# Patient Record
Sex: FEMALE | Race: WHITE | Employment: OTHER | URBAN - METROPOLITAN AREA
[De-identification: names, ages, dates, MRNs, and addresses within clinical notes are randomized per-mention and may not be internally consistent; named-entity substitution may affect disease eponyms.]

---

## 2017-03-03 PROBLEM — M17.12 PRIMARY OSTEOARTHRITIS OF LEFT KNEE: Status: ACTIVE | Noted: 2017-03-03

## 2017-03-23 PROBLEM — J44.9 COPD (CHRONIC OBSTRUCTIVE PULMONARY DISEASE) (HCC): Chronic | Status: ACTIVE | Noted: 2017-03-23

## 2017-03-23 PROBLEM — Z87.19 HISTORY OF ISCHEMIC BOWEL DISEASE: Chronic | Status: ACTIVE | Noted: 2017-03-23

## 2017-03-23 PROBLEM — N18.30 CHRONIC KIDNEY DISEASE, STAGE III (MODERATE) (HCC): Chronic | Status: ACTIVE | Noted: 2017-03-23

## 2017-03-23 PROBLEM — Z93.2 ILEOSTOMY PRESENT (HCC): Chronic | Status: ACTIVE | Noted: 2017-03-23

## 2018-06-13 ENCOUNTER — HOSPITAL ENCOUNTER (OUTPATIENT)
Dept: CT IMAGING | Age: 78
Discharge: HOME | End: 2018-06-15
Payer: MEDICARE

## 2018-06-13 DIAGNOSIS — R52 PAIN: ICD-10-CM

## 2018-06-13 PROCEDURE — 74160 CT ABDOMEN W/CONTRAST: CPT

## 2018-06-13 PROCEDURE — 6360000004 HC RX CONTRAST MEDICATION: Performed by: RADIOLOGY

## 2018-06-13 RX ADMIN — IOPAMIDOL 110 ML: 755 INJECTION, SOLUTION INTRAVENOUS at 15:20

## 2018-06-13 RX ADMIN — IOHEXOL 50 ML: 240 INJECTION, SOLUTION INTRATHECAL; INTRAVASCULAR; INTRAVENOUS; ORAL at 15:21

## 2018-07-05 ENCOUNTER — HOSPITAL ENCOUNTER (INPATIENT)
Age: 78
LOS: 2 days | Discharge: HOME | DRG: 683 | End: 2018-07-07
Attending: EMERGENCY MEDICINE | Admitting: INTERNAL MEDICINE
Payer: MEDICARE

## 2018-07-05 ENCOUNTER — APPOINTMENT (OUTPATIENT)
Dept: CT IMAGING | Age: 78
DRG: 683 | End: 2018-07-05
Payer: MEDICARE

## 2018-07-05 DIAGNOSIS — E87.5 HYPERKALEMIA: Primary | ICD-10-CM

## 2018-07-05 DIAGNOSIS — R11.2 NON-INTRACTABLE VOMITING WITH NAUSEA, UNSPECIFIED VOMITING TYPE: ICD-10-CM

## 2018-07-05 DIAGNOSIS — N17.9 ACUTE KIDNEY INJURY (HCC): ICD-10-CM

## 2018-07-05 DIAGNOSIS — E86.0 DEHYDRATION: ICD-10-CM

## 2018-07-05 DIAGNOSIS — E87.1 HYPONATREMIA: ICD-10-CM

## 2018-07-05 LAB
ANION GAP SERPL CALCULATED.3IONS-SCNC: 16 MMOL/L (ref 7–16)
ANION GAP SERPL CALCULATED.3IONS-SCNC: 18 MMOL/L (ref 7–16)
BASOPHILS ABSOLUTE: 0.05 E9/L (ref 0–0.2)
BASOPHILS RELATIVE PERCENT: 0.5 % (ref 0–2)
BUN BLDV-MCNC: 49 MG/DL (ref 8–23)
BUN BLDV-MCNC: 52 MG/DL (ref 8–23)
CALCIUM SERPL-MCNC: 8.7 MG/DL (ref 8.6–10.2)
CALCIUM SERPL-MCNC: 9.8 MG/DL (ref 8.6–10.2)
CHLORIDE BLD-SCNC: 94 MMOL/L (ref 98–107)
CHLORIDE BLD-SCNC: 96 MMOL/L (ref 98–107)
CO2: 16 MMOL/L (ref 22–29)
CO2: 18 MMOL/L (ref 22–29)
CREAT SERPL-MCNC: 1.7 MG/DL (ref 0.5–1)
CREAT SERPL-MCNC: 2 MG/DL (ref 0.5–1)
EKG ATRIAL RATE: 65 BPM
EKG P AXIS: 63 DEGREES
EKG P-R INTERVAL: 174 MS
EKG Q-T INTERVAL: 402 MS
EKG QRS DURATION: 94 MS
EKG QTC CALCULATION (BAZETT): 418 MS
EKG R AXIS: -43 DEGREES
EKG T AXIS: 49 DEGREES
EKG VENTRICULAR RATE: 65 BPM
EOSINOPHILS ABSOLUTE: 0.13 E9/L (ref 0.05–0.5)
EOSINOPHILS RELATIVE PERCENT: 1.4 % (ref 0–6)
GFR AFRICAN AMERICAN: 29
GFR AFRICAN AMERICAN: 35
GFR NON-AFRICAN AMERICAN: 24 ML/MIN/1.73
GFR NON-AFRICAN AMERICAN: 29 ML/MIN/1.73
GLUCOSE BLD-MCNC: 138 MG/DL (ref 74–109)
GLUCOSE BLD-MCNC: 75 MG/DL (ref 74–109)
HCT VFR BLD CALC: 49.5 % (ref 34–48)
HEMOGLOBIN: 16.2 G/DL (ref 11.5–15.5)
IMMATURE GRANULOCYTES #: 0.08 E9/L
IMMATURE GRANULOCYTES %: 0.8 % (ref 0–5)
LACTIC ACID: 1.1 MMOL/L (ref 0.5–2.2)
LYMPHOCYTES ABSOLUTE: 1.51 E9/L (ref 1.5–4)
LYMPHOCYTES RELATIVE PERCENT: 16 % (ref 20–42)
MCH RBC QN AUTO: 32.1 PG (ref 26–35)
MCHC RBC AUTO-ENTMCNC: 32.7 % (ref 32–34.5)
MCV RBC AUTO: 98.2 FL (ref 80–99.9)
MONOCYTES ABSOLUTE: 0.94 E9/L (ref 0.1–0.95)
MONOCYTES RELATIVE PERCENT: 10 % (ref 2–12)
NEUTROPHILS ABSOLUTE: 6.73 E9/L (ref 1.8–7.3)
NEUTROPHILS RELATIVE PERCENT: 71.3 % (ref 43–80)
PDW BLD-RTO: 13.6 FL (ref 11.5–15)
PLATELET # BLD: 267 E9/L (ref 130–450)
PMV BLD AUTO: 11.4 FL (ref 7–12)
POTASSIUM REFLEX MAGNESIUM: 6.8 MMOL/L (ref 3.5–5)
POTASSIUM SERPL-SCNC: 5.4 MMOL/L (ref 3.5–5)
RBC # BLD: 5.04 E12/L (ref 3.5–5.5)
SODIUM BLD-SCNC: 128 MMOL/L (ref 132–146)
SODIUM BLD-SCNC: 130 MMOL/L (ref 132–146)
TROPONIN: <0.01 NG/ML (ref 0–0.03)
WBC # BLD: 9.4 E9/L (ref 4.5–11.5)

## 2018-07-05 PROCEDURE — 2060000000 HC ICU INTERMEDIATE R&B

## 2018-07-05 PROCEDURE — 93005 ELECTROCARDIOGRAM TRACING: CPT | Performed by: EMERGENCY MEDICINE

## 2018-07-05 PROCEDURE — 6370000000 HC RX 637 (ALT 250 FOR IP): Performed by: INTERNAL MEDICINE

## 2018-07-05 PROCEDURE — 83605 ASSAY OF LACTIC ACID: CPT

## 2018-07-05 PROCEDURE — 96374 THER/PROPH/DIAG INJ IV PUSH: CPT

## 2018-07-05 PROCEDURE — 96375 TX/PRO/DX INJ NEW DRUG ADDON: CPT

## 2018-07-05 PROCEDURE — 36415 COLL VENOUS BLD VENIPUNCTURE: CPT

## 2018-07-05 PROCEDURE — 85025 COMPLETE CBC W/AUTO DIFF WBC: CPT

## 2018-07-05 PROCEDURE — 96361 HYDRATE IV INFUSION ADD-ON: CPT

## 2018-07-05 PROCEDURE — 2580000003 HC RX 258: Performed by: EMERGENCY MEDICINE

## 2018-07-05 PROCEDURE — 6360000002 HC RX W HCPCS: Performed by: EMERGENCY MEDICINE

## 2018-07-05 PROCEDURE — 94664 DEMO&/EVAL PT USE INHALER: CPT

## 2018-07-05 PROCEDURE — 94640 AIRWAY INHALATION TREATMENT: CPT

## 2018-07-05 PROCEDURE — 99285 EMERGENCY DEPT VISIT HI MDM: CPT

## 2018-07-05 PROCEDURE — 74176 CT ABD & PELVIS W/O CONTRAST: CPT

## 2018-07-05 PROCEDURE — 2500000003 HC RX 250 WO HCPCS: Performed by: EMERGENCY MEDICINE

## 2018-07-05 PROCEDURE — 6360000004 HC RX CONTRAST MEDICATION: Performed by: RADIOLOGY

## 2018-07-05 PROCEDURE — 84484 ASSAY OF TROPONIN QUANT: CPT

## 2018-07-05 PROCEDURE — 80048 BASIC METABOLIC PNL TOTAL CA: CPT

## 2018-07-05 PROCEDURE — 6370000000 HC RX 637 (ALT 250 FOR IP): Performed by: EMERGENCY MEDICINE

## 2018-07-05 PROCEDURE — 94760 N-INVAS EAR/PLS OXIMETRY 1: CPT

## 2018-07-05 PROCEDURE — 2580000003 HC RX 258: Performed by: INTERNAL MEDICINE

## 2018-07-05 RX ORDER — ONDANSETRON 2 MG/ML
4 INJECTION INTRAMUSCULAR; INTRAVENOUS ONCE
Status: COMPLETED | OUTPATIENT
Start: 2018-07-05 | End: 2018-07-05

## 2018-07-05 RX ORDER — TRAZODONE HYDROCHLORIDE 50 MG/1
50 TABLET ORAL NIGHTLY
Status: DISCONTINUED | OUTPATIENT
Start: 2018-07-05 | End: 2018-07-07 | Stop reason: HOSPADM

## 2018-07-05 RX ORDER — SODIUM CHLORIDE 9 MG/ML
INJECTION, SOLUTION INTRAVENOUS CONTINUOUS
Status: DISCONTINUED | OUTPATIENT
Start: 2018-07-05 | End: 2018-07-05 | Stop reason: SDUPTHER

## 2018-07-05 RX ORDER — FENTANYL CITRATE 50 UG/ML
50 INJECTION, SOLUTION INTRAMUSCULAR; INTRAVENOUS ONCE
Status: COMPLETED | OUTPATIENT
Start: 2018-07-05 | End: 2018-07-05

## 2018-07-05 RX ORDER — SODIUM CHLORIDE 0.9 % (FLUSH) 0.9 %
10 SYRINGE (ML) INJECTION PRN
Status: DISCONTINUED | OUTPATIENT
Start: 2018-07-05 | End: 2018-07-07 | Stop reason: HOSPADM

## 2018-07-05 RX ORDER — PROPRANOLOL HYDROCHLORIDE 20 MG/1
20 TABLET ORAL 2 TIMES DAILY
Status: DISCONTINUED | OUTPATIENT
Start: 2018-07-05 | End: 2018-07-07 | Stop reason: HOSPADM

## 2018-07-05 RX ORDER — FENTANYL CITRATE 50 UG/ML
50 INJECTION, SOLUTION INTRAMUSCULAR; INTRAVENOUS ONCE
Status: DISCONTINUED | OUTPATIENT
Start: 2018-07-05 | End: 2018-07-06

## 2018-07-05 RX ORDER — PRIMIDONE 250 MG/1
250 TABLET ORAL NIGHTLY
Status: DISCONTINUED | OUTPATIENT
Start: 2018-07-05 | End: 2018-07-07 | Stop reason: HOSPADM

## 2018-07-05 RX ORDER — PRAVASTATIN SODIUM 20 MG
40 TABLET ORAL EVERY EVENING
Status: DISCONTINUED | OUTPATIENT
Start: 2018-07-05 | End: 2018-07-07 | Stop reason: HOSPADM

## 2018-07-05 RX ORDER — SODIUM CHLORIDE 0.9 % (FLUSH) 0.9 %
10 SYRINGE (ML) INJECTION EVERY 12 HOURS SCHEDULED
Status: DISCONTINUED | OUTPATIENT
Start: 2018-07-05 | End: 2018-07-07 | Stop reason: HOSPADM

## 2018-07-05 RX ORDER — OXYCODONE AND ACETAMINOPHEN 10; 325 MG/1; MG/1
1 TABLET ORAL EVERY 6 HOURS PRN
Status: DISCONTINUED | OUTPATIENT
Start: 2018-07-05 | End: 2018-07-07 | Stop reason: HOSPADM

## 2018-07-05 RX ORDER — ALBUTEROL SULFATE 2.5 MG/3ML
10 SOLUTION RESPIRATORY (INHALATION) ONCE
Status: COMPLETED | OUTPATIENT
Start: 2018-07-05 | End: 2018-07-05

## 2018-07-05 RX ORDER — ONDANSETRON 2 MG/ML
4 INJECTION INTRAMUSCULAR; INTRAVENOUS EVERY 6 HOURS PRN
Status: DISCONTINUED | OUTPATIENT
Start: 2018-07-05 | End: 2018-07-07 | Stop reason: HOSPADM

## 2018-07-05 RX ORDER — METOCLOPRAMIDE 5 MG/1
5 TABLET ORAL
Status: DISCONTINUED | OUTPATIENT
Start: 2018-07-06 | End: 2018-07-07 | Stop reason: HOSPADM

## 2018-07-05 RX ORDER — DILTIAZEM HYDROCHLORIDE 180 MG/1
180 CAPSULE, COATED, EXTENDED RELEASE ORAL DAILY
Status: DISCONTINUED | OUTPATIENT
Start: 2018-07-06 | End: 2018-07-06

## 2018-07-05 RX ORDER — SODIUM CHLORIDE 9 MG/ML
INJECTION, SOLUTION INTRAVENOUS CONTINUOUS
Status: DISCONTINUED | OUTPATIENT
Start: 2018-07-05 | End: 2018-07-07

## 2018-07-05 RX ORDER — PANTOPRAZOLE SODIUM 40 MG/1
40 GRANULE, DELAYED RELEASE ORAL DAILY
Status: DISCONTINUED | OUTPATIENT
Start: 2018-07-06 | End: 2018-07-07 | Stop reason: HOSPADM

## 2018-07-05 RX ORDER — SODIUM CHLORIDE 0.9 % (FLUSH) 0.9 %
10 SYRINGE (ML) INJECTION PRN
Status: DISCONTINUED | OUTPATIENT
Start: 2018-07-05 | End: 2018-07-05

## 2018-07-05 RX ORDER — 0.9 % SODIUM CHLORIDE 0.9 %
1000 INTRAVENOUS SOLUTION INTRAVENOUS ONCE
Status: COMPLETED | OUTPATIENT
Start: 2018-07-05 | End: 2018-07-05

## 2018-07-05 RX ORDER — DEXTROSE MONOHYDRATE 25 G/50ML
50 INJECTION, SOLUTION INTRAVENOUS ONCE
Status: COMPLETED | OUTPATIENT
Start: 2018-07-05 | End: 2018-07-05

## 2018-07-05 RX ORDER — ESCITALOPRAM OXALATE 10 MG/1
20 TABLET ORAL NIGHTLY
Status: DISCONTINUED | OUTPATIENT
Start: 2018-07-05 | End: 2018-07-07 | Stop reason: HOSPADM

## 2018-07-05 RX ORDER — ACETAMINOPHEN 325 MG/1
650 TABLET ORAL EVERY 4 HOURS PRN
Status: DISCONTINUED | OUTPATIENT
Start: 2018-07-05 | End: 2018-07-07 | Stop reason: HOSPADM

## 2018-07-05 RX ADMIN — Medication 10 ML: at 18:44

## 2018-07-05 RX ADMIN — SODIUM CHLORIDE 1000 ML: 9 INJECTION, SOLUTION INTRAVENOUS at 18:49

## 2018-07-05 RX ADMIN — TRAZODONE HYDROCHLORIDE 50 MG: 50 TABLET ORAL at 23:48

## 2018-07-05 RX ADMIN — PRIMIDONE 250 MG: 250 TABLET ORAL at 23:48

## 2018-07-05 RX ADMIN — INSULIN HUMAN 10 UNITS: 100 INJECTION, SOLUTION PARENTERAL at 18:44

## 2018-07-05 RX ADMIN — Medication 10 ML: at 23:48

## 2018-07-05 RX ADMIN — ALBUTEROL SULFATE 10 MG: 2.5 SOLUTION RESPIRATORY (INHALATION) at 19:29

## 2018-07-05 RX ADMIN — SODIUM BICARBONATE 50 MEQ: 84 INJECTION, SOLUTION INTRAVENOUS at 18:44

## 2018-07-05 RX ADMIN — PRAVASTATIN SODIUM 40 MG: 20 TABLET ORAL at 23:48

## 2018-07-05 RX ADMIN — ESCITALOPRAM OXALATE 20 MG: 10 TABLET ORAL at 23:48

## 2018-07-05 RX ADMIN — ONDANSETRON 4 MG: 2 INJECTION INTRAMUSCULAR; INTRAVENOUS at 17:40

## 2018-07-05 RX ADMIN — FENTANYL CITRATE 50 MCG: 50 INJECTION, SOLUTION INTRAMUSCULAR; INTRAVENOUS at 17:40

## 2018-07-05 RX ADMIN — DEXTROSE MONOHYDRATE 50 ML: 25 INJECTION, SOLUTION INTRAVENOUS at 18:44

## 2018-07-05 RX ADMIN — SODIUM CHLORIDE: 9 INJECTION, SOLUTION INTRAVENOUS at 23:41

## 2018-07-05 RX ADMIN — IOHEXOL 50 ML: 240 INJECTION, SOLUTION INTRATHECAL; INTRAVASCULAR; INTRAVENOUS; ORAL at 18:58

## 2018-07-05 ASSESSMENT — ENCOUNTER SYMPTOMS
ABDOMINAL PAIN: 1
EYE PAIN: 0
SINUS PRESSURE: 0
SHORTNESS OF BREATH: 0
WHEEZING: 0
EYE REDNESS: 0
ABDOMINAL DISTENTION: 1
VOMITING: 1
BACK PAIN: 0
DIARRHEA: 1
COUGH: 0
EYE DISCHARGE: 0
SORE THROAT: 0
NAUSEA: 1

## 2018-07-05 ASSESSMENT — PAIN DESCRIPTION - PAIN TYPE
TYPE: ACUTE PAIN
TYPE: ACUTE PAIN

## 2018-07-05 ASSESSMENT — PAIN SCALES - GENERAL
PAINLEVEL_OUTOF10: 3
PAINLEVEL_OUTOF10: 4
PAINLEVEL_OUTOF10: 1

## 2018-07-05 ASSESSMENT — PAIN DESCRIPTION - LOCATION
LOCATION: NECK;HEAD;BACK
LOCATION: ABDOMEN

## 2018-07-05 NOTE — ED PROVIDER NOTES
past medical history of Acute kidney failure ([facility] [address] Anemia; Anxiety; Arthritis; Bladder calculus; CAD (coronary artery disease); Cancer [facility]); COPD (chronic obstructive pulmonary disease) ([facility] Depression; Fibromyalgia; Gastritis; Generalized headaches; History of blood transfusion; History of necrotic bowel; Hyperlipidemia; Hypertension; Incisional hernia; Insomnia; Mitral valve regurgitation; Myocardial infarct; Nausea; Occasional tremors; Osteopenia; PONV (postoperative nausea and vomiting); and Vitamin B12 deficiency. Past Surgical History:  has a past surgical history that includes Coronary angioplasty with stent (1-7-2002); Colonoscopy; Esophagus surgery (1-4-13); Rotator cuff repair (2010); arthroplasty (1-2006); Finger trigger release (Right, 2006); Cataract removal with implant (Right, 03/03/15); Abdomen surgery (2-); Appendectomy (2002); Cholecystectomy (5-11-07); Carpal tunnel release (Bilateral); skin biopsy (2010); Foot surgery (Right); ileostomy or jejunostomy (1-3-13); joint replacement (Right, 3/24/15); Endoscopy, colon, diagnostic; epigastric hernia repair (3/21/16); other surgical history (08/24/2016); Nasal sinus surgery; back surgery (1991, 2004, 2009); back surgery; Mohs surgery; other surgical history; hernia repair (12-31-13); Breast surgery (years ago); and Knee Arthroplasty (Left, 03/22/2017). Social History:  reports that she has been smoking. She has been smoking about 1.00 pack per day. She has never used smokeless tobacco. She reports that she drinks alcohol. She reports that she does not use drugs. Family History: family history is not on file. The patients home medications have been reviewed.     Allergies: Fentanyl; Levofloxacin; Tramadol; and Vioxx [rofecoxib]    -------------------------------------------------- RESULTS -------------------------------------------------    LABS:  Results for orders placed or performed during the hospital encounter of 07/05/18 Duration 94 ms    Q-T Interval 402 ms    QTc Calculation (Bazett) 418 ms    P Axis 63 degrees    R Axis -43 degrees    T Axis 49 degrees       RADIOLOGY:  CT ABDOMEN PELVIS WO CONTRAST Additional Contrast? Oral   Final Result          ------------------------- NURSING NOTES AND VITALS REVIEWED ---------------------------  Date / Time Roomed:  7/5/2018  3:53 PM  ED Bed Assignment:  11/11    The nursing notes within the ED encounter and vital signs as below have been reviewed. Patient Vitals for the past 24 hrs:   BP Temp Temp src Pulse Resp SpO2 Height Weight   07/05/18 1551 117/62 98.3 °F (36.8 °C) Oral 75 14 94 % 5' (1.524 m) 130 lb (59 kg)       Oxygen Saturation Interpretation: Normal    ------------------------------------------ PROGRESS NOTES ------------------------------------------  Counseling:  I have spoken with the patient and  and discussed todays results, in addition to providing specific details for the plan of care and counseling regarding the diagnosis and prognosis. Their questions are answered at this time and they are agreeable with the plan of admission.    --------------------------------- ADDITIONAL PROVIDER NOTES ---------------------------------  Consultations:  Time: 10:28 PM. Spoke with Dr. Joseline Kasper. Discussed case. They will admit the patient. This patient's ED course included: a personal history and physicial examination, multiple bedside re-evaluations, IV medications, cardiac monitoring, continuous pulse oximetry and complex medical decision making and emergency management    This patient has remained hemodynamically stable during their ED course. Diagnosis:  1. Hyperkalemia    2. Acute kidney injury ([address]    3. Hyponatremia    4. Dehydration    5. Non-intractable vomiting with nausea, unspecified vomiting type        Disposition:  Patient's disposition: Admit to telemetry  Patient's condition is serious.          Aurelia Hill, DO  Resident  07/05/18 9291

## 2018-07-06 ENCOUNTER — APPOINTMENT (OUTPATIENT)
Dept: ULTRASOUND IMAGING | Age: 78
DRG: 683 | End: 2018-07-06
Payer: MEDICARE

## 2018-07-06 PROBLEM — E44.0 MODERATE PROTEIN MALNUTRITION (HCC): Status: ACTIVE | Noted: 2018-07-06

## 2018-07-06 LAB
ANION GAP SERPL CALCULATED.3IONS-SCNC: 16 MMOL/L (ref 7–16)
BUN BLDV-MCNC: 45 MG/DL (ref 8–23)
CALCIUM SERPL-MCNC: 8.6 MG/DL (ref 8.6–10.2)
CHLORIDE BLD-SCNC: 98 MMOL/L (ref 98–107)
CHLORIDE URINE RANDOM: <20 MMOL/L
CO2: 17 MMOL/L (ref 22–29)
CREAT SERPL-MCNC: 1.6 MG/DL (ref 0.5–1)
GFR AFRICAN AMERICAN: 38
GFR NON-AFRICAN AMERICAN: 31 ML/MIN/1.73
GLUCOSE BLD-MCNC: 109 MG/DL (ref 74–109)
HCT VFR BLD CALC: 41.7 % (ref 34–48)
HEMOGLOBIN: 13.5 G/DL (ref 11.5–15.5)
MAGNESIUM: 1.9 MG/DL (ref 1.6–2.6)
MCH RBC QN AUTO: 32.1 PG (ref 26–35)
MCHC RBC AUTO-ENTMCNC: 32.4 % (ref 32–34.5)
MCV RBC AUTO: 99 FL (ref 80–99.9)
PDW BLD-RTO: 13.2 FL (ref 11.5–15)
PLATELET # BLD: 201 E9/L (ref 130–450)
PMV BLD AUTO: 11 FL (ref 7–12)
POTASSIUM SERPL-SCNC: 5.4 MMOL/L (ref 3.5–5)
POTASSIUM SERPL-SCNC: 5.5 MMOL/L (ref 3.5–5)
POTASSIUM, UR: 53.9 MMOL/L
RBC # BLD: 4.21 E12/L (ref 3.5–5.5)
SODIUM BLD-SCNC: 131 MMOL/L (ref 132–146)
SODIUM URINE: <20 MMOL/L
WBC # BLD: 7.9 E9/L (ref 4.5–11.5)

## 2018-07-06 PROCEDURE — 83735 ASSAY OF MAGNESIUM: CPT

## 2018-07-06 PROCEDURE — 6370000000 HC RX 637 (ALT 250 FOR IP): Performed by: INTERNAL MEDICINE

## 2018-07-06 PROCEDURE — 51702 INSERT TEMP BLADDER CATH: CPT

## 2018-07-06 PROCEDURE — 2580000003 HC RX 258: Performed by: INTERNAL MEDICINE

## 2018-07-06 PROCEDURE — 84133 ASSAY OF URINE POTASSIUM: CPT

## 2018-07-06 PROCEDURE — 6360000002 HC RX W HCPCS: Performed by: INTERNAL MEDICINE

## 2018-07-06 PROCEDURE — 36415 COLL VENOUS BLD VENIPUNCTURE: CPT

## 2018-07-06 PROCEDURE — 2500000003 HC RX 250 WO HCPCS: Performed by: INTERNAL MEDICINE

## 2018-07-06 PROCEDURE — 84300 ASSAY OF URINE SODIUM: CPT

## 2018-07-06 PROCEDURE — 2060000000 HC ICU INTERMEDIATE R&B

## 2018-07-06 PROCEDURE — 84132 ASSAY OF SERUM POTASSIUM: CPT

## 2018-07-06 PROCEDURE — 85027 COMPLETE CBC AUTOMATED: CPT

## 2018-07-06 PROCEDURE — 82436 ASSAY OF URINE CHLORIDE: CPT

## 2018-07-06 PROCEDURE — 80048 BASIC METABOLIC PNL TOTAL CA: CPT

## 2018-07-06 PROCEDURE — 76770 US EXAM ABDO BACK WALL COMP: CPT

## 2018-07-06 RX ORDER — SODIUM BICARBONATE 650 MG/1
650 TABLET ORAL 2 TIMES DAILY WITH MEALS
Status: DISCONTINUED | OUTPATIENT
Start: 2018-07-06 | End: 2018-07-07 | Stop reason: HOSPADM

## 2018-07-06 RX ORDER — DILTIAZEM HYDROCHLORIDE 120 MG/1
120 CAPSULE, COATED, EXTENDED RELEASE ORAL DAILY
Status: DISCONTINUED | OUTPATIENT
Start: 2018-07-06 | End: 2018-07-07 | Stop reason: HOSPADM

## 2018-07-06 RX ADMIN — Medication 10 ML: at 21:58

## 2018-07-06 RX ADMIN — SODIUM PHOSPHATE, MONOBASIC, MONOHYDRATE 15 MMOL: 276; 142 INJECTION, SOLUTION INTRAVENOUS at 10:37

## 2018-07-06 RX ADMIN — SODIUM BICARBONATE 650 MG: 650 TABLET ORAL at 16:42

## 2018-07-06 RX ADMIN — OXYCODONE AND ACETAMINOPHEN 1 TABLET: 10; 325 TABLET ORAL at 16:40

## 2018-07-06 RX ADMIN — ENOXAPARIN SODIUM 30 MG: 100 INJECTION SUBCUTANEOUS at 10:27

## 2018-07-06 RX ADMIN — SODIUM CHLORIDE: 9 INJECTION, SOLUTION INTRAVENOUS at 21:58

## 2018-07-06 RX ADMIN — SODIUM CHLORIDE: 9 INJECTION, SOLUTION INTRAVENOUS at 12:32

## 2018-07-06 RX ADMIN — OXYCODONE AND ACETAMINOPHEN 1 TABLET: 10; 325 TABLET ORAL at 22:43

## 2018-07-06 RX ADMIN — PRIMIDONE 250 MG: 250 TABLET ORAL at 21:57

## 2018-07-06 RX ADMIN — ESCITALOPRAM OXALATE 20 MG: 10 TABLET ORAL at 21:58

## 2018-07-06 RX ADMIN — TRAZODONE HYDROCHLORIDE 50 MG: 50 TABLET ORAL at 23:57

## 2018-07-06 RX ADMIN — Medication 20 ML: at 13:53

## 2018-07-06 RX ADMIN — SODIUM BICARBONATE 650 MG: 650 TABLET ORAL at 12:58

## 2018-07-06 RX ADMIN — DILTIAZEM HYDROCHLORIDE 120 MG: 120 CAPSULE, COATED, EXTENDED RELEASE ORAL at 10:37

## 2018-07-06 RX ADMIN — PROPRANOLOL HYDROCHLORIDE 20 MG: 20 TABLET ORAL at 10:26

## 2018-07-06 RX ADMIN — OXYCODONE AND ACETAMINOPHEN 1 TABLET: 10; 325 TABLET ORAL at 10:26

## 2018-07-06 RX ADMIN — PANTOPRAZOLE SODIUM 40 MG: 40 GRANULE, DELAYED RELEASE ORAL at 10:26

## 2018-07-06 RX ADMIN — PRAVASTATIN SODIUM 40 MG: 20 TABLET ORAL at 21:57

## 2018-07-06 RX ADMIN — ONDANSETRON 4 MG: 2 INJECTION, SOLUTION INTRAMUSCULAR; INTRAVENOUS at 12:32

## 2018-07-06 ASSESSMENT — PAIN DESCRIPTION - DESCRIPTORS
DESCRIPTORS: ACHING;CONSTANT;DISCOMFORT
DESCRIPTORS: ACHING;CONSTANT;DISCOMFORT

## 2018-07-06 ASSESSMENT — PAIN SCALES - GENERAL
PAINLEVEL_OUTOF10: 2
PAINLEVEL_OUTOF10: 4
PAINLEVEL_OUTOF10: 5
PAINLEVEL_OUTOF10: 2
PAINLEVEL_OUTOF10: 0
PAINLEVEL_OUTOF10: 3
PAINLEVEL_OUTOF10: 4
PAINLEVEL_OUTOF10: 0

## 2018-07-06 ASSESSMENT — PAIN DESCRIPTION - ONSET
ONSET: ON-GOING
ONSET: ON-GOING

## 2018-07-06 ASSESSMENT — PAIN DESCRIPTION - PROGRESSION
CLINICAL_PROGRESSION: NOT CHANGED
CLINICAL_PROGRESSION: NOT CHANGED

## 2018-07-06 ASSESSMENT — PAIN DESCRIPTION - PAIN TYPE
TYPE: ACUTE PAIN
TYPE: CHRONIC PAIN
TYPE: CHRONIC PAIN
TYPE: ACUTE PAIN

## 2018-07-06 ASSESSMENT — PAIN DESCRIPTION - ORIENTATION
ORIENTATION: MID
ORIENTATION: MID

## 2018-07-06 ASSESSMENT — PAIN DESCRIPTION - LOCATION
LOCATION: BACK;NECK
LOCATION: BACK;NECK
LOCATION: HEAD;NECK
LOCATION: HEAD

## 2018-07-06 ASSESSMENT — PAIN DESCRIPTION - FREQUENCY
FREQUENCY: CONTINUOUS
FREQUENCY: CONTINUOUS

## 2018-07-06 NOTE — H&P
SURGERY  years ago    monor calcifications    CARPAL TUNNEL RELEASE Bilateral     CATARACT REMOVAL WITH IMPLANT Right 03/03/15    CHOLECYSTECTOMY  5-11-07    Lap    COLONOSCOPY      CORONARY ANGIOPLASTY WITH STENT PLACEMENT  1-7-2002    ENDOSCOPY, COLON, DIAGNOSTIC      EPIGASTRIC HERNIA REPAIR  3/21/16    incisional with mesh implantation    ESOPHAGUS SURGERY  1-4-13    esophageal clamp (torn Esophagus)    FINGER TRIGGER RELEASE Right 2006    index finger    FOOT SURGERY Right     multiple    HERNIA REPAIR  12-31-13    abdominal x 5    ILEOSTOMY OR JEJUNOSTOMY  1-3-13    revision    JOINT REPLACEMENT Right 3/24/15    right total shoulder arthroplasty    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     MOHS SURGERY      bilateal / leg    NASAL SINUS SURGERY      x2 /  cauterized    OTHER SURGICAL HISTORY  08/24/2016    diagnostic laparotomy with repair of intraabdominal hernia    OTHER SURGICAL HISTORY      removal plate / screws  / right great toe    ROTATOR CUFF REPAIR  2010    right     SKIN BIOPSY  2010    3 squamous cell carcinoma right leg, left leg       Medications Prior to Admission:    Prescriptions Prior to Admission: aspirin 325 MG EC tablet, Take 1 tablet by mouth daily  trimethobenzamide (TIGAN) 300 MG capsule, Take 300 mg by mouth every 6 hours as needed  oxyCODONE-acetaminophen (PERCOCET)  MG per tablet, Take 1 tablet by mouth every 6 hours as needed for Pain  Instructed to take with sip water am of procedure if needs .   fenofibrate (TRICOR) 145 MG tablet, Take 145 mg by mouth daily  Multiple Vitamins-Minerals (ICAPS AREDS 2 PO), Take by mouth daily LD 3/16/2017  Cholecalciferol (VITAMIN D3) 48214 UNITS CAPS, Take by mouth every 7 days Takes every Friday LD 3/16/2017  Coenzyme Q10 (COQ10 PO), Take by mouth daily LD 3/16/2017  Ferrous Sulfate (IRON) 28 MG TABS, Take by mouth daily  calcium carbonate (OSCAL) 500 MG TABS tablet, Take 500 mg by mouth daily LD 3/16/2017  metoclopramide (REGLAN) 5 MG tablet, Take 5 mg by mouth 2 times daily (before meals)   pravastatin (PRAVACHOL) 40 MG tablet, Take 40 mg by mouth every evening  propranolol (INDERAL) 20 MG tablet, Take 20 mg by mouth 2 times daily Instructed to take morning of surgery with a sip of water  vitamin B-12 (CYANOCOBALAMIN) 500 MCG tablet, Place 500 mcg under the tongue daily holdInstructed to hold 5 days pre-op / LD 3/16/2017  Omega-3 Fatty Acids (OMEGA 3 PO), Take by mouth daily Instructed to hold 5 days pre-op / LD 3/16/2017  diltiazem (CARDIZEM CD) 240 MG ER capsule, Take 180 mg by mouth daily Instructed to take morning of surgery with a sip of water  escitalopram (LEXAPRO) 20 MG tablet, Take 20 mg by mouth nightly. Pantoprazole Sodium (PROTONIX) 40 MG PACK packet, Take 40 mg by mouth daily Instructed to take morning of surgery with a sip of water  primidone (MYSOLINE) 50 MG tablet, Take 250 mg by mouth nightly Taking for essential tremors  traZODone (DESYREL) 50 MG tablet, Take 50 mg by mouth nightly. tiZANidine (ZANAFLEX) 4 MG tablet, Take 4 mg by mouth 2 times daily as needed. Note that the patient's home medications were reviewed and the above list is accurate to the best of my knowledge at the time of the exam.    Allergies:    Fentanyl; Levofloxacin; Tramadol; and Vioxx [rofecoxib]    Social History:    reports that she has been smoking. She has been smoking about 1.00 pack per day. She has never used smokeless tobacco. She reports that she drinks alcohol. She reports that she does not use drugs. Family History:   History is noncontributory due to advanced age and co-morbidities. REVIEW OF SYSTEMS:  As above in the HPI, otherwise negative    PHYSICAL EXAM:    Vitals:  BP (!) 94/57   Pulse 92   Temp 98 °F (36.7 °C) (Oral)   Resp 18   Ht 5' (1.524 m)   Wt 130 lb (59 kg)   SpO2 94%   BMI 25.39 kg/m²     General:  Awake, alert, oriented X 3. Well developed, well nourished, well groomed. No apparent distress. HEENT:  Normocephalic, atraumatic. No scleral icterus. No conjunctival injection. Normal lips, teeth, and gums. No nasal discharge. Neck:  Supple  Heart:  RRR, 3/6 holosystolic murmur at apex, no gallops or rubs  Lungs:  CTA bilaterally, bilat symmetrical expansion, no wheeze, rales, or rhonchi  Abdomen:   Bowel sounds present, soft, non distended, nontender, no masses, no organomegaly, no peritoneal signs  Extremities:  No clubbing, cyanosis, or edema  Skin:  Warm and dry, no open lesions or rash  Neuro:  Cranial nerves 2-12 intact, no focal deficits  Breast: deferred  Rectal: deferred  Genitalia:  deferred    LABS:  CBC:   Lab Results   Component Value Date    WBC 7.9 07/06/2018    RBC 4.21 07/06/2018    HGB 13.5 07/06/2018    HCT 41.7 07/06/2018    MCV 99.0 07/06/2018    MCH 32.1 07/06/2018    MCHC 32.4 07/06/2018    RDW 13.2 07/06/2018     07/06/2018    MPV 11.0 07/06/2018     BMP:    Lab Results   Component Value Date     07/06/2018    K 5.4 07/06/2018    CL 98 07/06/2018    CO2 17 07/06/2018    BUN 45 07/06/2018    CREATININE 1.6 07/06/2018    CALCIUM 8.6 07/06/2018    GFRAA 38 07/06/2018    LABGLOM 31 07/06/2018    GLUCOSE 109 07/06/2018     Magnesium:    Lab Results   Component Value Date    MG 1.9 07/06/2018     Phosphorus:    Lab Results   Component Value Date    PHOS 1.4 08/26/2016       Imaging studies pending  Monitor-sinus      ASSESSMENT:      Patient Active Problem List   Diagnosis    Acute kidney injury with severe hyperkalemia-secondary to volume depletion    Essential hypertension, benign    Hyperlipidemia, mixed    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    Monica Colon MD  9:11 AM  7/6/2018

## 2018-07-06 NOTE — CONSULTS
Nephrology Consult  [facility]  Toni Freeman MD    CC:   arf    HPI:  The pt is a 71 yo female who was admitted for nausea, vomiting, diarrhea for several days prior to admission. Labs showed na 128, k 6.8, co2 18, bun 49, cr 1.7, ca 8.7, wbc 9.4, hgb 16.2, plt 267, urine na < 20. Renal us was without hydronephrosis. She has been started on ivf, ns at 150 ml/hr. Her bp has been low. She does have an ostomy and the output from it is more watery and higher volume.     PMH:    Past Medical History:   Diagnosis Date    Acute kidney failure ([address] 2014 and 8/2016    x2,no dialysis needed,resolved, kidney function tests returned to normal    Anemia     h/o    Anxiety     Arthritis     Bladder calculus     h/o    CAD (coronary artery disease)     follows with Dr. Bre Bhagat every 6 months    Cancer [facility])     skin, leg,nose    COPD (chronic obstructive pulmonary disease) (HCC)     mild    Depression     Fibromyalgia     chronic back and neck pain    Gastritis     Generalized headaches     h/o / daily    History of blood transfusion 3-11-14    multiple times    History of necrotic bowel 2012    Hyperlipidemia     Hypertension     Incisional hernia     abdomen    Insomnia     Mitral valve regurgitation     Myocardial infarct 2002    Nausea     daily / since last hernia surgery in 3/2015    Occasional tremors     at hands / stable with medication    Osteopenia     PONV (postoperative nausea and vomiting)     Vitamin B12 deficiency     h/o       Patient Active Problem List   Diagnosis    Right cataract    Essential hypertension, benign    Hyperlipidemia with target LDL less than 100    Osteoarthritis, shoulder    Primary osteoarthritis of left knee    History of ischemic bowel disease    Ileostomy present ([address]    Chronic kidney disease, stage III (moderate)    COPD (chronic obstructive pulmonary disease) ([address]    KD (acute kidney injury) ([address]       Meds:     diltiazem  120 mg Oral the tongue daily holdInstructed to hold 5 days pre-op / LD 3/16/2017      Omega-3 Fatty Acids (OMEGA 3 PO) Take by mouth daily Instructed to hold 5 days pre-op / LD 3/16/2017      diltiazem (CARDIZEM CD) 240 MG ER capsule Take 180 mg by mouth daily Instructed to take morning of surgery with a sip of water      escitalopram (LEXAPRO) 20 MG tablet Take 20 mg by mouth nightly.  Pantoprazole Sodium (PROTONIX) 40 MG PACK packet Take 40 mg by mouth daily Instructed to take morning of surgery with a sip of water      primidone (MYSOLINE) 50 MG tablet Take 250 mg by mouth nightly Taking for essential tremors      traZODone (DESYREL) 50 MG tablet Take 50 mg by mouth nightly.  tiZANidine (ZANAFLEX) 4 MG tablet Take 4 mg by mouth 2 times daily as needed. Allergies:    Fentanyl; Levofloxacin; Tramadol; and Vioxx [rofecoxib]    Social History:     reports that she has been smoking. She has been smoking about 1.00 pack per day. She has never used smokeless tobacco. She reports that she drinks alcohol. She reports that she does not use drugs.     Family History:     No h/o kidney disease    ROS:     General: no fever, chills   Heent: no nasal congestion, sore throat   Resp: no cough, sob   Cardiac: no cp   Gi: no nausea, vomiting, melena, abd pain, nausea  Gu: no hematuria, dysuria   Neruo: no numbness, weakness, headache, blurry vision   Endocrine:  No h/o dm  Derm: no rash   Heme: no epistaxis, bruising  All other sx negative     Physical Exam:    Patient Vitals for the past 24 hrs:   BP Temp Temp src Pulse Resp SpO2 Height Weight   07/06/18 1015 - 98.1 °F (36.7 °C) Oral - 18 - - -   07/06/18 0430 (!) 94/57 98 °F (36.7 °C) Oral 92 18 94 % - -   07/05/18 2306 (!) 99/56 97.9 °F (36.6 °C) Oral 88 20 93 % - -   07/05/18 2238 (!) 80/52 98.2 °F (36.8 °C) - 89 18 - - -   07/05/18 1551 117/62 98.3 °F (36.8 °C) Oral 75 14 94 % 5' (1.524 m) 130 lb (59 kg)         Intake/Output Summary (Last 24 hours) at 07/06/18 1056  Last data filed at 07/06/18 1050   Gross per 24 hour   Intake           583.33 ml   Output             1075 ml   Net          -491.67 ml       Constitutional: Patient in no acute distress   Head: normocephalic, atraumatic   Neck: supple, no jvd  Cardiovascular: regular rate and rhythm, no murmurs, gallops, or rubs   Respiratory: Clear, no rales, rhochi, or wheezes,   Gastrointestinal: soft, nontender, nondistended, no hepatosplenomegaly  Ext: no edema  Neuro: aaox3  Skin: dry, no rash   Back: nontender    Data:    Recent Labs      07/05/18 1742 07/06/18   0356   WBC  9.4  7.9   HGB  16.2*  13.5   HCT  49.5*  41.7   MCV  98.2  99.0   PLT  267  201       Recent Labs      07/05/18 1742 07/05/18   2113  07/06/18   0356   NA  128*  130*  131*   K  6.8*  5.4*  5.4*   CL  94*  96*  98   CO2  16*  18*  17*   CREATININE  2.0*  1.7*  1.6*   BUN  52*  49*  45*   LABGLOM  24  29  31   GLUCOSE  138*  75  109   CALCIUM  9.8  8.7  8.6       No results for input(s): ALT, AST, ALKPHOS, BILITOT, BILIDIR in the last 72 hours. No results found for: FERRITIN, IRON, TIBC    Lab Results   Component Value Date    MG 1.9 07/06/2018    PHOS 1.4 (L) 08/26/2016       Lab Results   Component Value Date    LABALBU 3.0 (L) 09/22/2017    LABALBU 3.5 09/21/2017    LABALBU 2.9 (L) 08/26/2016       Assessment and Plan:    1. Arf (baseline 0.8): in setting of nausea, vomiting, diarrhea. Continue hydration at this time. Daily bmp, check riya as well as urine lytes. 2. Hyperkalemia: in setting of arf and metabolic acidosis. Improving  3. Metabolic acidosis: due to arf and diarrhea. Add oral hco3  4. Hypotension: continue ivf, due to hypovolemia  5. Hyponatremia hypovolemic. Continue hydration    Wash Deems.  Melissa Viera MD

## 2018-07-06 NOTE — PROGRESS NOTES
7/6/2018  2:42 PM      Nutrition Assessment    Type and Reason for Visit: Initial, Positive Nutrition Screen    Nutrition Recommendations: Continue current diet and initiated Standard high calorie ONS    Malnutrition Assessment:  · Malnutrition Status: Meets the criteria for moderate malnutrition  · Context: Acute illness or injury  · Findings of the 6 clinical characteristics of malnutrition (Minimum of 2 out of 6 clinical characteristics is required to make the diagnosis of moderate or severe Protein Calorie Malnutrition based on AND/ASPEN Guidelines):  1. Energy Intake-Less than or equal to 50%, greater than or equal to 5 days    2. Weight Loss-No significant weight loss,    3. Fat Loss-No significant subcutaneous fat loss,    4. Muscle Loss-Mild muscle mass loss, Clavicles (pectoralis and deltoids)  5. Fluid Accumulation-No significant fluid accumulation,    6.  Strength-Not measured    Nutrition Diagnosis:   · Problem: Moderate malnutrition, in context of acute illness or injury  · Etiology: related to Alteration in GI function     Signs and symptoms:  as evidenced by Diet history of poor intake, Intake 0-25%, Lab values, GI abnormality, Nausea, Diarrhea, Vomiting    Nutrition Assessment:  · Subjective Assessment:    · Nutrition-Focused Physical Findings: N+V, abdominal pain and diarrhea PTA, fatigue, weakness, -I/O  · Wound Type:  Wound Consult Pending (ileostomy)  · Current Nutrition Therapies:  · Oral Diet Orders: General   · Oral Diet intake: 1-25% (PTA and per pt currently)  · Anthropometric Measures:  · Ht: 5' (152.4 cm)   · Current Body Wt: 136 lb (61.7 kg) (7/6 bedwt by RD)  · Usual Body Wt: 130 lb (59 kg) (per Pt and EMR hx)  · % Weight Change: none significant,     · Ideal Body Wt: 100 lb (45.4 kg), % Ideal Body 136%  · BMI Classification: BMI 25.0 - 29.9 Overweight  · Comparative Standards (Estimated Nutrition Needs):  · Estimated Daily Total Kcal:   · Estimated Daily Protein (g):

## 2018-07-06 NOTE — PROGRESS NOTES
Dr. Wili Pike covering for Dr. John hCaparro notified via [facility] of new consult.   Rajni Dela Cruz

## 2018-07-07 VITALS
HEART RATE: 86 BPM | TEMPERATURE: 98 F | OXYGEN SATURATION: 94 % | BODY MASS INDEX: 27.27 KG/M2 | HEIGHT: 60 IN | DIASTOLIC BLOOD PRESSURE: 52 MMHG | SYSTOLIC BLOOD PRESSURE: 122 MMHG | WEIGHT: 138.9 LBS | RESPIRATION RATE: 16 BRPM

## 2018-07-07 PROBLEM — M17.12 PRIMARY OSTEOARTHRITIS OF LEFT KNEE: Chronic | Status: ACTIVE | Noted: 2017-03-03

## 2018-07-07 PROBLEM — E44.0 MODERATE PROTEIN MALNUTRITION (HCC): Chronic | Status: ACTIVE | Noted: 2018-07-06

## 2018-07-07 PROBLEM — N17.9 AKI (ACUTE KIDNEY INJURY) (HCC): Status: RESOLVED | Noted: 2018-07-05 | Resolved: 2018-07-07

## 2018-07-07 LAB
ANION GAP SERPL CALCULATED.3IONS-SCNC: 12 MMOL/L (ref 7–16)
BUN BLDV-MCNC: 32 MG/DL (ref 8–23)
CALCIUM SERPL-MCNC: 8.2 MG/DL (ref 8.6–10.2)
CHLORIDE BLD-SCNC: 106 MMOL/L (ref 98–107)
CO2: 20 MMOL/L (ref 22–29)
CREAT SERPL-MCNC: 1.1 MG/DL (ref 0.5–1)
GFR AFRICAN AMERICAN: 58
GFR NON-AFRICAN AMERICAN: 48 ML/MIN/1.73
GLUCOSE BLD-MCNC: 86 MG/DL (ref 74–109)
MAGNESIUM: 1.7 MG/DL (ref 1.6–2.6)
PHOSPHORUS: 3.7 MG/DL (ref 2.5–4.5)
POTASSIUM SERPL-SCNC: 4.7 MMOL/L (ref 3.5–5)
SODIUM BLD-SCNC: 138 MMOL/L (ref 132–146)

## 2018-07-07 PROCEDURE — 2580000003 HC RX 258: Performed by: INTERNAL MEDICINE

## 2018-07-07 PROCEDURE — 6370000000 HC RX 637 (ALT 250 FOR IP): Performed by: INTERNAL MEDICINE

## 2018-07-07 PROCEDURE — 6360000002 HC RX W HCPCS: Performed by: INTERNAL MEDICINE

## 2018-07-07 PROCEDURE — 84100 ASSAY OF PHOSPHORUS: CPT

## 2018-07-07 PROCEDURE — 36415 COLL VENOUS BLD VENIPUNCTURE: CPT

## 2018-07-07 PROCEDURE — 80048 BASIC METABOLIC PNL TOTAL CA: CPT

## 2018-07-07 PROCEDURE — 83735 ASSAY OF MAGNESIUM: CPT

## 2018-07-07 RX ORDER — DILTIAZEM HYDROCHLORIDE 120 MG/1
120 CAPSULE, COATED, EXTENDED RELEASE ORAL DAILY
Qty: 30 CAPSULE | Refills: 0 | Status: SHIPPED | OUTPATIENT
Start: 2018-07-07 | End: 2018-07-07

## 2018-07-07 RX ORDER — SODIUM BICARBONATE 650 MG/1
650 TABLET ORAL 2 TIMES DAILY WITH MEALS
Qty: 60 TABLET | Refills: 0 | Status: ON HOLD | OUTPATIENT
Start: 2018-07-07 | End: 2020-05-07

## 2018-07-07 RX ORDER — SODIUM BICARBONATE 650 MG/1
650 TABLET ORAL 2 TIMES DAILY WITH MEALS
Qty: 60 TABLET | Refills: 0 | Status: SHIPPED | OUTPATIENT
Start: 2018-07-07 | End: 2018-07-07

## 2018-07-07 RX ORDER — DILTIAZEM HYDROCHLORIDE 120 MG/1
120 CAPSULE, COATED, EXTENDED RELEASE ORAL DAILY
Qty: 30 CAPSULE | Refills: 0 | Status: ON HOLD | OUTPATIENT
Start: 2018-07-07 | End: 2021-02-28 | Stop reason: HOSPADM

## 2018-07-07 RX ADMIN — SODIUM BICARBONATE 650 MG: 650 TABLET ORAL at 09:12

## 2018-07-07 RX ADMIN — PANTOPRAZOLE SODIUM 40 MG: 40 GRANULE, DELAYED RELEASE ORAL at 09:13

## 2018-07-07 RX ADMIN — PROPRANOLOL HYDROCHLORIDE 20 MG: 20 TABLET ORAL at 09:12

## 2018-07-07 RX ADMIN — DILTIAZEM HYDROCHLORIDE 120 MG: 120 CAPSULE, COATED, EXTENDED RELEASE ORAL at 09:12

## 2018-07-07 RX ADMIN — SODIUM CHLORIDE: 9 INJECTION, SOLUTION INTRAVENOUS at 03:48

## 2018-07-07 RX ADMIN — OXYCODONE AND ACETAMINOPHEN 1 TABLET: 10; 325 TABLET ORAL at 09:58

## 2018-07-07 ASSESSMENT — PAIN DESCRIPTION - ONSET: ONSET: ON-GOING

## 2018-07-07 ASSESSMENT — PAIN DESCRIPTION - LOCATION: LOCATION: HEAD

## 2018-07-07 ASSESSMENT — PAIN DESCRIPTION - PROGRESSION: CLINICAL_PROGRESSION: GRADUALLY IMPROVING

## 2018-07-07 ASSESSMENT — PAIN DESCRIPTION - DESCRIPTORS: DESCRIPTORS: ACHING;CONSTANT;DISCOMFORT

## 2018-07-07 ASSESSMENT — PAIN DESCRIPTION - PAIN TYPE: TYPE: ACUTE PAIN

## 2018-07-07 ASSESSMENT — PAIN DESCRIPTION - ORIENTATION: ORIENTATION: MID

## 2018-07-07 ASSESSMENT — PAIN SCALES - GENERAL
PAINLEVEL_OUTOF10: 0
PAINLEVEL_OUTOF10: 4
PAINLEVEL_OUTOF10: 3
PAINLEVEL_OUTOF10: 0

## 2018-07-07 ASSESSMENT — PAIN DESCRIPTION - FREQUENCY: FREQUENCY: CONTINUOUS

## 2018-07-07 NOTE — DISCHARGE SUMMARY
(TRICOR) 145 MG tablet Take 145 mg by mouth daily      Multiple Vitamins-Minerals (ICAPS AREDS 2 PO) Take by mouth daily LD 3/16/2017      Cholecalciferol (VITAMIN D3) 27870 UNITS CAPS Take by mouth every 7 days Takes every Friday  LD 3/16/2017      Coenzyme Q10 (COQ10 PO) Take by mouth daily LD 3/16/2017      Ferrous Sulfate (IRON) 28 MG TABS Take by mouth daily      calcium carbonate (OSCAL) 500 MG TABS tablet Take 500 mg by mouth daily LD 3/16/2017      metoclopramide (REGLAN) 5 MG tablet Take 5 mg by mouth 2 times daily (before meals)       pravastatin (PRAVACHOL) 40 MG tablet Take 40 mg by mouth every evening      propranolol (INDERAL) 20 MG tablet Take 20 mg by mouth 2 times daily Instructed to take morning of surgery with a sip of water      vitamin B-12 (CYANOCOBALAMIN) 500 MCG tablet Place 500 mcg under the tongue daily holdInstructed to hold 5 days pre-op / LD 3/16/2017      Omega-3 Fatty Acids (OMEGA 3 PO) Take by mouth daily Instructed to hold 5 days pre-op / LD 3/16/2017      escitalopram (LEXAPRO) 20 MG tablet Take 20 mg by mouth nightly. Pantoprazole Sodium (PROTONIX) 40 MG PACK packet Take 40 mg by mouth daily Instructed to take morning of surgery with a sip of water      primidone (MYSOLINE) 50 MG tablet Take 250 mg by mouth nightly Taking for essential tremors      traZODone (DESYREL) 50 MG tablet Take 50 mg by mouth nightly. tiZANidine (ZANAFLEX) 4 MG tablet Take 4 mg by mouth 2 times daily as needed. STOP taking these medications       aspirin 325 MG EC tablet Comments:   Reason for Stopping:             Activity: activity as tolerated  Diet: regular diet    Follow-up with DR Kaiser in 1 week.     Note that over 30 minutes was spent in preparing discharge papers, discussing discharge with patient, medication review, etc.    Signed:  Jackson Schaeffer MD  7/7/2018  9:10 AM

## 2018-07-07 NOTE — PLAN OF CARE
Problem: Skin Integrity/Risk  Goal: No skin breakdown during hospitalization  Outcome: Met This Shift      Problem: Pain:  Goal: Pain level will decrease  Pain level will decrease   Outcome: Met This Shift

## 2018-07-07 NOTE — PROGRESS NOTES
Subjective: The patient is awake and alert. Sitting up in a chair. Eating breakfast.  No acute events overnight. Tolerating diet. No further nausea/vomiting. Wants to go home. Objective:    BP (!) 122/52   Pulse 86   Temp 98 °F (36.7 °C) (Oral)   Resp 16   Ht 5' (1.524 m)   Wt 138 lb 14.4 oz (63 kg)   SpO2 94%   BMI 27.13 kg/m²     Current medications that patient is taking have been reviewed. Heart:  RRR, 3/6 holosystolic murmur across the chest, no gallops or rubs.   Lungs:  CTA bilaterally, no wheeze, rales or rhonchi  Abd: bowel sounds present, nontender, nondistended, no masses  Extrem:  No clubbing, cyanosis, or edema    BMP:    Lab Results   Component Value Date     07/07/2018    K 4.7 07/07/2018    K 6.8 07/05/2018     07/07/2018    CO2 20 07/07/2018    BUN 32 07/07/2018    LABALBU 3.0 09/22/2017    CREATININE 1.1 07/07/2018    CALCIUM 8.2 07/07/2018    GFRAA 58 07/07/2018    LABGLOM 48 07/07/2018    GLUCOSE 86 07/07/2018      Phosphorus:    Lab Results   Component Value Date    PHOS 3.7 07/07/2018         Assessment:    Patient Active Problem List   Diagnosis    Acute kidney injury with severe hyperkalemia-secondary to volume depletion, resolved    Essential hypertension, benign    Hyperlipidemia, mixed    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

## 2019-03-11 ENCOUNTER — HOSPITAL ENCOUNTER (OUTPATIENT)
Age: 79
Discharge: HOME | End: 2019-03-13

## 2019-03-11 LAB
ABO/RH: NORMAL
ANTIBODY SCREEN: NORMAL

## 2019-03-11 PROCEDURE — 87088 URINE BACTERIA CULTURE: CPT

## 2019-03-11 PROCEDURE — 87081 CULTURE SCREEN ONLY: CPT

## 2019-03-11 PROCEDURE — 86850 RBC ANTIBODY SCREEN: CPT

## 2019-03-11 PROCEDURE — 86901 BLOOD TYPING SEROLOGIC RH(D): CPT

## 2019-03-11 PROCEDURE — 86900 BLOOD TYPING SEROLOGIC ABO: CPT

## 2019-03-13 LAB
MRSA CULTURE ONLY: NORMAL
URINE CULTURE, ROUTINE: NORMAL

## 2019-03-22 ENCOUNTER — HOSPITAL ENCOUNTER (OUTPATIENT)
Age: 79
Discharge: HOME | End: 2019-03-24

## 2019-03-22 LAB
ANION GAP SERPL CALCULATED.3IONS-SCNC: 12 MMOL/L (ref 7–16)
BUN BLDV-MCNC: 10 MG/DL (ref 8–23)
CALCIUM SERPL-MCNC: 8.1 MG/DL (ref 8.6–10.2)
CHLORIDE BLD-SCNC: 104 MMOL/L (ref 98–107)
CO2: 24 MMOL/L (ref 22–29)
CREAT SERPL-MCNC: 0.9 MG/DL (ref 0.5–1)
GFR AFRICAN AMERICAN: >60
GFR NON-AFRICAN AMERICAN: >60 ML/MIN/1.73
GLUCOSE BLD-MCNC: 106 MG/DL (ref 74–99)
HCT VFR BLD CALC: 42.5 % (ref 34–48)
HEMOGLOBIN: 13 G/DL (ref 11.5–15.5)
MCH RBC QN AUTO: 32.3 PG (ref 26–35)
MCHC RBC AUTO-ENTMCNC: 30.6 % (ref 32–34.5)
MCV RBC AUTO: 105.5 FL (ref 80–99.9)
PDW BLD-RTO: 13.2 FL (ref 11.5–15)
PLATELET # BLD: 214 E9/L (ref 130–450)
PMV BLD AUTO: 12.8 FL (ref 7–12)
POTASSIUM SERPL-SCNC: 4.1 MMOL/L (ref 3.5–5)
RBC # BLD: 4.03 E12/L (ref 3.5–5.5)
SODIUM BLD-SCNC: 140 MMOL/L (ref 132–146)
WBC # BLD: 10.6 E9/L (ref 4.5–11.5)

## 2019-03-22 PROCEDURE — 85027 COMPLETE CBC AUTOMATED: CPT

## 2019-03-22 PROCEDURE — 80048 BASIC METABOLIC PNL TOTAL CA: CPT

## 2019-03-23 ENCOUNTER — HOSPITAL ENCOUNTER (OUTPATIENT)
Age: 79
Discharge: HOME | End: 2019-03-25

## 2019-03-23 LAB
ANION GAP SERPL CALCULATED.3IONS-SCNC: 9 MMOL/L (ref 7–16)
BUN BLDV-MCNC: 10 MG/DL (ref 8–23)
CALCIUM SERPL-MCNC: 8.5 MG/DL (ref 8.6–10.2)
CHLORIDE BLD-SCNC: 103 MMOL/L (ref 98–107)
CO2: 26 MMOL/L (ref 22–29)
CREAT SERPL-MCNC: 0.9 MG/DL (ref 0.5–1)
GFR AFRICAN AMERICAN: >60
GFR NON-AFRICAN AMERICAN: >60 ML/MIN/1.73
GLUCOSE BLD-MCNC: 103 MG/DL (ref 74–99)
HCT VFR BLD CALC: 34.9 % (ref 34–48)
HEMOGLOBIN: 11.2 G/DL (ref 11.5–15.5)
MCH RBC QN AUTO: 32.7 PG (ref 26–35)
MCHC RBC AUTO-ENTMCNC: 32.1 % (ref 32–34.5)
MCV RBC AUTO: 101.7 FL (ref 80–99.9)
PDW BLD-RTO: 13.1 FL (ref 11.5–15)
PLATELET # BLD: 192 E9/L (ref 130–450)
PMV BLD AUTO: 12.3 FL (ref 7–12)
POTASSIUM SERPL-SCNC: 3.6 MMOL/L (ref 3.5–5)
RBC # BLD: 3.43 E12/L (ref 3.5–5.5)
SODIUM BLD-SCNC: 138 MMOL/L (ref 132–146)
WBC # BLD: 8.7 E9/L (ref 4.5–11.5)

## 2019-03-23 PROCEDURE — 85027 COMPLETE CBC AUTOMATED: CPT

## 2019-03-23 PROCEDURE — 80048 BASIC METABOLIC PNL TOTAL CA: CPT

## 2020-01-25 ENCOUNTER — HOSPITAL ENCOUNTER (INPATIENT)
Age: 80
LOS: 4 days | Discharge: NURSING, REHAB OR LONG-TERM CARE | DRG: 908 | End: 2020-01-29
Attending: EMERGENCY MEDICINE | Admitting: INTERNAL MEDICINE
Payer: MEDICARE

## 2020-01-25 ENCOUNTER — APPOINTMENT (OUTPATIENT)
Dept: CT IMAGING | Age: 80
DRG: 908 | End: 2020-01-25
Payer: MEDICARE

## 2020-01-25 PROBLEM — L02.211 ABDOMINAL WALL ABSCESS: Status: ACTIVE | Noted: 2020-01-25

## 2020-01-25 LAB
ALBUMIN SERPL-MCNC: 3.9 G/DL (ref 3.5–5.2)
ALP BLD-CCNC: 117 U/L (ref 35–104)
ALT SERPL-CCNC: 14 U/L (ref 0–32)
ANION GAP SERPL CALCULATED.3IONS-SCNC: 14 MMOL/L (ref 7–16)
AST SERPL-CCNC: 27 U/L (ref 0–31)
BACTERIA: ABNORMAL /HPF
BASOPHILS ABSOLUTE: 0.05 E9/L (ref 0–0.2)
BASOPHILS RELATIVE PERCENT: 0.4 % (ref 0–2)
BILIRUB SERPL-MCNC: 0.4 MG/DL (ref 0–1.2)
BILIRUBIN URINE: ABNORMAL
BLOOD, URINE: NEGATIVE
BUN BLDV-MCNC: 30 MG/DL (ref 8–23)
CALCIUM SERPL-MCNC: 10 MG/DL (ref 8.6–10.2)
CASTS: ABNORMAL /LPF
CHLORIDE BLD-SCNC: 106 MMOL/L (ref 98–107)
CLARITY: CLEAR
CO2: 19 MMOL/L (ref 22–29)
COLOR: YELLOW
CREAT SERPL-MCNC: 1.6 MG/DL (ref 0.5–1)
EOSINOPHILS ABSOLUTE: 0.27 E9/L (ref 0.05–0.5)
EOSINOPHILS RELATIVE PERCENT: 2.2 % (ref 0–6)
GFR AFRICAN AMERICAN: 38
GFR NON-AFRICAN AMERICAN: 31 ML/MIN/1.73
GLUCOSE BLD-MCNC: 242 MG/DL (ref 74–99)
GLUCOSE URINE: NEGATIVE MG/DL
HCT VFR BLD CALC: 43.7 % (ref 34–48)
HEMOGLOBIN: 13.3 G/DL (ref 11.5–15.5)
IMMATURE GRANULOCYTES #: 0.07 E9/L
IMMATURE GRANULOCYTES %: 0.6 % (ref 0–5)
KETONES, URINE: ABNORMAL MG/DL
LACTIC ACID: 1.2 MMOL/L (ref 0.5–2.2)
LEUKOCYTE ESTERASE, URINE: ABNORMAL
LIPASE: 19 U/L (ref 13–60)
LYMPHOCYTES ABSOLUTE: 1.33 E9/L (ref 1.5–4)
LYMPHOCYTES RELATIVE PERCENT: 10.8 % (ref 20–42)
MCH RBC QN AUTO: 31.4 PG (ref 26–35)
MCHC RBC AUTO-ENTMCNC: 30.4 % (ref 32–34.5)
MCV RBC AUTO: 103.1 FL (ref 80–99.9)
MONOCYTES ABSOLUTE: 0.75 E9/L (ref 0.1–0.95)
MONOCYTES RELATIVE PERCENT: 6.1 % (ref 2–12)
NEUTROPHILS ABSOLUTE: 9.87 E9/L (ref 1.8–7.3)
NEUTROPHILS RELATIVE PERCENT: 79.9 % (ref 43–80)
NITRITE, URINE: NEGATIVE
PDW BLD-RTO: 13.5 FL (ref 11.5–15)
PH UA: 5 (ref 5–9)
PLATELET # BLD: 446 E9/L (ref 130–450)
PMV BLD AUTO: 10.8 FL (ref 7–12)
POTASSIUM REFLEX MAGNESIUM: 5.2 MMOL/L (ref 3.5–5)
PROTEIN UA: 30 MG/DL
RBC # BLD: 4.24 E12/L (ref 3.5–5.5)
RBC UA: ABNORMAL /HPF (ref 0–2)
SODIUM BLD-SCNC: 139 MMOL/L (ref 132–146)
SPECIFIC GRAVITY UA: >=1.03 (ref 1–1.03)
TOTAL PROTEIN: 7.7 G/DL (ref 6.4–8.3)
TROPONIN: 0.07 NG/ML (ref 0–0.03)
TROPONIN: 0.09 NG/ML (ref 0–0.03)
UROBILINOGEN, URINE: 0.2 E.U./DL
WBC # BLD: 12.3 E9/L (ref 4.5–11.5)
WBC UA: ABNORMAL /HPF (ref 0–5)

## 2020-01-25 PROCEDURE — G0378 HOSPITAL OBSERVATION PER HR: HCPCS

## 2020-01-25 PROCEDURE — 6360000002 HC RX W HCPCS: Performed by: EMERGENCY MEDICINE

## 2020-01-25 PROCEDURE — 84484 ASSAY OF TROPONIN QUANT: CPT

## 2020-01-25 PROCEDURE — 83605 ASSAY OF LACTIC ACID: CPT

## 2020-01-25 PROCEDURE — 96367 TX/PROPH/DG ADDL SEQ IV INF: CPT

## 2020-01-25 PROCEDURE — 96374 THER/PROPH/DIAG INJ IV PUSH: CPT

## 2020-01-25 PROCEDURE — 6360000002 HC RX W HCPCS

## 2020-01-25 PROCEDURE — 81001 URINALYSIS AUTO W/SCOPE: CPT

## 2020-01-25 PROCEDURE — 96372 THER/PROPH/DIAG INJ SC/IM: CPT

## 2020-01-25 PROCEDURE — 2580000003 HC RX 258: Performed by: STUDENT IN AN ORGANIZED HEALTH CARE EDUCATION/TRAINING PROGRAM

## 2020-01-25 PROCEDURE — 6360000002 HC RX W HCPCS: Performed by: INTERNAL MEDICINE

## 2020-01-25 PROCEDURE — 36415 COLL VENOUS BLD VENIPUNCTURE: CPT

## 2020-01-25 PROCEDURE — 74176 CT ABD & PELVIS W/O CONTRAST: CPT

## 2020-01-25 PROCEDURE — 93005 ELECTROCARDIOGRAM TRACING: CPT | Performed by: STUDENT IN AN ORGANIZED HEALTH CARE EDUCATION/TRAINING PROGRAM

## 2020-01-25 PROCEDURE — 83690 ASSAY OF LIPASE: CPT

## 2020-01-25 PROCEDURE — 6370000000 HC RX 637 (ALT 250 FOR IP): Performed by: INTERNAL MEDICINE

## 2020-01-25 PROCEDURE — 80053 COMPREHEN METABOLIC PANEL: CPT

## 2020-01-25 PROCEDURE — 99284 EMERGENCY DEPT VISIT MOD MDM: CPT

## 2020-01-25 PROCEDURE — 87040 BLOOD CULTURE FOR BACTERIA: CPT

## 2020-01-25 PROCEDURE — 96365 THER/PROPH/DIAG IV INF INIT: CPT

## 2020-01-25 PROCEDURE — 2580000003 HC RX 258: Performed by: EMERGENCY MEDICINE

## 2020-01-25 PROCEDURE — 85025 COMPLETE CBC W/AUTO DIFF WBC: CPT

## 2020-01-25 PROCEDURE — 96375 TX/PRO/DX INJ NEW DRUG ADDON: CPT

## 2020-01-25 PROCEDURE — 2580000003 HC RX 258: Performed by: INTERNAL MEDICINE

## 2020-01-25 RX ORDER — OXYCODONE AND ACETAMINOPHEN 10; 325 MG/1; MG/1
1 TABLET ORAL EVERY 6 HOURS PRN
Status: DISCONTINUED | OUTPATIENT
Start: 2020-01-25 | End: 2020-01-29 | Stop reason: HOSPADM

## 2020-01-25 RX ORDER — 0.9 % SODIUM CHLORIDE 0.9 %
1000 INTRAVENOUS SOLUTION INTRAVENOUS ONCE
Status: DISCONTINUED | OUTPATIENT
Start: 2020-01-25 | End: 2020-01-25

## 2020-01-25 RX ORDER — MORPHINE SULFATE 4 MG/ML
4 INJECTION, SOLUTION INTRAMUSCULAR; INTRAVENOUS ONCE
Status: COMPLETED | OUTPATIENT
Start: 2020-01-25 | End: 2020-01-25

## 2020-01-25 RX ORDER — SODIUM CHLORIDE 9 MG/ML
INJECTION, SOLUTION INTRAVENOUS CONTINUOUS
Status: DISCONTINUED | OUTPATIENT
Start: 2020-01-26 | End: 2020-01-26

## 2020-01-25 RX ORDER — PRAVASTATIN SODIUM 20 MG
40 TABLET ORAL EVERY EVENING
Status: DISCONTINUED | OUTPATIENT
Start: 2020-01-25 | End: 2020-01-29 | Stop reason: HOSPADM

## 2020-01-25 RX ORDER — MORPHINE SULFATE 4 MG/ML
INJECTION, SOLUTION INTRAMUSCULAR; INTRAVENOUS
Status: COMPLETED
Start: 2020-01-25 | End: 2020-01-25

## 2020-01-25 RX ORDER — ONDANSETRON 2 MG/ML
4 INJECTION INTRAMUSCULAR; INTRAVENOUS ONCE
Status: COMPLETED | OUTPATIENT
Start: 2020-01-25 | End: 2020-01-25

## 2020-01-25 RX ORDER — OMEPRAZOLE 20 MG/1
40 CAPSULE, DELAYED RELEASE ORAL DAILY
Status: ON HOLD | COMMUNITY
End: 2020-05-07

## 2020-01-25 RX ORDER — SODIUM CHLORIDE 9 MG/ML
INJECTION, SOLUTION INTRAVENOUS CONTINUOUS
Status: DISCONTINUED | OUTPATIENT
Start: 2020-01-25 | End: 2020-01-29 | Stop reason: HOSPADM

## 2020-01-25 RX ORDER — HYOSCYAMINE SULFATE 0.125 MG
125 TABLET ORAL 3 TIMES DAILY
Status: DISCONTINUED | OUTPATIENT
Start: 2020-01-25 | End: 2020-01-29 | Stop reason: HOSPADM

## 2020-01-25 RX ORDER — ONDANSETRON 4 MG/1
4 TABLET, FILM COATED ORAL EVERY 6 HOURS PRN
Status: DISCONTINUED | OUTPATIENT
Start: 2020-01-25 | End: 2020-01-29 | Stop reason: HOSPADM

## 2020-01-25 RX ORDER — PROPRANOLOL HYDROCHLORIDE 10 MG/1
20 TABLET ORAL 2 TIMES DAILY
Status: DISCONTINUED | OUTPATIENT
Start: 2020-01-25 | End: 2020-01-29 | Stop reason: HOSPADM

## 2020-01-25 RX ORDER — ESCITALOPRAM OXALATE 10 MG/1
20 TABLET ORAL NIGHTLY
Status: DISCONTINUED | OUTPATIENT
Start: 2020-01-25 | End: 2020-01-29 | Stop reason: HOSPADM

## 2020-01-25 RX ORDER — OMEPRAZOLE 20 MG/1
40 CAPSULE, DELAYED RELEASE ORAL 2 TIMES DAILY
Status: DISCONTINUED | OUTPATIENT
Start: 2020-01-25 | End: 2020-01-25

## 2020-01-25 RX ORDER — OMEPRAZOLE 20 MG/1
40 CAPSULE, DELAYED RELEASE ORAL DAILY
Status: DISCONTINUED | OUTPATIENT
Start: 2020-01-26 | End: 2020-01-29 | Stop reason: HOSPADM

## 2020-01-25 RX ORDER — DILTIAZEM HYDROCHLORIDE 180 MG/1
180 CAPSULE, COATED, EXTENDED RELEASE ORAL DAILY
Status: DISCONTINUED | OUTPATIENT
Start: 2020-01-25 | End: 2020-01-29 | Stop reason: HOSPADM

## 2020-01-25 RX ORDER — HYOSCYAMINE SULFATE 0.125 MG
125 TABLET ORAL 3 TIMES DAILY
Status: ON HOLD | COMMUNITY
End: 2020-05-07

## 2020-01-25 RX ORDER — PRIMIDONE 250 MG/1
250 TABLET ORAL NIGHTLY
Status: DISCONTINUED | OUTPATIENT
Start: 2020-01-25 | End: 2020-01-29 | Stop reason: HOSPADM

## 2020-01-25 RX ORDER — 0.9 % SODIUM CHLORIDE 0.9 %
1000 INTRAVENOUS SOLUTION INTRAVENOUS ONCE
Status: COMPLETED | OUTPATIENT
Start: 2020-01-25 | End: 2020-01-25

## 2020-01-25 RX ORDER — ONDANSETRON 2 MG/ML
INJECTION INTRAMUSCULAR; INTRAVENOUS
Status: COMPLETED
Start: 2020-01-25 | End: 2020-01-25

## 2020-01-25 RX ORDER — ONDANSETRON 4 MG/1
4 TABLET, FILM COATED ORAL 3 TIMES DAILY
Status: ON HOLD | COMMUNITY
End: 2021-02-28 | Stop reason: HOSPADM

## 2020-01-25 RX ORDER — TRAZODONE HYDROCHLORIDE 50 MG/1
50 TABLET ORAL NIGHTLY
Status: DISCONTINUED | OUTPATIENT
Start: 2020-01-25 | End: 2020-01-29 | Stop reason: HOSPADM

## 2020-01-25 RX ADMIN — OXYCODONE AND ACETAMINOPHEN 1 TABLET: 10; 325 TABLET ORAL at 22:18

## 2020-01-25 RX ADMIN — PROPRANOLOL HYDROCHLORIDE 20 MG: 10 TABLET ORAL at 22:33

## 2020-01-25 RX ADMIN — ONDANSETRON HYDROCHLORIDE 4 MG: 4 TABLET, FILM COATED ORAL at 22:18

## 2020-01-25 RX ADMIN — PRAVASTATIN SODIUM 40 MG: 20 TABLET ORAL at 22:21

## 2020-01-25 RX ADMIN — ESCITALOPRAM OXALATE 20 MG: 10 TABLET ORAL at 21:41

## 2020-01-25 RX ADMIN — ENOXAPARIN SODIUM 40 MG: 40 INJECTION SUBCUTANEOUS at 21:41

## 2020-01-25 RX ADMIN — PIPERACILLIN AND TAZOBACTAM 4.5 G: 4; .5 INJECTION, POWDER, FOR SOLUTION INTRAVENOUS at 18:05

## 2020-01-25 RX ADMIN — PRIMIDONE 250 MG: 250 TABLET ORAL at 22:21

## 2020-01-25 RX ADMIN — SODIUM CHLORIDE 1000 ML: 9 INJECTION, SOLUTION INTRAVENOUS at 15:00

## 2020-01-25 RX ADMIN — ONDANSETRON 4 MG: 2 INJECTION INTRAMUSCULAR; INTRAVENOUS at 15:01

## 2020-01-25 RX ADMIN — SODIUM CHLORIDE: 9 INJECTION, SOLUTION INTRAVENOUS at 21:43

## 2020-01-25 RX ADMIN — VANCOMYCIN HYDROCHLORIDE 1000 MG: 1 INJECTION, POWDER, LYOPHILIZED, FOR SOLUTION INTRAVENOUS at 19:32

## 2020-01-25 RX ADMIN — MORPHINE SULFATE 4 MG: 4 INJECTION, SOLUTION INTRAMUSCULAR; INTRAVENOUS at 15:01

## 2020-01-25 RX ADMIN — TRAZODONE HYDROCHLORIDE 50 MG: 50 TABLET ORAL at 23:46

## 2020-01-25 ASSESSMENT — ENCOUNTER SYMPTOMS
TROUBLE SWALLOWING: 0
COUGH: 0
ABDOMINAL PAIN: 1
SHORTNESS OF BREATH: 0
DIARRHEA: 0
NAUSEA: 1
BACK PAIN: 0
VOMITING: 0

## 2020-01-25 ASSESSMENT — PAIN DESCRIPTION - PAIN TYPE
TYPE: ACUTE PAIN
TYPE: ACUTE PAIN

## 2020-01-25 ASSESSMENT — PAIN - FUNCTIONAL ASSESSMENT: PAIN_FUNCTIONAL_ASSESSMENT: PREVENTS OR INTERFERES SOME ACTIVE ACTIVITIES AND ADLS

## 2020-01-25 ASSESSMENT — PAIN SCALES - GENERAL
PAINLEVEL_OUTOF10: 0
PAINLEVEL_OUTOF10: 8
PAINLEVEL_OUTOF10: 8
PAINLEVEL_OUTOF10: 0
PAINLEVEL_OUTOF10: 7

## 2020-01-25 ASSESSMENT — PAIN DESCRIPTION - FREQUENCY
FREQUENCY: CONTINUOUS
FREQUENCY: INTERMITTENT

## 2020-01-25 ASSESSMENT — PAIN DESCRIPTION - ONSET: ONSET: ON-GOING

## 2020-01-25 ASSESSMENT — PAIN DESCRIPTION - PROGRESSION: CLINICAL_PROGRESSION: NOT CHANGED

## 2020-01-25 ASSESSMENT — PAIN DESCRIPTION - DESCRIPTORS
DESCRIPTORS: BURNING;TENDER
DESCRIPTORS: ACHING;BURNING;SHOOTING

## 2020-01-25 ASSESSMENT — PAIN DESCRIPTION - LOCATION
LOCATION: ABDOMEN
LOCATION: ABDOMEN

## 2020-01-25 NOTE — ED NOTES
RN faxed SBAR to floor. called floor to confirm receipt. spoke with floor staff who confirmed.       Salas Hernandez RN  01/25/20 7227

## 2020-01-25 NOTE — ED PROVIDER NOTES
not radiate  Pain severity:  Mild  Onset quality:  Sudden  Timing:  Constant  Progression:  Worsening  Chronicity:  New  Context: previous surgery    Relieved by:  None tried  Worsened by:  Nothing  Ineffective treatments:  None tried  Associated symptoms: nausea    Associated symptoms: no chest pain, no chills, no cough, no diarrhea, no dysuria, no fatigue, no fever, no hematuria, no melena, no shortness of breath and no vomiting    Risk factors: multiple surgeries         Review of Systems   Constitutional: Negative for chills, fatigue and fever. HENT: Negative for congestion and trouble swallowing. Respiratory: Negative for cough and shortness of breath. Cardiovascular: Negative for chest pain and leg swelling. Gastrointestinal: Positive for abdominal pain and nausea. Negative for diarrhea, melena and vomiting. Genitourinary: Negative for dysuria, flank pain and hematuria. Musculoskeletal: Negative for back pain and neck pain. Skin: Negative for rash and wound. Neurological: Negative for dizziness, syncope, weakness, light-headedness, numbness and headaches. All other systems reviewed and are negative. Physical Exam  Vitals signs and nursing note reviewed. Constitutional:       General: She is not in acute distress. Appearance: She is well-developed. She is not ill-appearing, toxic-appearing or diaphoretic. HENT:      Head: Normocephalic and atraumatic. Nose: Nose normal.      Mouth/Throat:      Lips: Pink. No lesions. Mouth: Mucous membranes are moist.   Eyes:      General: Lids are normal.   Neck:      Musculoskeletal: Normal range of motion and neck supple. Cardiovascular:      Rate and Rhythm: Normal rate and regular rhythm. Heart sounds: Normal heart sounds, S1 normal and S2 normal. No murmur. Pulmonary:      Effort: Pulmonary effort is normal. No respiratory distress. Breath sounds: Normal breath sounds. No wheezing or rales.    Abdominal: - 10.2 mg/dL    Total Protein 7.7 6.4 - 8.3 g/dL    Alb 3.9 3.5 - 5.2 g/dL    Total Bilirubin 0.4 0.0 - 1.2 mg/dL    Alkaline Phosphatase 117 (H) 35 - 104 U/L    ALT 14 0 - 32 U/L    AST 27 0 - 31 U/L   Lactic Acid, Plasma   Result Value Ref Range    Lactic Acid 1.2 0.5 - 2.2 mmol/L   Urinalysis with Microscopic   Result Value Ref Range    Color, UA Yellow Straw/Yellow    Clarity, UA Clear Clear    Glucose, Ur Negative Negative mg/dL    Bilirubin Urine SMALL (A) Negative    Ketones, Urine TRACE (A) Negative mg/dL    Specific Gravity, UA >=1.030 1.005 - 1.030    Blood, Urine Negative Negative    pH, UA 5.0 5.0 - 9.0    Protein, UA 30 (A) Negative mg/dL    Urobilinogen, Urine 0.2 <2.0 E.U./dL    Nitrite, Urine Negative Negative    Leukocyte Esterase, Urine TRACE (A) Negative    Casts FEW /LPF    WBC, UA 2-5 0 - 5 /HPF    RBC, UA NONE 0 - 2 /HPF    Bacteria, UA FEW (A) /HPF   Lipase   Result Value Ref Range    Lipase 19 13 - 60 U/L   Troponin   Result Value Ref Range    Troponin 0.09 (H) 0.00 - 0.03 ng/mL   EKG 12 Lead   Result Value Ref Range    Ventricular Rate 66 BPM    Atrial Rate 66 BPM    P-R Interval 160 ms    QRS Duration 84 ms    Q-T Interval 402 ms    QTc Calculation (Bazett) 421 ms    P Axis 68 degrees    R Axis -15 degrees    T Axis 46 degrees     CT ABDOMEN PELVIS WO CONTRAST   Final Result   1. 8.3 x 3.8 x 9.4 cm anterior abdominal wall abscess with air-fluid   level. There is a linear tract of gas which extends from the posterior   aspect of the abdominal wall collection towards the right to the level   of the ileostomy, raising concern for a fistulous communication. Alternately, the gas may be due to recent surgical intervention. If   indicated, the ileostomy may be injected under fluoroscopy to   determine any fistulous communication with the abdominal wall abscess. 2. 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  Prominent atherosclerosis, with respect two stable 1.4 x 1. 1 cm. Procedures     MDM  Number of Diagnoses or Management Options  Abdominal wall abscess:   KD (acute kidney injury) ([facility] [address]):   Elevated troponin:   Hyperkalemia:   Leukocytosis, unspecified type:   Diagnosis management comments: The patient is a 72-year-old female who presents to the emergency department for a wound check. She is hemodynamically stable, nontoxic appearance, in no acute distress. Differential diagnosis includes but is not limited to superficial abscess versus abdominal abscess versus obstruction versus ventral hernia. We will proceed with basic labs, CT scan, and reevaluate. Patient with a dose of morphine, IV fluids, and Zofran. Patient found to have an KD. She also has a mild degree of hyperkalemia with a potassium of 5.2. Patient also has an elevated troponin. Not complaining of any chest pain at this time. EKG unremarkable. CT of the abdomen and pelvis shows an 8 x 4 x 9 anterior abdominal wall abscess with air-fluid level. There is also a linear track to gas was extends from the posterior aspect of the abdominal wall collection twoard the right ileostomy concerning for a fistulous communication. Started on broad-spectrum antibiotics, IV Zosyn and vancomycin. I spoke with the last surgeon the patient had seen for follow-up, Dr. Catarina Schwarz. He will see this patient on consultation. I spoke with Dr. Debi Padilla on behalf of Dr. Leah Self (PCP) for admission. EKG:  This EKG is signed and interpreted by me. Rate: 1811  Rhythm: Sinus  Interpretation: Normal sinus rhythm without any ST elevation or depression. No T wave inversion. Normal intervals.   Comparison: was normal     Problem List Items Addressed This Visit     Abdominal wall abscess - Primary      Other Visit Diagnoses     KD (acute kidney injury) ([facility] [address]        Hyperkalemia        Elevated troponin        Leukocytosis, unspecified type               Darrion Gilbert MD  01/25/20 1811

## 2020-01-26 ENCOUNTER — ANESTHESIA (OUTPATIENT)
Dept: OPERATING ROOM | Age: 80
DRG: 908 | End: 2020-01-26
Payer: MEDICARE

## 2020-01-26 ENCOUNTER — ANESTHESIA EVENT (OUTPATIENT)
Dept: OPERATING ROOM | Age: 80
DRG: 908 | End: 2020-01-26
Payer: MEDICARE

## 2020-01-26 VITALS — SYSTOLIC BLOOD PRESSURE: 139 MMHG | OXYGEN SATURATION: 97 % | DIASTOLIC BLOOD PRESSURE: 103 MMHG

## 2020-01-26 LAB
ANION GAP SERPL CALCULATED.3IONS-SCNC: 11 MMOL/L (ref 7–16)
BUN BLDV-MCNC: 26 MG/DL (ref 8–23)
CALCIUM SERPL-MCNC: 9.1 MG/DL (ref 8.6–10.2)
CHLORIDE BLD-SCNC: 108 MMOL/L (ref 98–107)
CO2: 20 MMOL/L (ref 22–29)
CREAT SERPL-MCNC: 1.4 MG/DL (ref 0.5–1)
EKG ATRIAL RATE: 66 BPM
EKG P AXIS: 68 DEGREES
EKG P-R INTERVAL: 160 MS
EKG Q-T INTERVAL: 402 MS
EKG QRS DURATION: 84 MS
EKG QTC CALCULATION (BAZETT): 421 MS
EKG R AXIS: -15 DEGREES
EKG T AXIS: 46 DEGREES
EKG VENTRICULAR RATE: 66 BPM
GFR AFRICAN AMERICAN: 44
GFR NON-AFRICAN AMERICAN: 36 ML/MIN/1.73
GLUCOSE BLD-MCNC: 113 MG/DL (ref 74–99)
HCT VFR BLD CALC: 38.3 % (ref 34–48)
HEMOGLOBIN: 11.3 G/DL (ref 11.5–15.5)
MCH RBC QN AUTO: 31.3 PG (ref 26–35)
MCHC RBC AUTO-ENTMCNC: 29.5 % (ref 32–34.5)
MCV RBC AUTO: 106.1 FL (ref 80–99.9)
PDW BLD-RTO: 13.7 FL (ref 11.5–15)
PLATELET # BLD: 379 E9/L (ref 130–450)
PMV BLD AUTO: 11.1 FL (ref 7–12)
POTASSIUM SERPL-SCNC: 5.1 MMOL/L (ref 3.5–5)
RBC # BLD: 3.61 E12/L (ref 3.5–5.5)
SODIUM BLD-SCNC: 139 MMOL/L (ref 132–146)
WBC # BLD: 14.3 E9/L (ref 4.5–11.5)

## 2020-01-26 PROCEDURE — 3700000000 HC ANESTHESIA ATTENDED CARE: Performed by: SURGERY

## 2020-01-26 PROCEDURE — 2580000003 HC RX 258: Performed by: EMERGENCY MEDICINE

## 2020-01-26 PROCEDURE — 2709999900 HC NON-CHARGEABLE SUPPLY: Performed by: SURGERY

## 2020-01-26 PROCEDURE — 80048 BASIC METABOLIC PNL TOTAL CA: CPT

## 2020-01-26 PROCEDURE — G0378 HOSPITAL OBSERVATION PER HR: HCPCS

## 2020-01-26 PROCEDURE — 85027 COMPLETE CBC AUTOMATED: CPT

## 2020-01-26 PROCEDURE — 6370000000 HC RX 637 (ALT 250 FOR IP): Performed by: INTERNAL MEDICINE

## 2020-01-26 PROCEDURE — 7100000001 HC PACU RECOVERY - ADDTL 15 MIN: Performed by: SURGERY

## 2020-01-26 PROCEDURE — 6360000002 HC RX W HCPCS: Performed by: SPECIALIST

## 2020-01-26 PROCEDURE — 3700000001 HC ADD 15 MINUTES (ANESTHESIA): Performed by: SURGERY

## 2020-01-26 PROCEDURE — 2580000003 HC RX 258: Performed by: NURSE ANESTHETIST, CERTIFIED REGISTERED

## 2020-01-26 PROCEDURE — 3600000012 HC SURGERY LEVEL 2 ADDTL 15MIN: Performed by: SURGERY

## 2020-01-26 PROCEDURE — 2700000000 HC OXYGEN THERAPY PER DAY

## 2020-01-26 PROCEDURE — 87206 SMEAR FLUORESCENT/ACID STAI: CPT

## 2020-01-26 PROCEDURE — 2580000003 HC RX 258: Performed by: SPECIALIST

## 2020-01-26 PROCEDURE — 0WPF0JZ REMOVAL OF SYNTHETIC SUBSTITUTE FROM ABDOMINAL WALL, OPEN APPROACH: ICD-10-PCS | Performed by: SURGERY

## 2020-01-26 PROCEDURE — 3600000002 HC SURGERY LEVEL 2 BASE: Performed by: SURGERY

## 2020-01-26 PROCEDURE — 87116 MYCOBACTERIA CULTURE: CPT

## 2020-01-26 PROCEDURE — 87015 SPECIMEN INFECT AGNT CONCNTJ: CPT

## 2020-01-26 PROCEDURE — 6360000002 HC RX W HCPCS: Performed by: ANESTHESIOLOGY

## 2020-01-26 PROCEDURE — 87205 SMEAR GRAM STAIN: CPT

## 2020-01-26 PROCEDURE — 87070 CULTURE OTHR SPECIMN AEROBIC: CPT

## 2020-01-26 PROCEDURE — 87102 FUNGUS ISOLATION CULTURE: CPT

## 2020-01-26 PROCEDURE — 2060000000 HC ICU INTERMEDIATE R&B

## 2020-01-26 PROCEDURE — 7100000000 HC PACU RECOVERY - FIRST 15 MIN: Performed by: SURGERY

## 2020-01-26 PROCEDURE — 6360000002 HC RX W HCPCS: Performed by: NURSE ANESTHETIST, CERTIFIED REGISTERED

## 2020-01-26 PROCEDURE — 87147 CULTURE TYPE IMMUNOLOGIC: CPT

## 2020-01-26 PROCEDURE — 0W9F0ZZ DRAINAGE OF ABDOMINAL WALL, OPEN APPROACH: ICD-10-PCS | Performed by: SURGERY

## 2020-01-26 PROCEDURE — 87186 SC STD MICRODIL/AGAR DIL: CPT

## 2020-01-26 PROCEDURE — 36415 COLL VENOUS BLD VENIPUNCTURE: CPT

## 2020-01-26 PROCEDURE — 87106 FUNGI IDENTIFICATION YEAST: CPT

## 2020-01-26 PROCEDURE — 87076 CULTURE ANAEROBE IDENT EACH: CPT

## 2020-01-26 PROCEDURE — 2580000003 HC RX 258: Performed by: INTERNAL MEDICINE

## 2020-01-26 PROCEDURE — 2500000003 HC RX 250 WO HCPCS: Performed by: NURSE ANESTHETIST, CERTIFIED REGISTERED

## 2020-01-26 PROCEDURE — 87077 CULTURE AEROBIC IDENTIFY: CPT

## 2020-01-26 PROCEDURE — 6360000002 HC RX W HCPCS: Performed by: EMERGENCY MEDICINE

## 2020-01-26 PROCEDURE — 0JD80ZZ EXTRACTION OF ABDOMEN SUBCUTANEOUS TISSUE AND FASCIA, OPEN APPROACH: ICD-10-PCS | Performed by: SURGERY

## 2020-01-26 PROCEDURE — 6360000002 HC RX W HCPCS: Performed by: INTERNAL MEDICINE

## 2020-01-26 PROCEDURE — 96366 THER/PROPH/DIAG IV INF ADDON: CPT

## 2020-01-26 PROCEDURE — 87075 CULTR BACTERIA EXCEPT BLOOD: CPT

## 2020-01-26 RX ORDER — KETOROLAC TROMETHAMINE 5 MG/ML
1 SOLUTION OPHTHALMIC 4 TIMES DAILY
Status: DISCONTINUED | OUTPATIENT
Start: 2020-01-26 | End: 2020-01-26

## 2020-01-26 RX ORDER — ONDANSETRON 2 MG/ML
INJECTION INTRAMUSCULAR; INTRAVENOUS PRN
Status: DISCONTINUED | OUTPATIENT
Start: 2020-01-26 | End: 2020-01-26 | Stop reason: SDUPTHER

## 2020-01-26 RX ORDER — KETOROLAC TROMETHAMINE 5 MG/ML
1 SOLUTION OPHTHALMIC 3 TIMES DAILY
Status: ON HOLD | COMMUNITY
Start: 2020-01-26 | End: 2020-01-26

## 2020-01-26 RX ORDER — SODIUM CHLORIDE, SODIUM LACTATE, POTASSIUM CHLORIDE, CALCIUM CHLORIDE 600; 310; 30; 20 MG/100ML; MG/100ML; MG/100ML; MG/100ML
INJECTION, SOLUTION INTRAVENOUS CONTINUOUS PRN
Status: DISCONTINUED | OUTPATIENT
Start: 2020-01-26 | End: 2020-01-26 | Stop reason: SDUPTHER

## 2020-01-26 RX ORDER — DIPHENHYDRAMINE HYDROCHLORIDE 50 MG/ML
12.5 INJECTION INTRAMUSCULAR; INTRAVENOUS
Status: ACTIVE | OUTPATIENT
Start: 2020-01-26 | End: 2020-01-26

## 2020-01-26 RX ORDER — MEPERIDINE HYDROCHLORIDE 25 MG/ML
12.5 INJECTION INTRAMUSCULAR; INTRAVENOUS; SUBCUTANEOUS EVERY 5 MIN PRN
Status: DISCONTINUED | OUTPATIENT
Start: 2020-01-26 | End: 2020-01-28

## 2020-01-26 RX ORDER — PREDNISOLONE ACETATE 10 MG/ML
1 SUSPENSION/ DROPS OPHTHALMIC 4 TIMES DAILY
Status: DISCONTINUED | OUTPATIENT
Start: 2020-01-26 | End: 2020-01-29 | Stop reason: HOSPADM

## 2020-01-26 RX ORDER — FENTANYL CITRATE 50 UG/ML
INJECTION, SOLUTION INTRAMUSCULAR; INTRAVENOUS PRN
Status: DISCONTINUED | OUTPATIENT
Start: 2020-01-26 | End: 2020-01-26 | Stop reason: SDUPTHER

## 2020-01-26 RX ORDER — LIDOCAINE HYDROCHLORIDE 20 MG/ML
INJECTION, SOLUTION EPIDURAL; INFILTRATION; INTRACAUDAL; PERINEURAL PRN
Status: DISCONTINUED | OUTPATIENT
Start: 2020-01-26 | End: 2020-01-26 | Stop reason: SDUPTHER

## 2020-01-26 RX ORDER — SODIUM CHLORIDE 9 MG/ML
INJECTION, SOLUTION INTRAVENOUS EVERY 12 HOURS
Status: DISCONTINUED | OUTPATIENT
Start: 2020-01-27 | End: 2020-01-29 | Stop reason: HOSPADM

## 2020-01-26 RX ORDER — KETOROLAC TROMETHAMINE 5 MG/ML
1 SOLUTION OPHTHALMIC 4 TIMES DAILY
Status: ON HOLD | COMMUNITY
End: 2020-05-07

## 2020-01-26 RX ORDER — PREDNISOLONE ACETATE 10 MG/ML
1 SUSPENSION/ DROPS OPHTHALMIC 4 TIMES DAILY
Status: ON HOLD | COMMUNITY
Start: 2020-01-26 | End: 2020-05-07

## 2020-01-26 RX ORDER — ROCURONIUM BROMIDE 10 MG/ML
INJECTION, SOLUTION INTRAVENOUS PRN
Status: DISCONTINUED | OUTPATIENT
Start: 2020-01-26 | End: 2020-01-26 | Stop reason: SDUPTHER

## 2020-01-26 RX ORDER — PROPOFOL 10 MG/ML
INJECTION, EMULSION INTRAVENOUS PRN
Status: DISCONTINUED | OUTPATIENT
Start: 2020-01-26 | End: 2020-01-26 | Stop reason: SDUPTHER

## 2020-01-26 RX ORDER — KETOROLAC TROMETHAMINE 5 MG/ML
1 SOLUTION OPHTHALMIC 3 TIMES DAILY
Status: DISCONTINUED | OUTPATIENT
Start: 2020-01-26 | End: 2020-01-29 | Stop reason: HOSPADM

## 2020-01-26 RX ADMIN — SODIUM CHLORIDE: 9 INJECTION, SOLUTION INTRAVENOUS at 14:50

## 2020-01-26 RX ADMIN — ESCITALOPRAM OXALATE 20 MG: 10 TABLET ORAL at 21:03

## 2020-01-26 RX ADMIN — PIPERACILLIN AND TAZOBACTAM 3.38 G: 3; .375 INJECTION, POWDER, FOR SOLUTION INTRAVENOUS at 21:49

## 2020-01-26 RX ADMIN — PRIMIDONE 250 MG: 250 TABLET ORAL at 21:03

## 2020-01-26 RX ADMIN — KETOROLAC TROMETHAMINE 1 DROP: 5 SOLUTION OPHTHALMIC at 14:44

## 2020-01-26 RX ADMIN — ONDANSETRON HYDROCHLORIDE 4 MG: 4 TABLET, FILM COATED ORAL at 21:05

## 2020-01-26 RX ADMIN — SODIUM CHLORIDE, POTASSIUM CHLORIDE, SODIUM LACTATE AND CALCIUM CHLORIDE: 600; 310; 30; 20 INJECTION, SOLUTION INTRAVENOUS at 13:00

## 2020-01-26 RX ADMIN — FENTANYL CITRATE 50 MCG: 50 INJECTION, SOLUTION INTRAMUSCULAR; INTRAVENOUS at 13:06

## 2020-01-26 RX ADMIN — KETOROLAC TROMETHAMINE 1 DROP: 5 SOLUTION OPHTHALMIC at 21:04

## 2020-01-26 RX ADMIN — PROPOFOL 130 MG: 10 INJECTION, EMULSION INTRAVENOUS at 13:06

## 2020-01-26 RX ADMIN — HYOSCYAMINE SULFATE 125 MCG: 0.12 TABLET ORAL at 14:44

## 2020-01-26 RX ADMIN — LIDOCAINE HYDROCHLORIDE 100 MG: 20 INJECTION, SOLUTION EPIDURAL; INFILTRATION; INTRACAUDAL; PERINEURAL at 13:06

## 2020-01-26 RX ADMIN — HYOSCYAMINE SULFATE 125 MCG: 0.12 TABLET ORAL at 09:49

## 2020-01-26 RX ADMIN — PREDNISOLONE ACETATE 1 DROP: 10 SUSPENSION/ DROPS OPHTHALMIC at 09:50

## 2020-01-26 RX ADMIN — OXYCODONE AND ACETAMINOPHEN 1 TABLET: 10; 325 TABLET ORAL at 21:05

## 2020-01-26 RX ADMIN — PIPERACILLIN AND TAZOBACTAM 3.38 G: 3; .375 INJECTION, POWDER, FOR SOLUTION INTRAVENOUS at 09:50

## 2020-01-26 RX ADMIN — ENOXAPARIN SODIUM 30 MG: 30 INJECTION SUBCUTANEOUS at 21:03

## 2020-01-26 RX ADMIN — FENTANYL CITRATE 50 MCG: 50 INJECTION, SOLUTION INTRAMUSCULAR; INTRAVENOUS at 13:32

## 2020-01-26 RX ADMIN — SODIUM CHLORIDE: 9 INJECTION, SOLUTION INTRAVENOUS at 06:58

## 2020-01-26 RX ADMIN — HYDROMORPHONE HYDROCHLORIDE 0.25 MG: 1 INJECTION, SOLUTION INTRAMUSCULAR; INTRAVENOUS; SUBCUTANEOUS at 14:10

## 2020-01-26 RX ADMIN — KETOROLAC TROMETHAMINE 1 DROP: 5 SOLUTION OPHTHALMIC at 09:50

## 2020-01-26 RX ADMIN — OMEPRAZOLE 40 MG: 20 CAPSULE, DELAYED RELEASE ORAL at 09:49

## 2020-01-26 RX ADMIN — PIPERACILLIN AND TAZOBACTAM 3.38 G: 3; .375 INJECTION, POWDER, FOR SOLUTION INTRAVENOUS at 02:08

## 2020-01-26 RX ADMIN — PROPRANOLOL HYDROCHLORIDE 20 MG: 10 TABLET ORAL at 21:08

## 2020-01-26 RX ADMIN — DILTIAZEM HYDROCHLORIDE 180 MG: 180 CAPSULE, COATED, EXTENDED RELEASE ORAL at 09:50

## 2020-01-26 RX ADMIN — HYDROMORPHONE HYDROCHLORIDE 0.25 MG: 1 INJECTION, SOLUTION INTRAMUSCULAR; INTRAVENOUS; SUBCUTANEOUS at 14:04

## 2020-01-26 RX ADMIN — ROCURONIUM BROMIDE 30 MG: 10 SOLUTION INTRAVENOUS at 13:06

## 2020-01-26 RX ADMIN — ONDANSETRON HYDROCHLORIDE 4 MG: 2 INJECTION, SOLUTION INTRAMUSCULAR; INTRAVENOUS at 13:06

## 2020-01-26 RX ADMIN — PREDNISOLONE ACETATE 1 DROP: 10 SUSPENSION/ DROPS OPHTHALMIC at 18:05

## 2020-01-26 RX ADMIN — SUGAMMADEX 200 MG: 100 INJECTION, SOLUTION INTRAVENOUS at 13:32

## 2020-01-26 RX ADMIN — PREDNISOLONE ACETATE 1 DROP: 10 SUSPENSION/ DROPS OPHTHALMIC at 21:04

## 2020-01-26 RX ADMIN — PREDNISOLONE ACETATE 1 DROP: 10 SUSPENSION/ DROPS OPHTHALMIC at 14:44

## 2020-01-26 RX ADMIN — PROPRANOLOL HYDROCHLORIDE 20 MG: 10 TABLET ORAL at 09:49

## 2020-01-26 RX ADMIN — HYOSCYAMINE SULFATE 125 MCG: 0.12 TABLET ORAL at 21:03

## 2020-01-26 RX ADMIN — PRAVASTATIN SODIUM 40 MG: 20 TABLET ORAL at 18:04

## 2020-01-26 ASSESSMENT — PULMONARY FUNCTION TESTS
PIF_VALUE: 29
PIF_VALUE: 30
PIF_VALUE: 1
PIF_VALUE: 1
PIF_VALUE: 4
PIF_VALUE: 1
PIF_VALUE: 29
PIF_VALUE: 27
PIF_VALUE: 20
PIF_VALUE: 29
PIF_VALUE: 2
PIF_VALUE: 30
PIF_VALUE: 28
PIF_VALUE: 1
PIF_VALUE: 29
PIF_VALUE: 2
PIF_VALUE: 29
PIF_VALUE: 31
PIF_VALUE: 30
PIF_VALUE: 14
PIF_VALUE: 29
PIF_VALUE: 27
PIF_VALUE: 29
PIF_VALUE: 17
PIF_VALUE: 30
PIF_VALUE: 29
PIF_VALUE: 0
PIF_VALUE: 17
PIF_VALUE: 1
PIF_VALUE: 30
PIF_VALUE: 31
PIF_VALUE: 31
PIF_VALUE: 29
PIF_VALUE: 1
PIF_VALUE: 29
PIF_VALUE: 2
PIF_VALUE: 1

## 2020-01-26 ASSESSMENT — PAIN DESCRIPTION - PAIN TYPE
TYPE: SURGICAL PAIN
TYPE: SURGICAL PAIN

## 2020-01-26 ASSESSMENT — PAIN SCALES - GENERAL
PAINLEVEL_OUTOF10: 5
PAINLEVEL_OUTOF10: 6
PAINLEVEL_OUTOF10: 6
PAINLEVEL_OUTOF10: 0
PAINLEVEL_OUTOF10: 6

## 2020-01-26 ASSESSMENT — PAIN - FUNCTIONAL ASSESSMENT
PAIN_FUNCTIONAL_ASSESSMENT: PREVENTS OR INTERFERES SOME ACTIVE ACTIVITIES AND ADLS
PAIN_FUNCTIONAL_ASSESSMENT: PREVENTS OR INTERFERES SOME ACTIVE ACTIVITIES AND ADLS

## 2020-01-26 ASSESSMENT — LIFESTYLE VARIABLES: SMOKING_STATUS: 1

## 2020-01-26 ASSESSMENT — PAIN DESCRIPTION - LOCATION
LOCATION: ABDOMEN
LOCATION: ABDOMEN

## 2020-01-26 ASSESSMENT — PAIN DESCRIPTION - DESCRIPTORS
DESCRIPTORS: DISCOMFORT;PRESSURE
DESCRIPTORS: ACHING;DISCOMFORT;PRESSURE

## 2020-01-26 ASSESSMENT — PAIN DESCRIPTION - FREQUENCY
FREQUENCY: INTERMITTENT
FREQUENCY: CONTINUOUS

## 2020-01-26 ASSESSMENT — PAIN DESCRIPTION - PROGRESSION
CLINICAL_PROGRESSION: GRADUALLY IMPROVING
CLINICAL_PROGRESSION: NOT CHANGED

## 2020-01-26 ASSESSMENT — PAIN DESCRIPTION - ORIENTATION
ORIENTATION: MID;UPPER
ORIENTATION: MID;UPPER

## 2020-01-26 ASSESSMENT — PAIN DESCRIPTION - ONSET
ONSET: ON-GOING
ONSET: ON-GOING

## 2020-01-26 ASSESSMENT — PAIN SCALES - WONG BAKER: WONGBAKER_NUMERICALRESPONSE: 0

## 2020-01-26 NOTE — H&P
negative    PHYSICAL EXAM:    Vitals:  BP (!) 102/51   Pulse 63   Temp 98.8 °F (37.1 °C) (Oral)   Resp 18   Ht 5' (1.524 m)   Wt 129 lb 1.6 oz (58.6 kg)   SpO2 91%   BMI 25.21 kg/m²     General:  Awake, alert, oriented X 3. Well developed, well nourished, well groomed. No apparent distress. HEENT:  Normocephalic, atraumatic. Pupils equal, round, reactive to light. No scleral icterus. No conjunctival injection. Normal lips, teeth, and gums. No nasal discharge. Neck:  Supple  Heart:  RRR, no murmurs, gallops, rubs  Lungs:  CTA bilaterally, bilat symmetrical expansion, no wheeze, rales, or rhonchi  Abdomen:   Bowel sounds present, soft, nontender, no masses, no organomegaly, no peritoneal signs  Extremities:  No clubbing, cyanosis, or edema  Skin:  Warm and dry, no open lesions or rash  Neuro:  Cranial nerves 2-12 intact, no focal deficits  Breast: deferred  Rectal: deferred  Genitalia:  deferred    LABS:  CBC with Differential:    Lab Results   Component Value Date    WBC 14.3 01/26/2020    RBC 3.61 01/26/2020    HGB 11.3 01/26/2020    HCT 38.3 01/26/2020     01/26/2020    .1 01/26/2020    MCH 31.3 01/26/2020    MCHC 29.5 01/26/2020    RDW 13.7 01/26/2020    LYMPHOPCT 10.8 01/25/2020    MONOPCT 6.1 01/25/2020    BASOPCT 0.4 01/25/2020    MONOSABS 0.75 01/25/2020    LYMPHSABS 1.33 01/25/2020    EOSABS 0.27 01/25/2020    BASOSABS 0.05 01/25/2020     CMP:    Lab Results   Component Value Date     01/26/2020    K 5.1 01/26/2020    K 5.2 01/25/2020     01/26/2020    CO2 20 01/26/2020    BUN 26 01/26/2020    CREATININE 1.4 01/26/2020    GFRAA 44 01/26/2020    LABGLOM 36 01/26/2020    GLUCOSE 113 01/26/2020    PROT 7.7 01/25/2020    LABALBU 3.9 01/25/2020    CALCIUM 9.1 01/26/2020    BILITOT 0.4 01/25/2020    ALKPHOS 117 01/25/2020    AST 27 01/25/2020    ALT 14 01/25/2020     PT/INR:    Lab Results   Component Value Date    PROTIME 12.9 08/23/2016    INR 1.2 08/23/2016

## 2020-01-26 NOTE — CONSULTS
1680    And    0.9 % sodium chloride infusion   Intravenous Continuous Main Ott MD 12.5 mL/hr at 01/26/20 0658      0.9 % sodium chloride infusion   Intravenous Continuous Yas Martinez,  mL/hr at 01/25/20 2143      diltiazem (CARDIZEM CD) extended release capsule 180 mg  180 mg Oral Daily Yasadriel Pettitsen, DO        escitalopram (LEXAPRO) tablet 20 mg  20 mg Oral Nightly Yas Juan, DO   20 mg at 01/25/20 2141    ondansetron (ZOFRAN) tablet 4 mg  4 mg Oral Q6H PRN Yas Pettitsen, DO   4 mg at 01/25/20 2218    pravastatin (PRAVACHOL) tablet 40 mg  40 mg Oral QPM Yas Pettitsen, DO   40 mg at 01/25/20 2221    oxyCODONE-acetaminophen (PERCOCET)  MG per tablet 1 tablet  1 tablet Oral Q6H PRN Yas Pettitsen, DO   1 tablet at 01/25/20 2218    propranolol (INDERAL) tablet 20 mg  20 mg Oral BID Yas Pettitsen, DO   20 mg at 01/25/20 2233    traZODone (DESYREL) tablet 50 mg  50 mg Oral Nightly Yas Juan, DO   50 mg at 01/25/20 2346    primidone (MYSOLINE) tablet 250 mg  250 mg Oral Nightly Yas Juan, DO   250 mg at 01/25/20 2221    hyoscyamine (ANASPAZ;LEVSIN) tablet 125 mcg  125 mcg Oral TID Yas Pettitsen, DO        omeprazole (PRILOSEC) delayed release capsule 40 mg  40 mg Oral Daily Yas Juan, DO           Social History     Tobacco Use    Smoking status: Current Every Day Smoker     Packs/day: 1.00    Smokeless tobacco: Never Used   Substance Use Topics    Alcohol use: Yes     Comment: occasional        Labs:  Lab Results   Component Value Date    WBC 14.3 (H) 01/26/2020    HCT 38.3 01/26/2020    HGB 11.3 (L) 01/26/2020     01/26/2020      Lab Results   Component Value Date     01/26/2020    K 5.1 (H) 01/26/2020     (H) 01/26/2020    CO2 20 (L) 01/26/2020    BUN 26 (H) 01/26/2020    CREATININE 1.4 (H) 01/26/2020    GLUCOSE 113 (H) 01/26/2020     Lab Results   Component Value Date    AST 27 01/25/2020    ALT 14 01/25/2020    ALKPHOS 117 (H) 01/25/2020    PROT 7.7 01/25/2020    LIPASE 19 01/25/2020        Review of Systems:    Other than stated above in the HPI is negative      Physical exam: BP (!) 102/51   Pulse 63   Temp 98.8 °F (37.1 °C) (Oral)   Resp 18   Ht 5' (1.524 m)   Wt 129 lb 1.6 oz (58.6 kg)   SpO2 91%   BMI 25.21 kg/m²     General appearance: no acute distress  Head: NCAT, PERRLA, EOMI  Neck: supple, no masses  Lungs: CTABL  Heart: RRR  Abdomen: soft, nondistended with evidence of an abscess involving the abdominal wall with associated tenderness, erythema, induration and rubor   extremities: no rash cyanosis edema or jaundice    Assessment:    Abdominal wall abscess without communication with the ileostomy or intra-abdominal cavity  The patient does have a tattoo below the site. Plan:    The patient will undergo exploration of the abdominal wall and drainage of an associated abscess.   Sophia Flores MD 1/26/2020 at 7:21 AM

## 2020-01-26 NOTE — CONSULTS
traZODone  50 mg Oral Nightly    primidone  250 mg Oral Nightly    hyoscyamine  125 mcg Oral TID    omeprazole  40 mg Oral Daily     Continuous Infusions:   sodium chloride 12.5 mL/hr at 01/26/20 0658    sodium chloride 100 mL/hr at 01/25/20 2143     PRN Meds:ondansetron, oxyCODONE-acetaminophen    Allergies:  Fentanyl; Levofloxacin; Tramadol; and Vioxx [rofecoxib]    Social History:   Social History     Socioeconomic History    Marital status:      Spouse name: None    Number of children: None    Years of education: None    Highest education level: None   Occupational History    None   Social Needs    Financial resource strain: None    Food insecurity:     Worry: None     Inability: None    Transportation needs:     Medical: None     Non-medical: None   Tobacco Use    Smoking status: Current Every Day Smoker     Packs/day: 1.00    Smokeless tobacco: Never Used   Substance and Sexual Activity    Alcohol use: Yes     Comment: occasional    Drug use: No    Sexual activity: None   Lifestyle    Physical activity:     Days per week: None     Minutes per session: None    Stress: None   Relationships    Social connections:     Talks on phone: None     Gets together: None     Attends Denominational service: None     Active member of club or organization: None     Attends meetings of clubs or organizations: None     Relationship status: None    Intimate partner violence:     Fear of current or ex partner: None     Emotionally abused: None     Physically abused: None     Forced sexual activity: None   Other Topics Concern    None   Social History Narrative    None      Pets: Cats  Travel: No  The patient lives at home with her     Family History:   History reviewed. No pertinent family history. . Otherwise non-pertinent to the chief complaint.     REVIEW OF SYSTEMS:    Constitutional: Native for fevers, chills, diaphoresis  Neurologic: Negative   Psychiatric: Negative  Rheumatologic: Negative 01/26/2020    BUN 26 01/26/2020    CREATININE 1.4 01/26/2020    GFRAA 44 01/26/2020    LABGLOM 36 01/26/2020    GLUCOSE 113 01/26/2020    PROT 7.7 01/25/2020    LABALBU 3.9 01/25/2020    CALCIUM 9.1 01/26/2020    BILITOT 0.4 01/25/2020    ALKPHOS 117 01/25/2020    AST 27 01/25/2020    ALT 14 01/25/2020       Lab Results   Component Value Date    PROTIME 12.9 08/23/2016    INR 1.2 08/23/2016       No results found for: TSH    Lab Results   Component Value Date    COLORU Yellow 01/25/2020    PHUR 5.0 01/25/2020    LABCAST FEW 01/25/2020    WBCUA 2-5 01/25/2020    RBCUA NONE 01/25/2020    BACTERIA FEW 01/25/2020    CLARITYU Clear 01/25/2020    SPECGRAV >=1.030 01/25/2020    LEUKOCYTESUR TRACE 01/25/2020    UROBILINOGEN 0.2 01/25/2020    BILIRUBINUR SMALL 01/25/2020    BLOODU Negative 01/25/2020    GLUCOSEU Negative 01/25/2020       No results found for: HCO3, BE, O2SAT, PH, THGB, PCO2, PO2, TCO2  Radiology:  CT of the abdomen and pelvis reviewed    Microbiology:  Pending  No results for input(s): BC in the last 72 hours. No results for input(s): ORG in the last 72 hours. No results for input(s): Sallye Callow in the last 72 hours. No results for input(s): STREPNEUMAGU in the last 72 hours. No results for input(s): LP1UAG in the last 72 hours. No results for input(s): ASO in the last 72 hours. No results for input(s): CULTRESP in the last 72 hours. No results for input(s): PROCAL in the last 72 hours. Assessment:  · Abdominal wall abscess  · Probable mesh infection  · Possible enterocutaneous fistula  · Leukocytosis associated to the above    Plan:    · Continue Zosyn. Adjusted to underlying GFR  · Check cultures, baseline ESR, CRP  · Monitor labs  · Will follow with you    Thank you for having us see this patient in consultation. I will be discussing this case with the treating physicians.     Sandi Pride  12:47 PM  1/26/2020

## 2020-01-26 NOTE — PROGRESS NOTES
Miranda Padilla,    Your patient is on a medication that requires a renal dose adjustment. Renal Function Assessment:    Date Body Weight IBW Adj. Body Weight Scr CrCl Dialysis status   1/26/20 58.6 kg   1.4 27        Pharmacy has renally dose-adjusted the following medication(s):    Date Medication Original Dosing Regimen New Dosing Regimen   1/26/20 lovenox 40 mg daily 30 mg daily           These changes were made per protocol according to the [facility] Renal Function-Based Dose Adjustments Policy    *Please note this dose may need readjusted if your patient's renal function significantly improves. Please contact pharmacy will any questions regarding these changes.     Thank you,

## 2020-01-27 PROBLEM — E44.0 MODERATE PROTEIN MALNUTRITION (HCC): Status: ACTIVE | Noted: 2020-01-27

## 2020-01-27 LAB
ANION GAP SERPL CALCULATED.3IONS-SCNC: 14 MMOL/L (ref 7–16)
ANTISTREPTOLYSIN-O: 65 IU/ML (ref 0–200)
BUN BLDV-MCNC: 23 MG/DL (ref 8–23)
C-REACTIVE PROTEIN: 23.2 MG/DL (ref 0–0.4)
CALCIUM SERPL-MCNC: 9.2 MG/DL (ref 8.6–10.2)
CHLORIDE BLD-SCNC: 108 MMOL/L (ref 98–107)
CO2: 18 MMOL/L (ref 22–29)
CREAT SERPL-MCNC: 1.2 MG/DL (ref 0.5–1)
GFR AFRICAN AMERICAN: 53
GFR NON-AFRICAN AMERICAN: 43 ML/MIN/1.73
GLUCOSE BLD-MCNC: 150 MG/DL (ref 74–99)
GRAM STAIN ORDERABLE: NORMAL
HCT VFR BLD CALC: 38.3 % (ref 34–48)
HEMOGLOBIN: 11.2 G/DL (ref 11.5–15.5)
MCH RBC QN AUTO: 31.5 PG (ref 26–35)
MCHC RBC AUTO-ENTMCNC: 29.2 % (ref 32–34.5)
MCV RBC AUTO: 107.6 FL (ref 80–99.9)
PDW BLD-RTO: 13.7 FL (ref 11.5–15)
PLATELET # BLD: 332 E9/L (ref 130–450)
PMV BLD AUTO: 11.8 FL (ref 7–12)
POTASSIUM SERPL-SCNC: 4.3 MMOL/L (ref 3.5–5)
RBC # BLD: 3.56 E12/L (ref 3.5–5.5)
SEDIMENTATION RATE, ERYTHROCYTE: 60 MM/HR (ref 0–20)
SODIUM BLD-SCNC: 140 MMOL/L (ref 132–146)
WBC # BLD: 8.1 E9/L (ref 4.5–11.5)

## 2020-01-27 PROCEDURE — 86140 C-REACTIVE PROTEIN: CPT

## 2020-01-27 PROCEDURE — 6360000002 HC RX W HCPCS: Performed by: SPECIALIST

## 2020-01-27 PROCEDURE — 2580000003 HC RX 258: Performed by: INTERNAL MEDICINE

## 2020-01-27 PROCEDURE — 85027 COMPLETE CBC AUTOMATED: CPT

## 2020-01-27 PROCEDURE — 6360000002 HC RX W HCPCS: Performed by: SURGERY

## 2020-01-27 PROCEDURE — 86060 ANTISTREPTOLYSIN O TITER: CPT

## 2020-01-27 PROCEDURE — 36415 COLL VENOUS BLD VENIPUNCTURE: CPT

## 2020-01-27 PROCEDURE — 6360000002 HC RX W HCPCS: Performed by: INTERNAL MEDICINE

## 2020-01-27 PROCEDURE — 6370000000 HC RX 637 (ALT 250 FOR IP): Performed by: INTERNAL MEDICINE

## 2020-01-27 PROCEDURE — 2060000000 HC ICU INTERMEDIATE R&B

## 2020-01-27 PROCEDURE — 85651 RBC SED RATE NONAUTOMATED: CPT

## 2020-01-27 PROCEDURE — 80048 BASIC METABOLIC PNL TOTAL CA: CPT

## 2020-01-27 PROCEDURE — 2580000003 HC RX 258: Performed by: SPECIALIST

## 2020-01-27 RX ORDER — LIDOCAINE HYDROCHLORIDE 10 MG/ML
5 INJECTION, SOLUTION EPIDURAL; INFILTRATION; INTRACAUDAL; PERINEURAL ONCE
Status: COMPLETED | OUTPATIENT
Start: 2020-01-27 | End: 2020-01-28

## 2020-01-27 RX ORDER — SODIUM CHLORIDE 0.9 % (FLUSH) 0.9 %
10 SYRINGE (ML) INJECTION PRN
Status: DISCONTINUED | OUTPATIENT
Start: 2020-01-27 | End: 2020-01-29 | Stop reason: HOSPADM

## 2020-01-27 RX ORDER — MORPHINE SULFATE 2 MG/ML
2 INJECTION, SOLUTION INTRAMUSCULAR; INTRAVENOUS
Status: DISCONTINUED | OUTPATIENT
Start: 2020-01-27 | End: 2020-01-28

## 2020-01-27 RX ORDER — HEPARIN SODIUM (PORCINE) LOCK FLUSH IV SOLN 100 UNIT/ML 100 UNIT/ML
3 SOLUTION INTRAVENOUS EVERY 12 HOURS SCHEDULED
Status: DISCONTINUED | OUTPATIENT
Start: 2020-01-27 | End: 2020-01-29 | Stop reason: HOSPADM

## 2020-01-27 RX ORDER — HEPARIN SODIUM (PORCINE) LOCK FLUSH IV SOLN 100 UNIT/ML 100 UNIT/ML
3 SOLUTION INTRAVENOUS PRN
Status: DISCONTINUED | OUTPATIENT
Start: 2020-01-27 | End: 2020-01-29 | Stop reason: HOSPADM

## 2020-01-27 RX ADMIN — OXYCODONE AND ACETAMINOPHEN 1 TABLET: 10; 325 TABLET ORAL at 16:46

## 2020-01-27 RX ADMIN — SODIUM CHLORIDE: 9 INJECTION, SOLUTION INTRAVENOUS at 16:02

## 2020-01-27 RX ADMIN — TRAZODONE HYDROCHLORIDE 50 MG: 50 TABLET ORAL at 19:53

## 2020-01-27 RX ADMIN — PREDNISOLONE ACETATE 1 DROP: 10 SUSPENSION/ DROPS OPHTHALMIC at 09:25

## 2020-01-27 RX ADMIN — PIPERACILLIN AND TAZOBACTAM 3.38 G: 3; .375 INJECTION, POWDER, FOR SOLUTION INTRAVENOUS at 21:22

## 2020-01-27 RX ADMIN — PROPRANOLOL HYDROCHLORIDE 20 MG: 10 TABLET ORAL at 19:53

## 2020-01-27 RX ADMIN — KETOROLAC TROMETHAMINE 1 DROP: 5 SOLUTION OPHTHALMIC at 14:10

## 2020-01-27 RX ADMIN — PREDNISOLONE ACETATE 1 DROP: 10 SUSPENSION/ DROPS OPHTHALMIC at 19:52

## 2020-01-27 RX ADMIN — ESCITALOPRAM OXALATE 20 MG: 10 TABLET ORAL at 19:53

## 2020-01-27 RX ADMIN — HYOSCYAMINE SULFATE 125 MCG: 0.12 TABLET ORAL at 09:25

## 2020-01-27 RX ADMIN — KETOROLAC TROMETHAMINE 1 DROP: 5 SOLUTION OPHTHALMIC at 09:25

## 2020-01-27 RX ADMIN — SODIUM CHLORIDE: 9 INJECTION, SOLUTION INTRAVENOUS at 02:17

## 2020-01-27 RX ADMIN — HYOSCYAMINE SULFATE 125 MCG: 0.12 TABLET ORAL at 19:53

## 2020-01-27 RX ADMIN — PIPERACILLIN AND TAZOBACTAM 3.38 G: 3; .375 INJECTION, POWDER, FOR SOLUTION INTRAVENOUS at 09:24

## 2020-01-27 RX ADMIN — ENOXAPARIN SODIUM 30 MG: 30 INJECTION SUBCUTANEOUS at 19:53

## 2020-01-27 RX ADMIN — Medication 10 ML: at 15:13

## 2020-01-27 RX ADMIN — MORPHINE SULFATE 2 MG: 2 INJECTION, SOLUTION INTRAMUSCULAR; INTRAVENOUS at 19:53

## 2020-01-27 RX ADMIN — PRAVASTATIN SODIUM 40 MG: 20 TABLET ORAL at 21:22

## 2020-01-27 RX ADMIN — PREDNISOLONE ACETATE 1 DROP: 10 SUSPENSION/ DROPS OPHTHALMIC at 16:46

## 2020-01-27 RX ADMIN — OXYCODONE AND ACETAMINOPHEN 1 TABLET: 10; 325 TABLET ORAL at 03:39

## 2020-01-27 RX ADMIN — KETOROLAC TROMETHAMINE 1 DROP: 5 SOLUTION OPHTHALMIC at 19:52

## 2020-01-27 RX ADMIN — OMEPRAZOLE 40 MG: 20 CAPSULE, DELAYED RELEASE ORAL at 09:25

## 2020-01-27 RX ADMIN — PREDNISOLONE ACETATE 1 DROP: 10 SUSPENSION/ DROPS OPHTHALMIC at 14:10

## 2020-01-27 RX ADMIN — PROPRANOLOL HYDROCHLORIDE 20 MG: 10 TABLET ORAL at 09:25

## 2020-01-27 RX ADMIN — SODIUM CHLORIDE: 9 INJECTION, SOLUTION INTRAVENOUS at 14:16

## 2020-01-27 RX ADMIN — DILTIAZEM HYDROCHLORIDE 180 MG: 180 CAPSULE, COATED, EXTENDED RELEASE ORAL at 09:25

## 2020-01-27 RX ADMIN — PRIMIDONE 250 MG: 250 TABLET ORAL at 19:54

## 2020-01-27 ASSESSMENT — PAIN SCALES - GENERAL
PAINLEVEL_OUTOF10: 0
PAINLEVEL_OUTOF10: 5
PAINLEVEL_OUTOF10: 5
PAINLEVEL_OUTOF10: 9
PAINLEVEL_OUTOF10: 0
PAINLEVEL_OUTOF10: 0
PAINLEVEL_OUTOF10: 6
PAINLEVEL_OUTOF10: 3

## 2020-01-27 ASSESSMENT — PAIN DESCRIPTION - FREQUENCY
FREQUENCY: INTERMITTENT
FREQUENCY: INTERMITTENT

## 2020-01-27 ASSESSMENT — ENCOUNTER SYMPTOMS
DIARRHEA: 0
NAUSEA: 0
VOMITING: 0
SHORTNESS OF BREATH: 0

## 2020-01-27 ASSESSMENT — PAIN DESCRIPTION - PAIN TYPE
TYPE: SURGICAL PAIN
TYPE: SURGICAL PAIN

## 2020-01-27 ASSESSMENT — PAIN DESCRIPTION - ONSET
ONSET: ON-GOING
ONSET: ON-GOING

## 2020-01-27 ASSESSMENT — PAIN DESCRIPTION - LOCATION
LOCATION: ABDOMEN
LOCATION: ABDOMEN

## 2020-01-27 ASSESSMENT — PAIN DESCRIPTION - PROGRESSION
CLINICAL_PROGRESSION: NOT CHANGED
CLINICAL_PROGRESSION: NOT CHANGED

## 2020-01-27 ASSESSMENT — PAIN DESCRIPTION - DESCRIPTORS
DESCRIPTORS: ACHING;DISCOMFORT
DESCRIPTORS: ACHING;DISCOMFORT;PRESSURE

## 2020-01-27 ASSESSMENT — PAIN DESCRIPTION - ORIENTATION
ORIENTATION: MID
ORIENTATION: MID

## 2020-01-27 NOTE — PROGRESS NOTES
Nutrition Assessment    Type and Reason for Visit: Initial, Positive Nutrition Screen(Wounds)    Nutrition Recommendations: Continue Diet. Start Ensure High Ayaz ONS BID and Misael Wound Healing ONS BID. Nutrition Assessment: Pt moderately malnourished upon admit AEB mild muscle loss and poor intake x ~1 week d/t poor appetite 2/2 Abd Pain and nausea per pt. At further risk d/t increased needs 2/2 wound healing s/p Abd Wall Abscess Drainage w/ Debridement as well as h/o COPD w/ Ileostomy. Malnutrition Assessment:  · Malnutrition Status: Meets the criteria for moderate malnutrition  · Context: Acute illness or injury  · Findings of the 6 clinical characteristics of malnutrition (Minimum of 2 out of 6 clinical characteristics is required to make the diagnosis of moderate or severe Protein Calorie Malnutrition based on AND/ASPEN Guidelines):  1. Energy Intake-Less than or equal to 75% of estimated energy requirement, Greater than or equal to 7 days    2. Weight Loss-Unable to assess(2/2 poor EMR hx), unable to assess  3. Fat Loss-No significant subcutaneous fat loss    4. Muscle Loss-Mild muscle mass loss, Temples (temporalis muscle), Clavicles (pectoralis and deltoids)  5. Fluid Accumulation-No significant fluid accumulation    6.  Strength-Not measured    Nutrition Risk Level: Moderate    Nutrient Needs:  · Estimated Daily Total Kcal: 2294-0324(MSJ x 1.3 SF per CBW)  · Estimated Daily Protein (g): 60-70(1.3-1.5 gm/kg per IBW)  · Estimated Daily Total Fluid (ml/day): 8436-3489(1 ml/kcal)    Nutrition Diagnosis:   · Problem:  Moderate malnutrition, In context of acute illness or injury  · Etiology: related to Pain(2/2 Abd Wall Abscess)     Signs and symptoms:  as evidenced by Intake 25-50%, Diet history of poor intake, Mild muscle loss    Objective Information:  · Nutrition-Focused Physical Findings: A&O, poor dentition, Abd WDL, Hypo BS, +Ileostomy, No Edema, +I/O's  · Wound Type: Surgical

## 2020-01-27 NOTE — PROGRESS NOTES
[address] [place] [address] [facility]  [facility]UANA[facility]  Progress Note    SUBJECTIVE:  Chief Complaint   Patient presents with    Wound Check     abdomen, started monday. Xray done  and told results were unremarkable. Redness and swelling started tuesday and has gotten worse. The patient is tolerating antibiotics. She admits to having some abdominal discomfort. No nausea or vomiting. Review of systems:  As stated above in the chief complaint, otherwise negative. Medications:  Scheduled Meds:   prednisoLONE acetate  1 drop Left Eye 4x Daily    ketorolac  1 drop Left Eye TID    enoxaparin  30 mg Subcutaneous Daily    piperacillin-tazobactam  3.375 g Intravenous Q12H    diltiazem  180 mg Oral Daily    escitalopram  20 mg Oral Nightly    pravastatin  40 mg Oral QPM    propranolol  20 mg Oral BID    traZODone  50 mg Oral Nightly    primidone  250 mg Oral Nightly    hyoscyamine  125 mcg Oral TID    omeprazole  40 mg Oral Daily     Continuous Infusions:   sodium chloride 12.5 mL/hr at 20 0217    sodium chloride 100 mL/hr at 20 1450     PRN Meds:meperidine, HYDROmorphone, HYDROmorphone, ondansetron, oxyCODONE-acetaminophen    OBJECTIVE:  BP (!) 126/57   Pulse 70   Temp 97.6 °F (36.4 °C) (Oral)   Resp 16   Ht 5' (1.524 m)   Wt 128 lb 1.6 oz (58.1 kg)   SpO2 94%   BMI 25.02 kg/m²   Temp  Av.9 °F (36.6 °C)  Min: 97.5 °F (36.4 °C)  Max: 98.6 °F (37 °C)  Constitutional: The patient is awake, alert, and oriented. Sitting in a chair.  present. Skin: Warm and dry. No rashes were noted. HEENT: Round and reactive pupils. Moist mucous membranes. No ulcerations or thrush. Neck: Supple to movements. Chest: No use of accessory muscles to breathe. Symmetrical expansion. No wheezing, crackles or rhonchi. Cardiovascular: S1 and S2 are rhythmic and regular. No murmurs appreciated. Abdomen: Positive bowel sounds to auscultation.   Epigastric abdominal incision covered with dressing. Not disturbed. Ileostomy with liquid green fluid. Extremities: No clubbing, no cyanosis, no edema. Lines: peripheral    Laboratory and Tests Review:  Lab Results   Component Value Date    WBC 8.1 01/27/2020    WBC 14.3 (H) 01/26/2020    WBC 12.3 (H) 01/25/2020    HGB 11.2 (L) 01/27/2020    HCT 38.3 01/27/2020    .6 (H) 01/27/2020     01/27/2020     Lab Results   Component Value Date    NEUTROABS 9.87 (H) 01/25/2020    NEUTROABS 6.73 07/05/2018    NEUTROABS 6.96 09/22/2017     No results found for: CRP  Lab Results   Component Value Date    ALT 14 01/25/2020    AST 27 01/25/2020    ALKPHOS 117 (H) 01/25/2020    BILITOT 0.4 01/25/2020     Lab Results   Component Value Date     01/27/2020    K 4.3 01/27/2020    K 5.2 01/25/2020     01/27/2020    CO2 18 01/27/2020    BUN 23 01/27/2020    CREATININE 1.2 01/27/2020    CREATININE 1.4 01/26/2020    CREATININE 1.6 01/25/2020    GFRAA 53 01/27/2020    LABGLOM 43 01/27/2020    GLUCOSE 150 01/27/2020    PROT 7.7 01/25/2020    LABALBU 3.9 01/25/2020    CALCIUM 9.2 01/27/2020    BILITOT 0.4 01/25/2020    ALKPHOS 117 01/25/2020    AST 27 01/25/2020    ALT 14 01/25/2020     No results found for: CRP  No results found for: [address]  Radiology:      Microbiology:   Surgical culture 1/26/2020 mixed GPO, GNR  Blood cultures 1/25/2020: Pending    ASSESSMENT:  · Abdominal wall abscess. Status post incision and drainage, debridement of skin and soft tissue without muscle or fascia and explantation of visible mesh on 1/26/2020  · Probable mesh infection  · Leukocytosis secondary to abdominal wall abscess, improved    PLAN:  · Continue Zosyn  · Check final cultures  · PICC for IV antibiotics    Informed Consent for PICC:  I explained the risks, benefits, alternative options, and potential complications associated with insertion of Peripherally Inserted Central Catheter (PICC) with the patient prior to the procedure.     Electronically signed by Mallika Soria MD on 1/27/2020 at 2:58 PM     Mallika Soria  1:51 PM  1/27/2020

## 2020-01-27 NOTE — PROGRESS NOTES
[facility] Quality Flow/Interdisciplinary Rounds Progress Note        Quality Flow Rounds held on January 27, 2020    Disciplines Attending:  Bedside Nurse, ,  and Nursing Unit Leadership    Frances Richard was admitted on 1/25/2020  2:20 PM    Anticipated Discharge Date:  Expected Discharge Date: 01/28/20    Disposition:    Roland Score:  Roland Scale Score: 20    Readmission Risk              Risk of Unplanned Readmission:        19           Discussed patient goal for the day, patient clinical progression, and barriers to discharge. The following Goal(s) of the Day/Commitment(s) have been identified:  Patient continues on Iv ATB's.     Lurlean Acron  January 27, 2020

## 2020-01-27 NOTE — PROGRESS NOTES
PRN Lewie Arrow, DO   4 mg at 01/26/20 2105    pravastatin (PRAVACHOL) tablet 40 mg  40 mg Oral QPM Lewie Arrow, DO   40 mg at 01/26/20 1804    oxyCODONE-acetaminophen (PERCOCET)  MG per tablet 1 tablet  1 tablet Oral Q6H PRN Lewie Arrow, DO   1 tablet at 01/27/20 5083    propranolol (INDERAL) tablet 20 mg  20 mg Oral BID Lewie Arrow, DO   20 mg at 01/26/20 2108    traZODone (DESYREL) tablet 50 mg  50 mg Oral Nightly Lewie Arrow, DO   50 mg at 01/25/20 2346    primidone (MYSOLINE) tablet 250 mg  250 mg Oral Nightly Lewie Arrow, DO   250 mg at 01/26/20 2103    hyoscyamine (ANASPAZ;LEVSIN) tablet 125 mcg  125 mcg Oral TID Lewie Arrow, DO   125 mcg at 01/26/20 2103    omeprazole (PRILOSEC) delayed release capsule 40 mg  40 mg Oral Daily Lewie Arrow, DO   40 mg at 01/26/20 6236     Allergies   Allergen Reactions    Fentanyl Itching     Patch only    Levofloxacin Other (See Comments)     Leg edema/pain    Tramadol Nausea Only     Nausea . Sleeplisness, shakey    Vioxx [Rofecoxib]      Leg edema and pain     Active Problems:    Abdominal wall abscess  Resolved Problems:    * No resolved hospital problems. *    Blood pressure (!) 115/54, pulse 57, temperature 97.6 °F (36.4 °C), temperature source Oral, resp. rate 18, height 5' (1.524 m), weight 128 lb 1.6 oz (58.1 kg), SpO2 90 %. Subjective:  Symptoms:  No shortness of breath, chest pain, chest pressure or diarrhea. Diet:  No nausea or vomiting. Activity level: Impaired due to pain. Pain:  She complains of pain that is moderate. Pain is partially controlled. Objective:  General Appearance:  Comfortable and ill-appearing. Vital signs: (most recent): Blood pressure (!) 115/54, pulse 57, temperature 97.6 °F (36.4 °C), temperature source Oral, resp. rate 18, height 5' (1.524 m), weight 128 lb 1.6 oz (58.1 kg), SpO2 90 %. Vital signs are normal.    Output: Producing urine.     Lungs:  Normal effort and normal respiratory

## 2020-01-27 NOTE — PLAN OF CARE
Problem: Falls - Risk of:  Goal: Will remain free from falls  Description  Will remain free from falls  1/27/2020 0704 by Carine Kelly RN  Outcome: Ongoing  1/26/2020 2234 by Ya Ponce RN  Outcome: Met This Shift  Goal: Absence of physical injury  Description  Absence of physical injury  Outcome: Ongoing     Problem: Pain:  Goal: Pain level will decrease  Description  Pain level will decrease  1/27/2020 0704 by Carine Kelly RN  Outcome: Ongoing  1/26/2020 2234 by Ya Ponce RN  Outcome: Met This Shift  Goal: Control of acute pain  Description  Control of acute pain  1/27/2020 0704 by Carine Kelly RN  Outcome: Ongoing  1/26/2020 2234 by Ya Ponec RN  Outcome: Met This Shift  Goal: Control of chronic pain  Description  Control of chronic pain  Outcome: Ongoing     Problem: Nausea/Vomiting:  Goal: Absence of nausea/vomiting  Description  Absence of nausea/vomiting  Outcome: Ongoing     Problem: Physical Regulation:  Goal: Complications related to the disease process, condition or treatment will be avoided or minimized  Description  Complications related to the disease process, condition or treatment will be avoided or minimized  Outcome: Ongoing     Problem: Sensory:  Goal: Pain level will decrease  Description  Pain level will decrease  1/27/2020 0704 by Carine Kelly RN  Outcome: Ongoing  1/26/2020 2234 by Ya Ponce RN  Outcome: Met This Shift     Problem: Skin Integrity:  Goal: Skin integrity will be maintained  Description  Skin integrity will be maintained  Outcome: Ongoing  Goal: Skin integrity will improve  Description  Skin integrity will improve  Outcome: Ongoing

## 2020-01-27 NOTE — CARE COORDINATION
Met w/ patient. Explained role of  and plan of care. POD # 1 I&D abd wall abscess-mesh removed. Lives w/ . Independent PTA. Currently on O2 4lNC- does not wear home O2. On iv abx. PCP is Dr. Hernando Cassidy and pharmacy is [facility]. Plan per pt is to return home. HHC discussed- list offered and declined. States she would like [facility] on discharge if needed. [facility] referral made per Bessie CHARLES. Will follow Arianne Nova     The Plan for Transition of Care is related to the following treatment goals: post op care    The Patient and/or patient representative Shauna Breen was provided with a choice of provider and agrees   with the discharge plan. [x] Yes [] No    Freedom of choice list was provided with basic dialogue that supports the patient's individualized plan of care/goals, treatment preferences and shares the quality data associated with the providers.  [x] Yes [] No

## 2020-01-28 LAB
ANION GAP SERPL CALCULATED.3IONS-SCNC: 12 MMOL/L (ref 7–16)
BUN BLDV-MCNC: 17 MG/DL (ref 8–23)
CALCIUM SERPL-MCNC: 9.1 MG/DL (ref 8.6–10.2)
CHLORIDE BLD-SCNC: 108 MMOL/L (ref 98–107)
CO2: 21 MMOL/L (ref 22–29)
CREAT SERPL-MCNC: 1 MG/DL (ref 0.5–1)
GFR AFRICAN AMERICAN: >60
GFR NON-AFRICAN AMERICAN: 54 ML/MIN/1.73
GLUCOSE BLD-MCNC: 93 MG/DL (ref 74–99)
HCT VFR BLD CALC: 36.8 % (ref 34–48)
HEMOGLOBIN: 11.1 G/DL (ref 11.5–15.5)
MCH RBC QN AUTO: 31.7 PG (ref 26–35)
MCHC RBC AUTO-ENTMCNC: 30.2 % (ref 32–34.5)
MCV RBC AUTO: 105.1 FL (ref 80–99.9)
PDW BLD-RTO: 13.5 FL (ref 11.5–15)
PLATELET # BLD: 359 E9/L (ref 130–450)
PMV BLD AUTO: 10.7 FL (ref 7–12)
POTASSIUM SERPL-SCNC: 4.1 MMOL/L (ref 3.5–5)
RBC # BLD: 3.5 E12/L (ref 3.5–5.5)
SODIUM BLD-SCNC: 141 MMOL/L (ref 132–146)
WBC # BLD: 7.1 E9/L (ref 4.5–11.5)

## 2020-01-28 PROCEDURE — 36569 INSJ PICC 5 YR+ W/O IMAGING: CPT

## 2020-01-28 PROCEDURE — 85027 COMPLETE CBC AUTOMATED: CPT

## 2020-01-28 PROCEDURE — 6370000000 HC RX 637 (ALT 250 FOR IP): Performed by: INTERNAL MEDICINE

## 2020-01-28 PROCEDURE — 6360000002 HC RX W HCPCS: Performed by: SPECIALIST

## 2020-01-28 PROCEDURE — 36415 COLL VENOUS BLD VENIPUNCTURE: CPT

## 2020-01-28 PROCEDURE — 76937 US GUIDE VASCULAR ACCESS: CPT

## 2020-01-28 PROCEDURE — 6360000002 HC RX W HCPCS: Performed by: INTERNAL MEDICINE

## 2020-01-28 PROCEDURE — 97606 NEG PRS WND THER DME>50 SQCM: CPT

## 2020-01-28 PROCEDURE — 2060000000 HC ICU INTERMEDIATE R&B

## 2020-01-28 PROCEDURE — 2500000003 HC RX 250 WO HCPCS: Performed by: SPECIALIST

## 2020-01-28 PROCEDURE — 2580000003 HC RX 258: Performed by: SPECIALIST

## 2020-01-28 PROCEDURE — 80048 BASIC METABOLIC PNL TOTAL CA: CPT

## 2020-01-28 PROCEDURE — C1751 CATH, INF, PER/CENT/MIDLINE: HCPCS

## 2020-01-28 PROCEDURE — 02HV33Z INSERTION OF INFUSION DEVICE INTO SUPERIOR VENA CAVA, PERCUTANEOUS APPROACH: ICD-10-PCS | Performed by: INTERNAL MEDICINE

## 2020-01-28 PROCEDURE — 2580000003 HC RX 258: Performed by: INTERNAL MEDICINE

## 2020-01-28 PROCEDURE — 2700000000 HC OXYGEN THERAPY PER DAY

## 2020-01-28 RX ORDER — MORPHINE SULFATE 2 MG/ML
2 INJECTION, SOLUTION INTRAMUSCULAR; INTRAVENOUS EVERY 4 HOURS PRN
Status: DISCONTINUED | OUTPATIENT
Start: 2020-01-28 | End: 2020-01-29 | Stop reason: HOSPADM

## 2020-01-28 RX ADMIN — MORPHINE SULFATE 2 MG: 2 INJECTION, SOLUTION INTRAMUSCULAR; INTRAVENOUS at 12:55

## 2020-01-28 RX ADMIN — PREDNISOLONE ACETATE 1 DROP: 10 SUSPENSION/ DROPS OPHTHALMIC at 08:43

## 2020-01-28 RX ADMIN — KETOROLAC TROMETHAMINE 1 DROP: 5 SOLUTION OPHTHALMIC at 08:43

## 2020-01-28 RX ADMIN — HYOSCYAMINE SULFATE 125 MCG: 0.12 TABLET ORAL at 14:02

## 2020-01-28 RX ADMIN — HYOSCYAMINE SULFATE 125 MCG: 0.12 TABLET ORAL at 19:50

## 2020-01-28 RX ADMIN — PREDNISOLONE ACETATE 1 DROP: 10 SUSPENSION/ DROPS OPHTHALMIC at 19:52

## 2020-01-28 RX ADMIN — ESCITALOPRAM OXALATE 20 MG: 10 TABLET ORAL at 19:50

## 2020-01-28 RX ADMIN — SODIUM CHLORIDE: 9 INJECTION, SOLUTION INTRAVENOUS at 09:47

## 2020-01-28 RX ADMIN — KETOROLAC TROMETHAMINE 1 DROP: 5 SOLUTION OPHTHALMIC at 19:51

## 2020-01-28 RX ADMIN — OXYCODONE AND ACETAMINOPHEN 1 TABLET: 10; 325 TABLET ORAL at 14:39

## 2020-01-28 RX ADMIN — PREDNISOLONE ACETATE 1 DROP: 10 SUSPENSION/ DROPS OPHTHALMIC at 16:45

## 2020-01-28 RX ADMIN — KETOROLAC TROMETHAMINE 1 DROP: 5 SOLUTION OPHTHALMIC at 14:02

## 2020-01-28 RX ADMIN — PRAVASTATIN SODIUM 40 MG: 20 TABLET ORAL at 22:01

## 2020-01-28 RX ADMIN — PREDNISOLONE ACETATE 1 DROP: 10 SUSPENSION/ DROPS OPHTHALMIC at 14:02

## 2020-01-28 RX ADMIN — PROPRANOLOL HYDROCHLORIDE 20 MG: 10 TABLET ORAL at 08:42

## 2020-01-28 RX ADMIN — DILTIAZEM HYDROCHLORIDE 180 MG: 180 CAPSULE, COATED, EXTENDED RELEASE ORAL at 08:42

## 2020-01-28 RX ADMIN — SODIUM CHLORIDE: 9 INJECTION, SOLUTION INTRAVENOUS at 03:09

## 2020-01-28 RX ADMIN — Medication 10 ML: at 08:43

## 2020-01-28 RX ADMIN — SODIUM CHLORIDE: 9 INJECTION, SOLUTION INTRAVENOUS at 14:03

## 2020-01-28 RX ADMIN — PROPRANOLOL HYDROCHLORIDE 20 MG: 10 TABLET ORAL at 19:51

## 2020-01-28 RX ADMIN — PIPERACILLIN AND TAZOBACTAM 3.38 G: 3; .375 INJECTION, POWDER, FOR SOLUTION INTRAVENOUS at 09:47

## 2020-01-28 RX ADMIN — OMEPRAZOLE 40 MG: 20 CAPSULE, DELAYED RELEASE ORAL at 08:42

## 2020-01-28 RX ADMIN — HYOSCYAMINE SULFATE 125 MCG: 0.12 TABLET ORAL at 08:42

## 2020-01-28 RX ADMIN — OXYCODONE AND ACETAMINOPHEN 1 TABLET: 10; 325 TABLET ORAL at 08:39

## 2020-01-28 RX ADMIN — OXYCODONE AND ACETAMINOPHEN 1 TABLET: 10; 325 TABLET ORAL at 23:24

## 2020-01-28 RX ADMIN — TRAZODONE HYDROCHLORIDE 50 MG: 50 TABLET ORAL at 22:01

## 2020-01-28 RX ADMIN — ENOXAPARIN SODIUM 30 MG: 30 INJECTION SUBCUTANEOUS at 19:53

## 2020-01-28 RX ADMIN — SODIUM CHLORIDE, PRESERVATIVE FREE 300 UNITS: 5 INJECTION INTRAVENOUS at 20:51

## 2020-01-28 RX ADMIN — HYDROMORPHONE HYDROCHLORIDE 0.5 MG: 1 INJECTION, SOLUTION INTRAMUSCULAR; INTRAVENOUS; SUBCUTANEOUS at 10:12

## 2020-01-28 RX ADMIN — PRIMIDONE 250 MG: 250 TABLET ORAL at 19:51

## 2020-01-28 RX ADMIN — PIPERACILLIN AND TAZOBACTAM 3.38 G: 3; .375 INJECTION, POWDER, FOR SOLUTION INTRAVENOUS at 22:01

## 2020-01-28 RX ADMIN — LIDOCAINE HYDROCHLORIDE 0.5 ML: 10 INJECTION, SOLUTION EPIDURAL; INFILTRATION; INTRACAUDAL; PERINEURAL at 12:05

## 2020-01-28 ASSESSMENT — PAIN DESCRIPTION - PROGRESSION
CLINICAL_PROGRESSION: NOT CHANGED
CLINICAL_PROGRESSION: GRADUALLY WORSENING
CLINICAL_PROGRESSION: NOT CHANGED

## 2020-01-28 ASSESSMENT — PAIN SCALES - GENERAL
PAINLEVEL_OUTOF10: 2
PAINLEVEL_OUTOF10: 5
PAINLEVEL_OUTOF10: 3
PAINLEVEL_OUTOF10: 5
PAINLEVEL_OUTOF10: 4
PAINLEVEL_OUTOF10: 5
PAINLEVEL_OUTOF10: 3
PAINLEVEL_OUTOF10: 3
PAINLEVEL_OUTOF10: 0
PAINLEVEL_OUTOF10: 9

## 2020-01-28 ASSESSMENT — ENCOUNTER SYMPTOMS
DIARRHEA: 0
SHORTNESS OF BREATH: 0
NAUSEA: 0
VOMITING: 0

## 2020-01-28 ASSESSMENT — PAIN DESCRIPTION - ORIENTATION
ORIENTATION: MID
ORIENTATION: MID

## 2020-01-28 ASSESSMENT — PAIN DESCRIPTION - ONSET
ONSET: ON-GOING
ONSET: ON-GOING

## 2020-01-28 ASSESSMENT — PAIN DESCRIPTION - LOCATION
LOCATION: ABDOMEN
LOCATION: ABDOMEN

## 2020-01-28 ASSESSMENT — PAIN DESCRIPTION - FREQUENCY
FREQUENCY: INTERMITTENT
FREQUENCY: CONTINUOUS

## 2020-01-28 ASSESSMENT — PAIN DESCRIPTION - PAIN TYPE: TYPE: SURGICAL PAIN

## 2020-01-28 ASSESSMENT — PAIN DESCRIPTION - DIRECTION: RADIATING_TOWARDS: LOWER

## 2020-01-28 ASSESSMENT — PAIN DESCRIPTION - DESCRIPTORS
DESCRIPTORS: ACHING;DISCOMFORT;SHARP
DESCRIPTORS: ACHING;DISCOMFORT

## 2020-01-28 NOTE — PROCEDURES
PICC    Catheter insertion date 1/28/2020     Product Number:  Ref cdc [id number]   Lot No: [id number]   Gauge: 18   Lumen: single, 4FR   R Basilic    Vein Diameter : 0.44cm   Upper arm circumference (10CM ABOVE AC): 26cm   Catheter Length : 41cm   Internal Length: 40cm   External Catheter Length: 1cm   Ultrasound Used:yes  VPS [facility] [place] confirms PICC tip is placed in the lower 1/3 of the SVC or at the Cavoatrial junction. Floor nurse notified PICC is okay to use.    : LAZARO Menon-BC

## 2020-01-28 NOTE — PROGRESS NOTES
Colleen Soni is a 70 y.o. female patient seen for follow up of abdominal wall abscess. Patient states having some pain in belly this am but was worse during dressing change last evening. She denies any fevers, chills, chest pains, shortness of breath, nausea, vomiting or increased output through her ostomy.      Current Facility-Administered Medications   Medication Dose Route Frequency Provider Last Rate Last Dose    lidocaine PF 1 % injection 5 mL  5 mL Intradermal Once Renetta Parks MD        sodium chloride flush 0.9 % injection 10 mL  10 mL Intravenous PRN Renetta Parks MD   10 mL at 01/27/20 1513    heparin flush 100 UNIT/ML injection 300 Units  3 mL Intravenous 2 times per day Renetta Parks MD        heparin flush 100 UNIT/ML injection 300 Units  3 mL Intercatheter PRN Renetta Parks MD        morphine (PF) injection 2 mg  2 mg Intravenous Q2H PRN Geovanna Stout MD   2 mg at 01/27/20 1953    prednisoLONE acetate (PRED FORTE) 1 % ophthalmic suspension 1 drop  1 drop Left Eye 4x Daily Humphrey Gell, DO   1 drop at 01/27/20 1952    ketorolac (ACULAR) 0.5 % ophthalmic solution 1 drop  1 drop Left Eye TID Humphrey Gell, DO   1 drop at 01/27/20 1952    enoxaparin (LOVENOX) injection 30 mg  30 mg Subcutaneous Daily Easton Gell, DO   30 mg at 01/27/20 1953    piperacillin-tazobactam (ZOSYN) 3.375 g in dextrose 5 % 50 mL IVPB extended infusion (mini-bag)  3.375 g Intravenous Q12H Renetta Parks MD   Stopped at 01/28/20 0309    And    0.9 % sodium chloride infusion   Intravenous Q12H Renetta Parks MD 12.5 mL/hr at 01/28/20 0309      meperidine (DEMEROL) injection 12.5 mg  12.5 mg Intravenous Q5 Min PRN Fortino Singh MD        HYDROmorphone (DILAUDID) injection 0.5 mg  0.5 mg Intravenous Q5 Min PRN Fortino Singh MD        HYDROmorphone (DILAUDID) injection 0.25 mg  0.25 mg Intravenous Q5 Min PRN Fortino Singh MD   0.25 mg at 01/26/20 1410    0.9 % sodium chloride infusion   Intravenous Continuous Annita Borders,  mL/hr at 01/27/20 1602      diltiazem (CARDIZEM CD) extended release capsule 180 mg  180 mg Oral Daily Annita Borders, DO   180 mg at 01/27/20 8965    escitalopram (LEXAPRO) tablet 20 mg  20 mg Oral Nightly Kassandra Borders, DO   20 mg at 01/27/20 1953    ondansetron (ZOFRAN) tablet 4 mg  4 mg Oral Q6H PRN Annita Borders, DO   4 mg at 01/26/20 2105    pravastatin (PRAVACHOL) tablet 40 mg  40 mg Oral QPM Annita Borders, DO   40 mg at 01/27/20 2122    oxyCODONE-acetaminophen (PERCOCET)  MG per tablet 1 tablet  1 tablet Oral Q6H PRN Annita Borders, DO   1 tablet at 01/27/20 1646    propranolol (INDERAL) tablet 20 mg  20 mg Oral BID Annita Borders, DO   20 mg at 01/27/20 1953    traZODone (DESYREL) tablet 50 mg  50 mg Oral Nightly Annita Borders, DO   50 mg at 01/27/20 1953    primidone (MYSOLINE) tablet 250 mg  250 mg Oral Nightly Kassandra Borders, DO   250 mg at 01/27/20 1954    hyoscyamine (ANASPAZ;LEVSIN) tablet 125 mcg  125 mcg Oral TID Annita Borders, DO   125 mcg at 01/27/20 1953    omeprazole (PRILOSEC) delayed release capsule 40 mg  40 mg Oral Daily Annita Borders, DO   40 mg at 01/27/20 5068     Allergies   Allergen Reactions    Fentanyl Itching     Patch only    Levofloxacin Other (See Comments)     Leg edema/pain    Tramadol Nausea Only     Nausea . Sleeplisness, shakey    Vioxx [Rofecoxib]      Leg edema and pain     Active Problems: Moderate protein malnutrition (HCC)    Abdominal wall abscess  Resolved Problems:    * No resolved hospital problems. *    Blood pressure 120/62, pulse 78, temperature 98.2 °F (36.8 °C), temperature source Oral, resp. rate 18, height 5' (1.524 m), weight 129 lb 6.4 oz (58.7 kg), SpO2 95 %. Subjective:  Symptoms:  Improved. No shortness of breath, chest pain or diarrhea. Diet:  No nausea or vomiting. Activity level: Impaired due to pain. Pain:  She complains of pain that is mild.

## 2020-01-28 NOTE — CARE COORDINATION
Haim Loya @ [facility] notified of need for wound vac. Forms placed in light chart. For PICC insertion. On iv abx q12hr. Message left w/ Barbara Sargent @ [facility] to check LTAC criteria- awaiting response.  Aye Ordoñez

## 2020-01-28 NOTE — PROGRESS NOTES
Call placed to [facility] inpatient wound care to see patient in regards to wound vac vs. Dressing recommendation-also sent message via perfect Serve as this is an identified barrier to discharge.   Electronically signed by Rody Laws RN on 1/28/2020 at 9:43 AM

## 2020-01-28 NOTE — PROGRESS NOTES
GENERAL SURGERY  DAILY PROGRESS NOTE  1/28/2020    Subjective:  S/p I&D of abdominal wall abscess and explantation of mesh  Pain controlled. Objective:  /62   Pulse 78   Temp 98.2 °F (36.8 °C) (Oral)   Resp 18   Ht 5' (1.524 m)   Wt 129 lb 6.4 oz (58.7 kg)   SpO2 95%   BMI 25.27 kg/m²     GENERAL:  Laying in bed, awake, alert, cooperative, no apparent distress  HEAD: Normocephalic, atraumatic  EYES: No sclera icterus, pupils equal  LUNGS:  No increased work of breathing  CARDIOVASCULAR:  Regular rate  ABDOMEN:  Soft, non-tender, non-distended. RLQ ileostomy with air and stool.   Upper midline abdominal wound with packing in place, surrounding cellulitis improved s/p I&D  EXTREMITIES: No edema or swelling  SKIN: Warm and dry    Assessment/Plan:  70 y.o. female with abdominal wall abscess s/p 1/27 I&D abscess, debridement of  Necrotic skin and soft tissue and explantation of exposed mesh    Overall care per primary  Local wound care, will ask wound care RN to evaluate for placement of wound vac  ID following, on Zosyn    Electronically signed by Moreno Walsh on 1/28/2020 at 7:31 AM

## 2020-01-28 NOTE — PROGRESS NOTES
[facility] Quality Flow/Interdisciplinary Rounds Progress Note        Quality Flow Rounds held on January 28, 2020    Disciplines Attending:  Bedside Nurse, ,  and Nursing Unit Leadership    [place] was admitted on 1/25/2020  2:20 PM    Anticipated Discharge Date:  Expected Discharge Date: 01/28/20    Disposition:    Roland Score:  Roland Scale Score: 20    Readmission Risk              Risk of Unplanned Readmission:        18           Discussed patient goal for the day, patient clinical progression, and barriers to discharge. The following Goal(s) of the Day/Commitment(s) have been identified:  PICC line today-hopeful discharge afterwards.       Metro Lis  January 28, 2020

## 2020-01-28 NOTE — PLAN OF CARE
Problem: Falls - Risk of:  Goal: Will remain free from falls  Description  Will remain free from falls  Outcome: Ongoing  Goal: Absence of physical injury  Description  Absence of physical injury  Outcome: Ongoing     Problem: Pain:  Goal: Pain level will decrease  Description  Pain level will decrease  Outcome: Ongoing  Goal: Control of acute pain  Description  Control of acute pain  Outcome: Ongoing  Goal: Control of chronic pain  Description  Control of chronic pain  Outcome: Ongoing     Problem: Nausea/Vomiting:  Goal: Absence of nausea/vomiting  Description  Absence of nausea/vomiting  Outcome: Ongoing     Problem: Physical Regulation:  Goal: Complications related to the disease process, condition or treatment will be avoided or minimized  Description  Complications related to the disease process, condition or treatment will be avoided or minimized  Outcome: Ongoing     Problem: Sensory:  Goal: Pain level will decrease  Description  Pain level will decrease  Outcome: Ongoing     Problem: Skin Integrity:  Goal: Skin integrity will be maintained  Description  Skin integrity will be maintained  Outcome: Ongoing  Goal: Skin integrity will improve  Description  Skin integrity will improve  Outcome: Ongoing     Problem: Infection - Central Venous Catheter-Associated Bloodstream Infection:  Goal: Will show no infection signs and symptoms  Description  Will show no infection signs and symptoms  Outcome: Ongoing

## 2020-01-29 VITALS
OXYGEN SATURATION: 93 % | BODY MASS INDEX: 25.13 KG/M2 | RESPIRATION RATE: 18 BRPM | TEMPERATURE: 98.2 F | HEIGHT: 60 IN | DIASTOLIC BLOOD PRESSURE: 66 MMHG | WEIGHT: 128 LBS | SYSTOLIC BLOOD PRESSURE: 149 MMHG | HEART RATE: 83 BPM

## 2020-01-29 PROBLEM — L02.211 ABDOMINAL WALL ABSCESS: Status: RESOLVED | Noted: 2020-01-25 | Resolved: 2020-01-29

## 2020-01-29 PROBLEM — E44.0 MODERATE PROTEIN MALNUTRITION (HCC): Status: RESOLVED | Noted: 2020-01-27 | Resolved: 2020-01-29

## 2020-01-29 LAB
ANAEROBIC CULTURE: ABNORMAL
ANION GAP SERPL CALCULATED.3IONS-SCNC: 9 MMOL/L (ref 7–16)
BUN BLDV-MCNC: 25 MG/DL (ref 8–23)
CALCIUM SERPL-MCNC: 9.4 MG/DL (ref 8.6–10.2)
CHLORIDE BLD-SCNC: 108 MMOL/L (ref 98–107)
CO2: 23 MMOL/L (ref 22–29)
CREAT SERPL-MCNC: 1 MG/DL (ref 0.5–1)
GFR AFRICAN AMERICAN: >60
GFR NON-AFRICAN AMERICAN: 54 ML/MIN/1.73
GLUCOSE BLD-MCNC: 96 MG/DL (ref 74–99)
HCT VFR BLD CALC: 37 % (ref 34–48)
HEMOGLOBIN: 11.1 G/DL (ref 11.5–15.5)
MCH RBC QN AUTO: 31.2 PG (ref 26–35)
MCHC RBC AUTO-ENTMCNC: 30 % (ref 32–34.5)
MCV RBC AUTO: 103.9 FL (ref 80–99.9)
ORGANISM: ABNORMAL
PDW BLD-RTO: 13.4 FL (ref 11.5–15)
PLATELET # BLD: 405 E9/L (ref 130–450)
PMV BLD AUTO: 10.5 FL (ref 7–12)
POTASSIUM SERPL-SCNC: 4.1 MMOL/L (ref 3.5–5)
RBC # BLD: 3.56 E12/L (ref 3.5–5.5)
SODIUM BLD-SCNC: 140 MMOL/L (ref 132–146)
WBC # BLD: 8 E9/L (ref 4.5–11.5)

## 2020-01-29 PROCEDURE — 2580000003 HC RX 258: Performed by: SPECIALIST

## 2020-01-29 PROCEDURE — 85027 COMPLETE CBC AUTOMATED: CPT

## 2020-01-29 PROCEDURE — 6360000002 HC RX W HCPCS: Performed by: INTERNAL MEDICINE

## 2020-01-29 PROCEDURE — 97165 OT EVAL LOW COMPLEX 30 MIN: CPT

## 2020-01-29 PROCEDURE — 80048 BASIC METABOLIC PNL TOTAL CA: CPT

## 2020-01-29 PROCEDURE — 97161 PT EVAL LOW COMPLEX 20 MIN: CPT

## 2020-01-29 PROCEDURE — 6370000000 HC RX 637 (ALT 250 FOR IP): Performed by: INTERNAL MEDICINE

## 2020-01-29 PROCEDURE — 6360000002 HC RX W HCPCS: Performed by: SPECIALIST

## 2020-01-29 RX ADMIN — PREDNISOLONE ACETATE 1 DROP: 10 SUSPENSION/ DROPS OPHTHALMIC at 08:23

## 2020-01-29 RX ADMIN — MORPHINE SULFATE 2 MG: 2 INJECTION, SOLUTION INTRAMUSCULAR; INTRAVENOUS at 08:24

## 2020-01-29 RX ADMIN — OMEPRAZOLE 40 MG: 20 CAPSULE, DELAYED RELEASE ORAL at 08:23

## 2020-01-29 RX ADMIN — SODIUM CHLORIDE: 9 INJECTION, SOLUTION INTRAVENOUS at 02:06

## 2020-01-29 RX ADMIN — Medication 10 ML: at 08:25

## 2020-01-29 RX ADMIN — DILTIAZEM HYDROCHLORIDE 180 MG: 180 CAPSULE, COATED, EXTENDED RELEASE ORAL at 08:23

## 2020-01-29 RX ADMIN — HYDROMORPHONE HYDROCHLORIDE 0.5 MG: 1 INJECTION, SOLUTION INTRAMUSCULAR; INTRAVENOUS; SUBCUTANEOUS at 12:40

## 2020-01-29 RX ADMIN — PROPRANOLOL HYDROCHLORIDE 20 MG: 10 TABLET ORAL at 08:24

## 2020-01-29 RX ADMIN — SODIUM CHLORIDE, PRESERVATIVE FREE 300 UNITS: 5 INJECTION INTRAVENOUS at 08:24

## 2020-01-29 RX ADMIN — HYOSCYAMINE SULFATE 125 MCG: 0.12 TABLET ORAL at 08:23

## 2020-01-29 RX ADMIN — KETOROLAC TROMETHAMINE 1 DROP: 5 SOLUTION OPHTHALMIC at 14:19

## 2020-01-29 RX ADMIN — KETOROLAC TROMETHAMINE 1 DROP: 5 SOLUTION OPHTHALMIC at 08:23

## 2020-01-29 RX ADMIN — PIPERACILLIN AND TAZOBACTAM 3.38 G: 3; .375 INJECTION, POWDER, FOR SOLUTION INTRAVENOUS at 10:33

## 2020-01-29 RX ADMIN — PREDNISOLONE ACETATE 1 DROP: 10 SUSPENSION/ DROPS OPHTHALMIC at 14:19

## 2020-01-29 RX ADMIN — HYOSCYAMINE SULFATE 125 MCG: 0.12 TABLET ORAL at 14:19

## 2020-01-29 ASSESSMENT — ENCOUNTER SYMPTOMS
SHORTNESS OF BREATH: 0
DIARRHEA: 0
VOMITING: 0
NAUSEA: 0

## 2020-01-29 ASSESSMENT — PAIN SCALES - GENERAL
PAINLEVEL_OUTOF10: 0
PAINLEVEL_OUTOF10: 1
PAINLEVEL_OUTOF10: 5
PAINLEVEL_OUTOF10: 8
PAINLEVEL_OUTOF10: 7

## 2020-01-29 ASSESSMENT — PAIN DESCRIPTION - PAIN TYPE: TYPE: ACUTE PAIN

## 2020-01-29 ASSESSMENT — PAIN DESCRIPTION - LOCATION: LOCATION: ABDOMEN;HEAD;NECK;BACK

## 2020-01-29 NOTE — PROGRESS NOTES
VAC and ostomy pouch intact. Plans to transfer to [facility]  Jayde Marker, CNS, Wound Care

## 2020-01-29 NOTE — PROGRESS NOTES
Occupational Therapy  OCCUPATIONAL THERAPY INITIAL EVALUATION      Date:2020  Patient Name: Miya Min  MRN: [id number]  : 1940  Room: [address] [place], [place] [address]    Evaluating OT: Mason Mccracken OTR/L [id number]    AM-PAC Daily Activity Raw Score:     Recommended Adaptive Equipment: TBD    Diagnosis: abdominal wall abscess. Pt presents to ED from home for wound check. Pertinent Medical History: osteopenia, HTN, fibromyalgia, COPD, CAD, anxiety, ostomy   Precautions:  Falls, ostomy, wound vac     Home Living: Pt lives with  in a single story home with 2-3 steps HR on entry. Bathroom setup: walk in shower with grab bars and seat, standard commode     Prior Level of Function: Independent with ADLs, Independent with IADLs; completed functional mobility no AD  Driving: Yes    Pain Level: abdominal pain    Cognition: A&O: 4/4. Problem solving:  WFL   Judgement/safety:  WFL     Functional Assessment:   Initial Eval Status  Date: 20 Treatment session:  Short Term Goals  Treatment frequency: 2-5x/wk PRN x1-3 wks   Feeding Independent     Grooming SBA  Standing sink level for hand hygiene  Independent   UB Dressing Set up  Independent   LB Dressing Min A  Limited by presence of wound vac at abdomen  Mod I    Bathing Min A  Mod I   Toileting SBA  Use of grab bar for support in transfer and to maintain balance in standing for hyun care  mOd I   Bed Mobility  Supine to sit:  Independent     Functional Transfers STS: SBA  Mod I   Functional Mobility CGA with HHA  Household distance  Mod I during ADLs   Balance Sitting: fair    Standing: fair      Activity Tolerance fair  standing marin x6-7 min with fair plus balance during self care tasks              Strength ROM Additional Info:    RUE  4/5 WFL good  and FMC/dexterity noted during ADL tasks     LUE 4/5 WFL good  and FMC/dexterity noted during ADL tasks         Hearing: WFL   Vision: WFL   Sensation:  No c/o numbness or tingling   Tone: [facility] Edema: none                             Comments/Treatment: Patient educated on adapted techniques for completion of ADL, safe functional transfers and mobility. Patient required cues for follow through with proper hand/foot placement, pacing, safety and technique in bed mobility, functional transfers, functional mobility, toileting, grooming and LB dressing in preparation for maximum independence in all self care tasks. Eval Complexity: Low    Assessment of current deficits   Functional mobility [x]  ADLs [x] Strength [x]  Cognition []  Functional transfers  [x] IADLs [x] Safety Awareness [x]  Endurance [x]  Fine Motor Coordination [] Balance [x] Vision/perception [] Sensation []   Gross Motor Coordination [] ROM [] Delirium []                  Motor Control []    Plan of Care:   ADL retraining [x]   Equipment needs [x]   Neuromuscular re-education [x] Energy Conservation Techniques [x]  Functional Transfer training [x] Patient and/or Family Education [x]  Functional Mobility training [x]  Environmental Modifications [x]  Cognitive re-training []   Compensatory techniques for ADLs [x]  Splinting Needs []   Positioning to improve overall function [x]   Therapeutic Activity [x]  Therapeutic Exercise  [x]  Visual/Perceptual: []    Delirium prevention/treatment  []   Other:  []    Rehab Potential: Good for established goals     Patient / Family Goal: To get home. Patient and/or family were instructed on functional diagnosis, prognosis/goals and OT plan of care. Pt verbalized good understanding. Upon arrival, patient supine in bed. At end of session, patient seated in chair with call light and phone within reach, all lines and tubes intact. Pt would benefit from continued skilled OT to increase safety and independence with completion of ADL/IADL tasks for functional independence and quality of life.  Bed/chair alarm: ON    Low Evaluation + 0 timed treatment minutes    Evaluation time includes thorough review of current medical information, gathering information on past medical history/social history and prior level of function, completion of standardized testing/informal observation of tasks, assessment of data, and development of POC/Goals    Shannan Tavera OTR/L  [id number]

## 2020-01-29 NOTE — DISCHARGE INSTR - COC
Continuity of Care Form    Patient Name: Silva Sampson   :  1940  MRN:  [id number]    Admit date:  2020  Discharge date:  ***    Code Status Order: Prior   Advance Directives:   [facility] Documentation     Date/Time Healthcare Directive Type of Healthcare Directive Copy in [address] Agent's Name Healthcare Agent's Phone Number    20 1210  Yes, patient has an advance directive for healthcare treatment  Living will  No, copy requested from family  [address]  [phone]    20 2230  Yes, patient has an advance directive for healthcare treatment  Living will  No, copy requested from family  --  --  --          Admitting Physician:  Adolph Bal DO  PCP: Siddharth Urias DO    Discharging Nurse: [facility] Unit/Room#: 8011/0570-U  Discharging Unit Phone Number: ***    Emergency Contact:   Extended Emergency Contact Information  Primary Emergency Contact: John Kelley  Address: [address] [place] [address] Phone: [phone]  Relation: Spouse  Secondary Emergency Contact[facility] Phone: [phone]  Relation: Brother/Sister    Past Surgical History:  Past Surgical History:   Procedure Laterality Date    ABDOMEN SURGERY  2012    total colectomy, bowel resection, ileostomy,revision of ileostomy     ABDOMEN SURGERY N/A 2020    DRAINAGE OF ABDOMINAL WALL ABSCESS, EXPLANTATION OF MESH, DEBRIDEMENT OF SKIN AND SUBCUTANEOUS TISSUE performed by Enrique Ace MD at [address] [employer] [facility]     [facility] [address]  -    right and left thumb   [facility], ,     lumbar fusion x 3    BACK SURGERY      cervical fusion x 2    BREAST SURGERY  years ago    monor calcifications    CARPAL TUNNEL RELEASE Bilateral     CATARACT REMOVAL WITH IMPLANT Right 03/03/15    CHOLECYSTECTOMY  07    Lap    COLONOSCOPY      CORONARY ANGIOPLASTY WITH STENT PLACEMENT  1-7-2002    ENDOSCOPY, COLON, DIAGNOSTIC      EPIGASTRIC HERNIA REPAIR  3/21/16    incisional with mesh implantation    ESOPHAGUS SURGERY  1-4-13    esophageal clamp (torn Esophagus)    FINGER TRIGGER RELEASE Right 2006    index finger    FOOT SURGERY Right     multiple    HERNIA REPAIR  12-31-13    abdominal x 5    ILEOSTOMY OR JEJUNOSTOMY  1-3-13    revision    JOINT REPLACEMENT Right 3/24/15    right total shoulder arthroplasty    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     MOHS SURGERY      bilateal / leg    NASAL SINUS SURGERY      x2 /  cauterized    OTHER SURGICAL HISTORY  08/24/2016    diagnostic laparotomy with repair of intraabdominal hernia    OTHER SURGICAL HISTORY      removal plate / screws  / right great toe    ROTATOR CUFF REPAIR  2010    right     SKIN BIOPSY  2010    3 squamous cell carcinoma right leg, left leg       Immunization History:   Immunization History   Administered Date(s) Administered    Influenza Vaccine, unspecified formulation 10/15/2016       Active Problems:  Patient Active Problem List   Diagnosis Code    Right cataract H26.9    Essential hypertension, benign I10    Hyperlipidemia with target LDL less than 100 E78.5    Osteoarthritis, 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    Primary osteoarthritis of left knee M17.12    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    Ileostomy present ([facility] [address] [address] Z93.2    Chronic kidney disease, stage III (moderate) ([facility]) N18.3    COPD (chronic obstructive pulmonary disease) ([facility]) J44.9    Moderate protein malnutrition ([facility]) E44.0    Abdominal wall abscess L02.211       Isolation/Infection:   Isolation          No Isolation        Patient Infection Status     None to display          Nurse Assessment:  Last Vital Signs: BP (!) 149/66   Pulse 83   Temp 98.2 °F (36.8 °C) (Oral)   Resp 18   Ht 5' (1.524 m)   Wt 128 lb (58.1 kg)   SpO2 93%   BMI 25.00 kg/m²     Last documented pain score (0-10 scale): Pain Level: 7  Last Weight:   Wt Readings from Last 1 Encounters:   01/29/20 128 lb (58.1 kg)     Mental Status:  alert, thought processes intact and able to concentrate and follow conversation    IV Access:  - PICC - site  R Basilic, insertion date: 1/28/20    Nursing Mobility/ADLs:  Walking   Assisted  Transfer  Assisted  Bathing  Assisted  Dressing  Assisted  Toileting  Assisted  Feeding  Independent  Med [address]   Independent  Med Delivery   whole    Wound Care Documentation and Therapy:  Negative Pressure Wound Therapy Abdomen (Active)   $ Standard NPWT >50 sq cm PER TX $ Yes 1/28/2020  2:00 PM   Dressing Type Black foam 1/28/2020  2:00 PM   Number of pieces used 1 1/28/2020  2:00 PM   Cycle Continuous 1/28/2020 11:26 PM   Target Pressure (mmHg) 125 1/28/2020 11:26 PM   Dressing Status Clean;Dry; Intact 1/28/2020 11:26 PM   Number of days: 0       Wound 01/28/20 Abdomen (Active)   Wound Image   1/28/2020  2:00 PM   Dressing Status Clean;Dry; Intact 1/28/2020 11:26 PM   Dressing Changed Changed/New 1/28/2020  2:00 PM   Dressing/Treatment Vacuum dressing 1/28/2020 11:26 PM   Wound Length (cm) 8 cm 1/28/2020  2:00 PM   Wound Width (cm) 6 cm 1/28/2020  2:00 PM   Wound Depth (cm) 1 cm 1/28/2020  2:00 PM   Wound Surface Area (cm^2) 48 cm^2 1/28/2020  2:00 PM   Wound Volume (cm^3) 48 cm^3 1/28/2020  2:00 PM   Undermining Starts ___ O'Clock 1 1/28/2020  2:00 PM   Undermining Ends___ O'Clock 6 1/28/2020  2:00 PM   Undermining Maxium Distance (cm) 5 1/28/2020  2:00 PM   Wound Assessment Pink;Red 1/28/2020  2:00 PM   Drainage Amount Small 1/28/2020 11:26 PM   Drainage Description Serous 1/28/2020 11:26 PM   Odor None 1/28/2020  2:00 PM   Danelle-wound Assessment Intact 1/28/2020  2:00 PM   Number of days: 0        Elimination:  Continence:   · Bowel: No  · Bladder: No  Urinary Catheter: None   Colostomy/Ileostomy/Ileal Conduit: Yes  Ileostomy Ileostomy RLQ-Stomal Appliance: Changed  Ileostomy Ileostomy RLQ-Stoma

## 2020-01-29 NOTE — CARE COORDINATION
Social work / Discharge Planning:      Social work met with patient again to discuss discharge plan. She cannot afford the out of pocket cost for IV antibiotics at home. She does not want a [facility]. Social work discussed the option of LTAC. Patient is agreeable. She requests [facility]. Referral made and patient accepted. No precert needed. RN updated.    Electronically signed by CASSI Veras on 1/29/2020 at 9:38 AM

## 2020-01-29 NOTE — PROGRESS NOTES
History: anemia, anxiety, depression, CAD, COPD, fibromyalgia, HTN, MI  PRECAUTIONS: fall risk, wound vac      Social:  Pt lives with  in a 1 story floor plan with 2 steps and 1 rail to enter. Prior to admission pt walked without assistive device.       Initial Evaluation  Date: 1/29/2020 Treatment      Short Term/ Long Term   Goals   Was pt agreeable to Eval/treatment? yes       Does pt have pain? abd discomfort       Bed Mobility  Rolling: NT  Supine to sit: independent   Sit to supine: NT  Scooting: independent in sitting and supine   S/I   Transfers Sit to stand: SBA  Stand to sit: SBA   Stand pivot: SBA   S/I   Ambulation   15 feet  1 and 30 feet x 1  with wheeled walker with SBA   100 feet with wheeled walker with S/I         Stair negotiation: ascended and descended NT   4steps with 1 rail with SBA   LE ROM  WFL      LE strength   grossly 4/5        AM PAC 20/ 24           Balance: SBA    Endurance: decreased   Chair alarm: yes     ASSESSMENT    Pt displays functional ability as noted in the objective portion of this evaluation.       Comments/Treatment:  Pt left in chair with call light in reach after mobility.         Examination of body systems Decreased   Functional mobility X   ROM    Strength X   Safety Awareness     Cognition     Endurance X   Sensation     Balance X   Vision/Visual Deficits     Coordination           Patient education  Pt educated on fall risk, safety with mobility     Patient response to education:   Pt verbalized understanding Pt demonstrated skill Pt requires further education in this area   yes yes yes      Rehab potential is Good for reaching above PT goals.       Pts/ family goals     1. none stated     Patient and or family understand(s) diagnosis, prognosis, and plan of care. - yes     PLAN    PT care will be provided in accordance with the objectives noted above. Whenever appropriate, clear delegation orders will be provided for nursing staff.   Exercises and functional mobility practice will be used as well as appropriate assistive devices or modalities to obtain goals.  Patient and family education will also be administered as needed.     Frequency of treatments will be 2- 5 x/ wk  x 3 -5 days.     Time in: [facility]  Time out: [address] [place] [address] number:  PT [phone]

## 2020-01-29 NOTE — PROGRESS NOTES
Right PICC 1/28/2020    Laboratory and Tests Review:  Lab Results   Component Value Date    WBC 8.0 01/29/2020    WBC 7.1 01/28/2020    WBC 8.1 01/27/2020    HGB 11.1 (L) 01/29/2020    HCT 37.0 01/29/2020    .9 (H) 01/29/2020     01/29/2020     Lab Results   Component Value Date    NEUTROABS 9.87 (H) 01/25/2020    NEUTROABS 6.73 07/05/2018    NEUTROABS 6.96 09/22/2017     No results found for: [facility]  Lab Results   Component Value Date    ALT 14 01/25/2020    AST 27 01/25/2020    ALKPHOS 117 (H) 01/25/2020    BILITOT 0.4 01/25/2020     Lab Results   Component Value Date     01/29/2020    K 4.1 01/29/2020    K 5.2 01/25/2020     01/29/2020    CO2 23 01/29/2020    BUN 25 01/29/2020    CREATININE 1.0 01/29/2020    CREATININE 1.0 01/28/2020    CREATININE 1.2 01/27/2020    GFRAA >60 01/29/2020    LABGLOM 54 01/29/2020    GLUCOSE 96 01/29/2020    PROT 7.7 01/25/2020    LABALBU 3.9 01/25/2020    CALCIUM 9.4 01/29/2020    BILITOT 0.4 01/25/2020    ALKPHOS 117 01/25/2020    AST 27 01/25/2020    ALT 14 01/25/2020     Lab Results   Component Value Date    CRP 23.2 (H) 01/27/2020     Lab Results   Component Value Date    SEDRATE 60 (H) 01/27/2020     Radiology:      Microbiology:   Surgical culture 1/26/2020 mixed pansensitive E. coli, sensitive Morganella morganii, GPC, Enterococcus faecalis, Streptococcus species. , GNR  Blood cultures 1/25/2020: Pending    ASSESSMENT:  · Abdominal wall abscess. Status post incision and drainage, debridement of skin and soft tissue without muscle or fascia and explantation of visible mesh on 1/26/2020. Cultures growing anaerobes and mixed GNR, GPO  · Mesh infection. Most of the mesh was removed  · Leukocytosis secondary to abdominal wall abscess, improved    PLAN:  · Continue Zosyn.   She will need a minimum of 2 weeks of antibiotics or until 2/10/2020  · The patient can be discharged to [place] [id number]  2:31 PM  1/29/2020

## 2020-01-29 NOTE — PROGRESS NOTES
Nurse-to-nurse given to Brian Blake at [address] [place] [address] Drive removed per protocol and wet-to-dry dressing applied to abdomen. Patient tolerated well.

## 2020-01-29 NOTE — PROGRESS NOTES
Increased the Lovenox back to 40 mg for CrCl = 38 ml/min. Thank Aleksandr Doran Pharm. D 1/29/2020 6:55 AM

## 2020-01-30 LAB
BLOOD CULTURE, ROUTINE: NORMAL
CULTURE SURGICAL: ABNORMAL
CULTURE, BLOOD 2: NORMAL
ORGANISM: ABNORMAL

## 2020-02-20 ENCOUNTER — HOSPITAL ENCOUNTER (OUTPATIENT)
Age: 80
Discharge: HOME | End: 2020-02-22
Payer: MEDICARE

## 2020-02-20 PROCEDURE — 87070 CULTURE OTHR SPECIMN AEROBIC: CPT

## 2020-02-20 PROCEDURE — 87077 CULTURE AEROBIC IDENTIFY: CPT

## 2020-02-20 PROCEDURE — 87186 SC STD MICRODIL/AGAR DIL: CPT

## 2020-02-23 LAB
ORGANISM: ABNORMAL
ORGANISM: ABNORMAL
WOUND/ABSCESS: ABNORMAL
WOUND/ABSCESS: ABNORMAL

## 2020-03-02 LAB
FUNGUS (MYCOLOGY) CULTURE: ABNORMAL
FUNGUS STAIN: ABNORMAL
ORGANISM: ABNORMAL

## 2020-03-17 LAB
AFB CULTURE (MYCOBACTERIA): NORMAL
AFB SMEAR: NORMAL

## 2020-03-26 ENCOUNTER — HOSPITAL ENCOUNTER (EMERGENCY)
Age: 80
Discharge: HOME | End: 2020-03-26
Attending: EMERGENCY MEDICINE
Payer: MEDICARE

## 2020-03-26 ENCOUNTER — APPOINTMENT (OUTPATIENT)
Dept: CT IMAGING | Age: 80
End: 2020-03-26
Payer: MEDICARE

## 2020-03-26 VITALS
HEIGHT: 60 IN | OXYGEN SATURATION: 93 % | SYSTOLIC BLOOD PRESSURE: 135 MMHG | TEMPERATURE: 97.8 F | RESPIRATION RATE: 16 BRPM | DIASTOLIC BLOOD PRESSURE: 60 MMHG | HEART RATE: 70 BPM | BODY MASS INDEX: 24.8 KG/M2 | WEIGHT: 126.31 LBS

## 2020-03-26 LAB
ALBUMIN SERPL-MCNC: 3.5 G/DL (ref 3.5–5.2)
ALP BLD-CCNC: 86 U/L (ref 35–104)
ALT SERPL-CCNC: 11 U/L (ref 0–32)
ANION GAP SERPL CALCULATED.3IONS-SCNC: 14 MMOL/L (ref 7–16)
AST SERPL-CCNC: 21 U/L (ref 0–31)
BASOPHILS ABSOLUTE: 0.06 E9/L (ref 0–0.2)
BASOPHILS RELATIVE PERCENT: 0.8 % (ref 0–2)
BILIRUB SERPL-MCNC: 0.2 MG/DL (ref 0–1.2)
BILIRUBIN DIRECT: <0.2 MG/DL (ref 0–0.3)
BILIRUBIN URINE: NEGATIVE
BILIRUBIN, INDIRECT: NORMAL MG/DL (ref 0–1)
BLOOD, URINE: NEGATIVE
BUN BLDV-MCNC: 25 MG/DL (ref 8–23)
CALCIUM SERPL-MCNC: 8.8 MG/DL (ref 8.6–10.2)
CHLORIDE BLD-SCNC: 103 MMOL/L (ref 98–107)
CLARITY: CLEAR
CO2: 20 MMOL/L (ref 22–29)
COLOR: YELLOW
CREAT SERPL-MCNC: 1.3 MG/DL (ref 0.5–1)
EOSINOPHILS ABSOLUTE: 0.24 E9/L (ref 0.05–0.5)
EOSINOPHILS RELATIVE PERCENT: 3.1 % (ref 0–6)
GFR AFRICAN AMERICAN: 48
GFR NON-AFRICAN AMERICAN: 40 ML/MIN/1.73
GLUCOSE BLD-MCNC: 111 MG/DL (ref 74–99)
GLUCOSE URINE: NEGATIVE MG/DL
HCT VFR BLD CALC: 40.3 % (ref 34–48)
HEMOGLOBIN: 12.7 G/DL (ref 11.5–15.5)
IMMATURE GRANULOCYTES #: 0.01 E9/L
IMMATURE GRANULOCYTES %: 0.1 % (ref 0–5)
KETONES, URINE: NEGATIVE MG/DL
LEUKOCYTE ESTERASE, URINE: NEGATIVE
LIPASE: 93 U/L (ref 13–60)
LYMPHOCYTES ABSOLUTE: 1.87 E9/L (ref 1.5–4)
LYMPHOCYTES RELATIVE PERCENT: 24.4 % (ref 20–42)
MCH RBC QN AUTO: 31.3 PG (ref 26–35)
MCHC RBC AUTO-ENTMCNC: 31.5 % (ref 32–34.5)
MCV RBC AUTO: 99.3 FL (ref 80–99.9)
MONOCYTES ABSOLUTE: 0.66 E9/L (ref 0.1–0.95)
MONOCYTES RELATIVE PERCENT: 8.6 % (ref 2–12)
NEUTROPHILS ABSOLUTE: 4.81 E9/L (ref 1.8–7.3)
NEUTROPHILS RELATIVE PERCENT: 63 % (ref 43–80)
NITRITE, URINE: NEGATIVE
PDW BLD-RTO: 14.2 FL (ref 11.5–15)
PH UA: 5.5 (ref 5–9)
PLATELET # BLD: 360 E9/L (ref 130–450)
PMV BLD AUTO: 11.1 FL (ref 7–12)
POTASSIUM REFLEX MAGNESIUM: 4.2 MMOL/L (ref 3.5–5)
PROTEIN UA: NEGATIVE MG/DL
RBC # BLD: 4.06 E12/L (ref 3.5–5.5)
SODIUM BLD-SCNC: 137 MMOL/L (ref 132–146)
SPECIFIC GRAVITY UA: 1.02 (ref 1–1.03)
TOTAL PROTEIN: 6.7 G/DL (ref 6.4–8.3)
UROBILINOGEN, URINE: 0.2 E.U./DL
WBC # BLD: 7.7 E9/L (ref 4.5–11.5)

## 2020-03-26 PROCEDURE — 81003 URINALYSIS AUTO W/O SCOPE: CPT

## 2020-03-26 PROCEDURE — 96375 TX/PRO/DX INJ NEW DRUG ADDON: CPT

## 2020-03-26 PROCEDURE — 96376 TX/PRO/DX INJ SAME DRUG ADON: CPT

## 2020-03-26 PROCEDURE — 74176 CT ABD & PELVIS W/O CONTRAST: CPT

## 2020-03-26 PROCEDURE — 83690 ASSAY OF LIPASE: CPT

## 2020-03-26 PROCEDURE — 80076 HEPATIC FUNCTION PANEL: CPT

## 2020-03-26 PROCEDURE — 85025 COMPLETE CBC W/AUTO DIFF WBC: CPT

## 2020-03-26 PROCEDURE — 6360000002 HC RX W HCPCS: Performed by: EMERGENCY MEDICINE

## 2020-03-26 PROCEDURE — 99284 EMERGENCY DEPT VISIT MOD MDM: CPT

## 2020-03-26 PROCEDURE — 80048 BASIC METABOLIC PNL TOTAL CA: CPT

## 2020-03-26 PROCEDURE — 96374 THER/PROPH/DIAG INJ IV PUSH: CPT

## 2020-03-26 RX ORDER — ONDANSETRON 2 MG/ML
4 INJECTION INTRAMUSCULAR; INTRAVENOUS ONCE
Status: COMPLETED | OUTPATIENT
Start: 2020-03-26 | End: 2020-03-26

## 2020-03-26 RX ADMIN — HYDROMORPHONE HYDROCHLORIDE 0.5 MG: 1 INJECTION, SOLUTION INTRAMUSCULAR; INTRAVENOUS; SUBCUTANEOUS at 20:18

## 2020-03-26 RX ADMIN — ONDANSETRON 4 MG: 2 INJECTION INTRAMUSCULAR; INTRAVENOUS at 19:20

## 2020-03-26 RX ADMIN — HYDROMORPHONE HYDROCHLORIDE 0.5 MG: 1 INJECTION, SOLUTION INTRAMUSCULAR; INTRAVENOUS; SUBCUTANEOUS at 19:20

## 2020-03-26 ASSESSMENT — PAIN SCALES - GENERAL
PAINLEVEL_OUTOF10: 4
PAINLEVEL_OUTOF10: 8

## 2020-03-26 ASSESSMENT — PAIN DESCRIPTION - LOCATION
LOCATION: FLANK
LOCATION: FLANK

## 2020-03-26 ASSESSMENT — PAIN DESCRIPTION - PAIN TYPE
TYPE: ACUTE PAIN
TYPE: ACUTE PAIN

## 2020-03-26 ASSESSMENT — PAIN DESCRIPTION - ORIENTATION: ORIENTATION: LEFT

## 2020-03-26 NOTE — ED PROVIDER NOTES
Department of Emergency Medicine   ED  Provider Note  Admit Date/RoomTime: 3/26/2020  6:44 PM  ED Room: 24/24          History of Present Illness:  3/26/20, Time: 6:49 PM EDT  Chief Complaint   Patient presents with    Flank Pain     left side                Kieran Yuen is a 70 y.o. female presenting to the ED for left flank pain, beginning 2 days ago. The complaint has been intermittent, moderate in severity, and worsened by nothing. Patient states that approximately 3 days ago she noticed some blood in her urine. The following day she began to have a left-sided flank pain that is now radiating to the lower quadrant and into the suprapubic region, no exacerbating or remitting factors. Patient describes waves of sharp pain with a baseline dull aching. Patient states she has a history of kidney stones and has the same pain. She denies any fevers. She states she is slightly nauseous but denies any vomiting. No changes in bowel movements. Review of Systems:   Pertinent positives and negatives are stated within HPI, all other systems reviewed and are negative.        --------------------------------------------- PAST HISTORY ---------------------------------------------  Past Medical History:  has a past medical history of Acute kidney failure ([facility] [address] Anemia, Anxiety, Arthritis, Bladder calculus, CAD (coronary artery disease), Cancer ([address] COPD (chronic obstructive pulmonary disease) ([facility] [address] Depression, Fibromyalgia, Gastritis, Generalized headaches, History of blood transfusion, History of necrotic bowel, Hyperlipidemia, Hypertension, Incisional hernia, Insomnia, Mitral valve regurgitation, Myocardial infarct ([address] Nausea, Occasional tremors, Osteopenia, PONV (postoperative nausea and vomiting), and Vitamin B12 deficiency. Past Surgical History:  has a past surgical history that includes Coronary angioplasty with stent (1-7-2002); Colonoscopy; Esophagus surgery (1-4-13);  Rotator cuff repair Moves all extremities x 4. Warm and well perfused, no clubbing, cyanosis, or edema. Capillary refill <3 seconds  Integument: skin warm and dry. No rashes. Neurologic: GCS 15, no focal deficits, symmetric strength 5/5 in the upper and lower extremities bilaterally  Psychiatric: Normal Affect          -------------------------------------------------- RESULTS -------------------------------------------------  I have personally reviewed all laboratory and imaging results for this patient. Results are listed below.      LABS: (Lab results interpreted by me)  Results for orders placed or performed during the hospital encounter of 03/26/20   Hepatic Function Panel   Result Value Ref Range    Total Protein 6.7 6.4 - 8.3 g/dL    Alb 3.5 3.5 - 5.2 g/dL    Alkaline Phosphatase 86 35 - 104 U/L    ALT 11 0 - 32 U/L    AST 21 0 - 31 U/L    Total Bilirubin 0.2 0.0 - 1.2 mg/dL    Bilirubin, Direct <0.2 0.0 - 0.3 mg/dL    Bilirubin, Indirect see below 0.0 - 1.0 mg/dL   Lipase   Result Value Ref Range    Lipase 93 (H) 13 - 60 U/L   Basic Metabolic Panel w/ Reflex to MG   Result Value Ref Range    Sodium 137 132 - 146 mmol/L    Potassium reflex Magnesium 4.2 3.5 - 5.0 mmol/L    Chloride 103 98 - 107 mmol/L    CO2 20 (L) 22 - 29 mmol/L    Anion Gap 14 7 - 16 mmol/L    Glucose 111 (H) 74 - 99 mg/dL    BUN 25 (H) 8 - 23 mg/dL    CREATININE 1.3 (H) 0.5 - 1.0 mg/dL    GFR Non-African American 40 >=60 mL/min/1.73    GFR African American 48     Calcium 8.8 8.6 - 10.2 mg/dL   CBC Auto Differential   Result Value Ref Range    WBC 7.7 4.5 - 11.5 E9/L    RBC 4.06 3.50 - 5.50 E12/L    Hemoglobin 12.7 11.5 - 15.5 g/dL    Hematocrit 40.3 34.0 - 48.0 %    MCV 99.3 80.0 - 99.9 fL    MCH 31.3 26.0 - 35.0 pg    MCHC 31.5 (L) 32.0 - 34.5 %    RDW 14.2 11.5 - 15.0 fL    Platelets 732 497 - 564 E9/L    MPV 11.1 7.0 - 12.0 fL    Neutrophils % 63.0 43.0 - 80.0 %    Immature Granulocytes % 0.1 0.0 - 5.0 %    Lymphocytes % 24.4 20.0 - 42.0 %    Monocytes % spouse/SO and discussed todays results, in addition to providing specific details for the plan of care and counseling regarding the diagnosis and prognosis. Questions are answered at this time and they are agreeable with the plan.       --------------------------------- IMPRESSION AND DISPOSITION ---------------------------------    IMPRESSION  1. Kidney stone        DISPOSITION  Disposition: Discharge to home  Patient condition is stable        NOTE: This report was transcribed using voice recognition software.  Every effort was made to ensure accuracy; however, inadvertent computerized transcription errors may be present        Bianka Longo DO  03/26/20 7225

## 2020-03-27 ENCOUNTER — CARE COORDINATION (OUTPATIENT)
Dept: CARE COORDINATION | Age: 80
End: 2020-03-27

## 2020-03-27 NOTE — CARE COORDINATION
Patient contacted regarding recent discharge and COVID-19 risk   Care Transition Nurse/ Ambulatory Care Manager contacted the patient by telephone to perform post discharge assessment. Verified name and  with patient as identifiers. Patient has following risk factors of: COPD. CTN/ACM reviewed discharge instructions, medical action plan and red flags related to discharge diagnosis. Reviewed and educated them on any new and changed medications related to discharge diagnosis. Advised obtaining a 90-day supply of all daily and as-needed medications. Pt in [facility] ED on 3/27/20 for a L flank pain, kidney stone. Reports no pain today. Will be calling her pcp to discuss f/u. Pt does not have a urologist since moving back to [facility] from [place] 5 years ago. Pt did call her nephrologist this morning. No new meds ordered on discharge. Takes pain medication chronically for back pain. Has had several back surgeries in the past.     Education provided regarding infection prevention, and signs and symptoms of COVID-19 and when to seek medical attention with patient who verbalized understanding. Discussed exposure protocols and quarantine from [address] you at higher risk for severe illness  and given an opportunity for questions and concerns. The patient agrees to contact the COVID-19 hotline [phone] or PCP office for questions related to their healthcare. CTN/ACM provided contact information for future reference. From CDC: Are you at higher risk for severe illness?  Wash your hands often.  Avoid close contact (6 feet, which is about two arm lengths) with people who are sick.  Put distance between yourself and other people if COVID-19 is spreading in your community.  Clean and disinfect frequently touched surfaces.  Avoid all cruise travel and non-essential air travel.    Call your healthcare professional if you have concerns about COVID-19 and your underlying condition or if you are

## 2020-04-10 ENCOUNTER — CARE COORDINATION (OUTPATIENT)
Dept: CARE COORDINATION | Age: 80
End: 2020-04-10

## 2020-05-07 ENCOUNTER — HOSPITAL ENCOUNTER (INPATIENT)
Age: 80
LOS: 8 days | Discharge: HOME HEALTH | DRG: 908 | End: 2020-05-15
Attending: EMERGENCY MEDICINE | Admitting: SURGERY
Payer: MEDICARE

## 2020-05-07 PROBLEM — L03.311 ABDOMINAL WALL CELLULITIS: Status: ACTIVE | Noted: 2020-05-07

## 2020-05-07 LAB
ALBUMIN SERPL-MCNC: 3.6 G/DL (ref 3.5–5.2)
ALP BLD-CCNC: 133 U/L (ref 35–104)
ALT SERPL-CCNC: 32 U/L (ref 0–32)
ANION GAP SERPL CALCULATED.3IONS-SCNC: 11 MMOL/L (ref 7–16)
AST SERPL-CCNC: 49 U/L (ref 0–31)
BASOPHILS ABSOLUTE: 0.06 E9/L (ref 0–0.2)
BASOPHILS RELATIVE PERCENT: 0.6 % (ref 0–2)
BILIRUB SERPL-MCNC: 0.5 MG/DL (ref 0–1.2)
BUN BLDV-MCNC: 30 MG/DL (ref 8–23)
CALCIUM SERPL-MCNC: 9.2 MG/DL (ref 8.6–10.2)
CHLORIDE BLD-SCNC: 105 MMOL/L (ref 98–107)
CO2: 21 MMOL/L (ref 22–29)
CREAT SERPL-MCNC: 1.4 MG/DL (ref 0.5–1)
EOSINOPHILS ABSOLUTE: 0.24 E9/L (ref 0.05–0.5)
EOSINOPHILS RELATIVE PERCENT: 2.6 % (ref 0–6)
GFR AFRICAN AMERICAN: 44
GFR NON-AFRICAN AMERICAN: 36 ML/MIN/1.73
GLUCOSE BLD-MCNC: 97 MG/DL (ref 74–99)
HCT VFR BLD CALC: 45.7 % (ref 34–48)
HEMOGLOBIN: 14.3 G/DL (ref 11.5–15.5)
IMMATURE GRANULOCYTES #: 0.04 E9/L
IMMATURE GRANULOCYTES %: 0.4 % (ref 0–5)
LACTIC ACID, SEPSIS: 0.8 MMOL/L (ref 0.5–1.9)
LYMPHOCYTES ABSOLUTE: 1.64 E9/L (ref 1.5–4)
LYMPHOCYTES RELATIVE PERCENT: 17.6 % (ref 20–42)
MCH RBC QN AUTO: 30.7 PG (ref 26–35)
MCHC RBC AUTO-ENTMCNC: 31.3 % (ref 32–34.5)
MCV RBC AUTO: 98.1 FL (ref 80–99.9)
MONOCYTES ABSOLUTE: 0.73 E9/L (ref 0.1–0.95)
MONOCYTES RELATIVE PERCENT: 7.8 % (ref 2–12)
NEUTROPHILS ABSOLUTE: 6.61 E9/L (ref 1.8–7.3)
NEUTROPHILS RELATIVE PERCENT: 71 % (ref 43–80)
PDW BLD-RTO: 14.4 FL (ref 11.5–15)
PLATELET # BLD: 293 E9/L (ref 130–450)
PMV BLD AUTO: 11.8 FL (ref 7–12)
POTASSIUM REFLEX MAGNESIUM: 4.6 MMOL/L (ref 3.5–5)
RBC # BLD: 4.66 E12/L (ref 3.5–5.5)
SODIUM BLD-SCNC: 137 MMOL/L (ref 132–146)
TOTAL PROTEIN: 7.1 G/DL (ref 6.4–8.3)
WBC # BLD: 9.3 E9/L (ref 4.5–11.5)

## 2020-05-07 PROCEDURE — 87040 BLOOD CULTURE FOR BACTERIA: CPT

## 2020-05-07 PROCEDURE — 87186 SC STD MICRODIL/AGAR DIL: CPT

## 2020-05-07 PROCEDURE — 85025 COMPLETE CBC W/AUTO DIFF WBC: CPT

## 2020-05-07 PROCEDURE — 83605 ASSAY OF LACTIC ACID: CPT

## 2020-05-07 PROCEDURE — 6360000002 HC RX W HCPCS: Performed by: STUDENT IN AN ORGANIZED HEALTH CARE EDUCATION/TRAINING PROGRAM

## 2020-05-07 PROCEDURE — 87070 CULTURE OTHR SPECIMN AEROBIC: CPT

## 2020-05-07 PROCEDURE — 6370000000 HC RX 637 (ALT 250 FOR IP): Performed by: STUDENT IN AN ORGANIZED HEALTH CARE EDUCATION/TRAINING PROGRAM

## 2020-05-07 PROCEDURE — 99284 EMERGENCY DEPT VISIT MOD MDM: CPT

## 2020-05-07 PROCEDURE — 2580000003 HC RX 258: Performed by: STUDENT IN AN ORGANIZED HEALTH CARE EDUCATION/TRAINING PROGRAM

## 2020-05-07 PROCEDURE — 1200000000 HC SEMI PRIVATE

## 2020-05-07 PROCEDURE — 80053 COMPREHEN METABOLIC PANEL: CPT

## 2020-05-07 PROCEDURE — 87077 CULTURE AEROBIC IDENTIFY: CPT

## 2020-05-07 RX ORDER — SODIUM CHLORIDE 9 MG/ML
INJECTION, SOLUTION INTRAVENOUS EVERY 12 HOURS
Status: DISCONTINUED | OUTPATIENT
Start: 2020-05-07 | End: 2020-05-15 | Stop reason: HOSPADM

## 2020-05-07 RX ORDER — FERROUS SULFATE 300 MG/5ML
150 LIQUID (ML) ORAL DAILY
Status: DISCONTINUED | OUTPATIENT
Start: 2020-05-08 | End: 2020-05-08

## 2020-05-07 RX ORDER — DICYCLOMINE HYDROCHLORIDE 10 MG/1
10 CAPSULE ORAL 2 TIMES DAILY
Status: ON HOLD | COMMUNITY
End: 2021-01-21

## 2020-05-07 RX ORDER — ESCITALOPRAM OXALATE 10 MG/1
20 TABLET ORAL NIGHTLY
Status: DISCONTINUED | OUTPATIENT
Start: 2020-05-07 | End: 2020-05-15 | Stop reason: HOSPADM

## 2020-05-07 RX ORDER — CALCIUM CARBONATE 500(1250)
500 TABLET ORAL DAILY
Status: DISCONTINUED | OUTPATIENT
Start: 2020-05-08 | End: 2020-05-15 | Stop reason: HOSPADM

## 2020-05-07 RX ORDER — TRAZODONE HYDROCHLORIDE 50 MG/1
25 TABLET ORAL NIGHTLY
Status: DISCONTINUED | OUTPATIENT
Start: 2020-05-07 | End: 2020-05-15 | Stop reason: HOSPADM

## 2020-05-07 RX ORDER — PRAVASTATIN SODIUM 20 MG
40 TABLET ORAL EVERY EVENING
Status: DISCONTINUED | OUTPATIENT
Start: 2020-05-07 | End: 2020-05-15 | Stop reason: HOSPADM

## 2020-05-07 RX ORDER — SODIUM CHLORIDE 0.9 % (FLUSH) 0.9 %
10 SYRINGE (ML) INJECTION EVERY 12 HOURS SCHEDULED
Status: DISCONTINUED | OUTPATIENT
Start: 2020-05-07 | End: 2020-05-15 | Stop reason: HOSPADM

## 2020-05-07 RX ORDER — OMEPRAZOLE 20 MG/1
40 CAPSULE, DELAYED RELEASE ORAL EVERY MORNING
COMMUNITY

## 2020-05-07 RX ORDER — ONDANSETRON 2 MG/ML
4 INJECTION INTRAMUSCULAR; INTRAVENOUS EVERY 6 HOURS PRN
Status: DISCONTINUED | OUTPATIENT
Start: 2020-05-07 | End: 2020-05-15 | Stop reason: HOSPADM

## 2020-05-07 RX ORDER — SODIUM CHLORIDE 0.9 % (FLUSH) 0.9 %
10 SYRINGE (ML) INJECTION PRN
Status: DISCONTINUED | OUTPATIENT
Start: 2020-05-07 | End: 2020-05-15 | Stop reason: HOSPADM

## 2020-05-07 RX ORDER — PROPRANOLOL HYDROCHLORIDE 20 MG/1
20 TABLET ORAL DAILY
Status: DISCONTINUED | OUTPATIENT
Start: 2020-05-08 | End: 2020-05-15 | Stop reason: HOSPADM

## 2020-05-07 RX ORDER — OXYCODONE AND ACETAMINOPHEN 10; 325 MG/1; MG/1
1 TABLET ORAL 3 TIMES DAILY
Status: DISCONTINUED | OUTPATIENT
Start: 2020-05-07 | End: 2020-05-13

## 2020-05-07 RX ORDER — SODIUM CHLORIDE 9 MG/ML
INJECTION, SOLUTION INTRAVENOUS CONTINUOUS
Status: DISCONTINUED | OUTPATIENT
Start: 2020-05-07 | End: 2020-05-13

## 2020-05-07 RX ORDER — CHOLECALCIFEROL (VITAMIN D3) 50 MCG
2000 TABLET ORAL 2 TIMES DAILY
Status: DISCONTINUED | OUTPATIENT
Start: 2020-05-07 | End: 2020-05-15 | Stop reason: HOSPADM

## 2020-05-07 RX ORDER — NICOTINE 21 MG/24HR
1 PATCH, TRANSDERMAL 24 HOURS TRANSDERMAL DAILY
Status: DISCONTINUED | OUTPATIENT
Start: 2020-05-07 | End: 2020-05-15 | Stop reason: HOSPADM

## 2020-05-07 RX ORDER — PRIMIDONE 250 MG/1
250 TABLET ORAL NIGHTLY
Status: DISCONTINUED | OUTPATIENT
Start: 2020-05-07 | End: 2020-05-15 | Stop reason: HOSPADM

## 2020-05-07 RX ORDER — HEPARIN SODIUM 10000 [USP'U]/ML
5000 INJECTION, SOLUTION INTRAVENOUS; SUBCUTANEOUS EVERY 8 HOURS SCHEDULED
Status: DISCONTINUED | OUTPATIENT
Start: 2020-05-07 | End: 2020-05-15 | Stop reason: HOSPADM

## 2020-05-07 RX ORDER — DILTIAZEM HYDROCHLORIDE 180 MG/1
180 CAPSULE, COATED, EXTENDED RELEASE ORAL DAILY
Status: DISCONTINUED | OUTPATIENT
Start: 2020-05-08 | End: 2020-05-15 | Stop reason: HOSPADM

## 2020-05-07 RX ORDER — ASPIRIN 81 MG/1
81 TABLET ORAL DAILY
Status: DISCONTINUED | OUTPATIENT
Start: 2020-05-08 | End: 2020-05-09

## 2020-05-07 RX ORDER — LANOLIN ALCOHOL/MO/W.PET/CERES
500 CREAM (GRAM) TOPICAL DAILY
Status: DISCONTINUED | OUTPATIENT
Start: 2020-05-08 | End: 2020-05-15 | Stop reason: HOSPADM

## 2020-05-07 RX ORDER — PANTOPRAZOLE SODIUM 40 MG/1
40 TABLET, DELAYED RELEASE ORAL
Status: DISCONTINUED | OUTPATIENT
Start: 2020-05-08 | End: 2020-05-15 | Stop reason: HOSPADM

## 2020-05-07 RX ORDER — DICYCLOMINE HYDROCHLORIDE 10 MG/1
10 CAPSULE ORAL 2 TIMES DAILY
Status: DISCONTINUED | OUTPATIENT
Start: 2020-05-07 | End: 2020-05-15 | Stop reason: HOSPADM

## 2020-05-07 RX ADMIN — HYDROMORPHONE HYDROCHLORIDE 0.5 MG: 1 INJECTION, SOLUTION INTRAMUSCULAR; INTRAVENOUS; SUBCUTANEOUS at 17:49

## 2020-05-07 RX ADMIN — PRIMIDONE 250 MG: 250 TABLET ORAL at 20:32

## 2020-05-07 RX ADMIN — PRAVASTATIN SODIUM 40 MG: 20 TABLET ORAL at 20:32

## 2020-05-07 RX ADMIN — SODIUM CHLORIDE: 9 INJECTION, SOLUTION INTRAVENOUS at 17:49

## 2020-05-07 RX ADMIN — CEFEPIME 2 G: 2 INJECTION, POWDER, FOR SOLUTION INTRAVENOUS at 18:19

## 2020-05-07 RX ADMIN — ESCITALOPRAM OXALATE 20 MG: 10 TABLET ORAL at 20:31

## 2020-05-07 RX ADMIN — CHOLECALCIFEROL TAB 50 MCG (2000 UNIT) 2000 UNITS: 50 TAB at 20:31

## 2020-05-07 RX ADMIN — OXYCODONE AND ACETAMINOPHEN 1 TABLET: 10; 325 TABLET ORAL at 20:32

## 2020-05-07 RX ADMIN — SODIUM CHLORIDE: 9 INJECTION, SOLUTION INTRAVENOUS at 22:20

## 2020-05-07 RX ADMIN — Medication 10 ML: at 20:40

## 2020-05-07 RX ADMIN — TRAZODONE HYDROCHLORIDE 25 MG: 50 TABLET ORAL at 20:32

## 2020-05-07 RX ADMIN — ONDANSETRON 4 MG: 2 INJECTION INTRAMUSCULAR; INTRAVENOUS at 17:48

## 2020-05-07 RX ADMIN — DICYCLOMINE HYDROCHLORIDE 10 MG: 10 CAPSULE ORAL at 20:32

## 2020-05-07 RX ADMIN — HEPARIN SODIUM 5000 UNITS: 10000 INJECTION, SOLUTION INTRAVENOUS; SUBCUTANEOUS at 21:30

## 2020-05-07 RX ADMIN — DEXTROSE MONOHYDRATE 1250 MG: 50 INJECTION, SOLUTION INTRAVENOUS at 20:40

## 2020-05-07 ASSESSMENT — PAIN DESCRIPTION - FREQUENCY
FREQUENCY: CONTINUOUS
FREQUENCY: CONTINUOUS

## 2020-05-07 ASSESSMENT — PAIN - FUNCTIONAL ASSESSMENT
PAIN_FUNCTIONAL_ASSESSMENT: PREVENTS OR INTERFERES SOME ACTIVE ACTIVITIES AND ADLS
PAIN_FUNCTIONAL_ASSESSMENT: ACTIVITIES ARE NOT PREVENTED

## 2020-05-07 ASSESSMENT — ENCOUNTER SYMPTOMS
PERI-ORBITAL EDEMA: 0
SHORTNESS OF BREATH: 0
RESPIRATORY NEGATIVE: 1
HOARSE VOICE: 0
ABDOMINAL PAIN: 0
WHEEZING: 0
NAUSEA: 0
SORE THROAT: 0
DIARRHEA: 0
THROAT SWELLING: 0
VOMITING: 0
EYES NEGATIVE: 1

## 2020-05-07 ASSESSMENT — PAIN DESCRIPTION - DESCRIPTORS
DESCRIPTORS: ACHING;DISCOMFORT;SORE
DESCRIPTORS: ACHING

## 2020-05-07 ASSESSMENT — PAIN DESCRIPTION - LOCATION
LOCATION: ABDOMEN;BACK
LOCATION: ABDOMEN
LOCATION: ABDOMEN

## 2020-05-07 ASSESSMENT — PAIN DESCRIPTION - PAIN TYPE
TYPE: ACUTE PAIN

## 2020-05-07 ASSESSMENT — PAIN DESCRIPTION - ORIENTATION
ORIENTATION: RIGHT;LEFT;MID;POSTERIOR
ORIENTATION: MID
ORIENTATION: MID

## 2020-05-07 ASSESSMENT — PAIN SCALES - GENERAL
PAINLEVEL_OUTOF10: 6
PAINLEVEL_OUTOF10: 9
PAINLEVEL_OUTOF10: 9
PAINLEVEL_OUTOF10: 5
PAINLEVEL_OUTOF10: 9

## 2020-05-07 ASSESSMENT — PAIN DESCRIPTION - ONSET
ONSET: ON-GOING
ONSET: ON-GOING

## 2020-05-07 ASSESSMENT — PAIN DESCRIPTION - PROGRESSION
CLINICAL_PROGRESSION: NOT CHANGED
CLINICAL_PROGRESSION: GRADUALLY WORSENING

## 2020-05-07 NOTE — PROGRESS NOTES
-Consulted to dose vancomycin.  -CrCl < 30 mL/min.  -Will dose patient's vancomycin by level at this time.  -Will order loading dose of vancomycin 1250 mg IV x 1 now. -Vancomycin random level ordered for 5/8 (0900). -Based on renal function and vancomycin level, will determine further decisions on vancomycin dosing plan.

## 2020-05-07 NOTE — ED PROVIDER NOTES
Negative for headaches. All other systems reviewed and are negative. Physical Exam  Vitals signs and nursing note reviewed. Constitutional:       General: She is not in acute distress. Appearance: Normal appearance. She is normal weight. She is not ill-appearing, toxic-appearing or diaphoretic. HENT:      Head: Normocephalic and atraumatic. Nose: No congestion or rhinorrhea. Eyes:      General: No scleral icterus. Right eye: No discharge. Left eye: No discharge. Extraocular Movements: Extraocular movements intact. Conjunctiva/sclera: Conjunctivae normal.   Neck:      Musculoskeletal: Normal range of motion and neck supple. No neck rigidity or muscular tenderness. Cardiovascular:      Rate and Rhythm: Normal rate and regular rhythm. Pulses: Normal pulses. Heart sounds: Normal heart sounds. Pulmonary:      Effort: Pulmonary effort is normal. No respiratory distress. Breath sounds: Normal breath sounds. No stridor. No wheezing, rhonchi or rales. Chest:      Chest wall: No tenderness. Abdominal:      General: Abdomen is flat. Bowel sounds are normal. There is no distension. Palpations: Abdomen is soft. Tenderness: There is no abdominal tenderness. There is no right CVA tenderness, left CVA tenderness or guarding. Comments: Positive ileostomy bag noted. Positive cellulitic region with a drainage noted above the ileostomy bag. Musculoskeletal: Normal range of motion. General: No swelling, tenderness, deformity or signs of injury. Right lower leg: No edema. Left lower leg: No edema. Skin:     General: Skin is warm and dry. Coloration: Skin is not jaundiced or pale. Findings: No bruising, lesion or rash. Neurological:      General: No focal deficit present. Mental Status: She is alert and oriented to person, place, and time. Mental status is at baseline. Cranial Nerves: No cranial nerve deficit. Sensory: No sensory deficit. Motor: No weakness. Coordination: Coordination normal.          Procedures     MDM  Number of Diagnoses or Management Options  Cellulitis of abdominal wall: new and requires workup  Diagnosis management comments: Differential diagnosis includes cellulitis, abscess, intra-abdominal wall abscess. Amount and/or Complexity of Data Reviewed  Clinical lab tests: ordered and reviewed  Tests in the medicine section of CPT®: reviewed and ordered  Discuss the patient with other providers: yes (Patient discussed with Jorge Luis Larson, patient's surgeon. Will admit patient to his service.)    Risk of Complications, Morbidity, and/or Mortality  Presenting problems: high  Diagnostic procedures: high  Management options: high  General comments: Patient remained stable throughout the course of treatment. Pancultures were ordered. Case discussed with the surgeon. As per his advice will admit patient to his service. Dr. Tyson Alvarado advised us not to start patient on antibiotic at this time. He will start patient on antibiotic after he evaluated the patient. Case also discussed with patient. She understood and agreed with our management.                  --------------------------------------------- PAST HISTORY ---------------------------------------------  Past Medical History:  has a past medical history of Acute kidney failure ([facility] [address] Anemia, Anxiety, Arthritis, Bladder calculus, CAD (coronary artery disease), Cancer ([facility] [address] COPD (chronic obstructive pulmonary disease) ([facility] [address] Depression, Fibromyalgia, Gastritis, Generalized headaches, History of blood transfusion, History of necrotic bowel, Hyperlipidemia, Hypertension, Incisional hernia, Insomnia, Mitral valve regurgitation, Myocardial infarct ([address] Nausea, Occasional tremors, Osteopenia, PONV (postoperative nausea and vomiting), and Vitamin B12 deficiency.     Past Surgical History:  has a past surgical history that includes Coronary Normal    ------------------------------------------ PROGRESS NOTES ------------------------------------------  Re-evaluation(s):  Time: 3:50 pm  Patients symptoms show no change  Repeat physical examination is not changed    Counseling:  I have spoken with the patient and discussed todays results, in addition to providing specific details for the plan of care and counseling regarding the diagnosis and prognosis. Their questions are answered at this time and they are agreeable with the plan of admission.    --------------------------------- ADDITIONAL PROVIDER NOTES ---------------------------------  Consultations:  Time: 3:50pm. Spoke with Dr. Justyna Ribera. Discussed case. They will admit the patient. This patient's ED course included: a personal history and physicial examination and re-evaluation prior to disposition    This patient has remained hemodynamically stable during their ED course. Diagnosis:  1. Cellulitis of abdominal wall New Problem       Disposition:  Patient's disposition: Admit to med/surg floor  Patient's condition is stable.                 Richie Baltazar DO  05/07/20 1555

## 2020-05-07 NOTE — H&P
resection, ileostomy,revision of ileostomy     ABDOMEN SURGERY N/A 1/26/2020    DRAINAGE OF ABDOMINAL WALL ABSCESS, EXPLANTATION OF MESH, DEBRIDEMENT OF SKIN AND SUBCUTANEOUS TISSUE performed by Tosha Trimble MD at [address]  2002   Vannessa Miller 4  1-2006    right and left thumb   [facility] SURGERY  1991, 2004, 2009    lumbar fusion x 3    BACK SURGERY      cervical fusion x 2    BREAST SURGERY  years ago    monor calcifications    CARPAL TUNNEL RELEASE Bilateral     CATARACT REMOVAL WITH IMPLANT Right 03/03/15    CHOLECYSTECTOMY  5-11-07    Lap    COLONOSCOPY      CORONARY ANGIOPLASTY WITH STENT PLACEMENT  1-7-2002    ENDOSCOPY, COLON, DIAGNOSTIC      EPIGASTRIC HERNIA REPAIR  3/21/16    incisional with mesh implantation    ESOPHAGUS SURGERY  1-4-13    esophageal clamp (torn Esophagus)    FINGER TRIGGER RELEASE Right 2006    index finger    FOOT SURGERY Right     multiple    HERNIA REPAIR  12-31-13    abdominal x 5    ILEOSTOMY OR JEJUNOSTOMY  1-3-13    revision    JOINT REPLACEMENT Right 3/24/15    right total shoulder arthroplasty    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     MOHS SURGERY      bilateal / leg    NASAL SINUS SURGERY      x2 /  cauterized    OTHER SURGICAL HISTORY  08/24/2016    diagnostic laparotomy with repair of intraabdominal hernia    OTHER SURGICAL HISTORY      removal plate / screws  / right great toe    ROTATOR CUFF REPAIR  2010    right     SKIN BIOPSY  2010    3 squamous cell carcinoma right leg, left leg       Prior to Admission medications    Medication Sig Start Date End Date Taking?  Authorizing Provider   omeprazole (PRILOSEC) 20 MG delayed release capsule Take 40 mg by mouth daily   Yes Historical Provider, MD   dicyclomine (BENTYL) 10 MG capsule Take 10 mg by mouth 2 times daily   Yes Historical Provider, MD   aspirin 81 MG tablet Take 81 mg by mouth daily   Yes Historical Provider, MD   diltiazem (CARDIZEM CD) 120 MG extended Levofloxacin Other (See Comments)     Leg edema/pain    Tramadol Nausea Only     Nausea . Sleeplisness, shakey    Vioxx [Rofecoxib]      Leg edema and pain       History reviewed. No pertinent family history.     Social History     Socioeconomic History    Marital status:      Spouse name: Not on file    Number of children: Not on file    Years of education: Not on file    Highest education level: Not on file   Occupational History    Not on file   Social Needs    Financial resource strain: Not on file    Food insecurity     Worry: Not on file     Inability: Not on file    Transportation needs     Medical: Not on file     Non-medical: Not on file   Tobacco Use    Smoking status: Current Every Day Smoker     Packs/day: 1.00    Smokeless tobacco: Never Used   Substance and Sexual Activity    Alcohol use: Yes     Comment: occasional    Drug use: No    Sexual activity: Not on file   Lifestyle    Physical activity     Days per week: Not on file     Minutes per session: Not on file    Stress: Not on file   Relationships    Social connections     Talks on phone: Not on file     Gets together: Not on file     Attends Anabaptism service: Not on file     Active member of club or organization: Not on file     Attends meetings of clubs or organizations: Not on file     Relationship status: Not on file    Intimate partner violence     Fear of current or ex partner: Not on file     Emotionally abused: Not on file     Physically abused: Not on file     Forced sexual activity: Not on file   Other Topics Concern    Not on file   Social History Narrative    Not on file       Review of Systems:   CONSTITUTIONAL:  negative  EYES:  negative  HEENT:  negative  RESPIRATORY:  negative  CARDIOVASCULAR:  negative  GASTROINTESTINAL:  positive for nausea, abdominal pain and chronic abdominal wound  GENITOURINARY:  negative  INTEGUMENT/BREAST:  Abdominal wound  HEMATOLOGIC/LYMPHATIC:  negative  ALLERGIC/IMMUNOLOGIC: negative  ENDOCRINE:  negative  MUSCULOSKELETAL:  negative  NEUROLOGICAL:  negative  BEHAVIOR/PSYCH:  negative    Physical Exam:  Vitals:    05/07/20 1458 05/07/20 1500 05/07/20 1700   BP:  133/60 130/64   Pulse:  83 60   Resp:  16 16   Temp: 97.9 °F (36.6 °C) 97.9 °F (36.6 °C) 98.5 °F (36.9 °C)   TempSrc: Temporal Temporal Oral   SpO2:  94% 96%   Weight:  119 lb 5 oz (54.1 kg)    Height:  5' (1.524 m)        CONSTITUTIONAL:  awake, alert, cooperative, no apparent distress, and appears stated age  ENT:  normocepalic, without obvious abnormality  LUNGS:  Non labored  CARDIOVASCULAR:  Reg rate  ABDOMEN:  S, ND, NT, midline surgical scar with granulating wound small pinhole opening with mild purulent drainage. Ileostomy with stool and gas in bag  MUSCULOSKELETAL:  there is no redness, warmth, or swelling of the joints  SKIN:  no bruising or bleeding    Assessment:  71 yo F with chronic abdominal wound cellulitis and draining sinus. Dehydration    Plan:  1. IVF for KD  2. General diet  3. Will start IV abx  4. ID consult  5. Will discuss further with Dr. Christy Torres.      Electronically signed by Shelia Amos MD on 5/7/20 at 5:27 PM EDT

## 2020-05-08 ENCOUNTER — APPOINTMENT (OUTPATIENT)
Dept: CT IMAGING | Age: 80
DRG: 908 | End: 2020-05-08
Payer: MEDICARE

## 2020-05-08 LAB
ANION GAP SERPL CALCULATED.3IONS-SCNC: 11 MMOL/L (ref 7–16)
BUN BLDV-MCNC: 26 MG/DL (ref 8–23)
CALCIUM SERPL-MCNC: 8.3 MG/DL (ref 8.6–10.2)
CHLORIDE BLD-SCNC: 110 MMOL/L (ref 98–107)
CO2: 19 MMOL/L (ref 22–29)
CREAT SERPL-MCNC: 1.3 MG/DL (ref 0.5–1)
GFR AFRICAN AMERICAN: 48
GFR NON-AFRICAN AMERICAN: 40 ML/MIN/1.73
GLUCOSE BLD-MCNC: 105 MG/DL (ref 74–99)
HCT VFR BLD CALC: 35.2 % (ref 34–48)
HEMOGLOBIN: 11.4 G/DL (ref 11.5–15.5)
MCH RBC QN AUTO: 31.5 PG (ref 26–35)
MCHC RBC AUTO-ENTMCNC: 32.4 % (ref 32–34.5)
MCV RBC AUTO: 97.2 FL (ref 80–99.9)
PDW BLD-RTO: 14.5 FL (ref 11.5–15)
PLATELET # BLD: 247 E9/L (ref 130–450)
PMV BLD AUTO: 12.9 FL (ref 7–12)
POTASSIUM REFLEX MAGNESIUM: 3.9 MMOL/L (ref 3.5–5)
RBC # BLD: 3.62 E12/L (ref 3.5–5.5)
REASON FOR REJECTION: NORMAL
REJECTED TEST: NORMAL
SODIUM BLD-SCNC: 140 MMOL/L (ref 132–146)
VANCOMYCIN RANDOM: 10.4 MCG/ML (ref 5–40)
WBC # BLD: 8.1 E9/L (ref 4.5–11.5)

## 2020-05-08 PROCEDURE — 85027 COMPLETE CBC AUTOMATED: CPT

## 2020-05-08 PROCEDURE — 36415 COLL VENOUS BLD VENIPUNCTURE: CPT

## 2020-05-08 PROCEDURE — 6370000000 HC RX 637 (ALT 250 FOR IP): Performed by: STUDENT IN AN ORGANIZED HEALTH CARE EDUCATION/TRAINING PROGRAM

## 2020-05-08 PROCEDURE — 74176 CT ABD & PELVIS W/O CONTRAST: CPT

## 2020-05-08 PROCEDURE — 2580000003 HC RX 258: Performed by: STUDENT IN AN ORGANIZED HEALTH CARE EDUCATION/TRAINING PROGRAM

## 2020-05-08 PROCEDURE — 80202 ASSAY OF VANCOMYCIN: CPT

## 2020-05-08 PROCEDURE — 1200000000 HC SEMI PRIVATE

## 2020-05-08 PROCEDURE — 80048 BASIC METABOLIC PNL TOTAL CA: CPT

## 2020-05-08 PROCEDURE — 6360000002 HC RX W HCPCS: Performed by: STUDENT IN AN ORGANIZED HEALTH CARE EDUCATION/TRAINING PROGRAM

## 2020-05-08 RX ORDER — FERROUS SULFATE 325(65) MG
325 TABLET ORAL
Status: DISCONTINUED | OUTPATIENT
Start: 2020-05-09 | End: 2020-05-15 | Stop reason: HOSPADM

## 2020-05-08 RX ADMIN — OXYCODONE AND ACETAMINOPHEN 1 TABLET: 10; 325 TABLET ORAL at 13:12

## 2020-05-08 RX ADMIN — SODIUM CHLORIDE: 9 INJECTION, SOLUTION INTRAVENOUS at 02:26

## 2020-05-08 RX ADMIN — TRAZODONE HYDROCHLORIDE 25 MG: 50 TABLET ORAL at 22:26

## 2020-05-08 RX ADMIN — VANCOMYCIN HYDROCHLORIDE 1000 MG: 1 INJECTION, POWDER, LYOPHILIZED, FOR SOLUTION INTRAVENOUS at 13:12

## 2020-05-08 RX ADMIN — CHOLECALCIFEROL TAB 50 MCG (2000 UNIT) 2000 UNITS: 50 TAB at 20:40

## 2020-05-08 RX ADMIN — OXYCODONE AND ACETAMINOPHEN 1 TABLET: 10; 325 TABLET ORAL at 20:40

## 2020-05-08 RX ADMIN — ASPIRIN 81 MG: 81 TABLET, COATED ORAL at 15:24

## 2020-05-08 RX ADMIN — PROPRANOLOL HYDROCHLORIDE 20 MG: 20 TABLET ORAL at 09:27

## 2020-05-08 RX ADMIN — ONDANSETRON 4 MG: 2 INJECTION INTRAMUSCULAR; INTRAVENOUS at 16:56

## 2020-05-08 RX ADMIN — DILTIAZEM HYDROCHLORIDE 180 MG: 180 CAPSULE, COATED, EXTENDED RELEASE ORAL at 09:27

## 2020-05-08 RX ADMIN — SODIUM CHLORIDE, PRESERVATIVE FREE 10 ML: 5 INJECTION INTRAVENOUS at 16:56

## 2020-05-08 RX ADMIN — CEFEPIME 2 G: 2 INJECTION, POWDER, FOR SOLUTION INTRAVENOUS at 05:30

## 2020-05-08 RX ADMIN — CYANOCOBALAMIN TAB 1000 MCG 500 MCG: 1000 TAB at 15:25

## 2020-05-08 RX ADMIN — SODIUM CHLORIDE: 9 INJECTION, SOLUTION INTRAVENOUS at 09:15

## 2020-05-08 RX ADMIN — CEFEPIME 2 G: 2 INJECTION, POWDER, FOR SOLUTION INTRAVENOUS at 17:34

## 2020-05-08 RX ADMIN — HEPARIN SODIUM 5000 UNITS: 10000 INJECTION, SOLUTION INTRAVENOUS; SUBCUTANEOUS at 22:26

## 2020-05-08 RX ADMIN — CALCIUM 500 MG: 500 TABLET ORAL at 15:25

## 2020-05-08 RX ADMIN — PRAVASTATIN SODIUM 40 MG: 20 TABLET ORAL at 20:41

## 2020-05-08 RX ADMIN — PRIMIDONE 250 MG: 250 TABLET ORAL at 20:40

## 2020-05-08 RX ADMIN — DICYCLOMINE HYDROCHLORIDE 10 MG: 10 CAPSULE ORAL at 20:40

## 2020-05-08 RX ADMIN — ESCITALOPRAM OXALATE 20 MG: 10 TABLET ORAL at 20:40

## 2020-05-08 RX ADMIN — SODIUM CHLORIDE: 9 INJECTION, SOLUTION INTRAVENOUS at 21:54

## 2020-05-08 RX ADMIN — HEPARIN SODIUM 5000 UNITS: 10000 INJECTION, SOLUTION INTRAVENOUS; SUBCUTANEOUS at 05:30

## 2020-05-08 ASSESSMENT — PAIN DESCRIPTION - PROGRESSION
CLINICAL_PROGRESSION: NOT CHANGED
CLINICAL_PROGRESSION: NOT CHANGED

## 2020-05-08 ASSESSMENT — PAIN DESCRIPTION - FREQUENCY
FREQUENCY: CONTINUOUS
FREQUENCY: CONTINUOUS

## 2020-05-08 ASSESSMENT — PAIN DESCRIPTION - PAIN TYPE
TYPE: ACUTE PAIN
TYPE: ACUTE PAIN

## 2020-05-08 ASSESSMENT — PAIN DESCRIPTION - DESCRIPTORS
DESCRIPTORS: ACHING;DISCOMFORT
DESCRIPTORS: HEADACHE;ACHING;SORE

## 2020-05-08 ASSESSMENT — PAIN SCALES - GENERAL
PAINLEVEL_OUTOF10: 6
PAINLEVEL_OUTOF10: 2
PAINLEVEL_OUTOF10: 5

## 2020-05-08 ASSESSMENT — PAIN DESCRIPTION - ONSET
ONSET: ON-GOING
ONSET: ON-GOING

## 2020-05-08 ASSESSMENT — PAIN DESCRIPTION - ORIENTATION: ORIENTATION: MID

## 2020-05-08 ASSESSMENT — PAIN DESCRIPTION - LOCATION
LOCATION: ABDOMEN;BACK
LOCATION: HEAD;BACK;NECK

## 2020-05-08 NOTE — CARE COORDINATION
Spoke to patient via phone, introduced myself and explained my role as RN case manager. Patient stated that she came to the hospital due to pain and an infection she has had since January. Discussed plan of care (ID consult, general surgery consult, NPO status, Labs/testing, medications, discharge planning, etc.). Pt verbalized understanding. She noted that she lives with her  independently and plans to return hmoe upon discharge. She stated that she has used [facility]d [facility] in the past. She also noted that she has been to [facility] in [place] in the past, but nowhere local. Pt stated she gets around independently bu has a cane and a walker at home that she can use if ever needed. Pt denies any questions or concerns at this time.      Informed patient that case management and social work will continue to follow to assist with the transition of care

## 2020-05-08 NOTE — PROGRESS NOTES
Notified Dr. Garrison Mcmullen that CT results are available.     Electronically signed by Torin Herrera RN on 5/8/2020 at 1:53 PM

## 2020-05-09 LAB
ANION GAP SERPL CALCULATED.3IONS-SCNC: 10 MMOL/L (ref 7–16)
BUN BLDV-MCNC: 25 MG/DL (ref 8–23)
CALCIUM SERPL-MCNC: 8.7 MG/DL (ref 8.6–10.2)
CHLORIDE BLD-SCNC: 111 MMOL/L (ref 98–107)
CO2: 20 MMOL/L (ref 22–29)
CREAT SERPL-MCNC: 1.3 MG/DL (ref 0.5–1)
GFR AFRICAN AMERICAN: 48
GFR NON-AFRICAN AMERICAN: 40 ML/MIN/1.73
GLUCOSE BLD-MCNC: 89 MG/DL (ref 74–99)
HCT VFR BLD CALC: 37.3 % (ref 34–48)
HEMOGLOBIN: 11.7 G/DL (ref 11.5–15.5)
MAGNESIUM: 1.6 MG/DL (ref 1.6–2.6)
MCH RBC QN AUTO: 30.6 PG (ref 26–35)
MCHC RBC AUTO-ENTMCNC: 31.4 % (ref 32–34.5)
MCV RBC AUTO: 97.6 FL (ref 80–99.9)
PDW BLD-RTO: 14.4 FL (ref 11.5–15)
PLATELET # BLD: 211 E9/L (ref 130–450)
PMV BLD AUTO: 12.5 FL (ref 7–12)
POTASSIUM REFLEX MAGNESIUM: 3.5 MMOL/L (ref 3.5–5)
RBC # BLD: 3.82 E12/L (ref 3.5–5.5)
SODIUM BLD-SCNC: 141 MMOL/L (ref 132–146)
WBC # BLD: 6.1 E9/L (ref 4.5–11.5)

## 2020-05-09 PROCEDURE — 6370000000 HC RX 637 (ALT 250 FOR IP): Performed by: STUDENT IN AN ORGANIZED HEALTH CARE EDUCATION/TRAINING PROGRAM

## 2020-05-09 PROCEDURE — 80048 BASIC METABOLIC PNL TOTAL CA: CPT

## 2020-05-09 PROCEDURE — 6370000000 HC RX 637 (ALT 250 FOR IP): Performed by: SURGERY

## 2020-05-09 PROCEDURE — 83735 ASSAY OF MAGNESIUM: CPT

## 2020-05-09 PROCEDURE — 6360000002 HC RX W HCPCS: Performed by: STUDENT IN AN ORGANIZED HEALTH CARE EDUCATION/TRAINING PROGRAM

## 2020-05-09 PROCEDURE — 85027 COMPLETE CBC AUTOMATED: CPT

## 2020-05-09 PROCEDURE — 36415 COLL VENOUS BLD VENIPUNCTURE: CPT

## 2020-05-09 PROCEDURE — 1200000000 HC SEMI PRIVATE

## 2020-05-09 PROCEDURE — 2580000003 HC RX 258: Performed by: STUDENT IN AN ORGANIZED HEALTH CARE EDUCATION/TRAINING PROGRAM

## 2020-05-09 RX ADMIN — DICYCLOMINE HYDROCHLORIDE 10 MG: 10 CAPSULE ORAL at 21:00

## 2020-05-09 RX ADMIN — Medication 10 ML: at 09:31

## 2020-05-09 RX ADMIN — CYANOCOBALAMIN TAB 1000 MCG 500 MCG: 1000 TAB at 09:26

## 2020-05-09 RX ADMIN — DILTIAZEM HYDROCHLORIDE 180 MG: 180 CAPSULE, COATED, EXTENDED RELEASE ORAL at 09:26

## 2020-05-09 RX ADMIN — PANTOPRAZOLE SODIUM 40 MG: 40 TABLET, DELAYED RELEASE ORAL at 06:29

## 2020-05-09 RX ADMIN — HYDROMORPHONE HYDROCHLORIDE 0.5 MG: 1 INJECTION, SOLUTION INTRAMUSCULAR; INTRAVENOUS; SUBCUTANEOUS at 18:05

## 2020-05-09 RX ADMIN — CEFEPIME 2 G: 2 INJECTION, POWDER, FOR SOLUTION INTRAVENOUS at 17:33

## 2020-05-09 RX ADMIN — CALCIUM 500 MG: 500 TABLET ORAL at 09:25

## 2020-05-09 RX ADMIN — PRAVASTATIN SODIUM 40 MG: 20 TABLET ORAL at 19:46

## 2020-05-09 RX ADMIN — CEFEPIME 2 G: 2 INJECTION, POWDER, FOR SOLUTION INTRAVENOUS at 06:29

## 2020-05-09 RX ADMIN — FERROUS SULFATE TAB 325 MG (65 MG ELEMENTAL FE) 325 MG: 325 (65 FE) TAB at 09:25

## 2020-05-09 RX ADMIN — DICYCLOMINE HYDROCHLORIDE 10 MG: 10 CAPSULE ORAL at 09:26

## 2020-05-09 RX ADMIN — CHOLECALCIFEROL TAB 50 MCG (2000 UNIT) 2000 UNITS: 50 TAB at 09:25

## 2020-05-09 RX ADMIN — SODIUM CHLORIDE: 9 INJECTION, SOLUTION INTRAVENOUS at 22:40

## 2020-05-09 RX ADMIN — ONDANSETRON 4 MG: 2 INJECTION INTRAMUSCULAR; INTRAVENOUS at 12:07

## 2020-05-09 RX ADMIN — ESCITALOPRAM OXALATE 20 MG: 10 TABLET ORAL at 21:00

## 2020-05-09 RX ADMIN — CHOLECALCIFEROL TAB 50 MCG (2000 UNIT) 2000 UNITS: 50 TAB at 21:00

## 2020-05-09 RX ADMIN — HYDROMORPHONE HYDROCHLORIDE 0.5 MG: 1 INJECTION, SOLUTION INTRAMUSCULAR; INTRAVENOUS; SUBCUTANEOUS at 12:07

## 2020-05-09 RX ADMIN — SODIUM CHLORIDE: 9 INJECTION, SOLUTION INTRAVENOUS at 17:37

## 2020-05-09 RX ADMIN — VANCOMYCIN HYDROCHLORIDE 1000 MG: 1 INJECTION, POWDER, LYOPHILIZED, FOR SOLUTION INTRAVENOUS at 13:51

## 2020-05-09 RX ADMIN — TRAZODONE HYDROCHLORIDE 25 MG: 50 TABLET ORAL at 19:50

## 2020-05-09 RX ADMIN — HEPARIN SODIUM 5000 UNITS: 10000 INJECTION, SOLUTION INTRAVENOUS; SUBCUTANEOUS at 06:29

## 2020-05-09 RX ADMIN — PRIMIDONE 250 MG: 250 TABLET ORAL at 20:30

## 2020-05-09 RX ADMIN — OXYCODONE AND ACETAMINOPHEN 1 TABLET: 10; 325 TABLET ORAL at 21:00

## 2020-05-09 RX ADMIN — PROPRANOLOL HYDROCHLORIDE 20 MG: 20 TABLET ORAL at 09:28

## 2020-05-09 RX ADMIN — OXYCODONE AND ACETAMINOPHEN 1 TABLET: 10; 325 TABLET ORAL at 09:26

## 2020-05-09 RX ADMIN — SODIUM CHLORIDE: 9 INJECTION, SOLUTION INTRAVENOUS at 11:01

## 2020-05-09 RX ADMIN — OXYCODONE AND ACETAMINOPHEN 1 TABLET: 10; 325 TABLET ORAL at 13:50

## 2020-05-09 RX ADMIN — Medication 10 ML: at 20:30

## 2020-05-09 RX ADMIN — SODIUM CHLORIDE, PRESERVATIVE FREE 10 ML: 5 INJECTION INTRAVENOUS at 14:37

## 2020-05-09 ASSESSMENT — PAIN SCALES - GENERAL
PAINLEVEL_OUTOF10: 5
PAINLEVEL_OUTOF10: 7
PAINLEVEL_OUTOF10: 4
PAINLEVEL_OUTOF10: 7
PAINLEVEL_OUTOF10: 6
PAINLEVEL_OUTOF10: 0

## 2020-05-09 ASSESSMENT — PAIN DESCRIPTION - ONSET: ONSET: ON-GOING

## 2020-05-09 ASSESSMENT — PAIN DESCRIPTION - ORIENTATION: ORIENTATION: RIGHT;LEFT

## 2020-05-09 ASSESSMENT — PAIN - FUNCTIONAL ASSESSMENT: PAIN_FUNCTIONAL_ASSESSMENT: PREVENTS OR INTERFERES SOME ACTIVE ACTIVITIES AND ADLS

## 2020-05-09 ASSESSMENT — PAIN DESCRIPTION - PAIN TYPE: TYPE: ACUTE PAIN

## 2020-05-09 ASSESSMENT — PAIN DESCRIPTION - FREQUENCY: FREQUENCY: INTERMITTENT

## 2020-05-09 ASSESSMENT — PAIN DESCRIPTION - LOCATION: LOCATION: BACK;HEAD;NECK

## 2020-05-09 ASSESSMENT — PAIN DESCRIPTION - PROGRESSION: CLINICAL_PROGRESSION: NOT CHANGED

## 2020-05-09 ASSESSMENT — PAIN DESCRIPTION - DESCRIPTORS: DESCRIPTORS: DISCOMFORT;ACHING;HEADACHE

## 2020-05-09 NOTE — PROGRESS NOTES
Department of Surgery - Adult  General Surgery  Dr. Charlene Flores's Progress Note      SUBJECTIVE: Overall, the patient is improved. The patient states that her abdominal pain has improved. OBJECTIVE      Physical    VITALS:  BP (!) 142/62   Pulse 68   Temp 98.1 °F (36.7 °C) (Oral)   Resp 16   Ht 5' (1.524 m)   Wt 122 lb 12.3 oz (55.7 kg)   SpO2 95%   BMI 23.98 kg/m²   INTAKE/OUTPUT:      Intake/Output Summary (Last 24 hours) at 2020 0656  Last data filed at 2020  Gross per 24 hour   Intake 2370 ml   Output 1200 ml   Net 1170 ml     TEMPERATURE:  Current - Temp: 98.1 °F (36.7 °C); Max - Temp  Av.2 °F (36.8 °C)  Min: 98.1 °F (36.7 °C)  Max: 98.2 °F (36.8 °C)  RESPIRATIONS RANGE: Resp  Av  Min: 16  Max: 16  PULSE RANGE: Pulse  Av  Min: 68  Max: 76  BLOOD PRESSURE RANGE:  Systolic (83UVC), YFI:617 , Min:142 , KGL:404   ; Diastolic (48BJT), IKR:11, Min:62, Max:67    PULSE OXIMETRY RANGE: SpO2  Av.5 %  Min: 95 %  Max: 96 %  CONSTITUTIONAL:  awake, alert, cooperative, no apparent distress, and appears stated age  LUNGS:  No increased work of breathing, good air exchange, clear to auscultation bilaterally, no crackles or wheezing  CARDIOVASCULAR:  regular rate and rhythm and no murmur noted  ABDOMEN: The abdomen is soft non-distended with appropriate tenderness related to her active infection. The patient has induration, erythema and edema. The patient does have a fistulous tract.   Data  CBC with Differential:    Lab Results   Component Value Date    WBC 6.1 2020    RBC 3.82 2020    HGB 11.7 2020    HCT 37.3 2020     2020    MCV 97.6 2020    MCH 30.6 2020    MCHC 31.4 2020    RDW 14.4 2020    LYMPHOPCT 17.6 2020    MONOPCT 7.8 2020    BASOPCT 0.6 2020    MONOSABS 0.73 2020    LYMPHSABS 1.64 2020    EOSABS 0.24 2020    BASOSABS 0.06 2020     CMP:    Lab Results   Component Value Date     05/09/2020    K 3.5 05/09/2020     05/09/2020    CO2 20 05/09/2020    BUN 25 05/09/2020    CREATININE 1.3 05/09/2020    GFRAA 48 05/09/2020    LABGLOM 40 05/09/2020    GLUCOSE 89 05/09/2020    PROT 7.1 05/07/2020    LABALBU 3.6 05/07/2020    CALCIUM 8.7 05/09/2020    BILITOT 0.5 05/07/2020    ALKPHOS 133 05/07/2020    AST 49 05/07/2020    ALT 32 05/07/2020     BMP:  Hepatic Function Panel:  Ionized Calcium:  No results found for: IONCA  Magnesium:    Lab Results   Component Value Date    MG 1.6 05/09/2020     Phosphorus:    Lab Results   Component Value Date    PHOS 3.7 07/07/2018       Current Inpatient Medications    Current Facility-Administered Medications: vancomycin 1000 mg IVPB in 250 mL D5W addavial, 1,000 mg, Intravenous, Q24H  ferrous sulfate (IRON 325) tablet 325 mg, 325 mg, Oral, Daily with breakfast  sodium chloride flush 0.9 % injection 10 mL, 10 mL, Intravenous, 2 times per day  sodium chloride flush 0.9 % injection 10 mL, 10 mL, Intravenous, PRN  0.9 % sodium chloride infusion, , Intravenous, Continuous  HYDROmorphone (DILAUDID) injection 0.25 mg, 0.25 mg, Intravenous, Q3H PRN **OR** HYDROmorphone (DILAUDID) injection 0.5 mg, 0.5 mg, Intravenous, Q3H PRN  ondansetron (ZOFRAN) injection 4 mg, 4 mg, Intravenous, Q6H PRN  heparin (porcine) injection 5,000 Units, 5,000 Units, Subcutaneous, 3 times per day  aspirin EC tablet 81 mg, 81 mg, Oral, Daily  calcium elemental (OSCAL) tablet 500 mg, 500 mg, Oral, Daily  vitamin D tablet 2,000 Units, 2,000 Units, Oral, BID  dicyclomine (BENTYL) capsule 10 mg, 10 mg, Oral, BID  dilTIAZem (CARDIZEM CD) extended release capsule 180 mg, 180 mg, Oral, Daily  escitalopram (LEXAPRO) tablet 20 mg, 20 mg, Oral, Nightly  pantoprazole (PROTONIX) tablet 40 mg, 40 mg, Oral, QAM AC  oxyCODONE-acetaminophen (PERCOCET)  MG per tablet 1 tablet, 1 tablet, Oral, TID  pravastatin (PRAVACHOL) tablet 40 mg, 40 mg, Oral, QPM  primidone (MYSOLINE) tablet 250

## 2020-05-10 LAB
ANION GAP SERPL CALCULATED.3IONS-SCNC: 7 MMOL/L (ref 7–16)
BUN BLDV-MCNC: 24 MG/DL (ref 8–23)
CALCIUM SERPL-MCNC: 8.3 MG/DL (ref 8.6–10.2)
CHLORIDE BLD-SCNC: 110 MMOL/L (ref 98–107)
CO2: 21 MMOL/L (ref 22–29)
CREAT SERPL-MCNC: 1 MG/DL (ref 0.5–1)
GFR AFRICAN AMERICAN: >60
GFR NON-AFRICAN AMERICAN: 54 ML/MIN/1.73
GLUCOSE BLD-MCNC: 77 MG/DL (ref 74–99)
HCT VFR BLD CALC: 34.2 % (ref 34–48)
HEMOGLOBIN: 10.8 G/DL (ref 11.5–15.5)
MCH RBC QN AUTO: 30.7 PG (ref 26–35)
MCHC RBC AUTO-ENTMCNC: 31.6 % (ref 32–34.5)
MCV RBC AUTO: 97.2 FL (ref 80–99.9)
ORGANISM: ABNORMAL
ORGANISM: ABNORMAL
PDW BLD-RTO: 14.5 FL (ref 11.5–15)
PLATELET # BLD: 219 E9/L (ref 130–450)
PMV BLD AUTO: 12.4 FL (ref 7–12)
POTASSIUM REFLEX MAGNESIUM: 3.6 MMOL/L (ref 3.5–5)
RBC # BLD: 3.52 E12/L (ref 3.5–5.5)
SODIUM BLD-SCNC: 138 MMOL/L (ref 132–146)
WBC # BLD: 5 E9/L (ref 4.5–11.5)
WOUND/ABSCESS: ABNORMAL
WOUND/ABSCESS: ABNORMAL

## 2020-05-10 PROCEDURE — 87081 CULTURE SCREEN ONLY: CPT

## 2020-05-10 PROCEDURE — 6370000000 HC RX 637 (ALT 250 FOR IP): Performed by: SURGERY

## 2020-05-10 PROCEDURE — 6370000000 HC RX 637 (ALT 250 FOR IP): Performed by: STUDENT IN AN ORGANIZED HEALTH CARE EDUCATION/TRAINING PROGRAM

## 2020-05-10 PROCEDURE — 1200000000 HC SEMI PRIVATE

## 2020-05-10 PROCEDURE — 2580000003 HC RX 258: Performed by: STUDENT IN AN ORGANIZED HEALTH CARE EDUCATION/TRAINING PROGRAM

## 2020-05-10 PROCEDURE — 85027 COMPLETE CBC AUTOMATED: CPT

## 2020-05-10 PROCEDURE — 80048 BASIC METABOLIC PNL TOTAL CA: CPT

## 2020-05-10 PROCEDURE — 6360000002 HC RX W HCPCS: Performed by: STUDENT IN AN ORGANIZED HEALTH CARE EDUCATION/TRAINING PROGRAM

## 2020-05-10 PROCEDURE — 36415 COLL VENOUS BLD VENIPUNCTURE: CPT

## 2020-05-10 RX ORDER — SODIUM CHLORIDE, SODIUM LACTATE, POTASSIUM CHLORIDE, CALCIUM CHLORIDE 600; 310; 30; 20 MG/100ML; MG/100ML; MG/100ML; MG/100ML
INJECTION, SOLUTION INTRAVENOUS CONTINUOUS
Status: DISCONTINUED | OUTPATIENT
Start: 2020-05-10 | End: 2020-05-14

## 2020-05-10 RX ADMIN — CHOLECALCIFEROL TAB 50 MCG (2000 UNIT) 2000 UNITS: 50 TAB at 08:42

## 2020-05-10 RX ADMIN — HYDROMORPHONE HYDROCHLORIDE 0.5 MG: 1 INJECTION, SOLUTION INTRAMUSCULAR; INTRAVENOUS; SUBCUTANEOUS at 12:47

## 2020-05-10 RX ADMIN — ONDANSETRON 4 MG: 2 INJECTION INTRAMUSCULAR; INTRAVENOUS at 12:47

## 2020-05-10 RX ADMIN — SODIUM CHLORIDE: 9 INJECTION, SOLUTION INTRAVENOUS at 10:00

## 2020-05-10 RX ADMIN — Medication 10 ML: at 21:23

## 2020-05-10 RX ADMIN — OXYCODONE AND ACETAMINOPHEN 1 TABLET: 10; 325 TABLET ORAL at 21:21

## 2020-05-10 RX ADMIN — CEFEPIME 2 G: 2 INJECTION, POWDER, FOR SOLUTION INTRAVENOUS at 05:45

## 2020-05-10 RX ADMIN — CHOLECALCIFEROL TAB 50 MCG (2000 UNIT) 2000 UNITS: 50 TAB at 21:21

## 2020-05-10 RX ADMIN — DILTIAZEM HYDROCHLORIDE 180 MG: 180 CAPSULE, COATED, EXTENDED RELEASE ORAL at 08:42

## 2020-05-10 RX ADMIN — PROPRANOLOL HYDROCHLORIDE 20 MG: 20 TABLET ORAL at 08:41

## 2020-05-10 RX ADMIN — SODIUM CHLORIDE: 9 INJECTION, SOLUTION INTRAVENOUS at 13:41

## 2020-05-10 RX ADMIN — VANCOMYCIN HYDROCHLORIDE 1000 MG: 1 INJECTION, POWDER, LYOPHILIZED, FOR SOLUTION INTRAVENOUS at 13:30

## 2020-05-10 RX ADMIN — ESCITALOPRAM OXALATE 20 MG: 10 TABLET ORAL at 21:20

## 2020-05-10 RX ADMIN — PANTOPRAZOLE SODIUM 40 MG: 40 TABLET, DELAYED RELEASE ORAL at 05:45

## 2020-05-10 RX ADMIN — HEPARIN SODIUM 5000 UNITS: 10000 INJECTION, SOLUTION INTRAVENOUS; SUBCUTANEOUS at 06:00

## 2020-05-10 RX ADMIN — PRIMIDONE 250 MG: 250 TABLET ORAL at 21:21

## 2020-05-10 RX ADMIN — SODIUM CHLORIDE: 9 INJECTION, SOLUTION INTRAVENOUS at 05:45

## 2020-05-10 RX ADMIN — OXYCODONE AND ACETAMINOPHEN 1 TABLET: 10; 325 TABLET ORAL at 13:40

## 2020-05-10 RX ADMIN — CEFEPIME 2 G: 2 INJECTION, POWDER, FOR SOLUTION INTRAVENOUS at 17:13

## 2020-05-10 RX ADMIN — FERROUS SULFATE TAB 325 MG (65 MG ELEMENTAL FE) 325 MG: 325 (65 FE) TAB at 08:41

## 2020-05-10 RX ADMIN — HYDROMORPHONE HYDROCHLORIDE 0.5 MG: 1 INJECTION, SOLUTION INTRAMUSCULAR; INTRAVENOUS; SUBCUTANEOUS at 17:13

## 2020-05-10 RX ADMIN — PRAVASTATIN SODIUM 40 MG: 20 TABLET ORAL at 21:21

## 2020-05-10 RX ADMIN — CYANOCOBALAMIN TAB 1000 MCG 500 MCG: 1000 TAB at 08:42

## 2020-05-10 RX ADMIN — SODIUM CHLORIDE: 9 INJECTION, SOLUTION INTRAVENOUS at 21:22

## 2020-05-10 RX ADMIN — DICYCLOMINE HYDROCHLORIDE 10 MG: 10 CAPSULE ORAL at 21:21

## 2020-05-10 RX ADMIN — Medication 10 ML: at 08:45

## 2020-05-10 RX ADMIN — TRAZODONE HYDROCHLORIDE 25 MG: 50 TABLET ORAL at 21:48

## 2020-05-10 RX ADMIN — CALCIUM 500 MG: 500 TABLET ORAL at 08:41

## 2020-05-10 RX ADMIN — DICYCLOMINE HYDROCHLORIDE 10 MG: 10 CAPSULE ORAL at 08:41

## 2020-05-10 ASSESSMENT — PAIN SCALES - GENERAL
PAINLEVEL_OUTOF10: 4
PAINLEVEL_OUTOF10: 7
PAINLEVEL_OUTOF10: 4

## 2020-05-10 NOTE — PROGRESS NOTES
Pharmacy Consultation Note  (Antibiotic Dosing and Monitoring)    Initial consult date: 2020  Consulting physician: Dr. Najma Weems  Drug(s): Vancomycin  Indication: Abdominal wall cellulitis    Ht Readings from Last 1 Encounters:   20 5' (1.524 m)     Wt Readings from Last 1 Encounters:   05/10/20 133 lb (60.3 kg)       Age/Gender IBW DW  Allergy Information   70 y.o. female 45.5 kg 55.5 kg  Fentanyl; Levofloxacin; Tramadol; and Vioxx [rofecoxib]               Date  WBC BUN/CR Drug/Dose Time   Given Level(s)   (Time) Comments    9.3 30/1.4 Vancomycin 1250 mg IV x 1 dose 2040      8.1 26/1.3 Vancomycin 1 g IV Q24H 1312 Random: 10.4 mcg/mL (1055)     6.1 25/1.3 Vancomycin 1000 mg IV Q24H 1353     5/10 5 24/1 Vancomycin 1000 mg IV Q24H <1300>        Vancomycin 1000 mg IV Q24H <1300> Trough <1230>      Estimated Creatinine Clearance: 38 mL/min (based on SCr of 1 mg/dL).       Intake/Output Summary (Last 24 hours) at 5/10/2020 1150  Last data filed at 5/10/2020 1027  Gross per 24 hour   Intake 1988 ml   Output 1450 ml   Net 538 ml   Urine output: 0.5 mL/kg/hr    Temp max: Temp (24hrs), Av.5 °F (36.9 °C), Min:98.3 °F (36.8 °C), Max:98.6 °F (37 °C)      Cultures:  available culture and sensitivity results were reviewed in [place] [id number]   blood cx - No growth    wound cx - E coli    Assessment:  · Patient is a 70year old female currently ordered vancomycin and cefepime for abdominal wall cellulitis  · Consulted by Dr. Gage Baker to dose/monitor vancomycin  · Goal trough level:  15-20 mcg/mL  · Random level from 1055 on  was 10.4 mcg/mL    Plan:  · Will continue vancomycin 1 g IV every 24 hours  · Will check trough level at steady state if vancomycin continues  · Pharmacist will follow and monitor/adjust dosing as necessary    Omero DelgadoD 5/10/2020 11:50 AM

## 2020-05-11 ENCOUNTER — ANESTHESIA (OUTPATIENT)
Dept: OPERATING ROOM | Age: 80
DRG: 908 | End: 2020-05-11
Payer: MEDICARE

## 2020-05-11 ENCOUNTER — ANESTHESIA EVENT (OUTPATIENT)
Dept: OPERATING ROOM | Age: 80
DRG: 908 | End: 2020-05-11
Payer: MEDICARE

## 2020-05-11 LAB
ABO/RH: NORMAL
ANION GAP SERPL CALCULATED.3IONS-SCNC: 7 MMOL/L (ref 7–16)
ANTIBODY SCREEN: NORMAL
BUN BLDV-MCNC: 19 MG/DL (ref 8–23)
CALCIUM SERPL-MCNC: 8.4 MG/DL (ref 8.6–10.2)
CHLORIDE BLD-SCNC: 111 MMOL/L (ref 98–107)
CO2: 21 MMOL/L (ref 22–29)
CREAT SERPL-MCNC: 1 MG/DL (ref 0.5–1)
EKG ATRIAL RATE: 58 BPM
EKG P AXIS: 67 DEGREES
EKG P-R INTERVAL: 174 MS
EKG Q-T INTERVAL: 484 MS
EKG QRS DURATION: 90 MS
EKG QTC CALCULATION (BAZETT): 475 MS
EKG R AXIS: -20 DEGREES
EKG T AXIS: 43 DEGREES
EKG VENTRICULAR RATE: 58 BPM
GFR AFRICAN AMERICAN: >60
GFR NON-AFRICAN AMERICAN: 54 ML/MIN/1.73
GLUCOSE BLD-MCNC: 83 MG/DL (ref 74–99)
HCT VFR BLD CALC: 34 % (ref 34–48)
HEMOGLOBIN: 10.9 G/DL (ref 11.5–15.5)
MAGNESIUM: 1.5 MG/DL (ref 1.6–2.6)
MCH RBC QN AUTO: 31.1 PG (ref 26–35)
MCHC RBC AUTO-ENTMCNC: 32.1 % (ref 32–34.5)
MCV RBC AUTO: 96.9 FL (ref 80–99.9)
PDW BLD-RTO: 14.6 FL (ref 11.5–15)
PLATELET # BLD: 217 E9/L (ref 130–450)
PMV BLD AUTO: 11.9 FL (ref 7–12)
POTASSIUM REFLEX MAGNESIUM: 3.3 MMOL/L (ref 3.5–5)
RBC # BLD: 3.51 E12/L (ref 3.5–5.5)
SODIUM BLD-SCNC: 139 MMOL/L (ref 132–146)
VANCOMYCIN TROUGH: 16.7 MCG/ML (ref 5–16)
WBC # BLD: 4.3 E9/L (ref 4.5–11.5)

## 2020-05-11 PROCEDURE — 86901 BLOOD TYPING SEROLOGIC RH(D): CPT

## 2020-05-11 PROCEDURE — 80048 BASIC METABOLIC PNL TOTAL CA: CPT

## 2020-05-11 PROCEDURE — 2580000003 HC RX 258: Performed by: SURGERY

## 2020-05-11 PROCEDURE — 6370000000 HC RX 637 (ALT 250 FOR IP): Performed by: STUDENT IN AN ORGANIZED HEALTH CARE EDUCATION/TRAINING PROGRAM

## 2020-05-11 PROCEDURE — 6360000002 HC RX W HCPCS: Performed by: STUDENT IN AN ORGANIZED HEALTH CARE EDUCATION/TRAINING PROGRAM

## 2020-05-11 PROCEDURE — 36415 COLL VENOUS BLD VENIPUNCTURE: CPT

## 2020-05-11 PROCEDURE — 93005 ELECTROCARDIOGRAM TRACING: CPT | Performed by: ANESTHESIOLOGY

## 2020-05-11 PROCEDURE — 83735 ASSAY OF MAGNESIUM: CPT

## 2020-05-11 PROCEDURE — 86850 RBC ANTIBODY SCREEN: CPT

## 2020-05-11 PROCEDURE — 85027 COMPLETE CBC AUTOMATED: CPT

## 2020-05-11 PROCEDURE — 93010 ELECTROCARDIOGRAM REPORT: CPT | Performed by: INTERNAL MEDICINE

## 2020-05-11 PROCEDURE — 80202 ASSAY OF VANCOMYCIN: CPT

## 2020-05-11 PROCEDURE — 1200000000 HC SEMI PRIVATE

## 2020-05-11 PROCEDURE — 2580000003 HC RX 258: Performed by: STUDENT IN AN ORGANIZED HEALTH CARE EDUCATION/TRAINING PROGRAM

## 2020-05-11 PROCEDURE — 86900 BLOOD TYPING SEROLOGIC ABO: CPT

## 2020-05-11 RX ORDER — POTASSIUM CHLORIDE 7.45 MG/ML
10 INJECTION INTRAVENOUS
Status: COMPLETED | OUTPATIENT
Start: 2020-05-11 | End: 2020-05-11

## 2020-05-11 RX ORDER — MAGNESIUM SULFATE IN WATER 40 MG/ML
4 INJECTION, SOLUTION INTRAVENOUS ONCE
Status: COMPLETED | OUTPATIENT
Start: 2020-05-11 | End: 2020-05-12

## 2020-05-11 RX ADMIN — SODIUM CHLORIDE, POTASSIUM CHLORIDE, SODIUM LACTATE AND CALCIUM CHLORIDE: 600; 310; 30; 20 INJECTION, SOLUTION INTRAVENOUS at 00:28

## 2020-05-11 RX ADMIN — POTASSIUM CHLORIDE 10 MEQ: 10 INJECTION, SOLUTION INTRAVENOUS at 12:40

## 2020-05-11 RX ADMIN — HYDROMORPHONE HYDROCHLORIDE 0.5 MG: 1 INJECTION, SOLUTION INTRAMUSCULAR; INTRAVENOUS; SUBCUTANEOUS at 22:31

## 2020-05-11 RX ADMIN — POTASSIUM CHLORIDE 10 MEQ: 10 INJECTION, SOLUTION INTRAVENOUS at 13:40

## 2020-05-11 RX ADMIN — CEFEPIME 2 G: 2 INJECTION, POWDER, FOR SOLUTION INTRAVENOUS at 05:52

## 2020-05-11 RX ADMIN — TRAZODONE HYDROCHLORIDE 25 MG: 50 TABLET ORAL at 00:27

## 2020-05-11 RX ADMIN — DILTIAZEM HYDROCHLORIDE 180 MG: 180 CAPSULE, COATED, EXTENDED RELEASE ORAL at 08:43

## 2020-05-11 RX ADMIN — SODIUM CHLORIDE: 9 INJECTION, SOLUTION INTRAVENOUS at 10:00

## 2020-05-11 RX ADMIN — OXYCODONE AND ACETAMINOPHEN 1 TABLET: 10; 325 TABLET ORAL at 08:43

## 2020-05-11 RX ADMIN — SODIUM CHLORIDE, POTASSIUM CHLORIDE, SODIUM LACTATE AND CALCIUM CHLORIDE: 600; 310; 30; 20 INJECTION, SOLUTION INTRAVENOUS at 08:46

## 2020-05-11 RX ADMIN — PRIMIDONE 250 MG: 250 TABLET ORAL at 20:42

## 2020-05-11 RX ADMIN — PROPRANOLOL HYDROCHLORIDE 20 MG: 20 TABLET ORAL at 08:43

## 2020-05-11 RX ADMIN — PRAVASTATIN SODIUM 40 MG: 20 TABLET ORAL at 20:42

## 2020-05-11 RX ADMIN — OXYCODONE AND ACETAMINOPHEN 1 TABLET: 10; 325 TABLET ORAL at 20:42

## 2020-05-11 RX ADMIN — ESCITALOPRAM OXALATE 20 MG: 10 TABLET ORAL at 20:42

## 2020-05-11 RX ADMIN — MAGNESIUM SULFATE IN WATER 4 G: 40 INJECTION, SOLUTION INTRAVENOUS at 22:32

## 2020-05-11 RX ADMIN — CEFEPIME 2 G: 2 INJECTION, POWDER, FOR SOLUTION INTRAVENOUS at 20:41

## 2020-05-11 RX ADMIN — VANCOMYCIN HYDROCHLORIDE 1000 MG: 1 INJECTION, POWDER, LYOPHILIZED, FOR SOLUTION INTRAVENOUS at 11:36

## 2020-05-11 RX ADMIN — POTASSIUM CHLORIDE 10 MEQ: 10 INJECTION, SOLUTION INTRAVENOUS at 18:50

## 2020-05-11 RX ADMIN — HYDROMORPHONE HYDROCHLORIDE 0.25 MG: 1 INJECTION, SOLUTION INTRAMUSCULAR; INTRAVENOUS; SUBCUTANEOUS at 18:29

## 2020-05-11 RX ADMIN — ONDANSETRON 4 MG: 2 INJECTION INTRAMUSCULAR; INTRAVENOUS at 22:31

## 2020-05-11 RX ADMIN — OXYCODONE AND ACETAMINOPHEN 1 TABLET: 10; 325 TABLET ORAL at 13:38

## 2020-05-11 ASSESSMENT — PAIN SCALES - GENERAL
PAINLEVEL_OUTOF10: 5
PAINLEVEL_OUTOF10: 7
PAINLEVEL_OUTOF10: 4
PAINLEVEL_OUTOF10: 6
PAINLEVEL_OUTOF10: 0

## 2020-05-11 ASSESSMENT — PAIN DESCRIPTION - PAIN TYPE
TYPE: ACUTE PAIN
TYPE: ACUTE PAIN

## 2020-05-11 ASSESSMENT — PAIN DESCRIPTION - DESCRIPTORS
DESCRIPTORS: ACHING;SORE
DESCRIPTORS: ACHING;SORE;DISCOMFORT

## 2020-05-11 ASSESSMENT — PAIN DESCRIPTION - PROGRESSION
CLINICAL_PROGRESSION: NOT CHANGED
CLINICAL_PROGRESSION: NOT CHANGED

## 2020-05-11 ASSESSMENT — PAIN - FUNCTIONAL ASSESSMENT
PAIN_FUNCTIONAL_ASSESSMENT: ACTIVITIES ARE NOT PREVENTED
PAIN_FUNCTIONAL_ASSESSMENT: ACTIVITIES ARE NOT PREVENTED

## 2020-05-11 ASSESSMENT — PAIN DESCRIPTION - LOCATION
LOCATION: ABDOMEN
LOCATION: ABDOMEN

## 2020-05-11 ASSESSMENT — PAIN DESCRIPTION - ORIENTATION
ORIENTATION: MID;LOWER
ORIENTATION: MID;LOWER

## 2020-05-11 ASSESSMENT — PAIN DESCRIPTION - ONSET
ONSET: ON-GOING
ONSET: ON-GOING

## 2020-05-11 ASSESSMENT — LIFESTYLE VARIABLES: SMOKING_STATUS: 1

## 2020-05-11 ASSESSMENT — PAIN DESCRIPTION - FREQUENCY
FREQUENCY: INTERMITTENT
FREQUENCY: CONTINUOUS

## 2020-05-11 NOTE — ANESTHESIA PRE PROCEDURE
Department of Anesthesiology  Preprocedure Note       Name:  Lona Godwin   Age:  70 y.o.  :  1940                                          MRN:  [id number]         Date:  2020      Surgeon: Gage Guadalupe):  Cherri Sosa MD    Procedure: EXPLORATORY LAPAROTOMY POSSIBLE EXPLANTATION OF INFECTED MESH POSSIBLE ILEOSTOMY REVISION (N/A Abdomen)    Medications prior to admission:   Prior to Admission medications    Medication Sig Start Date End Date Taking?  Authorizing Provider   omeprazole (PRILOSEC) 20 MG delayed release capsule Take 40 mg by mouth daily   Yes Historical Provider, MD   dicyclomine (BENTYL) 10 MG capsule Take 10 mg by mouth 2 times daily   Yes Historical Provider, MD   aspirin 81 MG tablet Take 81 mg by mouth daily   Yes Historical Provider, MD   diltiazem (CARDIZEM CD) 120 MG extended release capsule Take 1 capsule by mouth daily  Patient taking differently: Take 180 mg by mouth daily  18  Yes Karina Murphy MD   fenofibrate (TRICOR) 145 MG tablet Take 145 mg by mouth daily   Yes Historical Provider, MD   Coenzyme Q10 (COQ10 PO) Take by mouth daily LD 3/16/2017   Yes Historical Provider, MD   Ferrous Sulfate (IRON) 28 MG TABS Take by mouth daily   Yes Historical Provider, MD   calcium carbonate (OSCAL) 500 MG TABS tablet Take 500 mg by mouth daily LD 3/16/2017   Yes Historical Provider, MD   pravastatin (PRAVACHOL) 40 MG tablet Take 40 mg by mouth every evening   Yes Historical Provider, MD   propranolol (INDERAL) 20 MG tablet Take 20 mg by mouth daily Instructed to take morning of surgery with a sip of water    Yes Historical Provider, MD   vitamin B-12 (CYANOCOBALAMIN) 500 MCG tablet Place 500 mcg under the tongue daily holdInstructed to hold 5 days pre-op / LD 3/16/2017   Yes Historical Provider, MD   Omega-3 Fatty Acids (OMEGA 3 PO) Take by mouth daily Instructed to hold 5 days pre-op / LD 3/16/2017   Yes Historical Provider, MD   escitalopram (LEXAPRO) 20 MG tablet Take 20 infusion   Intravenous Q12H Nas Lackey MD 12.5 mL/hr at 05/11/20 1000         Allergies: Allergies   Allergen Reactions    Fentanyl Itching     Patch only    Levofloxacin Other (See Comments)     Leg edema/pain    Tramadol Nausea Only     Nausea .  Sleeplisness, shakey    Vioxx [Rofecoxib]      Leg edema and pain       Problem List:    Patient Active Problem List   Diagnosis Code    Right cataract H26.9    Essential hypertension, benign I10    Hyperlipidemia with target LDL less than 100 E78.5    Osteoarthritis, 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    Primary osteoarthritis of left knee M17.12    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    Ileostomy present ([facility] Z93.2    Chronic kidney disease, stage III (moderate) ([facility]) N18.3    COPD (chronic obstructive pulmonary disease) ([facility] J44.9    Abdominal wall cellulitis L03.311       Past Medical History:        Diagnosis Date    Acute kidney failure ([facility] 2014 and 8/2016    x2,no dialysis needed,resolved, kidney function tests returned to normal    Anemia     h/o    Anxiety     Arthritis     Bladder calculus     h/o    CAD (coronary artery disease)     follows with Dr. Regina Albert every 6 months    Cancer ([facility]     skin, leg,nose    COPD (chronic obstructive pulmonary disease) ([facility])     mild    Depression     Fibromyalgia     chronic back and neck pain    Gastritis     Generalized headaches     h/o / daily    History of blood transfusion 3-11-14    multiple times    History of necrotic bowel 2012    Hyperlipidemia     Hypertension     Incisional hernia     abdomen    Insomnia     Mitral valve regurgitation     Myocardial infarct ([facility] 2002    Nausea     daily / since last hernia surgery in 3/2015    Occasional tremors     at hands / stable with medication    Osteopenia     PONV (postoperative nausea and vomiting)     Vitamin B12 deficiency     h/o       Past Surgical History:        Procedure Laterality Date    ABDOMEN SURGERY  2-

## 2020-05-11 NOTE — PATIENT CARE CONFERENCE
[facility] Quality Flow/Interdisciplinary Rounds Progress Note        Quality Flow Rounds held on May 11, 2020    Disciplines Attending:  Bedside Nurse, ,  and Nursing Unit Leadership    Helen Sweeney was admitted on 5/7/2020  3:23 PM    Anticipated Discharge Date:  Expected Discharge Date: 05/13/20    Disposition:    Roland Score:  Roland Scale Score: 22    Readmission Risk              Risk of Unplanned Readmission:        20           Discussed patient goal for the day, patient clinical progression, and barriers to discharge.   The following Goal(s) of the Day/Commitment(s) have been identified:  Diagnostics - Report Results      Gavino Varma  May 11, 2020

## 2020-05-11 NOTE — PROGRESS NOTES
Pt's IV was leaking. Explained to pt. I need to place a new IV site because she is getting IV fluids and she is going for surgery kenia. Pt refused and said IV team is the only one she will let place an IV on her. She said she is a hard stick and her veins are small. I looked at her veins and was not able to find a good one.  Will consult IV team.

## 2020-05-12 VITALS — TEMPERATURE: 96.8 F | DIASTOLIC BLOOD PRESSURE: 67 MMHG | SYSTOLIC BLOOD PRESSURE: 146 MMHG | OXYGEN SATURATION: 95 %

## 2020-05-12 LAB
ANION GAP SERPL CALCULATED.3IONS-SCNC: 9 MMOL/L (ref 7–16)
BLOOD CULTURE, ROUTINE: NORMAL
BUN BLDV-MCNC: 15 MG/DL (ref 8–23)
CALCIUM SERPL-MCNC: 8.6 MG/DL (ref 8.6–10.2)
CHLORIDE BLD-SCNC: 107 MMOL/L (ref 98–107)
CO2: 20 MMOL/L (ref 22–29)
CREAT SERPL-MCNC: 0.9 MG/DL (ref 0.5–1)
CULTURE, BLOOD 2: NORMAL
GFR AFRICAN AMERICAN: >60
GFR NON-AFRICAN AMERICAN: >60 ML/MIN/1.73
GLUCOSE BLD-MCNC: 79 MG/DL (ref 74–99)
HCT VFR BLD CALC: 31.9 % (ref 34–48)
HEMOGLOBIN: 10.2 G/DL (ref 11.5–15.5)
MCH RBC QN AUTO: 30.7 PG (ref 26–35)
MCHC RBC AUTO-ENTMCNC: 32 % (ref 32–34.5)
MCV RBC AUTO: 96.1 FL (ref 80–99.9)
MRSA CULTURE ONLY: NORMAL
PDW BLD-RTO: 14.4 FL (ref 11.5–15)
PLATELET # BLD: 221 E9/L (ref 130–450)
PMV BLD AUTO: 12.1 FL (ref 7–12)
POTASSIUM REFLEX MAGNESIUM: 3.6 MMOL/L (ref 3.5–5)
RBC # BLD: 3.32 E12/L (ref 3.5–5.5)
SODIUM BLD-SCNC: 136 MMOL/L (ref 132–146)
WBC # BLD: 5.3 E9/L (ref 4.5–11.5)

## 2020-05-12 PROCEDURE — 2720000010 HC SURG SUPPLY STERILE: Performed by: SURGERY

## 2020-05-12 PROCEDURE — 36415 COLL VENOUS BLD VENIPUNCTURE: CPT

## 2020-05-12 PROCEDURE — 1200000000 HC SEMI PRIVATE

## 2020-05-12 PROCEDURE — 6360000002 HC RX W HCPCS: Performed by: STUDENT IN AN ORGANIZED HEALTH CARE EDUCATION/TRAINING PROGRAM

## 2020-05-12 PROCEDURE — 2580000003 HC RX 258: Performed by: SURGERY

## 2020-05-12 PROCEDURE — 87075 CULTR BACTERIA EXCEPT BLOOD: CPT

## 2020-05-12 PROCEDURE — 2500000003 HC RX 250 WO HCPCS: Performed by: NURSE ANESTHETIST, CERTIFIED REGISTERED

## 2020-05-12 PROCEDURE — 3600000003 HC SURGERY LEVEL 3 BASE: Performed by: SURGERY

## 2020-05-12 PROCEDURE — 0DPU0JZ REMOVAL OF SYNTHETIC SUBSTITUTE FROM OMENTUM, OPEN APPROACH: ICD-10-PCS | Performed by: STUDENT IN AN ORGANIZED HEALTH CARE EDUCATION/TRAINING PROGRAM

## 2020-05-12 PROCEDURE — 87205 SMEAR GRAM STAIN: CPT

## 2020-05-12 PROCEDURE — 7100000000 HC PACU RECOVERY - FIRST 15 MIN: Performed by: SURGERY

## 2020-05-12 PROCEDURE — 87116 MYCOBACTERIA CULTURE: CPT

## 2020-05-12 PROCEDURE — 6370000000 HC RX 637 (ALT 250 FOR IP): Performed by: STUDENT IN AN ORGANIZED HEALTH CARE EDUCATION/TRAINING PROGRAM

## 2020-05-12 PROCEDURE — 0DN80ZZ RELEASE SMALL INTESTINE, OPEN APPROACH: ICD-10-PCS | Performed by: STUDENT IN AN ORGANIZED HEALTH CARE EDUCATION/TRAINING PROGRAM

## 2020-05-12 PROCEDURE — 2580000003 HC RX 258: Performed by: STUDENT IN AN ORGANIZED HEALTH CARE EDUCATION/TRAINING PROGRAM

## 2020-05-12 PROCEDURE — 87102 FUNGUS ISOLATION CULTURE: CPT

## 2020-05-12 PROCEDURE — 87206 SMEAR FLUORESCENT/ACID STAI: CPT

## 2020-05-12 PROCEDURE — 2709999900 HC NON-CHARGEABLE SUPPLY: Performed by: SURGERY

## 2020-05-12 PROCEDURE — 3700000000 HC ANESTHESIA ATTENDED CARE: Performed by: SURGERY

## 2020-05-12 PROCEDURE — 87070 CULTURE OTHR SPECIMN AEROBIC: CPT

## 2020-05-12 PROCEDURE — 80048 BASIC METABOLIC PNL TOTAL CA: CPT

## 2020-05-12 PROCEDURE — 87106 FUNGI IDENTIFICATION YEAST: CPT

## 2020-05-12 PROCEDURE — 94770 HC ETCO2 MONITOR DAILY: CPT

## 2020-05-12 PROCEDURE — 87186 SC STD MICRODIL/AGAR DIL: CPT

## 2020-05-12 PROCEDURE — 88300 SURGICAL PATH GROSS: CPT

## 2020-05-12 PROCEDURE — 3700000001 HC ADD 15 MINUTES (ANESTHESIA): Performed by: SURGERY

## 2020-05-12 PROCEDURE — 87015 SPECIMEN INFECT AGNT CONCNTJ: CPT

## 2020-05-12 PROCEDURE — 88307 TISSUE EXAM BY PATHOLOGIST: CPT

## 2020-05-12 PROCEDURE — 2700000000 HC OXYGEN THERAPY PER DAY

## 2020-05-12 PROCEDURE — 0WQF0ZZ REPAIR ABDOMINAL WALL, OPEN APPROACH: ICD-10-PCS | Performed by: STUDENT IN AN ORGANIZED HEALTH CARE EDUCATION/TRAINING PROGRAM

## 2020-05-12 PROCEDURE — 6360000002 HC RX W HCPCS: Performed by: NURSE ANESTHETIST, CERTIFIED REGISTERED

## 2020-05-12 PROCEDURE — 85027 COMPLETE CBC AUTOMATED: CPT

## 2020-05-12 PROCEDURE — 7100000001 HC PACU RECOVERY - ADDTL 15 MIN: Performed by: SURGERY

## 2020-05-12 PROCEDURE — 87077 CULTURE AEROBIC IDENTIFY: CPT

## 2020-05-12 PROCEDURE — 0DB80ZZ EXCISION OF SMALL INTESTINE, OPEN APPROACH: ICD-10-PCS | Performed by: STUDENT IN AN ORGANIZED HEALTH CARE EDUCATION/TRAINING PROGRAM

## 2020-05-12 PROCEDURE — 3600000013 HC SURGERY LEVEL 3 ADDTL 15MIN: Performed by: SURGERY

## 2020-05-12 RX ORDER — FENTANYL CITRATE 50 UG/ML
INJECTION, SOLUTION INTRAMUSCULAR; INTRAVENOUS PRN
Status: DISCONTINUED | OUTPATIENT
Start: 2020-05-12 | End: 2020-05-12 | Stop reason: SDUPTHER

## 2020-05-12 RX ORDER — DEXAMETHASONE SODIUM PHOSPHATE 4 MG/ML
INJECTION, SOLUTION INTRA-ARTICULAR; INTRALESIONAL; INTRAMUSCULAR; INTRAVENOUS; SOFT TISSUE PRN
Status: DISCONTINUED | OUTPATIENT
Start: 2020-05-12 | End: 2020-05-12 | Stop reason: SDUPTHER

## 2020-05-12 RX ORDER — PROPOFOL 10 MG/ML
INJECTION, EMULSION INTRAVENOUS PRN
Status: DISCONTINUED | OUTPATIENT
Start: 2020-05-12 | End: 2020-05-12 | Stop reason: SDUPTHER

## 2020-05-12 RX ORDER — NALOXONE HYDROCHLORIDE 0.4 MG/ML
0.4 INJECTION, SOLUTION INTRAMUSCULAR; INTRAVENOUS; SUBCUTANEOUS PRN
Status: DISCONTINUED | OUTPATIENT
Start: 2020-05-12 | End: 2020-05-13

## 2020-05-12 RX ORDER — EPHEDRINE SULFATE/0.9% NACL/PF 50 MG/5 ML
SYRINGE (ML) INTRAVENOUS PRN
Status: DISCONTINUED | OUTPATIENT
Start: 2020-05-12 | End: 2020-05-12 | Stop reason: SDUPTHER

## 2020-05-12 RX ORDER — DIPHENHYDRAMINE HYDROCHLORIDE 50 MG/ML
12.5 INJECTION INTRAMUSCULAR; INTRAVENOUS
Status: DISCONTINUED | OUTPATIENT
Start: 2020-05-12 | End: 2020-05-12 | Stop reason: HOSPADM

## 2020-05-12 RX ORDER — ONDANSETRON 2 MG/ML
INJECTION INTRAMUSCULAR; INTRAVENOUS PRN
Status: DISCONTINUED | OUTPATIENT
Start: 2020-05-12 | End: 2020-05-12 | Stop reason: SDUPTHER

## 2020-05-12 RX ORDER — LIDOCAINE HYDROCHLORIDE 20 MG/ML
INJECTION, SOLUTION EPIDURAL; INFILTRATION; INTRACAUDAL; PERINEURAL PRN
Status: DISCONTINUED | OUTPATIENT
Start: 2020-05-12 | End: 2020-05-12 | Stop reason: SDUPTHER

## 2020-05-12 RX ORDER — NEOSTIGMINE METHYLSULFATE 1 MG/ML
INJECTION, SOLUTION INTRAVENOUS PRN
Status: DISCONTINUED | OUTPATIENT
Start: 2020-05-12 | End: 2020-05-12 | Stop reason: SDUPTHER

## 2020-05-12 RX ORDER — ROCURONIUM BROMIDE 10 MG/ML
INJECTION, SOLUTION INTRAVENOUS PRN
Status: DISCONTINUED | OUTPATIENT
Start: 2020-05-12 | End: 2020-05-12 | Stop reason: SDUPTHER

## 2020-05-12 RX ORDER — MEPERIDINE HYDROCHLORIDE 25 MG/ML
12.5 INJECTION INTRAMUSCULAR; INTRAVENOUS; SUBCUTANEOUS EVERY 5 MIN PRN
Status: DISCONTINUED | OUTPATIENT
Start: 2020-05-12 | End: 2020-05-12 | Stop reason: HOSPADM

## 2020-05-12 RX ORDER — LABETALOL HYDROCHLORIDE 5 MG/ML
INJECTION, SOLUTION INTRAVENOUS PRN
Status: DISCONTINUED | OUTPATIENT
Start: 2020-05-12 | End: 2020-05-12 | Stop reason: SDUPTHER

## 2020-05-12 RX ORDER — GLYCOPYRROLATE 1 MG/5 ML
SYRINGE (ML) INTRAVENOUS PRN
Status: DISCONTINUED | OUTPATIENT
Start: 2020-05-12 | End: 2020-05-12 | Stop reason: SDUPTHER

## 2020-05-12 RX ADMIN — Medication 10 MG: at 17:09

## 2020-05-12 RX ADMIN — ROCURONIUM BROMIDE 10 MG: 10 INJECTION, SOLUTION INTRAVENOUS at 17:12

## 2020-05-12 RX ADMIN — Medication 0.6 MG: at 18:50

## 2020-05-12 RX ADMIN — ROCURONIUM BROMIDE 10 MG: 10 INJECTION, SOLUTION INTRAVENOUS at 17:31

## 2020-05-12 RX ADMIN — FENTANYL CITRATE 100 MCG: 50 INJECTION, SOLUTION INTRAMUSCULAR; INTRAVENOUS at 16:39

## 2020-05-12 RX ADMIN — LIDOCAINE HYDROCHLORIDE 100 MG: 20 INJECTION, SOLUTION EPIDURAL; INFILTRATION; INTRACAUDAL; PERINEURAL at 16:39

## 2020-05-12 RX ADMIN — Medication 0.2 MG: at 17:05

## 2020-05-12 RX ADMIN — PROPRANOLOL HYDROCHLORIDE 20 MG: 20 TABLET ORAL at 08:00

## 2020-05-12 RX ADMIN — ONDANSETRON HYDROCHLORIDE 4 MG: 2 INJECTION, SOLUTION INTRAMUSCULAR; INTRAVENOUS at 18:46

## 2020-05-12 RX ADMIN — ROCURONIUM BROMIDE 30 MG: 10 INJECTION, SOLUTION INTRAVENOUS at 16:40

## 2020-05-12 RX ADMIN — FENTANYL CITRATE 25 MCG: 50 INJECTION, SOLUTION INTRAMUSCULAR; INTRAVENOUS at 19:10

## 2020-05-12 RX ADMIN — SODIUM CHLORIDE, POTASSIUM CHLORIDE, SODIUM LACTATE AND CALCIUM CHLORIDE: 600; 310; 30; 20 INJECTION, SOLUTION INTRAVENOUS at 16:31

## 2020-05-12 RX ADMIN — Medication 3 MG: at 18:50

## 2020-05-12 RX ADMIN — VANCOMYCIN HYDROCHLORIDE 1000 MG: 1 INJECTION, POWDER, LYOPHILIZED, FOR SOLUTION INTRAVENOUS at 12:33

## 2020-05-12 RX ADMIN — LABETALOL HYDROCHLORIDE 5 MG: 5 INJECTION INTRAVENOUS at 17:00

## 2020-05-12 RX ADMIN — SODIUM CHLORIDE, POTASSIUM CHLORIDE, SODIUM LACTATE AND CALCIUM CHLORIDE: 600; 310; 30; 20 INJECTION, SOLUTION INTRAVENOUS at 11:26

## 2020-05-12 RX ADMIN — SODIUM CHLORIDE, PRESERVATIVE FREE 10 ML: 5 INJECTION INTRAVENOUS at 12:33

## 2020-05-12 RX ADMIN — SODIUM CHLORIDE, POTASSIUM CHLORIDE, SODIUM LACTATE AND CALCIUM CHLORIDE: 600; 310; 30; 20 INJECTION, SOLUTION INTRAVENOUS at 18:30

## 2020-05-12 RX ADMIN — SODIUM CHLORIDE: 9 INJECTION, SOLUTION INTRAVENOUS at 10:45

## 2020-05-12 RX ADMIN — FENTANYL CITRATE 25 MCG: 50 INJECTION, SOLUTION INTRAMUSCULAR; INTRAVENOUS at 19:06

## 2020-05-12 RX ADMIN — FENTANYL CITRATE 25 MCG: 50 INJECTION, SOLUTION INTRAMUSCULAR; INTRAVENOUS at 18:53

## 2020-05-12 RX ADMIN — FENTANYL CITRATE 25 MCG: 50 INJECTION, SOLUTION INTRAMUSCULAR; INTRAVENOUS at 18:57

## 2020-05-12 RX ADMIN — HYDROMORPHONE HYDROCHLORIDE 0.25 MG: 1 INJECTION, SOLUTION INTRAMUSCULAR; INTRAVENOUS; SUBCUTANEOUS at 12:36

## 2020-05-12 RX ADMIN — Medication 6 MG: at 19:43

## 2020-05-12 RX ADMIN — TRAZODONE HYDROCHLORIDE 25 MG: 50 TABLET ORAL at 00:32

## 2020-05-12 RX ADMIN — PROPOFOL 100 MG: 10 INJECTION, EMULSION INTRAVENOUS at 16:39

## 2020-05-12 RX ADMIN — CEFEPIME 2 G: 2 INJECTION, POWDER, FOR SOLUTION INTRAVENOUS at 05:38

## 2020-05-12 RX ADMIN — Medication 10 MG: at 17:06

## 2020-05-12 RX ADMIN — DEXAMETHASONE SODIUM PHOSPHATE 10 MG: 4 INJECTION, SOLUTION INTRAMUSCULAR; INTRAVENOUS at 16:49

## 2020-05-12 RX ADMIN — DILTIAZEM HYDROCHLORIDE 180 MG: 180 CAPSULE, COATED, EXTENDED RELEASE ORAL at 08:01

## 2020-05-12 RX ADMIN — CEFEPIME 2 G: 2 INJECTION, POWDER, FOR SOLUTION INTRAVENOUS at 16:46

## 2020-05-12 ASSESSMENT — PULMONARY FUNCTION TESTS
PIF_VALUE: 25
PIF_VALUE: 27
PIF_VALUE: 26
PIF_VALUE: 29
PIF_VALUE: 24
PIF_VALUE: 23
PIF_VALUE: 29
PIF_VALUE: 26
PIF_VALUE: 25
PIF_VALUE: 29
PIF_VALUE: 26
PIF_VALUE: 24
PIF_VALUE: 0
PIF_VALUE: 0
PIF_VALUE: 24
PIF_VALUE: 24
PIF_VALUE: 25
PIF_VALUE: 1
PIF_VALUE: 26
PIF_VALUE: 26
PIF_VALUE: 24
PIF_VALUE: 26
PIF_VALUE: 25
PIF_VALUE: 25
PIF_VALUE: 26
PIF_VALUE: 26
PIF_VALUE: 25
PIF_VALUE: 25
PIF_VALUE: 24
PIF_VALUE: 1
PIF_VALUE: 2
PIF_VALUE: 2
PIF_VALUE: 26
PIF_VALUE: 25
PIF_VALUE: 1
PIF_VALUE: 24
PIF_VALUE: 26
PIF_VALUE: 2
PIF_VALUE: 25
PIF_VALUE: 25
PIF_VALUE: 26
PIF_VALUE: 0
PIF_VALUE: 1
PIF_VALUE: 23
PIF_VALUE: 26
PIF_VALUE: 27
PIF_VALUE: 26
PIF_VALUE: 2
PIF_VALUE: 25
PIF_VALUE: 27
PIF_VALUE: 25
PIF_VALUE: 24
PIF_VALUE: 24
PIF_VALUE: 26
PIF_VALUE: 24
PIF_VALUE: 24
PIF_VALUE: 25
PIF_VALUE: 0
PIF_VALUE: 24
PIF_VALUE: 27
PIF_VALUE: 0
PIF_VALUE: 26
PIF_VALUE: 24
PIF_VALUE: 32
PIF_VALUE: 25
PIF_VALUE: 24
PIF_VALUE: 1
PIF_VALUE: 24
PIF_VALUE: 24
PIF_VALUE: 2
PIF_VALUE: 21
PIF_VALUE: 24
PIF_VALUE: 25
PIF_VALUE: 26
PIF_VALUE: 26
PIF_VALUE: 23
PIF_VALUE: 29
PIF_VALUE: 31
PIF_VALUE: 24
PIF_VALUE: 29
PIF_VALUE: 28
PIF_VALUE: 23
PIF_VALUE: 24
PIF_VALUE: 27
PIF_VALUE: 26
PIF_VALUE: 1
PIF_VALUE: 1
PIF_VALUE: 26
PIF_VALUE: 24
PIF_VALUE: 27
PIF_VALUE: 28
PIF_VALUE: 2
PIF_VALUE: 25
PIF_VALUE: 26
PIF_VALUE: 24
PIF_VALUE: 26
PIF_VALUE: 25
PIF_VALUE: 24
PIF_VALUE: 27
PIF_VALUE: 24
PIF_VALUE: 29
PIF_VALUE: 23
PIF_VALUE: 30
PIF_VALUE: 24
PIF_VALUE: 25
PIF_VALUE: 26
PIF_VALUE: 27
PIF_VALUE: 25
PIF_VALUE: 24
PIF_VALUE: 27
PIF_VALUE: 27
PIF_VALUE: 25
PIF_VALUE: 24
PIF_VALUE: 21
PIF_VALUE: 1
PIF_VALUE: 24
PIF_VALUE: 24
PIF_VALUE: 23
PIF_VALUE: 25
PIF_VALUE: 23
PIF_VALUE: 25
PIF_VALUE: 4
PIF_VALUE: 2
PIF_VALUE: 25
PIF_VALUE: 24
PIF_VALUE: 4
PIF_VALUE: 26
PIF_VALUE: 0
PIF_VALUE: 24
PIF_VALUE: 23
PIF_VALUE: 26
PIF_VALUE: 25
PIF_VALUE: 29
PIF_VALUE: 23
PIF_VALUE: 24
PIF_VALUE: 24
PIF_VALUE: 25
PIF_VALUE: 25
PIF_VALUE: 2
PIF_VALUE: 26
PIF_VALUE: 25
PIF_VALUE: 2
PIF_VALUE: 24
PIF_VALUE: 2
PIF_VALUE: 28

## 2020-05-12 ASSESSMENT — PAIN DESCRIPTION - PROGRESSION: CLINICAL_PROGRESSION: NOT CHANGED

## 2020-05-12 ASSESSMENT — PAIN SCALES - WONG BAKER
WONGBAKER_NUMERICALRESPONSE: 8
WONGBAKER_NUMERICALRESPONSE: 6
WONGBAKER_NUMERICALRESPONSE: 8

## 2020-05-12 ASSESSMENT — PAIN DESCRIPTION - FREQUENCY: FREQUENCY: INTERMITTENT

## 2020-05-12 ASSESSMENT — PAIN SCALES - GENERAL
PAINLEVEL_OUTOF10: 0
PAINLEVEL_OUTOF10: 4
PAINLEVEL_OUTOF10: 10
PAINLEVEL_OUTOF10: 0
PAINLEVEL_OUTOF10: 9
PAINLEVEL_OUTOF10: 0

## 2020-05-12 ASSESSMENT — PAIN - FUNCTIONAL ASSESSMENT: PAIN_FUNCTIONAL_ASSESSMENT: ACTIVITIES ARE NOT PREVENTED

## 2020-05-12 ASSESSMENT — PAIN DESCRIPTION - DESCRIPTORS: DESCRIPTORS: ACHING;DISCOMFORT

## 2020-05-12 ASSESSMENT — PAIN DESCRIPTION - ORIENTATION: ORIENTATION: MID;LOWER

## 2020-05-12 ASSESSMENT — PAIN DESCRIPTION - ONSET: ONSET: ON-GOING

## 2020-05-12 ASSESSMENT — PAIN DESCRIPTION - LOCATION: LOCATION: ABDOMEN

## 2020-05-12 ASSESSMENT — PAIN DESCRIPTION - PAIN TYPE: TYPE: ACUTE PAIN

## 2020-05-12 NOTE — OP NOTE
through the mesh in order to enter then abdominal cavity. There were adhesions stuck to the abdominal wall near the ileostomy. There was a large piece of coated mesh extending along the abdominal wall from each costal margin laterally, the xiphoid superior, and just past the ileostomy inferior. There was a loop of small bowel adhered to the mesh on the right lateral abdominal wall that was congruent with the ileostomy. 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 We contunued to lyse adhesions around the ileostomy and the mesh was excised in its entirety using combination of sharp dissection and cautery. The area near the ileostomy was so densely adhered that we decided to transect the small bowel proximal to where the mesh was involved with a ДМИТРИЙ 55 Blue load stapler. Mesentery was transected with the Ligasure device. At this time the small bowel was completely free and the mesh was removed in its entirety. All that was remaining was the small bowel associated with the stoma. We circumferentially incised the skin around the stoma and dissected down to the fascia with cautery freeing the remaining bowel from the abdominal wall. The abdomen was copiously irrigated. Bleeding points were cauterized. We then brought the stapled end of ileum back through the aperture on the the right. We approximated the fascial defect with #1 PDS suture around the bowel. The midline was closed with running 0-looped PDS sutures with 2 nylon retention sutures and skin was closed with staples. The new ileostomy was matured in a brooking fashion with 3-0 vicryl. Sterile dressings and an ostomy bag were applied. The patient tolerated the procedure well and was sent to PACU in stable condition. All sponge and instrument counts were correct x2. Dr. Bere Cho was present for the entire procedure.      Electronically signed by Tyson Yost MD on 5/12/20 at 7:09 PM EDT

## 2020-05-12 NOTE — PROGRESS NOTES
Pharmacy Consultation Note  (Antibiotic Dosing and Monitoring)    Initial consult date: 2020  Consulting physician: Dr. Shelia Amos  Drug(s): Vancomycin  Indication: Abdominal wall cellulitis    Ht Readings from Last 1 Encounters:   20 5' (1.524 m)     Wt Readings from Last 1 Encounters:   20 135 lb (61.2 kg)       Age/Gender IBW DW  Allergy Information   70 y.o. female 45.5 kg 55.5 kg  Fentanyl; Levofloxacin; Tramadol; and Vioxx [rofecoxib]               Date  WBC BUN/CR Drug/Dose Time   Given Level(s)   (Time) Comments    9.3 30/1.4 Vancomycin 1250 mg IV x 1 dose 204 8.1 26/1.3 Vancomycin 1 g IV Q24H 1312 Random: 10.4 mcg/mL (1055)     6.1 25/1.3 Vancomycin 1 g IV Q24H 1353     5/10 5 24/1 Vancomycin 1 g IV Q24H 1330      4.3 19/1 Vancomycin 1 g IV Q24H 1136 Trough: 16.7 mcg/mL (1120)     5.3 15/0.9 Vancomycin 1 g IV Q24H 1233                         Estimated Creatinine Clearance: 43 mL/min (based on SCr of 0.9 mg/dL).       Intake/Output Summary (Last 24 hours) at 2020 1329  Last data filed at 2020 1131  Gross per 24 hour   Intake 3888 ml   Output 1700 ml   Net 2188 ml   Urine output: 0.5 mL/kg/hr    Temp max: Temp (24hrs), Av.2 °F (36.8 °C), Min:97.7 °F (36.5 °C), Max:98.6 °F (37 °C)      Cultures:  available culture and sensitivity results were reviewed in [facility]   blood cx - no growth to date   wound cx - E. coli  5/10 MRSA nares screen     Assessment:  · Patient is a 70year old female currently ordered vancomycin and cefepime for abdominal wall cellulitis  · Consulted by Dr. Kenan Mcleod to dose/monitor vancomycin  · For OR today for explantation of retained mesh  · Goal trough level: 15-20 mcg/mL; goal AUC/JAMAAL: 400-600  · Trough level from 1120 on  was 16.7 mcg/mL; AUC/JAMAAL within goal range    Plan:  · Will continue vancomycin 1 g IV every 24 hours  · Will check trough level at steady state if vancomycin continues  · Pharmacist will follow and monitor/adjust dosing as necessary    Bev Khalil PharmD, BCCCP  5/12/2020  1:29 PM

## 2020-05-13 LAB — GRAM STAIN ORDERABLE: NORMAL

## 2020-05-13 PROCEDURE — 2700000000 HC OXYGEN THERAPY PER DAY

## 2020-05-13 PROCEDURE — 6360000002 HC RX W HCPCS: Performed by: STUDENT IN AN ORGANIZED HEALTH CARE EDUCATION/TRAINING PROGRAM

## 2020-05-13 PROCEDURE — 94770 HC ETCO2 MONITOR DAILY: CPT

## 2020-05-13 PROCEDURE — 2580000003 HC RX 258: Performed by: STUDENT IN AN ORGANIZED HEALTH CARE EDUCATION/TRAINING PROGRAM

## 2020-05-13 PROCEDURE — 6370000000 HC RX 637 (ALT 250 FOR IP): Performed by: STUDENT IN AN ORGANIZED HEALTH CARE EDUCATION/TRAINING PROGRAM

## 2020-05-13 PROCEDURE — 1200000000 HC SEMI PRIVATE

## 2020-05-13 RX ORDER — OXYCODONE HYDROCHLORIDE 5 MG/1
10 TABLET ORAL EVERY 4 HOURS PRN
Status: DISCONTINUED | OUTPATIENT
Start: 2020-05-13 | End: 2020-05-15 | Stop reason: HOSPADM

## 2020-05-13 RX ORDER — OXYCODONE HYDROCHLORIDE 5 MG/1
5 TABLET ORAL EVERY 4 HOURS PRN
Status: DISCONTINUED | OUTPATIENT
Start: 2020-05-13 | End: 2020-05-15 | Stop reason: HOSPADM

## 2020-05-13 RX ADMIN — SODIUM CHLORIDE, POTASSIUM CHLORIDE, SODIUM LACTATE AND CALCIUM CHLORIDE: 600; 310; 30; 20 INJECTION, SOLUTION INTRAVENOUS at 16:54

## 2020-05-13 RX ADMIN — PANTOPRAZOLE SODIUM 40 MG: 40 TABLET, DELAYED RELEASE ORAL at 05:33

## 2020-05-13 RX ADMIN — SODIUM CHLORIDE: 9 INJECTION, SOLUTION INTRAVENOUS at 22:00

## 2020-05-13 RX ADMIN — SODIUM CHLORIDE, PRESERVATIVE FREE 10 ML: 5 INJECTION INTRAVENOUS at 13:12

## 2020-05-13 RX ADMIN — HEPARIN SODIUM 5000 UNITS: 10000 INJECTION, SOLUTION INTRAVENOUS; SUBCUTANEOUS at 05:34

## 2020-05-13 RX ADMIN — PRAVASTATIN SODIUM 40 MG: 20 TABLET ORAL at 22:05

## 2020-05-13 RX ADMIN — CEFEPIME 2 G: 2 INJECTION, POWDER, FOR SOLUTION INTRAVENOUS at 17:20

## 2020-05-13 RX ADMIN — DICYCLOMINE HYDROCHLORIDE 10 MG: 10 CAPSULE ORAL at 22:05

## 2020-05-13 RX ADMIN — PROPRANOLOL HYDROCHLORIDE 20 MG: 20 TABLET ORAL at 09:41

## 2020-05-13 RX ADMIN — SODIUM CHLORIDE: 9 INJECTION, SOLUTION INTRAVENOUS at 09:34

## 2020-05-13 RX ADMIN — PRIMIDONE 250 MG: 250 TABLET ORAL at 22:05

## 2020-05-13 RX ADMIN — DILTIAZEM HYDROCHLORIDE 180 MG: 180 CAPSULE, COATED, EXTENDED RELEASE ORAL at 09:40

## 2020-05-13 RX ADMIN — CHOLECALCIFEROL TAB 50 MCG (2000 UNIT) 2000 UNITS: 50 TAB at 22:05

## 2020-05-13 RX ADMIN — ONDANSETRON 4 MG: 2 INJECTION INTRAMUSCULAR; INTRAVENOUS at 15:04

## 2020-05-13 RX ADMIN — TRAZODONE HYDROCHLORIDE 25 MG: 50 TABLET ORAL at 22:05

## 2020-05-13 RX ADMIN — HEPARIN SODIUM 5000 UNITS: 10000 INJECTION, SOLUTION INTRAVENOUS; SUBCUTANEOUS at 22:08

## 2020-05-13 RX ADMIN — HYDROMORPHONE HYDROCHLORIDE 0.5 MG: 1 INJECTION, SOLUTION INTRAMUSCULAR; INTRAVENOUS; SUBCUTANEOUS at 15:04

## 2020-05-13 RX ADMIN — HEPARIN SODIUM 5000 UNITS: 10000 INJECTION, SOLUTION INTRAVENOUS; SUBCUTANEOUS at 14:50

## 2020-05-13 RX ADMIN — VANCOMYCIN HYDROCHLORIDE 1000 MG: 1 INJECTION, POWDER, LYOPHILIZED, FOR SOLUTION INTRAVENOUS at 13:09

## 2020-05-13 RX ADMIN — HYDROMORPHONE HYDROCHLORIDE 0.5 MG: 1 INJECTION, SOLUTION INTRAMUSCULAR; INTRAVENOUS; SUBCUTANEOUS at 18:47

## 2020-05-13 RX ADMIN — ESCITALOPRAM OXALATE 20 MG: 10 TABLET ORAL at 22:05

## 2020-05-13 RX ADMIN — Medication: at 06:55

## 2020-05-13 RX ADMIN — OXYCODONE HYDROCHLORIDE 10 MG: 5 TABLET ORAL at 16:58

## 2020-05-13 RX ADMIN — OXYCODONE HYDROCHLORIDE 5 MG: 5 TABLET ORAL at 22:06

## 2020-05-13 RX ADMIN — CEFEPIME 2 G: 2 INJECTION, POWDER, FOR SOLUTION INTRAVENOUS at 05:33

## 2020-05-13 RX ADMIN — SODIUM CHLORIDE, POTASSIUM CHLORIDE, SODIUM LACTATE AND CALCIUM CHLORIDE: 600; 310; 30; 20 INJECTION, SOLUTION INTRAVENOUS at 08:28

## 2020-05-13 ASSESSMENT — PAIN DESCRIPTION - PROGRESSION
CLINICAL_PROGRESSION: NOT CHANGED
CLINICAL_PROGRESSION: NOT CHANGED

## 2020-05-13 ASSESSMENT — PAIN DESCRIPTION - PAIN TYPE
TYPE: SURGICAL PAIN

## 2020-05-13 ASSESSMENT — PAIN DESCRIPTION - LOCATION
LOCATION: ABDOMEN

## 2020-05-13 ASSESSMENT — PAIN SCALES - GENERAL
PAINLEVEL_OUTOF10: 0
PAINLEVEL_OUTOF10: 4
PAINLEVEL_OUTOF10: 7
PAINLEVEL_OUTOF10: 7
PAINLEVEL_OUTOF10: 9
PAINLEVEL_OUTOF10: 4
PAINLEVEL_OUTOF10: 10
PAINLEVEL_OUTOF10: 9

## 2020-05-13 ASSESSMENT — PAIN - FUNCTIONAL ASSESSMENT
PAIN_FUNCTIONAL_ASSESSMENT: PREVENTS OR INTERFERES SOME ACTIVE ACTIVITIES AND ADLS

## 2020-05-13 ASSESSMENT — PAIN DESCRIPTION - DESCRIPTORS
DESCRIPTORS: CONSTANT;SORE;SHARP

## 2020-05-13 ASSESSMENT — PAIN DESCRIPTION - ONSET
ONSET: ON-GOING

## 2020-05-13 ASSESSMENT — PAIN DESCRIPTION - FREQUENCY
FREQUENCY: CONTINUOUS

## 2020-05-13 NOTE — PROGRESS NOTES
GENERAL SURGERY  DAILY PROGRESS NOTE  5/13/2020    Subjective:  slight confusion this morning, went to OR yesterday and had mesh explanted, pressed PCA 93 times since post op     Objective:  /72   Pulse 74   Temp 97.7 °F (36.5 °C) (Oral)   Resp 24   Ht 5' (1.524 m)   Wt 136 lb 1.6 oz (61.7 kg)   SpO2 95%   BMI 26.58 kg/m²     GENERAL:  Laying in bed, confusion, NAD  HEAD: Normocephalic, atraumatic  EYES: No sclera icterus, pupils equal  LUNGS:  No increased work of breathing  CARDIOVASCULAR:  RR  ABDOMEN:  Soft, midline dressing with minimal strikethrough, ileostomy pink and viable without air in bag  EXTREMITIES: No edema or swelling  SKIN: Warm and dry    Assessment/Plan:  70 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 The patient has not had any significant function at the ileostomy. The patient will have some clear liquids. The PCA has been decreased.

## 2020-05-13 NOTE — PROGRESS NOTES
Pharmacy Consultation Note  (Antibiotic Dosing and Monitoring)    Initial consult date: 2020  Consulting physician: Dr. Nas Lackey  Drug(s): Vancomycin  Indication: Abdominal wall cellulitis    Ht Readings from Last 1 Encounters:   20 5' (1.524 m)     Wt Readings from Last 1 Encounters:   20 136 lb 1.6 oz (61.7 kg)       Age/Gender IBW DW  Allergy Information   70 y.o. female 45.5 kg 55.5 kg  Fentanyl; Levofloxacin; Tramadol; and Vioxx [rofecoxib]               Date  WBC BUN/CR Drug/Dose Time   Given Level(s)   (Time) Comments    9.3 30/1.4 Vancomycin 1250 mg IV x 1 dose 2040      8.1 26/1.3 Vancomycin 1 g IV Q24H 1312 Random: 10.4 mcg/mL (1055)     6.1 25/1.3 Vancomycin 1 g IV Q24H 1353     5/10 5 24/1 Vancomycin 1 g IV Q24H 1330      4.3 19/1 Vancomycin 1 g IV Q24H 1136 Trough: 16.7 mcg/mL (1120)     5.3 15/0.9 Vancomycin 1 g IV Q24H 1233      X X Vancomycin 1 g IV Q24H                 Estimated Creatinine Clearance: 43 mL/min (based on SCr of 0.9 mg/dL).       Intake/Output Summary (Last 24 hours) at 2020 1124  Last data filed at 2020 1040  Gross per 24 hour   Intake 2902 ml   Output 1150 ml   Net 1752 ml   Urine output: 0.5 mL/kg/hr    Temp max: Temp (24hrs), Av.3 °F (36.3 °C), Min:96.8 °F (36 °C), Max:98.2 °F (36.8 °C)      Cultures:  available culture and sensitivity results were reviewed in [facility]   blood cx - negative   wound cx - E. coli  5/10 MRSA nares screen - negative   surgical cx - mixed Gram positive organisms    Assessment:  · Patient is a 70year old female currently ordered vancomycin and cefepime for abdominal wall cellulitis  · Consulted by Dr. Porsha Healy to dose/monitor vancomycin  · S/P OR on  for explantation of retained mesh, small bowel resection, and revision of end ileostomy  · Goal trough level: 15-20 mcg/mL; goal AUC/JAMAAL: 400-600  · Trough level from 1120 on  was 16.7 mcg/mL; AUC/JAMAAL within goal range    Plan:  · Will continue vancomycin 1 g IV every 24 hours  · Will monitor renal function and recheck trough level if needed  · Pharmacist will follow and monitor/adjust dosing as necessary    Bev Khalil PharmD, [facility]  5/13/2020  11:24 AM

## 2020-05-13 NOTE — PROGRESS NOTES
Per day shift nurse pt's  wanted called and notified when she returned from surgery. Attempted to call him and no answer. Message was left and awaiting a call back to give updates.

## 2020-05-14 LAB
ANAEROBIC CULTURE: NORMAL
ANION GAP SERPL CALCULATED.3IONS-SCNC: 6 MMOL/L (ref 7–16)
BUN BLDV-MCNC: 18 MG/DL (ref 8–23)
CALCIUM SERPL-MCNC: 8.4 MG/DL (ref 8.6–10.2)
CHLORIDE BLD-SCNC: 107 MMOL/L (ref 98–107)
CO2: 21 MMOL/L (ref 22–29)
CREAT SERPL-MCNC: 1 MG/DL (ref 0.5–1)
GFR AFRICAN AMERICAN: >60
GFR NON-AFRICAN AMERICAN: 54 ML/MIN/1.73
GLUCOSE BLD-MCNC: 122 MG/DL (ref 74–99)
HCT VFR BLD CALC: 28.5 % (ref 34–48)
HEMOGLOBIN: 9 G/DL (ref 11.5–15.5)
MCH RBC QN AUTO: 30.9 PG (ref 26–35)
MCHC RBC AUTO-ENTMCNC: 31.6 % (ref 32–34.5)
MCV RBC AUTO: 97.9 FL (ref 80–99.9)
PDW BLD-RTO: 14.6 FL (ref 11.5–15)
PLATELET # BLD: 228 E9/L (ref 130–450)
PMV BLD AUTO: 11.7 FL (ref 7–12)
POTASSIUM REFLEX MAGNESIUM: 4.4 MMOL/L (ref 3.5–5)
RBC # BLD: 2.91 E12/L (ref 3.5–5.5)
SODIUM BLD-SCNC: 134 MMOL/L (ref 132–146)
WBC # BLD: 12.4 E9/L (ref 4.5–11.5)

## 2020-05-14 PROCEDURE — 80048 BASIC METABOLIC PNL TOTAL CA: CPT

## 2020-05-14 PROCEDURE — 6360000002 HC RX W HCPCS: Performed by: STUDENT IN AN ORGANIZED HEALTH CARE EDUCATION/TRAINING PROGRAM

## 2020-05-14 PROCEDURE — 1200000000 HC SEMI PRIVATE

## 2020-05-14 PROCEDURE — 85027 COMPLETE CBC AUTOMATED: CPT

## 2020-05-14 PROCEDURE — 6370000000 HC RX 637 (ALT 250 FOR IP): Performed by: STUDENT IN AN ORGANIZED HEALTH CARE EDUCATION/TRAINING PROGRAM

## 2020-05-14 PROCEDURE — 36415 COLL VENOUS BLD VENIPUNCTURE: CPT

## 2020-05-14 PROCEDURE — 2580000003 HC RX 258: Performed by: STUDENT IN AN ORGANIZED HEALTH CARE EDUCATION/TRAINING PROGRAM

## 2020-05-14 PROCEDURE — 97161 PT EVAL LOW COMPLEX 20 MIN: CPT

## 2020-05-14 PROCEDURE — 2700000000 HC OXYGEN THERAPY PER DAY

## 2020-05-14 PROCEDURE — 97165 OT EVAL LOW COMPLEX 30 MIN: CPT | Performed by: OCCUPATIONAL THERAPIST

## 2020-05-14 RX ADMIN — PROPRANOLOL HYDROCHLORIDE 20 MG: 20 TABLET ORAL at 08:36

## 2020-05-14 RX ADMIN — HYDROMORPHONE HYDROCHLORIDE 0.25 MG: 1 INJECTION, SOLUTION INTRAMUSCULAR; INTRAVENOUS; SUBCUTANEOUS at 18:56

## 2020-05-14 RX ADMIN — HYDROMORPHONE HYDROCHLORIDE 0.5 MG: 1 INJECTION, SOLUTION INTRAMUSCULAR; INTRAVENOUS; SUBCUTANEOUS at 12:30

## 2020-05-14 RX ADMIN — Medication 10 ML: at 21:34

## 2020-05-14 RX ADMIN — SODIUM CHLORIDE, POTASSIUM CHLORIDE, SODIUM LACTATE AND CALCIUM CHLORIDE: 600; 310; 30; 20 INJECTION, SOLUTION INTRAVENOUS at 04:29

## 2020-05-14 RX ADMIN — HYDROMORPHONE HYDROCHLORIDE 0.5 MG: 1 INJECTION, SOLUTION INTRAMUSCULAR; INTRAVENOUS; SUBCUTANEOUS at 00:44

## 2020-05-14 RX ADMIN — TRAZODONE HYDROCHLORIDE 25 MG: 50 TABLET ORAL at 21:33

## 2020-05-14 RX ADMIN — CEFEPIME 2 G: 2 INJECTION, POWDER, FOR SOLUTION INTRAVENOUS at 17:52

## 2020-05-14 RX ADMIN — SODIUM CHLORIDE: 9 INJECTION, SOLUTION INTRAVENOUS at 09:47

## 2020-05-14 RX ADMIN — CYANOCOBALAMIN TAB 1000 MCG 500 MCG: 1000 TAB at 08:37

## 2020-05-14 RX ADMIN — CHOLECALCIFEROL TAB 50 MCG (2000 UNIT) 2000 UNITS: 50 TAB at 21:33

## 2020-05-14 RX ADMIN — VANCOMYCIN HYDROCHLORIDE 1000 MG: 1 INJECTION, POWDER, LYOPHILIZED, FOR SOLUTION INTRAVENOUS at 13:00

## 2020-05-14 RX ADMIN — PRIMIDONE 250 MG: 250 TABLET ORAL at 21:34

## 2020-05-14 RX ADMIN — DICYCLOMINE HYDROCHLORIDE 10 MG: 10 CAPSULE ORAL at 08:37

## 2020-05-14 RX ADMIN — HYDROMORPHONE HYDROCHLORIDE 0.25 MG: 1 INJECTION, SOLUTION INTRAMUSCULAR; INTRAVENOUS; SUBCUTANEOUS at 15:56

## 2020-05-14 RX ADMIN — HYDROMORPHONE HYDROCHLORIDE 0.25 MG: 1 INJECTION, SOLUTION INTRAMUSCULAR; INTRAVENOUS; SUBCUTANEOUS at 08:40

## 2020-05-14 RX ADMIN — OXYCODONE HYDROCHLORIDE 10 MG: 5 TABLET ORAL at 09:45

## 2020-05-14 RX ADMIN — OXYCODONE HYDROCHLORIDE 5 MG: 5 TABLET ORAL at 21:34

## 2020-05-14 RX ADMIN — PRAVASTATIN SODIUM 40 MG: 20 TABLET ORAL at 21:34

## 2020-05-14 RX ADMIN — DICYCLOMINE HYDROCHLORIDE 10 MG: 10 CAPSULE ORAL at 21:34

## 2020-05-14 RX ADMIN — ESCITALOPRAM OXALATE 20 MG: 10 TABLET ORAL at 21:33

## 2020-05-14 RX ADMIN — HEPARIN SODIUM 5000 UNITS: 10000 INJECTION, SOLUTION INTRAVENOUS; SUBCUTANEOUS at 06:41

## 2020-05-14 RX ADMIN — CHOLECALCIFEROL TAB 50 MCG (2000 UNIT) 2000 UNITS: 50 TAB at 08:37

## 2020-05-14 RX ADMIN — PANTOPRAZOLE SODIUM 40 MG: 40 TABLET, DELAYED RELEASE ORAL at 06:41

## 2020-05-14 RX ADMIN — HEPARIN SODIUM 5000 UNITS: 10000 INJECTION, SOLUTION INTRAVENOUS; SUBCUTANEOUS at 21:34

## 2020-05-14 RX ADMIN — CEFEPIME 2 G: 2 INJECTION, POWDER, FOR SOLUTION INTRAVENOUS at 06:41

## 2020-05-14 RX ADMIN — Medication 10 ML: at 08:41

## 2020-05-14 RX ADMIN — HEPARIN SODIUM 5000 UNITS: 10000 INJECTION, SOLUTION INTRAVENOUS; SUBCUTANEOUS at 13:59

## 2020-05-14 RX ADMIN — CALCIUM 500 MG: 500 TABLET ORAL at 08:36

## 2020-05-14 RX ADMIN — FERROUS SULFATE TAB 325 MG (65 MG ELEMENTAL FE) 325 MG: 325 (65 FE) TAB at 08:37

## 2020-05-14 RX ADMIN — DILTIAZEM HYDROCHLORIDE 180 MG: 180 CAPSULE, COATED, EXTENDED RELEASE ORAL at 08:36

## 2020-05-14 ASSESSMENT — PAIN - FUNCTIONAL ASSESSMENT
PAIN_FUNCTIONAL_ASSESSMENT: PREVENTS OR INTERFERES SOME ACTIVE ACTIVITIES AND ADLS

## 2020-05-14 ASSESSMENT — PAIN DESCRIPTION - DESCRIPTORS
DESCRIPTORS: CONSTANT;SHARP;SORE
DESCRIPTORS: CONSTANT;SHARP;SORE
DESCRIPTORS: CONSTANT;SORE;SHARP
DESCRIPTORS: CONSTANT;SORE;SHARP
DESCRIPTORS: CONSTANT;SHARP;SORE
DESCRIPTORS: CONSTANT;SORE;SHARP

## 2020-05-14 ASSESSMENT — PAIN SCALES - GENERAL
PAINLEVEL_OUTOF10: 7
PAINLEVEL_OUTOF10: 5
PAINLEVEL_OUTOF10: 3
PAINLEVEL_OUTOF10: 7
PAINLEVEL_OUTOF10: 0
PAINLEVEL_OUTOF10: 5
PAINLEVEL_OUTOF10: 5
PAINLEVEL_OUTOF10: 7
PAINLEVEL_OUTOF10: 7
PAINLEVEL_OUTOF10: 4
PAINLEVEL_OUTOF10: 3
PAINLEVEL_OUTOF10: 0
PAINLEVEL_OUTOF10: 3
PAINLEVEL_OUTOF10: 6

## 2020-05-14 ASSESSMENT — PAIN DESCRIPTION - ONSET
ONSET: ON-GOING

## 2020-05-14 ASSESSMENT — PAIN DESCRIPTION - PROGRESSION: CLINICAL_PROGRESSION: GRADUALLY IMPROVING

## 2020-05-14 ASSESSMENT — PAIN DESCRIPTION - LOCATION
LOCATION: ABDOMEN;BACK
LOCATION: ABDOMEN
LOCATION: ABDOMEN;BACK
LOCATION: ABDOMEN;BACK
LOCATION: ABDOMEN
LOCATION: ABDOMEN

## 2020-05-14 ASSESSMENT — PAIN DESCRIPTION - FREQUENCY
FREQUENCY: CONTINUOUS

## 2020-05-14 ASSESSMENT — PAIN DESCRIPTION - PAIN TYPE
TYPE: SURGICAL PAIN
TYPE: SURGICAL PAIN;CHRONIC PAIN
TYPE: SURGICAL PAIN;CHRONIC PAIN
TYPE: SURGICAL PAIN
TYPE: SURGICAL PAIN
TYPE: CHRONIC PAIN;SURGICAL PAIN

## 2020-05-14 NOTE — PROGRESS NOTES
GENERAL SURGERY  DAILY PROGRESS NOTE  5/14/2020    Subjective:  Patient complains of pain. Reports she was able to tolerate her diet without any difficulty. No n/v, fever or chills. No acute overnight events.      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

## 2020-05-14 NOTE — PROGRESS NOTES
Pharmacy Consultation Note  (Antibiotic Dosing and Monitoring)    Initial consult date: 2020  Consulting physician: Dr. Myra Finley  Drug(s): Vancomycin  Indication: Abdominal wall cellulitis    Ht Readings from Last 1 Encounters:   20 5' (1.524 m)     Wt Readings from Last 1 Encounters:   20 136 lb 1.6 oz (61.7 kg)       Age/Gender IBW DW  Allergy Information   70 y.o. female 45.5 kg 55.5 kg  Fentanyl; Levofloxacin; Tramadol; and Vioxx [rofecoxib]               Date  WBC BUN/CR Drug/Dose Time   Given Level(s)   (Time) Comments    9.3 30/1.4 Vancomycin 1250 mg IV x 1 dose  8.1 26/1.3 Vancomycin 1 g IV Q24H 1312 Random: 10.4 mcg/mL (1055)     6.1 25/1.3 Vancomycin 1 g IV Q24H 1353     5/10 5 24/1 Vancomycin 1 g IV Q24H 1330      4.3 19/1 Vancomycin 1 g IV Q24H 1136 Trough: 16.7 mcg/mL (1120)     5.3 15/0.9 Vancomycin 1 g IV Q24H 1233      X X Vancomycin 1 g IV Q24H 1309      12.4 18/1 Vancomycin 1 g IV Q24H        Estimated Creatinine Clearance: 39 mL/min (based on SCr of 1 mg/dL).       Intake/Output Summary (Last 24 hours) at 2020 1200  Last data filed at 2020 7608  Gross per 24 hour   Intake 2996 ml   Output 475 ml   Net 2521 ml   Urine output: 0.5 mL/kg/hr    Temp max: Temp (24hrs), Av.6 °F (37 °C), Min:98.1 °F (36.7 °C), Max:99 °F (37.2 °C)      Cultures:  available culture and sensitivity results were reviewed in [facility]   blood cx - negative   wound cx - E. coli  5/10 MRSA nares screen - negative   surgical cx - Enterococcus species, Alpha Streptococcus (not pneumococcus)    Assessment:  · Patient is a 70year old female currently ordered vancomycin and cefepime for abdominal wall cellulitis  · Consulted by Dr. Estephanie Ramos to dose/monitor vancomycin  · S/P OR on  for explantation of retained mesh, small bowel resection, and revision of end ileostomy  · Goal trough level: 15-20 mcg/mL; goal AUC/JAMAAL: 400-600  · Trough level from 1120 on

## 2020-05-14 NOTE — PROGRESS NOTES
LUE 4-/5  WFL good  and wfl FMC/dexterity noted during ADL tasks       Hearing: WFL   Sensation:  No c/o numbness or tingling   Tone: WFL   Edema: none noted at time of evaluation    Comments: Upon arrival patient supine in bed. Patient seated in chair At end of session, patient with call light and phone within reach, all lines and tubes intact. Overall patient demonstrated decreased independence and safety during completion of ADL/functional transfer/mobility tasks. Pt would benefit from continued skilled OT to increase safety and independence with completion of ADL/IADL tasks for functional independence and quality of life. Eval Complexity: Low Complexity    Assessment of current deficits   Functional mobility [x]  ADLs [x] Strength [x]  Cognition []  Functional transfers  [x] IADLs [x] Safety Awareness [x]  Endurance [x]  Fine Motor Coordination [] Balance [x] Vision/perception [] Sensation []   Gross Motor Coordination [] ROM [] Delirium []                  Motor Control []    Plan of Care:  OT 1-3x/week PRN   ADL retraining [x]   Equipment needs [x]   Neuromuscular re-education [x] Energy Conservation Techniques [x]  Functional Transfer training [x] Patient and/or Family Education [x]  Functional Mobility training [x]  Environmental Modifications [x]  Cognitive re-training []   Compensatory techniques for ADLs [x]  Splinting Needs []   Positioning to improve overall function [x]   Therapeutic Activity [x]  Therapeutic Exercise  [x]  Visual/Perceptual: []    Delirium prevention/treatment  []   Other:  []    Rehab Potential: Good for established goals     Patient / Family Goal: to return home      Patient and/or family were instructed on functional diagnosis, prognosis/goals and OT plan of care. Demonstrated good understanding.      Evaluation (+)    Treatment Time In:1015            Treatment Time Out: 1035                 Evaluation time includes thorough review of current medical information, gathering information on past medical history/social history and prior level of function, completion of standardized testing/informal observation of tasks, assessment of data, and development of POC/Goals      [employer], OTR/L, CLT-ARINA

## 2020-05-14 NOTE — DISCHARGE INSTR - DIET
 Good nutrition is important when healing from an illness, injury, or surgery. Follow any nutrition recommendations given to you during your hospital stay.  If you were given an oral nutrition supplement while in the hospital, continue to take this supplement at home. You can take it with meals, in-between meals, and/or before bedtime. These supplements can be purchased at most local grocery stores, pharmacies, and chain super-stores.  If you have any questions about your diet or nutrition, call the hospital and ask for the dietitian. Ileostomy Nutrition Therapy    During ileostomy surgery, the entire colon, rectum, and anus are removed or bypassed. The part of the small intestine called the ileum is brought through the abdominal wall, creating a stoma. The stoma is an opening in the abdomen that must be covered with a bag to collect stools at all times. It can be temporary or permanent depending on the surgery. After surgery, your bowel will be swollen. Your eating plan will begin with a diet of clear liquids. As you recover, you will start eating solid foods, beginning with foods that are low in fiber (see Recommended Foods). You should avoid high-fiber foods because they are harder to digest. Avoiding high-fiber foods will allow the bowel to heal and prevent blockage of the ileostomy. You should have less than 8 grams of fiber per day when you move from liquids to solid foods and then transition to less than 13 grams of fiber for the whole day in the next few days as your symptoms decrease. Most patients begin to eat more normally 6 weeks after surgery. The changes to your diet recommended on this handout can help reduce symptoms such as diarrhea, odor, and gas; help you avoid a blockage; and help your body get more nutrients from your food as you heal from surgery. Tips  Your appetite may not be as good as before surgery, so eat small amounts every 2 to 4 hours.  Keeping a regular schedule for 30 minutes after meals or snacks to avoid flushing foods through quickly, which prevents good absorption. The average output for an ileostomy patient ranges between 500 and 1,300 milliliters in a day. Right after surgery and during gastroenteritis (an infection or irritation of the stomach and intestines), the output can be 1,800 milliliters daily or higher. During times of higher output or heavy sweating, you need to drink more fluids. You may need to actually measure how much you are drinking and your output from your ostomy when it is high:   1 ounce is 30 milliliters   1 cup is 8 ounces   8 cups is 64 ounces or 2 quarts or 2 liters  Watch for signs and symptoms of fluid-electrolyte imbalance. If symptoms occur, seek treatment right away. Symptoms include:   Dry mouth   Reduced urine output (not as much urine or urinating less often than normal)   Dark-colored urine   Feeling dizzy when you stand up   Noticeable fatigue (feeling extremely tired)   Abdominal cramping  If you have a high output ostomy, you may need to use an oral rehydration solution (ORS) to replace fluid loss. The World Health Organization has a solution in powder form you can buy (called Oral Rehydration Salts). Some sports drinks can increase stoma output, so pediatric electrolyte solutions, such as Pedialyte, are recommended instead. A less expensive option is to make the oral rehydration solution yourself using one of the following recipes:   2 cups Gatorade + 2 cups water + ½ teaspoon salt   3 cups water + 1 cup orange juice + ¾ teaspoon salt + ½ teaspoon baking soda   ½ cup grape juice or cranberry juice + 3½ cups water + ½ teaspoon salt   1 cup apple juice + 3 cups water + ½ teaspoon salt   4¼ cups (1 liter) water + ½ teaspoon table salt + 6 level teaspoons sugar (World Health Organizations ORS recipe)  Your registered dietitian nutritionist or doctor may suggest increasing foods that are higher in sodium and potassium.  Remember to choose lower fiber foods that are high in potassium. Some examples of high-potassium foods include soy milk, yogurt, cottage cheese, potatoes without skin, liquid supplements (such as Ensure Muscle Health) orange juice or tomato juice (if these do not cause reflux or other symptoms), smooth peanut butter, and baked or broiled salmon or turkey. Salt substitute that contains potassium chloride can also be used, but be careful not to overuse it. Higher-sodium foods added to the diet should also be lower fiber and not fried. Recommended Foods  If particular foods make you feel unwell, stop eating them and try again in 2 to 3 weeks. Everyones tolerance to foods is different. Finding foods that are best for you may require some trial and error. Dairy Products  Notes  If you feel unwell after drinking milk or eating dairy foods, try lactose-free products. Foods marked with an asterisk (*) on this list have lactose. Aged cheeses (such as cheddar and Swiss) are lower in lactose. Check labels for calcium content of almond milk and rice milk to make sure they have 30% calcium. These beverages are not high in protein, so include other high-protein foods at meals and snacks to support healing. Soy milk may cause gas and bloating for some people. It is best to avoid soy milk if it causes symptoms. Foods Recommended  Fat-free (skim) or low-fat (1%) milk*   Soy milk, rice milk, or almond milk   Lactose-free milk   Yogurt*   Powdered milk   Cheese*   Buttermilk   Low-fat ice cream* or sherbet  Protein Foods (Meat, Poultry, Fish, Nuts)  Notes  When trying nuts, fish, and eggs, start with small amounts. These foods may cause odors. Use a moist heating method for meats and poultry:   Use water or broth to cook the meat or poultry at a lower temperature. Cover the dish when cooking in the oven, so the food cooks in its own juices.    Marinate meat first with an acidic ingredient, such as vinegar, lemon juice, or wine, and Grapes   Apples Cheese, some types Green pepper   Asparagus Corn Melons   Bananas Cucumber Onions   Beer Dairy products Peanuts   Broccoli Dried beans and peas Prunes   [place] sprouts Eggs Radishes   Cabbage Fatty foods Turnips   Carbonated beverages Fish    Foods That May Help Relieve Gas and Odor  Buttermilk Yogurt with active cultures   Cranberry juice Parsley   Diarrhea  Initially after surgery, the stool will be liquid and watery because of where the ileostomy is located in the intestine. The stool will gradually become thicker over the next few weeks (more like a consistency of pudding or oatmeal). There are some foods that can help make the stool thicker or thinner, but if changes to your diet do not improve stools, then your doctor may be able to recommend some medications to help.   Foods That May Cause Diarrhea (looser or more frequent stool)  Alcohol (including beer) Soup Caffeinated drinks (especially hot)   Apricots (and stone fruits) Spicy foods Fruit: fresh, canned, or dried   Beans, baked or legumes Gum, sugar free Fruit juice: apple, grape, orange   Bran High-fat foods Sugar-free substitutes   Broccoli High-sugar foods Glucose-free foods containing mannitol or sorbitol   [place] sprouts Licorice Tomatoes   Cabbage Milk and dairy foods Turnip greens/green leafy vegetables, raw   Chocolate Nuts or seeds Wheat/whole grains   Corn Peaches (stone fruit) Wine   Fried meats, fish poultry Peas    Plums (stone fruit) Prune juice or prunes    Foods That May Help Thicken Stool  Applesauce Saltines Oatmeal (when OK to have fiber)   Bananas Tapioca Pasta (sauces may increase symptoms)   White rice, boiled Peanut butter, creamy White bread (not high fiber)   Cheese Potatoes, no skin Barley (when OK to have fiber)   Marshmallows Pretzels Yogurt   Foods That May Discolor Stool  Turn stool red Darken stool   Beets Asparagus   Foods with red dye Broccoli    Spinach     Ileostomy Sample HYLT [employer] Nutrient Info (1 gram fiber)   Morning Snack ½ cup cranberry-grape juice diluted with ½ cup water   1½ cups herbal tea   Lunch 1/3 cup meatless chicken (2 grams fiber)   ½ cup mashed potatoes (1 gram fiber)   ½ cup green beans, well cooked (2 grams fiber)   ½ cup peaches, canned in juice (2 grams fiber)   2 teaspoons olive oil   1 cup 1% milk   Afternoon Snack 8 ounces yogurt without fruit or nuts   1 small ripe banana (1 gram fiber)   1 cup rehydration solution if needed   Evening Meal 2 slices white bread (2 grams fiber)   2 slices meatless luncheon slices   1 ounce Swiss cheese   5 saltine crackers   1 cup rehydration solution   Evening Snack 5 saltine crackers   1 ounce Swiss cheese   1 cup 1% milk   Daily Sum   Nutrient Unit Value   Macronutrients   Energy kcal 1781   Energy kJ 7450   Protein g 82   Total lipid (fat) g 57   Carbohydrate, by difference g 239   Fiber, total dietary g 14   Sugars, total g 119   Minerals   Calcium, Ca mg 1848   Iron, Fe mg 9   Sodium, Na mg 2455   Vitamins   Vitamin C, total ascorbic acid mg 87   Vitamin A, IU IU 2933   Vitamin D    Lipids   Fatty acids, total saturated g 21   Fatty acids, total monounsaturated g 20   Fatty acids, total polyunsaturated g 10   Cholesterol mg 255     Ileostomy Vegan Sample 1-Day Menu View Nutrient Info   Breakfast ½ cup tofu scramble   1 English muffin (2 grams fiber)   2 teaspoons margarine, soft, tub   ½ cup unsweetened applesauce (1 gram fiber)   Morning Snack ½ cup cranberry-grape juice diluted with ½ cup water   1½ cups herbal tea   Lunch 1/3 cup meatless chicken (2 grams fiber)   ½ cup mashed potatoes (1 gram fiber)   ½ cup green beans, well cooked (2 grams fiber)   ½ cup peaches, canned in juice (2 grams fiber)   2 teaspoons olive oil   1 cup soymilk fortified with calcium, vitamin B12, and vitamin D (1 gram fiber)   Afternoon Snack 6 ounces vanilla soy yogurt without fruit or nuts (1 gram fiber)   1 small ripe banana (1 gram fiber)   1 cup

## 2020-05-15 VITALS
DIASTOLIC BLOOD PRESSURE: 53 MMHG | SYSTOLIC BLOOD PRESSURE: 112 MMHG | TEMPERATURE: 97.8 F | RESPIRATION RATE: 16 BRPM | HEART RATE: 71 BPM | BODY MASS INDEX: 28.15 KG/M2 | HEIGHT: 60 IN | WEIGHT: 143.38 LBS | OXYGEN SATURATION: 93 %

## 2020-05-15 LAB
ANION GAP SERPL CALCULATED.3IONS-SCNC: 8 MMOL/L (ref 7–16)
BUN BLDV-MCNC: 23 MG/DL (ref 8–23)
CALCIUM SERPL-MCNC: 9.2 MG/DL (ref 8.6–10.2)
CHLORIDE BLD-SCNC: 106 MMOL/L (ref 98–107)
CO2: 21 MMOL/L (ref 22–29)
CREAT SERPL-MCNC: 1.3 MG/DL (ref 0.5–1)
CULTURE SURGICAL: ABNORMAL
CULTURE SURGICAL: ABNORMAL
GFR AFRICAN AMERICAN: 48
GFR NON-AFRICAN AMERICAN: 40 ML/MIN/1.73
GLUCOSE BLD-MCNC: 139 MG/DL (ref 74–99)
HCT VFR BLD CALC: 28.3 % (ref 34–48)
HEMOGLOBIN: 8.8 G/DL (ref 11.5–15.5)
MCH RBC QN AUTO: 30.7 PG (ref 26–35)
MCHC RBC AUTO-ENTMCNC: 31.1 % (ref 32–34.5)
MCV RBC AUTO: 98.6 FL (ref 80–99.9)
ORGANISM: ABNORMAL
ORGANISM: ABNORMAL
PDW BLD-RTO: 14.6 FL (ref 11.5–15)
PLATELET # BLD: 254 E9/L (ref 130–450)
PMV BLD AUTO: 11.7 FL (ref 7–12)
POTASSIUM REFLEX MAGNESIUM: 3.8 MMOL/L (ref 3.5–5)
RBC # BLD: 2.87 E12/L (ref 3.5–5.5)
SODIUM BLD-SCNC: 135 MMOL/L (ref 132–146)
WBC # BLD: 9.9 E9/L (ref 4.5–11.5)

## 2020-05-15 PROCEDURE — 6370000000 HC RX 637 (ALT 250 FOR IP): Performed by: STUDENT IN AN ORGANIZED HEALTH CARE EDUCATION/TRAINING PROGRAM

## 2020-05-15 PROCEDURE — 2700000000 HC OXYGEN THERAPY PER DAY

## 2020-05-15 PROCEDURE — 97530 THERAPEUTIC ACTIVITIES: CPT

## 2020-05-15 PROCEDURE — 36415 COLL VENOUS BLD VENIPUNCTURE: CPT

## 2020-05-15 PROCEDURE — 2580000003 HC RX 258: Performed by: STUDENT IN AN ORGANIZED HEALTH CARE EDUCATION/TRAINING PROGRAM

## 2020-05-15 PROCEDURE — 85027 COMPLETE CBC AUTOMATED: CPT

## 2020-05-15 PROCEDURE — 97535 SELF CARE MNGMENT TRAINING: CPT

## 2020-05-15 PROCEDURE — 6360000002 HC RX W HCPCS: Performed by: STUDENT IN AN ORGANIZED HEALTH CARE EDUCATION/TRAINING PROGRAM

## 2020-05-15 PROCEDURE — 80048 BASIC METABOLIC PNL TOTAL CA: CPT

## 2020-05-15 RX ORDER — AMOXICILLIN AND CLAVULANATE POTASSIUM 875; 125 MG/1; MG/1
1 TABLET, FILM COATED ORAL 2 TIMES DAILY
Qty: 28 TABLET | Refills: 0 | Status: SHIPPED | OUTPATIENT
Start: 2020-05-15 | End: 2020-05-27

## 2020-05-15 RX ORDER — OXYCODONE HYDROCHLORIDE 5 MG/1
5 TABLET ORAL EVERY 6 HOURS PRN
Qty: 30 TABLET | Refills: 0 | Status: SHIPPED | OUTPATIENT
Start: 2020-05-15 | End: 2020-05-23

## 2020-05-15 RX ADMIN — PROPRANOLOL HYDROCHLORIDE 20 MG: 20 TABLET ORAL at 08:20

## 2020-05-15 RX ADMIN — CALCIUM 500 MG: 500 TABLET ORAL at 08:20

## 2020-05-15 RX ADMIN — Medication 10 ML: at 08:23

## 2020-05-15 RX ADMIN — SODIUM CHLORIDE, PRESERVATIVE FREE 10 ML: 5 INJECTION INTRAVENOUS at 17:33

## 2020-05-15 RX ADMIN — OXYCODONE HYDROCHLORIDE 5 MG: 5 TABLET ORAL at 09:59

## 2020-05-15 RX ADMIN — HEPARIN SODIUM 5000 UNITS: 10000 INJECTION, SOLUTION INTRAVENOUS; SUBCUTANEOUS at 05:59

## 2020-05-15 RX ADMIN — CYANOCOBALAMIN TAB 1000 MCG 500 MCG: 1000 TAB at 08:20

## 2020-05-15 RX ADMIN — CHOLECALCIFEROL TAB 50 MCG (2000 UNIT) 2000 UNITS: 50 TAB at 08:20

## 2020-05-15 RX ADMIN — SODIUM CHLORIDE, PRESERVATIVE FREE 10 ML: 5 INJECTION INTRAVENOUS at 13:31

## 2020-05-15 RX ADMIN — HYDROMORPHONE HYDROCHLORIDE 0.25 MG: 1 INJECTION, SOLUTION INTRAMUSCULAR; INTRAVENOUS; SUBCUTANEOUS at 12:10

## 2020-05-15 RX ADMIN — DILTIAZEM HYDROCHLORIDE 180 MG: 180 CAPSULE, COATED, EXTENDED RELEASE ORAL at 08:20

## 2020-05-15 RX ADMIN — HYDROMORPHONE HYDROCHLORIDE 0.5 MG: 1 INJECTION, SOLUTION INTRAMUSCULAR; INTRAVENOUS; SUBCUTANEOUS at 17:33

## 2020-05-15 RX ADMIN — DICYCLOMINE HYDROCHLORIDE 10 MG: 10 CAPSULE ORAL at 08:20

## 2020-05-15 RX ADMIN — FERROUS SULFATE TAB 325 MG (65 MG ELEMENTAL FE) 325 MG: 325 (65 FE) TAB at 08:20

## 2020-05-15 RX ADMIN — HEPARIN SODIUM 5000 UNITS: 10000 INJECTION, SOLUTION INTRAVENOUS; SUBCUTANEOUS at 13:30

## 2020-05-15 RX ADMIN — PANTOPRAZOLE SODIUM 40 MG: 40 TABLET, DELAYED RELEASE ORAL at 05:59

## 2020-05-15 RX ADMIN — CEFEPIME 2 G: 2 INJECTION, POWDER, FOR SOLUTION INTRAVENOUS at 05:59

## 2020-05-15 RX ADMIN — OXYCODONE HYDROCHLORIDE 5 MG: 5 TABLET ORAL at 13:42

## 2020-05-15 RX ADMIN — HYDROMORPHONE HYDROCHLORIDE 0.25 MG: 1 INJECTION, SOLUTION INTRAMUSCULAR; INTRAVENOUS; SUBCUTANEOUS at 08:18

## 2020-05-15 RX ADMIN — VANCOMYCIN HYDROCHLORIDE 1000 MG: 1 INJECTION, POWDER, LYOPHILIZED, FOR SOLUTION INTRAVENOUS at 13:30

## 2020-05-15 RX ADMIN — SODIUM CHLORIDE: 9 INJECTION, SOLUTION INTRAVENOUS at 10:00

## 2020-05-15 ASSESSMENT — PAIN SCALES - GENERAL
PAINLEVEL_OUTOF10: 7
PAINLEVEL_OUTOF10: 2
PAINLEVEL_OUTOF10: 3
PAINLEVEL_OUTOF10: 5
PAINLEVEL_OUTOF10: 0
PAINLEVEL_OUTOF10: 5
PAINLEVEL_OUTOF10: 3
PAINLEVEL_OUTOF10: 5
PAINLEVEL_OUTOF10: 2
PAINLEVEL_OUTOF10: 6

## 2020-05-15 ASSESSMENT — PAIN - FUNCTIONAL ASSESSMENT: PAIN_FUNCTIONAL_ASSESSMENT: PREVENTS OR INTERFERES SOME ACTIVE ACTIVITIES AND ADLS

## 2020-05-15 ASSESSMENT — PAIN DESCRIPTION - DESCRIPTORS: DESCRIPTORS: ACHING;DISCOMFORT;SORE

## 2020-05-15 ASSESSMENT — PAIN DESCRIPTION - PROGRESSION: CLINICAL_PROGRESSION: GRADUALLY WORSENING

## 2020-05-15 ASSESSMENT — PAIN DESCRIPTION - LOCATION
LOCATION: ABDOMEN
LOCATION: ABDOMEN

## 2020-05-15 ASSESSMENT — PAIN DESCRIPTION - ONSET: ONSET: GRADUAL

## 2020-05-15 ASSESSMENT — PAIN DESCRIPTION - PAIN TYPE
TYPE: SURGICAL PAIN
TYPE: SURGICAL PAIN

## 2020-05-15 ASSESSMENT — PAIN DESCRIPTION - FREQUENCY: FREQUENCY: INTERMITTENT

## 2020-05-15 NOTE — PLAN OF CARE
Problem: Inadequate oral food/beverage intake (NI-2.1)  Goal: Food and/or Nutrient Delivery  Continue Current Diet. Continue Ensure High Pro ONS BID and Misael Wound [facility]aling ONS BID. Description: Individualized approach for food/nutrient provision.   Outcome: Met This Shift
Problem: Skin Integrity:  Goal: Absence of new skin breakdown  Description: Absence of new skin breakdown  Outcome: Met This Shift     Problem: Pain:  Description: Pain management should include both nonpharmacologic and pharmacologic interventions.   Goal: Control of acute pain  Description: Control of acute pain  Outcome: Met This Shift
Care:  Goal: Daily care needs are met  Description: Daily care needs are met  Outcome: Met This Shift
met  Description: Daily care needs are met  Outcome: Met This Shift     Problem: Discharge Planning:  Goal: Patients continuum of care needs are met  Description: Patients continuum of care needs are met  Outcome: Not Met This Shift

## 2020-05-15 NOTE — PROGRESS NOTES
[facility] Quality Flow/Interdisciplinary Rounds Progress Note        Quality Flow Rounds held on May 15, 2020    Disciplines Attending:  Bedside Nurse, ,  and Nursing Unit Leadership    Duncan Inman was admitted on 5/7/2020  3:23 PM    Anticipated Discharge Date:  Expected Discharge Date: 05/15/20    Disposition:    Roland Score:  Roland Scale Score: 20    Readmission Risk              Risk of Unplanned Readmission:        21           Discussed patient goal for the day, patient clinical progression, and barriers to discharge.   The following Goal(s) of the Day/Commitment(s) have been identified:  Diagnostics - Report Results and Labs - Report Results      Aminata Pacheco  May 15, 2020

## 2020-05-15 NOTE — PROGRESS NOTES
IVPB extended (mini-bag), 2 g, Intravenous, Q12H  nicotine (NICODERM CQ) 21 MG/24HR 1 patch, 1 patch, Transdermal, Daily  0.9 % sodium chloride infusion, , Intravenous, Q12H    ASSESSMENT:    70 y.o. female status post mesh explantation post op day #  3    PLAN:    Discharge home  Onur Issa 5/15/45640:10 PM

## 2020-05-15 NOTE — PROGRESS NOTES
Pt demonstrated skill Pt requires further education in this area   yes yes yes     ASSESSMENT:    Comments:  Pt was found up in chair just finished breakfast.  She walked with ww and fatigue limited distance. She walked with slow gait speed and took several short standing rest breaks to correct her posture. At times pt required MIN A to maneuver ww. Pt reported need to use BR and transferred on commode with MIN A and off commode with SBA. She performed self hygiene care. She stood at sink and washed her hands with SBA for safety. She walked with ww back to her bed and laid back in bed herself with supervision. Pt was placed back on 2L O2 since O2 sat dropped with amb and was not coming back up after 2 min. RN was notified. Treatment:  Patient practiced and was instructed in the following treatment:     Bed mobility, transfers, ADLs, and gait with ww to improve functional strength and endurance. Pt was left supine in bed per her request with call light left by patient. Chair/bed alarm: bed alarm was activated. PLAN:    Pt is making good progress toward established Physical Therapy goals. Continue with physical therapy current plan of care. Time in  07:45  Time out  08:15    Total Treatment Time  30 minutes     Evaluation Time includes thorough review of current medical information, gathering information on past medical history/social history and prior level of function, completion of standardized testing/informal observation of tasks, assessment of data and education on plan of care and goals. CPT codes:  [] Low Complexity PT evaluation 49844  [] Moderate Complexity PT evaluation 82207  [] High Complexity PT evaluation 00593  [] PT Re-evaluation 27872  [] Gait training 02250 ** minutes  [] Manual therapy 19660 ** minutes  [x] Therapeutic activities 44319 30 minutes  [] Therapeutic exercises 07753 ** minutes  [] Neuromuscular reeducation 40047 ** minutes     Stuart Sullivan.,

## 2020-05-17 NOTE — DISCHARGE SUMMARY
23 05/15/2020    CREATININE 1.3 05/15/2020    GLUCOSE 139 05/15/2020    LABGLOM 40 05/15/2020    PROTIME 12.9 08/23/2016    INR 1.2 08/23/2016    LABALBU 3.6 05/07/2020    PROT 7.1 05/07/2020    CALCIUM 9.2 05/15/2020    MG 1.5 05/11/2020    PHOS 3.7 07/07/2018    BILITOT 0.5 05/07/2020    BILIDIR <0.2 03/26/2020    ALKPHOS 133 05/07/2020    AST 49 05/07/2020    ALT 32 05/07/2020       Discharge Exam:   VITALS: BP (!) 112/53   Pulse 71   Temp 97.8 °F (36.6 °C) (Oral)   Resp 16   Ht 5' (1.524 m)   Wt 143 lb 6 oz (65 kg)   SpO2 93%   BMI 28.00 kg/m²     CONSTITUTIONAL:  awake, alert, cooperative, no apparent distress, and appears stated age  LUNGS:  No increased work of breathing, good air exchange, clear to auscultation bilaterally, no crackles or wheezing  CARDIOVASCULAR:  regular rate and rhythm and no murmur noted  ABDOMEN:  Less tender and non distended with retention sutures intac    Disposition: home       Medication List      START taking these medications    amoxicillin-clavulanate 875-125 MG per tablet  Commonly known as:  AUGMENTIN  Take 1 tablet by mouth 2 times daily for 12 days     oxyCODONE 5 MG immediate release tablet  Commonly known as:  ROXICODONE  Take 1 tablet by mouth every 6 hours as needed for Pain for up to 8 days.         CHANGE how you take these medications    dilTIAZem 120 MG extended release capsule  Commonly known as:  CARDIZEM CD  Take 1 capsule by mouth daily  What changed:  how much to take        CONTINUE taking these medications    aspirin 81 MG tablet     calcium carbonate 500 MG Tabs tablet  Commonly known as:  OSCAL     COQ10 PO     dicyclomine 10 MG capsule  Commonly known as:  BENTYL     fenofibrate 145 MG tablet  Commonly known as:  TRICOR     Iron 28 MG Tabs     Lexapro 20 MG tablet  Generic drug:  escitalopram     OMEGA 3 PO     omeprazole 20 MG delayed release capsule  Commonly known as:  PRILOSEC     ondansetron 4 MG tablet  Commonly known as:  William Jimenez

## 2020-06-05 ENCOUNTER — APPOINTMENT (OUTPATIENT)
Dept: CT IMAGING | Age: 80
DRG: 908 | End: 2020-06-05
Payer: MEDICARE

## 2020-06-05 ENCOUNTER — HOSPITAL ENCOUNTER (INPATIENT)
Age: 80
LOS: 3 days | Discharge: NURSING, REHAB OR LONG-TERM CARE | DRG: 908 | End: 2020-06-10
Attending: EMERGENCY MEDICINE | Admitting: SURGERY
Payer: MEDICARE

## 2020-06-05 PROBLEM — L03.90 CELLULITIS: Status: ACTIVE | Noted: 2020-06-05

## 2020-06-05 LAB
ALBUMIN SERPL-MCNC: 3.7 G/DL (ref 3.5–5.2)
ALP BLD-CCNC: 84 U/L (ref 35–104)
ALT SERPL-CCNC: 10 U/L (ref 0–32)
ANION GAP SERPL CALCULATED.3IONS-SCNC: 9 MMOL/L (ref 7–16)
AST SERPL-CCNC: 23 U/L (ref 0–31)
BASOPHILS ABSOLUTE: 0.06 E9/L (ref 0–0.2)
BASOPHILS RELATIVE PERCENT: 0.8 % (ref 0–2)
BILIRUB SERPL-MCNC: 0.3 MG/DL (ref 0–1.2)
BILIRUBIN URINE: NEGATIVE
BLOOD, URINE: NEGATIVE
BUN BLDV-MCNC: 22 MG/DL (ref 8–23)
CALCIUM SERPL-MCNC: 9.1 MG/DL (ref 8.6–10.2)
CHLORIDE BLD-SCNC: 109 MMOL/L (ref 98–107)
CLARITY: CLEAR
CO2: 22 MMOL/L (ref 22–29)
COLOR: YELLOW
CREAT SERPL-MCNC: 1.3 MG/DL (ref 0.5–1)
EOSINOPHILS ABSOLUTE: 0.51 E9/L (ref 0.05–0.5)
EOSINOPHILS RELATIVE PERCENT: 6.5 % (ref 0–6)
GFR AFRICAN AMERICAN: 48
GFR NON-AFRICAN AMERICAN: 40 ML/MIN/1.73
GLUCOSE BLD-MCNC: 76 MG/DL (ref 74–99)
GLUCOSE URINE: NEGATIVE MG/DL
HCT VFR BLD CALC: 36 % (ref 34–48)
HEMOGLOBIN: 10.9 G/DL (ref 11.5–15.5)
IMMATURE GRANULOCYTES #: 0.02 E9/L
IMMATURE GRANULOCYTES %: 0.3 % (ref 0–5)
INR BLD: 1
KETONES, URINE: NEGATIVE MG/DL
LACTIC ACID, SEPSIS: 0.8 MMOL/L (ref 0.5–1.9)
LEUKOCYTE ESTERASE, URINE: NEGATIVE
LYMPHOCYTES ABSOLUTE: 1.72 E9/L (ref 1.5–4)
LYMPHOCYTES RELATIVE PERCENT: 21.8 % (ref 20–42)
MCH RBC QN AUTO: 30.8 PG (ref 26–35)
MCHC RBC AUTO-ENTMCNC: 30.3 % (ref 32–34.5)
MCV RBC AUTO: 101.7 FL (ref 80–99.9)
MONOCYTES ABSOLUTE: 0.63 E9/L (ref 0.1–0.95)
MONOCYTES RELATIVE PERCENT: 8 % (ref 2–12)
NEUTROPHILS ABSOLUTE: 4.94 E9/L (ref 1.8–7.3)
NEUTROPHILS RELATIVE PERCENT: 62.6 % (ref 43–80)
NITRITE, URINE: NEGATIVE
PDW BLD-RTO: 15.6 FL (ref 11.5–15)
PH UA: 5.5 (ref 5–9)
PLATELET # BLD: 396 E9/L (ref 130–450)
PMV BLD AUTO: 11 FL (ref 7–12)
POTASSIUM REFLEX MAGNESIUM: 5.8 MMOL/L (ref 3.5–5)
PROTEIN UA: NEGATIVE MG/DL
PROTHROMBIN TIME: 12.1 SEC (ref 9.3–12.4)
RBC # BLD: 3.54 E12/L (ref 3.5–5.5)
SODIUM BLD-SCNC: 140 MMOL/L (ref 132–146)
SPECIFIC GRAVITY UA: 1.02 (ref 1–1.03)
TOTAL PROTEIN: 7.3 G/DL (ref 6.4–8.3)
UROBILINOGEN, URINE: 0.2 E.U./DL
WBC # BLD: 7.9 E9/L (ref 4.5–11.5)

## 2020-06-05 PROCEDURE — 80053 COMPREHEN METABOLIC PANEL: CPT

## 2020-06-05 PROCEDURE — 85610 PROTHROMBIN TIME: CPT

## 2020-06-05 PROCEDURE — 87040 BLOOD CULTURE FOR BACTERIA: CPT

## 2020-06-05 PROCEDURE — 87186 SC STD MICRODIL/AGAR DIL: CPT

## 2020-06-05 PROCEDURE — G0378 HOSPITAL OBSERVATION PER HR: HCPCS

## 2020-06-05 PROCEDURE — 99285 EMERGENCY DEPT VISIT HI MDM: CPT

## 2020-06-05 PROCEDURE — 81003 URINALYSIS AUTO W/O SCOPE: CPT

## 2020-06-05 PROCEDURE — 87088 URINE BACTERIA CULTURE: CPT

## 2020-06-05 PROCEDURE — 74176 CT ABD & PELVIS W/O CONTRAST: CPT

## 2020-06-05 PROCEDURE — 85025 COMPLETE CBC W/AUTO DIFF WBC: CPT

## 2020-06-05 PROCEDURE — 6360000002 HC RX W HCPCS: Performed by: SURGERY

## 2020-06-05 PROCEDURE — 96375 TX/PRO/DX INJ NEW DRUG ADDON: CPT

## 2020-06-05 PROCEDURE — 83605 ASSAY OF LACTIC ACID: CPT

## 2020-06-05 PROCEDURE — 2580000003 HC RX 258: Performed by: SURGERY

## 2020-06-05 PROCEDURE — 96376 TX/PRO/DX INJ SAME DRUG ADON: CPT

## 2020-06-05 PROCEDURE — 6360000002 HC RX W HCPCS: Performed by: EMERGENCY MEDICINE

## 2020-06-05 PROCEDURE — 96374 THER/PROPH/DIAG INJ IV PUSH: CPT

## 2020-06-05 PROCEDURE — 93005 ELECTROCARDIOGRAM TRACING: CPT | Performed by: EMERGENCY MEDICINE

## 2020-06-05 RX ORDER — ONDANSETRON 2 MG/ML
4 INJECTION INTRAMUSCULAR; INTRAVENOUS ONCE
Status: COMPLETED | OUTPATIENT
Start: 2020-06-05 | End: 2020-06-05

## 2020-06-05 RX ORDER — SODIUM CHLORIDE 0.9 % (FLUSH) 0.9 %
10 SYRINGE (ML) INJECTION PRN
Status: DISCONTINUED | OUTPATIENT
Start: 2020-06-05 | End: 2020-06-10 | Stop reason: HOSPADM

## 2020-06-05 RX ORDER — ONDANSETRON 2 MG/ML
4 INJECTION INTRAMUSCULAR; INTRAVENOUS EVERY 6 HOURS PRN
Status: DISCONTINUED | OUTPATIENT
Start: 2020-06-05 | End: 2020-06-10 | Stop reason: HOSPADM

## 2020-06-05 RX ORDER — SODIUM CHLORIDE 9 MG/ML
INJECTION, SOLUTION INTRAVENOUS CONTINUOUS
Status: DISCONTINUED | OUTPATIENT
Start: 2020-06-05 | End: 2020-06-06

## 2020-06-05 RX ORDER — SODIUM CHLORIDE 0.9 % (FLUSH) 0.9 %
10 SYRINGE (ML) INJECTION EVERY 12 HOURS SCHEDULED
Status: DISCONTINUED | OUTPATIENT
Start: 2020-06-05 | End: 2020-06-10 | Stop reason: HOSPADM

## 2020-06-05 RX ADMIN — HYDROMORPHONE HYDROCHLORIDE 0.5 MG: 1 INJECTION, SOLUTION INTRAMUSCULAR; INTRAVENOUS; SUBCUTANEOUS at 22:51

## 2020-06-05 RX ADMIN — Medication 10 ML: at 22:51

## 2020-06-05 RX ADMIN — SODIUM CHLORIDE: 9 INJECTION, SOLUTION INTRAVENOUS at 22:51

## 2020-06-05 RX ADMIN — ONDANSETRON 4 MG: 2 INJECTION INTRAMUSCULAR; INTRAVENOUS at 18:04

## 2020-06-05 RX ADMIN — HYDROMORPHONE HYDROCHLORIDE 0.5 MG: 1 INJECTION, SOLUTION INTRAMUSCULAR; INTRAVENOUS; SUBCUTANEOUS at 18:04

## 2020-06-05 ASSESSMENT — ENCOUNTER SYMPTOMS
VOMITING: 0
EYE REDNESS: 0
EYE PAIN: 0
WHEEZING: 0
NAUSEA: 0
BACK PAIN: 0
DIARRHEA: 0
ABDOMINAL DISTENTION: 0
SORE THROAT: 0
EYE DISCHARGE: 0
SHORTNESS OF BREATH: 0
SINUS PRESSURE: 0
COUGH: 0
ABDOMINAL PAIN: 1

## 2020-06-05 ASSESSMENT — PAIN SCALES - GENERAL
PAINLEVEL_OUTOF10: 5
PAINLEVEL_OUTOF10: 5
PAINLEVEL_OUTOF10: 6

## 2020-06-05 ASSESSMENT — PAIN DESCRIPTION - PAIN TYPE: TYPE: ACUTE PAIN

## 2020-06-05 NOTE — ED PROVIDER NOTES
transfusion, History of necrotic bowel, Hyperlipidemia, Hypertension, Incisional hernia, Insomnia, Mitral valve regurgitation, Myocardial infarct (HCC), Nausea, Occasional tremors, Osteopenia, PONV (postoperative nausea and vomiting), and Vitamin B12 deficiency. Past Surgical History:  has a past surgical history that includes Coronary angioplasty with stent (1-7-2002); Colonoscopy; Esophagus surgery (1-4-13); Rotator cuff repair (2010); arthroplasty (1-2006); Finger trigger release (Right, 2006); Cataract removal with implant (Right, 03/03/15); Abdomen surgery (2-); Appendectomy (2002); Cholecystectomy (5-11-07); Carpal tunnel release (Bilateral); skin biopsy (2010); Foot surgery (Right); ileostomy or jejunostomy (1-3-13); joint replacement (Right, 3/24/15); Endoscopy, colon, diagnostic; epigastric hernia repair (3/21/16); other surgical history (08/24/2016); Nasal sinus surgery; back surgery (1991, 2004, 2009); back surgery; Mohs surgery; other surgical history; hernia repair (12-31-13); Breast surgery (years ago); Knee Arthroplasty (Left, 03/22/2017); Abdomen surgery (N/A, 1/26/2020); and laparotomy (N/A, 5/12/2020). Social History:  reports that she has been smoking. She has been smoking about 1.00 pack per day. She has never used smokeless tobacco. She reports current alcohol use. She reports that she does not use drugs. Family History: family history is not on file. The patients home medications have been reviewed.     Allergies: Fentanyl; Levofloxacin; Tramadol; and Vioxx [rofecoxib]    -------------------------------------------------- RESULTS -------------------------------------------------    LABS:  Results for orders placed or performed during the hospital encounter of 06/05/20   Lactate, Sepsis   Result Value Ref Range    Lactic Acid, Sepsis 0.8 0.5 - 1.9 mmol/L   CBC auto differential   Result Value Ref Range    WBC 7.9 4.5 - 11.5 E9/L    RBC 3.54 3.50 - 5.50 E12/L    Hemoglobin mesh from the midline abdominal   wall. No gross fluid collection is in the region. No evidence of   reherniation. EKG:  This EKG is signed and interpreted by me.        ------------------------- NURSING NOTES AND VITALS REVIEWED ---------------------------  Date / Time Roomed:  6/5/2020  4:28 PM  ED Bed Assignment:  [id number]    The nursing notes within the ED encounter and vital signs as below have been reviewed. Patient Vitals for the past 24 hrs:   BP Temp Temp src Pulse Resp SpO2 Height Weight   06/05/20 2026 122/75 98.8 °F (37.1 °C) Oral 74 16 94 % -- --   06/05/20 2002 (!) 114/49 -- -- 62 17 92 % -- --   06/05/20 1913 (!) 108/59 98 °F (36.7 °C) Oral 59 21 94 % -- --   06/05/20 1511 (!) 119/55 -- -- -- -- -- -- --   06/05/20 1456 -- 97.1 °F (36.2 °C) Infrared 72 16 95 % 5' (1.524 m) 116 lb (52.6 kg)       Oxygen Saturation Interpretation: Normal    ------------------------------------------ PROGRESS NOTES ------------------------------------------  Re-evaluation(s):  Time: 2075  Patients symptoms show no change  Repeat physical examination is not changed    Counseling:  I have spoken with the patient and discussed todays results, in addition to providing specific details for the plan of care and counseling regarding the diagnosis and prognosis. Their questions are answered at this time and they are agreeable with the plan of admission.    --------------------------------- ADDITIONAL PROVIDER NOTES ---------------------------------  Consultations:  Spoke with Dr. Roscoe Robb. Discussed case. They will admit the patient. This patient's ED course included: a personal history and physicial examination, re-evaluation prior to disposition, multiple bedside re-evaluations, IV medications and complex medical decision making and emergency management    This patient has remained hemodynamically stable during their ED course. Diagnosis:  1. Cellulitis of abdominal wall    2.  Dehiscence of

## 2020-06-05 NOTE — H&P
GENERAL SURGERY  HISTORY AND PHYSICAL  6/5/2020    Chief Complaint   Patient presents with    Wound Check     patient had an abdominal wall abscess post abdominal surgery; Dr. Karis Posey instructed patient to come to ED for worsening symptoms      HPI  Floyd West is a 68 y.o. female with PMH chronic back pain, CAD, MVR, and numerous abdominal surgeries who presents for wound check. 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 She was discharged home on 5/15 on augmentin. This morning she woke up and found that one leg of the top retention suture had pulled through. Since then has noticed new cellulitis to the right of her incision. No fevers, chills, nausea, or vomiting. She has some abdominal pain but nothing new.     Past Medical History:   Diagnosis Date    Acute kidney failure ([address] 2014 and 8/2016    x2,no dialysis needed,resolved, kidney function tests returned to normal    Anemia     h/o    Anxiety     Arthritis     Bladder calculus     h/o    CAD (coronary artery disease)     follows with Dr. Jayme Cain every 6 months    Cancer [facility])     skin, leg,nose    COPD (chronic obstructive pulmonary disease) (HCC)     mild    Depression     Fibromyalgia     chronic back and neck pain    Gastritis     Generalized headaches     h/o / daily    History of blood transfusion 3-11-14    multiple times    History of necrotic bowel 2012    Hyperlipidemia     Hypertension     Incisional hernia     abdomen    Insomnia     Mitral valve regurgitation     Myocardial infarct ([facility] 2002    Nausea     daily / since last hernia surgery in 3/2015    Occasional tremors     at hands / stable with medication    Osteopenia     PONV (postoperative nausea and vomiting)     Vitamin B12 deficiency     h/o       Past Surgical History: Procedure Laterality Date    ABDOMEN SURGERY  2-    total colectomy, bowel resection, ileostomy,revision of ileostomy     ABDOMEN SURGERY N/A 1/26/2020    DRAINAGE OF ABDOMINAL WALL ABSCESS, EXPLANTATION OF MESH, DEBRIDEMENT OF SKIN AND SUBCUTANEOUS TISSUE performed by Richi Garibay MD at [address]  2002   [address]  1-2006    right and left thumb   [facility] SURGERY  1991, 2004, 2009    lumbar fusion x 3    BACK SURGERY      cervical fusion x 2    BREAST SURGERY  years ago    monor calcifications    CARPAL TUNNEL RELEASE Bilateral     CATARACT REMOVAL WITH IMPLANT Right 03/03/15    CHOLECYSTECTOMY  5-11-07    Lap    COLONOSCOPY      CORONARY ANGIOPLASTY WITH STENT PLACEMENT  1-7-2002    ENDOSCOPY, COLON, DIAGNOSTIC      EPIGASTRIC HERNIA REPAIR  3/21/16    incisional with mesh implantation    ESOPHAGUS SURGERY  1-4-13    esophageal clamp (torn Esophagus)    FINGER TRIGGER RELEASE Right 2006    index finger    FOOT SURGERY Right     multiple    HERNIA REPAIR  12-31-13    abdominal x 5    ILEOSTOMY OR JEJUNOSTOMY  1-3-13    revision    JOINT REPLACEMENT Right 3/24/15    right total shoulder arthroplasty    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     LAPAROTOMY N/A 5/12/2020    EXPLORATORY LAPAROTOMY EXPLANTATION OF INFECTED MESH SMAL BOWEL RESECTION AND REVISION END ILEOSTOMY, LYSIS OF ADHESIONS performed by Richi Garibay MD at [address] / leg    NASAL SINUS SURGERY      x2 /  cauterized    OTHER SURGICAL HISTORY  08/24/2016    diagnostic laparotomy with repair of intraabdominal hernia    OTHER SURGICAL HISTORY      removal plate / screws  / right great toe    ROTATOR CUFF REPAIR  2010    right     SKIN BIOPSY  2010    3 squamous cell carcinoma right leg, left leg       Medications Prior to Admission:    Prior to Admission medications    Medication Sig Start Date End Date Taking?  Authorizing Provider   omeprazole CREATININE 1.3*   CALCIUM 9.1     Liver Function  Recent Labs     06/05/20  1748   BILITOT 0.3   AST 23   ALT 10   ALKPHOS 84   PROT 7.3   LABALBU 3.7     No results for input(s): LACTATE in the last 72 hours.   Recent Labs     06/05/20  1748   INR 1.0     ASSESSMENT:  68 y.o. female with nonfunctioning retention suture - no sign of fascial dehiscence on physical exam    PLAN:  Admit for observation  Remove retention sutures  ID consult for antibiotic recommendations given cellulitis  Ok for diet as tolerated    Seen with Dr. Jeff Hwang    Electronically signed by Mahi Agee MD on 6/5/20 at 7:23 PM EDT

## 2020-06-06 LAB
ANION GAP SERPL CALCULATED.3IONS-SCNC: 7 MMOL/L (ref 7–16)
BUN BLDV-MCNC: 20 MG/DL (ref 8–23)
CALCIUM SERPL-MCNC: 9 MG/DL (ref 8.6–10.2)
CHLORIDE BLD-SCNC: 112 MMOL/L (ref 98–107)
CO2: 22 MMOL/L (ref 22–29)
CREAT SERPL-MCNC: 1.2 MG/DL (ref 0.5–1)
EKG ATRIAL RATE: 57 BPM
EKG P AXIS: 64 DEGREES
EKG P-R INTERVAL: 166 MS
EKG Q-T INTERVAL: 434 MS
EKG QRS DURATION: 84 MS
EKG QTC CALCULATION (BAZETT): 422 MS
EKG R AXIS: -26 DEGREES
EKG T AXIS: 36 DEGREES
EKG VENTRICULAR RATE: 57 BPM
GFR AFRICAN AMERICAN: 53
GFR NON-AFRICAN AMERICAN: 43 ML/MIN/1.73
GLUCOSE BLD-MCNC: 89 MG/DL (ref 74–99)
HCT VFR BLD CALC: 31.8 % (ref 34–48)
HEMOGLOBIN: 9.8 G/DL (ref 11.5–15.5)
MCH RBC QN AUTO: 31.1 PG (ref 26–35)
MCHC RBC AUTO-ENTMCNC: 30.8 % (ref 32–34.5)
MCV RBC AUTO: 101 FL (ref 80–99.9)
PDW BLD-RTO: 15.9 FL (ref 11.5–15)
PLATELET # BLD: 314 E9/L (ref 130–450)
PMV BLD AUTO: 11.2 FL (ref 7–12)
POTASSIUM REFLEX MAGNESIUM: 5.1 MMOL/L (ref 3.5–5)
RBC # BLD: 3.15 E12/L (ref 3.5–5.5)
SODIUM BLD-SCNC: 141 MMOL/L (ref 132–146)
WBC # BLD: 6.6 E9/L (ref 4.5–11.5)

## 2020-06-06 PROCEDURE — 93010 ELECTROCARDIOGRAM REPORT: CPT | Performed by: INTERNAL MEDICINE

## 2020-06-06 PROCEDURE — 80048 BASIC METABOLIC PNL TOTAL CA: CPT

## 2020-06-06 PROCEDURE — 2580000003 HC RX 258: Performed by: SURGERY

## 2020-06-06 PROCEDURE — 87070 CULTURE OTHR SPECIMN AEROBIC: CPT

## 2020-06-06 PROCEDURE — 36415 COLL VENOUS BLD VENIPUNCTURE: CPT

## 2020-06-06 PROCEDURE — 96376 TX/PRO/DX INJ SAME DRUG ADON: CPT

## 2020-06-06 PROCEDURE — 6370000000 HC RX 637 (ALT 250 FOR IP): Performed by: SURGERY

## 2020-06-06 PROCEDURE — G0378 HOSPITAL OBSERVATION PER HR: HCPCS

## 2020-06-06 PROCEDURE — 87186 SC STD MICRODIL/AGAR DIL: CPT

## 2020-06-06 PROCEDURE — 85027 COMPLETE CBC AUTOMATED: CPT

## 2020-06-06 PROCEDURE — 6360000002 HC RX W HCPCS: Performed by: SURGERY

## 2020-06-06 PROCEDURE — 87075 CULTR BACTERIA EXCEPT BLOOD: CPT

## 2020-06-06 PROCEDURE — 87077 CULTURE AEROBIC IDENTIFY: CPT

## 2020-06-06 RX ORDER — ESCITALOPRAM OXALATE 10 MG/1
20 TABLET ORAL NIGHTLY
Status: DISCONTINUED | OUTPATIENT
Start: 2020-06-06 | End: 2020-06-09 | Stop reason: DRUGHIGH

## 2020-06-06 RX ORDER — CHOLECALCIFEROL (VITAMIN D3) 1250 MCG
2000 CAPSULE ORAL 2 TIMES DAILY
Status: DISCONTINUED | OUTPATIENT
Start: 2020-06-06 | End: 2020-06-10 | Stop reason: CLARIF

## 2020-06-06 RX ORDER — DILTIAZEM HYDROCHLORIDE 180 MG/1
180 CAPSULE, COATED, EXTENDED RELEASE ORAL DAILY
Status: DISCONTINUED | OUTPATIENT
Start: 2020-06-06 | End: 2020-06-10 | Stop reason: HOSPADM

## 2020-06-06 RX ORDER — PRIMIDONE 250 MG/1
250 TABLET ORAL NIGHTLY
Status: DISCONTINUED | OUTPATIENT
Start: 2020-06-06 | End: 2020-06-10 | Stop reason: HOSPADM

## 2020-06-06 RX ORDER — CALCIUM CARBONATE 500(1250)
500 TABLET ORAL DAILY
Status: DISCONTINUED | OUTPATIENT
Start: 2020-06-06 | End: 2020-06-10 | Stop reason: HOSPADM

## 2020-06-06 RX ORDER — OMEPRAZOLE 20 MG/1
40 CAPSULE, DELAYED RELEASE ORAL DAILY
Status: DISCONTINUED | OUTPATIENT
Start: 2020-06-06 | End: 2020-06-06 | Stop reason: CLARIF

## 2020-06-06 RX ORDER — HYDROCODONE BITARTRATE AND ACETAMINOPHEN 5; 325 MG/1; MG/1
1 TABLET ORAL EVERY 6 HOURS PRN
Status: DISCONTINUED | OUTPATIENT
Start: 2020-06-06 | End: 2020-06-07

## 2020-06-06 RX ORDER — PANTOPRAZOLE SODIUM 40 MG/1
40 TABLET, DELAYED RELEASE ORAL
Status: DISCONTINUED | OUTPATIENT
Start: 2020-06-07 | End: 2020-06-10 | Stop reason: HOSPADM

## 2020-06-06 RX ORDER — FENOFIBRATE 145 MG/1
145 TABLET, COATED ORAL DAILY
Status: DISCONTINUED | OUTPATIENT
Start: 2020-06-06 | End: 2020-06-06 | Stop reason: CLARIF

## 2020-06-06 RX ORDER — UBIDECARENONE 30 MG
30 CAPSULE ORAL DAILY
Status: DISCONTINUED | OUTPATIENT
Start: 2020-06-06 | End: 2020-06-06 | Stop reason: CLARIF

## 2020-06-06 RX ORDER — UBIDECARENONE 75 MG
500 CAPSULE ORAL DAILY
Status: DISCONTINUED | OUTPATIENT
Start: 2020-06-06 | End: 2020-06-10 | Stop reason: HOSPADM

## 2020-06-06 RX ORDER — PRAVASTATIN SODIUM 20 MG
40 TABLET ORAL EVERY EVENING
Status: DISCONTINUED | OUTPATIENT
Start: 2020-06-06 | End: 2020-06-10 | Stop reason: HOSPADM

## 2020-06-06 RX ORDER — PROPRANOLOL HYDROCHLORIDE 20 MG/1
20 TABLET ORAL DAILY
Status: DISCONTINUED | OUTPATIENT
Start: 2020-06-06 | End: 2020-06-10 | Stop reason: HOSPADM

## 2020-06-06 RX ORDER — FENOFIBRATE 160 MG/1
160 TABLET ORAL DAILY
Status: DISCONTINUED | OUTPATIENT
Start: 2020-06-06 | End: 2020-06-10 | Stop reason: HOSPADM

## 2020-06-06 RX ORDER — TRAZODONE HYDROCHLORIDE 50 MG/1
25 TABLET ORAL NIGHTLY
Status: DISCONTINUED | OUTPATIENT
Start: 2020-06-06 | End: 2020-06-09 | Stop reason: DRUGHIGH

## 2020-06-06 RX ORDER — DICYCLOMINE HYDROCHLORIDE 10 MG/1
10 CAPSULE ORAL 2 TIMES DAILY
Status: DISCONTINUED | OUTPATIENT
Start: 2020-06-06 | End: 2020-06-10 | Stop reason: HOSPADM

## 2020-06-06 RX ADMIN — Medication 10 ML: at 19:59

## 2020-06-06 RX ADMIN — Medication 500 MCG: at 11:05

## 2020-06-06 RX ADMIN — HYDROCODONE BITARTRATE AND ACETAMINOPHEN 1 TABLET: 5; 325 TABLET ORAL at 17:27

## 2020-06-06 RX ADMIN — PRIMIDONE 250 MG: 250 TABLET ORAL at 19:59

## 2020-06-06 RX ADMIN — ESCITALOPRAM OXALATE 20 MG: 10 TABLET ORAL at 19:58

## 2020-06-06 RX ADMIN — DILTIAZEM HYDROCHLORIDE 180 MG: 180 CAPSULE, COATED, EXTENDED RELEASE ORAL at 11:09

## 2020-06-06 RX ADMIN — SODIUM CHLORIDE, PRESERVATIVE FREE 10 ML: 5 INJECTION INTRAVENOUS at 22:14

## 2020-06-06 RX ADMIN — ONDANSETRON 4 MG: 2 INJECTION INTRAMUSCULAR; INTRAVENOUS at 22:14

## 2020-06-06 RX ADMIN — TRAZODONE HYDROCHLORIDE 25 MG: 50 TABLET ORAL at 23:19

## 2020-06-06 RX ADMIN — ONDANSETRON 4 MG: 2 INJECTION INTRAMUSCULAR; INTRAVENOUS at 12:59

## 2020-06-06 RX ADMIN — PRAVASTATIN SODIUM 40 MG: 20 TABLET ORAL at 16:30

## 2020-06-06 RX ADMIN — HYDROCODONE BITARTRATE AND ACETAMINOPHEN 1 TABLET: 5; 325 TABLET ORAL at 11:08

## 2020-06-06 RX ADMIN — PROPRANOLOL HYDROCHLORIDE 20 MG: 20 TABLET ORAL at 11:05

## 2020-06-06 RX ADMIN — FENOFIBRATE 160 MG: 160 TABLET ORAL at 11:05

## 2020-06-06 RX ADMIN — CALCIUM 500 MG: 500 TABLET ORAL at 11:04

## 2020-06-06 RX ADMIN — DICYCLOMINE HYDROCHLORIDE 10 MG: 10 CAPSULE ORAL at 19:58

## 2020-06-06 RX ADMIN — HYDROCODONE BITARTRATE AND ACETAMINOPHEN 1 TABLET: 5; 325 TABLET ORAL at 23:20

## 2020-06-06 RX ADMIN — DICYCLOMINE HYDROCHLORIDE 10 MG: 10 CAPSULE ORAL at 11:05

## 2020-06-06 ASSESSMENT — PAIN DESCRIPTION - PROGRESSION
CLINICAL_PROGRESSION: NOT CHANGED
CLINICAL_PROGRESSION: NOT CHANGED

## 2020-06-06 ASSESSMENT — PAIN DESCRIPTION - ORIENTATION
ORIENTATION: MID;ANTERIOR
ORIENTATION: MID

## 2020-06-06 ASSESSMENT — PAIN SCALES - GENERAL
PAINLEVEL_OUTOF10: 7
PAINLEVEL_OUTOF10: 4
PAINLEVEL_OUTOF10: 0
PAINLEVEL_OUTOF10: 5
PAINLEVEL_OUTOF10: 7

## 2020-06-06 ASSESSMENT — PAIN DESCRIPTION - FREQUENCY
FREQUENCY: CONTINUOUS
FREQUENCY: CONTINUOUS

## 2020-06-06 ASSESSMENT — PAIN DESCRIPTION - ONSET
ONSET: ON-GOING
ONSET: ON-GOING

## 2020-06-06 ASSESSMENT — PAIN DESCRIPTION - DESCRIPTORS
DESCRIPTORS: ACHING;DISCOMFORT;HEADACHE
DESCRIPTORS: ACHING;DISCOMFORT;SORE

## 2020-06-06 ASSESSMENT — PAIN DESCRIPTION - PAIN TYPE
TYPE: ACUTE PAIN
TYPE: ACUTE PAIN

## 2020-06-06 ASSESSMENT — PAIN DESCRIPTION - LOCATION
LOCATION: ABDOMEN
LOCATION: ABDOMEN

## 2020-06-06 NOTE — CONSULTS
pain    Gastritis     Generalized headaches     h/o / daily    History of blood transfusion 3-11-14    multiple times    History of necrotic bowel 2012    Hyperlipidemia     Hypertension     Incisional hernia     abdomen    Insomnia     Mitral valve regurgitation     Myocardial infarct ([address] 2002    Nausea     daily / since last hernia surgery in 3/2015    Occasional tremors     at hands / stable with medication    Osteopenia     PONV (postoperative nausea and vomiting)     Vitamin B12 deficiency     h/o     Past Surgical History:        Procedure Laterality Date    ABDOMEN SURGERY  2-    total colectomy, bowel resection, ileostomy,revision of ileostomy     ABDOMEN SURGERY N/A 1/26/2020    DRAINAGE OF ABDOMINAL WALL ABSCESS, EXPLANTATION OF MESH, DEBRIDEMENT OF SKIN AND SUBCUTANEOUS TISSUE performed by Laurence Centeno MD at [address]  2002   [facility] [address]  1-2006    right and left thumb   [facility], 2004, 2009    lumbar fusion x 3    BACK SURGERY      cervical fusion x 2    BREAST SURGERY  years ago    monor calcifications    CARPAL TUNNEL RELEASE Bilateral     CATARACT REMOVAL WITH IMPLANT Right 03/03/15    CHOLECYSTECTOMY  5-11-07    Lap    COLONOSCOPY      CORONARY ANGIOPLASTY WITH STENT PLACEMENT  1-7-2002    ENDOSCOPY, COLON, DIAGNOSTIC      EPIGASTRIC HERNIA REPAIR  3/21/16    incisional with mesh implantation    ESOPHAGUS SURGERY  1-4-13    esophageal clamp (torn Esophagus)    FINGER TRIGGER RELEASE Right 2006    index finger    FOOT SURGERY Right     multiple    HERNIA REPAIR  12-31-13    abdominal x 5    ILEOSTOMY OR JEJUNOSTOMY  1-3-13    revision    JOINT REPLACEMENT Right 3/24/15    right total shoulder arthroplasty    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     LAPAROTOMY N/A 5/12/2020    EXPLORATORY LAPAROTOMY EXPLANTATION OF INFECTED MESH SMAL BOWEL RESECTION AND REVISION END ILEOSTOMY, LYSIS OF ADHESIONS performed by evidence of   reherniation. Microbiology:  Pending  No results for input(s): BC in the last 72 hours. No results for input(s): ORG in the last 72 hours. No results for input(s): Hoffman Webb in the last 72 hours. No results for input(s): STREPNEUMAGU in the last 72 hours. No results for input(s): LP1UAG in the last 72 hours. No results for input(s): ASO in the last 72 hours. No results for input(s): CULTRESP in the last 72 hours. Assessment:  · Infected mesh    Plan:    · Cont off antibiotics until a better handle on the cultures but of concern is the Candida glabrata patient will need a least Eraxis plus broad-spectrum antibiotics for the previous cultures including MRSA and E. coli Bacteroides and beta-hemolytic strep from previous cultures and start antibiotics at the time of explantation  · Check cultures  · Baseline ESR, CRP  · Monitor labs  · Will follow with you    Thank you for having us see this patient in consultation. I will be discussing this case with the treating physicians.       Electronically signed by Em Adrian MD on 6/6/2020 at 9:49 AM

## 2020-06-07 PROCEDURE — 87081 CULTURE SCREEN ONLY: CPT

## 2020-06-07 PROCEDURE — 2580000003 HC RX 258: Performed by: SURGERY

## 2020-06-07 PROCEDURE — 6370000000 HC RX 637 (ALT 250 FOR IP): Performed by: SURGERY

## 2020-06-07 PROCEDURE — 1200000000 HC SEMI PRIVATE

## 2020-06-07 PROCEDURE — 6360000002 HC RX W HCPCS: Performed by: SURGERY

## 2020-06-07 PROCEDURE — G0378 HOSPITAL OBSERVATION PER HR: HCPCS

## 2020-06-07 PROCEDURE — 96372 THER/PROPH/DIAG INJ SC/IM: CPT

## 2020-06-07 RX ORDER — OXYCODONE AND ACETAMINOPHEN 10; 325 MG/1; MG/1
1 TABLET ORAL EVERY 6 HOURS PRN
Status: DISCONTINUED | OUTPATIENT
Start: 2020-06-07 | End: 2020-06-09

## 2020-06-07 RX ADMIN — ESCITALOPRAM OXALATE 20 MG: 10 TABLET ORAL at 20:10

## 2020-06-07 RX ADMIN — PANTOPRAZOLE SODIUM 40 MG: 40 TABLET, DELAYED RELEASE ORAL at 05:57

## 2020-06-07 RX ADMIN — PRAVASTATIN SODIUM 40 MG: 20 TABLET ORAL at 17:13

## 2020-06-07 RX ADMIN — Medication 10 ML: at 20:00

## 2020-06-07 RX ADMIN — ONDANSETRON 4 MG: 2 INJECTION INTRAMUSCULAR; INTRAVENOUS at 20:00

## 2020-06-07 RX ADMIN — Medication 10 ML: at 09:00

## 2020-06-07 RX ADMIN — DICYCLOMINE HYDROCHLORIDE 10 MG: 10 CAPSULE ORAL at 08:49

## 2020-06-07 RX ADMIN — DILTIAZEM HYDROCHLORIDE 180 MG: 180 CAPSULE, COATED, EXTENDED RELEASE ORAL at 08:49

## 2020-06-07 RX ADMIN — ENOXAPARIN SODIUM 30 MG: 30 INJECTION SUBCUTANEOUS at 08:49

## 2020-06-07 RX ADMIN — FENOFIBRATE 160 MG: 160 TABLET ORAL at 08:47

## 2020-06-07 RX ADMIN — DICYCLOMINE HYDROCHLORIDE 10 MG: 10 CAPSULE ORAL at 20:10

## 2020-06-07 RX ADMIN — OXYCODONE AND ACETAMINOPHEN 1 TABLET: 10; 325 TABLET ORAL at 22:24

## 2020-06-07 RX ADMIN — TRAZODONE HYDROCHLORIDE 25 MG: 50 TABLET ORAL at 22:24

## 2020-06-07 RX ADMIN — OXYCODONE AND ACETAMINOPHEN 1 TABLET: 10; 325 TABLET ORAL at 09:45

## 2020-06-07 RX ADMIN — PRIMIDONE 250 MG: 250 TABLET ORAL at 20:10

## 2020-06-07 RX ADMIN — CALCIUM 500 MG: 500 TABLET ORAL at 08:49

## 2020-06-07 RX ADMIN — OXYCODONE AND ACETAMINOPHEN 1 TABLET: 10; 325 TABLET ORAL at 15:48

## 2020-06-07 RX ADMIN — ONDANSETRON 4 MG: 2 INJECTION INTRAMUSCULAR; INTRAVENOUS at 13:56

## 2020-06-07 RX ADMIN — Medication 500 MCG: at 08:47

## 2020-06-07 RX ADMIN — PROPRANOLOL HYDROCHLORIDE 20 MG: 20 TABLET ORAL at 08:49

## 2020-06-07 ASSESSMENT — PAIN SCALES - GENERAL
PAINLEVEL_OUTOF10: 7
PAINLEVEL_OUTOF10: 6
PAINLEVEL_OUTOF10: 0
PAINLEVEL_OUTOF10: 2
PAINLEVEL_OUTOF10: 5
PAINLEVEL_OUTOF10: 8

## 2020-06-07 ASSESSMENT — PAIN DESCRIPTION - PAIN TYPE
TYPE: ACUTE PAIN
TYPE: ACUTE PAIN

## 2020-06-07 ASSESSMENT — PAIN DESCRIPTION - LOCATION
LOCATION: ABDOMEN
LOCATION: ABDOMEN

## 2020-06-07 ASSESSMENT — PAIN DESCRIPTION - ORIENTATION
ORIENTATION: MID;ANTERIOR
ORIENTATION: MID;ANTERIOR

## 2020-06-07 ASSESSMENT — PAIN DESCRIPTION - ONSET
ONSET: ON-GOING
ONSET: ON-GOING

## 2020-06-07 ASSESSMENT — PAIN DESCRIPTION - FREQUENCY
FREQUENCY: CONTINUOUS
FREQUENCY: CONTINUOUS

## 2020-06-07 ASSESSMENT — PAIN DESCRIPTION - DESCRIPTORS
DESCRIPTORS: ACHING;DISCOMFORT;HEADACHE
DESCRIPTORS: ACHING;DISCOMFORT

## 2020-06-07 ASSESSMENT — PAIN DESCRIPTION - PROGRESSION
CLINICAL_PROGRESSION: NOT CHANGED

## 2020-06-07 NOTE — PROGRESS NOTES
with you    [address] [facility],[address]  10:56 AM  6/7/2020       Pt seen and examined. Above discussed agree with advanced practice nurse. Labs, cultures, and radiographs reviewed. Face to Face encounter occurred. Changes made as necessary.      Arvind Berrios MD

## 2020-06-07 NOTE — PLAN OF CARE
Problem: Falls - Risk of:  Goal: Will remain free from falls  Description: Will remain free from falls  Outcome: Met This Shift  Goal: Absence of physical injury  Description: Absence of physical injury  Outcome: Met This Shift     Problem: Skin Integrity:  Goal: Will show no infection signs and symptoms  Description: Will show no infection signs and symptoms  Outcome: Met This Shift     Problem: Pain:  Goal: Pain level will decrease  Description: Pain level will decrease  Outcome: Met This Shift

## 2020-06-08 ENCOUNTER — ANESTHESIA (OUTPATIENT)
Dept: OPERATING ROOM | Age: 80
DRG: 908 | End: 2020-06-08
Payer: MEDICARE

## 2020-06-08 ENCOUNTER — ANESTHESIA EVENT (OUTPATIENT)
Dept: OPERATING ROOM | Age: 80
DRG: 908 | End: 2020-06-08
Payer: MEDICARE

## 2020-06-08 VITALS
RESPIRATION RATE: 25 BRPM | OXYGEN SATURATION: 99 % | SYSTOLIC BLOOD PRESSURE: 226 MMHG | DIASTOLIC BLOOD PRESSURE: 107 MMHG | TEMPERATURE: 97.2 F

## 2020-06-08 LAB
ABO/RH: NORMAL
ANAEROBIC CULTURE: NORMAL
ANION GAP SERPL CALCULATED.3IONS-SCNC: 10 MMOL/L (ref 7–16)
ANTIBODY SCREEN: NORMAL
BUN BLDV-MCNC: 29 MG/DL (ref 8–23)
CALCIUM SERPL-MCNC: 9.3 MG/DL (ref 8.6–10.2)
CHLORIDE BLD-SCNC: 111 MMOL/L (ref 98–107)
CO2: 18 MMOL/L (ref 22–29)
CREAT SERPL-MCNC: 1.4 MG/DL (ref 0.5–1)
GFR AFRICAN AMERICAN: 44
GFR NON-AFRICAN AMERICAN: 36 ML/MIN/1.73
GLUCOSE BLD-MCNC: 90 MG/DL (ref 74–99)
HCT VFR BLD CALC: 34.4 % (ref 34–48)
HEMOGLOBIN: 10.6 G/DL (ref 11.5–15.5)
MCH RBC QN AUTO: 31.1 PG (ref 26–35)
MCHC RBC AUTO-ENTMCNC: 30.8 % (ref 32–34.5)
MCV RBC AUTO: 100.9 FL (ref 80–99.9)
ORGANISM: ABNORMAL
PDW BLD-RTO: 15.3 FL (ref 11.5–15)
PLATELET # BLD: 316 E9/L (ref 130–450)
PMV BLD AUTO: 11.6 FL (ref 7–12)
POTASSIUM REFLEX MAGNESIUM: 4 MMOL/L (ref 3.5–5)
RBC # BLD: 3.41 E12/L (ref 3.5–5.5)
SODIUM BLD-SCNC: 139 MMOL/L (ref 132–146)
URINE CULTURE, ROUTINE: ABNORMAL
URINE CULTURE, ROUTINE: ABNORMAL
WBC # BLD: 5.3 E9/L (ref 4.5–11.5)

## 2020-06-08 PROCEDURE — 87205 SMEAR GRAM STAIN: CPT

## 2020-06-08 PROCEDURE — 3700000001 HC ADD 15 MINUTES (ANESTHESIA): Performed by: SURGERY

## 2020-06-08 PROCEDURE — 6360000002 HC RX W HCPCS: Performed by: SPECIALIST

## 2020-06-08 PROCEDURE — 2580000003 HC RX 258: Performed by: SPECIALIST

## 2020-06-08 PROCEDURE — 86900 BLOOD TYPING SEROLOGIC ABO: CPT

## 2020-06-08 PROCEDURE — 2580000003 HC RX 258: Performed by: STUDENT IN AN ORGANIZED HEALTH CARE EDUCATION/TRAINING PROGRAM

## 2020-06-08 PROCEDURE — 86901 BLOOD TYPING SEROLOGIC RH(D): CPT

## 2020-06-08 PROCEDURE — 36415 COLL VENOUS BLD VENIPUNCTURE: CPT

## 2020-06-08 PROCEDURE — 88304 TISSUE EXAM BY PATHOLOGIST: CPT

## 2020-06-08 PROCEDURE — 6360000002 HC RX W HCPCS: Performed by: NURSE ANESTHETIST, CERTIFIED REGISTERED

## 2020-06-08 PROCEDURE — 2709999900 HC NON-CHARGEABLE SUPPLY: Performed by: SURGERY

## 2020-06-08 PROCEDURE — 85027 COMPLETE CBC AUTOMATED: CPT

## 2020-06-08 PROCEDURE — 87070 CULTURE OTHR SPECIMN AEROBIC: CPT

## 2020-06-08 PROCEDURE — 80048 BASIC METABOLIC PNL TOTAL CA: CPT

## 2020-06-08 PROCEDURE — 6360000002 HC RX W HCPCS: Performed by: SURGERY

## 2020-06-08 PROCEDURE — 87015 SPECIMEN INFECT AGNT CONCNTJ: CPT

## 2020-06-08 PROCEDURE — 87102 FUNGUS ISOLATION CULTURE: CPT

## 2020-06-08 PROCEDURE — 6370000000 HC RX 637 (ALT 250 FOR IP): Performed by: SURGERY

## 2020-06-08 PROCEDURE — 7100000001 HC PACU RECOVERY - ADDTL 15 MIN: Performed by: SURGERY

## 2020-06-08 PROCEDURE — 1200000000 HC SEMI PRIVATE

## 2020-06-08 PROCEDURE — 87186 SC STD MICRODIL/AGAR DIL: CPT

## 2020-06-08 PROCEDURE — 2580000003 HC RX 258: Performed by: NURSE ANESTHETIST, CERTIFIED REGISTERED

## 2020-06-08 PROCEDURE — 6370000000 HC RX 637 (ALT 250 FOR IP): Performed by: STUDENT IN AN ORGANIZED HEALTH CARE EDUCATION/TRAINING PROGRAM

## 2020-06-08 PROCEDURE — 2500000003 HC RX 250 WO HCPCS: Performed by: NURSE ANESTHETIST, CERTIFIED REGISTERED

## 2020-06-08 PROCEDURE — 86850 RBC ANTIBODY SCREEN: CPT

## 2020-06-08 PROCEDURE — 0WPF0JZ REMOVAL OF SYNTHETIC SUBSTITUTE FROM ABDOMINAL WALL, OPEN APPROACH: ICD-10-PCS | Performed by: SURGERY

## 2020-06-08 PROCEDURE — 87116 MYCOBACTERIA CULTURE: CPT

## 2020-06-08 PROCEDURE — 2580000003 HC RX 258: Performed by: SURGERY

## 2020-06-08 PROCEDURE — 3700000000 HC ANESTHESIA ATTENDED CARE: Performed by: SURGERY

## 2020-06-08 PROCEDURE — 6360000002 HC RX W HCPCS: Performed by: ANESTHESIOLOGY

## 2020-06-08 PROCEDURE — 7100000000 HC PACU RECOVERY - FIRST 15 MIN: Performed by: SURGERY

## 2020-06-08 PROCEDURE — 87206 SMEAR FLUORESCENT/ACID STAI: CPT

## 2020-06-08 PROCEDURE — 3600000003 HC SURGERY LEVEL 3 BASE: Performed by: SURGERY

## 2020-06-08 PROCEDURE — 87075 CULTR BACTERIA EXCEPT BLOOD: CPT

## 2020-06-08 PROCEDURE — 87077 CULTURE AEROBIC IDENTIFY: CPT

## 2020-06-08 PROCEDURE — 3600000013 HC SURGERY LEVEL 3 ADDTL 15MIN: Performed by: SURGERY

## 2020-06-08 RX ORDER — HEPARIN SODIUM (PORCINE) LOCK FLUSH IV SOLN 100 UNIT/ML 100 UNIT/ML
3 SOLUTION INTRAVENOUS PRN
Status: DISCONTINUED | OUTPATIENT
Start: 2020-06-08 | End: 2020-06-10 | Stop reason: HOSPADM

## 2020-06-08 RX ORDER — LIDOCAINE HYDROCHLORIDE 10 MG/ML
5 INJECTION, SOLUTION EPIDURAL; INFILTRATION; INTRACAUDAL; PERINEURAL ONCE
Status: COMPLETED | OUTPATIENT
Start: 2020-06-08 | End: 2020-06-09

## 2020-06-08 RX ORDER — SODIUM CHLORIDE 0.9 % (FLUSH) 0.9 %
10 SYRINGE (ML) INJECTION PRN
Status: DISCONTINUED | OUTPATIENT
Start: 2020-06-08 | End: 2020-06-08 | Stop reason: SDUPTHER

## 2020-06-08 RX ORDER — HEPARIN SODIUM (PORCINE) LOCK FLUSH IV SOLN 100 UNIT/ML 100 UNIT/ML
3 SOLUTION INTRAVENOUS EVERY 12 HOURS SCHEDULED
Status: DISCONTINUED | OUTPATIENT
Start: 2020-06-08 | End: 2020-06-10 | Stop reason: HOSPADM

## 2020-06-08 RX ORDER — PROPOFOL 10 MG/ML
INJECTION, EMULSION INTRAVENOUS PRN
Status: DISCONTINUED | OUTPATIENT
Start: 2020-06-08 | End: 2020-06-08 | Stop reason: SDUPTHER

## 2020-06-08 RX ORDER — HEPARIN SODIUM (PORCINE) LOCK FLUSH IV SOLN 100 UNIT/ML 100 UNIT/ML
1 SOLUTION INTRAVENOUS PRN
Status: DISCONTINUED | OUTPATIENT
Start: 2020-06-08 | End: 2020-06-08 | Stop reason: SDUPTHER

## 2020-06-08 RX ORDER — SUCCINYLCHOLINE CHLORIDE 20 MG/ML
INJECTION INTRAMUSCULAR; INTRAVENOUS PRN
Status: DISCONTINUED | OUTPATIENT
Start: 2020-06-08 | End: 2020-06-08 | Stop reason: SDUPTHER

## 2020-06-08 RX ORDER — ROCURONIUM BROMIDE 10 MG/ML
INJECTION, SOLUTION INTRAVENOUS PRN
Status: DISCONTINUED | OUTPATIENT
Start: 2020-06-08 | End: 2020-06-08 | Stop reason: SDUPTHER

## 2020-06-08 RX ORDER — ONDANSETRON 2 MG/ML
INJECTION INTRAMUSCULAR; INTRAVENOUS PRN
Status: DISCONTINUED | OUTPATIENT
Start: 2020-06-08 | End: 2020-06-08 | Stop reason: SDUPTHER

## 2020-06-08 RX ORDER — NEOSTIGMINE METHYLSULFATE 1 MG/ML
INJECTION, SOLUTION INTRAVENOUS PRN
Status: DISCONTINUED | OUTPATIENT
Start: 2020-06-08 | End: 2020-06-08 | Stop reason: SDUPTHER

## 2020-06-08 RX ORDER — FENTANYL CITRATE 50 UG/ML
INJECTION, SOLUTION INTRAMUSCULAR; INTRAVENOUS PRN
Status: DISCONTINUED | OUTPATIENT
Start: 2020-06-08 | End: 2020-06-08 | Stop reason: SDUPTHER

## 2020-06-08 RX ORDER — GLYCOPYRROLATE 1 MG/5 ML
SYRINGE (ML) INTRAVENOUS PRN
Status: DISCONTINUED | OUTPATIENT
Start: 2020-06-08 | End: 2020-06-08 | Stop reason: SDUPTHER

## 2020-06-08 RX ORDER — SODIUM CHLORIDE 9 MG/ML
INJECTION, SOLUTION INTRAVENOUS CONTINUOUS PRN
Status: DISCONTINUED | OUTPATIENT
Start: 2020-06-08 | End: 2020-06-08 | Stop reason: SDUPTHER

## 2020-06-08 RX ORDER — FENTANYL CITRATE 50 UG/ML
25 INJECTION, SOLUTION INTRAMUSCULAR; INTRAVENOUS EVERY 5 MIN PRN
Status: DISCONTINUED | OUTPATIENT
Start: 2020-06-08 | End: 2020-06-08 | Stop reason: HOSPADM

## 2020-06-08 RX ORDER — SODIUM CHLORIDE 0.9 % (FLUSH) 0.9 %
10 SYRINGE (ML) INJECTION PRN
Status: DISCONTINUED | OUTPATIENT
Start: 2020-06-08 | End: 2020-06-10 | Stop reason: HOSPADM

## 2020-06-08 RX ORDER — HEPARIN SODIUM (PORCINE) LOCK FLUSH IV SOLN 100 UNIT/ML 100 UNIT/ML
1 SOLUTION INTRAVENOUS EVERY 12 HOURS SCHEDULED
Status: DISCONTINUED | OUTPATIENT
Start: 2020-06-08 | End: 2020-06-08 | Stop reason: SDUPTHER

## 2020-06-08 RX ORDER — DEXAMETHASONE SODIUM PHOSPHATE 4 MG/ML
INJECTION, SOLUTION INTRA-ARTICULAR; INTRALESIONAL; INTRAMUSCULAR; INTRAVENOUS; SOFT TISSUE PRN
Status: DISCONTINUED | OUTPATIENT
Start: 2020-06-08 | End: 2020-06-08 | Stop reason: SDUPTHER

## 2020-06-08 RX ADMIN — PROPOFOL 150 MG: 10 INJECTION, EMULSION INTRAVENOUS at 14:43

## 2020-06-08 RX ADMIN — HYDROMORPHONE HYDROCHLORIDE 1 MG: 1 INJECTION, SOLUTION INTRAMUSCULAR; INTRAVENOUS; SUBCUTANEOUS at 20:20

## 2020-06-08 RX ADMIN — ONDANSETRON HYDROCHLORIDE 4 MG: 2 INJECTION, SOLUTION INTRAMUSCULAR; INTRAVENOUS at 15:23

## 2020-06-08 RX ADMIN — DICYCLOMINE HYDROCHLORIDE 10 MG: 10 CAPSULE ORAL at 20:20

## 2020-06-08 RX ADMIN — FENTANYL CITRATE 25 MCG: 50 INJECTION INTRAMUSCULAR; INTRAVENOUS at 16:23

## 2020-06-08 RX ADMIN — TRAZODONE HYDROCHLORIDE 25 MG: 50 TABLET ORAL at 23:10

## 2020-06-08 RX ADMIN — FENTANYL CITRATE 50 MCG: 50 INJECTION, SOLUTION INTRAMUSCULAR; INTRAVENOUS at 14:35

## 2020-06-08 RX ADMIN — Medication 10 ML: at 09:22

## 2020-06-08 RX ADMIN — HYDROMORPHONE HYDROCHLORIDE 0.25 MG: 1 INJECTION, SOLUTION INTRAMUSCULAR; INTRAVENOUS; SUBCUTANEOUS at 16:08

## 2020-06-08 RX ADMIN — ESCITALOPRAM OXALATE 20 MG: 10 TABLET ORAL at 20:20

## 2020-06-08 RX ADMIN — PRIMIDONE 250 MG: 250 TABLET ORAL at 20:20

## 2020-06-08 RX ADMIN — DEXTROSE 3.38 G: 50 INJECTION, SOLUTION INTRAVENOUS at 15:11

## 2020-06-08 RX ADMIN — HYDROMORPHONE HYDROCHLORIDE 0.25 MG: 1 INJECTION, SOLUTION INTRAMUSCULAR; INTRAVENOUS; SUBCUTANEOUS at 15:53

## 2020-06-08 RX ADMIN — SODIUM CHLORIDE: 9 INJECTION, SOLUTION INTRAVENOUS at 14:35

## 2020-06-08 RX ADMIN — VANCOMYCIN HYDROCHLORIDE 1250 MG: 1 INJECTION, POWDER, LYOPHILIZED, FOR SOLUTION INTRAVENOUS at 14:55

## 2020-06-08 RX ADMIN — DEXTROSE MONOHYDRATE 200 MG: 50 INJECTION, SOLUTION INTRAVENOUS at 19:19

## 2020-06-08 RX ADMIN — HYDROMORPHONE HYDROCHLORIDE 0.25 MG: 1 INJECTION, SOLUTION INTRAMUSCULAR; INTRAVENOUS; SUBCUTANEOUS at 16:00

## 2020-06-08 RX ADMIN — HYDROMORPHONE HYDROCHLORIDE 0.25 MG: 1 INJECTION, SOLUTION INTRAMUSCULAR; INTRAVENOUS; SUBCUTANEOUS at 16:18

## 2020-06-08 RX ADMIN — SUCCINYLCHOLINE CHLORIDE 100 MG: 20 INJECTION, SOLUTION INTRAMUSCULAR; INTRAVENOUS at 14:43

## 2020-06-08 RX ADMIN — FENTANYL CITRATE 50 MCG: 50 INJECTION, SOLUTION INTRAMUSCULAR; INTRAVENOUS at 14:47

## 2020-06-08 RX ADMIN — FENTANYL CITRATE 25 MCG: 50 INJECTION INTRAMUSCULAR; INTRAVENOUS at 16:31

## 2020-06-08 RX ADMIN — Medication 3 MG: at 15:23

## 2020-06-08 RX ADMIN — ROCURONIUM BROMIDE 20 MG: 10 INJECTION, SOLUTION INTRAVENOUS at 14:55

## 2020-06-08 RX ADMIN — OXYCODONE AND ACETAMINOPHEN 1 TABLET: 10; 325 TABLET ORAL at 17:18

## 2020-06-08 RX ADMIN — Medication 0.6 MG: at 15:23

## 2020-06-08 RX ADMIN — DEXAMETHASONE SODIUM PHOSPHATE 10 MG: 4 INJECTION, SOLUTION INTRAMUSCULAR; INTRAVENOUS at 15:23

## 2020-06-08 RX ADMIN — Medication 10 ML: at 20:21

## 2020-06-08 RX ADMIN — PRAVASTATIN SODIUM 40 MG: 20 TABLET ORAL at 19:19

## 2020-06-08 ASSESSMENT — PULMONARY FUNCTION TESTS
PIF_VALUE: 9
PIF_VALUE: 4
PIF_VALUE: 21
PIF_VALUE: 20
PIF_VALUE: 17
PIF_VALUE: 1
PIF_VALUE: 17
PIF_VALUE: 6
PIF_VALUE: 4
PIF_VALUE: 3
PIF_VALUE: 16
PIF_VALUE: 17
PIF_VALUE: 4
PIF_VALUE: 2
PIF_VALUE: 9
PIF_VALUE: 2
PIF_VALUE: 0
PIF_VALUE: 23
PIF_VALUE: 9
PIF_VALUE: 17
PIF_VALUE: 5
PIF_VALUE: 17
PIF_VALUE: 19
PIF_VALUE: 16
PIF_VALUE: 5
PIF_VALUE: 17
PIF_VALUE: 16
PIF_VALUE: 2
PIF_VALUE: 18
PIF_VALUE: 0
PIF_VALUE: 19
PIF_VALUE: 4
PIF_VALUE: 8
PIF_VALUE: 17
PIF_VALUE: 5
PIF_VALUE: 17
PIF_VALUE: 5
PIF_VALUE: 31
PIF_VALUE: 18
PIF_VALUE: 4
PIF_VALUE: 20
PIF_VALUE: 8
PIF_VALUE: 5
PIF_VALUE: 21
PIF_VALUE: 15
PIF_VALUE: 18
PIF_VALUE: 18
PIF_VALUE: 7
PIF_VALUE: 6
PIF_VALUE: 21
PIF_VALUE: 18
PIF_VALUE: 17
PIF_VALUE: 17
PIF_VALUE: 16
PIF_VALUE: 22
PIF_VALUE: 18
PIF_VALUE: 21
PIF_VALUE: 8
PIF_VALUE: 18
PIF_VALUE: 2
PIF_VALUE: 17
PIF_VALUE: 9
PIF_VALUE: 8
PIF_VALUE: 17
PIF_VALUE: 0

## 2020-06-08 ASSESSMENT — PAIN DESCRIPTION - FREQUENCY
FREQUENCY: CONTINUOUS
FREQUENCY: CONTINUOUS

## 2020-06-08 ASSESSMENT — PAIN DESCRIPTION - ONSET
ONSET: ON-GOING
ONSET: ON-GOING

## 2020-06-08 ASSESSMENT — PAIN DESCRIPTION - PAIN TYPE
TYPE: SURGICAL PAIN

## 2020-06-08 ASSESSMENT — PAIN DESCRIPTION - DESCRIPTORS
DESCRIPTORS: SORE
DESCRIPTORS: ACHING;DISCOMFORT

## 2020-06-08 ASSESSMENT — PAIN DESCRIPTION - ORIENTATION
ORIENTATION: MID
ORIENTATION: MID

## 2020-06-08 ASSESSMENT — LIFESTYLE VARIABLES: SMOKING_STATUS: 1

## 2020-06-08 ASSESSMENT — PAIN SCALES - GENERAL
PAINLEVEL_OUTOF10: 10
PAINLEVEL_OUTOF10: 8
PAINLEVEL_OUTOF10: 8
PAINLEVEL_OUTOF10: 10
PAINLEVEL_OUTOF10: 6
PAINLEVEL_OUTOF10: 8

## 2020-06-08 ASSESSMENT — PAIN DESCRIPTION - LOCATION
LOCATION: ABDOMEN;RIB CAGE
LOCATION: ABDOMEN

## 2020-06-08 ASSESSMENT — PAIN - FUNCTIONAL ASSESSMENT: PAIN_FUNCTIONAL_ASSESSMENT: PREVENTS OR INTERFERES SOME ACTIVE ACTIVITIES AND ADLS

## 2020-06-08 ASSESSMENT — PAIN DESCRIPTION - PROGRESSION: CLINICAL_PROGRESSION: NOT CHANGED

## 2020-06-08 NOTE — PROGRESS NOTES
Nutrition Assessment    Type and Reason for Visit: Initial, Positive Nutrition Screen, Consult    Nutrition Recommendations: Continue NPO. ADAT and start ONS when medically appropriate    Nutrition Assessment: Pt w/ moderate malnutrition on admit d/t muscle/fat wasting and poor PO intakes PTA 2/2 recent ex lap, SBR w/ ileostomy and hx COPD. Pt at further risk d/t increased nutrient needs for wound healing, pending OR for mesh explantation. Will monitor for diet progression. Malnutrition Assessment:  · Malnutrition Status: Meets the criteria for moderate malnutrition  · Context: Chronic illness  · Findings of the 6 clinical characteristics of malnutrition (Minimum of 2 out of 6 clinical characteristics is required to make the diagnosis of moderate or severe Protein Calorie Malnutrition based on AND/ASPEN Guidelines):  1. Energy Intake-Less than or equal to 75% of estimated energy requirement, Greater than or equal to 3 months    2. Weight Loss-10% loss or greater, in 6 months  3. Fat Loss-Mild subcutaneous fat loss, Triceps  4. Muscle Loss-Mild muscle mass loss, Temples (temporalis muscle), Clavicles (pectoralis and deltoids)  5. Fluid Accumulation-No significant fluid accumulation,    6.  Strength-Not measured    Nutrition Risk Level: Moderate    Nutrient Needs:  · Estimated Daily Total Kcal: 7924-6107( x 1.3 SF)  · Estimated Daily Protein (g): 80-95(1.5-1.8)  · Estimated Daily Total Fluid (ml/day): 6120-7965    Nutrition Diagnosis:   · Problem:  Moderate malnutrition, In context of chronic illness  · Etiology: related to Alteration in GI function     Signs and symptoms:  as evidenced by Diet history of poor intake, Weight loss greater than or equal to 10% in 6 months, Mild loss of subcutaneous fat, Mild muscle loss    Objective Information:  · Nutrition-Focused Physical Findings: pt alert, ileostomy RLQ, active BS, abd tenderness, no edema, -I/Os  · Wound Type: Multiple, Open Wounds  · Current

## 2020-06-08 NOTE — PROGRESS NOTES
Abdomen: Positive bowel sounds to auscultation. Benign to palpation. Flat, soft. Benign to palpation. Wound VAC. Colostomy with liquid stools. Extremities: No clubbing, no cyanosis, no edema. Lines: peripheral    Laboratory and Tests Review:  Lab Results   Component Value Date    WBC 5.3 06/08/2020    WBC 6.6 06/06/2020    WBC 7.9 06/05/2020    HGB 10.6 (L) 06/08/2020    HCT 34.4 06/08/2020    .9 (H) 06/08/2020     06/08/2020     Lab Results   Component Value Date    NEUTROABS 4.94 06/05/2020    NEUTROABS 6.61 05/07/2020    NEUTROABS 4.81 03/26/2020     No results found for: [facility] [[email or web address]]  Lab Results   Component Value Date    ALT 10 06/05/2020    AST 23 06/05/2020    ALKPHOS 84 06/05/2020    BILITOT 0.3 06/05/2020     Lab Results   Component Value Date     06/08/2020    K 4.0 06/08/2020     06/08/2020    CO2 18 06/08/2020    BUN 29 06/08/2020    CREATININE 1.4 06/08/2020    CREATININE 1.2 06/06/2020    CREATININE 1.3 06/05/2020    GFRAA 44 06/08/2020    LABGLOM 36 06/08/2020    GLUCOSE 90 06/08/2020    PROT 7.3 06/05/2020    LABALBU 3.7 06/05/2020    CALCIUM 9.3 06/08/2020    BILITOT 0.3 06/05/2020    ALKPHOS 84 06/05/2020    AST 23 06/05/2020    ALT 10 06/05/2020     Lab Results   Component Value Date    CRP 23.2 (H) 01/27/2020     Lab Results   Component Value Date    SEDRATE 60 (H) 01/27/2020     Radiology:      Microbiology:   Abdominal wound 1/26/2020 Candida albicans, E. coli, Morganella morganii, beta-hemolytic Streptococcus, Bacteroides fragilis  Abdominal wound culture 2/20/2020 MRSA, E. coli  Abdominal wound culture 5/7/2020 E. coli x2  Abdominal wound culture 5/12/2020 Candida glabrata, alpha hemolytic Streptococcus, Enterococcus faecalis  urine culture 6/5/2020 <10,000 GPO, E. coli  Wound culture 6/6/2020 GNR, GNR  Tissue culture abdomen 6/8/2020: Pending    ASSESSMENT:  · Abdominal wall with mesh infection with multiple organisms.   Status post partial mesh removal  · Abdominal

## 2020-06-08 NOTE — PATIENT CARE CONFERENCE
[facility] Quality Flow/Interdisciplinary Rounds Progress Note        Quality Flow Rounds held on June 8, 2020    Disciplines Attending:  Bedside Nurse, ,  and Nursing Unit Leadership    Teresa Meier was admitted on 6/5/2020  4:28 PM    Anticipated Discharge Date:  Expected Discharge Date: 06/08/20    Disposition:    Roland Score:  Roland Scale Score: 20    Readmission Risk              Risk of Unplanned Readmission:        24           Discussed patient goal for the day, patient clinical progression, and barriers to discharge.   The following Goal(s) of the Day/Commitment(s) have been identified:  Diagnostics - Report Results- OR today      Betito Zhao  June 8, 2020

## 2020-06-08 NOTE — OP NOTE
Operative Note      Patient: Duncan Inman  YOB: 1940  MRN: [id number]    Date of Procedure: 6/8/2020    Pre-Op Diagnosis: Abdominal wound infection    Post-Op Diagnosis: Same, retained mesh       Procedure(s):  EXPLANTATION OF HERNIA MESH    Surgeon(s):  Iglesia Chandler MD    Assistant:   Resident: Dung Aleman DO    Anesthesia: General    Estimated Blood Loss (mL): Minimal    Complications: None    Specimens:   ID Type Source Tests Collected by Time Destination   1 :  Tissue Tissue CULTURE, ANAEROBIC, CULTURE, FUNGUS, GRAM STAIN, CULTURE, SURGICAL, CULTURE WITH SMEAR, ACID FAST Benjamin Mack MD 6/8/2020 1505    A :  [address] [facility] Iglesia Chandler MD 6/8/2020 1507        Implants:  * No implants in log *      Drains:   Negative Pressure Wound Therapy Abdomen Anterior;Medial (Active)       Ileostomy Ileostomy RLQ (Active)   Stomal Appliance 1 piece;Clean;Dry; Intact 5/13/2020  7:31 PM   Stoma  Assessment Protrudes; Red 6/8/2020 12:05 PM   Mucocutaneous Junction Intact 6/8/2020 12:05 PM   Peristomal Assessment Clean; Intact 5/15/2020  8:06 AM   Stool Color Yellow;Brown 6/8/2020 12:05 PM   Stool Appearance Watery; Loose 6/8/2020 12:05 PM   Stool Amount Medium 6/8/2020 12:05 PM   Output (mL) 50 ml 6/8/2020  3:38 PM       [REMOVED] Negative Pressure Wound Therapy Abdomen (Removed)       [REMOVED] Ileostomy Ileostomy RLQ (Removed)       Findings: 8 x 15cm retained mesh right lateral abdominal wall    Detailed Description of Procedure: The patient was brought to the operating room and positioned supine on the OR table. Sequential compression devices were placed on the patient's lower extremities and functioning. Preoperative antibiotics were administered. Anesthesia was obtained without complication as per the anesthesia record. Immediately prior to the procedure a time-out was called and the surgical checklist was reviewed and agreed upon by all present.  The patient was prepped and draped in the usual sterile fashion. There was exposed mesh within the midline abdominal wound. A subcutaneous flap was created between the skin and the mesh using blunt finger dissection. The wound was enlarged superiorly and laterally to facilitate removal of the mesh. Using a combination of blunt and sharp dissection with a scissors and cautery, the 8cm x 15cm piece of mesh was removed from the anterior rectus sheath. The space encompassed from the anterior abdominal midline, lateral to the anterior axillary line, and superior to the costal margin. The wound was irrigated and bleeding points were cauterized. The wound margins lateral and superior that were enlarged were then reapproximated using deep dermal 3-0 vicryl sutures. A large black wound vac sponge was cut to size and placed within the wound cavity. The skin was washed and dried and the sterile tape dressing applied. The dressing was attached to suction and had a good seal.     Needle, sponge, and instrument counts were reported as correct x2. Dr. Meng Monsivais was present and scrubbed throughout the case. The patient tolerated the procedure well without complications. She was transferred to the recovery area in good condition.     Electronically signed by Ally Marie DO on 6/8/2020 at 3:44 PM

## 2020-06-08 NOTE — ANESTHESIA PRE PROCEDURE
06/08/20 0919    Vitamin D3 CAPS 2,000 Units  2,000 Units Oral BID Raghu Parra MD   Stopped at 06/08/20 2872    dicyclomine (BENTYL) capsule 10 mg  10 mg Oral BID Raghu Parra MD   Stopped at 06/08/20 0919    dilTIAZem (CARDIZEM CD) extended release capsule 180 mg  180 mg Oral Daily Raghu Parra MD   Stopped at 06/08/20 0919    escitalopram (LEXAPRO) tablet 20 mg  20 mg Oral Nightly Kayleigh STRONG MD   20 mg at 06/07/20 2010    pravastatin (PRAVACHOL) tablet 40 mg  40 mg Oral QPM Raghu Parra MD   40 mg at 06/07/20 1713    primidone (MYSOLINE) tablet 250 mg  250 mg Oral Nightly Kayleigh STRONG MD   250 mg at 06/07/20 2010    propranolol (INDERAL) tablet 20 mg  20 mg Oral Daily Raghu Parra MD   Stopped at 06/08/20 0920    traZODone (DESYREL) tablet 25 mg  25 mg Oral Nightly Kayleigh STRONG MD   25 mg at 06/07/20 2224    vitamin B-12 (CYANOCOBALAMIN) tablet 500 mcg  500 mcg Oral Daily Raghu Parra MD   Stopped at 06/08/20 1743    fenofibrate (TRIGLIDE) tablet 160 mg  160 mg Oral Daily Raghu Parra MD   Stopped at 06/08/20 0920    pantoprazole (PROTONIX) tablet 40 mg  40 mg Oral QAM AC Kayleigh STRONG MD   40 mg at 06/07/20 0557    sodium chloride flush 0.9 % injection 10 mL  10 mL Intravenous 2 times per day Raghu Parra MD   10 mL at 06/08/20 0922    sodium chloride flush 0.9 % injection 10 mL  10 mL Intravenous PRN Kayleigh STRONG MD   10 mL at 06/06/20 2214    enoxaparin (LOVENOX) injection 30 mg  30 mg Subcutaneous Daily Kayleigh STRONG MD   30 mg at 06/07/20 0849    ondansetron (ZOFRAN) injection 4 mg  4 mg Intravenous Q6H PRN Raghu Parra MD   4 mg at 06/07/20 2000       Allergies: Allergies   Allergen Reactions    Fentanyl Itching     Patch only    Levofloxacin Other (See Comments)     Leg edema/pain    Tramadol Nausea Only     Nausea .  Sleeplisness, shakey    Vioxx [Rofecoxib]      Leg edema and pain       Problem List:    Patient Active Problem List   Diagnosis Code    Right cataract H26.9    Essential hypertension, benign I10    Hyperlipidemia with target LDL less than 100 E78.5    Osteoarthritis, 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    Primary osteoarthritis of left knee M17.12    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    Ileostomy present ([facility] Z93.2    Chronic kidney disease, stage III (moderate) ([facility]) N18.3    COPD (chronic obstructive pulmonary disease) ([facility] J44.9    Abdominal wall cellulitis L03.311    Cellulitis L03.90       Past Medical History:        Diagnosis Date    Acute kidney failure ([facility] 2014 and 8/2016    x2,no dialysis needed,resolved, kidney function tests returned to normal    Anemia     h/o    Anxiety     Arthritis     Bladder calculus     h/o    CAD (coronary artery disease)     follows with Dr. Inez Sánchez every 6 months    Cancer ([facility]     skin, leg,nose    COPD (chronic obstructive pulmonary disease) ([facility])     mild    Depression     Fibromyalgia     chronic back and neck pain    Gastritis     Generalized headaches     h/o / daily    History of blood transfusion 3-11-14    multiple times    History of necrotic bowel 2012    Hyperlipidemia     Hypertension     Incisional hernia     abdomen    Insomnia     Mitral valve regurgitation     Myocardial infarct ([facility] 2002    Nausea     daily / since last hernia surgery in 3/2015    Occasional tremors     at hands / stable with medication    Osteopenia     PONV (postoperative nausea and vomiting)     Vitamin B12 deficiency     h/o       Past Surgical History:        Procedure Laterality Date    ABDOMEN SURGERY  2-    total colectomy, bowel resection, ileostomy,revision of ileostomy     ABDOMEN SURGERY N/A 1/26/2020    DRAINAGE OF ABDOMINAL WALL ABSCESS, EXPLANTATION OF MESH, DEBRIDEMENT OF SKIN AND SUBCUTANEOUS TISSUE [facility]    ARTHROPLASTY  1-2006    right and left thumb

## 2020-06-09 LAB
ANION GAP SERPL CALCULATED.3IONS-SCNC: 10 MMOL/L (ref 7–16)
BASOPHILS ABSOLUTE: 0.04 E9/L (ref 0–0.2)
BASOPHILS RELATIVE PERCENT: 0.6 % (ref 0–2)
BUN BLDV-MCNC: 36 MG/DL (ref 8–23)
CALCIUM SERPL-MCNC: 8.8 MG/DL (ref 8.6–10.2)
CHLORIDE BLD-SCNC: 108 MMOL/L (ref 98–107)
CO2: 18 MMOL/L (ref 22–29)
CREAT SERPL-MCNC: 1.4 MG/DL (ref 0.5–1)
EOSINOPHILS ABSOLUTE: 0.06 E9/L (ref 0.05–0.5)
EOSINOPHILS RELATIVE PERCENT: 0.9 % (ref 0–6)
GFR AFRICAN AMERICAN: 44
GFR NON-AFRICAN AMERICAN: 36 ML/MIN/1.73
GLUCOSE BLD-MCNC: 103 MG/DL (ref 74–99)
GRAM STAIN ORDERABLE: NORMAL
HCT VFR BLD CALC: 31.9 % (ref 34–48)
HEMOGLOBIN: 9.8 G/DL (ref 11.5–15.5)
IMMATURE GRANULOCYTES #: 0.01 E9/L
IMMATURE GRANULOCYTES %: 0.2 % (ref 0–5)
LYMPHOCYTES ABSOLUTE: 1.18 E9/L (ref 1.5–4)
LYMPHOCYTES RELATIVE PERCENT: 17.9 % (ref 20–42)
MCH RBC QN AUTO: 31.2 PG (ref 26–35)
MCHC RBC AUTO-ENTMCNC: 30.7 % (ref 32–34.5)
MCV RBC AUTO: 101.6 FL (ref 80–99.9)
MONOCYTES ABSOLUTE: 0.68 E9/L (ref 0.1–0.95)
MONOCYTES RELATIVE PERCENT: 10.3 % (ref 2–12)
MRSA CULTURE ONLY: NORMAL
NEUTROPHILS ABSOLUTE: 4.64 E9/L (ref 1.8–7.3)
NEUTROPHILS RELATIVE PERCENT: 70.1 % (ref 43–80)
PDW BLD-RTO: 15.4 FL (ref 11.5–15)
PLATELET # BLD: 338 E9/L (ref 130–450)
PMV BLD AUTO: 12.1 FL (ref 7–12)
POTASSIUM REFLEX MAGNESIUM: 4.5 MMOL/L (ref 3.5–5)
RBC # BLD: 3.14 E12/L (ref 3.5–5.5)
SODIUM BLD-SCNC: 136 MMOL/L (ref 132–146)
WBC # BLD: 6.6 E9/L (ref 4.5–11.5)

## 2020-06-09 PROCEDURE — 2500000003 HC RX 250 WO HCPCS: Performed by: SPECIALIST

## 2020-06-09 PROCEDURE — 6370000000 HC RX 637 (ALT 250 FOR IP): Performed by: STUDENT IN AN ORGANIZED HEALTH CARE EDUCATION/TRAINING PROGRAM

## 2020-06-09 PROCEDURE — 6360000002 HC RX W HCPCS: Performed by: SURGERY

## 2020-06-09 PROCEDURE — 02HV33Z INSERTION OF INFUSION DEVICE INTO SUPERIOR VENA CAVA, PERCUTANEOUS APPROACH: ICD-10-PCS | Performed by: SURGERY

## 2020-06-09 PROCEDURE — 80048 BASIC METABOLIC PNL TOTAL CA: CPT

## 2020-06-09 PROCEDURE — 85025 COMPLETE CBC W/AUTO DIFF WBC: CPT

## 2020-06-09 PROCEDURE — 36415 COLL VENOUS BLD VENIPUNCTURE: CPT

## 2020-06-09 PROCEDURE — 1200000000 HC SEMI PRIVATE

## 2020-06-09 PROCEDURE — 2700000000 HC OXYGEN THERAPY PER DAY

## 2020-06-09 PROCEDURE — 2580000003 HC RX 258: Performed by: SPECIALIST

## 2020-06-09 PROCEDURE — 6360000002 HC RX W HCPCS: Performed by: STUDENT IN AN ORGANIZED HEALTH CARE EDUCATION/TRAINING PROGRAM

## 2020-06-09 PROCEDURE — 2580000003 HC RX 258: Performed by: STUDENT IN AN ORGANIZED HEALTH CARE EDUCATION/TRAINING PROGRAM

## 2020-06-09 PROCEDURE — 6360000002 HC RX W HCPCS: Performed by: SPECIALIST

## 2020-06-09 PROCEDURE — 6370000000 HC RX 637 (ALT 250 FOR IP): Performed by: SPECIALIST

## 2020-06-09 RX ORDER — LANOLIN ALCOHOL/MO/W.PET/CERES
50 CREAM (GRAM) TOPICAL DAILY
Status: DISCONTINUED | OUTPATIENT
Start: 2020-06-09 | End: 2020-06-10 | Stop reason: HOSPADM

## 2020-06-09 RX ORDER — OXYCODONE HYDROCHLORIDE 15 MG/1
15 TABLET ORAL EVERY 4 HOURS PRN
Status: DISCONTINUED | OUTPATIENT
Start: 2020-06-09 | End: 2020-06-10 | Stop reason: HOSPADM

## 2020-06-09 RX ORDER — SODIUM CHLORIDE 9 MG/ML
INJECTION, SOLUTION INTRAVENOUS EVERY 8 HOURS
Status: DISCONTINUED | OUTPATIENT
Start: 2020-06-09 | End: 2020-06-10 | Stop reason: HOSPADM

## 2020-06-09 RX ORDER — ACETAMINOPHEN 500 MG
1000 TABLET ORAL EVERY 8 HOURS SCHEDULED
Status: DISCONTINUED | OUTPATIENT
Start: 2020-06-09 | End: 2020-06-10 | Stop reason: HOSPADM

## 2020-06-09 RX ORDER — LINEZOLID 600 MG/1
600 TABLET, FILM COATED ORAL EVERY 12 HOURS SCHEDULED
Status: DISCONTINUED | OUTPATIENT
Start: 2020-06-09 | End: 2020-06-10 | Stop reason: HOSPADM

## 2020-06-09 RX ORDER — OXYCODONE HYDROCHLORIDE 5 MG/1
10 TABLET ORAL EVERY 4 HOURS PRN
Status: DISCONTINUED | OUTPATIENT
Start: 2020-06-09 | End: 2020-06-10 | Stop reason: HOSPADM

## 2020-06-09 RX ADMIN — OXYCODONE HYDROCHLORIDE 15 MG: 15 TABLET ORAL at 15:58

## 2020-06-09 RX ADMIN — ENOXAPARIN SODIUM 30 MG: 30 INJECTION SUBCUTANEOUS at 08:16

## 2020-06-09 RX ADMIN — PRAVASTATIN SODIUM 40 MG: 20 TABLET ORAL at 20:38

## 2020-06-09 RX ADMIN — HYDROMORPHONE HYDROCHLORIDE 1 MG: 1 INJECTION, SOLUTION INTRAMUSCULAR; INTRAVENOUS; SUBCUTANEOUS at 12:58

## 2020-06-09 RX ADMIN — OXYCODONE HYDROCHLORIDE 15 MG: 15 TABLET ORAL at 23:19

## 2020-06-09 RX ADMIN — Medication 500 MCG: at 08:15

## 2020-06-09 RX ADMIN — PANTOPRAZOLE SODIUM 40 MG: 40 TABLET, DELAYED RELEASE ORAL at 05:28

## 2020-06-09 RX ADMIN — Medication 10 ML: at 20:40

## 2020-06-09 RX ADMIN — OXYCODONE HYDROCHLORIDE 15 MG: 15 TABLET ORAL at 08:16

## 2020-06-09 RX ADMIN — LINEZOLID 600 MG: 600 TABLET, FILM COATED ORAL at 20:38

## 2020-06-09 RX ADMIN — FENOFIBRATE 160 MG: 160 TABLET ORAL at 08:14

## 2020-06-09 RX ADMIN — OXYCODONE AND ACETAMINOPHEN 1 TABLET: 10; 325 TABLET ORAL at 00:46

## 2020-06-09 RX ADMIN — DICYCLOMINE HYDROCHLORIDE 10 MG: 10 CAPSULE ORAL at 08:15

## 2020-06-09 RX ADMIN — PIPERACILLIN AND TAZOBACTAM 3.38 G: 3; .375 INJECTION, POWDER, FOR SOLUTION INTRAVENOUS at 09:58

## 2020-06-09 RX ADMIN — HYDROMORPHONE HYDROCHLORIDE 1 MG: 1 INJECTION, SOLUTION INTRAMUSCULAR; INTRAVENOUS; SUBCUTANEOUS at 09:58

## 2020-06-09 RX ADMIN — PRIMIDONE 250 MG: 250 TABLET ORAL at 20:38

## 2020-06-09 RX ADMIN — CALCIUM 500 MG: 500 TABLET ORAL at 08:15

## 2020-06-09 RX ADMIN — ONDANSETRON 4 MG: 2 INJECTION INTRAMUSCULAR; INTRAVENOUS at 18:00

## 2020-06-09 RX ADMIN — SODIUM CHLORIDE, PRESERVATIVE FREE 10 ML: 5 INJECTION INTRAVENOUS at 02:03

## 2020-06-09 RX ADMIN — Medication 10 ML: at 08:17

## 2020-06-09 RX ADMIN — HYDROMORPHONE HYDROCHLORIDE 1 MG: 1 INJECTION, SOLUTION INTRAMUSCULAR; INTRAVENOUS; SUBCUTANEOUS at 02:01

## 2020-06-09 RX ADMIN — PYRIDOXINE HCL TAB 50 MG 50 MG: 50 TAB at 15:59

## 2020-06-09 RX ADMIN — HYDROMORPHONE HYDROCHLORIDE 1 MG: 1 INJECTION, SOLUTION INTRAMUSCULAR; INTRAVENOUS; SUBCUTANEOUS at 20:39

## 2020-06-09 RX ADMIN — DICYCLOMINE HYDROCHLORIDE 10 MG: 10 CAPSULE ORAL at 20:38

## 2020-06-09 RX ADMIN — DEXTROSE MONOHYDRATE 100 MG: 50 INJECTION, SOLUTION INTRAVENOUS at 22:58

## 2020-06-09 RX ADMIN — LIDOCAINE HYDROCHLORIDE 1 ML: 10 INJECTION, SOLUTION EPIDURAL; INFILTRATION; INTRACAUDAL; PERINEURAL at 14:32

## 2020-06-09 RX ADMIN — PIPERACILLIN AND TAZOBACTAM 3.38 G: 3; .375 INJECTION, POWDER, FOR SOLUTION INTRAVENOUS at 18:08

## 2020-06-09 ASSESSMENT — PAIN SCALES - GENERAL
PAINLEVEL_OUTOF10: 7
PAINLEVEL_OUTOF10: 10
PAINLEVEL_OUTOF10: 7
PAINLEVEL_OUTOF10: 10
PAINLEVEL_OUTOF10: 7
PAINLEVEL_OUTOF10: 8
PAINLEVEL_OUTOF10: 7
PAINLEVEL_OUTOF10: 9

## 2020-06-09 ASSESSMENT — PAIN - FUNCTIONAL ASSESSMENT
PAIN_FUNCTIONAL_ASSESSMENT: PREVENTS OR INTERFERES SOME ACTIVE ACTIVITIES AND ADLS

## 2020-06-09 ASSESSMENT — PAIN DESCRIPTION - ORIENTATION: ORIENTATION: MID

## 2020-06-09 ASSESSMENT — PAIN DESCRIPTION - FREQUENCY
FREQUENCY: CONTINUOUS

## 2020-06-09 ASSESSMENT — PAIN DESCRIPTION - PAIN TYPE
TYPE: SURGICAL PAIN
TYPE: CHRONIC PAIN
TYPE: SURGICAL PAIN
TYPE: SURGICAL PAIN

## 2020-06-09 ASSESSMENT — PAIN DESCRIPTION - DESCRIPTORS
DESCRIPTORS: ACHING;DISCOMFORT;SORE
DESCRIPTORS: ACHING;SORE
DESCRIPTORS: ACHING;DISCOMFORT;SORE
DESCRIPTORS: SORE

## 2020-06-09 ASSESSMENT — PAIN DESCRIPTION - PROGRESSION
CLINICAL_PROGRESSION: NOT CHANGED

## 2020-06-09 ASSESSMENT — PAIN DESCRIPTION - LOCATION
LOCATION: ABDOMEN;RIB CAGE
LOCATION: BACK;NECK

## 2020-06-09 ASSESSMENT — PAIN DESCRIPTION - ONSET
ONSET: ON-GOING
ONSET: ON-GOING
ONSET: GRADUAL
ONSET: ON-GOING

## 2020-06-09 NOTE — PATIENT CARE CONFERENCE
[facility] Quality Flow/Interdisciplinary Rounds Progress Note        Quality Flow Rounds held on June 9, 2020    Disciplines Attending:  Bedside Nurse, ,  and Nursing Unit Leadership    Polo Padron was admitted on 6/5/2020  4:28 PM    Anticipated Discharge Date:  Expected Discharge Date: 06/08/20    Disposition:    Roland Score:  Roland Scale Score: 20    Readmission Risk              Risk of Unplanned Readmission:        25           Discussed patient goal for the day, patient clinical progression, and barriers to discharge.   The following Goal(s) of the Day/Commitment(s) have been identified:  Diagnostics - Report Results      Fred Corado  June 9, 2020

## 2020-06-09 NOTE — CARE COORDINATION
Social Work / Discharge Planning : ARACELI noted PICC order. ARACELI called IDALIA from [place] [id number] and updated her in regards to IV antibiotics at discharge. Await benefits. SW to follow. Electronically signed by CASSI Youssef on 6/9/20 at 7:57 AM EDT    Addendum :ARACELI called Janine from [place] [id number] and made referral for IV medication at discharge. IDALIA from [place] [id number] updated. AWait final ID plan to run benefits.  . Electronically signed by CASSI Youssef on 6/9/20 at 9:38 AM EDT

## 2020-06-09 NOTE — PROCEDURES
PICC  Catheter insertion date 6/9/2020     Product Number:  [id number]    Lot No: [id number]   Gauge: 4 FR   Lumen: SINGLE   R Basilic    Vein Diameter : 0.52 CM   Upper arm circumference (10CM ABOVE AC): 26 CM   Catheter Length : 39 CM   Internal Length: 38 CM   External Catheter Length: 1 CM   Ultrasound Used:YES  [facility] [place] confirms PICC tip is placed in the lower 1/3 of the SVC or at the Cavoatrial junction. Floor nurse notified PICC is okay to use.    : NOEL Stout

## 2020-06-09 NOTE — PROGRESS NOTES
[address] [place] [address] [facility]  Progress Note  Face to face encounter   SUBJECTIVE:  Chief Complaint   Patient presents with    Wound Check     patient had an abdominal wall abscess post abdominal surgery; Dr. Tima Sousa instructed patient to come to ED for worsening symptoms      The patient has pain to abdominal area- wound vac in place  Tolerating antibiotics. No nausea or vomiting. Sitting up at bedside toilet     Review of systems:  As stated above in the chief complaint, otherwise negative. Medications:  Scheduled Meds:   acetaminophen  1,000 mg Oral 3 times per day    piperacillin-tazobactam  3.375 g Intravenous Q8H    anidulafungin  100 mg Intravenous Q24H    lidocaine PF  5 mL Intradermal Once    heparin flush  3 mL Intravenous 2 times per day    nicotine  1 patch Transdermal Daily    calcium elemental  500 mg Oral Daily    Vitamin D3  2,000 Units Oral BID    dicyclomine  10 mg Oral BID    dilTIAZem  180 mg Oral Daily    escitalopram  20 mg Oral Nightly    pravastatin  40 mg Oral QPM    primidone  250 mg Oral Nightly    propranolol  20 mg Oral Daily    traZODone  25 mg Oral Nightly    vitamin B-12  500 mcg Oral Daily    fenofibrate  160 mg Oral Daily    pantoprazole  40 mg Oral QAM AC    sodium chloride flush  10 mL Intravenous 2 times per day    enoxaparin  30 mg Subcutaneous Daily     Continuous Infusions:   sodium chloride       PRN Meds:oxyCODONE **OR** oxyCODONE, sodium chloride flush, heparin flush, HYDROmorphone, sodium chloride flush, ondansetron    OBJECTIVE:  BP (!) 100/48   Pulse 84   Temp 98.3 °F (36.8 °C) (Oral)   Resp 18   Ht 5' (1.524 m)   Wt 146 lb 12.8 oz (66.6 kg)   SpO2 94%   BMI 28.67 kg/m²   Temp  Av.6 °F (36.4 °C)  Min: 97.2 °F (36.2 °C)  Max: 98.8 °F (37.1 °C)  Constitutional: The patient is awake, alert, and oriented. Sitting up at bedside toilet. No distress. Skin: Warm and dry. No rashes were noted.    HEENT: Round and

## 2020-06-09 NOTE — PROGRESS NOTES
Department of Surgery - Adult  General Surgery  Dr. John Flores's Progress Note      SUBJECTIVE:  Overall the patient is doing well    OBJECTIVE      Physical    VITALS:  BP (!) 100/48   Pulse 84   Temp 98.3 °F (36.8 °C) (Oral)   Resp 18   Ht 5' (1.524 m)   Wt 146 lb 12.8 oz (66.6 kg)   SpO2 94%   BMI 28.67 kg/m²   INTAKE/OUTPUT:      Intake/Output Summary (Last 24 hours) at 2020 0940  Last data filed at 2020 0800  Gross per 24 hour   Intake 720 ml   Output 725 ml   Net -5 ml     TEMPERATURE:  Current - Temp: 98.3 °F (36.8 °C); Max - Temp  Av.5 °F (36.4 °C)  Min: 97.2 °F (36.2 °C)  Max: 98.8 °F (37.1 °C)  RESPIRATIONS RANGE: Resp  Av.2  Min: 1  Max: 34  PULSE RANGE: Pulse  Av.4  Min: 78  Max: 100  BLOOD PRESSURE RANGE:  Systolic (24SIQ), XJI:423 , Min:94 , WWQ:754   ; Diastolic (19KCR), PJY:11, Min:48, Max:107    PULSE OXIMETRY RANGE: SpO2  Av.7 %  Min: 92 %  Max: 100 %  CONSTITUTIONAL:  awake, alert, cooperative, no apparent distress, and appears stated age  LUNGS:  No increased work of breathing, good air exchange, clear to auscultation bilaterally, no crackles or wheezing  CARDIOVASCULAR:  regular rate and rhythm and no murmur noted  ABDOMEN:  As above  Data  CBC with Differential:  The wound vac is intact without discharge.  The patient has appropriate incisional tenderness  Lab Results   Component Value Date    WBC 6.6 2020    RBC 3.14 2020    HGB 9.8 2020    HCT 31.9 2020     2020    .6 2020    MCH 31.2 2020    MCHC 30.7 2020    RDW 15.4 2020    LYMPHOPCT 17.9 2020    MONOPCT 10.3 2020    BASOPCT 0.6 2020    MONOSABS 0.68 2020    LYMPHSABS 1.18 2020    EOSABS 0.06 2020    BASOSABS 0.04 2020     CMP:    Lab Results   Component Value Date     2020    K 4.5 2020     2020    CO2 18 2020    BUN 36 2020    CREATININE 1.4 2020 GFRAA 44 06/09/2020    LABGLOM 36 06/09/2020    GLUCOSE 103 06/09/2020    PROT 7.3 06/05/2020    LABALBU 3.7 06/05/2020    CALCIUM 8.8 06/09/2020    BILITOT 0.3 06/05/2020    ALKPHOS 84 06/05/2020    AST 23 06/05/2020    ALT 10 06/05/2020     BMP:  Hepatic Function Panel:  Ionized Calcium:  No results found for: IONCA  Magnesium:    Lab Results   Component Value Date    MG 1.5 05/11/2020     Phosphorus:    Lab Results   Component Value Date    PHOS 3.7 07/07/2018       Current Inpatient Medications    Current Facility-Administered Medications: oxyCODONE (ROXICODONE) immediate release tablet 10 mg, 10 mg, Oral, Q4H PRN **OR** oxyCODONE (OXY-IR) immediate release tablet 15 mg, 15 mg, Oral, Q4H PRN  acetaminophen (TYLENOL) tablet 1,000 mg, 1,000 mg, Oral, 3 times per day  piperacillin-tazobactam (ZOSYN) 3.375 g in dextrose 5 % 50 mL IVPB extended infusion (mini-bag), 3.375 g, Intravenous, Q8H **AND** 0.9 % sodium chloride infusion, , Intravenous, Q8H  [COMPLETED] anidulafungin (ERAXIS) 200 mg in dextrose 5 % 260 mL IVPB, 200 mg, Intravenous, Once **AND** anidulafungin (ERAXIS) 100 mg in dextrose 5 % 130 mL IVPB, 100 mg, Intravenous, Q24H  lidocaine PF 1 % injection 5 mL, 5 mL, Intradermal, Once  sodium chloride flush 0.9 % injection 10 mL, 10 mL, Intravenous, PRN  heparin flush 100 UNIT/ML injection 300 Units, 3 mL, Intravenous, 2 times per day  heparin flush 100 UNIT/ML injection 300 Units, 3 mL, Intercatheter, PRN  HYDROmorphone (DILAUDID) injection 1 mg, 1 mg, Intravenous, Q3H PRN  nicotine (NICODERM CQ) 7 MG/24HR 1 patch, 1 patch, Transdermal, Daily  calcium elemental (OSCAL) tablet 500 mg, 500 mg, Oral, Daily  Vitamin D3 CAPS 2,000 Units, 2,000 Units, Oral, BID  dicyclomine (BENTYL) capsule 10 mg, 10 mg, Oral, BID  dilTIAZem (CARDIZEM CD) extended release capsule 180 mg, 180 mg, Oral, Daily  escitalopram (LEXAPRO) tablet 20 mg, 20 mg, Oral, Nightly  pravastatin (PRAVACHOL) tablet 40 mg, 40 mg, Oral, QPM  primidone (MYSOLINE) tablet 250 mg, 250 mg, Oral, Nightly  propranolol (INDERAL) tablet 20 mg, 20 mg, Oral, Daily  traZODone (DESYREL) tablet 25 mg, 25 mg, Oral, Nightly  vitamin B-12 (CYANOCOBALAMIN) tablet 500 mcg, 500 mcg, Oral, Daily  fenofibrate (TRIGLIDE) tablet 160 mg, 160 mg, Oral, Daily  pantoprazole (PROTONIX) tablet 40 mg, 40 mg, Oral, QAM AC  sodium chloride flush 0.9 % injection 10 mL, 10 mL, Intravenous, 2 times per day  sodium chloride flush 0.9 % injection 10 mL, 10 mL, Intravenous, PRN  enoxaparin (LOVENOX) injection 30 mg, 30 mg, Subcutaneous, Daily  ondansetron (ZOFRAN) injection 4 mg, 4 mg, Intravenous, Q6H PRN    ASSESSMENT:    68 y.o. female with ongoing abdominal wall infections with mesh    PLAN:    Continue wound vac   IV Antibiotics     Tosha Trimble 6/9/20209:40 AM

## 2020-06-09 NOTE — PROGRESS NOTES
IV team requesting clearance from [facility] for PICC line. Patient is known to Dr. Gauri [place]. Spoke to Dr. Gauri [place] and patient is ok for picc placement from her POV.     Electronically signed by Leah العلي RN on 6/9/2020 at 11:46 AM

## 2020-06-10 VITALS
OXYGEN SATURATION: 93 % | RESPIRATION RATE: 18 BRPM | WEIGHT: 146.77 LBS | HEART RATE: 71 BPM | SYSTOLIC BLOOD PRESSURE: 113 MMHG | HEIGHT: 60 IN | DIASTOLIC BLOOD PRESSURE: 53 MMHG | BODY MASS INDEX: 28.81 KG/M2 | TEMPERATURE: 98.2 F

## 2020-06-10 PROBLEM — T85.79XA INFECTED HERNIOPLASTY MESH (HCC): Status: ACTIVE | Noted: 2020-06-10

## 2020-06-10 LAB
ANAEROBIC CULTURE: NORMAL
ANION GAP SERPL CALCULATED.3IONS-SCNC: 8 MMOL/L (ref 7–16)
BASOPHILS ABSOLUTE: 0.04 E9/L (ref 0–0.2)
BASOPHILS RELATIVE PERCENT: 0.9 % (ref 0–2)
BLOOD CULTURE, ROUTINE: NORMAL
BUN BLDV-MCNC: 39 MG/DL (ref 8–23)
CALCIUM SERPL-MCNC: 9.2 MG/DL (ref 8.6–10.2)
CHLORIDE BLD-SCNC: 107 MMOL/L (ref 98–107)
CO2: 20 MMOL/L (ref 22–29)
CREAT SERPL-MCNC: 1.3 MG/DL (ref 0.5–1)
CULTURE, BLOOD 2: NORMAL
EOSINOPHILS ABSOLUTE: 0.36 E9/L (ref 0.05–0.5)
EOSINOPHILS RELATIVE PERCENT: 8 % (ref 0–6)
GFR AFRICAN AMERICAN: 48
GFR NON-AFRICAN AMERICAN: 40 ML/MIN/1.73
GLUCOSE BLD-MCNC: 97 MG/DL (ref 74–99)
HCT VFR BLD CALC: 29.7 % (ref 34–48)
HEMOGLOBIN: 8.9 G/DL (ref 11.5–15.5)
IMMATURE GRANULOCYTES #: 0.02 E9/L
IMMATURE GRANULOCYTES %: 0.4 % (ref 0–5)
LYMPHOCYTES ABSOLUTE: 1.08 E9/L (ref 1.5–4)
LYMPHOCYTES RELATIVE PERCENT: 24.1 % (ref 20–42)
MCH RBC QN AUTO: 30.8 PG (ref 26–35)
MCHC RBC AUTO-ENTMCNC: 30 % (ref 32–34.5)
MCV RBC AUTO: 102.8 FL (ref 80–99.9)
MONOCYTES ABSOLUTE: 0.44 E9/L (ref 0.1–0.95)
MONOCYTES RELATIVE PERCENT: 9.8 % (ref 2–12)
NEUTROPHILS ABSOLUTE: 2.55 E9/L (ref 1.8–7.3)
NEUTROPHILS RELATIVE PERCENT: 56.8 % (ref 43–80)
ORGANISM: ABNORMAL
PDW BLD-RTO: 15.6 FL (ref 11.5–15)
PLATELET # BLD: 279 E9/L (ref 130–450)
PMV BLD AUTO: 11.5 FL (ref 7–12)
POTASSIUM REFLEX MAGNESIUM: 4 MMOL/L (ref 3.5–5)
RBC # BLD: 2.89 E12/L (ref 3.5–5.5)
SODIUM BLD-SCNC: 135 MMOL/L (ref 132–146)
WBC # BLD: 4.5 E9/L (ref 4.5–11.5)
WOUND/ABSCESS: ABNORMAL

## 2020-06-10 PROCEDURE — 6370000000 HC RX 637 (ALT 250 FOR IP): Performed by: SPECIALIST

## 2020-06-10 PROCEDURE — 6360000002 HC RX W HCPCS: Performed by: STUDENT IN AN ORGANIZED HEALTH CARE EDUCATION/TRAINING PROGRAM

## 2020-06-10 PROCEDURE — 80048 BASIC METABOLIC PNL TOTAL CA: CPT

## 2020-06-10 PROCEDURE — 2580000003 HC RX 258: Performed by: STUDENT IN AN ORGANIZED HEALTH CARE EDUCATION/TRAINING PROGRAM

## 2020-06-10 PROCEDURE — 36592 COLLECT BLOOD FROM PICC: CPT

## 2020-06-10 PROCEDURE — 6360000002 HC RX W HCPCS: Performed by: SPECIALIST

## 2020-06-10 PROCEDURE — 36415 COLL VENOUS BLD VENIPUNCTURE: CPT

## 2020-06-10 PROCEDURE — 6370000000 HC RX 637 (ALT 250 FOR IP): Performed by: STUDENT IN AN ORGANIZED HEALTH CARE EDUCATION/TRAINING PROGRAM

## 2020-06-10 PROCEDURE — 6360000002 HC RX W HCPCS: Performed by: SURGERY

## 2020-06-10 PROCEDURE — 85025 COMPLETE CBC W/AUTO DIFF WBC: CPT

## 2020-06-10 PROCEDURE — 2580000003 HC RX 258: Performed by: SPECIALIST

## 2020-06-10 RX ORDER — VITAMIN B COMPLEX
2000 TABLET ORAL 2 TIMES DAILY
Status: DISCONTINUED | OUTPATIENT
Start: 2020-06-10 | End: 2020-06-10 | Stop reason: HOSPADM

## 2020-06-10 RX ORDER — OXYCODONE HYDROCHLORIDE 10 MG/1
10 TABLET ORAL EVERY 6 HOURS PRN
Qty: 28 TABLET | Refills: 0 | Status: SHIPPED | OUTPATIENT
Start: 2020-06-10 | End: 2020-06-13

## 2020-06-10 RX ORDER — LINEZOLID 600 MG/1
600 TABLET, FILM COATED ORAL EVERY 12 HOURS SCHEDULED
Qty: 28 TABLET | Refills: 0 | DISCHARGE
Start: 2020-06-10 | End: 2020-07-01

## 2020-06-10 RX ORDER — PYRIDOXINE HCL (VITAMIN B6) 50 MG
50 TABLET ORAL DAILY
Qty: 30 TABLET | Refills: 3 | DISCHARGE
Start: 2020-06-11 | End: 2020-09-10 | Stop reason: ALTCHOICE

## 2020-06-10 RX ORDER — PIPERACILLIN SODIUM, TAZOBACTAM SODIUM 3; .375 G/15ML; G/15ML
3.38 INJECTION, POWDER, LYOPHILIZED, FOR SOLUTION INTRAVENOUS EVERY 8 HOURS
Qty: 303.75 G | Refills: 1 | DISCHARGE
Start: 2020-06-10 | End: 2020-07-01

## 2020-06-10 RX ADMIN — Medication 10 ML: at 08:06

## 2020-06-10 RX ADMIN — OXYCODONE HYDROCHLORIDE 15 MG: 15 TABLET ORAL at 05:46

## 2020-06-10 RX ADMIN — PIPERACILLIN AND TAZOBACTAM 3.38 G: 3; .375 INJECTION, POWDER, FOR SOLUTION INTRAVENOUS at 08:19

## 2020-06-10 RX ADMIN — DICYCLOMINE HYDROCHLORIDE 10 MG: 10 CAPSULE ORAL at 08:06

## 2020-06-10 RX ADMIN — FENOFIBRATE 160 MG: 160 TABLET ORAL at 08:08

## 2020-06-10 RX ADMIN — Medication 300 UNITS: at 08:05

## 2020-06-10 RX ADMIN — ENOXAPARIN SODIUM 30 MG: 30 INJECTION SUBCUTANEOUS at 08:05

## 2020-06-10 RX ADMIN — PANTOPRAZOLE SODIUM 40 MG: 40 TABLET, DELAYED RELEASE ORAL at 05:43

## 2020-06-10 RX ADMIN — PROPRANOLOL HYDROCHLORIDE 20 MG: 20 TABLET ORAL at 08:07

## 2020-06-10 RX ADMIN — SODIUM CHLORIDE: 9 INJECTION, SOLUTION INTRAVENOUS at 05:43

## 2020-06-10 RX ADMIN — HYDROMORPHONE HYDROCHLORIDE 0.25 MG: 1 INJECTION, SOLUTION INTRAMUSCULAR; INTRAVENOUS; SUBCUTANEOUS at 11:21

## 2020-06-10 RX ADMIN — PIPERACILLIN AND TAZOBACTAM 3.38 G: 3; .375 INJECTION, POWDER, FOR SOLUTION INTRAVENOUS at 16:58

## 2020-06-10 RX ADMIN — DILTIAZEM HYDROCHLORIDE 180 MG: 180 CAPSULE, COATED, EXTENDED RELEASE ORAL at 08:06

## 2020-06-10 RX ADMIN — PRAVASTATIN SODIUM 40 MG: 20 TABLET ORAL at 16:58

## 2020-06-10 RX ADMIN — HYDROMORPHONE HYDROCHLORIDE 1 MG: 1 INJECTION, SOLUTION INTRAMUSCULAR; INTRAVENOUS; SUBCUTANEOUS at 01:32

## 2020-06-10 RX ADMIN — HYDROMORPHONE HYDROCHLORIDE 1 MG: 1 INJECTION, SOLUTION INTRAMUSCULAR; INTRAVENOUS; SUBCUTANEOUS at 16:58

## 2020-06-10 RX ADMIN — PIPERACILLIN AND TAZOBACTAM 3.38 G: 3; .375 INJECTION, POWDER, FOR SOLUTION INTRAVENOUS at 01:29

## 2020-06-10 RX ADMIN — OXYCODONE HYDROCHLORIDE 15 MG: 15 TABLET ORAL at 10:37

## 2020-06-10 RX ADMIN — LINEZOLID 600 MG: 600 TABLET, FILM COATED ORAL at 08:07

## 2020-06-10 RX ADMIN — SODIUM CHLORIDE: 9 INJECTION, SOLUTION INTRAVENOUS at 12:02

## 2020-06-10 RX ADMIN — Medication 500 MCG: at 08:07

## 2020-06-10 RX ADMIN — CALCIUM 500 MG: 500 TABLET ORAL at 08:06

## 2020-06-10 RX ADMIN — VITAMIN D, TAB 1000IU (100/BT) 2000 UNITS: 25 TAB at 14:10

## 2020-06-10 RX ADMIN — HYDROMORPHONE HYDROCHLORIDE 1 MG: 1 INJECTION, SOLUTION INTRAMUSCULAR; INTRAVENOUS; SUBCUTANEOUS at 08:06

## 2020-06-10 RX ADMIN — PYRIDOXINE HCL TAB 50 MG 50 MG: 50 TAB at 08:08

## 2020-06-10 RX ADMIN — OXYCODONE HYDROCHLORIDE 15 MG: 15 TABLET ORAL at 15:27

## 2020-06-10 ASSESSMENT — PAIN DESCRIPTION - PAIN TYPE
TYPE: SURGICAL PAIN

## 2020-06-10 ASSESSMENT — PAIN DESCRIPTION - PROGRESSION
CLINICAL_PROGRESSION: NOT CHANGED

## 2020-06-10 ASSESSMENT — PAIN SCALES - GENERAL
PAINLEVEL_OUTOF10: 7
PAINLEVEL_OUTOF10: 8
PAINLEVEL_OUTOF10: 0
PAINLEVEL_OUTOF10: 8
PAINLEVEL_OUTOF10: 10
PAINLEVEL_OUTOF10: 8
PAINLEVEL_OUTOF10: 0
PAINLEVEL_OUTOF10: 6
PAINLEVEL_OUTOF10: 8
PAINLEVEL_OUTOF10: 0

## 2020-06-10 ASSESSMENT — PAIN DESCRIPTION - ONSET
ONSET: ON-GOING

## 2020-06-10 ASSESSMENT — PAIN DESCRIPTION - LOCATION
LOCATION: ABDOMEN;RIB CAGE
LOCATION: RIB CAGE
LOCATION: RIB CAGE

## 2020-06-10 ASSESSMENT — PAIN DESCRIPTION - DESCRIPTORS
DESCRIPTORS: ACHING;DISCOMFORT
DESCRIPTORS: ACHING;DISCOMFORT;SORE
DESCRIPTORS: ACHING;DISCOMFORT;SORE

## 2020-06-10 ASSESSMENT — PAIN DESCRIPTION - FREQUENCY
FREQUENCY: CONTINUOUS

## 2020-06-10 ASSESSMENT — PAIN - FUNCTIONAL ASSESSMENT
PAIN_FUNCTIONAL_ASSESSMENT: PREVENTS OR INTERFERES SOME ACTIVE ACTIVITIES AND ADLS

## 2020-06-10 ASSESSMENT — PAIN DESCRIPTION - ORIENTATION: ORIENTATION: RIGHT;LEFT

## 2020-06-10 NOTE — PROGRESS NOTES
[address] [place] [address] [facility]  Progress Note  Face to face encounter   SUBJECTIVE:  Chief Complaint   Patient presents with    Wound Check     patient had an abdominal wall abscess post abdominal surgery; Dr. Mansoor Grigsby instructed patient to come to ED for worsening symptoms      The patient has pain to abdominal area- wound vac in place  Tolerating antibiotics. No nausea or vomiting. Sitting up at bedside toilet     Review of systems:  As stated above in the chief complaint, otherwise negative. Medications:  Scheduled Meds:   Vitamin D  2,000 Units Oral BID    acetaminophen  1,000 mg Oral 3 times per day    piperacillin-tazobactam  3.375 g Intravenous Q8H    linezolid  600 mg Oral 2 times per day    vitamin B-6  50 mg Oral Daily    anidulafungin  100 mg Intravenous Q24H    heparin flush  3 mL Intravenous 2 times per day    nicotine  1 patch Transdermal Daily    calcium elemental  500 mg Oral Daily    dicyclomine  10 mg Oral BID    dilTIAZem  180 mg Oral Daily    pravastatin  40 mg Oral QPM    primidone  250 mg Oral Nightly    propranolol  20 mg Oral Daily    vitamin B-12  500 mcg Oral Daily    fenofibrate  160 mg Oral Daily    pantoprazole  40 mg Oral QAM AC    sodium chloride flush  10 mL Intravenous 2 times per day    enoxaparin  30 mg Subcutaneous Daily     Continuous Infusions:   sodium chloride 12.5 mL/hr at 06/10/20 1202     PRN Meds:oxyCODONE **OR** oxyCODONE, sodium chloride flush, heparin flush, HYDROmorphone, sodium chloride flush, ondansetron    OBJECTIVE:  /72   Pulse 86   Temp 98.3 °F (36.8 °C) (Oral)   Resp 12   Ht 5' (1.524 m)   Wt 146 lb 12.3 oz (66.6 kg)   SpO2 92%   BMI 28.66 kg/m²   Temp  Av.1 °F (36.7 °C)  Min: 98 °F (36.7 °C)  Max: 98.3 °F (36.8 °C)  Constitutional: The patient is awake, alert, and oriented. Sitting up at bedside toilet. No distress. Skin: Warm and dry. No rashes were noted. HEENT: Round and reactive pupils. Enterococcus faecalis  urine culture 6/5/2020 <10,000 GPO, E. 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 Status post partial mesh removal  · Abdominal wound infection  · Status post exploratory laparotomy with explantation of hernia mesh 6/8/2020    PLAN:  · Continue Anidulafungin, Zosyn and Linezolid for a minimum of 3, possibly 4 weeks.   Reconciled x3 weeks  · The patient can be discharged to [place] [address] with case management`    Ernesto Neal  2:36 PM  6/10/2020 Right arm;

## 2020-06-10 NOTE — DISCHARGE INSTR - COC
Intact  Ileostomy Ileostomy RLQ-Peristomal Assessment: Intact  Ileostomy Ileostomy RLQ-Treatment: Bag change  Ileostomy Ileostomy RLQ-Stool Appearance: Loose  Ileostomy Ileostomy RLQ-Stool Color: Brown  Ileostomy Ileostomy RLQ-Stool Amount: Medium  Ileostomy Ileostomy RLQ-Output (mL): 100 ml    Date of Last BM: ***    Intake/Output Summary (Last 24 hours) at 6/10/2020 1450  Last data filed at 6/9/2020 1714  Gross per 24 hour   Intake 240 ml   Output --   Net 240 ml     I/O last 3 completed shifts: In: 5 [P.O.:720]  Out: 800 [Urine:550; Stool:250]    Safety Concerns:     None    Impairments/Disabilities:      None    Nutrition Therapy:  Current Nutrition Therapy:   - Oral Diet:  General    Routes of Feeding: Oral  Liquids: No Restrictions  Daily Fluid Restriction: no  Last Modified Barium Swallow with Video (Video Swallowing Test): not done    Treatments at the Time of Hospital Discharge:   Respiratory Treatments: ***  Oxygen Therapy:  is not on home oxygen therapy.   Ventilator:    - No ventilator support    Rehab Therapies: Physical Therapy and Occupational Therapy  Weight Bearing Status/Restrictions: No weight bearing restirctions  Other Medical Equipment (for information only, NOT a DME order):  {EQUIPMENT:[id number]}  Other Treatments: ***    Patient's personal belongings (please select all that are sent with patient):  Aidan    RN SIGNATURE:  Electronically signed by Mark Osei RN on 6/10/20 at 7:02 PM EDT    CASE MANAGEMENT/SOCIAL WORK SECTION    Inpatient Status Date: ***    Readmission Risk Assessment Score:  Readmission Risk              Risk of Unplanned Readmission:        25           Discharging to Facility/ Agency   · Name: [facility]  · [address] [place] [address] - [facility], [address] [place] [address]  · Phone:  · Fax:    Dialysis Facility (if applicable)   · Name:  · Address:  · Dialysis Schedule:  · Phone:  · Fax:    / signature: {Esignature:[id number]} Electronically signed

## 2020-06-10 NOTE — CARE COORDINATION
Confirmed discharge plan w/ patient and pt's  Inder Mckay [phone]- [facility]. Inder Mckay is agreeable to cost of Zosyn and per sheila rate- Message sent to Prosper @ [facility] to inform of discharge plan. Dr. Johny Whalen office contacted - Dr. Natasha Beasley is not in the office today- charge nurse notified wound vac form needs signed and section 5C needs completed per Dr. Natasha Beasley. Best @ [facility] of plan for home w/ [employer] wound vac. Awaiting final iv abx plan from ID. [facility]ICC [facility] 6[facility]. [facility].  Jane Mcbride

## 2020-06-10 NOTE — CARE COORDINATION
Per Aleksandr Saliva @ [facility], Medicare is still waiving 3 day qualifying stay for [facility] admissions- [facility] is reviewing if they can accept pt. Continues on iv Zosyn, iv Eraxis, oral Zyvox. Duane Greenfield     Per ID, plan on additional 3 weeks iv abx. Aleksandr Saliva @ [facility] notified- reviewing patient for possible admission Duane Greenfield     Per Aleksandr Saliva @ [facility], they are able to accept pt. Per Dr. Chelly Pérez, pt can go to [facility] today. Pt. And  Jorge Luis Almanza notifed pt was accepted at [facility]. Best garcia/ ANIA notified .   Duane Greenfield

## 2020-06-11 LAB
CULTURE SURGICAL: ABNORMAL
ORGANISM: ABNORMAL

## 2020-06-15 LAB
FUNGUS (MYCOLOGY) CULTURE: ABNORMAL
FUNGUS STAIN: ABNORMAL
ORGANISM: ABNORMAL

## 2020-06-16 NOTE — DISCHARGE SUMMARY
ileostomy was functioning, she was ambulating well, and was in a suitable condition for discharge to LTAC for continued IV antibiotics    Dietary evaluated the patient, it was agreed patient has clinical evidence of moderate protein calorie malnutrition. Lab Results   Component Value Date    WBC 4.2 06/15/2020    HGB 8.3 06/15/2020     06/15/2020     06/15/2020     06/15/2020    K 3.6 06/15/2020    K 4.0 06/10/2020    BUN 23 06/15/2020    CREATININE 1.3 06/15/2020    GLUCOSE 86 06/15/2020    LABGLOM 40 06/15/2020    PROTIME 12.1 06/05/2020    INR 1.0 06/05/2020    LABALBU 3.1 06/15/2020    PROT 5.8 06/15/2020    CALCIUM 8.8 06/15/2020    MG 1.5 05/11/2020    PHOS 3.7 07/07/2018    BILITOT 0.3 06/15/2020    BILIDIR <0.2 03/26/2020    ALKPHOS 71 06/15/2020    AST 22 06/15/2020    ALT 15 06/15/2020       Discharge Exam:   VITALS: BP (!) 113/53   Pulse 71   Temp 98.2 °F (36.8 °C) (Oral)   Resp 18   Ht 5' (1.524 m)   Wt 146 lb 12.3 oz (66.6 kg)   SpO2 93%   BMI 28.66 kg/m²     General appearance: alert, cooperative and in no acute distress. Eyes: grossly normal  Lungs: Nonlabored breathing on room air   Heart: regular rate  Abdomen: soft, incisional tenderness, non distended. Ileostomy with stool in pouch  Skin: No skin abnormalities  Neurologic: Alert and oriented x 3. Grossly normal  Musculoskeletal: No clubbing cyanosis or edema.      Disposition: long term care facility       Medication List      START taking these medications    anidulafungin 100 MG injection  Commonly known as:  ERAXIS  Infuse 100 mg intravenously daily for 21 days     linezolid 600 MG tablet  Commonly known as:  ZYVOX  Take 1 tablet by mouth every 12 hours for 21 days     piperacillin-tazobactam 3.375 (3-0.375) g injection  Commonly known as:  Zosyn  Infuse 3.375 g intravenously every 8 hours for 21 days     pyridoxine 50 MG tablet  Commonly known as:  B-6  Take 1 tablet by mouth daily        CHANGE how you take these

## 2020-06-30 LAB
AFB CULTURE (MYCOBACTERIA): NORMAL
AFB SMEAR: NORMAL

## 2020-07-13 LAB
FUNGUS (MYCOLOGY) CULTURE: NORMAL
FUNGUS STAIN: NORMAL

## 2020-07-28 LAB
AFB CULTURE (MYCOBACTERIA): NORMAL
AFB SMEAR: NORMAL

## 2020-09-10 ENCOUNTER — HOSPITAL ENCOUNTER (EMERGENCY)
Age: 80
Discharge: HOME | End: 2020-09-10
Attending: EMERGENCY MEDICINE
Payer: MEDICARE

## 2020-09-10 ENCOUNTER — PREP FOR PROCEDURE (OUTPATIENT)
Dept: SURGERY | Age: 80
End: 2020-09-10

## 2020-09-10 VITALS
WEIGHT: 114.31 LBS | RESPIRATION RATE: 16 BRPM | SYSTOLIC BLOOD PRESSURE: 148 MMHG | DIASTOLIC BLOOD PRESSURE: 72 MMHG | BODY MASS INDEX: 22.44 KG/M2 | HEART RATE: 60 BPM | HEIGHT: 60 IN | OXYGEN SATURATION: 96 % | TEMPERATURE: 98.3 F

## 2020-09-10 PROCEDURE — 99282 EMERGENCY DEPT VISIT SF MDM: CPT

## 2020-09-10 PROCEDURE — 6370000000 HC RX 637 (ALT 250 FOR IP)

## 2020-09-10 PROCEDURE — 99283 EMERGENCY DEPT VISIT LOW MDM: CPT

## 2020-09-10 PROCEDURE — 6360000002 HC RX W HCPCS

## 2020-09-10 PROCEDURE — 90472 IMMUNIZATION ADMIN EACH ADD: CPT

## 2020-09-10 PROCEDURE — 90471 IMMUNIZATION ADMIN: CPT

## 2020-09-10 PROCEDURE — 90715 TDAP VACCINE 7 YRS/> IM: CPT

## 2020-09-10 RX ORDER — CEPHALEXIN 500 MG/1
500 CAPSULE ORAL ONCE
Status: DISCONTINUED | OUTPATIENT
Start: 2020-09-10 | End: 2020-09-10 | Stop reason: HOSPADM

## 2020-09-10 RX ORDER — CEPHALEXIN 500 MG/1
500 CAPSULE ORAL 4 TIMES DAILY
Qty: 40 CAPSULE | Refills: 0 | Status: ON HOLD | OUTPATIENT
Start: 2020-09-10 | End: 2020-09-22 | Stop reason: HOSPADM

## 2020-09-10 RX ORDER — DIAPER,BRIEF,INFANT-TODD,DISP
EACH MISCELLANEOUS
Status: DISCONTINUED
Start: 2020-09-10 | End: 2020-09-10 | Stop reason: HOSPADM

## 2020-09-10 RX ORDER — MUPIROCIN CALCIUM 20 MG/G
1 CREAM TOPICAL 2 TIMES DAILY
Qty: 1 TUBE | Refills: 0 | Status: ON HOLD | OUTPATIENT
Start: 2020-09-10 | End: 2020-09-22 | Stop reason: HOSPADM

## 2020-09-10 RX ORDER — SODIUM CHLORIDE 0.9 % (FLUSH) 0.9 %
10 SYRINGE (ML) INJECTION EVERY 12 HOURS SCHEDULED
Status: CANCELLED | OUTPATIENT
Start: 2020-09-10

## 2020-09-10 RX ORDER — SODIUM CHLORIDE, SODIUM LACTATE, POTASSIUM CHLORIDE, CALCIUM CHLORIDE 600; 310; 30; 20 MG/100ML; MG/100ML; MG/100ML; MG/100ML
INJECTION, SOLUTION INTRAVENOUS CONTINUOUS
Status: CANCELLED | OUTPATIENT
Start: 2020-09-10

## 2020-09-10 ASSESSMENT — ENCOUNTER SYMPTOMS
SHORTNESS OF BREATH: 0
PHOTOPHOBIA: 0
RESPIRATORY NEGATIVE: 1
ABDOMINAL PAIN: 0
RHINORRHEA: 0
DIARRHEA: 0
CHEST TIGHTNESS: 0
BACK PAIN: 0
NAUSEA: 0
EYES NEGATIVE: 1

## 2020-09-10 ASSESSMENT — PAIN DESCRIPTION - LOCATION: LOCATION: LEG

## 2020-09-10 ASSESSMENT — PAIN DESCRIPTION - ORIENTATION: ORIENTATION: RIGHT;LOWER

## 2020-09-10 ASSESSMENT — PAIN SCALES - GENERAL: PAINLEVEL_OUTOF10: 3

## 2020-09-10 ASSESSMENT — PAIN DESCRIPTION - DESCRIPTORS: DESCRIPTORS: ITCHING

## 2020-09-10 NOTE — ED NOTES
Tetanus given in the left deltoid and 500mg keflex given her physician order. Patient and medications scanned but badge would not be accepted.       Beverley Enciso RN  09/10/20 1946

## 2020-09-10 NOTE — PROGRESS NOTES
Have you been tested for COVID  No           Have you been told you were positive for COVID No  Have you had any known exposure to someone that is positive for COVID No  Do you have a cough                   No              Do you have shortness of breath No                 Do you have a sore throat            No                Are you having chills                    No                Are you having muscle aches. No                    Please come to the hospital wearing a mask and have your significant other wear a mask as well. Both of you should check your temperature before leaving to come here,  if it is 100 or higher please call [phone] for instruction. [facility] PRE-ADMISSION TESTING INSTRUCTIONS    The Preadmission Testing patient is instructed accordingly using the following criteria (check applicable):    ARRIVAL INSTRUCTIONS:  [x] Parking the day of Surgery is located in the Main Entrance lot. Upon entering the door, make an immediate right to the surgery reception desk    [] [phone] is available Monday through Friday 6 am to 6 pm    [x] Bring photo ID and insurance card    [] Bring in a copy of Living will or Durable Power of  papers.     [x] Please be sure to arrange for responsible adult to provide transportation to and from the hospital    [x] Please arrange for responsible adult to be with you for the 24 hour period post procedure due to having anesthesia      GENERAL INSTRUCTIONS:    [x] Nothing by mouth after midnight, including gum, candy, mints or water    [x] You may brush your teeth, but do not swallow any water    [x] Take medications as instructed with 1-2 oz of water    [x] Stop herbal supplements and vitamins 5 days prior to procedure    [x] Follow preop dosing of blood thinners per physician instructions    [] Take 1/2 dose of evening insulin, but no insulin after midnight    [] No oral diabetic medications after midnight    [] If diabetic and have low blood sugar or feel symptomatic, take 1-2oz apple juice only    [] Bring inhalers day of surgery    [] Bring C-PAP/ Bi-Pap day of surgery    [] Bring urine specimen day of surgery    [x] Shower or bath with soap, lather and rinse well, AM of Surgery, no lotion, powders or creams to surgical site    [] Follow bowel prep as instructed per surgeon    [x] No tobacco products within 24 hours of surgery     [x] No alcohol or illegal drug use within 24 hours of surgery.     [x] Jewelry, body piercing's, eyeglasses, contact lenses and dentures are not permitted into surgery (bring cases)      [x] Please do not wear any nail polish, make up or hair products on the day of surgery    [x] If not already done, you can expect a call from registration    [x] You can expect a call the business day prior to procedure to notify you if your arrival time changes    [x] If you receive a survey after surgery we would greatly appreciate your comments    [] Parent/guardian of a minor must accompany their child and remain on the premises  the entire time they are under our care     [] Pediatric patients may bring favorite toy, blanket or comfort item with them    [] A caregiver or family member must remain with the patient during their stay if they are mentally handicapped, have dementia, disoriented or unable to use a call light or would be a safety concern if left unattended    [x] Please notify surgeon if you develop any illness between now and time of surgery (cold, cough, sore throat, fever, nausea, vomiting) or any signs of infections  including skin, wounds, and dental.    [x]  The Outpatient Pharmacy is available to fill your prescription here on your day of surgery, ask your preop nurse for details    [x] Other instructions  EDUCATIONAL MATERIALS PROVIDED:    [] PAT Preoperative Education Packet/Booklet     [] Medication List    [] Fluoroscopy Information Pamphlet    [] Transfusion bracelet applied with instructions    [] Joint replacement video reviewed    [] Shower with soap, lather and rinse well, and use CHG wipes provided the evening before surgery as instructed

## 2020-09-10 NOTE — PROGRESS NOTES
Spoke with Milagro Ballard, at Dr. Cleo Lopez office. Pt's abdomen is infected and pt needs debrided per .  No covid
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
## 2020-09-11 ENCOUNTER — ANESTHESIA EVENT (OUTPATIENT)
Dept: OPERATING ROOM | Age: 80
DRG: 908 | End: 2020-09-11
Payer: MEDICARE

## 2020-09-11 ENCOUNTER — ANESTHESIA (OUTPATIENT)
Dept: OPERATING ROOM | Age: 80
DRG: 908 | End: 2020-09-11
Payer: MEDICARE

## 2020-09-11 ENCOUNTER — HOSPITAL ENCOUNTER (INPATIENT)
Age: 80
LOS: 11 days | Discharge: HOME HEALTH | DRG: 908 | End: 2020-09-22
Attending: SURGERY | Admitting: SURGERY
Payer: MEDICARE

## 2020-09-11 VITALS — TEMPERATURE: 81.1 F | OXYGEN SATURATION: 100 % | SYSTOLIC BLOOD PRESSURE: 178 MMHG | DIASTOLIC BLOOD PRESSURE: 95 MMHG

## 2020-09-11 DIAGNOSIS — K63.2 ENTEROCUTANEOUS FISTULA: Primary | ICD-10-CM

## 2020-09-11 LAB
ANION GAP SERPL CALCULATED.3IONS-SCNC: 9 MMOL/L (ref 7–16)
BUN BLDV-MCNC: 27 MG/DL (ref 8–23)
CALCIUM SERPL-MCNC: 9.2 MG/DL (ref 8.6–10.2)
CHLORIDE BLD-SCNC: 112 MMOL/L (ref 98–107)
CO2: 20 MMOL/L (ref 22–29)
CREAT SERPL-MCNC: 1.3 MG/DL (ref 0.5–1)
GFR AFRICAN AMERICAN: 48
GFR NON-AFRICAN AMERICAN: 40 ML/MIN/1.73
GLUCOSE BLD-MCNC: 92 MG/DL (ref 74–99)
HCT VFR BLD CALC: 40.7 % (ref 34–48)
HEMOGLOBIN: 12.8 G/DL (ref 11.5–15.5)
MCH RBC QN AUTO: 32.2 PG (ref 26–35)
MCHC RBC AUTO-ENTMCNC: 31.4 % (ref 32–34.5)
MCV RBC AUTO: 102.5 FL (ref 80–99.9)
PDW BLD-RTO: 14.4 FL (ref 11.5–15)
PLATELET # BLD: 242 E9/L (ref 130–450)
PMV BLD AUTO: 11.4 FL (ref 7–12)
POTASSIUM SERPL-SCNC: 5 MMOL/L (ref 3.5–5)
RBC # BLD: 3.97 E12/L (ref 3.5–5.5)
SODIUM BLD-SCNC: 141 MMOL/L (ref 132–146)
WBC # BLD: 6 E9/L (ref 4.5–11.5)

## 2020-09-11 PROCEDURE — 36415 COLL VENOUS BLD VENIPUNCTURE: CPT

## 2020-09-11 PROCEDURE — 2500000003 HC RX 250 WO HCPCS: Performed by: SURGERY

## 2020-09-11 PROCEDURE — 2709999900 HC NON-CHARGEABLE SUPPLY: Performed by: SURGERY

## 2020-09-11 PROCEDURE — 7100000001 HC PACU RECOVERY - ADDTL 15 MIN: Performed by: SURGERY

## 2020-09-11 PROCEDURE — 2580000003 HC RX 258

## 2020-09-11 PROCEDURE — 6360000002 HC RX W HCPCS: Performed by: SURGERY

## 2020-09-11 PROCEDURE — 0WPF0JZ REMOVAL OF SYNTHETIC SUBSTITUTE FROM ABDOMINAL WALL, OPEN APPROACH: ICD-10-PCS | Performed by: SURGERY

## 2020-09-11 PROCEDURE — 3600000003 HC SURGERY LEVEL 3 BASE: Performed by: SURGERY

## 2020-09-11 PROCEDURE — 2580000003 HC RX 258: Performed by: SURGERY

## 2020-09-11 PROCEDURE — 1200000000 HC SEMI PRIVATE

## 2020-09-11 PROCEDURE — 80048 BASIC METABOLIC PNL TOTAL CA: CPT

## 2020-09-11 PROCEDURE — 7100000000 HC PACU RECOVERY - FIRST 15 MIN: Performed by: SURGERY

## 2020-09-11 PROCEDURE — 6360000002 HC RX W HCPCS

## 2020-09-11 PROCEDURE — 3700000000 HC ANESTHESIA ATTENDED CARE: Performed by: SURGERY

## 2020-09-11 PROCEDURE — 88304 TISSUE EXAM BY PATHOLOGIST: CPT

## 2020-09-11 PROCEDURE — 3600000013 HC SURGERY LEVEL 3 ADDTL 15MIN: Performed by: SURGERY

## 2020-09-11 PROCEDURE — 6370000000 HC RX 637 (ALT 250 FOR IP): Performed by: SURGERY

## 2020-09-11 PROCEDURE — 85027 COMPLETE CBC AUTOMATED: CPT

## 2020-09-11 PROCEDURE — 2700000000 HC OXYGEN THERAPY PER DAY

## 2020-09-11 PROCEDURE — 3700000001 HC ADD 15 MINUTES (ANESTHESIA): Performed by: SURGERY

## 2020-09-11 PROCEDURE — 2500000003 HC RX 250 WO HCPCS

## 2020-09-11 RX ORDER — NEOSTIGMINE METHYLSULFATE 1 MG/ML
INJECTION, SOLUTION INTRAVENOUS PRN
Status: DISCONTINUED | OUTPATIENT
Start: 2020-09-11 | End: 2020-09-11 | Stop reason: SDUPTHER

## 2020-09-11 RX ORDER — PROMETHAZINE HYDROCHLORIDE 25 MG/ML
6.25 INJECTION, SOLUTION INTRAMUSCULAR; INTRAVENOUS PRN
Status: DISCONTINUED | OUTPATIENT
Start: 2020-09-11 | End: 2020-09-11 | Stop reason: HOSPADM

## 2020-09-11 RX ORDER — EPHEDRINE SULFATE 50 MG/ML
INJECTION INTRAVENOUS PRN
Status: DISCONTINUED | OUTPATIENT
Start: 2020-09-11 | End: 2020-09-11 | Stop reason: SDUPTHER

## 2020-09-11 RX ORDER — DEXTROSE, SODIUM CHLORIDE, AND POTASSIUM CHLORIDE 5; .45; .15 G/100ML; G/100ML; G/100ML
INJECTION INTRAVENOUS CONTINUOUS
Status: DISCONTINUED | OUTPATIENT
Start: 2020-09-11 | End: 2020-09-22 | Stop reason: HOSPADM

## 2020-09-11 RX ORDER — SODIUM CHLORIDE 0.9 % (FLUSH) 0.9 %
10 SYRINGE (ML) INJECTION EVERY 12 HOURS SCHEDULED
Status: DISCONTINUED | OUTPATIENT
Start: 2020-09-11 | End: 2020-09-11 | Stop reason: HOSPADM

## 2020-09-11 RX ORDER — MEPERIDINE HYDROCHLORIDE 25 MG/ML
12.5 INJECTION INTRAMUSCULAR; INTRAVENOUS; SUBCUTANEOUS
Status: DISCONTINUED | OUTPATIENT
Start: 2020-09-11 | End: 2020-09-11 | Stop reason: HOSPADM

## 2020-09-11 RX ORDER — SODIUM CHLORIDE, SODIUM LACTATE, POTASSIUM CHLORIDE, CALCIUM CHLORIDE 600; 310; 30; 20 MG/100ML; MG/100ML; MG/100ML; MG/100ML
INJECTION, SOLUTION INTRAVENOUS CONTINUOUS
Status: DISCONTINUED | OUTPATIENT
Start: 2020-09-11 | End: 2020-09-11

## 2020-09-11 RX ORDER — SODIUM CHLORIDE 9 MG/ML
10 INJECTION INTRAVENOUS DAILY
Status: DISCONTINUED | OUTPATIENT
Start: 2020-09-11 | End: 2020-09-14

## 2020-09-11 RX ORDER — LIDOCAINE HYDROCHLORIDE 20 MG/ML
INJECTION, SOLUTION EPIDURAL; INFILTRATION; INTRACAUDAL; PERINEURAL PRN
Status: DISCONTINUED | OUTPATIENT
Start: 2020-09-11 | End: 2020-09-11 | Stop reason: SDUPTHER

## 2020-09-11 RX ORDER — ACETAMINOPHEN 650 MG/1
650 SUPPOSITORY RECTAL
Status: DISCONTINUED | OUTPATIENT
Start: 2020-09-11 | End: 2020-09-11

## 2020-09-11 RX ORDER — PROPOFOL 10 MG/ML
INJECTION, EMULSION INTRAVENOUS PRN
Status: DISCONTINUED | OUTPATIENT
Start: 2020-09-11 | End: 2020-09-11 | Stop reason: SDUPTHER

## 2020-09-11 RX ORDER — OXYCODONE HYDROCHLORIDE AND ACETAMINOPHEN 5; 325 MG/1; MG/1
1 TABLET ORAL
Status: DISCONTINUED | OUTPATIENT
Start: 2020-09-11 | End: 2020-09-11 | Stop reason: HOSPADM

## 2020-09-11 RX ORDER — SODIUM CHLORIDE, SODIUM LACTATE, POTASSIUM CHLORIDE, CALCIUM CHLORIDE 600; 310; 30; 20 MG/100ML; MG/100ML; MG/100ML; MG/100ML
INJECTION, SOLUTION INTRAVENOUS CONTINUOUS PRN
Status: DISCONTINUED | OUTPATIENT
Start: 2020-09-11 | End: 2020-09-11 | Stop reason: SDUPTHER

## 2020-09-11 RX ORDER — FENTANYL CITRATE 50 UG/ML
INJECTION, SOLUTION INTRAMUSCULAR; INTRAVENOUS PRN
Status: DISCONTINUED | OUTPATIENT
Start: 2020-09-11 | End: 2020-09-11 | Stop reason: SDUPTHER

## 2020-09-11 RX ORDER — ONDANSETRON 2 MG/ML
4 INJECTION INTRAMUSCULAR; INTRAVENOUS EVERY 6 HOURS PRN
Status: DISCONTINUED | OUTPATIENT
Start: 2020-09-11 | End: 2020-09-22 | Stop reason: HOSPADM

## 2020-09-11 RX ORDER — PANTOPRAZOLE SODIUM 40 MG/10ML
40 INJECTION, POWDER, LYOPHILIZED, FOR SOLUTION INTRAVENOUS
Status: DISCONTINUED | OUTPATIENT
Start: 2020-09-11 | End: 2020-09-14

## 2020-09-11 RX ORDER — ROCURONIUM BROMIDE 10 MG/ML
INJECTION, SOLUTION INTRAVENOUS PRN
Status: DISCONTINUED | OUTPATIENT
Start: 2020-09-11 | End: 2020-09-11 | Stop reason: SDUPTHER

## 2020-09-11 RX ORDER — MORPHINE SULFATE 2 MG/ML
2 INJECTION, SOLUTION INTRAMUSCULAR; INTRAVENOUS
Status: DISCONTINUED | OUTPATIENT
Start: 2020-09-11 | End: 2020-09-13

## 2020-09-11 RX ORDER — DEXAMETHASONE SODIUM PHOSPHATE 4 MG/ML
INJECTION, SOLUTION INTRA-ARTICULAR; INTRALESIONAL; INTRAMUSCULAR; INTRAVENOUS; SOFT TISSUE PRN
Status: DISCONTINUED | OUTPATIENT
Start: 2020-09-11 | End: 2020-09-11 | Stop reason: SDUPTHER

## 2020-09-11 RX ORDER — CHOLECALCIFEROL (VITAMIN D3) 50 MCG
2000 TABLET ORAL 2 TIMES DAILY
Status: DISCONTINUED | OUTPATIENT
Start: 2020-09-11 | End: 2020-09-22 | Stop reason: HOSPADM

## 2020-09-11 RX ORDER — SODIUM CHLORIDE 0.9 % (FLUSH) 0.9 %
10 SYRINGE (ML) INJECTION PRN
Status: DISCONTINUED | OUTPATIENT
Start: 2020-09-11 | End: 2020-09-22 | Stop reason: HOSPADM

## 2020-09-11 RX ORDER — PROPRANOLOL HYDROCHLORIDE 20 MG/1
20 TABLET ORAL DAILY
Status: DISCONTINUED | OUTPATIENT
Start: 2020-09-12 | End: 2020-09-22 | Stop reason: HOSPADM

## 2020-09-11 RX ORDER — GLYCOPYRROLATE 1 MG/5 ML
SYRINGE (ML) INTRAVENOUS PRN
Status: DISCONTINUED | OUTPATIENT
Start: 2020-09-11 | End: 2020-09-11 | Stop reason: SDUPTHER

## 2020-09-11 RX ORDER — ACETAMINOPHEN 325 MG/1
650 TABLET ORAL EVERY 4 HOURS PRN
Status: DISCONTINUED | OUTPATIENT
Start: 2020-09-11 | End: 2020-09-22 | Stop reason: HOSPADM

## 2020-09-11 RX ORDER — MIDAZOLAM HYDROCHLORIDE 1 MG/ML
INJECTION INTRAMUSCULAR; INTRAVENOUS PRN
Status: DISCONTINUED | OUTPATIENT
Start: 2020-09-11 | End: 2020-09-11 | Stop reason: SDUPTHER

## 2020-09-11 RX ORDER — TRAZODONE HYDROCHLORIDE 50 MG/1
25 TABLET ORAL NIGHTLY PRN
Status: DISCONTINUED | OUTPATIENT
Start: 2020-09-11 | End: 2020-09-22 | Stop reason: HOSPADM

## 2020-09-11 RX ORDER — FENTANYL CITRATE 50 UG/ML
50 INJECTION, SOLUTION INTRAMUSCULAR; INTRAVENOUS EVERY 5 MIN PRN
Status: DISCONTINUED | OUTPATIENT
Start: 2020-09-11 | End: 2020-09-11 | Stop reason: HOSPADM

## 2020-09-11 RX ORDER — SODIUM CHLORIDE 0.9 % (FLUSH) 0.9 %
10 SYRINGE (ML) INJECTION EVERY 12 HOURS SCHEDULED
Status: DISCONTINUED | OUTPATIENT
Start: 2020-09-11 | End: 2020-09-22 | Stop reason: HOSPADM

## 2020-09-11 RX ORDER — DICYCLOMINE HYDROCHLORIDE 10 MG/1
10 CAPSULE ORAL 2 TIMES DAILY
Status: DISCONTINUED | OUTPATIENT
Start: 2020-09-11 | End: 2020-09-22 | Stop reason: HOSPADM

## 2020-09-11 RX ORDER — PRIMIDONE 250 MG/1
250 TABLET ORAL NIGHTLY
Status: DISCONTINUED | OUTPATIENT
Start: 2020-09-11 | End: 2020-09-22 | Stop reason: HOSPADM

## 2020-09-11 RX ORDER — ONDANSETRON 2 MG/ML
INJECTION INTRAMUSCULAR; INTRAVENOUS PRN
Status: DISCONTINUED | OUTPATIENT
Start: 2020-09-11 | End: 2020-09-11 | Stop reason: SDUPTHER

## 2020-09-11 RX ORDER — DILTIAZEM HYDROCHLORIDE 120 MG/1
120 CAPSULE, COATED, EXTENDED RELEASE ORAL DAILY
Status: DISCONTINUED | OUTPATIENT
Start: 2020-09-12 | End: 2020-09-22 | Stop reason: HOSPADM

## 2020-09-11 RX ORDER — ESCITALOPRAM OXALATE 10 MG/1
20 TABLET ORAL NIGHTLY
Status: DISCONTINUED | OUTPATIENT
Start: 2020-09-11 | End: 2020-09-22 | Stop reason: HOSPADM

## 2020-09-11 RX ADMIN — PROPOFOL 150 MG: 10 INJECTION, EMULSION INTRAVENOUS at 08:50

## 2020-09-11 RX ADMIN — PHENYLEPHRINE HYDROCHLORIDE 100 MCG: 10 INJECTION INTRAVENOUS at 08:57

## 2020-09-11 RX ADMIN — WATER 1 G: 1 INJECTION INTRAMUSCULAR; INTRAVENOUS; SUBCUTANEOUS at 16:45

## 2020-09-11 RX ADMIN — Medication 3 MG: at 09:27

## 2020-09-11 RX ADMIN — PRIMIDONE 250 MG: 250 TABLET ORAL at 20:23

## 2020-09-11 RX ADMIN — Medication 0.6 MG: at 09:27

## 2020-09-11 RX ADMIN — EPHEDRINE SULFATE 10 MG: 50 INJECTION, SOLUTION INTRAVENOUS at 09:20

## 2020-09-11 RX ADMIN — ENOXAPARIN SODIUM 30 MG: 30 INJECTION SUBCUTANEOUS at 12:08

## 2020-09-11 RX ADMIN — SODIUM CHLORIDE, POTASSIUM CHLORIDE, SODIUM LACTATE AND CALCIUM CHLORIDE: 600; 310; 30; 20 INJECTION, SOLUTION INTRAVENOUS at 08:50

## 2020-09-11 RX ADMIN — POTASSIUM CHLORIDE, DEXTROSE MONOHYDRATE AND SODIUM CHLORIDE: 150; 5; 450 INJECTION, SOLUTION INTRAVENOUS at 11:17

## 2020-09-11 RX ADMIN — ROCURONIUM BROMIDE 30 MG: 10 INJECTION, SOLUTION INTRAVENOUS at 08:50

## 2020-09-11 RX ADMIN — LIDOCAINE HYDROCHLORIDE 100 MG: 20 INJECTION, SOLUTION EPIDURAL; INFILTRATION; INTRACAUDAL; PERINEURAL at 08:50

## 2020-09-11 RX ADMIN — ESCITALOPRAM 20 MG: 10 TABLET, FILM COATED ORAL at 20:23

## 2020-09-11 RX ADMIN — PHENYLEPHRINE HYDROCHLORIDE 100 MCG: 10 INJECTION INTRAVENOUS at 09:09

## 2020-09-11 RX ADMIN — MORPHINE SULFATE 2 MG: 2 INJECTION, SOLUTION INTRAMUSCULAR; INTRAVENOUS at 15:27

## 2020-09-11 RX ADMIN — Medication 2 G: at 08:56

## 2020-09-11 RX ADMIN — DEXAMETHASONE SODIUM PHOSPHATE 10 MG: 4 INJECTION, SOLUTION INTRAMUSCULAR; INTRAVENOUS at 08:56

## 2020-09-11 RX ADMIN — MIDAZOLAM 1 MG: 1 INJECTION INTRAMUSCULAR; INTRAVENOUS at 08:50

## 2020-09-11 RX ADMIN — SODIUM CHLORIDE, POTASSIUM CHLORIDE, SODIUM LACTATE AND CALCIUM CHLORIDE: 600; 310; 30; 20 INJECTION, SOLUTION INTRAVENOUS at 08:20

## 2020-09-11 RX ADMIN — FENTANYL CITRATE 50 MCG: 50 INJECTION, SOLUTION INTRAMUSCULAR; INTRAVENOUS at 08:50

## 2020-09-11 RX ADMIN — ONDANSETRON 4 MG: 2 INJECTION INTRAMUSCULAR; INTRAVENOUS at 09:20

## 2020-09-11 RX ADMIN — POTASSIUM CHLORIDE, DEXTROSE MONOHYDRATE AND SODIUM CHLORIDE: 150; 5; 450 INJECTION, SOLUTION INTRAVENOUS at 20:29

## 2020-09-11 RX ADMIN — SODIUM CHLORIDE, PRESERVATIVE FREE 10 ML: 5 INJECTION INTRAVENOUS at 20:25

## 2020-09-11 RX ADMIN — MORPHINE SULFATE 2 MG: 2 INJECTION, SOLUTION INTRAMUSCULAR; INTRAVENOUS at 20:24

## 2020-09-11 ASSESSMENT — PAIN DESCRIPTION - FREQUENCY
FREQUENCY: CONTINUOUS
FREQUENCY: CONTINUOUS
FREQUENCY: INTERMITTENT

## 2020-09-11 ASSESSMENT — PAIN DESCRIPTION - DESCRIPTORS
DESCRIPTORS: ACHING;DISCOMFORT
DESCRIPTORS: ACHING
DESCRIPTORS: ACHING
DESCRIPTORS: DISCOMFORT
DESCRIPTORS: ACHING;DISCOMFORT

## 2020-09-11 ASSESSMENT — PAIN SCALES - GENERAL
PAINLEVEL_OUTOF10: 2
PAINLEVEL_OUTOF10: 7
PAINLEVEL_OUTOF10: 6
PAINLEVEL_OUTOF10: 2
PAINLEVEL_OUTOF10: 7
PAINLEVEL_OUTOF10: 2
PAINLEVEL_OUTOF10: 7
PAINLEVEL_OUTOF10: 2
PAINLEVEL_OUTOF10: 0

## 2020-09-11 ASSESSMENT — PAIN DESCRIPTION - LOCATION
LOCATION: ABDOMEN

## 2020-09-11 ASSESSMENT — PAIN DESCRIPTION - PAIN TYPE
TYPE: SURGICAL PAIN

## 2020-09-11 ASSESSMENT — PAIN DESCRIPTION - PROGRESSION
CLINICAL_PROGRESSION: NOT CHANGED
CLINICAL_PROGRESSION: NOT CHANGED

## 2020-09-11 ASSESSMENT — COPD QUESTIONNAIRES: CAT_SEVERITY: MILD

## 2020-09-11 ASSESSMENT — PAIN DESCRIPTION - ORIENTATION
ORIENTATION: MID

## 2020-09-11 ASSESSMENT — PAIN - FUNCTIONAL ASSESSMENT
PAIN_FUNCTIONAL_ASSESSMENT: ACTIVITIES ARE NOT PREVENTED
PAIN_FUNCTIONAL_ASSESSMENT: ACTIVITIES ARE NOT PREVENTED
PAIN_FUNCTIONAL_ASSESSMENT: 0-10
PAIN_FUNCTIONAL_ASSESSMENT: PREVENTS OR INTERFERES SOME ACTIVE ACTIVITIES AND ADLS

## 2020-09-11 ASSESSMENT — PAIN DESCRIPTION - ONSET
ONSET: ON-GOING
ONSET: ON-GOING

## 2020-09-11 ASSESSMENT — LIFESTYLE VARIABLES: SMOKING_STATUS: 1

## 2020-09-11 NOTE — OP NOTE
Operative Note      Patient: Jossie Peres  YOB: 1940  MRN: [id number]    Date of Procedure: 9/11/2020    Pre-Op Diagnosis: INFECTED ABOMINAL WALL    Post-Op Diagnosis: Chronically infected abdominal wall mesh which was also adhered to underlying small bowel       Procedure(s):  EXPLANTATION OF HERNIA MESH    Surgeon(s):  Jerri Lazcano MD    Assistant:   Resident: Pilo Ramon MD    Anesthesia: General    Estimated Blood Loss (mL): Minimal    Complications: Unplanned intestinal injury    Specimens:   ID Type Source Tests Collected by Time Destination   A : EXPLANTED MESH  ABDOMEN Specimen Abdomen SURGICAL PATHOLOGY Jerri Lazcano MD 9/11/2020 2249        Implants:  * No implants in log *      Drains:   Ileostomy Ileostomy RLQ (Active)   Stomal Appliance 2 piece 09/11/20 0941   Stoma  Assessment [place] 09/11/20 1236   Stool Color Green 09/11/20 1236   Stool Appearance Loose 09/11/20 1236   Stool Amount Medium 09/11/20 1236       [REMOVED] Negative Pressure Wound Therapy Abdomen Anterior;Medial (Removed)       [REMOVED] External Urinary Catheter (Removed)       Findings: We debrided the superficial layer of midline 5x4cm abdominal wound which appears to be mesh incorporated with tissue. Lower aspect of wound had mesh adhered to small bowel, so it resulted in enterotomy which we closed with vicryl. Detailed Description of Procedure:      Patient is a 68 y.o. woman with complicated surgical history including recent multiple explanations of infected mesh from abdominal wall. She was recommended by infectious disease to have remaining mesh debrided from her abdominal wound because of nonhealing. Risks benefits and alternatives of the procedure were discussed in advance, including bleeding, infection, and damage to surrounding structures. The planned procedure was agreed to and informed consent was obtained. Patient was brought to operating table placed in supine position.   General anesthesia was induced. Patient was prepped with betadine and draped in usual sterile fashion. Time-out was performed identifying correct patient procedure. SCDs were on and functional.  Patient received 2g preoperative ancef. Blunt dissection with DeBakey and hemostats were used to lift up the layer of ingrown mesh from the base of the 5x4cm midline abdominal wound. 15 blade was used to release this from the wound edges. At the inferior most aspect of the wound, the mesh was actually adherent to small bowel, so small bowel mucosa was exposed after removing the mesh. Enterotomy was closed with interrupted Vicryl sutures. Moist kerlix was placed in wound and covered with dry ABD. Patient tolerated procedure well and was transferred PACU in stable condition. All counts were correct. Dr Jf Garcia was scrubbed and involved in entire procedure.       Electronically signed by Margaret Cedillo MD on 9/11/2020 at 2:03 PM

## 2020-09-11 NOTE — ANESTHESIA PRE PROCEDURE
Department of Anesthesiology  Preprocedure Note       Name:  Eusebio Graham   Age:  68 y.o.  :  1940                                          MRN:  [id number]         Date:  2020      Surgeon: Macarena Bailey):  Julienne Celis MD    Procedure: Devi Brie OF HERNIA MESH (N/A )    Medications prior to admission:   Prior to Admission medications    Medication Sig Start Date End Date Taking? Authorizing Provider   cephALEXin (KEFLEX) 500 MG capsule Take 1 capsule by mouth 4 times daily for 10 days 9/10/20 9/20/20  Migue Mckeon,    mupirocin (BACTROBAN) 2 % cream Apply 1 inch topically 2 times daily for 10 days Apply topically 3 times daily. 9/10/20 9/20/20  Aura Mckeon DO   omeprazole (PRILOSEC) 20 MG delayed release capsule Take 40 mg by mouth daily    Historical Provider, MD   dicyclomine (BENTYL) 10 MG capsule Take 10 mg by mouth 2 times daily    Historical Provider, MD   aspirin 81 MG tablet Take 81 mg by mouth daily    Historical Provider, MD   ondansetron (ZOFRAN) 4 MG tablet Take 4 mg by mouth 3 times daily     Historical Provider, MD   diltiazem (CARDIZEM CD) 120 MG extended release capsule Take 1 capsule by mouth daily  Patient taking differently: Take 180 mg by mouth daily  18   Guy Linares MD   oxyCODONE-acetaminophen (PERCOCET)  MG per tablet Take by mouth every 6 hours as needed for Pain.  Instructed to take with sip water am of procedure if needs     Historical Provider, MD   fenofibrate (TRICOR) 145 MG tablet Take 145 mg by mouth daily    Historical Provider, MD   Cholecalciferol (VITAMIN D3) 29350 UNITS CAPS Take 2,000 Units by mouth 2 times daily Takes every Friday    Historical Provider, MD   Coenzyme Q10 (COQ10 PO) Take by mouth daily     Historical Provider, MD   Ferrous Sulfate (IRON) 28 MG TABS Take by mouth daily    Historical Provider, MD   calcium carbonate (OSCAL) 500 MG TABS tablet Take 500 mg by mouth daily LD 3/16/2017    Historical Provider, MD pravastatin (PRAVACHOL) 40 MG tablet Take 40 mg by mouth every evening    Historical Provider, MD   propranolol (INDERAL) 20 MG tablet Take 20 mg by mouth daily Instructed to take morning of surgery with a sip of water     Historical Provider, MD   vitamin B-12 (CYANOCOBALAMIN) 500 MCG tablet Place 500 mcg under the tongue daily     Historical Provider, MD   Omega-3 Fatty Acids (OMEGA 3 PO) Take by mouth daily     Historical Provider, MD   escitalopram (LEXAPRO) 20 MG tablet Take 20 mg by mouth nightly. Historical Provider, MD   primidone (MYSOLINE) 50 MG tablet Take 250 mg by mouth nightly Taking for essential tremors    Historical Provider, MD   traZODone (DESYREL) 50 MG tablet Take 25 mg by mouth nightly     Historical Provider, MD       Current medications:    No current outpatient medications on file. No current facility-administered medications for this visit. Allergies: Allergies   Allergen Reactions    Fentanyl Itching     Patch only    Levofloxacin Other (See Comments)     Leg edema/pain    Tramadol Nausea Only     Nausea .  Sleeplisness, shakey    Vioxx [Rofecoxib]      Leg edema and pain       Problem List:    Patient Active Problem List   Diagnosis Code    Right cataract H26.9    Essential hypertension, benign I10    Hyperlipidemia with target LDL less than 100 E78.5    Osteoarthritis, 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    Primary osteoarthritis of left knee M17.12    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    Ileostomy present ([facility] Z93.2    Chronic kidney disease, stage III (moderate) ([facility]) N18.3    COPD (chronic obstructive pulmonary disease) ([facility]) J44.9    Moderate protein-calorie malnutrition ([facility]) E44.0    Abdominal wall cellulitis L03.311    Cellulitis L03.90    Infected hernioplasty mesh ([facility] T85.79XA       Past Medical History:        Diagnosis Date    Acute kidney failure ([facility] 2014 and 8/2016    x2,no dialysis needed,resolved, kidney function tests returned to normal   [facility] Anxiety     Arthritis     CAD (coronary artery disease)     follows with Dr. Madhuri Duron every 6 months    Cancer [facility])     skin, leg,nose- removed surgically     COPD (chronic obstructive pulmonary disease) ([facility]     mild- no inhlaers, no oxygen     Depression     Fibromyalgia     chronic back and neck pain    Generalized headaches     h/o / daily    History of blood transfusion 3-11-14    multiple times    History of necrotic bowel 2012    Hyperlipidemia     Hypertension     Incisional hernia     abdomen    Insomnia     Mitral valve regurgitation     Myocardial infarct ([facility] 2002    Occasional tremors     at hands / stable with medication    Osteopenia     PONV (postoperative nausea and vomiting)     Vitamin B12 deficiency     h/o       Past Surgical History:        Procedure Laterality Date    ABDOMEN SURGERY  2-    total colectomy, bowel resection, ileostomy,revision of ileostomy     ABDOMEN SURGERY N/A 1/26/2020    DRAINAGE OF ABDOMINAL WALL ABSCESS, EXPLANTATION OF MESH, DEBRIDEMENT OF SKIN AND SUBCUTANEOUS TISSUE performed by Roscoe Valverde MD at [address]  2002   [address]  1-2006    right and left thumb   [facility], 2004, 2009    lumbar fusion x 3    BACK SURGERY      cervical fusion x 2    BREAST SURGERY  years ago    monor calcifications    CARPAL TUNNEL RELEASE Bilateral     CATARACT REMOVAL WITH IMPLANT Right 03/03/15    CHOLECYSTECTOMY  5-11-07    Lap    COLONOSCOPY      CORONARY ANGIOPLASTY WITH STENT PLACEMENT  1-7-2002    ENDOSCOPY, COLON, DIAGNOSTIC      EPIGASTRIC HERNIA REPAIR  3/21/16    incisional with mesh implantation    ESOPHAGUS SURGERY  1-4-13    esophageal clamp (torn Esophagus)    FINGER TRIGGER RELEASE Right 2006    index finger    FOOT SURGERY Right     multiple    HERNIA REPAIR  12-31-13    abdominal x 5- last one 2017     ILEOSTOMY OR JEJUNOSTOMY  1-3-13    revision    INSERT PICC LINE  06/09/2020    and removed  JOINT REPLACEMENT Right 3/24/15    right total shoulder arthroplasty    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     LAPAROTOMY N/A 5/12/2020    EXPLORATORY LAPAROTOMY EXPLANTATION OF INFECTED MESH SMAL BOWEL RESECTION AND REVISION END ILEOSTOMY, LYSIS OF ADHESIONS performed by Roscoe Valverde MD at [facility] N/A 6/8/2020    LAPAROTOMY EXPLORATORY, Courtney Borrow OF HERNIA MESH performed by Roscoe Valverde MD at [address] / leg    NASAL SINUS SURGERY      x2 /  cauterized    OTHER SURGICAL HISTORY  08/24/2016    diagnostic laparotomy with repair of intraabdominal hernia    OTHER SURGICAL HISTORY      removal plate / screws  / right great toe    ROTATOR CUFF REPAIR  2010    right     SKIN BIOPSY  2010    3 squamous cell carcinoma right leg, left leg       Social History:    Social History     Tobacco Use    Smoking status: Current Every Day Smoker     Packs/day: 1.00    Smokeless tobacco: Never Used   Substance Use Topics    Alcohol use: Yes     Comment: occasional                                Ready to quit: Not Answered  Counseling given: Not Answered      Vital Signs (Current): There were no vitals filed for this visit.                                            BP Readings from Last 3 Encounters:   09/10/20 (!) 148/72   06/10/20 (!) 113/53   06/08/20 (!) 226/107       NPO Status:  Last liquid and solid consumption 2200 9/10/20                                                                               BMI:   Wt Readings from Last 3 Encounters:   09/10/20 114 lb 5 oz (51.9 kg)   06/10/20 146 lb 12.3 oz (66.6 kg)   05/15/20 143 lb 6 oz (65 kg)     There is no height or weight on file to calculate BMI.    CBC:   Lab Results   Component Value Date    WBC 3.3 06/29/2020    RBC 2.72 06/29/2020    HGB 8.6 06/29/2020    HCT 27.5 06/29/2020    .1 06/29/2020    RDW 15.0 06/29/2020     06/29/2020       CMP:   Lab Results   Component Value Date     06/29/2020    K 4.0 06/29/2020    K 4.0 06/10/2020     06/29/2020    CO2 21 06/29/2020    BUN 26 06/29/2020    CREATININE 1.2 06/29/2020    GFRAA 53 06/29/2020    LABGLOM 43 06/29/2020    GLUCOSE 75 06/29/2020    PROT 5.4 06/29/2020    CALCIUM 8.4 06/29/2020    BILITOT <0.2 06/29/2020    ALKPHOS 90 06/29/2020    AST 28 06/29/2020    ALT 18 06/29/2020       POC Tests: No results for input(s): POCGLU, POCNA, POCK, POCCL, POCBUN, POCHEMO, POCHCT in the last 72 hours. Coags:   Lab Results   Component Value Date    PROTIME 12.1 06/05/2020    INR 1.0 06/05/2020       HCG (If Applicable): No results found for: PREGTESTUR, PREGSERUM, HCG, HCGQUANT     ABGs: No results found for: PHART, PO2ART, OQU7SLN, PQF5ANQ, BEART, B2QNFLUL     Type & Screen (If Applicable):  No results found for: LABABO, LABRH       6/5/20 EKG   6/6/2020  6:32 AM - Bob, Mhy Incoming Ekg Results From [facility]     Component  Value  Ref Range & Units  Status  Collected  Lab    Ventricular Rate  57  BPM  Final  06/05/2020  5:02 PM  [facility]    Atrial Rate  57  BPM  Final  06/05/2020  5:02 PM  [facility]    P-R Interval  166  ms  Final  06/05/2020  5:02 PM  [facility]    QRS Duration  84  ms  Final  06/05/2020  5:02 PM  [facility]    Q-T Interval  434  ms  Final  06/05/2020  5:02 PM  [facility]    QTc Calculation (Bazett)  422  ms  Final  06/05/2020  5:02 PM  [facility]    P Axis  64  degrees  Final  06/05/2020  5:02 PM  [facility]    R Axis  -26  degrees  Final  06/05/2020  5:02 PM  [facility]    T Axis  36  degrees  Final  06/05/2020  5:02 PM  [facility]    Testing Performed By     Lab - Abbreviation  Name  Director  Address  Valid Date Range    360-[facility]  [facility] MUSE  Unknown  Unknown  04/18/16 0721-Present    Narrative & Impression     Sinus bradycardia  Confirmed by Joselyn Bear ([id number]) on 6/6/2020 6:32:07 AM                     6/25/20 CT ABD  1.  Improved the post operatory changes in the midline anterior    abdominal wall after explantation of a hernia mesh.         2. Development of a complex collection of fat and hyperdense contents    in the anterior abdominal wall above the level of the ileostomy, from    the midline to the right side , along the more deep subcutaneous    area/superficial fascia muscle interface, covering an area for 15 x 2    x 11 cm. The hyperdense contents can represent a hemorrhagic    collection. It appears to be in direct continuity with the midline    anterior abdominal wall.         3. No acute intraperitoneal process. No bowel obstruction. Ileostomy    is patent. 6/11/20 CXR  Impression         Unchanged from prior study         Right-sided PIC catheter with tip in superior vena cava         No evidence of airspace consolidation or pulmonary venous congestion                  Anesthesia Evaluation  Patient summary reviewed and Nursing notes reviewed   history of anesthetic complications (pt states PONV once): PONV. Airway: Mallampati: III  TM distance: >3 FB   Neck ROM: full  Mouth opening: > = 3 FB Dental: normal exam         Pulmonary:   (+) COPD: mild,  decreased breath sounds,  current smoker                           Cardiovascular:    (+) hypertension:, valvular problems/murmurs (s/p MVR): MR, past MI:, CAD:, CABG/stent:, hyperlipidemia      ECG reviewed  Rhythm: regular  Rate: normal           Beta Blocker:  Dose within 24 Hrs         Neuro/Psych:   (+) neuromuscular disease:, headaches:, psychiatric history: stable with treatment             ROS comment: Fibromyalgia  tremor GI/Hepatic/Renal:   (+) GERD: well controlled, renal disease (stage 3): CRI,          ROS comment: Hx of MRSA of abdomen following mesh implantation    Ileostomy    Hx of esophageal tear in the past--secondary to vomiting. Has had NG tubes since. .   Endo/Other:    (+) : arthritis: OA., malignancy/cancer (skin). Blood dyscrasia: current h/h 10.9/34. Abdominal:           Vascular: negative vascular ROS. Anesthesia Plan      general     ASA 3     (OK with IV fentanyl.)  Induction: intravenous. BIS  MIPS: Postoperative opioids intended, Prophylactic antiemetics administered and Postoperative trial extubation. Anesthetic plan and risks discussed with patient. Use of blood products discussed with patient whom consented to blood products. Plan discussed with CRNA.     Attending anesthesiologist reviewed and agrees with Pre Eval content          Alena David RN   9/11/2020

## 2020-09-11 NOTE — PROGRESS NOTES
Educated patient on the importance of Incentive Spirometer, Patient returned demonstration of 2000, tolerated well.

## 2020-09-11 NOTE — BRIEF OP NOTE
Brief Postoperative Note      Patient: Mariana Taylor  YOB: 1940  MRN: [id number]    Date of Procedure: 9/11/2020    Pre-Op Diagnosis: INFECTED ABOMINAL WALL    Post-Op Diagnosis: Same       Procedure(s):  EXPLANTATION OF HERNIA MESH    Surgeon(s):  Edward Hardy MD    Assistant:  Resident: Kylie Burleson MD    Anesthesia: General    Estimated Blood Loss (mL): Minimal    Complications: Unplanned intestinal injury    Specimens:   ID Type Source Tests Collected by Time Destination   A : EXPLANTED MESH  ABDOMEN Specimen Abdomen SURGICAL PATHOLOGY Edward Hardy MD 9/11/2020 6174        Implants:  * No implants in log *      Drains:   Ileostomy Ileostomy RLQ (Active)   Stomal Appliance 2 piece 09/11/20 0813   Stoma  Assessment [place] 09/11/20 0813   Stool Color Green 09/11/20 0813   Stool Appearance Loose 09/11/20 0813       [REMOVED] Negative Pressure Wound Therapy Abdomen Anterior;Medial (Removed)       [REMOVED] External Urinary Catheter (Removed)       Findings: We debrided the superficial layer of midline 5x4cm abdominal wound which appears to be mesh incorporated with tissue. Lower aspect of wound had mesh adhered to small bowel, so it resulted in enterotomy which we closed with vicryl.     Electronically signed by Kylie Burleson MD on 9/11/2020 at 9:37 AM

## 2020-09-11 NOTE — PROGRESS NOTES
Abdominal dressing changed per order. Patient empties own ileostomy, states she has emptied 2x. Per doctor Bryce Riley patient is to have no ice.

## 2020-09-11 NOTE — PROGRESS NOTES
Nursing Transfer Note    Data:  Summary of patients progress: abdomenal surgery  Reason for transfer: continuation of care    Action:  Explained reason for transfer to patient.  instructed to proceed to room 709  Report given to: rn using RN Handoff Navigator.   Mode of transportation: cart    Response:  RN Recommendations:follow post op orders

## 2020-09-11 NOTE — H&P
History and Physical    Patient's Name/Date of Birth: Taiwo Arreguin / 1940    Date: September 11, 2020     PCP: Estrella Gaffney DO     Chief Complaint:    Retained foreign body    History of Present Illness: The patient is a 71-year-old white female who presents for a retained foreign body. The patient has had multiple expectations. The patient has a wound VAC. The patient has some residual mesh.       Past Medical History:   Diagnosis Date    Acute kidney failure ([facility] [address] 2014 and 8/2016    x2,no dialysis needed,resolved, kidney function tests returned to normal    Anxiety     Arthritis     CAD (coronary artery disease)     follows with Dr. Chan Woods every 6 months    Cancer [facility])     skin, leg,nose- removed surgically     COPD (chronic obstructive pulmonary disease) ([facility] [address]     mild- no inhlaers, no oxygen     Depression     Fibromyalgia     chronic back and neck pain    Generalized headaches     h/o / daily    History of blood transfusion 3-11-14    multiple times    History of necrotic bowel 2012    Hyperlipidemia     Hypertension     Incisional hernia     abdomen    Insomnia     Mitral valve regurgitation     Myocardial infarct ([facility] [address] 2002    Occasional tremors     at hands / stable with medication    Osteopenia     PONV (postoperative nausea and vomiting)     Vitamin B12 deficiency     h/o      Past Surgical History:   Procedure Laterality Date    ABDOMEN SURGERY  2-    total colectomy, bowel resection, ileostomy,revision of ileostomy     ABDOMEN SURGERY N/A 1/26/2020    DRAINAGE OF ABDOMINAL WALL ABSCESS, EXPLANTATION OF MESH, DEBRIDEMENT OF SKIN AND SUBCUTANEOUS TISSUE performed by Nicole Bender MD at [address]  [facility]   [address]  1-2006    right and left thumb   [address], 2004, 2009    lumbar fusion x 3    BACK SURGERY      cervical fusion x 2    BREAST SURGERY  years ago    monor calcifications    CARPAL TUNNEL RELEASE Bilateral  CATARACT REMOVAL WITH IMPLANT Right 03/03/15    CHOLECYSTECTOMY  5-11-07    Lap    COLONOSCOPY      CORONARY ANGIOPLASTY WITH STENT PLACEMENT  1-7-2002    ENDOSCOPY, COLON, DIAGNOSTIC      EPIGASTRIC HERNIA REPAIR  3/21/16    incisional with mesh implantation    ESOPHAGUS SURGERY  1-4-13    esophageal clamp (torn Esophagus)    FINGER TRIGGER RELEASE Right 2006    index finger    FOOT SURGERY Right     multiple    HERNIA REPAIR  12-31-13    abdominal x 5- last one 2017     ILEOSTOMY OR JEJUNOSTOMY  1-3-13    revision    INSERT PICC LINE  06/09/2020    and removed     JOINT REPLACEMENT Right 3/24/15    right total shoulder arthroplasty    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     LAPAROTOMY N/A 5/12/2020    EXPLORATORY LAPAROTOMY EXPLANTATION OF INFECTED MESH SMAL BOWEL RESECTION AND REVISION END ILEOSTOMY, LYSIS OF ADHESIONS performed by Jaylin Pozo MD at [facility] N/A 6/8/2020    LAPAROTOMY EXPLORATORY, Tor Ishikawa OF HERNIA MESH performed by Jaylin Pozo MD at [address] [place] [address] / leg    NASAL SINUS SURGERY      x2 /  cauterized    OTHER SURGICAL HISTORY  08/24/2016    diagnostic laparotomy with repair of intraabdominal hernia    OTHER SURGICAL HISTORY      removal plate / screws  / right great toe    ROTATOR CUFF REPAIR  2010    right     SKIN BIOPSY  2010    3 squamous cell carcinoma right leg, left leg      Allergies: Fentanyl; Levofloxacin; Tramadol; and Vioxx [rofecoxib]     Current Facility-Administered Medications   Medication Dose Route Frequency Provider Last Rate Last Dose    ceFAZolin (ANCEF) 2 g in sterile water 20 mL IV syringe  2 g Intravenous On Call to Harry Mortensen MD        lactated ringers infusion   Intravenous Continuous Jaylin Pozo MD        sodium chloride flush 0.9 % injection 10 mL  10 mL Intravenous 2 times per day Jaylin Pozo MD        fentaNYL (SUBLIMAZE) injection 50 mcg  50 mcg Intravenous Q5 Min PRN Lilian Delgadillo MD        HYDROmorphone (DILAUDID) injection 0.25 mg  0.25 mg Intravenous Q10 Min PRN Lilian Delgadillo MD        oxyCODONE-acetaminophen (PERCOCET) 5-325 MG per tablet 1 tablet  1 tablet Oral Once PRN Lilian Delgadillo MD        promethazine [facility]) injection 6.25 mg  6.25 mg Intramuscular PRN Lilian Delgadillo MD        meperidine (DEMEROL) injection 12.5 mg  12.5 mg Intravenous Once PRN Lilian Delgadillo MD           Social History     Tobacco Use    Smoking status: Current Every Day Smoker     Packs/day: 1.00    Smokeless tobacco: Never Used   Substance Use Topics    Alcohol use: Yes     Comment: occasional        Labs:  Lab Results   Component Value Date    WBC 6.0 09/11/2020    HCT 40.7 09/11/2020    HGB 12.8 09/11/2020     09/11/2020      Lab Results   Component Value Date     06/29/2020    K 4.0 06/29/2020     (H) 06/29/2020    CO2 21 (L) 06/29/2020    BUN 26 (H) 06/29/2020    CREATININE 1.2 (H) 06/29/2020    GLUCOSE 75 06/29/2020     Lab Results   Component Value Date    BILIDIR <0.2 03/26/2020    AST 28 06/29/2020    ALT 18 06/29/2020    ALKPHOS 90 06/29/2020    PROT 5.4 (L) 06/29/2020    LIPASE 93 (H) 03/26/2020        Review of Systems:    Other than stated above in the HPI is negative      Physical exam: BP (!) 105/54   Pulse 53   Temp 97.3 °F (36.3 °C) (Temporal)   Resp 20   Ht 5' (1.524 m)   Wt 114 lb 8 oz (51.9 kg)   SpO2 94%   BMI 22.36 kg/m²     General appearance: no acute distress  Head: NCAT, PERRLA, EOMI  Neck: supple, no masses  Lungs: CTABL  Heart: RRR  Abdomen: soft, nondistended, with exposed mesh involving the abdominal wall with associated tenderness, normotympanic, no guarding, no peritoneal signs.   Extremities: no rash cyanosis edema or jaundice    Assessment:    Retained foreign body    Plan:    Explantation of mesh    Brandon Flores MD 9/11/2020 at 8:18 AM

## 2020-09-11 NOTE — PROGRESS NOTES
Spoke with Dr Kyle Frames no admission order-will be going home at this time. No covid testing -add on- [facility] GALO palacios.

## 2020-09-11 NOTE — PROGRESS NOTES
Patient has abrasion on right shin that is covered with a dry dressing from home. She states she wishes to care for this herself.

## 2020-09-12 ENCOUNTER — APPOINTMENT (OUTPATIENT)
Dept: CT IMAGING | Age: 80
DRG: 908 | End: 2020-09-12
Attending: SURGERY
Payer: MEDICARE

## 2020-09-12 LAB
ANION GAP SERPL CALCULATED.3IONS-SCNC: 7 MMOL/L (ref 7–16)
BASOPHILS ABSOLUTE: 0.05 E9/L (ref 0–0.2)
BASOPHILS RELATIVE PERCENT: 1 % (ref 0–2)
BUN BLDV-MCNC: 18 MG/DL (ref 8–23)
CALCIUM SERPL-MCNC: 8.9 MG/DL (ref 8.6–10.2)
CHLORIDE BLD-SCNC: 109 MMOL/L (ref 98–107)
CO2: 22 MMOL/L (ref 22–29)
CREAT SERPL-MCNC: 1.1 MG/DL (ref 0.5–1)
EOSINOPHILS ABSOLUTE: 0.13 E9/L (ref 0.05–0.5)
EOSINOPHILS RELATIVE PERCENT: 2.6 % (ref 0–6)
GFR AFRICAN AMERICAN: 58
GFR NON-AFRICAN AMERICAN: 48 ML/MIN/1.73
GLUCOSE BLD-MCNC: 89 MG/DL (ref 74–99)
HCT VFR BLD CALC: 34.6 % (ref 34–48)
HEMOGLOBIN: 11.1 G/DL (ref 11.5–15.5)
IMMATURE GRANULOCYTES #: 0 E9/L
IMMATURE GRANULOCYTES %: 0 % (ref 0–5)
LYMPHOCYTES ABSOLUTE: 2.09 E9/L (ref 1.5–4)
LYMPHOCYTES RELATIVE PERCENT: 41.2 % (ref 20–42)
MCH RBC QN AUTO: 32.6 PG (ref 26–35)
MCHC RBC AUTO-ENTMCNC: 32.1 % (ref 32–34.5)
MCV RBC AUTO: 101.5 FL (ref 80–99.9)
MONOCYTES ABSOLUTE: 0.39 E9/L (ref 0.1–0.95)
MONOCYTES RELATIVE PERCENT: 7.7 % (ref 2–12)
NEUTROPHILS ABSOLUTE: 2.41 E9/L (ref 1.8–7.3)
NEUTROPHILS RELATIVE PERCENT: 47.5 % (ref 43–80)
PDW BLD-RTO: 14.3 FL (ref 11.5–15)
PHOSPHORUS: 3.1 MG/DL (ref 2.5–4.5)
PLATELET # BLD: 219 E9/L (ref 130–450)
PMV BLD AUTO: 11.6 FL (ref 7–12)
POTASSIUM REFLEX MAGNESIUM: 4.1 MMOL/L (ref 3.5–5)
RBC # BLD: 3.41 E12/L (ref 3.5–5.5)
SODIUM BLD-SCNC: 138 MMOL/L (ref 132–146)
WBC # BLD: 5.1 E9/L (ref 4.5–11.5)

## 2020-09-12 PROCEDURE — 6370000000 HC RX 637 (ALT 250 FOR IP): Performed by: SURGERY

## 2020-09-12 PROCEDURE — 6360000002 HC RX W HCPCS: Performed by: SURGERY

## 2020-09-12 PROCEDURE — 84100 ASSAY OF PHOSPHORUS: CPT

## 2020-09-12 PROCEDURE — 36415 COLL VENOUS BLD VENIPUNCTURE: CPT

## 2020-09-12 PROCEDURE — 1200000000 HC SEMI PRIVATE

## 2020-09-12 PROCEDURE — 6360000004 HC RX CONTRAST MEDICATION: Performed by: RADIOLOGY

## 2020-09-12 PROCEDURE — 2500000003 HC RX 250 WO HCPCS: Performed by: SURGERY

## 2020-09-12 PROCEDURE — 85025 COMPLETE CBC W/AUTO DIFF WBC: CPT

## 2020-09-12 PROCEDURE — 80048 BASIC METABOLIC PNL TOTAL CA: CPT

## 2020-09-12 PROCEDURE — C9113 INJ PANTOPRAZOLE SODIUM, VIA: HCPCS | Performed by: SURGERY

## 2020-09-12 PROCEDURE — 74176 CT ABD & PELVIS W/O CONTRAST: CPT

## 2020-09-12 PROCEDURE — 2580000003 HC RX 258: Performed by: SURGERY

## 2020-09-12 RX ADMIN — WATER 1 G: 1 INJECTION INTRAMUSCULAR; INTRAVENOUS; SUBCUTANEOUS at 08:59

## 2020-09-12 RX ADMIN — IOHEXOL 50 ML: 240 INJECTION, SOLUTION INTRATHECAL; INTRAVASCULAR; INTRAVENOUS; ORAL at 21:05

## 2020-09-12 RX ADMIN — ESCITALOPRAM 20 MG: 10 TABLET, FILM COATED ORAL at 22:49

## 2020-09-12 RX ADMIN — PROPRANOLOL HYDROCHLORIDE 20 MG: 20 TABLET ORAL at 08:58

## 2020-09-12 RX ADMIN — DICYCLOMINE HYDROCHLORIDE 10 MG: 10 CAPSULE ORAL at 22:49

## 2020-09-12 RX ADMIN — MORPHINE SULFATE 2 MG: 2 INJECTION, SOLUTION INTRAMUSCULAR; INTRAVENOUS at 16:58

## 2020-09-12 RX ADMIN — PRIMIDONE 250 MG: 250 TABLET ORAL at 22:49

## 2020-09-12 RX ADMIN — POTASSIUM CHLORIDE, DEXTROSE MONOHYDRATE AND SODIUM CHLORIDE: 150; 5; 450 INJECTION, SOLUTION INTRAVENOUS at 10:12

## 2020-09-12 RX ADMIN — PANTOPRAZOLE SODIUM 40 MG: 40 INJECTION, POWDER, FOR SOLUTION INTRAVENOUS at 08:59

## 2020-09-12 RX ADMIN — WATER 1 G: 1 INJECTION INTRAMUSCULAR; INTRAVENOUS; SUBCUTANEOUS at 17:06

## 2020-09-12 RX ADMIN — ONDANSETRON 4 MG: 2 INJECTION INTRAMUSCULAR; INTRAVENOUS at 22:57

## 2020-09-12 RX ADMIN — MORPHINE SULFATE 2 MG: 2 INJECTION, SOLUTION INTRAMUSCULAR; INTRAVENOUS at 00:00

## 2020-09-12 RX ADMIN — CHOLECALCIFEROL TAB 50 MCG (2000 UNIT) 2000 UNITS: 50 TAB at 22:49

## 2020-09-12 RX ADMIN — SODIUM CHLORIDE, PRESERVATIVE FREE 10 ML: 5 INJECTION INTRAVENOUS at 08:59

## 2020-09-12 RX ADMIN — DILTIAZEM HYDROCHLORIDE 120 MG: 120 CAPSULE, COATED, EXTENDED RELEASE ORAL at 09:00

## 2020-09-12 RX ADMIN — MORPHINE SULFATE 2 MG: 2 INJECTION, SOLUTION INTRAMUSCULAR; INTRAVENOUS at 08:59

## 2020-09-12 RX ADMIN — POTASSIUM CHLORIDE, DEXTROSE MONOHYDRATE AND SODIUM CHLORIDE: 150; 5; 450 INJECTION, SOLUTION INTRAVENOUS at 22:55

## 2020-09-12 RX ADMIN — MORPHINE SULFATE 2 MG: 2 INJECTION, SOLUTION INTRAMUSCULAR; INTRAVENOUS at 10:59

## 2020-09-12 RX ADMIN — CHOLECALCIFEROL TAB 50 MCG (2000 UNIT) 2000 UNITS: 50 TAB at 08:58

## 2020-09-12 RX ADMIN — MORPHINE SULFATE 2 MG: 2 INJECTION, SOLUTION INTRAMUSCULAR; INTRAVENOUS at 05:11

## 2020-09-12 RX ADMIN — WATER 1 G: 1 INJECTION INTRAMUSCULAR; INTRAVENOUS; SUBCUTANEOUS at 01:20

## 2020-09-12 RX ADMIN — DICYCLOMINE HYDROCHLORIDE 10 MG: 10 CAPSULE ORAL at 08:58

## 2020-09-12 RX ADMIN — ENOXAPARIN SODIUM 30 MG: 30 INJECTION SUBCUTANEOUS at 08:58

## 2020-09-12 RX ADMIN — TRAZODONE HYDROCHLORIDE 25 MG: 50 TABLET ORAL at 00:00

## 2020-09-12 RX ADMIN — MORPHINE SULFATE 2 MG: 2 INJECTION, SOLUTION INTRAMUSCULAR; INTRAVENOUS at 19:37

## 2020-09-12 RX ADMIN — MORPHINE SULFATE 2 MG: 2 INJECTION, SOLUTION INTRAMUSCULAR; INTRAVENOUS at 22:49

## 2020-09-12 RX ADMIN — SODIUM CHLORIDE 10 ML: 9 INJECTION INTRAMUSCULAR; INTRAVENOUS; SUBCUTANEOUS at 10:13

## 2020-09-12 ASSESSMENT — PAIN - FUNCTIONAL ASSESSMENT
PAIN_FUNCTIONAL_ASSESSMENT: ACTIVITIES ARE NOT PREVENTED
PAIN_FUNCTIONAL_ASSESSMENT: PREVENTS OR INTERFERES SOME ACTIVE ACTIVITIES AND ADLS
PAIN_FUNCTIONAL_ASSESSMENT: ACTIVITIES ARE NOT PREVENTED

## 2020-09-12 ASSESSMENT — PAIN DESCRIPTION - DESCRIPTORS
DESCRIPTORS: ACHING;DISCOMFORT
DESCRIPTORS: ACHING;DISCOMFORT
DESCRIPTORS: ACHING;DISCOMFORT;CONSTANT

## 2020-09-12 ASSESSMENT — PAIN DESCRIPTION - ONSET
ONSET: ON-GOING

## 2020-09-12 ASSESSMENT — PAIN SCALES - GENERAL
PAINLEVEL_OUTOF10: 7
PAINLEVEL_OUTOF10: 0
PAINLEVEL_OUTOF10: 0
PAINLEVEL_OUTOF10: 5
PAINLEVEL_OUTOF10: 0
PAINLEVEL_OUTOF10: 5
PAINLEVEL_OUTOF10: 7
PAINLEVEL_OUTOF10: 6
PAINLEVEL_OUTOF10: 0
PAINLEVEL_OUTOF10: 8
PAINLEVEL_OUTOF10: 5
PAINLEVEL_OUTOF10: 7

## 2020-09-12 ASSESSMENT — PAIN DESCRIPTION - PAIN TYPE
TYPE: SURGICAL PAIN
TYPE: ACUTE PAIN;SURGICAL PAIN
TYPE: SURGICAL PAIN

## 2020-09-12 ASSESSMENT — PAIN DESCRIPTION - LOCATION
LOCATION: ABDOMEN

## 2020-09-12 ASSESSMENT — PAIN DESCRIPTION - PROGRESSION
CLINICAL_PROGRESSION: NOT CHANGED
CLINICAL_PROGRESSION: NOT CHANGED
CLINICAL_PROGRESSION: GRADUALLY WORSENING

## 2020-09-12 ASSESSMENT — PAIN DESCRIPTION - ORIENTATION
ORIENTATION: MID
ORIENTATION: MID
ORIENTATION: LEFT;MID

## 2020-09-12 ASSESSMENT — PAIN DESCRIPTION - FREQUENCY
FREQUENCY: CONTINUOUS

## 2020-09-12 NOTE — PROGRESS NOTES
[facility] Quality Flow/Interdisciplinary Rounds Progress Note        Quality Flow Rounds held on September 12, 2020    Disciplines Attending:  Bedside and charge rn    Cynthiadequan Larisa was admitted on 9/11/2020  7:09 AM    Anticipated Discharge Date:  Expected Discharge Date: 09/13/20    Disposition:    Roland Score:  Roland Scale Score: 20    Readmission Risk              Risk of Unplanned Readmission:        24           Discussed patient goal for the day, patient clinical progression, and barriers to discharge. The following Goal(s) of the Day/Commitment(s) have been identified: On clear liquid diet. Manage drainage from abdominal wound and pain.  May need further surgical intervention       Brooke Schwartz  September 12, 2020

## 2020-09-13 LAB
ANION GAP SERPL CALCULATED.3IONS-SCNC: 6 MMOL/L (ref 7–16)
BASOPHILS ABSOLUTE: 0.03 E9/L (ref 0–0.2)
BASOPHILS RELATIVE PERCENT: 0.7 % (ref 0–2)
BUN BLDV-MCNC: 11 MG/DL (ref 8–23)
CALCIUM SERPL-MCNC: 8.8 MG/DL (ref 8.6–10.2)
CHLORIDE BLD-SCNC: 108 MMOL/L (ref 98–107)
CO2: 23 MMOL/L (ref 22–29)
CREAT SERPL-MCNC: 0.9 MG/DL (ref 0.5–1)
EOSINOPHILS ABSOLUTE: 0.2 E9/L (ref 0.05–0.5)
EOSINOPHILS RELATIVE PERCENT: 4.7 % (ref 0–6)
GFR AFRICAN AMERICAN: >60
GFR NON-AFRICAN AMERICAN: >60 ML/MIN/1.73
GLUCOSE BLD-MCNC: 104 MG/DL (ref 74–99)
HCT VFR BLD CALC: 34.4 % (ref 34–48)
HEMOGLOBIN: 10.9 G/DL (ref 11.5–15.5)
IMMATURE GRANULOCYTES #: 0 E9/L
IMMATURE GRANULOCYTES %: 0 % (ref 0–5)
LYMPHOCYTES ABSOLUTE: 1.16 E9/L (ref 1.5–4)
LYMPHOCYTES RELATIVE PERCENT: 27 % (ref 20–42)
MCH RBC QN AUTO: 31.7 PG (ref 26–35)
MCHC RBC AUTO-ENTMCNC: 31.7 % (ref 32–34.5)
MCV RBC AUTO: 100 FL (ref 80–99.9)
MONOCYTES ABSOLUTE: 0.42 E9/L (ref 0.1–0.95)
MONOCYTES RELATIVE PERCENT: 9.8 % (ref 2–12)
NEUTROPHILS ABSOLUTE: 2.49 E9/L (ref 1.8–7.3)
NEUTROPHILS RELATIVE PERCENT: 57.8 % (ref 43–80)
PDW BLD-RTO: 14.1 FL (ref 11.5–15)
PLATELET # BLD: 214 E9/L (ref 130–450)
PMV BLD AUTO: 11.9 FL (ref 7–12)
POTASSIUM REFLEX MAGNESIUM: 4 MMOL/L (ref 3.5–5)
RBC # BLD: 3.44 E12/L (ref 3.5–5.5)
SODIUM BLD-SCNC: 137 MMOL/L (ref 132–146)
WBC # BLD: 4.3 E9/L (ref 4.5–11.5)

## 2020-09-13 PROCEDURE — 80048 BASIC METABOLIC PNL TOTAL CA: CPT

## 2020-09-13 PROCEDURE — 6360000002 HC RX W HCPCS: Performed by: SURGERY

## 2020-09-13 PROCEDURE — 85025 COMPLETE CBC W/AUTO DIFF WBC: CPT

## 2020-09-13 PROCEDURE — 2500000003 HC RX 250 WO HCPCS: Performed by: SURGERY

## 2020-09-13 PROCEDURE — 6370000000 HC RX 637 (ALT 250 FOR IP): Performed by: SURGERY

## 2020-09-13 PROCEDURE — 36415 COLL VENOUS BLD VENIPUNCTURE: CPT

## 2020-09-13 PROCEDURE — C9113 INJ PANTOPRAZOLE SODIUM, VIA: HCPCS | Performed by: SURGERY

## 2020-09-13 PROCEDURE — 2580000003 HC RX 258: Performed by: SURGERY

## 2020-09-13 PROCEDURE — 1200000000 HC SEMI PRIVATE

## 2020-09-13 RX ADMIN — CHOLECALCIFEROL TAB 50 MCG (2000 UNIT) 2000 UNITS: 50 TAB at 09:31

## 2020-09-13 RX ADMIN — CHOLECALCIFEROL TAB 50 MCG (2000 UNIT) 2000 UNITS: 50 TAB at 20:36

## 2020-09-13 RX ADMIN — PROPRANOLOL HYDROCHLORIDE 20 MG: 20 TABLET ORAL at 09:30

## 2020-09-13 RX ADMIN — DICYCLOMINE HYDROCHLORIDE 10 MG: 10 CAPSULE ORAL at 09:30

## 2020-09-13 RX ADMIN — ESCITALOPRAM 20 MG: 10 TABLET, FILM COATED ORAL at 20:35

## 2020-09-13 RX ADMIN — DILTIAZEM HYDROCHLORIDE 120 MG: 120 CAPSULE, COATED, EXTENDED RELEASE ORAL at 09:31

## 2020-09-13 RX ADMIN — POTASSIUM CHLORIDE, DEXTROSE MONOHYDRATE AND SODIUM CHLORIDE: 150; 5; 450 INJECTION, SOLUTION INTRAVENOUS at 20:34

## 2020-09-13 RX ADMIN — HYDROMORPHONE HYDROCHLORIDE 0.5 MG: 1 INJECTION, SOLUTION INTRAMUSCULAR; INTRAVENOUS; SUBCUTANEOUS at 20:34

## 2020-09-13 RX ADMIN — ENOXAPARIN SODIUM 30 MG: 30 INJECTION SUBCUTANEOUS at 09:30

## 2020-09-13 RX ADMIN — POTASSIUM CHLORIDE, DEXTROSE MONOHYDRATE AND SODIUM CHLORIDE: 150; 5; 450 INJECTION, SOLUTION INTRAVENOUS at 10:06

## 2020-09-13 RX ADMIN — ONDANSETRON 4 MG: 2 INJECTION INTRAMUSCULAR; INTRAVENOUS at 12:33

## 2020-09-13 RX ADMIN — WATER 1 G: 1 INJECTION INTRAMUSCULAR; INTRAVENOUS; SUBCUTANEOUS at 09:30

## 2020-09-13 RX ADMIN — WATER 1 G: 1 INJECTION INTRAMUSCULAR; INTRAVENOUS; SUBCUTANEOUS at 17:36

## 2020-09-13 RX ADMIN — HYDROMORPHONE HYDROCHLORIDE 0.5 MG: 1 INJECTION, SOLUTION INTRAMUSCULAR; INTRAVENOUS; SUBCUTANEOUS at 13:18

## 2020-09-13 RX ADMIN — HYDROMORPHONE HYDROCHLORIDE 0.5 MG: 1 INJECTION, SOLUTION INTRAMUSCULAR; INTRAVENOUS; SUBCUTANEOUS at 17:29

## 2020-09-13 RX ADMIN — SODIUM CHLORIDE, PRESERVATIVE FREE 10 ML: 5 INJECTION INTRAVENOUS at 09:32

## 2020-09-13 RX ADMIN — SODIUM CHLORIDE, PRESERVATIVE FREE 10 ML: 5 INJECTION INTRAVENOUS at 05:24

## 2020-09-13 RX ADMIN — PANTOPRAZOLE SODIUM 40 MG: 40 INJECTION, POWDER, FOR SOLUTION INTRAVENOUS at 05:24

## 2020-09-13 RX ADMIN — PRIMIDONE 250 MG: 250 TABLET ORAL at 20:36

## 2020-09-13 RX ADMIN — TRAZODONE HYDROCHLORIDE 25 MG: 50 TABLET ORAL at 23:52

## 2020-09-13 RX ADMIN — TRAZODONE HYDROCHLORIDE 25 MG: 50 TABLET ORAL at 01:00

## 2020-09-13 RX ADMIN — HYDROMORPHONE HYDROCHLORIDE 0.5 MG: 1 INJECTION, SOLUTION INTRAMUSCULAR; INTRAVENOUS; SUBCUTANEOUS at 23:52

## 2020-09-13 RX ADMIN — HYDROMORPHONE HYDROCHLORIDE 0.5 MG: 1 INJECTION, SOLUTION INTRAMUSCULAR; INTRAVENOUS; SUBCUTANEOUS at 09:42

## 2020-09-13 RX ADMIN — WATER 1 G: 1 INJECTION INTRAMUSCULAR; INTRAVENOUS; SUBCUTANEOUS at 01:00

## 2020-09-13 RX ADMIN — DICYCLOMINE HYDROCHLORIDE 10 MG: 10 CAPSULE ORAL at 20:35

## 2020-09-13 RX ADMIN — SODIUM CHLORIDE 10 ML: 9 INJECTION INTRAMUSCULAR; INTRAVENOUS; SUBCUTANEOUS at 09:32

## 2020-09-13 ASSESSMENT — PAIN DESCRIPTION - DESCRIPTORS
DESCRIPTORS: ACHING;DISCOMFORT;DULL

## 2020-09-13 ASSESSMENT — PAIN - FUNCTIONAL ASSESSMENT
PAIN_FUNCTIONAL_ASSESSMENT: ACTIVITIES ARE NOT PREVENTED

## 2020-09-13 ASSESSMENT — PAIN DESCRIPTION - PROGRESSION
CLINICAL_PROGRESSION: GRADUALLY WORSENING
CLINICAL_PROGRESSION: GRADUALLY WORSENING

## 2020-09-13 ASSESSMENT — PAIN DESCRIPTION - ORIENTATION
ORIENTATION: LEFT;MID

## 2020-09-13 ASSESSMENT — PAIN DESCRIPTION - PAIN TYPE
TYPE: SURGICAL PAIN

## 2020-09-13 ASSESSMENT — PAIN SCALES - GENERAL
PAINLEVEL_OUTOF10: 0
PAINLEVEL_OUTOF10: 7
PAINLEVEL_OUTOF10: 1
PAINLEVEL_OUTOF10: 7
PAINLEVEL_OUTOF10: 5
PAINLEVEL_OUTOF10: 7
PAINLEVEL_OUTOF10: 7

## 2020-09-13 ASSESSMENT — PAIN DESCRIPTION - LOCATION
LOCATION: ABDOMEN

## 2020-09-13 ASSESSMENT — PAIN DESCRIPTION - ONSET
ONSET: ON-GOING

## 2020-09-13 ASSESSMENT — PAIN DESCRIPTION - FREQUENCY
FREQUENCY: CONTINUOUS

## 2020-09-13 NOTE — PROGRESS NOTES
PerfectServed Dr. Simona West regarding patient's wound leaking. Explained that patient is expressing frustration, states she has had both ostomy bags changed at least 8 times each during day shift. Danelle-wound moist, red, irritated. OK to remove bag and dress with ABD/gauze and clean & redress as needed.

## 2020-09-13 NOTE — PROGRESS NOTES
Seen/examined for Dr Regina Nance  No events overnight  Nurses report difficulty maintaining ostomy appliance on fistula  Abdomen soft non-distended  Open wound with EC fistula  Ileostomy RLQ viable with bilious output  Per Dr Regina Nance NPO at midnight for possible surgery tomorrow- informed pt he will discuss with her  Orders written  JSGaCristóbal

## 2020-09-14 ENCOUNTER — ANESTHESIA EVENT (OUTPATIENT)
Dept: OPERATING ROOM | Age: 80
DRG: 908 | End: 2020-09-14
Payer: MEDICARE

## 2020-09-14 LAB
ABO/RH: NORMAL
ANION GAP SERPL CALCULATED.3IONS-SCNC: 8 MMOL/L (ref 7–16)
ANTIBODY SCREEN: NORMAL
BASOPHILS ABSOLUTE: 0.03 E9/L (ref 0–0.2)
BASOPHILS RELATIVE PERCENT: 0.7 % (ref 0–2)
BUN BLDV-MCNC: 6 MG/DL (ref 8–23)
CALCIUM SERPL-MCNC: 8.7 MG/DL (ref 8.6–10.2)
CHLORIDE BLD-SCNC: 106 MMOL/L (ref 98–107)
CO2: 21 MMOL/L (ref 22–29)
CREAT SERPL-MCNC: 0.9 MG/DL (ref 0.5–1)
EOSINOPHILS ABSOLUTE: 0.23 E9/L (ref 0.05–0.5)
EOSINOPHILS RELATIVE PERCENT: 5.3 % (ref 0–6)
GFR AFRICAN AMERICAN: >60
GFR NON-AFRICAN AMERICAN: >60 ML/MIN/1.73
GLUCOSE BLD-MCNC: 112 MG/DL (ref 74–99)
HCT VFR BLD CALC: 35.5 % (ref 34–48)
HEMOGLOBIN: 11.2 G/DL (ref 11.5–15.5)
IMMATURE GRANULOCYTES #: 0.01 E9/L
IMMATURE GRANULOCYTES %: 0.2 % (ref 0–5)
LYMPHOCYTES ABSOLUTE: 1.48 E9/L (ref 1.5–4)
LYMPHOCYTES RELATIVE PERCENT: 34.2 % (ref 20–42)
MCH RBC QN AUTO: 31.5 PG (ref 26–35)
MCHC RBC AUTO-ENTMCNC: 31.5 % (ref 32–34.5)
MCV RBC AUTO: 99.7 FL (ref 80–99.9)
MONOCYTES ABSOLUTE: 0.39 E9/L (ref 0.1–0.95)
MONOCYTES RELATIVE PERCENT: 9 % (ref 2–12)
NEUTROPHILS ABSOLUTE: 2.19 E9/L (ref 1.8–7.3)
NEUTROPHILS RELATIVE PERCENT: 50.6 % (ref 43–80)
PDW BLD-RTO: 13.9 FL (ref 11.5–15)
PLATELET # BLD: 140 E9/L (ref 130–450)
PMV BLD AUTO: 12.3 FL (ref 7–12)
POTASSIUM REFLEX MAGNESIUM: 3.9 MMOL/L (ref 3.5–5)
RBC # BLD: 3.56 E12/L (ref 3.5–5.5)
SODIUM BLD-SCNC: 135 MMOL/L (ref 132–146)
WBC # BLD: 4.3 E9/L (ref 4.5–11.5)

## 2020-09-14 PROCEDURE — 6370000000 HC RX 637 (ALT 250 FOR IP): Performed by: SURGERY

## 2020-09-14 PROCEDURE — 2580000003 HC RX 258: Performed by: STUDENT IN AN ORGANIZED HEALTH CARE EDUCATION/TRAINING PROGRAM

## 2020-09-14 PROCEDURE — 86901 BLOOD TYPING SEROLOGIC RH(D): CPT

## 2020-09-14 PROCEDURE — 36415 COLL VENOUS BLD VENIPUNCTURE: CPT

## 2020-09-14 PROCEDURE — 80048 BASIC METABOLIC PNL TOTAL CA: CPT

## 2020-09-14 PROCEDURE — 1200000000 HC SEMI PRIVATE

## 2020-09-14 PROCEDURE — 6360000002 HC RX W HCPCS: Performed by: SURGERY

## 2020-09-14 PROCEDURE — C9113 INJ PANTOPRAZOLE SODIUM, VIA: HCPCS | Performed by: SURGERY

## 2020-09-14 PROCEDURE — C9113 INJ PANTOPRAZOLE SODIUM, VIA: HCPCS | Performed by: STUDENT IN AN ORGANIZED HEALTH CARE EDUCATION/TRAINING PROGRAM

## 2020-09-14 PROCEDURE — 86850 RBC ANTIBODY SCREEN: CPT

## 2020-09-14 PROCEDURE — 2580000003 HC RX 258: Performed by: SURGERY

## 2020-09-14 PROCEDURE — 85025 COMPLETE CBC W/AUTO DIFF WBC: CPT

## 2020-09-14 PROCEDURE — 86900 BLOOD TYPING SEROLOGIC ABO: CPT

## 2020-09-14 PROCEDURE — 6370000000 HC RX 637 (ALT 250 FOR IP): Performed by: STUDENT IN AN ORGANIZED HEALTH CARE EDUCATION/TRAINING PROGRAM

## 2020-09-14 PROCEDURE — 6360000002 HC RX W HCPCS: Performed by: STUDENT IN AN ORGANIZED HEALTH CARE EDUCATION/TRAINING PROGRAM

## 2020-09-14 PROCEDURE — 2500000003 HC RX 250 WO HCPCS: Performed by: SURGERY

## 2020-09-14 RX ORDER — NICOTINE 21 MG/24HR
1 PATCH, TRANSDERMAL 24 HOURS TRANSDERMAL DAILY
Status: DISCONTINUED | OUTPATIENT
Start: 2020-09-14 | End: 2020-09-22 | Stop reason: HOSPADM

## 2020-09-14 RX ORDER — OCTREOTIDE ACETATE 100 UG/ML
100 INJECTION, SOLUTION INTRAVENOUS; SUBCUTANEOUS ONCE
Status: COMPLETED | OUTPATIENT
Start: 2020-09-14 | End: 2020-09-14

## 2020-09-14 RX ORDER — SODIUM CHLORIDE 0.9 % (FLUSH) 0.9 %
10 SYRINGE (ML) INJECTION PRN
Status: DISCONTINUED | OUTPATIENT
Start: 2020-09-14 | End: 2020-09-22 | Stop reason: HOSPADM

## 2020-09-14 RX ORDER — LIDOCAINE HYDROCHLORIDE 10 MG/ML
5 INJECTION, SOLUTION EPIDURAL; INFILTRATION; INTRACAUDAL; PERINEURAL ONCE
Status: COMPLETED | OUTPATIENT
Start: 2020-09-14 | End: 2020-09-15

## 2020-09-14 RX ORDER — LOPERAMIDE HCL 1 MG/7.5ML
4 SOLUTION ORAL 4 TIMES DAILY
Status: DISCONTINUED | OUTPATIENT
Start: 2020-09-14 | End: 2020-09-15

## 2020-09-14 RX ORDER — HEPARIN SODIUM (PORCINE) LOCK FLUSH IV SOLN 100 UNIT/ML 100 UNIT/ML
3 SOLUTION INTRAVENOUS EVERY 12 HOURS SCHEDULED
Status: DISCONTINUED | OUTPATIENT
Start: 2020-09-14 | End: 2020-09-22 | Stop reason: HOSPADM

## 2020-09-14 RX ORDER — HEPARIN SODIUM (PORCINE) LOCK FLUSH IV SOLN 100 UNIT/ML 100 UNIT/ML
3 SOLUTION INTRAVENOUS PRN
Status: DISCONTINUED | OUTPATIENT
Start: 2020-09-14 | End: 2020-09-22 | Stop reason: HOSPADM

## 2020-09-14 RX ORDER — CALCIUM ACETATE MONOHYDRATE AND ALUMINUM SULFATE TETRADECAHYDRATE 952; 1347 MG/2299MG; MG/2299MG
4 POWDER, FOR SOLUTION TOPICAL DAILY PRN
Status: DISCONTINUED | OUTPATIENT
Start: 2020-09-14 | End: 2020-09-22 | Stop reason: HOSPADM

## 2020-09-14 RX ADMIN — POTASSIUM CHLORIDE, DEXTROSE MONOHYDRATE AND SODIUM CHLORIDE: 150; 5; 450 INJECTION, SOLUTION INTRAVENOUS at 19:37

## 2020-09-14 RX ADMIN — ESCITALOPRAM 20 MG: 10 TABLET, FILM COATED ORAL at 21:29

## 2020-09-14 RX ADMIN — WATER 1 G: 1 INJECTION INTRAMUSCULAR; INTRAVENOUS; SUBCUTANEOUS at 08:25

## 2020-09-14 RX ADMIN — PANTOPRAZOLE SODIUM 40 MG: 40 INJECTION, POWDER, FOR SOLUTION INTRAVENOUS at 07:32

## 2020-09-14 RX ADMIN — WATER 1 G: 1 INJECTION INTRAMUSCULAR; INTRAVENOUS; SUBCUTANEOUS at 18:24

## 2020-09-14 RX ADMIN — HYDROMORPHONE HYDROCHLORIDE 0.5 MG: 1 INJECTION, SOLUTION INTRAMUSCULAR; INTRAVENOUS; SUBCUTANEOUS at 22:14

## 2020-09-14 RX ADMIN — PROPRANOLOL HYDROCHLORIDE 20 MG: 20 TABLET ORAL at 08:26

## 2020-09-14 RX ADMIN — HYDROMORPHONE HYDROCHLORIDE 0.5 MG: 1 INJECTION, SOLUTION INTRAMUSCULAR; INTRAVENOUS; SUBCUTANEOUS at 12:25

## 2020-09-14 RX ADMIN — CHOLECALCIFEROL TAB 50 MCG (2000 UNIT) 2000 UNITS: 50 TAB at 21:29

## 2020-09-14 RX ADMIN — OCTREOTIDE ACETATE 100 MCG: 100 INJECTION, SOLUTION INTRAVENOUS; SUBCUTANEOUS at 18:24

## 2020-09-14 RX ADMIN — HYDROMORPHONE HYDROCHLORIDE 0.5 MG: 1 INJECTION, SOLUTION INTRAMUSCULAR; INTRAVENOUS; SUBCUTANEOUS at 04:46

## 2020-09-14 RX ADMIN — WATER 1 G: 1 INJECTION INTRAMUSCULAR; INTRAVENOUS; SUBCUTANEOUS at 01:08

## 2020-09-14 RX ADMIN — PRIMIDONE 250 MG: 250 TABLET ORAL at 21:29

## 2020-09-14 RX ADMIN — DILTIAZEM HYDROCHLORIDE 120 MG: 120 CAPSULE, COATED, EXTENDED RELEASE ORAL at 08:26

## 2020-09-14 RX ADMIN — TRAZODONE HYDROCHLORIDE 25 MG: 50 TABLET ORAL at 23:30

## 2020-09-14 RX ADMIN — SODIUM CHLORIDE 8 MG/HR: 9 INJECTION, SOLUTION INTRAVENOUS at 18:24

## 2020-09-14 RX ADMIN — SODIUM CHLORIDE 10 ML: 9 INJECTION INTRAMUSCULAR; INTRAVENOUS; SUBCUTANEOUS at 07:32

## 2020-09-14 RX ADMIN — LOPERAMIDE HCL 4 MG: 1 SUSPENSION ORAL at 21:29

## 2020-09-14 RX ADMIN — DICYCLOMINE HYDROCHLORIDE 10 MG: 10 CAPSULE ORAL at 21:29

## 2020-09-14 RX ADMIN — HYDROMORPHONE HYDROCHLORIDE 0.5 MG: 1 INJECTION, SOLUTION INTRAMUSCULAR; INTRAVENOUS; SUBCUTANEOUS at 08:25

## 2020-09-14 RX ADMIN — HYDROMORPHONE HYDROCHLORIDE 0.5 MG: 1 INJECTION, SOLUTION INTRAMUSCULAR; INTRAVENOUS; SUBCUTANEOUS at 18:57

## 2020-09-14 RX ADMIN — HYDROMORPHONE HYDROCHLORIDE 0.5 MG: 1 INJECTION, SOLUTION INTRAMUSCULAR; INTRAVENOUS; SUBCUTANEOUS at 16:03

## 2020-09-14 ASSESSMENT — PAIN DESCRIPTION - PROGRESSION
CLINICAL_PROGRESSION: GRADUALLY WORSENING
CLINICAL_PROGRESSION: GRADUALLY WORSENING
CLINICAL_PROGRESSION: GRADUALLY IMPROVING
CLINICAL_PROGRESSION: GRADUALLY WORSENING

## 2020-09-14 ASSESSMENT — PAIN DESCRIPTION - ORIENTATION
ORIENTATION: RIGHT;LEFT
ORIENTATION: MID
ORIENTATION: RIGHT;LEFT
ORIENTATION: RIGHT;LEFT

## 2020-09-14 ASSESSMENT — PAIN SCALES - GENERAL
PAINLEVEL_OUTOF10: 0
PAINLEVEL_OUTOF10: 7
PAINLEVEL_OUTOF10: 6

## 2020-09-14 ASSESSMENT — PAIN DESCRIPTION - DESCRIPTORS
DESCRIPTORS: ACHING;DISCOMFORT;DULL
DESCRIPTORS: ACHING;BURNING

## 2020-09-14 ASSESSMENT — PAIN DESCRIPTION - PAIN TYPE
TYPE: SURGICAL PAIN

## 2020-09-14 ASSESSMENT — PAIN - FUNCTIONAL ASSESSMENT
PAIN_FUNCTIONAL_ASSESSMENT: ACTIVITIES ARE NOT PREVENTED

## 2020-09-14 ASSESSMENT — PAIN DESCRIPTION - LOCATION
LOCATION: ABDOMEN

## 2020-09-14 ASSESSMENT — PAIN DESCRIPTION - FREQUENCY
FREQUENCY: CONTINUOUS

## 2020-09-14 ASSESSMENT — PAIN DESCRIPTION - ONSET
ONSET: ON-GOING

## 2020-09-14 ASSESSMENT — LIFESTYLE VARIABLES: SMOKING_STATUS: 1

## 2020-09-14 NOTE — PROGRESS NOTES
Re-visit today  domeboros solution soak to skin. With improvement. powder, no sting  D/W  [facility]. Possible surgery tomorrow  Wound pouched. aquacel rope to wound. Barrier ring and paste to separate from stoma  One piece pouch applied  ileostomy pouch change. Peristomal skin intact. Redness outside of that area is from fistula dressing leaking  Less output from ileostomy and from fistula when NPO  Omar Pacheco.  Denver Sousa, LALI, Wound Care

## 2020-09-14 NOTE — PROGRESS NOTES
Surgical residents notified via perfect serve of patient wound status and to come reassess per patient request.

## 2020-09-14 NOTE — CARE COORDINATION
Social Work:    Reviewed chart notes. Patient underwent Explantation of Hernia Mesh on Friday and incurred unexpected complications that require further surgical intervention, possibly today vs tomorrow. Social service met with Mrs. Terris Canavan her , offered support, and explained social service role within the case management department. Mrs. Bart Taylor resides independently in a one-floor home, has a walker, cane, grab bars, and is active with [facility] [address] for nursing & wound care. Social work to follow-up and confirm discharge plans closer to discharge date.      Electronically signed by CASSI Lopez on 9/14/2020 at 3:48 PM

## 2020-09-14 NOTE — PROGRESS NOTES
[facility] Quality Flow/Interdisciplinary Rounds Progress Note        Quality Flow Rounds held on September 14, 2020    Disciplines Attending:  Bedside Nurse, ,  and Nursing Unit Leadership    Kin Rodriguez was admitted on 9/11/2020  7:09 AM    Anticipated Discharge Date:  Expected Discharge Date: 09/13/20    Disposition:    Roland Score:  Roland Scale Score: 18    Readmission Risk              Risk of Unplanned Readmission:        23           Discussed patient goal for the day, patient clinical progression, and barriers to discharge.   The following Goal(s) of the Day/Commitment(s) have been identified:  Pain Control      Pawan Dec  September 14, 2020

## 2020-09-14 NOTE — PROGRESS NOTES
GENERAL SURGERY  DAILY PROGRESS NOTE  9/14/2020    No chief complaint on file. Subjective:  Pt states she is not doing well. Says she is leaking all over. Objective:  /68   Pulse 68   Temp 98.4 °F (36.9 °C) (Oral)   Resp 16   Ht 5' (1.524 m)   Wt 117 lb 12.8 oz (53.4 kg)   SpO2 94%   BMI 23.01 kg/m²     GENERAL:  Laying in bed, awake, alert, cooperative, no apparent distress  HEAD: Normocephalic, atraumatic  EYES: No sclera icterus, pupils equal  LUNGS:  No increased work of breathing  CARDIOVASCULAR:  RR  ABDOMEN:  Soft, TTP around ostomy, non-distended. Ostomy pink, patent, with liquid green output. Leaking out the sides. Skin erythematous around ostomy.  Redressed with duoderm and 4x4s  EXTREMITIES: No edema or swelling  SKIN: Warm and dry    Assessment/Plan:  68 y.o. female s/p mesh explantation with enterotomy and closure POD3    - NPO for possible procedure 9/14  - Possible OR today 9/14  - Monitor ostomy output  - Pain control PRN    Electronically signed by Kristin Merlin, MD on 9/14/2020 at 4:42 AM

## 2020-09-14 NOTE — ANESTHESIA POSTPROCEDURE EVALUATION
Department of Anesthesiology  Postprocedure Note    Patient: Poly Schwab  MRN: [id number]  YOB: 1940  Date of evaluation: 9/14/2020  Time:  9:25 AM     Procedure Summary     Date:  09/11/20 Room / Location:  [address]    Anesthesia Start:  8289 Anesthesia Stop:  4858    Procedure:  EXPLANTATION OF HERNIA MESH (N/A ) Diagnosis:  (INFECTED ABOMINAL WALL)    Surgeon:  Dhiraj Granado MD Responsible Provider:  Lio Rodgers MD    Anesthesia Type:  general ASA Status:  3          Anesthesia Type: general    Sugar Phase I: Sugar Score: 9    Sugar Phase II:      Last vitals: Reviewed and per EMR flowsheets.        Anesthesia Post Evaluation    Patient location during evaluation: PACU  Patient participation: complete - patient participated  Level of consciousness: awake and alert  Airway patency: patent  Nausea & Vomiting: no vomiting and no nausea  Complications: no  Cardiovascular status: blood pressure returned to baseline  Respiratory status: acceptable  Hydration status: euvolemic

## 2020-09-14 NOTE — ANESTHESIA PRE PROCEDURE
Department of Anesthesiology  Preprocedure Note       Name:  Cody Chin   Age:  68 y.o.  :  1940                                          MRN:  [id number]         Date:  2020      Surgeon: Joesph Guzman):  Dorita Montero MD    Procedure: EXPLORATORY LAPAROTOMY WITH SMALL BOWEL RESECTION (N/A Abdomen)    Medications prior to admission:   Prior to Admission medications    Medication Sig Start Date End Date Taking? Authorizing Provider   cephALEXin (KEFLEX) 500 MG capsule Take 1 capsule by mouth 4 times daily for 10 days 9/10/20 9/20/20  Nikia Mckeon,    mupirocin (BACTROBAN) 2 % cream Apply 1 inch topically 2 times daily for 10 days Apply topically 3 times daily. 9/10/20 9/20/20  Rach Mckeon,    omeprazole (PRILOSEC) 20 MG delayed release capsule Take 40 mg by mouth daily    Historical Provider, MD   dicyclomine (BENTYL) 10 MG capsule Take 10 mg by mouth 2 times daily    Historical Provider, MD   aspirin 81 MG tablet Take 81 mg by mouth daily    Historical Provider, MD   ondansetron (ZOFRAN) 4 MG tablet Take 4 mg by mouth 3 times daily     Historical Provider, MD   diltiazem (CARDIZEM CD) 120 MG extended release capsule Take 1 capsule by mouth daily 18   Cedric Leary MD   oxyCODONE-acetaminophen (PERCOCET)  MG per tablet Take by mouth every 6 hours as needed for Pain.  Instructed to take with sip water am of procedure if needs     Historical Provider, MD   fenofibrate (TRICOR) 145 MG tablet Take 145 mg by mouth daily    Historical Provider, MD   Cholecalciferol (VITAMIN D3) 65849 UNITS CAPS Take 2,000 Units by mouth 2 times daily     Historical Provider, MD   Coenzyme Q10 (COQ10 PO) Take by mouth daily     Historical Provider, MD   Ferrous Sulfate (IRON) 28 MG TABS Take by mouth daily    Historical Provider, MD   calcium carbonate (OSCAL) 500 MG TABS tablet Take 500 mg by mouth daily LD 3/16/2017    Historical Provider, MD   pravastatin (PRAVACHOL) 40 MG tablet Take 40 mg by mouth every evening    Historical Provider, MD   propranolol (INDERAL) 20 MG tablet Take 20 mg by mouth daily Instructed to take morning of surgery with a sip of water     Historical Provider, MD   vitamin B-12 (CYANOCOBALAMIN) 500 MCG tablet Place 500 mcg under the tongue daily     Historical Provider, MD   Omega-3 Fatty Acids (OMEGA 3 PO) Take by mouth daily     Historical Provider, MD   escitalopram (LEXAPRO) 20 MG tablet Take 20 mg by mouth nightly. Historical Provider, MD   primidone (MYSOLINE) 50 MG tablet Take 250 mg by mouth nightly Taking for essential tremors    Historical Provider, MD   traZODone (DESYREL) 50 MG tablet Take 25 mg by mouth nightly     Historical Provider, MD       Current medications:    No current facility-administered medications for this visit. No current outpatient medications on file.      Facility-Administered Medications Ordered in Other Visits   Medication Dose Route Frequency Provider Last Rate Last Dose    enoxaparin (LOVENOX) injection 40 mg  40 mg Subcutaneous Daily Ivett Newell MD        aluminum sulfate-calcium acetate [facility] packet 4 packet  4 packet Topical Daily PRN Enma Avalos MD        HYDROmorphone (DILAUDID) injection 0.5 mg  0.5 mg Intravenous Q3H PRN Carrie Velasquez MD   0.5 mg at 09/14/20 1603    dicyclomine (BENTYL) capsule 10 mg  10 mg Oral BID Gualberto Hightower MD   10 mg at 09/13/20 2035    dilTIAZem (CARDIZEM CD) extended release capsule 120 mg  120 mg Oral Daily Gualberto Hightower MD   120 mg at 09/14/20 0826    escitalopram (LEXAPRO) tablet 20 mg  20 mg Oral Nightly Gualberto Hightower MD   20 mg at 09/13/20 2035    pantoprazole (PROTONIX) injection 40 mg  40 mg Intravenous QAM AC Gualberto Hightower MD   40 mg at 09/14/20 0732    And    sodium chloride (PF) 0.9 % injection 10 mL  10 mL Intravenous Daily Gualberto Hightower MD   10 mL at 09/14/20 0732    ceFAZolin (ANCEF) 1 g in sterile water 10 mL IV syringe  1 g Intravenous Rosy Felix MD   1 g at 09/14/20 0825  primidone (MYSOLINE) tablet 250 mg  250 mg Oral Nightly Lakhwinder Styles MD   250 mg at 09/13/20 2036    propranolol (INDERAL) tablet 20 mg  20 mg Oral Daily Lakhwinder Styles MD   20 mg at 09/14/20 5865    traZODone (DESYREL) tablet 25 mg  25 mg Oral Nightly PRN Lakhwinder Styles MD   25 mg at 09/13/20 2352    vitamin D (CHOLECALCIFEROL) tablet 2,000 Units  2,000 Units Oral BID Lakhwinder Styles MD   2,000 Units at 09/13/20 2036    sodium chloride flush 0.9 % injection 10 mL  10 mL Intravenous 2 times per day Lakhwinder Styles MD   10 mL at 09/13/20 0932    sodium chloride flush 0.9 % injection 10 mL  10 mL Intravenous PRN Lakhwinder Styles MD   10 mL at 09/13/20 0524    dextrose 5 % and 0.45 % NaCl with KCl 20 mEq infusion   Intravenous Continuous Lakhwinder Styles MD 90 mL/hr at 09/13/20 2034      ondansetron (ZOFRAN) injection 4 mg  4 mg Intravenous Q6H PRN Lakhwinder Styles MD   4 mg at 09/13/20 1233    acetaminophen (TYLENOL) tablet 650 mg  650 mg Oral Q4H PRN Jamaal Schwartz MD           Allergies: Allergies   Allergen Reactions    Fentanyl Itching     Patch only    Levofloxacin Other (See Comments)     Leg edema/pain    Tramadol Nausea Only     Nausea .  Sleeplisness, shakey    Vioxx [Rofecoxib]      Leg edema and pain       Problem List:    Patient Active Problem List   Diagnosis Code    Right cataract H26.9    Essential hypertension, benign I10    Hyperlipidemia with target LDL less than 100 E78.5    Osteoarthritis, 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    Primary osteoarthritis of left knee M17.12    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    Ileostomy present ([facility] Z93.2    Chronic kidney disease, stage III (moderate) ([facility]) N18.3    COPD (chronic obstructive pulmonary disease) ([facility]) J44.9    Moderate protein-calorie malnutrition (HCC) E44.0    Abdominal wall cellulitis L03.311    Cellulitis L03.90    Infected hernioplasty mesh ([facility] T85.79XA    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 Maranda Osman every 6 months    Cancer [facility])     skin, leg,nose- removed surgically     COPD (chronic obstructive pulmonary disease) [facility]     mild- no inhlaers, no oxygen     Depression     Fibromyalgia     chronic back and neck pain    Generalized headaches     h/o / daily    History of blood transfusion 3-11-14    multiple times    History of necrotic bowel 2012    Hyperlipidemia     Hypertension     Incisional hernia     abdomen    Insomnia     Mitral valve regurgitation     Myocardial infarct ([facility] 2002    Occasional tremors     at hands / stable with medication    Osteopenia     PONV (postoperative nausea and vomiting)     Vitamin B12 deficiency     h/o       Past Surgical History:        Procedure Laterality Date    ABDOMEN SURGERY  2-    total colectomy, bowel resection, ileostomy,revision of ileostomy     ABDOMEN SURGERY N/A 1/26/2020    DRAINAGE OF ABDOMINAL WALL ABSCESS, EXPLANTATION OF MESH, DEBRIDEMENT OF SKIN AND SUBCUTANEOUS TISSUE performed by Sharon Evans MD at [facility] N/A 9/11/2020    EXPLANTATION OF HERNIA MESH performed by Sharon Evans MD at [address] [place] [address]  2002   [address]  1-2006    right and left thumb   [facility], 2004, 2009    lumbar fusion x 3    BACK SURGERY      cervical fusion x 2    BREAST SURGERY  years ago    monor calcifications    CARPAL TUNNEL RELEASE Bilateral     CATARACT REMOVAL WITH IMPLANT Right 03/03/15    CHOLECYSTECTOMY  5-11-07    Lap    COLONOSCOPY      CORONARY ANGIOPLASTY WITH STENT PLACEMENT  1-7-2002    ENDOSCOPY, COLON, DIAGNOSTIC      EPIGASTRIC HERNIA REPAIR  3/21/16    incisional with mesh implantation    ESOPHAGUS SURGERY  1-4-13    esophageal clamp (torn Esophagus)    FINGER TRIGGER RELEASE Right 2006    index finger    FOOT SURGERY Right     multiple    HERNIA REPAIR  12-31-13    abdominal x 5- last one 2017     ILEOSTOMY OR JEJUNOSTOMY  1-3-13    revision    INSERT PICC LINE  06/09/2020    and removed     JOINT REPLACEMENT Right 3/24/15    right total shoulder arthroplasty    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     LAPAROTOMY N/A 5/12/2020    EXPLORATORY LAPAROTOMY EXPLANTATION OF INFECTED MESH SMAL BOWEL RESECTION AND REVISION END ILEOSTOMY, LYSIS OF ADHESIONS performed by Miriam Gipson MD at [facility] N/A 6/8/2020    LAPAROTOMY EXPLORATORY, Albino Early OF HERNIA MESH performed by Miriam Gipson MD at [address] / leg    NASAL SINUS SURGERY      x2 /  cauterized    OTHER SURGICAL HISTORY  08/24/2016    diagnostic laparotomy with repair of intraabdominal hernia    OTHER SURGICAL HISTORY      removal plate / screws  / right great toe    ROTATOR CUFF REPAIR  2010    right     SKIN BIOPSY  2010    3 squamous cell carcinoma right leg, left leg       Social History:    Social History     Tobacco Use    Smoking status: Current Every Day Smoker     Packs/day: 1.00    Smokeless tobacco: Never Used   Substance Use Topics    Alcohol use: Yes     Comment: occasional                                Ready to quit: Not Answered  Counseling given: Not Answered      Vital Signs (Current): There were no vitals filed for this visit.                                            BP Readings from Last 3 Encounters:   09/14/20 (!) 170/74   09/11/20 (!) 178/95   09/10/20 (!) 148/72       NPO Status:                                                                                 BMI:   Wt Readings from Last 3 Encounters:   09/11/20 117 lb 12.8 oz (53.4 kg)   09/10/20 114 lb 5 oz (51.9 kg)   06/10/20 146 lb 12.3 oz (66.6 kg)     There is no height or weight on file to calculate BMI.    CBC:   Lab Results   Component Value Date    WBC 4.3 09/14/2020    RBC 3.56 09/14/2020    HGB 11.2 09/14/2020    HCT 35.5 09/14/2020    MCV 99.7 09/14/2020    RDW 13.9 09/14/2020     09/14/2020       CMP:   Lab Results   Component Value Date     09/14/2020    K 3.9 09/14/2020     09/14/2020    CO2 21 09/14/2020    BUN 6 09/14/2020    CREATININE 0.9 09/14/2020    GFRAA >60 09/14/2020    LABGLOM >60 09/14/2020    GLUCOSE 112 09/14/2020    PROT 5.4 06/29/2020    CALCIUM 8.7 09/14/2020    BILITOT <0.2 06/29/2020    ALKPHOS 90 06/29/2020    AST 28 06/29/2020    ALT 18 06/29/2020       POC Tests: No results for input(s): POCGLU, POCNA, POCK, POCCL, POCBUN, POCHEMO, POCHCT in the last 72 hours.     Coags:   Lab Results   Component Value Date    PROTIME 12.1 06/05/2020    INR 1.0 06/05/2020       HCG (If Applicable): No results found for: PREGTESTUR, PREGSERUM, HCG, HCGQUANT     ABGs: No results found for: PHART, PO2ART, NNY6UUS, MJB0QFK, BEART, Z8BIMMJI     Type & Screen (If Applicable):  No results found for: LABABO, LABRH     EKG 5-    Component Value Ref Range & Units Status Collected Lab   Ventricular Rate 58  BPM Incomplete 05/11/2020  9:28 AM [facility]   Atrial Rate 58  BPM Incomplete 05/11/2020  9:28 AM [facility]   P-R Interval 174  ms Incomplete 05/11/2020  9:28 AM [facility]   QRS Duration 90  ms Incomplete 05/11/2020  9:28 AM [facility]   Q-T Interval 484  ms Incomplete 05/11/2020  9:28 AM [facility]   QTc Calculation (Bazett) 475  ms Incomplete 05/11/2020  9:28 AM [facility]   P Axis 67  degrees Incomplete 05/11/2020  9:28 AM [facility]   R Axis -20  degrees Incomplete 05/11/2020  9:28 AM [facility]   T Axis 43  degrees Incomplete 05/11/2020  9:28 AM [facility]   Testing Performed By     Lab - Abbreviation Name Director Address Valid Date Range   360-[facility] MUSE Unknown Unknown 04/18/16 0721-Present   Narrative & Impression     Sinus bradycardia  Cannot rule out Anterior infarct , age undetermined  Abnormal ECG  When compared with ECG of 25-JAN-2020 18:11,  Minimal criteria for Anterior infarct are now present  Nonspecific T wave abnormality now evident in Inferior leads  Nonspecific T wave abnormality now evident in Anterolateral leads     CT ABD 5-8-2020    Impression   Status post colectomy with right lower quadrant colostomy.       Persistent soft tissue thickening of the mesh/and the abdominal wall   with tiny air bubbles which may represent inflammation/infected mesh   with phlegmon and the microabscesses. A fistula from the ileostomy is   less likely.       Some haziness surrounding the pancreatic head. Please correlate with   the pancreatic enzymes.       Dilated small bowel loops which may be due to ileus or low-grade bowel   obstruction. CXR 2-5-2020    Impression   Borderline cardiomegaly with atherosclerotic disease of the aorta. Interval removal of right PICC line catheter. No acute cardiopulmonary process identified.           This study was dictated by Vadim Rob PA-C and Geovany Jeffers MD    reviewed and concurred with the findings. Anesthesia Evaluation  Patient summary reviewed and Nursing notes reviewed   history of anesthetic complications: PONV. Airway: Mallampati: III  TM distance: >3 FB   Neck ROM: full  Mouth opening: > = 3 FB Dental: normal exam         Pulmonary:   (+) COPD:  decreased breath sounds,  current smoker                           Cardiovascular:    (+) hypertension:, valvular problems/murmurs (s/p MVR):, past MI:, CAD:, CABG/stent:, hyperlipidemia      ECG reviewed  Rhythm: regular  Rate: normal                    Neuro/Psych:   (+) headaches:, psychiatric history: stable with treatment             ROS comment: Fibromyalgia GI/Hepatic/Renal:   (+) renal disease (stage 3): CRI,          ROS comment: S/p ventral hernia repair with necrotizing infection of abdominal wall    Ileostomy     enterocutaneous fistula.    Endo/Other:    (+) blood dyscrasia (current h/h 10.9/34): anemia, arthritis: OA., malignancy/cancer (skin). Abdominal:           Vascular: negative vascular ROS. Anesthesia Plan      general     ASA 3       Induction: intravenous. MIPS: Postoperative opioids intended and Prophylactic antiemetics administered. Anesthetic plan and risks discussed with patient. Attending anesthesiologist reviewed and agrees with Pre Eval content            Cong Lux MD   9/14/2020      Patient will need to be re-evaluated prior to surgery by DOS anesthesiologist.    Cong Lux MD           9/14/2020        4:30 PM          DOS STAFF ADDENDUM:    Pt seen and examined, physical exam updated, chart reviewed including anesthesia, drug and allergy history. H&P reviewed. No interval changes to history or physical examination (unless noted above). NPO status confirmed. Anesthetic plan, risks, benefits, alternatives discussed with patient. Patient verbalized an understanding and agrees to proceed.      Luci Ivy MD  Anesthesiologist

## 2020-09-14 NOTE — PROGRESS NOTES
Re-visit   [facility] and residents present  Ileostomy sutured closed, adaptic and gauze around stoma  Mooney catheter to CD in fistula  Fistula pouched with high output windowed pouch. Barrier ring  Binder applied. Patient on bedrest until at least tomorrow  Amy 8.  Adriano Molina, CNS, Wound Care

## 2020-09-14 NOTE — PROGRESS NOTES
Consult for ileostomy  9/11/2020 explantation of hernia mesh, infected abd wall  Alert and oriented  Hx wound VAC abd wound until recently  Ileostomy draining liquid yellow. States no new output in pouch this am  abd wound bubbling yellow. Large amount liquid yellow on dressing  abd   Red/denuded around wound and lateral rt from wound drainage      Patient states under pouch the skin is white and intact. Awaiting word from surgery re OR today  [place] applied with gauze zee Martinez.  Edis Wood, CNS, Wound Care

## 2020-09-14 NOTE — PROGRESS NOTES
Comprehensive Nutrition Assessment    Type and Reason for Visit:  Initial, Positive Nutrition Screen(Wt Loss)    Nutrition Recommendations/Plan: Continue NPO and ADAT once med. appropriate. Will Start ONS w/ diet once advanced. Would rec to consider nutrition support if unable to tolerate diet after next ~3-4 days. Nutrition Assessment:  Pt adm w/ abd wall/wound infection 2/2 enterocutaneous fistula per EMR review. Pt at risk d/t poor intake since adm 2/2 altered GI function w/ high ileostomy outputs as well as increased needs for post-op wound healing. Will Start ONS w/ diet once appropriate. May need to consider nutrition support soon pending surgical plan and if unable to tolerate diet after next few days. Malnutrition Assessment:  Malnutrition Status:  Insufficient data    Context:  Acute Illness     Findings of the 6 clinical characteristics of malnutrition:  Energy Intake:  Mild decrease in energy intake (Comment)(NPO/clears only x ~3 days now since adm)  Weight Loss:  No significant weight loss     Body Fat Loss:  Unable to assess     Muscle Mass Loss:  Unable to assess    Fluid Accumulation:  No significant fluid accumulation     Strength:  Not Performed    Estimated Daily Nutrient Needs:  Energy (kcal):  6450-2217(MSJ x 1.3 SF per CBW); Weight Used for Energy Requirements:  Current     Protein (g):  70-80(1.3-1.5 gm/kg per CBW); Weight Used for Protein Requirements:  Current        Fluid (ml/day):  5661-1739(1 ml/kcal);  Weight Used for Fluid Requirements:  Current      Nutrition Related Findings:  A&O, dentition WNL, tender/non-distended abd, +BS, +Ileostomy, no edema, -I/O's(explantation of Hernia Mesh 9/11; pend plan for re-peat OR today)      Wounds:  Multiple, Surgical Wound       Current Nutrition Therapies:    Diet NPO, After Midnight Exceptions are: Sips with Meds    Anthropometric Measures:  · Height: 5' (152.4 cm)  · Current Body Weight: 117 lb (53.1 kg)(bed 9/11)   · Admission Body Weight: 117 lb (53.1 kg)(bed 9/11)    · Usual Body Weight: 126 lb (57.2 kg)(actual 3/26/20 per EMR)     · Ideal Body Weight: 100 lbs; % Ideal Body Weight 117 %   · BMI: 22.9  · Adjusted Body Weight:  ; No Adjustment   · Adjusted BMI:      · BMI Categories: Normal Weight (BMI 22.0 to 24.9) age over 72       Nutrition Diagnosis:   · Inadequate oral intake related to altered GI function(2/2 enterocutaneous fistula) as evidenced by NPO or clear liquid status due to medical condition, poor intake prior to admission, wounds, weight loss, GI abnormality, diarrhea      Nutrition Interventions:   Food and/or Nutrient Delivery:  Continue NPO(Continue NPO and ADAT once med. appropriate. Will Start ONS w/ diet once advanced. Would rec to consider nutrition support if unable to tolerate diet after next ~3-4 days.)  Nutrition Education/Counseling:  No recommendation at this time   Coordination of Nutrition Care:  Continued Inpatient Monitoring    Goals:  Nutrition Progression. Nutrition Monitoring and Evaluation:   Behavioral-Environmental Outcomes:  (n/a)   Food/Nutrient Intake Outcomes:  Diet Advancement/Tolerance, Food and Nutrient Intake, Supplement Intake  Physical Signs/Symptoms Outcomes:  Biochemical Data, Diarrhea, GI Status, Nausea or Vomiting, Fluid Status or Edema, Nutrition Focused Physical Findings, Skin, Weight     Discharge Planning:     Too soon to determine     Electronically signed by Kelvin Cardenas RD, LD on 9/14/20 at 3:53 PM EDT    Contact: ext [phone]

## 2020-09-15 ENCOUNTER — APPOINTMENT (OUTPATIENT)
Dept: GENERAL RADIOLOGY | Age: 80
DRG: 908 | End: 2020-09-15
Attending: SURGERY
Payer: MEDICARE

## 2020-09-15 ENCOUNTER — ANESTHESIA (OUTPATIENT)
Dept: OPERATING ROOM | Age: 80
DRG: 908 | End: 2020-09-15
Payer: MEDICARE

## 2020-09-15 VITALS
SYSTOLIC BLOOD PRESSURE: 152 MMHG | TEMPERATURE: 52.9 F | DIASTOLIC BLOOD PRESSURE: 72 MMHG | OXYGEN SATURATION: 100 % | RESPIRATION RATE: 13 BRPM

## 2020-09-15 LAB
ANION GAP SERPL CALCULATED.3IONS-SCNC: 8 MMOL/L (ref 7–16)
BASOPHILS ABSOLUTE: 0.05 E9/L (ref 0–0.2)
BASOPHILS RELATIVE PERCENT: 0.9 % (ref 0–2)
BUN BLDV-MCNC: 4 MG/DL (ref 8–23)
CALCIUM SERPL-MCNC: 8.6 MG/DL (ref 8.6–10.2)
CHLORIDE BLD-SCNC: 105 MMOL/L (ref 98–107)
CO2: 22 MMOL/L (ref 22–29)
CREAT SERPL-MCNC: 0.8 MG/DL (ref 0.5–1)
EOSINOPHILS ABSOLUTE: 0.3 E9/L (ref 0.05–0.5)
EOSINOPHILS RELATIVE PERCENT: 5.3 % (ref 0–6)
GFR AFRICAN AMERICAN: >60
GFR NON-AFRICAN AMERICAN: >60 ML/MIN/1.73
GLUCOSE BLD-MCNC: 115 MG/DL (ref 74–99)
HCT VFR BLD CALC: 37.5 % (ref 34–48)
HEMOGLOBIN: 12 G/DL (ref 11.5–15.5)
IMMATURE GRANULOCYTES #: 0.01 E9/L
IMMATURE GRANULOCYTES %: 0.2 % (ref 0–5)
LYMPHOCYTES ABSOLUTE: 1.24 E9/L (ref 1.5–4)
LYMPHOCYTES RELATIVE PERCENT: 21.9 % (ref 20–42)
MCH RBC QN AUTO: 32 PG (ref 26–35)
MCHC RBC AUTO-ENTMCNC: 32 % (ref 32–34.5)
MCV RBC AUTO: 100 FL (ref 80–99.9)
METER GLUCOSE: 162 MG/DL (ref 74–99)
MONOCYTES ABSOLUTE: 0.45 E9/L (ref 0.1–0.95)
MONOCYTES RELATIVE PERCENT: 8 % (ref 2–12)
NEUTROPHILS ABSOLUTE: 3.61 E9/L (ref 1.8–7.3)
NEUTROPHILS RELATIVE PERCENT: 63.7 % (ref 43–80)
PDW BLD-RTO: 14 FL (ref 11.5–15)
PLATELET # BLD: 170 E9/L (ref 130–450)
PMV BLD AUTO: 12.9 FL (ref 7–12)
POTASSIUM REFLEX MAGNESIUM: 4.4 MMOL/L (ref 3.5–5)
RBC # BLD: 3.75 E12/L (ref 3.5–5.5)
SODIUM BLD-SCNC: 135 MMOL/L (ref 132–146)
WBC # BLD: 5.7 E9/L (ref 4.5–11.5)

## 2020-09-15 PROCEDURE — 6360000002 HC RX W HCPCS: Performed by: SURGERY

## 2020-09-15 PROCEDURE — 1200000000 HC SEMI PRIVATE

## 2020-09-15 PROCEDURE — 6370000000 HC RX 637 (ALT 250 FOR IP): Performed by: SURGERY

## 2020-09-15 PROCEDURE — 36415 COLL VENOUS BLD VENIPUNCTURE: CPT

## 2020-09-15 PROCEDURE — 2580000003 HC RX 258: Performed by: STUDENT IN AN ORGANIZED HEALTH CARE EDUCATION/TRAINING PROGRAM

## 2020-09-15 PROCEDURE — 2500000003 HC RX 250 WO HCPCS: Performed by: STUDENT IN AN ORGANIZED HEALTH CARE EDUCATION/TRAINING PROGRAM

## 2020-09-15 PROCEDURE — 82962 GLUCOSE BLOOD TEST: CPT

## 2020-09-15 PROCEDURE — 7100000000 HC PACU RECOVERY - FIRST 15 MIN: Performed by: SURGERY

## 2020-09-15 PROCEDURE — 3600000003 HC SURGERY LEVEL 3 BASE: Performed by: SURGERY

## 2020-09-15 PROCEDURE — 6360000002 HC RX W HCPCS: Performed by: STUDENT IN AN ORGANIZED HEALTH CARE EDUCATION/TRAINING PROGRAM

## 2020-09-15 PROCEDURE — 36569 INSJ PICC 5 YR+ W/O IMAGING: CPT

## 2020-09-15 PROCEDURE — 88304 TISSUE EXAM BY PATHOLOGIST: CPT

## 2020-09-15 PROCEDURE — 2720000010 HC SURG SUPPLY STERILE: Performed by: SURGERY

## 2020-09-15 PROCEDURE — 02HV33Z INSERTION OF INFUSION DEVICE INTO SUPERIOR VENA CAVA, PERCUTANEOUS APPROACH: ICD-10-PCS | Performed by: SURGERY

## 2020-09-15 PROCEDURE — 74018 RADEX ABDOMEN 1 VIEW: CPT

## 2020-09-15 PROCEDURE — 6370000000 HC RX 637 (ALT 250 FOR IP): Performed by: STUDENT IN AN ORGANIZED HEALTH CARE EDUCATION/TRAINING PROGRAM

## 2020-09-15 PROCEDURE — 0DT80ZZ RESECTION OF SMALL INTESTINE, OPEN APPROACH: ICD-10-PCS | Performed by: SURGERY

## 2020-09-15 PROCEDURE — 2700000000 HC OXYGEN THERAPY PER DAY

## 2020-09-15 PROCEDURE — 80048 BASIC METABOLIC PNL TOTAL CA: CPT

## 2020-09-15 PROCEDURE — 2580000003 HC RX 258: Performed by: SURGERY

## 2020-09-15 PROCEDURE — 87081 CULTURE SCREEN ONLY: CPT

## 2020-09-15 PROCEDURE — 2709999900 HC NON-CHARGEABLE SUPPLY: Performed by: SURGERY

## 2020-09-15 PROCEDURE — 3700000000 HC ANESTHESIA ATTENDED CARE: Performed by: SURGERY

## 2020-09-15 PROCEDURE — 3600000013 HC SURGERY LEVEL 3 ADDTL 15MIN: Performed by: SURGERY

## 2020-09-15 PROCEDURE — 2500000003 HC RX 250 WO HCPCS: Performed by: SURGERY

## 2020-09-15 PROCEDURE — 2580000003 HC RX 258: Performed by: NURSE ANESTHETIST, CERTIFIED REGISTERED

## 2020-09-15 PROCEDURE — C9113 INJ PANTOPRAZOLE SODIUM, VIA: HCPCS | Performed by: STUDENT IN AN ORGANIZED HEALTH CARE EDUCATION/TRAINING PROGRAM

## 2020-09-15 PROCEDURE — 94761 N-INVAS EAR/PLS OXIMETRY MLT: CPT

## 2020-09-15 PROCEDURE — 3700000001 HC ADD 15 MINUTES (ANESTHESIA): Performed by: SURGERY

## 2020-09-15 PROCEDURE — C1751 CATH, INF, PER/CENT/MIDLINE: HCPCS

## 2020-09-15 PROCEDURE — 76937 US GUIDE VASCULAR ACCESS: CPT

## 2020-09-15 PROCEDURE — 6360000002 HC RX W HCPCS: Performed by: NURSE ANESTHETIST, CERTIFIED REGISTERED

## 2020-09-15 PROCEDURE — 88307 TISSUE EXAM BY PATHOLOGIST: CPT

## 2020-09-15 PROCEDURE — 2500000003 HC RX 250 WO HCPCS: Performed by: NURSE ANESTHETIST, CERTIFIED REGISTERED

## 2020-09-15 PROCEDURE — 7100000001 HC PACU RECOVERY - ADDTL 15 MIN: Performed by: SURGERY

## 2020-09-15 PROCEDURE — 6360000002 HC RX W HCPCS: Performed by: ANESTHESIOLOGY

## 2020-09-15 PROCEDURE — 85025 COMPLETE CBC W/AUTO DIFF WBC: CPT

## 2020-09-15 RX ORDER — MIDAZOLAM HYDROCHLORIDE 1 MG/ML
INJECTION INTRAMUSCULAR; INTRAVENOUS PRN
Status: DISCONTINUED | OUTPATIENT
Start: 2020-09-15 | End: 2020-09-15 | Stop reason: SDUPTHER

## 2020-09-15 RX ORDER — ONDANSETRON 2 MG/ML
INJECTION INTRAMUSCULAR; INTRAVENOUS PRN
Status: DISCONTINUED | OUTPATIENT
Start: 2020-09-15 | End: 2020-09-15 | Stop reason: SDUPTHER

## 2020-09-15 RX ORDER — DEXAMETHASONE SODIUM PHOSPHATE 4 MG/ML
INJECTION, SOLUTION INTRA-ARTICULAR; INTRALESIONAL; INTRAMUSCULAR; INTRAVENOUS; SOFT TISSUE PRN
Status: DISCONTINUED | OUTPATIENT
Start: 2020-09-15 | End: 2020-09-15 | Stop reason: SDUPTHER

## 2020-09-15 RX ORDER — NALOXONE HYDROCHLORIDE 0.4 MG/ML
0.4 INJECTION, SOLUTION INTRAMUSCULAR; INTRAVENOUS; SUBCUTANEOUS PRN
Status: DISCONTINUED | OUTPATIENT
Start: 2020-09-15 | End: 2020-09-20

## 2020-09-15 RX ORDER — SCOLOPAMINE TRANSDERMAL SYSTEM 1 MG/1
1 PATCH, EXTENDED RELEASE TRANSDERMAL ONCE
Status: DISCONTINUED | OUTPATIENT
Start: 2020-09-15 | End: 2020-09-15 | Stop reason: HOSPADM

## 2020-09-15 RX ORDER — HYDROMORPHONE HCL 110MG/55ML
PATIENT CONTROLLED ANALGESIA SYRINGE INTRAVENOUS PRN
Status: DISCONTINUED | OUTPATIENT
Start: 2020-09-15 | End: 2020-09-15 | Stop reason: SDUPTHER

## 2020-09-15 RX ORDER — SODIUM CHLORIDE 9 MG/ML
10 INJECTION INTRAVENOUS 2 TIMES DAILY
Status: DISCONTINUED | OUTPATIENT
Start: 2020-09-15 | End: 2020-09-22 | Stop reason: HOSPADM

## 2020-09-15 RX ORDER — PROPOFOL 10 MG/ML
INJECTION, EMULSION INTRAVENOUS PRN
Status: DISCONTINUED | OUTPATIENT
Start: 2020-09-15 | End: 2020-09-15 | Stop reason: SDUPTHER

## 2020-09-15 RX ORDER — GLYCOPYRROLATE 1 MG/5 ML
SYRINGE (ML) INTRAVENOUS PRN
Status: DISCONTINUED | OUTPATIENT
Start: 2020-09-15 | End: 2020-09-15 | Stop reason: SDUPTHER

## 2020-09-15 RX ORDER — ROCURONIUM BROMIDE 10 MG/ML
INJECTION, SOLUTION INTRAVENOUS PRN
Status: DISCONTINUED | OUTPATIENT
Start: 2020-09-15 | End: 2020-09-15 | Stop reason: SDUPTHER

## 2020-09-15 RX ORDER — SODIUM CHLORIDE 9 MG/ML
INJECTION, SOLUTION INTRAVENOUS CONTINUOUS PRN
Status: DISCONTINUED | OUTPATIENT
Start: 2020-09-15 | End: 2020-09-15 | Stop reason: SDUPTHER

## 2020-09-15 RX ORDER — PANTOPRAZOLE SODIUM 40 MG/10ML
40 INJECTION, POWDER, LYOPHILIZED, FOR SOLUTION INTRAVENOUS 2 TIMES DAILY
Status: DISCONTINUED | OUTPATIENT
Start: 2020-09-15 | End: 2020-09-22 | Stop reason: HOSPADM

## 2020-09-15 RX ORDER — LIDOCAINE HYDROCHLORIDE 20 MG/ML
INJECTION, SOLUTION EPIDURAL; INFILTRATION; INTRACAUDAL; PERINEURAL PRN
Status: DISCONTINUED | OUTPATIENT
Start: 2020-09-15 | End: 2020-09-15 | Stop reason: SDUPTHER

## 2020-09-15 RX ORDER — PROMETHAZINE HYDROCHLORIDE 25 MG/ML
6.25 INJECTION, SOLUTION INTRAMUSCULAR; INTRAVENOUS
Status: DISCONTINUED | OUTPATIENT
Start: 2020-09-15 | End: 2020-09-15 | Stop reason: HOSPADM

## 2020-09-15 RX ADMIN — SODIUM CHLORIDE 8 MG/HR: 9 INJECTION, SOLUTION INTRAVENOUS at 12:38

## 2020-09-15 RX ADMIN — DILTIAZEM HYDROCHLORIDE 120 MG: 120 CAPSULE, COATED, EXTENDED RELEASE ORAL at 09:09

## 2020-09-15 RX ADMIN — HYDROMORPHONE HYDROCHLORIDE 0.5 MG: 1 INJECTION, SOLUTION INTRAMUSCULAR; INTRAVENOUS; SUBCUTANEOUS at 18:48

## 2020-09-15 RX ADMIN — PROPRANOLOL HYDROCHLORIDE 20 MG: 20 TABLET ORAL at 09:09

## 2020-09-15 RX ADMIN — DEXAMETHASONE SODIUM PHOSPHATE 8 MG: 4 INJECTION, SOLUTION INTRAMUSCULAR; INTRAVENOUS at 16:05

## 2020-09-15 RX ADMIN — HYDROMORPHONE HYDROCHLORIDE 0.5 MG: 1 INJECTION, SOLUTION INTRAMUSCULAR; INTRAVENOUS; SUBCUTANEOUS at 12:04

## 2020-09-15 RX ADMIN — HYDROMORPHONE HYDROCHLORIDE 0.5 MG: 2 INJECTION, SOLUTION INTRAMUSCULAR; INTRAVENOUS; SUBCUTANEOUS at 18:19

## 2020-09-15 RX ADMIN — ASCORBIC ACID, VITAMIN A PALMITATE, CHOLECALCIFEROL, THIAMINE HYDROCHLORIDE, RIBOFLAVIN-5 PHOSPHATE SODIUM, PYRIDOXINE HYDROCHLORIDE, NIACINAMIDE, DEXPANTHENOL, ALPHA-TOCOPHEROL ACETATE, VITAMIN K1, FOLIC ACID, BIOTIN, CYANOCOBALAMIN: 200; 3300; 200; 6; 3.6; 6; 40; 15; 10; 150; 600; 60; 5 INJECTION, SOLUTION INTRAVENOUS at 21:43

## 2020-09-15 RX ADMIN — PRIMIDONE 250 MG: 250 TABLET ORAL at 21:58

## 2020-09-15 RX ADMIN — WATER 1 G: 1 INJECTION INTRAMUSCULAR; INTRAVENOUS; SUBCUTANEOUS at 15:44

## 2020-09-15 RX ADMIN — WATER 1 G: 1 INJECTION INTRAMUSCULAR; INTRAVENOUS; SUBCUTANEOUS at 08:56

## 2020-09-15 RX ADMIN — ESCITALOPRAM 20 MG: 10 TABLET, FILM COATED ORAL at 21:58

## 2020-09-15 RX ADMIN — HYDROMORPHONE HYDROCHLORIDE 0.5 MG: 2 INJECTION, SOLUTION INTRAMUSCULAR; INTRAVENOUS; SUBCUTANEOUS at 16:04

## 2020-09-15 RX ADMIN — PROPOFOL 80 MG: 10 INJECTION, EMULSION INTRAVENOUS at 15:53

## 2020-09-15 RX ADMIN — DICYCLOMINE HYDROCHLORIDE 10 MG: 10 CAPSULE ORAL at 21:59

## 2020-09-15 RX ADMIN — PANTOPRAZOLE SODIUM 40 MG: 40 INJECTION, POWDER, FOR SOLUTION INTRAVENOUS at 21:58

## 2020-09-15 RX ADMIN — Medication 0.2 MG: at 16:20

## 2020-09-15 RX ADMIN — HYDROMORPHONE HYDROCHLORIDE 0.5 MG: 1 INJECTION, SOLUTION INTRAMUSCULAR; INTRAVENOUS; SUBCUTANEOUS at 02:14

## 2020-09-15 RX ADMIN — WATER 1 G: 1 INJECTION INTRAMUSCULAR; INTRAVENOUS; SUBCUTANEOUS at 02:10

## 2020-09-15 RX ADMIN — SODIUM CHLORIDE, PRESERVATIVE FREE 10 ML: 5 INJECTION INTRAVENOUS at 22:05

## 2020-09-15 RX ADMIN — POTASSIUM CHLORIDE, DEXTROSE MONOHYDRATE AND SODIUM CHLORIDE: 150; 5; 450 INJECTION, SOLUTION INTRAVENOUS at 06:18

## 2020-09-15 RX ADMIN — LIDOCAINE HYDROCHLORIDE 1 ML: 10 INJECTION, SOLUTION EPIDURAL; INFILTRATION; INTRACAUDAL; PERINEURAL at 13:05

## 2020-09-15 RX ADMIN — ROCURONIUM BROMIDE 50 MG: 10 INJECTION, SOLUTION INTRAVENOUS at 15:54

## 2020-09-15 RX ADMIN — SODIUM CHLORIDE, PRESERVATIVE FREE 20 ML: 5 INJECTION INTRAVENOUS at 13:09

## 2020-09-15 RX ADMIN — SODIUM CHLORIDE, PRESERVATIVE FREE 10 ML: 5 INJECTION INTRAVENOUS at 22:00

## 2020-09-15 RX ADMIN — ONDANSETRON 4 MG: 2 INJECTION INTRAMUSCULAR; INTRAVENOUS at 09:16

## 2020-09-15 RX ADMIN — Medication: at 21:05

## 2020-09-15 RX ADMIN — METRONIDAZOLE 500 MG: 500 INJECTION, SOLUTION INTRAVENOUS at 22:20

## 2020-09-15 RX ADMIN — HYDROMORPHONE HYDROCHLORIDE 0.5 MG: 1 INJECTION, SOLUTION INTRAMUSCULAR; INTRAVENOUS; SUBCUTANEOUS at 19:01

## 2020-09-15 RX ADMIN — ONDANSETRON 4 MG: 2 INJECTION INTRAMUSCULAR; INTRAVENOUS at 17:18

## 2020-09-15 RX ADMIN — SODIUM CHLORIDE 8 MG/HR: 9 INJECTION, SOLUTION INTRAVENOUS at 03:22

## 2020-09-15 RX ADMIN — SOYBEAN OIL 250 ML: 20 INJECTION, SOLUTION INTRAVENOUS at 21:40

## 2020-09-15 RX ADMIN — LIDOCAINE HYDROCHLORIDE 60 MG: 20 INJECTION, SOLUTION EPIDURAL; INFILTRATION; INTRACAUDAL; PERINEURAL at 15:53

## 2020-09-15 RX ADMIN — SODIUM CHLORIDE: 9 INJECTION, SOLUTION INTRAVENOUS at 15:44

## 2020-09-15 RX ADMIN — POTASSIUM CHLORIDE, DEXTROSE MONOHYDRATE AND SODIUM CHLORIDE: 150; 5; 450 INJECTION, SOLUTION INTRAVENOUS at 20:20

## 2020-09-15 RX ADMIN — HYDROMORPHONE HYDROCHLORIDE 0.5 MG: 1 INJECTION, SOLUTION INTRAMUSCULAR; INTRAVENOUS; SUBCUTANEOUS at 05:48

## 2020-09-15 RX ADMIN — MIDAZOLAM 1 MG: 1 INJECTION INTRAMUSCULAR; INTRAVENOUS at 15:52

## 2020-09-15 RX ADMIN — HYDROMORPHONE HYDROCHLORIDE 0.5 MG: 1 INJECTION, SOLUTION INTRAMUSCULAR; INTRAVENOUS; SUBCUTANEOUS at 09:00

## 2020-09-15 RX ADMIN — CHOLECALCIFEROL TAB 50 MCG (2000 UNIT) 2000 UNITS: 50 TAB at 21:58

## 2020-09-15 ASSESSMENT — PULMONARY FUNCTION TESTS
PIF_VALUE: 15
PIF_VALUE: 15
PIF_VALUE: 3
PIF_VALUE: 15
PIF_VALUE: 20
PIF_VALUE: 1
PIF_VALUE: 2
PIF_VALUE: 16
PIF_VALUE: 15
PIF_VALUE: 15
PIF_VALUE: 16
PIF_VALUE: 7
PIF_VALUE: 16
PIF_VALUE: 16
PIF_VALUE: 9
PIF_VALUE: 15
PIF_VALUE: 15
PIF_VALUE: 17
PIF_VALUE: 17
PIF_VALUE: 18
PIF_VALUE: 15
PIF_VALUE: 15
PIF_VALUE: 14
PIF_VALUE: 14
PIF_VALUE: 15
PIF_VALUE: 14
PIF_VALUE: 13
PIF_VALUE: 16
PIF_VALUE: 16
PIF_VALUE: 3
PIF_VALUE: 2
PIF_VALUE: 16
PIF_VALUE: 22
PIF_VALUE: 0
PIF_VALUE: 1
PIF_VALUE: 14
PIF_VALUE: 15
PIF_VALUE: 17
PIF_VALUE: 14
PIF_VALUE: 15
PIF_VALUE: 0
PIF_VALUE: 14
PIF_VALUE: 0
PIF_VALUE: 15
PIF_VALUE: 18
PIF_VALUE: 16
PIF_VALUE: 14
PIF_VALUE: 15
PIF_VALUE: 3
PIF_VALUE: 3
PIF_VALUE: 14
PIF_VALUE: 2
PIF_VALUE: 0
PIF_VALUE: 2
PIF_VALUE: 15
PIF_VALUE: 14
PIF_VALUE: 15
PIF_VALUE: 15
PIF_VALUE: 3
PIF_VALUE: 16
PIF_VALUE: 14
PIF_VALUE: 14
PIF_VALUE: 15
PIF_VALUE: 14
PIF_VALUE: 14
PIF_VALUE: 15
PIF_VALUE: 16
PIF_VALUE: 13
PIF_VALUE: 17
PIF_VALUE: 10
PIF_VALUE: 17
PIF_VALUE: 2
PIF_VALUE: 14
PIF_VALUE: 15
PIF_VALUE: 14
PIF_VALUE: 14
PIF_VALUE: 15
PIF_VALUE: 14
PIF_VALUE: 20
PIF_VALUE: 15
PIF_VALUE: 14
PIF_VALUE: 15
PIF_VALUE: 14
PIF_VALUE: 16
PIF_VALUE: 13
PIF_VALUE: 15
PIF_VALUE: 7
PIF_VALUE: 15
PIF_VALUE: 14
PIF_VALUE: 4
PIF_VALUE: 15
PIF_VALUE: 14
PIF_VALUE: 14
PIF_VALUE: 3
PIF_VALUE: 14
PIF_VALUE: 16
PIF_VALUE: 15
PIF_VALUE: 18
PIF_VALUE: 15
PIF_VALUE: 14
PIF_VALUE: 15
PIF_VALUE: 14
PIF_VALUE: 14
PIF_VALUE: 15
PIF_VALUE: 17
PIF_VALUE: 15
PIF_VALUE: 13
PIF_VALUE: 14
PIF_VALUE: 2
PIF_VALUE: 13
PIF_VALUE: 5
PIF_VALUE: 20
PIF_VALUE: 16
PIF_VALUE: 2
PIF_VALUE: 1
PIF_VALUE: 2
PIF_VALUE: 2
PIF_VALUE: 15
PIF_VALUE: 15
PIF_VALUE: 2
PIF_VALUE: 0
PIF_VALUE: 3
PIF_VALUE: 2
PIF_VALUE: 3
PIF_VALUE: 18
PIF_VALUE: 3
PIF_VALUE: 15
PIF_VALUE: 15
PIF_VALUE: 17
PIF_VALUE: 0
PIF_VALUE: 14
PIF_VALUE: 17
PIF_VALUE: 16
PIF_VALUE: 15
PIF_VALUE: 3
PIF_VALUE: 18
PIF_VALUE: 17
PIF_VALUE: 3
PIF_VALUE: 14
PIF_VALUE: 16
PIF_VALUE: 15
PIF_VALUE: 3
PIF_VALUE: 0
PIF_VALUE: 1
PIF_VALUE: 14
PIF_VALUE: 14
PIF_VALUE: 3
PIF_VALUE: 14
PIF_VALUE: 17
PIF_VALUE: 15
PIF_VALUE: 14
PIF_VALUE: 14
PIF_VALUE: 3

## 2020-09-15 ASSESSMENT — PAIN DESCRIPTION - DESCRIPTORS
DESCRIPTORS: ACHING
DESCRIPTORS: ACHING;CONSTANT;DISCOMFORT
DESCRIPTORS: ACHING;CONSTANT;DISCOMFORT

## 2020-09-15 ASSESSMENT — PAIN SCALES - GENERAL
PAINLEVEL_OUTOF10: 9
PAINLEVEL_OUTOF10: 8
PAINLEVEL_OUTOF10: 9
PAINLEVEL_OUTOF10: 5
PAINLEVEL_OUTOF10: 7
PAINLEVEL_OUTOF10: 8
PAINLEVEL_OUTOF10: 4
PAINLEVEL_OUTOF10: 2
PAINLEVEL_OUTOF10: 9
PAINLEVEL_OUTOF10: 2
PAINLEVEL_OUTOF10: 5
PAINLEVEL_OUTOF10: 3

## 2020-09-15 ASSESSMENT — PAIN DESCRIPTION - LOCATION
LOCATION: ABDOMEN;GENERALIZED
LOCATION: ABDOMEN
LOCATION: ABDOMEN;GENERALIZED

## 2020-09-15 ASSESSMENT — PAIN DESCRIPTION - ONSET
ONSET: ON-GOING
ONSET: ON-GOING

## 2020-09-15 ASSESSMENT — PAIN DESCRIPTION - FREQUENCY
FREQUENCY: CONTINUOUS
FREQUENCY: CONTINUOUS

## 2020-09-15 ASSESSMENT — PAIN DESCRIPTION - PAIN TYPE
TYPE: SURGICAL PAIN
TYPE: SURGICAL PAIN
TYPE: CHRONIC PAIN;SURGICAL PAIN

## 2020-09-15 ASSESSMENT — PAIN - FUNCTIONAL ASSESSMENT
PAIN_FUNCTIONAL_ASSESSMENT: ACTIVITIES ARE NOT PREVENTED
PAIN_FUNCTIONAL_ASSESSMENT: 0-10

## 2020-09-15 NOTE — PROGRESS NOTES
[facility] Quality Flow/Interdisciplinary Rounds Progress Note        Quality Flow Rounds held on September 15, 2020    Disciplines Attending:  Bedside Nurse, ,  and Nursing Unit Leadership    Vivian Leal was admitted on 9/11/2020  7:09 AM    Anticipated Discharge Date:  Expected Discharge Date: 09/13/20    Disposition:    Roland Score:  Roland Scale Score: 18    Readmission Risk              Risk of Unplanned Readmission:        21           Discussed patient goal for the day, patient clinical progression, and barriers to discharge.   The following Goal(s) of the Day/Commitment(s) have been identified:  Discharge [address] [place]  September 15, 2020

## 2020-09-15 NOTE — PROGRESS NOTES
For OR today  Pouch leaked last evening. Dressing changed per staff  D[facility] charge nurse  [facility]  Grace López, LAIL, Wound Care

## 2020-09-15 NOTE — PROGRESS NOTES
Consulted to place PICC, consent not yet signed per DR. Ricketts nurse instructed to notify me when signed. 9/15/2020 9:09 AM NOEL GOLDSMITH, [facility]-BC

## 2020-09-15 NOTE — PROGRESS NOTES
GENERAL SURGERY  DAILY PROGRESS NOTE  9/15/2020    No chief complaint on file. Subjective:  Pt states she had no leaking from her fistula overnight. States she is ready for surgery today and is hungry. Objective:  /63   Pulse 58   Temp 98.4 °F (36.9 °C) (Oral)   Resp 14   Ht 5' (1.524 m)   Wt 117 lb 12.8 oz (53.4 kg)   SpO2 95%   BMI 23.01 kg/m²     GENERAL:  Laying in bed, awake, alert, cooperative, no apparent distress  HEAD: Normocephalic, atraumatic  EYES: No sclera icterus, pupils equal  LUNGS:  No increased work of breathing  CARDIOVASCULAR:  RR  ABDOMEN:  Soft, TTP around ostomy, non-distended. Ostomy sutured closed. Gaviria placed in proximal fistula tract with green output into gaviria bag.    EXTREMITIES: No edema or swelling  SKIN: Warm and dry    Assessment/Plan:  68 y.o. female s/p mesh explantation with enterotomy and closure POD4    - NPO for procedure today 9/15  - OR today 9/15  - Local wound care for fistula  - Pain control PRN     Electronically signed by Emilie Leos MD on 9/15/2020 at 5:27 AM

## 2020-09-15 NOTE — PROCEDURES
PICC   Catheter insertion date 9/15/2020     Product Number:  [id number]   Lot No: [id number]   Gauge: 5FR   Lumen: DUAL   R Basilic    Vein Diameter : 0.40CM   Upper arm circumference (10CM ABOVE AC): 24CM   Catheter Length : 40CM   Internal Length: 40CM   External Catheter Length: 0CM   Ultrasound Used:YES  VPS Blue [place] confirms PICC tip is placed in the lower 1/3 of the SVC or at the Cavoatrial junction. Floor nurse notified PICC is okay to use.    : GEN Pruett

## 2020-09-16 PROBLEM — E44.1 MILD PROTEIN-CALORIE MALNUTRITION (HCC): Chronic | Status: ACTIVE | Noted: 2020-09-16

## 2020-09-16 LAB
ANION GAP SERPL CALCULATED.3IONS-SCNC: 8 MMOL/L (ref 7–16)
BASOPHILS ABSOLUTE: 0.02 E9/L (ref 0–0.2)
BASOPHILS RELATIVE PERCENT: 0.3 % (ref 0–2)
BUN BLDV-MCNC: 6 MG/DL (ref 8–23)
CALCIUM SERPL-MCNC: 7.6 MG/DL (ref 8.6–10.2)
CHLORIDE BLD-SCNC: 107 MMOL/L (ref 98–107)
CO2: 24 MMOL/L (ref 22–29)
CREAT SERPL-MCNC: 0.8 MG/DL (ref 0.5–1)
EOSINOPHILS ABSOLUTE: 0.05 E9/L (ref 0.05–0.5)
EOSINOPHILS RELATIVE PERCENT: 0.7 % (ref 0–6)
GFR AFRICAN AMERICAN: >60
GFR NON-AFRICAN AMERICAN: >60 ML/MIN/1.73
GLUCOSE BLD-MCNC: 158 MG/DL (ref 74–99)
HCT VFR BLD CALC: 34.7 % (ref 34–48)
HEMOGLOBIN: 11 G/DL (ref 11.5–15.5)
IMMATURE GRANULOCYTES #: 0.02 E9/L
IMMATURE GRANULOCYTES %: 0.3 % (ref 0–5)
LYMPHOCYTES ABSOLUTE: 0.86 E9/L (ref 1.5–4)
LYMPHOCYTES RELATIVE PERCENT: 12.5 % (ref 20–42)
MAGNESIUM: 1 MG/DL (ref 1.6–2.6)
MAGNESIUM: 2.3 MG/DL (ref 1.6–2.6)
MCH RBC QN AUTO: 31.9 PG (ref 26–35)
MCHC RBC AUTO-ENTMCNC: 31.7 % (ref 32–34.5)
MCV RBC AUTO: 100.6 FL (ref 80–99.9)
METER GLUCOSE: 139 MG/DL (ref 74–99)
METER GLUCOSE: 144 MG/DL (ref 74–99)
METER GLUCOSE: 165 MG/DL (ref 74–99)
MONOCYTES ABSOLUTE: 0.64 E9/L (ref 0.1–0.95)
MONOCYTES RELATIVE PERCENT: 9.3 % (ref 2–12)
NEUTROPHILS ABSOLUTE: 5.29 E9/L (ref 1.8–7.3)
NEUTROPHILS RELATIVE PERCENT: 76.9 % (ref 43–80)
PDW BLD-RTO: 13.8 FL (ref 11.5–15)
PHOSPHORUS: 2.7 MG/DL (ref 2.5–4.5)
PLATELET # BLD: 215 E9/L (ref 130–450)
PMV BLD AUTO: 11.7 FL (ref 7–12)
POTASSIUM REFLEX MAGNESIUM: 3.9 MMOL/L (ref 3.5–5)
RBC # BLD: 3.45 E12/L (ref 3.5–5.5)
SODIUM BLD-SCNC: 139 MMOL/L (ref 132–146)
TRIGL SERPL-MCNC: 219 MG/DL (ref 0–149)
WBC # BLD: 6.9 E9/L (ref 4.5–11.5)

## 2020-09-16 PROCEDURE — 80048 BASIC METABOLIC PNL TOTAL CA: CPT

## 2020-09-16 PROCEDURE — 6370000000 HC RX 637 (ALT 250 FOR IP): Performed by: STUDENT IN AN ORGANIZED HEALTH CARE EDUCATION/TRAINING PROGRAM

## 2020-09-16 PROCEDURE — 2700000000 HC OXYGEN THERAPY PER DAY

## 2020-09-16 PROCEDURE — 2500000003 HC RX 250 WO HCPCS: Performed by: STUDENT IN AN ORGANIZED HEALTH CARE EDUCATION/TRAINING PROGRAM

## 2020-09-16 PROCEDURE — 1200000000 HC SEMI PRIVATE

## 2020-09-16 PROCEDURE — 6360000002 HC RX W HCPCS: Performed by: STUDENT IN AN ORGANIZED HEALTH CARE EDUCATION/TRAINING PROGRAM

## 2020-09-16 PROCEDURE — 2580000003 HC RX 258: Performed by: STUDENT IN AN ORGANIZED HEALTH CARE EDUCATION/TRAINING PROGRAM

## 2020-09-16 PROCEDURE — 84478 ASSAY OF TRIGLYCERIDES: CPT

## 2020-09-16 PROCEDURE — 84100 ASSAY OF PHOSPHORUS: CPT

## 2020-09-16 PROCEDURE — 82962 GLUCOSE BLOOD TEST: CPT

## 2020-09-16 PROCEDURE — 83735 ASSAY OF MAGNESIUM: CPT

## 2020-09-16 PROCEDURE — 36415 COLL VENOUS BLD VENIPUNCTURE: CPT

## 2020-09-16 PROCEDURE — 85025 COMPLETE CBC W/AUTO DIFF WBC: CPT

## 2020-09-16 PROCEDURE — 94770 HC ETCO2 MONITOR DAILY: CPT

## 2020-09-16 PROCEDURE — C9113 INJ PANTOPRAZOLE SODIUM, VIA: HCPCS | Performed by: STUDENT IN AN ORGANIZED HEALTH CARE EDUCATION/TRAINING PROGRAM

## 2020-09-16 PROCEDURE — 6360000002 HC RX W HCPCS

## 2020-09-16 RX ORDER — MAGNESIUM SULFATE 1 G/100ML
INJECTION INTRAVENOUS
Status: COMPLETED
Start: 2020-09-16 | End: 2020-09-16

## 2020-09-16 RX ORDER — MAGNESIUM SULFATE IN WATER 40 MG/ML
4 INJECTION, SOLUTION INTRAVENOUS ONCE
Status: DISCONTINUED | OUTPATIENT
Start: 2020-09-16 | End: 2020-09-16

## 2020-09-16 RX ORDER — MAGNESIUM SULFATE IN WATER 40 MG/ML
4 INJECTION, SOLUTION INTRAVENOUS ONCE
Status: COMPLETED | OUTPATIENT
Start: 2020-09-16 | End: 2020-09-16

## 2020-09-16 RX ADMIN — SODIUM CHLORIDE, PRESERVATIVE FREE 10 ML: 5 INJECTION INTRAVENOUS at 08:36

## 2020-09-16 RX ADMIN — METRONIDAZOLE 500 MG: 500 INJECTION, SOLUTION INTRAVENOUS at 22:05

## 2020-09-16 RX ADMIN — TRAZODONE HYDROCHLORIDE 25 MG: 50 TABLET ORAL at 23:52

## 2020-09-16 RX ADMIN — PROPRANOLOL HYDROCHLORIDE 20 MG: 20 TABLET ORAL at 08:28

## 2020-09-16 RX ADMIN — SODIUM CHLORIDE, PRESERVATIVE FREE 10 ML: 5 INJECTION INTRAVENOUS at 21:07

## 2020-09-16 RX ADMIN — Medication 6 MG: at 06:03

## 2020-09-16 RX ADMIN — DILTIAZEM HYDROCHLORIDE 120 MG: 120 CAPSULE, COATED, EXTENDED RELEASE ORAL at 08:29

## 2020-09-16 RX ADMIN — POTASSIUM PHOSPHATE, MONOBASIC AND POTASSIUM PHOSPHATE, DIBASIC 10 MMOL: 224; 236 INJECTION, SOLUTION, CONCENTRATE INTRAVENOUS at 13:34

## 2020-09-16 RX ADMIN — POTASSIUM CHLORIDE, DEXTROSE MONOHYDRATE AND SODIUM CHLORIDE: 150; 5; 450 INJECTION, SOLUTION INTRAVENOUS at 08:27

## 2020-09-16 RX ADMIN — SODIUM CHLORIDE, PRESERVATIVE FREE 300 UNITS: 5 INJECTION INTRAVENOUS at 21:06

## 2020-09-16 RX ADMIN — MAGNESIUM SULFATE 4 G: 4 INJECTION INTRAVENOUS at 09:41

## 2020-09-16 RX ADMIN — DICYCLOMINE HYDROCHLORIDE 10 MG: 10 CAPSULE ORAL at 21:06

## 2020-09-16 RX ADMIN — Medication: at 11:35

## 2020-09-16 RX ADMIN — POTASSIUM CHLORIDE: 2 INJECTION, SOLUTION, CONCENTRATE INTRAVENOUS at 18:00

## 2020-09-16 RX ADMIN — METRONIDAZOLE 500 MG: 500 INJECTION, SOLUTION INTRAVENOUS at 13:50

## 2020-09-16 RX ADMIN — CHOLECALCIFEROL TAB 50 MCG (2000 UNIT) 2000 UNITS: 50 TAB at 21:06

## 2020-09-16 RX ADMIN — WATER 1 G: 1 INJECTION INTRAMUSCULAR; INTRAVENOUS; SUBCUTANEOUS at 17:23

## 2020-09-16 RX ADMIN — ENOXAPARIN SODIUM 40 MG: 40 INJECTION SUBCUTANEOUS at 08:27

## 2020-09-16 RX ADMIN — SODIUM CHLORIDE, PRESERVATIVE FREE 10 ML: 5 INJECTION INTRAVENOUS at 08:27

## 2020-09-16 RX ADMIN — TRAZODONE HYDROCHLORIDE 25 MG: 50 TABLET ORAL at 00:06

## 2020-09-16 RX ADMIN — WATER 1 G: 1 INJECTION INTRAMUSCULAR; INTRAVENOUS; SUBCUTANEOUS at 01:23

## 2020-09-16 RX ADMIN — ESCITALOPRAM 20 MG: 10 TABLET, FILM COATED ORAL at 21:06

## 2020-09-16 RX ADMIN — PRIMIDONE 250 MG: 250 TABLET ORAL at 21:06

## 2020-09-16 RX ADMIN — PANTOPRAZOLE SODIUM 40 MG: 40 INJECTION, POWDER, FOR SOLUTION INTRAVENOUS at 21:06

## 2020-09-16 RX ADMIN — METRONIDAZOLE 500 MG: 500 INJECTION, SOLUTION INTRAVENOUS at 06:10

## 2020-09-16 RX ADMIN — ONDANSETRON 4 MG: 2 INJECTION INTRAMUSCULAR; INTRAVENOUS at 17:23

## 2020-09-16 RX ADMIN — PANTOPRAZOLE SODIUM 40 MG: 40 INJECTION, POWDER, FOR SOLUTION INTRAVENOUS at 08:26

## 2020-09-16 RX ADMIN — WATER 1 G: 1 INJECTION INTRAMUSCULAR; INTRAVENOUS; SUBCUTANEOUS at 08:26

## 2020-09-16 RX ADMIN — DICYCLOMINE HYDROCHLORIDE 10 MG: 10 CAPSULE ORAL at 08:29

## 2020-09-16 RX ADMIN — MAGNESIUM SULFATE HEPTAHYDRATE 1 G: 1 INJECTION, SOLUTION INTRAVENOUS at 08:28

## 2020-09-16 ASSESSMENT — PAIN - FUNCTIONAL ASSESSMENT
PAIN_FUNCTIONAL_ASSESSMENT: ACTIVITIES ARE NOT PREVENTED
PAIN_FUNCTIONAL_ASSESSMENT: PREVENTS OR INTERFERES SOME ACTIVE ACTIVITIES AND ADLS

## 2020-09-16 ASSESSMENT — PAIN DESCRIPTION - ONSET
ONSET: ON-GOING

## 2020-09-16 ASSESSMENT — PAIN DESCRIPTION - LOCATION
LOCATION: ABDOMEN
LOCATION: ABDOMEN
LOCATION: ABDOMEN;BACK
LOCATION: ABDOMEN

## 2020-09-16 ASSESSMENT — PAIN DESCRIPTION - ORIENTATION
ORIENTATION: MID

## 2020-09-16 ASSESSMENT — PAIN DESCRIPTION - FREQUENCY
FREQUENCY: CONTINUOUS

## 2020-09-16 ASSESSMENT — PAIN SCALES - GENERAL
PAINLEVEL_OUTOF10: 6
PAINLEVEL_OUTOF10: 6
PAINLEVEL_OUTOF10: 7
PAINLEVEL_OUTOF10: 4
PAINLEVEL_OUTOF10: 5
PAINLEVEL_OUTOF10: 5

## 2020-09-16 ASSESSMENT — PAIN DESCRIPTION - PROGRESSION
CLINICAL_PROGRESSION: GRADUALLY WORSENING
CLINICAL_PROGRESSION: GRADUALLY WORSENING
CLINICAL_PROGRESSION: NOT CHANGED
CLINICAL_PROGRESSION: GRADUALLY WORSENING

## 2020-09-16 ASSESSMENT — PAIN DESCRIPTION - PAIN TYPE
TYPE: ACUTE PAIN;SURGICAL PAIN
TYPE: ACUTE PAIN
TYPE: ACUTE PAIN;SURGICAL PAIN
TYPE: ACUTE PAIN;SURGICAL PAIN

## 2020-09-16 ASSESSMENT — PAIN DESCRIPTION - DESCRIPTORS
DESCRIPTORS: ACHING

## 2020-09-16 NOTE — PROGRESS NOTES
Post 9/15 Dr. Roscoe Givens, TAKE DOWN ENTEROCUTANEOUS FISTULA, SMALL BOWEL RESECTION, ILEOSTOMY REVISION 9/15  Large abd dressing over surgical site and ostomy. Assessed by resident today  Sacral buttocks intact-sacral mepilex in place  Ng in place  States had lots of pain last night  Will follow  Jean Banda.  Eric Larson, CNS, Wound Care

## 2020-09-16 NOTE — PROGRESS NOTES
Educated patient on the importance of Incentive Spirometer, Patient returned demonstration of 200ml tolerated poorly.  Education reinforced and patient will use with every push of PCA, as her pain was her reason for her not wanting to use it

## 2020-09-16 NOTE — ANESTHESIA POSTPROCEDURE EVALUATION
Department of Anesthesiology  Postprocedure Note    Patient: Breanne Valladares  MRN: [id number]  YOB: 1940  Date of evaluation: 9/15/2020  Time:  8:21 PM     Procedure Summary     Date:  09/15/20 Room / Location:  [facility] OR 05 / [facility]    Anesthesia Start:  7259 Anesthesia Stop:  1973    Procedure:  EXPLORATORY LAPAROTOMY WITH SMALL BOWEL RESECTION, TAKE DOWN REVISION ENTEROCUTANEOUS FISTULA , OSTOMY REVISION (N/A Abdomen) Diagnosis:  (-)    Surgeon:  Enma Avalos MD Responsible Provider:  Dalila Abarca DO    Anesthesia Type:  general ASA Status:  3          Anesthesia Type: general    Sugar Phase I: Sugar Score: 10    Sugar Phase II:      Last vitals: Reviewed and per EMR flowsheets.        Anesthesia Post Evaluation    Patient location during evaluation: PACU  Patient participation: complete - patient participated  Level of consciousness: awake and alert  Airway patency: patent  Nausea & Vomiting: no nausea and no vomiting  Complications: no  Cardiovascular status: hemodynamically stable  Respiratory status: acceptable  Hydration status: euvolemic

## 2020-09-16 NOTE — PROGRESS NOTES
Lab called to notify patients mg level is 1.0. Perfect served residents. Will continue to monitor patient.

## 2020-09-16 NOTE — PROGRESS NOTES
Due to critical national TPN shortage, all new orders for parenteral nutrition will be reviewed by clinical dietitian and clinical pharmacist to determine if the order meets the indication guidelines as follows:      Check box if present Approved indication/criteria for total parenteral nutrition   []  patient   [x] Malnourished adult patient without nutrition for 3 - 5 days without ability to take enteral nutrition (examples below table)   [] Previously well-nourished adult without nutrition for 7 days without ability to take enteral nutrition (examples below in table)     Example Disease states Requiring Parenteral Nutrition    Disease Category Examples   Impaired absorption Short bowel syndrome, NEC, meconium ileus, trauma   Mechanical bowel obstruction Intrinsic or extrinsic blockage of intestinal lumen (stenosis, strictures, inflammatory disease)   Need to restrict PO or enteral intake Ischemic bowel, severe pancreatitis, chylous fistula, preoperative status   Motility disorders Prolonged ileus, pseudo-obstruction, visceral organ myopathy   Inability to achieve or maintain enteral access Varies with clinical circumstances       Hattie Kraft 2020. 9:30 AM

## 2020-09-16 NOTE — CARE COORDINATION
Updated discharge plan of care. POD#1. Continue PCA, NPO, TPN. Will continue to follow for needs.  Plan, so far is home with At Home with [facility] EM[facility] AT [place]

## 2020-09-16 NOTE — PROGRESS NOTES
GENERAL SURGERY  DAILY PROGRESS NOTE  9/16/2020    No chief complaint on file. Subjective:  Pt states she has abdominal pain around her incision site. Says her PCA wasn't working right overnight. Denies any leaking from abdominal sites. Reports some nausea, but no vomiting. Objective:  BP (!) 148/60   Pulse 82   Temp 98.9 °F (37.2 °C) (Oral)   Resp 16   Ht 5' (1.524 m)   Wt 117 lb (53.1 kg)   SpO2 95%   BMI 22.85 kg/m²     GENERAL:  Laying in bed, awake, alert, cooperative, no apparent distress  HEAD: Normocephalic, atraumatic  EYES: No sclera icterus, pupils equal  LUNGS: On 3L NC  CARDIOVASCULAR:  RR  ABDOMEN:  Soft, TTP around ostomy, non-distended. Incision c/d/i. Ostomy pink, patent, with green output in bag. NG tube in place with 200cc green fluid.   EXTREMITIES: No edema or swelling  SKIN: Warm and dry    Assessment/Plan:  68 y.o. female s/p mesh explantation with enterotomy and closure POD5 s/p ex-lap with SBR, revision of ileostomy POD1    - Monitor ostomy function and output  - NPO  - Plan to begin TPN   - Local wound care  - Ostomy care  - Pain control PRN  - Encouraged ambulation and IS    Electronically signed by Wendi Joaquin MD on 9/16/2020 at 5:05 AM

## 2020-09-16 NOTE — CONSULTS
Comprehensive Nutrition Assessment    Type and Reason for Visit:  Consult(TPN recommendations)    Nutrition Recommendations/Plan: Modify Parenteral Nutrition  TPN recommendation:   3-in-1 Central Standard @ 58.3 ml/hr to provide 1400 ml tv, 1449 kcals, 70 gm AA  Monitor electrolytes d/t pt at risk for refeeding syndrome    Nutrition Assessment:  Pt remains nutritionally compromised s/p ex lap, takedown enterocutaneus fistula, SBR w/ ileostomy revision. Consult received for TPN recommendations. Will provide updated TPN recs and monitor. Malnutrition Assessment:  Malnutrition Status:  Mild malnutrition    Context:  Chronic Illness     Findings of the 6 clinical characteristics of malnutrition:  Energy Intake:  7 - 75% or less estimated energy requirements for 1 month or longer  Weight Loss:  1 - Mild weight loss (specify amount and time period)(7.1% wt loss x 6 months)     Body Fat Loss:  Unable to assess     Muscle Mass Loss:  Unable to assess    Fluid Accumulation:  No significant fluid accumulation     Strength:  Not Performed    Estimated Daily Nutrient Needs:  Energy (kcal):  3565-3154; Weight Used for Energy Requirements:  Current     Protein (g):  70-80;  Weight Used for Protein Requirements:  Current(1.3-1.5)        Fluid (ml/day):  2017-4363; Weight Used for Fluid Requirements:  Current      Current Nutrition Therapies:    Diet NPO, After Midnight Exceptions are: Sips with Meds  PN-Adult Premix 5/15 - Standard Electrolytes - Central Line  Current Parenteral Nutrition Orders:  · Type and Formula: Premix Central   · Duration: Continuous  · Rate/Volume: 50 ml/hr; 2000 ml tv  · Goal PN Orders Provides: 1420 kcals, 100 gm AA    Nutrition Interventions:   Food and/or Nutrient Delivery:  Modify Parenteral Nutrition(TPN recommendation: 3-in-1 Central Standard @ 58.3 ml/hr to provide 1400 ml tv, 1449 kcals, 70 gm AA)  Nutrition Education/Counseling:  No recommendation at this time   Coordination of Nutrition

## 2020-09-16 NOTE — PROGRESS NOTES
[facility] Quality Flow/Interdisciplinary Rounds Progress Note        Quality Flow Rounds held on September 16, 2020    Disciplines Attending:  Bedside Nurse, ,  and Nursing Unit Leadership    Howie Sebastian was admitted on 9/11/2020  7:09 AM    Anticipated Discharge Date:  Expected Discharge Date: 09/13/20    Disposition:    Roland Score:  Roland Scale Score: 17    Readmission Risk              Risk of Unplanned Readmission:        30           Discussed patient goal for the day, patient clinical progression, and barriers to discharge.   The following Goal(s) of the Day/Commitment(s) have been identified:  Discharge [address] [place]  September 16, 2020

## 2020-09-16 NOTE — OP NOTE
Operative Note      Patient: Carina Hernandez  YOB: 1940  MRN: [id number]    Date of Procedure: 9/15/2020    Pre-Op Diagnosis: enterocutaneous fistula    Post-Op Diagnosis: Same       Procedure(s):  EXPLORATORY LAPAROTOMY, TAKE DOWN ENTEROCUTANEOUS FISTULA, SMALL BOWEL RESECTION, ILEOSTOMY REVISION    Surgeon(s):  MD Jesus Wong MD    Assistant:   Resident: Pawan Vega MD PGY -4    Anesthesia: General    Estimated Blood Loss (mL): 24JD    Complications: None    Specimens:   ID Type Source Tests Collected by Time Destination   A : SMALL BOWEL  Tissue Duodenum SURGICAL PATHOLOGY Salome Coronado MD 9/15/2020 1656    B : ILEOSTOMY Tissue Duodenum SURGICAL PATHOLOGY Salome Coronado MD 9/15/2020 1710        Implants:  * No implants in log *      Drains:   Urethral Catheter Straight-tip 16 fr (Active)   Catheter Indications Perioperative use in selected surgeries including but not limited to urologic, pelvic or need for intraoperative monitoring of urinary output due to prolonged surgery, large volume infusion or need for diuretic therapy in surgery 09/15/20 2258   Urine Color Yellow 09/15/20 2258   Urine Appearance Clear 09/15/20 2258   Output (mL) 400 mL 09/15/20 2258       [REMOVED] Negative Pressure Wound Therapy Abdomen Anterior;Medial (Removed)       [REMOVED] Ileostomy Ileostomy RLQ (Removed)   Stomal Appliance Other (Comment) 09/15/20 2258   Stoma  Assessment ADEEL 09/15/20 2258   Mucocutaneous Junction Intact 09/15/20 1457   Peristomal Assessment Excoriation;Blanchable erythema;Red 09/14/20 0840   Treatment Bag change 09/14/20 0132   Stool Color Brown 09/15/20 1457   Stool Appearance Loose 09/15/20 1457   Stool Amount Small 09/14/20 1351   Output (mL) 250 ml 09/15/20 0525       [REMOVED] External Urinary Catheter (Removed)       Findings: distal small bowel enterocutaneous fistula    HISTORY: This is a 68year old female with history of prior hernia repairs with mesh and subsequent mesh explantations with delayed healing abdominal wound who underwent excision of residual mesh four days ago. She was noted to have small bowel directly underneath the mesh. 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 Enterocutaneous fistula takedown with small bowel resection and possible ileostomy revision was recommended. The risks benefits and alternatives of the procedure were discussed with the patient who stated understanding and agreed to proceed. DESCRIPTION OF PROCEDURE: The patient was brought to the operating room and positioned supine on the OR table. Sequential compression devices were placed on the patient's lower extremities and functioning. Preoperative antibiotics were continued, Ancef. General anesthesia was obtained without complication as per the anesthesia record. Immediately prior to the procedure a time-out was called and the surgical checklist was reviewed and agreed upon by all present. A gaviria catheter was inserted in to the bladder. A nasogastric tube was placed. The gaviria catheter in her abdominal fistula was removed. The patient was prepped with betadine wet prep and draped in the usual sterile fashion and the procedure went forth with strict aseptic technique under maximal barrier precautions. A midline incision was made with a 15 blade scalpel through prior scar inferior to her open wound and granulation tissue. This was carried down carefully through skin and subcutaneous tissue with the scalpel. The abdomen was entered. There were adhesions of small bowel to the anterior abdominal wall which were taken down mostly sharply with scalpel and metzenbaum scissors. The skin incision was extended cephalad through granulation tissue surrounding the fistula, until there was liver directly underlying the granulation tissue.  It eventually was demonstrated that the enterocutaneous fistula arose from distal small bowel about 15 -20cm proximal to the ileostomy. The decision was made to resect this portion of small bowel as well as her ileostomy. Windows were made in the mesentery with cautery proximal to the fistula and proximal to the ileostomy, and a ДМИТРИЙ stapler with a blue load was fired across the small bowel proximal to the fistula. The stapler was fired a second time with a blue load proximal to the ileostomy. The mesentery was divided with a Ligasure. Bleeding from the mesentery was oversewn with 0 vicryl suture. The small bowel was passed off the field. The ileostomy was excised with cautery and also passed off the field. The small bowel was brought through the ileostomy incision. A small serosal tear was repaired with Lembert chromic titch with . Due to her numerous prior surgeries there was no fascia to bring together so a skin only closure was performed. At the superior portion of the incision, skin flaps were raised to advance over the granulation tissue. The skin was closed with 3-0 Vicryl simple interrupted sutures. Three retention sutures of 0 Nylon were placed. The ileostomy was matured in a Nemo fashion with 3-0 chromic gut sutures. An ostomy appliance was placed. The incisino was dressed with a heavy drainage pack. Needle, sponge, and instrument counts were correct. DISPOSITION: Anesthesia was discontinued and the patient was extubated prior to leaving the OR. She was transferred to the PACU in good condition. She will return to the nursing floor for further recovery.     Electronically signed by Twin Burt MD on 9/16/2020 at 3:34 AM

## 2020-09-17 LAB
ANION GAP SERPL CALCULATED.3IONS-SCNC: 7 MMOL/L (ref 7–16)
BASOPHILS ABSOLUTE: 0.02 E9/L (ref 0–0.2)
BASOPHILS RELATIVE PERCENT: 0.3 % (ref 0–2)
BUN BLDV-MCNC: 11 MG/DL (ref 8–23)
CALCIUM SERPL-MCNC: 7.7 MG/DL (ref 8.6–10.2)
CHLORIDE BLD-SCNC: 104 MMOL/L (ref 98–107)
CO2: 25 MMOL/L (ref 22–29)
CREAT SERPL-MCNC: 0.9 MG/DL (ref 0.5–1)
EOSINOPHILS ABSOLUTE: 0.29 E9/L (ref 0.05–0.5)
EOSINOPHILS RELATIVE PERCENT: 4.8 % (ref 0–6)
GFR AFRICAN AMERICAN: >60
GFR NON-AFRICAN AMERICAN: >60 ML/MIN/1.73
GLUCOSE BLD-MCNC: 140 MG/DL (ref 74–99)
HCT VFR BLD CALC: 29 % (ref 34–48)
HEMOGLOBIN: 9.4 G/DL (ref 11.5–15.5)
IMMATURE GRANULOCYTES #: 0.01 E9/L
IMMATURE GRANULOCYTES %: 0.2 % (ref 0–5)
LYMPHOCYTES ABSOLUTE: 0.7 E9/L (ref 1.5–4)
LYMPHOCYTES RELATIVE PERCENT: 11.6 % (ref 20–42)
MAGNESIUM: 1.9 MG/DL (ref 1.6–2.6)
MCH RBC QN AUTO: 32.2 PG (ref 26–35)
MCHC RBC AUTO-ENTMCNC: 32.4 % (ref 32–34.5)
MCV RBC AUTO: 99.3 FL (ref 80–99.9)
METER GLUCOSE: 140 MG/DL (ref 74–99)
METER GLUCOSE: 143 MG/DL (ref 74–99)
METER GLUCOSE: 150 MG/DL (ref 74–99)
METER GLUCOSE: 159 MG/DL (ref 74–99)
MONOCYTES ABSOLUTE: 0.59 E9/L (ref 0.1–0.95)
MONOCYTES RELATIVE PERCENT: 9.8 % (ref 2–12)
MRSA CULTURE ONLY: NORMAL
NEUTROPHILS ABSOLUTE: 4.44 E9/L (ref 1.8–7.3)
NEUTROPHILS RELATIVE PERCENT: 73.3 % (ref 43–80)
PDW BLD-RTO: 13.9 FL (ref 11.5–15)
PHOSPHORUS: 2.4 MG/DL (ref 2.5–4.5)
PLATELET # BLD: 184 E9/L (ref 130–450)
PMV BLD AUTO: 12 FL (ref 7–12)
POTASSIUM REFLEX MAGNESIUM: 3.7 MMOL/L (ref 3.5–5)
RBC # BLD: 2.92 E12/L (ref 3.5–5.5)
SODIUM BLD-SCNC: 136 MMOL/L (ref 132–146)
WBC # BLD: 6.1 E9/L (ref 4.5–11.5)

## 2020-09-17 PROCEDURE — 82962 GLUCOSE BLOOD TEST: CPT

## 2020-09-17 PROCEDURE — 6370000000 HC RX 637 (ALT 250 FOR IP): Performed by: STUDENT IN AN ORGANIZED HEALTH CARE EDUCATION/TRAINING PROGRAM

## 2020-09-17 PROCEDURE — 2580000003 HC RX 258: Performed by: STUDENT IN AN ORGANIZED HEALTH CARE EDUCATION/TRAINING PROGRAM

## 2020-09-17 PROCEDURE — 85025 COMPLETE CBC W/AUTO DIFF WBC: CPT

## 2020-09-17 PROCEDURE — 2500000003 HC RX 250 WO HCPCS: Performed by: STUDENT IN AN ORGANIZED HEALTH CARE EDUCATION/TRAINING PROGRAM

## 2020-09-17 PROCEDURE — 80048 BASIC METABOLIC PNL TOTAL CA: CPT

## 2020-09-17 PROCEDURE — 2700000000 HC OXYGEN THERAPY PER DAY

## 2020-09-17 PROCEDURE — 6360000002 HC RX W HCPCS: Performed by: STUDENT IN AN ORGANIZED HEALTH CARE EDUCATION/TRAINING PROGRAM

## 2020-09-17 PROCEDURE — 83735 ASSAY OF MAGNESIUM: CPT

## 2020-09-17 PROCEDURE — 94770 HC ETCO2 MONITOR DAILY: CPT

## 2020-09-17 PROCEDURE — C9113 INJ PANTOPRAZOLE SODIUM, VIA: HCPCS | Performed by: STUDENT IN AN ORGANIZED HEALTH CARE EDUCATION/TRAINING PROGRAM

## 2020-09-17 PROCEDURE — 1200000000 HC SEMI PRIVATE

## 2020-09-17 PROCEDURE — 36415 COLL VENOUS BLD VENIPUNCTURE: CPT

## 2020-09-17 PROCEDURE — 84100 ASSAY OF PHOSPHORUS: CPT

## 2020-09-17 RX ORDER — PROMETHAZINE HYDROCHLORIDE 25 MG/ML
12.5 INJECTION, SOLUTION INTRAMUSCULAR; INTRAVENOUS EVERY 6 HOURS PRN
Status: DISCONTINUED | OUTPATIENT
Start: 2020-09-17 | End: 2020-09-22 | Stop reason: HOSPADM

## 2020-09-17 RX ADMIN — POTASSIUM PHOSPHATE, MONOBASIC AND POTASSIUM PHOSPHATE, DIBASIC 10 MMOL: 224; 236 INJECTION, SOLUTION, CONCENTRATE INTRAVENOUS at 15:11

## 2020-09-17 RX ADMIN — METRONIDAZOLE 500 MG: 500 INJECTION, SOLUTION INTRAVENOUS at 15:14

## 2020-09-17 RX ADMIN — ONDANSETRON 4 MG: 2 INJECTION INTRAMUSCULAR; INTRAVENOUS at 12:38

## 2020-09-17 RX ADMIN — SODIUM CHLORIDE, PRESERVATIVE FREE 300 UNITS: 5 INJECTION INTRAVENOUS at 08:34

## 2020-09-17 RX ADMIN — SODIUM CHLORIDE, PRESERVATIVE FREE 10 ML: 5 INJECTION INTRAVENOUS at 08:34

## 2020-09-17 RX ADMIN — WATER 1 G: 1 INJECTION INTRAMUSCULAR; INTRAVENOUS; SUBCUTANEOUS at 16:51

## 2020-09-17 RX ADMIN — Medication 0.2 MG: at 19:49

## 2020-09-17 RX ADMIN — DICYCLOMINE HYDROCHLORIDE 10 MG: 10 CAPSULE ORAL at 08:36

## 2020-09-17 RX ADMIN — POTASSIUM CHLORIDE, DEXTROSE MONOHYDRATE AND SODIUM CHLORIDE: 150; 5; 450 INJECTION, SOLUTION INTRAVENOUS at 05:51

## 2020-09-17 RX ADMIN — SODIUM CHLORIDE, PRESERVATIVE FREE 10 ML: 5 INJECTION INTRAVENOUS at 12:38

## 2020-09-17 RX ADMIN — SODIUM CHLORIDE, PRESERVATIVE FREE 10 ML: 5 INJECTION INTRAVENOUS at 22:22

## 2020-09-17 RX ADMIN — PRIMIDONE 250 MG: 250 TABLET ORAL at 22:20

## 2020-09-17 RX ADMIN — ONDANSETRON 4 MG: 2 INJECTION INTRAMUSCULAR; INTRAVENOUS at 06:57

## 2020-09-17 RX ADMIN — ENOXAPARIN SODIUM 40 MG: 40 INJECTION SUBCUTANEOUS at 08:35

## 2020-09-17 RX ADMIN — PANTOPRAZOLE SODIUM 40 MG: 40 INJECTION, POWDER, FOR SOLUTION INTRAVENOUS at 22:21

## 2020-09-17 RX ADMIN — DILTIAZEM HYDROCHLORIDE 120 MG: 120 CAPSULE, COATED, EXTENDED RELEASE ORAL at 08:35

## 2020-09-17 RX ADMIN — PANTOPRAZOLE SODIUM 40 MG: 40 INJECTION, POWDER, FOR SOLUTION INTRAVENOUS at 08:34

## 2020-09-17 RX ADMIN — Medication 6 MG: at 04:26

## 2020-09-17 RX ADMIN — DICYCLOMINE HYDROCHLORIDE 10 MG: 10 CAPSULE ORAL at 22:20

## 2020-09-17 RX ADMIN — CALCIUM ACETATE MONOHYDRATE AND ALUMINUM SULFATE TETRADECAHYDRATE 4 PACKET: 952; 1347 POWDER, FOR SOLUTION TOPICAL at 11:05

## 2020-09-17 RX ADMIN — ACETAMINOPHEN 650 MG: 325 TABLET ORAL at 22:20

## 2020-09-17 RX ADMIN — WATER 1 G: 1 INJECTION INTRAMUSCULAR; INTRAVENOUS; SUBCUTANEOUS at 08:34

## 2020-09-17 RX ADMIN — WATER 1 G: 1 INJECTION INTRAMUSCULAR; INTRAVENOUS; SUBCUTANEOUS at 00:41

## 2020-09-17 RX ADMIN — SODIUM CHLORIDE, PRESERVATIVE FREE 300 UNITS: 5 INJECTION INTRAVENOUS at 22:22

## 2020-09-17 RX ADMIN — SODIUM CHLORIDE, PRESERVATIVE FREE 10 ML: 5 INJECTION INTRAVENOUS at 08:43

## 2020-09-17 RX ADMIN — ESCITALOPRAM 20 MG: 10 TABLET, FILM COATED ORAL at 22:21

## 2020-09-17 RX ADMIN — SODIUM CHLORIDE, PRESERVATIVE FREE 10 ML: 5 INJECTION INTRAVENOUS at 16:52

## 2020-09-17 RX ADMIN — POTASSIUM CHLORIDE: 2 INJECTION, SOLUTION, CONCENTRATE INTRAVENOUS at 18:50

## 2020-09-17 RX ADMIN — SODIUM CHLORIDE, PRESERVATIVE FREE 10 ML: 5 INJECTION INTRAVENOUS at 08:36

## 2020-09-17 RX ADMIN — PROPRANOLOL HYDROCHLORIDE 20 MG: 20 TABLET ORAL at 08:41

## 2020-09-17 RX ADMIN — METRONIDAZOLE 500 MG: 500 INJECTION, SOLUTION INTRAVENOUS at 22:42

## 2020-09-17 RX ADMIN — SODIUM CHLORIDE, PRESERVATIVE FREE 10 ML: 5 INJECTION INTRAVENOUS at 22:24

## 2020-09-17 RX ADMIN — METRONIDAZOLE 500 MG: 500 INJECTION, SOLUTION INTRAVENOUS at 06:00

## 2020-09-17 ASSESSMENT — PAIN DESCRIPTION - LOCATION
LOCATION: ABDOMEN

## 2020-09-17 ASSESSMENT — PAIN SCALES - GENERAL
PAINLEVEL_OUTOF10: 0
PAINLEVEL_OUTOF10: 4
PAINLEVEL_OUTOF10: 5
PAINLEVEL_OUTOF10: 1
PAINLEVEL_OUTOF10: 5
PAINLEVEL_OUTOF10: 5
PAINLEVEL_OUTOF10: 6

## 2020-09-17 ASSESSMENT — PAIN DESCRIPTION - ONSET
ONSET: ON-GOING

## 2020-09-17 ASSESSMENT — PAIN DESCRIPTION - DESCRIPTORS
DESCRIPTORS: ACHING;CONSTANT
DESCRIPTORS: ACHING;CRAMPING;DISCOMFORT;DULL
DESCRIPTORS: ACHING;DISCOMFORT;CONSTANT
DESCRIPTORS: ACHING;DISCOMFORT;DULL

## 2020-09-17 ASSESSMENT — PAIN DESCRIPTION - PAIN TYPE
TYPE: SURGICAL PAIN
TYPE: ACUTE PAIN

## 2020-09-17 ASSESSMENT — PAIN - FUNCTIONAL ASSESSMENT
PAIN_FUNCTIONAL_ASSESSMENT: PREVENTS OR INTERFERES SOME ACTIVE ACTIVITIES AND ADLS
PAIN_FUNCTIONAL_ASSESSMENT: PREVENTS OR INTERFERES SOME ACTIVE ACTIVITIES AND ADLS
PAIN_FUNCTIONAL_ASSESSMENT: PREVENTS OR INTERFERES WITH MANY ACTIVE NOT PASSIVE ACTIVITIES
PAIN_FUNCTIONAL_ASSESSMENT: PREVENTS OR INTERFERES SOME ACTIVE ACTIVITIES AND ADLS

## 2020-09-17 ASSESSMENT — PAIN DESCRIPTION - FREQUENCY
FREQUENCY: CONTINUOUS

## 2020-09-17 ASSESSMENT — PAIN DESCRIPTION - PROGRESSION
CLINICAL_PROGRESSION: NOT CHANGED

## 2020-09-17 ASSESSMENT — PAIN DESCRIPTION - ORIENTATION
ORIENTATION: MID

## 2020-09-17 NOTE — PROGRESS NOTES
Revisit  Pouch leaking. Wound is draining on pouch and loosening it  Stoma pink/red, moist. Peristomal skin intact. domeboros used to reddened skin. Moderate serous drainage from retention suture sight and some from proximal end of stoma  aquacel , kerlex and abds applied  Ostomy pouched with flat one piece. Has dip from 3-9 1/2 barrier ring applied  Will follow  Robert Guzmán.  [place], CNS, Wound Care

## 2020-09-17 NOTE — PROGRESS NOTES
Physical Therapy  PT eval attempted, but pt refused due to just getting back into bed. Will re-attempt at later date.

## 2020-09-17 NOTE — PROGRESS NOTES
Department of Surgery - Adult  General Surgery  Dr. Patricia Flores's Progress Note      SUBJECTIVE: Overall, the patient seems to be doing quite well. The ileostomy is functioning    OBJECTIVE      Physical    VITALS:  BP (!) 135/58   Pulse 85   Temp 98.7 °F (37.1 °C) (Oral)   Resp 22   Ht 5' (1.524 m)   Wt 117 lb (53.1 kg)   SpO2 93%   BMI 22.85 kg/m²   INTAKE/OUTPUT:      Intake/Output Summary (Last 24 hours) at 2020 0746  Last data filed at 2020 0700  Gross per 24 hour   Intake 3741.8 ml   Output 1925 ml   Net 1816.8 ml     TEMPERATURE:  Current - Temp: 98.7 °F (37.1 °C); Max - Temp  Av.9 °F (37.2 °C)  Min: 98.4 °F (36.9 °C)  Max: 99.5 °F (37.5 °C)  RESPIRATIONS RANGE: Resp  Av.8  Min: 16  Max: 22  PULSE RANGE: Pulse  Av.3  Min: 64  Max: 85  BLOOD PRESSURE RANGE:  Systolic (53WLT), DZK:519 , Min:108 , QBY:630   ; Diastolic (58SWZ), SUO:07, Min:55, Max:75    PULSE OXIMETRY RANGE: SpO2  Av %  Min: 93 %  Max: 97 %  CONSTITUTIONAL:  awake, alert, cooperative, no apparent distress, and appears stated age  ABDOMEN: The abdomen is soft with appropriate incisional tenderness. The patient continues to have some discharge from the skin incision. The ostomy is functioning well.   Data  CBC with Differential:    Lab Results   Component Value Date    WBC 6.1 2020    RBC 2.92 2020    HGB 9.4 2020    HCT 29.0 2020     2020    MCV 99.3 2020    MCH 32.2 2020    MCHC 32.4 2020    RDW 13.9 2020    LYMPHOPCT 11.6 2020    MONOPCT 9.8 2020    BASOPCT 0.3 2020    MONOSABS 0.59 2020    LYMPHSABS 0.70 2020    EOSABS 0.29 2020    BASOSABS 0.02 2020     CMP:    Lab Results   Component Value Date     2020    K 3.7 2020     2020    CO2 25 2020    BUN 11 2020    CREATININE 0.9 2020    GFRAA >60 2020    LABGLOM >60 2020    GLUCOSE 140 2020 PROT 5.4 06/29/2020    LABALBU 2.8 06/29/2020    CALCIUM 7.7 09/17/2020    BILITOT <0.2 06/29/2020    ALKPHOS 90 06/29/2020    AST 28 06/29/2020    ALT 18 06/29/2020     BMP:  Hepatic Function Panel:  Ionized Calcium:  No results found for: IONCA  Magnesium:    Lab Results   Component Value Date    MG 1.9 09/17/2020     Phosphorus:    Lab Results   Component Value Date    PHOS 2.4 09/17/2020       Current Inpatient Medications    Current Facility-Administered Medications: PN-Adult  3-in-1 Central Line (Standard), , Intravenous, Continuous TPN  pantoprazole (PROTONIX) injection 40 mg, 40 mg, Intravenous, BID **AND** sodium chloride (PF) 0.9 % injection 10 mL, 10 mL, Intravenous, BID  naloxone (NARCAN) injection 0.4 mg, 0.4 mg, Intravenous, PRN  HYDROmorphone (DILAUDID) 0.2 mg/mL PCA, , Intravenous, Continuous  metronidazole (FLAGYL) 500 mg in NaCl 100 mL IVPB premix, 500 mg, Intravenous, Q8H  enoxaparin (LOVENOX) injection 40 mg, 40 mg, Subcutaneous, Daily  aluminum sulfate-calcium acetate (DOMEBORO) packet 4 packet, 4 packet, Topical, Daily PRN  sodium chloride flush 0.9 % injection 10 mL, 10 mL, Intravenous, PRN  heparin flush 100 UNIT/ML injection 300 Units, 3 mL, Intravenous, 2 times per day  heparin flush 100 UNIT/ML injection 300 Units, 3 mL, Intracatheter, PRN  nicotine (NICODERM CQ) 14 MG/24HR 1 patch, 1 patch, Transdermal, Daily  HYDROmorphone (DILAUDID) injection 0.5 mg, 0.5 mg, Intravenous, Q3H PRN  dicyclomine (BENTYL) capsule 10 mg, 10 mg, Oral, BID  dilTIAZem (CARDIZEM CD) extended release capsule 120 mg, 120 mg, Oral, Daily  escitalopram (LEXAPRO) tablet 20 mg, 20 mg, Oral, Nightly  ceFAZolin (ANCEF) 1 g in sterile water 10 mL IV syringe, 1 g, Intravenous, Q8H  primidone (MYSOLINE) tablet 250 mg, 250 mg, Oral, Nightly  propranolol (INDERAL) tablet 20 mg, 20 mg, Oral, Daily  traZODone (DESYREL) tablet 25 mg, 25 mg, Oral, Nightly PRN  vitamin D (CHOLECALCIFEROL) tablet 2,000 Units, 2,000 Units, Oral, BID  sodium chloride flush 0.9 % injection 10 mL, 10 mL, Intravenous, 2 times per day  sodium chloride flush 0.9 % injection 10 mL, 10 mL, Intravenous, PRN  dextrose 5 % and 0.45 % NaCl with KCl 20 mEq infusion, , Intravenous, Continuous  ondansetron (ZOFRAN) injection 4 mg, 4 mg, Intravenous, Q6H PRN  acetaminophen (TYLENOL) tablet 650 mg, 650 mg, Oral, Q4H PRN    ASSESSMENT:    68 y.o. female status post exploratory laparotomy with takedown of enterocutaneous fistula with takedown of ileostomy with receding.   Post op day #  2    PLAN:    Remove nasogastric tube  Clear liquid diet  Remove Mooney catheter    Louise Estrada 9/17/20207:46 AM

## 2020-09-17 NOTE — FLOWSHEET NOTE
Inpatient Wound Care    Admit Date: 9/11/2020  7:09 AM    Reason for consult:  Ileostomy    Significant history:  Admitted with enterocutaneous fistula s/p enterotomy , mesh explantation, takedown fistula ileostomy , revised ileostomy. Wound history:  Revised stoma    Findings:       09/17/20 1305   Ileostomy Ileostomy RLQ   Placement Date/Time: 09/17/20 1305   Pre-existing: No  Inserted by: Dr. Lana Still  Ileostomy Type: Ileostomy  Location: RLQ   Stomal Appliance 1 piece   Flange Size (inches)   (stoma 45)   Stoma  Assessment Protrudes; Red   Mucocutaneous Junction Intact   Peristomal Assessment Intact   Treatment Bag change   Stool Color Brown   Stool Appearance Watery   Stool Amount Medium   Output (mL)   (150)     Impression:  Ileostomy stoma red, raised, functioning for watery effluent. Peristomal skin red. Incision seeping serosang drainage. Interventions in place:  Domeboros soaks applied to denuded skin for 10 to 20 min. Applied stomahesive powder. Then Opticell to incision covered with heavy drainage pouch. Ileostomy stoma re-pouched using 1 pc Coloplast and a ring. Dressing to R leg changed. Wound red. Plan: Cont ostomy and wound care.        Isabella Arana 9/17/2020 1:07 PM

## 2020-09-17 NOTE — PROGRESS NOTES
GENERAL SURGERY  DAILY PROGRESS NOTE  9/17/2020    No chief complaint on file. Subjective:  Pt states she has some pain, but is controlled with the PCA. Reports having a leak from her midline incision. Objective:  BP (!) 135/58   Pulse 85   Temp 98.7 °F (37.1 °C) (Oral)   Resp 22   Ht 5' (1.524 m)   Wt 117 lb (53.1 kg)   SpO2 93%   BMI 22.85 kg/m²     GENERAL:  Laying in bed, awake, alert, cooperative, no apparent distress  HEAD: Normocephalic, atraumatic  EYES: No sclera icterus, pupils equal  LUNGS: On 3L NC  CARDIOVASCULAR:  RR  ABDOMEN:  Soft, appropriate TTP around ostomy, non-distended. Incision c/d/i. Ostomy pink, patent, with green output in bag.  NG tube in place with 400cc green fluid over 24 hours  EXTREMITIES: No edema or swelling  SKIN: Warm and dry    Assessment/Plan:  68 y.o. female s/p mesh explantation with enterotomy and closure POD6 s/p ex-lap with SBR, revision of ileostomy POD2    - Monitor ostomy function and output  - NPO  - Possible clamp trial NGT  - Continue TPN   - Local wound care  - Ostomy care  - Pain control PRN  - Encouraged ambulation and IS    Electronically signed by Yari Schultz MD on 9/17/2020 at 4:46 AM

## 2020-09-17 NOTE — PROGRESS NOTES
Occupational Therapy  Date:9/17/2020  Patient Name: Skyla Willams  Room: 6429/3880-W     Occupational Therapy (OT) order received, patient's medical record reviewed, and OT evaluation attempted this afternoon; patient declined to participate in EOB/OOB activities for OT evaluation noting that she had recently returned to bed. OT evaluation to be re-attempted at later date, as able/appropriate. Valentine Carter, OTR/L  License Number: [id number]

## 2020-09-17 NOTE — CARE COORDINATION
Social Work/Discharge Planning  This worker attempted to meet with patient, but wound care was assisting patient. Will attempt to meet with patient at a later time.   Electronically signed by CASSI Metzger on 9/17/2020 at 2:36 PM

## 2020-09-18 ENCOUNTER — APPOINTMENT (OUTPATIENT)
Dept: GENERAL RADIOLOGY | Age: 80
DRG: 908 | End: 2020-09-18
Attending: SURGERY
Payer: MEDICARE

## 2020-09-18 LAB
ANION GAP SERPL CALCULATED.3IONS-SCNC: 6 MMOL/L (ref 7–16)
BACTERIA: ABNORMAL /HPF
BASOPHILS ABSOLUTE: 0.02 E9/L (ref 0–0.2)
BASOPHILS RELATIVE PERCENT: 0.4 % (ref 0–2)
BILIRUBIN URINE: NEGATIVE
BLOOD, URINE: NORMAL
BUN BLDV-MCNC: 14 MG/DL (ref 8–23)
CALCIUM SERPL-MCNC: 8.4 MG/DL (ref 8.6–10.2)
CHLORIDE BLD-SCNC: 105 MMOL/L (ref 98–107)
CLARITY: CLEAR
CO2: 26 MMOL/L (ref 22–29)
COLOR: YELLOW
CREAT SERPL-MCNC: 0.9 MG/DL (ref 0.5–1)
EOSINOPHILS ABSOLUTE: 0.27 E9/L (ref 0.05–0.5)
EOSINOPHILS RELATIVE PERCENT: 4.9 % (ref 0–6)
GFR AFRICAN AMERICAN: >60
GFR NON-AFRICAN AMERICAN: >60 ML/MIN/1.73
GLUCOSE BLD-MCNC: 105 MG/DL (ref 74–99)
GLUCOSE URINE: NEGATIVE MG/DL
HCT VFR BLD CALC: 31.8 % (ref 34–48)
HEMOGLOBIN: 10.1 G/DL (ref 11.5–15.5)
IMMATURE GRANULOCYTES #: 0.02 E9/L
IMMATURE GRANULOCYTES %: 0.4 % (ref 0–5)
KETONES, URINE: NEGATIVE MG/DL
LEUKOCYTE ESTERASE, URINE: NEGATIVE
LYMPHOCYTES ABSOLUTE: 0.86 E9/L (ref 1.5–4)
LYMPHOCYTES RELATIVE PERCENT: 15.7 % (ref 20–42)
MAGNESIUM: 1.9 MG/DL (ref 1.6–2.6)
MCH RBC QN AUTO: 32 PG (ref 26–35)
MCHC RBC AUTO-ENTMCNC: 31.8 % (ref 32–34.5)
MCV RBC AUTO: 100.6 FL (ref 80–99.9)
METER GLUCOSE: 147 MG/DL (ref 74–99)
METER GLUCOSE: 168 MG/DL (ref 74–99)
MONOCYTES ABSOLUTE: 0.65 E9/L (ref 0.1–0.95)
MONOCYTES RELATIVE PERCENT: 11.9 % (ref 2–12)
NEUTROPHILS ABSOLUTE: 3.65 E9/L (ref 1.8–7.3)
NEUTROPHILS RELATIVE PERCENT: 66.7 % (ref 43–80)
NITRITE, URINE: NEGATIVE
PDW BLD-RTO: 14.1 FL (ref 11.5–15)
PH UA: 5.5 (ref 5–9)
PHOSPHORUS: 2.7 MG/DL (ref 2.5–4.5)
PLATELET # BLD: 192 E9/L (ref 130–450)
PMV BLD AUTO: 11.8 FL (ref 7–12)
POTASSIUM REFLEX MAGNESIUM: 4.8 MMOL/L (ref 3.5–5)
PROTEIN UA: NEGATIVE MG/DL
RBC # BLD: 3.16 E12/L (ref 3.5–5.5)
RBC UA: ABNORMAL /HPF (ref 0–2)
SODIUM BLD-SCNC: 137 MMOL/L (ref 132–146)
SPECIFIC GRAVITY UA: 1.02 (ref 1–1.03)
UROBILINOGEN, URINE: 0.2 E.U./DL
WBC # BLD: 5.5 E9/L (ref 4.5–11.5)
WBC UA: ABNORMAL /HPF (ref 0–5)
YEAST: PRESENT /HPF

## 2020-09-18 PROCEDURE — 2580000003 HC RX 258: Performed by: STUDENT IN AN ORGANIZED HEALTH CARE EDUCATION/TRAINING PROGRAM

## 2020-09-18 PROCEDURE — 82962 GLUCOSE BLOOD TEST: CPT

## 2020-09-18 PROCEDURE — 87088 URINE BACTERIA CULTURE: CPT

## 2020-09-18 PROCEDURE — 6370000000 HC RX 637 (ALT 250 FOR IP): Performed by: STUDENT IN AN ORGANIZED HEALTH CARE EDUCATION/TRAINING PROGRAM

## 2020-09-18 PROCEDURE — 83735 ASSAY OF MAGNESIUM: CPT

## 2020-09-18 PROCEDURE — 2500000003 HC RX 250 WO HCPCS: Performed by: SURGERY

## 2020-09-18 PROCEDURE — 6360000002 HC RX W HCPCS: Performed by: STUDENT IN AN ORGANIZED HEALTH CARE EDUCATION/TRAINING PROGRAM

## 2020-09-18 PROCEDURE — 36415 COLL VENOUS BLD VENIPUNCTURE: CPT

## 2020-09-18 PROCEDURE — 94770 HC ETCO2 MONITOR DAILY: CPT

## 2020-09-18 PROCEDURE — 85025 COMPLETE CBC W/AUTO DIFF WBC: CPT

## 2020-09-18 PROCEDURE — 97161 PT EVAL LOW COMPLEX 20 MIN: CPT

## 2020-09-18 PROCEDURE — 2700000000 HC OXYGEN THERAPY PER DAY

## 2020-09-18 PROCEDURE — 2500000003 HC RX 250 WO HCPCS: Performed by: STUDENT IN AN ORGANIZED HEALTH CARE EDUCATION/TRAINING PROGRAM

## 2020-09-18 PROCEDURE — 97165 OT EVAL LOW COMPLEX 30 MIN: CPT

## 2020-09-18 PROCEDURE — 84100 ASSAY OF PHOSPHORUS: CPT

## 2020-09-18 PROCEDURE — C9113 INJ PANTOPRAZOLE SODIUM, VIA: HCPCS | Performed by: STUDENT IN AN ORGANIZED HEALTH CARE EDUCATION/TRAINING PROGRAM

## 2020-09-18 PROCEDURE — 80048 BASIC METABOLIC PNL TOTAL CA: CPT

## 2020-09-18 PROCEDURE — 1200000000 HC SEMI PRIVATE

## 2020-09-18 PROCEDURE — 71045 X-RAY EXAM CHEST 1 VIEW: CPT

## 2020-09-18 PROCEDURE — 97535 SELF CARE MNGMENT TRAINING: CPT

## 2020-09-18 PROCEDURE — 81001 URINALYSIS AUTO W/SCOPE: CPT

## 2020-09-18 RX ADMIN — DILTIAZEM HYDROCHLORIDE 120 MG: 120 CAPSULE, COATED, EXTENDED RELEASE ORAL at 09:28

## 2020-09-18 RX ADMIN — METRONIDAZOLE 500 MG: 500 INJECTION, SOLUTION INTRAVENOUS at 14:45

## 2020-09-18 RX ADMIN — SODIUM CHLORIDE, PRESERVATIVE FREE 10 ML: 5 INJECTION INTRAVENOUS at 23:02

## 2020-09-18 RX ADMIN — SODIUM CHLORIDE, PRESERVATIVE FREE 10 ML: 5 INJECTION INTRAVENOUS at 21:00

## 2020-09-18 RX ADMIN — WATER 1 G: 1 INJECTION INTRAMUSCULAR; INTRAVENOUS; SUBCUTANEOUS at 18:05

## 2020-09-18 RX ADMIN — DICYCLOMINE HYDROCHLORIDE 10 MG: 10 CAPSULE ORAL at 09:28

## 2020-09-18 RX ADMIN — POTASSIUM CHLORIDE, DEXTROSE MONOHYDRATE AND SODIUM CHLORIDE: 150; 5; 450 INJECTION, SOLUTION INTRAVENOUS at 01:46

## 2020-09-18 RX ADMIN — METRONIDAZOLE 500 MG: 500 INJECTION, SOLUTION INTRAVENOUS at 22:50

## 2020-09-18 RX ADMIN — ESCITALOPRAM 20 MG: 10 TABLET, FILM COATED ORAL at 22:51

## 2020-09-18 RX ADMIN — WATER 1 G: 1 INJECTION INTRAMUSCULAR; INTRAVENOUS; SUBCUTANEOUS at 09:26

## 2020-09-18 RX ADMIN — SODIUM CHLORIDE, PRESERVATIVE FREE 10 ML: 5 INJECTION INTRAVENOUS at 22:52

## 2020-09-18 RX ADMIN — SODIUM CHLORIDE, PRESERVATIVE FREE 10 ML: 5 INJECTION INTRAVENOUS at 09:27

## 2020-09-18 RX ADMIN — PRIMIDONE 250 MG: 250 TABLET ORAL at 21:00

## 2020-09-18 RX ADMIN — MICONAZOLE NITRATE: 20 POWDER TOPICAL at 14:45

## 2020-09-18 RX ADMIN — POTASSIUM CHLORIDE: 2 INJECTION, SOLUTION, CONCENTRATE INTRAVENOUS at 18:13

## 2020-09-18 RX ADMIN — PROPRANOLOL HYDROCHLORIDE 20 MG: 20 TABLET ORAL at 09:28

## 2020-09-18 RX ADMIN — ONDANSETRON 4 MG: 2 INJECTION INTRAMUSCULAR; INTRAVENOUS at 09:27

## 2020-09-18 RX ADMIN — ENOXAPARIN SODIUM 40 MG: 40 INJECTION SUBCUTANEOUS at 09:28

## 2020-09-18 RX ADMIN — METRONIDAZOLE 500 MG: 500 INJECTION, SOLUTION INTRAVENOUS at 07:50

## 2020-09-18 RX ADMIN — PANTOPRAZOLE SODIUM 40 MG: 40 INJECTION, POWDER, FOR SOLUTION INTRAVENOUS at 21:00

## 2020-09-18 RX ADMIN — Medication: at 17:39

## 2020-09-18 RX ADMIN — WATER 1 G: 1 INJECTION INTRAMUSCULAR; INTRAVENOUS; SUBCUTANEOUS at 01:46

## 2020-09-18 RX ADMIN — DICYCLOMINE HYDROCHLORIDE 10 MG: 10 CAPSULE ORAL at 21:00

## 2020-09-18 RX ADMIN — PANTOPRAZOLE SODIUM 40 MG: 40 INJECTION, POWDER, FOR SOLUTION INTRAVENOUS at 09:27

## 2020-09-18 ASSESSMENT — PAIN SCALES - GENERAL
PAINLEVEL_OUTOF10: 2
PAINLEVEL_OUTOF10: 5

## 2020-09-18 ASSESSMENT — PAIN DESCRIPTION - ONSET
ONSET: ON-GOING
ONSET: ON-GOING

## 2020-09-18 ASSESSMENT — PAIN DESCRIPTION - FREQUENCY
FREQUENCY: CONTINUOUS
FREQUENCY: INTERMITTENT

## 2020-09-18 ASSESSMENT — PAIN DESCRIPTION - LOCATION
LOCATION: ABDOMEN
LOCATION: ABDOMEN

## 2020-09-18 ASSESSMENT — PAIN DESCRIPTION - DESCRIPTORS
DESCRIPTORS: ACHING;DISCOMFORT
DESCRIPTORS: DISCOMFORT;PRESSURE;TENDER

## 2020-09-18 ASSESSMENT — PAIN DESCRIPTION - ORIENTATION: ORIENTATION: MID

## 2020-09-18 ASSESSMENT — PAIN DESCRIPTION - PAIN TYPE
TYPE: SURGICAL PAIN
TYPE: SURGICAL PAIN

## 2020-09-18 NOTE — PROGRESS NOTES
Physical Therapy    Facility/Department: [facility] SURGERY  Initial Assessment    NAME: Lorena Delgadillo  : 1940  MRN: [id number]    Date of Service: 2020     Attending Provider:  James Lewis MD    Evaluating PT:  Brandyn Cox. Mainor Godwin, P.T. Room #:  6475/6696-G  Diagnosis:  Infected abdominal wall  Procedure/Surgery:  20 explantation of hernia mesh, 9/15/20 exp lap with take down of enterocutaneous fistula, SBR, and ileostomy revision  Precautions:  Falls, PCA pump    SUBJECTIVE:    Pt lives with  in a 1 story home with 2 stairs and 2 rails to enter. Pt ambulated with no AD PTA. OBJECTIVE:   Initial Evaluation  Date: 20 Treatment Short Term/ Long Term   Goals   Was pt agreeable to Eval/treatment? yes     Does pt have pain? 5/10 belly, back, and neck pain     Bed Mobility  Rolling: Independent  Supine to sit: supervision  Sit to supine: NA  Scooting: supervision  Independent    Transfers Sit to stand: MIN A  Stand to sit: SBA  Stand pivot: MIN A with ww  Independent   Ambulation   3 feet with ww MIN A  150 feet with ww supervision   Stair negotiation: ascended and descended NA  2 steps with 2 rails SBA   AM-PAC 6 Clicks        BLE ROM is WFL. BLE strength is grossly 4/5. Sensation:  Pt denies numbness and tingling to extremities  Edema:  BLE  Balance: sitting is supervision and standing with ww is SBA/MIN A  Endurance: fair    Patient education  Pt educated on hand placement during transfers. Patient response to education:   Pt verbalized understanding Pt demonstrated skill Pt requires further education in this area   yes yes yes     ASSESSMENT:    Comments:  Pt was found in bed and agreeable to PT. She stood with ww and walked 3 feet to chair. She c/o fatigue limiting amb distance and had multiple lines and wires. Pt was left sitting up in chair with feet elevated on footstool and call light left by patient. Pt's/ family goals   1. To go home. Patient and or family understand(s) diagnosis, prognosis, and plan of care. PLAN OF CARE:    Current Treatment Recommendations     [x] Strengthening     [] ROM   [x] Balance Training   [x] Endurance Training   [x] Transfer Training   [x] Gait Training   [x] Stair Training   [] Positioning   [x] Safety and Education Training   [x] Patient/Caregiver Education   [] HEP  [] Other     PT care will be provided in accordance with the objectives noted above. Exercises and functional mobility practice will be used as well as appropriate assistive devices or modalities to obtain goals. Patient and family education will also be administered as needed. Frequency of treatments: 2-5x/week x 1-2 weeks. Time in  08:45  Time out  09:00    Evaluation Time includes thorough review of current medical information, gathering information on past medical history/social history and prior level of function, completion of standardized testing/informal observation of tasks, assessment of data and education on plan of care and goals. CPT codes:  [x] Low Complexity PT evaluation 65322  [] Moderate Complexity PT evaluation 05512  [] High Complexity PT evaluation 32121  [] PT Re-evaluation 77467  [] Gait training 47519 ** minutes  [] Manual therapy 83729 ** minutes  [] Therapeutic activities 51168 ** minutes  [] Therapeutic exercises 00052 ** minutes  [] Neuromuscular reeducation 04534 ** minutes     Stuart Borges., P.T.   License Number: [id number]

## 2020-09-18 NOTE — PROGRESS NOTES
[facility] Quality Flow/Interdisciplinary Rounds Progress Note        Quality Flow Rounds held on September 18, 2020    Disciplines Attending:  Bedside Nurse, ,  and Nursing Unit Leadership    Taiwo Arreguin was admitted on 9/11/2020  7:09 AM    Anticipated Discharge Date:  Expected Discharge Date: 09/13/20    Disposition:    Roland Score:  Roland Scale Score: 18    Readmission Risk              Risk of Unplanned Readmission:        30           Discussed patient goal for the day, patient clinical progression, and barriers to discharge.   The following Goal(s) of the Day/Commitment(s) have been identified:  Discharge planning      York Bolus  September 18, 2020

## 2020-09-18 NOTE — PROGRESS NOTES
GENERAL SURGERY  DAILY PROGRESS NOTE  9/18/2020    No chief complaint on file. Subjective:  Pt states she had a rough night with some abdominal pain and she did not sleep well. Reports some nausea, but no vomiting. Tolerating her diet. Febrile overnight. Objective:  BP (!) 104/51   Pulse 64   Temp 98.9 °F (37.2 °C) (Oral)   Resp 16   Ht 5' (1.524 m)   Wt 117 lb (53.1 kg)   SpO2 97%   BMI 22.85 kg/m²     GENERAL:  Laying in bed, awake, alert, cooperative, no apparent distress  HEAD: Normocephalic, atraumatic  EYES: No sclera icterus, pupils equal  LUNGS: On 3L NC  CARDIOVASCULAR:  RR  ABDOMEN:  Soft, appropriate TTP around ostomy, non-distended. Incision c/d/i. Ostomy pink, patent, with some brown liquid in bag. EXTREMITIES: No edema or swelling  SKIN: Warm and dry    Assessment/Plan:  68 y.o. female s/p mesh explantation with enterotomy and closure POD7 s/p ex-lap with SBR, revision of ileostomy POD3. Febrile overnight.     - Monitor ostomy function and output  - F/U CXR and UA  - Okay for CLD  - Continue TPN   - Local wound care  - Ostomy care  - Pain control PRN  - Encouraged ambulation and IS    Electronically signed by Arturo Romano MD on 9/18/2020 at 4:56 AM

## 2020-09-18 NOTE — PROGRESS NOTES
Occupational Therapy  OCCUPATIONAL THERAPY INITIAL EVALUATION      Date:2020  Patient Name: Skyla Willams  MRN: [id number]  : 1940  Room: [address] [place] [address]    Referring Provider: Ehsan Nayak MD    Evaluating OT: Sonia Ram OTR/L [id number]    AM-PAC Daily Activity Raw Score:     Recommended Adaptive Equipment: TBD    Diagnosis: infected abdominal wall. Surgery: 20 explantation of hernia mesh, 9/15/20 exp lap with take down of enterocutaneous fistula, SBR, and ileostomy revision   Pertinent Medical History: HTN, COPD, CAD, anxiety, osteopenia   Precautions:  Falls, abdominal precautions, PCA, O2     Home Living: Pt lives with  in a single story home. Bathroom setup: walk in shower with seat, low commode     Prior Level of Function: Independent with ADLs, Independent with IADLs; completed functional mobility no AD  Driving: Yes    Pain Level: pt verbalizes abdominal pain with movement, has self use of PCA pump. Educated on compensatory/pain management techniques with some relief reported    Cognition: A&O: 4/4.     Problem solving:  WFL   Judgement/safety:  WFL     Functional Assessment:   Initial Eval Status  Date: 20 Treatment session:  Short Term Goals  Treatment frequency: 2-5x/wk PRN x1-3 wks     Feeding Independent     Grooming SBA  Standing sink level for hand hygiene  Independent   UB Dressing Set up  [address] A  Education on cross over method good follow through  Pt reports  will assist if needed at home  Mod I    Bathing Min A  Mod I   Toileting SBA  Use of grab bar for support in transfer  Able to manage hyun care  Mod I   Bed Mobility  Supine to sit: Min A  Sit to Supine: SBA     Functional Transfers STS: SBA  Mod I   Functional Mobility SBA with [employer]  Household distance  Mod I during ADLs   Balance Sitting: fair plus    Standing: fair at [employer]     Activity Tolerance Fair   standing marin x6-7 min with fair plus balance during self care tasks             Treatment: Patient educated on techniques for completion of ADL, safe functional transfers and functional mobility. Patient required cues for follow through with proper hand/foot placement, pacing, safety, precautions and technique in bed mobility, functional transfers, functional mobility, toileting, grooming and LB dressing in preparation for maximum independence in all self care tasks.      Hand Dominance: Right []  Left []   Strength ROM Additional Info:    RUE  4/5 WFL good  and FMC/dexterity noted during ADL tasks     LUE 4/5 WFL good  and FMC/dexterity noted during ADL tasks         Hearing: WFL   Vision: WFL   Sensation:  No c/o numbness or tingling  Tone: WFL   Edema: none                             Long Term Goal (1-3 wks): Pt will maximize functional performance in all self care tasks/functional transfers with good follow through of all trained techniques for safe transition to next level of care    Eval Complexity: Low    Assessment of current deficits   Functional mobility [x]  ADLs [x] Strength [x]  Cognition []  Functional transfers  [x] IADLs [x] Safety Awareness [x]  Endurance [x]  Fine Motor Coordination [] Balance [x] Vision/perception [] Sensation []   Gross Motor Coordination [] ROM [] Delirium []                  Motor Control []    Plan of Care:   [x] ADL retraining/AE recommendations specific to abdominal precautions  [x] Energy Conservation Techniques/Strategies      [] Neuromuscular Re-Education      [x] Functional Transfer Training         [x] Functional Mobility Training          [] Cognitive Re-Training         [] Splinting/Positioning Needs           [x] Therapeutic Activity   [x]Therapeutic Exercise   [] Visual/Perceptual  [x] Delirium Prevention/Treatment   [x] Positioning to Improve Functional [place], Safety, and Skin Integrity   [x] Patient and/or Family Education to Increase Safety and Functional [place]   [] Other:     Rehab Potential: Good for established goals     Patient / Family Goal: To get home. Patient and/or family were instructed on functional diagnosis, prognosis/goals and OT plan of care. Pt verbalized understanding. Upon arrival, patient supine in bed. At end of session, patient supine in bed per pt request, encouraged to sit up again around dinner, with call light and phone within reach, all lines and tubes intact. Pt would benefit from continued skilled OT to increase safety and independence with completion of ADL/IADL tasks for functional independence and quality of life.      Low Evaluation  Time In: 1324   Time Out: [address]       Min Units   Therapeutic Ex 11526     Therapeutic Activities 81572     ADL/Self Care 90802 10 1   Orthotic Management 43088     Neuro Re-Ed 31005     Non-Billable Time     TOTAL TIMED TREATMENT 10 1           Evaluation time includes thorough review of current medical information, gathering information on past medical history/social history and prior level of function, completion of standardized testing/informal observation of tasks, assessment of data, and development of POC/Goals    Manish Davis OTR/L  [id number]

## 2020-09-18 NOTE — CARE COORDINATION
Social Work:    Per morning rounds, Mrs. Alison Whelan is on TPN. Social work called [facility] and they do not do the infusion, only C. Social work called a tentative referral to Bart Angulo at [employer] [facility] to check TPN coverage.     Electronically signed by CASSI Jin on 9/18/2020 at 8:45 AM

## 2020-09-18 NOTE — FLOWSHEET NOTE
Inpatient Wound Care    Admit Date: 9/11/2020  7:09 AM    Reason for consult:  Ileostomy, ABD    Significant history:  S/P enterotomy, mesh explantation, revision of ileostomy. Wound history:  surgical    Findings:       09/18/20 1209   Ileostomy Ileostomy RLQ   Placement Date/Time: 09/17/20 1305   Pre-existing: No  Inserted by: Dr. Sol Vaca  Ileostomy Type: Ileostomy  Location: RLQ   Stomal Appliance 1 piece   Flange Size (inches)   (stoma 40)   Stoma  Assessment Protrudes; Red   Mucocutaneous Junction Intact   Peristomal Assessment Denuded   Treatment Bag change   Stool Color Brown   Stool Appearance Watery   Stool Amount Medium   Output (mL) 200 ml       Impression:  Abd incision draining serosang exudate. Sutures and retention sutures intact. Ileostomy. Interventions in place:  Domeboro soaks applied to hyun wound and peristomal skin for 10 min. Ileostomy pouched with 1 pc coloplast and a ring. Opticell placed over abd wound then covered with ABD. Plan: Cont ostomy and wound  Care, Nystatin powder to groin and hyun-wound.        Linda Laureano 9/18/2020 12:11 PM

## 2020-09-18 NOTE — PROGRESS NOTES
Comprehensive Nutrition Assessment    Type and Reason for Visit:  Reassess    Nutrition Recommendations/Plan: Continue Diet and ADAT. Continue Current TPN until pt able to tolerate diet w/ adequate PO intake. Nutrition Assessment:  Pt improving from a nutritional stand-point AEB pt tolerating PN at goal rate as well as tolerating Clears w/ noted ostomy output at this time per EMR. Remains at risk d/t Mild Malnutrition w/ altered GI and increased needs for wound healing s/p Ex Lap w/ multiple revisions. Malnutrition Assessment:  Malnutrition Status:  Mild malnutrition    Context:  Chronic Illness     Findings of the 6 clinical characteristics of malnutrition:  Energy Intake:  7 - 75% or less estimated energy requirements for 1 month or longer  Weight Loss:  1 - Mild weight loss (specify amount and time period)(7.1% wt loss x 6 months)     Body Fat Loss:  Unable to assess     Muscle Mass Loss:  Unable to assess    Fluid Accumulation:  No significant fluid accumulation     Strength:  Not Performed    Estimated Daily Nutrient Needs:  Energy (kcal):  0109-5962; Weight Used for Energy Requirements:  Current     Protein (g):  70-80(1.3-1.5 gm/kg per CBW); Weight Used for Protein Requirements:  Current        Fluid (ml/day):  3911-0065(1 ml/kcal); Weight Used for Fluid Requirements:  Current      Nutrition Related Findings:  A&O, dentition WNL, tender/non-distended Abd, Hypo BS, +Ileostomy, No edema, -I/O's(Explantation of Hernia Mesh 9/11;  Ex Lap SBR, Takedown revision of Fistula, Ostomy Revision 9/15)      Wounds:  Open Wounds, Surgical Wound       Current Nutrition Therapies:    DIET CLEAR LIQUID;  PN-Adult  3-in-1 Central Line (Standard)  PN-Adult  3-in-1 Central Line (Standard)  Current Parenteral Nutrition Orders:  · Type and Formula: 3-in-1 Standard   · Lipids: None  · Duration: Continuous  · Rate/Volume: 58.3 ml/hr; 1400 ml TV  · Current PN Order Provides: Same as Goal  · Goal PN Orders Provides: 1449 kcals, 70 gm AA      Anthropometric Measures:  · Height: 5' (152.4 cm)  · Current Body Weight: 117 lb (53.1 kg)(bed 9/11)   · Admission Body Weight: 117 lb (53.1 kg)(9/11 actual)    · Usual Body Weight: 126 lb (57.2 kg)(actual 3/26/20 per EMR)     · Ideal Body Weight: 100 lbs; % Ideal Body Weight 117 %   · BMI: 22.9  · Adjusted Body Weight:  ; No Adjustment   · Adjusted BMI:      · BMI Categories: Normal Weight (BMI 22.0 to 24.9) age over 72       Nutrition Diagnosis:   · Mild malnutrition, In context of chronic illness related to altered 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) as evidenced by intake 0-25%, poor intake prior to admission, weight loss      Nutrition Interventions:   Food and/or Nutrient Delivery:  Continue Current Diet, Continue Current Parenteral Nutrition(Continue Diet and ADAT. Continue Current TPN until pt able to tolerate diet w/ adequate PO intake.)  Nutrition Education/Counseling:  No recommendation at this time   Coordination of Nutrition Care:  Continued Inpatient Monitoring    Goals:  Pt to tolerate PN at goal rate; PO intake >50% of meals. Nutrition Monitoring and Evaluation:   Behavioral-Environmental Outcomes:  (n/a)   Food/Nutrient Intake Outcomes:  Diet Advancement/Tolerance, Food and Nutrient Intake, Parenteral Nutrition Intake/Tolerance  Physical Signs/Symptoms Outcomes:  Biochemical Data, GI Status, Nausea or Vomiting, Fluid Status or Edema, Nutrition Focused Physical Findings, Skin, Weight     Discharge Planning:     Too soon to determine     Electronically signed by Damien Mcnair RD, LD on 9/18/20 at 1:35 PM EDT    Contact: ext [phone]

## 2020-09-19 LAB
ANION GAP SERPL CALCULATED.3IONS-SCNC: 4 MMOL/L (ref 7–16)
BASOPHILS ABSOLUTE: 0.02 E9/L (ref 0–0.2)
BASOPHILS RELATIVE PERCENT: 0.5 % (ref 0–2)
BUN BLDV-MCNC: 16 MG/DL (ref 8–23)
CALCIUM SERPL-MCNC: 8.2 MG/DL (ref 8.6–10.2)
CHLORIDE BLD-SCNC: 102 MMOL/L (ref 98–107)
CO2: 25 MMOL/L (ref 22–29)
CREAT SERPL-MCNC: 0.8 MG/DL (ref 0.5–1)
EOSINOPHILS ABSOLUTE: 0.21 E9/L (ref 0.05–0.5)
EOSINOPHILS RELATIVE PERCENT: 4.8 % (ref 0–6)
GFR AFRICAN AMERICAN: >60
GFR NON-AFRICAN AMERICAN: >60 ML/MIN/1.73
GLUCOSE BLD-MCNC: 142 MG/DL (ref 74–99)
HCT VFR BLD CALC: 27.7 % (ref 34–48)
HEMOGLOBIN: 8.7 G/DL (ref 11.5–15.5)
IMMATURE GRANULOCYTES #: 0.02 E9/L
IMMATURE GRANULOCYTES %: 0.5 % (ref 0–5)
LYMPHOCYTES ABSOLUTE: 0.73 E9/L (ref 1.5–4)
LYMPHOCYTES RELATIVE PERCENT: 16.8 % (ref 20–42)
MAGNESIUM: 1.6 MG/DL (ref 1.6–2.6)
MCH RBC QN AUTO: 32 PG (ref 26–35)
MCHC RBC AUTO-ENTMCNC: 31.4 % (ref 32–34.5)
MCV RBC AUTO: 101.8 FL (ref 80–99.9)
MONOCYTES ABSOLUTE: 0.63 E9/L (ref 0.1–0.95)
MONOCYTES RELATIVE PERCENT: 14.5 % (ref 2–12)
NEUTROPHILS ABSOLUTE: 2.73 E9/L (ref 1.8–7.3)
NEUTROPHILS RELATIVE PERCENT: 62.9 % (ref 43–80)
PDW BLD-RTO: 14.2 FL (ref 11.5–15)
PHOSPHORUS: 2.4 MG/DL (ref 2.5–4.5)
PLATELET # BLD: 184 E9/L (ref 130–450)
PMV BLD AUTO: 11.8 FL (ref 7–12)
POTASSIUM REFLEX MAGNESIUM: 4.5 MMOL/L (ref 3.5–5)
RBC # BLD: 2.72 E12/L (ref 3.5–5.5)
SODIUM BLD-SCNC: 131 MMOL/L (ref 132–146)
WBC # BLD: 4.3 E9/L (ref 4.5–11.5)

## 2020-09-19 PROCEDURE — 80048 BASIC METABOLIC PNL TOTAL CA: CPT

## 2020-09-19 PROCEDURE — 85025 COMPLETE CBC W/AUTO DIFF WBC: CPT

## 2020-09-19 PROCEDURE — 84100 ASSAY OF PHOSPHORUS: CPT

## 2020-09-19 PROCEDURE — 6360000002 HC RX W HCPCS: Performed by: STUDENT IN AN ORGANIZED HEALTH CARE EDUCATION/TRAINING PROGRAM

## 2020-09-19 PROCEDURE — 2500000003 HC RX 250 WO HCPCS: Performed by: SURGERY

## 2020-09-19 PROCEDURE — 94770 HC ETCO2 MONITOR DAILY: CPT

## 2020-09-19 PROCEDURE — 2700000000 HC OXYGEN THERAPY PER DAY

## 2020-09-19 PROCEDURE — 2580000003 HC RX 258: Performed by: STUDENT IN AN ORGANIZED HEALTH CARE EDUCATION/TRAINING PROGRAM

## 2020-09-19 PROCEDURE — 2500000003 HC RX 250 WO HCPCS: Performed by: STUDENT IN AN ORGANIZED HEALTH CARE EDUCATION/TRAINING PROGRAM

## 2020-09-19 PROCEDURE — C9113 INJ PANTOPRAZOLE SODIUM, VIA: HCPCS | Performed by: STUDENT IN AN ORGANIZED HEALTH CARE EDUCATION/TRAINING PROGRAM

## 2020-09-19 PROCEDURE — 6370000000 HC RX 637 (ALT 250 FOR IP): Performed by: STUDENT IN AN ORGANIZED HEALTH CARE EDUCATION/TRAINING PROGRAM

## 2020-09-19 PROCEDURE — 1200000000 HC SEMI PRIVATE

## 2020-09-19 PROCEDURE — 83735 ASSAY OF MAGNESIUM: CPT

## 2020-09-19 PROCEDURE — 36415 COLL VENOUS BLD VENIPUNCTURE: CPT

## 2020-09-19 RX ADMIN — ENOXAPARIN SODIUM 40 MG: 40 INJECTION SUBCUTANEOUS at 10:09

## 2020-09-19 RX ADMIN — WATER 1 G: 1 INJECTION INTRAMUSCULAR; INTRAVENOUS; SUBCUTANEOUS at 01:09

## 2020-09-19 RX ADMIN — SODIUM CHLORIDE, PRESERVATIVE FREE 10 ML: 5 INJECTION INTRAVENOUS at 22:20

## 2020-09-19 RX ADMIN — PANTOPRAZOLE SODIUM 40 MG: 40 INJECTION, POWDER, FOR SOLUTION INTRAVENOUS at 22:08

## 2020-09-19 RX ADMIN — PANTOPRAZOLE SODIUM 40 MG: 40 INJECTION, POWDER, FOR SOLUTION INTRAVENOUS at 10:09

## 2020-09-19 RX ADMIN — ESCITALOPRAM 20 MG: 10 TABLET, FILM COATED ORAL at 22:08

## 2020-09-19 RX ADMIN — MICONAZOLE NITRATE: 20 POWDER TOPICAL at 22:10

## 2020-09-19 RX ADMIN — SODIUM CHLORIDE, PRESERVATIVE FREE 10 ML: 5 INJECTION INTRAVENOUS at 22:09

## 2020-09-19 RX ADMIN — PROPRANOLOL HYDROCHLORIDE 20 MG: 20 TABLET ORAL at 10:09

## 2020-09-19 RX ADMIN — DICYCLOMINE HYDROCHLORIDE 10 MG: 10 CAPSULE ORAL at 22:08

## 2020-09-19 RX ADMIN — DILTIAZEM HYDROCHLORIDE 120 MG: 120 CAPSULE, COATED, EXTENDED RELEASE ORAL at 10:44

## 2020-09-19 RX ADMIN — Medication 6 MG: at 11:08

## 2020-09-19 RX ADMIN — PRIMIDONE 250 MG: 250 TABLET ORAL at 22:08

## 2020-09-19 RX ADMIN — WATER 1 G: 1 INJECTION INTRAMUSCULAR; INTRAVENOUS; SUBCUTANEOUS at 18:47

## 2020-09-19 RX ADMIN — METRONIDAZOLE 500 MG: 500 INJECTION, SOLUTION INTRAVENOUS at 22:09

## 2020-09-19 RX ADMIN — WATER 1 G: 1 INJECTION INTRAMUSCULAR; INTRAVENOUS; SUBCUTANEOUS at 10:09

## 2020-09-19 RX ADMIN — DICYCLOMINE HYDROCHLORIDE 10 MG: 10 CAPSULE ORAL at 10:09

## 2020-09-19 RX ADMIN — SODIUM CHLORIDE, PRESERVATIVE FREE 10 ML: 5 INJECTION INTRAVENOUS at 04:07

## 2020-09-19 RX ADMIN — METRONIDAZOLE 500 MG: 500 INJECTION, SOLUTION INTRAVENOUS at 06:32

## 2020-09-19 RX ADMIN — CHOLECALCIFEROL TAB 50 MCG (2000 UNIT) 2000 UNITS: 50 TAB at 22:08

## 2020-09-19 RX ADMIN — SODIUM CHLORIDE, PRESERVATIVE FREE 300 UNITS: 5 INJECTION INTRAVENOUS at 22:09

## 2020-09-19 RX ADMIN — Medication 300 UNITS: at 04:07

## 2020-09-19 RX ADMIN — POTASSIUM CHLORIDE, DEXTROSE MONOHYDRATE AND SODIUM CHLORIDE: 150; 5; 450 INJECTION, SOLUTION INTRAVENOUS at 18:49

## 2020-09-19 RX ADMIN — METRONIDAZOLE 500 MG: 500 INJECTION, SOLUTION INTRAVENOUS at 15:01

## 2020-09-19 RX ADMIN — POTASSIUM CHLORIDE, DEXTROSE MONOHYDRATE AND SODIUM CHLORIDE: 150; 5; 450 INJECTION, SOLUTION INTRAVENOUS at 04:06

## 2020-09-19 RX ADMIN — Medication 6 MG: at 21:43

## 2020-09-19 ASSESSMENT — PAIN DESCRIPTION - ONSET
ONSET: ON-GOING
ONSET: ON-GOING

## 2020-09-19 ASSESSMENT — PAIN DESCRIPTION - PROGRESSION
CLINICAL_PROGRESSION: NOT CHANGED
CLINICAL_PROGRESSION: RAPIDLY IMPROVING
CLINICAL_PROGRESSION: GRADUALLY WORSENING

## 2020-09-19 ASSESSMENT — PAIN SCALES - GENERAL
PAINLEVEL_OUTOF10: 6
PAINLEVEL_OUTOF10: 5
PAINLEVEL_OUTOF10: 0
PAINLEVEL_OUTOF10: 5

## 2020-09-19 ASSESSMENT — PAIN DESCRIPTION - DESCRIPTORS
DESCRIPTORS: ACHING;CONSTANT;DISCOMFORT
DESCRIPTORS: DISCOMFORT;SORE;TENDER

## 2020-09-19 ASSESSMENT — PAIN DESCRIPTION - FREQUENCY
FREQUENCY: CONTINUOUS
FREQUENCY: INTERMITTENT

## 2020-09-19 ASSESSMENT — PAIN - FUNCTIONAL ASSESSMENT
PAIN_FUNCTIONAL_ASSESSMENT: ACTIVITIES ARE NOT PREVENTED
PAIN_FUNCTIONAL_ASSESSMENT: ACTIVITIES ARE NOT PREVENTED
PAIN_FUNCTIONAL_ASSESSMENT: PREVENTS OR INTERFERES SOME ACTIVE ACTIVITIES AND ADLS

## 2020-09-19 ASSESSMENT — PAIN DESCRIPTION - LOCATION
LOCATION: ABDOMEN
LOCATION: ABDOMEN

## 2020-09-19 ASSESSMENT — PAIN DESCRIPTION - PAIN TYPE
TYPE: SURGICAL PAIN
TYPE: SURGICAL PAIN

## 2020-09-19 ASSESSMENT — PAIN DESCRIPTION - ORIENTATION: ORIENTATION: MID

## 2020-09-19 NOTE — PROGRESS NOTES
[facility] Quality Flow/Interdisciplinary Rounds Progress Note        Quality Flow Rounds held on September 19, 2020    Disciplines Attending:  Bedside Nurse, ,  and Nursing Unit Leadership    Victor Hugo Rider was admitted on 9/11/2020  7:09 AM    Anticipated Discharge Date:  Expected Discharge Date: 09/13/20    Disposition:    Roland Score:  Roland Scale Score: 17    Readmission Risk              Risk of Unplanned Readmission:        31           Discussed patient goal for the day, patient clinical progression, and barriers to discharge.   The following Goal(s) of the Day/Commitment(s) have been identified: Pain management/discharge planning     Austin Mccarthy  September 19, 2020

## 2020-09-19 NOTE — PROGRESS NOTES
Department of Surgery - Adult  General Surgery  Dr. Shday Flores's Progress Note      SUBJECTIVE: Overall, feeling better    OBJECTIVE      Physical    VITALS:  /64   Pulse 63   Temp 99.2 °F (37.3 °C) (Oral)   Resp 16   Ht 5' (1.524 m)   Wt 117 lb (53.1 kg)   SpO2 94%   BMI 22.85 kg/m²   INTAKE/OUTPUT:      Intake/Output Summary (Last 24 hours) at 2020 0853  Last data filed at 2020 6229  Gross per 24 hour   Intake 2456 ml   Output 1875 ml   Net 581 ml     TEMPERATURE:  Current - Temp: 99.2 °F (37.3 °C); Max - Temp  Av.7 °F (37.1 °C)  Min: 97.7 °F (36.5 °C)  Max: 99.3 °F (37.4 °C)  RESPIRATIONS RANGE: Resp  Av.7  Min: 16  Max: 18  PULSE RANGE: Pulse  Av.2  Min: 56  Max: 69  BLOOD PRESSURE RANGE:  Systolic (73ADH), BID:503 , Min:107 , GURJIT:361   ; Diastolic (04BKM), SXL:91, Min:51, Max:64    PULSE OXIMETRY RANGE: SpO2  Av.5 %  Min: 94 %  Max: 98 %  CONSTITUTIONAL:  awake, alert, cooperative, no apparent distress, and appears stated age  }  ABDOMEN: The abdomen is soft with the ileostomy functioning well. The patient still has some discharge from the midline wound. No signs of infection are noted.   Data  CBC with Differential:    Lab Results   Component Value Date    WBC 4.3 2020    RBC 2.72 2020    HGB 8.7 2020    HCT 27.7 2020     2020    .8 2020    MCH 32.0 2020    MCHC 31.4 2020    RDW 14.2 2020    LYMPHOPCT 16.8 2020    MONOPCT 14.5 2020    BASOPCT 0.5 2020    MONOSABS 0.63 2020    LYMPHSABS 0.73 2020    EOSABS 0.21 2020    BASOSABS 0.02 2020     CMP:    Lab Results   Component Value Date     2020    K 4.5 2020     2020    CO2 25 2020    BUN 16 2020    CREATININE 0.8 2020    GFRAA >60 2020    LABGLOM >60 2020    GLUCOSE 142 2020    PROT 5.4 2020    LABALBU 2.8 2020    CALCIUM 8.2 09/19/2020    BILITOT <0.2 06/29/2020    ALKPHOS 90 06/29/2020    AST 28 06/29/2020    ALT 18 06/29/2020     BMP:  Hepatic Function Panel:  Ionized Calcium:  No results found for: IONCA  Magnesium:    Lab Results   Component Value Date    MG 1.6 09/19/2020     Phosphorus:    Lab Results   Component Value Date    PHOS 2.4 09/19/2020       Current Inpatient Medications    Current Facility-Administered Medications: PN-Adult  3-in-1 Central Line (Standard), , Intravenous, Continuous TPN  miconazole (MICOTIN) 2 % powder, , Topical, BID  promethazine (PHENERGAN) injection 12.5 mg, 12.5 mg, Intramuscular, Q6H PRN  pantoprazole (PROTONIX) injection 40 mg, 40 mg, Intravenous, BID **AND** sodium chloride (PF) 0.9 % injection 10 mL, 10 mL, Intravenous, BID  naloxone (NARCAN) injection 0.4 mg, 0.4 mg, Intravenous, PRN  HYDROmorphone (DILAUDID) 0.2 mg/mL PCA, , Intravenous, Continuous  metronidazole (FLAGYL) 500 mg in NaCl 100 mL IVPB premix, 500 mg, Intravenous, Q8H  enoxaparin (LOVENOX) injection 40 mg, 40 mg, Subcutaneous, Daily  aluminum sulfate-calcium acetate (DOMEBORO) packet 4 packet, 4 packet, Topical, Daily PRN  sodium chloride flush 0.9 % injection 10 mL, 10 mL, Intravenous, PRN  heparin flush 100 UNIT/ML injection 300 Units, 3 mL, Intravenous, 2 times per day  heparin flush 100 UNIT/ML injection 300 Units, 3 mL, Intracatheter, PRN  nicotine (NICODERM CQ) 14 MG/24HR 1 patch, 1 patch, Transdermal, Daily  HYDROmorphone (DILAUDID) injection 0.5 mg, 0.5 mg, Intravenous, Q3H PRN  dicyclomine (BENTYL) capsule 10 mg, 10 mg, Oral, BID  dilTIAZem (CARDIZEM CD) extended release capsule 120 mg, 120 mg, Oral, Daily  escitalopram (LEXAPRO) tablet 20 mg, 20 mg, Oral, Nightly  ceFAZolin (ANCEF) 1 g in sterile water 10 mL IV syringe, 1 g, Intravenous, Q8H  primidone (MYSOLINE) tablet 250 mg, 250 mg, Oral, Nightly  propranolol (INDERAL) tablet 20 mg, 20 mg, Oral, Daily  traZODone (DESYREL) tablet 25 mg, 25 mg, Oral, Nightly PRN  vitamin D (CHOLECALCIFEROL) tablet 2,000 Units, 2,000 Units, Oral, BID  sodium chloride flush 0.9 % injection 10 mL, 10 mL, Intravenous, 2 times per day  sodium chloride flush 0.9 % injection 10 mL, 10 mL, Intravenous, PRN  dextrose 5 % and 0.45 % NaCl with KCl 20 mEq infusion, , Intravenous, Continuous  ondansetron (ZOFRAN) injection 4 mg, 4 mg, Intravenous, Q6H PRN  acetaminophen (TYLENOL) tablet 650 mg, 650 mg, Oral, Q4H PRN    ASSESSMENT:    68 y.o. 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   Post op day #  3    PLAN:    Advance diet  Discontinue TPN    Patricia Ho 9/19/20208:53 AM

## 2020-09-19 NOTE — PROGRESS NOTES
Occupational Therapy  Patient treatment attempted this AM.  Patient back and bed and receiving nursing care (dressing change due to leaking at incision area) after sitting in the chair earlier this AM.  Pt pleasant however stated she is tired and declined therapist offer to return later for therapy. Will attempt on a later date.    Leni TEJADA/CURTIS [id number]

## 2020-09-20 LAB
ANION GAP SERPL CALCULATED.3IONS-SCNC: 5 MMOL/L (ref 7–16)
BASOPHILS ABSOLUTE: 0.03 E9/L (ref 0–0.2)
BASOPHILS RELATIVE PERCENT: 0.6 % (ref 0–2)
BUN BLDV-MCNC: 13 MG/DL (ref 8–23)
CALCIUM SERPL-MCNC: 8.3 MG/DL (ref 8.6–10.2)
CHLORIDE BLD-SCNC: 105 MMOL/L (ref 98–107)
CO2: 26 MMOL/L (ref 22–29)
CREAT SERPL-MCNC: 0.9 MG/DL (ref 0.5–1)
EOSINOPHILS ABSOLUTE: 0.29 E9/L (ref 0.05–0.5)
EOSINOPHILS RELATIVE PERCENT: 5.8 % (ref 0–6)
GFR AFRICAN AMERICAN: >60
GFR NON-AFRICAN AMERICAN: >60 ML/MIN/1.73
GLUCOSE BLD-MCNC: 117 MG/DL (ref 74–99)
HCT VFR BLD CALC: 27.9 % (ref 34–48)
HEMOGLOBIN: 8.7 G/DL (ref 11.5–15.5)
IMMATURE GRANULOCYTES #: 0.02 E9/L
IMMATURE GRANULOCYTES %: 0.4 % (ref 0–5)
LYMPHOCYTES ABSOLUTE: 1.03 E9/L (ref 1.5–4)
LYMPHOCYTES RELATIVE PERCENT: 20.5 % (ref 20–42)
MAGNESIUM: 1.3 MG/DL (ref 1.6–2.6)
MCH RBC QN AUTO: 31.6 PG (ref 26–35)
MCHC RBC AUTO-ENTMCNC: 31.2 % (ref 32–34.5)
MCV RBC AUTO: 101.5 FL (ref 80–99.9)
MONOCYTES ABSOLUTE: 0.63 E9/L (ref 0.1–0.95)
MONOCYTES RELATIVE PERCENT: 12.5 % (ref 2–12)
NEUTROPHILS ABSOLUTE: 3.03 E9/L (ref 1.8–7.3)
NEUTROPHILS RELATIVE PERCENT: 60.2 % (ref 43–80)
PDW BLD-RTO: 14.3 FL (ref 11.5–15)
PHOSPHORUS: 2.9 MG/DL (ref 2.5–4.5)
PLATELET # BLD: 204 E9/L (ref 130–450)
PMV BLD AUTO: 12.9 FL (ref 7–12)
POTASSIUM REFLEX MAGNESIUM: 4.5 MMOL/L (ref 3.5–5)
RBC # BLD: 2.75 E12/L (ref 3.5–5.5)
SODIUM BLD-SCNC: 136 MMOL/L (ref 132–146)
URINE CULTURE, ROUTINE: NORMAL
WBC # BLD: 5 E9/L (ref 4.5–11.5)

## 2020-09-20 PROCEDURE — 85025 COMPLETE CBC W/AUTO DIFF WBC: CPT

## 2020-09-20 PROCEDURE — 6360000002 HC RX W HCPCS: Performed by: STUDENT IN AN ORGANIZED HEALTH CARE EDUCATION/TRAINING PROGRAM

## 2020-09-20 PROCEDURE — 6370000000 HC RX 637 (ALT 250 FOR IP): Performed by: STUDENT IN AN ORGANIZED HEALTH CARE EDUCATION/TRAINING PROGRAM

## 2020-09-20 PROCEDURE — 6370000000 HC RX 637 (ALT 250 FOR IP): Performed by: SURGERY

## 2020-09-20 PROCEDURE — C9113 INJ PANTOPRAZOLE SODIUM, VIA: HCPCS | Performed by: STUDENT IN AN ORGANIZED HEALTH CARE EDUCATION/TRAINING PROGRAM

## 2020-09-20 PROCEDURE — 36415 COLL VENOUS BLD VENIPUNCTURE: CPT

## 2020-09-20 PROCEDURE — 80048 BASIC METABOLIC PNL TOTAL CA: CPT

## 2020-09-20 PROCEDURE — 2580000003 HC RX 258: Performed by: STUDENT IN AN ORGANIZED HEALTH CARE EDUCATION/TRAINING PROGRAM

## 2020-09-20 PROCEDURE — 1200000000 HC SEMI PRIVATE

## 2020-09-20 PROCEDURE — 2700000000 HC OXYGEN THERAPY PER DAY

## 2020-09-20 PROCEDURE — 83735 ASSAY OF MAGNESIUM: CPT

## 2020-09-20 PROCEDURE — 2500000003 HC RX 250 WO HCPCS: Performed by: STUDENT IN AN ORGANIZED HEALTH CARE EDUCATION/TRAINING PROGRAM

## 2020-09-20 PROCEDURE — 84100 ASSAY OF PHOSPHORUS: CPT

## 2020-09-20 RX ORDER — HYDROCODONE BITARTRATE AND ACETAMINOPHEN 5; 325 MG/1; MG/1
1 TABLET ORAL EVERY 4 HOURS PRN
Status: DISCONTINUED | OUTPATIENT
Start: 2020-09-20 | End: 2020-09-21

## 2020-09-20 RX ORDER — HYDROCODONE BITARTRATE AND ACETAMINOPHEN 5; 325 MG/1; MG/1
2 TABLET ORAL EVERY 4 HOURS PRN
Status: DISCONTINUED | OUTPATIENT
Start: 2020-09-20 | End: 2020-09-21

## 2020-09-20 RX ADMIN — TRAZODONE HYDROCHLORIDE 25 MG: 50 TABLET ORAL at 23:44

## 2020-09-20 RX ADMIN — PANTOPRAZOLE SODIUM 40 MG: 40 INJECTION, POWDER, FOR SOLUTION INTRAVENOUS at 08:49

## 2020-09-20 RX ADMIN — SODIUM CHLORIDE, PRESERVATIVE FREE 300 UNITS: 5 INJECTION INTRAVENOUS at 08:50

## 2020-09-20 RX ADMIN — WATER 1 G: 1 INJECTION INTRAMUSCULAR; INTRAVENOUS; SUBCUTANEOUS at 02:30

## 2020-09-20 RX ADMIN — SODIUM CHLORIDE, PRESERVATIVE FREE 300 UNITS: 5 INJECTION INTRAVENOUS at 20:26

## 2020-09-20 RX ADMIN — DICYCLOMINE HYDROCHLORIDE 10 MG: 10 CAPSULE ORAL at 08:50

## 2020-09-20 RX ADMIN — ESCITALOPRAM 20 MG: 10 TABLET, FILM COATED ORAL at 20:26

## 2020-09-20 RX ADMIN — DICYCLOMINE HYDROCHLORIDE 10 MG: 10 CAPSULE ORAL at 20:26

## 2020-09-20 RX ADMIN — PRIMIDONE 250 MG: 250 TABLET ORAL at 20:35

## 2020-09-20 RX ADMIN — WATER 1 G: 1 INJECTION INTRAMUSCULAR; INTRAVENOUS; SUBCUTANEOUS at 10:26

## 2020-09-20 RX ADMIN — POTASSIUM CHLORIDE, DEXTROSE MONOHYDRATE AND SODIUM CHLORIDE: 150; 5; 450 INJECTION, SOLUTION INTRAVENOUS at 17:59

## 2020-09-20 RX ADMIN — HYDROCODONE BITARTRATE AND ACETAMINOPHEN 1 TABLET: 5; 325 TABLET ORAL at 11:31

## 2020-09-20 RX ADMIN — SODIUM CHLORIDE, PRESERVATIVE FREE 10 ML: 5 INJECTION INTRAVENOUS at 20:27

## 2020-09-20 RX ADMIN — POTASSIUM CHLORIDE, DEXTROSE MONOHYDRATE AND SODIUM CHLORIDE: 150; 5; 450 INJECTION, SOLUTION INTRAVENOUS at 06:14

## 2020-09-20 RX ADMIN — HYDROCODONE BITARTRATE AND ACETAMINOPHEN 2 TABLET: 5; 325 TABLET ORAL at 21:11

## 2020-09-20 RX ADMIN — MICONAZOLE NITRATE: 20 POWDER TOPICAL at 20:30

## 2020-09-20 RX ADMIN — CHOLECALCIFEROL TAB 50 MCG (2000 UNIT) 2000 UNITS: 50 TAB at 08:50

## 2020-09-20 RX ADMIN — TRAZODONE HYDROCHLORIDE 25 MG: 50 TABLET ORAL at 00:07

## 2020-09-20 RX ADMIN — CHOLECALCIFEROL TAB 50 MCG (2000 UNIT) 2000 UNITS: 50 TAB at 20:26

## 2020-09-20 RX ADMIN — DILTIAZEM HYDROCHLORIDE 120 MG: 120 CAPSULE, COATED, EXTENDED RELEASE ORAL at 08:50

## 2020-09-20 RX ADMIN — SODIUM CHLORIDE, PRESERVATIVE FREE 10 ML: 5 INJECTION INTRAVENOUS at 20:26

## 2020-09-20 RX ADMIN — PANTOPRAZOLE SODIUM 40 MG: 40 INJECTION, POWDER, FOR SOLUTION INTRAVENOUS at 20:26

## 2020-09-20 RX ADMIN — MAGNESIUM OXIDE TAB 400 MG (241.3 MG ELEMENTAL MG) 400 MG: 400 (241.3 MG) TAB at 20:26

## 2020-09-20 RX ADMIN — CALCIUM ACETATE MONOHYDRATE AND ALUMINUM SULFATE TETRADECAHYDRATE 4 PACKET: 952; 1347 POWDER, FOR SOLUTION TOPICAL at 06:28

## 2020-09-20 RX ADMIN — METRONIDAZOLE 500 MG: 500 INJECTION, SOLUTION INTRAVENOUS at 06:15

## 2020-09-20 RX ADMIN — HYDROCODONE BITARTRATE AND ACETAMINOPHEN 2 TABLET: 5; 325 TABLET ORAL at 17:10

## 2020-09-20 RX ADMIN — ENOXAPARIN SODIUM 40 MG: 40 INJECTION SUBCUTANEOUS at 08:49

## 2020-09-20 RX ADMIN — PROPRANOLOL HYDROCHLORIDE 20 MG: 20 TABLET ORAL at 08:50

## 2020-09-20 RX ADMIN — ONDANSETRON 4 MG: 2 INJECTION INTRAMUSCULAR; INTRAVENOUS at 18:35

## 2020-09-20 RX ADMIN — MAGNESIUM OXIDE TAB 400 MG (241.3 MG ELEMENTAL MG) 400 MG: 400 (241.3 MG) TAB at 08:50

## 2020-09-20 ASSESSMENT — PAIN - FUNCTIONAL ASSESSMENT
PAIN_FUNCTIONAL_ASSESSMENT: ACTIVITIES ARE NOT PREVENTED

## 2020-09-20 ASSESSMENT — PAIN DESCRIPTION - FREQUENCY
FREQUENCY: CONTINUOUS

## 2020-09-20 ASSESSMENT — PAIN DESCRIPTION - PAIN TYPE
TYPE: SURGICAL PAIN

## 2020-09-20 ASSESSMENT — PAIN DESCRIPTION - PROGRESSION
CLINICAL_PROGRESSION: NOT CHANGED
CLINICAL_PROGRESSION: GRADUALLY WORSENING
CLINICAL_PROGRESSION: NOT CHANGED
CLINICAL_PROGRESSION: NOT CHANGED

## 2020-09-20 ASSESSMENT — PAIN DESCRIPTION - LOCATION
LOCATION: ABDOMEN

## 2020-09-20 ASSESSMENT — PAIN DESCRIPTION - ONSET
ONSET: ON-GOING

## 2020-09-20 ASSESSMENT — PAIN SCALES - WONG BAKER
WONGBAKER_NUMERICALRESPONSE: 0

## 2020-09-20 ASSESSMENT — PAIN DESCRIPTION - ORIENTATION
ORIENTATION: MID
ORIENTATION: RIGHT;LEFT
ORIENTATION: RIGHT;LEFT;MID
ORIENTATION: MID
ORIENTATION: RIGHT;LEFT

## 2020-09-20 ASSESSMENT — PAIN DESCRIPTION - DESCRIPTORS
DESCRIPTORS: ACHING;DISCOMFORT;DULL
DESCRIPTORS: ACHING;DISCOMFORT;DULL;CONSTANT
DESCRIPTORS: ACHING;DISCOMFORT;DULL
DESCRIPTORS: ACHING;CONSTANT;CRAMPING

## 2020-09-20 ASSESSMENT — PAIN SCALES - GENERAL
PAINLEVEL_OUTOF10: 0
PAINLEVEL_OUTOF10: 0
PAINLEVEL_OUTOF10: 7
PAINLEVEL_OUTOF10: 6
PAINLEVEL_OUTOF10: 7
PAINLEVEL_OUTOF10: 6
PAINLEVEL_OUTOF10: 0
PAINLEVEL_OUTOF10: 4
PAINLEVEL_OUTOF10: 6

## 2020-09-20 NOTE — PROGRESS NOTES
Parker served Dr. Leighton Gil on call for Dr. Cantu Parent regarding patients mag level of 1.3 no new orders at this time.

## 2020-09-20 NOTE — PROGRESS NOTES
S/p ECF takedown, ileostomy  TPN stopped  tolerated PO intake  Stop PCA, po and IV pain control  Monitor ostomy output    Maggie Vo MD

## 2020-09-20 NOTE — PROGRESS NOTES
Nurse to nurse given to 5W RN, all patient care dicussed and plan of care given. All questions answered.

## 2020-09-20 NOTE — PROGRESS NOTES
[facility] Quality Flow/Interdisciplinary Rounds Progress Note        Quality Flow Rounds held on September 20, 2020    Disciplines Attending:  Bedside Nurse, ,  and Nursing Unit Leadership    Jossie Peres was admitted on 9/11/2020  7:09 AM    Anticipated Discharge Date:  Expected Discharge Date: 09/13/20    Disposition:    Roland Score:  Roland Scale Score: 17    Readmission Risk              Risk of Unplanned Readmission:        31           Discussed patient goal for the day, patient clinical progression, and barriers to discharge.   The following Goal(s) of the Day/Commitment(s) have been identified:  Pain Control      [facility]  September 20, 2020

## 2020-09-20 NOTE — PROGRESS NOTES
Dr. Quita Barroso notified via answering service of history with pain management, per patient wants consult to pain management. See new orders.

## 2020-09-21 LAB
ANION GAP SERPL CALCULATED.3IONS-SCNC: 5 MMOL/L (ref 7–16)
BASOPHILS ABSOLUTE: 0.03 E9/L (ref 0–0.2)
BASOPHILS RELATIVE PERCENT: 0.6 % (ref 0–2)
BUN BLDV-MCNC: 8 MG/DL (ref 8–23)
CALCIUM SERPL-MCNC: 8.3 MG/DL (ref 8.6–10.2)
CHLORIDE BLD-SCNC: 105 MMOL/L (ref 98–107)
CO2: 26 MMOL/L (ref 22–29)
CREAT SERPL-MCNC: 0.9 MG/DL (ref 0.5–1)
EOSINOPHILS ABSOLUTE: 0.16 E9/L (ref 0.05–0.5)
EOSINOPHILS RELATIVE PERCENT: 3.2 % (ref 0–6)
GFR AFRICAN AMERICAN: >60
GFR NON-AFRICAN AMERICAN: >60 ML/MIN/1.73
GLUCOSE BLD-MCNC: 103 MG/DL (ref 74–99)
HCT VFR BLD CALC: 27.2 % (ref 34–48)
HEMOGLOBIN: 8.8 G/DL (ref 11.5–15.5)
IMMATURE GRANULOCYTES #: 0.01 E9/L
IMMATURE GRANULOCYTES %: 0.2 % (ref 0–5)
LYMPHOCYTES ABSOLUTE: 1.21 E9/L (ref 1.5–4)
LYMPHOCYTES RELATIVE PERCENT: 24.2 % (ref 20–42)
MAGNESIUM: 1.2 MG/DL (ref 1.6–2.6)
MCH RBC QN AUTO: 31.9 PG (ref 26–35)
MCHC RBC AUTO-ENTMCNC: 32.4 % (ref 32–34.5)
MCV RBC AUTO: 98.6 FL (ref 80–99.9)
MONOCYTES ABSOLUTE: 0.58 E9/L (ref 0.1–0.95)
MONOCYTES RELATIVE PERCENT: 11.6 % (ref 2–12)
NEUTROPHILS ABSOLUTE: 3.02 E9/L (ref 1.8–7.3)
NEUTROPHILS RELATIVE PERCENT: 60.2 % (ref 43–80)
PDW BLD-RTO: 14.2 FL (ref 11.5–15)
PHOSPHORUS: 2.7 MG/DL (ref 2.5–4.5)
PLATELET # BLD: 235 E9/L (ref 130–450)
PMV BLD AUTO: 11.5 FL (ref 7–12)
POTASSIUM REFLEX MAGNESIUM: 4 MMOL/L (ref 3.5–5)
RBC # BLD: 2.76 E12/L (ref 3.5–5.5)
SODIUM BLD-SCNC: 136 MMOL/L (ref 132–146)
WBC # BLD: 5 E9/L (ref 4.5–11.5)

## 2020-09-21 PROCEDURE — 84100 ASSAY OF PHOSPHORUS: CPT

## 2020-09-21 PROCEDURE — 2580000003 HC RX 258: Performed by: STUDENT IN AN ORGANIZED HEALTH CARE EDUCATION/TRAINING PROGRAM

## 2020-09-21 PROCEDURE — C9113 INJ PANTOPRAZOLE SODIUM, VIA: HCPCS | Performed by: STUDENT IN AN ORGANIZED HEALTH CARE EDUCATION/TRAINING PROGRAM

## 2020-09-21 PROCEDURE — 99222 1ST HOSP IP/OBS MODERATE 55: CPT | Performed by: PAIN MEDICINE

## 2020-09-21 PROCEDURE — 1200000000 HC SEMI PRIVATE

## 2020-09-21 PROCEDURE — 6370000000 HC RX 637 (ALT 250 FOR IP): Performed by: SURGERY

## 2020-09-21 PROCEDURE — 36415 COLL VENOUS BLD VENIPUNCTURE: CPT

## 2020-09-21 PROCEDURE — 83735 ASSAY OF MAGNESIUM: CPT

## 2020-09-21 PROCEDURE — 6370000000 HC RX 637 (ALT 250 FOR IP): Performed by: STUDENT IN AN ORGANIZED HEALTH CARE EDUCATION/TRAINING PROGRAM

## 2020-09-21 PROCEDURE — 6360000002 HC RX W HCPCS: Performed by: STUDENT IN AN ORGANIZED HEALTH CARE EDUCATION/TRAINING PROGRAM

## 2020-09-21 PROCEDURE — 2500000003 HC RX 250 WO HCPCS: Performed by: STUDENT IN AN ORGANIZED HEALTH CARE EDUCATION/TRAINING PROGRAM

## 2020-09-21 PROCEDURE — 2700000000 HC OXYGEN THERAPY PER DAY

## 2020-09-21 PROCEDURE — 80048 BASIC METABOLIC PNL TOTAL CA: CPT

## 2020-09-21 PROCEDURE — 85025 COMPLETE CBC W/AUTO DIFF WBC: CPT

## 2020-09-21 PROCEDURE — 97530 THERAPEUTIC ACTIVITIES: CPT

## 2020-09-21 RX ORDER — OXYCODONE HYDROCHLORIDE AND ACETAMINOPHEN 5; 325 MG/1; MG/1
2 TABLET ORAL EVERY 6 HOURS PRN
Status: DISCONTINUED | OUTPATIENT
Start: 2020-09-21 | End: 2020-09-22 | Stop reason: HOSPADM

## 2020-09-21 RX ORDER — MAGNESIUM SULFATE IN WATER 40 MG/ML
4 INJECTION, SOLUTION INTRAVENOUS ONCE
Status: COMPLETED | OUTPATIENT
Start: 2020-09-21 | End: 2020-09-21

## 2020-09-21 RX ADMIN — MAGNESIUM OXIDE TAB 400 MG (241.3 MG ELEMENTAL MG) 400 MG: 400 (241.3 MG) TAB at 22:04

## 2020-09-21 RX ADMIN — HYDROCODONE BITARTRATE AND ACETAMINOPHEN 2 TABLET: 5; 325 TABLET ORAL at 12:50

## 2020-09-21 RX ADMIN — MICONAZOLE NITRATE: 20 POWDER TOPICAL at 22:18

## 2020-09-21 RX ADMIN — SODIUM CHLORIDE, PRESERVATIVE FREE 10 ML: 5 INJECTION INTRAVENOUS at 09:16

## 2020-09-21 RX ADMIN — CALCIUM ACETATE MONOHYDRATE AND ALUMINUM SULFATE TETRADECAHYDRATE 4 PACKET: 952; 1347 POWDER, FOR SOLUTION TOPICAL at 05:12

## 2020-09-21 RX ADMIN — ENOXAPARIN SODIUM 40 MG: 40 INJECTION SUBCUTANEOUS at 09:14

## 2020-09-21 RX ADMIN — POTASSIUM CHLORIDE, DEXTROSE MONOHYDRATE AND SODIUM CHLORIDE: 150; 5; 450 INJECTION, SOLUTION INTRAVENOUS at 16:55

## 2020-09-21 RX ADMIN — POTASSIUM CHLORIDE, DEXTROSE MONOHYDRATE AND SODIUM CHLORIDE: 150; 5; 450 INJECTION, SOLUTION INTRAVENOUS at 22:16

## 2020-09-21 RX ADMIN — CHOLECALCIFEROL TAB 50 MCG (2000 UNIT) 2000 UNITS: 50 TAB at 09:14

## 2020-09-21 RX ADMIN — SODIUM CHLORIDE, PRESERVATIVE FREE 10 ML: 5 INJECTION INTRAVENOUS at 22:05

## 2020-09-21 RX ADMIN — SODIUM CHLORIDE, PRESERVATIVE FREE 300 UNITS: 5 INJECTION INTRAVENOUS at 22:18

## 2020-09-21 RX ADMIN — PANTOPRAZOLE SODIUM 40 MG: 40 INJECTION, POWDER, FOR SOLUTION INTRAVENOUS at 09:14

## 2020-09-21 RX ADMIN — TRAZODONE HYDROCHLORIDE 25 MG: 50 TABLET ORAL at 22:05

## 2020-09-21 RX ADMIN — OXYCODONE HYDROCHLORIDE AND ACETAMINOPHEN 2 TABLET: 5; 325 TABLET ORAL at 22:03

## 2020-09-21 RX ADMIN — PROPRANOLOL HYDROCHLORIDE 20 MG: 20 TABLET ORAL at 09:14

## 2020-09-21 RX ADMIN — MICONAZOLE NITRATE: 20 POWDER TOPICAL at 09:13

## 2020-09-21 RX ADMIN — ESCITALOPRAM 20 MG: 10 TABLET, FILM COATED ORAL at 22:18

## 2020-09-21 RX ADMIN — DICYCLOMINE HYDROCHLORIDE 10 MG: 10 CAPSULE ORAL at 22:03

## 2020-09-21 RX ADMIN — MAGNESIUM OXIDE TAB 400 MG (241.3 MG ELEMENTAL MG) 400 MG: 400 (241.3 MG) TAB at 09:14

## 2020-09-21 RX ADMIN — MAGNESIUM SULFATE 4 G: 4 INJECTION INTRAVENOUS at 09:13

## 2020-09-21 RX ADMIN — SODIUM CHLORIDE, PRESERVATIVE FREE 10 ML: 5 INJECTION INTRAVENOUS at 09:13

## 2020-09-21 RX ADMIN — HYDROCODONE BITARTRATE AND ACETAMINOPHEN 1 TABLET: 5; 325 TABLET ORAL at 01:38

## 2020-09-21 RX ADMIN — SODIUM CHLORIDE, PRESERVATIVE FREE 10 ML: 5 INJECTION INTRAVENOUS at 22:16

## 2020-09-21 RX ADMIN — HYDROCODONE BITARTRATE AND ACETAMINOPHEN 2 TABLET: 5; 325 TABLET ORAL at 16:57

## 2020-09-21 RX ADMIN — PANTOPRAZOLE SODIUM 40 MG: 40 INJECTION, POWDER, FOR SOLUTION INTRAVENOUS at 22:18

## 2020-09-21 RX ADMIN — CHOLECALCIFEROL TAB 50 MCG (2000 UNIT) 2000 UNITS: 50 TAB at 22:04

## 2020-09-21 RX ADMIN — PRIMIDONE 250 MG: 250 TABLET ORAL at 22:04

## 2020-09-21 RX ADMIN — DILTIAZEM HYDROCHLORIDE 120 MG: 120 CAPSULE, COATED, EXTENDED RELEASE ORAL at 09:14

## 2020-09-21 RX ADMIN — DICYCLOMINE HYDROCHLORIDE 10 MG: 10 CAPSULE ORAL at 09:14

## 2020-09-21 RX ADMIN — HYDROMORPHONE HYDROCHLORIDE 0.5 MG: 1 INJECTION, SOLUTION INTRAMUSCULAR; INTRAVENOUS; SUBCUTANEOUS at 09:14

## 2020-09-21 RX ADMIN — POTASSIUM CHLORIDE, DEXTROSE MONOHYDRATE AND SODIUM CHLORIDE: 150; 5; 450 INJECTION, SOLUTION INTRAVENOUS at 05:12

## 2020-09-21 RX ADMIN — HYDROCODONE BITARTRATE AND ACETAMINOPHEN 2 TABLET: 5; 325 TABLET ORAL at 06:47

## 2020-09-21 ASSESSMENT — PAIN DESCRIPTION - DESCRIPTORS
DESCRIPTORS: ACHING;CONSTANT;DISCOMFORT
DESCRIPTORS: SORE

## 2020-09-21 ASSESSMENT — PAIN DESCRIPTION - PAIN TYPE
TYPE: SURGICAL PAIN
TYPE: CHRONIC PAIN;ACUTE PAIN

## 2020-09-21 ASSESSMENT — PAIN DESCRIPTION - LOCATION
LOCATION: ABDOMEN
LOCATION: ABDOMEN

## 2020-09-21 ASSESSMENT — PAIN DESCRIPTION - ORIENTATION
ORIENTATION: RIGHT;MID
ORIENTATION: MID

## 2020-09-21 ASSESSMENT — PAIN DESCRIPTION - PROGRESSION
CLINICAL_PROGRESSION: NOT CHANGED
CLINICAL_PROGRESSION: NOT CHANGED

## 2020-09-21 ASSESSMENT — PAIN SCALES - GENERAL
PAINLEVEL_OUTOF10: 7
PAINLEVEL_OUTOF10: 1
PAINLEVEL_OUTOF10: 4
PAINLEVEL_OUTOF10: 6
PAINLEVEL_OUTOF10: 0
PAINLEVEL_OUTOF10: 7
PAINLEVEL_OUTOF10: 7
PAINLEVEL_OUTOF10: 9
PAINLEVEL_OUTOF10: 10

## 2020-09-21 ASSESSMENT — PAIN DESCRIPTION - FREQUENCY
FREQUENCY: CONTINUOUS
FREQUENCY: CONTINUOUS

## 2020-09-21 ASSESSMENT — PAIN SCALES - WONG BAKER
WONGBAKER_NUMERICALRESPONSE: 0
WONGBAKER_NUMERICALRESPONSE: 0

## 2020-09-21 ASSESSMENT — PAIN DESCRIPTION - ONSET
ONSET: ON-GOING
ONSET: ON-GOING

## 2020-09-21 NOTE — PROGRESS NOTES
Occupational Therapy  Patient treatment attempted this PM.  Patient laying in the bed. States she is exhausted from having her dressings changed \"several times\" today. Reports ostomy bag is leaking and declined OT services at this time. Will attempt at a later time.   Paige Fabry OTA/L [id number]

## 2020-09-21 NOTE — PROGRESS NOTES
Wound care consulted for patients ostomy bag leaking, skin red and raw. Educated patient on the importance of [address]. Encouraged patient to use [address]. Patient demonstrated correct use of device.

## 2020-09-21 NOTE — CARE COORDINATION
Social Work discharge planning   Sw consult from earlier today stating \"home care needs, ostomy and midline skin only closure. Poss DC today vs tomorrow\" noted. Alexandra called [facility] for April Carmona re: this Sw consult. Await her call back. Also, Alexandra notified Margot Atiya with [employer] that pt will NOT require TPN at discharge. Social Work to follow for support and discharge planning with CM.    Electronically signed by Mark Torres on 9/21/2020 at 11:45 AM

## 2020-09-21 NOTE — PATIENT CARE CONFERENCE
[facility] Quality Flow/Interdisciplinary Rounds Progress Note        Quality Flow Rounds held on September 21, 2020    Disciplines Attending:  Bedside Nurse, ,  and Nursing Unit Leadership    Bob Carmona was admitted on 9/11/2020  7:09 AM    Anticipated Discharge Date:  Expected Discharge Date: 09/13/20    Disposition:    Roland Score:  Roland Scale Score: 19    Readmission Risk              Risk of Unplanned Readmission:        31           Discussed patient goal for the day, patient clinical progression, and barriers to discharge.   The following Goal(s) of the Day/Commitment(s) have been identified:  Labs - Report Results      David Muller  September 21, 2020

## 2020-09-21 NOTE — PROGRESS NOTES
Wound / ostomy dept follow-up for leaking ostomy appliance. The abd incision is draining from the distal retention suture which caused the ostomy appliance to leak. Domeboro soaks applied for 10 to 20 min. Then Opticell applied to draining area. Covered with ABD. The stoma was pouched with Coloplast 1 pc and a ring. Lots of emotional support given to the Pt and her . Heels and sacrum are intact. Will cont to follow.

## 2020-09-21 NOTE — PROGRESS NOTES
GENERAL SURGERY  DAILY PROGRESS NOTE  9/21/2020    No chief complaint on file. Subjective:  Pt states she is tolerating her diet. Denies any nausea or vomiting. Objective:  BP (!) 110/53   Pulse 67   Temp 97.7 °F (36.5 °C) (Oral)   Resp 16   Ht 5' (1.524 m)   Wt 141 lb 6.4 oz (64.1 kg)   SpO2 93%   BMI 27.62 kg/m²     GENERAL:  Laying in bed, awake, alert, cooperative, no apparent distress  HEAD: Normocephalic, atraumatic  EYES: No sclera icterus, pupils equa  LUNGS:  No increased work of breathing  CARDIOVASCULAR:  Normotensive and RR  ABDOMEN:  Soft, non-tender, non-distended. Ostomy pink and patent with some output.  Midline incision with mild surrounding erythema and minimal leakage  EXTREMITIES: No edema or swelling  SKIN: Warm and dry    Assessment/Plan:  68 y.o. female s/p mesh explantation with enterotomy and closure POD10 s/p ex-lap with SBR, revision of ileostomy POD6    - Okay for diet  - Monitor ostomy function and output  - Local wound care  - Will likely need SW consult for home care needs  - Pain control PRN  - Encouraged ambulation and IS  - Possible DC today vs tomorrow    Electronically signed by Maryann Eisenmenger, MD on 9/21/2020 at 6:24 AM     Agree with the assessment and plan

## 2020-09-21 NOTE — PLAN OF CARE
Mobility will improve, walk around room\
Problem: Inadequate oral food/beverage intake (NI-2.1)  Goal: Food and/or Nutrient Delivery  Description: Individualized approach for food/nutrient provision.   Outcome: Met This Shift
Problem: Pain:  Goal: Control of acute pain  Description: Control of acute pain  Outcome: Met This Shift     Problem: Falls - Risk of:  Goal: Will remain free from falls  Description: Will remain free from falls  9/11/2020 1245 by Evelyn Ramachandran RN  Outcome: Met This Shift
Problem: Pain:  Goal: Control of acute pain  Description: Control of acute pain  Outcome: Ongoing     Problem: Falls - Risk of:  Goal: Will remain free from falls  Description: Will remain free from falls  Outcome: Ongoing     Problem: Skin Integrity:  Goal: Will show no infection signs and symptoms  Description: Will show no infection signs and symptoms  Outcome: Ongoing     Problem: Skin Integrity:  Goal: Absence of new skin breakdown  Description: Absence of new skin breakdown  Outcome: Ongoing
Problem: Pain:  Goal: Pain level will decrease  Description: Pain level will decrease  9/14/2020 1621 by Mey Su RN  Outcome: Met This Shift  9/14/2020 0844 by Deisy Alvarez RN  Outcome: Met This Shift     Problem: Falls - Risk of:  Goal: Will remain free from falls  Description: Will remain free from falls  9/14/2020 1621 by Mey Su RN  Outcome: Met This Shift  9/14/2020 0844 by Deisy Alvarez RN  Outcome: Met This Shift     Problem: Skin Integrity:  Goal: Will show no infection signs and symptoms  Description: Will show no infection signs and symptoms  Outcome: Ongoing
Problem: Pain:  Goal: Pain level will decrease  Description: Pain level will decrease  9/17/2020 0818 by Brandon Betancourt RN  Outcome: Met This Shift     Problem: Pain:  Goal: Control of acute pain  Description: Control of acute pain  9/17/2020 0818 by Brandon Betancourt RN  Outcome: Ongoing     Problem: Falls - Risk of:  Goal: Will remain free from falls  Description: Will remain free from falls  9/17/2020 0818 by Brandon Betancourt RN  Outcome: Met This Shift     Problem: Skin Integrity:  Goal: Will show no infection signs and symptoms  Description: Will show no infection signs and symptoms  9/17/2020 0818 by Brandon Betancourt RN  Outcome: Ongoing
Problem: Pain:  Goal: Pain level will decrease  Description: Pain level will decrease  9/20/2020 1045 by Armond Ruiz RN  Outcome: Met This Shift  9/20/2020 0340 by Jenny Verde RN  Outcome: Met This Shift     Problem: Falls - Risk of:  Goal: Will remain free from falls  Description: Will remain free from falls  9/20/2020 1045 by Armond Ruiz RN  Outcome: Met This Shift  9/20/2020 0340 by Jenny Verde RN  Outcome: Met This Shift     Problem: Skin Integrity:  Goal: Will show no infection signs and symptoms  Description: Will show no infection signs and symptoms  Outcome: Met This Shift     Problem: Mobility - Impaired:  Goal: Mobility will improve  Description: Mobility will improve  Outcome: Met This Shift     Problem: Cardiac:  Goal: Hemodynamic stability will improve  Description: Hemodynamic stability will improve  Outcome: Met This Shift
Problem: Pain:  Goal: Pain level will decrease  Description: Pain level will decrease  9/21/2020 0233 by Go Hui RN  Outcome: Met This Shift     Problem: Pain:  Goal: Control of acute pain  Description: Control of acute pain  9/21/2020 0233 by Go Hui RN  Outcome: Met This Shift     Problem: Pain:  Goal: Control of chronic pain  Description: Control of chronic pain  9/21/2020 0233 by Go Hui RN  Outcome: Met This Shift     Problem: Falls - Risk of:  Goal: Will remain free from falls  Description: Will remain free from falls  9/21/2020 0233 by Go Hui RN  Outcome: Met This Shift     Problem: Falls - Risk of:  Goal: Absence of physical injury  Description: Absence of physical injury  9/21/2020 0233 by Go Hui RN  Outcome: Met This Shift
Problem: Pain:  Goal: Pain level will decrease  Description: Pain level will decrease  Outcome: Met This Shift     Problem: Falls - Risk of:  Goal: Will remain free from falls  Description: Will remain free from falls  Outcome: Met This Shift
Problem: Pain:  Goal: Pain level will decrease  Description: Pain level will decrease  Outcome: Met This Shift     Problem: Falls - Risk of:  Goal: Will remain free from falls  Description: Will remain free from falls  Outcome: Met This Shift
Problem: Pain:  Goal: Pain level will decrease  Description: Pain level will decrease  Outcome: Met This Shift     Problem: Pain:  Goal: Control of acute pain  Description: Control of acute pain  9/15/2020 1403 by Barba Schilder, RN  Outcome: Met This Shift     Problem: Pain:  Goal: Control of chronic pain  Description: Control of chronic pain  Outcome: Met This Shift     Problem: Falls - Risk of:  Goal: Will remain free from falls  Description: Will remain free from falls  9/15/2020 1403 by Barba Schilder, RN  Outcome: Met This Shift     Problem: Skin Integrity:  Goal: Will show no infection signs and symptoms  Description: Will show no infection signs and symptoms  9/15/2020 1403 by Barba Schilder, RN  Outcome: Met This Shift     Problem: Skin Integrity:  Goal: Absence of new skin breakdown  Description: Absence of new skin breakdown  9/15/2020 1403 by Barba Schilder, RN  Outcome: Met This Shift
Problem: Pain:  Goal: Pain level will decrease  Description: Pain level will decrease  Outcome: Met This Shift     Problem: Pain:  Goal: Control of acute pain  Description: Control of acute pain  Outcome: Met This Shift     Problem: Falls - Risk of:  Goal: Will remain free from falls  Description: Will remain free from falls  Outcome: Met This Shift
Problem: Pain:  Goal: Pain level will decrease  Description: Pain level will decrease  Outcome: Met This Shift     Problem: Pain:  Goal: Control of acute pain  Description: Control of acute pain  Outcome: Met This Shift     Problem: Falls - Risk of:  Goal: Will remain free from falls  Description: Will remain free from falls  Outcome: Met This Shift     Problem: Skin Integrity:  Goal: Will show no infection signs and symptoms  Description: Will show no infection signs and symptoms  Outcome: Ongoing     Problem: Skin Integrity:  Goal: Absence of new skin breakdown  Description: Absence of new skin breakdown  Outcome: Ongoing
Problem: Pain:  Goal: Pain level will decrease  Description: Pain level will decrease  Outcome: Met This Shift  Goal: Control of acute pain  Description: Control of acute pain  Outcome: Met This Shift     Problem: Falls - Risk of:  Goal: Will remain free from falls  Description: Will remain free from falls  Outcome: Met This Shift  Goal: Absence of physical injury  Description: Absence of physical injury  Outcome: Met This Shift     Problem: Skin Integrity:  Goal: Will show no infection signs and symptoms  Description: Will show no infection signs and symptoms  Outcome: Met This Shift  Goal: Absence of new skin breakdown  Description: Absence of new skin breakdown  Outcome: Met This Shift     Problem: Mobility - Impaired:  Goal: Mobility will improve  Description: Mobility will improve  Outcome: Met This Shift
Problem: Pain:  Goal: Pain level will decrease  Description: Pain level will decrease  Outcome: Met This Shift  Goal: Control of acute pain  Description: Control of acute pain  Outcome: Met This Shift  Goal: Control of chronic pain  Description: Control of chronic pain  Outcome: Met This Shift     Problem: Falls - Risk of:  Goal: Will remain free from falls  Description: Will remain free from falls  Outcome: Met This Shift  Goal: Absence of physical injury  Description: Absence of physical injury  Outcome: Met This Shift
Problem: Pain:  Goal: Pain level will decrease  Description: Pain level will decrease  Outcome: Met This Shift  Goal: Control of acute pain  Description: Control of acute pain  Outcome: Met This Shift  Goal: Control of chronic pain  Description: Control of chronic pain  Outcome: Met This Shift     Problem: Falls - Risk of:  Goal: Will remain free from falls  Description: Will remain free from falls  Outcome: Met This Shift  Goal: Absence of physical injury  Description: Absence of physical injury  Outcome: Met This Shift     Problem: Skin Integrity:  Goal: Will show no infection signs and symptoms  Description: Will show no infection signs and symptoms  Outcome: Met This Shift  Goal: Absence of new skin breakdown  Description: Absence of new skin breakdown  Outcome: Met This Shift
Met This Shift

## 2020-09-21 NOTE — PROGRESS NOTES
Physical Therapy  Treatment Note  NAME: Shai Borja  : 1940  MRN: [id number]    Date of Service: 2020     Attending Provider:  Ro eDsir MD    Evaluating PT:  Adriana Hernandez. Sacha Leo, P.T. Room #:  3506/8206-C  Diagnosis:  Infected abdominal wall  Procedure/Surgery:  20 explantation of hernia mesh, 9/15/20 exp lap with take down of enterocutaneous fistula, SBR, and ileostomy revision  Precautions:  Falls, PCA pump    SUBJECTIVE:    Pt lives with  in a 1 story home with 2 stairs and 2 rails to enter. Pt ambulated with no AD PTA. OBJECTIVE:   Initial Evaluation  Date: 20 Treatment Short Term/ Long Term   Goals   Was pt agreeable to Eval/treatment? yes yes    Does pt have pain? /10 belly, back, and neck pain C/o incisional pain    Bed Mobility  Rolling: Independent  Supine to sit: supervision  Sit to supine: NA  Scooting: supervision Rolling: Independent  Supine to sit: NA  Sit to supine: Independent  Scooting: Independent Independent    Transfers Sit to stand: MIN A  Stand to sit: SBA  Stand pivot: MIN A with ww Sit to stand: Independent   Stand to sit: Independent   Stand pivot: supervision Independent   Ambulation   3 feet with ww MIN A 15 feet with no AD supervision 150 feet with ww supervision   Stair negotiation: ascended and descended NA NA 2 steps with 2 rails SBA   AM-PAC 6 Clicks  45/81      BLE ROM is WFL. BLE strength is grossly 4/5. Balance: Independent     ASSESSMENT:    Comments:  Pt was found in  on commode. She transferred on/off commode Independently and performed self hygiene care and stood at sink and washed her hands Independently. Pt reported her wound continues to leak and is leaking again and RN will have to address this and did not want to walk any farther at this time.       Treatment:  Patient practiced and was instructed in the following treatment:     Bed mobility, transfers, ADLs, and gait to improve functional strength and

## 2020-09-21 NOTE — PROGRESS NOTES
Student nurse attempted to check patient's blood sugar. Patient refused and stated many reasons as to why she did not want it to be checked. Will notify physician.

## 2020-09-22 VITALS
OXYGEN SATURATION: 94 % | TEMPERATURE: 98.8 F | DIASTOLIC BLOOD PRESSURE: 66 MMHG | WEIGHT: 136.19 LBS | HEART RATE: 72 BPM | HEIGHT: 60 IN | RESPIRATION RATE: 18 BRPM | SYSTOLIC BLOOD PRESSURE: 148 MMHG | BODY MASS INDEX: 26.74 KG/M2

## 2020-09-22 LAB
ANION GAP SERPL CALCULATED.3IONS-SCNC: 6 MMOL/L (ref 7–16)
BASOPHILS ABSOLUTE: 0.03 E9/L (ref 0–0.2)
BASOPHILS RELATIVE PERCENT: 0.6 % (ref 0–2)
BUN BLDV-MCNC: 4 MG/DL (ref 8–23)
CALCIUM SERPL-MCNC: 6.6 MG/DL (ref 8.6–10.2)
CHLORIDE BLD-SCNC: 112 MMOL/L (ref 98–107)
CO2: 21 MMOL/L (ref 22–29)
CREAT SERPL-MCNC: 0.6 MG/DL (ref 0.5–1)
EOSINOPHILS ABSOLUTE: 0.17 E9/L (ref 0.05–0.5)
EOSINOPHILS RELATIVE PERCENT: 3.5 % (ref 0–6)
GFR AFRICAN AMERICAN: >60
GFR NON-AFRICAN AMERICAN: >60 ML/MIN/1.73
GLUCOSE BLD-MCNC: 77 MG/DL (ref 74–99)
HCT VFR BLD CALC: 24.1 % (ref 34–48)
HEMOGLOBIN: 7.8 G/DL (ref 11.5–15.5)
IMMATURE GRANULOCYTES #: 0.02 E9/L
IMMATURE GRANULOCYTES %: 0.4 % (ref 0–5)
LYMPHOCYTES ABSOLUTE: 1.1 E9/L (ref 1.5–4)
LYMPHOCYTES RELATIVE PERCENT: 22.6 % (ref 20–42)
MAGNESIUM: 1.5 MG/DL (ref 1.6–2.6)
MCH RBC QN AUTO: 32.1 PG (ref 26–35)
MCHC RBC AUTO-ENTMCNC: 32.4 % (ref 32–34.5)
MCV RBC AUTO: 99.2 FL (ref 80–99.9)
MONOCYTES ABSOLUTE: 0.45 E9/L (ref 0.1–0.95)
MONOCYTES RELATIVE PERCENT: 9.3 % (ref 2–12)
NEUTROPHILS ABSOLUTE: 3.09 E9/L (ref 1.8–7.3)
NEUTROPHILS RELATIVE PERCENT: 63.6 % (ref 43–80)
PDW BLD-RTO: 14.4 FL (ref 11.5–15)
PHOSPHORUS: 2.6 MG/DL (ref 2.5–4.5)
PLATELET # BLD: 240 E9/L (ref 130–450)
PMV BLD AUTO: 11.8 FL (ref 7–12)
POTASSIUM REFLEX MAGNESIUM: 3.3 MMOL/L (ref 3.5–5)
RBC # BLD: 2.43 E12/L (ref 3.5–5.5)
SODIUM BLD-SCNC: 139 MMOL/L (ref 132–146)
WBC # BLD: 4.9 E9/L (ref 4.5–11.5)

## 2020-09-22 PROCEDURE — 6370000000 HC RX 637 (ALT 250 FOR IP): Performed by: SURGERY

## 2020-09-22 PROCEDURE — 6370000000 HC RX 637 (ALT 250 FOR IP): Performed by: STUDENT IN AN ORGANIZED HEALTH CARE EDUCATION/TRAINING PROGRAM

## 2020-09-22 PROCEDURE — 84100 ASSAY OF PHOSPHORUS: CPT

## 2020-09-22 PROCEDURE — 6360000002 HC RX W HCPCS: Performed by: STUDENT IN AN ORGANIZED HEALTH CARE EDUCATION/TRAINING PROGRAM

## 2020-09-22 PROCEDURE — 80048 BASIC METABOLIC PNL TOTAL CA: CPT

## 2020-09-22 PROCEDURE — 2580000003 HC RX 258: Performed by: STUDENT IN AN ORGANIZED HEALTH CARE EDUCATION/TRAINING PROGRAM

## 2020-09-22 PROCEDURE — 36415 COLL VENOUS BLD VENIPUNCTURE: CPT

## 2020-09-22 PROCEDURE — 83735 ASSAY OF MAGNESIUM: CPT

## 2020-09-22 PROCEDURE — 97530 THERAPEUTIC ACTIVITIES: CPT

## 2020-09-22 PROCEDURE — C9113 INJ PANTOPRAZOLE SODIUM, VIA: HCPCS | Performed by: STUDENT IN AN ORGANIZED HEALTH CARE EDUCATION/TRAINING PROGRAM

## 2020-09-22 PROCEDURE — 85025 COMPLETE CBC W/AUTO DIFF WBC: CPT

## 2020-09-22 RX ORDER — OXYCODONE HYDROCHLORIDE AND ACETAMINOPHEN 5; 325 MG/1; MG/1
1 TABLET ORAL ONCE
Status: COMPLETED | OUTPATIENT
Start: 2020-09-22 | End: 2020-09-22

## 2020-09-22 RX ORDER — POTASSIUM CHLORIDE 20 MEQ/1
40 TABLET, EXTENDED RELEASE ORAL ONCE
Status: COMPLETED | OUTPATIENT
Start: 2020-09-22 | End: 2020-09-22

## 2020-09-22 RX ORDER — LIDOCAINE HYDROCHLORIDE AND EPINEPHRINE 10; 10 MG/ML; UG/ML
20 INJECTION, SOLUTION INFILTRATION; PERINEURAL ONCE
Status: DISCONTINUED | OUTPATIENT
Start: 2020-09-22 | End: 2020-09-22 | Stop reason: HOSPADM

## 2020-09-22 RX ORDER — OXYCODONE HYDROCHLORIDE AND ACETAMINOPHEN 5; 325 MG/1; MG/1
2 TABLET ORAL EVERY 6 HOURS PRN
Qty: 56 TABLET | Refills: 0 | Status: SHIPPED | OUTPATIENT
Start: 2020-09-22 | End: 2020-09-29

## 2020-09-22 RX ORDER — MAGNESIUM SULFATE IN WATER 40 MG/ML
4 INJECTION, SOLUTION INTRAVENOUS ONCE
Status: COMPLETED | OUTPATIENT
Start: 2020-09-22 | End: 2020-09-22

## 2020-09-22 RX ADMIN — CHOLECALCIFEROL TAB 50 MCG (2000 UNIT) 2000 UNITS: 50 TAB at 09:10

## 2020-09-22 RX ADMIN — PSYLLIUM HUSK 1 PACKET: 3.4 GRANULE ORAL at 14:35

## 2020-09-22 RX ADMIN — OXYCODONE HYDROCHLORIDE AND ACETAMINOPHEN 2 TABLET: 5; 325 TABLET ORAL at 04:02

## 2020-09-22 RX ADMIN — OXYCODONE HYDROCHLORIDE AND ACETAMINOPHEN 2 TABLET: 5; 325 TABLET ORAL at 16:14

## 2020-09-22 RX ADMIN — PROPRANOLOL HYDROCHLORIDE 20 MG: 20 TABLET ORAL at 09:11

## 2020-09-22 RX ADMIN — ENOXAPARIN SODIUM 40 MG: 40 INJECTION SUBCUTANEOUS at 09:09

## 2020-09-22 RX ADMIN — PANTOPRAZOLE SODIUM 40 MG: 40 INJECTION, POWDER, FOR SOLUTION INTRAVENOUS at 09:10

## 2020-09-22 RX ADMIN — MAGNESIUM SULFATE 4 G: 4 INJECTION INTRAVENOUS at 12:42

## 2020-09-22 RX ADMIN — SODIUM CHLORIDE, PRESERVATIVE FREE 300 UNITS: 5 INJECTION INTRAVENOUS at 09:08

## 2020-09-22 RX ADMIN — SODIUM CHLORIDE, PRESERVATIVE FREE 10 ML: 5 INJECTION INTRAVENOUS at 09:08

## 2020-09-22 RX ADMIN — MAGNESIUM OXIDE TAB 400 MG (241.3 MG ELEMENTAL MG) 400 MG: 400 (241.3 MG) TAB at 09:11

## 2020-09-22 RX ADMIN — CALCIUM ACETATE MONOHYDRATE AND ALUMINUM SULFATE TETRADECAHYDRATE 4 PACKET: 952; 1347 POWDER, FOR SOLUTION TOPICAL at 01:05

## 2020-09-22 RX ADMIN — POTASSIUM CHLORIDE 40 MEQ: 20 TABLET, EXTENDED RELEASE ORAL at 11:35

## 2020-09-22 RX ADMIN — DILTIAZEM HYDROCHLORIDE 120 MG: 120 CAPSULE, COATED, EXTENDED RELEASE ORAL at 09:10

## 2020-09-22 RX ADMIN — DICYCLOMINE HYDROCHLORIDE 10 MG: 10 CAPSULE ORAL at 09:11

## 2020-09-22 RX ADMIN — OXYCODONE HYDROCHLORIDE AND ACETAMINOPHEN 1 TABLET: 5; 325 TABLET ORAL at 14:35

## 2020-09-22 RX ADMIN — OXYCODONE HYDROCHLORIDE AND ACETAMINOPHEN 2 TABLET: 5; 325 TABLET ORAL at 10:03

## 2020-09-22 ASSESSMENT — PAIN DESCRIPTION - DESCRIPTORS
DESCRIPTORS: BURNING
DESCRIPTORS: SORE;ACHING;BURNING

## 2020-09-22 ASSESSMENT — PAIN SCALES - GENERAL
PAINLEVEL_OUTOF10: 7
PAINLEVEL_OUTOF10: 7
PAINLEVEL_OUTOF10: 8
PAINLEVEL_OUTOF10: 4
PAINLEVEL_OUTOF10: 4
PAINLEVEL_OUTOF10: 7

## 2020-09-22 ASSESSMENT — PAIN DESCRIPTION - PROGRESSION
CLINICAL_PROGRESSION: NOT CHANGED
CLINICAL_PROGRESSION: GRADUALLY IMPROVING

## 2020-09-22 ASSESSMENT — PAIN DESCRIPTION - ONSET
ONSET: ON-GOING
ONSET: ON-GOING

## 2020-09-22 ASSESSMENT — PAIN DESCRIPTION - PAIN TYPE
TYPE: CHRONIC PAIN;ACUTE PAIN
TYPE: CHRONIC PAIN;ACUTE PAIN

## 2020-09-22 ASSESSMENT — PAIN DESCRIPTION - LOCATION
LOCATION: ABDOMEN
LOCATION: ABDOMEN

## 2020-09-22 ASSESSMENT — PAIN DESCRIPTION - ORIENTATION
ORIENTATION: LOWER;MID
ORIENTATION: MID

## 2020-09-22 ASSESSMENT — PAIN DESCRIPTION - FREQUENCY
FREQUENCY: CONTINUOUS
FREQUENCY: CONTINUOUS

## 2020-09-22 NOTE — CARE COORDINATION
Social Work discharge 1 [facility] received call from [place] at [employer], and she needs hhc order (NOT SW consult order as done yesterday) stating SPECIFIC wound care orders for hhc and Resumption of hhc.  ARACELI discussed with charge RN [address]    Electronically signed by Elsa Marie on 9/22/2020 at 1:02 PM

## 2020-09-22 NOTE — CONSULTS
9/22/2020  12:36 PM      Comprehensive Nutrition Assessment    Type and Reason for Visit:  Reassess, Consult    Nutrition Recommendations/Plan: Continue Low Fiber Diet and High Pro Ensure ONS (to promote healing)    Nutrition Assessment:  Pt nutrition status improving, as pt is tolerating diet progression and TPN has been stopped. Initiated HP Ensure BID (low in fiber) with current Low Fiber Diet to optimize PO. Discharge planning for home w/HHC possible today. Included written diet information on Low Fiber Diet and ONS use with Discharge Instructions. Also included RD contact information. Malnutrition Assessment:  Malnutrition Status:  Mild malnutrition    Context:  Chronic Illness     Findings of the 6 clinical characteristics of malnutrition:  Energy Intake:  7 - 75% or less estimated energy requirements for 1 month or longer  Weight Loss:  1 - Mild weight loss (specify amount and time period)(7.1% wt loss x 6 months)     Body Fat Loss:  Unable to assess     Muscle Mass Loss:  Unable to assess    Fluid Accumulation:  No significant fluid accumulation     Strength:  Not Performed    Estimated Daily Nutrient Needs:  Energy (kcal):  ; Weight Used for Energy Requirements:  Admission     Protein (g):  70-80 (1.3-1.5 g/kg); Weight Used for Protein Requirements:  Admission        Fluid (ml/day):   (1 ml/elidia); Weight Used for Fluid Requirements:  Admission      Nutrition Related Findings:  tender abdomen w/ileostomy and +BS, +1 edema, I/O WNL, marin low fiber diet      Wounds:  Open Wounds, Surgical Wound(draining at incision site, stoma)       Current Nutrition Therapies:    DIET LOW FIBER;   Dietary Nutrition Supplements: Low Calorie High Protein Supplement    Anthropometric Measures:  · Height: 5' (152.4 cm)  · Current Body Weight: 136 lb (61.7 kg)(9/22)   · Admission Body Weight: 117 lb (53.1 kg)(9/11 actual)    · Usual Body Weight: 126 lb (57.2 kg)(actual 3/26/20 per EMR)     · Ideal Body Weight: 100 lbs; % Ideal Body Weight 117 %   · BMI: 26.6  · BMI Categories: Normal Weight (BMI 22.0 to 24.9) age over 65(22.8 at admit wt)       Nutrition Diagnosis:   · Mild malnutrition, In context of chronic illness related to altered 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) as evidenced by intake 0-25%, poor intake prior to admission, weight loss      Nutrition Interventions:   Food and/or Nutrient Delivery:  Continue Current Diet, Start Oral Nutrition Supplement(HP Ensure BID)  Nutrition Education/Counseling:  Education initiated   Coordination of Nutrition Care:  Continued Inpatient Monitoring    Goals:  PO >50% at meals/ONS       Nutrition Monitoring and Evaluation:   Food/Nutrient Intake Outcomes:  Food and Nutrient Intake, Supplement Intake  Physical Signs/Symptoms Outcomes:  Biochemical Data, Fluid Status or Edema, GI Status, Weight, Skin, Nutrition Focused Physical Findings     Discharge Planning:    Continue current diet, Continue Oral Nutrition Supplement     Electronically signed by Matthew Duval RD, LALIC, LD on 9/22/20 at 12:36 PM EDT    Contact: [phone]

## 2020-09-22 NOTE — PROGRESS NOTES
Physical Therapy  Treatment Note  NAME: Tyler Griffiths  : 1940  MRN: [id number]    Date of Service: 2020     Attending Provider:  Patricia Ho MD    Evaluating PT:  JadDeric Childers P.TJake Room #:  4377/2097-K  Diagnosis:  Infected abdominal wall  Procedure/Surgery:  20 explantation of hernia mesh, 9/15/20 exp lap with take down of enterocutaneous fistula, SBR, and ileostomy revision  Precautions:  Falls, PCA pump    SUBJECTIVE:    Pt lives with  in a 1 story home with 2 stairs and 2 rails to enter. Pt ambulated with no AD PTA. OBJECTIVE:   Initial Evaluation  Date: 20 Treatment  2020 Short Term/ Long Term   Goals   Was pt agreeable to Eval/treatment? yes yes    Does pt have pain? 5/10 belly, back, and neck pain C/o incisional pain, low back pain    Bed Mobility  Rolling: Independent  Supine to sit: supervision  Sit to supine: NA  Scooting: supervision Rolling: Independent  Supine to sit: Independent  Sit to supine: Independent  Scooting: Independent Independent    Transfers Sit to stand: MIN A  Stand to sit: SBA  Stand pivot: MIN A with ww Sit to stand: Independent   Stand to sit: Independent   Stand pivot: NT Independent   Ambulation   3 feet with ww MIN A 15 feet and 40 feet x 1 each without A/D  feet with ww supervision   Stair negotiation: ascended and descended NA NA 2 steps with 2 rails SBA   AM-PAC 6 Clicks         Pt is alert and able to follow instruction, states needs to go to the bathroom. Balance: fair dynamic without A/D    ASSESSMENT:   Comments: Pt found in bed, wound care Nurse present. Pt states having a bad day, states needs to go to the bathroom. Pt assisted self to EOB, sat Independently while Nurse cared for her ostomy bag. Pt then assisted to the bathroom followed by gait in the room, layton slow and consistent, no loss of balance or shortness of breath noted.  Pt fatigued after activity, states wants to go back to bed, states this better for her back than sitting in a bedside chair. Pt states hopes to go home later today     Pt was left in bed per request with call light in reach    Time in 1415   Time out 1430   Total Treatment Time 15 minutes   CPT codes:     Therapeutic activities 25796 15 minutes   Therapeutic exercises 39380 0 minutes       Pt is making good progress toward established Physical Therapy goals as per increased functional mobility performed. Continue with physical therapy current plan of care.     Vane Vieira PTA   License Number: \[facility]\"" [id number]

## 2020-09-22 NOTE — CARE COORDINATION
Chart reviewed and discussed with charge RN. Met with Patient at bedside- discussed plan of care and discharge plans. Discussed the importance of a safe discharge plan- pt verbalized understanding. Informed pt per General surgery's note, \"Possible DC today. \" Pt stated that discharge plan remains for her to return home with her  and [facility]. Discussed the option of [facility]- pt replied, \"No way! \"     Discharge plan remains home with [facility]. SW placed phone call to Siri Ayers at [facility] [place] [facility]- awaiting return call.

## 2020-09-22 NOTE — PROGRESS NOTES
Occupational Therapy  Patient treatment attempted this AM.  Patient declined session requesting to rest prior to D/C today. Pt declined any need for AE to assist with LE ADLs due to receiving assistance from  at home. Pt also refused any DME needs. RN verified that pt being D/C today. Will attempt at a later time.                                                  Mariana Offer MELINDA/L [id number]

## 2020-09-22 NOTE — FLOWSHEET NOTE
Re-visit   Pouch changed leaking. 09/22/20 1554   Vital Signs   Temp 98.8 °F (37.1 °C)   Temp Source Oral   Pulse 72   Resp 18   BP (!) 148/66   Ileostomy Ileostomy RLQ   Placement Date/Time: 09/17/20 1305   Pre-existing: No  Inserted by: Dr. Moe Salcido  Ileostomy Type: Ileostomy  Location: RLQ   Stomal Appliance 1 piece; Changed;Leaking   Peristomal Assessment Denuded;Red  (distally)   Treatment   (d[place]boro, nystatin powder, no sting. barrier ring 4-8)   Stool Appearance   (bright yellow)   Output (mL) 100 ml   surgical incision cleaned with ns,  retentions in place. Distal retention. periwound red   Instruct on use of belt- states is it too tight and took it off. Instructed on loosening belt. abd or gauze under belt. 50 mm convex coloplast pouches, barrier stripes, no sting remover and skin barrier. Patient verbalizes all understanding. Bruna Roles. Aurelia Carrasco, CNS, Wound Care      Supplies given to patient.

## 2020-09-22 NOTE — CARE COORDINATION
Social Work discharge planning   Alexandra called Edith Clark with [facility] again this am, and was only able to leave a 2nd message re: hhc order under SW consult yesterday and possible discharge to home today. ALEXANDRA asked Edith Clark to call Sw back to confirm hhc.  Electronically signed by Nathaniel Kim on 9/22/2020 at 9:55 AM     Addendum: Alexandra called [facility] office and spoke to Edith Clark. SHe is aware of above info for possible discharge today.   Electronically signed by Nathaniel Kim on 9/22/2020 at 10:00 AM

## 2020-09-22 NOTE — DISCHARGE INSTR - DIET
 Good nutrition is important when healing from an illness, injury, or surgery. Follow any nutrition recommendations given to you during your hospital stay.  If you were given an oral nutrition supplement while in the hospital, continue to take this supplement at home. You can take it with meals, in-between meals, and/or before bedtime. These supplements can be purchased at most local grocery stores, pharmacies, and chain super-stores.  Your Dietitian recommends High Protein Ensure or other supplement with <2g fiber per serving    If you have any questions about your diet or nutrition, call your dietitian at: Gris Cheema, ALESSANDRA, LD, [address] [place]  [phone]    Low Fiber Diet:    You may need a low-fiber diet if you have Crohns disease, diverticulitis, gastroparesis, ulcerative colitis, a new colostomy, or new ileostomy. A low-fiber diet may also be needed following radiation therapy to the pelvis and lower bowel or recent intestinal surgery. A low-fiber diet reduces the frequency and volume of your stools. This lessens irritation to the gastrointestinal (GI) tract and can help you heal. Use this diet if you have a stricture so your intestine doesnt get blocked. The goal of this diet is to get less than 8 grams of fiber daily. Its also important to eat enough protein foods while you are on a low-fiber diet. Drink nutrition supplements that have 1 gram of fiber or less in each serving. If your stricture is severe or if your inflammation is severe, drink more liquids to reduce symptoms and to get enough calories and protein. Tips  Eat about 5 to 6 small meals daily or about every 3 to 4 hours. Do not skip meals. Every time you eat, include a small amount of protein (1 to 2 ounces) plus an additional food. Low fiber starch foods are the best choice to eat with protein. Limit acidic, spicy and high-fat or fried and greasy foods to reduce GI symptoms.    Do not eat raw fruits and vegetables while on meats: beef, pork or lamb (slow cook until soft; chop meats if you have stricture or ostomy)  Eggs, well-cooked  Smooth nut butters such as almond, peanut, or sunflower  Tofu   Dairy  If you have lactose intolerance, drinking milk products from cows or goats may make diarrhea worse. Foods marked with an asterisk (*) have lactose. Milk: fat-free, 1% or 2% * (choose best tolerated)  Lactose-free milk  Buttermilk*  Fortified non-dairy milks: almond, cashew, coconut, or rice (be aware that these options are not good sources of protein so you will need to eat an additional protein food)  Kefir* (Dont include kefir in the diet until approved by your health care provider)  Yogurt*/lactose-free yogurt (without nuts, fruit, granola or chocolate)  Mild cheese* (hard and aged cheeses tend to be lower in lactose such as cheddar, swiss or parmesan)  Cottage cheese* or lactose-free cottage cheese  Low-fat ice cream* or lactose-free ice cream  Sherbet* (usually lower lactose)   Vegetables  Canned and well-cooked vegetables without seeds, skins, or hulls   Carrots or green beans, cooked  White, red or yellow potatoes without skins  Strained vegetable juice   Fruit Soft, and well-cooked fruits without skins, seeds, or membranes  Canned fruit in juice: peaches, pears, or applesauce  Fruit juice without pulp diluted by half with water may be tolerated better  Fruit drinks fortified with vitamin C may be tolerated better than 100% fruit juice   Oils  When possible, choose healthy oils and fats, such as olive and canola oils, plant oils rather than solid fats. Other  Broth and strained soups made from allowed foods  Desserts (small portions) without whole grains, seeds, nuts, raisins, or coconut  Jelly (clear)         Foods Not Recommended  These foods are higher in fat and fiber and may make your GI symptoms worse.    Food Group Foods Not Recommended   Grains  Bread, whole wheat or with whole grain flour or seeds or nuts  [facility] rice, quinoa, kasha, barley  Tortillas: whole grain  Whole wheat pasta  Whole grain and high-fiber cereals, including oatmeal, bran flakes or shredded wheat  Popcorn   Protein Foods  Steak, pork chops, or other meats that are fatty or have gristle  Fried meat, poultry, or fish  Seafood with a tough or rubbery texture, such as shrimp  Luncheon meats such as bologna and salami  Sausage, garcia, or hot dogs  Dried beans, peas, or lentils  Hummus  Sushi  Nuts and chunky nut butters   Dairy  Whole milk  Pea milk and soymilk (may cause diarrhea, gas, bloating, and abdominal pain)  Cream  Half-and-half  Sour cream  Yogurt with added fruit, nuts, or granola or chocolate   Vegetables  Alfalfa or bean sprouts (high fiber and risk for bacteria)  Raw or undercooked vegetables: beets; broccoli; brussels sprouts; cabbage; cauliflower; lance, mustard, or turnip greens; corn; cucumber; green peas or any kind of peas; kale; lima beans; mushrooms; okra; olives; pickles and relish; onions; parsnips; peppers; potato skins; sauerkraut; spinach; tomatoes   Fruit Raw fruit  Dried fruit  Avocado, berries, coconut  Canned fruit in syrup  Canned fruit with mandarin oranges, papaya or pineapple  Fruit juice with pulp  Prune juice  Fruit skin   Oils  Pork rinds     Low-Fiber (8 grams) Sample 1-Day Menu    Breakfast ½ cup cream of wheat (0.5 gram fiber)   1 slice white toast (1 gram fiber)   1 teaspoon margarine, soft tub   2 scrambled eggs    Morning Snack 1 cup lactose-free nutrition supplement   Lunch 2 slices white bread (2 grams fiber)   3 tablespoons tuna   1 tablespoon mayonnaise   1 cup chicken noodle soup (1 gram fiber)   ½ cup apple juice    Afternoon Snack 6 saltine crackers (0.5 gram fiber)   2 ounces low-fat cheddar cheese   Evening Meal 3 ounces tender chicken breast   1 cup white rice (0.5 gram fiber)   ½ cup cooked canned green beans (2 grams fiber)   ½ cup cranberry juice    Evening Snack 1 cup lactose-free nutrition supplement

## 2020-09-23 NOTE — DISCHARGE SUMMARY
Physician Discharge Summary     Patient ID:  Victor Hugo Rider  [id number]  89 y.o.  1940    Admit date: 9/11/2020    Discharge date and time: 9/22/2020  6:08 PM     Admitting Physician: Louise Estrada MD     Admission Diagnoses: Enterocutaneous fistula [K63.2]    Discharge Diagnoses: Active Problems:    Enterocutaneous fistula    Mild protein-calorie malnutrition ([address]  Resolved Problems:    * No resolved hospital problems. *      Admission Condition: poor    Discharged Condition: stable    Indication for Admission: Mesh explantation    Hospital Course/Procedures/Operation/treatments:   9/11: S/p explantation of hernia mesh with enterotomy and closure  9/12: Pt complaining of discharge from the incision. Okay for CLD, ostomy care, but will likely need surgical correction. 9/13: Continued difficulty maintaining ostomy appliance on fistula. RLQ ileostomy with bilious output. Plan for possible surgery tomorrow. 9/14: Pt reports continued leaking from ostomy site and multiple dressing changes. Ostomy nurse seen and attempted multiple times to stop leaking. Placed gaviria in proximal tract with attachment to gaviria bag, no more leaking. 9/15: Pt reports no leaking from fistula overnight. Taken to the OR for exploratory laparotomy, take down enterocutaneous fistula, small bowel resection, and ileostomy revision. 9/16: Pt reporting some abdominal pain around incision site. PCA was not working correctly. Denies any further leaking. Began TPN to compensate for GI losses and NPO.   9/17: Pt reporting some pain but now controlled with PCA. Reports some mild leaking from midline incision. Removed NG tube and gaviria catheter, continued TPN and started CLD. 9/18: Pt had some abdominal pain and is not sleeping well. Reports some nausea, but is tolerating her diet. Was febrile overnight, but resolved with Tylenol. Continued TPN for nutrition, continued CLD. CXR and UA were negative for signs of infection.   9/19: Pt doing much better today. Stopped TPN and advanced diet. 9/20: Pt doing well. Stopped PCA and transitioned to oral pain medications. 9/21: Pt tolerating diet without nausea or vomiting. Social work consulted for home care needs. Pain management consulted and started Percocet Q6H. Likely DC tomorrow. 9/22: Pt reporting some leaking from abdominal incision, but upon inspection, there is no leaking noted. Home care order placed. Discharge home today. Consults:   IP CONSULT TO DIETITIAN  IP CONSULT TO PAIN MANAGEMENT  IP CONSULT TO SOCIAL WORK  IP CONSULT TO DIETITIAN  IP CONSULT TO HOME CARE NEEDS    Significant Diagnostic Studies:   No results found. Discharge Exam:  GENERAL:  Laying in bed, awake, alert, cooperative, no apparent distress  HEAD: Normocephalic, atraumatic  EYES: No sclera icterus, pupils equa  LUNGS:  No increased work of breathing  CARDIOVASCULAR:  Normotensive and RR  ABDOMEN:  Soft, non-tender, non-distended. Ostomy pink and patent with some output. Midline incision with mild surrounding erythema inferiorly. No expressible drainage  EXTREMITIES: No edema or swelling  SKIN: Warm and dry    Disposition: home    In process/preliminary results:  Outstanding Order Results     No orders found from 8/13/2020 to 9/12/2020. Patient Instructions:   Discharge Medication List as of 9/22/2020  5:15 PM           Details   oxyCODONE-acetaminophen (PERCOCET) 5-325 MG per tablet Take 2 tablets by mouth every 6 hours as needed for Pain for up to 7 days. Intended supply: 3 days.  Take lowest dose possible to manage pain, Disp-56 tablet,R-0Normal              Details   omeprazole (PRILOSEC) 20 MG delayed release capsule Take 40 mg by mouth dailyHistorical Med      dicyclomine (BENTYL) 10 MG capsule Take 10 mg by mouth 2 times dailyHistorical Med      aspirin 81 MG tablet Take 81 mg by mouth dailyHistorical Med      ondansetron (ZOFRAN) 4 MG tablet Take 4 mg by mouth 3 times daily Historical Med diltiazem (CARDIZEM CD) 120 MG extended release capsule Take 1 capsule by mouth daily, Disp-30 capsule, R-0Normal      oxyCODONE-acetaminophen (PERCOCET)  MG per tablet Take by mouth every 6 hours as needed for Pain. Instructed to take with sip water am of procedure if needs Historical Med      fenofibrate (TRICOR) 145 MG tablet Take 145 mg by mouth dailyHistorical Med      Cholecalciferol (VITAMIN D3) 50124 UNITS CAPS Take 2,000 Units by mouth 2 times daily Historical Med      Coenzyme Q10 (COQ10 PO) Take by mouth daily Historical Med      Ferrous Sulfate (IRON) 28 MG TABS Take by mouth dailyHistorical Med      calcium carbonate (OSCAL) 500 MG TABS tablet Take 500 mg by mouth daily LD 3/16/2017Historical Med      pravastatin (PRAVACHOL) 40 MG tablet Take 40 mg by mouth every evening      propranolol (INDERAL) 20 MG tablet Take 20 mg by mouth daily Instructed to take morning of surgery with a sip of water Historical Med      vitamin B-12 (CYANOCOBALAMIN) 500 MCG tablet Place 500 mcg under the tongue daily Historical Med      Omega-3 Fatty Acids (OMEGA 3 PO) Take by mouth daily Historical Med      escitalopram (LEXAPRO) 20 MG tablet Take 20 mg by mouth nightly. primidone (MYSOLINE) 50 MG tablet Take 250 mg by mouth nightly Taking for essential tremorsHistorical Med      traZODone (DESYREL) 50 MG tablet Take 25 mg by mouth nightly Historical Med             Ileostomy: What to Expect at [address] [place] [address]  Part of your intestine was removed or  from the rest of the intestine. This is most often done because of a disease. During the ileostomy, the surgeon made a hole in your belly and connected part of the small intestine to that opening in the skin. This opening is called the stoma. A pouch is attached to the outside of the stoma. Stool collects in the pouch and must be removed several times each day. The stool will be looser or have more liquid than before surgery.   You are likely to have calf, back of knee, thigh, or groin. ? Redness and swelling in your leg or groin. · You have signs of infection, such as:  ? Increased pain, swelling, warmth, or redness. ? Red streaks leading from the incision. ? Pus draining from the incision. ? A fever. · Bright red blood has soaked through your bandage. · You are sick to your stomach or cannot drink fluids. Watch closely for changes in your health, and be sure to contact your doctor if you have any problems with your stoma. Where can you learn more? Go to [email or web address] and sign in to your [employer] account. Enter Y146 in the [facility] box to learn more about \"Ileostomy: What to Expect at Home. \"     If you do not have an account, please click on the \"Sign Up Now\" link. Current as of: August 12, 2019               Content Version: 12.5  © 4614-9218 [employer]wise, [employer]. Care instructions adapted under license by [facility] ([facility]). If you have questions about a medical condition or this instruction, always ask your healthcare professional. [place] [address] any warranty or liability for your use of this information.       Follow up:   Melania Luna DO  [address] [facility] [address]  [phone]          [place] [id number]  [place] [id number]    In 2 weeks  follow up mesh explantation and ileostomy revision       Signed:  Wendi Joaquin MD  9/23/2020  7:06 AM

## 2020-09-24 ENCOUNTER — HOSPITAL ENCOUNTER (OUTPATIENT)
Age: 80
Discharge: HOME | End: 2020-09-26
Payer: MEDICARE

## 2020-09-24 LAB
ANION GAP SERPL CALCULATED.3IONS-SCNC: 15 MMOL/L (ref 7–16)
BUN BLDV-MCNC: 6 MG/DL (ref 8–23)
CALCIUM SERPL-MCNC: 8.5 MG/DL (ref 8.6–10.2)
CHLORIDE BLD-SCNC: 103 MMOL/L (ref 98–107)
CO2: 25 MMOL/L (ref 22–29)
CREAT SERPL-MCNC: 0.9 MG/DL (ref 0.5–1)
GFR AFRICAN AMERICAN: >60
GFR NON-AFRICAN AMERICAN: >60 ML/MIN/1.73
GLUCOSE BLD-MCNC: 54 MG/DL (ref 74–99)
HCT VFR BLD CALC: 31.4 % (ref 34–48)
HEMOGLOBIN: 9.8 G/DL (ref 11.5–15.5)
MAGNESIUM: 1.6 MG/DL (ref 1.6–2.6)
MCH RBC QN AUTO: 31.3 PG (ref 26–35)
MCHC RBC AUTO-ENTMCNC: 31.2 % (ref 32–34.5)
MCV RBC AUTO: 100.3 FL (ref 80–99.9)
PDW BLD-RTO: 14.9 FL (ref 11.5–15)
PHOSPHORUS: 4.5 MG/DL (ref 2.5–4.5)
PLATELET # BLD: 412 E9/L (ref 130–450)
PMV BLD AUTO: 11.5 FL (ref 7–12)
POTASSIUM SERPL-SCNC: 4.3 MMOL/L (ref 3.5–5)
RBC # BLD: 3.13 E12/L (ref 3.5–5.5)
SODIUM BLD-SCNC: 143 MMOL/L (ref 132–146)
WBC # BLD: 8.2 E9/L (ref 4.5–11.5)

## 2020-09-24 PROCEDURE — 80048 BASIC METABOLIC PNL TOTAL CA: CPT

## 2020-09-24 PROCEDURE — 85027 COMPLETE CBC AUTOMATED: CPT

## 2020-09-24 PROCEDURE — 83735 ASSAY OF MAGNESIUM: CPT

## 2020-09-24 PROCEDURE — 84100 ASSAY OF PHOSPHORUS: CPT

## 2020-10-20 ENCOUNTER — HOSPITAL ENCOUNTER (OUTPATIENT)
Age: 80
Discharge: HOME | End: 2020-10-22
Payer: MEDICARE

## 2020-10-20 PROCEDURE — 87070 CULTURE OTHR SPECIMN AEROBIC: CPT

## 2020-10-20 PROCEDURE — 87186 SC STD MICRODIL/AGAR DIL: CPT

## 2020-10-22 LAB
ORGANISM: ABNORMAL
WOUND/ABSCESS: ABNORMAL

## 2020-11-15 ENCOUNTER — HOSPITAL ENCOUNTER (INPATIENT)
Age: 80
LOS: 3 days | Discharge: HOME HEALTH | DRG: 683 | End: 2020-11-18
Attending: EMERGENCY MEDICINE | Admitting: INTERNAL MEDICINE
Payer: MEDICARE

## 2020-11-15 PROBLEM — N17.9 ACUTE RENAL FAILURE (HCC): Status: ACTIVE | Noted: 2020-11-15

## 2020-11-15 LAB
ALBUMIN SERPL-MCNC: 8.5 G/DL (ref 3.5–5.2)
ALP BLD-CCNC: 123 U/L (ref 35–104)
ALT SERPL-CCNC: 32 U/L (ref 0–32)
ANION GAP SERPL CALCULATED.3IONS-SCNC: 17 MMOL/L (ref 7–16)
AST SERPL-CCNC: 52 U/L (ref 0–31)
BASOPHILS ABSOLUTE: 0.05 E9/L (ref 0–0.2)
BASOPHILS RELATIVE PERCENT: 0.6 % (ref 0–2)
BILIRUB SERPL-MCNC: 0.4 MG/DL (ref 0–1.2)
BUN BLDV-MCNC: 68 MG/DL (ref 8–23)
CALCIUM SERPL-MCNC: 10.2 MG/DL (ref 8.6–10.2)
CHLORIDE BLD-SCNC: 92 MMOL/L (ref 98–107)
CO2: 22 MMOL/L (ref 22–29)
CREAT SERPL-MCNC: 4.5 MG/DL (ref 0.5–1)
EOSINOPHILS ABSOLUTE: 0.11 E9/L (ref 0.05–0.5)
EOSINOPHILS RELATIVE PERCENT: 1.4 % (ref 0–6)
GFR AFRICAN AMERICAN: 11
GFR NON-AFRICAN AMERICAN: 9 ML/MIN/1.73
GLUCOSE BLD-MCNC: 103 MG/DL (ref 74–99)
HCT VFR BLD CALC: 49.8 % (ref 34–48)
HEMOGLOBIN: 15 G/DL (ref 11.5–15.5)
IMMATURE GRANULOCYTES #: 0.01 E9/L
IMMATURE GRANULOCYTES %: 0.1 % (ref 0–5)
LYMPHOCYTES ABSOLUTE: 2.88 E9/L (ref 1.5–4)
LYMPHOCYTES RELATIVE PERCENT: 37.4 % (ref 20–42)
MCH RBC QN AUTO: 31.1 PG (ref 26–35)
MCHC RBC AUTO-ENTMCNC: 30.1 % (ref 32–34.5)
MCV RBC AUTO: 103.1 FL (ref 80–99.9)
METER GLUCOSE: 106 MG/DL (ref 74–99)
METER GLUCOSE: 111 MG/DL (ref 74–99)
METER GLUCOSE: 96 MG/DL (ref 74–99)
MONOCYTES ABSOLUTE: 0.54 E9/L (ref 0.1–0.95)
MONOCYTES RELATIVE PERCENT: 7 % (ref 2–12)
NEUTROPHILS ABSOLUTE: 4.12 E9/L (ref 1.8–7.3)
NEUTROPHILS RELATIVE PERCENT: 53.5 % (ref 43–80)
PDW BLD-RTO: 13.6 FL (ref 11.5–15)
PLATELET # BLD: 340 E9/L (ref 130–450)
PMV BLD AUTO: 12.7 FL (ref 7–12)
POTASSIUM REFLEX MAGNESIUM: 6.2 MMOL/L (ref 3.5–5)
RBC # BLD: 4.83 E12/L (ref 3.5–5.5)
SODIUM BLD-SCNC: 131 MMOL/L (ref 132–146)
TOTAL PROTEIN: 9 G/DL (ref 6.4–8.3)
WBC # BLD: 7.7 E9/L (ref 4.5–11.5)

## 2020-11-15 PROCEDURE — 80053 COMPREHEN METABOLIC PANEL: CPT

## 2020-11-15 PROCEDURE — 2580000003 HC RX 258: Performed by: EMERGENCY MEDICINE

## 2020-11-15 PROCEDURE — 2500000003 HC RX 250 WO HCPCS: Performed by: EMERGENCY MEDICINE

## 2020-11-15 PROCEDURE — 6370000000 HC RX 637 (ALT 250 FOR IP): Performed by: INTERNAL MEDICINE

## 2020-11-15 PROCEDURE — 6360000002 HC RX W HCPCS: Performed by: EMERGENCY MEDICINE

## 2020-11-15 PROCEDURE — 2060000000 HC ICU INTERMEDIATE R&B

## 2020-11-15 PROCEDURE — 6370000000 HC RX 637 (ALT 250 FOR IP): Performed by: EMERGENCY MEDICINE

## 2020-11-15 PROCEDURE — 93005 ELECTROCARDIOGRAM TRACING: CPT | Performed by: EMERGENCY MEDICINE

## 2020-11-15 PROCEDURE — 85025 COMPLETE CBC W/AUTO DIFF WBC: CPT

## 2020-11-15 PROCEDURE — 82962 GLUCOSE BLOOD TEST: CPT

## 2020-11-15 PROCEDURE — 96374 THER/PROPH/DIAG INJ IV PUSH: CPT

## 2020-11-15 PROCEDURE — 99283 EMERGENCY DEPT VISIT LOW MDM: CPT

## 2020-11-15 RX ORDER — SODIUM CHLORIDE 0.9 % (FLUSH) 0.9 %
10 SYRINGE (ML) INJECTION EVERY 12 HOURS SCHEDULED
Status: DISCONTINUED | OUTPATIENT
Start: 2020-11-15 | End: 2020-11-18 | Stop reason: HOSPADM

## 2020-11-15 RX ORDER — ACETAMINOPHEN 650 MG/1
650 SUPPOSITORY RECTAL EVERY 6 HOURS PRN
Status: DISCONTINUED | OUTPATIENT
Start: 2020-11-15 | End: 2020-11-18 | Stop reason: HOSPADM

## 2020-11-15 RX ORDER — OXYCODONE AND ACETAMINOPHEN 10; 325 MG/1; MG/1
1 TABLET ORAL EVERY 6 HOURS PRN
Status: DISCONTINUED | OUTPATIENT
Start: 2020-11-15 | End: 2020-11-18 | Stop reason: HOSPADM

## 2020-11-15 RX ORDER — MORPHINE SULFATE 4 MG/ML
4 INJECTION, SOLUTION INTRAMUSCULAR; INTRAVENOUS ONCE
Status: COMPLETED | OUTPATIENT
Start: 2020-11-15 | End: 2020-11-15

## 2020-11-15 RX ORDER — DEXTROSE MONOHYDRATE 25 G/50ML
25 INJECTION, SOLUTION INTRAVENOUS ONCE
Status: COMPLETED | OUTPATIENT
Start: 2020-11-15 | End: 2020-11-15

## 2020-11-15 RX ORDER — PANTOPRAZOLE SODIUM 40 MG/1
40 TABLET, DELAYED RELEASE ORAL
Status: DISCONTINUED | OUTPATIENT
Start: 2020-11-16 | End: 2020-11-18 | Stop reason: HOSPADM

## 2020-11-15 RX ORDER — 0.9 % SODIUM CHLORIDE 0.9 %
1000 INTRAVENOUS SOLUTION INTRAVENOUS ONCE
Status: COMPLETED | OUTPATIENT
Start: 2020-11-15 | End: 2020-11-15

## 2020-11-15 RX ORDER — DICYCLOMINE HYDROCHLORIDE 10 MG/1
10 CAPSULE ORAL 2 TIMES DAILY
Status: DISCONTINUED | OUTPATIENT
Start: 2020-11-15 | End: 2020-11-18 | Stop reason: HOSPADM

## 2020-11-15 RX ORDER — ASPIRIN 81 MG/1
81 TABLET, CHEWABLE ORAL DAILY
Status: DISCONTINUED | OUTPATIENT
Start: 2020-11-15 | End: 2020-11-18 | Stop reason: HOSPADM

## 2020-11-15 RX ORDER — PROPRANOLOL HYDROCHLORIDE 10 MG/1
20 TABLET ORAL DAILY
Status: DISCONTINUED | OUTPATIENT
Start: 2020-11-15 | End: 2020-11-18 | Stop reason: HOSPADM

## 2020-11-15 RX ORDER — HEPARIN SODIUM 10000 [USP'U]/ML
5000 INJECTION, SOLUTION INTRAVENOUS; SUBCUTANEOUS EVERY 8 HOURS SCHEDULED
Status: DISCONTINUED | OUTPATIENT
Start: 2020-11-15 | End: 2020-11-18 | Stop reason: HOSPADM

## 2020-11-15 RX ORDER — ACETAMINOPHEN 325 MG/1
650 TABLET ORAL EVERY 6 HOURS PRN
Status: DISCONTINUED | OUTPATIENT
Start: 2020-11-15 | End: 2020-11-18 | Stop reason: HOSPADM

## 2020-11-15 RX ORDER — SODIUM CHLORIDE 0.9 % (FLUSH) 0.9 %
10 SYRINGE (ML) INJECTION PRN
Status: DISCONTINUED | OUTPATIENT
Start: 2020-11-15 | End: 2020-11-18 | Stop reason: HOSPADM

## 2020-11-15 RX ORDER — SENNA PLUS 8.6 MG/1
1 TABLET ORAL DAILY PRN
Status: DISCONTINUED | OUTPATIENT
Start: 2020-11-15 | End: 2020-11-18 | Stop reason: HOSPADM

## 2020-11-15 RX ORDER — DEXTROSE MONOHYDRATE 25 G/50ML
12.5 INJECTION, SOLUTION INTRAVENOUS PRN
Status: DISCONTINUED | OUTPATIENT
Start: 2020-11-15 | End: 2020-11-18 | Stop reason: HOSPADM

## 2020-11-15 RX ORDER — SODIUM POLYSTYRENE SULFONATE 4.1 MEQ/G
15 POWDER, FOR SUSPENSION ORAL; RECTAL ONCE
Status: COMPLETED | OUTPATIENT
Start: 2020-11-15 | End: 2020-11-15

## 2020-11-15 RX ORDER — ONDANSETRON 2 MG/ML
4 INJECTION INTRAMUSCULAR; INTRAVENOUS ONCE
Status: COMPLETED | OUTPATIENT
Start: 2020-11-15 | End: 2020-11-15

## 2020-11-15 RX ORDER — DEXTROSE MONOHYDRATE 50 MG/ML
100 INJECTION, SOLUTION INTRAVENOUS PRN
Status: DISCONTINUED | OUTPATIENT
Start: 2020-11-15 | End: 2020-11-18 | Stop reason: HOSPADM

## 2020-11-15 RX ORDER — ESCITALOPRAM OXALATE 10 MG/1
20 TABLET ORAL NIGHTLY
Status: DISCONTINUED | OUTPATIENT
Start: 2020-11-15 | End: 2020-11-18 | Stop reason: HOSPADM

## 2020-11-15 RX ORDER — TRAZODONE HYDROCHLORIDE 50 MG/1
25 TABLET ORAL NIGHTLY
Status: DISCONTINUED | OUTPATIENT
Start: 2020-11-15 | End: 2020-11-18 | Stop reason: HOSPADM

## 2020-11-15 RX ORDER — SODIUM CHLORIDE 9 MG/ML
INJECTION, SOLUTION INTRAVENOUS CONTINUOUS
Status: DISCONTINUED | OUTPATIENT
Start: 2020-11-15 | End: 2020-11-18 | Stop reason: HOSPADM

## 2020-11-15 RX ORDER — NICOTINE POLACRILEX 4 MG
15 LOZENGE BUCCAL PRN
Status: DISCONTINUED | OUTPATIENT
Start: 2020-11-15 | End: 2020-11-18 | Stop reason: HOSPADM

## 2020-11-15 RX ORDER — DILTIAZEM HYDROCHLORIDE 120 MG/1
120 CAPSULE, COATED, EXTENDED RELEASE ORAL DAILY
Status: DISCONTINUED | OUTPATIENT
Start: 2020-11-15 | End: 2020-11-18 | Stop reason: HOSPADM

## 2020-11-15 RX ORDER — SODIUM CHLORIDE 9 MG/ML
1000 INJECTION, SOLUTION INTRAVENOUS CONTINUOUS
Status: DISCONTINUED | OUTPATIENT
Start: 2020-11-15 | End: 2020-11-18 | Stop reason: HOSPADM

## 2020-11-15 RX ADMIN — SODIUM CHLORIDE 1000 ML: 9 INJECTION, SOLUTION INTRAVENOUS at 21:25

## 2020-11-15 RX ADMIN — OXYCODONE AND ACETAMINOPHEN 1 TABLET: 10; 325 TABLET ORAL at 23:01

## 2020-11-15 RX ADMIN — DICYCLOMINE HYDROCHLORIDE 10 MG: 10 CAPSULE ORAL at 23:00

## 2020-11-15 RX ADMIN — INSULIN HUMAN 10 UNITS: 100 INJECTION, SOLUTION PARENTERAL at 21:26

## 2020-11-15 RX ADMIN — SODIUM BICARBONATE 50 MEQ: 84 INJECTION, SOLUTION INTRAVENOUS at 18:34

## 2020-11-15 RX ADMIN — ONDANSETRON 4 MG: 2 INJECTION INTRAMUSCULAR; INTRAVENOUS at 18:15

## 2020-11-15 RX ADMIN — MORPHINE SULFATE 4 MG: 4 INJECTION, SOLUTION INTRAMUSCULAR; INTRAVENOUS at 18:34

## 2020-11-15 RX ADMIN — ESCITALOPRAM OXALATE 20 MG: 10 TABLET ORAL at 23:00

## 2020-11-15 RX ADMIN — DEXTROSE MONOHYDRATE 25 G: 25 INJECTION, SOLUTION INTRAVENOUS at 23:03

## 2020-11-15 RX ADMIN — TRAZODONE HYDROCHLORIDE 25 MG: 50 TABLET ORAL at 22:59

## 2020-11-15 RX ADMIN — ASPIRIN 81 MG: 81 TABLET, CHEWABLE ORAL at 22:59

## 2020-11-15 RX ADMIN — SODIUM POLYSTYRENE SULFONATE 15 G: 1 POWDER ORAL; RECTAL at 18:34

## 2020-11-15 RX ADMIN — SODIUM CHLORIDE 1000 ML: 9 INJECTION, SOLUTION INTRAVENOUS at 16:48

## 2020-11-15 ASSESSMENT — PAIN DESCRIPTION - LOCATION
LOCATION: ABDOMEN
LOCATION: ABDOMEN

## 2020-11-15 ASSESSMENT — ENCOUNTER SYMPTOMS
TROUBLE SWALLOWING: 0
BACK PAIN: 0
VOMITING: 0
BLOOD IN STOOL: 0
WHEEZING: 0
SHORTNESS OF BREATH: 0
CHEST TIGHTNESS: 0
EYE PAIN: 0
COUGH: 0
ABDOMINAL PAIN: 0
DIARRHEA: 0
NAUSEA: 1
RHINORRHEA: 0

## 2020-11-15 ASSESSMENT — PAIN SCALES - GENERAL
PAINLEVEL_OUTOF10: 3
PAINLEVEL_OUTOF10: 5
PAINLEVEL_OUTOF10: 5
PAINLEVEL_OUTOF10: 3
PAINLEVEL_OUTOF10: 4

## 2020-11-15 ASSESSMENT — PAIN DESCRIPTION - DESCRIPTORS
DESCRIPTORS: CRAMPING
DESCRIPTORS: CRAMPING

## 2020-11-15 NOTE — ED PROVIDER NOTES
[facility]  eMERGENCY dEPARTMENT eNCOUnter      Pt Name: Dorcas Christian  MRN: [id number]  [place] 1940  Date of evaluation: 11/15/2020  Provider: Cosme Downs MD     [address] [place] [address]       Chief Complaint   Patient presents with    Fatigue     hx CKD    Other     tingling in hands    Nausea    Emesis    Abdominal Pain     has ostomy. not much output         HISTORY OF PRESENT ILLNESS   (Location/Symptom, Timing/Onset,Context/Setting, Quality, Duration, Modifying Factors, Severity) Note limiting factors. Patient presents here saying that she thinks her potassium is high. She states for the last day or so she has not felt well. She states that she has had tingling and cramps and felt weak. She also states that she thought she was having some problems with her ostomy. She states she has a partial blockage to it and she did not have any output for a while but now it has seemed to resolve itself. She states that she has been nauseated and has not been eating and drinking much so she thinks she is dehydrated as well. She states that her last time her blood work was done her kidney function was pretty good but she has had problems with her kidneys before. She denies any neck or back pain. Denies any abdominal pain. She has a lot of chronic neck and back pain          Dorcas Christian is a 68 y.o. female who presents to the emergency department      Nursing Notes were reviewed. REVIEW OF SYSTEMS    (2+ for4; 10+ for level 5)     Review of Systems   Constitutional: Positive for fatigue. Negative for appetite change, chills, fever and unexpected weight change. HENT: Negative for congestion, drooling, nosebleeds, rhinorrhea and trouble swallowing. Eyes: Negative for pain and visual disturbance. Respiratory: Negative for cough, chest tightness, shortness of breath and wheezing.     Cardiovascular: Negative for chest pain, 1/26/2020    DRAINAGE OF ABDOMINAL WALL ABSCESS, EXPLANTATION OF MESH, DEBRIDEMENT OF SKIN AND SUBCUTANEOUS TISSUE performed by Ehsan Nayak MD at [facility] N/A 9/11/2020    EXPLANTATION OF HERNIA MESH performed by Ehsan Nayak MD at [address]  1-2006    right and left thumb   [address] [facility], 2004, 2009    lumbar fusion x 3    BACK SURGERY      cervical fusion x 2    BREAST SURGERY  years ago    monor calcifications    CARPAL TUNNEL RELEASE Bilateral     CATARACT REMOVAL WITH IMPLANT Right 03/03/15    CHOLECYSTECTOMY  5-11-07    Lap    COLONOSCOPY      CORONARY ANGIOPLASTY WITH STENT PLACEMENT  1-7-2002    ENDOSCOPY, COLON, DIAGNOSTIC      EPIGASTRIC HERNIA REPAIR  3/21/16    incisional with mesh implantation    ESOPHAGUS SURGERY  1-4-13    esophageal clamp (torn Esophagus)    FINGER TRIGGER RELEASE Right 2006    index finger    FOOT SURGERY Right     multiple    HERNIA REPAIR  12-31-13    abdominal x 5- last one 2017     ILEOSTOMY OR JEJUNOSTOMY  1-3-13    revision    INSERT PICC LINE  06/09/2020    and removed     JOINT REPLACEMENT Right 3/24/15    right total shoulder arthroplasty    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     LAPAROTOMY N/A 5/12/2020    EXPLORATORY LAPAROTOMY EXPLANTATION OF INFECTED MESH SMAL BOWEL RESECTION AND REVISION END ILEOSTOMY, LYSIS OF ADHESIONS performed by Ehsan Nayak MD at [place] [address] N/A 6/8/2020    LAPAROTOMY EXPLORATORY, Crispin Amilcar OF HERNIA MESH performed by Ehsan Nayak MD at [place] [address] N/A 9/15/2020    EXPLORATORY LAPAROTOMY WITH SMALL BOWEL RESECTION, TAKE DOWN REVISION ENTEROCUTANEOUS FISTULA , OSTOMY REVISION performed by Ehsan Nayak MD at [address] [place] [address] / leg    NASAL SINUS SURGERY      x2 /  cauterized    OTHER SURGICAL HISTORY  08/24/2016    diagnostic laparotomy with repair of intraabdominal hernia    OTHER SURGICAL HISTORY      removal plate / screws  / right great toe    PICC LINE INSERTION NURSE  9/15/2020         ROTATOR CUFF REPAIR  2010    right     SKIN BIOPSY  2010    3 squamous cell carcinoma right leg, left leg       CURRENT MEDICATIONS       Previous Medications    ASPIRIN 81 MG TABLET    Take 81 mg by mouth daily    CALCIUM CARBONATE (OSCAL) 500 MG TABS TABLET    Take 500 mg by mouth daily LD 3/16/2017    CHOLECALCIFEROL (VITAMIN D3) 82146 UNITS CAPS    Take 2,000 Units by mouth 2 times daily     COENZYME Q10 (COQ10 PO)    Take by mouth daily     DICYCLOMINE (BENTYL) 10 MG CAPSULE    Take 10 mg by mouth 2 times daily    DILTIAZEM (CARDIZEM CD) 120 MG EXTENDED RELEASE CAPSULE    Take 1 capsule by mouth daily    ESCITALOPRAM (LEXAPRO) 20 MG TABLET    Take 20 mg by mouth nightly. FENOFIBRATE (TRICOR) 145 MG TABLET    Take 145 mg by mouth daily    FERROUS SULFATE (IRON) 28 MG TABS    Take by mouth daily    OMEGA-3 FATTY ACIDS (OMEGA 3 PO)    Take by mouth daily     OMEPRAZOLE (PRILOSEC) 20 MG DELAYED RELEASE CAPSULE    Take 40 mg by mouth daily    ONDANSETRON (ZOFRAN) 4 MG TABLET    Take 4 mg by mouth 3 times daily     OXYCODONE-ACETAMINOPHEN (PERCOCET)  MG PER TABLET    Take by mouth every 6 hours as needed for Pain. Instructed to take with sip water am of procedure if needs     PRAVASTATIN (PRAVACHOL) 40 MG TABLET    Take 40 mg by mouth every evening    PRIMIDONE (MYSOLINE) 50 MG TABLET    Take 250 mg by mouth nightly Taking for essential tremors    PROPRANOLOL (INDERAL) 20 MG TABLET    Take 20 mg by mouth daily Instructed to take morning of surgery with a sip of water     TRAZODONE (DESYREL) 50 MG TABLET    Take 25 mg by mouth nightly     VITAMIN B-12 (CYANOCOBALAMIN) 500 MCG TABLET    Place 500 mcg under the tongue daily             Fentanyl; Levofloxacin; Tramadol; and Vioxx [rofecoxib]    FAMILY HISTORY     History reviewed. No pertinent family history.        SOCIAL HISTORY       Social History     Socioeconomic History    Marital status:      Spouse name: None    Number of children: None    Years of education: None    Highest education level: None   Occupational History    None   Social Needs    Financial resource strain: None    Food insecurity     Worry: None     Inability: None    Transportation needs     Medical: None     Non-medical: None   Tobacco Use    Smoking status: Current Every Day Smoker     Packs/day: 1.00     Types: Cigarettes    Smokeless tobacco: Never Used   Substance and Sexual Activity    Alcohol use: Not Currently     Comment: occasional    Drug use: No    Sexual activity: None   Lifestyle    Physical activity     Days per week: None     Minutes per session: None    Stress: None   Relationships    Social connections     Talks on phone: None     Gets together: None     Attends [employer] service: None     Active member of club or organization: None     Attends meetings of clubs or organizations: None     Relationship status: None    Intimate partner violence     Fear of current or ex partner: None     Emotionally abused: None     Physically abused: None     Forced sexual activity: None   Other Topics Concern    None   Social History Narrative    None       SCREENINGS      @FLOW([id number])@    PHYSICAL EXAM    (5+ for level 4, 8+ for level 5)     ED Triage Vitals   BP Temp Temp Source Pulse Resp SpO2 Height Weight   11/15/20 1548 11/15/20 1546 11/15/20 1546 11/15/20 1546 11/15/20 1546 11/15/20 1546 11/15/20 1546 11/15/20 1546   (!) 117/59 97.5 °F (36.4 °C) Temporal 68 14 95 % 5' (1.524 m) 130 lb (59 kg)       Physical Exam  Vitals signs and nursing note reviewed. Constitutional:       General: She is not in acute distress. Appearance: Normal appearance. She is well-developed. She is not ill-appearing, toxic-appearing or diaphoretic. HENT:      Head: Normocephalic and atraumatic. Nose: Nose normal. No congestion.       Mouth/Throat: Pharynx: No oropharyngeal exudate. Eyes:      General: No scleral icterus. Right eye: No discharge. Left eye: No discharge. Conjunctiva/sclera: Conjunctivae normal.      Pupils: Pupils are equal, round, and reactive to light. Neck:      Musculoskeletal: Normal range of motion and neck supple. Thyroid: No thyromegaly. Vascular: No JVD. Trachea: No tracheal deviation. Cardiovascular:      Rate and Rhythm: Normal rate and regular rhythm. Heart sounds: Normal heart sounds. No murmur. No gallop. Pulmonary:      Effort: Pulmonary effort is normal. No respiratory distress. Breath sounds: Normal breath sounds. No stridor. No wheezing or rales. Chest:      Chest wall: No tenderness. Abdominal:      General: There is no distension. Palpations: Abdomen is soft. There is no mass. Tenderness: There is no abdominal tenderness. There is no guarding or rebound. Comments: No specific abdominal pain. Patient having large amounts of green liquid stool in her ostomy. Abdomen is not distended. No sign of obstruction   Musculoskeletal: Normal range of motion. General: No tenderness or deformity. Lymphadenopathy:      Cervical: No cervical adenopathy. Skin:     General: Skin is warm and dry. Coloration: Skin is not pale. Findings: No erythema or rash. Neurological:      General: No focal deficit present. Mental Status: She is alert and oriented to person, place, and time. Cranial Nerves: No cranial nerve deficit. Motor: No abnormal muscle tone. Coordination: Coordination normal.   Psychiatric:         Mood and Affect: Mood normal.         Behavior: Behavior normal.         Thought Content: Thought content normal.         Judgment: Judgment normal.         DIAGNOSTIC RESULTS     EKG (Per Emergency Physician):       RADIOLOGY (Per Diego Ramachandran):    Interpretation per the Radiologist below, if available at the time of this note:  No results found.    :  Labs Reviewed   COMPREHENSIVE METABOLIC PANEL W/ REFLEX TO MG FOR LOW K - Abnormal; Notable for the following components:       Result Value    Sodium 131 (*)     Potassium reflex Magnesium 6.2 (*)     Chloride 92 (*)     Anion Gap 17 (*)     Glucose 103 (*)     BUN 68 (*)     CREATININE 4.5 (*)     Total Protein 9.0 (*)     Alb 8.5 (*)     Alkaline Phosphatase 123 (*)     AST 52 (*)     All other components within normal limits   CBC WITH AUTO DIFFERENTIAL - Abnormal; Notable for the following components:    Hematocrit 49.8 (*)     .1 (*)     MCHC 30.1 (*)     MPV 12.7 (*)     All other components within normal limits   URINALYSIS   COMPREHENSIVE METABOLIC PANEL W/ REFLEX TO MG FOR LOW K   CBC WITH AUTO DIFFERENTIAL   PROTEIN / CREATININE RATIO, URINE   BASIC METABOLIC PANEL   POCT GLUCOSE   POCT GLUCOSE   POCT GLUCOSE   POCT GLUCOSE   POCT GLUCOSE       All other labs were within normal range or not returned as of this dictation.     EMERGENCY DEPARTMENT COURSE and DIFFERENTIALDIAGNOSIS/MDM:   Vitals:    Vitals:    11/15/20 1546 11/15/20 1548 11/15/20 1738   BP:  (!) 117/59 (!) 109/51   Pulse: 68  62   Resp: 14  16   Temp: 97.5 °F (36.4 °C)     TempSrc: Temporal     SpO2: 95%  96%   Weight: 130 lb (59 kg)     Height: 5' (1.524 m)         Medications   insulin regular (HUMULIN R;NOVOLIN R) injection 10 Units (has no administration in time range)     And   dextrose 50 % IV solution (has no administration in time range)   glucose (GLUTOSE) 40 % oral gel 15 g (has no administration in time range)   dextrose 50 % IV solution (has no administration in time range)   glucagon (rDNA) injection 1 mg (has no administration in time range)   dextrose 5 % solution (has no administration in time range)   sodium bicarbonate 8.4 % injection 50 mEq (has no administration in time range)   sodium polystyrene (KAYEXALATE) powder 15 g (has no administration in time range)   trimethobenzamide Kory Clinton) injection 200 mg (has no administration in time range)   morphine sulfate (PF) injection 4 mg (has no administration in time range)   sodium chloride flush 0.9 % injection 10 mL (has no administration in time range)   sodium chloride flush 0.9 % injection 10 mL (has no administration in time range)   acetaminophen (TYLENOL) tablet 650 mg (has no administration in time range)     Or   acetaminophen (TYLENOL) suppository 650 mg (has no administration in time range)   senna (SENOKOT) tablet 8.6 mg (has no administration in time range)   heparin (porcine) injection 5,000 Units (has no administration in time range)   aspirin chewable tablet 81 mg (has no administration in time range)   dicyclomine (BENTYL) capsule 10 mg (has no administration in time range)   dilTIAZem (CARDIZEM CD) extended release capsule 120 mg (has no administration in time range)   escitalopram (LEXAPRO) tablet 20 mg (has no administration in time range)   pantoprazole (PROTONIX) tablet 40 mg (has no administration in time range)   oxyCODONE-acetaminophen (PERCOCET)  MG per tablet 1 tablet (has no administration in time range)   propranolol (INDERAL) tablet 20 mg (has no administration in time range)   traZODone (DESYREL) tablet 25 mg (has no administration in time range)   0.9 % sodium chloride infusion (has no administration in time range)   0.9 % sodium chloride bolus (0 mLs Intravenous Stopped 11/15/20 1821)   ondansetron (ZOFRAN) injection 4 mg (4 mg Intravenous Given 11/15/20 1815)       MDM  Number of Diagnoses or Management Options  Acute renal failure, unspecified acute renal failure type [facility]):   Chronic pain syndrome:   Hyperkalemia:   Diagnosis management comments: Presented with feeling of tingling and weakness. She felt that her potassium may be elevated.   She also had been complaining of some issues with her colostomy but those have been longstanding and have seem to have resolved as the patient has now copious amounts of liquid stool in her ostomy bag. Her exam otherwise is unremarkable. She had labs obtained which revealed potassium 6.2 but her creatinine is elevated to 4.5 which is a significant increase over her previous of 0.9. EKG has slightly peaked T waves in the anterolateral leads as well as a prolonged QT. Hyperkalemic protocol was started. Patient was complaining of nausea and her chronic pain. She was given Tigan as she has prolonged QT and also given a small amount of morphine. I spoke with Dr. Hernandez Meier the patient will be admitted to her service. Consult was also placed for Dr. Vicky De La Torre who is the patient's nephrologist.  Patient is hemodynamically stable. Have spoken with the patient and discussed today's results, in addition to providing specific details for the plan of care and counseling regarding the diagnosis and prognosis. Their questions are answered at this time and they are agreeable with the plan  Speak with Dr. Jeremi Mendoza covering for Dr. Vicky De La Torre. He requested a Mooney and saline at 100 cc/h. Will be admitted to telemetry       Amount and/or Complexity of Data Reviewed  Clinical lab tests: reviewed  Tests in the radiology section of CPT®: ordered and reviewed  Tests in the medicine section of CPT®: ordered and reviewed    Risk of Complications, Morbidity, and/or Mortality  Presenting problems: moderate  Diagnostic procedures: moderate  Management options: moderate    Critical Care  Total time providing critical care: (25 treatment and diagnosis of hyperkalemia)    Patient Progress  Patient progress: stable  . CONSULTS:  IP CONSULT TO INTERNAL MEDICINE  IP CONSULT TO NEPHROLOGY    PROCEDURES:  Unless otherwise noted below, none     Procedures    FINAL IMPRESSION      1. Hyperkalemia    2. Acute renal failure, unspecified acute renal failure type ([address]    3.  Chronic pain syndrome        DISPOSITION/PLAN   DISPOSITION Admitted 11/15/2020 06:22:51 PM      PATIENT REFERRED TO:  No follow-up provider

## 2020-11-16 ENCOUNTER — APPOINTMENT (OUTPATIENT)
Dept: GENERAL RADIOLOGY | Age: 80
DRG: 683 | End: 2020-11-16
Payer: MEDICARE

## 2020-11-16 ENCOUNTER — APPOINTMENT (OUTPATIENT)
Dept: ULTRASOUND IMAGING | Age: 80
DRG: 683 | End: 2020-11-16
Payer: MEDICARE

## 2020-11-16 LAB
ALBUMIN SERPL-MCNC: 3.3 G/DL (ref 3.5–5.2)
ALP BLD-CCNC: 83 U/L (ref 35–104)
ALT SERPL-CCNC: 21 U/L (ref 0–32)
ANION GAP SERPL CALCULATED.3IONS-SCNC: 9 MMOL/L (ref 7–16)
AST SERPL-CCNC: 32 U/L (ref 0–31)
BACTERIA: ABNORMAL /HPF
BASOPHILS ABSOLUTE: 0.01 E9/L (ref 0–0.2)
BASOPHILS RELATIVE PERCENT: 0.2 % (ref 0–2)
BILIRUB SERPL-MCNC: <0.2 MG/DL (ref 0–1.2)
BILIRUBIN URINE: NEGATIVE
BLOOD, URINE: ABNORMAL
BUN BLDV-MCNC: 60 MG/DL (ref 8–23)
CALCIUM SERPL-MCNC: 8.5 MG/DL (ref 8.6–10.2)
CHLORIDE BLD-SCNC: 106 MMOL/L (ref 98–107)
CHLORIDE URINE RANDOM: 29 MMOL/L
CLARITY: CLEAR
CO2: 23 MMOL/L (ref 22–29)
COLOR: YELLOW
CREAT SERPL-MCNC: 3.1 MG/DL (ref 0.5–1)
CREATININE URINE: 100 MG/DL (ref 29–226)
CREATININE URINE: 112 MG/DL (ref 29–226)
EKG ATRIAL RATE: 62 BPM
EKG P AXIS: 70 DEGREES
EKG P-R INTERVAL: 170 MS
EKG Q-T INTERVAL: 412 MS
EKG QRS DURATION: 86 MS
EKG QTC CALCULATION (BAZETT): 512 MS
EKG R AXIS: -27 DEGREES
EKG T AXIS: 48 DEGREES
EKG VENTRICULAR RATE: 93 BPM
EOSINOPHILS ABSOLUTE: 0.11 E9/L (ref 0.05–0.5)
EOSINOPHILS RELATIVE PERCENT: 2.3 % (ref 0–6)
EPITHELIAL CELLS, UA: ABNORMAL /HPF
GFR AFRICAN AMERICAN: 18
GFR NON-AFRICAN AMERICAN: 15 ML/MIN/1.73
GLUCOSE BLD-MCNC: 33 MG/DL (ref 74–99)
GLUCOSE BLD-MCNC: 96 MG/DL (ref 74–99)
GLUCOSE URINE: NEGATIVE MG/DL
HCT VFR BLD CALC: 36.9 % (ref 34–48)
HEMOGLOBIN: 11.2 G/DL (ref 11.5–15.5)
IMMATURE GRANULOCYTES #: 0.01 E9/L
IMMATURE GRANULOCYTES %: 0.2 % (ref 0–5)
KETONES, URINE: NEGATIVE MG/DL
LEUKOCYTE ESTERASE, URINE: ABNORMAL
LYMPHOCYTES ABSOLUTE: 1.53 E9/L (ref 1.5–4)
LYMPHOCYTES RELATIVE PERCENT: 31.4 % (ref 20–42)
MCH RBC QN AUTO: 31.5 PG (ref 26–35)
MCHC RBC AUTO-ENTMCNC: 30.4 % (ref 32–34.5)
MCV RBC AUTO: 103.9 FL (ref 80–99.9)
METER GLUCOSE: 125 MG/DL (ref 74–99)
METER GLUCOSE: 156 MG/DL (ref 74–99)
METER GLUCOSE: 179 MG/DL (ref 74–99)
METER GLUCOSE: 285 MG/DL (ref 74–99)
METER GLUCOSE: 71 MG/DL (ref 74–99)
METER GLUCOSE: 82 MG/DL (ref 74–99)
METER GLUCOSE: 85 MG/DL (ref 74–99)
METER GLUCOSE: 89 MG/DL (ref 74–99)
METER GLUCOSE: 96 MG/DL (ref 74–99)
METER GLUCOSE: 98 MG/DL (ref 74–99)
METER GLUCOSE: <40 MG/DL (ref 74–99)
MONOCYTES ABSOLUTE: 0.41 E9/L (ref 0.1–0.95)
MONOCYTES RELATIVE PERCENT: 8.4 % (ref 2–12)
NEUTROPHILS ABSOLUTE: 2.8 E9/L (ref 1.8–7.3)
NEUTROPHILS RELATIVE PERCENT: 57.5 % (ref 43–80)
NITRITE, URINE: NEGATIVE
PDW BLD-RTO: 13.5 FL (ref 11.5–15)
PH UA: 6 (ref 5–9)
PLATELET # BLD: 196 E9/L (ref 130–450)
PMV BLD AUTO: 12.2 FL (ref 7–12)
POTASSIUM REFLEX MAGNESIUM: 5.1 MMOL/L (ref 3.5–5)
POTASSIUM, UR: 56.6 MMOL/L
PROTEIN PROTEIN: 23 MG/DL (ref 0–12)
PROTEIN UA: ABNORMAL MG/DL
PROTEIN/CREAT RATIO: 0.2
PROTEIN/CREAT RATIO: 0.2 (ref 0–0.2)
RBC # BLD: 3.55 E12/L (ref 3.5–5.5)
RBC UA: ABNORMAL /HPF (ref 0–2)
SODIUM BLD-SCNC: 138 MMOL/L (ref 132–146)
SODIUM URINE: <20 MMOL/L
SPECIFIC GRAVITY UA: 1.02 (ref 1–1.03)
TOTAL PROTEIN: 5.8 G/DL (ref 6.4–8.3)
UREA NITROGEN, UR: 725 MG/DL (ref 800–1666)
UROBILINOGEN, URINE: 0.2 E.U./DL
WBC # BLD: 4.9 E9/L (ref 4.5–11.5)
WBC UA: ABNORMAL /HPF (ref 0–5)

## 2020-11-16 PROCEDURE — 84300 ASSAY OF URINE SODIUM: CPT

## 2020-11-16 PROCEDURE — 74018 RADEX ABDOMEN 1 VIEW: CPT

## 2020-11-16 PROCEDURE — 80053 COMPREHEN METABOLIC PANEL: CPT

## 2020-11-16 PROCEDURE — 81001 URINALYSIS AUTO W/SCOPE: CPT

## 2020-11-16 PROCEDURE — 84540 ASSAY OF URINE/UREA-N: CPT

## 2020-11-16 PROCEDURE — 93010 ELECTROCARDIOGRAM REPORT: CPT | Performed by: INTERNAL MEDICINE

## 2020-11-16 PROCEDURE — 2060000000 HC ICU INTERMEDIATE R&B

## 2020-11-16 PROCEDURE — 36415 COLL VENOUS BLD VENIPUNCTURE: CPT

## 2020-11-16 PROCEDURE — 6360000002 HC RX W HCPCS: Performed by: INTERNAL MEDICINE

## 2020-11-16 PROCEDURE — 87088 URINE BACTERIA CULTURE: CPT

## 2020-11-16 PROCEDURE — 2580000003 HC RX 258: Performed by: EMERGENCY MEDICINE

## 2020-11-16 PROCEDURE — 84156 ASSAY OF PROTEIN URINE: CPT

## 2020-11-16 PROCEDURE — 82570 ASSAY OF URINE CREATININE: CPT

## 2020-11-16 PROCEDURE — 82947 ASSAY GLUCOSE BLOOD QUANT: CPT

## 2020-11-16 PROCEDURE — 85025 COMPLETE CBC W/AUTO DIFF WBC: CPT

## 2020-11-16 PROCEDURE — 82436 ASSAY OF URINE CHLORIDE: CPT

## 2020-11-16 PROCEDURE — 2580000003 HC RX 258: Performed by: INTERNAL MEDICINE

## 2020-11-16 PROCEDURE — 6370000000 HC RX 637 (ALT 250 FOR IP): Performed by: INTERNAL MEDICINE

## 2020-11-16 PROCEDURE — 84133 ASSAY OF URINE POTASSIUM: CPT

## 2020-11-16 PROCEDURE — 82962 GLUCOSE BLOOD TEST: CPT

## 2020-11-16 PROCEDURE — 76770 US EXAM ABDO BACK WALL COMP: CPT

## 2020-11-16 PROCEDURE — 51702 INSERT TEMP BLADDER CATH: CPT

## 2020-11-16 RX ORDER — 0.9 % SODIUM CHLORIDE 0.9 %
500 INTRAVENOUS SOLUTION INTRAVENOUS ONCE
Status: COMPLETED | OUTPATIENT
Start: 2020-11-16 | End: 2020-11-16

## 2020-11-16 RX ORDER — PROCHLORPERAZINE MALEATE 5 MG/1
5 TABLET ORAL EVERY 6 HOURS PRN
Status: DISCONTINUED | OUTPATIENT
Start: 2020-11-16 | End: 2020-11-18 | Stop reason: HOSPADM

## 2020-11-16 RX ORDER — CALCIUM ACETATE MONOHYDRATE AND ALUMINUM SULFATE TETRADECAHYDRATE 952; 1347 MG/2299MG; MG/2299MG
1 POWDER, FOR SOLUTION TOPICAL 3 TIMES DAILY PRN
Status: DISCONTINUED | OUTPATIENT
Start: 2020-11-16 | End: 2020-11-18 | Stop reason: HOSPADM

## 2020-11-16 RX ORDER — ONDANSETRON 2 MG/ML
4 INJECTION INTRAMUSCULAR; INTRAVENOUS EVERY 6 HOURS PRN
Status: DISCONTINUED | OUTPATIENT
Start: 2020-11-16 | End: 2020-11-16

## 2020-11-16 RX ORDER — PRIMIDONE 250 MG/1
250 TABLET ORAL NIGHTLY
Status: DISCONTINUED | OUTPATIENT
Start: 2020-11-16 | End: 2020-11-18 | Stop reason: HOSPADM

## 2020-11-16 RX ADMIN — SODIUM CHLORIDE 1000 ML: 9 INJECTION, SOLUTION INTRAVENOUS at 19:13

## 2020-11-16 RX ADMIN — DICYCLOMINE HYDROCHLORIDE 10 MG: 10 CAPSULE ORAL at 20:37

## 2020-11-16 RX ADMIN — Medication 10 ML: at 01:28

## 2020-11-16 RX ADMIN — PRIMIDONE 250 MG: 250 TABLET ORAL at 20:36

## 2020-11-16 RX ADMIN — HEPARIN SODIUM 5000 UNITS: 10000 INJECTION INTRAVENOUS; SUBCUTANEOUS at 14:27

## 2020-11-16 RX ADMIN — SODIUM CHLORIDE 500 ML: 9 INJECTION, SOLUTION INTRAVENOUS at 01:56

## 2020-11-16 RX ADMIN — PROCHLORPERAZINE MALEATE 5 MG: 5 TABLET ORAL at 20:37

## 2020-11-16 RX ADMIN — TRAZODONE HYDROCHLORIDE 25 MG: 50 TABLET ORAL at 20:37

## 2020-11-16 RX ADMIN — ESCITALOPRAM OXALATE 20 MG: 10 TABLET ORAL at 20:36

## 2020-11-16 RX ADMIN — OXYCODONE AND ACETAMINOPHEN 1 TABLET: 10; 325 TABLET ORAL at 18:13

## 2020-11-16 RX ADMIN — DEXTROSE MONOHYDRATE 25 G: 25 INJECTION, SOLUTION INTRAVENOUS at 01:43

## 2020-11-16 RX ADMIN — PANTOPRAZOLE SODIUM 40 MG: 40 TABLET, DELAYED RELEASE ORAL at 05:48

## 2020-11-16 RX ADMIN — TRIMETHOBENZAMIDE HYDROCHLORIDE 200 MG: 100 INJECTION INTRAMUSCULAR at 11:21

## 2020-11-16 RX ADMIN — CEFTRIAXONE 1 G: 1 INJECTION, POWDER, FOR SOLUTION INTRAMUSCULAR; INTRAVENOUS at 14:27

## 2020-11-16 RX ADMIN — OXYCODONE AND ACETAMINOPHEN 1 TABLET: 10; 325 TABLET ORAL at 11:21

## 2020-11-16 RX ADMIN — Medication 10 ML: at 20:38

## 2020-11-16 ASSESSMENT — PAIN SCALES - GENERAL
PAINLEVEL_OUTOF10: 0
PAINLEVEL_OUTOF10: 9
PAINLEVEL_OUTOF10: 7
PAINLEVEL_OUTOF10: 0

## 2020-11-16 ASSESSMENT — PAIN - FUNCTIONAL ASSESSMENT: PAIN_FUNCTIONAL_ASSESSMENT: ACTIVITIES ARE NOT PREVENTED

## 2020-11-16 ASSESSMENT — ENCOUNTER SYMPTOMS
DIARRHEA: 0
VOICE CHANGE: 0
WHEEZING: 0
BACK PAIN: 1
VOMITING: 1
COLOR CHANGE: 0
TROUBLE SWALLOWING: 0
NAUSEA: 1
SHORTNESS OF BREATH: 0
COUGH: 0
SORE THROAT: 0
ABDOMINAL PAIN: 0
EYE ITCHING: 0
BLOOD IN STOOL: 0
CONSTIPATION: 0
EYE DISCHARGE: 0

## 2020-11-16 ASSESSMENT — PAIN DESCRIPTION - PAIN TYPE: TYPE: CHRONIC PAIN

## 2020-11-16 ASSESSMENT — PAIN DESCRIPTION - LOCATION: LOCATION: BACK

## 2020-11-16 ASSESSMENT — PAIN DESCRIPTION - ORIENTATION: ORIENTATION: MID;LOWER

## 2020-11-16 ASSESSMENT — PAIN DESCRIPTION - DESCRIPTORS: DESCRIPTORS: ACHING;DISCOMFORT

## 2020-11-16 NOTE — PROGRESS NOTES
Spoke with Dr. Betzaida Black regarding new patient consult. States to notify surgical resident. Surgical resident notified.

## 2020-11-16 NOTE — ED NOTES
Spoke to [place], 4s, ext [phone], confirmed receipt of sbar. Pt prepared for transport via cart and monitor.      Jonatan Motley RN  11/15/20 6512

## 2020-11-16 NOTE — FLOWSHEET NOTE
Inpatient Wound Care    Admit Date: 11/15/2020  3:48 PM    Reason for consult: Ileostomy    Significant history:  Admitted with KD. History includes: Infected mesh removal with ileostomy revision. , HTN, HLD. Wound history:  POA    Findings:       11/16/20 1406   Ileostomy Ileostomy RLQ   Placement Date/Time: 09/17/20 1305   Pre-existing: No  Inserted by: Dr. Meaghan Elizondo  Ileostomy Type: Ileostomy  Location: RLQ   Stomal Appliance 2 piece   Flange Size (inches)   (1\")   Stoma  Assessment Red   Mucocutaneous Junction Intact   Peristomal Assessment Intact   Treatment Bag change   Stool Color Brown   Stool Appearance Watery   Stool Amount Medium   Output (mL) 100 ml       Impression:  Ileostomy stoma low in R abd. Peristomal irritation noted. Mucocutaneous junction intact. Interventions in place:  [place] soak applied for 10 min. The stoma was changed using her appliance from home; [address] [place] [address] 2 pc with convexity and a ring. Plan: Ostomy care as needed.        Ebony Sender 11/16/2020 2:07 PM

## 2020-11-16 NOTE — H&P
History & Physicial  11/16/20  Primary Care:  Shaun Lozano, DO  [address] [facility] / [facility] [address]        Chief Complaint   Patient presents with    Fatigue     hx CKD    Other     tingling in hands    Nausea    Emesis    Abdominal Pain     has ostomy. not much output       HPI:    Patient is a 68year old female who presented to the er due to nausea, vomiting and dehydration. Patient states that 2 weeks ago she developed pain in her abdomen every time she ate. She states that she has had 5 abdominal surgeries recently due to infected mesh and last Wednesday she went to [facility] and had a second opinion by a general surgeon who told her she had a partial blockage of her bowels. She states she has not been eating much and is down to 100.5 lbs. She states that on Friday she developed abdominal pain, nausea, and vomiting. She has been feeling dehydrated due to not being able to eat or drink. She has had green colored stools coming out of her ileostomy. Prior to Visit Medications    Medication Sig Taking? Authorizing Provider   omeprazole (PRILOSEC) 20 MG delayed release capsule Take 40 mg by mouth daily Yes Historical Provider, MD   dicyclomine (BENTYL) 10 MG capsule Take 10 mg by mouth 2 times daily Yes Historical Provider, MD   aspirin 81 MG tablet Take 81 mg by mouth daily Yes Historical Provider, MD   ondansetron (ZOFRAN) 4 MG tablet Take 4 mg by mouth 3 times daily  Yes Historical Provider, MD   diltiazem (CARDIZEM CD) 120 MG extended release capsule Take 1 capsule by mouth daily Yes Alexis Blanco MD   oxyCODONE-acetaminophen (PERCOCET)  MG per tablet Take by mouth every 6 hours as needed for Pain.  Instructed to take with sip water am of procedure if needs  Yes Historical Provider, MD   fenofibrate (TRICOR) 145 MG tablet Take 145 mg by mouth daily Yes Historical Provider, MD   Cholecalciferol (VITAMIN D3) 36204 UNITS CAPS Take 2,000 Units by mouth 2 times daily  Yes Historical Provider, MD   Coenzyme Q10 (COQ10 PO) Take by mouth daily  Yes Historical Provider, MD   Ferrous Sulfate (IRON) 28 MG TABS Take by mouth daily Yes Historical Provider, MD   calcium carbonate (OSCAL) 500 MG TABS tablet Take 500 mg by mouth daily LD 3/16/2017 Yes Historical Provider, MD   pravastatin (PRAVACHOL) 40 MG tablet Take 40 mg by mouth every evening Yes Historical Provider, MD   propranolol (INDERAL) 20 MG tablet Take 20 mg by mouth daily Instructed to take morning of surgery with a sip of water  Yes Historical Provider, MD   vitamin B-12 (CYANOCOBALAMIN) 500 MCG tablet Place 500 mcg under the tongue daily  Yes Historical Provider, MD   Omega-3 Fatty Acids (OMEGA 3 PO) Take by mouth daily  Yes Historical Provider, MD   escitalopram (LEXAPRO) 20 MG tablet Take 20 mg by mouth nightly. Yes Historical Provider, MD   primidone (MYSOLINE) 50 MG tablet Take 250 mg by mouth nightly Taking for essential tremors Yes Historical Provider, MD   traZODone (DESYREL) 50 MG tablet Take 25 mg by mouth nightly  Yes Historical Provider, MD     Social History     Tobacco Use    Smoking status: Current Every Day Smoker     Packs/day: 1.00     Types: Cigarettes    Smokeless tobacco: Never Used   Substance Use Topics    Alcohol use: Not Currently     Comment: occasional    Drug use: No     History reviewed. No pertinent family history.   Past Surgical History:   Procedure Laterality Date    ABDOMEN SURGERY  2-    total colectomy, bowel resection, ileostomy,revision of ileostomy     ABDOMEN SURGERY N/A 1/26/2020    DRAINAGE OF ABDOMINAL WALL ABSCESS, EXPLANTATION OF MESH, DEBRIDEMENT OF SKIN AND SUBCUTANEOUS TISSUE performed by Ketan English MD at [address] N/A 9/11/2020    EXPLANTATION OF HERNIA MESH performed by Ketan English MD at [address] [facility]  2002   [facility] [address]  1-2006    right and left thumb   [address], 2004, 2009    lumbar fusion x 3    BACK SURGERY cervical fusion x 2    BREAST SURGERY  years ago    monor calcifications    CARPAL TUNNEL RELEASE Bilateral     CATARACT REMOVAL WITH IMPLANT Right 03/03/15    CHOLECYSTECTOMY  5-11-07    Lap    COLONOSCOPY      CORONARY ANGIOPLASTY WITH STENT PLACEMENT  1-7-2002    ENDOSCOPY, COLON, DIAGNOSTIC      EPIGASTRIC HERNIA REPAIR  3/21/16    incisional with mesh implantation    ESOPHAGUS SURGERY  1-4-13    esophageal clamp (torn Esophagus)    FINGER TRIGGER RELEASE Right 2006    index finger    FOOT SURGERY Right     multiple    HERNIA REPAIR  12-31-13    abdominal x 5- last one 2017     ILEOSTOMY OR JEJUNOSTOMY  1-3-13    revision    INSERT PICC LINE  06/09/2020    and removed     JOINT REPLACEMENT Right 3/24/15    right total shoulder arthroplasty    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     LAPAROTOMY N/A 5/12/2020    EXPLORATORY LAPAROTOMY EXPLANTATION OF INFECTED MESH SMAL BOWEL RESECTION AND REVISION END ILEOSTOMY, LYSIS OF ADHESIONS performed by Alonzo Worthy MD at [place] [address] N/A 6/8/2020    LAPAROTOMY EXPLORATORY, Lilibeth Grammes OF HERNIA MESH performed by Alonzo Worthy MD at [place] [address] N/A 9/15/2020    EXPLORATORY LAPAROTOMY WITH SMALL BOWEL RESECTION, TAKE DOWN REVISION ENTEROCUTANEOUS FISTULA , OSTOMY REVISION performed by Alonzo Worthy MD at [address] [place] [address] / leg    NASAL SINUS SURGERY      x2 /  cauterized    OTHER SURGICAL HISTORY  08/24/2016    diagnostic laparotomy with repair of intraabdominal hernia    OTHER SURGICAL HISTORY      removal plate / screws  / right great toe    PICC LINE INSERTION NURSE  9/15/2020         ROTATOR CUFF REPAIR  2010    right     SKIN BIOPSY  2010    3 squamous cell carcinoma right leg, left leg     Past Medical History:   Diagnosis Date    Acute kidney failure ([address] 2014 and 8/2016    x2,no dialysis needed,resolved, kidney function tests returned to normal    Anxiety     Arthritis     CAD (coronary artery disease)     follows with Dr. Michael Forman every 6 months    Cancer [facility])     skin, leg,nose- removed surgically     COPD (chronic obstructive pulmonary disease) ([facility] [address]     mild- no inhlaers, no oxygen     Depression     Fibromyalgia     chronic back and neck pain    Generalized headaches     h/o / daily    History of blood transfusion 3-11-14    multiple times    History of necrotic bowel 2012    Hyperlipidemia     Hypertension     Incisional hernia     abdomen    Insomnia     Mitral valve regurgitation     Myocardial infarct ([facility] [address] 2002    Occasional tremors     at hands / stable with medication    Osteopenia     PONV (postoperative nausea and vomiting)     Vitamin B12 deficiency     h/o     Review of Systems   Constitutional: Positive for appetite change, fatigue and unexpected weight change. Negative for chills and fever. HENT: Negative for sore throat, trouble swallowing and voice change. Eyes: Negative for discharge and itching. Respiratory: Negative for cough, shortness of breath and wheezing. Cardiovascular: Negative for chest pain, palpitations and leg swelling. Gastrointestinal: Positive for nausea and vomiting. Negative for abdominal pain, blood in stool, constipation and diarrhea. Endocrine: Negative for cold intolerance and heat intolerance. Genitourinary: Negative for dysuria, flank pain and frequency. Musculoskeletal: Positive for back pain. Negative for arthralgias. Skin: Negative for color change and rash. Allergic/Immunologic: Negative for environmental allergies and food allergies. Neurological: Negative for dizziness, light-headedness and numbness. Hematological: Negative for adenopathy. Does not bruise/bleed easily. Psychiatric/Behavioral: Negative for behavioral problems and decreased concentration.      Vitals:    11/16/20 0306   BP: (!) 115/54   Pulse: 62   Resp:    Temp:    SpO2:        Physical Exam  Constitutional: labs.     DVT Prophylaxis: Heparin   Code Status: Full     Ladarius Dumont DO      Electronically signed by Ladarius Dumont DO on 11/16/2020 at 4:59 AM

## 2020-11-16 NOTE — CONSULTS
Nephrology Consult Note  Patient's Name: Reina Alvarez  11:29 AM  11/16/2020    Nephrologist: Harriet Mejia    Reason for Consult:  KD on CKD  Requesting Physician:  Jurgen Liang DO    Chief Complaint:  Weakness    History Obtained From:  patient and past medical records    History of Present Ilness:    Reina Alvarez is a 68 y.o. female with prior history of Stage II KD in July of 2018 sec to decreased effective renal perfusion in the setting of high Out put from the ostomy. Her baseline prior to and following the event was 0.8-0.9mg/dl. She presented to the ED 11/15/20 with the c/o of feeling weak and having paresthesias. She noted decreased output from her ostomy but that had resolved by the time she presented to the ED and she had loose green colored stools.  She also noted  being nauseated and vomited with decreased po intake    Past Medical History:   Diagnosis Date    Acute kidney failure ([address] 2014 and 8/2016    x2,no dialysis needed,resolved, kidney function tests returned to normal    Anxiety     Arthritis     CAD (coronary artery disease)     follows with Dr. Apoorva Brady every 6 months    Cancer [facility])     skin, leg,nose- removed surgically     COPD (chronic obstructive pulmonary disease) ([facility] [address]     mild- no inhlaers, no oxygen     Depression     Fibromyalgia     chronic back and neck pain    Generalized headaches     h/o / daily    History of blood transfusion 3-11-14    multiple times    History of necrotic bowel 2012    Hyperlipidemia     Hypertension     Incisional hernia     abdomen    Insomnia     Mitral valve regurgitation     Myocardial infarct ([address] 2002    Occasional tremors     at hands / stable with medication    Osteopenia     PONV (postoperative nausea and vomiting)     Vitamin B12 deficiency     h/o       Past Surgical History:   Procedure Laterality Date    ABDOMEN SURGERY  2-    total colectomy, bowel resection, ileostomy,revision of ileostomy  ABDOMEN SURGERY N/A 1/26/2020    DRAINAGE OF ABDOMINAL WALL ABSCESS, EXPLANTATION OF MESH, DEBRIDEMENT OF SKIN AND SUBCUTANEOUS TISSUE performed by Ehsan Nayak MD at [facility] N/A 9/11/2020    EXPLANTATION OF HERNIA MESH performed by Ehsan Nayak MD at [address] [place] [address]   [address]  1-2006    right and left thumb   [facility], 2004, 2009    lumbar fusion x 3    BACK SURGERY      cervical fusion x 2    BREAST SURGERY  years ago    monor calcifications    CARPAL TUNNEL RELEASE Bilateral     CATARACT REMOVAL WITH IMPLANT Right 03/03/15    CHOLECYSTECTOMY  5-11-07    Lap    COLONOSCOPY      CORONARY ANGIOPLASTY WITH STENT PLACEMENT  1-7-2002    ENDOSCOPY, COLON, DIAGNOSTIC      EPIGASTRIC HERNIA REPAIR  3/21/16    incisional with mesh implantation    ESOPHAGUS SURGERY  1-4-13    esophageal clamp (torn Esophagus)    FINGER TRIGGER RELEASE Right 2006    index finger    FOOT SURGERY Right     multiple    HERNIA REPAIR  12-31-13    abdominal x 5- last one 2017     ILEOSTOMY OR JEJUNOSTOMY  1-3-13    revision    INSERT PICC LINE  06/09/2020    and removed     JOINT REPLACEMENT Right 3/24/15    right total shoulder arthroplasty    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     LAPAROTOMY N/A 5/12/2020    EXPLORATORY LAPAROTOMY EXPLANTATION OF INFECTED MESH SMAL BOWEL RESECTION AND REVISION END ILEOSTOMY, LYSIS OF ADHESIONS performed by Ehsan Nayak MD at [place] [address] N/A 6/8/2020    LAPAROTOMY EXPLORATORY, Crispin Amilcar OF HERNIA MESH performed by Ehsan Nayak MD at [place] [address] N/A 9/15/2020    EXPLORATORY LAPAROTOMY WITH SMALL BOWEL RESECTION, TAKE DOWN REVISION ENTEROCUTANEOUS FISTULA , OSTOMY REVISION performed by Ehsan Nayak MD at [address] [place] [address] / leg    NASAL SINUS SURGERY      x2 /  cauterized    OTHER SURGICAL HISTORY  08/24/2016    diagnostic laparotomy with repair of intraabdominal hernia    OTHER SURGICAL HISTORY      removal plate / screws  / right great toe    PICC LINE INSERTION NURSE  9/15/2020         ROTATOR CUFF REPAIR  2010    right     SKIN BIOPSY  2010    3 squamous cell carcinoma right leg, left leg       History reviewed. No pertinent family history. reports that she has been smoking cigarettes. She has been smoking about 1.00 pack per day. She has never used smokeless tobacco. She reports previous alcohol use. She reports that she does not use drugs. Allergies:  Fentanyl; Levofloxacin; Tramadol; and Vioxx [rofecoxib]    Current Medications:    aluminum sulfate-calcium acetate (DOMEBORO) packet 1 packet, TID PRN  trimethobenzamide (TIGAN) injection 200 mg, Q6H PRN  glucose (GLUTOSE) 40 % oral gel 15 g, PRN  dextrose 50 % IV solution, PRN  glucagon (rDNA) injection 1 mg, PRN  dextrose 5 % solution, PRN  trimethobenzamide (TIGAN) injection 200 mg, Once  sodium chloride flush 0.9 % injection 10 mL, 2 times per day  sodium chloride flush 0.9 % injection 10 mL, PRN  acetaminophen (TYLENOL) tablet 650 mg, Q6H PRN    Or  acetaminophen (TYLENOL) suppository 650 mg, Q6H PRN  senna (SENOKOT) tablet 8.6 mg, Daily PRN  heparin (porcine) injection 5,000 Units, 3 times per day  aspirin chewable tablet 81 mg, Daily  dicyclomine (BENTYL) capsule 10 mg, BID  [Held by provider] dilTIAZem (CARDIZEM CD) extended release capsule 120 mg, Daily  escitalopram (LEXAPRO) tablet 20 mg, Nightly  pantoprazole (PROTONIX) tablet 40 mg, QAM AC  oxyCODONE-acetaminophen (PERCOCET)  MG per tablet 1 tablet, Q6H PRN  [Held by provider] propranolol (INDERAL) tablet 20 mg, Daily  traZODone (DESYREL) tablet 25 mg, Nightly  0.9 % sodium chloride infusion, Continuous  0.9 % sodium chloride infusion, Continuous        Review of Systems:   Pertinent items are noted in HPI. Remainder of a complete review of systems is (-) other than stated in the HPI    Physical exam:   Constitutional:  Elderly female in nAD  Vitals:   VITALS:  BP (!) 97/48   Pulse 65   Temp 97.6 °F (36.4 °C) (Oral)   Resp 18   Ht 5' (1.524 m)   Wt 130 lb 3.2 oz (59.1 kg)   SpO2 99%   BMI 25.43 kg/m²   24HR INTAKE/OUTPUT:    Intake/Output Summary (Last 24 hours) at 11/16/2020 1131  Last data filed at 11/16/2020 1100  Gross per 24 hour   Intake 1432 ml   Output 650 ml   Net 782 ml     URINARY CATHETER OUTPUT ([employer]):     DRAIN/TUBE OUTPUT:     VENT SETTINGS:  Vent Information  SpO2: 99 %  Additional Respiratory  Assessments  Pulse: 65  Resp: 18  SpO2: 99 %    Skin: no rash, turgor wnl  Heent:  eomi, mmm  Neck: no bruits or jvd noted  Cardiovascular: PMI not lat displaced S1, S2 with soft systolic murmur  Respiratory: CTA B without w/r/r  Abdomen:  +bs, soft, nt, nd, ostomy in  He RLQ with watery diarrhea  Ext: (-) Bilat lower extremity edema  Psychiatric: mood and affect appropriate, cr nr 2-12 grossly intact  Musculoskeletal:  Rom, muscular strength intact    Data:   Labs:  CBC:   Lab Results   Component Value Date    WBC 4.9 11/16/2020    RBC 3.55 11/16/2020    HGB 11.2 11/16/2020    HCT 36.9 11/16/2020    .9 11/16/2020    MCH 31.5 11/16/2020    MCHC 30.4 11/16/2020    RDW 13.5 11/16/2020     11/16/2020    MPV 12.2 11/16/2020     CBC with Differential:    Lab Results   Component Value Date    WBC 4.9 11/16/2020    RBC 3.55 11/16/2020    HGB 11.2 11/16/2020    HCT 36.9 11/16/2020     11/16/2020    .9 11/16/2020    MCH 31.5 11/16/2020    MCHC 30.4 11/16/2020    RDW 13.5 11/16/2020    LYMPHOPCT 31.4 11/16/2020    MONOPCT 8.4 11/16/2020    BASOPCT 0.2 11/16/2020    MONOSABS 0.41 11/16/2020    LYMPHSABS 1.53 11/16/2020    EOSABS 0.11 11/16/2020    BASOSABS 0.01 11/16/2020     Platelets:    Lab Results   Component Value Date     11/16/2020     Hemoglobin/Hematocrit:    Lab Results   Component Value Date    HGB 11.2 11/16/2020    HCT 36.9 11/16/2020     CMP:    Lab Results   Component Value Date    NA 138 11/16/2020    K 5.1 11/16/2020     11/16/2020    CO2 23 11/16/2020    BUN 60 11/16/2020    CREATININE 3.1 11/16/2020    GFRAA 18 11/16/2020    LABGLOM 15 11/16/2020    GLUCOSE 96 11/16/2020    PROT 5.8 11/16/2020    LABALBU 3.3 11/16/2020    CALCIUM 8.5 11/16/2020    BILITOT <0.2 11/16/2020    ALKPHOS 83 11/16/2020    AST 32 11/16/2020    ALT 21 11/16/2020     BMP:    Lab Results   Component Value Date     11/16/2020    K 5.1 11/16/2020     11/16/2020    CO2 23 11/16/2020    BUN 60 11/16/2020    LABALBU 3.3 11/16/2020    CREATININE 3.1 11/16/2020    CALCIUM 8.5 11/16/2020    GFRAA 18 11/16/2020    LABGLOM 15 11/16/2020    GLUCOSE 96 11/16/2020     BUN/Creatinine:    Lab Results   Component Value Date    BUN 60 11/16/2020    CREATININE 3.1 11/16/2020     Hepatic Function Panel:    Lab Results   Component Value Date    ALKPHOS 83 11/16/2020    ALT 21 11/16/2020    AST 32 11/16/2020    PROT 5.8 11/16/2020    BILITOT <0.2 11/16/2020    BILIDIR <0.2 03/26/2020    IBILI see below 03/26/2020    LABALBU 3.3 11/16/2020     Albumin:    Lab Results   Component Value Date    LABALBU 3.3 11/16/2020     Calcium:    Lab Results   Component Value Date    CALCIUM 8.5 11/16/2020     Ionized Calcium:  No results found for: IONCA  Magnesium:    Lab Results   Component Value Date    MG 1.6 09/24/2020     Phosphorus:    Lab Results   Component Value Date    PHOS 4.5 09/24/2020     LDH:  No results found for: LDH  Uric Acid:  No results found for: LABURIC, URICACID  PT/INR:    Lab Results   Component Value Date    PROTIME 12.1 06/05/2020    INR 1.0 06/05/2020     PTT:  No results found for: APTT, PTT[APTT}  Troponin:    Lab Results   Component Value Date    TROPONINI 0.07 01/25/2020     U/A:    Lab Results   Component Value Date    COLORU Yellow 09/18/2020    PROTEINU Negative 09/18/2020    PHUR 5.5 09/18/2020    LABCAST FEW 01/25/2020    WBCUA 0-1 09/18/2020    RBCUA 0-1 09/18/2020    YEAST Present 09/18/2020 BACTERIA RARE 09/18/2020    CLARITYU Clear 09/18/2020    SPECGRAV 1.025 09/18/2020    LEUKOCYTESUR Negative 09/18/2020    UROBILINOGEN 0.2 09/18/2020    BILIRUBINUR Negative 09/18/2020    BLOODU TRACE-INTACT 09/18/2020    GLUCOSEU Negative 09/18/2020     ABG:  No results found for: PH, PCO2, PO2, HCO3, BE, THGB, TCO2, O2SAT  HgBA1c:  No results found for: LABA1C  Microalbumen/Creatinine ratio:  No components found for: RUCREAT  FLP:    Lab Results   Component Value Date    TRIG 219 09/16/2020     TSH:  No results found for: TSH  VITAMIN B12: No components found for: B12  FOLATE:  No results found for: FOLATE  Iron Saturation:  No components found for: PERCENTFE  FERRITIN:  No results found for: FERRITIN  AMYLASE:  No results found for: AMYLASE  LIPASE:    Lab Results   Component Value Date    LIPASE 93 03/26/2020     24 Hour Urine for Protein:  No components found for: RAWUPRO, UHRS3, STWM04HJ, UTV3  24 Hour Urine for Creatinine Clearance:  No components found for: CREAT4, UHRS10, UTV10     Imaging:  EXAMINATION:    ONE SUPINE XRAY VIEW(S) OF THE ABDOMEN         11/16/2020 9:01 am         COMPARISON:    09/15/2020         HISTORY:    ORDERING SYSTEM PROVIDED HISTORY: Abdominal pain. TECHNOLOGIST PROVIDED HISTORY:    Reason for exam:->Abdominal pain.         FINDINGS:    The bowel gas pattern is nonobstructive. Sara Contes is relative paucity of bowel    gas.  Rounded calcifications in the left upper quadrant correspond to splenic    artery aneurysms.  The largest measures approximately 18 mm.  Posterior body    fusion hardware is seen in the thoracolumbar spine.  Mooney catheter is in    situ.              Impression    1. Nonobstructive bowel gas pattern.  Relative paucity of bowel gas. 2. Rounded calcifications in the left upper quadrant consistent with splenic    artery aneurysms.  The largest measures approximately 18 mm.           Assessment  1-Stage III KD most probably due to decreased effective renla perfusion sec to the combined effects o the GI loss and the decreased po intake  UA blood large, tr LE, , protein trace, RBC 10-20  Urine protein: Cr 0.2  PLAN:1. Check FeNa and FeUrea  2. Check Renal US    2-Hyperkalemia due to decreased distal tubule Na+ delivery limiting K+ excretion  K+ Improved  PLAN:1.Follow with hydration    3-Mixed Acid-Base Disorder with a combined Anion Gap Met Acidosis and a Met Alkalosis as shown by a Delta AG and Delta HCO3>2  Anion Gap closed  PLAN:1.Follow    4- Hypocalcemia with Hypoalbuminemia  PLAN:1. Check ionized Ca++, PTH, Vit D    5-Macrocytic Anemia  PLAN:1.  Check Vit B12, Folate, TSH, Free T4    6- Hypovolemic Hyponatremia  Resolved with IVF  PLAN:1. Follow Na+      Thank you  for allowing us to participate in care of Fermin Anderson  11:29 AM  11/16/2020

## 2020-11-16 NOTE — CONSULTS
GENERAL SURGERY  CONSULT NOTE  11/16/2020    Physician Consulted: Dr. Federico Yepez  Reason for Consult: abdominal pain   Referring Physician: Dr. Isidro Cross    EARL Gan is a 68 y.o. female who presents for evaluation of abdominal pain, nausea, vomiting. Patient states that when she gets this type of abdominal pain along with the symptoms she knows something is wrong with her kidney. When she presented she had an elevated BUN, creatinine as well as potassium level. She has known chronic kidney disease. She states that currently she feels better. She does not have any abdominal tenderness. Ostomy has liquid brown stool present in bag. KUB shows nonobstructive bowel gas patterns. The KUB also showed calcifications as well as splenic arterial aneurysm. She states that she has intermittent diarrhea and constipation with some vague abdominal pain sometimes when she eats. She states that she has had this ongoing for a long time now. She denies any fevers or chills. She has a substantial past surgical history with mesh implantation x2 as well as multiple surgeries for mesh removal and colectomy.       Past Medical History:   Diagnosis Date    Acute kidney failure ([facility] [address] 2014 and 8/2016    x2,no dialysis needed,resolved, kidney function tests returned to normal    Anxiety     Arthritis     CAD (coronary artery disease)     follows with Dr. Kaylee Kelly every 6 months    Cancer [facility])     skin, leg,nose- removed surgically     COPD (chronic obstructive pulmonary disease) ([facility] [address]     mild- no inhlaers, no oxygen     Depression     Fibromyalgia     chronic back and neck pain    Generalized headaches     h/o / daily    History of blood transfusion 3-11-14    multiple times    History of necrotic bowel 2012    Hyperlipidemia     Hypertension     Incisional hernia     abdomen    Insomnia     Mitral valve regurgitation     Myocardial infarct ([facility] [address] 2002    Occasional tremors     at hands / stable with medication    Osteopenia     PONV (postoperative nausea and vomiting)     Vitamin B12 deficiency     h/o       Past Surgical History:   Procedure Laterality Date    ABDOMEN SURGERY  2-    total colectomy, bowel resection, ileostomy,revision of ileostomy     ABDOMEN SURGERY N/A 1/26/2020    DRAINAGE OF ABDOMINAL WALL ABSCESS, EXPLANTATION OF MESH, DEBRIDEMENT OF SKIN AND SUBCUTANEOUS TISSUE performed by Nicole Bender MD at [facility] N/A 9/11/2020    EXPLANTATION OF HERNIA MESH [facility]  [facility]   [facility]    right and left thumb   [facility], 2004, 2009    lumbar fusion x 3    BACK SURGERY      cervical fusion x 2    BREAST SURGERY  years ago    monor calcifications    CARPAL TUNNEL RELEASE Bilateral     CATARACT REMOVAL WITH IMPLANT Right 03/03/15    CHOLECYSTECTOMY  5-11-07    Lap    COLONOSCOPY      CORONARY ANGIOPLASTY WITH STENT PLACEMENT  1-7-2002    ENDOSCOPY, COLON, DIAGNOSTIC      EPIGASTRIC HERNIA REPAIR  3/21/16    incisional with mesh implantation    ESOPHAGUS SURGERY  1-4-13    esophageal clamp (torn Esophagus)    FINGER TRIGGER RELEASE Right 2006    index finger    FOOT SURGERY Right     multiple    HERNIA REPAIR  12-31-13    abdominal x 5- last one 2017     ILEOSTOMY OR JEJUNOSTOMY  1-3-13    revision    INSERT PICC LINE  06/09/2020    and removed     JOINT REPLACEMENT Right 3/24/15    right total shoulder arthroplasty    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     LAPAROTOMY N/A 5/12/2020    EXPLORATORY LAPAROTOMY EXPLANTATION OF INFECTED MESH SMAL BOWEL RESECTION AND REVISION END ILEOSTOMY, LYSIS OF ADHESIONS performed by Nicole Bender MD at [address] 6/8/2020    LAPAROTOMY EXPLORATORY, Orlin Achilles OF HERNIA MESH performed by Nicole Bender MD at [address] 9/15/2020    EXPLORATORY LAPAROTOMY WITH SMALL BOWEL RESECTION, TAKE DOWN REVISION ENTEROCUTANEOUS FISTULA , OSTOMY REVISION performed by Edward Hardy MD at [address] / leg    NASAL SINUS SURGERY      x2 /  cauterized    OTHER SURGICAL HISTORY  08/24/2016    diagnostic laparotomy with repair of intraabdominal hernia    OTHER SURGICAL HISTORY      removal plate / screws  / right great toe    PICC LINE INSERTION NURSE  9/15/2020         ROTATOR CUFF REPAIR  2010    right     SKIN BIOPSY  2010    3 squamous cell carcinoma right leg, left leg       Medications Prior to Admission:    Prior to Admission medications    Medication Sig Start Date End Date Taking? Authorizing Provider   omeprazole (PRILOSEC) 20 MG delayed release capsule Take 40 mg by mouth daily   Yes Historical Provider, MD   dicyclomine (BENTYL) 10 MG capsule Take 10 mg by mouth 2 times daily   Yes Historical Provider, MD   aspirin 81 MG tablet Take 81 mg by mouth daily   Yes Historical Provider, MD   ondansetron (ZOFRAN) 4 MG tablet Take 4 mg by mouth 3 times daily    Yes Historical Provider, MD   diltiazem (CARDIZEM CD) 120 MG extended release capsule Take 1 capsule by mouth daily 7/7/18  Yes Issac Phipps MD   oxyCODONE-acetaminophen (PERCOCET)  MG per tablet Take by mouth every 6 hours as needed for Pain.  Instructed to take with sip water am of procedure if needs    Yes Historical Provider, MD   fenofibrate (TRICOR) 145 MG tablet Take 145 mg by mouth daily   Yes Historical Provider, MD   Cholecalciferol (VITAMIN D3) 02568 UNITS CAPS Take 2,000 Units by mouth 2 times daily    Yes Historical Provider, MD   Coenzyme Q10 (COQ10 PO) Take by mouth daily    Yes Historical Provider, MD   Ferrous Sulfate (IRON) 28 MG TABS Take by mouth daily   Yes Historical Provider, MD   calcium carbonate (OSCAL) 500 MG TABS tablet Take 500 mg by mouth daily LD 3/16/2017   Yes Historical Provider, MD   pravastatin (PRAVACHOL) 40 MG tablet Take 40 mg by mouth every evening   Yes Historical Provider, MD propranolol (INDERAL) 20 MG tablet Take 20 mg by mouth daily Instructed to take morning of surgery with a sip of water    Yes Historical Provider, MD   vitamin B-12 (CYANOCOBALAMIN) 500 MCG tablet Place 500 mcg under the tongue daily    Yes Historical Provider, MD   Omega-3 Fatty Acids (OMEGA 3 PO) Take by mouth daily    Yes Historical Provider, MD   escitalopram (LEXAPRO) 20 MG tablet Take 20 mg by mouth nightly. Yes Historical Provider, MD   primidone (MYSOLINE) 50 MG tablet Take 250 mg by mouth nightly Taking for essential tremors   Yes Historical Provider, MD   traZODone (DESYREL) 50 MG tablet Take 25 mg by mouth nightly    Yes Historical Provider, MD       Allergies   Allergen Reactions    Fentanyl Itching     Patch only    Levofloxacin Other (See Comments)     Leg edema/pain    Tramadol Nausea Only     Nausea . Sleeplisness, shakey    Vioxx [Rofecoxib]      Leg edema and pain       History reviewed. No pertinent family history. Social History     Tobacco Use    Smoking status: Current Every Day Smoker     Packs/day: 1.00     Types: Cigarettes    Smokeless tobacco: Never Used   Substance Use Topics    Alcohol use: Not Currently     Comment: occasional    Drug use: No         Review of Systems   General ROS: negative  Chart was reviewed, all relevant review of systems in HPI      PHYSICAL EXAM:    Vitals:    11/16/20 0800   BP: (!) 97/48   Pulse: 65   Resp: 18   Temp: 97.6 °F (36.4 °C)   SpO2: 99%       General Appearance:  awake, alert, oriented, in no acute distress  Skin:  Skin color, texture, turgor normal. No rashes or lesions. Head/face:  NCAT  Lungs:  No chest wall tenderness. Heart:  Heart regular rate and rhythm  Abdomen: Multiple old incisions, midline incision looks to be dry clean and healing well. Has ostomy that has brown liquid stool in bag.   Abdomen is soft nondistended and nontender currently  Extremities: pulses present in all extremities      LABS:    CBC  Recent Labs

## 2020-11-17 LAB
ALBUMIN SERPL-MCNC: 3 G/DL (ref 3.5–5.2)
ALP BLD-CCNC: 77 U/L (ref 35–104)
ALT SERPL-CCNC: 17 U/L (ref 0–32)
ANION GAP SERPL CALCULATED.3IONS-SCNC: 9 MMOL/L (ref 7–16)
AST SERPL-CCNC: 29 U/L (ref 0–31)
BASOPHILS ABSOLUTE: 0.02 E9/L (ref 0–0.2)
BASOPHILS RELATIVE PERCENT: 0.5 % (ref 0–2)
BILIRUB SERPL-MCNC: 0.3 MG/DL (ref 0–1.2)
BUN BLDV-MCNC: 39 MG/DL (ref 8–23)
CALCIUM IONIZED: 1.31 MMOL/L (ref 1.15–1.33)
CALCIUM SERPL-MCNC: 8.5 MG/DL (ref 8.6–10.2)
CHLORIDE BLD-SCNC: 108 MMOL/L (ref 98–107)
CO2: 21 MMOL/L (ref 22–29)
CREAT SERPL-MCNC: 1.6 MG/DL (ref 0.5–1)
EOSINOPHILS ABSOLUTE: 0.15 E9/L (ref 0.05–0.5)
EOSINOPHILS RELATIVE PERCENT: 4 % (ref 0–6)
FOLATE: 5.5 NG/ML (ref 4.8–24.2)
GFR AFRICAN AMERICAN: 38
GFR NON-AFRICAN AMERICAN: 31 ML/MIN/1.73
GLUCOSE BLD-MCNC: 79 MG/DL (ref 74–99)
HCT VFR BLD CALC: 33.9 % (ref 34–48)
HEMOGLOBIN: 10.3 G/DL (ref 11.5–15.5)
IMMATURE GRANULOCYTES #: 0.01 E9/L
IMMATURE GRANULOCYTES %: 0.3 % (ref 0–5)
LYMPHOCYTES ABSOLUTE: 1.54 E9/L (ref 1.5–4)
LYMPHOCYTES RELATIVE PERCENT: 41.5 % (ref 20–42)
MAGNESIUM: 1.2 MG/DL (ref 1.6–2.6)
MCH RBC QN AUTO: 31 PG (ref 26–35)
MCHC RBC AUTO-ENTMCNC: 30.4 % (ref 32–34.5)
MCV RBC AUTO: 102.1 FL (ref 80–99.9)
METER GLUCOSE: 124 MG/DL (ref 74–99)
METER GLUCOSE: 125 MG/DL (ref 74–99)
METER GLUCOSE: 85 MG/DL (ref 74–99)
MONOCYTES ABSOLUTE: 0.26 E9/L (ref 0.1–0.95)
MONOCYTES RELATIVE PERCENT: 7 % (ref 2–12)
NEUTROPHILS ABSOLUTE: 1.73 E9/L (ref 1.8–7.3)
NEUTROPHILS RELATIVE PERCENT: 46.7 % (ref 43–80)
PARATHYROID HORMONE INTACT: 64 PG/ML (ref 15–65)
PDW BLD-RTO: 13.6 FL (ref 11.5–15)
PHOSPHORUS: 3.2 MG/DL (ref 2.5–4.5)
PLATELET # BLD: 199 E9/L (ref 130–450)
PMV BLD AUTO: 12.7 FL (ref 7–12)
POTASSIUM REFLEX MAGNESIUM: 3.8 MMOL/L (ref 3.5–5)
RBC # BLD: 3.32 E12/L (ref 3.5–5.5)
SODIUM BLD-SCNC: 138 MMOL/L (ref 132–146)
T4 FREE: 1.26 NG/DL (ref 0.93–1.7)
TOTAL PROTEIN: 5.5 G/DL (ref 6.4–8.3)
TSH SERPL DL<=0.05 MIU/L-ACNC: 0.58 UIU/ML (ref 0.27–4.2)
VITAMIN B-12: 724 PG/ML (ref 211–946)
VITAMIN D 25-HYDROXY: 29 NG/ML (ref 30–100)
WBC # BLD: 3.7 E9/L (ref 4.5–11.5)

## 2020-11-17 PROCEDURE — 83735 ASSAY OF MAGNESIUM: CPT

## 2020-11-17 PROCEDURE — 93005 ELECTROCARDIOGRAM TRACING: CPT | Performed by: INTERNAL MEDICINE

## 2020-11-17 PROCEDURE — 6370000000 HC RX 637 (ALT 250 FOR IP): Performed by: INTERNAL MEDICINE

## 2020-11-17 PROCEDURE — 6360000002 HC RX W HCPCS: Performed by: INTERNAL MEDICINE

## 2020-11-17 PROCEDURE — 84100 ASSAY OF PHOSPHORUS: CPT

## 2020-11-17 PROCEDURE — 82607 VITAMIN B-12: CPT

## 2020-11-17 PROCEDURE — 84443 ASSAY THYROID STIM HORMONE: CPT

## 2020-11-17 PROCEDURE — 36415 COLL VENOUS BLD VENIPUNCTURE: CPT

## 2020-11-17 PROCEDURE — 83970 ASSAY OF PARATHORMONE: CPT

## 2020-11-17 PROCEDURE — 82962 GLUCOSE BLOOD TEST: CPT

## 2020-11-17 PROCEDURE — 2580000003 HC RX 258: Performed by: INTERNAL MEDICINE

## 2020-11-17 PROCEDURE — 82330 ASSAY OF CALCIUM: CPT

## 2020-11-17 PROCEDURE — 82306 VITAMIN D 25 HYDROXY: CPT

## 2020-11-17 PROCEDURE — 84439 ASSAY OF FREE THYROXINE: CPT

## 2020-11-17 PROCEDURE — 2060000000 HC ICU INTERMEDIATE R&B

## 2020-11-17 PROCEDURE — 85025 COMPLETE CBC W/AUTO DIFF WBC: CPT

## 2020-11-17 PROCEDURE — 82746 ASSAY OF FOLIC ACID SERUM: CPT

## 2020-11-17 PROCEDURE — 80053 COMPREHEN METABOLIC PANEL: CPT

## 2020-11-17 RX ORDER — MAGNESIUM SULFATE IN WATER 40 MG/ML
2 INJECTION, SOLUTION INTRAVENOUS ONCE
Status: COMPLETED | OUTPATIENT
Start: 2020-11-17 | End: 2020-11-17

## 2020-11-17 RX ADMIN — OXYCODONE AND ACETAMINOPHEN 1 TABLET: 10; 325 TABLET ORAL at 14:59

## 2020-11-17 RX ADMIN — CEFTRIAXONE 1 G: 1 INJECTION, POWDER, FOR SOLUTION INTRAMUSCULAR; INTRAVENOUS at 13:27

## 2020-11-17 RX ADMIN — PROPRANOLOL HYDROCHLORIDE 20 MG: 20 TABLET ORAL at 08:41

## 2020-11-17 RX ADMIN — ESCITALOPRAM OXALATE 20 MG: 10 TABLET ORAL at 21:30

## 2020-11-17 RX ADMIN — DICYCLOMINE HYDROCHLORIDE 10 MG: 10 CAPSULE ORAL at 08:41

## 2020-11-17 RX ADMIN — ASPIRIN 81 MG: 81 TABLET, CHEWABLE ORAL at 08:41

## 2020-11-17 RX ADMIN — PROCHLORPERAZINE MALEATE 5 MG: 5 TABLET ORAL at 08:41

## 2020-11-17 RX ADMIN — PRIMIDONE 250 MG: 250 TABLET ORAL at 21:30

## 2020-11-17 RX ADMIN — OXYCODONE AND ACETAMINOPHEN 1 TABLET: 10; 325 TABLET ORAL at 08:47

## 2020-11-17 RX ADMIN — PROCHLORPERAZINE MALEATE 5 MG: 5 TABLET ORAL at 23:43

## 2020-11-17 RX ADMIN — HEPARIN SODIUM 5000 UNITS: 10000 INJECTION INTRAVENOUS; SUBCUTANEOUS at 21:30

## 2020-11-17 RX ADMIN — PANTOPRAZOLE SODIUM 40 MG: 40 TABLET, DELAYED RELEASE ORAL at 05:21

## 2020-11-17 RX ADMIN — OXYCODONE AND ACETAMINOPHEN 1 TABLET: 10; 325 TABLET ORAL at 22:04

## 2020-11-17 RX ADMIN — SODIUM CHLORIDE: 9 INJECTION, SOLUTION INTRAVENOUS at 03:14

## 2020-11-17 RX ADMIN — HEPARIN SODIUM 5000 UNITS: 10000 INJECTION INTRAVENOUS; SUBCUTANEOUS at 13:27

## 2020-11-17 RX ADMIN — MAGNESIUM SULFATE 2 G: 2 INJECTION INTRAVENOUS at 17:24

## 2020-11-17 RX ADMIN — TRAZODONE HYDROCHLORIDE 25 MG: 50 TABLET ORAL at 21:30

## 2020-11-17 RX ADMIN — DICYCLOMINE HYDROCHLORIDE 10 MG: 10 CAPSULE ORAL at 21:30

## 2020-11-17 ASSESSMENT — PAIN SCALES - GENERAL
PAINLEVEL_OUTOF10: 4
PAINLEVEL_OUTOF10: 7
PAINLEVEL_OUTOF10: 0
PAINLEVEL_OUTOF10: 8

## 2020-11-17 ASSESSMENT — PAIN DESCRIPTION - ONSET: ONSET: GRADUAL

## 2020-11-17 ASSESSMENT — PAIN DESCRIPTION - DESCRIPTORS: DESCRIPTORS: ACHING;DISCOMFORT

## 2020-11-17 ASSESSMENT — PAIN - FUNCTIONAL ASSESSMENT: PAIN_FUNCTIONAL_ASSESSMENT: PREVENTS OR INTERFERES SOME ACTIVE ACTIVITIES AND ADLS

## 2020-11-17 ASSESSMENT — PAIN DESCRIPTION - PROGRESSION: CLINICAL_PROGRESSION: GRADUALLY WORSENING

## 2020-11-17 ASSESSMENT — ENCOUNTER SYMPTOMS
SHORTNESS OF BREATH: 0
NAUSEA: 1
DIARRHEA: 0
VOMITING: 0
COUGH: 0

## 2020-11-17 ASSESSMENT — PAIN DESCRIPTION - ORIENTATION: ORIENTATION: LOWER;MID

## 2020-11-17 ASSESSMENT — PAIN DESCRIPTION - LOCATION: LOCATION: BACK

## 2020-11-17 ASSESSMENT — PAIN DESCRIPTION - PAIN TYPE: TYPE: CHRONIC PAIN

## 2020-11-17 ASSESSMENT — PAIN DESCRIPTION - FREQUENCY: FREQUENCY: CONTINUOUS

## 2020-11-17 NOTE — PROGRESS NOTES
soft and non-distended. (Ileostomy in right lower quadrant. ). Bowel sounds are normal.   There is no abdominal tenderness. There is no rebound tenderness. There is no guarding. Extremities: Normal range of motion. There is no dependent edema. Neurological: Patient is alert and oriented to person, place and time. Skin:  Warm and dry. Labs/Imaging/Diagnostics    Labs:  CBC:  Recent Labs     11/15/20  1630 11/16/20  0812 11/17/20  0305   WBC 7.7 4.9 3.7*   RBC 4.83 3.55 3.32*   HGB 15.0 11.2* 10.3*   HCT 49.8* 36.9 33.9*   .1* 103.9* 102.1*   RDW 13.6 13.5 13.6    196 199     CHEMISTRIES:  Recent Labs     11/15/20  1630 11/16/20  0146 11/16/20  0812   *  --  138   K 6.2*  --  5.1*   CL 92*  --  106   CO2 22  --  23   BUN 68*  --  60*   CREATININE 4.5*  --  3.1*   GLUCOSE 103* 33* 96     PT/INR:No results for input(s): PROTIME, INR in the last 72 hours. APTT:No results for input(s): APTT in the last 72 hours. LIVER PROFILE:  Recent Labs     11/15/20  1630 11/16/20  0812   AST 52* 32*   ALT 32 21   BILITOT 0.4 <0.2   ALKPHOS 123* 83       Imaging Last 24 Hours:  Xr Abdomen (kub) (single Ap View)    Result Date: 11/16/2020  EXAMINATION: ONE SUPINE XRAY VIEW(S) OF THE ABDOMEN 11/16/2020 9:01 am COMPARISON: 09/15/2020 HISTORY: ORDERING SYSTEM PROVIDED HISTORY: Abdominal pain. TECHNOLOGIST PROVIDED HISTORY: Reason for exam:->Abdominal pain. FINDINGS: The bowel gas pattern is nonobstructive. There is relative paucity of bowel gas. Rounded calcifications in the left upper quadrant correspond to splenic artery aneurysms. The largest measures approximately 18 mm. Posterior body fusion hardware is seen in the thoracolumbar spine. Mooney catheter is in situ. 1. Nonobstructive bowel gas pattern. Relative paucity of bowel gas. 2. Rounded calcifications in the left upper quadrant consistent with splenic artery aneurysms. The largest measures approximately 18 mm.      Us Retroperitoneal Complete    Result Date: 11/16/2020  EXAMINATION: RETROPERITONEAL ULTRASOUND OF THE KIDNEYS AND URINARY BLADDER 11/16/2020 COMPARISON: CT of the abdomen and pelvis, 09/12/2020 HISTORY: ORDERING SYSTEM PROVIDED HISTORY: KD TECHNOLOGIST PROVIDED HISTORY: Reason for exam:->KD What reading provider will be dictating this exam?->CRC FINDINGS: Kidneys: The right kidney measures 10.1 cm in length and the left kidney measures 9.3 cm in length. Kidneys demonstrate normal cortical echogenicity. No evidence of hydronephrosis or intrarenal stones. Bilateral renal cysts are seen, with the largest along the inferior pole of the left kidney measuring 5.1 x 3.9 x 3.7 cm. Bladder: Decompressed by Mooney catheter and therefore not visualized. 1. No renal calculus or hydronephrosis. 2. Bilateral renal cysts. Assessment//Plan           Hospital Problems           Last Modified POA    Acute renal failure ([facility] [address] 11/15/2020 Yes        Assessment & Plan  1. Acute Renal Failure  2. Hyperkalemia  3. Nausea and vomiting  4. Dehydration  5. History of Infected mesh s/p 5 recent abdominal surgeries   6. Hypertension   7. [facility] Nephrology and General surgery input. Advance diet this am. Monitor kidney function closely.  Repeat EKG to monitor QT interval.     Gauri Kaiser DO    Electronically signed by Stacy Saldivar DO on 11/17/20 at 4:51 AM EST

## 2020-11-17 NOTE — PROGRESS NOTES
 ABDOMEN SURGERY  2-    total colectomy, bowel resection, ileostomy,revision of ileostomy     ABDOMEN SURGERY N/A 1/26/2020    DRAINAGE OF ABDOMINAL WALL ABSCESS, EXPLANTATION OF MESH, DEBRIDEMENT OF SKIN AND SUBCUTANEOUS TISSUE performed by Derick Temple MD at [facility] N/A 9/11/2020    EXPLANTATION OF HERNIA MESH performed by Derick Temple MD at [address]  [facility]   [address]  1-2006    right and left thumb   [facility] SURGERY  1991, 2004, 2009    lumbar fusion x 3    BACK SURGERY      cervical fusion x 2    BREAST SURGERY  years ago    monor calcifications    CARPAL TUNNEL RELEASE Bilateral     CATARACT REMOVAL WITH IMPLANT Right 03/03/15    CHOLECYSTECTOMY  5-11-07    Lap    COLONOSCOPY      CORONARY ANGIOPLASTY WITH STENT PLACEMENT  1-7-2002    ENDOSCOPY, COLON, DIAGNOSTIC      EPIGASTRIC HERNIA REPAIR  3/21/16    incisional with mesh implantation    ESOPHAGUS SURGERY  1-4-13    esophageal clamp (torn Esophagus)    FINGER TRIGGER RELEASE Right 2006    index finger    FOOT SURGERY Right     multiple    HERNIA REPAIR  12-31-13    abdominal x 5- last one 2017     ILEOSTOMY OR JEJUNOSTOMY  1-3-13    revision    INSERT PICC LINE  06/09/2020    and removed     JOINT REPLACEMENT Right 3/24/15    right total shoulder arthroplasty    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     LAPAROTOMY N/A 5/12/2020    EXPLORATORY LAPAROTOMY EXPLANTATION OF INFECTED MESH SMAL BOWEL RESECTION AND REVISION END ILEOSTOMY, LYSIS OF ADHESIONS performed by Derick Temple MD at [place] [address] N/A 6/8/2020    LAPAROTOMY EXPLORATORY, Clotilde Asp OF HERNIA MESH performed by Derick Temple MD at [place] [address] N/A 9/15/2020    EXPLORATORY LAPAROTOMY WITH SMALL BOWEL RESECTION, TAKE DOWN REVISION ENTEROCUTANEOUS FISTULA , OSTOMY REVISION performed by Derick Temple MD at [facility] / leg    NASAL SINUS SURGERY      x2 / cauterized    OTHER SURGICAL HISTORY  08/24/2016    diagnostic laparotomy with repair of intraabdominal hernia    OTHER SURGICAL HISTORY      removal plate / screws  / right great toe    PICC LINE INSERTION NURSE  9/15/2020         ROTATOR CUFF REPAIR  2010    right     SKIN BIOPSY  2010    3 squamous cell carcinoma right leg, left leg       History reviewed. No pertinent family history. reports that she has been smoking cigarettes. She has been smoking about 1.00 pack per day. She has never used smokeless tobacco. She reports previous alcohol use. She reports that she does not use drugs.     Allergies:  Fentanyl; Levofloxacin; Tramadol; and Vioxx [rofecoxib]    Current Medications:    aluminum sulfate-calcium acetate (DOMEBORO) packet 1 packet, TID PRN  trimethobenzamide (TIGAN) injection 200 mg, Q6H PRN  cefTRIAXone (ROCEPHIN) 1 g in sterile water 10 mL IV syringe, Q24H  primidone (MYSOLINE) tablet 250 mg, Nightly  prochlorperazine (COMPAZINE) tablet 5 mg, Q6H PRN  glucose (GLUTOSE) 40 % oral gel 15 g, PRN  dextrose 50 % IV solution, PRN  glucagon (rDNA) injection 1 mg, PRN  dextrose 5 % solution, PRN  trimethobenzamide (TIGAN) injection 200 mg, Once  sodium chloride flush 0.9 % injection 10 mL, 2 times per day  sodium chloride flush 0.9 % injection 10 mL, PRN  acetaminophen (TYLENOL) tablet 650 mg, Q6H PRN    Or  acetaminophen (TYLENOL) suppository 650 mg, Q6H PRN  senna (SENOKOT) tablet 8.6 mg, Daily PRN  heparin (porcine) injection 5,000 Units, 3 times per day  aspirin chewable tablet 81 mg, Daily  dicyclomine (BENTYL) capsule 10 mg, BID  [Held by provider] dilTIAZem (CARDIZEM CD) extended release capsule 120 mg, Daily  escitalopram (LEXAPRO) tablet 20 mg, Nightly  pantoprazole (PROTONIX) tablet 40 mg, QAM AC  oxyCODONE-acetaminophen (PERCOCET)  MG per tablet 1 tablet, Q6H PRN  propranolol (INDERAL) tablet 20 mg, Daily  traZODone (DESYREL) tablet 25 mg, Nightly  0.9 % sodium chloride infusion, Continuous  0.9 % sodium chloride infusion, Continuous        Review of Systems:   Pertinent items are noted in HPI. Remainder of a complete review of systems is (-) other than stated in the HPI    Physical exam:   Constitutional:  Elderly female in nAD  Vitals:   VITALS:  BP (!) 124/58   Pulse 83   Temp 98.4 °F (36.9 °C) (Temporal)   Resp 16   Ht 5' (1.524 m)   Wt 129 lb 6.4 oz (58.7 kg)   SpO2 93%   BMI 25.27 kg/m²   24HR INTAKE/OUTPUT:      Intake/Output Summary (Last 24 hours) at 11/17/2020 1338  Last data filed at 11/17/2020 1255  Gross per 24 hour   Intake 2428 ml   Output 3550 ml   Net -1122 ml     URINARY CATHETER OUTPUT (Mooney):  [REMOVED] Urethral Catheter 16 fr-Output (mL): 350 mL  DRAIN/TUBE OUTPUT:     VENT SETTINGS:  Vent Information  SpO2: 93 %  Additional Respiratory  Assessments  Pulse: 83  Resp: 16  SpO2: 93 %    Skin: no rash, turgor wnl  Heent:  eomi, mmm  Neck: no bruits or jvd noted  Cardiovascular: PMI not lat displaced S1, S2 with soft systolic murmur  Respiratory: CTA B without w/r/r  Abdomen:  +bs, soft, nt, nd, ostomy in  He RLQ with watery diarrhea  Ext: (-) Bilat lower extremity edema  Psychiatric: mood and affect appropriate, cr nr 2-12 grossly intact  Musculoskeletal:  Rom, muscular strength intact    Data:   Labs:  CBC:   Lab Results   Component Value Date    WBC 3.7 11/17/2020    RBC 3.32 11/17/2020    HGB 10.3 11/17/2020    HCT 33.9 11/17/2020    .1 11/17/2020    MCH 31.0 11/17/2020    MCHC 30.4 11/17/2020    RDW 13.6 11/17/2020     11/17/2020    MPV 12.7 11/17/2020     CBC with Differential:    Lab Results   Component Value Date    WBC 3.7 11/17/2020    RBC 3.32 11/17/2020    HGB 10.3 11/17/2020    HCT 33.9 11/17/2020     11/17/2020    .1 11/17/2020    MCH 31.0 11/17/2020    MCHC 30.4 11/17/2020    RDW 13.6 11/17/2020    LYMPHOPCT 41.5 11/17/2020    MONOPCT 7.0 11/17/2020    BASOPCT 0.5 11/17/2020    MONOSABS 0.26 11/17/2020    LYMPHSABS 1.54 11/17/2020    EOSABS 0.15 11/17/2020    BASOSABS 0.02 11/17/2020     Platelets:    Lab Results   Component Value Date     11/17/2020     Hemoglobin/Hematocrit:    Lab Results   Component Value Date    HGB 10.3 11/17/2020    HCT 33.9 11/17/2020     CMP:    Lab Results   Component Value Date     11/17/2020    K 3.8 11/17/2020     11/17/2020    CO2 21 11/17/2020    BUN 39 11/17/2020    CREATININE 1.6 11/17/2020    GFRAA 38 11/17/2020    LABGLOM 31 11/17/2020    GLUCOSE 79 11/17/2020    PROT 5.5 11/17/2020    LABALBU 3.0 11/17/2020    CALCIUM 8.5 11/17/2020    BILITOT 0.3 11/17/2020    ALKPHOS 77 11/17/2020    AST 29 11/17/2020    ALT 17 11/17/2020     BMP:    Lab Results   Component Value Date     11/17/2020    K 3.8 11/17/2020     11/17/2020    CO2 21 11/17/2020    BUN 39 11/17/2020    LABALBU 3.0 11/17/2020    CREATININE 1.6 11/17/2020    CALCIUM 8.5 11/17/2020    GFRAA 38 11/17/2020    LABGLOM 31 11/17/2020    GLUCOSE 79 11/17/2020     BUN/Creatinine:    Lab Results   Component Value Date    BUN 39 11/17/2020    CREATININE 1.6 11/17/2020     Hepatic Function Panel:    Lab Results   Component Value Date    ALKPHOS 77 11/17/2020    ALT 17 11/17/2020    AST 29 11/17/2020    PROT 5.5 11/17/2020    BILITOT 0.3 11/17/2020    BILIDIR <0.2 03/26/2020    IBILI see below 03/26/2020    LABALBU 3.0 11/17/2020     Albumin:    Lab Results   Component Value Date    LABALBU 3.0 11/17/2020     Calcium:    Lab Results   Component Value Date    CALCIUM 8.5 11/17/2020     Ionized Calcium:  No results found for: IONCA  Magnesium:    Lab Results   Component Value Date    MG 1.2 11/17/2020     Phosphorus:    Lab Results   Component Value Date    PHOS 3.2 11/17/2020     LDH:  No results found for: LDH  Uric Acid:  No results found for: LABURIC, URICACID  PT/INR:    Lab Results   Component Value Date    PROTIME 12.1 06/05/2020    INR 1.0 06/05/2020     PTT:  No results found for: APTT, PTT[APTT}  Troponin: Lab Results   Component Value Date    TROPONINI 0.07 01/25/2020     U/A:    Lab Results   Component Value Date    COLORU Yellow 11/16/2020    PROTEINU TRACE 11/16/2020    PHUR 6.0 11/16/2020    LABCAST FEW 01/25/2020    WBCUA 1-3 11/16/2020    RBCUA 10-20 11/16/2020    YEAST Present 09/18/2020    BACTERIA FEW 11/16/2020    CLARITYU Clear 11/16/2020    SPECGRAV 1.020 11/16/2020    LEUKOCYTESUR TRACE 11/16/2020    UROBILINOGEN 0.2 11/16/2020    BILIRUBINUR Negative 11/16/2020    BLOODU LARGE 11/16/2020    GLUCOSEU Negative 11/16/2020     ABG:  No results found for: PH, PCO2, PO2, HCO3, BE, THGB, TCO2, O2SAT  HgBA1c:  No results found for: LABA1C  Microalbumen/Creatinine ratio:  No components found for: RUCREAT  FLP:    Lab Results   Component Value Date    TRIG 219 09/16/2020     TSH:    Lab Results   Component Value Date    TSH 0.576 11/17/2020     VITAMIN B12: No components found for: B12  FOLATE:    Lab Results   Component Value Date    FOLATE 5.5 11/17/2020     Iron Saturation:  No components found for: PERCENTFE  FERRITIN:  No results found for: FERRITIN  AMYLASE:  No results found for: AMYLASE  LIPASE:    Lab Results   Component Value Date    LIPASE 93 03/26/2020     24 Hour Urine for Protein:  No components found for: RAWUPRO, UHRS3, ODDB35FK, UTV3  24 Hour Urine for Creatinine Clearance:  No components found for: CREAT4, UHRS10, UTV10     Imaging:  EXAMINATION:    ONE SUPINE XRAY VIEW(S) OF THE ABDOMEN         11/16/2020 9:01 am         COMPARISON:    09/15/2020         HISTORY:    ORDERING SYSTEM PROVIDED HISTORY: Abdominal pain. TECHNOLOGIST PROVIDED HISTORY:    Reason for exam:->Abdominal pain.         FINDINGS:    The bowel gas pattern is nonobstructive. [place] is relative paucity of bowel    gas.  Rounded calcifications in the left upper quadrant correspond to splenic    artery aneurysms.  The largest measures approximately 18 mm.  Posterior body    fusion hardware is seen in the thoracolumbar spine.  Gaviria catheter is in    situ.              Impression    1. Nonobstructive bowel gas pattern.  Relative paucity of bowel gas. 2. Rounded calcifications in the left upper quadrant consistent with splenic    artery aneurysms.  The largest measures approximately 18 mm. US RETROPERITONEAL COMPLETE [[id number]]      Collected: 11/16/20 1745     Updated: 11/16/20 1751     Narrative:      EXAMINATION:   RETROPERITONEAL ULTRASOUND OF THE KIDNEYS AND URINARY BLADDER   11/16/2020   COMPARISON:   CT of the abdomen and pelvis, 09/12/2020   HISTORY:   ORDERING SYSTEM PROVIDED HISTORY: KD   TECHNOLOGIST PROVIDED HISTORY:   Reason for exam:->KD   What reading provider will be dictating this exam?->CRC   FINDINGS:   Kidneys: The right kidney measures 10.1 cm in length and the left kidney measures 9.3   cm in length. Kidneys demonstrate normal cortical echogenicity.  No evidence of   hydronephrosis or intrarenal stones.  Bilateral renal cysts are seen, with   the largest along the inferior pole of the left kidney measuring 5.1 x 3.9 x   3.7 cm. Bladder:   Decompressed by Gaviria catheter and therefore not visualized. Impression:      1. No renal calculus or hydronephrosis. 2. Bilateral renal cysts. Assessment  1-Stage III KD most  due to decreased effective renal perfusion sec to the combined effects o the GI loss and the decreased po intake as evidenced by the FeNa <1% and FeUrea <35%   UA blood large, tr LE, , protein trace, RBC 10-20  Urine protein: Cr 0.2  Renal US no hydro  Cr trending down  PLAN:1.Remove gaviria  2.  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 Supplement Mg++ IV  2.  Start Vit D    5-Macrocytic Anemia  Folate 5.5, Vit B12 724, TSH 0.576 Free T4 1.26  PLAN:1. Follow H/H    6- Hypovolemic Hyponatremia  Resolved with IVF  PLAN:1. Follow Na+      Thank you  for allowing us to participate in care of Siddharth Anderson  1:38 PM  11/17/2020

## 2020-11-17 NOTE — PROGRESS NOTES
GENERAL SURGERY  DAILY PROGRESS NOTE  11/17/2020    Subjective:  Patient states she has no abdominal pain  States she has no nausea or vomiting  States that her only issue is her kidney function  Handling her diet without any issue  Stool in ostomy bag    Objective:  /60   Pulse 73   Temp 98.4 °F (36.9 °C) (Oral)   Resp 16   Ht 5' (1.524 m)   Wt 129 lb 6.4 oz (58.7 kg)   SpO2 93%   BMI 25.27 kg/m²     GENERAL:  Laying in bed, awake, alert, cooperative, no apparent distress  HEAD: Normocephalic, atraumatic  EYES: No sclera icterus, pupils equal  LUNGS:  No increased work of breathing  CARDIOVASCULAR:  RR  ABDOMEN:  Soft, non-tender, non-distended  SKIN: Warm and dry    Assessment/Plan:  68 y.o. female with right-sided abdominal pain, nausea, vomiting for the last few days    Patient denies any abdominal pain, nausea, vomiting  Patient is handling diet  No acute surgical intervention needed at this time  Renal function per primary   Please call with any questions   Discussed with Dr. Abiodun Jennings     Electronically signed by Maurilio Sykes DO on 11/17/2020 at 7:51 AM

## 2020-11-18 VITALS
RESPIRATION RATE: 16 BRPM | HEART RATE: 64 BPM | TEMPERATURE: 97.5 F | DIASTOLIC BLOOD PRESSURE: 52 MMHG | HEIGHT: 60 IN | OXYGEN SATURATION: 93 % | SYSTOLIC BLOOD PRESSURE: 127 MMHG | WEIGHT: 137.25 LBS | BODY MASS INDEX: 26.95 KG/M2

## 2020-11-18 PROBLEM — N17.9 ACUTE RENAL FAILURE (HCC): Status: RESOLVED | Noted: 2020-11-15 | Resolved: 2020-11-18

## 2020-11-18 LAB
ALBUMIN SERPL-MCNC: 3 G/DL (ref 3.5–5.2)
ALP BLD-CCNC: 78 U/L (ref 35–104)
ALT SERPL-CCNC: 15 U/L (ref 0–32)
ANION GAP SERPL CALCULATED.3IONS-SCNC: 7 MMOL/L (ref 7–16)
AST SERPL-CCNC: 26 U/L (ref 0–31)
BASOPHILS ABSOLUTE: 0.02 E9/L (ref 0–0.2)
BASOPHILS RELATIVE PERCENT: 0.5 % (ref 0–2)
BILIRUB SERPL-MCNC: 0.3 MG/DL (ref 0–1.2)
BUN BLDV-MCNC: 23 MG/DL (ref 8–23)
CALCIUM IONIZED: 1.31 MMOL/L (ref 1.15–1.33)
CALCIUM SERPL-MCNC: 8.5 MG/DL (ref 8.6–10.2)
CHLORIDE BLD-SCNC: 110 MMOL/L (ref 98–107)
CO2: 22 MMOL/L (ref 22–29)
CREAT SERPL-MCNC: 1.1 MG/DL (ref 0.5–1)
EKG ATRIAL RATE: 69 BPM
EKG P AXIS: 57 DEGREES
EKG P-R INTERVAL: 168 MS
EKG Q-T INTERVAL: 416 MS
EKG QRS DURATION: 90 MS
EKG QTC CALCULATION (BAZETT): 445 MS
EKG R AXIS: -34 DEGREES
EKG T AXIS: 56 DEGREES
EKG VENTRICULAR RATE: 69 BPM
EOSINOPHILS ABSOLUTE: 0.15 E9/L (ref 0.05–0.5)
EOSINOPHILS RELATIVE PERCENT: 3.8 % (ref 0–6)
GFR AFRICAN AMERICAN: 58
GFR NON-AFRICAN AMERICAN: 48 ML/MIN/1.73
GLUCOSE BLD-MCNC: 86 MG/DL (ref 74–99)
HCT VFR BLD CALC: 33.8 % (ref 34–48)
HEMOGLOBIN: 10.4 G/DL (ref 11.5–15.5)
IMMATURE GRANULOCYTES #: 0.01 E9/L
IMMATURE GRANULOCYTES %: 0.3 % (ref 0–5)
LYMPHOCYTES ABSOLUTE: 1.5 E9/L (ref 1.5–4)
LYMPHOCYTES RELATIVE PERCENT: 38.3 % (ref 20–42)
MAGNESIUM: 1.7 MG/DL (ref 1.6–2.6)
MCH RBC QN AUTO: 31.4 PG (ref 26–35)
MCHC RBC AUTO-ENTMCNC: 30.8 % (ref 32–34.5)
MCV RBC AUTO: 102.1 FL (ref 80–99.9)
MONOCYTES ABSOLUTE: 0.27 E9/L (ref 0.1–0.95)
MONOCYTES RELATIVE PERCENT: 6.9 % (ref 2–12)
NEUTROPHILS ABSOLUTE: 1.97 E9/L (ref 1.8–7.3)
NEUTROPHILS RELATIVE PERCENT: 50.2 % (ref 43–80)
PDW BLD-RTO: 13.6 FL (ref 11.5–15)
PHOSPHORUS: 2.8 MG/DL (ref 2.5–4.5)
PLATELET # BLD: 218 E9/L (ref 130–450)
PMV BLD AUTO: 12.3 FL (ref 7–12)
POTASSIUM REFLEX MAGNESIUM: 3.4 MMOL/L (ref 3.5–5)
RBC # BLD: 3.31 E12/L (ref 3.5–5.5)
SODIUM BLD-SCNC: 139 MMOL/L (ref 132–146)
TOTAL PROTEIN: 5.5 G/DL (ref 6.4–8.3)
URINE CULTURE, ROUTINE: NORMAL
WBC # BLD: 3.9 E9/L (ref 4.5–11.5)

## 2020-11-18 PROCEDURE — 85025 COMPLETE CBC W/AUTO DIFF WBC: CPT

## 2020-11-18 PROCEDURE — 6370000000 HC RX 637 (ALT 250 FOR IP): Performed by: INTERNAL MEDICINE

## 2020-11-18 PROCEDURE — 93010 ELECTROCARDIOGRAM REPORT: CPT | Performed by: INTERNAL MEDICINE

## 2020-11-18 PROCEDURE — 6360000002 HC RX W HCPCS: Performed by: INTERNAL MEDICINE

## 2020-11-18 PROCEDURE — 82330 ASSAY OF CALCIUM: CPT

## 2020-11-18 PROCEDURE — 36415 COLL VENOUS BLD VENIPUNCTURE: CPT

## 2020-11-18 PROCEDURE — 2580000003 HC RX 258: Performed by: INTERNAL MEDICINE

## 2020-11-18 PROCEDURE — 80053 COMPREHEN METABOLIC PANEL: CPT

## 2020-11-18 PROCEDURE — 83735 ASSAY OF MAGNESIUM: CPT

## 2020-11-18 PROCEDURE — 84100 ASSAY OF PHOSPHORUS: CPT

## 2020-11-18 RX ORDER — POTASSIUM CHLORIDE 20 MEQ/1
40 TABLET, EXTENDED RELEASE ORAL ONCE
Status: COMPLETED | OUTPATIENT
Start: 2020-11-18 | End: 2020-11-18

## 2020-11-18 RX ORDER — CALCIUM ACETATE MONOHYDRATE AND ALUMINUM SULFATE TETRADECAHYDRATE 952; 1347 MG/2299MG; MG/2299MG
POWDER, FOR SOLUTION TOPICAL
Qty: 30 PACKET | Refills: 4 | Status: SHIPPED | OUTPATIENT
Start: 2020-11-18 | End: 2020-12-18

## 2020-11-18 RX ADMIN — ASPIRIN 81 MG: 81 TABLET, CHEWABLE ORAL at 10:10

## 2020-11-18 RX ADMIN — HEPARIN SODIUM 5000 UNITS: 10000 INJECTION INTRAVENOUS; SUBCUTANEOUS at 06:15

## 2020-11-18 RX ADMIN — PROPRANOLOL HYDROCHLORIDE 20 MG: 20 TABLET ORAL at 10:11

## 2020-11-18 RX ADMIN — DILTIAZEM HYDROCHLORIDE 120 MG: 120 CAPSULE, COATED, EXTENDED RELEASE ORAL at 10:11

## 2020-11-18 RX ADMIN — OXYCODONE AND ACETAMINOPHEN 1 TABLET: 10; 325 TABLET ORAL at 13:53

## 2020-11-18 RX ADMIN — SODIUM CHLORIDE: 9 INJECTION, SOLUTION INTRAVENOUS at 03:57

## 2020-11-18 RX ADMIN — CEFTRIAXONE 1 G: 1 INJECTION, POWDER, FOR SOLUTION INTRAMUSCULAR; INTRAVENOUS at 13:53

## 2020-11-18 RX ADMIN — PANTOPRAZOLE SODIUM 40 MG: 40 TABLET, DELAYED RELEASE ORAL at 10:11

## 2020-11-18 RX ADMIN — SODIUM CHLORIDE, PRESERVATIVE FREE 10 ML: 5 INJECTION INTRAVENOUS at 13:55

## 2020-11-18 RX ADMIN — DICYCLOMINE HYDROCHLORIDE 10 MG: 10 CAPSULE ORAL at 10:10

## 2020-11-18 RX ADMIN — PROCHLORPERAZINE MALEATE 5 MG: 5 TABLET ORAL at 07:39

## 2020-11-18 RX ADMIN — OXYCODONE AND ACETAMINOPHEN 1 TABLET: 10; 325 TABLET ORAL at 07:39

## 2020-11-18 RX ADMIN — POTASSIUM CHLORIDE 40 MEQ: 20 TABLET, EXTENDED RELEASE ORAL at 11:32

## 2020-11-18 ASSESSMENT — PAIN DESCRIPTION - PROGRESSION
CLINICAL_PROGRESSION: GRADUALLY WORSENING

## 2020-11-18 ASSESSMENT — PAIN SCALES - GENERAL
PAINLEVEL_OUTOF10: 5
PAINLEVEL_OUTOF10: 0
PAINLEVEL_OUTOF10: 2
PAINLEVEL_OUTOF10: 5

## 2020-11-18 ASSESSMENT — PAIN DESCRIPTION - FREQUENCY
FREQUENCY: INTERMITTENT

## 2020-11-18 ASSESSMENT — PAIN DESCRIPTION - ONSET
ONSET: GRADUAL

## 2020-11-18 ASSESSMENT — PAIN - FUNCTIONAL ASSESSMENT
PAIN_FUNCTIONAL_ASSESSMENT: PREVENTS OR INTERFERES SOME ACTIVE ACTIVITIES AND ADLS

## 2020-11-18 ASSESSMENT — PAIN DESCRIPTION - PAIN TYPE
TYPE: CHRONIC PAIN

## 2020-11-18 ASSESSMENT — PAIN DESCRIPTION - DESCRIPTORS
DESCRIPTORS: ACHING;DISCOMFORT

## 2020-11-18 ASSESSMENT — PAIN DESCRIPTION - LOCATION
LOCATION: BACK;ABDOMEN;KNEE
LOCATION: BACK;KNEE
LOCATION: BACK;KNEE

## 2020-11-18 ASSESSMENT — ENCOUNTER SYMPTOMS
NAUSEA: 0
DIARRHEA: 0
SHORTNESS OF BREATH: 0
VOMITING: 0
COUGH: 0

## 2020-11-18 NOTE — PROGRESS NOTES
Nephrology Progress Note  Patient's Name: Sofia Szymanski  2:58 PM  11/18/2020    Nephrologist: Cecy Rich    Reason for Consult:  KD on CKD  Requesting Physician:  Danna Child DO    Chief Complaint:  Weakness    History Obtained From:  patient and past medical records    History of Present Ilness:    Sofia Szymanski is a 68 y.o. female with prior history of Stage II KD in July of 2018 sec to decreased effective renal perfusion in the setting of high Out put from the ostomy. Her baseline prior to and following the event was 0.8-0.9mg/dl. She presented to the ED 11/15/20 with the c/o of feeling weak and having paresthesias. She noted decreased output from her ostomy but that had resolved by the time she presented to the ED and she had loose green colored stools.  She also noted  being nauseated and vomited with decreased po intake    11/18/20: Pt states she feels better eager to be discharged    Past Medical History:   Diagnosis Date    Acute kidney failure ([address] 2014 and 8/2016    x2,no dialysis needed,resolved, kidney function tests returned to normal    Anxiety     Arthritis     CAD (coronary artery disease)     follows with Dr. Maranda Gramajo every 6 months    Cancer [facility])     skin, leg,nose- removed surgically     COPD (chronic obstructive pulmonary disease) ([address]     mild- no inhlaers, no oxygen     Depression     Fibromyalgia     chronic back and neck pain    Generalized headaches     h/o / daily    History of blood transfusion 3-11-14    multiple times    History of necrotic bowel 2012    Hyperlipidemia     Hypertension     Incisional hernia     abdomen    Insomnia     Mitral valve regurgitation     Myocardial infarct ([address] 2002    Occasional tremors     at hands / stable with medication    Osteopenia     PONV (postoperative nausea and vomiting)     Vitamin B12 deficiency     h/o       Past Surgical History:   Procedure Laterality Date    ABDOMEN SURGERY  2- total colectomy, bowel resection, ileostomy,revision of ileostomy     ABDOMEN SURGERY N/A 1/26/2020    DRAINAGE OF ABDOMINAL WALL ABSCESS, EXPLANTATION OF MESH, DEBRIDEMENT OF SKIN AND SUBCUTANEOUS TISSUE performed by Nicole Goodson MD at [facility] N/A 9/11/2020    EXPLANTATION OF HERNIA MESH performed by Nicole Goodson MD at [address]  [facility]   [address]  1-2006    right and left thumb   [facility]  1991, 2004, 2009    lumbar fusion x 3    BACK SURGERY      cervical fusion x 2    BREAST SURGERY  years ago    monor calcifications    CARPAL TUNNEL RELEASE Bilateral     CATARACT REMOVAL WITH IMPLANT Right 03/03/15    CHOLECYSTECTOMY  5-11-07    Lap    COLONOSCOPY      CORONARY ANGIOPLASTY WITH STENT PLACEMENT  1-7-2002    ENDOSCOPY, COLON, DIAGNOSTIC      EPIGASTRIC HERNIA REPAIR  3/21/16    incisional with mesh implantation    ESOPHAGUS SURGERY  1-4-13    esophageal clamp (torn Esophagus)    FINGER TRIGGER RELEASE Right 2006    index finger    FOOT SURGERY Right     multiple    HERNIA REPAIR  12-31-13    abdominal x 5- last one 2017     ILEOSTOMY OR JEJUNOSTOMY  1-3-13    revision    INSERT PICC LINE  06/09/2020    and removed     JOINT REPLACEMENT Right 3/24/15    right total shoulder arthroplasty    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     LAPAROTOMY N/A 5/12/2020    EXPLORATORY LAPAROTOMY EXPLANTATION OF INFECTED MESH SMAL BOWEL RESECTION AND REVISION END ILEOSTOMY, LYSIS OF ADHESIONS performed by Nicole Goodson MD at [place] [address] N/A 6/8/2020    LAPAROTOMY EXPLORATORY, [address] [address] OF HERNIA MESH performed by Nicole Goodson MD at [place] [address] N/A 9/15/2020    EXPLORATORY LAPAROTOMY WITH SMALL BOWEL RESECTION, TAKE DOWN REVISION ENTEROCUTANEOUS FISTULA , OSTOMY REVISION performed by Nicole Goodson MD at [facility] / leg    NASAL SINUS SURGERY      x2 /  cauterized    OTHER SURGICAL HISTORY  08/24/2016    diagnostic laparotomy with repair of intraabdominal hernia    OTHER SURGICAL HISTORY      removal plate / screws  / right great toe    PICC LINE INSERTION NURSE  9/15/2020         ROTATOR CUFF REPAIR  2010    right     SKIN BIOPSY  2010    3 squamous cell carcinoma right leg, left leg       History reviewed. No pertinent family history. reports that she has been smoking cigarettes. She has been smoking about 1.00 pack per day. She has never used smokeless tobacco. She reports previous alcohol use. She reports that she does not use drugs.     Allergies:  Fentanyl; Levofloxacin; Tramadol; and Vioxx [rofecoxib]    Current Medications:    aluminum sulfate-calcium acetate (DOMEBORO) packet 1 packet, TID PRN  trimethobenzamide (TIGAN) injection 200 mg, Q6H PRN  cefTRIAXone (ROCEPHIN) 1 g in sterile water 10 mL IV syringe, Q24H  primidone (MYSOLINE) tablet 250 mg, Nightly  prochlorperazine (COMPAZINE) tablet 5 mg, Q6H PRN  glucose (GLUTOSE) 40 % oral gel 15 g, PRN  dextrose 50 % IV solution, PRN  glucagon (rDNA) injection 1 mg, PRN  dextrose 5 % solution, PRN  trimethobenzamide (TIGAN) injection 200 mg, Once  sodium chloride flush 0.9 % injection 10 mL, 2 times per day  sodium chloride flush 0.9 % injection 10 mL, PRN  acetaminophen (TYLENOL) tablet 650 mg, Q6H PRN    Or  acetaminophen (TYLENOL) suppository 650 mg, Q6H PRN  senna (SENOKOT) tablet 8.6 mg, Daily PRN  heparin (porcine) injection 5,000 Units, 3 times per day  aspirin chewable tablet 81 mg, Daily  dicyclomine (BENTYL) capsule 10 mg, BID  dilTIAZem (CARDIZEM CD) extended release capsule 120 mg, Daily  escitalopram (LEXAPRO) tablet 20 mg, Nightly  pantoprazole (PROTONIX) tablet 40 mg, QAM AC  oxyCODONE-acetaminophen (PERCOCET)  MG per tablet 1 tablet, Q6H PRN  propranolol (INDERAL) tablet 20 mg, Daily  traZODone (DESYREL) tablet 25 mg, Nightly  0.9 % sodium chloride infusion, Continuous  0.9 % sodium chloride infusion, Continuous        Review of Systems:   Pertinent items are noted in HPI. Remainder of a complete review of systems is (-) other than stated in the HPI    Physical exam:   Constitutional:  Elderly female in nAD  Vitals:   VITALS:  BP (!) 127/52   Pulse 64   Temp 97.5 °F (36.4 °C) (Oral)   Resp 16   Ht 5' (1.524 m)   Wt 137 lb 4 oz (62.3 kg)   SpO2 93%   BMI 26.80 kg/m²   24HR INTAKE/OUTPUT:      Intake/Output Summary (Last 24 hours) at 11/18/2020 1458  Last data filed at 11/18/2020 1241  Gross per 24 hour   Intake 4073.7 ml   Output 3120 ml   Net 953.7 ml     URINARY CATHETER OUTPUT (Mooney):  [REMOVED] Urethral Catheter 16 fr-Output (mL): 350 mL  DRAIN/TUBE OUTPUT:     VENT SETTINGS:  Vent Information  SpO2: 93 %  Additional Respiratory  Assessments  Pulse: 64  Resp: 16  SpO2: 93 %    Skin: no rash, turgor wnl  Heent:  eomi, mmm  Neck: no bruits or jvd noted  Cardiovascular: PMI not lat displaced S1, S2 with soft systolic murmur  Respiratory: CTA B without w/r/r  Abdomen:  +bs, soft, nt, nd, ostomy in  He RLQ with watery diarrhea  Ext: (-) Bilat lower extremity edema  Psychiatric: mood and affect appropriate, cr nr 2-12 grossly intact  Musculoskeletal:  Rom, muscular strength intact    Data:   Labs:  CBC:   Lab Results   Component Value Date    WBC 3.9 11/18/2020    RBC 3.31 11/18/2020    HGB 10.4 11/18/2020    HCT 33.8 11/18/2020    .1 11/18/2020    MCH 31.4 11/18/2020    MCHC 30.8 11/18/2020    RDW 13.6 11/18/2020     11/18/2020    MPV 12.3 11/18/2020     CBC with Differential:    Lab Results   Component Value Date    WBC 3.9 11/18/2020    RBC 3.31 11/18/2020    HGB 10.4 11/18/2020    HCT 33.8 11/18/2020     11/18/2020    .1 11/18/2020    MCH 31.4 11/18/2020    MCHC 30.8 11/18/2020    RDW 13.6 11/18/2020    LYMPHOPCT 38.3 11/18/2020    MONOPCT 6.9 11/18/2020    BASOPCT 0.5 11/18/2020    MONOSABS 0.27 11/18/2020    LYMPHSABS 1.50 11/18/2020    EOSABS 0.15 11/18/2020    BASOSABS 0.02 01/25/2020     U/A:    Lab Results   Component Value Date    COLORU Yellow 11/16/2020    PROTEINU TRACE 11/16/2020    PHUR 6.0 11/16/2020    LABCAST FEW 01/25/2020    WBCUA 1-3 11/16/2020    RBCUA 10-20 11/16/2020    YEAST Present 09/18/2020    BACTERIA FEW 11/16/2020    CLARITYU Clear 11/16/2020    SPECGRAV 1.020 11/16/2020    LEUKOCYTESUR TRACE 11/16/2020    UROBILINOGEN 0.2 11/16/2020    BILIRUBINUR Negative 11/16/2020    BLOODU LARGE 11/16/2020    GLUCOSEU Negative 11/16/2020     ABG:  No results found for: PH, PCO2, PO2, HCO3, BE, THGB, TCO2, O2SAT  HgBA1c:  No results found for: LABA1C  Microalbumen/Creatinine ratio:  No components found for: RUCREAT  FLP:    Lab Results   Component Value Date    TRIG 219 09/16/2020     TSH:    Lab Results   Component Value Date    TSH 0.576 11/17/2020     VITAMIN B12: No components found for: B12  FOLATE:    Lab Results   Component Value Date    FOLATE 5.5 11/17/2020     Iron Saturation:  No components found for: PERCENTFE  FERRITIN:  No results found for: FERRITIN  AMYLASE:  No results found for: AMYLASE  LIPASE:    Lab Results   Component Value Date    LIPASE 93 03/26/2020     24 Hour Urine for Protein:  No components found for: RAWUPRO, UHRS3, FRVW27IG, UTV3  24 Hour Urine for Creatinine Clearance:  No components found for: CREAT4, UHRS10, UTV10     Imaging:  EXAMINATION:    ONE SUPINE XRAY VIEW(S) OF THE ABDOMEN         11/16/2020 9:01 am         COMPARISON:    09/15/2020         HISTORY:    ORDERING SYSTEM PROVIDED HISTORY: Abdominal pain.     TECHNOLOGIST PROVIDED HISTORY:    Reason for exam:->Abdominal pain.         FINDINGS:    The bowel gas pattern is nonobstructive. Lucetta Record is relative paucity of bowel    gas.  Rounded calcifications in the left upper quadrant correspond to splenic    artery aneurysms.  The largest measures approximately 18 mm.  Posterior body    fusion hardware is seen in the thoracolumbar spine.  Mooney catheter is in    situ.              Impression 1. Nonobstructive bowel gas pattern.  Relative paucity of bowel gas. 2. Rounded calcifications in the left upper quadrant consistent with splenic    artery aneurysms.  The largest measures approximately 18 mm. US RETROPERITONEAL COMPLETE [[id number]]      Collected: 11/16/20 1745     Updated: 11/16/20 1751     Narrative:      EXAMINATION:   RETROPERITONEAL ULTRASOUND OF THE KIDNEYS AND URINARY BLADDER   11/16/2020   COMPARISON:   CT of the abdomen and pelvis, 09/12/2020   HISTORY:   ORDERING SYSTEM PROVIDED HISTORY: KD   TECHNOLOGIST PROVIDED HISTORY:   Reason for exam:->KD   What reading provider will be dictating this exam?->CRC   FINDINGS:   Kidneys: The right kidney measures 10.1 cm in length and the left kidney measures 9.3   cm in length. Kidneys demonstrate normal cortical echogenicity.  No evidence of   hydronephrosis or intrarenal stones.  Bilateral renal cysts are seen, with   the largest along the inferior pole of the left kidney measuring 5.1 x 3.9 x   3.7 cm. Bladder:   Decompressed by Mooney catheter and therefore not visualized. Impression:      1. No renal calculus or hydronephrosis. 2. Bilateral renal cysts.           Assessment  1-Stage III KD most  due to decreased effective renal perfusion sec to the combined effects o the GI loss and the decreased po intake as evidenced by the FeNa <1% and FeUrea <35%   UA blood large, tr LE, , protein trace, RBC 10-20  Urine protein: Cr 0.2  Renal US no hydro  Cr down to 1.1mg/dl  PLAN:1.Follow Cr as an outpt-script given to pt for labs 11/23    2-Hyperkalemia due to decreased distal tubule Na+ delivery limiting K+ excretion-resolved    3-Hypokalemia in the setting of the resolving KD  PLAN:1.PO supplemented this AM    4-Mixed Acid-Base Disorder with a combined Anion Gap Met Acidosis and a Met Alkalosis as shown by a Delta AG and Delta HCO3>2  Anion Gap closed  PLAN:1.Follow    5- Hypocalcemia with Hypoalbuminemia-Hypomagnesemia and unspecified Vit D Def  Ionized Ca++ 1.31 WNL, Mg++ 1.2-->1.7 post supplement-Hypomagnesemia  PTH 64, Vit D 29  PLAN:1. Recheck as an outpt  2.  Continue Vit D    6-Macrocytic Anemia  Folate 5.5, Vit B12 724, TSH 0.576 Free T4 1.26  PLAN:1. Follow H/H    7- Hypovolemic Hyponatremia  Resolved with IVF  PLAN:1. Follow Na+      Thank you  for allowing us to participate in care of Anyabrysonfazal Remedios Anderson  2:58 PM  11/18/2020

## 2020-11-18 NOTE — DISCHARGE SUMMARY
Discharge Summary     Patient ID:  Jemima Hoffman  [id number]  68 y.o. 1940 female  Jadwin Leader Awilda DO        Admit date: 11/15/2020    Discharge date and time:  11/18/2020  10:25 AM      Activity level: as tolerated  Diet:renal  Disposition:home with home health care. Condition on Discharge:stable    Admit Diagnoses:   Patient Active Problem List   Diagnosis    Right cataract    Essential hypertension, benign    Hyperlipidemia with target LDL less than 100    Osteoarthritis, shoulder    Primary osteoarthritis of left knee    History of ischemic bowel disease    Ileostomy present ([address]    Chronic kidney disease, stage III (moderate)    COPD (chronic obstructive pulmonary disease) (HCC)    Moderate protein-calorie malnutrition (HCC)    Abdominal wall cellulitis    Cellulitis    Infected hernioplasty mesh ([address]    Enterocutaneous fistula    Mild protein-calorie malnutrition ([address]       Discharge Diagnoses: Active Problems:    * No active hospital problems. *  Resolved Problems:    Acute renal failure ([address]  1. Acute Renal failure  2. Hyperkalemia  3. Nausea and vomiting  4. Dehydration  5. History of infected mesh s/p 5 recent abdominal surgeries  6. HTN  7. HLD    Consults:  IP CONSULT TO INTERNAL MEDICINE  IP CONSULT TO NEPHROLOGY  IP CONSULT TO GENERAL SURGERY    Procedures: None     Hospital Course: Patient is a 68year old female who presented to the er due to dehydration, nausea and vomiting. Patient was found to have Acute Renal failure with Hyperkalemia. She responded to [facility] [place] as well as insulin and glucose. She was seen by nephrology and kidney function improved with IVF. General surgery was consulted due to the abdominal pain nausea and vomiting but resolved on admission with no further investigations needed. KUB showed non-obstructive bowel gas pattern. Patient was able to tolerate diet without difficulty prior to discharge.      Discharge Exam:  General Appearance: Comfortable, well-appearing and in no acute distress. Vital signs: (most recent): Blood pressure (!) 172/88, pulse 62, temperature 98.5 °F (36.9 °C), temperature source Oral, resp. rate 16, height 5' (1.524 m), weight 137 lb 4 oz (62.3 kg), SpO2 98 %. Lungs: Normal effort and normal respiratory rate. Breath sounds clear to auscultation. No rales or rhonchi. Heart: Normal rate. Regular rhythm. S1 normal and S2 normal. No murmur or friction rub. Abdomen: Abdomen is soft and non-distended. (Right lower quadrant ileostomy in place green liquid stool in bag. ). Bowel sounds are normal. There is no abdominal tenderness. There is no rebound tenderness. There is no guarding. Extremities: Normal range of motion. There is no dependent edema. Skin: Warm and dry. I/O last 3 completed shifts: In: 3297.7 [P.O.:360; I.V.:2937.7]  Out: 2850 [Urine:1200; VXPJJ:0531]  I/O this shift:  In: 120 [P.O.:120]  Out: 400 [Urine:100; Stool:300]      LABS:  Recent Labs     11/16/20  0812 11/17/20 0305 11/18/20  0712    138 139   K 5.1* 3.8 3.4*    108* 110*   CO2 23 21* 22   BUN 60* 39* 23   CREATININE 3.1* 1.6* 1.1*   GLUCOSE 96 79 86   CALCIUM 8.5* 8.5* 8.5*       Recent Labs     11/16/20  0812 11/17/20 0305 11/18/20  0712   WBC 4.9 3.7* 3.9*   RBC 3.55 3.32* 3.31*   HGB 11.2* 10.3* 10.4*   HCT 36.9 33.9* 33.8*   .9* 102.1* 102.1*   MCH 31.5 31.0 31.4   MCHC 30.4* 30.4* 30.8*   RDW 13.5 13.6 13.6    199 218   MPV 12.2* 12.7* 12.3*       Recent Labs     11/17/20  1536   GLUMET 125*       Imaging:  Xr Abdomen (kub) (single Ap View)    Result Date: 11/16/2020  EXAMINATION: ONE SUPINE XRAY VIEW(S) OF THE ABDOMEN 11/16/2020 9:01 am COMPARISON: 09/15/2020 HISTORY: ORDERING SYSTEM PROVIDED HISTORY: Abdominal pain. TECHNOLOGIST PROVIDED HISTORY: Reason for exam:->Abdominal pain. FINDINGS: The bowel gas pattern is nonobstructive. There is relative paucity of bowel gas.   Rounded calcifications in the left

## 2020-11-18 NOTE — CARE COORDINATION
Social Work/Discharge Planning:  C order noted. Called liaison Talha Patel with [address] and notified her of resume home care order. Will continue to follow. Electronically signed by CASSI Gastelum on 11/18/2020 at 9:45 AM    Addendum:  Katrin Genao with [address] of discharge order.   Electronically signed by CASSI Gastelum on 11/18/2020 at 1:57 PM

## 2020-11-18 NOTE — PROGRESS NOTES
Progress Note  Date:2020       IOZO:[id number]  Patient Name:Bea Kelley     YOB: 1940     Age:80 y.o. Patient feeling well this am. She states that she is tolerating her diet well. No further episodes of abdominal pain, nausea or vomiting. She denies any chest pains, shortness of breath or lightheadedness. Ostomy function normally. Subjective    Subjective:  Symptoms:  No shortness of breath, cough, chest pain, headache, chest pressure or diarrhea. Diet:  No nausea or vomiting. Review of Systems   Respiratory: Negative for cough and shortness of breath. Cardiovascular: Negative for chest pain. Gastrointestinal: Negative for diarrhea, nausea and vomiting. Objective         Vitals Last 24 Hours:  TEMPERATURE:  Temp  Av.2 °F (36.8 °C)  Min: 97.8 °F (36.6 °C)  Max: 98.5 °F (36.9 °C)  RESPIRATIONS RANGE: Resp  Av  Min: 16  Max: 16  PULSE OXIMETRY RANGE: SpO2  Av %  Min: 93 %  Max: 98 %  PULSE RANGE: Pulse  Av.8  Min: 60  Max: 83  BLOOD PRESSURE RANGE: Systolic (68GTL), TTA:259 , Min:124 , PFM:320   ; Diastolic (62NTD), ENX:72, Min:58, Max:88    I/O (24Hr): Intake/Output Summary (Last 24 hours) at 2020 0704  Last data filed at 2020 0617  Gross per 24 hour   Intake 3297.7 ml   Output 2850 ml   Net 447.7 ml     Objective:  General Appearance:  Comfortable, well-appearing and in no acute distress. Vital signs: (most recent): Blood pressure (!) 172/88, pulse 62, temperature 98.5 °F (36.9 °C), temperature source Oral, resp. rate 16, height 5' (1.524 m), weight 137 lb 4 oz (62.3 kg), SpO2 98 %. Lungs:  Normal effort and normal respiratory rate. Breath sounds clear to auscultation. No rales or rhonchi. Heart: Normal rate. Regular rhythm. S1 normal and S2 normal.  No murmur or friction rub. Abdomen: Abdomen is soft and non-distended. (Right lower quadrant ileostomy in place green liquid stool in bag. ).   Bowel sounds are normal.   There is no abdominal tenderness. There is no rebound tenderness. There is no guarding. Extremities: Normal range of motion. There is no dependent edema. Skin:  Warm and dry. Labs/Imaging/Diagnostics    Labs:  CBC:  Recent Labs     11/15/20  1630 11/16/20  0812 11/17/20  0305   WBC 7.7 4.9 3.7*   RBC 4.83 3.55 3.32*   HGB 15.0 11.2* 10.3*   HCT 49.8* 36.9 33.9*   .1* 103.9* 102.1*   RDW 13.6 13.5 13.6    196 199     CHEMISTRIES:  Recent Labs     11/15/20  1630 11/16/20  0146 11/16/20  0812 11/17/20  0305   *  --  138 138   K 6.2*  --  5.1* 3.8   CL 92*  --  106 108*   CO2 22  --  23 21*   BUN 68*  --  60* 39*   CREATININE 4.5*  --  3.1* 1.6*   GLUCOSE 103* 33* 96 79   PHOS  --   --   --  3.2   MG  --   --   --  1.2*     PT/INR:No results for input(s): PROTIME, INR in the last 72 hours. APTT:No results for input(s): APTT in the last 72 hours. LIVER PROFILE:  Recent Labs     11/15/20  1630 11/16/20  0812 11/17/20  0305   AST 52* 32* 29   ALT 32 21 17   BILITOT 0.4 <0.2 0.3   ALKPHOS 123* 83 77       Imaging Last 24 Hours:  Xr Abdomen (kub) (single Ap View)    Result Date: 11/16/2020  EXAMINATION: ONE SUPINE XRAY VIEW(S) OF THE ABDOMEN 11/16/2020 9:01 am COMPARISON: 09/15/2020 HISTORY: ORDERING SYSTEM PROVIDED HISTORY: Abdominal pain. TECHNOLOGIST PROVIDED HISTORY: Reason for exam:->Abdominal pain. FINDINGS: The bowel gas pattern is nonobstructive. There is relative paucity of bowel gas. Rounded calcifications in the left upper quadrant correspond to splenic artery aneurysms. The largest measures approximately 18 mm. Posterior body fusion hardware is seen in the thoracolumbar spine. Mooney catheter is in situ. 1. Nonobstructive bowel gas pattern. Relative paucity of bowel gas. 2. Rounded calcifications in the left upper quadrant consistent with splenic artery aneurysms. The largest measures approximately 18 mm.      Us Retroperitoneal Complete    Result Date: 11/16/2020  EXAMINATION: RETROPERITONEAL ULTRASOUND OF THE KIDNEYS AND URINARY BLADDER 11/16/2020 COMPARISON: CT of the abdomen and pelvis, 09/12/2020 HISTORY: ORDERING SYSTEM PROVIDED HISTORY: KD TECHNOLOGIST PROVIDED HISTORY: Reason for exam:->KD What reading provider will be dictating this exam?->CRC FINDINGS: Kidneys: The right kidney measures 10.1 cm in length and the left kidney measures 9.3 cm in length. Kidneys demonstrate normal cortical echogenicity. No evidence of hydronephrosis or intrarenal stones. Bilateral renal cysts are seen, with the largest along the inferior pole of the left kidney measuring 5.1 x 3.9 x 3.7 cm. Bladder: Decompressed by Mooney catheter and therefore not visualized. 1. No renal calculus or hydronephrosis. 2. Bilateral renal cysts. Assessment//Plan           Hospital Problems           Last Modified POA    Acute renal failure ([facility] 11/15/2020 Yes        Assessment & Plan  1. Acute Renal Failure  2. Hyperkalemia  3. Nausea and vomiting  4. Dehydration  5. History of Infected mesh s/p 5 recent abdominal surgeries   6. Hypertension   7. HLD    Kidney function improving. Await labs this am but likely discharge later today.      Estrella Gaffney DO    Electronically signed by Estrella Gaffney DO on 11/18/20 at 7:04 AM EST

## 2020-11-19 ENCOUNTER — CARE COORDINATION (OUTPATIENT)
Dept: CASE MANAGEMENT | Age: 80
End: 2020-11-19

## 2020-11-19 NOTE — CARE COORDINATION
[facility] Transitions Initial Follow Up Call    Call within 2 business days of discharge: Yes    Patient: Shai Borja Patient : 1940   MRN: [id number]  Reason for Admission: hyperkalemia   Discharge Date: 20 RARS: Readmission Risk Score: 38      Last Discharge [facility]       Complaint Diagnosis Description Type Department Provider    11/15/20 Fatigue; Other; Nausea; Emesis; Abdominal Pain Hyperkalemia . .. ED to Hosp-Admission (Discharged) (ADMITTED) ANITA Kaiser DO; Ramona Schmitz. .. Facility: [facility]      First attempt to reach the patient for [facility] Care Transition call post hospital discharge, no answer or option to leave a message after multiple rings. Spoke with Marcia Short at [facility], she stated they received everything and will be contacting Deliliah Councilman. Follow Up  No future appointments.     Deidra Rubio RN

## 2020-11-20 ENCOUNTER — CARE COORDINATION (OUTPATIENT)
Dept: CASE MANAGEMENT | Age: 80
End: 2020-11-20

## 2020-12-01 ENCOUNTER — CARE COORDINATION (OUTPATIENT)
Dept: CASE MANAGEMENT | Age: 80
End: 2020-12-01

## 2020-12-01 NOTE — CARE COORDINATION
[facility] Transitions Follow Up Call    2020    Patient: Jossie Peres  Patient : 1940   MRN: [id number]  Reason for Admission:   Discharge Date: 20 RARS: Readmission Risk Score: 38       Attempted to reach patient by phone for follow up; BPCI-A. HIPAA compliant message left on patient's voicemail; CTN contact information provided.      Argenis Chaidez RN

## 2020-12-08 ENCOUNTER — CARE COORDINATION (OUTPATIENT)
Dept: CARE COORDINATION | Age: 80
End: 2020-12-08

## 2020-12-08 ENCOUNTER — CARE COORDINATION (OUTPATIENT)
Dept: CASE MANAGEMENT | Age: 80
End: 2020-12-08

## 2020-12-08 NOTE — CARE COORDINATION
[facility] Transitions Bundled Payments for Care Improvement (BPCI) Follow Up Call  Qualifying Diagnosis of Qualifying Diagnosis Renal Failure   12/8/2020  Patient Name:  Erasmo Lamb   YOB: 1940  Discharge Date:  11/18/20  RARS:  Readmission Risk Score: 38    PCP:  Chiki Kaiser,     Assessment:   Disorientation/Confusion: denies   Nausea, Vomiting, or Diarrhea:  ileostomy, some nausea   Shortness of Breath: denies   Muscle Pain: denies   Sweaty Skin: denies   Dizziness or Fainting, recent falls: denies   Decrease in Urination: denies   Fever >101 degrees F: denies  [address],  Active: [address] Medication needs/Questions: denies   Transportation Need:  denies   Live Alone: spouse    Ability to [employer], [place]: ok   Follow Up Appointment Scheduled: yes, tele visit 12/11/2020   Do you feel like you have everything you need to stay well at home? yes   Agreeable to ongoing Care Transition Communications: yes  Care Transitions will continue to follow per [facility] Program.  Tristan Ba RN, CTN    Follow Up Concerns: nausea  No future appointments.

## 2020-12-08 NOTE — CARE COORDINATION
[facility] Transitions Follow Up Call    2020    Patient: Sofia Szymanski  Patient : 1940   MRN: <[id number]>  Reason for Admission:   Discharge Date: 20 RARS: Readmission Risk Score: 45         Spoke with: Attempted to contact patient for follow up BPCI-A transition call. No answer - unable to leave message. Will continue to follow. Ashley Richardson LPN    [phone]  [facility] / [facility] Transitions Subsequent and Final Call    Subsequent and Final Calls  Care Transitions Interventions  Other Interventions: Follow Up  No future appointments.     Ashley Richardson LPN

## 2020-12-22 ENCOUNTER — CARE COORDINATION (OUTPATIENT)
Dept: CASE MANAGEMENT | Age: 80
End: 2020-12-22

## 2020-12-22 NOTE — CARE COORDINATION
[facility] Transitions Follow Up Call    2020    Patient: Adelfa Dancer  Patient : 1940   MRN: [id number]  Reason for Admission:   Discharge Date: 20 RARS: Readmission Risk Score: 38      Attempted to reach patient by phone for follow up; BPCI-A. Unable to reach patient; no answer. Will attempt to contact patient at a later time.        Mandi Chirinos RN

## 2020-12-30 ENCOUNTER — CARE COORDINATION (OUTPATIENT)
Dept: CASE MANAGEMENT | Age: 80
End: 2020-12-30

## 2021-01-06 ENCOUNTER — CARE COORDINATION (OUTPATIENT)
Dept: CASE MANAGEMENT | Age: 81
End: 2021-01-06

## 2021-01-06 NOTE — CARE COORDINATION
[facility] Transitions Follow Up Call    2021    Patient: Kishore Rueda  Patient : 1940   MRN: <[id number]>  Reason for Admission: Weakness   Discharge Date: 20 RARS: Readmission Risk Score: 45         Spoke with: Leonardo Coleman with patient who reported she is doing ok. Patient reports she continues to have issues with her ostomy bag popping off throughout the night. Patient reports she plans to follow up with [facility] to discuss the possibility of a different appliance. Patient denied any other issues or concerns at this time. CTN advised patient of use of urgent care or physicians 24 hr access line if assistance is needed after hours. Also advised that they can always contact their home care provider to request a nurse visit even if it isn't their regularly scheduled day for their nurse to visit. Care Transitions Subsequent and Final Call    Subsequent and Final Calls  Do you have any ongoing symptoms?: No  Have your medications changed?: No  Do you have any questions related to your medications?: No  Do you currently have any active services?: Yes  Are you currently active with any services?: Home Health  Do you have any needs or concerns that I can assist you with?: No  Identified Barriers: None  Care Transitions Interventions  Other Interventions: Follow Up  No future appointments.     January Marc RN

## 2021-01-08 ENCOUNTER — HOSPITAL ENCOUNTER (OUTPATIENT)
Dept: WOUND CARE | Age: 81
Discharge: HOME | End: 2021-01-08
Payer: MEDICARE

## 2021-01-08 PROCEDURE — 99203 OFFICE O/P NEW LOW 30 MIN: CPT

## 2021-01-08 NOTE — FLOWSHEET NOTE
Clinical Level of Care Assessment    Outpatient Ostomy Care      NAME:  Flavia Kelley  YOB: 1940  MEDICAL RECORD NUMBER:  [id number]   DATE:  1/8/2021      Patient Amber Delacruz Assessment- Document in Flowsheet I&O   Points   Review of chart []   0   Assess Complete Ostomy tab in Navigator for assessment of; stoma status, peristomal skin, presence of hernia/stool consistency/diet/related medications   Simple adjustments to pouch size/pouch system, new stoma pattern, accessory addition/deletion. []   1   Assess Complete Ostomy tab in Navigator for assessment of; stoma status, peristomal skin, presence of hernia/stool consistency/diet/related medications   Moderate adjustments to pouch size/pouch system, new stoma pattern, accessory addition/deletion. Observe patient/caregiver with hands-on care. 1-2 adjustments to pouch size/system/skin care/accessory addition or deletion. [x]   2   Assess Complete Ostomy tab in Navigator for assessment of; stoma status, peristomal skin, presence of hernia/stool consistency/diet/related medications   Complex adjustments to pouch size/pouch system, new stoma pattern, accessory addition/deletion. 3 or more complex adjustments to pouch size/system/skin care/accessory addition or deletion. Observe patient/caregiver with hands-on care. Assess patient/patient abdomen for optimal pre-marked stoma site. Assess patient abdomen for type of hernia belt/accessory needed. []   3         Ambulation Status Documented in CN Clinical Note  Status Definition Points   Independent Independently able to ambulate. Fully able (without any assistance) to get on/off exam table/chair. []   0   Minimal Physical Assistance Requires physical assistance of one person to ambulate and/or position patient to be examined. Includes necessary physical assistance to position lower extremities on/off stool.    [x]   1   Moderate Physical Assistance Requires at least one staff member to physically assist patient in ambulating into treatment room, and/or on off chair/bed. Requires assistance to bathroom. []   2   Full Assistance Requires assistance of at least two staff members to transfer patient into treatment room and/or on/off bed/chair. \"Total Transfer\". Unable to use bathroom requires bedside commode and/or bedpan []   3       Teaching Effort Documented in [facility] Clinical Note  Effort Definition Points   No Teaching  []   0   General Initial/Simple lesson:  Assess readiness to learn, assess patient learning style to determine educational flow/special needs for learning. Teaching related to 1-3 topics  Documentation in [facility] completed. []   1   Intermediate Assess readiness to learn, assess patient learning style to determine educational flow/special needs for learning. Teaching related to 3-4 topics. Hernia belt application and care considerations  Documentation in [facility] completed. [x]   2   Complex Assess readiness to learn, assess patient learning style to determine educational flow/special needs for learning. Teaching of greater than 5 additional topics   Pre-operative ostomy education with review of written resources for patient/family/caregiver as needed. Demonstration/return demonstration of ostomy irrigation  Documentation in [facility] completed. []   3     Patient Assessment and Planning in [facility] Clinical Note   Planning Definition Points   Simple Simple pouch change procedure completed and reviewed with patient/family/caregiver   Documentation in [facility] completed. []   1   Intermediate Moderate level of follow-up needs:   Pouch change/discharge procedure revised and reviewed with patient/caregiver. Communications with outside resources; i.e. Telephone calls to Surgeon/ PCP, family/caregiver, home health, ECF. Documentation in [facility] completed.      [x]   2   Complex Complex level of instructions/changes:   Family/Caregiver learning/demonstration/return demonstration visit. Pouching/discharge procedure revised/reviewed with patient/family/caregiver. Contact with outside resources; i.e. communication with Surgeon/ PCP, home health, ECF. Contact/referral to ostomy appliance supplier for new or additional products. Review when to call [facility] or schedule follow-up visit. Referral to Emergency Department   Documentation in [facility] completed. []   3       Is this the Patient's First Visit with [facility] @ [facility]? Yes    Is this Patient Established to this [facility] within the last 3 years? Yes             Clinical Level of Care      Points  0-3  Level 1 []     Points  4-6  Level 2 []     Points  7-8  Level 3 [x]     Points  9-10  Level 4 []     Points  11-12  Level 5 []        01/08/21 1400   Ileostomy Ileostomy RLQ   Placement Date/Time: 09/17/20 1305   Pre-existing: No  Inserted by: Dr. Taran Bean  Ileostomy Type: Ileostomy  Location: RLQ   Stomal Appliance 1 piece; Changed  (with convexity)   Stoma  Assessment Prolapse;Moist;Red   Peristomal Assessment   (slightly red and raw)   Treatment Bag change;Stoma powder  (skin care, skin prep)   Stool Color Brown   Stool Appearance Soft   Stool Amount Small   patient using a 2 piece crys pouch - too stiff  Patient sits a lot and lays in a ball when sleeping  Recommend one piece convex  Provided pattern  Pre cut pouch patient was using is to small  reviewed application technique  Product provided- Convex pouch, barrier ring, belt, barrier strips  Will follow up next week to determine if pouching system lasted      Electronically signed by Carolina Huff RN on 1/8/2021 at 3:01 PM

## 2021-01-12 ENCOUNTER — HOSPITAL ENCOUNTER (OUTPATIENT)
Dept: WOUND CARE | Age: 81
Discharge: HOME | End: 2021-01-12
Payer: MEDICARE

## 2021-01-12 PROCEDURE — 99212 OFFICE O/P EST SF 10 MIN: CPT

## 2021-01-12 NOTE — FLOWSHEET NOTE
Clinical Level of Care Assessment    Outpatient Ostomy Care      NAME:  Emmanuel Kelley  YOB: 1940  MEDICAL RECORD NUMBER:  [id number]   DATE:  1/12/2021      Patient Hermilo Kimball Assessment- Document in Flowsheet I&O   Points   Review of chart []   0   Assess Complete Ostomy tab in Navigator for assessment of; stoma status, peristomal skin, presence of hernia/stool consistency/diet/related medications   Simple adjustments to pouch size/pouch system, new stoma pattern, accessory addition/deletion. []   1   Assess Complete Ostomy tab in Navigator for assessment of; stoma status, peristomal skin, presence of hernia/stool consistency/diet/related medications   Moderate adjustments to pouch size/pouch system, new stoma pattern, accessory addition/deletion. Observe patient/caregiver with hands-on care. 1-2 adjustments to pouch size/system/skin care/accessory addition or deletion. [x]   2   Assess Complete Ostomy tab in Navigator for assessment of; stoma status, peristomal skin, presence of hernia/stool consistency/diet/related medications   Complex adjustments to pouch size/pouch system, new stoma pattern, accessory addition/deletion. 3 or more complex adjustments to pouch size/system/skin care/accessory addition or deletion. Observe patient/caregiver with hands-on care. Assess patient/patient abdomen for optimal pre-marked stoma site. Assess patient abdomen for type of hernia belt/accessory needed. []   3         Ambulation Status Documented in CN Clinical Note  Status Definition Points   Independent Independently able to ambulate. Fully able (without any assistance) to get on/off exam table/chair. []   0   Minimal Physical Assistance Requires physical assistance of one person to ambulate and/or position patient to be examined. Includes necessary physical assistance to position lower extremities on/off stool.    [x]   1   Moderate Physical Assistance Requires at least one staff member to demonstration visit. Pouching/discharge procedure revised/reviewed with patient/family/caregiver. Contact with outside resources; i.e. communication with Surgeon/ PCP, home health, ECF. Contact/referral to ostomy appliance supplier for new or additional products. Review when to call [facility] or schedule follow-up visit. Referral to Emergency Department   Documentation in [facility] completed. []   3       Is this the Patient's First Visit with [facility] @ [facility]? No    Is this Patient Established to this [facility] within the last 3 years? Yes             Clinical Level of Care      Points  0-3  Level 1 []     Points  4-6  Level 2 [x]     Points  7-8  Level 3 []     Points  9-10  Level 4 []     Points  11-12  Level 5 []        01/12/21 1400   Ileostomy Ileostomy RLQ   Placement Date/Time: 09/17/20 1305   Pre-existing: No  Inserted by: Dr. Dilshad Rae  Ileostomy Type: Ileostomy  Location: RLQ   Stomal Appliance 1 piece; Changed  (with convexity)   Stoma  Assessment Protrudes; Moist;Red   Peristomal Assessment   (slightly red)   Treatment Stoma powder;Bag change  (skin care, barrier ring, barrier strips)   Stool Color Brown   Stool Appearance Loose   Stool Amount Small   patient states pouch applied Saturday lasted till present  Pouch removed, no signs of leakage  Pouch applied  Called [employer] and helped patient order supplies  Will follow prn  Electronically signed by Jamir De Santiago RN on 1/12/2021 at 4:23 PM

## 2021-01-13 ENCOUNTER — CARE COORDINATION (OUTPATIENT)
Dept: CASE MANAGEMENT | Age: 81
End: 2021-01-13

## 2021-01-21 ENCOUNTER — HOSPITAL ENCOUNTER (OUTPATIENT)
Age: 81
Setting detail: OBSERVATION
Discharge: HOME | End: 2021-01-22
Attending: EMERGENCY MEDICINE | Admitting: INTERNAL MEDICINE
Payer: MEDICARE

## 2021-01-21 ENCOUNTER — APPOINTMENT (OUTPATIENT)
Dept: CT IMAGING | Age: 81
End: 2021-01-21
Payer: MEDICARE

## 2021-01-21 DIAGNOSIS — N17.9 AKI (ACUTE KIDNEY INJURY) (HCC): Primary | ICD-10-CM

## 2021-01-21 DIAGNOSIS — E87.5 HYPERKALEMIA: ICD-10-CM

## 2021-01-21 LAB
ALBUMIN SERPL-MCNC: 4.5 G/DL (ref 3.5–5.2)
ALP BLD-CCNC: 168 U/L (ref 35–104)
ALT SERPL-CCNC: 22 U/L (ref 0–32)
ANION GAP SERPL CALCULATED.3IONS-SCNC: 10 MMOL/L (ref 7–16)
ANION GAP SERPL CALCULATED.3IONS-SCNC: 11 MMOL/L (ref 7–16)
APTT: 29.6 SEC (ref 24.5–35.1)
AST SERPL-CCNC: 29 U/L (ref 0–31)
BACTERIA: ABNORMAL /HPF
BASOPHILS ABSOLUTE: 0.04 E9/L (ref 0–0.2)
BASOPHILS RELATIVE PERCENT: 0.4 % (ref 0–2)
BILIRUB SERPL-MCNC: 0.4 MG/DL (ref 0–1.2)
BILIRUBIN DIRECT: <0.2 MG/DL (ref 0–0.3)
BILIRUBIN URINE: NEGATIVE
BILIRUBIN, INDIRECT: ABNORMAL MG/DL (ref 0–1)
BLOOD, URINE: NEGATIVE
BUN BLDV-MCNC: 48 MG/DL (ref 8–23)
BUN BLDV-MCNC: 53 MG/DL (ref 8–23)
CALCIUM SERPL-MCNC: 10 MG/DL (ref 8.6–10.2)
CALCIUM SERPL-MCNC: 8.9 MG/DL (ref 8.6–10.2)
CHLORIDE BLD-SCNC: 105 MMOL/L (ref 98–107)
CHLORIDE BLD-SCNC: 110 MMOL/L (ref 98–107)
CHLORIDE URINE RANDOM: <20 MMOL/L
CLARITY: CLEAR
CO2: 21 MMOL/L (ref 22–29)
CO2: 22 MMOL/L (ref 22–29)
COLOR: YELLOW
CREAT SERPL-MCNC: 1.8 MG/DL (ref 0.5–1)
CREAT SERPL-MCNC: 1.9 MG/DL (ref 0.5–1)
CREATININE URINE: 167 MG/DL (ref 29–226)
EKG ATRIAL RATE: 82 BPM
EKG P AXIS: 82 DEGREES
EKG P-R INTERVAL: 158 MS
EKG Q-T INTERVAL: 370 MS
EKG QRS DURATION: 80 MS
EKG QTC CALCULATION (BAZETT): 432 MS
EKG R AXIS: -58 DEGREES
EKG T AXIS: 66 DEGREES
EKG VENTRICULAR RATE: 82 BPM
EOSINOPHILS ABSOLUTE: 1.19 E9/L (ref 0.05–0.5)
EOSINOPHILS RELATIVE PERCENT: 12.2 % (ref 0–6)
EPITHELIAL CELLS, UA: ABNORMAL /HPF
GFR AFRICAN AMERICAN: 31
GFR AFRICAN AMERICAN: 33
GFR NON-AFRICAN AMERICAN: 26 ML/MIN/1.73
GFR NON-AFRICAN AMERICAN: 27 ML/MIN/1.73
GLUCOSE BLD-MCNC: 113 MG/DL (ref 74–99)
GLUCOSE BLD-MCNC: 128 MG/DL (ref 74–99)
GLUCOSE URINE: NEGATIVE MG/DL
HCT VFR BLD CALC: 45.4 % (ref 34–48)
HEMOGLOBIN: 14 G/DL (ref 11.5–15.5)
IMMATURE GRANULOCYTES #: 0.03 E9/L
IMMATURE GRANULOCYTES %: 0.3 % (ref 0–5)
INR BLD: 1.2
KETONES, URINE: NEGATIVE MG/DL
LACTIC ACID: 1.5 MMOL/L (ref 0.5–2.2)
LEUKOCYTE ESTERASE, URINE: NEGATIVE
LIPASE: 62 U/L (ref 13–60)
LYMPHOCYTES ABSOLUTE: 0.96 E9/L (ref 1.5–4)
LYMPHOCYTES RELATIVE PERCENT: 9.8 % (ref 20–42)
MAGNESIUM: 1.5 MG/DL (ref 1.6–2.6)
MAGNESIUM: 1.6 MG/DL (ref 1.6–2.6)
MCH RBC QN AUTO: 31.4 PG (ref 26–35)
MCHC RBC AUTO-ENTMCNC: 30.8 % (ref 32–34.5)
MCV RBC AUTO: 101.8 FL (ref 80–99.9)
MONOCYTES ABSOLUTE: 0.35 E9/L (ref 0.1–0.95)
MONOCYTES RELATIVE PERCENT: 3.6 % (ref 2–12)
NEUTROPHILS ABSOLUTE: 7.22 E9/L (ref 1.8–7.3)
NEUTROPHILS RELATIVE PERCENT: 73.7 % (ref 43–80)
NITRITE, URINE: NEGATIVE
PDW BLD-RTO: 14.6 FL (ref 11.5–15)
PH UA: 6 (ref 5–9)
PLATELET # BLD: 294 E9/L (ref 130–450)
PMV BLD AUTO: 12.8 FL (ref 7–12)
POTASSIUM SERPL-SCNC: 4.9 MMOL/L (ref 3.5–5)
POTASSIUM SERPL-SCNC: 5.4 MMOL/L (ref 3.5–5)
POTASSIUM, UR: 55.1 MMOL/L
PROTEIN PROTEIN: 42 MG/DL (ref 0–12)
PROTEIN UA: 30 MG/DL
PROTHROMBIN TIME: 13.5 SEC (ref 9.3–12.4)
RBC # BLD: 4.46 E12/L (ref 3.5–5.5)
RBC UA: ABNORMAL /HPF (ref 0–2)
SODIUM BLD-SCNC: 137 MMOL/L (ref 132–146)
SODIUM BLD-SCNC: 142 MMOL/L (ref 132–146)
SODIUM URINE: <20 MMOL/L
SPECIFIC GRAVITY UA: 1.02 (ref 1–1.03)
TOTAL PROTEIN: 8.2 G/DL (ref 6.4–8.3)
TROPONIN: 0.01 NG/ML (ref 0–0.03)
UROBILINOGEN, URINE: 0.2 E.U./DL
WBC # BLD: 9.8 E9/L (ref 4.5–11.5)
WBC UA: ABNORMAL /HPF (ref 0–5)

## 2021-01-21 PROCEDURE — 84300 ASSAY OF URINE SODIUM: CPT

## 2021-01-21 PROCEDURE — 6370000000 HC RX 637 (ALT 250 FOR IP): Performed by: INTERNAL MEDICINE

## 2021-01-21 PROCEDURE — 80076 HEPATIC FUNCTION PANEL: CPT

## 2021-01-21 PROCEDURE — 83605 ASSAY OF LACTIC ACID: CPT

## 2021-01-21 PROCEDURE — 85025 COMPLETE CBC W/AUTO DIFF WBC: CPT

## 2021-01-21 PROCEDURE — 96374 THER/PROPH/DIAG INJ IV PUSH: CPT

## 2021-01-21 PROCEDURE — 83735 ASSAY OF MAGNESIUM: CPT

## 2021-01-21 PROCEDURE — 2580000003 HC RX 258: Performed by: NURSE PRACTITIONER

## 2021-01-21 PROCEDURE — 74176 CT ABD & PELVIS W/O CONTRAST: CPT

## 2021-01-21 PROCEDURE — 96375 TX/PRO/DX INJ NEW DRUG ADDON: CPT

## 2021-01-21 PROCEDURE — 82436 ASSAY OF URINE CHLORIDE: CPT

## 2021-01-21 PROCEDURE — 84156 ASSAY OF PROTEIN URINE: CPT

## 2021-01-21 PROCEDURE — 85610 PROTHROMBIN TIME: CPT

## 2021-01-21 PROCEDURE — 81001 URINALYSIS AUTO W/SCOPE: CPT

## 2021-01-21 PROCEDURE — 2500000003 HC RX 250 WO HCPCS: Performed by: EMERGENCY MEDICINE

## 2021-01-21 PROCEDURE — 2580000003 HC RX 258: Performed by: EMERGENCY MEDICINE

## 2021-01-21 PROCEDURE — 84133 ASSAY OF URINE POTASSIUM: CPT

## 2021-01-21 PROCEDURE — G0378 HOSPITAL OBSERVATION PER HR: HCPCS

## 2021-01-21 PROCEDURE — 6360000002 HC RX W HCPCS: Performed by: EMERGENCY MEDICINE

## 2021-01-21 PROCEDURE — 85730 THROMBOPLASTIN TIME PARTIAL: CPT

## 2021-01-21 PROCEDURE — 80048 BASIC METABOLIC PNL TOTAL CA: CPT

## 2021-01-21 PROCEDURE — 82570 ASSAY OF URINE CREATININE: CPT

## 2021-01-21 PROCEDURE — 83690 ASSAY OF LIPASE: CPT

## 2021-01-21 PROCEDURE — 99285 EMERGENCY DEPT VISIT HI MDM: CPT

## 2021-01-21 PROCEDURE — 6370000000 HC RX 637 (ALT 250 FOR IP): Performed by: EMERGENCY MEDICINE

## 2021-01-21 PROCEDURE — 93005 ELECTROCARDIOGRAM TRACING: CPT | Performed by: EMERGENCY MEDICINE

## 2021-01-21 PROCEDURE — 84484 ASSAY OF TROPONIN QUANT: CPT

## 2021-01-21 RX ORDER — SODIUM CHLORIDE 0.9 % (FLUSH) 0.9 %
10 SYRINGE (ML) INJECTION PRN
Status: DISCONTINUED | OUTPATIENT
Start: 2021-01-21 | End: 2021-01-22 | Stop reason: HOSPADM

## 2021-01-21 RX ORDER — ONDANSETRON 4 MG/1
4 TABLET, FILM COATED ORAL 3 TIMES DAILY
Status: DISCONTINUED | OUTPATIENT
Start: 2021-01-21 | End: 2021-01-22 | Stop reason: HOSPADM

## 2021-01-21 RX ORDER — FERROUS SULFATE 325(65) MG
325 TABLET ORAL
Status: DISCONTINUED | OUTPATIENT
Start: 2021-01-22 | End: 2021-01-22 | Stop reason: HOSPADM

## 2021-01-21 RX ORDER — ACETAMINOPHEN 325 MG/1
650 TABLET ORAL EVERY 6 HOURS PRN
Status: DISCONTINUED | OUTPATIENT
Start: 2021-01-21 | End: 2021-01-22 | Stop reason: HOSPADM

## 2021-01-21 RX ORDER — TRAZODONE HYDROCHLORIDE 50 MG/1
25 TABLET ORAL NIGHTLY
Status: DISCONTINUED | OUTPATIENT
Start: 2021-01-21 | End: 2021-01-22 | Stop reason: HOSPADM

## 2021-01-21 RX ORDER — DIPHENOXYLATE HYDROCHLORIDE AND ATROPINE SULFATE 2.5; .025 MG/1; MG/1
1 TABLET ORAL 4 TIMES DAILY
Status: ON HOLD | COMMUNITY
End: 2021-02-27 | Stop reason: HOSPADM

## 2021-01-21 RX ORDER — ONDANSETRON 2 MG/ML
4 INJECTION INTRAMUSCULAR; INTRAVENOUS EVERY 6 HOURS PRN
Status: DISCONTINUED | OUTPATIENT
Start: 2021-01-21 | End: 2021-01-22 | Stop reason: HOSPADM

## 2021-01-21 RX ORDER — DICYCLOMINE HYDROCHLORIDE 10 MG/1
10 CAPSULE ORAL 2 TIMES DAILY
Status: DISCONTINUED | OUTPATIENT
Start: 2021-01-21 | End: 2021-01-22

## 2021-01-21 RX ORDER — PROPRANOLOL HYDROCHLORIDE 20 MG/1
20 TABLET ORAL DAILY
Status: DISCONTINUED | OUTPATIENT
Start: 2021-01-21 | End: 2021-01-22 | Stop reason: HOSPADM

## 2021-01-21 RX ORDER — CALCIUM GLUCONATE 94 MG/ML
1000 INJECTION, SOLUTION INTRAVENOUS ONCE
Status: COMPLETED | OUTPATIENT
Start: 2021-01-21 | End: 2021-01-21

## 2021-01-21 RX ORDER — SODIUM CHLORIDE 0.9 % (FLUSH) 0.9 %
10 SYRINGE (ML) INJECTION EVERY 12 HOURS SCHEDULED
Status: DISCONTINUED | OUTPATIENT
Start: 2021-01-21 | End: 2021-01-22 | Stop reason: HOSPADM

## 2021-01-21 RX ORDER — UBIDECARENONE 75 MG
500 CAPSULE ORAL DAILY
Status: DISCONTINUED | OUTPATIENT
Start: 2021-01-21 | End: 2021-01-22 | Stop reason: HOSPADM

## 2021-01-21 RX ORDER — HYOSCYAMINE SULFATE 0.125 MG
125 TABLET ORAL 4 TIMES DAILY PRN
COMMUNITY

## 2021-01-21 RX ORDER — DILTIAZEM HYDROCHLORIDE 120 MG/1
120 CAPSULE, COATED, EXTENDED RELEASE ORAL DAILY
Status: DISCONTINUED | OUTPATIENT
Start: 2021-01-21 | End: 2021-01-22 | Stop reason: HOSPADM

## 2021-01-21 RX ORDER — SENNA PLUS 8.6 MG/1
1 TABLET ORAL DAILY PRN
Status: DISCONTINUED | OUTPATIENT
Start: 2021-01-21 | End: 2021-01-22 | Stop reason: HOSPADM

## 2021-01-21 RX ORDER — PRAVASTATIN SODIUM 20 MG
40 TABLET ORAL EVERY EVENING
Status: DISCONTINUED | OUTPATIENT
Start: 2021-01-21 | End: 2021-01-22 | Stop reason: HOSPADM

## 2021-01-21 RX ORDER — PRIMIDONE 250 MG/1
250 TABLET ORAL NIGHTLY
Status: DISCONTINUED | OUTPATIENT
Start: 2021-01-21 | End: 2021-01-22 | Stop reason: HOSPADM

## 2021-01-21 RX ORDER — OXYCODONE HYDROCHLORIDE AND ACETAMINOPHEN 5; 325 MG/1; MG/1
1 TABLET ORAL ONCE
Status: COMPLETED | OUTPATIENT
Start: 2021-01-21 | End: 2021-01-21

## 2021-01-21 RX ORDER — ACETAMINOPHEN 650 MG/1
650 SUPPOSITORY RECTAL EVERY 6 HOURS PRN
Status: DISCONTINUED | OUTPATIENT
Start: 2021-01-21 | End: 2021-01-22 | Stop reason: HOSPADM

## 2021-01-21 RX ORDER — SODIUM CHLORIDE 9 MG/ML
INJECTION, SOLUTION INTRAVENOUS ONCE
Status: COMPLETED | OUTPATIENT
Start: 2021-01-21 | End: 2021-01-21

## 2021-01-21 RX ORDER — OXYCODONE AND ACETAMINOPHEN 10; 325 MG/1; MG/1
1 TABLET ORAL EVERY 6 HOURS PRN
Status: DISCONTINUED | OUTPATIENT
Start: 2021-01-21 | End: 2021-01-22 | Stop reason: HOSPADM

## 2021-01-21 RX ORDER — FAMOTIDINE 20 MG/1
20 TABLET, FILM COATED ORAL 2 TIMES DAILY
Status: DISCONTINUED | OUTPATIENT
Start: 2021-01-21 | End: 2021-01-21

## 2021-01-21 RX ORDER — CHOLECALCIFEROL (VITAMIN D3) 50 MCG
2000 TABLET ORAL 2 TIMES DAILY
Status: DISCONTINUED | OUTPATIENT
Start: 2021-01-21 | End: 2021-01-22 | Stop reason: HOSPADM

## 2021-01-21 RX ORDER — ESCITALOPRAM OXALATE 10 MG/1
20 TABLET ORAL NIGHTLY
Status: DISCONTINUED | OUTPATIENT
Start: 2021-01-21 | End: 2021-01-22 | Stop reason: HOSPADM

## 2021-01-21 RX ORDER — PANTOPRAZOLE SODIUM 40 MG/1
40 TABLET, DELAYED RELEASE ORAL
Status: DISCONTINUED | OUTPATIENT
Start: 2021-01-22 | End: 2021-01-22 | Stop reason: HOSPADM

## 2021-01-21 RX ORDER — POTASSIUM CHLORIDE 7.45 MG/ML
10 INJECTION INTRAVENOUS PRN
Status: DISCONTINUED | OUTPATIENT
Start: 2021-01-21 | End: 2021-01-21

## 2021-01-21 RX ORDER — ASPIRIN 81 MG/1
81 TABLET, CHEWABLE ORAL DAILY
Status: DISCONTINUED | OUTPATIENT
Start: 2021-01-21 | End: 2021-01-22 | Stop reason: HOSPADM

## 2021-01-21 RX ORDER — DIPHENOXYLATE HCL/ATROPINE 2.5-.025/5
5 LIQUID (ML) ORAL 4 TIMES DAILY PRN
Status: DISCONTINUED | OUTPATIENT
Start: 2021-01-21 | End: 2021-01-22 | Stop reason: HOSPADM

## 2021-01-21 RX ORDER — POTASSIUM CHLORIDE 20 MEQ/1
40 TABLET, EXTENDED RELEASE ORAL PRN
Status: DISCONTINUED | OUTPATIENT
Start: 2021-01-21 | End: 2021-01-21

## 2021-01-21 RX ORDER — PROMETHAZINE HYDROCHLORIDE 25 MG/1
12.5 TABLET ORAL EVERY 6 HOURS PRN
Status: DISCONTINUED | OUTPATIENT
Start: 2021-01-21 | End: 2021-01-22 | Stop reason: HOSPADM

## 2021-01-21 RX ADMIN — OXYCODONE AND ACETAMINOPHEN 1 TABLET: 10; 325 TABLET ORAL at 21:09

## 2021-01-21 RX ADMIN — ESCITALOPRAM OXALATE 20 MG: 10 TABLET ORAL at 22:44

## 2021-01-21 RX ADMIN — PRAVASTATIN SODIUM 40 MG: 20 TABLET ORAL at 17:20

## 2021-01-21 RX ADMIN — Medication 500 MCG: at 18:29

## 2021-01-21 RX ADMIN — SODIUM CHLORIDE, POTASSIUM CHLORIDE, SODIUM LACTATE AND CALCIUM CHLORIDE: 600; 310; 30; 20 INJECTION, SOLUTION INTRAVENOUS at 14:53

## 2021-01-21 RX ADMIN — OXYCODONE HYDROCHLORIDE AND ACETAMINOPHEN 1 TABLET: 5; 325 TABLET ORAL at 13:42

## 2021-01-21 RX ADMIN — CALCIUM GLUCONATE 1000 MG: 98 INJECTION, SOLUTION INTRAVENOUS at 12:30

## 2021-01-21 RX ADMIN — SODIUM BICARBONATE 50 MEQ: 84 INJECTION, SOLUTION INTRAVENOUS at 13:43

## 2021-01-21 RX ADMIN — PRIMIDONE 250 MG: 250 TABLET ORAL at 22:44

## 2021-01-21 RX ADMIN — OXYCODONE HYDROCHLORIDE AND ACETAMINOPHEN 1 TABLET: 5; 325 TABLET ORAL at 13:50

## 2021-01-21 RX ADMIN — ASPIRIN 81 MG: 81 TABLET, CHEWABLE ORAL at 17:19

## 2021-01-21 RX ADMIN — TRAZODONE HYDROCHLORIDE 25 MG: 50 TABLET ORAL at 22:45

## 2021-01-21 RX ADMIN — DILTIAZEM HYDROCHLORIDE 120 MG: 120 CAPSULE, COATED, EXTENDED RELEASE ORAL at 18:25

## 2021-01-21 RX ADMIN — SODIUM CHLORIDE, POTASSIUM CHLORIDE, SODIUM LACTATE AND CALCIUM CHLORIDE: 600; 310; 30; 20 INJECTION, SOLUTION INTRAVENOUS at 20:40

## 2021-01-21 RX ADMIN — PROPRANOLOL HYDROCHLORIDE 20 MG: 20 TABLET ORAL at 18:26

## 2021-01-21 RX ADMIN — SODIUM CHLORIDE: 9 INJECTION, SOLUTION INTRAVENOUS at 12:30

## 2021-01-21 RX ADMIN — ONDANSETRON HYDROCHLORIDE 4 MG: 4 TABLET, FILM COATED ORAL at 17:20

## 2021-01-21 ASSESSMENT — PAIN DESCRIPTION - ORIENTATION: ORIENTATION: LOWER

## 2021-01-21 ASSESSMENT — PAIN DESCRIPTION - DESCRIPTORS: DESCRIPTORS: ACHING

## 2021-01-21 ASSESSMENT — ENCOUNTER SYMPTOMS
BACK PAIN: 0
RHINORRHEA: 0
ABDOMINAL PAIN: 1
PHOTOPHOBIA: 0
COUGH: 1
SHORTNESS OF BREATH: 0

## 2021-01-21 ASSESSMENT — PAIN DESCRIPTION - PROGRESSION: CLINICAL_PROGRESSION: NOT CHANGED

## 2021-01-21 ASSESSMENT — PAIN DESCRIPTION - PAIN TYPE: TYPE: ACUTE PAIN

## 2021-01-21 ASSESSMENT — PAIN DESCRIPTION - ONSET: ONSET: ON-GOING

## 2021-01-21 NOTE — ED NOTES
NICOLEAR faxed and verified received with [facility].       Marita Canseco RN  01/21/21 6998

## 2021-01-21 NOTE — CONSULTS
Nephrology Consult Note  Patient's Name: Jose Adan  1:55 PM  1/21/2021    Nephrologist: Dr Rafael King    Reason for Consult:  KD and hyperkalemia  Requesting Physician:  Leigh Bergman DO    Chief Complaint:  Fatigue and abnormal labs    History Obtained From:  patient and past medical records    History of Present Ilness:    Jose Adan is a 68 y.o. female with PMH HTN, HLD, CAD, High output ostomy and COPD. She was sent to the ER for abnormal labs. Per the patient she has not been feeling well. She has had decreased urine output and decreased PO intake. Her PCP did lab work and she was told her Cr and K were abnormal, she does not know the values. She has been seen by our practice for multiple KD's secondary to high output ostomy and dehydration. Her last OV with us was Dec 11, 2020. Her Cr was elevated at that time at 1.6mg/dl, it had previously been 1.1mg/dl. She was put on lomotil 4x/day by GI which she states has helped with the diarrhea. On assessment, the patient is resting in bed in the ER. She states she feels SOB, but has been smoking more at home, and is chronically SOB due to COPD. She denies chest pain. Labs on arrival today showed Na 137, K 5.4, Cl 105, CO2 21, BUN 53, Cr 1.9. She received 1L NS bolus, CaGluc and NaHCO3 in the ER.      Past Medical History:   Diagnosis Date    Acute kidney failure ([facility] 2014 and 8/2016    x2,no dialysis needed,resolved, kidney function tests returned to normal    Anxiety     Arthritis     CAD (coronary artery disease)     follows with Dr. Yoel Galarza every 6 months    Cancer [facility])     skin, leg,nose- removed surgically     COPD (chronic obstructive pulmonary disease) ([facility] [address]     mild- no inhlaers, no oxygen     Depression     Fibromyalgia     chronic back and neck pain    Generalized headaches     h/o / daily    History of blood transfusion 3-11-14    multiple times    History of necrotic bowel 2012    Hyperlipidemia     Hypertension  Incisional hernia     abdomen    Insomnia     Mitral valve regurgitation     Myocardial infarct ([address] 2002    Occasional tremors     at hands / stable with medication    Osteopenia     PONV (postoperative nausea and vomiting)     Vitamin B12 deficiency     h/o       Past Surgical History:   Procedure Laterality Date    ABDOMEN SURGERY  2-    total colectomy, bowel resection, ileostomy,revision of ileostomy     ABDOMEN SURGERY N/A 1/26/2020    DRAINAGE OF ABDOMINAL WALL ABSCESS, EXPLANTATION OF MESH, DEBRIDEMENT OF SKIN AND SUBCUTANEOUS TISSUE performed by Sampson Pendleton MD at [facility] N/A 9/11/2020    EXPLANTATION OF HERNIA MESH performed by Sampson Pendleton MD at [address] [employer] [facility]  2002   [address]  1-2006    right and left thumb   [facility], 2004, 2009    lumbar fusion x 3    BACK SURGERY      cervical fusion x 2    BREAST SURGERY  years ago    monor calcifications    CARPAL TUNNEL RELEASE Bilateral     CATARACT REMOVAL WITH IMPLANT Right 03/03/15    CHOLECYSTECTOMY  5-11-07    Lap    COLONOSCOPY      CORONARY ANGIOPLASTY WITH STENT PLACEMENT  1-7-2002    ENDOSCOPY, COLON, DIAGNOSTIC      EPIGASTRIC HERNIA REPAIR  3/21/16    incisional with mesh implantation    ESOPHAGUS SURGERY  1-4-13    esophageal clamp (torn Esophagus)    FINGER TRIGGER RELEASE Right 2006    index finger    FOOT SURGERY Right     multiple    HERNIA REPAIR  12-31-13    abdominal x 5- last one 2017     ILEOSTOMY OR JEJUNOSTOMY  1-3-13    revision    INSERT PICC LINE  06/09/2020    and removed     JOINT REPLACEMENT Right 3/24/15    right total shoulder arthroplasty    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     LAPAROTOMY N/A 5/12/2020    EXPLORATORY LAPAROTOMY EXPLANTATION OF INFECTED MESH SMAL BOWEL RESECTION AND REVISION END ILEOSTOMY, LYSIS OF ADHESIONS performed by Sampson Pendleton MD at [address] 6/8/2020    LAPAROTOMY EXPLORATORY, Bobby Mitts OF HERNIA MESH performed by Kristi Alfredo MD at [facility] N/A 9/15/2020    EXPLORATORY LAPAROTOMY WITH SMALL BOWEL RESECTION, TAKE DOWN REVISION ENTEROCUTANEOUS FISTULA , OSTOMY REVISION performed by Kristi Alfredo MD at [address] / leg    NASAL SINUS SURGERY      x2 /  cauterized    OTHER SURGICAL HISTORY  08/24/2016    diagnostic laparotomy with repair of intraabdominal hernia    OTHER SURGICAL HISTORY      removal plate / screws  / right great toe    PICC LINE INSERTION NURSE  9/15/2020         ROTATOR CUFF REPAIR  2010    right     SKIN BIOPSY  2010    3 squamous cell carcinoma right leg, left leg       History reviewed. No pertinent family history. reports that she has been smoking cigarettes. She has been smoking about 1.00 pack per day. She has never used smokeless tobacco. She reports previous alcohol use. She reports that she does not use drugs. Allergies:  Fentanyl, Levofloxacin, Tramadol, and Vioxx [rofecoxib]    Current Medications:    No current facility-administered medications for this encounter. Review of Systems:   Pertinent items are noted in HPI.     Physical exam:   Constitutional:    Vitals: VITALS:  BP (!) 116/56   Pulse 90   Temp 98 °F (36.7 °C) (Oral)   Resp 14   Ht 5' (1.524 m)   Wt 97 lb (44 kg)   SpO2 94%   BMI 18.94 kg/m²   24HR INTAKE/OUTPUT:  No intake or output data in the 24 hours ending 01/21/21 1355  URINARY CATHETER OUTPUT ([place]):     Skin: no rash, turgor wnl  Heent:  eomi, mmm  Neck: no bruits or jvd noted  Cardiovascular:  S1, S2 without m/r/g  Respiratory: Diminished in bases bilat  Abdomen:  +bs, soft, nt, nd. +ostomy  Ext: neg lower extremity edema  Psychiatric: mood and affect appropriate  Musculoskeletal:  Rom, muscular strength intact    Data:   Labs:  CBC:   Lab Results   Component Value Date    WBC 9.8 01/21/2021    RBC 4.46 01/21/2021    HGB 14.0 01/21/2021    HCT 45.4 01/21/2021    MCV 101.8 01/21/2021    MCH 31.4 01/21/2021    MCHC 30.8 01/21/2021    RDW 14.6 01/21/2021     01/21/2021    MPV 12.8 01/21/2021     BMP:    Lab Results   Component Value Date     01/21/2021    K 5.4 01/21/2021    K 3.4 11/18/2020     01/21/2021    CO2 21 01/21/2021    BUN 53 01/21/2021    LABALBU 4.5 01/21/2021    CREATININE 1.9 01/21/2021    CALCIUM 10.0 01/21/2021    GFRAA 31 01/21/2021    LABGLOM 26 01/21/2021    GLUCOSE 113 01/21/2021        Imaging:  CT ABDOMEN PELVIS WO CONTRAST [[id number]] Collected: 01/21/21 1257      Order Status: Completed Updated: 01/21/21 1310     Narrative:       EXAMINATION:   CT OF THE ABDOMEN AND PELVIS WITHOUT CONTRAST 1/21/2021 12:44 pm   TECHNIQUE:   CT of the abdomen and pelvis was performed without the administration of   intravenous contrast. Multiplanar reformatted images are provided for review. Dose modulation, iterative reconstruction, and/or weight based adjustment of   the mA/kV was utilized to reduce the radiation dose to as low as reasonably   achievable. COMPARISON:   09/12/2020   HISTORY:   ORDERING SYSTEM PROVIDED HISTORY: abdominal pain   TECHNOLOGIST PROVIDED HISTORY:   Reason for exam:->abdominal pain   FINDINGS:   Lower Chest:  Visualized portion of the lower chest demonstrates no acute   abnormality. Organs: The liver, spleen, pancreas and adrenal glands are unremarkable. Please note there are calcified stable splenic aneurysms unchanged compared   to the prior studies the largest measures up to 1.1 cm. Bilateral renal cortical thinning is noted.  There is no appreciable   obstructive uropathy. Herchel Adrien is a stable hyperdense left renal cyst measuring   1.3 cm and there is a stable 1.8 cm simple left renal cyst. Herchel Adrien is an   exophytic 5 cm x 4.2 cm cystic focus seen along the posterolateral aspect of   the left kidney which could represent an exophytic left renal cyst.   GI/Bowel:  The gallbladder is surgically absent.  The stomach is normally   distended. [place] is no small bowel obstruction.  The patient has undergone   previous partial colectomy.  Note is made of a right lower quadrant ostomy   and peristomal hernia.  No subcutaneous abscess is noted. Pelvis:  Bladder is unremarkable in appearance. Peritoneum/Retroperitoneum:  No evidence of retroperitoneal lymphadenopathy. .   There is calcified plaque seen within the abdominal aorta.  There is no   aneurysm. Bones/Soft Tissues: There are advanced degenerative changes of the lumbar   spine and there are extensive postoperative changes of the lumbar spine. There is chronic 6 mm anterolisthesis of L4 on L5.  There is no osseous   lesion and there is no pelvic osseous lesion.     Impression:       1. There are no findings of obstructive uropathy. 2. Bilateral renal cortical thinning. 3. Stable left hyperdense and simple renal cysts. 4. There is no intra-abscess or free air. Assessment and Plan  1. Stage I KD in the setting of decreased effective renal perfusion with the high output ostomy and decreased PO intake as evidenced by the FeNa <1%  ABD CT no stones or hydro  UA clear, 30 protein, (-)LE, rare bacteria  Cr 1.9  PLAN: 1. Will start IVF  2. Follow Cr    2. 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 Follow    3. Metabolic acidosis with the CKD and high output ostomy  HCO3 near goal >=22  PLAN:1. Follow    4. Hyperkalemia in the setting of CKD and KD  K 5.4  PLAN: 1. Repeat K this PM    5.  CKD G3A with baseline Cr 1.1mg/dl and eGFR 48ml/min in November 2020    Thank you Dr. Leif Enciso DO for allowing us to participate in care of [address], APRN - NP  1:55 PM  1/21/2021

## 2021-01-21 NOTE — PROGRESS NOTES
Miranda Sharp,    Your patient is on a medication that requires a renal dose adjustment. Renal Function Assessment:    Date Body Weight IBW Adj. Body Weight SCr CrCl Dialysis status   1/21/2021 44 kg   1.9 16 ml/min no       Pharmacy has renally dose-adjusted the following medication(s):    Date Medication Original Dosing Regimen New Dosing Regimen   1/21/2021 Lovenox 40 mg sc daily 30 mg sc daily           These changes were made per protocol according to the [facility] Renal Function-Based Dose Adjustments Policy    *Please note this dose may need readjusted if your patient's renal function significantly improves. Please contact pharmacy with any questions regarding these changes. Thank Titi Shannon Pharm. D 1/21/2021 5:04 PM

## 2021-01-21 NOTE — ED NOTES
Bed:  [place] [address]  Expected date:   Expected time:   Means of arrival:   Comments:  Adeel Sweeney, MARYJANE  01/21/21 1127

## 2021-01-21 NOTE — ED PROVIDER NOTES
Juan Mario is a 68 y.o. female presenting to the ED for abnomal labs, beginning yesterday ago. The complaint has been persistent, moderate in severity, and worsened by nothing. 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 Pt has history of ileostomy w high output now on immodium which is helping. Pt was admitted in November for kd and hyperk. Pt notes fatigue and weight loss since September. Pt had labs ordered by pcp dr Alessandro Barragan for fatigue and not feelig rifght and was called that labs from yesterday showed KD and elevated potassium. She does not know how high. Pt notes some decreased urination decreased appitite and has been smokin gmore. She states she has a smokers cough but is not any different. She has mild copd which she states is stable. She denies any fever, her abdominal pain is chronic and no different than normal'. Pt denies any chest pain, denies covid exposures. Pt denes any melena or hematochezia    Review of Systems:   Review of Systems   Constitutional: Positive for fever. Negative for appetite change and fatigue. HENT: Negative for congestion, postnasal drip and rhinorrhea. Eyes: Negative for photophobia and visual disturbance. Respiratory: Positive for cough. Negative for shortness of breath. Cardiovascular: Negative for chest pain, palpitations and leg swelling. Gastrointestinal: Positive for abdominal pain. Endocrine: Negative for polyphagia and polyuria. Genitourinary: Positive for difficulty urinating. Negative for dysuria and flank pain. Musculoskeletal: Negative for back pain and neck pain. Skin: Negative for pallor and rash. Allergic/Immunologic: Negative for food allergies and immunocompromised state. Neurological: Negative for light-headedness and headaches. Hematological: Negative for adenopathy. Psychiatric/Behavioral: Negative for agitation. The patient is not nervous/anxious. --------------------------------------------- PAST HISTORY ---------------------------------------------  Past Medical History:  has a past medical history of Acute kidney failure ([facility] [address] Anxiety, Arthritis, CAD (coronary artery disease), Cancer ([facility] [address] COPD (chronic obstructive pulmonary disease) ([facility] [address] Depression, Fibromyalgia, Generalized headaches, History of blood transfusion, History of necrotic bowel, Hyperlipidemia, Hypertension, Incisional hernia, Insomnia, Mitral valve regurgitation, Myocardial infarct ([facility] [address] Occasional tremors, Osteopenia, PONV (postoperative nausea and vomiting), and Vitamin B12 deficiency. Past Surgical History:  has a past surgical history that includes Coronary angioplasty with stent (1-7-2002); Colonoscopy; Esophagus surgery (1-4-13); Rotator cuff repair (2010); arthroplasty (1-2006); Finger trigger release (Right, 2006); Cataract removal with implant (Right, 03/03/15); Appendectomy (2002); Cholecystectomy (5-11-07); Carpal tunnel release (Bilateral); skin biopsy (2010); Foot surgery (Right); ileostomy or jejunostomy (1-3-13); Endoscopy, colon, diagnostic; epigastric hernia repair (3/21/16); other surgical history (08/24/2016); Nasal sinus surgery; back surgery (1991, 2004, 2009); back surgery; Mohs surgery; other surgical history; Breast surgery (years ago); Knee Arthroplasty (Left, 03/22/2017); laparotomy (N/A, 5/12/2020); laparotomy (N/A, 6/8/2020); Insert Picc Line (06/09/2020); joint replacement (Right, 3/24/15); Abdomen surgery (2-); Abdomen surgery (N/A, 1/26/2020); hernia repair (12-31-13); Abdomen surgery (N/A, 9/11/2020); picc line insertion nurse (9/15/2020); and laparotomy (N/A, 9/15/2020). Social History:  reports that she has been smoking cigarettes. She has been smoking about 1.00 pack per day. She has never used smokeless tobacco. She reports previous alcohol use. She reports that she does not use drugs.     Family History: family history is not on file. The patients home medications have been reviewed.     Allergies: Fentanyl, Levofloxacin, Tramadol, and Vioxx [rofecoxib]    -------------------------------------------------- RESULTS -------------------------------------------------  All laboratory and radiology results have been personally reviewed by myself   LABS:  Results for orders placed or performed during the hospital encounter of 01/21/21   CBC Auto Differential   Result Value Ref Range    WBC 9.8 4.5 - 11.5 E9/L    RBC 4.46 3.50 - 5.50 E12/L    Hemoglobin 14.0 11.5 - 15.5 g/dL    Hematocrit 45.4 34.0 - 48.0 %    .8 (H) 80.0 - 99.9 fL    MCH 31.4 26.0 - 35.0 pg    MCHC 30.8 (L) 32.0 - 34.5 %    RDW 14.6 11.5 - 15.0 fL    Platelets 513 991 - 172 E9/L    MPV 12.8 (H) 7.0 - 12.0 fL    Neutrophils % 73.7 43.0 - 80.0 %    Immature Granulocytes % 0.3 0.0 - 5.0 %    Lymphocytes % 9.8 (L) 20.0 - 42.0 %    Monocytes % 3.6 2.0 - 12.0 %    Eosinophils % 12.2 (H) 0.0 - 6.0 %    Basophils % 0.4 0.0 - 2.0 %    Neutrophils Absolute 7.22 1.80 - 7.30 E9/L    Immature Granulocytes # 0.03 E9/L    Lymphocytes Absolute 0.96 (L) 1.50 - 4.00 E9/L    Monocytes Absolute 0.35 0.10 - 0.95 E9/L    Eosinophils Absolute 1.19 (H) 0.05 - 0.50 E9/L    Basophils Absolute 0.04 0.00 - 0.20 E9/L   Lipase   Result Value Ref Range    Lipase 62 (H) 13 - 60 U/L   Basic Metabolic Panel   Result Value Ref Range    Sodium 137 132 - 146 mmol/L    Potassium 5.4 (H) 3.5 - 5.0 mmol/L    Chloride 105 98 - 107 mmol/L    CO2 21 (L) 22 - 29 mmol/L    Anion Gap 11 7 - 16 mmol/L    Glucose 113 (H) 74 - 99 mg/dL    BUN 53 (H) 8 - 23 mg/dL    CREATININE 1.9 (H) 0.5 - 1.0 mg/dL    GFR Non-African American 26 >=60 mL/min/1.73    GFR African American 31     Calcium 10.0 8.6 - 10.2 mg/dL   Magnesium   Result Value Ref Range    Magnesium 1.6 1.6 - 2.6 mg/dL   Hepatic Function Panel   Result Value Ref Range    Total Protein 8.2 6.4 - 8.3 g/dL    Alb 4.5 3.5 - 5.2 g/dL    Alkaline Phosphatase 168 (H) 35 - 104 U/L    ALT 22 0 - 32 U/L    AST 29 0 - 31 U/L    Total Bilirubin 0.4 0.0 - 1.2 mg/dL    Bilirubin, Direct <0.2 0.0 - 0.3 mg/dL    Bilirubin, Indirect see below 0.0 - 1.0 mg/dL   Protime-INR   Result Value Ref Range    Protime 13.5 (H) 9.3 - 12.4 sec    INR 1.2    APTT   Result Value Ref Range    aPTT 29.6 24.5 - 35.1 sec   Troponin   Result Value Ref Range    Troponin 0.01 0.00 - 0.03 ng/mL   Lactic Acid, Plasma   Result Value Ref Range    Lactic Acid 1.5 0.5 - 2.2 mmol/L   Urinalysis, reflex to microscopic   Result Value Ref Range    Color, UA Yellow Straw/Yellow    Clarity, UA Clear Clear    Glucose, Ur Negative Negative mg/dL    Bilirubin Urine Negative Negative    Ketones, Urine Negative Negative mg/dL    Specific Gravity, UA 1.025 1.005 - 1.030    Blood, Urine Negative Negative    pH, UA 6.0 5.0 - 9.0    Protein, UA 30 (A) Negative mg/dL    Urobilinogen, Urine 0.2 <2.0 E.U./dL    Nitrite, Urine Negative Negative    Leukocyte Esterase, Urine Negative Negative   Urine electrolytes   Result Value Ref Range    Sodium, Ur <20 Not Established mmol/L    Potassium, Ur 55.1 Not Established mmol/L    Chloride <20 Not Established mmol/L   PROTEIN, URINE, RANDOM   Result Value Ref Range    Protein, Ur 42 (H) 0 - 12 mg/dL   CREATININE, RANDOM URINE   Result Value Ref Range    Creatinine, Ur 167 29 - 226 mg/dL   Microscopic Urinalysis   Result Value Ref Range    WBC, UA 0-1 0 - 5 /HPF    RBC, UA NONE 0 - 2 /HPF    Epithelial Cells, UA RARE /HPF    Bacteria, UA RARE (A) None Seen /HPF   EKG 12 Lead   Result Value Ref Range    Ventricular Rate 82 BPM    Atrial Rate 82 BPM    P-R Interval 158 ms    QRS Duration 80 ms    Q-T Interval 370 ms    QTc Calculation (Bazett) 432 ms    P Axis 82 degrees    R Axis -58 degrees    T Axis 66 degrees       RADIOLOGY:  Interpreted by Radiologist.  CT ABDOMEN PELVIS WO CONTRAST   Final Result   1. There are no findings of obstructive uropathy.    2. Bilateral renal cortical thinning. 3. Stable left hyperdense and simple renal cysts. 4. There is no intra-abscess or free air.          ------------------------- NURSING NOTES AND VITALS REVIEWED ---------------------------   The nursing notes within the ED encounter and vital signs as below have been reviewed. BP (!) 116/56   Pulse 90   Temp 98 °F (36.7 °C) (Oral)   Resp 14   Ht 5' (1.524 m)   Wt 97 lb (44 kg)   SpO2 94%   BMI 18.94 kg/m²   Oxygen Saturation Interpretation: Normal      ---------------------------------------------------PHYSICAL EXAM--------------------------------------    Physical Exam  Vitals signs reviewed. Constitutional:       General: She is not in acute distress. Appearance: Normal appearance. She is not toxic-appearing. HENT:      Head: Normocephalic and atraumatic. Right Ear: External ear normal.      Left Ear: External ear normal.      Nose: Nose normal. No congestion. Mouth/Throat:      Mouth: Mucous membranes are moist.      Pharynx: Oropharynx is clear. No posterior oropharyngeal erythema. Eyes:      Extraocular Movements: Extraocular movements intact. Pupils: Pupils are equal, round, and reactive to light. Neck:      Musculoskeletal: Normal range of motion and neck supple. No muscular tenderness. Cardiovascular:      Rate and Rhythm: Normal rate and regular rhythm. Pulses: Normal pulses. Heart sounds: No murmur. Pulmonary:      Effort: Pulmonary effort is normal.      Breath sounds: No wheezing or rhonchi. Chest:      Chest wall: No tenderness. Abdominal:      General: Bowel sounds are normal.      Tenderness: There is no abdominal tenderness. There is no right CVA tenderness, left CVA tenderness or guarding. Musculoskeletal:         General: No swelling, tenderness or deformity. Right lower leg: No edema. Left lower leg: No edema. Skin:     General: Skin is warm and dry. Capillary Refill: Capillary refill takes less than 2 seconds. Findings: No lesion. Neurological:      General: No focal deficit present. Mental Status: She is alert and oriented to person, place, and time. Sensory: No sensory deficit. Motor: No weakness. Psychiatric:         Mood and Affect: Mood normal.               ------------------------------ ED COURSE/MEDICAL DECISION MAKING----------------------  Medications   lactated ringers 1,000 mL infusion ( Intravenous New Bag 1/21/21 1453)   calcium gluconate 10 % injection 1,000 mg (1,000 mg Intravenous Given 1/21/21 1230)   0.9 % sodium chloride infusion ( Intravenous Stopped 1/21/21 1420)   sodium bicarbonate 8.4 % injection 50 mEq (50 mEq Intravenous Given 1/21/21 1343)   oxyCODONE-acetaminophen (PERCOCET) 5-325 MG per tablet 1 tablet (1 tablet Oral Given 1/21/21 1342)   oxyCODONE-acetaminophen (PERCOCET) 5-325 MG per tablet 1 tablet (1 tablet Oral Given 1/21/21 1350)     EKG: This EKG is signed and interpreted by me. Time:1140  Rate: 82  Rhythm: Sinus  Interpretation: possible age undetermined inf infarct, Peaked appearing t waves but similar to last ecg  Comparison: stable as compared to patient's most recent EKG 11/15/20      ED COURSE:  ED Course as of Jan 21 1507   Thu Jan 21, 2021   1506 D/w dr Sydney Barrios who will admit    Cee Pendleton with [facility] admit since K is 5.4 and was treated and tele and ecg are stable    [AMOR]      ED Course User Index  [AMOR] Nakia Ramachandran DO       Medical Decision Making:    Pt with ade likely pre-renal, pt given fluids 1 amp bicarb d/t outpt labs showing k>6. K here is 5.4, no arrythmias, ecg stable c/w previous, d/w pcp who will admit to [facility]    Counseling: The emergency provider has spoken with the patient and discussed todays results, in addition to providing specific details for the plan of care and counseling regarding the diagnosis and prognosis.   Questions are answered at this time and they are agreeable with the plan.      --------------------------------- IMPRESSION AND DISPOSITION ---------------------------------    IMPRESSION  1. KD (acute kidney injury) ([facility] [address]    2. Hyperkalemia        DISPOSITION  Disposition: Admit to med/surg floor  Patient condition is fair      NOTE: This report was transcribed using voice recognition software.  Every effort was made to ensure accuracy; however, inadvertent computerized transcription errors may be present       Sreedhar Faust DO  01/21/21 150

## 2021-01-22 VITALS
WEIGHT: 97 LBS | HEART RATE: 81 BPM | BODY MASS INDEX: 19.04 KG/M2 | SYSTOLIC BLOOD PRESSURE: 108 MMHG | OXYGEN SATURATION: 88 % | DIASTOLIC BLOOD PRESSURE: 55 MMHG | RESPIRATION RATE: 20 BRPM | HEIGHT: 60 IN | TEMPERATURE: 97.2 F

## 2021-01-22 LAB
ANION GAP SERPL CALCULATED.3IONS-SCNC: 10 MMOL/L (ref 7–16)
BUN BLDV-MCNC: 43 MG/DL (ref 8–23)
CALCIUM SERPL-MCNC: 9 MG/DL (ref 8.6–10.2)
CHLORIDE BLD-SCNC: 108 MMOL/L (ref 98–107)
CO2: 22 MMOL/L (ref 22–29)
CREAT SERPL-MCNC: 1.6 MG/DL (ref 0.5–1)
FOLATE: 8.1 NG/ML (ref 4.8–24.2)
GFR AFRICAN AMERICAN: 38
GFR NON-AFRICAN AMERICAN: 31 ML/MIN/1.73
GLUCOSE BLD-MCNC: 87 MG/DL (ref 74–99)
HCT VFR BLD CALC: 35.9 % (ref 34–48)
HEMOGLOBIN: 11.1 G/DL (ref 11.5–15.5)
MAGNESIUM: 1.5 MG/DL (ref 1.6–2.6)
MCH RBC QN AUTO: 30.9 PG (ref 26–35)
MCHC RBC AUTO-ENTMCNC: 30.9 % (ref 32–34.5)
MCV RBC AUTO: 100 FL (ref 80–99.9)
PDW BLD-RTO: 14.6 FL (ref 11.5–15)
PHOSPHORUS: 3.3 MG/DL (ref 2.5–4.5)
PLATELET # BLD: 210 E9/L (ref 130–450)
PMV BLD AUTO: 12.4 FL (ref 7–12)
POTASSIUM SERPL-SCNC: 4.4 MMOL/L (ref 3.5–5)
RBC # BLD: 3.59 E12/L (ref 3.5–5.5)
SODIUM BLD-SCNC: 140 MMOL/L (ref 132–146)
TSH SERPL DL<=0.05 MIU/L-ACNC: 0.58 UIU/ML (ref 0.27–4.2)
VITAMIN B-12: 988 PG/ML (ref 211–946)
WBC # BLD: 6.1 E9/L (ref 4.5–11.5)

## 2021-01-22 PROCEDURE — G0378 HOSPITAL OBSERVATION PER HR: HCPCS

## 2021-01-22 PROCEDURE — 36415 COLL VENOUS BLD VENIPUNCTURE: CPT

## 2021-01-22 PROCEDURE — 2580000003 HC RX 258: Performed by: NURSE PRACTITIONER

## 2021-01-22 PROCEDURE — 83735 ASSAY OF MAGNESIUM: CPT

## 2021-01-22 PROCEDURE — 82746 ASSAY OF FOLIC ACID SERUM: CPT

## 2021-01-22 PROCEDURE — 82607 VITAMIN B-12: CPT

## 2021-01-22 PROCEDURE — 84100 ASSAY OF PHOSPHORUS: CPT

## 2021-01-22 PROCEDURE — 80048 BASIC METABOLIC PNL TOTAL CA: CPT

## 2021-01-22 PROCEDURE — 2580000003 HC RX 258: Performed by: INTERNAL MEDICINE

## 2021-01-22 PROCEDURE — 6370000000 HC RX 637 (ALT 250 FOR IP): Performed by: INTERNAL MEDICINE

## 2021-01-22 PROCEDURE — 85027 COMPLETE CBC AUTOMATED: CPT

## 2021-01-22 PROCEDURE — 96366 THER/PROPH/DIAG IV INF ADDON: CPT

## 2021-01-22 PROCEDURE — 96365 THER/PROPH/DIAG IV INF INIT: CPT

## 2021-01-22 PROCEDURE — 84443 ASSAY THYROID STIM HORMONE: CPT

## 2021-01-22 PROCEDURE — 6360000002 HC RX W HCPCS: Performed by: INTERNAL MEDICINE

## 2021-01-22 RX ORDER — HYOSCYAMINE SULFATE 0.125 MG
125 TABLET ORAL 2 TIMES DAILY PRN
Status: DISCONTINUED | OUTPATIENT
Start: 2021-01-22 | End: 2021-01-22 | Stop reason: HOSPADM

## 2021-01-22 RX ORDER — MAGNESIUM SULFATE IN WATER 40 MG/ML
2000 INJECTION, SOLUTION INTRAVENOUS ONCE
Status: COMPLETED | OUTPATIENT
Start: 2021-01-22 | End: 2021-01-22

## 2021-01-22 RX ORDER — OMEPRAZOLE 20 MG/1
20 CAPSULE, DELAYED RELEASE ORAL DAILY
Status: DISCONTINUED | OUTPATIENT
Start: 2021-01-22 | End: 2021-01-22 | Stop reason: CLARIF

## 2021-01-22 RX ORDER — DIPHENOXYLATE HYDROCHLORIDE AND ATROPINE SULFATE 2.5; .025 MG/1; MG/1
1 TABLET ORAL 4 TIMES DAILY
Status: DISCONTINUED | OUTPATIENT
Start: 2021-01-22 | End: 2021-01-22 | Stop reason: HOSPADM

## 2021-01-22 RX ADMIN — Medication 2000 UNITS: at 09:27

## 2021-01-22 RX ADMIN — OXYCODONE AND ACETAMINOPHEN 1 TABLET: 10; 325 TABLET ORAL at 10:22

## 2021-01-22 RX ADMIN — SODIUM CHLORIDE, PRESERVATIVE FREE 10 ML: 5 INJECTION INTRAVENOUS at 09:27

## 2021-01-22 RX ADMIN — PROPRANOLOL HYDROCHLORIDE 20 MG: 20 TABLET ORAL at 09:27

## 2021-01-22 RX ADMIN — SODIUM CHLORIDE, POTASSIUM CHLORIDE, SODIUM LACTATE AND CALCIUM CHLORIDE: 600; 310; 30; 20 INJECTION, SOLUTION INTRAVENOUS at 03:44

## 2021-01-22 RX ADMIN — FERROUS SULFATE TAB 325 MG (65 MG ELEMENTAL FE) 325 MG: 325 (65 FE) TAB at 09:27

## 2021-01-22 RX ADMIN — MAGNESIUM SULFATE 2000 MG: 2 INJECTION INTRAVENOUS at 05:33

## 2021-01-22 RX ADMIN — ONDANSETRON HYDROCHLORIDE 4 MG: 4 TABLET, FILM COATED ORAL at 09:26

## 2021-01-22 RX ADMIN — Medication 500 MCG: at 09:27

## 2021-01-22 RX ADMIN — OXYCODONE AND ACETAMINOPHEN 1 TABLET: 10; 325 TABLET ORAL at 17:05

## 2021-01-22 RX ADMIN — ASPIRIN 81 MG: 81 TABLET, CHEWABLE ORAL at 09:27

## 2021-01-22 RX ADMIN — PANTOPRAZOLE SODIUM 40 MG: 40 TABLET, DELAYED RELEASE ORAL at 05:33

## 2021-01-22 RX ADMIN — ONDANSETRON HYDROCHLORIDE 4 MG: 4 TABLET, FILM COATED ORAL at 13:28

## 2021-01-22 RX ADMIN — DIPHENOXYLATE HYDROCHLORIDE AND ATROPINE SULFATE 1 TABLET: 2.5; .025 TABLET ORAL at 09:26

## 2021-01-22 RX ADMIN — DIPHENOXYLATE HYDROCHLORIDE AND ATROPINE SULFATE 1 TABLET: 2.5; .025 TABLET ORAL at 17:05

## 2021-01-22 RX ADMIN — DILTIAZEM HYDROCHLORIDE 120 MG: 120 CAPSULE, COATED, EXTENDED RELEASE ORAL at 09:27

## 2021-01-22 ASSESSMENT — ENCOUNTER SYMPTOMS
DIARRHEA: 1
CHEST TIGHTNESS: 0
BLOOD IN STOOL: 0
COLOR CHANGE: 0
PHOTOPHOBIA: 0
STRIDOR: 0
BACK PAIN: 1
SHORTNESS OF BREATH: 0
ABDOMINAL PAIN: 1
CONSTIPATION: 0
NAUSEA: 1
VOMITING: 0

## 2021-01-22 NOTE — DISCHARGE SUMMARY
Discharge Summary     Patient ID:  Latosha Shay  [id number]  68 y.o. 1940 female  Rochelle Kaiser,         Admit date: 1/21/2021    Discharge date and time:  1/22/2021  5:57 PM      Activity level: As tolerated  Diet:Diabetic with nutritional supplements. Disposition:Home  Condition on Discharge:Stable      Admit Diagnoses:   Patient Active Problem List   Diagnosis    Right cataract    Essential hypertension, benign    Hyperlipidemia with target LDL less than 100    Osteoarthritis, shoulder    Primary osteoarthritis of left knee    History of ischemic bowel disease    Ileostomy present ([address]    Chronic kidney disease, stage III (moderate)    COPD (chronic obstructive pulmonary disease) ([address]    Moderate protein-calorie malnutrition (HCC)    Abdominal wall cellulitis    Cellulitis    Infected hernioplasty mesh ([address]    Enterocutaneous fistula    Mild protein-calorie malnutrition ([address]    Acute kidney injury ([address]       Discharge Diagnoses: Active Problems:    Acute kidney injury ([address]  Resolved Problems:    * No resolved hospital problems. *  1. KD   2. Hyperkalemia   3. Short gut syndrome   4. Abdominal adhesions   5. CKD stage III   6. CAD   7. Chronic pain      Consults:  IP CONSULT TO INTERNAL MEDICINE  IP CONSULT TO NEPHROLOGY  IP CONSULT TO SOCIAL WORK  IP CONSULT TO DIETITIAN  IP CONSULT TO IV TEAM    Hospital Course: Patient is a 69 YO female who presented to ER due to abnormal labs done at my office previous day. She was found to be in acute kidney injury on ckd stage III with hyperkalemia. She was seen by nephrology and given IVF. KD secondary to her high output through ostomy. She did well with IVF and was stable on day of discharge. She is to follow up with me in 1 week and follow up with Dr. Nany Jensen and have repeat blood work.      Discharge Exam:    [facility]          Vitals:    01/21/21 1632 01/21/21 1826 01/21/21 1833 01/21/21 1945   BP: (!) 118/54 (!) MCH 31.4 30.9   MCHC 30.8* 30.9*   RDW 14.6 14.6    210   MPV 12.8* 12.4*       No results for input(s): GLUMET in the last 72 hours. Imaging:  Ct Abdomen Pelvis Wo Contrast    Result Date: 1/21/2021  EXAMINATION: CT OF THE ABDOMEN AND PELVIS WITHOUT CONTRAST 1/21/2021 12:44 pm TECHNIQUE: CT of the abdomen and pelvis was performed without the administration of intravenous contrast. Multiplanar reformatted images are provided for review. Dose modulation, iterative reconstruction, and/or weight based adjustment of the mA/kV was utilized to reduce the radiation dose to as low as reasonably achievable. COMPARISON: 09/12/2020 HISTORY: ORDERING SYSTEM PROVIDED HISTORY: abdominal pain TECHNOLOGIST PROVIDED HISTORY: Reason for exam:->abdominal pain FINDINGS: Lower Chest:  Visualized portion of the lower chest demonstrates no acute abnormality. Organs: The liver, spleen, pancreas and adrenal glands are unremarkable. Please note there are calcified stable splenic aneurysms unchanged compared to the prior studies the largest measures up to 1.1 cm. Bilateral renal cortical thinning is noted. There is no appreciable obstructive uropathy. There is a stable hyperdense left renal cyst measuring 1.3 cm and there is a stable 1.8 cm simple left renal cyst.  There is an exophytic 5 cm x 4.2 cm cystic focus seen along the posterolateral aspect of the left kidney which could represent an exophytic left renal cyst. GI/Bowel: The gallbladder is surgically absent. The stomach is normally distended. There is no small bowel obstruction. The patient has undergone previous partial colectomy. Note is made of a right lower quadrant ostomy and peristomal hernia. No subcutaneous abscess is noted. Pelvis:  Bladder is unremarkable in appearance. Peritoneum/Retroperitoneum:  No evidence of retroperitoneal lymphadenopathy. . There is calcified plaque seen within the abdominal aorta. There is no aneurysm. Bones/Soft Tissues:  There are advanced degenerative changes of the lumbar spine and there are extensive postoperative changes of the lumbar spine. There is chronic 6 mm anterolisthesis of L4 on L5. There is no osseous lesion and there is no pelvic osseous lesion. 1. There are no findings of obstructive uropathy. 2. Bilateral renal cortical thinning. 3. Stable left hyperdense and simple renal cysts. 4. There is no intra-abscess or free air. Patient Instructions:   Discharge Medication List as of 1/22/2021  5:18 PM      CONTINUE these medications which have NOT CHANGED    Details   hyoscyamine (ANASPAZ;LEVSIN) 125 MCG tablet Take 125 mcg by mouth 2 times daily as needed for CrampingHistorical Med      diphenoxylate-atropine (LOMOTIL) 2.5-0.025 MG per tablet Take 1 tablet by mouth 4 times daily. Historical Med      omeprazole (PRILOSEC) 20 MG delayed release capsule Take 40 mg by mouth dailyHistorical Med      aspirin 81 MG tablet Take 81 mg by mouth dailyHistorical Med      ondansetron (ZOFRAN) 4 MG tablet Take 4 mg by mouth 3 times daily Historical Med      diltiazem (CARDIZEM CD) 120 MG extended release capsule Take 1 capsule by mouth daily, Disp-30 capsule, R-0Normal      oxyCODONE-acetaminophen (PERCOCET)  MG per tablet Take by mouth every 6 hours as needed for Pain.  Instructed to take with sip water am of procedure if needs Historical Med      fenofibrate (TRICOR) 145 MG tablet Take 145 mg by mouth dailyHistorical Med      Cholecalciferol (VITAMIN D3) 53345 UNITS CAPS Take 2,000 Units by mouth 2 times daily Historical Med      Coenzyme Q10 (COQ10 PO) Take by mouth daily Historical Med      Ferrous Sulfate (IRON) 28 MG TABS Take by mouth dailyHistorical Med      calcium carbonate (OSCAL) 500 MG TABS tablet Take 500 mg by mouth daily LD 3/16/2017Historical Med      pravastatin (PRAVACHOL) 40 MG tablet Take 40 mg by mouth every evening      propranolol (INDERAL) 20 MG tablet Take 20 mg by mouth daily Instructed to take morning of surgery with a sip of water Historical Med      vitamin B-12 (CYANOCOBALAMIN) 500 MCG tablet Place 500 mcg under the tongue daily Historical Med      Omega-3 Fatty Acids (OMEGA 3 PO) Take by mouth daily Historical Med      escitalopram (LEXAPRO) 20 MG tablet Take 20 mg by mouth nightly.       primidone (MYSOLINE) 50 MG tablet Take 250 mg by mouth nightly Taking for essential tremorsHistorical Med      traZODone (DESYREL) 50 MG tablet Take 25 mg by mouth nightly Historical Med         STOP taking these medications       dicyclomine (BENTYL) 10 MG capsule Comments:   Reason for Stopping:                 Note that more than 30 minutes was spent in preparing discharge papers, discussing discharge with patient, medication review, etc.      Signed:    Heidi Bennett DO    Electronically signed by Heidi Bennett DO on 1/22/2021 at 5:57 PM

## 2021-01-22 NOTE — H&P
History & Physical  01/22/21  Primary Care:  Leigh Bergman, DO  [address] [facility] / [facility] [address]        Chief Complaint   Patient presents with    Abnormal Lab     Elevated potassium, kidney functions       HPI:  Patient is a 68year old female who presented to [facility] due to abnormal labs. She has had increased fatigue and weakness for the last several weeks along with weight loss. She had multiple abdominal surgeries due to infected mesh and has had abdominal pain which limits her eating. She denies any fevers, chills, chest pains, shortness of breath, but admits to nausea and increased ostomy output but has improved with lomotil. HPI  Prior to Visit Medications    Medication Sig Taking? Authorizing Provider   hyoscyamine (ANASPAZ;LEVSIN) 125 MCG tablet Take 125 mcg by mouth 2 times daily as needed for Cramping Yes Historical Provider, MD   diphenoxylate-atropine (LOMOTIL) 2.5-0.025 MG per tablet Take 1 tablet by mouth 4 times daily. Yes Historical Provider, MD   omeprazole (PRILOSEC) 20 MG delayed release capsule Take 40 mg by mouth daily  Historical Provider, MD   aspirin 81 MG tablet Take 81 mg by mouth daily  Historical Provider, MD   ondansetron (ZOFRAN) 4 MG tablet Take 4 mg by mouth 3 times daily   Historical Provider, MD   diltiazem (CARDIZEM CD) 120 MG extended release capsule Take 1 capsule by mouth daily  Jordan Flores MD   oxyCODONE-acetaminophen (PERCOCET)  MG per tablet Take by mouth every 6 hours as needed for Pain.  Instructed to take with sip water am of procedure if needs   Historical Provider, MD   fenofibrate (TRICOR) 145 MG tablet Take 145 mg by mouth daily  Historical Provider, MD   Cholecalciferol (VITAMIN D3) 40118 UNITS CAPS Take 2,000 Units by mouth 2 times daily   Historical Provider, MD   Coenzyme Q10 (COQ10 PO) Take by mouth daily   Historical Provider, MD   Ferrous Sulfate (IRON) 28 MG TABS Take by mouth daily  Historical Provider, MD calcium carbonate (OSCAL) 500 MG TABS tablet Take 500 mg by mouth daily LD 3/16/2017  Historical Provider, MD   pravastatin (PRAVACHOL) 40 MG tablet Take 40 mg by mouth every evening  Historical Provider, MD   propranolol (INDERAL) 20 MG tablet Take 20 mg by mouth daily Instructed to take morning of surgery with a sip of water   Historical Provider, MD   vitamin B-12 (CYANOCOBALAMIN) 500 MCG tablet Place 500 mcg under the tongue daily   Historical Provider, MD   Omega-3 Fatty Acids (OMEGA 3 PO) Take by mouth daily   Historical Provider, MD   escitalopram (LEXAPRO) 20 MG tablet Take 20 mg by mouth nightly. Historical Provider, MD   primidone (MYSOLINE) 50 MG tablet Take 250 mg by mouth nightly Taking for essential tremors  Historical Provider, MD   traZODone (DESYREL) 50 MG tablet Take 25 mg by mouth nightly   Historical Provider, MD     Social History     Tobacco Use    Smoking status: Current Every Day Smoker     Packs/day: 1.00     Types: Cigarettes    Smokeless tobacco: Never Used   Substance Use Topics    Alcohol use: Not Currently     Comment: occasional    Drug use: No     History reviewed. No pertinent family history.   Past Surgical History:   Procedure Laterality Date    ABDOMEN SURGERY  2-    total colectomy, bowel resection, ileostomy,revision of ileostomy     ABDOMEN SURGERY N/A 1/26/2020    DRAINAGE OF ABDOMINAL WALL ABSCESS, EXPLANTATION OF MESH, DEBRIDEMENT OF SKIN AND SUBCUTANEOUS TISSUE performed by Bailee Garcia MD at [facility] N/A 9/11/2020    EXPLANTATION OF HERNIA MESH [facility] [place] [address]   M[place] 4  1-2006    right and left thumb   [address] [facility], 2004, 2009    lumbar fusion x 3    BACK SURGERY      cervical fusion x 2    BREAST SURGERY  years ago    monor calcifications    CARPAL TUNNEL RELEASE Bilateral     CATARACT REMOVAL WITH IMPLANT Right 03/03/15    CHOLECYSTECTOMY  5-11-07    Lap    COLONOSCOPY      CORONARY ANGIOPLASTY WITH STENT PLACEMENT  1-7-2002    ENDOSCOPY, COLON, DIAGNOSTIC      EPIGASTRIC HERNIA REPAIR  3/21/16    incisional with mesh implantation    ESOPHAGUS SURGERY  1-4-13    esophageal clamp (torn Esophagus)    FINGER TRIGGER RELEASE Right 2006    index finger    FOOT SURGERY Right     multiple    HERNIA REPAIR  12-31-13    abdominal x 5- last one 2017     ILEOSTOMY OR JEJUNOSTOMY  1-3-13    revision    INSERT PICC LINE  06/09/2020    and removed     JOINT REPLACEMENT Right 3/24/15    right total shoulder arthroplasty    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     LAPAROTOMY N/A 5/12/2020    EXPLORATORY LAPAROTOMY EXPLANTATION OF INFECTED MESH SMAL BOWEL RESECTION AND REVISION END ILEOSTOMY, LYSIS OF ADHESIONS performed by Stacey Lopez MD at [place] [address] N/A 6/8/2020    LAPAROTOMY EXPLORATORY, Elby Scott OF HERNIA MESH performed by Stacey Lopez MD at [place] [address] N/A 9/15/2020    EXPLORATORY LAPAROTOMY WITH SMALL BOWEL RESECTION, TAKE DOWN REVISION ENTEROCUTANEOUS FISTULA , OSTOMY REVISION performed by Stacey Lopez MD at [address] [place] [address] / leg    NASAL SINUS SURGERY      x2 /  cauterized    OTHER SURGICAL HISTORY  08/24/2016    diagnostic laparotomy with repair of intraabdominal hernia    OTHER SURGICAL HISTORY      removal plate / screws  / right great toe    PICC LINE INSERTION NURSE  9/15/2020         ROTATOR CUFF REPAIR  2010    right     SKIN BIOPSY  2010    3 squamous cell carcinoma right leg, left leg     Past Medical History:   Diagnosis Date    Acute kidney failure ([facility] [address] 2014 and 8/2016    x2,no dialysis needed,resolved, kidney function tests returned to normal    Anxiety     Arthritis     CAD (coronary artery disease)     follows with Dr. Mohan Dorantes every 6 months    Cancer [facility])     skin, leg,nose- removed surgically     COPD (chronic obstructive pulmonary disease) ([facility] [address]     mild- no inhlaers, no oxygen     Depression     Fibromyalgia     chronic back and neck pain    Generalized headaches     h/o / daily    History of blood transfusion 3-11-14    multiple times    History of necrotic bowel 2012    Hyperlipidemia     Hypertension     Incisional hernia     abdomen    Insomnia     Mitral valve regurgitation     Myocardial infarct ([facility] [address] 2002    Occasional tremors     at hands / stable with medication    Osteopenia     PONV (postoperative nausea and vomiting)     Vitamin B12 deficiency     h/o     Review of Systems   Constitutional: Positive for activity change, appetite change and fatigue. Negative for chills and fever. HENT: Negative for dental problem and mouth sores. Eyes: Negative for photophobia and visual disturbance. Respiratory: Negative for chest tightness, shortness of breath and stridor. Cardiovascular: Negative for chest pain, palpitations and leg swelling. Gastrointestinal: Positive for abdominal pain, diarrhea and nausea. Negative for blood in stool, constipation and vomiting. Endocrine: Negative for cold intolerance and heat intolerance. Genitourinary: Negative for difficulty urinating and dysuria. Musculoskeletal: Positive for back pain. Negative for arthralgias and myalgias. Skin: Negative for color change and rash. Allergic/Immunologic: Negative for environmental allergies and food allergies. Neurological: Negative for dizziness and light-headedness. Hematological: Negative for adenopathy. Does not bruise/bleed easily. Psychiatric/Behavioral: Negative for agitation and decreased concentration. Vitals:    01/21/21 1632 01/21/21 1826 01/21/21 1833 01/21/21 1945   BP: (!) 118/54 (!) 100/55 (!) 100/55 (!) 112/59   Pulse: 89 89 89 61   Resp: 15  15 16   Temp:   98.2 °F (36.8 °C) 98.2 °F (36.8 °C)   TempSrc:   Oral Oral   SpO2: 95%  93% 96%   Weight:       Height:           Physical Exam  Constitutional:       Appearance: Normal appearance. HENT:      Head: Normocephalic. Right Ear: External ear normal.      Left Ear: External ear normal.      Nose: Nose normal. No congestion. Mouth/Throat:      Mouth: Mucous membranes are dry. Eyes:      Extraocular Movements: Extraocular movements intact. Pupils: Pupils are equal, round, and reactive to light. Neck:      Musculoskeletal: Normal range of motion and neck supple. Cardiovascular:      Rate and Rhythm: Normal rate and regular rhythm. Pulses: Normal pulses. Pulmonary:      Effort: Pulmonary effort is normal.      Breath sounds: Normal breath sounds. No wheezing or rales. Abdominal:      General: Bowel sounds are normal. There is no distension. Palpations: Abdomen is soft. Tenderness: There is no abdominal tenderness. Hernia: A hernia is present. Comments: Ostomy intact stoma pink. Musculoskeletal: Normal range of motion. Right lower leg: No edema. Left lower leg: No edema. Skin:     General: Skin is warm. Capillary Refill: Capillary refill takes less than 2 seconds. Findings: No rash. Neurological:      General: No focal deficit present. Mental Status: She is alert and oriented to person, place, and time. Psychiatric:         Mood and Affect: Mood normal.         Behavior: Behavior normal.         Active Problems:    Acute kidney injury ([address]  Resolved Problems:    * No resolved hospital problems. *  1. KD   2. Hyperkalemia  3. Short gut syndrome  4. Abdominal adhesions   5. CKD stage III  6. CAD   7. Chronic pain     Plan:  Continue on IVF. Nephrology consulted. Ask dietician and ostomy nurse to see. Monitor kidney function closely. Restart lomotil and levsin.      DVT Prophylaxis: Lovenox  Code Status: Full    Rob Eaton DO      Electronically signed by Rob Eaton DO on 1/22/2021 at 5:05 AM

## 2021-01-22 NOTE — CARE COORDINATION
Social Work/Discharge Planning:  Social Work consult noted. Met with patient and completed initial assessment. Explained Social Work role and discussed transition of care/discharge planning. Patient lives with her  in a one story house with two steps to enter. PTA she is independent with no adaptive device. She has a cane, walker and shower chair at home. She has a home health care history with Cleve Davis of the [facility], which services were completed in December. Patient PCP is Dr. Albaro Christian and pharmacy is [facility] in [facility]. Patient plans to return home at discharge and she denies any need for home health care at this time. Will continue to follow and assist with discharge planning.  Electronically signed by CASSI Gimenez on 1/22/2021 at 10:26 AM

## 2021-01-22 NOTE — PROGRESS NOTES
Nephrology Consult Note  Patient's Name: Cuong Hung  11:01 AM  1/22/2021    History of Present Ilness:    Cuong Hung is a 68 y.o. female with PMH HTN, HLD, CAD, High output ostomy and COPD. She was sent to the ER for abnormal labs. Per the patient she has not been feeling well. She has had decreased urine output and decreased PO intake. Her PCP did lab work and she was told her Cr and K were abnormal, she does not know the values. She has been seen by our practice for multiple KD's secondary to high output ostomy and dehydration. Her last OV with us was Dec 11, 2020. Her Cr was elevated at that time at 1.6mg/dl, it had previously been 1.1mg/dl. She was put on lomotil 4x/day by GI which she states has helped with the diarrhea. On assessment, the patient is resting in bed in the ER. She states she feels SOB, but has been smoking more at home, and is chronically SOB due to COPD. She denies chest pain. Labs on arrival today showed Na 137, K 5.4, Cl 105, CO2 21, BUN 53, Cr 1.9. She received 1L NS bolus, CaGluc and NaHCO3 in the ER. Interval History:  1/22/21: Seen in room, states she feels OK and would like to go home. Good PO intake.      Past Medical History:   Diagnosis Date    Acute kidney failure ([facility] [address] 2014 and 8/2016    x2,no dialysis needed,resolved, kidney function tests returned to normal    Anxiety     Arthritis     CAD (coronary artery disease)     follows with Dr. Darline Galindo every 6 months    Cancer [facility])     skin, leg,nose- removed surgically     COPD (chronic obstructive pulmonary disease) ([facility] [address]     mild- no inhlaers, no oxygen     Depression     Fibromyalgia     chronic back and neck pain    Generalized headaches     h/o / daily    History of blood transfusion 3-11-14    multiple times    History of necrotic bowel 2012    Hyperlipidemia     Hypertension     Incisional hernia     abdomen    Insomnia     Mitral valve regurgitation     Myocardial infarct ([address] 2002    Occasional tremors     at hands / stable with medication    Osteopenia     PONV (postoperative nausea and vomiting)     Vitamin B12 deficiency     h/o       Past Surgical History:   Procedure Laterality Date    ABDOMEN SURGERY  2-    total colectomy, bowel resection, ileostomy,revision of ileostomy     ABDOMEN SURGERY N/A 1/26/2020    DRAINAGE OF ABDOMINAL WALL ABSCESS, EXPLANTATION OF MESH, DEBRIDEMENT OF SKIN AND SUBCUTANEOUS TISSUE performed by Jrery Cordoba MD [facility] N/A 9/11/2020    EXPLANTATION OF HERNIA MESH performed by Jerry Cordoba MD at [address] [facility]  2002   [facility]    right and left thumb   [facility], 2004, 2009    lumbar fusion x 3    BACK SURGERY      cervical fusion x 2    BREAST SURGERY  years ago    monor calcifications    CARPAL TUNNEL RELEASE Bilateral     CATARACT REMOVAL WITH IMPLANT Right 03/03/15    CHOLECYSTECTOMY  5-11-07    Lap    COLONOSCOPY      CORONARY ANGIOPLASTY WITH STENT PLACEMENT  1-7-2002    ENDOSCOPY, COLON, DIAGNOSTIC      EPIGASTRIC HERNIA REPAIR  3/21/16    incisional with mesh implantation    ESOPHAGUS SURGERY  1-4-13    esophageal clamp (torn Esophagus)    FINGER TRIGGER RELEASE Right 2006    index finger    FOOT SURGERY Right     multiple    HERNIA REPAIR  12-31-13    abdominal x 5- last one 2017     ILEOSTOMY OR JEJUNOSTOMY  1-3-13    revision    INSERT PICC LINE  06/09/2020    and removed     JOINT REPLACEMENT Right 3/24/15    right total shoulder arthroplasty    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     LAPAROTOMY N/A 5/12/2020    EXPLORATORY LAPAROTOMY EXPLANTATION OF INFECTED MESH SMAL BOWEL RESECTION AND REVISION END ILEOSTOMY, LYSIS OF ADHESIONS performed by Jerry Cordoba MD at [address] 6/8/2020    LAPAROTOMY EXPLORATORY, Florene Feeling OF HERNIA MESH performed by Jerry Cordoba MD at [facility] N/A 9/15/2020    EXPLORATORY LAPAROTOMY WITH SMALL BOWEL RESECTION, TAKE DOWN REVISION ENTEROCUTANEOUS FISTULA , OSTOMY REVISION performed by Murali Dan MD at [address] / leg    NASAL SINUS SURGERY      x2 /  cauterized    OTHER SURGICAL HISTORY  08/24/2016    diagnostic laparotomy with repair of intraabdominal hernia    OTHER SURGICAL HISTORY      removal plate / screws  / right great toe    PICC LINE INSERTION NURSE  9/15/2020         ROTATOR CUFF REPAIR  2010    right     SKIN BIOPSY  2010    3 squamous cell carcinoma right leg, left leg       History reviewed. No pertinent family history. reports that she has been smoking cigarettes. She has been smoking about 1.00 pack per day. She has never used smokeless tobacco. She reports previous alcohol use. She reports that she does not use drugs.     Allergies:  Fentanyl, Levofloxacin, Tramadol, and Vioxx [rofecoxib]    Current Medications:        diphenoxylate-atropine (LOMOTIL) 2.5-0.025 MG per tablet 1 tablet, 4x Daily      hyoscyamine (ANASPAZ;LEVSIN) tablet 125 mcg, BID PRN      lactated ringers 1,000 mL infusion, Continuous      sodium chloride flush 0.9 % injection 10 mL, 2 times per day      sodium chloride flush 0.9 % injection 10 mL, PRN      promethazine (PHENERGAN) tablet 12.5 mg, Q6H PRN    Or      ondansetron (ZOFRAN) injection 4 mg, Q6H PRN      senna (SENOKOT) tablet 8.6 mg, Daily PRN      acetaminophen (TYLENOL) tablet 650 mg, Q6H PRN    Or      acetaminophen (TYLENOL) suppository 650 mg, Q6H PRN      aspirin chewable tablet 81 mg, Daily      vitamin D (CHOLECALCIFEROL) tablet 2,000 Units, BID      dilTIAZem (CARDIZEM CD) extended release capsule 120 mg, Daily      escitalopram (LEXAPRO) tablet 20 mg, Nightly      ferrous sulfate (IRON 325) tablet 325 mg, Daily with breakfast      pantoprazole (PROTONIX) tablet 40 mg, QAM AC      ondansetron (ZOFRAN) tablet 4 mg, TID      oxyCODONE-acetaminophen (PERCOCET)  MG per tablet 1 tablet, Q6H PRN      pravastatin (PRAVACHOL) tablet 40 mg, QPM      primidone (MYSOLINE) tablet 250 mg, Nightly      propranolol (INDERAL) tablet 20 mg, Daily      traZODone (DESYREL) tablet 25 mg, Nightly      vitamin B-12 (CYANOCOBALAMIN) tablet 500 mcg, Daily      diphenoxylate-atropine (LOMOTIL) liquid 5 mL, 4x Daily PRN      enoxaparin (LOVENOX) injection 30 mg, Daily        Review of Systems:   Pertinent items are noted in HPI.     Physical exam:   Constitutional:    Vitals: VITALS:  BP (!) 108/55   Pulse 81   Temp 97.2 °F (36.2 °C) (Oral)   Resp 20   Ht 5' (1.524 m)   Wt 97 lb (44 kg)   SpO2 (!) 88%   BMI 18.94 kg/m²   24HR INTAKE/OUTPUT:      Intake/Output Summary (Last 24 hours) at 1/22/2021 1101  Last data filed at 1/22/2021 0542  Gross per 24 hour   Intake 1302 ml   Output --   Net 1302 ml     URINARY CATHETER OUTPUT (Mooney):     Skin: no rash, turgor wnl  Heent:  eomi, mmm  Neck: no bruits or jvd noted  Cardiovascular:  S1, S2 without m/r/g  Respiratory: Diminished in bases bilat  Abdomen:  +bs, soft, nt, nd. +ostomy  Ext: neg lower extremity edema  Psychiatric: mood and affect appropriate  Musculoskeletal:  Rom, muscular strength intact    Data:   Labs:  CBC:   Lab Results   Component Value Date    WBC 6.1 01/22/2021    RBC 3.59 01/22/2021    HGB 11.1 01/22/2021    HCT 35.9 01/22/2021    .0 01/22/2021    MCH 30.9 01/22/2021    MCHC 30.9 01/22/2021    RDW 14.6 01/22/2021     01/22/2021    MPV 12.4 01/22/2021     BMP:    Lab Results   Component Value Date     01/22/2021    K 4.4 01/22/2021    K 3.4 11/18/2020     01/22/2021    CO2 22 01/22/2021    BUN 43 01/22/2021    LABALBU 4.5 01/21/2021    CREATININE 1.6 01/22/2021    CALCIUM 9.0 01/22/2021    GFRAA 38 01/22/2021    LABGLOM 31 01/22/2021    GLUCOSE 87 01/22/2021        Imaging:  CT ABDOMEN PELVIS WO CONTRAST [[id number]] Collected: 01/21/21 1257      Order Status: Completed Updated: 01/21/21 1310     Narrative:       EXAMINATION:   CT OF THE ABDOMEN AND PELVIS WITHOUT CONTRAST 1/21/2021 12:44 pm   TECHNIQUE:   CT of the abdomen and pelvis was performed without the administration of   intravenous contrast. Multiplanar reformatted images are provided for review. Dose modulation, iterative reconstruction, and/or weight based adjustment of   the mA/kV was utilized to reduce the radiation dose to as low as reasonably   achievable. COMPARISON:   09/12/2020   HISTORY:   ORDERING SYSTEM PROVIDED HISTORY: abdominal pain   TECHNOLOGIST PROVIDED HISTORY:   Reason for exam:->abdominal pain   FINDINGS:   Lower Chest:  Visualized portion of the lower chest demonstrates no acute   abnormality. Organs: The liver, spleen, pancreas and adrenal glands are unremarkable. Please note there are calcified stable splenic aneurysms unchanged compared   to the prior studies the largest measures up to 1.1 cm. Bilateral renal cortical thinning is noted.  There is no appreciable   obstructive uropathy. Carlee Hawks is a stable hyperdense left renal cyst measuring   1.3 cm and there is a stable 1.8 cm simple left renal cyst. Carlee Hawks is an   exophytic 5 cm x 4.2 cm cystic focus seen along the posterolateral aspect of   the left kidney which could represent an exophytic left renal cyst.   GI/Bowel: The gallbladder is surgically absent.  The stomach is normally   distended.  There is no small bowel obstruction.  The patient has undergone   previous partial colectomy.  Note is made of a right lower quadrant ostomy   and peristomal hernia.  No subcutaneous abscess is noted. Pelvis:  Bladder is unremarkable in appearance. Peritoneum/Retroperitoneum:  No evidence of retroperitoneal lymphadenopathy. .   There is calcified plaque seen within the abdominal aorta.  There is no   aneurysm. Bones/Soft Tissues: There are advanced degenerative changes of the lumbar   spine and there are extensive postoperative changes of the lumbar spine.    There is chronic 6 mm anterolisthesis of L4 on L5.  There is no osseous   lesion and there is no pelvic osseous lesion.     Impression:       1. There are no findings of obstructive uropathy. 2. Bilateral renal cortical thinning. 3. Stable left hyperdense and simple renal cysts. 4. There is no intra-abscess or free air. Assessment and Plan  1. Stage I KD in the setting of decreased effective renal perfusion with the high output ostomy and decreased PO intake as evidenced by the FeNa <1%  ABD CT no stones or hydro  UA clear, 30 protein, (-)LE, rare bacteria  Cr 1.9-->1.6  PLAN: 1. Cr improved with IVF  2. Follow Cr    2. 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 Follow    3. Metabolic acidosis with the CKD and high output ostomy  HCO3 at goal >=22  PLAN:1. Follow    4. Hyperkalemia due to the increased K+ load from the foods in the setting of the KD with decreased distal tubule Na+ delivery limiting K+ excretion  K 5.4-->4.4  PLAN: 1. Follow    5. CKD G3A with baseline Cr 1.1mg/dl and eGFR 48ml/min in November 2020    6. Macrocytosis without anemia  PLAN: 1. Check TSH, Folate and B12    7. Hypomagnesemia  Mag 1.5  PLAN: 1. Replace Mag IV    Thank you Dr. aRch Pratt DO for allowing us to participate in care of [address], APRN - NP  11:01 AM  1/22/2021   Pt seen and examined. Pt states she feels better and is eager to go home  Stool has slowed  All Labs reviewed  A/P:  1. Stage I KD-cr has trended down to 1.6mg/dl  PLAN:1. OK for D/C will follow labs as an outpt    2. Hypomagnesemia-Mg++1.5  PLAN:1. IV Mg++ given earlier today    3. Hyperkalemia  K+ down to WNL  PLAN:1. Will have the office get her set up with out pt Lokelma or Yuliana    Agree with the remainder as above    JEZ Anderson MD

## 2021-01-22 NOTE — FLOWSHEET NOTE
Inpatient Wound Care    Admit Date: 1/21/2021 11:26 AM    Reason for consult:  Ileostomy    Significant history:  Admitted with KD. History includes: Bowel resection, CAD, COPD hernia. Wound history:  POA    Findings:       01/22/21 1256   Ileostomy Ileostomy RLQ   Placement Date/Time: 09/17/20 1305   Pre-existing: No  Inserted by: Dr. Noe Azul  Ileostomy Type: Ileostomy  Location: RLQ   Stomal Appliance 1 piece   Flange Size (inches)   (stoma 1 1/8\")   Stoma  Assessment Protrudes; Red   Mucocutaneous Junction Intact   Peristomal Assessment Intact   Treatment Bag change   Stool Color Brown   Stool Appearance Watery   Stool Amount Medium       Impression:  Ileostomy stoma red, raised. Mucocutaneous junction and peristomal skin intact. Interventions in place:  Ostomy appliance changed using Coloplast 1 pc with convexity  And a ring. Plan: Ostomy care as needed.        Christopher Carl 1/22/2021 12:58 PM

## 2021-01-23 ENCOUNTER — CARE COORDINATION (OUTPATIENT)
Dept: CASE MANAGEMENT | Age: 81
End: 2021-01-23

## 2021-01-23 NOTE — CARE COORDINATION
[facility] Transitions Initial Follow Up Call    Call within 2 business days of discharge: Yes    Patient: Frantz Escamilla Patient : 1940   MRN: [id number]  Reason for Admission: KD   Discharge Date: 21 RARS: Readmission Risk Score: 45      Last Discharge [facility]       Complaint Diagnosis Description Type Department Provider    21 Abnormal Lab KD (acute kidney injury) ([facility] [address] . .. ED to Hosp-Admission (Discharged) (ADMITTED) ANITA 5W Azalea Kaiser DO; Chica Chaves. .. Facility: [facility]      First attempt to reach the patient for [facility] Care Transition call post hospital discharge. Phone rang multiple times with no option to leave a message. Follow Up  No future appointments.     Gaby Feliciano RN

## 2021-01-25 ENCOUNTER — CARE COORDINATION (OUTPATIENT)
Dept: CASE MANAGEMENT | Age: 81
End: 2021-01-25

## 2021-01-25 NOTE — CARE COORDINATION
[address] [facility] Transitions Initial Follow Up Call    Call within 2 business days of discharge: Yes    Patient: Yovani Ag Patient : 1940   MRN: [id number]  Reason for Admission: KD   Discharge Date: 21 RARS: Readmission Risk Score: 45      Last Discharge [address] [id number]       Complaint Diagnosis Description Type Department Provider    21 Abnormal Lab KD (acute kidney injury) ([facility] [address] . .. ED to Hosp-Admission (Discharged) (ADMITTED) ANITA 5W Azalea Kaiser DO; Dave Lane. .. Second attempt to reach the patient for [facility] Care Transition call post hospital discharge. Again, phone rang multiple times with no option to leave a message, will hand off to the Care Transition Central team to follow. Follow Up  No future appointments.     Shanda Trinh RN

## 2021-01-29 DIAGNOSIS — N18.4 HYPERTENSIVE KIDNEY DISEASE WITH CHRONIC KIDNEY DISEASE STAGE IV (HCC): ICD-10-CM

## 2021-01-29 DIAGNOSIS — I12.9 HYPERTENSIVE KIDNEY DISEASE WITH CHRONIC KIDNEY DISEASE STAGE IV (HCC): ICD-10-CM

## 2021-01-29 RX ORDER — SODIUM CHLORIDE, SODIUM LACTATE, POTASSIUM CHLORIDE, CALCIUM CHLORIDE 600; 310; 30; 20 MG/100ML; MG/100ML; MG/100ML; MG/100ML
INJECTION, SOLUTION INTRAVENOUS CONTINUOUS
Status: CANCELLED
Start: 2021-01-29

## 2021-01-29 RX ORDER — SODIUM CHLORIDE 0.9 % (FLUSH) 0.9 %
10 SYRINGE (ML) INJECTION PRN
Status: CANCELLED
Start: 2021-01-29

## 2021-02-01 ENCOUNTER — CARE COORDINATION (OUTPATIENT)
Dept: CASE MANAGEMENT | Age: 81
End: 2021-02-01

## 2021-02-01 NOTE — CARE COORDINATION
[facility] Transitions Follow Up Call    2021    Patient: Renea Mike  Patient : 1940   MRN: <[id number]>  Reason for Admission: KD; Hyperkalemia  Discharge Date: 21 RARS: Readmission Risk Score: 45         Spoke with: Olena Chaudharivitaly stated she has 6 hr infusion tomorrow scheduled with Dr Leatha Sethi at hospital, has lost over 25# recently. 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 Pt is working with Laura Anayach, ostomy RN at [address] [place] [address] will contact RN regarding issues. Pt is taking Questran for cholesterol but side effect has been loose stools. Stated she takes Lomotil  4 X per day. Pt also taking medication to lower potassium. Stated she has appt with Cardiology and pain clinic this week, did get her 1st COVID vaccine. CTN spoke to Laura Estevez who will follow up with pt later this week as requested. Care Transitions Subsequent and Final Call    Subsequent and Final Calls  Have your medications changed?: No  Do you have any questions related to your medications?: No  Do you currently have any active services?: No  Are you currently active with any services?: Home Health  Do you have any needs or concerns that I can assist you with?: Yes  Patient-reported Needs or Concerns: contact ostomy RN regarding leaking bags  Identified Barriers: None  Care Transitions Interventions  Other Interventions:            Follow Up  Future Appointments   Date Time Provider Jigar Hayden   2/3/2021  8:30 AM [facility] RIGO Licona, MARYJANE

## 2021-02-03 ENCOUNTER — HOSPITAL ENCOUNTER (OUTPATIENT)
Dept: INFUSION THERAPY | Age: 81
Setting detail: INFUSION SERIES
Discharge: HOME | End: 2021-02-03
Payer: MEDICARE

## 2021-02-03 VITALS
DIASTOLIC BLOOD PRESSURE: 53 MMHG | BODY MASS INDEX: 19.04 KG/M2 | WEIGHT: 97 LBS | HEIGHT: 60 IN | RESPIRATION RATE: 16 BRPM | TEMPERATURE: 98 F | OXYGEN SATURATION: 98 % | SYSTOLIC BLOOD PRESSURE: 109 MMHG | HEART RATE: 56 BPM

## 2021-02-03 DIAGNOSIS — N18.4 HYPERTENSIVE KIDNEY DISEASE WITH CHRONIC KIDNEY DISEASE STAGE IV (HCC): Primary | ICD-10-CM

## 2021-02-03 DIAGNOSIS — I12.9 HYPERTENSIVE KIDNEY DISEASE WITH CHRONIC KIDNEY DISEASE STAGE IV (HCC): Primary | ICD-10-CM

## 2021-02-03 PROCEDURE — 96360 HYDRATION IV INFUSION INIT: CPT

## 2021-02-03 PROCEDURE — 2580000003 HC RX 258: Performed by: INTERNAL MEDICINE

## 2021-02-03 PROCEDURE — 96361 HYDRATE IV INFUSION ADD-ON: CPT

## 2021-02-03 RX ORDER — SODIUM CHLORIDE, SODIUM LACTATE, POTASSIUM CHLORIDE, CALCIUM CHLORIDE 600; 310; 30; 20 MG/100ML; MG/100ML; MG/100ML; MG/100ML
INJECTION, SOLUTION INTRAVENOUS CONTINUOUS
Status: CANCELLED
Start: 2021-02-03

## 2021-02-03 RX ORDER — SODIUM CHLORIDE 0.9 % (FLUSH) 0.9 %
10 SYRINGE (ML) INJECTION PRN
Status: CANCELLED
Start: 2021-02-03

## 2021-02-03 RX ORDER — PATIROMER 8.4 G/1
8.4 POWDER, FOR SUSPENSION ORAL DAILY
Status: ON HOLD | COMMUNITY
End: 2021-02-28 | Stop reason: HOSPADM

## 2021-02-03 RX ORDER — CHOLESTYRAMINE 4 G/9G
POWDER, FOR SUSPENSION ORAL
COMMUNITY
End: 2021-09-10 | Stop reason: ALTCHOICE

## 2021-02-03 RX ORDER — SODIUM CHLORIDE, SODIUM LACTATE, POTASSIUM CHLORIDE, CALCIUM CHLORIDE 600; 310; 30; 20 MG/100ML; MG/100ML; MG/100ML; MG/100ML
INJECTION, SOLUTION INTRAVENOUS ONCE
Status: COMPLETED | OUTPATIENT
Start: 2021-02-03 | End: 2021-02-03

## 2021-02-03 RX ORDER — SODIUM CHLORIDE 0.9 % (FLUSH) 0.9 %
10 SYRINGE (ML) INJECTION PRN
Status: DISCONTINUED | OUTPATIENT
Start: 2021-02-03 | End: 2021-02-03 | Stop reason: ALTCHOICE

## 2021-02-03 RX ADMIN — SODIUM CHLORIDE, PRESERVATIVE FREE 10 ML: 5 INJECTION INTRAVENOUS at 09:05

## 2021-02-03 RX ADMIN — SODIUM CHLORIDE, POTASSIUM CHLORIDE, SODIUM LACTATE AND CALCIUM CHLORIDE: 600; 310; 30; 20 INJECTION, SOLUTION INTRAVENOUS at 09:06

## 2021-02-03 ASSESSMENT — PAIN DESCRIPTION - DESCRIPTORS: DESCRIPTORS: ACHING

## 2021-02-03 ASSESSMENT — PAIN DESCRIPTION - FREQUENCY: FREQUENCY: CONTINUOUS

## 2021-02-03 ASSESSMENT — PAIN SCALES - GENERAL: PAINLEVEL_OUTOF10: 3

## 2021-02-03 ASSESSMENT — PAIN DESCRIPTION - PAIN TYPE: TYPE: CHRONIC PAIN

## 2021-02-03 ASSESSMENT — PAIN DESCRIPTION - LOCATION: LOCATION: BACK;ABDOMEN

## 2021-02-03 NOTE — PROGRESS NOTES
Tolerated hydartion infusion well. Therapy plan reviewed with patient. Verbalizes understanding. Reviewed AVS with patient, reviewed medication information, verbalizes good knowledge of current plan, and has no signs or symptoms to report at this time.  Given copy of AVS.

## 2021-02-08 ENCOUNTER — CARE COORDINATION (OUTPATIENT)
Dept: CASE MANAGEMENT | Age: 81
End: 2021-02-08

## 2021-02-08 ENCOUNTER — HOSPITAL ENCOUNTER (OUTPATIENT)
Dept: WOUND CARE | Age: 81
Discharge: HOME | End: 2021-02-08
Payer: MEDICARE

## 2021-02-08 ENCOUNTER — HOSPITAL ENCOUNTER (OUTPATIENT)
Age: 81
Discharge: HOME | End: 2021-02-08
Payer: MEDICARE

## 2021-02-08 LAB
ANION GAP SERPL CALCULATED.3IONS-SCNC: 12 MMOL/L (ref 7–16)
BILIRUBIN URINE: NEGATIVE
BLOOD, URINE: NEGATIVE
BUN BLDV-MCNC: 45 MG/DL (ref 8–23)
CALCIUM SERPL-MCNC: 9.6 MG/DL (ref 8.6–10.2)
CHLORIDE BLD-SCNC: 103 MMOL/L (ref 98–107)
CLARITY: CLEAR
CO2: 18 MMOL/L (ref 22–29)
COLOR: YELLOW
CREAT SERPL-MCNC: 1.9 MG/DL (ref 0.5–1)
GFR AFRICAN AMERICAN: 31
GFR NON-AFRICAN AMERICAN: 26 ML/MIN/1.73
GLUCOSE BLD-MCNC: 117 MG/DL (ref 74–99)
GLUCOSE URINE: NEGATIVE MG/DL
KETONES, URINE: NEGATIVE MG/DL
LEUKOCYTE ESTERASE, URINE: NEGATIVE
NITRITE, URINE: NEGATIVE
PH UA: 5.5 (ref 5–9)
POTASSIUM SERPL-SCNC: 5.3 MMOL/L (ref 3.5–5)
PROTEIN UA: NEGATIVE MG/DL
SODIUM BLD-SCNC: 133 MMOL/L (ref 132–146)
SPECIFIC GRAVITY UA: 1.02 (ref 1–1.03)
UROBILINOGEN, URINE: 0.2 E.U./DL

## 2021-02-08 PROCEDURE — 36415 COLL VENOUS BLD VENIPUNCTURE: CPT

## 2021-02-08 PROCEDURE — 81003 URINALYSIS AUTO W/O SCOPE: CPT

## 2021-02-08 PROCEDURE — 99212 OFFICE O/P EST SF 10 MIN: CPT

## 2021-02-08 PROCEDURE — 80048 BASIC METABOLIC PNL TOTAL CA: CPT

## 2021-02-08 NOTE — CARE COORDINATION
[facility] Transitions Follow Up Call    2021    Patient: Sampson Brady  Patient : 1940   MRN: [id number]  Reason for Admission:   Discharge Date: 21 RARS: Readmission Risk Score: 38         Spoke with: Patient, [address] Transitions Subsequent and Final Call    Subsequent and Final Calls  Do you have any ongoing symptoms?: Yes  -weakness, fatigue  -patient offers C/O urinary frequency in small amounts over the past 2 weeks; denies burning with urination, fever, or chills  -reports urine is clear/yellow   -reports chronic neck/back pain and follows with pain management  -reports ileostomy bag continues to leak; appointment with ostomy nurse today at 1:30 PM  Do you have any questions related to your medications?: No  Do you currently have any active services?: No  Are you currently active with any services?: No  Do you have any needs or concerns that I can assist you with?: Yes  Identified Barriers: Stress    Care Transitions Interventions:  CTN called the office of Dr. Henry Wells. -CTN left a detailed message on the nurse line regarding patient's C/O urinary frequency in small amounts over the past two weeks.  -Notified office that patient will be at [facility] at 1:30 pm today for an appointment and plans to have labs drawn.  -Please notify Dr. Cristine Martell; lab would need orders if PCP would like patient to provide urine specimen. -CTN contact information provided. Patient is pleasant in conversation. -reports she had 2 good nights and states the ileostomy bag did not leak   -reports the leakage is starting to affect the surrounding skin   -patient reports she is due for lab work today; will have labs drawn at [facility]  -reports decreased appetite   -reports taking prescribed medications as ordered     Emotional support provided; will continue to follow.       Follow Up  Future Appointments   Date Time Provider Jigar Hayden   2021  1:00 PM JARETT SEARS RM 1 [place] RN

## 2021-02-08 NOTE — FLOWSHEET NOTE
Clinical Level of Care Assessment    Outpatient Ostomy Care      NAME:  Yang Kelley  YOB: 1940  MEDICAL RECORD NUMBER:  [id number]   DATE:  2/8/2021      Patient Jairo Matthew Assessment- Document in Flowsheet I&O   Points   Review of chart []   0   Assess Complete Ostomy tab in Navigator for assessment of; stoma status, peristomal skin, presence of hernia/stool consistency/diet/related medications   Simple adjustments to pouch size/pouch system, new stoma pattern, accessory addition/deletion. []   1   Assess Complete Ostomy tab in Navigator for assessment of; stoma status, peristomal skin, presence of hernia/stool consistency/diet/related medications   Moderate adjustments to pouch size/pouch system, new stoma pattern, accessory addition/deletion. Observe patient/caregiver with hands-on care. 1-2 adjustments to pouch size/system/skin care/accessory addition or deletion. [x]   2   Assess Complete Ostomy tab in Navigator for assessment of; stoma status, peristomal skin, presence of hernia/stool consistency/diet/related medications   Complex adjustments to pouch size/pouch system, new stoma pattern, accessory addition/deletion. 3 or more complex adjustments to pouch size/system/skin care/accessory addition or deletion. Observe patient/caregiver with hands-on care. Assess patient/patient abdomen for optimal pre-marked stoma site. Assess patient abdomen for type of hernia belt/accessory needed. []   3         Ambulation Status Documented in CN Clinical Note  Status Definition Points   Independent Independently able to ambulate. Fully able (without any assistance) to get on/off exam table/chair. []   0   Minimal Physical Assistance Requires physical assistance of one person to ambulate and/or position patient to be examined. Includes necessary physical assistance to position lower extremities on/off stool.    [x]   1   Moderate Physical Assistance Requires at least one staff member to demonstration visit. Pouching/discharge procedure revised/reviewed with patient/family/caregiver. Contact with outside resources; i.e. communication with Surgeon/ PCP, home health, ECF. Contact/referral to ostomy appliance supplier for new or additional products. Review when to call [facility] or schedule follow-up visit. Referral to Emergency Department   Documentation in CarePath completed. []   3       Is this the Patient's First Visit with [facility] @ [facility]? No    Is this Patient Established to this [facility] within the last 3 years? Yes             Clinical Level of Care      Points  0-3  Level 1 []     Points  4-6  Level 2 [x]     Points  7-8  Level 3 []     Points  9-10  Level 4 []     Points  11-12  Level 5 []        02/08/21 1415   Ileostomy Ileostomy RLQ   Placement Date/Time: 09/17/20 1305   Pre-existing: No  Inserted by: Dr. Taran Bean  Ileostomy Type: Ileostomy  Location: RLQ   Stomal Appliance 1 piece; Changed  (with convexity)   Stoma  Assessment Protrudes; Moist;Red   Peristomal Assessment Intact   Treatment Bag change  (skin care)   Stool Color Black   Stool Appearance Loose   Stool Amount Medium   patient seen in the past for adherence issues  Pouch lasting from 1-4 days  Pattern provided  Discussed different pouching options   applied barrier ring to pouch and barrier strips to patient  Will follow up prn  Electronically signed by Iraj Back RN on 2/8/2021 at 3:27 PM

## 2021-02-15 ENCOUNTER — CARE COORDINATION (OUTPATIENT)
Dept: CASE MANAGEMENT | Age: 81
End: 2021-02-15

## 2021-02-24 RX ORDER — SODIUM CHLORIDE 0.9 % (FLUSH) 0.9 %
10 SYRINGE (ML) INJECTION PRN
Status: CANCELLED | OUTPATIENT
Start: 2021-02-24

## 2021-02-25 ENCOUNTER — APPOINTMENT (OUTPATIENT)
Dept: ULTRASOUND IMAGING | Age: 81
DRG: 683 | End: 2021-02-25
Payer: MEDICARE

## 2021-02-25 ENCOUNTER — HOSPITAL ENCOUNTER (INPATIENT)
Age: 81
LOS: 1 days | Discharge: HOME | DRG: 683 | End: 2021-02-28
Attending: EMERGENCY MEDICINE | Admitting: INTERNAL MEDICINE
Payer: MEDICARE

## 2021-02-25 ENCOUNTER — HOSPITAL ENCOUNTER (OUTPATIENT)
Dept: INFUSION THERAPY | Age: 81
Setting detail: INFUSION SERIES
Discharge: HOME | DRG: 683 | End: 2021-02-25
Payer: MEDICARE

## 2021-02-25 VITALS
RESPIRATION RATE: 16 BRPM | TEMPERATURE: 98.2 F | OXYGEN SATURATION: 94 % | BODY MASS INDEX: 18.06 KG/M2 | SYSTOLIC BLOOD PRESSURE: 87 MMHG | DIASTOLIC BLOOD PRESSURE: 52 MMHG | WEIGHT: 92 LBS | HEART RATE: 52 BPM | HEIGHT: 60 IN

## 2021-02-25 DIAGNOSIS — E86.0 DEHYDRATION: ICD-10-CM

## 2021-02-25 DIAGNOSIS — I12.9 HYPERTENSIVE KIDNEY DISEASE WITH CHRONIC KIDNEY DISEASE STAGE IV (HCC): Primary | ICD-10-CM

## 2021-02-25 DIAGNOSIS — N17.9 AKI (ACUTE KIDNEY INJURY) (HCC): Primary | ICD-10-CM

## 2021-02-25 DIAGNOSIS — E86.1 HYPOTENSION DUE TO HYPOVOLEMIA: ICD-10-CM

## 2021-02-25 DIAGNOSIS — Z93.2 ILEOSTOMY PRESENT (HCC): Chronic | ICD-10-CM

## 2021-02-25 DIAGNOSIS — K90.9 DIARRHEA DUE TO MALABSORPTION: ICD-10-CM

## 2021-02-25 DIAGNOSIS — N18.4 HYPERTENSIVE KIDNEY DISEASE WITH CHRONIC KIDNEY DISEASE STAGE IV (HCC): Primary | ICD-10-CM

## 2021-02-25 DIAGNOSIS — I95.89 HYPOTENSION DUE TO HYPOVOLEMIA: ICD-10-CM

## 2021-02-25 DIAGNOSIS — R19.7 DIARRHEA DUE TO MALABSORPTION: ICD-10-CM

## 2021-02-25 LAB
ALBUMIN SERPL-MCNC: 4.1 G/DL (ref 3.5–5.2)
ALP BLD-CCNC: 188 U/L (ref 35–104)
ALT SERPL-CCNC: 17 U/L (ref 0–32)
ANION GAP SERPL CALCULATED.3IONS-SCNC: 11 MMOL/L (ref 7–16)
ANION GAP SERPL CALCULATED.3IONS-SCNC: 13 MMOL/L (ref 7–16)
AST SERPL-CCNC: 25 U/L (ref 0–31)
BASOPHILS ABSOLUTE: 0.04 E9/L (ref 0–0.2)
BASOPHILS RELATIVE PERCENT: 0.9 % (ref 0–2)
BILIRUB SERPL-MCNC: 0.3 MG/DL (ref 0–1.2)
BILIRUBIN URINE: NEGATIVE
BLOOD, URINE: NEGATIVE
BUN BLDV-MCNC: 64 MG/DL (ref 8–23)
BUN BLDV-MCNC: 67 MG/DL (ref 8–23)
CALCIUM SERPL-MCNC: 9 MG/DL (ref 8.6–10.2)
CALCIUM SERPL-MCNC: 9.3 MG/DL (ref 8.6–10.2)
CHLORIDE BLD-SCNC: 101 MMOL/L (ref 98–107)
CHLORIDE BLD-SCNC: 99 MMOL/L (ref 98–107)
CHLORIDE URINE RANDOM: <20 MMOL/L
CLARITY: CLEAR
CO2: 15 MMOL/L (ref 22–29)
CO2: 23 MMOL/L (ref 22–29)
COLOR: YELLOW
CREAT SERPL-MCNC: 2.5 MG/DL (ref 0.5–1)
CREAT SERPL-MCNC: 3 MG/DL (ref 0.5–1)
CREATININE URINE: 120 MG/DL (ref 29–226)
EOSINOPHILS ABSOLUTE: 0.25 E9/L (ref 0.05–0.5)
EOSINOPHILS RELATIVE PERCENT: 5.6 % (ref 0–6)
GFR AFRICAN AMERICAN: 18
GFR AFRICAN AMERICAN: 22
GFR NON-AFRICAN AMERICAN: 15 ML/MIN/1.73
GFR NON-AFRICAN AMERICAN: 19 ML/MIN/1.73
GLUCOSE BLD-MCNC: 117 MG/DL (ref 74–99)
GLUCOSE BLD-MCNC: 164 MG/DL (ref 74–99)
GLUCOSE URINE: NEGATIVE MG/DL
HCT VFR BLD CALC: 34.2 % (ref 34–48)
HEMOGLOBIN: 11.1 G/DL (ref 11.5–15.5)
IMMATURE GRANULOCYTES #: 0.02 E9/L
IMMATURE GRANULOCYTES %: 0.4 % (ref 0–5)
KETONES, URINE: NEGATIVE MG/DL
LEUKOCYTE ESTERASE, URINE: NEGATIVE
LYMPHOCYTES ABSOLUTE: 1.37 E9/L (ref 1.5–4)
LYMPHOCYTES RELATIVE PERCENT: 30.4 % (ref 20–42)
MCH RBC QN AUTO: 32.3 PG (ref 26–35)
MCHC RBC AUTO-ENTMCNC: 32.5 % (ref 32–34.5)
MCV RBC AUTO: 99.4 FL (ref 80–99.9)
MONOCYTES ABSOLUTE: 0.44 E9/L (ref 0.1–0.95)
MONOCYTES RELATIVE PERCENT: 9.8 % (ref 2–12)
NEUTROPHILS ABSOLUTE: 2.38 E9/L (ref 1.8–7.3)
NEUTROPHILS RELATIVE PERCENT: 52.9 % (ref 43–80)
NITRITE, URINE: NEGATIVE
PDW BLD-RTO: 15 FL (ref 11.5–15)
PH UA: 5.5 (ref 5–9)
PLATELET # BLD: 216 E9/L (ref 130–450)
PMV BLD AUTO: 13 FL (ref 7–12)
POTASSIUM REFLEX MAGNESIUM: 5 MMOL/L (ref 3.5–5)
POTASSIUM SERPL-SCNC: 5 MMOL/L (ref 3.5–5)
POTASSIUM, UR: 24.3 MMOL/L
PRO-BNP: 354 PG/ML (ref 0–450)
PROTEIN PROTEIN: 16 MG/DL (ref 0–12)
PROTEIN UA: NEGATIVE MG/DL
PROTEIN/CREAT RATIO: 0.1
PROTEIN/CREAT RATIO: 0.1 (ref 0–0.2)
RBC # BLD: 3.44 E12/L (ref 3.5–5.5)
SODIUM BLD-SCNC: 129 MMOL/L (ref 132–146)
SODIUM BLD-SCNC: 133 MMOL/L (ref 132–146)
SODIUM URINE: <20 MMOL/L
SPECIFIC GRAVITY UA: 1.02 (ref 1–1.03)
TOTAL PROTEIN: 6.9 G/DL (ref 6.4–8.3)
TROPONIN: 0.01 NG/ML (ref 0–0.03)
UROBILINOGEN, URINE: 0.2 E.U./DL
WBC # BLD: 4.5 E9/L (ref 4.5–11.5)

## 2021-02-25 PROCEDURE — 82570 ASSAY OF URINE CREATININE: CPT

## 2021-02-25 PROCEDURE — 2500000003 HC RX 250 WO HCPCS: Performed by: INTERNAL MEDICINE

## 2021-02-25 PROCEDURE — 96361 HYDRATE IV INFUSION ADD-ON: CPT

## 2021-02-25 PROCEDURE — G0378 HOSPITAL OBSERVATION PER HR: HCPCS

## 2021-02-25 PROCEDURE — 83880 ASSAY OF NATRIURETIC PEPTIDE: CPT

## 2021-02-25 PROCEDURE — 85025 COMPLETE CBC W/AUTO DIFF WBC: CPT

## 2021-02-25 PROCEDURE — 76770 US EXAM ABDO BACK WALL COMP: CPT

## 2021-02-25 PROCEDURE — 96365 THER/PROPH/DIAG IV INF INIT: CPT

## 2021-02-25 PROCEDURE — 84484 ASSAY OF TROPONIN QUANT: CPT

## 2021-02-25 PROCEDURE — 6370000000 HC RX 637 (ALT 250 FOR IP): Performed by: INTERNAL MEDICINE

## 2021-02-25 PROCEDURE — 84133 ASSAY OF URINE POTASSIUM: CPT

## 2021-02-25 PROCEDURE — 96366 THER/PROPH/DIAG IV INF ADDON: CPT

## 2021-02-25 PROCEDURE — 36415 COLL VENOUS BLD VENIPUNCTURE: CPT

## 2021-02-25 PROCEDURE — 2580000003 HC RX 258: Performed by: INTERNAL MEDICINE

## 2021-02-25 PROCEDURE — 99284 EMERGENCY DEPT VISIT MOD MDM: CPT

## 2021-02-25 PROCEDURE — 84156 ASSAY OF PROTEIN URINE: CPT

## 2021-02-25 PROCEDURE — 82436 ASSAY OF URINE CHLORIDE: CPT

## 2021-02-25 PROCEDURE — 87088 URINE BACTERIA CULTURE: CPT

## 2021-02-25 PROCEDURE — 93005 ELECTROCARDIOGRAM TRACING: CPT | Performed by: EMERGENCY MEDICINE

## 2021-02-25 PROCEDURE — 80048 BASIC METABOLIC PNL TOTAL CA: CPT

## 2021-02-25 PROCEDURE — 84300 ASSAY OF URINE SODIUM: CPT

## 2021-02-25 PROCEDURE — 2580000003 HC RX 258: Performed by: EMERGENCY MEDICINE

## 2021-02-25 PROCEDURE — 81003 URINALYSIS AUTO W/O SCOPE: CPT

## 2021-02-25 PROCEDURE — 80053 COMPREHEN METABOLIC PANEL: CPT

## 2021-02-25 RX ORDER — 0.9 % SODIUM CHLORIDE 0.9 %
1000 INTRAVENOUS SOLUTION INTRAVENOUS ONCE
Status: COMPLETED | OUTPATIENT
Start: 2021-02-25 | End: 2021-02-25

## 2021-02-25 RX ORDER — SODIUM CHLORIDE 0.9 % (FLUSH) 0.9 %
10 SYRINGE (ML) INJECTION EVERY 12 HOURS SCHEDULED
Status: DISCONTINUED | OUTPATIENT
Start: 2021-02-25 | End: 2021-02-28 | Stop reason: HOSPADM

## 2021-02-25 RX ORDER — 0.9 % SODIUM CHLORIDE 0.9 %
1000 INTRAVENOUS SOLUTION INTRAVENOUS ONCE
Status: COMPLETED | OUTPATIENT
Start: 2021-02-25 | End: 2021-02-26

## 2021-02-25 RX ORDER — SODIUM CHLORIDE 0.9 % (FLUSH) 0.9 %
10 SYRINGE (ML) INJECTION PRN
Status: DISCONTINUED | OUTPATIENT
Start: 2021-02-25 | End: 2021-02-25 | Stop reason: ALTCHOICE

## 2021-02-25 RX ORDER — SODIUM CHLORIDE 0.9 % (FLUSH) 0.9 %
10 SYRINGE (ML) INJECTION PRN
Status: DISCONTINUED | OUTPATIENT
Start: 2021-02-25 | End: 2021-02-28 | Stop reason: HOSPADM

## 2021-02-25 RX ORDER — DIPHENOXYLATE HYDROCHLORIDE AND ATROPINE SULFATE 2.5; .025 MG/1; MG/1
1 TABLET ORAL 4 TIMES DAILY
Status: DISCONTINUED | OUTPATIENT
Start: 2021-02-25 | End: 2021-02-27

## 2021-02-25 RX ORDER — CALCIUM CARBONATE 500(1250)
500 TABLET ORAL DAILY
Status: DISCONTINUED | OUTPATIENT
Start: 2021-02-25 | End: 2021-02-28 | Stop reason: HOSPADM

## 2021-02-25 RX ORDER — PROMETHAZINE HYDROCHLORIDE 25 MG/1
12.5 TABLET ORAL EVERY 6 HOURS PRN
Status: DISCONTINUED | OUTPATIENT
Start: 2021-02-25 | End: 2021-02-28 | Stop reason: HOSPADM

## 2021-02-25 RX ORDER — SODIUM CHLORIDE 9 MG/ML
1000 INJECTION, SOLUTION INTRAVENOUS CONTINUOUS
Status: DISCONTINUED | OUTPATIENT
Start: 2021-02-25 | End: 2021-02-26

## 2021-02-25 RX ORDER — OXYCODONE AND ACETAMINOPHEN 10; 325 MG/1; MG/1
1 TABLET ORAL EVERY 6 HOURS PRN
Status: DISCONTINUED | OUTPATIENT
Start: 2021-02-25 | End: 2021-02-28 | Stop reason: HOSPADM

## 2021-02-25 RX ORDER — HEPARIN SODIUM 10000 [USP'U]/ML
5000 INJECTION, SOLUTION INTRAVENOUS; SUBCUTANEOUS EVERY 8 HOURS
Status: DISCONTINUED | OUTPATIENT
Start: 2021-02-25 | End: 2021-02-26

## 2021-02-25 RX ORDER — LANOLIN ALCOHOL/MO/W.PET/CERES
500 CREAM (GRAM) TOPICAL DAILY
Status: DISCONTINUED | OUTPATIENT
Start: 2021-02-25 | End: 2021-02-28 | Stop reason: HOSPADM

## 2021-02-25 RX ORDER — ASPIRIN 81 MG/1
81 TABLET, CHEWABLE ORAL DAILY
Status: DISCONTINUED | OUTPATIENT
Start: 2021-02-25 | End: 2021-02-26

## 2021-02-25 RX ORDER — HYOSCYAMINE SULFATE 0.125 MG
125 TABLET ORAL 2 TIMES DAILY PRN
Status: DISCONTINUED | OUTPATIENT
Start: 2021-02-25 | End: 2021-02-28 | Stop reason: HOSPADM

## 2021-02-25 RX ORDER — CHOLECALCIFEROL (VITAMIN D3) 50 MCG
2000 TABLET ORAL 2 TIMES DAILY
Status: DISCONTINUED | OUTPATIENT
Start: 2021-02-25 | End: 2021-02-28 | Stop reason: HOSPADM

## 2021-02-25 RX ORDER — ESCITALOPRAM OXALATE 10 MG/1
20 TABLET ORAL NIGHTLY
Status: DISCONTINUED | OUTPATIENT
Start: 2021-02-25 | End: 2021-02-28 | Stop reason: HOSPADM

## 2021-02-25 RX ORDER — SODIUM CHLORIDE 0.9 % (FLUSH) 0.9 %
10 SYRINGE (ML) INJECTION PRN
Status: CANCELLED | OUTPATIENT
Start: 2021-02-25

## 2021-02-25 RX ORDER — ACETAMINOPHEN 650 MG/1
650 SUPPOSITORY RECTAL EVERY 6 HOURS PRN
Status: DISCONTINUED | OUTPATIENT
Start: 2021-02-25 | End: 2021-02-28 | Stop reason: HOSPADM

## 2021-02-25 RX ORDER — ACETAMINOPHEN 325 MG/1
650 TABLET ORAL EVERY 6 HOURS PRN
Status: DISCONTINUED | OUTPATIENT
Start: 2021-02-25 | End: 2021-02-28 | Stop reason: HOSPADM

## 2021-02-25 RX ORDER — CHOLESTYRAMINE 4 G/9G
4 POWDER, FOR SUSPENSION ORAL DAILY
Status: DISCONTINUED | OUTPATIENT
Start: 2021-02-25 | End: 2021-02-26

## 2021-02-25 RX ORDER — ONDANSETRON 2 MG/ML
4 INJECTION INTRAMUSCULAR; INTRAVENOUS EVERY 6 HOURS PRN
Status: DISCONTINUED | OUTPATIENT
Start: 2021-02-25 | End: 2021-02-28 | Stop reason: HOSPADM

## 2021-02-25 RX ORDER — PRAVASTATIN SODIUM 20 MG
40 TABLET ORAL EVERY EVENING
Status: DISCONTINUED | OUTPATIENT
Start: 2021-02-25 | End: 2021-02-28 | Stop reason: HOSPADM

## 2021-02-25 RX ORDER — PANTOPRAZOLE SODIUM 40 MG/1
40 TABLET, DELAYED RELEASE ORAL
Status: DISCONTINUED | OUTPATIENT
Start: 2021-02-26 | End: 2021-02-26

## 2021-02-25 RX ORDER — TRAZODONE HYDROCHLORIDE 50 MG/1
25 TABLET ORAL NIGHTLY
Status: DISCONTINUED | OUTPATIENT
Start: 2021-02-25 | End: 2021-02-28 | Stop reason: HOSPADM

## 2021-02-25 RX ORDER — PRIMIDONE 250 MG/1
250 TABLET ORAL NIGHTLY
Status: DISCONTINUED | OUTPATIENT
Start: 2021-02-25 | End: 2021-02-28 | Stop reason: HOSPADM

## 2021-02-25 RX ADMIN — TRAZODONE HYDROCHLORIDE 25 MG: 50 TABLET ORAL at 23:57

## 2021-02-25 RX ADMIN — SODIUM CHLORIDE 1000 ML: 9 INJECTION, SOLUTION INTRAVENOUS at 17:02

## 2021-02-25 RX ADMIN — PRAVASTATIN SODIUM 40 MG: 20 TABLET ORAL at 23:58

## 2021-02-25 RX ADMIN — ASPIRIN 81 MG CHEWABLE TABLET 81 MG: 81 TABLET CHEWABLE at 23:58

## 2021-02-25 RX ADMIN — SODIUM CHLORIDE, PRESERVATIVE FREE 10 ML: 5 INJECTION INTRAVENOUS at 07:22

## 2021-02-25 RX ADMIN — DIPHENOXYLATE HYDROCHLORIDE AND ATROPINE SULFATE 1 TABLET: 2.5; .025 TABLET ORAL at 23:58

## 2021-02-25 RX ADMIN — SODIUM CHLORIDE 1000 ML: 9 INJECTION, SOLUTION INTRAVENOUS at 19:50

## 2021-02-25 RX ADMIN — SODIUM BICARBONATE: 84 INJECTION, SOLUTION INTRAVENOUS at 07:34

## 2021-02-25 RX ADMIN — ESCITALOPRAM OXALATE 20 MG: 10 TABLET ORAL at 23:57

## 2021-02-25 RX ADMIN — PRIMIDONE 250 MG: 250 TABLET ORAL at 23:57

## 2021-02-25 ASSESSMENT — ENCOUNTER SYMPTOMS
ABDOMINAL PAIN: 0
SHORTNESS OF BREATH: 0
SORE THROAT: 0
BACK PAIN: 0
VOMITING: 0
EYE PAIN: 0
NAUSEA: 0

## 2021-02-25 ASSESSMENT — PAIN DESCRIPTION - ORIENTATION: ORIENTATION: LOWER

## 2021-02-25 ASSESSMENT — PAIN DESCRIPTION - LOCATION: LOCATION: BACK

## 2021-02-25 ASSESSMENT — PAIN SCALES - GENERAL
PAINLEVEL_OUTOF10: 3
PAINLEVEL_OUTOF10: 4

## 2021-02-25 ASSESSMENT — PAIN DESCRIPTION - FREQUENCY
FREQUENCY: CONTINUOUS
FREQUENCY: CONTINUOUS

## 2021-02-25 ASSESSMENT — PAIN DESCRIPTION - PAIN TYPE: TYPE: CHRONIC PAIN

## 2021-02-25 ASSESSMENT — PAIN DESCRIPTION - DESCRIPTORS: DESCRIPTORS: ACHING

## 2021-02-25 NOTE — PROGRESS NOTES
Spoke with Dr. Ange Parker notified of labs, infiltration right arm With lab results from today she felt patient needed more she wanted her to go over to emergency for evaluation and she was calling over to emergency. Told Dr. Ange Parker Patient finishing her lunch and we will take her over.

## 2021-02-25 NOTE — ED PROVIDER NOTES
HENT:      Head: Normocephalic and atraumatic. Right Ear: External ear normal.      Left Ear: External ear normal.      Nose: Nose normal.      Mouth/Throat:      Mouth: Mucous membranes are moist.      Pharynx: Oropharynx is clear. Eyes:      Pupils: Pupils are equal, round, and reactive to light. Neck:      Musculoskeletal: Normal range of motion and neck supple. Cardiovascular:      Rate and Rhythm: Normal rate and regular rhythm. Heart sounds: Normal heart sounds. Pulmonary:      Effort: Pulmonary effort is normal. No respiratory distress. Breath sounds: Normal breath sounds. Abdominal:      Palpations: Abdomen is soft. Tenderness: There is no abdominal tenderness. Musculoskeletal:         General: No swelling or tenderness. Skin:     General: Skin is warm and dry. Findings: No rash. Neurological:      Mental Status: She is alert and oriented to person, place, and time. Cranial Nerves: No cranial nerve deficit. Procedures     EKG: This EKG is signed and interpreted by me. Rate: 56  Rhythm: Sinus  Interpretation: no acute changes  Comparison: stable as compared to patient's most recent EKG       MDM  Number of Diagnoses or Management Options  KD (acute kidney injury) ([address]  Dehydration  Hypotension due to hypovolemia  Diagnosis management comments: Patient presented with dehydration and hypotension with abnormal outpatient lab work. Patient found to have worsening KD with dehydration and hypotension likely from short gut syndrome. Patient admits to drinking plenty of water however not able to maintain hydration. Due to this she was given 2 L IV fluid which improved her blood pressure and place on maintenance fluids.   Following this she was admitted to the hospital for further treatment.           --------------------------------------------- PAST HISTORY ---------------------------------------------  Past Medical History:  has a past medical history of Acute kidney failure ([facility] [address] Anxiety, Arthritis, CAD (coronary artery disease), Cancer ([address] COPD (chronic obstructive pulmonary disease) ([facility] [address] Depression, Fibromyalgia, Generalized headaches, History of blood transfusion, History of necrotic bowel, Hyperlipidemia, Hypertension, Incisional hernia, Insomnia, Mitral valve regurgitation, Myocardial infarct ([address] Occasional tremors, Osteopenia, PONV (postoperative nausea and vomiting), and Vitamin B12 deficiency. Past Surgical History:  has a past surgical history that includes Coronary angioplasty with stent (1-7-2002); Colonoscopy; Esophagus surgery (1-4-13); Rotator cuff repair (2010); arthroplasty (1-2006); Finger trigger release (Right, 2006); Cataract removal with implant (Right, 03/03/15); Appendectomy (2002); Cholecystectomy (5-11-07); Carpal tunnel release (Bilateral); skin biopsy (2010); Foot surgery (Right); ileostomy or jejunostomy (1-3-13); Endoscopy, colon, diagnostic; epigastric hernia repair (3/21/16); other surgical history (08/24/2016); Nasal sinus surgery; back surgery (1991, 2004, 2009); back surgery; Mohs surgery; other surgical history; Breast surgery (years ago); Knee Arthroplasty (Left, 03/22/2017); laparotomy (N/A, 5/12/2020); laparotomy (N/A, 6/8/2020); Insert Picc Line (06/09/2020); joint replacement (Right, 3/24/15); Abdomen surgery (2-); Abdomen surgery (N/A, 1/26/2020); hernia repair (12-31-13); Abdomen surgery (N/A, 9/11/2020); picc line insertion nurse (9/15/2020); and laparotomy (N/A, 9/15/2020). Social History:  reports that she has been smoking cigarettes. She has been smoking about 1.00 pack per day. She has never used smokeless tobacco. She reports previous alcohol use. She reports that she does not use drugs. Family History: family history is not on file. The patients home medications have been reviewed.     Allergies: Fentanyl, Levofloxacin, Tramadol, and Vioxx [rofecoxib]    -------------------------------------------------- RESULTS -------------------------------------------------    LABS:  Results for orders placed or performed during the hospital encounter of 02/25/21   Culture, Urine    Specimen: Urine, clean catch   Result Value Ref Range    Urine Culture, Routine       <10,000 CFU/mL  Gram negative rods  <10,000 CFU/mL  Mixed gram positive organisms  Gram negative rods     CBC Auto Differential   Result Value Ref Range    WBC 4.5 4.5 - 11.5 E9/L    RBC 3.44 (L) 3.50 - 5.50 E12/L    Hemoglobin 11.1 (L) 11.5 - 15.5 g/dL    Hematocrit 34.2 34.0 - 48.0 %    MCV 99.4 80.0 - 99.9 fL    MCH 32.3 26.0 - 35.0 pg    MCHC 32.5 32.0 - 34.5 %    RDW 15.0 11.5 - 15.0 fL    Platelets 607 347 - 837 E9/L    MPV 13.0 (H) 7.0 - 12.0 fL    Neutrophils % 52.9 43.0 - 80.0 %    Immature Granulocytes % 0.4 0.0 - 5.0 %    Lymphocytes % 30.4 20.0 - 42.0 %    Monocytes % 9.8 2.0 - 12.0 %    Eosinophils % 5.6 0.0 - 6.0 %    Basophils % 0.9 0.0 - 2.0 %    Neutrophils Absolute 2.38 1.80 - 7.30 E9/L    Immature Granulocytes # 0.02 E9/L    Lymphocytes Absolute 1.37 (L) 1.50 - 4.00 E9/L    Monocytes Absolute 0.44 0.10 - 0.95 E9/L    Eosinophils Absolute 0.25 0.05 - 0.50 E9/L    Basophils Absolute 0.04 0.00 - 0.20 E9/L   Comprehensive Metabolic Panel w/ Reflex to MG   Result Value Ref Range    Sodium 133 132 - 146 mmol/L    Potassium reflex Magnesium 5.0 3.5 - 5.0 mmol/L    Chloride 99 98 - 107 mmol/L    CO2 23 22 - 29 mmol/L    Anion Gap 11 7 - 16 mmol/L    Glucose 117 (H) 74 - 99 mg/dL    BUN 64 (H) 8 - 23 mg/dL    CREATININE 2.5 (H) 0.5 - 1.0 mg/dL    GFR Non-African American 19 >=60 mL/min/1.73    GFR African American 22     Calcium 9.0 8.6 - 10.2 mg/dL    Total Protein 6.9 6.4 - 8.3 g/dL    Albumin 4.1 3.5 - 5.2 g/dL    Total Bilirubin 0.3 0.0 - 1.2 mg/dL    Alkaline Phosphatase 188 (H) 35 - 104 U/L    ALT 17 0 - 32 U/L    AST 25 0 - 31 U/L   Urinalysis, reflex to microscopic   Result Value Ref Range    Color, UA Yellow Straw/Yellow    Clarity, UA Clear Clear    Glucose, Ur Negative Negative mg/dL    Bilirubin Urine Negative Negative    Ketones, Urine Negative Negative mg/dL    Specific Gravity, UA 1.020 1.005 - 1.030    Blood, Urine Negative Negative    pH, UA 5.5 5.0 - 9.0    Protein, UA Negative Negative mg/dL    Urobilinogen, Urine 0.2 <2.0 E.U./dL    Nitrite, Urine Negative Negative    Leukocyte Esterase, Urine Negative Negative   URINE ELECTROLYTES   Result Value Ref Range    Sodium, Ur <20 Not Established mmol/L    Potassium, Ur 24.3 Not Established mmol/L    Chloride <20 Not Established mmol/L   PROTEIN / CREATININE RATIO, URINE   Result Value Ref Range    Protein, Ur 16 (H) 0 - 12 mg/dL    Creatinine, Ur 120 29 - 226 mg/dL    Protein/Creat Ratio 0.1 0.0 - 0.2    Protein/Creat Ratio 0.1    Troponin   Result Value Ref Range    Troponin 0.01 0.00 - 0.03 ng/mL   Brain Natriuretic Peptide   Result Value Ref Range    Pro- 0 - 450 pg/mL   Comprehensive Metabolic Panel w/ Reflex to MG   Result Value Ref Range    Sodium 137 132 - 146 mmol/L    Potassium reflex Magnesium 4.0 3.5 - 5.0 mmol/L    Chloride 112 (H) 98 - 107 mmol/L    CO2 21 (L) 22 - 29 mmol/L    Anion Gap 4 (L) 7 - 16 mmol/L    Glucose 80 74 - 99 mg/dL    BUN 50 (H) 8 - 23 mg/dL    CREATININE 1.9 (H) 0.5 - 1.0 mg/dL    GFR Non-African American 26 >=60 mL/min/1.73    GFR African American 31     Calcium 7.7 (L) 8.6 - 10.2 mg/dL    Total Protein 4.7 (L) 6.4 - 8.3 g/dL    Albumin 3.0 (L) 3.5 - 5.2 g/dL    Total Bilirubin 0.2 0.0 - 1.2 mg/dL    Alkaline Phosphatase 129 (H) 35 - 104 U/L    ALT 13 0 - 32 U/L    AST 20 0 - 31 U/L   CBC auto differential   Result Value Ref Range    WBC 4.1 (L) 4.5 - 11.5 E9/L    RBC 2.66 (L) 3.50 - 5.50 E12/L    Hemoglobin 8.4 (L) 11.5 - 15.5 g/dL    Hematocrit 26.9 (L) 34.0 - 48.0 %    .1 (H) 80.0 - 99.9 fL    MCH 31.6 26.0 - 35.0 pg    MCHC 31.2 (L) 32.0 - 34.5 %    RDW 15.1 (H) 11.5 - 15.0 fL Platelets 854 984 - 283 E9/L    MPV 12.9 (H) 7.0 - 12.0 fL    Neutrophils % 46.3 43.0 - 80.0 %    Immature Granulocytes % 0.2 0.0 - 5.0 %    Lymphocytes % 38.4 20.0 - 42.0 %    Monocytes % 9.0 2.0 - 12.0 %    Eosinophils % 5.6 0.0 - 6.0 %    Basophils % 0.5 0.0 - 2.0 %    Neutrophils Absolute 1.89 1.80 - 7.30 E9/L    Immature Granulocytes # 0.01 E9/L    Lymphocytes Absolute 1.57 1.50 - 4.00 E9/L    Monocytes Absolute 0.37 0.10 - 0.95 E9/L    Eosinophils Absolute 0.23 0.05 - 0.50 E9/L    Basophils Absolute 0.02 0.00 - 0.20 E9/L   CORTISOL   Result Value Ref Range    Cortisol 6.13 2.68 - 18.40 mcg/dL   TSH without Reflex   Result Value Ref Range    TSH 0.834 0.270 - 4.200 uIU/mL   Hemoglobin   Result Value Ref Range    Hemoglobin 8.4 (L) 11.5 - 15.5 g/dL   Iron and TIBC   Result Value Ref Range    Iron 118 37 - 145 mcg/dL    TIBC 210 (L) 250 - 450 mcg/dL    Iron Saturation 56 (H) 15 - 50 %   Ferritin   Result Value Ref Range    Ferritin 205 ng/mL   Comprehensive Metabolic Panel w/ Reflex to MG   Result Value Ref Range    Sodium 140 132 - 146 mmol/L    Potassium reflex Magnesium 3.7 3.5 - 5.0 mmol/L    Chloride 113 (H) 98 - 107 mmol/L    CO2 20 (L) 22 - 29 mmol/L    Anion Gap 7 7 - 16 mmol/L    Glucose 82 74 - 99 mg/dL    BUN 32 (H) 8 - 23 mg/dL    CREATININE 1.5 (H) 0.5 - 1.0 mg/dL    GFR Non-African American 34 >=60 mL/min/1.73    GFR African American 41     Calcium 8.1 (L) 8.6 - 10.2 mg/dL    Total Protein 4.7 (L) 6.4 - 8.3 g/dL    Albumin 2.9 (L) 3.5 - 5.2 g/dL    Total Bilirubin <0.2 0.0 - 1.2 mg/dL    Alkaline Phosphatase 121 (H) 35 - 104 U/L    ALT 13 0 - 32 U/L    AST 22 0 - 31 U/L   CBC auto differential   Result Value Ref Range    WBC 5.1 4.5 - 11.5 E9/L    RBC 2.57 (L) 3.50 - 5.50 E12/L    Hemoglobin 8.2 (L) 11.5 - 15.5 g/dL    Hematocrit 26.0 (L) 34.0 - 48.0 %    .2 (H) 80.0 - 99.9 fL    MCH 31.9 26.0 - 35.0 pg    MCHC 31.5 (L) 32.0 - 34.5 %    RDW 15.5 (H) 11.5 - 15.0 fL    Platelets 698 437 - 247 discussed todays results, in addition to providing specific details for the plan of care and counseling regarding the diagnosis and prognosis. Their questions are answered at this time and they are agreeable with the plan of admission.    --------------------------------- ADDITIONAL PROVIDER NOTES ---------------------------------  Consultations:  Spoke with Dr. Manuela Bond. Discussed case. They will admit the patient. This patient's ED course included: a personal history and physicial examination, re-evaluation prior to disposition, multiple bedside re-evaluations, IV medications, cardiac monitoring, continuous pulse oximetry and complex medical decision making and emergency management    This patient has remained hemodynamically stable during their ED course. Diagnosis:  1. KD (acute kidney injury) ([address]    2. Dehydration    3. Hypotension due to hypovolemia        Disposition:  Patient's disposition: Admit to telemetry  Patient's condition is fair.                    Harley Brown DO  Resident  02/27/21 [id number]

## 2021-02-25 NOTE — PROGRESS NOTES
Patient was sleeping woke up and put on her call light she said her arm felt tight and noted she was laying leaning to the right side that IV line in and noted her arm was swelled. IV was stopped. Called spoke with pharmacist Natasha Garcia and ice applied and arm elevated no redness noted she denied pain said it felt tight. Called to Dr. Diana Del Rosario office and spoke with [facility] she was paging Dr. Abimael Trimble to call me.

## 2021-02-25 NOTE — PROGRESS NOTES
Called and spoke with Mohit Collins at Dr. Virginia Isaacs office, she will page her again. Told her patient site was restarted. Had ICU nurse John Paul Palma come down and look at patients arm. Arm with ice and elevation patient said no pain just feels tight. IV team came and restarted the site and IV is infusing again. Awaiting call back from Dr. Murlean Gowers to report labs and make sure if she wanted any further assessment or care to the right arm infiltration site.

## 2021-02-25 NOTE — PROGRESS NOTES
Patient was taken over to emergency left iv bag attached so she can finish the hydration clamped and taken off the pump at 1505 while walked over to er and er nurse Prasanna Moise will put on a pump and restart. Report given to Prasanna Moise. IV ran in the infusion center about 6hr 20 minutes.

## 2021-02-26 LAB
ALBUMIN SERPL-MCNC: 3 G/DL (ref 3.5–5.2)
ALP BLD-CCNC: 129 U/L (ref 35–104)
ALT SERPL-CCNC: 13 U/L (ref 0–32)
ANION GAP SERPL CALCULATED.3IONS-SCNC: 4 MMOL/L (ref 7–16)
AST SERPL-CCNC: 20 U/L (ref 0–31)
BASOPHILS ABSOLUTE: 0.02 E9/L (ref 0–0.2)
BASOPHILS RELATIVE PERCENT: 0.5 % (ref 0–2)
BILIRUB SERPL-MCNC: 0.2 MG/DL (ref 0–1.2)
BUN BLDV-MCNC: 50 MG/DL (ref 8–23)
CALCIUM SERPL-MCNC: 7.7 MG/DL (ref 8.6–10.2)
CHLORIDE BLD-SCNC: 112 MMOL/L (ref 98–107)
CO2: 21 MMOL/L (ref 22–29)
CORTISOL TOTAL: 6.13 MCG/DL (ref 2.68–18.4)
CREAT SERPL-MCNC: 1.9 MG/DL (ref 0.5–1)
EKG ATRIAL RATE: 56 BPM
EKG P AXIS: 73 DEGREES
EKG P-R INTERVAL: 176 MS
EKG Q-T INTERVAL: 422 MS
EKG QRS DURATION: 90 MS
EKG QTC CALCULATION (BAZETT): 407 MS
EKG R AXIS: -5 DEGREES
EKG T AXIS: 75 DEGREES
EKG VENTRICULAR RATE: 56 BPM
EOSINOPHILS ABSOLUTE: 0.23 E9/L (ref 0.05–0.5)
EOSINOPHILS RELATIVE PERCENT: 5.6 % (ref 0–6)
FERRITIN: 205 NG/ML
GFR AFRICAN AMERICAN: 31
GFR NON-AFRICAN AMERICAN: 26 ML/MIN/1.73
GLUCOSE BLD-MCNC: 80 MG/DL (ref 74–99)
HCT VFR BLD CALC: 26.9 % (ref 34–48)
HEMOGLOBIN: 8.4 G/DL (ref 11.5–15.5)
HEMOGLOBIN: 8.4 G/DL (ref 11.5–15.5)
IMMATURE GRANULOCYTES #: 0.01 E9/L
IMMATURE GRANULOCYTES %: 0.2 % (ref 0–5)
IRON SATURATION: 56 % (ref 15–50)
IRON: 118 MCG/DL (ref 37–145)
LYMPHOCYTES ABSOLUTE: 1.57 E9/L (ref 1.5–4)
LYMPHOCYTES RELATIVE PERCENT: 38.4 % (ref 20–42)
MCH RBC QN AUTO: 31.6 PG (ref 26–35)
MCHC RBC AUTO-ENTMCNC: 31.2 % (ref 32–34.5)
MCV RBC AUTO: 101.1 FL (ref 80–99.9)
MONOCYTES ABSOLUTE: 0.37 E9/L (ref 0.1–0.95)
MONOCYTES RELATIVE PERCENT: 9 % (ref 2–12)
NEUTROPHILS ABSOLUTE: 1.89 E9/L (ref 1.8–7.3)
NEUTROPHILS RELATIVE PERCENT: 46.3 % (ref 43–80)
PDW BLD-RTO: 15.1 FL (ref 11.5–15)
PLATELET # BLD: 182 E9/L (ref 130–450)
PMV BLD AUTO: 12.9 FL (ref 7–12)
POTASSIUM REFLEX MAGNESIUM: 4 MMOL/L (ref 3.5–5)
RBC # BLD: 2.66 E12/L (ref 3.5–5.5)
SODIUM BLD-SCNC: 137 MMOL/L (ref 132–146)
TOTAL IRON BINDING CAPACITY: 210 MCG/DL (ref 250–450)
TOTAL PROTEIN: 4.7 G/DL (ref 6.4–8.3)
TSH SERPL DL<=0.05 MIU/L-ACNC: 0.83 UIU/ML (ref 0.27–4.2)
WBC # BLD: 4.1 E9/L (ref 4.5–11.5)

## 2021-02-26 PROCEDURE — 6360000002 HC RX W HCPCS: Performed by: INTERNAL MEDICINE

## 2021-02-26 PROCEDURE — 6370000000 HC RX 637 (ALT 250 FOR IP): Performed by: INTERNAL MEDICINE

## 2021-02-26 PROCEDURE — 2580000003 HC RX 258: Performed by: INTERNAL MEDICINE

## 2021-02-26 PROCEDURE — 96374 THER/PROPH/DIAG INJ IV PUSH: CPT

## 2021-02-26 PROCEDURE — 93010 ELECTROCARDIOGRAM REPORT: CPT | Performed by: INTERNAL MEDICINE

## 2021-02-26 PROCEDURE — 83550 IRON BINDING TEST: CPT

## 2021-02-26 PROCEDURE — 83540 ASSAY OF IRON: CPT

## 2021-02-26 PROCEDURE — 96375 TX/PRO/DX INJ NEW DRUG ADDON: CPT

## 2021-02-26 PROCEDURE — 80053 COMPREHEN METABOLIC PANEL: CPT

## 2021-02-26 PROCEDURE — 85018 HEMOGLOBIN: CPT

## 2021-02-26 PROCEDURE — G0378 HOSPITAL OBSERVATION PER HR: HCPCS

## 2021-02-26 PROCEDURE — 36415 COLL VENOUS BLD VENIPUNCTURE: CPT

## 2021-02-26 PROCEDURE — 82533 TOTAL CORTISOL: CPT

## 2021-02-26 PROCEDURE — 82728 ASSAY OF FERRITIN: CPT

## 2021-02-26 PROCEDURE — 85025 COMPLETE CBC W/AUTO DIFF WBC: CPT

## 2021-02-26 PROCEDURE — 84443 ASSAY THYROID STIM HORMONE: CPT

## 2021-02-26 PROCEDURE — 96361 HYDRATE IV INFUSION ADD-ON: CPT

## 2021-02-26 RX ORDER — 0.9 % SODIUM CHLORIDE 0.9 %
1000 INTRAVENOUS SOLUTION INTRAVENOUS ONCE
Status: COMPLETED | OUTPATIENT
Start: 2021-02-26 | End: 2021-02-26

## 2021-02-26 RX ORDER — SODIUM CHLORIDE 9 MG/ML
1000 INJECTION, SOLUTION INTRAVENOUS CONTINUOUS
Status: DISCONTINUED | OUTPATIENT
Start: 2021-02-26 | End: 2021-02-27

## 2021-02-26 RX ORDER — PANTOPRAZOLE SODIUM 40 MG/10ML
40 INJECTION, POWDER, LYOPHILIZED, FOR SOLUTION INTRAVENOUS DAILY
Status: DISCONTINUED | OUTPATIENT
Start: 2021-02-26 | End: 2021-02-26

## 2021-02-26 RX ORDER — PANTOPRAZOLE SODIUM 40 MG/1
40 TABLET, DELAYED RELEASE ORAL
Status: DISCONTINUED | OUTPATIENT
Start: 2021-02-27 | End: 2021-02-28 | Stop reason: HOSPADM

## 2021-02-26 RX ORDER — SODIUM CHLORIDE 9 MG/ML
10 INJECTION INTRAVENOUS DAILY
Status: DISCONTINUED | OUTPATIENT
Start: 2021-02-26 | End: 2021-02-26

## 2021-02-26 RX ORDER — CHOLESTYRAMINE 4 G/9G
4 POWDER, FOR SUSPENSION ORAL 3 TIMES DAILY
Status: DISCONTINUED | OUTPATIENT
Start: 2021-02-26 | End: 2021-02-26

## 2021-02-26 RX ORDER — CHOLESTYRAMINE 4 G/9G
4 POWDER, FOR SUSPENSION ORAL 3 TIMES DAILY
Status: DISCONTINUED | OUTPATIENT
Start: 2021-02-26 | End: 2021-02-27

## 2021-02-26 RX ADMIN — Medication 2000 UNITS: at 21:36

## 2021-02-26 RX ADMIN — DIPHENOXYLATE HYDROCHLORIDE AND ATROPINE SULFATE 1 TABLET: 2.5; .025 TABLET ORAL at 14:07

## 2021-02-26 RX ADMIN — Medication 500 MG: at 08:32

## 2021-02-26 RX ADMIN — ESCITALOPRAM OXALATE 20 MG: 10 TABLET ORAL at 21:36

## 2021-02-26 RX ADMIN — CYANOCOBALAMIN TAB 1000 MCG 500 MCG: 1000 TAB at 08:34

## 2021-02-26 RX ADMIN — CHOLESTYRAMINE 4 G: 4 POWDER, FOR SUSPENSION ORAL at 08:32

## 2021-02-26 RX ADMIN — DIPHENOXYLATE HYDROCHLORIDE AND ATROPINE SULFATE 1 TABLET: 2.5; .025 TABLET ORAL at 21:36

## 2021-02-26 RX ADMIN — DIPHENOXYLATE HYDROCHLORIDE AND ATROPINE SULFATE 1 TABLET: 2.5; .025 TABLET ORAL at 08:32

## 2021-02-26 RX ADMIN — SODIUM CHLORIDE 1000 ML: 9 INJECTION, SOLUTION INTRAVENOUS at 07:00

## 2021-02-26 RX ADMIN — PRIMIDONE 250 MG: 250 TABLET ORAL at 21:36

## 2021-02-26 RX ADMIN — Medication 2000 UNITS: at 08:32

## 2021-02-26 RX ADMIN — SODIUM CHLORIDE 1000 ML: 9 INJECTION, SOLUTION INTRAVENOUS at 09:13

## 2021-02-26 RX ADMIN — CHOLESTYRAMINE 4 G: 4 POWDER, FOR SUSPENSION ORAL at 17:55

## 2021-02-26 RX ADMIN — TRAZODONE HYDROCHLORIDE 25 MG: 50 TABLET ORAL at 21:36

## 2021-02-26 RX ADMIN — DIPHENOXYLATE HYDROCHLORIDE AND ATROPINE SULFATE 1 TABLET: 2.5; .025 TABLET ORAL at 17:40

## 2021-02-26 RX ADMIN — ONDANSETRON 4 MG: 2 INJECTION INTRAMUSCULAR; INTRAVENOUS at 13:12

## 2021-02-26 RX ADMIN — ASPIRIN 81 MG CHEWABLE TABLET 81 MG: 81 TABLET CHEWABLE at 08:32

## 2021-02-26 RX ADMIN — SODIUM CHLORIDE 1000 ML: 9 INJECTION, SOLUTION INTRAVENOUS at 19:51

## 2021-02-26 RX ADMIN — PANTOPRAZOLE SODIUM 40 MG: 40 TABLET, DELAYED RELEASE ORAL at 06:35

## 2021-02-26 RX ADMIN — PRAVASTATIN SODIUM 40 MG: 20 TABLET ORAL at 17:41

## 2021-02-26 RX ADMIN — OXYCODONE AND ACETAMINOPHEN 1 TABLET: 10; 325 TABLET ORAL at 14:07

## 2021-02-26 RX ADMIN — OXYCODONE AND ACETAMINOPHEN 1 TABLET: 10; 325 TABLET ORAL at 23:32

## 2021-02-26 ASSESSMENT — ENCOUNTER SYMPTOMS
SHORTNESS OF BREATH: 0
WHEEZING: 0
BLOOD IN STOOL: 0
ABDOMINAL DISTENTION: 0
ABDOMINAL PAIN: 0
COLOR CHANGE: 0
CONSTIPATION: 0
VOMITING: 0
COUGH: 0
PHOTOPHOBIA: 0
RHINORRHEA: 0
NAUSEA: 1

## 2021-02-26 ASSESSMENT — PAIN DESCRIPTION - FREQUENCY: FREQUENCY: INTERMITTENT

## 2021-02-26 ASSESSMENT — PAIN SCALES - GENERAL
PAINLEVEL_OUTOF10: 4
PAINLEVEL_OUTOF10: 4

## 2021-02-26 ASSESSMENT — PAIN DESCRIPTION - PAIN TYPE
TYPE: CHRONIC PAIN

## 2021-02-26 ASSESSMENT — PAIN DESCRIPTION - LOCATION
LOCATION: BACK
LOCATION: BACK

## 2021-02-26 ASSESSMENT — PAIN DESCRIPTION - DESCRIPTORS: DESCRIPTORS: ACHING

## 2021-02-26 NOTE — H&P
History & Physical  02/26/21  Primary Care:  Humberto Stanley, DO  [address] [facility] / [facility] [address]        Chief Complaint   Patient presents with    Other     sent over by nephro for elevated creatinine       HPI:  Patient is a 68year old female who presented to the infusion center for fluids and was found to be hypotensive and have an elevated creatinine. She was transferred to ER for further evaluation. Patient has had multiple abdominal surgeries due to bowel obstructions due to adhesions as well as infected mesh with mesh removal. She has been struggling to stay hydrated and following locally with GI as well as nephrology. Patient states over the last week she has felt weak and tired. She denies any fevers, chills, chest pains, shortness of breath, or vomiting. She has an ileostomy and has had increased output after surgery but has been taking lomotil for this. Prior to Visit Medications    Medication Sig Taking? Authorizing Provider   patiromer sorbitex calcium (VELTASSA) 8.4 g PACK packet Take 8.4 g by mouth daily  Historical Provider, MD   cholestyramine (QUESTRAN) 4 GM/DOSE powder Take by mouth 3 times daily (with meals)  Historical Provider, MD   hyoscyamine (ANASPAZ;LEVSIN) 125 MCG tablet Take 125 mcg by mouth 2 times daily as needed for Cramping  Historical Provider, MD   diphenoxylate-atropine (LOMOTIL) 2.5-0.025 MG per tablet Take 1 tablet by mouth 4 times daily. Historical Provider, MD   omeprazole (PRILOSEC) 20 MG delayed release capsule Take 40 mg by mouth daily  Historical Provider, MD   aspirin 81 MG tablet Take 81 mg by mouth daily  Historical Provider, MD   ondansetron (ZOFRAN) 4 MG tablet Take 4 mg by mouth 3 times daily   Historical Provider, MD   diltiazem (CARDIZEM CD) 120 MG extended release capsule Take 1 capsule by mouth daily  Charline Latif MD   oxyCODONE-acetaminophen (PERCOCET)  MG per tablet Take by mouth every 6 hours as needed for Pain.  Instructed to take with sip water am of procedure if needs   Historical Provider, MD   fenofibrate (TRICOR) 145 MG tablet Take 145 mg by mouth daily  Historical Provider, MD   Cholecalciferol (VITAMIN D3) 69675 UNITS CAPS Take 2,000 Units by mouth 2 times daily   Historical Provider, MD   Coenzyme Q10 (COQ10 PO) Take by mouth daily   Historical Provider, MD   Ferrous Sulfate (IRON) 28 MG TABS Take by mouth daily  Historical Provider, MD   calcium carbonate (OSCAL) 500 MG TABS tablet Take 500 mg by mouth daily LD 3/16/2017  Historical Provider, MD   pravastatin (PRAVACHOL) 40 MG tablet Take 40 mg by mouth every evening  Historical Provider, MD   propranolol (INDERAL) 20 MG tablet Take 20 mg by mouth daily Instructed to take morning of surgery with a sip of water   Historical Provider, MD   vitamin B-12 (CYANOCOBALAMIN) 500 MCG tablet Place 500 mcg under the tongue daily   Historical Provider, MD   Omega-3 Fatty Acids (OMEGA 3 PO) Take by mouth daily   Historical Provider, MD   escitalopram (LEXAPRO) 20 MG tablet Take 20 mg by mouth nightly. Historical Provider, MD   primidone (MYSOLINE) 50 MG tablet Take 250 mg by mouth nightly Taking for essential tremors  Historical Provider, MD   traZODone (DESYREL) 50 MG tablet Take 25 mg by mouth nightly   Historical Provider, MD     Social History     Tobacco Use    Smoking status: Current Every Day Smoker     Packs/day: 1.00     Types: Cigarettes    Smokeless tobacco: Never Used   Substance Use Topics    Alcohol use: Not Currently     Comment: occasional    Drug use: No     History reviewed. No pertinent family history.   Past Surgical History:   Procedure Laterality Date    ABDOMEN SURGERY  2-    total colectomy, bowel resection, ileostomy,revision of ileostomy     ABDOMEN SURGERY N/A 1/26/2020    DRAINAGE OF ABDOMINAL WALL ABSCESS, EXPLANTATION OF MESH, DEBRIDEMENT OF SKIN AND SUBCUTANEOUS TISSUE performed by Bailee Garcia MD at [facility] N/A 9/11/2020 carcinoma right leg, left leg     Past Medical History:   Diagnosis Date    [facility] 2014 and 8/2016    x2,no dialysis needed,resolved, kidney function tests returned to normal    Anxiety     Arthritis     CAD (coronary artery disease)     follows with Dr. Adriana Sheriff every 6 months    Cancer [facility])     skin, leg,nose- removed surgically     COPD (chronic obstructive pulmonary disease) ([facility]     mild- no inhlaers, no oxygen     Depression     Fibromyalgia     chronic back and neck pain    Generalized headaches     h/o / daily    History of blood transfusion 3-11-14    multiple times    History of necrotic bowel 2012    Hyperlipidemia     Hypertension     Incisional hernia     abdomen    Insomnia     Mitral valve regurgitation     Myocardial infarct ([facility] 2002    Occasional tremors     at hands / stable with medication    Osteopenia     PONV (postoperative nausea and vomiting)     Vitamin B12 deficiency     h/o     Review of Systems   Constitutional: Positive for activity change and fatigue. Negative for chills and fever. HENT: Negative for postnasal drip and rhinorrhea. Eyes: Negative for photophobia and visual disturbance. Respiratory: Negative for cough, shortness of breath and wheezing. Cardiovascular: Negative for chest pain. Gastrointestinal: Positive for nausea. Negative for abdominal distention, abdominal pain, blood in stool, constipation and vomiting. Endocrine: Negative for cold intolerance and heat intolerance. Genitourinary: Negative for dysuria, frequency and hematuria. Musculoskeletal: Negative for arthralgias, gait problem and myalgias. Skin: Negative for color change and pallor. Allergic/Immunologic: Negative for environmental allergies and food allergies. Neurological: Positive for light-headedness. Negative for dizziness and headaches. Hematological: Negative for adenopathy. Psychiatric/Behavioral: Negative for agitation and confusion. hypovolemia and medication induced  2. KD- prerenal   3. 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 CAD   5. HLD     Plan:  Give another 1 liter bolus and continue IVF hydration. Monitor blood pressure closely. Was not informed of hypotension last evening. Check Cortisol and TSH. Hold antihypertensives. GI and Nephrology consulted.      DVT Prophylaxis: Heparin  Code Status: Full    Ricky Kelley DO      Electronically signed by Ricky Kelley DO on 2/26/2021 at 5:26 AM

## 2021-02-26 NOTE — CONSULTS
to have significant renal failure with creatinine of 3 with baseline of 1.6-1.8. Her creatinine has significantly improved since admission and today is 1.9. Patient potassium has decreased to 4 today. Liver profile reveals mildly elevated alkaline phosphatase but nonelevated transaminase and nonelevated bilirubin. No dedicated abdominal imaging has been obtained.       Information sources:   -Patient  -medical record  -health care team    PMHx:  Past Medical History:   Diagnosis Date    Acute kidney failure ([facility] 2014 and 8/2016    x2,no dialysis needed,resolved, kidney function tests returned to normal    Anxiety     Arthritis     CAD (coronary artery disease)     follows with Dr. Kenan Rollins every 6 months    Cancer [facility])     skin, leg,nose- removed surgically     COPD (chronic obstructive pulmonary disease) ([facility]     mild- no inhlaers, no oxygen     Depression     Fibromyalgia     chronic back and neck pain    Generalized headaches     h/o / daily    History of blood transfusion 3-11-14    multiple times    History of necrotic bowel 2012    Hyperlipidemia     Hypertension     Incisional hernia     abdomen    Insomnia     Mitral valve regurgitation     Myocardial infarct ([facility] 2002    Occasional tremors     at hands / stable with medication    Osteopenia     PONV (postoperative nausea and vomiting)     Vitamin B12 deficiency     h/o       PSHx:  Past Surgical History:   Procedure Laterality Date    ABDOMEN SURGERY  2-    total colectomy, bowel resection, ileostomy,revision of ileostomy     ABDOMEN SURGERY N/A 1/26/2020    DRAINAGE OF ABDOMINAL WALL ABSCESS, EXPLANTATION OF MESH, DEBRIDEMENT OF SKIN AND SUBCUTANEOUS TISSUE performed by Mary Torres MD at [facility] N/A 9/11/2020    EXPLANTATION OF HERNIA MESH performed by Mary Torres MD at [address]  2002   Vannessa Miller 4  1-2006    right and left thumb   [facility], 2004, 2009    lumbar (PROTONIX) tablet 40 mg  40 mg Oral QAM AC Azalea E Awilda, DO        0.9 % sodium chloride infusion  1,000 mL Intravenous Continuous Christian Davidson MD 75 mL/hr at 02/26/21 1314 Rate Change at 02/26/21 1314    sodium chloride flush 0.9 % injection 10 mL  10 mL Intravenous 2 times per day Azalea E Awilda, DO        sodium chloride flush 0.9 % injection 10 mL  10 mL Intravenous PRN Azalea E Awilda, DO        promethazine (PHENERGAN) tablet 12.5 mg  12.5 mg Oral Q6H PRN Azalea E Awilda, DO        Or    ondansetron (ZOFRAN) injection 4 mg  4 mg Intravenous Q6H PRN Azalea E Awilda, DO   4 mg at 02/26/21 1312    acetaminophen (TYLENOL) tablet 650 mg  650 mg Oral Q6H PRN Azalea E Awilda, DO        Or    acetaminophen (TYLENOL) suppository 650 mg  650 mg Rectal Q6H PRN Azalea E Awilda, DO        calcium elemental (OSCAL) tablet 500 mg  500 mg Oral Daily Azalea E Awilda, DO   500 mg at 02/26/21 5560    vitamin D (CHOLECALCIFEROL) tablet 2,000 Units  2,000 Units Oral BID Azalea E Awilda, DO   2,000 Units at 02/26/21 9139    cholestyramine (QUESTRAN) packet 4 g  4 g Oral Daily Azalea E Awilda, DO   4 g at 02/26/21 0569    diphenoxylate-atropine (LOMOTIL) 2.5-0.025 MG per tablet 1 tablet  1 tablet Oral 4x Daily Azalea E Awilda, DO   1 tablet at 02/26/21 1407    escitalopram (LEXAPRO) tablet 20 mg  20 mg Oral Nightly Azalea E Awilda, DO   20 mg at 02/25/21 0447    hyoscyamine (ANASPAZ;LEVSIN) tablet 125 mcg  125 mcg Oral BID PRN Azalea E Awilda, DO        oxyCODONE-acetaminophen (PERCOCET)  MG per tablet 1 tablet  1 tablet Oral Q6H PRN Azalea E Awilda, DO   1 tablet at 02/26/21 1407    pravastatin (PRAVACHOL) tablet 40 mg  40 mg Oral QPM Azalea E Awilda, DO   40 mg at 02/25/21 2358    primidone (MYSOLINE) tablet 250 mg  250 mg Oral Nightly Azalea E Awilda, DO   250 mg at 02/25/21 2357    traZODone (DESYREL) tablet 25 mg  25 mg Oral Nightly Azalea E Awilda, DO   25 mg at 02/25/21 systems or unobtainable unless otherwise stated in this dictation. PE:  BP (!) 119/58   Pulse 92   Temp 97.9 °F (36.6 °C) (Oral)   Resp 18   Ht 5' (1.524 m)   Wt 92 lb (41.7 kg)   SpO2 98%   BMI 17.97 kg/m²     Gen.: NAD/elderly  female. AAO x3  Head: Atraumatic/normocephalic  Eyes: EOMI/anicteric sclera/no conjunctival erythema  ENT: Moist oral mucosa/no discharge from nose or ears  Neck: Supple with trachea midline  Chest: CTA B/symmetrical excursion  Cor: Regular/S1-S2  Abd.: Soft and not distended. Ostomy bag is noted in the right lower quadrant. Bag contains no blood or melena and scant amount of brown stool  Extr.:  No peripheral edema. Swelling of the right forearm and left elbow area bilateral lower extremities consistent with infiltrating IV sites  Muscles: Decreased tone and bulk throughout  Skin: Warm and dry        DATA:     Lab Results   Component Value Date    WBC 4.1 02/26/2021    RBC 2.66 02/26/2021    HGB 8.4 02/26/2021    HCT 26.9 02/26/2021    .1 02/26/2021    MCH 31.6 02/26/2021    MCHC 31.2 02/26/2021    RDW 15.1 02/26/2021     02/26/2021    MPV 12.9 02/26/2021     Lab Results   Component Value Date     02/26/2021    K 4.0 02/26/2021     02/26/2021    CO2 21 02/26/2021    BUN 50 02/26/2021    CREATININE 1.9 02/26/2021    CALCIUM 7.7 02/26/2021    PROT 4.7 02/26/2021    LABALBU 3.0 02/26/2021    BILITOT 0.2 02/26/2021    ALKPHOS 129 02/26/2021    AST 20 02/26/2021    ALT 13 02/26/2021     Lab Results   Component Value Date    LIPASE 62 01/21/2021     No results found for: AMYLASE      ASSESSMENT/PLAN:  1. Short gut syndrome  -Continue antimotility agents such as Imodium/Lomotil  -Consider intermittent IV supplementation of fluids/electrolytes at home  -Consider placement of semipermanent versus permanent IV access such as PICC line versus Mediport  -In case of Gaitan output consider octreotide    2.   Anemia  -Acute on chronic without overt bleed  -Obtain FOBT and iron studies  -Consider IV iron supplementation if indicated given above  -Obtain copper levels and supplement if indicated  -Monitor H&H; defer transfusion to admitting  -Consider endoscopic evaluation in case of precipitous drop in H&H or overt bleed    3. Renal failure  -KD/CKD  -Likely secondary to dehydration from above  -Per admitting/nephrology    4. Malnutrition  -Consider dietitian consult    5. Poor IV access  -May consider semipermanent or permanent IV access such as PICC line versus Mediport  -Defer further discussion and decision to admitting/PCP    6. History of abdominal hernia/multiple abdominal surgeries  -Per admitting/general surgery    Above has been discussed in detail with the patient and all questions answered to her satisfaction. She is agreeable with the plan as delineated. Thank you for the opportunity to see this patient in consultation      Mariann Cortés MD  2/26/2021  3:55 PM    NOTE:  This report was transcribed using voice recognition software. Every effort was made to ensure accuracy; however, inadvertent computerized transcription errors may be present.

## 2021-02-26 NOTE — ED NOTES
NICOLEAR faxed to [address] [place] [address] [place]. Sonia Espinoza for confirmation of receipt.      Margaret Mccray RN  02/25/21 3090

## 2021-02-26 NOTE — PROGRESS NOTES
with Differential:    Lab Results   Component Value Date    WBC 4.1 02/26/2021    RBC 2.66 02/26/2021    HGB 8.4 02/26/2021    HCT 26.9 02/26/2021     02/26/2021    .1 02/26/2021    MCH 31.6 02/26/2021    MCHC 31.2 02/26/2021    RDW 15.1 02/26/2021    LYMPHOPCT 38.4 02/26/2021    MONOPCT 9.0 02/26/2021    BASOPCT 0.5 02/26/2021    MONOSABS 0.37 02/26/2021    LYMPHSABS 1.57 02/26/2021    EOSABS 0.23 02/26/2021    BASOSABS 0.02 02/26/2021     CMP:    Lab Results   Component Value Date     02/26/2021    K 4.0 02/26/2021     02/26/2021    CO2 21 02/26/2021    BUN 50 02/26/2021    CREATININE 1.9 02/26/2021    GFRAA 31 02/26/2021    LABGLOM 26 02/26/2021    GLUCOSE 80 02/26/2021    PROT 4.7 02/26/2021    LABALBU 3.0 02/26/2021    CALCIUM 7.7 02/26/2021    BILITOT 0.2 02/26/2021    ALKPHOS 129 02/26/2021    AST 20 02/26/2021    ALT 13 02/26/2021     U/A:    Lab Results   Component Value Date    COLORU Yellow 02/25/2021    PROTEINU Negative 02/25/2021    PHUR 5.5 02/25/2021    LABCAST FEW 01/25/2020    WBCUA 0-1 01/21/2021    RBCUA NONE 01/21/2021    YEAST Present 09/18/2020    BACTERIA RARE 01/21/2021    CLARITYU Clear 02/25/2021    SPECGRAV 1.020 02/25/2021    LEUKOCYTESUR Negative 02/25/2021    UROBILINOGEN 0.2 02/25/2021    BILIRUBINUR Negative 02/25/2021    BLOODU Negative 02/25/2021    GLUCOSEU Negative 02/25/2021     Fractional excretion of sodium less than 1%       [START ON 2/27/2021] pantoprazole  40 mg Oral QAM AC    sodium chloride flush  10 mL Intravenous 2 times per day    calcium elemental  500 mg Oral Daily    vitamin D  2,000 Units Oral BID    cholestyramine  4 g Oral Daily    diphenoxylate-atropine  1 tablet Oral 4x Daily    escitalopram  20 mg Oral Nightly    pravastatin  40 mg Oral QPM    primidone  250 mg Oral Nightly    traZODone  25 mg Oral Nightly    vitamin B-12  500 mcg Oral Daily      sodium chloride       sodium chloride flush, promethazine **OR** ondansetron, acetaminophen **OR** acetaminophen, hyoscyamine, oxyCODONE-acetaminophen    IMPRESSION/RECOMMENDATIONS:      Acute kidney injury superimposed on CKD 3 secondary to decreased renal perfusion from volume contraction, creatinine improving with volume expansion  Anemia suspect initial hemoglobin reflecting hemoconcentration, tarry stools may have been secondary to her oral iron therapy at home, await iron studies, I benefit from just giving IV iron intermittently and avoiding oral iron  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

## 2021-02-27 PROBLEM — R19.7 DIARRHEA: Status: ACTIVE | Noted: 2021-02-27

## 2021-02-27 LAB
ALBUMIN SERPL-MCNC: 2.9 G/DL (ref 3.5–5.2)
ALP BLD-CCNC: 121 U/L (ref 35–104)
ALT SERPL-CCNC: 13 U/L (ref 0–32)
ANION GAP SERPL CALCULATED.3IONS-SCNC: 7 MMOL/L (ref 7–16)
AST SERPL-CCNC: 22 U/L (ref 0–31)
BASOPHILS ABSOLUTE: 0.03 E9/L (ref 0–0.2)
BASOPHILS RELATIVE PERCENT: 0.6 % (ref 0–2)
BILIRUB SERPL-MCNC: <0.2 MG/DL (ref 0–1.2)
BUN BLDV-MCNC: 32 MG/DL (ref 8–23)
CALCIUM SERPL-MCNC: 8.1 MG/DL (ref 8.6–10.2)
CHLORIDE BLD-SCNC: 113 MMOL/L (ref 98–107)
CO2: 20 MMOL/L (ref 22–29)
CREAT SERPL-MCNC: 1.5 MG/DL (ref 0.5–1)
EOSINOPHILS ABSOLUTE: 0.23 E9/L (ref 0.05–0.5)
EOSINOPHILS RELATIVE PERCENT: 4.5 % (ref 0–6)
GFR AFRICAN AMERICAN: 41
GFR NON-AFRICAN AMERICAN: 34 ML/MIN/1.73
GLUCOSE BLD-MCNC: 82 MG/DL (ref 74–99)
HCT VFR BLD CALC: 26 % (ref 34–48)
HEMOGLOBIN: 8.2 G/DL (ref 11.5–15.5)
HEMOGLOBIN: 9.3 G/DL (ref 11.5–15.5)
HEMOGLOBIN: 9.4 G/DL (ref 11.5–15.5)
IMMATURE GRANULOCYTES #: 0 E9/L
IMMATURE GRANULOCYTES %: 0 % (ref 0–5)
LYMPHOCYTES ABSOLUTE: 1.78 E9/L (ref 1.5–4)
LYMPHOCYTES RELATIVE PERCENT: 34.7 % (ref 20–42)
MCH RBC QN AUTO: 31.9 PG (ref 26–35)
MCHC RBC AUTO-ENTMCNC: 31.5 % (ref 32–34.5)
MCV RBC AUTO: 101.2 FL (ref 80–99.9)
MONOCYTES ABSOLUTE: 0.41 E9/L (ref 0.1–0.95)
MONOCYTES RELATIVE PERCENT: 8 % (ref 2–12)
NEUTROPHILS ABSOLUTE: 2.68 E9/L (ref 1.8–7.3)
NEUTROPHILS RELATIVE PERCENT: 52.2 % (ref 43–80)
PDW BLD-RTO: 15.5 FL (ref 11.5–15)
PLATELET # BLD: 188 E9/L (ref 130–450)
PMV BLD AUTO: 12.6 FL (ref 7–12)
POTASSIUM REFLEX MAGNESIUM: 3.7 MMOL/L (ref 3.5–5)
RBC # BLD: 2.57 E12/L (ref 3.5–5.5)
SODIUM BLD-SCNC: 140 MMOL/L (ref 132–146)
TOTAL PROTEIN: 4.7 G/DL (ref 6.4–8.3)
WBC # BLD: 5.1 E9/L (ref 4.5–11.5)

## 2021-02-27 PROCEDURE — 87328 CRYPTOSPORIDIUM AG IA: CPT

## 2021-02-27 PROCEDURE — 36415 COLL VENOUS BLD VENIPUNCTURE: CPT

## 2021-02-27 PROCEDURE — 6370000000 HC RX 637 (ALT 250 FOR IP): Performed by: INTERNAL MEDICINE

## 2021-02-27 PROCEDURE — 80053 COMPREHEN METABOLIC PANEL: CPT

## 2021-02-27 PROCEDURE — 87449 NOS EACH ORGANISM AG IA: CPT

## 2021-02-27 PROCEDURE — 82525 ASSAY OF COPPER: CPT

## 2021-02-27 PROCEDURE — 83520 IMMUNOASSAY QUANT NOS NONAB: CPT

## 2021-02-27 PROCEDURE — 85025 COMPLETE CBC W/AUTO DIFF WBC: CPT

## 2021-02-27 PROCEDURE — 87324 CLOSTRIDIUM AG IA: CPT

## 2021-02-27 PROCEDURE — 87425 ROTAVIRUS AG IA: CPT

## 2021-02-27 PROCEDURE — 89055 LEUKOCYTE ASSESSMENT FECAL: CPT

## 2021-02-27 PROCEDURE — 2580000003 HC RX 258: Performed by: INTERNAL MEDICINE

## 2021-02-27 PROCEDURE — 6370000000 HC RX 637 (ALT 250 FOR IP): Performed by: NURSE PRACTITIONER

## 2021-02-27 PROCEDURE — 87045 FECES CULTURE AEROBIC BACT: CPT

## 2021-02-27 PROCEDURE — 82272 OCCULT BLD FECES 1-3 TESTS: CPT

## 2021-02-27 PROCEDURE — 1200000000 HC SEMI PRIVATE

## 2021-02-27 PROCEDURE — 87329 GIARDIA AG IA: CPT

## 2021-02-27 PROCEDURE — 85018 HEMOGLOBIN: CPT

## 2021-02-27 RX ORDER — DIPHENOXYLATE HYDROCHLORIDE AND ATROPINE SULFATE 2.5; .025 MG/1; MG/1
2 TABLET ORAL 4 TIMES DAILY PRN
Status: DISCONTINUED | OUTPATIENT
Start: 2021-02-27 | End: 2021-02-28 | Stop reason: HOSPADM

## 2021-02-27 RX ORDER — DIPHENOXYLATE HYDROCHLORIDE AND ATROPINE SULFATE 2.5; .025 MG/1; MG/1
2 TABLET ORAL EVERY 8 HOURS PRN
Qty: 180 TABLET | Refills: 0 | Status: SHIPPED | OUTPATIENT
Start: 2021-02-27 | End: 2021-03-29

## 2021-02-27 RX ORDER — CHOLESTYRAMINE 4 G/9G
4 POWDER, FOR SUSPENSION ORAL
Status: DISCONTINUED | OUTPATIENT
Start: 2021-02-27 | End: 2021-02-28 | Stop reason: HOSPADM

## 2021-02-27 RX ADMIN — OXYCODONE AND ACETAMINOPHEN 1 TABLET: 10; 325 TABLET ORAL at 23:47

## 2021-02-27 RX ADMIN — DIPHENOXYLATE HYDROCHLORIDE AND ATROPINE SULFATE 2 TABLET: 2.5; .025 TABLET ORAL at 14:05

## 2021-02-27 RX ADMIN — CHOLESTYRAMINE 4 G: 4 POWDER, FOR SUSPENSION ORAL at 11:41

## 2021-02-27 RX ADMIN — Medication 10 ML: at 21:21

## 2021-02-27 RX ADMIN — Medication 2000 UNITS: at 21:20

## 2021-02-27 RX ADMIN — CYANOCOBALAMIN TAB 1000 MCG 500 MCG: 1000 TAB at 08:34

## 2021-02-27 RX ADMIN — SODIUM CHLORIDE 1000 ML: 9 INJECTION, SOLUTION INTRAVENOUS at 09:41

## 2021-02-27 RX ADMIN — DIPHENOXYLATE HYDROCHLORIDE AND ATROPINE SULFATE 2 TABLET: 2.5; .025 TABLET ORAL at 23:01

## 2021-02-27 RX ADMIN — Medication 500 MG: at 08:35

## 2021-02-27 RX ADMIN — PANTOPRAZOLE SODIUM 40 MG: 40 TABLET, DELAYED RELEASE ORAL at 05:20

## 2021-02-27 RX ADMIN — PRAVASTATIN SODIUM 40 MG: 20 TABLET ORAL at 17:08

## 2021-02-27 RX ADMIN — Medication 2000 UNITS: at 08:34

## 2021-02-27 RX ADMIN — CHOLESTYRAMINE 4 G: 4 POWDER, FOR SUSPENSION ORAL at 17:08

## 2021-02-27 RX ADMIN — OXYCODONE AND ACETAMINOPHEN 1 TABLET: 10; 325 TABLET ORAL at 17:08

## 2021-02-27 RX ADMIN — PRIMIDONE 250 MG: 250 TABLET ORAL at 21:20

## 2021-02-27 RX ADMIN — TRAZODONE HYDROCHLORIDE 25 MG: 50 TABLET ORAL at 21:20

## 2021-02-27 RX ADMIN — CHOLESTYRAMINE 4 G: 4 POWDER, FOR SUSPENSION ORAL at 08:33

## 2021-02-27 RX ADMIN — SODIUM CHLORIDE, PRESERVATIVE FREE 10 ML: 5 INJECTION INTRAVENOUS at 21:44

## 2021-02-27 RX ADMIN — OXYCODONE AND ACETAMINOPHEN 1 TABLET: 10; 325 TABLET ORAL at 09:13

## 2021-02-27 RX ADMIN — DIPHENOXYLATE HYDROCHLORIDE AND ATROPINE SULFATE 2 TABLET: 2.5; .025 TABLET ORAL at 18:10

## 2021-02-27 RX ADMIN — ESCITALOPRAM OXALATE 20 MG: 10 TABLET ORAL at 21:20

## 2021-02-27 RX ADMIN — DIPHENOXYLATE HYDROCHLORIDE AND ATROPINE SULFATE 1 TABLET: 2.5; .025 TABLET ORAL at 08:33

## 2021-02-27 ASSESSMENT — PAIN SCALES - GENERAL: PAINLEVEL_OUTOF10: 4

## 2021-02-27 ASSESSMENT — PAIN - FUNCTIONAL ASSESSMENT: PAIN_FUNCTIONAL_ASSESSMENT: ACTIVITIES ARE NOT PREVENTED

## 2021-02-27 NOTE — PROGRESS NOTES
Admit Date: 2/25/2021    Subjective:     Patient seen this am. She would like to go home. Cr back to baseline but still signficant watery output from ostomy. Seems open to insertion of mediport . Scheduled Meds:   cholestyramine  4 g Oral TID WC    pantoprazole  40 mg Oral QAM AC    sodium chloride flush  10 mL Intravenous 2 times per day    calcium elemental  500 mg Oral Daily    vitamin D  2,000 Units Oral BID    diphenoxylate-atropine  1 tablet Oral 4x Daily    escitalopram  20 mg Oral Nightly    pravastatin  40 mg Oral QPM    primidone  250 mg Oral Nightly    traZODone  25 mg Oral Nightly    vitamin B-12  500 mcg Oral Daily     Continuous Infusions:   sodium chloride 75 mL/hr at 02/27/21 0317     PRN Meds:sodium chloride flush, promethazine **OR** ondansetron, acetaminophen **OR** acetaminophen, hyoscyamine, oxyCODONE-acetaminophen      Objective:     I/O last 3 completed shifts: In: 240 [P.O.:240]  Out: 150 [Stool:150]  I/O this shift:  In: -   Out: 150 [Stool:150]  BP (!) 116/55   Pulse 68   Temp 97.7 °F (36.5 °C) (Oral)   Resp 18   Ht 5' (1.524 m)   Wt 95 lb (43.1 kg)   SpO2 95%   BMI 18.55 kg/m²     General appearance: alert, appears stated age and cooperative  Neck: no adenopathy, no carotid bruit, no JVD, supple, symmetrical, trachea midline and thyroid not enlarged, symmetric, no tenderness/mass/nodules  Lungs: clear to auscultation bilaterally  Chest wall: no tenderness  Heart: regular rate and rhythm, S1, S2 normal, no murmur, click, rub or gallop  Abdomen: soft, non-tender; bowel sounds normal; no masses,  no organomegaly. Ostomy intact. Green/liquid stool.   Extremities: extremities normal, atraumatic, no cyanosis or edema    Data Review    CBC with Differential:    Lab Results   Component Value Date    WBC 5.1 02/27/2021    RBC 2.57 02/27/2021    HGB 8.2 02/27/2021    HCT 26.0 02/27/2021     02/27/2021    .2 02/27/2021    MCH 31.9 02/27/2021    [facility] 31.5

## 2021-02-27 NOTE — PROGRESS NOTES
Name:  Neri Das  :  1940  MRN:  [id number]  Room:  [address] [place], [place] [address]-  DOS:  2021    [facility]  [facility]  Dr. Liang Perdomo M.D. Dr. Navin Bautista M.D. ERENDIRA Silva Dr., M.D. Dr. Mariaelena Senior D.O.    -NP Progress Note-    PCP:  Marcos Schmidt DO  Admitting Physician:  Marcos Schmidt DO  Chief Complaint:    Chief Complaint   Patient presents with    Other     sent over by nephro for elevated creatinine       Subjective  Patient resting in bed. Denies abdominal pain. Watery ostomy output. No blood or melena.     Physical Examination  Vitals:  BP (!) 108/48   Pulse 73   Temp 97.7 °F (36.5 °C) (Oral)   Resp 18   Ht 5' (1.524 m)   Wt 95 lb (43.1 kg)   SpO2 94% Comment: nurse wanted   BMI 18.55 kg/m²   General Appearance:  awake, alert, and oriented to person, place, time, and purpose; appears stated age and cooperative; no apparent distress no labored breathing  HEENT:  PERRL; EOMI; sclera clear; buccal mucosa moist  Neck:  supple; trachea midline; no thyromegaly; no JVD; no bruits  Heart:  rhythm regular; rate controlled; no murmurs  Lungs:  symmetrical; clear to auscultation bilaterally; no wheezes; no rhonchi; no rales  Abdomen:  soft, non-tender, non-distended; bowel sounds positive; no organomegaly or masses, ileostomy  Extremities:  peripheral pulses present; no peripheral edema; no ulcers, decreased muscle tone and bulk  Neurologic:  alert and oriented x 3; no focal deficit; cranial nerves grossly intact  Skin:  no petechia; no hemorrhage; no wounds    Medications  Scheduled Meds    cholestyramine  4 g Oral TID WC    pantoprazole  40 mg Oral QAM AC    sodium chloride flush  10 mL Intravenous 2 times per day    calcium elemental  500 mg Oral Daily    vitamin D  2,000 Units Oral BID    diphenoxylate-atropine  1 tablet Oral 4x Daily    escitalopram  20 mg Oral Nightly    pravastatin  40 mg Oral QPM    primidone  250 mg Oral Nightly  traZODone  25 mg Oral Nightly    vitamin B-12  500 mcg Oral Daily     Infusion Meds    sodium chloride 1,000 mL (02/27/21 0941)     PRN Meds sodium chloride flush, promethazine **OR** ondansetron, acetaminophen **OR** acetaminophen, hyoscyamine, oxyCODONE-acetaminophen    Laboratory Data  Recent Results (from the past 24 hour(s))   Comprehensive Metabolic Panel w/ Reflex to MG    Collection Time: 02/27/21  5:25 AM   Result Value Ref Range    Sodium 140 132 - 146 mmol/L    Potassium reflex Magnesium 3.7 3.5 - 5.0 mmol/L    Chloride 113 (H) 98 - 107 mmol/L    CO2 20 (L) 22 - 29 mmol/L    Anion Gap 7 7 - 16 mmol/L    Glucose 82 74 - 99 mg/dL    BUN 32 (H) 8 - 23 mg/dL    CREATININE 1.5 (H) 0.5 - 1.0 mg/dL    GFR Non-African American 34 >=60 mL/min/1.73    GFR African American 41     Calcium 8.1 (L) 8.6 - 10.2 mg/dL    Total Protein 4.7 (L) 6.4 - 8.3 g/dL    Albumin 2.9 (L) 3.5 - 5.2 g/dL    Total Bilirubin <0.2 0.0 - 1.2 mg/dL    Alkaline Phosphatase 121 (H) 35 - 104 U/L    ALT 13 0 - 32 U/L    AST 22 0 - 31 U/L   CBC auto differential    Collection Time: 02/27/21  5:25 AM   Result Value Ref Range    WBC 5.1 4.5 - 11.5 E9/L    RBC 2.57 (L) 3.50 - 5.50 E12/L    Hemoglobin 8.2 (L) 11.5 - 15.5 g/dL    Hematocrit 26.0 (L) 34.0 - 48.0 %    .2 (H) 80.0 - 99.9 fL    MCH 31.9 26.0 - 35.0 pg    MCHC 31.5 (L) 32.0 - 34.5 %    RDW 15.5 (H) 11.5 - 15.0 fL    Platelets 194 397 - 742 E9/L    MPV 12.6 (H) 7.0 - 12.0 fL    Neutrophils % 52.2 43.0 - 80.0 %    Immature Granulocytes % 0.0 0.0 - 5.0 %    Lymphocytes % 34.7 20.0 - 42.0 %    Monocytes % 8.0 2.0 - 12.0 %    Eosinophils % 4.5 0.0 - 6.0 %    Basophils % 0.6 0.0 - 2.0 %    Neutrophils Absolute 2.68 1.80 - 7.30 E9/L    Immature Granulocytes # 0.00 E9/L    Lymphocytes Absolute 1.78 1.50 - 4.00 E9/L    Monocytes Absolute 0.41 0.10 - 0.95 E9/L    Eosinophils Absolute 0.23 0.05 - 0.50 E9/L    Basophils Absolute 0.03 0.00 - 0.20 E9/L       Imaging  Us Retroperitoneal Complete    Result Date: 2/25/2021  EXAMINATION: RETROPERITONEAL ULTRASOUND OF THE KIDNEYS AND URINARY BLADDER 2/25/2021 COMPARISON: AP CT 01/21/2021 HISTORY: ORDERING SYSTEM PROVIDED HISTORY: Enedelia TECHNOLOGIST PROVIDED HISTORY: Reason for exam:->Enedelia What reading provider will be dictating this exam?->CRC FINDINGS: RENAL MEASUREMENTS: Length of visualized right kidney: 9.3 cm. Right renal cortical thickness: Up to 17 mm. Length of visualized left kidney: 9.1 cm. Left renal cortical thickness: 13 mm. RIGHT KIDNEY: Focal lesion: A non-specific, smoothly marginated, hypoechoic lesion is statistically most likely a cyst.  There is no evidence of mural nodularity, septation, wall thickening, or calcification. It measures 10 mm in the upper pole region. Calculus: None. Hydronephrosis: None. Perinephric fluid: None. LEFT KIDNEY: Focal lesion: Non-specific, smoothly marginated, hypoechoic lesions are statistically most likely cysts. There is no evidence of mural nodularity, septation, wall thickening, or calcification. Largest measures 4.4 cm and is exophytic from the lower pole region. Calculus: None. Hydronephrosis: None. Perinephric fluid: None. URINARY BLADDER: Focal abnormality: None. Wall thickening: None. Prevoid volume: 134cc. Postvoid volume: Not evaluated. No sonographic evidence of acute renal pathology. Bilateral renal cysts again noted        Assessment and Plan  1. Short gut syndrome  -Continue antimotility agents such as Imodium/Lomotil  -Consider intermittent IV supplementation of fluids/electrolytes at home  -Consider placement of semipermanent versus permanent IV access such as PICC line versus Mediport  -In case of Gaitan output consider octreotide     2.   Anemia  -Acute on chronic without overt bleed  -Pending FOBT  -No iron deficiency  -Pending copper levels - supplement if indicated  -Monitor H&H; defer transfusion to admitting  -Consider endoscopic evaluation in case of precipitous drop in H&H or overt bleed - currently stable     3. Renal failure  -KD/CKD  -Likely secondary to dehydration from above  -Per admitting/nephrology     4. Malnutrition  -Consider dietitian consult     5. Poor IV access  -May consider semipermanent or permanent IV access such as PICC line versus Mediport  -Defer further discussion and decision to admitting/PCP     6. History of abdominal hernia/multiple abdominal surgeries  -Per admitting/general surgery      Patient feeling better. Still with watery output from ostomy. No overt GI bleeding. Increase Lomotil to 2 tablets PRN. Continue Questran. Check stool studies. Discussed possible Mediport again - patient contemplating. Can consider outpatient supplementation of IV fluids. No other immediate interventions planned from GI POV. If discharged, patient should follow in our office in 1-2 weeks. Patient to call for appointment.  [phone]. Thank you for the opportunity to participate in the care of Ms. Kelley. Check OARRS. Script in chart for Lomotil.         GEMA Turner - CNP  12:47 PM  2/27/2021

## 2021-02-27 NOTE — PROGRESS NOTES
Left message for Ashwin Sharp NP regarding patient not qualifying for CDIFF specimen and main campus lab to reject sample if sent unless otherwise ordered by infectious disease.

## 2021-02-27 NOTE — CONSULTS
[facility] [address] [place] [address]    Name: Apoorva Reyes    Age: 68 y.o. Date of Admission: 2/25/2021  3:19 PM    Date of Service: 2/27/2021    Reason for Consultation: sinus pauses    Referring Physician: Dr Araceli Sethi  Primary Care Physician: Josephine Kaiser DO    History of Present Illness: The patient is a 68y.o. year old female with short gut syndrome admitted for recurrent dehydration. She was taking cardizem  mg and propanolol at home- held on admission for hypotension. While sleeping this morning between 10am -11am she had several short sinus pauses 2-2.4 seconds. Nothing longer, was sleeping  And states was awakened by staff. Has been totally asymptomatic with no lightheadedness, syncope or presyncope. No palpitations. Last stress test 7/2018-normal, last echo 7/2018 EF 55-60% , mild LVH DD1, 1-2+ TR [id number]. Remote PCI of the RCA 2002. Hypertension, lyperlipidemia.     Past Medical History:   Past Medical History:   Diagnosis Date    Acute kidney failure ([address] 2014 and 8/2016    x2,no dialysis needed,resolved, kidney function tests returned to normal    Anxiety     Arthritis     CAD (coronary artery disease)     follows with Dr. Lena Shabazz every 6 months    [facility])     skin, leg,nose- removed surgically     COPD (chronic obstructive pulmonary disease) ([address]     mild- no inhlaers, no oxygen     Depression     Fibromyalgia     chronic back and neck pain    Generalized headaches     h/o / daily    History of blood transfusion 3-11-14    multiple times    History of necrotic bowel 2012    Hyperlipidemia     Hypertension     Incisional hernia     abdomen    Insomnia     Mitral valve regurgitation     Myocardial infarct ([address] 2002    Occasional tremors     at hands / stable with medication    Osteopenia     PONV (postoperative nausea and vomiting)     Vitamin B12 deficiency     h/o       Review of Systems:   Constitutional: No fever, chills, sweats, has lost 30 lbs since her last surgery  Cardiac: As per HPI  Pulmonary: No cough, wheeze, hemoptysis  HEENT: No visual disturbances, difficult swallowing  GI: No nausea, vomiting, diarrhea, abdominal pain, rectal bleeding  : No dysuria or hematuria  Endocrine: No excessive thirst, heat or cold intolerance. Musculoskeletal: No joint pain or muscle aches. No claudication  Skin: No skin breakdown or rashes  Neuro: No headache, confusion, or seizures  Psych: No depression, anxiety    Family History:  History reviewed. No pertinent family history.     Social History:  Social History     Socioeconomic History    Marital status:      Spouse name: Not on file    Number of children: Not on file    Years of education: Not on file    Highest education level: Not on file   Occupational History    Not on file   Social Needs    Financial resource strain: Not on file    Food insecurity     Worry: Not on file     Inability: Not on file    Transportation needs     Medical: Not on file     Non-medical: Not on file   Tobacco Use    Smoking status: Current Every Day Smoker     Packs/day: 1.00     Types: Cigarettes    Smokeless tobacco: Never Used   Substance and Sexual Activity    Alcohol use: Not Currently     Comment: occasional    Drug use: No    Sexual activity: Not on file   Lifestyle    Physical activity     Days per week: Not on file     Minutes per session: Not on file    Stress: Not on file   Relationships    Social connections     Talks on phone: Not on file     Gets together: Not on file     Attends Buddhism service: Not on file     Active member of club or organization: Not on file     Attends meetings of clubs or organizations: Not on file     Relationship status: Not on file    Intimate partner violence     Fear of current or ex partner: Not on file     Emotionally abused: Not on file     Physically abused: Not on file     Forced sexual activity: Not on file   Other Topics Concern    Not on file   Social History Narrative    Not on file       Allergies: Allergies   Allergen Reactions    Fentanyl Itching     Patch only    Levofloxacin Other (See Comments)     Leg edema/pain    Tramadol Nausea Only     Nausea . Sleeplisness, shakey    Vioxx [Rofecoxib]      Leg edema and pain       Home Medications:  Prior to Admission medications    Medication Sig Start Date End Date Taking? Authorizing Provider   diphenoxylate-atropine (LOMOTIL) 2.5-0.025 MG per tablet Take 2 tablets by mouth every 8 hours as needed for Diarrhea for up to 30 days. 2/27/21 3/29/21 Yes Braden BLEVINS Masters, APRN - CNP   patiromer sorbitex calcium (VELTASSA) 8.4 g PACK packet Take 8.4 g by mouth daily    Historical Provider, MD   cholestyramine Keara Punch) 4 GM/DOSE powder Take by mouth 3 times daily (with meals)    Historical Provider, MD   hyoscyamine (ANASPAZ;LEVSIN) 125 MCG tablet Take 125 mcg by mouth 2 times daily as needed for Cramping    Historical Provider, MD   omeprazole (PRILOSEC) 20 MG delayed release capsule Take 40 mg by mouth daily    Historical Provider, MD   aspirin 81 MG tablet Take 81 mg by mouth daily    Historical Provider, MD   ondansetron (ZOFRAN) 4 MG tablet Take 4 mg by mouth 3 times daily     Historical Provider, MD   diltiazem (CARDIZEM CD) 120 MG extended release capsule Take 1 capsule by mouth daily 7/7/18   Tanya Liz MD   oxyCODONE-acetaminophen (PERCOCET)  MG per tablet Take by mouth every 6 hours as needed for Pain.  Instructed to take with sip water am of procedure if needs     Historical Provider, MD   fenofibrate (TRICOR) 145 MG tablet Take 145 mg by mouth daily    Historical Provider, MD   Cholecalciferol (VITAMIN D3) 10572 UNITS CAPS Take 2,000 Units by mouth 2 times daily     Historical Provider, MD   Coenzyme Q10 (COQ10 PO) Take by mouth daily     Historical Provider, MD   Ferrous Sulfate (IRON) 28 MG TABS Take by mouth daily    Historical Provider, MD   calcium carbonate (OSCAL) 500 MG TABS tablet Take 500 mg by mouth daily LD 3/16/2017    Historical Provider, MD   pravastatin (PRAVACHOL) 40 MG tablet Take 40 mg by mouth every evening    Historical Provider, MD   propranolol (INDERAL) 20 MG tablet Take 20 mg by mouth daily Instructed to take morning of surgery with a sip of water     Historical Provider, MD   vitamin B-12 (CYANOCOBALAMIN) 500 MCG tablet Place 500 mcg under the tongue daily     Historical Provider, MD   Omega-3 Fatty Acids (OMEGA 3 PO) Take by mouth daily     Historical Provider, MD   escitalopram (LEXAPRO) 20 MG tablet Take 20 mg by mouth nightly.     Historical Provider, MD   primidone (MYSOLINE) 50 MG tablet Take 250 mg by mouth nightly Taking for essential tremors    Historical Provider, MD   traZODone (DESYREL) 50 MG tablet Take 25 mg by mouth nightly     Historical Provider, MD       Current Medications:  Current Facility-Administered Medications   Medication Dose Route Frequency Provider Last Rate Last Admin    cholestyramine (QUESTRAN) packet 4 g  4 g Oral TID WC Azaleavonda Kaiser, DO   4 g at 02/27/21 1141    diphenoxylate-atropine (LOMOTIL) 2.5-0.025 MG per tablet 2 tablet  2 tablet Oral 4x Daily PRN GEMA Machado - TOSHIA        pantoprazole (PROTONIX) tablet 40 mg  40 mg Oral QAM AC Azalea Kaiser, DO   40 mg at 02/27/21 0520    0.9 % sodium chloride infusion  1,000 mL Intravenous Continuous Mora MD Aki 75 mL/hr at 02/27/21 0941 1,000 mL at 02/27/21 0941    sodium chloride flush 0.9 % injection 10 mL  10 mL Intravenous 2 times per day Azalea LUTZ Awilda, DO        sodium chloride flush 0.9 % injection 10 mL  10 mL Intravenous PRN Azalea LUTZ Awilda, DO        promethazine (PHENERGAN) tablet 12.5 mg  12.5 mg Oral Q6H PRN Azalea LUTZ Awilda, DO        Or    ondansetron (ZOFRAN) injection 4 mg  4 mg Intravenous Q6H PRN Azalea LUTZ Awilda, DO   4 mg at 02/26/21 1312    acetaminophen (TYLENOL) tablet 650 mg  650 mg Oral Q6H PRN Pasqual Slight Carotid upstrokes brisk. Respiratory: Clear bilaterally; no wheezes, no rales, no rhonchi. Unlabored respirations  Abdomen: Soft, nontender, nondistended, bowel sounds+, no hepatomegaly, no masses, no abdominal bruits  Extremities: No edema, no cyanosis, no clubbing. Distal pulses intact  Skin: Intact, warm and dry, no rashes, no breakdown  Musculoskeletal: normal tone and strength in the upper and lower extremities bilaterally  Neuro: No focal deficits. Moves all extremities appropriately to command. Normal sensation in the upper and lower extremities bilaterally  Psychiatric: Cooperative, and normal affect    Intake/Output:    Intake/Output Summary (Last 24 hours) at 2/27/2021 1337  Last data filed at 2/27/2021 1119  Gross per 24 hour   Intake 240 ml   Output 400 ml   Net -160 ml     I/O this shift:  In: -   Out: 400 [Stool:400]    Laboratory Tests:  Last 3 CBC:  Recent Labs     02/25/21  1626 02/26/21  0722 02/26/21  1125 02/27/21  0525 02/27/21  1235   WBC 4.5 4.1*  --  5.1  --    RBC 3.44* 2.66*  --  2.57*  --    HGB 11.1* 8.4* 8.4* 8.2* 9.4*   HCT 34.2 26.9*  --  26.0*  --    MCV 99.4 101.1*  --  101.2*  --    MCH 32.3 31.6  --  31.9  --    MCHC 32.5 31.2*  --  31.5*  --    RDW 15.0 15.1*  --  15.5*  --     182  --  188  --    MPV 13.0* 12.9*  --  12.6*  --        Last 3 CMP:    Recent Labs     02/25/21  1626 02/26/21  0722 02/27/21  0525    137 140   K 5.0 4.0 3.7   CL 99 112* 113*   CO2 23 21* 20*   BUN 64* 50* 32*   CREATININE 2.5* 1.9* 1.5*   GLUCOSE 117* 80 82   CALCIUM 9.0 7.7* 8.1*   PROT 6.9 4.7* 4.7*   LABALBU 4.1 3.0* 2.9*   BILITOT 0.3 0.2 <0.2   ALKPHOS 188* 129* 121*   AST 25 20 22   ALT 17 13 13       Last 3 Mag/Phos:  No results for input(s): MG, PHOS in the last 72 hours.     Last 3 CK, CKMB, Troponin  Recent Labs     02/25/21  1626   TROPONINI 0.01       Last 3 Pro-BNP:  Recent Labs     02/25/21  1626   PROBNP 354     Lab Results   Component Value Date    PROBNP 354 02/25/2021 Last 3 Glucose:     Recent Labs     02/25/21  1626 02/26/21  0722 02/27/21  0525   GLUCOSE 117* 80 82       Last 3 Coags:  No results for input(s): PROTIME, INR, PTT in the last 72 hours. Lab Results   Component Value Date    PROTIME 13.5 01/21/2021    INR 1.2 01/21/2021       Last 3 Lipid Panel:  No results for input(s): LDLCALC, HDL, TRIG in the last 72 hours. Invalid input(s): CHLPL  No results found for: LDLCALC  No results found for: HDL  Lab Results   Component Value Date    TRIG 219 (H) 09/16/2020     No components found for: CHLPL    TSH:  Recent Labs     02/26/21  0722   TSH 0.834     Lab Results   Component Value Date    TSH 0.834 02/26/2021       ABGs:  No results for input(s): PH, PO2, PCO2, HCO3, BE, O2SAT in the last 72 hours. Lactic Acid:  No results for input(s): LACTA in the last 72 hours. Radiology:  RAD Results:  US RETROPERITONEAL COMPLETE   Final Result   No sonographic evidence of acute renal pathology. Bilateral renal cysts again noted            EKG and Telemetry:  12-lead EKG personally reviewed and shows sinus rhythm normal WA, no ischemic changes    Telemetry personally reviewed and shows sinus rhythm - she had several 2-2.3 second pauses while sleeping this AM, no long pauses. Short run PAT last night        ASSESSMENT / PLAN:    1. Pauses- likely a combination of residual effect of 2 AV rodrigo blockers and altered sympathetic tone from falling asleep. She has had no pauses of any significance, nothing that would require a pacemaker. Probably doesn't need to resume cardizem upon discharge as she was on a low dose and with 30 lbs weight loss and recurrent dehydration bp is not a problem. She can continue propanolol for her tremors. From a cardiac standpoint stable for home. No other cardiac plans. Can f/u in the office per her routine. 2.        Dehydration with acute kidney injury due to short bowel syndrome- rehydrated with improvement of electrolytes. 3. essential tremors- can continue low dose propanolol. 4 CAD remote RCA intervention, normal stress test  7/.2018    5 Hyperlipidemia- on statin    6 Hypertension- not a problem with so much weight loss, will recommend holding cardizem now at home. 7 Nicotine dependence. Thank you for consultation.     Toyin Mclain MD, [facility]  The [address] at [facility]    Electronically signed by Toyin Mclain MD on 2/27/2021 at 1:37 PM

## 2021-02-27 NOTE — PROGRESS NOTES
Nephrology Note  Radha Lai MD          Patient seen and examined. No family member is present at bedside. Chart reviewed. Patient is feeling much better. She is anxious to be discharged home. She has been seen by cardiology because of sinus pauses. She still has significant liquid output from her ileostomy. Her dose of Lomotil has been increased. Denies shortness of breath. Appetite good. No nausea or dysguesia. Awake and alert . In no acute distress. Vital SignsBP (!) 108/48   Pulse 73   Temp 97.7 °F (36.5 °C) (Oral)   Resp 18   Ht 5' (1.524 m)   Wt 95 lb (43.1 kg)   SpO2 94% Comment: nurse wanted   BMI 18.55 kg/m²   24HR INTAKE/OUTPUT:    Intake/Output Summary (Last 24 hours) at 2/27/2021 1419  Last data filed at 2/27/2021 1119  Gross per 24 hour   Intake 240 ml   Output 400 ml   Net -160 ml         Physical Exam    Neck: No JVD  Lungs: Breath sounds decreased at the bases. No rales or ronchi. Heart: Regular rate and rhythm. No S3 gallop. No  murmrur. Abdomen: Soft non distended, ileostomy in place with liquid stools, non tender and normal bowel sounds. Extremeties: No edema.       ROS:  RESPIRATORY:  negative  CARDIOVASCULAR:  negative  GASTROINTESTINAL:  negative  GENITOURINARY:  Negative        Current Facility-Administered Medications   Medication Dose Route Frequency Provider Last Rate Last Admin    cholestyramine (QUESTRAN) packet 4 g  4 g Oral TID  Azalea Kaiser DO   4 g at 02/27/21 1141    diphenoxylate-atropine (LOMOTIL) 2.5-0.025 MG per tablet 2 tablet  2 tablet Oral 4x Daily PRN GEMA Oviedo - CNP   2 tablet at 02/27/21 1405    pantoprazole (PROTONIX) tablet 40 mg  40 mg Oral QAM AC Azalea Kaiser DO   40 mg at 02/27/21 0520    0.9 % sodium chloride infusion  1,000 mL Intravenous Continuous Sotero Ohara MD   Stopped at 02/27/21 1345    sodium chloride flush 0.9 % injection 10 mL  10 mL Intravenous 2 times per day Azalea Kaiser DO  sodium chloride flush 0.9 % injection 10 mL  10 mL Intravenous PRN Azalea E Awilda, DO        promethazine (PHENERGAN) tablet 12.5 mg  12.5 mg Oral Q6H PRN Azalea E Awilda, DO        Or    ondansetron (ZOFRAN) injection 4 mg  4 mg Intravenous Q6H PRN Azalea E Awilda, DO   4 mg at 02/26/21 1312    acetaminophen (TYLENOL) tablet 650 mg  650 mg Oral Q6H PRN Azalea E Awilda, DO        Or    acetaminophen (TYLENOL) suppository 650 mg  650 mg Rectal Q6H PRN Azalea E Awilda, DO        calcium elemental (OSCAL) tablet 500 mg  500 mg Oral Daily Azalea E Awilda, DO   500 mg at 02/27/21 0835    vitamin D (CHOLECALCIFEROL) tablet 2,000 Units  2,000 Units Oral BID Azalea E Awilda, DO   2,000 Units at 02/27/21 0834    escitalopram (LEXAPRO) tablet 20 mg  20 mg Oral Nightly Azalea E Awilda, DO   20 mg at 02/26/21 2136    hyoscyamine (ANASPAZ;LEVSIN) tablet 125 mcg  125 mcg Oral BID PRN Azalea E Awilda, DO        oxyCODONE-acetaminophen (PERCOCET)  MG per tablet 1 tablet  1 tablet Oral Q6H PRN Azalea E Awilda, DO   1 tablet at 02/27/21 0913    pravastatin (PRAVACHOL) tablet 40 mg  40 mg Oral QPM Azalea E Awilda, DO   40 mg at 02/26/21 1741    primidone (MYSOLINE) tablet 250 mg  250 mg Oral Nightly Azalea E Awilda, DO   250 mg at 02/26/21 2136    traZODone (DESYREL) tablet 25 mg  25 mg Oral Nightly Azalea E Awilda, DO   25 mg at 02/26/21 2136    vitamin B-12 (CYANOCOBALAMIN) tablet 500 mcg  500 mcg Oral Daily Azalea E Awilda, DO   500 mcg at 02/27/21 6052       US RETROPERITONEAL COMPLETE   Final Result   No sonographic evidence of acute renal pathology.    Bilateral renal cysts again noted            Labs:    CBC:   Recent Labs     02/25/21  1626 02/26/21  0722 02/26/21  1125 02/27/21  0525 02/27/21  1235   WBC 4.5 4.1*  --  5.1  --    HGB 11.1* 8.4* 8.4* 8.2* 9.4*    182  --  188  --         Lab Results   Component Value Date    IRON 118 02/26/2021    TIBC 210 (L) 02/26/2021    FERRITIN 205 02/26/2021       Lab Results   Component Value Date    PTH 64 11/17/2020    CALCIUM 8.1 (L) 02/27/2021    CALCIUM 7.7 (L) 02/26/2021    CALCIUM 9.0 02/25/2021    CAION 1.31 11/18/2020    CAION 1.31 11/17/2020    PHOS 3.3 01/22/2021    PHOS 2.8 11/18/2020    PHOS 3.2 11/17/2020    MG 1.5 (L) 01/22/2021    MG 1.5 (L) 01/21/2021    MG 1.6 01/21/2021       BMP:   Recent Labs     02/25/21  1626 02/26/21  0722 02/27/21  0525    137 140   K 5.0 4.0 3.7   CL 99 112* 113*   CO2 23 21* 20*   BUN 64* 50* 32*   CREATININE 2.5* 1.9* 1.5*   GLUCOSE 117* 80 82             Assessment: / Plan:    1. Acute kidney injury. Acute kidney injury secondary volume depletion with high output from ileostomy. Serum creatinine is lower. Patient is anxious to be discharged home. Will Hep-Lock IV fluids and if the serum creatinine remained stable she probably should be able to go home in the next 24 hours. 2.  Hypertension with chronic kidney disease stage G1 to G4. Blood pressure at target of less than 130/80 mmHg. 3.  Hyperkalemia. Hyperkalemia secondary to volume depletion leading to reduced sodium delivery to the susceptible and compromising renal potassium excretion. Improved. We will keep the patient off Veltassa. .  Follow hemoglobin hematocrit. 4.   Metabolic acidosis. Metabolic acidosis secondary to acute kidney injury and ileostomy. Will follow bicarbonate levels and if needed we will supplement bicarbonate to target a bicarbonate level of 22 mmol/L.    5.  Anemia of chronic kidney disease. Iron studies also reflect  deficiency. Will follow hemoglobin . 6.   Chronic kidney disease stage G3a with a baseline serum creatinine of 1.17 mg/dL. Gail Winter MD  Nephrology  Electronically signed by Gail Winter MD on 2/27/2021 at 2:11 PM      Note: This report was completed utilizing computer voice recognition software.  Every effort has been made to ensure accuracy, however; inadvertent computerized

## 2021-02-27 NOTE — PLAN OF CARE
Problem: Falls - Risk of:  Goal: Will remain free from falls  Description: Will remain free from falls  Outcome: Met This Shift  Goal: Absence of physical injury  Description: Absence of physical injury  Outcome: Met This Shift     Problem: Pain:  Goal: Pain level will decrease  Description: Pain level will decrease  Outcome: Met This Shift  Goal: Control of chronic pain  Description: Control of chronic pain  Outcome: Met This Shift

## 2021-02-28 VITALS
HEART RATE: 87 BPM | OXYGEN SATURATION: 97 % | HEIGHT: 60 IN | TEMPERATURE: 97.8 F | WEIGHT: 109.79 LBS | BODY MASS INDEX: 21.55 KG/M2 | RESPIRATION RATE: 18 BRPM | DIASTOLIC BLOOD PRESSURE: 57 MMHG | SYSTOLIC BLOOD PRESSURE: 120 MMHG

## 2021-02-28 LAB
ALBUMIN SERPL-MCNC: 3.2 G/DL (ref 3.5–5.2)
ALP BLD-CCNC: 128 U/L (ref 35–104)
ALT SERPL-CCNC: 18 U/L (ref 0–32)
ANION GAP SERPL CALCULATED.3IONS-SCNC: 8 MMOL/L (ref 7–16)
AST SERPL-CCNC: 34 U/L (ref 0–31)
BASOPHILS ABSOLUTE: 0.05 E9/L (ref 0–0.2)
BASOPHILS RELATIVE PERCENT: 0.8 % (ref 0–2)
BILIRUB SERPL-MCNC: 0.4 MG/DL (ref 0–1.2)
BUN BLDV-MCNC: 20 MG/DL (ref 8–23)
C DIFF TOXIN/ANTIGEN: NORMAL
CALCIUM SERPL-MCNC: 8.5 MG/DL (ref 8.6–10.2)
CHLORIDE BLD-SCNC: 110 MMOL/L (ref 98–107)
CO2: 20 MMOL/L (ref 22–29)
CREAT SERPL-MCNC: 1.3 MG/DL (ref 0.5–1)
EOSINOPHILS ABSOLUTE: 0.27 E9/L (ref 0.05–0.5)
EOSINOPHILS RELATIVE PERCENT: 4.3 % (ref 0–6)
GFR AFRICAN AMERICAN: 48
GFR NON-AFRICAN AMERICAN: 40 ML/MIN/1.73
GLUCOSE BLD-MCNC: 120 MG/DL (ref 74–99)
HCT VFR BLD CALC: 30 % (ref 34–48)
HEMOGLOBIN: 9.6 G/DL (ref 11.5–15.5)
IMMATURE GRANULOCYTES #: 0.01 E9/L
IMMATURE GRANULOCYTES %: 0.2 % (ref 0–5)
LYMPHOCYTES ABSOLUTE: 1.57 E9/L (ref 1.5–4)
LYMPHOCYTES RELATIVE PERCENT: 25.1 % (ref 20–42)
MCH RBC QN AUTO: 32.1 PG (ref 26–35)
MCHC RBC AUTO-ENTMCNC: 32 % (ref 32–34.5)
MCV RBC AUTO: 100.3 FL (ref 80–99.9)
MONOCYTES ABSOLUTE: 0.42 E9/L (ref 0.1–0.95)
MONOCYTES RELATIVE PERCENT: 6.7 % (ref 2–12)
NEUTROPHILS ABSOLUTE: 3.93 E9/L (ref 1.8–7.3)
NEUTROPHILS RELATIVE PERCENT: 62.9 % (ref 43–80)
OCCULT BLOOD DIAGNOSTIC: NORMAL
PDW BLD-RTO: 15.3 FL (ref 11.5–15)
PLATELET # BLD: 208 E9/L (ref 130–450)
PMV BLD AUTO: 12.2 FL (ref 7–12)
POTASSIUM REFLEX MAGNESIUM: 3.6 MMOL/L (ref 3.5–5)
RBC # BLD: 2.99 E12/L (ref 3.5–5.5)
ROTAVIRUS ANTIGEN: NORMAL
SODIUM BLD-SCNC: 138 MMOL/L (ref 132–146)
TOTAL PROTEIN: 5.3 G/DL (ref 6.4–8.3)
URINE CULTURE, ROUTINE: NORMAL
WBC # BLD: 6.3 E9/L (ref 4.5–11.5)
WHITE BLOOD CELLS (WBC), STOOL: NORMAL

## 2021-02-28 PROCEDURE — 2580000003 HC RX 258: Performed by: INTERNAL MEDICINE

## 2021-02-28 PROCEDURE — 36415 COLL VENOUS BLD VENIPUNCTURE: CPT

## 2021-02-28 PROCEDURE — 80053 COMPREHEN METABOLIC PANEL: CPT

## 2021-02-28 PROCEDURE — 6370000000 HC RX 637 (ALT 250 FOR IP): Performed by: NURSE PRACTITIONER

## 2021-02-28 PROCEDURE — 6370000000 HC RX 637 (ALT 250 FOR IP): Performed by: INTERNAL MEDICINE

## 2021-02-28 PROCEDURE — 85025 COMPLETE CBC W/AUTO DIFF WBC: CPT

## 2021-02-28 RX ADMIN — Medication 500 MG: at 08:37

## 2021-02-28 RX ADMIN — CYANOCOBALAMIN TAB 1000 MCG 500 MCG: 1000 TAB at 08:36

## 2021-02-28 RX ADMIN — DIPHENOXYLATE HYDROCHLORIDE AND ATROPINE SULFATE 2 TABLET: 2.5; .025 TABLET ORAL at 14:15

## 2021-02-28 RX ADMIN — CHOLESTYRAMINE 4 G: 4 POWDER, FOR SUSPENSION ORAL at 08:36

## 2021-02-28 RX ADMIN — OXYCODONE AND ACETAMINOPHEN 1 TABLET: 10; 325 TABLET ORAL at 08:37

## 2021-02-28 RX ADMIN — HYOSCYAMINE SULFATE 125 MCG: 0.12 TABLET ORAL at 08:37

## 2021-02-28 RX ADMIN — Medication 2000 UNITS: at 08:36

## 2021-02-28 RX ADMIN — Medication 10 ML: at 08:38

## 2021-02-28 RX ADMIN — PANTOPRAZOLE SODIUM 40 MG: 40 TABLET, DELAYED RELEASE ORAL at 06:42

## 2021-02-28 RX ADMIN — CHOLESTYRAMINE 4 G: 4 POWDER, FOR SUSPENSION ORAL at 13:00

## 2021-02-28 ASSESSMENT — PAIN SCALES - GENERAL
PAINLEVEL_OUTOF10: 5
PAINLEVEL_OUTOF10: 0
PAINLEVEL_OUTOF10: 5

## 2021-02-28 ASSESSMENT — PAIN DESCRIPTION - DESCRIPTORS: DESCRIPTORS: ACHING

## 2021-02-28 ASSESSMENT — PAIN DESCRIPTION - LOCATION: LOCATION: GENERALIZED

## 2021-02-28 NOTE — PLAN OF CARE
Problem: Falls - Risk of:  Goal: Will remain free from falls  Description: Will remain free from falls  2/27/2021 1856 by Tony Wynn RN  Outcome: Met This Shift

## 2021-02-28 NOTE — PROGRESS NOTES
Name:  Emily Martin  :  1940  MRN:  [id number]  Room:  [address] [place] [address]  DOS:  2021    [facility]  [facility]  Dr. Simone Dunn M.D. Dr. Govind Rogers M.D. Dr. Bruna Bruner D.O. Dr. Shira Hernandez M.D. Dr. Ester Valencia D.O.    -NP Progress Note-    PCP:  Kaity Parker DO  Admitting Physician:  Kaity Parker DO  Chief Complaint:    Chief Complaint   Patient presents with    Other     sent over by nephro for elevated creatinine       Subjective  Patient sleeping in bed. Easily awakened. Denies abdominal pain. Denies nausea or vomiting. IV fluids stopped. Still with watery output from ileostomy. Not eating much food, stating she does not like the hospital food.       Physical Examination  Vitals:  BP (!) 124/59   Pulse 62   Temp 98.9 °F (37.2 °C) (Oral)   Resp 18   Ht 5' (1.524 m)   Wt 109 lb 12.6 oz (49.8 kg)   SpO2 98%   BMI 21.44 kg/m²   General Appearance:  awake, alert, and oriented to person, place, time, and purpose; appears stated age and cooperative; no apparent distress no labored breathing  HEENT:  PERRL; EOMI; sclera clear; buccal mucosa moist  Neck:  supple; trachea midline; no thyromegaly; no JVD; no bruits  Heart:  rhythm regular; rate controlled; no murmurs  Lungs:  symmetrical; clear to auscultation bilaterally; no wheezes; no rhonchi; no rales  Abdomen:  soft, non-tender, non-distended; bowel sounds positive; no organomegaly or masses, ileostomy  Extremities:  peripheral pulses present; no peripheral edema; no ulcers, decreased muscle tone and bulk  Neurologic:  alert and oriented x 3; no focal deficit; cranial nerves grossly intact  Skin:  no petechia; no hemorrhage; no wounds    Medications  Scheduled Meds    cholestyramine  4 g Oral TID WC    pantoprazole  40 mg Oral QAM AC    sodium chloride flush  10 mL Intravenous 2 times per day    calcium elemental  500 mg Oral Daily    vitamin D  2,000 Units Oral BID    escitalopram  20 mg Oral sonographic evidence of acute renal pathology. Bilateral renal cysts again noted        Assessment and Plan  1. Short gut syndrome  -Continue antimotility agents such as Imodium/Lomotil  -Consider intermittent IV supplementation of fluids/electrolytes at home  -Consider placement of semipermanent versus permanent IV access such as PICC line versus Mediport  -In case of Gaitan output consider octreotide     2. Anemia  -Acute on chronic without overt bleed  -Pending FOBT  -No iron deficiency  -Pending copper levels - supplement if indicated  -Monitor H&H; defer transfusion to admitting  -Consider endoscopic evaluation in case of precipitous drop in H&H or overt bleed - currently stable     3. Renal failure  -KD/CKD  -Likely secondary to dehydration from above  -Per admitting/nephrology     4. Malnutrition  -Consider dietitian consult     5. Poor IV access  -May consider semipermanent or permanent IV access such as PICC line versus Mediport  -Defer further discussion and decision to admitting/PCP     6. History of abdominal hernia/multiple abdominal surgeries  -Per admitting/general surgery      Patient feeling better. Still with watery output from ostomy, but not really eating solid foods - states she does not like the hospital food. No overt GI bleeding. Increased Lomotil to 2 tablets PRN. Continue Questran. Pending stool studies. No other immediate interventions planned from GI POV. If discharged, patient should follow in our office in 1-2 weeks. Patient to call for appointment.  [phone]. Thank you for the opportunity to participate in the care of Ms. Kelley.         Jerry Wills, GEMA - CNP  6:41 AM  2/28/2021

## 2021-02-28 NOTE — DISCHARGE SUMMARY
discharge. On oral iron at home. Iron studies were more consistent with chronic disease. Pt was aggressively hydrated, immodium/lomotil , questran for diarrhea. Consults obtained with GI, renal and cardiology. The latter due to sinus yogesh, pauses 2-2.4 sec while asleep. With Cr 1.5, day prior to d/c, Dr. Latrice Godinez suggested observiation off IVF. The following day, Cr 1.3. Felt stable for d/c. Diltiazem and valtessa held per consultant recommendations. Discussion re: Mediport given chronic need for hydration. Pt is willing to consider outpatient. Physical Exam:  BP (!) 120/57   Pulse 87   Temp 97.8 °F (36.6 °C) (Oral)   Resp 18   Ht 5' (1.524 m)   Wt 109 lb 12.6 oz (49.8 kg)   SpO2 97%   BMI 21.44 kg/m²   General appearance: alert, appears stated age and cooperative  Head: Normocephalic, without obvious abnormality, atraumatic  Eyes: conjunctivae/corneas clear. PERRL, EOM's intact. Ears: External ears -normal  Nose: No drainage or sinus tenderness. Throat: lips, mucosa, and tongue normal; teeth and gums normal  Neck: no adenopathy, no carotid bruit, no JVD, supple, symmetrical, trachea midline and thyroid not enlarged, symmetric, no tenderness/mass/nodules  Lungs: clear to auscultation bilaterally  Chest wall: no tenderness  Heart: regular rate and rhythm, S1, S2 normal, no murmur, click, rub or gallop  Abdomen: soft, non-tender; bowel sounds normal; no masses,  no organomegaly. Ostomy intact. Green/liquid stool. Extremities: extremities normal, atraumatic, no cyanosis or edema  Neurologic: Grossly normal    US RETROPERITONEAL COMPLETE   Final Result   No sonographic evidence of acute renal pathology.    Bilateral renal cysts again noted          Consults: cardiology, GI and nephrology  Treatments: IV hydration  Disposition: home  Discharged Condition: Stable  Follow Up: Primary Care Physician in one week  Discharge Medications:   Dangelo Booker   Home Medication Instructions discussing plan of care and discharge medications.    -Acute kidney injury  CKD 3- back to her baseline Cr.  -Anemia-studies not consistent with iron deficiency.  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   -CAD  -Hyperlipidemia  -History of tobacco use patient continues to smoke       Signed:  Electronically signed by Sabiha Castelan MD on 2/28/2021 at 10:07 AM

## 2021-02-28 NOTE — PROGRESS NOTES
Nephrology Note  Bailee Rosario MD          Patient seen and examined. No family member is present at bedside. Chart reviewed. Patient continues to feel good. She is written for discharge already. Her output from ileostomy has decreased somewhat. Denies shortness of breath. Appetite good. No nausea or dysguesia. Awake and alert . In no acute distress. Vital SignsBP (!) 120/57   Pulse 87   Temp 97.8 °F (36.6 °C) (Oral)   Resp 18   Ht 5' (1.524 m)   Wt 109 lb 12.6 oz (49.8 kg)   SpO2 97%   BMI 21.44 kg/m²   24HR INTAKE/OUTPUT:    Intake/Output Summary (Last 24 hours) at 2/28/2021 1513  Last data filed at 2/27/2021 1556  Gross per 24 hour   Intake 120 ml   Output --   Net 120 ml         Physical Exam    Neck: No JVD  Lungs: Breath sounds decreased at the bases. No rales or ronchi. Heart: Regular rate and rhythm. No S3 gallop. No  murmrur. Abdomen: Soft non distended, ileostomy in place with liquid stools, non tender and normal bowel sounds.   Extremeties: No edema.        ROS:  RESPIRATORY:  negative  CARDIOVASCULAR:  negative  GASTROINTESTINAL:  negative  GENITOURINARY:  Negative    Current Facility-Administered Medications   Medication Dose Route Frequency Provider Last Rate Last Admin    cholestyramine (QUESTRAN) packet 4 g  4 g Oral TID  Azalea Kaiser, DO   4 g at 02/28/21 1300    diphenoxylate-atropine (LOMOTIL) 2.5-0.025 MG per tablet 2 tablet  2 tablet Oral 4x Daily PRN GEMA Knutson - CNP   2 tablet at 02/28/21 1415    pantoprazole (PROTONIX) tablet 40 mg  40 mg Oral QAM AC Azalea LUTZ Awilda, DO   40 mg at 02/28/21 3206    sodium chloride flush 0.9 % injection 10 mL  10 mL Intravenous 2 times per day Azalea LUTZ Awilda, DO   10 mL at 02/28/21 0838    sodium chloride flush 0.9 % injection 10 mL  10 mL Intravenous PRN Azalea Avendanoiter, DO   10 mL at 02/27/21 2144    promethazine (PHENERGAN) tablet 12.5 mg  12.5 mg Oral Q6H PRN Azalea Kaiser, DO        Or    ondansetron CALCIUM 8.1 (L) 02/27/2021    CALCIUM 7.7 (L) 02/26/2021    CAION 1.31 11/18/2020    CAION 1.31 11/17/2020    PHOS 3.3 01/22/2021    PHOS 2.8 11/18/2020    PHOS 3.2 11/17/2020    MG 1.5 (L) 01/22/2021    MG 1.5 (L) 01/21/2021    MG 1.6 01/21/2021       BMP:   Recent Labs     02/26/21  0722 02/27/21  0525 02/28/21  0845    140 138   K 4.0 3.7 3.6   * 113* 110*   CO2 21* 20* 20*   BUN 50* 32* 20   CREATININE 1.9* 1.5* 1.3*   GLUCOSE 80 82 120*             Assessment: / Plan:    1. Acute kidney injury. Acute kidney injury secondary volume depletion with high output from ileostomy. Serum creatinine is lower. Patient for discharge. Patient encouraged to liberalize her fluid intake.     2. Hypertension with chronic kidney disease stage G1 to G4. Blood pressure at target of less than 130/80 mmHg.     3. Hyperkalemia. Hyperkalemia secondary to volume depletion leading to reduced sodium delivery to the susceptible and compromising renal potassium excretion. Improved. We will keep the patient off Veltassa on discharge. Patient counseled to follow a low potassium diet at home.       4.   Metabolic acidosis. Metabolic acidosis secondary to acute kidney injury and ileostomy. Will follow bicarbonate levels and if needed we will supplement bicarbonate to target a bicarbonate level of 22 mmol/L.     5. Anemia of chronic kidney disease. Iron studies also reflect  deficiency. Will follow hemoglobin .     6. Chronic kidney disease stage G3a with a baseline serum creatinine of 1.17 mg/dL. Okay to discharge from renal standpoint. Patient advised to call the office on Tuesday, March 2, 2021  for follow-up appointment as well as follow-up labs      Ulises Terrazas MD  Nephrology  Electronically signed by Ulises Terrazas MD on 2/28/2021 at 3:11 PM      Note: This report was completed utilizing computer voice recognition software.  Every effort has been made to ensure accuracy, however; inadvertent computerized transcription errors may be present.

## 2021-03-01 LAB
CRYPTOSPORIDIUM ANTIGEN STOOL: NORMAL
GIARDIA ANTIGEN STOOL: NORMAL

## 2021-03-02 LAB — PANCREATIC ELASTASE, FECAL: 27 UG/G

## 2021-03-03 LAB
COPPER: 71.6 UG/DL (ref 80–155)
CULTURE, STOOL: NORMAL

## 2021-03-18 ENCOUNTER — CLINICAL DOCUMENTATION (OUTPATIENT)
Dept: NUTRITION | Age: 81
End: 2021-03-18

## 2021-03-18 NOTE — LETTER
[address]  [address] [facility], [facility] [address]  Phone: [phone]  Fax: [phone]    March 18, 2021    Dr. Jcarlos Bolanos   Fax: [phone]    Patient: Herve Landeros   YOB: 1940   Date of Visit: 3/18/2021       Dear Dr. Jcarlos Bolanos,     Thank you for referring Herve Landeros to me for nutrition counseling. Attached is my note. If you have questions, please do not hesitate to call me. I look forward to following this patient along with you. Sincerely,    Electronically signed by Antwan Parker RD, PRANAYN                                                            Initial Nutrition Assessment Note    Date:3/18/2021  Patient Name: Herve Landeros  Referring Clinician: Dr. Jcarlos Bolanos     Reason for Visit: Unintended wt loss     Current Typical Eating Pattern:  x3 wks  B: Frosted cheerios w/ strawberries & 1% milk  L: Soup; beef & rice w/ carrots   D: 1/2 shredded pot roast sandwich w/ fries   Snacks: Cheetos, ham slices   Water/day: ~53IK  G2/day: ~30oz   Crystal lite iced tea: 2 c/wk     Allergies and Food Sensitives:   NKA     Sleep patterns:   9 hrs/night, sleeps well     Weight History:  Sept 2020: 120-125#  CBW: 94#     Family Support:       Sex: female    Age: 68 y.o.   Height: 60 inches    CBW: 94#    BMI: 18       .  Past Medical History:   Diagnosis Date    Acute kidney failure ([facility] [address] 2014 and 8/2016    x2,no dialysis needed,resolved, kidney function tests returned to normal    Anxiety     Arthritis     CAD (coronary artery disease)     follows with Dr. Navjot Muir every 6 months    Cancer [facility])     skin, leg,nose- removed surgically     [facility]     mild- no inhlaers, no oxygen     Depression     Fibromyalgia     chronic back and neck pain    Generalized headaches     h/o / daily    History of blood transfusion 3-11-14    multiple times    History of necrotic LAPAROTOMY N/A 6/8/2020    LAPAROTOMY EXPLORATORY, Eudelia Mutter OF HERNIA MESH performed by Sampson Pendleton MD at [facility] N/A 9/15/2020    EXPLORATORY LAPAROTOMY WITH SMALL BOWEL RESECTION, TAKE DOWN REVISION ENTEROCUTANEOUS FISTULA , OSTOMY REVISION performed by Sampson Pendleton MD at [address] / leg    NASAL SINUS SURGERY      x2 /  cauterized    OTHER SURGICAL HISTORY  08/24/2016    diagnostic laparotomy with repair of intraabdominal hernia    OTHER SURGICAL HISTORY      removal plate / screws  / right great toe    PICC LINE INSERTION NURSE  9/15/2020         ROTATOR CUFF REPAIR  2010    right     SKIN BIOPSY  2010    3 squamous cell carcinoma right leg, left leg       No family history on file. Medications:  Prior to Admission medications    Medication Sig Start Date End Date Taking? Authorizing Provider   diphenoxylate-atropine (LOMOTIL) 2.5-0.025 MG per tablet Take 2 tablets by mouth every 8 hours as needed for Diarrhea for up to 30 days. 2/27/21 3/29/21  Nannette Neves Masters, APRN - CNP   cholestyramine Keara Punch) 4 GM/DOSE powder Take by mouth 3 times daily (with meals)    Historical Provider, MD   hyoscyamine (ANASPAZ;LEVSIN) 125 MCG tablet Take 125 mcg by mouth 2 times daily as needed for Cramping    Historical Provider, MD   omeprazole (PRILOSEC) 20 MG delayed release capsule Take 40 mg by mouth daily    Historical Provider, MD   aspirin 81 MG tablet Take 81 mg by mouth daily    Historical Provider, MD   oxyCODONE-acetaminophen (PERCOCET)  MG per tablet Take by mouth every 6 hours as needed for Pain.  Instructed to take with sip water am of procedure if needs     Historical Provider, MD   fenofibrate (TRICOR) 145 MG tablet Take 145 mg by mouth daily    Historical Provider, MD   Cholecalciferol (VITAMIN D3) 36038 UNITS CAPS Take 2,000 Units by mouth 2 times daily     Historical Provider, MD   Coenzyme Q10 (COQ10 PO) Take by mouth daily     Historical Provider, MD   Ferrous Sulfate (IRON) 28 MG TABS Take by mouth daily    Historical Provider, MD   calcium carbonate (OSCAL) 500 MG TABS tablet Take 500 mg by mouth daily LD 3/16/2017    Historical Provider, MD   pravastatin (PRAVACHOL) 40 MG tablet Take 40 mg by mouth every evening    Historical Provider, MD   propranolol (INDERAL) 20 MG tablet Take 20 mg by mouth daily Instructed to take morning of surgery with a sip of water     Historical Provider, MD   vitamin B-12 (CYANOCOBALAMIN) 500 MCG tablet Place 500 mcg under the tongue daily     Historical Provider, MD   Omega-3 Fatty Acids (OMEGA 3 PO) Take by mouth daily     Historical Provider, MD   escitalopram (LEXAPRO) 20 MG tablet Take 20 mg by mouth nightly.     Historical Provider, MD   primidone (MYSOLINE) 50 MG tablet Take 250 mg by mouth nightly Taking for essential tremors    Historical Provider, MD   traZODone (DESYREL) 50 MG tablet Take 25 mg by mouth nightly     Historical Provider, MD       Labs:  Sodium 138  132 - 146 mmol/L Final 02/28/2021  8:45 AM [facility]   Potassium reflex Magnesium 3.6  3.5 - 5.0 mmol/L Final 02/28/2021  8:45 AM [facility]   Chloride 110High   98 - 107 mmol/L Final 02/28/2021  8:45 AM [facility]   CO2 20Low   22 - 29 mmol/L Final 02/28/2021  8:45 AM [facility]   Anion Gap 8  7 - 16 mmol/L Final 02/28/2021  8:45 AM [facility]   Glucose 120High   74 - 99 mg/dL Final 02/28/2021  8:45 AM [facility]   BUN 20  8 - 23 mg/dL Final 02/28/2021  8:45 AM [facility]   CREATININE 1.3High   0.5 - 1.0 mg/dL Final 02/28/2021  8:45 AM [facility]     Hemoglobin 9.3Low   11.5 - 15.5 g/dL Final 02/27/2021  9:40 PM [facility]       Past Nutrition Hx:  x2 mon ago:   B: Frosted cheerios w/ strawberries & 1% milk  D: 1/2 ham sandwich     Plan:  In our next session, we will review calorically dense foods, pro rich foods, how to increase kcal intake     Follow-up plan:   May 6th, 2pm     Electronically signed by: Gigi Rosenberg RD, LDN

## 2021-03-18 NOTE — PROGRESS NOTES
Initial Nutrition Assessment Note    Date:3/18/2021  Patient Name: Alivia Santos  Referring Clinician: Dr. Julien Swift     Reason for Visit: Unintended wt loss     Current Typical Eating Pattern:  x3 wks  B: Frosted cheerios w/ strawberries & 1% milk  L: Soup; beef & rice w/ carrots   D: 1/2 shredded pot roast sandwich w/ fries   Snacks: Cheetos, ham slices   Water/day: ~36ZA  G2/day: ~30oz   Crystal lite iced tea: 2 c/wk     Allergies and Food Sensitives:   NKA     Sleep patterns:   9 hrs/night, sleeps well     Weight History:  Sept 2020: 120-125#  CBW: 94#     Family Support:       Sex: female    Age: 68 y.o.   Height: 60 inches    CBW: 94#    BMI: 18       .  Past Medical History:   Diagnosis Date    Acute kidney failure ([facility] 2014 and 8/2016    x2,no dialysis needed,resolved, kidney function tests returned to normal    Anxiety     Arthritis     CAD (coronary artery disease)     follows with Dr. Chuy Lee every 6 months    Cancer [facility])     skin, leg,nose- removed surgically     COPD (chronic obstructive pulmonary disease) ([facility]     mild- no inhlaers, no oxygen     Depression     Fibromyalgia     chronic back and neck pain    Generalized headaches     h/o / daily    History of blood transfusion 3-11-14    multiple times    History of necrotic bowel 2012    Hyperlipidemia     Hypertension     Incisional hernia     abdomen    Insomnia     Mitral valve regurgitation     Myocardial infarct ([facility] 2002    Occasional tremors     at hands / stable with medication    Osteopenia     PONV (postoperative nausea and vomiting)     Vitamin B12 deficiency     h/o       Past Surgical History:   Procedure Laterality Date    ABDOMEN SURGERY  2-    total colectomy, bowel resection, ileostomy,revision of ileostomy     ABDOMEN SURGERY N/A 1/26/2020    DRAINAGE OF ABDOMINAL WALL ABSCESS, EXPLANTATION OF MESH, DEBRIDEMENT OF SKIN AND SUBCUTANEOUS TISSUE performed by Mandie Mir MD at [place] [address] ABDOMEN SURGERY N/A 9/11/2020    EXPLANTATION OF HERNIA MESH performed by Sampson Pendleton MD at [address]  1-2006    right and left thumb   [address], 2004, 2009    lumbar fusion x 3    BACK SURGERY      cervical fusion x 2    BREAST SURGERY  years ago    monor calcifications    CARPAL TUNNEL RELEASE Bilateral     CATARACT REMOVAL WITH IMPLANT Right 03/03/15    CHOLECYSTECTOMY  5-11-07    Lap    COLONOSCOPY      CORONARY ANGIOPLASTY WITH STENT PLACEMENT  1-7-2002    ENDOSCOPY, COLON, DIAGNOSTIC      EPIGASTRIC HERNIA REPAIR  3/21/16    incisional with mesh implantation    ESOPHAGUS SURGERY  1-4-13    esophageal clamp (torn Esophagus)    FINGER TRIGGER RELEASE Right 2006    index finger    FOOT SURGERY Right     multiple    HERNIA REPAIR  12-31-13    abdominal x 5- last one 2017     ILEOSTOMY OR JEJUNOSTOMY  1-3-13    revision    INSERT PICC LINE  06/09/2020    and removed     JOINT REPLACEMENT Right 3/24/15    right total shoulder arthroplasty    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     LAPAROTOMY N/A 5/12/2020    EXPLORATORY LAPAROTOMY EXPLANTATION OF INFECTED MESH SMAL BOWEL RESECTION AND REVISION END ILEOSTOMY, LYSIS OF ADHESIONS performed by Sampson Pendleton MD at [place] [address] N/A 6/8/2020    LAPAROTOMY EXPLORATORY, Eudelia Mutter OF HERNIA MESH performed by Sampson Pendleton MD at [place] [address] N/A 9/15/2020    EXPLORATORY LAPAROTOMY WITH SMALL BOWEL RESECTION, TAKE DOWN REVISION ENTEROCUTANEOUS FISTULA , OSTOMY REVISION performed by Sampson Pendleton MD at [facility] / leg    NASAL SINUS SURGERY      x2 /  cauterized    OTHER SURGICAL HISTORY  08/24/2016    diagnostic laparotomy with repair of intraabdominal hernia    OTHER SURGICAL HISTORY      removal plate / screws  / right great toe    PICC LINE INSERTION NURSE  9/15/2020         ROTATOR CUFF REPAIR  2010    right     SKIN BIOPSY 2010    3 squamous cell carcinoma right leg, left leg       No family history on file. Medications:  Prior to Admission medications    Medication Sig Start Date End Date Taking? Authorizing Provider   diphenoxylate-atropine (LOMOTIL) 2.5-0.025 MG per tablet Take 2 tablets by mouth every 8 hours as needed for Diarrhea for up to 30 days. 2/27/21 3/29/21  Jam Rene Masters, APRN - CNP   cholestyramine Ulysess Abed) 4 GM/DOSE powder Take by mouth 3 times daily (with meals)    Historical Provider, MD   hyoscyamine (ANASPAZ;LEVSIN) 125 MCG tablet Take 125 mcg by mouth 2 times daily as needed for Cramping    Historical Provider, MD   omeprazole (PRILOSEC) 20 MG delayed release capsule Take 40 mg by mouth daily    Historical Provider, MD   aspirin 81 MG tablet Take 81 mg by mouth daily    Historical Provider, MD   oxyCODONE-acetaminophen (PERCOCET)  MG per tablet Take by mouth every 6 hours as needed for Pain.  Instructed to take with sip water am of procedure if needs     Historical Provider, MD   fenofibrate (TRICOR) 145 MG tablet Take 145 mg by mouth daily    Historical Provider, MD   Cholecalciferol (VITAMIN D3) 70830 UNITS CAPS Take 2,000 Units by mouth 2 times daily     Historical Provider, MD   Coenzyme Q10 (COQ10 PO) Take by mouth daily     Historical Provider, MD   Ferrous Sulfate (IRON) 28 MG TABS Take by mouth daily    Historical Provider, MD   calcium carbonate (OSCAL) 500 MG TABS tablet Take 500 mg by mouth daily LD 3/16/2017    Historical Provider, MD   pravastatin (PRAVACHOL) 40 MG tablet Take 40 mg by mouth every evening    Historical Provider, MD   propranolol (INDERAL) 20 MG tablet Take 20 mg by mouth daily Instructed to take morning of surgery with a sip of water     Historical Provider, MD   vitamin B-12 (CYANOCOBALAMIN) 500 MCG tablet Place 500 mcg under the tongue daily     Historical Provider, MD   Omega-3 Fatty Acids (OMEGA 3 PO) Take by mouth daily     Historical Provider, MD   escitalopram (LEXAPRO) 20 MG tablet Take 20 mg by mouth nightly. Historical Provider, MD   primidone (MYSOLINE) 50 MG tablet Take 250 mg by mouth nightly Taking for essential tremors    Historical Provider, MD   traZODone (DESYREL) 50 MG tablet Take 25 mg by mouth nightly     Historical Provider, MD       Labs:  Sodium 138  132 - 146 mmol/L Final 02/28/2021  8:45 AM [facility]   Potassium reflex Magnesium 3.6  3.5 - 5.0 mmol/L Final 02/28/2021  8:45 AM [facility]   Chloride 110High   98 - 107 mmol/L Final 02/28/2021  8:45 AM [facility]   CO2 20Low   22 - 29 mmol/L Final 02/28/2021  8:45 AM [facility]   Anion Gap 8  7 - 16 mmol/L Final 02/28/2021  8:45 AM [facility]   Glucose 120High   74 - 99 mg/dL Final 02/28/2021  8:45 AM [facility]   BUN 20  8 - 23 mg/dL Final 02/28/2021  8:45 AM [facility]   CREATININE 1.3High   0.5 - 1.0 mg/dL Final 02/28/2021  8:45 AM [facility]     Hemoglobin 9.3Low   11.5 - 15.5 g/dL Final 02/27/2021  9:40 PM [facility]       Past Nutrition Hx:  x2 mon ago:   B: Frosted cheerios w/ strawberries & 1% milk  D: 1/2 ham sandwich     Plan:  In our next session, we will review calorically dense foods, pro rich foods, how to increase kcal intake     Follow-up plan:   May 6th, 2pm     Electronically signed by: Christopher Javier, RD, LDN

## 2021-05-03 DIAGNOSIS — R79.89 ELEVATED SERUM CREATININE: ICD-10-CM

## 2021-05-03 RX ORDER — SODIUM CHLORIDE 9 MG/ML
INJECTION, SOLUTION INTRAVENOUS ONCE
Status: CANCELLED
Start: 2021-05-04 | End: 2021-05-04

## 2021-05-03 RX ORDER — SODIUM CHLORIDE 0.9 % (FLUSH) 0.9 %
5-40 SYRINGE (ML) INJECTION PRN
Status: CANCELLED
Start: 2021-05-04

## 2021-05-04 ENCOUNTER — HOSPITAL ENCOUNTER (OUTPATIENT)
Dept: INFUSION THERAPY | Age: 81
Setting detail: INFUSION SERIES
Discharge: HOME | End: 2021-05-04
Payer: MEDICARE

## 2021-05-04 VITALS
BODY MASS INDEX: 17.28 KG/M2 | HEART RATE: 59 BPM | DIASTOLIC BLOOD PRESSURE: 60 MMHG | HEIGHT: 60 IN | OXYGEN SATURATION: 97 % | TEMPERATURE: 98.2 F | SYSTOLIC BLOOD PRESSURE: 131 MMHG | WEIGHT: 88 LBS | RESPIRATION RATE: 18 BRPM

## 2021-05-04 DIAGNOSIS — R79.89 ELEVATED SERUM CREATININE: Primary | ICD-10-CM

## 2021-05-04 PROCEDURE — 96360 HYDRATION IV INFUSION INIT: CPT

## 2021-05-04 PROCEDURE — 2580000003 HC RX 258: Performed by: INTERNAL MEDICINE

## 2021-05-04 RX ORDER — SODIUM CHLORIDE 0.9 % (FLUSH) 0.9 %
5-40 SYRINGE (ML) INJECTION PRN
Status: DISCONTINUED | OUTPATIENT
Start: 2021-05-04 | End: 2021-05-04 | Stop reason: ALTCHOICE

## 2021-05-04 RX ORDER — SODIUM CHLORIDE 0.9 % (FLUSH) 0.9 %
5-40 SYRINGE (ML) INJECTION PRN
Status: CANCELLED
Start: 2021-05-04

## 2021-05-04 RX ORDER — SODIUM CHLORIDE 9 MG/ML
INJECTION, SOLUTION INTRAVENOUS ONCE
Status: CANCELLED
Start: 2021-05-04 | End: 2021-05-04

## 2021-05-04 RX ORDER — SODIUM CHLORIDE 9 MG/ML
INJECTION, SOLUTION INTRAVENOUS ONCE
Status: COMPLETED | OUTPATIENT
Start: 2021-05-04 | End: 2021-05-04

## 2021-05-04 RX ADMIN — SODIUM CHLORIDE, PRESERVATIVE FREE 10 ML: 5 INJECTION INTRAVENOUS at 08:45

## 2021-05-04 RX ADMIN — SODIUM CHLORIDE: 9 INJECTION, SOLUTION INTRAVENOUS at 08:45

## 2021-05-04 ASSESSMENT — PAIN DESCRIPTION - DESCRIPTORS: DESCRIPTORS: ACHING

## 2021-05-04 ASSESSMENT — PAIN DESCRIPTION - LOCATION: LOCATION: BACK

## 2021-05-04 ASSESSMENT — PAIN SCALES - GENERAL: PAINLEVEL_OUTOF10: 10

## 2021-05-04 NOTE — PROGRESS NOTES
Patient here for hydration therapy. Tolerated hydration well . Reviewed AVS with patient, reviewed medication information, verbalizes good knowledge of current plan, and has no signs or symptoms to report at this time.  Declines copy of AVS.

## 2021-05-06 ENCOUNTER — CLINICAL DOCUMENTATION (OUTPATIENT)
Dept: NUTRITION | Age: 81
End: 2021-05-06

## 2021-05-06 NOTE — LETTER
[address]  [address] [place] [address] [place] Chairez Nathen  Phone: [phone]  Fax: [phone]    May 6, 2021    Dr. Eddie Raemy   Fax: [phone]    Patient: Leonard Martínez   YOB: 1940   Date of Visit: 5/6/2021       Dear Dr. Eddie Ramey,     Thank you for referring Leonard Martínez to me for nutrition counseling. Attached is my note. If you have questions, please do not hesitate to call me. I look forward to following this patient along with you. Sincerely,    Electronically signed by Helder Jade RD, PADILLA                                                          Nutrition Follow Up Note    Date: 5/6/2021  Patient: Leonard aMrtínez  Referring Clinician: Dr. Eddie Ramey  Patient's Last Session: 3/18/2021  Reason for Visit:   Unintended weight loss     Current eating pattern:   B: Frosted cheerios w/ strawberries & 1% milk  L: Beef & rice soup w/ carrots & 2 potato cubes  D: Ground beef w/ red sauce & elbow macaroni, onions & peppers   Snacks: Marshmallows w/ pb, toast w/ butter, 1/3 granny smith apple, 6 potato chips   Water/d: ~16  G2/day: 32oz     Last visit weight: 94#  CBW: 88.5#  Height: 60\"    Handouts given:   Pro rich foods, calorically dense foods   Tips for increasing elidia/pro     Notes:   Pt stated every night her ostomy bag blows up/leaks & has issues w/ it daily     Updated plan: In our next session, we will review K+ sources of foods. Pt is concerned about K+ intake d/t CKD.     Follow up plan:   July 14th, 2pm     Electronically signed by: Helder Jade RD, LDN

## 2021-05-06 NOTE — PROGRESS NOTES
Nutrition Follow Up Note    Date: 5/6/2021  Patient: Jamaal Mora  Referring Clinician: Dr. Jerry Walker  Patient's Last Session: 3/18/2021  Reason for Visit:   Unintended weight loss     Current eating pattern:   B: Frosted cheerios w/ strawberries & 1% milk  L: Beef & rice soup w/ carrots & 2 potato cubes  D: Ground beef w/ red sauce & elbow macaroni, onions & peppers   Snacks: Marshmallows w/ pb, toast w/ butter, 1/3 granny smith apple, 6 potato chips   Water/d: ~16  G2/day: 32oz     Last visit weight: 94#  CBW: 88.5#  Height: 60\"    Handouts given:   Pro rich foods, calorically dense foods   Tips for increasing elidia/pro     Notes:   Pt stated every night her ostomy bag blows up/leaks & has issues w/ it daily     Updated plan: In our next session, we will review K+ sources of foods. Pt is concerned about K+ intake d/t CKD.     Follow up plan:   July 14th, 2pm     Electronically signed by: Ilda Grey RD, LDN

## 2021-06-14 DIAGNOSIS — R79.89 ABNORMAL SERUM CREATININE LEVEL: ICD-10-CM

## 2021-06-14 RX ORDER — 0.9 % SODIUM CHLORIDE 0.9 %
1000 INTRAVENOUS SOLUTION INTRAVENOUS ONCE
Status: CANCELLED | OUTPATIENT
Start: 2021-06-14 | End: 2021-06-14

## 2021-06-14 RX ORDER — SODIUM CHLORIDE 0.9 % (FLUSH) 0.9 %
10 SYRINGE (ML) INJECTION PRN
Status: CANCELLED | OUTPATIENT
Start: 2021-06-14

## 2021-06-16 ENCOUNTER — HOSPITAL ENCOUNTER (OUTPATIENT)
Dept: INFUSION THERAPY | Age: 81
Setting detail: INFUSION SERIES
Discharge: HOME | End: 2021-06-16
Payer: MEDICARE

## 2021-06-16 VITALS
HEIGHT: 60 IN | HEART RATE: 54 BPM | DIASTOLIC BLOOD PRESSURE: 56 MMHG | WEIGHT: 84 LBS | SYSTOLIC BLOOD PRESSURE: 118 MMHG | OXYGEN SATURATION: 97 % | TEMPERATURE: 97.9 F | RESPIRATION RATE: 16 BRPM | BODY MASS INDEX: 16.49 KG/M2

## 2021-06-16 DIAGNOSIS — R79.89 ABNORMAL SERUM CREATININE LEVEL: Primary | ICD-10-CM

## 2021-06-16 PROCEDURE — 96360 HYDRATION IV INFUSION INIT: CPT

## 2021-06-16 PROCEDURE — 2580000003 HC RX 258: Performed by: INTERNAL MEDICINE

## 2021-06-16 PROCEDURE — 96366 THER/PROPH/DIAG IV INF ADDON: CPT

## 2021-06-16 PROCEDURE — 96361 HYDRATE IV INFUSION ADD-ON: CPT

## 2021-06-16 RX ORDER — 0.9 % SODIUM CHLORIDE 0.9 %
1000 INTRAVENOUS SOLUTION INTRAVENOUS ONCE
Status: COMPLETED | OUTPATIENT
Start: 2021-06-16 | End: 2021-06-16

## 2021-06-16 RX ORDER — 0.9 % SODIUM CHLORIDE 0.9 %
1000 INTRAVENOUS SOLUTION INTRAVENOUS ONCE
Status: CANCELLED | OUTPATIENT
Start: 2021-06-16 | End: 2021-06-16

## 2021-06-16 RX ORDER — SODIUM CHLORIDE 0.9 % (FLUSH) 0.9 %
10 SYRINGE (ML) INJECTION PRN
Status: DISCONTINUED | OUTPATIENT
Start: 2021-06-16 | End: 2021-06-16 | Stop reason: ALTCHOICE

## 2021-06-16 RX ORDER — SODIUM CHLORIDE 0.9 % (FLUSH) 0.9 %
10 SYRINGE (ML) INJECTION PRN
Status: CANCELLED | OUTPATIENT
Start: 2021-06-16

## 2021-06-16 RX ADMIN — SODIUM CHLORIDE 1000 ML: 9 INJECTION, SOLUTION INTRAVENOUS at 11:57

## 2021-06-16 RX ADMIN — SODIUM CHLORIDE, PRESERVATIVE FREE 10 ML: 5 INJECTION INTRAVENOUS at 11:51

## 2021-06-16 ASSESSMENT — PAIN DESCRIPTION - DESCRIPTORS: DESCRIPTORS: ACHING

## 2021-06-16 ASSESSMENT — PAIN SCALES - GENERAL: PAINLEVEL_OUTOF10: 3

## 2021-06-16 ASSESSMENT — PAIN DESCRIPTION - LOCATION: LOCATION: OTHER (COMMENT)

## 2021-06-16 ASSESSMENT — PAIN DESCRIPTION - PAIN TYPE: TYPE: CHRONIC PAIN

## 2021-06-16 NOTE — PROGRESS NOTES
Patient here for hydration therapy. Tolerated hydration well. After infusion 1000 cc over  2 hours . Reviewed AVS with patient, reviewed medication information, verbalizes good knowledge of current plan, and has no signs or symptoms to report at this time. declinescopy of AVS.  Next appointment scheduled.

## 2021-06-28 RX ORDER — 0.9 % SODIUM CHLORIDE 0.9 %
2000 INTRAVENOUS SOLUTION INTRAVENOUS ONCE
Status: CANCELLED | OUTPATIENT
Start: 2021-06-28 | End: 2021-06-28

## 2021-06-28 RX ORDER — SODIUM CHLORIDE 0.9 % (FLUSH) 0.9 %
5-40 SYRINGE (ML) INJECTION PRN
Status: CANCELLED | OUTPATIENT
Start: 2021-06-28

## 2021-06-28 RX ORDER — SODIUM CHLORIDE 9 MG/ML
25 INJECTION, SOLUTION INTRAVENOUS PRN
Status: CANCELLED | OUTPATIENT
Start: 2021-06-28

## 2021-07-02 ENCOUNTER — HOSPITAL ENCOUNTER (OUTPATIENT)
Dept: INFUSION THERAPY | Age: 81
Setting detail: INFUSION SERIES
Discharge: HOME | End: 2021-07-02
Payer: MEDICARE

## 2021-07-02 VITALS
WEIGHT: 84 LBS | HEIGHT: 60 IN | BODY MASS INDEX: 16.49 KG/M2 | RESPIRATION RATE: 16 BRPM | OXYGEN SATURATION: 95 % | HEART RATE: 64 BPM | TEMPERATURE: 97.7 F | SYSTOLIC BLOOD PRESSURE: 134 MMHG | DIASTOLIC BLOOD PRESSURE: 56 MMHG

## 2021-07-02 DIAGNOSIS — R79.89 ELEVATED SERUM CREATININE: Primary | ICD-10-CM

## 2021-07-02 PROCEDURE — 96360 HYDRATION IV INFUSION INIT: CPT

## 2021-07-02 PROCEDURE — 2580000003 HC RX 258: Performed by: INTERNAL MEDICINE

## 2021-07-02 PROCEDURE — 96361 HYDRATE IV INFUSION ADD-ON: CPT

## 2021-07-02 RX ORDER — SODIUM CHLORIDE 0.9 % (FLUSH) 0.9 %
5-40 SYRINGE (ML) INJECTION PRN
Status: DISCONTINUED | OUTPATIENT
Start: 2021-07-02 | End: 2021-07-02 | Stop reason: ALTCHOICE

## 2021-07-02 RX ORDER — SODIUM CHLORIDE 0.9 % (FLUSH) 0.9 %
5-40 SYRINGE (ML) INJECTION PRN
Status: CANCELLED | OUTPATIENT
Start: 2021-07-02

## 2021-07-02 RX ORDER — 0.9 % SODIUM CHLORIDE 0.9 %
2000 INTRAVENOUS SOLUTION INTRAVENOUS ONCE
Status: COMPLETED | OUTPATIENT
Start: 2021-07-02 | End: 2021-07-02

## 2021-07-02 RX ORDER — 0.9 % SODIUM CHLORIDE 0.9 %
2000 INTRAVENOUS SOLUTION INTRAVENOUS ONCE
Status: CANCELLED | OUTPATIENT
Start: 2021-07-02 | End: 2021-07-02

## 2021-07-02 RX ORDER — SODIUM CHLORIDE 9 MG/ML
25 INJECTION, SOLUTION INTRAVENOUS PRN
Status: CANCELLED | OUTPATIENT
Start: 2021-07-02

## 2021-07-02 RX ADMIN — SODIUM CHLORIDE, PRESERVATIVE FREE 10 ML: 5 INJECTION INTRAVENOUS at 10:48

## 2021-07-02 RX ADMIN — SODIUM CHLORIDE, PRESERVATIVE FREE 10 ML: 5 INJECTION INTRAVENOUS at 12:42

## 2021-07-02 RX ADMIN — SODIUM CHLORIDE 2000 ML: 9 INJECTION, SOLUTION INTRAVENOUS at 10:48

## 2021-07-02 NOTE — PROGRESS NOTES
Patient here for hydration therapy. Tolerated hydration well. Reviewed AVS with patient, reviewed medication information, verbalizes good knowledge of current plan, and has no signs or symptoms to report at this time.  Given copy of AVS.

## 2021-07-14 ENCOUNTER — CLINICAL DOCUMENTATION (OUTPATIENT)
Dept: NUTRITION | Age: 81
End: 2021-07-14

## 2021-07-14 RX ORDER — 0.9 % SODIUM CHLORIDE 0.9 %
2000 INTRAVENOUS SOLUTION INTRAVENOUS ONCE
Status: CANCELLED | OUTPATIENT
Start: 2021-07-14 | End: 2021-07-14

## 2021-07-14 RX ORDER — SODIUM CHLORIDE 0.9 % (FLUSH) 0.9 %
5-40 SYRINGE (ML) INJECTION PRN
Status: CANCELLED | OUTPATIENT
Start: 2021-07-14

## 2021-07-19 ENCOUNTER — HOSPITAL ENCOUNTER (OUTPATIENT)
Dept: INFUSION THERAPY | Age: 81
Setting detail: INFUSION SERIES
Discharge: HOME | End: 2021-07-19
Payer: MEDICARE

## 2021-07-19 VITALS
TEMPERATURE: 98.2 F | DIASTOLIC BLOOD PRESSURE: 62 MMHG | SYSTOLIC BLOOD PRESSURE: 128 MMHG | RESPIRATION RATE: 16 BRPM | HEART RATE: 60 BPM

## 2021-07-19 DIAGNOSIS — R79.89 ABNORMAL SERUM CREATININE LEVEL: Primary | ICD-10-CM

## 2021-07-19 PROCEDURE — 2580000003 HC RX 258: Performed by: INTERNAL MEDICINE

## 2021-07-19 PROCEDURE — 96361 HYDRATE IV INFUSION ADD-ON: CPT

## 2021-07-19 PROCEDURE — 96360 HYDRATION IV INFUSION INIT: CPT

## 2021-07-19 RX ORDER — 0.9 % SODIUM CHLORIDE 0.9 %
2000 INTRAVENOUS SOLUTION INTRAVENOUS ONCE
Status: CANCELLED | OUTPATIENT
Start: 2021-07-19 | End: 2021-07-19

## 2021-07-19 RX ORDER — SODIUM CHLORIDE 0.9 % (FLUSH) 0.9 %
5-40 SYRINGE (ML) INJECTION PRN
Status: DISCONTINUED | OUTPATIENT
Start: 2021-07-19 | End: 2021-07-20 | Stop reason: HOSPADM

## 2021-07-19 RX ORDER — 0.9 % SODIUM CHLORIDE 0.9 %
2000 INTRAVENOUS SOLUTION INTRAVENOUS ONCE
Status: COMPLETED | OUTPATIENT
Start: 2021-07-19 | End: 2021-07-19

## 2021-07-19 RX ORDER — SODIUM CHLORIDE 0.9 % (FLUSH) 0.9 %
5-40 SYRINGE (ML) INJECTION PRN
Status: CANCELLED | OUTPATIENT
Start: 2021-07-19

## 2021-07-19 RX ADMIN — SODIUM CHLORIDE 2000 ML: 9 INJECTION, SOLUTION INTRAVENOUS at 10:12

## 2021-07-19 RX ADMIN — SODIUM CHLORIDE, PRESERVATIVE FREE 10 ML: 5 INJECTION INTRAVENOUS at 10:10

## 2021-07-22 RX ORDER — SODIUM CHLORIDE 0.9 % (FLUSH) 0.9 %
5-40 SYRINGE (ML) INJECTION PRN
Status: CANCELLED | OUTPATIENT
Start: 2021-07-22

## 2021-07-22 RX ORDER — 0.9 % SODIUM CHLORIDE 0.9 %
1000 INTRAVENOUS SOLUTION INTRAVENOUS ONCE
Status: CANCELLED | OUTPATIENT
Start: 2021-07-22 | End: 2021-07-22

## 2021-07-26 ENCOUNTER — HOSPITAL ENCOUNTER (OUTPATIENT)
Dept: INFUSION THERAPY | Age: 81
Setting detail: INFUSION SERIES
Discharge: HOME | End: 2021-07-26
Payer: MEDICARE

## 2021-07-26 VITALS
TEMPERATURE: 98.3 F | RESPIRATION RATE: 16 BRPM | DIASTOLIC BLOOD PRESSURE: 46 MMHG | SYSTOLIC BLOOD PRESSURE: 111 MMHG | OXYGEN SATURATION: 99 % | HEART RATE: 52 BPM

## 2021-07-26 DIAGNOSIS — R79.89 ABNORMAL SERUM CREATININE LEVEL: Primary | ICD-10-CM

## 2021-07-26 PROCEDURE — 2580000003 HC RX 258: Performed by: INTERNAL MEDICINE

## 2021-07-26 PROCEDURE — 96360 HYDRATION IV INFUSION INIT: CPT

## 2021-07-26 PROCEDURE — 96361 HYDRATE IV INFUSION ADD-ON: CPT

## 2021-07-26 RX ORDER — SODIUM CHLORIDE 0.9 % (FLUSH) 0.9 %
5-40 SYRINGE (ML) INJECTION PRN
Status: DISCONTINUED | OUTPATIENT
Start: 2021-07-26 | End: 2021-07-27 | Stop reason: HOSPADM

## 2021-07-26 RX ORDER — 0.9 % SODIUM CHLORIDE 0.9 %
1000 INTRAVENOUS SOLUTION INTRAVENOUS ONCE
Status: CANCELLED | OUTPATIENT
Start: 2021-07-26 | End: 2021-07-26

## 2021-07-26 RX ORDER — 0.9 % SODIUM CHLORIDE 0.9 %
1000 INTRAVENOUS SOLUTION INTRAVENOUS ONCE
Status: COMPLETED | OUTPATIENT
Start: 2021-07-26 | End: 2021-07-26

## 2021-07-26 RX ORDER — SODIUM CHLORIDE 0.9 % (FLUSH) 0.9 %
5-40 SYRINGE (ML) INJECTION PRN
Status: CANCELLED | OUTPATIENT
Start: 2021-07-26

## 2021-07-26 RX ADMIN — SODIUM CHLORIDE 1000 ML: 9 INJECTION, SOLUTION INTRAVENOUS at 12:54

## 2021-07-26 RX ADMIN — SODIUM CHLORIDE, PRESERVATIVE FREE 10 ML: 5 INJECTION INTRAVENOUS at 12:50

## 2021-09-10 ENCOUNTER — HOSPITAL ENCOUNTER (OUTPATIENT)
Dept: INFUSION THERAPY | Age: 81
Setting detail: INFUSION SERIES
Discharge: HOME | End: 2021-09-10
Payer: MEDICARE

## 2021-09-10 VITALS
DIASTOLIC BLOOD PRESSURE: 73 MMHG | TEMPERATURE: 99 F | RESPIRATION RATE: 16 BRPM | SYSTOLIC BLOOD PRESSURE: 146 MMHG | HEART RATE: 58 BPM

## 2021-09-10 LAB
ALBUMIN SERPL-MCNC: 3.9 G/DL (ref 3.5–5.2)
ALP BLD-CCNC: 115 U/L (ref 35–104)
ALT SERPL-CCNC: 16 U/L (ref 0–32)
ANION GAP SERPL CALCULATED.3IONS-SCNC: 6 MMOL/L (ref 7–16)
AST SERPL-CCNC: 26 U/L (ref 0–31)
BILIRUB SERPL-MCNC: 0.3 MG/DL (ref 0–1.2)
BUN BLDV-MCNC: 29 MG/DL (ref 6–23)
CALCIUM SERPL-MCNC: 8.4 MG/DL (ref 8.6–10.2)
CHLORIDE BLD-SCNC: 107 MMOL/L (ref 98–107)
CO2: 28 MMOL/L (ref 22–29)
CREAT SERPL-MCNC: 1.8 MG/DL (ref 0.5–1)
GFR AFRICAN AMERICAN: 33
GFR NON-AFRICAN AMERICAN: 27 ML/MIN/1.73
GLUCOSE BLD-MCNC: 102 MG/DL (ref 74–99)
HCT VFR BLD CALC: 32 % (ref 34–48)
HEMOGLOBIN: 10.2 G/DL (ref 11.5–15.5)
MAGNESIUM: 1.7 MG/DL (ref 1.6–2.6)
MCH RBC QN AUTO: 32.1 PG (ref 26–35)
MCHC RBC AUTO-ENTMCNC: 31.9 % (ref 32–34.5)
MCV RBC AUTO: 100.6 FL (ref 80–99.9)
PDW BLD-RTO: 14.4 FL (ref 11.5–15)
PHOSPHORUS: 4.4 MG/DL (ref 2.5–4.5)
PLATELET # BLD: 183 E9/L (ref 130–450)
PMV BLD AUTO: 13.5 FL (ref 7–12)
POTASSIUM SERPL-SCNC: 4.5 MMOL/L (ref 3.5–5)
RBC # BLD: 3.18 E12/L (ref 3.5–5.5)
SODIUM BLD-SCNC: 141 MMOL/L (ref 132–146)
TOTAL PROTEIN: 6.3 G/DL (ref 6.4–8.3)
WBC # BLD: 5.8 E9/L (ref 4.5–11.5)

## 2021-09-10 PROCEDURE — 83735 ASSAY OF MAGNESIUM: CPT

## 2021-09-10 PROCEDURE — 36592 COLLECT BLOOD FROM PICC: CPT

## 2021-09-10 PROCEDURE — 36415 COLL VENOUS BLD VENIPUNCTURE: CPT

## 2021-09-10 PROCEDURE — 84100 ASSAY OF PHOSPHORUS: CPT

## 2021-09-10 PROCEDURE — 2580000003 HC RX 258

## 2021-09-10 PROCEDURE — 6360000002 HC RX W HCPCS

## 2021-09-10 PROCEDURE — 80053 COMPREHEN METABOLIC PANEL: CPT

## 2021-09-10 PROCEDURE — 36591 DRAW BLOOD OFF VENOUS DEVICE: CPT

## 2021-09-10 PROCEDURE — 85027 COMPLETE CBC AUTOMATED: CPT

## 2021-09-10 RX ORDER — HEPARIN SODIUM (PORCINE) LOCK FLUSH IV SOLN 100 UNIT/ML 100 UNIT/ML
SOLUTION INTRAVENOUS
Status: COMPLETED
Start: 2021-09-10 | End: 2021-09-10

## 2021-09-10 RX ORDER — SODIUM CHLORIDE 0.9 % (FLUSH) 0.9 %
SYRINGE (ML) INJECTION
Status: COMPLETED
Start: 2021-09-10 | End: 2021-09-10

## 2021-09-10 RX ADMIN — SODIUM CHLORIDE, PRESERVATIVE FREE 10 ML: 5 INJECTION INTRAVENOUS at 14:36

## 2021-09-10 RX ADMIN — HEPARIN 500 UNITS: 100 SYRINGE at 14:36

## 2021-09-14 RX ORDER — SODIUM CHLORIDE 0.9 % (FLUSH) 0.9 %
10 SYRINGE (ML) INJECTION PRN
Status: CANCELLED | OUTPATIENT
Start: 2021-09-14

## 2021-09-14 RX ORDER — HEPARIN SODIUM (PORCINE) LOCK FLUSH IV SOLN 100 UNIT/ML 100 UNIT/ML
500 SOLUTION INTRAVENOUS PRN
Status: CANCELLED | OUTPATIENT
Start: 2021-09-14

## 2021-09-15 ENCOUNTER — HOSPITAL ENCOUNTER (OUTPATIENT)
Dept: INFUSION THERAPY | Age: 81
Setting detail: INFUSION SERIES
Discharge: HOME | End: 2021-09-15
Payer: MEDICARE

## 2021-09-15 VITALS
TEMPERATURE: 99.3 F | RESPIRATION RATE: 20 BRPM | SYSTOLIC BLOOD PRESSURE: 141 MMHG | HEART RATE: 58 BPM | OXYGEN SATURATION: 97 % | DIASTOLIC BLOOD PRESSURE: 57 MMHG

## 2021-09-15 DIAGNOSIS — R79.89 ABNORMAL SERUM CREATININE LEVEL: Primary | ICD-10-CM

## 2021-09-15 LAB
ANION GAP SERPL CALCULATED.3IONS-SCNC: 7 MMOL/L (ref 7–16)
BUN BLDV-MCNC: 29 MG/DL (ref 6–23)
CALCIUM SERPL-MCNC: 8.5 MG/DL (ref 8.6–10.2)
CHLORIDE BLD-SCNC: 109 MMOL/L (ref 98–107)
CO2: 28 MMOL/L (ref 22–29)
CREAT SERPL-MCNC: 1.4 MG/DL (ref 0.5–1)
GFR AFRICAN AMERICAN: 44
GFR NON-AFRICAN AMERICAN: 36 ML/MIN/1.73
GLUCOSE BLD-MCNC: 124 MG/DL (ref 74–99)
POTASSIUM SERPL-SCNC: 4 MMOL/L (ref 3.5–5)
SODIUM BLD-SCNC: 144 MMOL/L (ref 132–146)

## 2021-09-15 PROCEDURE — 36591 DRAW BLOOD OFF VENOUS DEVICE: CPT

## 2021-09-15 PROCEDURE — 2580000003 HC RX 258: Performed by: INTERNAL MEDICINE

## 2021-09-15 PROCEDURE — 80048 BASIC METABOLIC PNL TOTAL CA: CPT

## 2021-09-15 PROCEDURE — 36415 COLL VENOUS BLD VENIPUNCTURE: CPT

## 2021-09-15 PROCEDURE — 6360000002 HC RX W HCPCS

## 2021-09-15 PROCEDURE — 2580000003 HC RX 258

## 2021-09-15 RX ORDER — SODIUM CHLORIDE 0.9 % (FLUSH) 0.9 %
5-40 SYRINGE (ML) INJECTION PRN
Status: DISCONTINUED | OUTPATIENT
Start: 2021-09-15 | End: 2021-09-16 | Stop reason: HOSPADM

## 2021-09-15 RX ORDER — HEPARIN SODIUM (PORCINE) LOCK FLUSH IV SOLN 100 UNIT/ML 100 UNIT/ML
500 SOLUTION INTRAVENOUS PRN
Status: CANCELLED | OUTPATIENT
Start: 2021-09-15

## 2021-09-15 RX ORDER — SODIUM CHLORIDE 0.9 % (FLUSH) 0.9 %
5-40 SYRINGE (ML) INJECTION PRN
Status: CANCELLED | OUTPATIENT
Start: 2021-09-15

## 2021-09-15 RX ORDER — HEPARIN SODIUM (PORCINE) LOCK FLUSH IV SOLN 100 UNIT/ML 100 UNIT/ML
500 SOLUTION INTRAVENOUS PRN
Status: DISCONTINUED | OUTPATIENT
Start: 2021-09-15 | End: 2021-09-16 | Stop reason: HOSPADM

## 2021-09-15 RX ORDER — SODIUM CHLORIDE 0.9 % (FLUSH) 0.9 %
SYRINGE (ML) INJECTION
Status: COMPLETED
Start: 2021-09-15 | End: 2021-09-15

## 2021-09-15 RX ORDER — HEPARIN SODIUM (PORCINE) LOCK FLUSH IV SOLN 100 UNIT/ML 100 UNIT/ML
SOLUTION INTRAVENOUS
Status: COMPLETED
Start: 2021-09-15 | End: 2021-09-15

## 2021-09-15 RX ADMIN — SODIUM CHLORIDE, PRESERVATIVE FREE 10 ML: 5 INJECTION INTRAVENOUS at 15:27

## 2021-09-15 RX ADMIN — HEPARIN 500 UNITS: 100 SYRINGE at 15:27

## 2021-09-15 RX ADMIN — SODIUM CHLORIDE, PRESERVATIVE FREE 10 ML: 5 INJECTION INTRAVENOUS at 15:28

## 2021-09-15 RX ADMIN — HEPARIN SODIUM (PORCINE) LOCK FLUSH IV SOLN 100 UNIT/ML 500 UNITS: 100 SOLUTION at 15:27

## 2021-11-04 PROBLEM — E78.5 HYPERLIPEMIA: Status: ACTIVE | Noted: 2021-11-04

## 2021-11-05 ENCOUNTER — HOSPITAL ENCOUNTER (OUTPATIENT)
Dept: INFUSION THERAPY | Age: 81
Setting detail: INFUSION SERIES
Discharge: HOME | End: 2021-11-05
Payer: MEDICARE

## 2021-11-05 VITALS
RESPIRATION RATE: 20 BRPM | DIASTOLIC BLOOD PRESSURE: 61 MMHG | SYSTOLIC BLOOD PRESSURE: 137 MMHG | TEMPERATURE: 98.8 F | HEART RATE: 59 BPM

## 2021-11-05 DIAGNOSIS — E78.5 HYPERLIPIDEMIA, UNSPECIFIED HYPERLIPIDEMIA TYPE: Primary | ICD-10-CM

## 2021-11-05 DIAGNOSIS — R79.89 ABNORMAL SERUM CREATININE LEVEL: ICD-10-CM

## 2021-11-05 LAB
ALBUMIN SERPL-MCNC: 3.9 G/DL (ref 3.5–5.2)
ALP BLD-CCNC: 125 U/L (ref 35–104)
ALT SERPL-CCNC: 16 U/L (ref 0–32)
ANION GAP SERPL CALCULATED.3IONS-SCNC: 17 MMOL/L (ref 7–16)
AST SERPL-CCNC: 26 U/L (ref 0–31)
BASOPHILS ABSOLUTE: 0.05 E9/L (ref 0–0.2)
BASOPHILS RELATIVE PERCENT: 0.9 % (ref 0–2)
BILIRUB SERPL-MCNC: 0.2 MG/DL (ref 0–1.2)
BUN BLDV-MCNC: 34 MG/DL (ref 6–23)
CALCIUM SERPL-MCNC: 8.6 MG/DL (ref 8.6–10.2)
CHLORIDE BLD-SCNC: 106 MMOL/L (ref 98–107)
CHOLESTEROL, TOTAL: 132 MG/DL (ref 0–199)
CO2: 20 MMOL/L (ref 22–29)
CREAT SERPL-MCNC: 1.4 MG/DL (ref 0.5–1)
EOSINOPHILS ABSOLUTE: 0.21 E9/L (ref 0.05–0.5)
EOSINOPHILS RELATIVE PERCENT: 3.8 % (ref 0–6)
GFR AFRICAN AMERICAN: 44
GFR NON-AFRICAN AMERICAN: 36 ML/MIN/1.73
GLUCOSE BLD-MCNC: 114 MG/DL (ref 74–99)
HCT VFR BLD CALC: 35.2 % (ref 34–48)
HDLC SERPL-MCNC: 61 MG/DL
HEMOGLOBIN: 11.2 G/DL (ref 11.5–15.5)
IMMATURE GRANULOCYTES #: 0 E9/L
IMMATURE GRANULOCYTES %: 0 % (ref 0–5)
LDL CHOLESTEROL CALCULATED: 54 MG/DL (ref 0–99)
LYMPHOCYTES ABSOLUTE: 1.86 E9/L (ref 1.5–4)
LYMPHOCYTES RELATIVE PERCENT: 33.8 % (ref 20–42)
MCH RBC QN AUTO: 32.2 PG (ref 26–35)
MCHC RBC AUTO-ENTMCNC: 31.8 % (ref 32–34.5)
MCV RBC AUTO: 101.1 FL (ref 80–99.9)
MONOCYTES ABSOLUTE: 0.39 E9/L (ref 0.1–0.95)
MONOCYTES RELATIVE PERCENT: 7.1 % (ref 2–12)
NEUTROPHILS ABSOLUTE: 3 E9/L (ref 1.8–7.3)
NEUTROPHILS RELATIVE PERCENT: 54.4 % (ref 43–80)
PDW BLD-RTO: 14.2 FL (ref 11.5–15)
PLATELET # BLD: 190 E9/L (ref 130–450)
PMV BLD AUTO: 13.6 FL (ref 7–12)
POTASSIUM SERPL-SCNC: 4.4 MMOL/L (ref 3.5–5)
RBC # BLD: 3.48 E12/L (ref 3.5–5.5)
SODIUM BLD-SCNC: 143 MMOL/L (ref 132–146)
TOTAL PROTEIN: 6.5 G/DL (ref 6.4–8.3)
TRIGL SERPL-MCNC: 86 MG/DL (ref 0–149)
VLDLC SERPL CALC-MCNC: 17 MG/DL
WBC # BLD: 5.5 E9/L (ref 4.5–11.5)

## 2021-11-05 PROCEDURE — 36591 DRAW BLOOD OFF VENOUS DEVICE: CPT

## 2021-11-05 PROCEDURE — 6360000002 HC RX W HCPCS: Performed by: INTERNAL MEDICINE

## 2021-11-05 PROCEDURE — 96523 IRRIG DRUG DELIVERY DEVICE: CPT

## 2021-11-05 PROCEDURE — 80053 COMPREHEN METABOLIC PANEL: CPT

## 2021-11-05 PROCEDURE — 80061 LIPID PANEL: CPT

## 2021-11-05 PROCEDURE — 85025 COMPLETE CBC W/AUTO DIFF WBC: CPT

## 2021-11-05 PROCEDURE — 2580000003 HC RX 258: Performed by: INTERNAL MEDICINE

## 2021-11-05 PROCEDURE — 36415 COLL VENOUS BLD VENIPUNCTURE: CPT

## 2021-11-05 RX ORDER — HEPARIN SODIUM (PORCINE) LOCK FLUSH IV SOLN 100 UNIT/ML 100 UNIT/ML
500 SOLUTION INTRAVENOUS PRN
Status: CANCELLED | OUTPATIENT
Start: 2021-11-05

## 2021-11-05 RX ORDER — HEPARIN SODIUM (PORCINE) LOCK FLUSH IV SOLN 100 UNIT/ML 100 UNIT/ML
500 SOLUTION INTRAVENOUS PRN
Status: DISCONTINUED | OUTPATIENT
Start: 2021-11-05 | End: 2021-11-06 | Stop reason: HOSPADM

## 2021-11-05 RX ORDER — SODIUM CHLORIDE 0.9 % (FLUSH) 0.9 %
5-40 SYRINGE (ML) INJECTION PRN
Status: CANCELLED | OUTPATIENT
Start: 2021-11-05

## 2021-11-05 RX ORDER — SODIUM CHLORIDE 0.9 % (FLUSH) 0.9 %
5-40 SYRINGE (ML) INJECTION PRN
Status: DISCONTINUED | OUTPATIENT
Start: 2021-11-05 | End: 2021-11-06 | Stop reason: HOSPADM

## 2021-11-05 NOTE — PROGRESS NOTES
Patient refused labs for TSH. She states that her Medicare and secondary insurance will not pay. She states that the cost is 275 dollars. Dr. Kesha Stovall office notified and will send order to hold lab on Monday since Doctor is not there.

## 2021-12-01 ENCOUNTER — HOSPITAL ENCOUNTER (OUTPATIENT)
Dept: INFUSION THERAPY | Age: 81
Setting detail: INFUSION SERIES
Discharge: HOME | End: 2021-12-01
Payer: MEDICARE

## 2021-12-01 VITALS
OXYGEN SATURATION: 98 % | HEART RATE: 57 BPM | RESPIRATION RATE: 20 BRPM | SYSTOLIC BLOOD PRESSURE: 166 MMHG | TEMPERATURE: 98.5 F | DIASTOLIC BLOOD PRESSURE: 76 MMHG

## 2021-12-01 DIAGNOSIS — E78.5 HYPERLIPIDEMIA, UNSPECIFIED HYPERLIPIDEMIA TYPE: Primary | ICD-10-CM

## 2021-12-01 DIAGNOSIS — R79.89 ABNORMAL SERUM CREATININE LEVEL: ICD-10-CM

## 2021-12-01 LAB
ALBUMIN SERPL-MCNC: 4.1 G/DL (ref 3.5–5.2)
ALP BLD-CCNC: 125 U/L (ref 35–104)
ALT SERPL-CCNC: 15 U/L (ref 0–32)
ANION GAP SERPL CALCULATED.3IONS-SCNC: 9 MMOL/L (ref 7–16)
AST SERPL-CCNC: 24 U/L (ref 0–31)
BASOPHILS ABSOLUTE: 0.05 E9/L (ref 0–0.2)
BASOPHILS RELATIVE PERCENT: 1 % (ref 0–2)
BILIRUB SERPL-MCNC: 0.3 MG/DL (ref 0–1.2)
BUN BLDV-MCNC: 31 MG/DL (ref 6–23)
CALCIUM SERPL-MCNC: 8.9 MG/DL (ref 8.6–10.2)
CHLORIDE BLD-SCNC: 106 MMOL/L (ref 98–107)
CO2: 27 MMOL/L (ref 22–29)
CREAT SERPL-MCNC: 1.4 MG/DL (ref 0.5–1)
EOSINOPHILS ABSOLUTE: 0.2 E9/L (ref 0.05–0.5)
EOSINOPHILS RELATIVE PERCENT: 3.9 % (ref 0–6)
GFR AFRICAN AMERICAN: 44
GFR NON-AFRICAN AMERICAN: 36 ML/MIN/1.73
GLUCOSE BLD-MCNC: 136 MG/DL (ref 74–99)
HCT VFR BLD CALC: 34.7 % (ref 34–48)
HEMOGLOBIN: 11.1 G/DL (ref 11.5–15.5)
IMMATURE GRANULOCYTES #: 0.02 E9/L
IMMATURE GRANULOCYTES %: 0.4 % (ref 0–5)
LYMPHOCYTES ABSOLUTE: 1.54 E9/L (ref 1.5–4)
LYMPHOCYTES RELATIVE PERCENT: 30.1 % (ref 20–42)
MCH RBC QN AUTO: 32.6 PG (ref 26–35)
MCHC RBC AUTO-ENTMCNC: 32 % (ref 32–34.5)
MCV RBC AUTO: 102.1 FL (ref 80–99.9)
MONOCYTES ABSOLUTE: 0.35 E9/L (ref 0.1–0.95)
MONOCYTES RELATIVE PERCENT: 6.8 % (ref 2–12)
NEUTROPHILS ABSOLUTE: 2.96 E9/L (ref 1.8–7.3)
NEUTROPHILS RELATIVE PERCENT: 57.8 % (ref 43–80)
PDW BLD-RTO: 14.5 FL (ref 11.5–15)
PLATELET # BLD: 160 E9/L (ref 130–450)
PMV BLD AUTO: 13.3 FL (ref 7–12)
POTASSIUM SERPL-SCNC: 4.4 MMOL/L (ref 3.5–5)
RBC # BLD: 3.4 E12/L (ref 3.5–5.5)
SODIUM BLD-SCNC: 142 MMOL/L (ref 132–146)
TOTAL PROTEIN: 6.2 G/DL (ref 6.4–8.3)
WBC # BLD: 5.1 E9/L (ref 4.5–11.5)

## 2021-12-01 PROCEDURE — 36591 DRAW BLOOD OFF VENOUS DEVICE: CPT

## 2021-12-01 PROCEDURE — 36415 COLL VENOUS BLD VENIPUNCTURE: CPT

## 2021-12-01 PROCEDURE — 80053 COMPREHEN METABOLIC PANEL: CPT

## 2021-12-01 PROCEDURE — 6360000002 HC RX W HCPCS: Performed by: INTERNAL MEDICINE

## 2021-12-01 PROCEDURE — 85025 COMPLETE CBC W/AUTO DIFF WBC: CPT

## 2021-12-01 PROCEDURE — 2580000003 HC RX 258: Performed by: INTERNAL MEDICINE

## 2021-12-01 RX ORDER — HEPARIN SODIUM (PORCINE) LOCK FLUSH IV SOLN 100 UNIT/ML 100 UNIT/ML
500 SOLUTION INTRAVENOUS PRN
Status: DISCONTINUED | OUTPATIENT
Start: 2021-12-01 | End: 2021-12-02 | Stop reason: HOSPADM

## 2021-12-01 RX ORDER — SODIUM CHLORIDE 0.9 % (FLUSH) 0.9 %
5-40 SYRINGE (ML) INJECTION PRN
Status: DISCONTINUED | OUTPATIENT
Start: 2021-12-01 | End: 2021-12-02 | Stop reason: HOSPADM

## 2021-12-01 RX ORDER — SODIUM CHLORIDE 0.9 % (FLUSH) 0.9 %
5-40 SYRINGE (ML) INJECTION PRN
Status: CANCELLED | OUTPATIENT
Start: 2021-12-01

## 2021-12-01 RX ORDER — HEPARIN SODIUM (PORCINE) LOCK FLUSH IV SOLN 100 UNIT/ML 100 UNIT/ML
500 SOLUTION INTRAVENOUS PRN
Status: CANCELLED | OUTPATIENT
Start: 2021-12-01

## 2021-12-01 RX ADMIN — HEPARIN 500 UNITS: 100 SYRINGE at 13:56

## 2021-12-01 RX ADMIN — SODIUM CHLORIDE, PRESERVATIVE FREE 10 ML: 5 INJECTION INTRAVENOUS at 13:57

## 2021-12-01 RX ADMIN — SODIUM CHLORIDE, PRESERVATIVE FREE 10 ML: 5 INJECTION INTRAVENOUS at 13:56

## 2022-01-20 LAB — ANION GAP SERPL CALCULATED.3IONS-SCNC: 20 MMOL/L (ref 7–16)

## 2022-01-22 LAB
ANION GAP SERPL CALCULATED.3IONS-SCNC: 10 MMOL/L (ref 7–16)
BUN BLDV-MCNC: 46 MG/DL (ref 6–23)
CALCIUM SERPL-MCNC: 8.9 MG/DL (ref 8.6–10.2)
CHLORIDE BLD-SCNC: 106 MMOL/L (ref 98–107)
CO2: 25 MMOL/L (ref 22–29)
CREAT SERPL-MCNC: 1.3 MG/DL (ref 0.5–1)
GFR AFRICAN AMERICAN: 48
GFR NON-AFRICAN AMERICAN: 39 ML/MIN/1.73
GLUCOSE BLD-MCNC: 167 MG/DL (ref 74–99)
POTASSIUM SERPL-SCNC: 4.5 MMOL/L (ref 3.5–5)
SODIUM BLD-SCNC: 141 MMOL/L (ref 132–146)

## 2022-01-28 ENCOUNTER — APPOINTMENT (OUTPATIENT)
Dept: ULTRASOUND IMAGING | Age: 82
End: 2022-01-28
Payer: MEDICARE

## 2022-01-28 ENCOUNTER — HOSPITAL ENCOUNTER (EMERGENCY)
Age: 82
Discharge: HOME | End: 2022-01-28
Attending: EMERGENCY MEDICINE
Payer: MEDICARE

## 2022-01-28 ENCOUNTER — APPOINTMENT (OUTPATIENT)
Dept: GENERAL RADIOLOGY | Age: 82
End: 2022-01-28
Payer: MEDICARE

## 2022-01-28 VITALS
WEIGHT: 94 LBS | HEART RATE: 66 BPM | SYSTOLIC BLOOD PRESSURE: 166 MMHG | RESPIRATION RATE: 14 BRPM | BODY MASS INDEX: 18.46 KG/M2 | HEIGHT: 60 IN | TEMPERATURE: 98.1 F | OXYGEN SATURATION: 96 % | DIASTOLIC BLOOD PRESSURE: 77 MMHG

## 2022-01-28 DIAGNOSIS — T85.598A IMPAIRED NASAL GASTRIC FEEDING TUBE, INITIAL ENCOUNTER: Primary | ICD-10-CM

## 2022-01-28 LAB
ALBUMIN SERPL-MCNC: 4 G/DL (ref 3.5–5.2)
ALP BLD-CCNC: 95 U/L (ref 35–104)
ALT SERPL-CCNC: 13 U/L (ref 0–32)
ANION GAP SERPL CALCULATED.3IONS-SCNC: 9 MMOL/L (ref 7–16)
AST SERPL-CCNC: 22 U/L (ref 0–31)
BASOPHILS ABSOLUTE: 0.05 E9/L (ref 0–0.2)
BASOPHILS RELATIVE PERCENT: 0.8 % (ref 0–2)
BILIRUB SERPL-MCNC: 0.2 MG/DL (ref 0–1.2)
BUN BLDV-MCNC: 39 MG/DL (ref 6–23)
CALCIUM SERPL-MCNC: 8.6 MG/DL (ref 8.6–10.2)
CHLORIDE BLD-SCNC: 103 MMOL/L (ref 98–107)
CO2: 27 MMOL/L (ref 22–29)
CREAT SERPL-MCNC: 1.6 MG/DL (ref 0.5–1)
EOSINOPHILS ABSOLUTE: 0.3 E9/L (ref 0.05–0.5)
EOSINOPHILS RELATIVE PERCENT: 4.7 % (ref 0–6)
GFR AFRICAN AMERICAN: 38
GFR NON-AFRICAN AMERICAN: 31 ML/MIN/1.73
GLUCOSE BLD-MCNC: 78 MG/DL (ref 74–99)
HCT VFR BLD CALC: 31 % (ref 34–48)
HEMOGLOBIN: 9.8 G/DL (ref 11.5–15.5)
IMMATURE GRANULOCYTES #: 0.02 E9/L
IMMATURE GRANULOCYTES %: 0.3 % (ref 0–5)
LYMPHOCYTES ABSOLUTE: 1.58 E9/L (ref 1.5–4)
LYMPHOCYTES RELATIVE PERCENT: 25 % (ref 20–42)
MCH RBC QN AUTO: 33.1 PG (ref 26–35)
MCHC RBC AUTO-ENTMCNC: 31.6 % (ref 32–34.5)
MCV RBC AUTO: 104.7 FL (ref 80–99.9)
MONOCYTES ABSOLUTE: 0.42 E9/L (ref 0.1–0.95)
MONOCYTES RELATIVE PERCENT: 6.6 % (ref 2–12)
NEUTROPHILS ABSOLUTE: 3.96 E9/L (ref 1.8–7.3)
NEUTROPHILS RELATIVE PERCENT: 62.6 % (ref 43–80)
PDW BLD-RTO: 15 FL (ref 11.5–15)
PLATELET # BLD: 198 E9/L (ref 130–450)
PMV BLD AUTO: 12.3 FL (ref 7–12)
POTASSIUM REFLEX MAGNESIUM: 4 MMOL/L (ref 3.5–5)
PRO-BNP: 2394 PG/ML (ref 0–450)
RBC # BLD: 2.96 E12/L (ref 3.5–5.5)
SODIUM BLD-SCNC: 139 MMOL/L (ref 132–146)
TOTAL PROTEIN: 6.3 G/DL (ref 6.4–8.3)
WBC # BLD: 6.3 E9/L (ref 4.5–11.5)

## 2022-01-28 PROCEDURE — 83880 ASSAY OF NATRIURETIC PEPTIDE: CPT

## 2022-01-28 PROCEDURE — 6370000000 HC RX 637 (ALT 250 FOR IP): Performed by: STUDENT IN AN ORGANIZED HEALTH CARE EDUCATION/TRAINING PROGRAM

## 2022-01-28 PROCEDURE — 80053 COMPREHEN METABOLIC PANEL: CPT

## 2022-01-28 PROCEDURE — 93970 EXTREMITY STUDY: CPT

## 2022-01-28 PROCEDURE — 99284 EMERGENCY DEPT VISIT MOD MDM: CPT

## 2022-01-28 PROCEDURE — 85025 COMPLETE CBC W/AUTO DIFF WBC: CPT

## 2022-01-28 RX ADMIN — Medication 10 ML: at 18:25

## 2022-01-28 ASSESSMENT — ENCOUNTER SYMPTOMS
EYE PAIN: 0
BACK PAIN: 0
DIARRHEA: 0
WHEEZING: 0
VOMITING: 0
ABDOMINAL DISTENTION: 0
SHORTNESS OF BREATH: 0
SINUS PRESSURE: 0
NAUSEA: 0
SORE THROAT: 0
EYE DISCHARGE: 0
COUGH: 0

## 2022-01-28 ASSESSMENT — PAIN DESCRIPTION - FREQUENCY: FREQUENCY: INTERMITTENT

## 2022-01-28 ASSESSMENT — PAIN DESCRIPTION - LOCATION: LOCATION: ABDOMEN

## 2022-01-28 ASSESSMENT — PAIN DESCRIPTION - PROGRESSION: CLINICAL_PROGRESSION: NOT CHANGED

## 2022-01-28 ASSESSMENT — PAIN DESCRIPTION - PAIN TYPE: TYPE: CHRONIC PAIN

## 2022-01-28 ASSESSMENT — PAIN DESCRIPTION - DESCRIPTORS: DESCRIPTORS: CRAMPING

## 2022-01-28 NOTE — ED NOTES
Bed:  [place] [address]  Expected date:   Expected time:   Means of arrival:   Comments:  TJ-5th     Riri Burton RN  01/28/22 0439

## 2022-01-28 NOTE — ED NOTES
Department of Emergency Medicine  FIRST PROVIDER TRIAGE NOTE             Independent MLP           1/28/22  2:30 PM EST    Date of Encounter: 1/28/22   MRN: [id number]      HPI: Mickie Lucio is a 80 y.o. female who presents to the ED for G Tube Complications (clogged since yesterday, takes in orally also, has had peg for 2 weeks )     Clogged tube feed. ROS: Negative for cp or sob. PE: Gen Appearance/Constitutional: alert     Initial Plan of Care: All treatment areas with department are currently occupied.     Initial Plan of Care: Initiate Treatment-Testing, Proceed toTreatment Area When Bed Available for ED Attending/MLP to Continue Care    Electronically signed by Dean Whitaker PA-C   DD: 1/28/22         Dean Whitaker PA-C  01/28/22 3536

## 2022-01-28 NOTE — ED NOTES
77-year-old female presented emerged department for G-tube complications. 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 Clinic, is mild in severity, nothing made it better, nothing make it worse, sudden onset, have been persistent, no associated symptoms. Review of Systems   Constitutional: Negative for chills and fever. HENT: Positive for congestion (chronic). Negative for ear pain, sinus pressure and sore throat. Eyes: Negative for pain and discharge. Respiratory: Negative for cough, shortness of breath and wheezing. Cardiovascular: Negative for chest pain. Gastrointestinal: Negative for abdominal distention, diarrhea, nausea and vomiting. Genitourinary: Negative for dysuria and frequency. Musculoskeletal: Negative for arthralgias and back pain. Skin: Negative for rash and wound. Neurological: Negative for weakness and headaches. Hematological: Negative for adenopathy. All other systems reviewed and are negative. Physical Exam  Vitals and nursing note reviewed. Constitutional:       Appearance: Normal appearance. HENT:      Head: Normocephalic and atraumatic. Right Ear: External ear normal.      Left Ear: External ear normal.      Nose:      Comments: NG in place on the left nare     Mouth/Throat:      Mouth: Mucous membranes are moist.   Eyes:      Extraocular Movements: Extraocular movements intact. Pupils: Pupils are equal, round, and reactive to light. Cardiovascular:      Rate and Rhythm: Normal rate and regular rhythm. Pulses: Normal pulses. Heart sounds: Normal heart sounds. Pulmonary:      Effort: Pulmonary effort is normal.      Breath sounds: Normal breath sounds. Abdominal:      General: Bowel sounds are normal.      Palpations: Abdomen is soft.       Comments: Several surgical scars present, ostomy present   Musculoskeletal: requested the NG be removed and she would follow-up with her physician in [facility] clinic for possible PEG tube placement, she is able to tolerate p.o., states the feeding tube is in place due to her short gut syndrome, states she is not interested in seeing her surgeon in the area, and wishes to follow-up with appointment clinic doctor, she was discharged home return precautions given. ED Course as of 01/28/22 2217 Fri Jan 28, 2022   2721 Patient does not wish to see Dr. Liseth Dahl if surgery consult required [JG]   [phone] Patient requested I check lab work as they are watching her kidneys with NG tube, says she had leg swelling, on initial evaluation she had no complaints [JG]   4734 DVT study negative [JG]   4783 CKD within patients baseline, will chest CXR to r/o pulmonary edema [JG]   80 80year-old female presented emergency department for G-tube complications, 1 of blood work, as well as she states she is on feedings and wanted to know her kidney function, she was updated on this, had an elevated proBNP, wanted to get a chest x-ray, she refused this as she states the elevated proBNP and fluid retention is from a large amount of sodium in her feeds, initially attempted clog zapper without improvement, patient requested the NG be removed and she would follow-up with her physician in [facility] clinic for possible PEG tube placement, she is able to tolerate p.o., states the feeding tube is in place due to her short gut syndrome, states she is not interested in seeing her surgeon in the area, and wishes to follow-up with appointment clinic doctor, she was discharged home return precautions given.  [JG]      ED Course User Index  [JG] Jaydon Eugene MD       --------------------------------------------- PAST HISTORY ---------------------------------------------  Past Medical History:  has a past medical history of Acute kidney failure ([address] Anxiety, Arthritis, CAD (coronary artery disease), [facility] COPD (chronic obstructive pulmonary disease) ([facility] Depression, Fibromyalgia, Generalized headaches, History of blood transfusion, History of necrotic bowel, Hyperlipidemia, Hypertension, Incisional hernia, Insomnia, Mitral valve regurgitation, Myocardial infarct ([address] Occasional tremors, Osteopenia, PONV (postoperative nausea and vomiting), and Vitamin B12 deficiency. Past Surgical History:  has a past surgical history that includes Coronary angioplasty with stent (1-7-2002); Colonoscopy; Esophagus surgery (1-4-13); Rotator cuff repair (2010); arthroplasty (1-2006); Finger trigger release (Right, 2006); Cataract removal with implant (Right, 03/03/15); Appendectomy (2002); Cholecystectomy (5-11-07); Carpal tunnel release (Bilateral); skin biopsy (2010); Foot surgery (Right); ileostomy or jejunostomy (1-3-13); Endoscopy, colon, diagnostic; epigastric hernia repair (3/21/16); other surgical history (08/24/2016); Nasal sinus surgery; back surgery (1991, 2004, 2009); back surgery; Mohs surgery; other surgical history; Breast surgery (years ago); Knee Arthroplasty (Left, 03/22/2017); laparotomy (N/A, 5/12/2020); laparotomy (N/A, 6/8/2020); Insert Picc Line (06/09/2020); joint replacement (Right, 3/24/15); Abdomen surgery (2-); Abdomen surgery (N/A, 1/26/2020); hernia repair (12-31-13); Abdomen surgery (N/A, 9/11/2020); picc line insertion nurse (9/15/2020); and laparotomy (N/A, 9/15/2020). Social History:  reports that she has been smoking cigarettes. She has been smoking about 1.00 pack per day. She has never used smokeless tobacco. She reports previous alcohol use. She reports that she does not use drugs. Family History: family history is not on file. The patients home medications have been reviewed.     Allergies: Fentanyl, Levofloxacin, Tramadol, and Vioxx [rofecoxib]    -------------------------------------------------- RESULTS -------------------------------------------------  Labs:  Results for orders placed or performed during the hospital encounter of 01/28/22   Comprehensive Metabolic Panel w/ Reflex to MG   Result Value Ref Range    Sodium 139 132 - 146 mmol/L    Potassium reflex Magnesium 4.0 3.5 - 5.0 mmol/L    Chloride 103 98 - 107 mmol/L    CO2 27 22 - 29 mmol/L    Anion Gap 9 7 - 16 mmol/L    Glucose 78 74 - 99 mg/dL    BUN 39 (H) 6 - 23 mg/dL    CREATININE 1.6 (H) 0.5 - 1.0 mg/dL    GFR Non-African American 31 >=60 mL/min/1.73    GFR African American 38     Calcium 8.6 8.6 - 10.2 mg/dL    Total Protein 6.3 (L) 6.4 - 8.3 g/dL    Albumin 4.0 3.5 - 5.2 g/dL    Total Bilirubin 0.2 0.0 - 1.2 mg/dL    Alkaline Phosphatase 95 35 - 104 U/L    ALT 13 0 - 32 U/L    AST 22 0 - 31 U/L   CBC Auto Differential   Result Value Ref Range    WBC 6.3 4.5 - 11.5 E9/L    RBC 2.96 (L) 3.50 - 5.50 E12/L    Hemoglobin 9.8 (L) 11.5 - 15.5 g/dL    Hematocrit 31.0 (L) 34.0 - 48.0 %    .7 (H) 80.0 - 99.9 fL    MCH 33.1 26.0 - 35.0 pg    MCHC 31.6 (L) 32.0 - 34.5 %    RDW 15.0 11.5 - 15.0 fL    Platelets 297 068 - 707 E9/L    MPV 12.3 (H) 7.0 - 12.0 fL    Neutrophils % 62.6 43.0 - 80.0 %    Immature Granulocytes % 0.3 0.0 - 5.0 %    Lymphocytes % 25.0 20.0 - 42.0 %    Monocytes % 6.6 2.0 - 12.0 %    Eosinophils % 4.7 0.0 - 6.0 %    Basophils % 0.8 0.0 - 2.0 %    Neutrophils Absolute 3.96 1.80 - 7.30 E9/L    Immature Granulocytes # 0.02 E9/L    Lymphocytes Absolute 1.58 1.50 - 4.00 E9/L    Monocytes Absolute 0.42 0.10 - 0.95 E9/L    Eosinophils Absolute 0.30 0.05 - 0.50 E9/L    Basophils Absolute 0.05 0.00 - 0.20 E9/L   Brain Natriuretic Peptide   Result Value Ref Range    Pro-BNP 2,394 (H) 0 - 450 pg/mL       Radiology:  US DUP LOWER EXTREMITIES BILATERAL VENOUS   Final Result   No evidence of DVT in either lower extremity.             ------------------------- NURSING NOTES AND VITALS REVIEWED ---------------------------  Date / Time Roomed:  1/28/2022  2:47 PM  ED Bed Assignment:  [id number]    The nursing notes within the ED encounter and vital signs as below have been reviewed. BP (!) 166/77   Pulse 66   Temp 98.1 °F (36.7 °C) (Temporal)   Resp 14   Ht 5' (1.524 m)   Wt 94 lb (42.6 kg)   SpO2 96%   BMI 18.36 kg/m²   Oxygen Saturation Interpretation: Normal      ------------------------------------------ PROGRESS NOTES ------------------------------------------  10:17 PM EST  I have spoken with the patient and discussed todays results, in addition to providing specific details for the plan of care and counseling regarding the diagnosis and prognosis. Their questions are answered at this time and they are agreeable with the plan. I discussed at length with them reasons for immediate return here for re evaluation. They will followup with their general surgeon by calling their office on Monday.      --------------------------------- ADDITIONAL PROVIDER NOTES ---------------------------------  At this time the patient is without objective evidence of an acute process requiring hospitalization or inpatient management. They have remained hemodynamically stable throughout their entire ED visit and are stable for discharge with outpatient follow-up. The plan has been discussed in detail and they are aware of the specific conditions for emergent return, as well as the importance of follow-up. Discharge Medication List as of 1/28/2022  6:57 PM          Diagnosis:  1. Impaired nasal gastric feeding tube, initial encounter        Disposition:  Patient's disposition: Discharge to home  Patient's condition is stable.            Beryle Ditty, MD  Resident  01/28/22 3015

## 2022-01-29 NOTE — ED NOTES
Physician removed NG tube as it was still clogged after using clog zapper. Elza Sanches.  Jamshid Means RN  01/28/22 9551

## 2022-01-29 NOTE — ED PROVIDER NOTES
57-year-old female presented emerged department for G-tube complications. 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 Clinic, is mild in severity, nothing made it better, nothing make it worse, sudden onset, have been persistent, no associated symptoms. Review of Systems   Constitutional: Negative for chills and fever. HENT: Positive for congestion (chronic). Negative for ear pain, sinus pressure and sore throat. Eyes: Negative for pain and discharge. Respiratory: Negative for cough, shortness of breath and wheezing. Cardiovascular: Negative for chest pain. Gastrointestinal: Negative for abdominal distention, diarrhea, nausea and vomiting. Genitourinary: Negative for dysuria and frequency. Musculoskeletal: Negative for arthralgias and back pain. Skin: Negative for rash and wound. Neurological: Negative for weakness and headaches. Hematological: Negative for adenopathy. All other systems reviewed and are negative. Physical Exam  Vitals and nursing note reviewed. Constitutional:       Appearance: Normal appearance. HENT:      Head: Normocephalic and atraumatic. Right Ear: External ear normal.      Left Ear: External ear normal.      Nose:      Comments: NG in place on the left nare     Mouth/Throat:      Mouth: Mucous membranes are moist.   Eyes:      Extraocular Movements: Extraocular movements intact. Pupils: Pupils are equal, round, and reactive to light. Cardiovascular:      Rate and Rhythm: Normal rate and regular rhythm. Pulses: Normal pulses. Heart sounds: Normal heart sounds. Pulmonary:      Effort: Pulmonary effort is normal.      Breath sounds: Normal breath sounds. Abdominal:      General: Bowel sounds are normal.      Palpations: Abdomen is soft.       Comments: Several surgical scars present, ostomy present   Musculoskeletal: General: Normal range of motion. Cervical back: Normal range of motion and neck supple. Skin:     General: Skin is warm and dry. Neurological:      Mental Status: She is alert. Procedures     MDM     ED Course as of 01/28/22 2210   Fri Jan 28, 2022   5090 Patient does not wish to see Dr. Sena Miranda if surgery consult required [JG]   [phone] Patient requested I check lab work as they are watching her kidneys with NG tube, says she had leg swelling, on initial evaluation she had no complaints [JG]   4849 DVT study negative [JG]   1747 CKD within patients baseline, will chest CXR to r/o pulmonary edema [JG]   [age]year-old female presented emergency department for G-tube complications, 1 of blood work, as well as she states she is on feedings and wanted to know her kidney function, she was updated on this, had an elevated proBNP, wanted to get a chest x-ray, she refused this as she states the elevated proBNP and fluid retention is from a large amount of sodium in her feeds, initially attempted clog zapper without improvement, patient requested the NG be removed and she would follow-up with her physician in [place] clinic for possible PEG tube placement, she is able to tolerate p.o., states the feeding tube is in place due to her short gut syndrome, states she is not interested in seeing her surgeon in the area, and wishes to follow-up with appointment clinic doctor, she was discharged home return precautions given.  [JG]      ED Course User Index  [JG] Tony Perez MD      82-year-old female presented emergency department for G-tube complications, 1 of blood work, as well as she states she is on feedings and wanted to know her kidney function, she was updated on this, had an elevated proBNP, wanted to get a chest x-ray, she refused this as she states the elevated proBNP and fluid retention is from a large amount of sodium in her feeds, initially attempted clog zapper without improvement, patient requested the NG be removed and she would follow-up with her physician in [facility] clinic for possible PEG tube placement, she is able to tolerate p.o., states the feeding tube is in place due to her short gut syndrome, states she is not interested in seeing her surgeon in the area, and wishes to follow-up with appointment clinic doctor, she was discharged home return precautions given. ED Course as of 01/28/22 2217 Fri Jan 28, 2022   9890 Patient does not wish to see Dr. Lazaro Capellan if surgery consult required [JG]   [phone] Patient requested I check lab work as they are watching her kidneys with NG tube, says she had leg swelling, on initial evaluation she had no complaints [JG]   2688 DVT study negative [JG]   9441 CKD within patients baseline, will chest CXR to r/o pulmonary edema [JG]   [age]year-old female presented emergency department for G-tube complications, 1 of blood work, as well as she states she is on feedings and wanted to know her kidney function, she was updated on this, had an elevated proBNP, wanted to get a chest x-ray, she refused this as she states the elevated proBNP and fluid retention is from a large amount of sodium in her feeds, initially attempted clog zapper without improvement, patient requested the NG be removed and she would follow-up with her physician in [facility] clinic for possible PEG tube placement, she is able to tolerate p.o., states the feeding tube is in place due to her short gut syndrome, states she is not interested in seeing her surgeon in the area, and wishes to follow-up with appointment clinic doctor, she was discharged home return precautions given.  [JG]      ED Course User Index  [JG] Zeyad Eaton MD       --------------------------------------------- PAST HISTORY ---------------------------------------------  Past Medical History:  has a past medical history of Acute kidney failure ([address] Anxiety, Arthritis, CAD (coronary artery disease), [facility] COPD (chronic obstructive pulmonary disease) ([facility] [address] Depression, Fibromyalgia, Generalized headaches, History of blood transfusion, History of necrotic bowel, Hyperlipidemia, Hypertension, Incisional hernia, Insomnia, Mitral valve regurgitation, Myocardial infarct ([address] Occasional tremors, Osteopenia, PONV (postoperative nausea and vomiting), and Vitamin B12 deficiency. Past Surgical History:  has a past surgical history that includes Coronary angioplasty with stent (1-7-2002); Colonoscopy; Esophagus surgery (1-4-13); Rotator cuff repair (2010); arthroplasty (1-2006); Finger trigger release (Right, 2006); Cataract removal with implant (Right, 03/03/15); Appendectomy (2002); Cholecystectomy (5-11-07); Carpal tunnel release (Bilateral); skin biopsy (2010); Foot surgery (Right); ileostomy or jejunostomy (1-3-13); Endoscopy, colon, diagnostic; epigastric hernia repair (3/21/16); other surgical history (08/24/2016); Nasal sinus surgery; back surgery (1991, 2004, 2009); back surgery; Mohs surgery; other surgical history; Breast surgery (years ago); Knee Arthroplasty (Left, 03/22/2017); laparotomy (N/A, 5/12/2020); laparotomy (N/A, 6/8/2020); Insert Picc Line (06/09/2020); joint replacement (Right, 3/24/15); Abdomen surgery (2-); Abdomen surgery (N/A, 1/26/2020); hernia repair (12-31-13); Abdomen surgery (N/A, 9/11/2020); picc line insertion nurse (9/15/2020); and laparotomy (N/A, 9/15/2020). Social History:  reports that she has been smoking cigarettes. She has been smoking about 1.00 pack per day. She has never used smokeless tobacco. She reports previous alcohol use. She reports that she does not use drugs. Family History: family history is not on file. The patients home medications have been reviewed.     Allergies: Fentanyl, Levofloxacin, Tramadol, and Vioxx [rofecoxib]    -------------------------------------------------- RESULTS -------------------------------------------------  Labs:  Results for orders placed or performed during the hospital encounter of 01/28/22   Comprehensive Metabolic Panel w/ Reflex to MG   Result Value Ref Range    Sodium 139 132 - 146 mmol/L    Potassium reflex Magnesium 4.0 3.5 - 5.0 mmol/L    Chloride 103 98 - 107 mmol/L    CO2 27 22 - 29 mmol/L    Anion Gap 9 7 - 16 mmol/L    Glucose 78 74 - 99 mg/dL    BUN 39 (H) 6 - 23 mg/dL    CREATININE 1.6 (H) 0.5 - 1.0 mg/dL    GFR Non-African American 31 >=60 mL/min/1.73    GFR African American 38     Calcium 8.6 8.6 - 10.2 mg/dL    Total Protein 6.3 (L) 6.4 - 8.3 g/dL    Albumin 4.0 3.5 - 5.2 g/dL    Total Bilirubin 0.2 0.0 - 1.2 mg/dL    Alkaline Phosphatase 95 35 - 104 U/L    ALT 13 0 - 32 U/L    AST 22 0 - 31 U/L   CBC Auto Differential   Result Value Ref Range    WBC 6.3 4.5 - 11.5 E9/L    RBC 2.96 (L) 3.50 - 5.50 E12/L    Hemoglobin 9.8 (L) 11.5 - 15.5 g/dL    Hematocrit 31.0 (L) 34.0 - 48.0 %    .7 (H) 80.0 - 99.9 fL    MCH 33.1 26.0 - 35.0 pg    MCHC 31.6 (L) 32.0 - 34.5 %    RDW 15.0 11.5 - 15.0 fL    Platelets 617 578 - 080 E9/L    MPV 12.3 (H) 7.0 - 12.0 fL    Neutrophils % 62.6 43.0 - 80.0 %    Immature Granulocytes % 0.3 0.0 - 5.0 %    Lymphocytes % 25.0 20.0 - 42.0 %    Monocytes % 6.6 2.0 - 12.0 %    Eosinophils % 4.7 0.0 - 6.0 %    Basophils % 0.8 0.0 - 2.0 %    Neutrophils Absolute 3.96 1.80 - 7.30 E9/L    Immature Granulocytes # 0.02 E9/L    Lymphocytes Absolute 1.58 1.50 - 4.00 E9/L    Monocytes Absolute 0.42 0.10 - 0.95 E9/L    Eosinophils Absolute 0.30 0.05 - 0.50 E9/L    Basophils Absolute 0.05 0.00 - 0.20 E9/L   Brain Natriuretic Peptide   Result Value Ref Range    Pro-BNP 2,394 (H) 0 - 450 pg/mL       Radiology:  US DUP LOWER EXTREMITIES BILATERAL VENOUS   Final Result   No evidence of DVT in either lower extremity.             ------------------------- NURSING NOTES AND VITALS REVIEWED ---------------------------  Date / Time Roomed:  1/28/2022  2:47 PM  ED Bed Assignment:  [id number]    The nursing notes within the ED encounter and vital signs as below have been reviewed. BP (!) 166/77   Pulse 66   Temp 98.1 °F (36.7 °C) (Temporal)   Resp 14   Ht 5' (1.524 m)   Wt 94 lb (42.6 kg)   SpO2 96%   BMI 18.36 kg/m²   Oxygen Saturation Interpretation: Normal      ------------------------------------------ PROGRESS NOTES ------------------------------------------  10:17 PM EST  I have spoken with the patient and discussed todays results, in addition to providing specific details for the plan of care and counseling regarding the diagnosis and prognosis. Their questions are answered at this time and they are agreeable with the plan. I discussed at length with them reasons for immediate return here for re evaluation. They will followup with their general surgeon by calling their office on Monday.      --------------------------------- ADDITIONAL PROVIDER NOTES ---------------------------------  At this time the patient is without objective evidence of an acute process requiring hospitalization or inpatient management. They have remained hemodynamically stable throughout their entire ED visit and are stable for discharge with outpatient follow-up. The plan has been discussed in detail and they are aware of the specific conditions for emergent return, as well as the importance of follow-up. Discharge Medication List as of 1/28/2022  6:57 PM          Diagnosis:  1. Impaired nasal gastric feeding tube, initial encounter        Disposition:  Patient's disposition: Discharge to home  Patient's condition is stable.            Juan Frank MD  Resident  01/28/22 [id number]          Juan Frank MD  Resident  01/29/22 [id number]

## 2022-02-08 ENCOUNTER — HOSPITAL ENCOUNTER (OUTPATIENT)
Dept: INFUSION THERAPY | Age: 82
Setting detail: INFUSION SERIES
Discharge: HOME | End: 2022-02-08
Payer: MEDICARE

## 2022-02-08 VITALS
SYSTOLIC BLOOD PRESSURE: 180 MMHG | HEART RATE: 57 BPM | TEMPERATURE: 98.4 F | RESPIRATION RATE: 18 BRPM | DIASTOLIC BLOOD PRESSURE: 76 MMHG

## 2022-02-08 DIAGNOSIS — R79.89 ABNORMAL SERUM CREATININE LEVEL: ICD-10-CM

## 2022-02-08 DIAGNOSIS — E78.5 HYPERLIPIDEMIA, UNSPECIFIED HYPERLIPIDEMIA TYPE: Primary | ICD-10-CM

## 2022-02-08 LAB
ANION GAP SERPL CALCULATED.3IONS-SCNC: 10 MMOL/L (ref 7–16)
BASOPHILS ABSOLUTE: 0.04 E9/L (ref 0–0.2)
BASOPHILS RELATIVE PERCENT: 0.6 % (ref 0–2)
BUN BLDV-MCNC: 30 MG/DL (ref 6–23)
CALCIUM SERPL-MCNC: 8.8 MG/DL (ref 8.6–10.2)
CHLORIDE BLD-SCNC: 106 MMOL/L (ref 98–107)
CHOLESTEROL, TOTAL: 140 MG/DL (ref 0–199)
CO2: 25 MMOL/L (ref 22–29)
CREAT SERPL-MCNC: 1.5 MG/DL (ref 0.5–1)
EOSINOPHILS ABSOLUTE: 0.24 E9/L (ref 0.05–0.5)
EOSINOPHILS RELATIVE PERCENT: 3.8 % (ref 0–6)
GFR AFRICAN AMERICAN: 40
GFR NON-AFRICAN AMERICAN: 33 ML/MIN/1.73
GLUCOSE BLD-MCNC: 130 MG/DL (ref 74–99)
HBA1C MFR BLD: 4.7 % (ref 4–5.6)
HCT VFR BLD CALC: 33.6 % (ref 34–48)
HDLC SERPL-MCNC: 69 MG/DL
HEMOGLOBIN: 10.6 G/DL (ref 11.5–15.5)
IMMATURE GRANULOCYTES #: 0.01 E9/L
IMMATURE GRANULOCYTES %: 0.2 % (ref 0–5)
LDL CHOLESTEROL CALCULATED: 56 MG/DL (ref 0–99)
LYMPHOCYTES ABSOLUTE: 1.39 E9/L (ref 1.5–4)
LYMPHOCYTES RELATIVE PERCENT: 21.8 % (ref 20–42)
MCH RBC QN AUTO: 33 PG (ref 26–35)
MCHC RBC AUTO-ENTMCNC: 31.5 % (ref 32–34.5)
MCV RBC AUTO: 104.7 FL (ref 80–99.9)
MONOCYTES ABSOLUTE: 0.41 E9/L (ref 0.1–0.95)
MONOCYTES RELATIVE PERCENT: 6.4 % (ref 2–12)
NEUTROPHILS ABSOLUTE: 4.3 E9/L (ref 1.8–7.3)
NEUTROPHILS RELATIVE PERCENT: 67.2 % (ref 43–80)
PDW BLD-RTO: 14.6 FL (ref 11.5–15)
PLATELET # BLD: 214 E9/L (ref 130–450)
PMV BLD AUTO: 12.7 FL (ref 7–12)
POTASSIUM SERPL-SCNC: 4.4 MMOL/L (ref 3.5–5)
RBC # BLD: 3.21 E12/L (ref 3.5–5.5)
SODIUM BLD-SCNC: 141 MMOL/L (ref 132–146)
TRIGL SERPL-MCNC: 76 MG/DL (ref 0–149)
TSH SERPL DL<=0.05 MIU/L-ACNC: 1.27 UIU/ML (ref 0.27–4.2)
VLDLC SERPL CALC-MCNC: 15 MG/DL
WBC # BLD: 6.4 E9/L (ref 4.5–11.5)

## 2022-02-08 PROCEDURE — 36591 DRAW BLOOD OFF VENOUS DEVICE: CPT

## 2022-02-08 PROCEDURE — 83036 HEMOGLOBIN GLYCOSYLATED A1C: CPT

## 2022-02-08 PROCEDURE — 84443 ASSAY THYROID STIM HORMONE: CPT

## 2022-02-08 PROCEDURE — 2580000003 HC RX 258: Performed by: INTERNAL MEDICINE

## 2022-02-08 PROCEDURE — 6360000002 HC RX W HCPCS: Performed by: INTERNAL MEDICINE

## 2022-02-08 PROCEDURE — 80061 LIPID PANEL: CPT

## 2022-02-08 PROCEDURE — 80048 BASIC METABOLIC PNL TOTAL CA: CPT

## 2022-02-08 PROCEDURE — 85025 COMPLETE CBC W/AUTO DIFF WBC: CPT

## 2022-02-08 RX ORDER — HEPARIN SODIUM (PORCINE) LOCK FLUSH IV SOLN 100 UNIT/ML 100 UNIT/ML
500 SOLUTION INTRAVENOUS PRN
Status: CANCELLED | OUTPATIENT
Start: 2022-02-15

## 2022-02-08 RX ORDER — HEPARIN SODIUM (PORCINE) LOCK FLUSH IV SOLN 100 UNIT/ML 100 UNIT/ML
500 SOLUTION INTRAVENOUS PRN
Status: DISCONTINUED | OUTPATIENT
Start: 2022-02-08 | End: 2022-02-09 | Stop reason: HOSPADM

## 2022-02-08 RX ORDER — SODIUM CHLORIDE 0.9 % (FLUSH) 0.9 %
5-40 SYRINGE (ML) INJECTION PRN
Status: CANCELLED | OUTPATIENT
Start: 2022-02-15

## 2022-02-08 RX ORDER — SODIUM CHLORIDE 0.9 % (FLUSH) 0.9 %
5-40 SYRINGE (ML) INJECTION PRN
Status: DISCONTINUED | OUTPATIENT
Start: 2022-02-08 | End: 2022-02-09 | Stop reason: HOSPADM

## 2022-02-08 RX ADMIN — SODIUM CHLORIDE, PRESERVATIVE FREE 10 ML: 5 INJECTION INTRAVENOUS at 12:26

## 2022-02-08 RX ADMIN — HEPARIN 500 UNITS: 100 SYRINGE at 12:26

## 2022-04-13 ENCOUNTER — HOSPITAL ENCOUNTER (OUTPATIENT)
Dept: INFUSION THERAPY | Age: 82
Setting detail: INFUSION SERIES
Discharge: HOME | End: 2022-04-13
Payer: MEDICARE

## 2022-04-13 VITALS
OXYGEN SATURATION: 99 % | TEMPERATURE: 99.3 F | RESPIRATION RATE: 16 BRPM | SYSTOLIC BLOOD PRESSURE: 169 MMHG | DIASTOLIC BLOOD PRESSURE: 71 MMHG | HEART RATE: 61 BPM

## 2022-04-13 DIAGNOSIS — E78.5 HYPERLIPIDEMIA, UNSPECIFIED HYPERLIPIDEMIA TYPE: ICD-10-CM

## 2022-04-13 DIAGNOSIS — N17.9 AKI (ACUTE KIDNEY INJURY) (HCC): Primary | ICD-10-CM

## 2022-04-13 DIAGNOSIS — R79.89 ABNORMAL SERUM CREATININE LEVEL: ICD-10-CM

## 2022-04-13 LAB
ALBUMIN SERPL-MCNC: 3.8 G/DL (ref 3.5–5.2)
ALP BLD-CCNC: 147 U/L (ref 35–104)
ALT SERPL-CCNC: 19 U/L (ref 0–32)
ANION GAP SERPL CALCULATED.3IONS-SCNC: 7 MMOL/L (ref 7–16)
AST SERPL-CCNC: 24 U/L (ref 0–31)
BASOPHILS ABSOLUTE: 0.05 E9/L (ref 0–0.2)
BASOPHILS RELATIVE PERCENT: 0.6 % (ref 0–2)
BILIRUB SERPL-MCNC: <0.2 MG/DL (ref 0–1.2)
BUN BLDV-MCNC: 39 MG/DL (ref 6–23)
CALCIUM SERPL-MCNC: 8.7 MG/DL (ref 8.6–10.2)
CHLORIDE BLD-SCNC: 103 MMOL/L (ref 98–107)
CHOLESTEROL, TOTAL: 156 MG/DL (ref 0–199)
CO2: 28 MMOL/L (ref 22–29)
CREAT SERPL-MCNC: 1.5 MG/DL (ref 0.5–1)
EOSINOPHILS ABSOLUTE: 0.27 E9/L (ref 0.05–0.5)
EOSINOPHILS RELATIVE PERCENT: 3.4 % (ref 0–6)
GFR AFRICAN AMERICAN: 40
GFR NON-AFRICAN AMERICAN: 33 ML/MIN/1.73
GLUCOSE BLD-MCNC: 63 MG/DL (ref 74–99)
HBA1C MFR BLD: 4.6 % (ref 4–5.6)
HCT VFR BLD CALC: 31.7 % (ref 34–48)
HDLC SERPL-MCNC: 91 MG/DL
HEMOGLOBIN: 9.9 G/DL (ref 11.5–15.5)
IMMATURE GRANULOCYTES #: 0.03 E9/L
IMMATURE GRANULOCYTES %: 0.4 % (ref 0–5)
LDL CHOLESTEROL CALCULATED: 50 MG/DL (ref 0–99)
LYMPHOCYTES ABSOLUTE: 1.8 E9/L (ref 1.5–4)
LYMPHOCYTES RELATIVE PERCENT: 22.6 % (ref 20–42)
MAGNESIUM: 2 MG/DL (ref 1.6–2.6)
MCH RBC QN AUTO: 32.5 PG (ref 26–35)
MCHC RBC AUTO-ENTMCNC: 31.2 % (ref 32–34.5)
MCV RBC AUTO: 103.9 FL (ref 80–99.9)
MONOCYTES ABSOLUTE: 0.49 E9/L (ref 0.1–0.95)
MONOCYTES RELATIVE PERCENT: 6.1 % (ref 2–12)
NEUTROPHILS ABSOLUTE: 5.33 E9/L (ref 1.8–7.3)
NEUTROPHILS RELATIVE PERCENT: 66.9 % (ref 43–80)
PDW BLD-RTO: 13.5 FL (ref 11.5–15)
PHOSPHORUS: 4.3 MG/DL (ref 2.5–4.5)
PLATELET # BLD: 242 E9/L (ref 130–450)
PMV BLD AUTO: 12.2 FL (ref 7–12)
POTASSIUM SERPL-SCNC: 4.2 MMOL/L (ref 3.5–5)
RBC # BLD: 3.05 E12/L (ref 3.5–5.5)
SODIUM BLD-SCNC: 138 MMOL/L (ref 132–146)
TOTAL PROTEIN: 6.3 G/DL (ref 6.4–8.3)
TRIGL SERPL-MCNC: 73 MG/DL (ref 0–149)
TSH SERPL DL<=0.05 MIU/L-ACNC: 2.01 UIU/ML (ref 0.27–4.2)
VLDLC SERPL CALC-MCNC: 15 MG/DL
WBC # BLD: 8 E9/L (ref 4.5–11.5)

## 2022-04-13 PROCEDURE — 85025 COMPLETE CBC W/AUTO DIFF WBC: CPT

## 2022-04-13 PROCEDURE — 80061 LIPID PANEL: CPT

## 2022-04-13 PROCEDURE — 83036 HEMOGLOBIN GLYCOSYLATED A1C: CPT

## 2022-04-13 PROCEDURE — 80053 COMPREHEN METABOLIC PANEL: CPT

## 2022-04-13 PROCEDURE — 36415 COLL VENOUS BLD VENIPUNCTURE: CPT

## 2022-04-13 PROCEDURE — 36591 DRAW BLOOD OFF VENOUS DEVICE: CPT

## 2022-04-13 PROCEDURE — 2580000003 HC RX 258: Performed by: INTERNAL MEDICINE

## 2022-04-13 PROCEDURE — 83735 ASSAY OF MAGNESIUM: CPT

## 2022-04-13 PROCEDURE — 84100 ASSAY OF PHOSPHORUS: CPT

## 2022-04-13 PROCEDURE — 96523 IRRIG DRUG DELIVERY DEVICE: CPT

## 2022-04-13 PROCEDURE — 84443 ASSAY THYROID STIM HORMONE: CPT

## 2022-04-13 PROCEDURE — 6360000002 HC RX W HCPCS: Performed by: INTERNAL MEDICINE

## 2022-04-13 RX ORDER — SODIUM CHLORIDE 0.9 % (FLUSH) 0.9 %
10 SYRINGE (ML) INJECTION PRN
Status: DISCONTINUED | OUTPATIENT
Start: 2022-04-13 | End: 2022-04-14 | Stop reason: HOSPADM

## 2022-04-13 RX ORDER — HEPARIN SODIUM (PORCINE) LOCK FLUSH IV SOLN 100 UNIT/ML 100 UNIT/ML
500 SOLUTION INTRAVENOUS PRN
Status: DISCONTINUED | OUTPATIENT
Start: 2022-04-13 | End: 2022-04-14 | Stop reason: HOSPADM

## 2022-04-13 RX ORDER — SODIUM CHLORIDE 0.9 % (FLUSH) 0.9 %
5-40 SYRINGE (ML) INJECTION PRN
Status: CANCELLED | OUTPATIENT
Start: 2022-04-20

## 2022-04-13 RX ORDER — HEPARIN SODIUM (PORCINE) LOCK FLUSH IV SOLN 100 UNIT/ML 100 UNIT/ML
500 SOLUTION INTRAVENOUS PRN
Status: CANCELLED | OUTPATIENT
Start: 2022-04-20

## 2022-04-13 RX ORDER — HEPARIN SODIUM (PORCINE) LOCK FLUSH IV SOLN 100 UNIT/ML 100 UNIT/ML
500 SOLUTION INTRAVENOUS PRN
Status: CANCELLED | OUTPATIENT
Start: 2022-04-13

## 2022-04-13 RX ORDER — SODIUM CHLORIDE 0.9 % (FLUSH) 0.9 %
10 SYRINGE (ML) INJECTION PRN
Status: CANCELLED | OUTPATIENT
Start: 2022-04-13

## 2022-04-13 RX ADMIN — HEPARIN 500 UNITS: 100 SYRINGE at 15:18

## 2022-04-13 RX ADMIN — SODIUM CHLORIDE, PRESERVATIVE FREE 10 ML: 5 INJECTION INTRAVENOUS at 15:18

## 2022-04-13 ASSESSMENT — PAIN DESCRIPTION - DIRECTION: RADIATING_TOWARDS: NO

## 2022-04-13 ASSESSMENT — PAIN DESCRIPTION - PAIN TYPE: TYPE: ACUTE PAIN

## 2022-04-13 ASSESSMENT — PAIN DESCRIPTION - PROGRESSION: CLINICAL_PROGRESSION: NOT CHANGED

## 2022-04-13 ASSESSMENT — PAIN DESCRIPTION - ONSET: ONSET: GRADUAL

## 2022-04-13 ASSESSMENT — PAIN DESCRIPTION - LOCATION: LOCATION: ABDOMEN

## 2022-04-13 ASSESSMENT — PAIN DESCRIPTION - DESCRIPTORS: DESCRIPTORS: CRAMPING;DISCOMFORT

## 2022-04-13 ASSESSMENT — PAIN DESCRIPTION - FREQUENCY: FREQUENCY: INTERMITTENT

## 2022-04-13 ASSESSMENT — PAIN - FUNCTIONAL ASSESSMENT: PAIN_FUNCTIONAL_ASSESSMENT: ACTIVITIES ARE NOT PREVENTED

## 2022-04-13 ASSESSMENT — PAIN DESCRIPTION - ORIENTATION: ORIENTATION: LEFT

## 2022-04-13 ASSESSMENT — PAIN SCALES - GENERAL: PAINLEVEL_OUTOF10: 4

## 2022-04-13 NOTE — PROGRESS NOTES
Patient tolerated port flush and blood draw from port well. Patient alert and oriented x3. No distress noted. Vital signs stable. Patient denies any new or worsening pain. Offered patient education an/or discharge material.  Patient declined. Patient denies any needs. All questions answered.

## 2022-04-13 NOTE — PROGRESS NOTES
Message left at Dr Bhupinder Valverde office regarding clarification on lab orders. Waiting on a call back or a fax order sheet back. Labs drawn today were faxed to Dr Cheryle Roach and routed to Dr Pb Almeida via in basket.

## 2022-04-18 RX ORDER — HEPARIN SODIUM (PORCINE) LOCK FLUSH IV SOLN 100 UNIT/ML 100 UNIT/ML
500 SOLUTION INTRAVENOUS PRN
Status: CANCELLED | OUTPATIENT
Start: 2022-05-11

## 2022-04-18 RX ORDER — SODIUM CHLORIDE 0.9 % (FLUSH) 0.9 %
10 SYRINGE (ML) INJECTION PRN
Status: CANCELLED | OUTPATIENT
Start: 2022-05-11

## 2022-04-26 PROBLEM — I10 PRIMARY HYPERTENSION: Status: ACTIVE | Noted: 2022-04-26

## 2022-04-26 PROBLEM — E78.00 HYPERCHOLESTEROLEMIA: Status: ACTIVE | Noted: 2022-04-26

## 2022-04-26 PROBLEM — K91.2 POSTSURGICAL MALABSORPTION, NOT ELSEWHERE CLASSIFIED: Status: ACTIVE | Noted: 2022-04-26

## 2022-04-26 PROBLEM — E03.9 HYPOTHYROIDISM, UNSPECIFIED: Status: ACTIVE | Noted: 2022-04-26

## 2022-04-26 PROBLEM — R73.03 PREDIABETES: Status: ACTIVE | Noted: 2022-04-26

## 2022-04-26 PROBLEM — I10 ESSENTIAL (PRIMARY) HYPERTENSION: Status: ACTIVE | Noted: 2022-04-26

## 2022-04-26 PROBLEM — E78.00 PURE HYPERCHOLESTEROLEMIA: Status: ACTIVE | Noted: 2022-04-26

## 2022-04-27 ENCOUNTER — HOSPITAL ENCOUNTER (OUTPATIENT)
Dept: INFUSION THERAPY | Age: 82
Setting detail: INFUSION SERIES
Discharge: HOME | End: 2022-04-27
Payer: MEDICARE

## 2022-04-27 VITALS
SYSTOLIC BLOOD PRESSURE: 172 MMHG | RESPIRATION RATE: 16 BRPM | DIASTOLIC BLOOD PRESSURE: 70 MMHG | OXYGEN SATURATION: 99 % | HEART RATE: 65 BPM | TEMPERATURE: 99.3 F

## 2022-04-27 DIAGNOSIS — R73.03 PREDIABETES: ICD-10-CM

## 2022-04-27 DIAGNOSIS — K91.2 POSTSURGICAL MALABSORPTION, NOT ELSEWHERE CLASSIFIED: ICD-10-CM

## 2022-04-27 DIAGNOSIS — E03.9 HYPOTHYROIDISM, UNSPECIFIED TYPE: ICD-10-CM

## 2022-04-27 DIAGNOSIS — E78.00 PURE HYPERCHOLESTEROLEMIA: ICD-10-CM

## 2022-04-27 DIAGNOSIS — N17.9 AKI (ACUTE KIDNEY INJURY) (HCC): ICD-10-CM

## 2022-04-27 DIAGNOSIS — E78.00 HYPERCHOLESTEROLEMIA: ICD-10-CM

## 2022-04-27 DIAGNOSIS — I10 ESSENTIAL (PRIMARY) HYPERTENSION: ICD-10-CM

## 2022-04-27 DIAGNOSIS — I10 PRIMARY HYPERTENSION: Primary | ICD-10-CM

## 2022-04-27 LAB
ALBUMIN SERPL-MCNC: 4 G/DL (ref 3.5–5.2)
ALP BLD-CCNC: 192 U/L (ref 35–104)
ALT SERPL-CCNC: 19 U/L (ref 0–32)
ANION GAP SERPL CALCULATED.3IONS-SCNC: 12 MMOL/L (ref 7–16)
ANION GAP SERPL CALCULATED.3IONS-SCNC: 15 MMOL/L (ref 7–16)
AST SERPL-CCNC: 20 U/L (ref 0–31)
BILIRUB SERPL-MCNC: 0.3 MG/DL (ref 0–1.2)
BUN BLDV-MCNC: 34 MG/DL (ref 6–23)
BUN BLDV-MCNC: 34 MG/DL (ref 6–23)
C-REACTIVE PROTEIN: 2.3 MG/DL (ref 0–0.4)
CALCIUM SERPL-MCNC: 8.8 MG/DL (ref 8.6–10.2)
CALCIUM SERPL-MCNC: 8.8 MG/DL (ref 8.6–10.2)
CHLORIDE BLD-SCNC: 102 MMOL/L (ref 98–107)
CHLORIDE BLD-SCNC: 107 MMOL/L (ref 98–107)
CO2: 22 MMOL/L (ref 22–29)
CO2: 24 MMOL/L (ref 22–29)
CREAT SERPL-MCNC: 1.5 MG/DL (ref 0.5–1)
CREAT SERPL-MCNC: 1.5 MG/DL (ref 0.5–1)
FERRITIN: 55 NG/ML
GFR AFRICAN AMERICAN: 40
GFR AFRICAN AMERICAN: 40
GFR NON-AFRICAN AMERICAN: 33 ML/MIN/1.73
GFR NON-AFRICAN AMERICAN: 33 ML/MIN/1.73
GLUCOSE BLD-MCNC: 62 MG/DL (ref 74–99)
GLUCOSE BLD-MCNC: 65 MG/DL (ref 74–99)
HCT VFR BLD CALC: 33.2 % (ref 34–48)
HEMOGLOBIN: 10.6 G/DL (ref 11.5–15.5)
HOMOCYSTEINE: 13.1 UMOL/L (ref 0–15)
IRON SATURATION: 10 % (ref 15–50)
IRON: 33 MCG/DL (ref 37–145)
MAGNESIUM: 1.9 MG/DL (ref 1.6–2.6)
MCH RBC QN AUTO: 32.6 PG (ref 26–35)
MCHC RBC AUTO-ENTMCNC: 31.9 % (ref 32–34.5)
MCV RBC AUTO: 102.2 FL (ref 80–99.9)
PDW BLD-RTO: 13.9 FL (ref 11.5–15)
PHOSPHORUS: 4.5 MG/DL (ref 2.5–4.5)
PLATELET # BLD: 281 E9/L (ref 130–450)
PMV BLD AUTO: 11.6 FL (ref 7–12)
POTASSIUM SERPL-SCNC: 4.3 MMOL/L (ref 3.5–5)
POTASSIUM SERPL-SCNC: 4.4 MMOL/L (ref 3.5–5)
RBC # BLD: 3.25 E12/L (ref 3.5–5.5)
SODIUM BLD-SCNC: 138 MMOL/L (ref 132–146)
SODIUM BLD-SCNC: 144 MMOL/L (ref 132–146)
T4 FREE: 1.3 NG/DL (ref 0.93–1.7)
TOTAL IRON BINDING CAPACITY: 315 MCG/DL (ref 250–450)
TOTAL PROTEIN: 6.9 G/DL (ref 6.4–8.3)
TRANSFERRIN: 279 MG/DL (ref 200–360)
VITAMIN B-12: 1050 PG/ML (ref 211–946)
VITAMIN D 25-HYDROXY: 30 NG/ML (ref 30–100)
WBC # BLD: 11.5 E9/L (ref 4.5–11.5)

## 2022-04-27 PROCEDURE — 86140 C-REACTIVE PROTEIN: CPT

## 2022-04-27 PROCEDURE — 2580000003 HC RX 258: Performed by: INTERNAL MEDICINE

## 2022-04-27 PROCEDURE — 80048 BASIC METABOLIC PNL TOTAL CA: CPT

## 2022-04-27 PROCEDURE — 84100 ASSAY OF PHOSPHORUS: CPT

## 2022-04-27 PROCEDURE — 84630 ASSAY OF ZINC: CPT

## 2022-04-27 PROCEDURE — 6360000002 HC RX W HCPCS

## 2022-04-27 PROCEDURE — 36415 COLL VENOUS BLD VENIPUNCTURE: CPT

## 2022-04-27 PROCEDURE — 83550 IRON BINDING TEST: CPT

## 2022-04-27 PROCEDURE — 36591 DRAW BLOOD OFF VENOUS DEVICE: CPT

## 2022-04-27 PROCEDURE — 82728 ASSAY OF FERRITIN: CPT

## 2022-04-27 PROCEDURE — 83540 ASSAY OF IRON: CPT

## 2022-04-27 PROCEDURE — 83090 ASSAY OF HOMOCYSTEINE: CPT

## 2022-04-27 PROCEDURE — 82607 VITAMIN B-12: CPT

## 2022-04-27 PROCEDURE — 84466 ASSAY OF TRANSFERRIN: CPT

## 2022-04-27 PROCEDURE — 84439 ASSAY OF FREE THYROXINE: CPT

## 2022-04-27 PROCEDURE — 85027 COMPLETE CBC AUTOMATED: CPT

## 2022-04-27 PROCEDURE — 83735 ASSAY OF MAGNESIUM: CPT

## 2022-04-27 PROCEDURE — 80053 COMPREHEN METABOLIC PANEL: CPT

## 2022-04-27 PROCEDURE — 82306 VITAMIN D 25 HYDROXY: CPT

## 2022-04-27 PROCEDURE — 82525 ASSAY OF COPPER: CPT

## 2022-04-27 RX ORDER — HEPARIN SODIUM (PORCINE) LOCK FLUSH IV SOLN 100 UNIT/ML 100 UNIT/ML
500 SOLUTION INTRAVENOUS PRN
Status: DISCONTINUED | OUTPATIENT
Start: 2022-04-27 | End: 2022-04-28 | Stop reason: HOSPADM

## 2022-04-27 RX ORDER — HEPARIN SODIUM (PORCINE) LOCK FLUSH IV SOLN 100 UNIT/ML 100 UNIT/ML
SOLUTION INTRAVENOUS
Status: COMPLETED
Start: 2022-04-27 | End: 2022-04-27

## 2022-04-27 RX ORDER — SODIUM CHLORIDE 0.9 % (FLUSH) 0.9 %
10 SYRINGE (ML) INJECTION PRN
Status: CANCELLED | OUTPATIENT
Start: 2022-05-01

## 2022-04-27 RX ORDER — HEPARIN SODIUM (PORCINE) LOCK FLUSH IV SOLN 100 UNIT/ML 100 UNIT/ML
500 SOLUTION INTRAVENOUS PRN
Status: CANCELLED | OUTPATIENT
Start: 2022-05-01

## 2022-04-27 RX ORDER — FLUTICASONE PROPIONATE 50 MCG
1 SPRAY, SUSPENSION (ML) NASAL NIGHTLY
COMMUNITY

## 2022-04-27 RX ORDER — SODIUM CHLORIDE 0.9 % (FLUSH) 0.9 %
10 SYRINGE (ML) INJECTION PRN
Status: DISCONTINUED | OUTPATIENT
Start: 2022-04-27 | End: 2022-04-28 | Stop reason: HOSPADM

## 2022-04-27 RX ADMIN — HEPARIN 500 UNITS: 100 SYRINGE at 15:07

## 2022-04-27 RX ADMIN — HEPARIN SODIUM (PORCINE) LOCK FLUSH IV SOLN 100 UNIT/ML 500 UNITS: 100 SOLUTION at 15:07

## 2022-04-27 RX ADMIN — SODIUM CHLORIDE, PRESERVATIVE FREE 10 ML: 5 INJECTION INTRAVENOUS at 15:06

## 2022-04-27 NOTE — PROGRESS NOTES
[facility] called and verified that lab work that was scheduled for 5/13/22, can be done today.

## 2022-04-27 NOTE — PROGRESS NOTES
Patient tolerated PORT BLOOD DRAW  well. Patient alert and oriented x3. No distress noted. Vital signs stable. Patient denies any new or worsening pain. Faisal Span patient education an/or discharge material.  Patient declined. Patient denies any needs. All questions answered.

## 2022-05-02 LAB
COPPER: 106.9 UG/DL (ref 80–155)
ZINC: 55.1 UG/DL (ref 60–120)

## 2022-05-13 ENCOUNTER — APPOINTMENT (OUTPATIENT)
Dept: INFUSION THERAPY | Age: 82
End: 2022-05-13
Payer: MEDICARE

## 2022-05-24 ENCOUNTER — HOSPITAL ENCOUNTER (OUTPATIENT)
Dept: INFUSION THERAPY | Age: 82
Setting detail: INFUSION SERIES
Discharge: HOME | End: 2022-05-24
Payer: MEDICARE

## 2022-05-24 VITALS
HEART RATE: 66 BPM | SYSTOLIC BLOOD PRESSURE: 179 MMHG | OXYGEN SATURATION: 99 % | DIASTOLIC BLOOD PRESSURE: 60 MMHG | RESPIRATION RATE: 16 BRPM | TEMPERATURE: 99.2 F

## 2022-05-24 DIAGNOSIS — N17.9 AKI (ACUTE KIDNEY INJURY) (HCC): ICD-10-CM

## 2022-05-24 DIAGNOSIS — E78.00 HYPERCHOLESTEROLEMIA: ICD-10-CM

## 2022-05-24 DIAGNOSIS — R73.03 PREDIABETES: ICD-10-CM

## 2022-05-24 DIAGNOSIS — I10 ESSENTIAL (PRIMARY) HYPERTENSION: ICD-10-CM

## 2022-05-24 DIAGNOSIS — K91.2 POSTSURGICAL MALABSORPTION, NOT ELSEWHERE CLASSIFIED: ICD-10-CM

## 2022-05-24 DIAGNOSIS — E78.00 PURE HYPERCHOLESTEROLEMIA: ICD-10-CM

## 2022-05-24 DIAGNOSIS — I10 PRIMARY HYPERTENSION: Primary | ICD-10-CM

## 2022-05-24 DIAGNOSIS — E03.9 HYPOTHYROIDISM, UNSPECIFIED TYPE: ICD-10-CM

## 2022-05-24 LAB
ALBUMIN SERPL-MCNC: 4 G/DL (ref 3.5–5.2)
ALP BLD-CCNC: 186 U/L (ref 35–104)
ALT SERPL-CCNC: 15 U/L (ref 0–32)
ANION GAP SERPL CALCULATED.3IONS-SCNC: 8 MMOL/L (ref 7–16)
AST SERPL-CCNC: 21 U/L (ref 0–31)
BASOPHILS ABSOLUTE: 0.06 E9/L (ref 0–0.2)
BASOPHILS RELATIVE PERCENT: 0.7 % (ref 0–2)
BILIRUB SERPL-MCNC: <0.2 MG/DL (ref 0–1.2)
BUN BLDV-MCNC: 32 MG/DL (ref 6–23)
CALCIUM SERPL-MCNC: 8.5 MG/DL (ref 8.6–10.2)
CHLORIDE BLD-SCNC: 103 MMOL/L (ref 98–107)
CO2: 25 MMOL/L (ref 22–29)
CREAT SERPL-MCNC: 1.4 MG/DL (ref 0.5–1)
EOSINOPHILS ABSOLUTE: 0.32 E9/L (ref 0.05–0.5)
EOSINOPHILS RELATIVE PERCENT: 3.5 % (ref 0–6)
GFR AFRICAN AMERICAN: 44
GFR NON-AFRICAN AMERICAN: 36 ML/MIN/1.73
GLUCOSE BLD-MCNC: 81 MG/DL (ref 74–99)
HCT VFR BLD CALC: 34.8 % (ref 34–48)
HEMOGLOBIN: 11.1 G/DL (ref 11.5–15.5)
IMMATURE GRANULOCYTES #: 0.03 E9/L
IMMATURE GRANULOCYTES %: 0.3 % (ref 0–5)
LYMPHOCYTES ABSOLUTE: 1.83 E9/L (ref 1.5–4)
LYMPHOCYTES RELATIVE PERCENT: 20.3 % (ref 20–42)
MAGNESIUM: 1.9 MG/DL (ref 1.6–2.6)
MCH RBC QN AUTO: 31.7 PG (ref 26–35)
MCHC RBC AUTO-ENTMCNC: 31.9 % (ref 32–34.5)
MCV RBC AUTO: 99.4 FL (ref 80–99.9)
MONOCYTES ABSOLUTE: 0.58 E9/L (ref 0.1–0.95)
MONOCYTES RELATIVE PERCENT: 6.4 % (ref 2–12)
NEUTROPHILS ABSOLUTE: 6.2 E9/L (ref 1.8–7.3)
NEUTROPHILS RELATIVE PERCENT: 68.8 % (ref 43–80)
PDW BLD-RTO: 14.6 FL (ref 11.5–15)
PHOSPHORUS: 4.8 MG/DL (ref 2.5–4.5)
PLATELET # BLD: 219 E9/L (ref 130–450)
PMV BLD AUTO: 12.1 FL (ref 7–12)
POTASSIUM SERPL-SCNC: 4.3 MMOL/L (ref 3.5–5)
RBC # BLD: 3.5 E12/L (ref 3.5–5.5)
SODIUM BLD-SCNC: 136 MMOL/L (ref 132–146)
TOTAL PROTEIN: 6.7 G/DL (ref 6.4–8.3)
WBC # BLD: 9 E9/L (ref 4.5–11.5)

## 2022-05-24 PROCEDURE — 83735 ASSAY OF MAGNESIUM: CPT

## 2022-05-24 PROCEDURE — 84100 ASSAY OF PHOSPHORUS: CPT

## 2022-05-24 PROCEDURE — 96523 IRRIG DRUG DELIVERY DEVICE: CPT

## 2022-05-24 PROCEDURE — 36415 COLL VENOUS BLD VENIPUNCTURE: CPT

## 2022-05-24 PROCEDURE — 85025 COMPLETE CBC W/AUTO DIFF WBC: CPT

## 2022-05-24 PROCEDURE — 2580000003 HC RX 258: Performed by: INTERNAL MEDICINE

## 2022-05-24 PROCEDURE — 80053 COMPREHEN METABOLIC PANEL: CPT

## 2022-05-24 PROCEDURE — 36591 DRAW BLOOD OFF VENOUS DEVICE: CPT

## 2022-05-24 PROCEDURE — 6360000002 HC RX W HCPCS: Performed by: INTERNAL MEDICINE

## 2022-05-24 RX ORDER — LORAZEPAM 0.5 MG/1
0.25 TABLET ORAL DAILY PRN
Status: ON HOLD | COMMUNITY
End: 2022-07-31 | Stop reason: HOSPADM

## 2022-05-24 RX ORDER — SODIUM CHLORIDE 0.9 % (FLUSH) 0.9 %
10 SYRINGE (ML) INJECTION PRN
Status: DISCONTINUED | OUTPATIENT
Start: 2022-05-24 | End: 2022-05-25 | Stop reason: HOSPADM

## 2022-05-24 RX ORDER — HEPARIN SODIUM (PORCINE) LOCK FLUSH IV SOLN 100 UNIT/ML 100 UNIT/ML
500 SOLUTION INTRAVENOUS PRN
Status: DISCONTINUED | OUTPATIENT
Start: 2022-05-24 | End: 2022-05-25 | Stop reason: HOSPADM

## 2022-05-24 RX ORDER — HEPARIN SODIUM (PORCINE) LOCK FLUSH IV SOLN 100 UNIT/ML 100 UNIT/ML
500 SOLUTION INTRAVENOUS PRN
Status: CANCELLED | OUTPATIENT
Start: 2022-06-01

## 2022-05-24 RX ORDER — SODIUM CHLORIDE 0.9 % (FLUSH) 0.9 %
10 SYRINGE (ML) INJECTION PRN
Status: CANCELLED | OUTPATIENT
Start: 2022-06-01

## 2022-05-24 RX ADMIN — SODIUM CHLORIDE, PRESERVATIVE FREE 10 ML: 5 INJECTION INTRAVENOUS at 14:25

## 2022-05-24 RX ADMIN — SODIUM CHLORIDE, PRESERVATIVE FREE 10 ML: 5 INJECTION INTRAVENOUS at 14:15

## 2022-05-24 RX ADMIN — HEPARIN 500 UNITS: 100 SYRINGE at 14:26

## 2022-05-24 ASSESSMENT — PAIN - FUNCTIONAL ASSESSMENT: PAIN_FUNCTIONAL_ASSESSMENT: PREVENTS OR INTERFERES WITH MANY ACTIVE NOT PASSIVE ACTIVITIES

## 2022-05-24 ASSESSMENT — PAIN DESCRIPTION - LOCATION: LOCATION: BACK

## 2022-05-24 ASSESSMENT — PAIN DESCRIPTION - ONSET: ONSET: ON-GOING

## 2022-05-24 ASSESSMENT — PAIN DESCRIPTION - FREQUENCY: FREQUENCY: INTERMITTENT

## 2022-05-24 ASSESSMENT — PAIN SCALES - GENERAL: PAINLEVEL_OUTOF10: 3

## 2022-05-24 ASSESSMENT — PAIN DESCRIPTION - DESCRIPTORS: DESCRIPTORS: ACHING;DULL

## 2022-05-24 ASSESSMENT — PAIN DESCRIPTION - PAIN TYPE: TYPE: CHRONIC PAIN

## 2022-05-24 ASSESSMENT — PAIN DESCRIPTION - ORIENTATION: ORIENTATION: LOWER

## 2022-07-01 ENCOUNTER — HOSPITAL ENCOUNTER (OUTPATIENT)
Dept: INFUSION THERAPY | Age: 82
Setting detail: INFUSION SERIES
Discharge: HOME | End: 2022-07-01
Payer: MEDICARE

## 2022-07-01 VITALS
RESPIRATION RATE: 16 BRPM | DIASTOLIC BLOOD PRESSURE: 70 MMHG | TEMPERATURE: 99.7 F | HEART RATE: 61 BPM | SYSTOLIC BLOOD PRESSURE: 181 MMHG

## 2022-07-01 DIAGNOSIS — R73.03 PREDIABETES: ICD-10-CM

## 2022-07-01 DIAGNOSIS — I10 ESSENTIAL (PRIMARY) HYPERTENSION: ICD-10-CM

## 2022-07-01 DIAGNOSIS — K91.2 POSTSURGICAL MALABSORPTION, NOT ELSEWHERE CLASSIFIED: ICD-10-CM

## 2022-07-01 DIAGNOSIS — E78.00 HYPERCHOLESTEROLEMIA: ICD-10-CM

## 2022-07-01 DIAGNOSIS — I10 PRIMARY HYPERTENSION: Primary | ICD-10-CM

## 2022-07-01 DIAGNOSIS — E03.9 HYPOTHYROIDISM, UNSPECIFIED TYPE: ICD-10-CM

## 2022-07-01 DIAGNOSIS — N17.9 AKI (ACUTE KIDNEY INJURY) (HCC): ICD-10-CM

## 2022-07-01 DIAGNOSIS — E78.00 PURE HYPERCHOLESTEROLEMIA: ICD-10-CM

## 2022-07-01 LAB
ALBUMIN SERPL-MCNC: 3.6 G/DL (ref 3.5–5.2)
ALP BLD-CCNC: 242 U/L (ref 35–104)
ALT SERPL-CCNC: 15 U/L (ref 0–32)
ANION GAP SERPL CALCULATED.3IONS-SCNC: 12 MMOL/L (ref 7–16)
AST SERPL-CCNC: 15 U/L (ref 0–31)
BASOPHILS ABSOLUTE: 0.05 E9/L (ref 0–0.2)
BASOPHILS RELATIVE PERCENT: 0.5 % (ref 0–2)
BILIRUB SERPL-MCNC: 0.2 MG/DL (ref 0–1.2)
BUN BLDV-MCNC: 36 MG/DL (ref 6–23)
CALCIUM SERPL-MCNC: 8.7 MG/DL (ref 8.6–10.2)
CHLORIDE BLD-SCNC: 105 MMOL/L (ref 98–107)
CHOLESTEROL, TOTAL: 151 MG/DL (ref 0–199)
CO2: 21 MMOL/L (ref 22–29)
CREAT SERPL-MCNC: 1.5 MG/DL (ref 0.5–1)
EOSINOPHILS ABSOLUTE: 0.17 E9/L (ref 0.05–0.5)
EOSINOPHILS RELATIVE PERCENT: 1.7 % (ref 0–6)
FERRITIN: 143 NG/ML
GFR AFRICAN AMERICAN: 40
GFR NON-AFRICAN AMERICAN: 33 ML/MIN/1.73
GLUCOSE BLD-MCNC: 82 MG/DL (ref 74–99)
HBA1C MFR BLD: 5.2 % (ref 4–5.6)
HCT VFR BLD CALC: 35 % (ref 34–48)
HDLC SERPL-MCNC: 78 MG/DL
HEMOGLOBIN: 11.3 G/DL (ref 11.5–15.5)
IMMATURE GRANULOCYTES #: 0.03 E9/L
IMMATURE GRANULOCYTES %: 0.3 % (ref 0–5)
IRON SATURATION: 13 % (ref 15–50)
IRON: 30 MCG/DL (ref 37–145)
LDL CHOLESTEROL CALCULATED: 56 MG/DL (ref 0–99)
LYMPHOCYTES ABSOLUTE: 1.52 E9/L (ref 1.5–4)
LYMPHOCYTES RELATIVE PERCENT: 15.2 % (ref 20–42)
MAGNESIUM: 1.9 MG/DL (ref 1.6–2.6)
MCH RBC QN AUTO: 31.9 PG (ref 26–35)
MCHC RBC AUTO-ENTMCNC: 32.3 % (ref 32–34.5)
MCV RBC AUTO: 98.9 FL (ref 80–99.9)
MONOCYTES ABSOLUTE: 0.89 E9/L (ref 0.1–0.95)
MONOCYTES RELATIVE PERCENT: 8.9 % (ref 2–12)
NEUTROPHILS ABSOLUTE: 7.32 E9/L (ref 1.8–7.3)
NEUTROPHILS RELATIVE PERCENT: 73.4 % (ref 43–80)
PDW BLD-RTO: 14 FL (ref 11.5–15)
PHOSPHORUS: 4.6 MG/DL (ref 2.5–4.5)
PLATELET # BLD: 329 E9/L (ref 130–450)
PMV BLD AUTO: 11.1 FL (ref 7–12)
POTASSIUM SERPL-SCNC: 4.2 MMOL/L (ref 3.5–5)
RBC # BLD: 3.54 E12/L (ref 3.5–5.5)
SODIUM BLD-SCNC: 138 MMOL/L (ref 132–146)
T4 FREE: 1.38 NG/DL (ref 0.93–1.7)
TOTAL IRON BINDING CAPACITY: 229 MCG/DL (ref 250–450)
TOTAL PROTEIN: 7.1 G/DL (ref 6.4–8.3)
TRANSFERRIN: 205 MG/DL (ref 200–360)
TRIGL SERPL-MCNC: 83 MG/DL (ref 0–149)
TSH SERPL DL<=0.05 MIU/L-ACNC: 1.38 UIU/ML (ref 0.27–4.2)
VLDLC SERPL CALC-MCNC: 17 MG/DL
WBC # BLD: 10 E9/L (ref 4.5–11.5)

## 2022-07-01 PROCEDURE — 36591 DRAW BLOOD OFF VENOUS DEVICE: CPT

## 2022-07-01 PROCEDURE — 80053 COMPREHEN METABOLIC PANEL: CPT

## 2022-07-01 PROCEDURE — 84443 ASSAY THYROID STIM HORMONE: CPT

## 2022-07-01 PROCEDURE — 82728 ASSAY OF FERRITIN: CPT

## 2022-07-01 PROCEDURE — 6360000002 HC RX W HCPCS: Performed by: INTERNAL MEDICINE

## 2022-07-01 PROCEDURE — 83735 ASSAY OF MAGNESIUM: CPT

## 2022-07-01 PROCEDURE — 84439 ASSAY OF FREE THYROXINE: CPT

## 2022-07-01 PROCEDURE — 83540 ASSAY OF IRON: CPT

## 2022-07-01 PROCEDURE — 2580000003 HC RX 258: Performed by: INTERNAL MEDICINE

## 2022-07-01 PROCEDURE — 84100 ASSAY OF PHOSPHORUS: CPT

## 2022-07-01 PROCEDURE — 83550 IRON BINDING TEST: CPT

## 2022-07-01 PROCEDURE — 83036 HEMOGLOBIN GLYCOSYLATED A1C: CPT

## 2022-07-01 PROCEDURE — 85025 COMPLETE CBC W/AUTO DIFF WBC: CPT

## 2022-07-01 PROCEDURE — 80061 LIPID PANEL: CPT

## 2022-07-01 PROCEDURE — 84466 ASSAY OF TRANSFERRIN: CPT

## 2022-07-01 PROCEDURE — 36415 COLL VENOUS BLD VENIPUNCTURE: CPT

## 2022-07-01 RX ORDER — AMLODIPINE BESYLATE 10 MG/1
10 TABLET ORAL NIGHTLY
COMMUNITY
End: 2022-07-31

## 2022-07-01 RX ORDER — SODIUM CHLORIDE 0.9 % (FLUSH) 0.9 %
10 SYRINGE (ML) INJECTION PRN
Status: CANCELLED | OUTPATIENT
Start: 2022-07-22

## 2022-07-01 RX ORDER — HEPARIN SODIUM (PORCINE) LOCK FLUSH IV SOLN 100 UNIT/ML 100 UNIT/ML
500 SOLUTION INTRAVENOUS PRN
Status: DISCONTINUED | OUTPATIENT
Start: 2022-07-01 | End: 2022-07-02 | Stop reason: HOSPADM

## 2022-07-01 RX ORDER — HEPARIN SODIUM (PORCINE) LOCK FLUSH IV SOLN 100 UNIT/ML 100 UNIT/ML
500 SOLUTION INTRAVENOUS PRN
Status: CANCELLED | OUTPATIENT
Start: 2022-07-22

## 2022-07-01 RX ORDER — SODIUM CHLORIDE 0.9 % (FLUSH) 0.9 %
10 SYRINGE (ML) INJECTION PRN
Status: DISCONTINUED | OUTPATIENT
Start: 2022-07-01 | End: 2022-07-02 | Stop reason: HOSPADM

## 2022-07-01 RX ADMIN — SODIUM CHLORIDE, PRESERVATIVE FREE 10 ML: 5 INJECTION INTRAVENOUS at 14:47

## 2022-07-01 RX ADMIN — HEPARIN 500 UNITS: 100 SYRINGE at 14:47

## 2022-07-01 NOTE — PROGRESS NOTES
Patient admitted for lab draw from port and port flush. Port  Flushed with no problems labs obtained and port de-accessed. Patient was not fasting for her lipid panel but insisted on lab draw. Patient discharged in stable condition.

## 2022-07-12 ENCOUNTER — HOSPITAL ENCOUNTER (EMERGENCY)
Age: 82
Discharge: LEFT BEFORE DISCHARGE | End: 2022-07-12

## 2022-07-13 ENCOUNTER — APPOINTMENT (OUTPATIENT)
Dept: GENERAL RADIOLOGY | Age: 82
DRG: 682 | End: 2022-07-13
Payer: MEDICARE

## 2022-07-13 ENCOUNTER — HOSPITAL ENCOUNTER (EMERGENCY)
Age: 82
Discharge: LEFT BEFORE DISCHARGE | End: 2022-07-13

## 2022-07-13 PROCEDURE — 71046 X-RAY EXAM CHEST 2 VIEWS: CPT

## 2022-07-13 PROCEDURE — 87502 INFLUENZA DNA AMP PROBE: CPT

## 2022-07-13 PROCEDURE — 93005 ELECTROCARDIOGRAM TRACING: CPT | Performed by: PHYSICIAN ASSISTANT

## 2022-07-13 PROCEDURE — 99285 EMERGENCY DEPT VISIT HI MDM: CPT

## 2022-07-13 PROCEDURE — 87635 SARS-COV-2 COVID-19 AMP PRB: CPT

## 2022-07-13 RX ORDER — SODIUM CHLORIDE 9 MG/ML
INJECTION, SOLUTION INTRAVENOUS PRN
Status: DISCONTINUED | OUTPATIENT
Start: 2022-07-13 | End: 2022-07-13 | Stop reason: HOSPADM

## 2022-07-13 RX ORDER — SODIUM CHLORIDE 0.9 % (FLUSH) 0.9 %
5-40 SYRINGE (ML) INJECTION PRN
Status: DISCONTINUED | OUTPATIENT
Start: 2022-07-13 | End: 2022-07-13 | Stop reason: HOSPADM

## 2022-07-13 RX ORDER — SODIUM CHLORIDE 0.9 % (FLUSH) 0.9 %
5-40 SYRINGE (ML) INJECTION EVERY 12 HOURS SCHEDULED
Status: DISCONTINUED | OUTPATIENT
Start: 2022-07-13 | End: 2022-07-13 | Stop reason: HOSPADM

## 2022-07-14 ENCOUNTER — HOSPITAL ENCOUNTER (INPATIENT)
Age: 82
LOS: 8 days | Discharge: NURSING, REHAB OR LONG-TERM CARE | DRG: 682 | End: 2022-07-22
Attending: EMERGENCY MEDICINE | Admitting: INTERNAL MEDICINE
Payer: MEDICARE

## 2022-07-14 ENCOUNTER — APPOINTMENT (OUTPATIENT)
Dept: ULTRASOUND IMAGING | Age: 82
DRG: 682 | End: 2022-07-14
Payer: MEDICARE

## 2022-07-14 DIAGNOSIS — I21.4 NSTEMI (NON-ST ELEVATED MYOCARDIAL INFARCTION) (HCC): ICD-10-CM

## 2022-07-14 DIAGNOSIS — R62.7 FAILURE TO THRIVE IN ADULT: ICD-10-CM

## 2022-07-14 DIAGNOSIS — E86.0 DEHYDRATION: Primary | ICD-10-CM

## 2022-07-14 DIAGNOSIS — N17.9 ACUTE RENAL FAILURE, UNSPECIFIED ACUTE RENAL FAILURE TYPE (HCC): ICD-10-CM

## 2022-07-14 DIAGNOSIS — A41.9 SEPSIS, DUE TO UNSPECIFIED ORGANISM, UNSPECIFIED WHETHER ACUTE ORGAN DYSFUNCTION PRESENT (HCC): ICD-10-CM

## 2022-07-14 DIAGNOSIS — E43 SEVERE PROTEIN-CALORIE MALNUTRITION (HCC): ICD-10-CM

## 2022-07-14 LAB
ALBUMIN SERPL-MCNC: 3.2 G/DL (ref 3.5–5.2)
ALP BLD-CCNC: 273 U/L (ref 35–104)
ALT SERPL-CCNC: 30 U/L (ref 0–32)
ANION GAP SERPL CALCULATED.3IONS-SCNC: 18 MMOL/L (ref 7–16)
AST SERPL-CCNC: 20 U/L (ref 0–31)
BASOPHILS ABSOLUTE: 0.02 E9/L (ref 0–0.2)
BASOPHILS RELATIVE PERCENT: 0.2 % (ref 0–2)
BILIRUB SERPL-MCNC: 0.9 MG/DL (ref 0–1.2)
BUN BLDV-MCNC: 113 MG/DL (ref 6–23)
CALCIUM SERPL-MCNC: 8.8 MG/DL (ref 8.6–10.2)
CHLORIDE BLD-SCNC: 100 MMOL/L (ref 98–107)
CO2: 16 MMOL/L (ref 22–29)
CREAT SERPL-MCNC: 3.6 MG/DL (ref 0.5–1)
EKG ATRIAL RATE: 62 BPM
EKG P AXIS: 77 DEGREES
EKG P-R INTERVAL: 162 MS
EKG Q-T INTERVAL: 448 MS
EKG QRS DURATION: 114 MS
EKG QTC CALCULATION (BAZETT): 454 MS
EKG R AXIS: -42 DEGREES
EKG T AXIS: 53 DEGREES
EKG VENTRICULAR RATE: 62 BPM
EOSINOPHILS ABSOLUTE: 0.06 E9/L (ref 0.05–0.5)
EOSINOPHILS RELATIVE PERCENT: 0.6 % (ref 0–6)
GFR AFRICAN AMERICAN: 15
GFR NON-AFRICAN AMERICAN: 12 ML/MIN/1.73
GLUCOSE BLD-MCNC: 96 MG/DL (ref 74–99)
HCT VFR BLD CALC: 37.6 % (ref 34–48)
HEMOGLOBIN: 11.7 G/DL (ref 11.5–15.5)
IMMATURE GRANULOCYTES #: 0.06 E9/L
IMMATURE GRANULOCYTES %: 0.6 % (ref 0–5)
INFLUENZA A BY PCR: NOT DETECTED
INFLUENZA B BY PCR: NOT DETECTED
LACTIC ACID, SEPSIS: 0.9 MMOL/L (ref 0.5–1.9)
LYMPHOCYTES ABSOLUTE: 0.94 E9/L (ref 1.5–4)
LYMPHOCYTES RELATIVE PERCENT: 8.7 % (ref 20–42)
MCH RBC QN AUTO: 31.5 PG (ref 26–35)
MCHC RBC AUTO-ENTMCNC: 31.1 % (ref 32–34.5)
MCV RBC AUTO: 101.3 FL (ref 80–99.9)
MONOCYTES ABSOLUTE: 1.11 E9/L (ref 0.1–0.95)
MONOCYTES RELATIVE PERCENT: 10.3 % (ref 2–12)
NEUTROPHILS ABSOLUTE: 8.58 E9/L (ref 1.8–7.3)
NEUTROPHILS RELATIVE PERCENT: 79.6 % (ref 43–80)
PDW BLD-RTO: 15 FL (ref 11.5–15)
PLATELET # BLD: 211 E9/L (ref 130–450)
PMV BLD AUTO: 12.7 FL (ref 7–12)
POTASSIUM REFLEX MAGNESIUM: 4.7 MMOL/L (ref 3.5–5)
RBC # BLD: 3.71 E12/L (ref 3.5–5.5)
SARS-COV-2, NAAT: NOT DETECTED
SODIUM BLD-SCNC: 134 MMOL/L (ref 132–146)
TOTAL CK: 31 U/L (ref 20–180)
TOTAL PROTEIN: 7 G/DL (ref 6.4–8.3)
TROPONIN, HIGH SENSITIVITY: 326 NG/L (ref 0–9)
TROPONIN, HIGH SENSITIVITY: 372 NG/L (ref 0–9)
WBC # BLD: 10.8 E9/L (ref 4.5–11.5)

## 2022-07-14 PROCEDURE — 80053 COMPREHEN METABOLIC PANEL: CPT

## 2022-07-14 PROCEDURE — 82550 ASSAY OF CK (CPK): CPT

## 2022-07-14 PROCEDURE — 2580000003 HC RX 258: Performed by: NURSE PRACTITIONER

## 2022-07-14 PROCEDURE — 85025 COMPLETE CBC W/AUTO DIFF WBC: CPT

## 2022-07-14 PROCEDURE — 83605 ASSAY OF LACTIC ACID: CPT

## 2022-07-14 PROCEDURE — 6370000000 HC RX 637 (ALT 250 FOR IP): Performed by: INTERNAL MEDICINE

## 2022-07-14 PROCEDURE — 76770 US EXAM ABDO BACK WALL COMP: CPT

## 2022-07-14 PROCEDURE — 87040 BLOOD CULTURE FOR BACTERIA: CPT

## 2022-07-14 PROCEDURE — 2060000000 HC ICU INTERMEDIATE R&B

## 2022-07-14 PROCEDURE — 84484 ASSAY OF TROPONIN QUANT: CPT

## 2022-07-14 PROCEDURE — 36415 COLL VENOUS BLD VENIPUNCTURE: CPT

## 2022-07-14 PROCEDURE — 2580000003 HC RX 258: Performed by: INTERNAL MEDICINE

## 2022-07-14 PROCEDURE — 2500000003 HC RX 250 WO HCPCS: Performed by: NURSE PRACTITIONER

## 2022-07-14 PROCEDURE — 2580000003 HC RX 258: Performed by: EMERGENCY MEDICINE

## 2022-07-14 RX ORDER — SODIUM CHLORIDE 9 MG/ML
INJECTION, SOLUTION INTRAVENOUS CONTINUOUS
Status: DISCONTINUED | OUTPATIENT
Start: 2022-07-14 | End: 2022-07-15

## 2022-07-14 RX ORDER — SODIUM CHLORIDE 0.9 % (FLUSH) 0.9 %
10 SYRINGE (ML) INJECTION EVERY 12 HOURS SCHEDULED
Status: DISCONTINUED | OUTPATIENT
Start: 2022-07-14 | End: 2022-07-22 | Stop reason: HOSPADM

## 2022-07-14 RX ORDER — HYOSCYAMINE SULFATE 0.125 MG
125 TABLET ORAL 2 TIMES DAILY PRN
Status: DISCONTINUED | OUTPATIENT
Start: 2022-07-14 | End: 2022-07-22 | Stop reason: HOSPADM

## 2022-07-14 RX ORDER — SODIUM CHLORIDE 0.9 % (FLUSH) 0.9 %
10 SYRINGE (ML) INJECTION PRN
Status: DISCONTINUED | OUTPATIENT
Start: 2022-07-14 | End: 2022-07-22 | Stop reason: HOSPADM

## 2022-07-14 RX ORDER — OXYCODONE AND ACETAMINOPHEN 10; 325 MG/1; MG/1
1 TABLET ORAL EVERY 6 HOURS PRN
Status: DISCONTINUED | OUTPATIENT
Start: 2022-07-14 | End: 2022-07-22 | Stop reason: HOSPADM

## 2022-07-14 RX ORDER — DIPHENOXYLATE HYDROCHLORIDE AND ATROPINE SULFATE 2.5; .025 MG/1; MG/1
1 TABLET ORAL
COMMUNITY

## 2022-07-14 RX ORDER — MULTIVITAMIN/IRON/FOLIC ACID 18MG-0.4MG
250 TABLET ORAL 2 TIMES DAILY
COMMUNITY

## 2022-07-14 RX ORDER — 0.9 % SODIUM CHLORIDE 0.9 %
1000 INTRAVENOUS SOLUTION INTRAVENOUS ONCE
Status: COMPLETED | OUTPATIENT
Start: 2022-07-14 | End: 2022-07-14

## 2022-07-14 RX ORDER — SENNA PLUS 8.6 MG/1
1 TABLET ORAL DAILY PRN
Status: DISCONTINUED | OUTPATIENT
Start: 2022-07-14 | End: 2022-07-22 | Stop reason: HOSPADM

## 2022-07-14 RX ORDER — VIT A/VIT C/VIT E/ZINC/COPPER 4296-226
1 CAPSULE ORAL 2 TIMES DAILY
COMMUNITY

## 2022-07-14 RX ORDER — ESCITALOPRAM OXALATE 10 MG/1
20 TABLET ORAL NIGHTLY
Status: DISCONTINUED | OUTPATIENT
Start: 2022-07-14 | End: 2022-07-22 | Stop reason: HOSPADM

## 2022-07-14 RX ORDER — PANTOPRAZOLE SODIUM 40 MG/1
40 TABLET, DELAYED RELEASE ORAL
Status: DISCONTINUED | OUTPATIENT
Start: 2022-07-15 | End: 2022-07-22 | Stop reason: HOSPADM

## 2022-07-14 RX ORDER — ACETAMINOPHEN 325 MG/1
650 TABLET ORAL EVERY 6 HOURS PRN
Status: DISCONTINUED | OUTPATIENT
Start: 2022-07-14 | End: 2022-07-22 | Stop reason: HOSPADM

## 2022-07-14 RX ORDER — LOPERAMIDE HYDROCHLORIDE 2 MG/1
4 CAPSULE ORAL
COMMUNITY

## 2022-07-14 RX ORDER — ASPIRIN 81 MG/1
81 TABLET, CHEWABLE ORAL DAILY
Status: DISCONTINUED | OUTPATIENT
Start: 2022-07-14 | End: 2022-07-18

## 2022-07-14 RX ORDER — ACETAMINOPHEN 650 MG/1
650 SUPPOSITORY RECTAL EVERY 6 HOURS PRN
Status: DISCONTINUED | OUTPATIENT
Start: 2022-07-14 | End: 2022-07-22 | Stop reason: HOSPADM

## 2022-07-14 RX ORDER — LOPERAMIDE HYDROCHLORIDE 2 MG/1
6 CAPSULE ORAL NIGHTLY
Status: ON HOLD | COMMUNITY
End: 2022-07-31 | Stop reason: HOSPADM

## 2022-07-14 RX ORDER — SODIUM CHLORIDE 9 MG/ML
INJECTION, SOLUTION INTRAVENOUS PRN
Status: DISCONTINUED | OUTPATIENT
Start: 2022-07-14 | End: 2022-07-22 | Stop reason: HOSPADM

## 2022-07-14 RX ADMIN — SODIUM BICARBONATE: 84 INJECTION, SOLUTION INTRAVENOUS at 16:34

## 2022-07-14 RX ADMIN — SODIUM CHLORIDE 1000 ML: 9 INJECTION, SOLUTION INTRAVENOUS at 11:13

## 2022-07-14 RX ADMIN — SODIUM CHLORIDE, PRESERVATIVE FREE 10 ML: 5 INJECTION INTRAVENOUS at 20:44

## 2022-07-14 RX ADMIN — ESCITALOPRAM OXALATE 20 MG: 10 TABLET ORAL at 20:44

## 2022-07-14 ASSESSMENT — ENCOUNTER SYMPTOMS
SINUS PRESSURE: 0
NAUSEA: 0
WHEEZING: 0
EYE REDNESS: 0
EYE DISCHARGE: 0
ABDOMINAL DISTENTION: 0
SHORTNESS OF BREATH: 0
COUGH: 0
SORE THROAT: 0
DIARRHEA: 0
ABDOMINAL PAIN: 0
VOMITING: 0
BACK PAIN: 0
EYE PAIN: 0

## 2022-07-14 ASSESSMENT — PAIN SCALES - GENERAL: PAINLEVEL_OUTOF10: 0

## 2022-07-14 NOTE — ED PROVIDER NOTES
This patient presents complaining of body wide pain, generalized weakness and fatigue. She denies shortness of breath. Denies any recent trauma. Patient notes have been reviewed. She was seen by Dr. Artie Amato yesterday. They tried to make a direct admission but, no beds were available. Therefore she was sent here to the emergency department. [address] office note indicates failure to thrive with intent for observation status with admission to general medical floor with telemetry. The history is provided by the patient. Fatigue  Severity:  Severe  Onset quality:  Unable to specify  Timing:  Constant  Progression:  Worsening  Chronicity:  New  Relieved by:  None tried  Worsened by:  Nothing  Ineffective treatments:  None tried  Associated symptoms: chest pain    Associated symptoms: no abdominal pain, no arthralgias, no cough, no diarrhea, no dysuria, no fever, no frequency, no headaches, no nausea, no shortness of breath and no vomiting         Review of Systems   Constitutional: Positive for activity change, appetite change, fatigue and unexpected weight change. Negative for chills and fever. HENT: Negative for ear pain, sinus pressure and sore throat. Eyes: Negative for pain, discharge and redness. Respiratory: Negative for cough, shortness of breath and wheezing. Cardiovascular: Positive for chest pain. Gastrointestinal: Negative for abdominal distention, abdominal pain, diarrhea, nausea and vomiting. Genitourinary: Negative for dysuria and frequency. Musculoskeletal: Negative for arthralgias and back pain. Skin: Negative for rash and wound. Neurological: Negative for weakness and headaches. Hematological: Negative for adenopathy. All other systems reviewed and are negative. Physical Exam  Vitals and nursing note reviewed. Constitutional:       Appearance: She is well-developed. She is cachectic. HENT:      Head: Normocephalic and atraumatic.    Eyes:      Pupils: Pupils are equal, round, and reactive to light. Cardiovascular:      Rate and Rhythm: Normal rate and regular rhythm. Heart sounds: Normal heart sounds. No murmur heard. Pulmonary:      Effort: Pulmonary effort is normal. No respiratory distress. Breath sounds: Normal breath sounds. No wheezing or rales. Abdominal:      General: Bowel sounds are normal.      Palpations: Abdomen is soft. Tenderness: There is no abdominal tenderness. There is no guarding or rebound. Musculoskeletal:      Cervical back: Normal range of motion and neck supple. Skin:     General: Skin is warm and dry. Comments: Extremities with multiple bruises in various stages of healing. Neurological:      Mental Status: She is oriented to person, place, and time. She is lethargic. GCS: GCS eye subscore is 3. GCS verbal subscore is 5. GCS motor subscore is 6. Cranial Nerves: No cranial nerve deficit. Coordination: Coordination normal.      Comments: Patient falls back asleep easily but, is oriented when awake. Procedures     MDM     EKG: This EKG is signed and interpreted by me. Rate: 62  Rhythm: Sinus  Interpretation: no acute changes and non-specific EKG  Comparison: stable as compared to patient's most recent EKG    12:34 PM EDT  Discussed with Dr. Ceasar Rogers. She will admit.   Consults placed.         --------------------------------------------- PAST HISTORY ---------------------------------------------  Past Medical History:  has a past medical history of Acute kidney failure ([facility] [address] Anxiety, Arthritis, CAD (coronary artery disease), Cancer ([facility] [address] COPD (chronic obstructive pulmonary disease) ([facility] Depression, Fibromyalgia, Generalized headaches, History of blood transfusion, History of necrotic bowel, Hyperlipidemia, Hypertension, Incisional hernia, Insomnia, Mitral valve regurgitation, Myocardial infarct ([facility] [address] Occasional tremors, Osteopenia, PONV (postoperative nausea and vomiting), and Vitamin B12 deficiency. Past Surgical History:  has a past surgical history that includes Coronary angioplasty with stent (1-7-2002); Colonoscopy; Esophagus surgery (1-4-13); Rotator cuff repair (2010); arthroplasty (1-2006); Finger trigger release (Right, 2006); Cataract removal with implant (Right, 03/03/15); Appendectomy (2002); Cholecystectomy (5-11-07); Carpal tunnel release (Bilateral); skin biopsy (2010); Foot surgery (Right); Jejunostomy (1-3-13); Endoscopy, colon, diagnostic; epigastric hernia repair (3/21/16); other surgical history (08/24/2016); Nasal sinus surgery; back surgery (1991, 2004, 2009); back surgery; Mohs surgery; other surgical history; Breast surgery (years ago); Knee Arthroplasty (Left, 03/22/2017); laparotomy (N/A, 5/12/2020); laparotomy (N/A, 6/8/2020); Insert Picc Line (06/09/2020); joint replacement (Right, 3/24/15); Abdomen surgery (2-); Abdomen surgery (N/A, 1/26/2020); hernia repair (12-31-13); Abdomen surgery (N/A, 9/11/2020); picc line insertion nurse (9/15/2020); and laparotomy (N/A, 9/15/2020). Social History:  reports that she has been smoking cigarettes. She has been smoking about 1.00 pack per day. She has never used smokeless tobacco. She reports previous alcohol use. She reports that she does not use drugs. Family History: family history is not on file. The patients home medications have been reviewed.     Allergies: Fentanyl, Levofloxacin, Tramadol, and Vioxx [rofecoxib]    -------------------------------------------------- RESULTS -------------------------------------------------    LABS:  Results for orders placed or performed during the hospital encounter of 07/14/22   Rapid influenza A/B antigens    Specimen: Nasopharyngeal   Result Value Ref Range    Influenza A by PCR Not Detected Not Detected    Influenza B by PCR Not Detected Not Detected   COVID-19, Rapid    Specimen: Nasopharyngeal Swab   Result Value Ref Range    SARS-CoV-2, NAAT Not Detected Not Detected   Troponin   Result Value Ref Range    Troponin, High Sensitivity 372 (H) 0 - 9 ng/L   CBC with Auto Differential   Result Value Ref Range    WBC 10.8 4.5 - 11.5 E9/L    RBC 3.71 3.50 - 5.50 E12/L    Hemoglobin 11.7 11.5 - 15.5 g/dL    Hematocrit 37.6 34.0 - 48.0 %    .3 (H) 80.0 - 99.9 fL    MCH 31.5 26.0 - 35.0 pg    MCHC 31.1 (L) 32.0 - 34.5 %    RDW 15.0 11.5 - 15.0 fL    Platelets 391 232 - 168 E9/L    MPV 12.7 (H) 7.0 - 12.0 fL    Neutrophils % 79.6 43.0 - 80.0 %    Immature Granulocytes % 0.6 0.0 - 5.0 %    Lymphocytes % 8.7 (L) 20.0 - 42.0 %    Monocytes % 10.3 2.0 - 12.0 %    Eosinophils % 0.6 0.0 - 6.0 %    Basophils % 0.2 0.0 - 2.0 %    Neutrophils Absolute 8.58 (H) 1.80 - 7.30 E9/L    Immature Granulocytes # 0.06 E9/L    Lymphocytes Absolute 0.94 (L) 1.50 - 4.00 E9/L    Monocytes Absolute 1.11 (H) 0.10 - 0.95 E9/L    Eosinophils Absolute 0.06 0.05 - 0.50 E9/L    Basophils Absolute 0.02 0.00 - 0.20 E9/L   Comprehensive Metabolic Panel w/ Reflex to MG   Result Value Ref Range    Sodium 134 132 - 146 mmol/L    Potassium reflex Magnesium 4.7 3.5 - 5.0 mmol/L    Chloride 100 98 - 107 mmol/L    CO2 16 (L) 22 - 29 mmol/L    Anion Gap 18 (H) 7 - 16 mmol/L    Glucose 96 74 - 99 mg/dL     (HH) 6 - 23 mg/dL    CREATININE 3.6 (H) 0.5 - 1.0 mg/dL    GFR Non-African American 12 >=60 mL/min/1.73    GFR African American 15     Calcium 8.8 8.6 - 10.2 mg/dL    Total Protein 7.0 6.4 - 8.3 g/dL    Albumin 3.2 (L) 3.5 - 5.2 g/dL    Total Bilirubin 0.9 0.0 - 1.2 mg/dL    Alkaline Phosphatase 273 (H) 35 - 104 U/L    ALT 30 0 - 32 U/L    AST 20 0 - 31 U/L   Lactate, Sepsis   Result Value Ref Range    Lactic Acid, Sepsis 0.9 0.5 - 1.9 mmol/L   Troponin   Result Value Ref Range    Troponin, High Sensitivity 326 (H) 0 - 9 ng/L   EKG 12 Lead   Result Value Ref Range    Ventricular Rate 62 BPM    Atrial Rate 62 BPM    P-R Interval 162 ms    QRS Duration 114 ms    Q-T Interval 448 ms    QTc Calculation (Sha) 454 ms    P Axis 77 degrees    R Axis -42 degrees    T Axis 53 degrees       RADIOLOGY:  XR CHEST (2 VW)   Final Result   No acute process. ------------------------- NURSING NOTES AND VITALS REVIEWED ---------------------------  Date / Time Roomed:  7/14/2022  6:06 AM  ED Bed Assignment:  24/24    The nursing notes within the ED encounter and vital signs as below have been reviewed. Patient Vitals for the past 24 hrs:   BP Temp Temp src Pulse Resp SpO2 Height Weight   07/14/22 1114 (!) 128/43 98 °F (36.7 °C) -- 64 22 96 % -- --   07/14/22 0616 (!) 109/39 97.5 °F (36.4 °C) Oral 58 12 98 % -- --   07/14/22 0530 109/65 98 °F (36.7 °C) -- 60 18 98 % -- --   07/13/22 2205 (!) 119/49 97.5 °F (36.4 °C) Oral 57 14 94 % 5' (1.524 m) 100 lb (45.4 kg)       Oxygen Saturation Interpretation: Normal    ------------------------------------------ PROGRESS NOTES ------------------------------------------  Re-evaluation(s):  Time: 3412  Patients symptoms show no change  Repeat physical examination is not changed    Counseling:  I have spoken with the patient and discussed todays results, in addition to providing specific details for the plan of care and counseling regarding the diagnosis and prognosis. Their questions are answered at this time and they are agreeable with the plan of admission.    --------------------------------- ADDITIONAL PROVIDER NOTES ---------------------------------  Consultations:  Spoke with Dr. Neo Suarez. Discussed case. They will admit the patient. This patient's ED course included: a personal history and physicial examination, multiple bedside re-evaluations, IV medications, cardiac monitoring and continuous pulse oximetry    This patient has remained hemodynamically stable during their ED course. Diagnosis:  1. Dehydration    2. Acute renal failure, unspecified acute renal failure type ([facility] [address]    3. NSTEMI (non-ST elevated myocardial infarction) ([facility] [address]    4. Failure to thrive in adult    5. Severe protein-calorie malnutrition ([address]        Disposition:  Patient's disposition: Admit to [address]  Patient's condition is stable. Please note that the withdrawal or failure to initiate urgent interventions for this patient would likely result in a life threatening deterioration or permanent disability. Systems at risk for deterioration include: CV, pulm    Accordingly this patient received 44 minutes of critical care time, excluding separately billable procedures.          Missy Jain, DO  07/14/22 Monet Watson 666, DO  08/25/22 [id number]

## 2022-07-14 NOTE — CONSULTS
INPATIENT CONSULT-[facility] CARDIOLOGY    Name: Aniyah Mckay    Age: [de-identified] y.o. Date of Admission: 7/14/2022  6:06 AM    Date of Service: 7/14/2022    Reason for Consultation: Troponin elevation    Referring Physician: Awilda    History of Present Illness: The patient is a [de-identified]y.o. year old female admitted with severe fatigue over the last week and inability to perform daily tasks. Found to be in acute renal failure on top of chronic kidney disease with creatinine 3.6, up from baseline of around 1.4-1.5. Unable to give a cogent history, slumped over in the bed and awakens briefly only to go back to sleep. Troponin levels were checked and were in the mid 300s range and are steady. No acute ECG changes identified. Past Medical History:   Hypertension, hyperlipidemia, CAD post right coronary artery stent placement 2002 with preserved left ventricular systolic function and mild to moderate mitral regurgitation on echocardiogram 2018. Normal Lexiscan nuclear stress testing at that time. Stage IIIb chronic kidney disease with GFR approximately 40 cc/min and baseline creatinine 1.4-1.5. Review of Systems: Unable to assess due to mental status. Family History:  No known premature or nonischemic or CAD or sudden cardiac death in first-degree relatives.     Social History:  Social History     Socioeconomic History    Marital status:      Spouse name: Not on file    Number of children: Not on file    Years of education: Not on file    Highest education level: Not on file   Occupational History    Not on file   Tobacco Use    Smoking status: Current Every Day Smoker     Packs/day: 1.00     Types: Cigarettes    Smokeless tobacco: Never Used   Vaping Use    Vaping Use: Never used   Substance and Sexual Activity    Alcohol use: Not Currently     Comment: occasional    Drug use: No    Sexual activity: Not on file   Other Topics Concern    Not on file   Social History Narrative    Not on file     Social Determinants of Health     Financial Resource Strain:     Difficulty of Paying Living Expenses: Not on file   Food Insecurity:     Worried About Running Out of Food in the Last Year: Not on file    Ross of Food in the Last Year: Not on file   Transportation Needs:     Lack of Transportation (Medical): Not on file    Lack of Transportation (Non-Medical): Not on file   Physical Activity:     Days of Exercise per Week: Not on file    Minutes of Exercise per Session: Not on file   Stress:     Feeling of Stress : Not on file   Social Connections:     Frequency of Communication with Friends and Family: Not on file    Frequency of Social Gatherings with Friends and Family: Not on file    Attends Church Services: Not on file    Active Member of [address] [place] [address] [employer] or Organizations: Not on file    Attends Club or Organization Meetings: Not on file    Marital Status: Not on file   Intimate Partner Violence:     Fear of Current or Ex-Partner: Not on file    Emotionally Abused: Not on file    Physically Abused: Not on file    Sexually Abused: Not on file   Housing Stability:     Unable to Pay for Housing in the Last Year: Not on file    Number of [place] in the Last Year: Not on file    Unstable Housing in the Last Year: Not on file       Allergies: Allergies   Allergen Reactions    Fentanyl Itching     Patch only    Levofloxacin Other (See Comments)     Leg edema/pain    Tramadol Nausea Only     Nausea .  Sleeplisness, shakey    Vioxx [Rofecoxib]      Leg edema and pain       Current Medications:  Current Facility-Administered Medications   Medication Dose Route Frequency Provider Last Rate Last Admin    sodium chloride flush 0.9 % injection 10 mL  10 mL IntraVENous 2 times per day Azalea Kaiser, DO        sodium chloride flush 0.9 % injection 10 mL  10 mL IntraVENous PRN Azalea Kaiser, DO        0.9 % sodium chloride infusion   IntraVENous PRN Azalea Kaiser, DO        senna (SENOKOT) tablet 8.6 mg  1 tablet Oral Daily PRN Azalea E Awilda, DO        acetaminophen (TYLENOL) tablet 650 mg  650 mg Oral Q6H PRN Azalea E Awilda, DO        Or    acetaminophen (TYLENOL) suppository 650 mg  650 mg Rectal Q6H PRN Azalea E Awilda, DO        aspirin chewable tablet 81 mg  81 mg Oral Daily Azalea E Awilda, DO        escitalopram (LEXAPRO) tablet 20 mg  20 mg Oral Nightly Azalea E Awilda, DO        hyoscyamine (ANASPAZ;LEVSIN) tablet 125 mcg  125 mcg Oral BID PRN Azalea E Awilda, DO        [START ON 7/15/2022] pantoprazole (PROTONIX) tablet 40 mg  40 mg Oral QAM AC Azalea E Awilda, DO        oxyCODONE-acetaminophen (PERCOCET)  MG per tablet 1 tablet  1 tablet Oral Q6H PRN Azalea E Awilda, DO        sodium bicarbonate 50 mEq in dextrose 5 % 1,000 mL infusion   IntraVENous Continuous GEMA Armas - CNP         Current Outpatient Medications   Medication Sig Dispense Refill    amLODIPine (NORVASC) 5 MG tablet Take 5 mg by mouth daily      LORazepam (ATIVAN) 0.5 MG tablet Take 0.5 mg by mouth daily.  fluticasone (FLONASE) 50 MCG/ACT nasal spray 1 spray by Each Nostril route daily      lipase-protease-amylase (CREON) 96934-09141 units delayed release capsule Take 36,000 Units by mouth 3 times daily (with meals) 3- 4 times a day      Probiotic CAPS Take by mouth      hyoscyamine (ANASPAZ;LEVSIN) 125 MCG tablet Take 125 mcg by mouth 2 times daily as needed for Cramping      omeprazole (PRILOSEC) 20 MG delayed release capsule Take 40 mg by mouth daily      aspirin 81 MG tablet Take 81 mg by mouth daily      oxyCODONE-acetaminophen (PERCOCET)  MG per tablet Take by mouth every 6 hours as needed for Pain.  Instructed to take with sip water am of procedure if needs       fenofibrate (TRICOR) 145 MG tablet Take 145 mg by mouth daily      Cholecalciferol (VITAMIN D3) 74792 UNITS CAPS Take 2,000 Units by mouth 2 times daily       Coenzyme Q10 (COQ10 PO) Take by mouth daily       Ferrous Sulfate (IRON) 28 MG TABS Take by mouth daily      calcium carbonate (OSCAL) 500 MG TABS tablet Take 500 mg by mouth daily LD 3/16/2017      pravastatin (PRAVACHOL) 40 MG tablet Take 40 mg by mouth every evening      propranolol (INDERAL) 20 MG tablet Take 20 mg by mouth daily Instructed to take morning of surgery with a sip of water       vitamin B-12 (CYANOCOBALAMIN) 500 MCG tablet Place 500 mcg under the tongue daily       Omega-3 Fatty Acids (OMEGA 3 PO) Take by mouth daily       escitalopram (LEXAPRO) 20 MG tablet Take 20 mg by mouth nightly.  primidone (MYSOLINE) 50 MG tablet Take 250 mg by mouth nightly Taking for essential tremors      traZODone (DESYREL) 50 MG tablet Take 25 mg by mouth nightly         sodium chloride      IV infusion builder         Physical Exam:  BP (!) 128/43   Pulse 64   Temp 98 °F (36.7 °C)   Resp 22   Ht 5' (1.524 m)   Wt 100 lb (45.4 kg)   SpO2 96%   BMI 19.53 kg/m²   Weight change: Wt Readings from Last 3 Encounters:   07/13/22 100 lb (45.4 kg)   01/28/22 94 lb (42.6 kg)   07/02/21 84 lb (38.1 kg)         General: Obtunded, barely responsive. HEENT: Unremarkable  Neck: No JVD or bruits. Cardiac: Regular rate and rhythm, normal S1 and S2, no extra heart sounds, murmurs, heaves, thrills  Resp: Lungs clear without wheezing or crackles. No accessory muscle use or retraction  Abdomen: soft, nontender, nondistended, no gross organomegaly or mass  Skin: Warm and dry, no cyanosis. Musculoskeletal: No identified joint deformity  Neuro: Grossly unremarkable  Psych: Obtunded affect    Intake/Output:  No intake or output data in the 24 hours ending 07/14/22 1348  No intake/output data recorded.     Laboratory Tests:  Lab Results   Component Value Date    CREATININE 3.6 (H) 07/14/2022     (HH) 07/14/2022     07/14/2022    K 4.7 07/14/2022     07/14/2022    CO2 16 (L) 07/14/2022     No results for input(s): CKTOTAL, CKMB in Hyperlipidemia-continue indefinite statin despite controlled LDL. #5.  No further inpatient cardiac recommendations pending at this time.   Further treatment per the hospitalist team.    Electronically signed by Milly Madison MD on 7/14/2022 at 1:48 PM

## 2022-07-14 NOTE — ED NOTES
Patient left when called from waiting area three times. Her  came to pivot desk and stated that they left to get coffee and we must have called while they were gone. He would like her seen ASAP.      Lali Valenzuela RN  07/13/22 8534

## 2022-07-14 NOTE — PROGRESS NOTES
Database initiated. She is alert to person and place from home with her . States she does not use assistive devices and is RA at baseline. She is very weak and cant ambulate at the moment. She has an ostomy which she says has increased its output and has turned to watery stools.  Pharmacy the to verify home medications

## 2022-07-14 NOTE — ED NOTES
Department of Emergency Medicine  FIRST PROVIDER TRIAGE NOTE             Independent MLP           7/13/22  10:07 PM EDT    Date of Encounter: 7/13/22   MRN: [id number]      HPI: Erick Luna is a [de-identified] y.o. female who presents to the ED for Abnormal Lab (sent in by Dr. Linda Velez)     Patient primary care for abnormal labs. She has some chest pain shortness of breath cough      ROS: Negative for Suicidal ideation or Homicidal Ideation. PE: Gen Appearance/Constitutional: alert  CV: regular rate  Pulm:  Diminished bilaterally      Initial Plan of Care: All treatment areas with department are currently occupied. Plan to order/Initiate the following while awaiting opening in ED: labs, EKG and imaging studies.   Initiate Treatment-Testing, Proceed toTreatment Area When Bed Available for ED Attending/MLP to Continue Care    Electronically signed by JUANITO Ramos   DD: 7/13/22       JUANITO Ramos  07/13/22 0507

## 2022-07-14 NOTE — CONSULTS
Nephrology Consult  [facility]    CC:   KD on CKD    HPI:   Jamil Geiger is an [de-identified]year old female we were asked to see regarding KD. She is followed longitudinally in the office with Dr. Craig Florentino. she was last seen in the office in April of this year and at that time her baseline creatinine was noted to be 1.4-1.5 mg/dl with an e-GFR of 33-36 ml/min. She came to the ED overnight at the urging of her PCP, whom she had seen yesterday as well. The PCP was attempting to direct admit her, but no beds were available, hence she was sent to the ED for Failure to thrive. She is weak and poorly able to participate with questioning as she falls back to sleep. She has an ostomy in place and at first denied increased out put from same, but then did say it was increased. She is very frail and weak. She has additionally had poor intake at home.      PMH:    Past Medical History:   Diagnosis Date    Acute kidney failure ([address] 2014 and 8/2016    x2,no dialysis needed,resolved, kidney function tests returned to normal    Anxiety     Arthritis     CAD (coronary artery disease)     follows with Dr. Arleen Cline every 6 months    Cancer [facility])     skin, leg,nose- removed surgically     COPD (chronic obstructive pulmonary disease) ([address]     mild- no inhlaers, no oxygen     Depression     Fibromyalgia     chronic back and neck pain    Generalized headaches     h/o / daily    History of blood transfusion 3-11-14    multiple times    History of necrotic bowel 2012    Hyperlipidemia     Hypertension     Incisional hernia     abdomen    Insomnia     Mitral valve regurgitation     Myocardial infarct ([address] 2002    Occasional tremors     at hands / stable with medication    Osteopenia     PONV (postoperative nausea and vomiting)     Vitamin B12 deficiency     h/o       Patient Active Problem List   Diagnosis    Right cataract    Essential hypertension, benign    Hyperlipidemia with target LDL less than 100    Osteoarthritis, shoulder    Primary osteoarthritis of left knee    History of ischemic bowel disease    [facility] present [facility]    Chronic kidney disease, stage III (moderate) ([facility])    COPD (chronic obstructive pulmonary disease) ([facility])    Moderate protein-calorie malnutrition (HCC)    Abdominal wall cellulitis    Cellulitis    Infected hernioplasty mesh ([facility]    Enterocutaneous fistula    Mild protein-calorie malnutrition (HCC)    Acute kidney injury ([facility]    Hypertensive kidney disease with chronic kidney disease stage IV ([facility]    KD (acute kidney injury) ([facility]    Diarrhea    Elevated serum creatinine    Abnormal serum creatinine level    Hyperlipemia    Primary hypertension    Essential (primary) hypertension    Postsurgical malabsorption, not elsewhere classified    Hypercholesterolemia    Pure hypercholesterolemia    Prediabetes    Hypothyroidism, unspecified    Acute renal failure (ARF) ([facility]       Meds:            Meds prn:         Meds prior to admission:     No current facility-administered medications on file prior to encounter. Current Outpatient Medications on File Prior to Encounter   Medication Sig Dispense Refill    amLODIPine (NORVASC) 5 MG tablet Take 5 mg by mouth daily      LORazepam (ATIVAN) 0.5 MG tablet Take 0.5 mg by mouth daily.  fluticasone (FLONASE) 50 MCG/ACT nasal spray 1 spray by Each Nostril route daily      lipase-protease-amylase (CREON) 57008-50509 units delayed release capsule Take 36,000 Units by mouth 3 times daily (with meals) 3- 4 times a day      Probiotic CAPS Take by mouth      hyoscyamine (ANASPAZ;LEVSIN) 125 MCG tablet Take 125 mcg by mouth 2 times daily as needed for Cramping      omeprazole (PRILOSEC) 20 MG delayed release capsule Take 40 mg by mouth daily      aspirin 81 MG tablet Take 81 mg by mouth daily      oxyCODONE-acetaminophen (PERCOCET)  MG per tablet Take by mouth every 6 hours as needed for Pain.  Instructed to take with sip water am of procedure if needs       fenofibrate (TRICOR) 145 MG tablet Take 145 mg by mouth daily      Cholecalciferol (VITAMIN D3) 31551 UNITS CAPS Take 2,000 Units by mouth 2 times daily       Coenzyme Q10 (COQ10 PO) Take by mouth daily       Ferrous Sulfate (IRON) 28 MG TABS Take by mouth daily      calcium carbonate (OSCAL) 500 MG TABS tablet Take 500 mg by mouth daily LD 3/16/2017      pravastatin (PRAVACHOL) 40 MG tablet Take 40 mg by mouth every evening      propranolol (INDERAL) 20 MG tablet Take 20 mg by mouth daily Instructed to take morning of surgery with a sip of water       vitamin B-12 (CYANOCOBALAMIN) 500 MCG tablet Place 500 mcg under the tongue daily       Omega-3 Fatty Acids (OMEGA 3 PO) Take by mouth daily       escitalopram (LEXAPRO) 20 MG tablet Take 20 mg by mouth nightly.  primidone (MYSOLINE) 50 MG tablet Take 250 mg by mouth nightly Taking for essential tremors      traZODone (DESYREL) 50 MG tablet Take 25 mg by mouth nightly          Allergies:    Fentanyl, Levofloxacin, Tramadol, and Vioxx [rofecoxib]    Social History:     reports that she has been smoking cigarettes. She has been smoking about 1.00 pack per day. She has never used smokeless tobacco. She reports previous alcohol use. She reports that she does not use drugs. Family History:     No family history on file. ROS:     She is not fully able to participate in ROS as she is poorly alert and quickly falls back to sleep.      Physical Exam:      Patient Vitals for the past 24 hrs:   BP Temp Temp src Pulse Resp SpO2 Height Weight   07/14/22 1114 (!) 128/43 98 °F (36.7 °C) -- 64 22 96 % -- --   07/14/22 0616 (!) 109/39 97.5 °F (36.4 °C) Oral 58 12 98 % -- --   07/14/22 0530 109/65 98 °F (36.7 °C) -- 60 18 98 % -- --   07/13/22 2205 (!) 119/49 97.5 °F (36.4 °C) Oral 57 14 94 % 5' (1.524 m) 100 lb (45.4 kg)       No intake or output data in the 24 hours ending 07/14/22 1251    Constitutional: lethargic and cachetic    Head: normocephalic, atraumatic   Neck: supple, no jvd  Cardiovascular: S1 S2 no S3 or rub   Respiratory: Clear upper, diminished in bases. Poor effort  Gastrointestinal: soft, nontender, + ostomy with liquid stool, + PEG  Ext: no edema   Neuro: lethargic, will awaken, but will not maintain alertness. Skin: dry, no rash       Data:    Recent Labs     07/14/22  0629   WBC 10.8   HGB 11.7   HCT 37.6   .3*          Recent Labs     07/14/22  0629      K 4.7      CO2 16*   CREATININE 3.6*   *   LABGLOM 12   GLUCOSE 96   CALCIUM 8.8       Vit D, 25-Hydroxy   Date Value Ref Range Status   04/27/2022 30 30 - 100 ng/mL Final     Comment:     <20 ng/mL. ........... [facility] Deficient  20-30 ng/mL. ......... [facility] Insufficient   ng/mL. ........ [facility] Sufficient  >100 ng/mL. .......... [facility] Toxic         PTH   Date Value Ref Range Status   11/17/2020 64 15 - 65 pg/mL Final       Recent Labs     07/14/22  0629   ALT 30   AST 20   ALKPHOS 273*   BILITOT 0.9       Recent Labs     07/14/22  0629   LABALBU 3.2*       Ferritin   Date Value Ref Range Status   07/01/2022 143 ng/mL Final     Comment:     FERRITIN Reference Ranges:  Adult Males   20 - 60 years:    30 - 400 ng/mL  Adult females 16 - 61 years:    15 - 150 ng/mL  Adults greater than 60 years:   no established reference range  Pediatrics:                     no established reference range       Iron   Date Value Ref Range Status   07/01/2022 30 (L) 37 - 145 mcg/dL Final     TIBC   Date Value Ref Range Status   07/01/2022 229 (L) 250 - 450 mcg/dL Final       Vitamin B-12   Date Value Ref Range Status   04/27/2022 1050 (H) 211 - 946 pg/mL Final       Folate   Date Value Ref Range Status   01/22/2021 8.1 4.8 - 24.2 ng/mL Final         Lab Results   Component Value Date/Time    COLORU Yellow 02/25/2021 04:26 PM    NITRU Negative 02/25/2021 04:26 PM    GLUCOSEU Negative 02/25/2021 04:26 PM    KETUA Negative 02/25/2021 04:26 PM    UROBILINOGEN 0.2 02/25/2021 04:26 PM    BILIRUBINUR Negative 02/25/2021 04:26 PM       No results found for: ADEOLA, CREURRAN, MACREATRATIO, OSMOU    No components found for: URIC    No results found for: LIPIDPAN      Assessment and Plan:    1. Acute kidney injury-  Likely in the setting of decreased effective renal perfusion ue to poor po intake and GI losses via ostomy  S/P bolus IVF in the ED  Plan:  1. Will check urine studies  2. Will continue maintenance IVF at admission  3. Check renal US for completeness  4. Check 24 hour urine    2. Chronic kidney disease stage 3b-  Baseline creatinine 1.4-1.5mg/dl with e-GFR 33-36ml/min    3. Hypertension with CKD I-IV-  BP at goal <130/80  Plan:  1. Follow on chronic medications    4. Secondary hyperparathyroidism of renal origin-  Plan:   1. Check PO4, PTH and Vit D in the am    5. Metabolic acidosis without lactic acidosis-  Plan:  1. Will add HCO3 to IVF    5.  Failure to thrive in an adult-  She is enterally fed via PEG  Follow I&O    GEMA Desouza - CNP

## 2022-07-15 ENCOUNTER — APPOINTMENT (OUTPATIENT)
Dept: CT IMAGING | Age: 82
DRG: 682 | End: 2022-07-15
Payer: MEDICARE

## 2022-07-15 ENCOUNTER — APPOINTMENT (OUTPATIENT)
Dept: GENERAL RADIOLOGY | Age: 82
DRG: 682 | End: 2022-07-15
Payer: MEDICARE

## 2022-07-15 LAB
ALBUMIN SERPL-MCNC: 2.4 G/DL (ref 3.5–5.2)
ALP BLD-CCNC: 315 U/L (ref 35–104)
ALT SERPL-CCNC: 22 U/L (ref 0–32)
ANION GAP SERPL CALCULATED.3IONS-SCNC: 13 MMOL/L (ref 7–16)
AST SERPL-CCNC: 18 U/L (ref 0–31)
B.E.: -0.7 MMOL/L (ref -3–3)
BACTERIA: ABNORMAL /HPF
BASOPHILS ABSOLUTE: 0.01 E9/L (ref 0–0.2)
BASOPHILS RELATIVE PERCENT: 0.1 % (ref 0–2)
BILIRUB SERPL-MCNC: 0.6 MG/DL (ref 0–1.2)
BILIRUBIN URINE: NEGATIVE
BLOOD, URINE: ABNORMAL
BUN BLDV-MCNC: 100 MG/DL (ref 6–23)
CALCIUM SERPL-MCNC: 8 MG/DL (ref 8.6–10.2)
CHLORIDE BLD-SCNC: 104 MMOL/L (ref 98–107)
CHLORIDE URINE RANDOM: <20 MMOL/L
CLARITY: CLEAR
CO2: 19 MMOL/L (ref 22–29)
COHB: 0.2 % (ref 0–1.5)
COLOR: YELLOW
CREAT SERPL-MCNC: 2.5 MG/DL (ref 0.5–1)
CREATININE URINE: 69 MG/DL (ref 29–226)
CRITICAL: ABNORMAL
DATE ANALYZED: ABNORMAL
DATE OF COLLECTION: ABNORMAL
EOSINOPHILS ABSOLUTE: 0.07 E9/L (ref 0.05–0.5)
EOSINOPHILS RELATIVE PERCENT: 0.8 % (ref 0–6)
GFR AFRICAN AMERICAN: 22
GFR NON-AFRICAN AMERICAN: 19 ML/MIN/1.73
GLUCOSE BLD-MCNC: 214 MG/DL (ref 74–99)
GLUCOSE URINE: NEGATIVE MG/DL
HCO3: 21.8 MMOL/L (ref 22–26)
HCT VFR BLD CALC: 32.4 % (ref 34–48)
HEMOGLOBIN: 9.9 G/DL (ref 11.5–15.5)
HHB: 7.1 % (ref 0–5)
IMMATURE GRANULOCYTES #: 0.05 E9/L
IMMATURE GRANULOCYTES %: 0.6 % (ref 0–5)
KETONES, URINE: NEGATIVE MG/DL
LAB: ABNORMAL
LACTIC ACID: 3.9 MMOL/L (ref 0.5–2.2)
LEUKOCYTE ESTERASE, URINE: ABNORMAL
LYMPHOCYTES ABSOLUTE: 0.71 E9/L (ref 1.5–4)
LYMPHOCYTES RELATIVE PERCENT: 8.1 % (ref 20–42)
Lab: ABNORMAL
MAGNESIUM: 2 MG/DL (ref 1.6–2.6)
MCH RBC QN AUTO: 30.8 PG (ref 26–35)
MCHC RBC AUTO-ENTMCNC: 30.6 % (ref 32–34.5)
MCV RBC AUTO: 100.9 FL (ref 80–99.9)
METER GLUCOSE: 87 MG/DL (ref 74–99)
METHB: 0.3 % (ref 0–1.5)
MODE: ABNORMAL
MONOCYTES ABSOLUTE: 1 E9/L (ref 0.1–0.95)
MONOCYTES RELATIVE PERCENT: 11.4 % (ref 2–12)
NEUTROPHILS ABSOLUTE: 6.96 E9/L (ref 1.8–7.3)
NEUTROPHILS RELATIVE PERCENT: 79 % (ref 43–80)
NITRITE, URINE: NEGATIVE
O2 CONTENT: 16 ML/DL
O2 SATURATION: 92.9 % (ref 92–98.5)
O2HB: 92.4 % (ref 94–97)
OPERATOR ID: 1741
PARATHYROID HORMONE INTACT: 307 PG/ML (ref 15–65)
PATIENT TEMP: 37 C
PCO2: 29.5 MMHG (ref 35–45)
PDW BLD-RTO: 15 FL (ref 11.5–15)
PH BLOOD GAS: 7.49 (ref 7.35–7.45)
PH UA: 6 (ref 5–9)
PHOSPHORUS: 6.1 MG/DL (ref 2.5–4.5)
PLATELET # BLD: 164 E9/L (ref 130–450)
PMV BLD AUTO: 12.7 FL (ref 7–12)
PO2: 57.3 MMHG (ref 75–100)
POTASSIUM REFLEX MAGNESIUM: 3.8 MMOL/L (ref 3.5–5)
POTASSIUM, UR: 33.8 MMOL/L
PROTEIN UA: ABNORMAL MG/DL
RBC # BLD: 3.21 E12/L (ref 3.5–5.5)
RBC UA: ABNORMAL /HPF (ref 0–2)
SODIUM BLD-SCNC: 136 MMOL/L (ref 132–146)
SODIUM URINE: <20 MMOL/L
SOURCE, BLOOD GAS: ABNORMAL
SPECIFIC GRAVITY UA: <=1.005 (ref 1–1.03)
THB: 12.3 G/DL (ref 11.5–16.5)
TIME ANALYZED: 2109
TOTAL CK: 28 U/L (ref 20–180)
TOTAL PROTEIN: 5.7 G/DL (ref 6.4–8.3)
UROBILINOGEN, URINE: 0.2 E.U./DL
VITAMIN D 25-HYDROXY: 13 NG/ML (ref 30–100)
WBC # BLD: 8.8 E9/L (ref 4.5–11.5)
WBC UA: ABNORMAL /HPF (ref 0–5)

## 2022-07-15 PROCEDURE — 71045 X-RAY EXAM CHEST 1 VIEW: CPT

## 2022-07-15 PROCEDURE — 84145 PROCALCITONIN (PCT): CPT

## 2022-07-15 PROCEDURE — 36415 COLL VENOUS BLD VENIPUNCTURE: CPT

## 2022-07-15 PROCEDURE — 82436 ASSAY OF URINE CHLORIDE: CPT

## 2022-07-15 PROCEDURE — 2060000000 HC ICU INTERMEDIATE R&B

## 2022-07-15 PROCEDURE — 6370000000 HC RX 637 (ALT 250 FOR IP): Performed by: INTERNAL MEDICINE

## 2022-07-15 PROCEDURE — 70450 CT HEAD/BRAIN W/O DYE: CPT

## 2022-07-15 PROCEDURE — 84133 ASSAY OF URINE POTASSIUM: CPT

## 2022-07-15 PROCEDURE — 81001 URINALYSIS AUTO W/SCOPE: CPT

## 2022-07-15 PROCEDURE — 82306 VITAMIN D 25 HYDROXY: CPT

## 2022-07-15 PROCEDURE — 83735 ASSAY OF MAGNESIUM: CPT

## 2022-07-15 PROCEDURE — 2580000003 HC RX 258: Performed by: INTERNAL MEDICINE

## 2022-07-15 PROCEDURE — 2700000000 HC OXYGEN THERAPY PER DAY

## 2022-07-15 PROCEDURE — 84100 ASSAY OF PHOSPHORUS: CPT

## 2022-07-15 PROCEDURE — 84300 ASSAY OF URINE SODIUM: CPT

## 2022-07-15 PROCEDURE — 83605 ASSAY OF LACTIC ACID: CPT

## 2022-07-15 PROCEDURE — 82805 BLOOD GASES W/O2 SATURATION: CPT

## 2022-07-15 PROCEDURE — 87040 BLOOD CULTURE FOR BACTERIA: CPT

## 2022-07-15 PROCEDURE — 51702 INSERT TEMP BLADDER CATH: CPT

## 2022-07-15 PROCEDURE — 93005 ELECTROCARDIOGRAM TRACING: CPT | Performed by: INTERNAL MEDICINE

## 2022-07-15 PROCEDURE — 82962 GLUCOSE BLOOD TEST: CPT

## 2022-07-15 PROCEDURE — 85025 COMPLETE CBC W/AUTO DIFF WBC: CPT

## 2022-07-15 PROCEDURE — 80053 COMPREHEN METABOLIC PANEL: CPT

## 2022-07-15 PROCEDURE — 87186 SC STD MICRODIL/AGAR DIL: CPT

## 2022-07-15 PROCEDURE — 97530 THERAPEUTIC ACTIVITIES: CPT

## 2022-07-15 PROCEDURE — 97165 OT EVAL LOW COMPLEX 30 MIN: CPT

## 2022-07-15 PROCEDURE — 2500000003 HC RX 250 WO HCPCS: Performed by: INTERNAL MEDICINE

## 2022-07-15 PROCEDURE — 83970 ASSAY OF PARATHORMONE: CPT

## 2022-07-15 PROCEDURE — 2580000003 HC RX 258: Performed by: NURSE PRACTITIONER

## 2022-07-15 PROCEDURE — 2500000003 HC RX 250 WO HCPCS: Performed by: NURSE PRACTITIONER

## 2022-07-15 PROCEDURE — 82570 ASSAY OF URINE CREATININE: CPT

## 2022-07-15 PROCEDURE — 87150 DNA/RNA AMPLIFIED PROBE: CPT

## 2022-07-15 PROCEDURE — 97161 PT EVAL LOW COMPLEX 20 MIN: CPT

## 2022-07-15 RX ORDER — ERGOCALCIFEROL 1.25 MG/1
50000 CAPSULE ORAL WEEKLY
Status: DISCONTINUED | OUTPATIENT
Start: 2022-07-15 | End: 2022-07-15 | Stop reason: SDUPTHER

## 2022-07-15 RX ORDER — CALCITRIOL 0.25 UG/1
0.25 CAPSULE, LIQUID FILLED ORAL
Status: DISCONTINUED | OUTPATIENT
Start: 2022-07-15 | End: 2022-07-22 | Stop reason: HOSPADM

## 2022-07-15 RX ORDER — ERGOCALCIFEROL 1.25 MG/1
50000 CAPSULE ORAL WEEKLY
Status: DISCONTINUED | OUTPATIENT
Start: 2022-07-16 | End: 2022-07-22 | Stop reason: HOSPADM

## 2022-07-15 RX ORDER — 0.9 % SODIUM CHLORIDE 0.9 %
500 INTRAVENOUS SOLUTION INTRAVENOUS ONCE
Status: COMPLETED | OUTPATIENT
Start: 2022-07-15 | End: 2022-07-16

## 2022-07-15 RX ADMIN — OXYCODONE AND ACETAMINOPHEN 1 TABLET: 10; 325 TABLET ORAL at 16:15

## 2022-07-15 RX ADMIN — SODIUM CHLORIDE, PRESERVATIVE FREE 10 ML: 5 INJECTION INTRAVENOUS at 20:33

## 2022-07-15 RX ADMIN — SODIUM CHLORIDE, PRESERVATIVE FREE 10 ML: 5 INJECTION INTRAVENOUS at 08:06

## 2022-07-15 RX ADMIN — SODIUM BICARBONATE: 84 INJECTION, SOLUTION INTRAVENOUS at 05:23

## 2022-07-15 RX ADMIN — PANTOPRAZOLE SODIUM 40 MG: 40 TABLET, DELAYED RELEASE ORAL at 05:08

## 2022-07-15 RX ADMIN — SODIUM BICARBONATE: 84 INJECTION, SOLUTION INTRAVENOUS at 22:13

## 2022-07-15 RX ADMIN — SODIUM BICARBONATE: 84 INJECTION, SOLUTION INTRAVENOUS at 12:51

## 2022-07-15 RX ADMIN — SODIUM CHLORIDE 500 ML: 9 INJECTION, SOLUTION INTRAVENOUS at 21:24

## 2022-07-15 ASSESSMENT — PAIN DESCRIPTION - FREQUENCY: FREQUENCY: INTERMITTENT

## 2022-07-15 ASSESSMENT — ENCOUNTER SYMPTOMS
PHOTOPHOBIA: 0
BLOOD IN STOOL: 0
WHEEZING: 0
COLOR CHANGE: 0
ABDOMINAL PAIN: 0
VOMITING: 0
COUGH: 1
BACK PAIN: 1
DIARRHEA: 1
CONSTIPATION: 0
NAUSEA: 0
SHORTNESS OF BREATH: 0

## 2022-07-15 ASSESSMENT — PAIN - FUNCTIONAL ASSESSMENT: PAIN_FUNCTIONAL_ASSESSMENT: ACTIVITIES ARE NOT PREVENTED

## 2022-07-15 ASSESSMENT — PAIN DESCRIPTION - ONSET: ONSET: GRADUAL

## 2022-07-15 ASSESSMENT — PAIN SCALES - GENERAL
PAINLEVEL_OUTOF10: 0
PAINLEVEL_OUTOF10: 6
PAINLEVEL_OUTOF10: 0
PAINLEVEL_OUTOF10: 0

## 2022-07-15 ASSESSMENT — PAIN DESCRIPTION - PAIN TYPE: TYPE: ACUTE PAIN

## 2022-07-15 ASSESSMENT — PAIN DESCRIPTION - LOCATION: LOCATION: BACK

## 2022-07-15 ASSESSMENT — PAIN DESCRIPTION - ORIENTATION: ORIENTATION: MID

## 2022-07-15 ASSESSMENT — PAIN DESCRIPTION - DESCRIPTORS: DESCRIPTORS: ACHING;DISCOMFORT;CRAMPING

## 2022-07-15 NOTE — PROGRESS NOTES
Physical Therapy    Initial Assessment     Name: Josi Soria  : 1940  MRN: [id number]      Date of Service: 7/15/2022    Evaluating PT: Izzy Fraser, PT, DPT [id number]      Room #:  1277/2908-S  Diagnosis:  Dehydration [E86.0]  Severe protein-calorie malnutrition ([facility] [E43]  Failure to thrive in adult [R62.7]  NSTEMI (non-ST elevated myocardial infarction) ([facility] [I21.4]  Acute renal failure (ARF) ([facility]) [N17.9]  Acute renal failure, unspecified acute renal failure type ([facility] [N17.9]  PMHx/PSHx: Anxiety, HTN, B12 Deficiency, Fibromyalgia, HLD, MI, Necrotic bowel, Osteopenia, COPD, CAD, CA, Acute Kidney Failure  Precautions:  O2, PEG, TAPS, [place], Fall Risk, Alarm      SUBJECTIVE:    Pt lives with spouse in a 1 story home with 2 stair(s) to enter. Pt ambulated with cane and WW prior to admission. OBJECTIVE:   Initial Evaluation  Date: 7/15/22 Treatment Date: Short Term/ Long Term   Goals   AM-PAC 6 Clicks 96/81     Was pt agreeable to Eval/treatment? Yes     Does pt have pain? Knee and finger pain, does not quantify     Bed Mobility  Rolling: NT  Supine to sit: Mod A  Sit to supine: Max A  Scooting: Mod A  Rolling: Independent   Supine to sit: CGA  Sit to supine: CGA  Scooting: CGA   Transfers Sit to stand: NT d/t patient too fatigued  Stand to sit: NT  Stand pivot: NT  Sit to stand: Min A  Stand to sit: Min A  Stand pivot: Min A [employer]   Ambulation   NT  >50 feet with Min A with [employer]   Stair negotiation: ascended and descended NT  >2 step(s) with 1 rail(s) with Min A   ROM BUE: See OT note  BLE: WFL     Strength BUE: See OT note  BLE: 4/5     Balance Sitting EOB: CGA  Dynamic Standing: NT  Sitting EOB: Independent   Dynamic Standing: Min A with [employer]     Pt is A & O x: 2 to person and year. Pt able to recall season but not month, and can recall president with choices. Pt not oriented to location. Sensation: Pt denies numbness or tingling. Edema: Unremarkable.       Patient education  Pt educated on PT role in acute care. Patient response to education:   Pt verbalized understanding Pt demonstrated skill Pt requires further education in this area   Yes NA Reinforce     ASSESSMENT:    Conditions Requiring Skilled Therapeutic Intervention:    [x]Decreased strength     []Decreased ROM  [x]Decreased functional mobility  [x]Decreased balance   [x]Decreased endurance   [x]Decreased posture  []Decreased sensation  []Decreased coordination   []Decreased vision  [x]Decreased safety awareness   []Increased pain     Comments:    Upon entry to room pt was asleep and supine in bed. Pt woke up and was agreeable to PT evaluation. Pt PEG tube put on hold for session. Pt was able to sit EOB with medium assistance. Pt was visibly fatigued and SOB. Pt O2 sat was 90% on 3L O2. Pt remained seated EOB for 5+ minutes. Pt had handheld assistance while seated which was more for comfort than balance. Pt had no posterior lean sitting EOB. Pt was too fatigued and requested to lay back down. Pt required heavy assist to return supine in bed. Pt O2 sat at end of session was 91%. Pt was repositioned skillfully and HOB was placed at 30 degrees. Pt PEG tube was started again. Pt was left with all needs met and call light nearby. Treatment:  Patient practiced and was instructed in the following treatment:    Bed Mobility: Verbal cues required for technique and safety. Physical assistance required for sit<>supine. Skillful positioning for maintaining skin integrity  Sitting Balance: 5+ minutes EOB (strength, endurance, tolerance to upright). Monitoring of vitals: Pt O2 sat monitored throughout session. Pt's/family goals:  1. To return home. Prognosis is Fair for reaching above PT goals. Patient and or family understand(s) diagnosis, prognosis, and plan of care. Yes.     PHYSICAL THERAPY PLAN OF CARE:    PT POC is established based on physician order and patient diagnosis     Referring provider/PT Order:    6771  PT eval and treat minutes  [x] Therapeutic activities 43085 10 minutes  [] Therapeutic exercises 33749 0 minutes  [] Neuromuscular reeducation 54632 0 minutes     Ramona Calvin, PT, DPT  [id number]      Schuyler Dancer, SPT

## 2022-07-15 NOTE — CARE COORDINATION
7/15/2022  Social Work Discharge Planning:Tube feed, ostomy at home. Room air. Nephrology is consulted. SW went to see Pt;however, she was sleeping so soundly I felt she needed to rest and called her spouse to discuss discharge planning. Voicemail was left . Waiting for return call. Chart screened. Pt is from home with her spouse and they reside in a one story home with 2 entrance steps. Pt has a cane, ww and shower chair. History with [facility]. Awaiting therapy evals when Pt is able.  Electronically signed by CASSI Barnes on 7/15/2022 at 1:39 PM

## 2022-07-15 NOTE — H&P
History & Physicial  07/15/22  Primary Care:  Brett Purdy, DO  [address] [address] / [facility] [address]        Chief Complaint   Patient presents with    Abnormal Lab     sent in by Dr. Aliyah Jensen    Chest Pain       HPI:  Patient is an [de-identified]year old female who was sent to the ER due to dehydration, mental status change. Patient was well according to  until Friday. She developed chills and then would only sleep. Patient had stopped her tube feedings for Dr. Aranza Lewis her Short Gut Syndrome specialist at [facility]. Patient had wanted to try to get her peg tube pulled. Patient had become increasingly weak and would only sleep since Friday. She had not had much to eat or drink. Her  was at the office visit and stated that on Monday they had gone to ER but the line was so long they went back home. She had an appointment with her cardiologist earlier on Wednesday but her  states that he did not know the events as he did not go in with her and patient cannot relate what happened at that appointment. Patient was attempted to be direct admitted through the ER but due to no beds available was sent straight to ER. Patient had to wait in ER overnight. On Friday she was found to be in acute renal failure with elevated troponins and was admitted for further work up. Prior to Visit Medications    Medication Sig Taking?  Authorizing Provider   lipase-protease-amylase (CREON) 53058-99209 units delayed release capsule Take 12,000 Units by mouth take with snacks **SEE OTHER ORDER** Yes Historical Provider, MD   Multiple Vitamins-Minerals (PRESERVISION AREDS) CAPS Take 1 capsule by mouth 2 times daily Yes Historical Provider, MD   Magnesium Oxide (MAGNESIUM-OXIDE) 250 MG TABS tablet Take 250 mg by mouth 2 times daily Yes Historical Provider, MD   METAMUCIL FIBER PO Take 1 packet by mouth 2 times daily Yes Historical Provider, MD   diphenoxylate-atropine (LOMOTIL) 2.5-0.025 MG per tablet Take 1 tablet by mouth 4 times daily (before meals and nightly). Yes Historical Provider, MD   loperamide (IMODIUM) 2 MG capsule Take 4 mg by mouth 3 times daily (with meals) **SEE OTHER ORDER** Yes Historical Provider, MD   loperamide (IMODIUM) 2 MG capsule Take 6 mg by mouth nightly **SEE OTHER ORDER** Yes Historical Provider, MD   amLODIPine (NORVASC) 10 MG tablet Take 10 mg by mouth nightly   Historical Provider, MD   LORazepam (ATIVAN) 0.5 MG tablet Take 0.25 mg by mouth daily as needed for Anxiety. Historical Provider, MD   fluticasone (FLONASE) 50 MCG/ACT nasal spray 1 spray by Each Nostril route nightly   Historical Provider, MD   lipase-protease-amylase (CREON) 38837-47172 units delayed release capsule Take 24,000 Units by mouth 3 times daily (with meals) **SEE OTHER ORDER**  Historical Provider, MD   Probiotic CAPS Take 1 capsule by mouth nightly   Historical Provider, MD   hyoscyamine (ANASPAZ;LEVSIN) 125 MCG tablet Take 125 mcg by mouth 4 times daily as needed for Cramping   Historical Provider, MD   omeprazole (PRILOSEC) 20 MG delayed release capsule Take 40 mg by mouth every morning   Historical Provider, MD   aspirin 81 MG tablet Take 81 mg by mouth nightly   Historical Provider, MD   oxyCODONE-acetaminophen (PERCOCET)  MG per tablet Take 1 tablet by mouth every 6 hours as needed for Pain.    Historical Provider, MD   Cholecalciferol (VITAMIN D3) 50 MCG (2000 UT) TABS Take 2,000 Units by mouth 2 times daily   Historical Provider, MD   Coenzyme Q10 (COQ10 PO) Take 1 capsule by mouth nightly   Historical Provider, MD   Ferrous Sulfate (IRON) 325 (65 Fe) MG TABS Take 325 mg by mouth nightly   Historical Provider, MD   calcium carbonate (OSCAL) 500 MG TABS tablet Take 500 mg by mouth nightly   Historical Provider, MD   pravastatin (PRAVACHOL) 40 MG tablet Take 40 mg by mouth nightly   Historical Provider, MD   propranolol (INDERAL) 20 MG tablet Take 20 mg by mouth 2 times daily   Historical Provider, MD   vitamin B-12 (CYANOCOBALAMIN) 500 MCG tablet Place 500 mcg under the tongue nightly   Historical Provider, MD   Omega-3 Fatty Acids (OMEGA 3 PO) Take 1 capsule by mouth nightly   Historical Provider, MD   escitalopram (LEXAPRO) 20 MG tablet Take 20 mg by mouth nightly. Historical Provider, MD   primidone (MYSOLINE) 250 MG tablet Take 250 mg by mouth nightly   Historical Provider, MD   traZODone (DESYREL) 50 MG tablet Take 50 mg by mouth nightly   Historical Provider, MD     Social History     Tobacco Use    Smoking status: Every Day     Packs/day: 1.00     Types: Cigarettes    Smokeless tobacco: Never   Vaping Use    Vaping Use: Never used   Substance Use Topics    Alcohol use: Not Currently     Comment: occasional    Drug use: No     No family history on file.   Past Surgical History:   Procedure Laterality Date    ABDOMEN SURGERY  2-    total colectomy, bowel resection, ileostomy,revision of ileostomy     ABDOMEN SURGERY N/A 1/26/2020    DRAINAGE OF ABDOMINAL WALL ABSCESS, EXPLANTATION OF MESH, DEBRIDEMENT OF SKIN AND SUBCUTANEOUS TISSUE performed by Delmi Chan MD at [facility] N/A 9/11/2020    EXPLANTATION OF HERNIA MESH performed by Delmi Chan MD at [address] [place] [address] Rd  2002    ARTHROPLASTY  1-2006    right and left thumb    BACK SURGERY  1991, 2004, 2009    lumbar fusion x 3    BACK SURGERY      cervical fusion x 2    BREAST SURGERY  years ago    monor calcifications    CARPAL TUNNEL RELEASE Bilateral     CATARACT REMOVAL WITH IMPLANT Right 03/03/15    CHOLECYSTECTOMY  5-11-07    Lap    COLONOSCOPY      CORONARY ANGIOPLASTY WITH STENT PLACEMENT  1-7-2002    ENDOSCOPY, COLON, DIAGNOSTIC      EPIGASTRIC HERNIA REPAIR  3/21/16    incisional with mesh implantation    ESOPHAGUS SURGERY  1-4-13    esophageal clamp (torn Esophagus)    FINGER TRIGGER RELEASE Right 2006    index finger    FOOT SURGERY Right     multiple    HERNIA REPAIR  12-31-13    abdominal x 5- last one 2017     ILEOSTOMY OR JEJUNOSTOMY  1-3-13    revision    INSERT PICC LINE  06/09/2020    and removed     JOINT REPLACEMENT Right 3/24/15    right total shoulder arthroplasty    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     LAPAROTOMY N/A 5/12/2020    EXPLORATORY LAPAROTOMY EXPLANTATION OF INFECTED MESH SMAL BOWEL RESECTION AND REVISION END ILEOSTOMY, LYSIS OF ADHESIONS performed by Daniela Koenig MD at [facility] N/A 6/8/2020    LAPAROTOMY EXPLORATORY, Angelique Boers OF HERNIA MESH performed by Daniela Koenig MD at [facility] N/A 9/15/2020    EXPLORATORY LAPAROTOMY WITH SMALL BOWEL RESECTION, TAKE DOWN REVISION ENTEROCUTANEOUS FISTULA , OSTOMY REVISION performed by Daniela Koenig MD at [address] / leg    NASAL SINUS SURGERY      x2 /  cauterized    OTHER SURGICAL HISTORY  08/24/2016    diagnostic laparotomy with repair of intraabdominal hernia    OTHER SURGICAL HISTORY      removal plate / screws  / right great toe    PICC LINE INSERTION NURSE  9/15/2020         ROTATOR CUFF REPAIR  2010    right     SKIN BIOPSY  2010    3 squamous cell carcinoma right leg, left leg     Past Medical History:   Diagnosis Date    Acute kidney failure ([address] 2014 and 8/2016    x2,no dialysis needed,resolved, kidney function tests returned to normal    Anxiety     Arthritis     CAD (coronary artery disease)     follows with Dr. Chidi Callejas every 6 months    Cancer [facility])     skin, leg,nose- removed surgically     COPD (chronic obstructive pulmonary disease) ([address]     mild- no inhlaers, no oxygen     Depression     Fibromyalgia     chronic back and neck pain    Generalized headaches     h/o / daily    History of blood transfusion 3-11-14    multiple times    History of necrotic bowel 2012    Hyperlipidemia     Hypertension     Incisional hernia     abdomen    Insomnia     Mitral valve regurgitation     Myocardial infarct ([address] 2002    Occasional tremors     at hands / stable with medication    Osteopenia PONV (postoperative nausea and vomiting)     Vitamin B12 deficiency     h/o     Review of Systems   Constitutional:  Positive for activity change, appetite change, chills and fatigue. Negative for fever. HENT:  Negative for congestion, ear discharge, ear pain and hearing loss. Eyes:  Negative for photophobia and visual disturbance. Respiratory:  Positive for cough. Negative for shortness of breath and wheezing. Cardiovascular:  Negative for chest pain, palpitations and leg swelling. Gastrointestinal:  Positive for diarrhea (Chronic.). Negative for abdominal pain, blood in stool, constipation, nausea and vomiting. Endocrine: Negative for polydipsia, polyphagia and polyuria. Genitourinary:  Negative for dysuria, flank pain and urgency. Musculoskeletal:  Positive for arthralgias and back pain. Skin:  Negative for color change, pallor and rash. Neurological:  Negative for dizziness and headaches. Hematological:  Negative for adenopathy. Does not bruise/bleed easily. Psychiatric/Behavioral:  Positive for confusion. Negative for agitation. Vitals:    07/14/22 1815 07/14/22 2015 07/14/22 2333 07/15/22 0430   BP: (!) 121/42 (!) 114/46 (!) 121/42 (!) 105/42   Pulse: 68 81 72 70   Resp: 18 18 18 18   Temp: 97.9 °F (36.6 °C) 98.1 °F (36.7 °C) 98.3 °F (36.8 °C) 97.9 °F (36.6 °C)   TempSrc: Axillary Axillary Axillary Axillary   SpO2:  98% 96% 100%   Weight:       Height:           Physical Exam  Constitutional:       Appearance: She is ill-appearing. HENT:      Head: Normocephalic and atraumatic. Right Ear: External ear normal.      Left Ear: External ear normal.      Nose: Nose normal. No congestion. Mouth/Throat:      Mouth: Mucous membranes are dry. Pharynx: Oropharynx is clear. Eyes:      General:         Right eye: No discharge. Left eye: No discharge. Conjunctiva/sclera: Conjunctivae normal.      Pupils: Pupils are equal, round, and reactive to light. Cardiovascular:      Rate and Rhythm: Normal rate and regular rhythm. Pulses: Normal pulses. Pulmonary:      Effort: No respiratory distress. Breath sounds: Normal breath sounds. No wheezing, rhonchi or rales. Abdominal:      General: Bowel sounds are normal. There is no distension. Palpations: Abdomen is soft. Tenderness: There is no abdominal tenderness. There is no guarding or rebound. Comments: Ileostomy present with stool in bag. Musculoskeletal:      Cervical back: Neck supple. Right lower leg: No edema. Left lower leg: No edema. Skin:     General: Skin is warm and dry. Capillary Refill: Capillary refill takes less than 2 seconds. Neurological:      Mental Status: She is disoriented. Principal Problem:    Acute renal failure (ARF) ([address]  Resolved Problems:    * No resolved hospital problems. *  Acute renal failure  Metabolic Encephalopathy  Elevated Troponin  Short Gut syndrome  CAD    Plan:  Appreciate cardiology and nephrology input. Continue on IVF per nephrology. Continue on PEG tube feeding. Dietician consulted to help with protein calorie malnutrition. Discussed with nursing.      DVT Prophylaxis: PCDs  Code Status: Full     Nora Cadet DO      Electronically signed by Nora Cadet DO on 7/15/2022 at 4:46 AM

## 2022-07-15 NOTE — PROGRESS NOTES
Mooney catheter inserted without difficulty, 600ml yellow urine returned. 24 hour urine collection started at 0900.

## 2022-07-15 NOTE — CONSULTS
Comprehensive Nutrition Assessment    Type and Reason for Visit:  Initial, Consult    Nutrition Recommendations/Plan:   Modify Renal Tube Feeding (Nepro) to goal 35 mL/hr x 24 hours  + 100 ml water Q6 hours   Will provide 840 ml, 1487 kcals/d, 68 gm pro, 611 ml free water (1011 ml total w/ flushes). This meets est kcal/pro needs at this time. 2. Fluids per renal management. Malnutrition Assessment:  Malnutrition Status:  Severe malnutrition (07/15/22 1631)    Context:  Chronic Illness     Findings of the 6 clinical characteristics of malnutrition:  Energy Intake:  75% or less estimated energy requirements for 1 month or longer (poor PO take, h/o overnight TF)  Weight Loss:  No significant weight loss     Body Fat Loss:  Severe body fat loss Orbital, Buccal region   Muscle Mass Loss:  Severe muscle mass loss Temples (temporalis), Clavicles (pectoralis & deltoids)  Fluid Accumulation:  Unable to assess     Strength:  Not Performed    Nutrition Assessment:    Pt admit with dehydration, ARF, FTT of adult, h/o severe pro-elidia malnutrition. PMHx CKD stage III, s/p PEG placement 03/2022, h/o abdominal wound, s/p ileostomy, h/o cancer, COPD, HLD, HTN. Previously followed by RD at [facility]. Currently ordered Low K+ diet and TF Nepro via PEG x 24 hours/d. Consult for TF order & mgmt. Nutrition Related Findings:    A/O x4. -I/O. + ostomy, + PEG with TF infusing. Abd soft, +bs, +diarrhea, - appetite. Wound Type: None       Current Nutrition Intake & Therapies:    Average Meal Intake: 0%  Average Supplements Intake: None Ordered  ADULT DIET;  Regular; Low Potassium (Less than 3000 mg/day)  ADULT TUBE FEEDING; PEG; Renal Formula; Continuous; 35; Yes; 10; Q 4 hours; 35; 100; Q 6 hours  Current Tube Feeding (TF) Orders:  Feeding Route: PEG  Formula: Renal Formula  Schedule: Continuous  Feeding Regimen: Nepro at 40mL/hr  x 24 hours  Additives/Modulars: None  Water Flushes: 100 ml water q 6 hours  Current TF & Flush Orders Provides: = 960 ml , 1699 kcals, 78 gm pro, 698 ml free water (1298 ml w/ flushes)  Goal TF & Flush Orders Provides:      Anthropometric Measures:  Height: 5' (152.4 cm)  Ideal Body Weight (IBW): 100 lbs (45 kg)    Admission Body Weight: 102 lb 4.7 oz (46.4 kg)  Current Body Weight: 102 lb 4.7 oz (46.4 kg) (7/15), 102.3 % IBW. Current BMI (kg/m2): 20  Usual Body Weight:  (Goal 100# per pt, 5/25/22 92.5 lbs per [facility] RD note.)     Weight Adjustment For: No Adjustment                 BMI Categories: Underweight (BMI less than 22) age over 72    Estimated Daily Nutrient Needs:  Energy Requirements Based On: Kcal/kg  Weight Used for Energy Requirements: Admission  Energy (kcal/day):  kcal/d (30-32 kcal/kg)  Weight Used for Protein Requirements: Current  Protein (g/day): 50-60 gm/d  Method Used for Fluid Requirements: Standard Renal  Fluid (ml/day): per renal    Nutrition Diagnosis:   Severe malnutrition, In context of chronic illness related to altered GI function, inadequate protein-energy intake (h/o ileostomy, abd pain, poor PO intake PTA) as evidenced by Criteria as identified in malnutrition assessment    Nutrition Interventions:   Food and/or Nutrient Delivery: Modify Tube Feeding (Modify rate to 35 mL/hr x 24 hours (840 ml, 1487 kcals/d, 68 gm pro, 611 ml free water))  Nutrition Education/Counseling: No recommendation at this time  Coordination of Nutrition Care: Continue to monitor while inpatient       Goals:     Goals: Tolerate nutrition support at goal rate, by next RD assessment       Nutrition Monitoring and Evaluation:   Behavioral-Environmental Outcomes: None Identified  Food/Nutrient Intake Outcomes: Enteral Nutrition Intake/Tolerance  Physical Signs/Symptoms Outcomes: Biochemical Data, Constipation, Diarrhea, GI Status, Nausea or Vomiting, Fluid Status or Edema, Hemodynamic Status, Nutrition Focused Physical Findings, Skin, Weight    Discharge Planning:     Too soon to determine LENARD. [employer], [address] [place] [address]  Contact: [phone]

## 2022-07-15 NOTE — PROGRESS NOTES
Spoke with Dr. Mariposa Galo regarding patient being incontinent and 24hr urine ordered. New orders given.   Claudio Rondon RN

## 2022-07-15 NOTE — PROGRESS NOTES
[facility]  Nephrology Attending Progress Note    SUBJECTIVE:     7/15/22- continues to be minimally interactive, spoke with nursing, was a little more alert when her  was here. RN placed gaviria as she has been incontinent. Had return of 600 ml with placement.      PROBLEM LIST:    Patient Active Problem List   Diagnosis    Right cataract    Essential hypertension, benign    Hyperlipidemia with target LDL less than 100    Osteoarthritis, shoulder    Primary osteoarthritis of left knee    History of ischemic bowel disease    Ileostomy present ([address]    Chronic kidney disease, stage III (moderate) ([facility])    COPD (chronic obstructive pulmonary disease) ([facility])    Moderate protein-calorie malnutrition (HCC)    Abdominal wall cellulitis    Cellulitis    Infected hernioplasty mesh ([facility])    Enterocutaneous fistula    Mild protein-calorie malnutrition ([address]    Acute kidney injury ([address]    Hypertensive kidney disease with chronic kidney disease stage IV (HCC)    KD (acute kidney injury) ([address]    Diarrhea    Elevated serum creatinine    Abnormal serum creatinine level    Hyperlipemia    Primary hypertension    Essential (primary) hypertension    Postsurgical malabsorption, not elsewhere classified    Hypercholesterolemia    Pure hypercholesterolemia    Prediabetes    Hypothyroidism, unspecified    Acute renal failure (ARF) ([address]        PAST MEDICAL HISTORY:    Past Medical History:   Diagnosis Date    Acute kidney failure ([address] 2014 and 8/2016    x2,no dialysis needed,resolved, kidney function tests returned to normal    Anxiety     Arthritis     CAD (coronary artery disease)     follows with Dr. Gee Zuleta every 6 months    Cancer ([address]     skin, leg,nose- removed surgically     COPD (chronic obstructive pulmonary disease) ([address]     mild- no inhlaers, no oxygen     Depression     Fibromyalgia     chronic back and neck pain    Generalized headaches     h/o / daily    History of blood transfusion 3-11-14    multiple times    History of necrotic bowel 2012    Hyperlipidemia     Hypertension     Incisional hernia     abdomen    Insomnia     Mitral valve regurgitation     Myocardial infarct ([facility] 2002    Occasional tremors     at hands / stable with medication    Osteopenia     PONV (postoperative nausea and vomiting)     Vitamin B12 deficiency     h/o       DIET:    ADULT DIET;  Regular; Low Potassium (Less than 3000 mg/day)  ADULT TUBE FEEDING; PEG; Renal Formula; Continuous; 40; Yes; 10; Q 4 hours; 40; 100; Q 6 hours     PHYSICAL EXAM:     Patient Vitals for the past 24 hrs:   BP Temp Temp src Pulse Resp SpO2 Weight   07/15/22 0800 (!) 125/44 98 °F (36.7 °C) Axillary 67 18 97 % --   07/15/22 0551 -- -- -- -- -- -- 102 lb 3.2 oz (46.4 kg)   07/15/22 0430 (!) 105/42 97.9 °F (36.6 °C) Axillary 70 18 100 % --   07/14/22 2333 (!) 121/42 98.3 °F (36.8 °C) Axillary 72 18 96 % --   07/14/22 2015 (!) 114/46 98.1 °F (36.7 °C) Axillary 81 18 98 % --   07/14/22 1815 (!) 121/42 97.9 °F (36.6 °C) Axillary 68 18 -- --   @      Intake/Output Summary (Last 24 hours) at 7/15/2022 1229  Last data filed at 7/15/2022 1214  Gross per 24 hour   Intake 1438 ml   Output 1625 ml   Net -187 ml         Wt Readings from Last 3 Encounters:   07/15/22 102 lb 3.2 oz (46.4 kg)   01/28/22 94 lb (42.6 kg)   07/02/21 84 lb (38.1 kg)       Constitutional:  Pt frail and cachetic   Head: normocephalic, atraumatic  Neck: no JVD  Cardiovascular: S1 S2 no S3 or rub  Respiratory:  diminished, poor effort  Gastrointestinal:  Soft, + PEG and ostomy  Ext: no edema   Skin: dry, no rash  Neuro: minimally interactive    MEDS (scheduled):    sodium chloride flush  10 mL IntraVENous 2 times per day    aspirin  81 mg Oral Daily    escitalopram  20 mg Oral Nightly    pantoprazole  40 mg Oral QAM AC       MEDS (infusions):   sodium bicarbonate infusion      sodium chloride         MEDS (prn):  sodium chloride flush, sodium chloride, senna, acetaminophen **OR** acetaminophen, hyoscyamine, oxyCODONE-acetaminophen    DATA:    Recent Labs     07/14/22  0629 07/15/22  0438   WBC 10.8 8.8   HGB 11.7 9.9*   HCT 37.6 32.4*   .3* 100.9*    164     Recent Labs     07/14/22  0629 07/15/22  0438    136   K 4.7 3.8    104   CO2 16* 19*   * 100*   CREATININE 3.6* 2.5*   LABGLOM 12 19   GLUCOSE 96 214*   CALCIUM 8.8 8.0*   ALT 30 22   AST 20 18   BILITOT 0.9 0.6   ALKPHOS 273* 315*   MG  --  2.0   PHOS  --  6.1*       Lab Results   Component Value Date    LABALBU 2.4 (L) 07/15/2022    LABALBU 3.2 (L) 07/14/2022    LABALBU 3.6 07/01/2022     Lab Results   Component Value Date    TSH 1.380 07/01/2022       Iron Studies  Lab Results   Component Value Date    IRON 30 (L) 07/01/2022    TIBC 229 (L) 07/01/2022    FERRITIN 143 07/01/2022     Vitamin B-12   Date Value Ref Range Status   04/27/2022 1050 (H) 211 - 946 pg/mL Final     Folate   Date Value Ref Range Status   01/22/2021 8.1 4.8 - 24.2 ng/mL Final       Vit D, 25-Hydroxy   Date Value Ref Range Status   07/15/2022 13 (L) 30 - 100 ng/mL Final     Comment:     <20 ng/mL. ........... [place] Deficient  20-30 ng/mL. ......... [place] Insufficient   ng/mL. ........ [place] Sufficient  >100 ng/mL. .......... [place] Toxic       PTH   Date Value Ref Range Status   07/15/2022 307 (H) 15 - 65 pg/mL Final       No components found for: URIC    Lab Results   Component Value Date/Time    COLORU Yellow 02/25/2021 04:26 PM    NITRU Negative 02/25/2021 04:26 PM    GLUCOSEU Negative 02/25/2021 04:26 PM    KETUA Negative 02/25/2021 04:26 PM    UROBILINOGEN 0.2 02/25/2021 04:26 PM    BILIRUBINUR Negative 02/25/2021 04:26 PM       No results found for: [place]ha      IMPRESSION/RECOMMENDATIONS:      Stage II KD presumed sec to decreased effective renal perfusion in the setting of CKD G3B with a baseline serum cr 1.4-1.5mg/dl with an e-GFR=33-36ml/min  7/14/22 Renal US No evidence of hydro  FeNa<1%  TF running as po intake poor. PLAN:  1. Continue the IVF  2. Follow labs  3. Follow I&O, Gaviria placed with 600 ml returned immediately  4. Await 24 hour urine     2. HTN with CKD I-IV  BP goal <130/80  PLAN:  Follow BP     3. Anion Gap Met Acidosis in the setting of the KD on the CKD  PLAN:  Follow on the IV HCO3- improving      4. 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 I had a face to face encounter with the patient. She states she has ongoing abd discomfort and no appetite  Agree with exam.    Agree with  formulation, assessment and plan as outlined above and directed by me. Addition and corrections were done as deemed appropriate.    My exam and plan include:     A/P:  1. Stage II DK-cr trending down-gaviria placed for incontinence  Continue current treatment as outlined above    JEZ Anderson MD

## 2022-07-15 NOTE — PROGRESS NOTES
Occupational Therapy  OCCUPATIONAL THERAPY INITIAL EVALUATION     Adela Bianchi [place], [place]        TFWE:4/10/3251                                                  Patient Name: Carroll Ash    MRN: [id number]    : 1940    Room: [address] [place] [address]      Evaluating OT: Cindy Child, OTR/L [id number]      Referring Provider: Violet Molina DO    Specific Provider Orders/Date: OT eval and treat 22      Diagnosis:  Dehydration [E86.0]  Severe protein-calorie malnutrition ([address] [E43]  Failure to thrive in adult [R62.7]  NSTEMI (non-ST elevated myocardial infarction) ([address] [I21.4]  Acute renal failure (ARF) ([address] [N17.9]  Acute renal failure, unspecified acute renal failure type ([address] [N17.9]      Pertinent Medical History: arthritis, COPD, fibromyalgia, HTN, MI, osteopenia       Precautions:  Fall Risk, alarm, ostomy, PEG, O2      Assessment of current deficits    [x] Functional mobility  [x]ADLs  [x] Strength               [x]Cognition    [x] Functional transfers   [x] IADLs         [x] Safety Awareness   [x]Endurance    [] Fine Coordination              [x] Balance      [] Vision/perception   []Sensation     []Gross Motor Coordination  [] ROM  [] Delirium                   [] Motor Control     OT PLAN OF CARE   OT POC based on physician orders, patient diagnosis and results of clinical assessment    Frequency/Duration 2-4 days/wk for 2 weeks PRN   Specific OT Treatment Interventions to include:   * Instruction/training on adapted ADL techniques and AE recommendations to increase functional independence within precautions       * Training on energy conservation strategies, correct breathing pattern and techniques to improve independence/tolerance for self-care routine  * Functional transfer/mobility training/DME recommendations for increased independence, safety, and fall prevention  * Patient/Family education to increase follow through with safety techniques and functional independence  * Recommendation of environmental modifications for increased safety with functional transfers/mobility and ADLs  * Cognitive retraining/development of therapeutic activities to improve problem solving, judgement, memory, and attention for increased safety/participation in ADL/IADL tasks  * Therapeutic exercise to improve motor endurance, ROM, and functional strength for ADLs/functional transfers  * Therapeutic activities to facilitate/challenge dynamic balance, stand tolerance for increased safety and independence with ADLs  * Therapeutic activities to facilitate gross/fine motor skills for increased independence with ADLs  * Neuro-muscular re-education: facilitation of righting/equilibrium reactions, midline orientation, scapular stability/mobility, normalization of muscle tone, and facilitation of volitional active controled movement  * Positioning to improve skin integrity, interaction with environment and functional independence        Recommended Adaptive Equipment: TBD     Home Living: Pt lives with  in 1 story house with 2 LOW and 2 hand rails floor. Bathroom setup: walk in shower with shower chair   Equipment owned: cane, wheeled walker    Prior Level of Function: assist  with ADLs , assist from  with IADLs; functional mobility: cane or wheeled walker    Pain Level: pt reported pain in knees; pt agreeable to therapy  Cognition: A&O: 2/4; 1-2 step command follow demonstrated. Pt with some confusion noted.    Memory:  Fair    Sequencing:  Fair-   Problem solving:  Fair-   Judgement/safety:  Fair-     Functional Assessment:  AM-PAC Daily Activity Raw Score: 14/24   Initial Eval Status  Date: 7/15/22 Treatment Status  Date: STGs = LTGs  Time frame: 10-14 days   Feeding Set up     Grooming Min A, seated  Sup   UB Dressing Mod A  SBA   LB Dressing Max A  To don socks   Min A-with use of AD as appropriate/needed   Bathing Max A  Min A -with use of AD as appropriate/needed   Toileting Pt with catheter and ostomy      Bed Mobility  Supine to sit: Mod A   Sit to supine: Max A   SBA   Functional Transfers NT. Pt too fatigued to stand this date  Min A    Functional Mobility NT  Min A -with device as needed to maximize independence with ADLs and functional task completion   Balance Sitting:     Static:  SBA    Dynamic:CGA  Standing: NT  Sup for static/dynamic sitting balance to maximize independence with ADLs and functional task completion    Min A for standing balance to maximize independence with ADLs and functional task completion   Activity Tolerance Fair- with light activity. Pt limited by fatigue and weakness. Fair+ with ADL activity. Pt will demonstrate good understanding of education provided on EC/WS techniques    Visual/  Perceptual Glasses: yes                Additional long-term goal: Pt will increase functional independence to PLOF to allow pt to live in least restrictive environment. Hand Dominance R   AROM (PROM) Strength   RUE  WFL grossly WFL grossly    LUE WFL grossly WFL grossly      Hearing: WFL   Sensation:  No c/o numbness or tingling   Tone: WFL   Edema: none noted    Comments: Upon arrival patient lying in bed. At end of session, patient returned to bed with call light and phone within reach, all lines and tubes intact. Overall patient demonstrated decreased independence and safety during completion of ADL/functional transfer/mobility tasks. Pt would benefit from continued skilled OT to increase safety and independence with completion of ADL/IADL tasks for functional independence and quality of life. Rehab Potential: Good for established goals     Patient / Family Goal: none stated    Patient and/or family were instructed on functional diagnosis, prognosis/goals and OT plan of care. Demonstrated fair understanding.      Eval Complexity: Low    Time In: 1351  Time Out: 1408    Min Units   OT Eval Low [id number]  x  1   OT Eval Medium [id number]

## 2022-07-16 ENCOUNTER — APPOINTMENT (OUTPATIENT)
Dept: ULTRASOUND IMAGING | Age: 82
DRG: 682 | End: 2022-07-16
Payer: MEDICARE

## 2022-07-16 ENCOUNTER — APPOINTMENT (OUTPATIENT)
Dept: CT IMAGING | Age: 82
DRG: 682 | End: 2022-07-16
Payer: MEDICARE

## 2022-07-16 LAB
24HR URINE VOLUME (ML): 1600 ML
ADENOVIRUS BY PCR: NOT DETECTED
ALBUMIN SERPL-MCNC: 2.1 G/DL (ref 3.5–5.2)
ALP BLD-CCNC: 270 U/L (ref 35–104)
ALT SERPL-CCNC: 19 U/L (ref 0–32)
ANION GAP SERPL CALCULATED.3IONS-SCNC: 14 MMOL/L (ref 7–16)
ANISOCYTOSIS: ABNORMAL
APTT: 29.7 SEC (ref 24.5–35.1)
AST SERPL-CCNC: 25 U/L (ref 0–31)
BASOPHILS ABSOLUTE: 0.08 E9/L (ref 0–0.2)
BASOPHILS RELATIVE PERCENT: 0.3 % (ref 0–2)
BILIRUB SERPL-MCNC: 1.1 MG/DL (ref 0–1.2)
BORDETELLA PARAPERTUSSIS BY PCR: NOT DETECTED
BORDETELLA PERTUSSIS BY PCR: NOT DETECTED
BUN BLDV-MCNC: 83 MG/DL (ref 6–23)
CALCIUM SERPL-MCNC: 7.8 MG/DL (ref 8.6–10.2)
CHLAMYDOPHILIA PNEUMONIAE BY PCR: NOT DETECTED
CHLORIDE BLD-SCNC: 102 MMOL/L (ref 98–107)
CO2: 24 MMOL/L (ref 22–29)
CORONAVIRUS 229E BY PCR: NOT DETECTED
CORONAVIRUS HKU1 BY PCR: NOT DETECTED
CORONAVIRUS NL63 BY PCR: NOT DETECTED
CORONAVIRUS OC43 BY PCR: NOT DETECTED
CREAT SERPL-MCNC: 2 MG/DL (ref 0.5–1)
CREATININE 24 HOUR URINE: 976 MG/24H (ref 720–1510)
D DIMER: >5250 NG/ML DDU
EOSINOPHILS ABSOLUTE: 0.03 E9/L (ref 0.05–0.5)
EOSINOPHILS RELATIVE PERCENT: 0.1 % (ref 0–6)
GFR AFRICAN AMERICAN: 29
GFR NON-AFRICAN AMERICAN: 24 ML/MIN/1.73
GLUCOSE BLD-MCNC: 186 MG/DL (ref 74–99)
HCT VFR BLD CALC: 29 % (ref 34–48)
HCT VFR BLD CALC: 30.5 % (ref 34–48)
HEMOGLOBIN: 10 G/DL (ref 11.5–15.5)
HEMOGLOBIN: 9.3 G/DL (ref 11.5–15.5)
HUMAN METAPNEUMOVIRUS BY PCR: NOT DETECTED
HUMAN RHINOVIRUS/ENTEROVIRUS BY PCR: NOT DETECTED
IMMATURE GRANULOCYTES #: 0.58 E9/L
IMMATURE GRANULOCYTES %: 2.1 % (ref 0–5)
INFLUENZA A BY PCR: NOT DETECTED
INFLUENZA B BY PCR: NOT DETECTED
LACTIC ACID: 2.9 MMOL/L (ref 0.5–2.2)
LYMPHOCYTES ABSOLUTE: 0.4 E9/L (ref 1.5–4)
LYMPHOCYTES RELATIVE PERCENT: 1.5 % (ref 20–42)
Lab: 24 HOURS
MAGNESIUM: 1.4 MG/DL (ref 1.6–2.6)
MAGNESIUM: 2 MG/DL (ref 1.6–2.6)
MCH RBC QN AUTO: 31.1 PG (ref 26–35)
MCH RBC QN AUTO: 31.4 PG (ref 26–35)
MCHC RBC AUTO-ENTMCNC: 32.1 % (ref 32–34.5)
MCHC RBC AUTO-ENTMCNC: 32.8 % (ref 32–34.5)
MCV RBC AUTO: 95.9 FL (ref 80–99.9)
MCV RBC AUTO: 97 FL (ref 80–99.9)
METER GLUCOSE: 123 MG/DL (ref 74–99)
METER GLUCOSE: 138 MG/DL (ref 74–99)
METER GLUCOSE: 164 MG/DL (ref 74–99)
METER GLUCOSE: 178 MG/DL (ref 74–99)
MONOCYTES ABSOLUTE: 1.19 E9/L (ref 0.1–0.95)
MONOCYTES RELATIVE PERCENT: 4.4 % (ref 2–12)
MYCOPLASMA PNEUMONIAE BY PCR: NOT DETECTED
NEUTROPHILS ABSOLUTE: 24.85 E9/L (ref 1.8–7.3)
NEUTROPHILS RELATIVE PERCENT: 91.6 % (ref 43–80)
PARAINFLUENZA VIRUS 1 BY PCR: NOT DETECTED
PARAINFLUENZA VIRUS 2 BY PCR: NOT DETECTED
PARAINFLUENZA VIRUS 3 BY PCR: NOT DETECTED
PARAINFLUENZA VIRUS 4 BY PCR: NOT DETECTED
PDW BLD-RTO: 14.5 FL (ref 11.5–15)
PDW BLD-RTO: 14.6 FL (ref 11.5–15)
PLATELET # BLD: 137 E9/L (ref 130–450)
PLATELET # BLD: 95 E9/L (ref 130–450)
PLATELET CONFIRMATION: NORMAL
PMV BLD AUTO: 12.6 FL (ref 7–12)
PMV BLD AUTO: 13.3 FL (ref 7–12)
POTASSIUM REFLEX MAGNESIUM: 3.2 MMOL/L (ref 3.5–5)
POTASSIUM SERPL-SCNC: 3.9 MMOL/L (ref 3.5–5)
PROCALCITONIN: 68.17 NG/ML (ref 0–0.08)
PROCALCITONIN: 68.17 NG/ML (ref 0–0.08)
RBC # BLD: 2.99 E12/L (ref 3.5–5.5)
RBC # BLD: 3.18 E12/L (ref 3.5–5.5)
RESPIRATORY SYNCYTIAL VIRUS BY PCR: NOT DETECTED
SARS-COV-2, PCR: NOT DETECTED
SODIUM BLD-SCNC: 140 MMOL/L (ref 132–146)
TOTAL PROTEIN: 4.9 G/DL (ref 6.4–8.3)
WBC # BLD: 27.1 E9/L (ref 4.5–11.5)
WBC # BLD: 31.5 E9/L (ref 4.5–11.5)

## 2022-07-16 PROCEDURE — 87186 SC STD MICRODIL/AGAR DIL: CPT

## 2022-07-16 PROCEDURE — 87449 NOS EACH ORGANISM AG IA: CPT

## 2022-07-16 PROCEDURE — 93970 EXTREMITY STUDY: CPT

## 2022-07-16 PROCEDURE — 2580000003 HC RX 258: Performed by: INTERNAL MEDICINE

## 2022-07-16 PROCEDURE — 2700000000 HC OXYGEN THERAPY PER DAY

## 2022-07-16 PROCEDURE — 83605 ASSAY OF LACTIC ACID: CPT

## 2022-07-16 PROCEDURE — 85027 COMPLETE CBC AUTOMATED: CPT

## 2022-07-16 PROCEDURE — 2580000003 HC RX 258: Performed by: SPECIALIST

## 2022-07-16 PROCEDURE — 84145 PROCALCITONIN (PCT): CPT

## 2022-07-16 PROCEDURE — 6370000000 HC RX 637 (ALT 250 FOR IP): Performed by: INTERNAL MEDICINE

## 2022-07-16 PROCEDURE — 82570 ASSAY OF URINE CREATININE: CPT

## 2022-07-16 PROCEDURE — 85025 COMPLETE CBC W/AUTO DIFF WBC: CPT

## 2022-07-16 PROCEDURE — 2060000000 HC ICU INTERMEDIATE R&B

## 2022-07-16 PROCEDURE — 85730 THROMBOPLASTIN TIME PARTIAL: CPT

## 2022-07-16 PROCEDURE — 85378 FIBRIN DEGRADE SEMIQUANT: CPT

## 2022-07-16 PROCEDURE — 6360000002 HC RX W HCPCS: Performed by: INTERNAL MEDICINE

## 2022-07-16 PROCEDURE — 84132 ASSAY OF SERUM POTASSIUM: CPT

## 2022-07-16 PROCEDURE — 87088 URINE BACTERIA CULTURE: CPT

## 2022-07-16 PROCEDURE — 0202U NFCT DS 22 TRGT SARS-COV-2: CPT

## 2022-07-16 PROCEDURE — 6360000002 HC RX W HCPCS: Performed by: SPECIALIST

## 2022-07-16 PROCEDURE — 80053 COMPREHEN METABOLIC PANEL: CPT

## 2022-07-16 PROCEDURE — 87081 CULTURE SCREEN ONLY: CPT

## 2022-07-16 PROCEDURE — 83735 ASSAY OF MAGNESIUM: CPT

## 2022-07-16 PROCEDURE — 87077 CULTURE AEROBIC IDENTIFY: CPT

## 2022-07-16 PROCEDURE — 36415 COLL VENOUS BLD VENIPUNCTURE: CPT

## 2022-07-16 PROCEDURE — 82962 GLUCOSE BLOOD TEST: CPT

## 2022-07-16 PROCEDURE — 6370000000 HC RX 637 (ALT 250 FOR IP)

## 2022-07-16 PROCEDURE — 71250 CT THORAX DX C-: CPT

## 2022-07-16 RX ORDER — ENOXAPARIN SODIUM 100 MG/ML
1 INJECTION SUBCUTANEOUS 2 TIMES DAILY
Status: DISCONTINUED | OUTPATIENT
Start: 2022-07-16 | End: 2022-07-16 | Stop reason: DRUGHIGH

## 2022-07-16 RX ORDER — HEPARIN SODIUM 1000 [USP'U]/ML
80 INJECTION, SOLUTION INTRAVENOUS; SUBCUTANEOUS ONCE
Status: COMPLETED | OUTPATIENT
Start: 2022-07-16 | End: 2022-07-16

## 2022-07-16 RX ORDER — DEXTROSE MONOHYDRATE 50 MG/ML
100 INJECTION, SOLUTION INTRAVENOUS PRN
Status: DISCONTINUED | OUTPATIENT
Start: 2022-07-16 | End: 2022-07-22 | Stop reason: HOSPADM

## 2022-07-16 RX ORDER — HEPARIN SODIUM 1000 [USP'U]/ML
80 INJECTION, SOLUTION INTRAVENOUS; SUBCUTANEOUS ONCE
Status: DISCONTINUED | OUTPATIENT
Start: 2022-07-16 | End: 2022-07-16 | Stop reason: SDUPTHER

## 2022-07-16 RX ORDER — INSULIN LISPRO 100 [IU]/ML
0-3 INJECTION, SOLUTION INTRAVENOUS; SUBCUTANEOUS NIGHTLY
Status: DISCONTINUED | OUTPATIENT
Start: 2022-07-16 | End: 2022-07-22 | Stop reason: HOSPADM

## 2022-07-16 RX ORDER — DEXTROSE, SODIUM CHLORIDE, SODIUM LACTATE, POTASSIUM CHLORIDE, AND CALCIUM CHLORIDE 5; .6; .31; .03; .02 G/100ML; G/100ML; G/100ML; G/100ML; G/100ML
INJECTION, SOLUTION INTRAVENOUS CONTINUOUS
Status: DISCONTINUED | OUTPATIENT
Start: 2022-07-16 | End: 2022-07-20

## 2022-07-16 RX ORDER — HEPARIN SODIUM 10000 [USP'U]/100ML
5-30 INJECTION, SOLUTION INTRAVENOUS CONTINUOUS
Status: DISCONTINUED | OUTPATIENT
Start: 2022-07-16 | End: 2022-07-16

## 2022-07-16 RX ORDER — HEPARIN SODIUM 10000 [USP'U]/100ML
5-30 INJECTION, SOLUTION INTRAVENOUS CONTINUOUS
Status: DISCONTINUED | OUTPATIENT
Start: 2022-07-16 | End: 2022-07-21

## 2022-07-16 RX ORDER — MAGNESIUM SULFATE IN WATER 40 MG/ML
2000 INJECTION, SOLUTION INTRAVENOUS ONCE
Status: COMPLETED | OUTPATIENT
Start: 2022-07-16 | End: 2022-07-16

## 2022-07-16 RX ORDER — HEPARIN SODIUM 1000 [USP'U]/ML
80 INJECTION, SOLUTION INTRAVENOUS; SUBCUTANEOUS PRN
Status: DISCONTINUED | OUTPATIENT
Start: 2022-07-16 | End: 2022-07-16 | Stop reason: SDUPTHER

## 2022-07-16 RX ORDER — POTASSIUM CHLORIDE 7.45 MG/ML
10 INJECTION INTRAVENOUS
Status: COMPLETED | OUTPATIENT
Start: 2022-07-16 | End: 2022-07-16

## 2022-07-16 RX ORDER — INSULIN LISPRO 100 [IU]/ML
0-6 INJECTION, SOLUTION INTRAVENOUS; SUBCUTANEOUS
Status: DISCONTINUED | OUTPATIENT
Start: 2022-07-16 | End: 2022-07-22 | Stop reason: HOSPADM

## 2022-07-16 RX ORDER — HEPARIN SODIUM 1000 [USP'U]/ML
40 INJECTION, SOLUTION INTRAVENOUS; SUBCUTANEOUS PRN
Status: DISCONTINUED | OUTPATIENT
Start: 2022-07-16 | End: 2022-07-16 | Stop reason: SDUPTHER

## 2022-07-16 RX ORDER — HEPARIN SODIUM 1000 [USP'U]/ML
80 INJECTION, SOLUTION INTRAVENOUS; SUBCUTANEOUS PRN
Status: DISCONTINUED | OUTPATIENT
Start: 2022-07-16 | End: 2022-07-21

## 2022-07-16 RX ORDER — HEPARIN SODIUM 1000 [USP'U]/ML
40 INJECTION, SOLUTION INTRAVENOUS; SUBCUTANEOUS PRN
Status: DISCONTINUED | OUTPATIENT
Start: 2022-07-16 | End: 2022-07-21

## 2022-07-16 RX ORDER — NICOTINE 21 MG/24HR
1 PATCH, TRANSDERMAL 24 HOURS TRANSDERMAL DAILY
Status: DISCONTINUED | OUTPATIENT
Start: 2022-07-16 | End: 2022-07-22 | Stop reason: HOSPADM

## 2022-07-16 RX ORDER — ENOXAPARIN SODIUM 100 MG/ML
1 INJECTION SUBCUTANEOUS DAILY
Status: DISCONTINUED | OUTPATIENT
Start: 2022-07-16 | End: 2022-07-16

## 2022-07-16 RX ORDER — POTASSIUM CHLORIDE 7.45 MG/ML
10 INJECTION INTRAVENOUS
Status: DISCONTINUED | OUTPATIENT
Start: 2022-07-16 | End: 2022-07-16

## 2022-07-16 RX ADMIN — PANTOPRAZOLE SODIUM 40 MG: 40 TABLET, DELAYED RELEASE ORAL at 07:58

## 2022-07-16 RX ADMIN — HEPARIN SODIUM 3750 UNITS: 1000 INJECTION INTRAVENOUS; SUBCUTANEOUS at 17:59

## 2022-07-16 RX ADMIN — VANCOMYCIN HYDROCHLORIDE 1000 MG: 1 INJECTION, POWDER, LYOPHILIZED, FOR SOLUTION INTRAVENOUS at 13:05

## 2022-07-16 RX ADMIN — OXYCODONE AND ACETAMINOPHEN 1 TABLET: 10; 325 TABLET ORAL at 22:46

## 2022-07-16 RX ADMIN — ACETAMINOPHEN 650 MG: 325 TABLET ORAL at 10:02

## 2022-07-16 RX ADMIN — INSULIN LISPRO 1 UNITS: 100 INJECTION, SOLUTION INTRAVENOUS; SUBCUTANEOUS at 06:58

## 2022-07-16 RX ADMIN — MAGNESIUM SULFATE HEPTAHYDRATE 2000 MG: 40 INJECTION, SOLUTION INTRAVENOUS at 08:08

## 2022-07-16 RX ADMIN — ASPIRIN 81 MG CHEWABLE TABLET 81 MG: 81 TABLET CHEWABLE at 07:58

## 2022-07-16 RX ADMIN — SODIUM CHLORIDE, SODIUM LACTATE, POTASSIUM CHLORIDE, CALCIUM CHLORIDE AND DEXTROSE MONOHYDRATE: 5; 600; 310; 30; 20 INJECTION, SOLUTION INTRAVENOUS at 17:55

## 2022-07-16 RX ADMIN — POTASSIUM CHLORIDE 10 MEQ: 7.46 INJECTION, SOLUTION INTRAVENOUS at 10:51

## 2022-07-16 RX ADMIN — POTASSIUM CHLORIDE 10 MEQ: 7.46 INJECTION, SOLUTION INTRAVENOUS at 09:47

## 2022-07-16 RX ADMIN — HEPARIN SODIUM 18 UNITS/KG/HR: 10000 INJECTION, SOLUTION INTRAVENOUS at 18:02

## 2022-07-16 RX ADMIN — SODIUM CHLORIDE, SODIUM LACTATE, POTASSIUM CHLORIDE, CALCIUM CHLORIDE AND DEXTROSE MONOHYDRATE: 5; 600; 310; 30; 20 INJECTION, SOLUTION INTRAVENOUS at 08:11

## 2022-07-16 RX ADMIN — POTASSIUM CHLORIDE 10 MEQ: 7.46 INJECTION, SOLUTION INTRAVENOUS at 08:58

## 2022-07-16 RX ADMIN — INSULIN LISPRO 1 UNITS: 100 INJECTION, SOLUTION INTRAVENOUS; SUBCUTANEOUS at 15:57

## 2022-07-16 RX ADMIN — CEFEPIME HYDROCHLORIDE 1000 MG: 1 INJECTION, POWDER, FOR SOLUTION INTRAMUSCULAR; INTRAVENOUS at 13:07

## 2022-07-16 RX ADMIN — SODIUM CHLORIDE, PRESERVATIVE FREE 10 ML: 5 INJECTION INTRAVENOUS at 22:58

## 2022-07-16 RX ADMIN — ESCITALOPRAM OXALATE 20 MG: 10 TABLET ORAL at 22:43

## 2022-07-16 ASSESSMENT — PAIN DESCRIPTION - LOCATION: LOCATION: GENERALIZED

## 2022-07-16 ASSESSMENT — PAIN SCALES - GENERAL
PAINLEVEL_OUTOF10: 7
PAINLEVEL_OUTOF10: 0

## 2022-07-16 ASSESSMENT — PAIN DESCRIPTION - DESCRIPTORS: DESCRIPTORS: ACHING

## 2022-07-16 NOTE — PROGRESS NOTES
This patient is on medication that requires renal, weight, and/or indication dose adjustment. Date Body Weight IBW  Adjusted BW SCr  CrCl Dialysis status   7/16/2022 103 lb 4.8 oz (46.9 kg) Ideal body weight: 45.5 kg (100 lb 4.9 oz)  Adjusted ideal body weight: 46 kg (101 lb 8.1 oz) Serum creatinine: 2 mg/dL (H) 07/16/22 0422  Estimated creatinine clearance: 16 mL/min (A) N/a       Pharmacy has dose-adjusted the following medication(s):    Date Previous Order Adjusted Order   7/16/2022 Enoxaparin 1 mg/kg twice daily Enoxaparin 1 mg/kg daily       These changes were made per protocol according to the [address] for Pharmacists. *Please note this dose may need readjusted if patient's condition changes. Please contact pharmacy with any questions regarding these changes.     justen bishop, [address] [place] [address]  7/16/2022  5:26 PM

## 2022-07-16 NOTE — CONSULTS
alphahemolytic Streptococcus and Enterococcus faecalis. She was treated with Zosyn, Linezolid and Anidulafungin. The mesh was partially removed. She was discharged on 3 weeks of antibiotics to [facility]    January 2020. Admitted to [facility] with bulging over the epigastrium since secondary to an abdominal abscess. Treated with Zosyn. Seen by ID. Zosyn was continued. She underwent debridement with explantation of visible mesh. Cultures grew E. coli, Morganella, Enterococcus faecalis, Streptococcus. She was discharged to [facility] on [address] for 2 weeks. She was referred back to the office on 4/14/2020 because of purulent drainage from the abdominal wound over the epigastrium. Cultures grew E. coli and MRSA. She was on Cefdinir and Doxycycline. We felt the mesh was infected and asked that the remaining mesh to be removed.     Past Surgical History:        Procedure Laterality Date    ABDOMEN SURGERY  2-    total colectomy, bowel resection, ileostomy,revision of ileostomy     ABDOMEN SURGERY N/A 1/26/2020    DRAINAGE OF ABDOMINAL WALL ABSCESS, EXPLANTATION OF MESH, DEBRIDEMENT OF SKIN AND SUBCUTANEOUS TISSUE performed by Arlen Valverde MD at [facility] N/A 9/11/2020    EXPLANTATION OF HERNIA MESH performed by Arlen Valverde MD at [address] [place] [address]  2002    ARTHROPLASTY  1-2006    right and left thumb    [address], 2004, 2009    lumbar fusion x 3    BACK SURGERY      cervical fusion x 2    BREAST SURGERY  years ago    monor calcifications    CARPAL TUNNEL RELEASE Bilateral     CATARACT REMOVAL WITH IMPLANT Right 03/03/15    CHOLECYSTECTOMY  5-11-07    Lap    COLONOSCOPY      CORONARY ANGIOPLASTY WITH STENT PLACEMENT  1-7-2002    ENDOSCOPY, COLON, DIAGNOSTIC      EPIGASTRIC HERNIA REPAIR  3/21/16    incisional with mesh implantation    ESOPHAGUS SURGERY  1-4-13    esophageal clamp (torn Esophagus)    FINGER TRIGGER RELEASE Right 2006 index finger    FOOT SURGERY Right     multiple    HERNIA REPAIR  12-31-13    abdominal x 5- last one 2017     ILEOSTOMY OR JEJUNOSTOMY  1-3-13    revision    INSERT PICC LINE  06/09/2020    and removed     JOINT REPLACEMENT Right 3/24/15    right total shoulder arthroplasty    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     LAPAROTOMY N/A 5/12/2020    EXPLORATORY LAPAROTOMY EXPLANTATION OF INFECTED MESH SMAL BOWEL RESECTION AND REVISION END ILEOSTOMY, LYSIS OF ADHESIONS performed by Daniela Koenig MD at [address] N/A 6/8/2020    LAPAROTOMY EXPLORATORY, Angelique Boers OF HERNIA MESH performed by Daniela Koenig MD at [address] N/A 9/15/2020    EXPLORATORY LAPAROTOMY WITH SMALL BOWEL RESECTION, TAKE DOWN REVISION ENTEROCUTANEOUS FISTULA , OSTOMY REVISION performed by Daniela Koenig MD at [address] / leg    NASAL SINUS SURGERY      x2 /  cauterized    OTHER SURGICAL HISTORY  08/24/2016    diagnostic laparotomy with repair of intraabdominal hernia    OTHER SURGICAL HISTORY      removal plate / screws  / right great toe    PICC LINE INSERTION NURSE  9/15/2020         ROTATOR CUFF REPAIR  2010    right     SKIN BIOPSY  2010    3 squamous cell carcinoma right leg, left leg     Current Medications:   Scheduled Meds:   insulin lispro  0-6 Units SubCUTAneous TID WC    insulin lispro  0-3 Units SubCUTAneous Nightly    magnesium sulfate  2,000 mg IntraVENous Once    potassium chloride  10 mEq IntraVENous Q1H    calcitRIOL  0.25 mcg Oral Q MWF    vitamin D  50,000 Units Oral Weekly    sodium chloride flush  10 mL IntraVENous 2 times per day    aspirin  81 mg Oral Daily    escitalopram  20 mg Oral Nightly    pantoprazole  40 mg Oral QAM AC     Continuous Infusions:   dextrose      dextrose 5% in lactated ringers 100 mL/hr at 07/16/22 0811    sodium chloride       PRN Meds:glucose, dextrose bolus **OR** dextrose bolus, glucagon (rDNA), dextrose, sodium chloride flush, sodium chloride, senna, acetaminophen **OR** acetaminophen, hyoscyamine, oxyCODONE-acetaminophen    Allergies:  Fentanyl, Levofloxacin, Tramadol, and Vioxx [rofecoxib]    Social History:   Social History     Socioeconomic History    Marital status:    Tobacco Use    Smoking status: Every Day     Packs/day: 1.00     Types: Cigarettes    Smokeless tobacco: Never   Vaping Use    Vaping Use: Never used   Substance and Sexual Activity    Alcohol use: Not Currently     Comment: occasional    Drug use: No      Nursing home resident    Family History:   No family history on file. . Otherwise non-pertinent to the chief complaint. REVIEW OF SYSTEMS:    A 10 point review of systems could not be obtained from the patient given her status of lethargy. Otherwise, as in the HPI    PHYSICAL EXAM:    Vitals:   BP (!) 114/43   Pulse 92   Temp 98.3 °F (36.8 °C) (Axillary)   Resp 20   Ht 5' (1.524 m)   Wt 103 lb 4.8 oz (46.9 kg)   SpO2 96%   BMI 20.17 kg/m²   Constitutional: The patient is lying in bed. She is lethargic, arousable. She does not respond to questions and barely follows commands. She is tachypneic. O2 by nasal cannula 8 L. Skin: Warm and diaphoretic. No rashes were noted. HEENT: Eyes show round, and reactive pupils. No jaundice. Moist mucous membranes, no ulcerations, no thrush. Neck: Supple to movements. No lymphadenopathy. Chest: No use of accessory muscles to breathe. Symmetrical expansion. Auscultation reveals crackles over the right base posteriorly. Cardiovascular: S1 and S2 are rhythmic and regular. No murmurs appreciated. Abdomen: Positive bowel sounds to auscultation. Benign to palpation. No masses felt. No hepatosplenomegaly. PEG. Ileostomy. Extremities: No clubbing, no cyanosis, no edema. Lines: Right Mediport accessed. Mooney catheter.     CBC+dif:  Recent Labs     07/14/22  0629 07/15/22  0438 07/16/22  0422   WBC 10.8 8.8 27.1*   HGB 11.7 9.9* 9.3*   HCT 37.6 32.4* 29.0* .3* 100.9* 97.0    164 137   NEUTROABS 8.58* 6.96 24.85*     Lab Results   Component Value Date    CRP 2.3 (H) 04/27/2022    CRP 23.2 (H) 01/27/2020      No results found for: CRPHS  Lab Results   Component Value Date    SEDRATE 60 (H) 01/27/2020     Lab Results   Component Value Date    ALT 19 07/16/2022    AST 25 07/16/2022    ALKPHOS 270 (H) 07/16/2022    BILITOT 1.1 07/16/2022     Lab Results   Component Value Date/Time     07/16/2022 04:22 AM    K 3.2 07/16/2022 04:22 AM     07/16/2022 04:22 AM    CO2 24 07/16/2022 04:22 AM    BUN 83 07/16/2022 04:22 AM    CREATININE 2.0 07/16/2022 04:22 AM    GFRAA 29 07/16/2022 04:22 AM    LABGLOM 24 07/16/2022 04:22 AM    GLUCOSE 186 07/16/2022 04:22 AM    PROT 4.9 07/16/2022 04:22 AM    LABALBU 2.1 07/16/2022 04:22 AM    CALCIUM 7.8 07/16/2022 04:22 AM    BILITOT 1.1 07/16/2022 04:22 AM    ALKPHOS 270 07/16/2022 04:22 AM    AST 25 07/16/2022 04:22 AM    ALT 19 07/16/2022 04:22 AM       Lab Results   Component Value Date/Time    PROTIME 13.5 01/21/2021 11:55 AM    INR 1.2 01/21/2021 11:55 AM       Lab Results   Component Value Date/Time    TSH 1.380 07/01/2022 02:31 PM       Lab Results   Component Value Date/Time    COLORU Yellow 07/15/2022 11:06 PM    PHUR 6.0 07/15/2022 11:06 PM    LABCAST FEW 01/25/2020 02:45 PM    WBCUA 0-1 07/15/2022 11:06 PM    RBCUA 1-3 07/15/2022 11:06 PM    YEAST Present 09/18/2020 06:50 AM    BACTERIA NONE SEEN 07/15/2022 11:06 PM    CLARITYU Clear 07/15/2022 11:06 PM    SPECGRAV <=1.005 07/15/2022 11:06 PM    LEUKOCYTESUR SMALL 07/15/2022 11:06 PM    UROBILINOGEN 0.2 07/15/2022 11:06 PM    BILIRUBINUR Negative 07/15/2022 11:06 PM    BLOODU LARGE 07/15/2022 11:06 PM    GLUCOSEU Negative 07/15/2022 11:06 PM       Lab Results   Component Value Date/Time    HCO3 21.8 07/15/2022 09:09 PM    BE -0.7 07/15/2022 09:09 PM    O2SAT 92.9 07/15/2022 09:09 PM    PH 7.486 07/15/2022 09:09 PM    PCO2 29.5 07/15/2022 09:09 PM    PO2 57.3 07/15/2022 09:09 PM     Radiology:  Chest x-ray reviewed    Microbiology:  Pending  Recent Labs     07/14/22  0629   BC 24 Hours no growth     No results for input(s): ORG in the last 72 hours. Recent Labs     07/14/22  0629   BLOODCULT2 24 Hours no growth     No results for input(s): STREPNEUMAGU in the last 72 hours. No results for input(s): LP1UAG in the last 72 hours. No results for input(s): ASO in the last 72 hours. No results for input(s): CULTRESP in the last 72 hours. No results for input(s): PROCAL in the last 72 hours. Assessment:  HCAP right lower lobe  Fever with leukocytosis secondary to HCAP  Non-STEMI    Plan:    Start Cefepime  The patient has resisted colonization with MRSA in the past.  However, we will give her 1 dose of Vancomycin. Check random level in a.m. Check cultures, baseline ESR, CRP  Respiratory cultures  Urine antigens  Nares screen for MRSA  Respiratory panel  Will follow with you    Thank you for having us see this patient in consultation. I will be discussing this case with the treating physicians. Spoke with nursing.     Mirna Morales MD  9:33 AM  7/16/2022

## 2022-07-16 NOTE — PROGRESS NOTES
Dr. Krissy Stringer paged regarding ultrasound results. Awaiting callback. Callback received. See orders.

## 2022-07-16 NOTE — PROGRESS NOTES
[facility]  Nephrology Attending Progress Note    SUBJECTIVE:     7/16/22- Pt continues to feel poorly, she states she still has abd discomfort    PROBLEM LIST:    Patient Active Problem List   Diagnosis    Right cataract    Essential hypertension, benign    Hyperlipidemia with target LDL less than 100    Osteoarthritis, shoulder    Primary osteoarthritis of left knee    History of ischemic bowel disease    Ileostomy present ([address]    Chronic kidney disease, stage III (moderate) (HCC)    COPD (chronic obstructive pulmonary disease) (HCC)    Moderate protein-calorie malnutrition (HCC)    Abdominal wall cellulitis    Cellulitis    Infected hernioplasty mesh (HCC)    Enterocutaneous fistula    Mild protein-calorie malnutrition ([address]    Acute kidney injury ([address]    Hypertensive kidney disease with chronic kidney disease stage IV (HCC)    KD (acute kidney injury) ([address]    Diarrhea    Elevated serum creatinine    Abnormal serum creatinine level    Hyperlipemia    Primary hypertension    Essential (primary) hypertension    Postsurgical malabsorption, not elsewhere classified    Hypercholesterolemia    Pure hypercholesterolemia    Prediabetes    Hypothyroidism, unspecified    Acute renal failure (ARF) ([address]        PAST MEDICAL HISTORY:    Past Medical History:   Diagnosis Date    Acute kidney failure ([address] 2014 and 8/2016    x2,no dialysis needed,resolved, kidney function tests returned to normal    Anxiety     Arthritis     CAD (coronary artery disease)     follows with Dr. Cj Suazo every 6 months    Cancer ([address]     skin, leg,nose- removed surgically     COPD (chronic obstructive pulmonary disease) ([address]     mild- no inhlaers, no oxygen     Depression     Fibromyalgia     chronic back and neck pain    Generalized headaches     h/o / daily    History of blood transfusion 3-11-14    multiple times    History of necrotic bowel 2012    Hyperlipidemia     Hypertension     Incisional hernia     abdomen    Insomnia Mitral valve regurgitation     Myocardial infarct ([facility] 2002    Occasional tremors     at hands / stable with medication    Osteopenia     PONV (postoperative nausea and vomiting)     Vitamin B12 deficiency     h/o       DIET:    ADULT DIET;  Regular; Low Potassium (Less than 3000 mg/day)  ADULT TUBE FEEDING; PEG; Renal Formula; Continuous; 35; Yes; 10; Q 4 hours; 35; 100; Q 6 hours     PHYSICAL EXAM:     Patient Vitals for the past 24 hrs:   BP Temp Temp src Pulse Resp SpO2 Height Weight   07/16/22 1045 -- (!) 101.8 °F (38.8 °C) Axillary -- -- -- -- --   07/16/22 1000 -- 100.4 °F (38 °C) Axillary (!) 117 -- 93 % -- --   07/16/22 0930 -- -- -- -- -- 96 % -- --   07/16/22 0745 (!) 114/43 98.3 °F (36.8 °C) Axillary 92 20 96 % -- --   07/16/22 0534 -- -- -- -- -- -- -- 103 lb 4.8 oz (46.9 kg)   07/16/22 0400 -- -- -- -- -- 96 % -- --   07/16/22 0200 -- -- -- -- -- 96 % -- --   07/16/22 0045 (!) 120/55 98.1 °F (36.7 °C) Oral 95 20 95 % -- --   07/15/22 2100 -- -- -- -- 28 95 % -- --   07/15/22 2015 (!) 145/62 98.4 °F (36.9 °C) Axillary (!) 125 22 91 % -- --   07/15/22 2010 -- -- -- -- -- (!) 88 % -- --   07/15/22 1547 -- -- -- -- -- -- 5' (1.524 m) --   07/15/22 1300 (!) 136/45 97.5 °F (36.4 °C) Axillary 67 18 99 % -- --   07/15/22 1252 -- -- -- -- -- -- -- 102 lb 3.2 oz (46.4 kg)   @      Intake/Output Summary (Last 24 hours) at 7/16/2022 1059  Last data filed at 7/16/2022 6100  Gross per 24 hour   Intake 726 ml   Output 1000 ml   Net -274 ml         Wt Readings from Last 3 Encounters:   07/16/22 103 lb 4.8 oz (46.9 kg)   01/28/22 94 lb (42.6 kg)   07/02/21 84 lb (38.1 kg)       Constitutional:  Pt frail and cachetic   Head: normocephalic, atraumatic  Neck: no JVD  Cardiovascular: S1 S2 no S3 or rub  Respiratory:  diminished, poor effort  Gastrointestinal:  Soft, + PEG and ostomy  Ext: no edema   Skin: dry, no rash  Neuro: minimally interactive    MEDS (scheduled):    insulin lispro  0-6 Units SubCUTAneous TID WC insulin lispro  0-3 Units SubCUTAneous Nightly    calcitRIOL  0.25 mcg Oral Q MWF    vitamin D  50,000 Units Oral Weekly    sodium chloride flush  10 mL IntraVENous 2 times per day    aspirin  81 mg Oral Daily    escitalopram  20 mg Oral Nightly    pantoprazole  40 mg Oral QAM AC       MEDS (infusions):   dextrose      dextrose 5% in lactated ringers 100 mL/hr at 07/16/22 0811    sodium chloride         MEDS (prn):  glucose, dextrose bolus **OR** dextrose bolus, glucagon (rDNA), dextrose, sodium chloride flush, sodium chloride, senna, acetaminophen **OR** acetaminophen, hyoscyamine, oxyCODONE-acetaminophen    DATA:    Recent Labs     07/14/22  0629 07/15/22  0438 07/16/22  0422   WBC 10.8 8.8 27.1*   HGB 11.7 9.9* 9.3*   HCT 37.6 32.4* 29.0*   .3* 100.9* 97.0    164 137     Recent Labs     07/14/22  0629 07/15/22  0438 07/16/22  0422    136 140   K 4.7 3.8 3.2*    104 102   CO2 16* 19* 24   * 100* 83*   CREATININE 3.6* 2.5* 2.0*   LABGLOM 12 19 24   GLUCOSE 96 214* 186*   CALCIUM 8.8 8.0* 7.8*   ALT 30 22 19   AST 20 18 25   BILITOT 0.9 0.6 1.1   ALKPHOS 273* 315* 270*   MG  --  2.0 1.4*   PHOS  --  6.1*  --        Lab Results   Component Value Date    LABALBU 2.1 (L) 07/16/2022    LABALBU 2.4 (L) 07/15/2022    LABALBU 3.2 (L) 07/14/2022     Lab Results   Component Value Date    TSH 1.380 07/01/2022       Iron Studies  Lab Results   Component Value Date    IRON 30 (L) 07/01/2022    TIBC 229 (L) 07/01/2022    FERRITIN 143 07/01/2022     Vitamin B-12   Date Value Ref Range Status   04/27/2022 1050 (H) 211 - 946 pg/mL Final     Folate   Date Value Ref Range Status   01/22/2021 8.1 4.8 - 24.2 ng/mL Final       Vit D, 25-Hydroxy   Date Value Ref Range Status   07/15/2022 13 (L) 30 - 100 ng/mL Final     Comment:     <20 ng/mL. ........... Kathia Rast Deficient  20-30 ng/mL. ......... Katiha Rast Insufficient   ng/mL. ........ Kathia Rast Sufficient  >100 ng/mL. .......... Kathia Rast Toxic       PTH   Date Value Ref Range Status an e-GFR=33-36ml/min  7/14/22 Renal US No evidence of hydro  FeNa<1%  TF running as po intake poor. Cr trending down  24hour urine creainine clearance 33 based on the cr 2.0mg/dl  PLAN:  1. Continue the IVF  2. Follow labs  3. Follow I&O,        2. HTN with CKD I-IV  BP goal <130/80-at goal  PLAN:  Follow BP     3. Anion Gap Met Acidosis in the setting of the KD on the CKD with a lactic acidosis and the abd pain question ischemia  PLAN:  Follow LA on the LR     4. 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 Hypomagnesemia  Mg++ 1.4  PLAN:  IV supplementing with 2g Mg++    6.  Hypokalemia  PLAN:  KCl 10meq x 3 IV  Recheck K+ at noon       Kwame Gasca MD

## 2022-07-16 NOTE — PROGRESS NOTES
Progress Note  Date:2022       Room:[facility]5-A  Patient Seamus Mendez     YOB: 1940     Age:82 y.o. Patient seen for follow up of acute renal failure. Patient was becoming more alert yesterday during the day. Per nursing last evening patient became more lethargic, hypoxic and tachycardic. RRT was called. Patient had Lactic acid and chest xray done. She was seen by on call physician and given levaquin as well as fluid bolus. This am patient remains lethargic. She is resting on 10 liters of oxygen. Subjective    Subjective Unable to obtain. Review of Systems  Objective         Vitals Last 24 Hours:  TEMPERATURE:  Temp  Av °F (36.7 °C)  Min: 97.5 °F (36.4 °C)  Max: 98.4 °F (36.9 °C)  RESPIRATIONS RANGE: Resp  Av.2  Min: 18  Max: 28  PULSE OXIMETRY RANGE: SpO2  Av.6 %  Min: 88 %  Max: 99 %  PULSE RANGE: Pulse  Av.5  Min: 67  Max: 125  BLOOD PRESSURE RANGE: Systolic (94CXO), YYN:402 , Min:120 , ZSX:475   ; Diastolic (60UOC), DSJ:75, Min:44, Max:62    I/O (24Hr): Intake/Output Summary (Last 24 hours) at 2022 0735  Last data filed at 2022 0046  Gross per 24 hour   Intake 626 ml   Output 1450 ml   Net -824 ml     Objective:  General Appearance:  Comfortable, ill-appearing and in no acute distress. Vital signs: (most recent): Blood pressure (!) 120/55, pulse 95, temperature 98.1 °F (36.7 °C), temperature source Oral, resp. rate 20, height 5' (1.524 m), weight 103 lb 4.8 oz (46.9 kg), SpO2 96 %. Lungs:  Normal effort. There are decreased breath sounds. No rales or rhonchi. Heart: Normal rate. Regular rhythm. S1 normal and S2 normal.  No gallop. Abdomen: Abdomen is soft and non-distended. (Ileostomy in right lower quadrant. ). Bowel sounds are normal.   There is no abdominal tenderness. There is no rebound tenderness. There is no guarding. Extremities: Normal range of motion. There is no dependent edema. Skin:  Warm and dry. Labs/Imaging/Diagnostics    Labs:  CBC:  Recent Labs     07/14/22  0629 07/15/22  0438 07/16/22  0422   WBC 10.8 8.8 27.1*   RBC 3.71 3.21* 2.99*   HGB 11.7 9.9* 9.3*   HCT 37.6 32.4* 29.0*   .3* 100.9* 97.0   RDW 15.0 15.0 14.6    164 137     CHEMISTRIES:  Recent Labs     07/14/22  0629 07/15/22  0438 07/16/22  0422    136 140   K 4.7 3.8 3.2*    104 102   CO2 16* 19* 24   * 100* 83*   CREATININE 3.6* 2.5* 2.0*   GLUCOSE 96 214* 186*   PHOS  --  6.1*  --    MG  --  2.0 1.4*     PT/INR:No results for input(s): PROTIME, INR in the last 72 hours. APTT:No results for input(s): APTT in the last 72 hours. LIVER PROFILE:  Recent Labs     07/14/22  0629 07/15/22  0438 07/16/22  0422   AST 20 18 25   ALT 30 22 19   BILITOT 0.9 0.6 1.1   ALKPHOS 273* 315* 270*       Imaging Last 24 Hours:  CT HEAD WO CONTRAST    Result Date: 7/15/2022  EXAMINATION: CT OF THE HEAD WITHOUT CONTRAST  7/15/2022 9:58 pm TECHNIQUE: CT of the head was performed without the administration of intravenous contrast. Automated exposure control, iterative reconstruction, and/or weight based adjustment of the mA/kV was utilized to reduce the radiation dose to as low as reasonably achievable. COMPARISON: None. HISTORY: ORDERING SYSTEM PROVIDED HISTORY: change in mental status TECHNOLOGIST PROVIDED HISTORY: Reason for exam:->change in mental status Has a \"code stroke\" or \"stroke alert\" been called? ->No FINDINGS: BRAIN/VENTRICLES: There is no acute intracranial hemorrhage, mass effect or midline shift. No abnormal extra-axial fluid collection. The gray-white differentiation is maintained without evidence of an acute infarct. There is prominence of the ventricles and sulci due to global parenchymal volume loss. There are nonspecific areas of hypoattenuation within the periventricular and subcortical white matter, which likely represent chronic microvascular ischemic change.  There is a small chronic lacunar infarct in left basal ganglia. ORBITS: The visualized portion of the orbits demonstrate no acute abnormality. SINUSES: The visualized paranasal sinuses and mastoid air cells demonstrate no acute abnormality. SOFT TISSUES/SKULL: No acute abnormality of the visualized skull or soft tissues. No acute intracranial abnormality. XR CHEST PORTABLE    Result Date: 7/15/2022  EXAMINATION: ONE XRAY VIEW OF THE CHEST 7/15/2022 9:41 pm COMPARISON: 13 July 2022 HISTORY: ORDERING SYSTEM PROVIDED HISTORY: shortness of breath TECHNOLOGIST PROVIDED HISTORY: Reason for exam:->shortness of breath FINDINGS: Single AP erect portable chest demonstrates a right-sided MediPort catheter in good position. The lungs are clear. There is tortuosity of the aorta without cardiomegaly. There is a right-sided reverse shoulder arthroplasty. There is no evidence of a pneumothorax. No acute cardiopulmonary process. US RETROPERITONEAL COMPLETE    Result Date: 7/14/2022  EXAMINATION: RETROPERITONEAL ULTRASOUND OF THE KIDNEYS AND URINARY BLADDER 7/14/2022 COMPARISON: Renal ultrasound from February 25, 2021 HISTORY: ORDERING SYSTEM PROVIDED HISTORY: KD TECHNOLOGIST PROVIDED HISTORY: Reason for exam:->KD What reading provider will be dictating this exam?->CRC FINDINGS: Kidneys: The right kidney measures 10.4 cm in length and the left kidney measures 9.7 cm in length. Right kidney: The corticomedullary differentiation and cortical thickness is slightly decreased. Echogenic renal parenchyma. Anechoic thin walled nonvascular 13 mm right mid to upper pole renal cyst.  No concerning renal mass or intrarenal calcification. No hydronephrosis. Left kidney: The corticomedullary differentiation and cortical thickness is slightly decreased. Anechoic thin walled nonvascular left midpole cyst.  4.5 cm left lower pole cyst.  Mildly echogenic renal parenchyma. There is no hydronephrosis. Bladder: Bladder volume was 282 mL.   No intrinsic mass, intrinsic calcification, nor wall thickening. 1.  Bilateral renal cysts. Largest is seen in the lower pole the left kidney measuring 4.5 cm. No aggressive renal lesions. Mild bilateral renal cortical thinning. No hydronephrosis. Mildly echogenic renal parenchyma. 2.  Normal appearance of the imaged bladder. Assessment//Plan           Hospital Problems             Last Modified POA    * (Principal) Acute renal failure (ARF) ([facility] [address] 7/14/2022 Yes     Assessment & Plan  Acute hypoxic respiratory failure  Acute renal failure  Metabolic encephalopathy  Dehydration  CKD stage III b   DM type II   Urinary retention  Short gut syndrome    Appreciate on call coverage. Reviewed chest xray and urine results. WBC increased to 27 this am. Blood cultures pending. ID consulted. Consult pulmonary with increased oxygen demand. Continue on supplemental oxygen. Repeat Lactic acid this am. Continue on IVF per nephrology. Azalea Kaiser,     I can be reached through answering service.      Electronically signed by Rachel Leslie DO on 7/16/22 at 7:35 AM EDT

## 2022-07-16 NOTE — SIGNIFICANT EVENT
Nocturnist note:    Rapid response called. Patient apparently was less alert. Was able to awaken patient. Patient moves all 4 extremities. Vital signs show increased tachycardia. Has low-grade fever. Suspect may be getting septic. We will check procalcitonin lactic acid. Check chest x-ray. Check UA and blood cultures x2. Due to change in mental status will check CT scan head although I doubt this is an acute stroke. We will give a 500 bolus of normal saline. Start IV levofloxacin blood cultures obtained. Requested update primary physician.

## 2022-07-16 NOTE — PROGRESS NOTES
Patient's most recent set of vital signs done, still tachycardic and tachypneic. Persistent fever despite tylenol and ice therapy. Continues on 8 liters of oxygen. ID consulted and antibiotics ordered. Patient seems more awake than this morning, answering simple questions. Call placed to Dr. Lilibeth Prince answering service to update of the above. Awaiting callback.

## 2022-07-16 NOTE — PROGRESS NOTES
Perfect Serve message left with Dr. Alem Gil answering service for new consult.     Electronically signed by Yolande Higgins RN on 7/16/2022 at 7:06 AM

## 2022-07-16 NOTE — PROGRESS NOTES
Message left with Dr. Nora Grayson answering service regarding procalcitonin results from this afternoon. Awaiting callback.

## 2022-07-16 NOTE — PROGRESS NOTES
Patient's  Mariposa Alin updated again at the bedside and all questions answered. Patient's ostomy bag was leaking, site cleansed and bag changed per  with supplies brought from home.

## 2022-07-16 NOTE — CONSULTS
Pulmonary Consultation    Admit Date: 7/14/2022  Requesting Physician: Fernanda Bardales DO    CC: Acute hypoxic respiratory failure    HPI:  Kimberly Kelley [de-identified] y.o. female is a current 1 pack/day x 65+ year smoker, fully vaccinated for COVID and boosted, with a past medical history of myocardial infarction with stent 2002, necrotic bowel, short gut syndrome, COPD (although patient denies), kidney failure, hypertension, hyperlipidemia who was sent to the emergency department on 7/13 by Dr. Ivon Sanches for an abnormal lab. At that time she was complaining of generalized pain and weakness, fatigue and shortness of breath. The emergency department her creatinine was 3.6, , high-sensitivity troponin 372> 326, WBC 10.8, rapid flu and COVID-negative, EKG normal sinus rhythm with left anterior fascicular block, chest x-ray negative for acute process. She was admitted with consults to nephrology for acute kidney failure. Cardiology was subsequently consulted for elevated troponin which is believed to be secondary to incomplete clearance on the basis of acute renal failure and does not represent an acute non-STEMI due to plaque rupture and no plans for inpatient distress at this time. On 7/15 a rapid response was called for increased lethargy. She was found to be tachycardic with a low-grade fever and was worked up for possible sepsis. Procalcitonin was ordered but never resulted, lactic 3.9> 2.9, ABG 7.48/29.5/57.3/21.8 on 4 L nasal cannula. Chest x-ray negative. CT head negative. UA showed small leukocyte and large blood, blood cultures preliminary negative, she received 500 mL bolus of normal saline and started on IV Levaquin. Patient had an increase in oxygenation requirements, going from 4 L nasal cannula on 7/15 to requiring 10 L high flow nasal cannula on 7/16. D-dimer was obtained >5250. Pulmonary was consulted for further evaluation of hypoxia.     Patient was seen resting in bed, ill-appearing on 7 L high flow nasal cannula.  was present at the bedside. She reports to having increased shortness of breath beginning since 7/8/2022, that has been progressively worsening since. Shortness of breath is occurring at rest, is not associated with any chest pain, she does have a moist nonproductive cough. Her  states that the cough is her baseline. She denies any known sick contacts, no recent travel, no fever, chills, night sweats, recent weight changes. She has never followed with a pulmonary doctor in the past, and denies having a history of COPD. She is not currently on any inhalers. She denies any history of blood clots but does have a history of 1 miscarriage. She lives at home with her . They have 4 children, presumed healthy. 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 She was born in [facility] and briefly lived in [place] for 9 years.     PMH:    Past Medical History:   Diagnosis Date    Acute kidney failure ([facility] [address] 2014 and 8/2016    x2,no dialysis needed,resolved, kidney function tests returned to normal    Anxiety     Arthritis     CAD (coronary artery disease)     follows with Dr. Tony Camaar every 6 months    Cancer [facility])     skin, leg,nose- removed surgically     COPD (chronic obstructive pulmonary disease) ([facility] [address]     mild- no inhlaers, no oxygen     Depression     Fibromyalgia     chronic back and neck pain    Generalized headaches     h/o / daily    History of blood transfusion 3-11-14    multiple times    History of necrotic bowel 2012    Hyperlipidemia     Hypertension     Incisional hernia     abdomen    Insomnia     Mitral valve regurgitation     Myocardial infarct ([address] 2002    Occasional tremors     at hands / stable with medication    Osteopenia     PONV (postoperative nausea and vomiting)     Vitamin B12 deficiency     h/o     PSH:    Past Surgical History:   Procedure Laterality Date    ABDOMEN SURGERY  2-    total colectomy, bowel resection, ileostomy,revision of ileostomy     ABDOMEN SURGERY N/A 1/26/2020    DRAINAGE OF ABDOMINAL WALL ABSCESS, EXPLANTATION OF MESH, DEBRIDEMENT OF SKIN AND SUBCUTANEOUS TISSUE performed by Lyndle Nyhan, MD at [facility] N/A 9/11/2020    EXPLANTATION OF HERNIA MESH performed by Lyndle Nyhan, MD at [address] [facility]  2002    ARTHROPLASTY  1-2006    right and left thumb    BACK SURGERY  1991, 2004, 2009    lumbar fusion x 3    BACK SURGERY      cervical fusion x 2    BREAST SURGERY  years ago    monor calcifications    CARPAL TUNNEL RELEASE Bilateral     CATARACT REMOVAL WITH IMPLANT Right 03/03/15    CHOLECYSTECTOMY  5-11-07    Lap    COLONOSCOPY      CORONARY ANGIOPLASTY WITH STENT PLACEMENT  1-7-2002    ENDOSCOPY, COLON, DIAGNOSTIC      EPIGASTRIC HERNIA REPAIR  3/21/16    incisional with mesh implantation    ESOPHAGUS SURGERY  1-4-13    esophageal clamp (torn Esophagus)    FINGER TRIGGER RELEASE Right 2006    index finger    FOOT SURGERY Right     multiple    HERNIA REPAIR  12-31-13    abdominal x 5- last one 2017     ILEOSTOMY OR JEJUNOSTOMY  1-3-13    revision    INSERT PICC LINE  06/09/2020    and removed     JOINT REPLACEMENT Right 3/24/15    right total shoulder arthroplasty    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     LAPAROTOMY N/A 5/12/2020    EXPLORATORY LAPAROTOMY EXPLANTATION OF INFECTED MESH SMAL BOWEL RESECTION AND REVISION END ILEOSTOMY, LYSIS OF ADHESIONS performed by Lyndle Nyhan, MD at [address] [place] [address] N/A 6/8/2020    LAPAROTOMY EXPLORATORY, Lynda Flake OF HERNIA MESH performed by Lyndle Nyhan, MD at [address] [place] [address] N/A 9/15/2020    EXPLORATORY LAPAROTOMY WITH SMALL BOWEL RESECTION, TAKE DOWN REVISION ENTEROCUTANEOUS FISTULA , OSTOMY REVISION performed by Lyndle Nyhan, MD at [address] / leg    NASAL SINUS SURGERY      x2 /  cauterized    OTHER SURGICAL HISTORY  08/24/2016    diagnostic laparotomy with repair of intraabdominal hernia    OTHER SURGICAL HISTORY      removal plate / screws  / right great toe    PICC LINE INSERTION NURSE  9/15/2020         ROTATOR CUFF REPAIR  2010    right     SKIN BIOPSY  2010    3 squamous cell carcinoma right leg, left leg          Respiratory ROS: positive for - cough, shortness of breath, and tachypnea Otherwise, a complete review of systems is undertaken and is negative. Social History:  Social History     Socioeconomic History    Marital status:      Spouse name: Not on file    Number of children: Not on file    Years of education: Not on file    Highest education level: Not on file   Occupational History    Not on file   Tobacco Use    Smoking status: Every Day     Packs/day: 1.00     Types: Cigarettes    Smokeless tobacco: Never   Vaping Use    Vaping Use: Never used   Substance and Sexual Activity    Alcohol use: Not Currently     Comment: occasional    Drug use: No    Sexual activity: Not on file   Other Topics Concern    Not on file   Social History Narrative    Not on file     Social Determinants of Health     Financial Resource Strain: Not on file   Food Insecurity: Not on file   Transportation Needs: Not on file   Physical Activity: Not on file   Stress: Not on file   Social Connections: Not on file   Intimate Partner Violence: Not on file   Housing Stability: Not on file       Family History:  No family history on file.     Medications:     dextrose      dextrose 5% in lactated ringers 100 mL/hr at 07/16/22 0811    sodium chloride        insulin lispro  0-6 Units SubCUTAneous TID WC    insulin lispro  0-3 Units SubCUTAneous Nightly    cefepime  1,000 mg IntraVENous Q24H    calcitRIOL  0.25 mcg Oral Q MWF    vitamin D  50,000 Units Oral Weekly    sodium chloride flush  10 mL IntraVENous 2 times per day    aspirin  81 mg Oral Daily    escitalopram  20 mg Oral Nightly    pantoprazole  40 mg Oral QAM AC         Vitals:    VITALS:  BP (!) 98/49   Pulse 84   Temp 97.6 °F (36.4 °C) (Axillary)   Resp 22   Ht 5' (1.524 m)   Wt 103 lb 4.8 oz (46.9 kg)   SpO2 97%   BMI 20.17 kg/m²   24HR INTAKE/OUTPUT:    Intake/Output Summary (Last 24 hours) at 2022 1455  Last data filed at 2022 1400  Gross per 24 hour   Intake 726 ml   Output 1200 ml   Net -474 ml     CURRENT PULSE OXIMETRY:  SpO2: 97 %  24HR PULSE OXIMETRY RANGE:  SpO2  Av %  Min: 88 %  Max: 97 %      EXAM:  General: No distress. Eyes: PERRL. No sclera icterus. No conjunctival injection. ENT: No discharge. Pharynx clear. Neck: Trachea midline. Normal thyroid. Resp: No accessory muscle use. No crackles. No wheezing. No rhonchi. CV: Regular rate. Regular rhythm. No mumur or rub. ABD: Non-tender. Non-distended. No masses. No organmegaly. Normal bowel sounds. Skin: Warm and dry. No nodule on exposed extremities. No rash on exposed extremities. Lymph: No cervical LAD. No supraclavicular LAD. Ext: No joint deformity. No clubbing. No cyanosis. No edema  Neuro: Awake. Follows commands      Lab Results:  CBC:   Recent Labs     07/14/22  0629 07/15/22  0438 07/16/22  0422   WBC 10.8 8.8 27.1*   HGB 11.7 9.9* 9.3*   HCT 37.6 32.4* 29.0*   .3* 100.9* 97.0    164 137     BMP:   Recent Labs     07/14/22  0629 07/15/22  0438 07/16/22  0422 07/16/22  1200    136 140  --    K 4.7 3.8 3.2* 3.9    104 102  --    CO2 16* 19* 24  --    PHOS  --  6.1*  --   --    * 100* 83*  --    CREATININE 3.6* 2.5* 2.0*  --      LFT:   Recent Labs     07/14/22  0629 07/15/22  0438 07/16/22  0422   ALKPHOS 273* 315* 270*   ALT 30 22 19   AST 20 18 25   PROT 7.0 5.7* 4.9*   BILITOT 0.9 0.6 1.1   LABALBU 3.2* 2.4* 2.1*     PT/INR: No results for input(s): PROTIME, INR in the last 72 hours. Cultures:  No results for input(s): CULTRESP in the last 72 hours.     ABG:   Recent Labs     07/15/22  2109   PH 7.486*   PO2 57.3*   PCO2 29.5*   HCO3 21.8*   BE -0.7   O2SAT 92.9       Films:  CT HEAD WO CONTRAST    Result Date: 7/15/2022  EXAMINATION: CT OF THE HEAD WITHOUT CONTRAST  7/15/2022 9:58 pm TECHNIQUE: CT of the head was performed without the administration of intravenous contrast. Automated exposure control, iterative reconstruction, and/or weight based adjustment of the mA/kV was utilized to reduce the radiation dose to as low as reasonably achievable. COMPARISON: None. HISTORY: ORDERING SYSTEM PROVIDED HISTORY: change in mental status TECHNOLOGIST PROVIDED HISTORY: Reason for exam:->change in mental status Has a \"code stroke\" or \"stroke alert\" been called? ->No FINDINGS: BRAIN/VENTRICLES: There is no acute intracranial hemorrhage, mass effect or midline shift. No abnormal extra-axial fluid collection. The gray-white differentiation is maintained without evidence of an acute infarct. There is prominence of the ventricles and sulci due to global parenchymal volume loss. There are nonspecific areas of hypoattenuation within the periventricular and subcortical white matter, which likely represent chronic microvascular ischemic change. There is a small chronic lacunar infarct in left basal ganglia. ORBITS: The visualized portion of the orbits demonstrate no acute abnormality. SINUSES: The visualized paranasal sinuses and mastoid air cells demonstrate no acute abnormality. SOFT TISSUES/SKULL: No acute abnormality of the visualized skull or soft tissues. No acute intracranial abnormality. XR CHEST PORTABLE    Result Date: 7/15/2022  EXAMINATION: ONE XRAY VIEW OF THE CHEST 7/15/2022 9:41 pm COMPARISON: 13 July 2022 HISTORY: ORDERING SYSTEM PROVIDED HISTORY: shortness of breath TECHNOLOGIST PROVIDED HISTORY: Reason for exam:->shortness of breath FINDINGS: Single AP erect portable chest demonstrates a right-sided MediPort catheter in good position. The lungs are clear. There is tortuosity of the aorta without cardiomegaly. There is a right-sided reverse shoulder arthroplasty.  There is no evidence of a pneumothorax. No acute cardiopulmonary process. US RETROPERITONEAL COMPLETE    Result Date: 7/14/2022  EXAMINATION: RETROPERITONEAL ULTRASOUND OF THE KIDNEYS AND URINARY BLADDER 7/14/2022 COMPARISON: Renal ultrasound from February 25, 2021 HISTORY: ORDERING SYSTEM PROVIDED HISTORY: KD TECHNOLOGIST PROVIDED HISTORY: Reason for exam:->KD What reading provider will be dictating this exam?->CRC FINDINGS: Kidneys: The right kidney measures 10.4 cm in length and the left kidney measures 9.7 cm in length. Right kidney: The corticomedullary differentiation and cortical thickness is slightly decreased. Echogenic renal parenchyma. Anechoic thin walled nonvascular 13 mm right mid to upper pole renal cyst.  No concerning renal mass or intrarenal calcification. No hydronephrosis. Left kidney: The corticomedullary differentiation and cortical thickness is slightly decreased. Anechoic thin walled nonvascular left midpole cyst.  4.5 cm left lower pole cyst.  Mildly echogenic renal parenchyma. There is no hydronephrosis. Bladder: Bladder volume was 282 mL. No intrinsic mass, intrinsic calcification, nor wall thickening. 1.  Bilateral renal cysts. Largest is seen in the lower pole the left kidney measuring 4.5 cm. No aggressive renal lesions. Mild bilateral renal cortical thinning. No hydronephrosis. Mildly echogenic renal parenchyma. 2.  Normal appearance of the imaged bladder. Assessment:  Acute hypoxic respiratory failure without hypercarbia  Elevated D-dimer  Current smoker  Leukocytosis  Chronic kidney disease - nephrology following   Coronary artery disease with coronary stent placement 2002      Plan:  Patient is currently on 7L HFNC with SpO2 97%, was requiring up to 10L HFNC yesterday. baseline room air. Please wean oxygen as tolerated. Will add nicotine patch as she is a current 1PPD smoker  Strep and legionella antigens, PCT, sputum culture, respiratory panel all pending.  Leukocytosis today, 8.8>27.1. Patient remains febrile. ABGs noted, likely respiratory alkalosis as she was tachypneic on exam  With elevated d-dimer, would like to have CTA to r/o PE but with CKD will discuss with Dr. Dary Blood about VQ scan   With smoking history will add duo-nebs, will need OP PFTs  ID consulted for leukocytosis, received IV vancomycin x 1 and started on IV cefepime for history of resisted colonization with MRSA      Thank you for allowing us to see this patient in consultation. Please contact us with any questions. Seen and examined, agree with above. Old records reviewed. Acute hypoxic respiratory failure on 7-10 liters and ill appearing. CXR is clear and she is unable to have cta do to renal function. Will check ct without contrast to look for pneumonia and with markedly elevated d dimer, will us ble and rue as she has a mediport.   Electronically signed by GEMA Faulkner CNP on 7/16/2022 at 2:55 PM

## 2022-07-17 ENCOUNTER — ANESTHESIA (OUTPATIENT)
Dept: INPATIENT UNIT | Age: 82
End: 2022-07-17

## 2022-07-17 ENCOUNTER — APPOINTMENT (OUTPATIENT)
Dept: GENERAL RADIOLOGY | Age: 82
DRG: 682 | End: 2022-07-17
Payer: MEDICARE

## 2022-07-17 ENCOUNTER — ANESTHESIA EVENT (OUTPATIENT)
Dept: INPATIENT UNIT | Age: 82
End: 2022-07-17

## 2022-07-17 LAB
ACINETOBACTER CALCOAC BAUMANNII COMPLEX BY PCR: NOT DETECTED
ALBUMIN SERPL-MCNC: 2 G/DL (ref 3.5–5.2)
ALP BLD-CCNC: 252 U/L (ref 35–104)
ALT SERPL-CCNC: 17 U/L (ref 0–32)
ANION GAP SERPL CALCULATED.3IONS-SCNC: 14 MMOL/L (ref 7–16)
ANISOCYTOSIS: ABNORMAL
APTT: 33.9 SEC (ref 24.5–35.1)
APTT: 46.3 SEC (ref 24.5–35.1)
APTT: 52.6 SEC (ref 24.5–35.1)
AST SERPL-CCNC: 18 U/L (ref 0–31)
BACTEROIDES FRAGILIS BY PCR: NOT DETECTED
BASOPHILS ABSOLUTE: 0.05 E9/L (ref 0–0.2)
BASOPHILS RELATIVE PERCENT: 0.2 % (ref 0–2)
BILIRUB SERPL-MCNC: 2 MG/DL (ref 0–1.2)
BOTTLE TYPE: ABNORMAL
BUN BLDV-MCNC: 79 MG/DL (ref 6–23)
CALCIUM SERPL-MCNC: 8.6 MG/DL (ref 8.6–10.2)
CANDIDA ALBICANS BY PCR: NOT DETECTED
CANDIDA AURIS BY PCR: NOT DETECTED
CANDIDA GLABRATA BY PCR: NOT DETECTED
CANDIDA KRUSEI BY PCR: NOT DETECTED
CANDIDA PARAPSILOSIS BY PCR: NOT DETECTED
CANDIDA TROPICALIS BY PCR: NOT DETECTED
CARBAPENEM RESISTANCE IMP GENE BY PCR: NOT DETECTED
CARBAPENEM RESISTANCE KPC BY PCR: NOT DETECTED
CARBAPENEM RESISTANCE NDM GENE BY PCR: NOT DETECTED
CARBAPENEM RESISTANCE OXA-48 GENE BY PCR: NOT DETECTED
CARBAPENEM RESISTANCE VIM GENE BY PCR: NOT DETECTED
CEPHALOSPORIN RESISTANCE CTX-M GENE BY PCR: NOT DETECTED
CHLORIDE BLD-SCNC: 103 MMOL/L (ref 98–107)
CO2: 25 MMOL/L (ref 22–29)
COLISTIN RESISTANCE MCR-1 GENE BY PCR: NOT DETECTED
CREAT SERPL-MCNC: 1.9 MG/DL (ref 0.5–1)
CRYPTOCOCCUS NEOFORMANS/GATTII BY PCR: NOT DETECTED
DOHLE BODIES: ABNORMAL
ENTEROBACTER CLOACAE COMPLEX BY PCR: NOT DETECTED
ENTEROBACTERALES BY PCR: DETECTED
ENTEROCOCCUS FAECALIS BY PCR: NOT DETECTED
ENTEROCOCCUS FAECIUM BY PCR: NOT DETECTED
EOSINOPHILS ABSOLUTE: 0.01 E9/L (ref 0.05–0.5)
EOSINOPHILS RELATIVE PERCENT: 0 % (ref 0–6)
ESCHERICHIA COLI BY PCR: DETECTED
FIBRINOGEN: 534 MG/DL (ref 200–400)
GFR AFRICAN AMERICAN: 31
GFR NON-AFRICAN AMERICAN: 25 ML/MIN/1.73
GLUCOSE BLD-MCNC: 170 MG/DL (ref 74–99)
HAEMOPHILUS INFLUENZAE BY PCR: NOT DETECTED
HCT VFR BLD CALC: 30.8 % (ref 34–48)
HEMOGLOBIN: 10.1 G/DL (ref 11.5–15.5)
IMMATURE GRANULOCYTES #: 0.3 E9/L
IMMATURE GRANULOCYTES %: 1.1 % (ref 0–5)
INR BLD: 2
KLEBSIELLA AEROGENES BY PCR: NOT DETECTED
KLEBSIELLA OXYTOCA BY PCR: NOT DETECTED
KLEBSIELLA PNEUMONIAE GROUP BY PCR: NOT DETECTED
L. PNEUMOPHILA SEROGP 1 UR AG: NORMAL
LACTIC ACID: 2.5 MMOL/L (ref 0.5–2.2)
LISTERIA MONOCYTOGENES BY PCR: NOT DETECTED
LYMPHOCYTES ABSOLUTE: 0.8 E9/L (ref 1.5–4)
LYMPHOCYTES RELATIVE PERCENT: 2.9 % (ref 20–42)
MCH RBC QN AUTO: 31.8 PG (ref 26–35)
MCHC RBC AUTO-ENTMCNC: 32.8 % (ref 32–34.5)
MCV RBC AUTO: 96.9 FL (ref 80–99.9)
METER GLUCOSE: 115 MG/DL (ref 74–99)
METER GLUCOSE: 136 MG/DL (ref 74–99)
METER GLUCOSE: 182 MG/DL (ref 74–99)
MONOCYTES ABSOLUTE: 0.67 E9/L (ref 0.1–0.95)
MONOCYTES RELATIVE PERCENT: 2.4 % (ref 2–12)
NEISSERIA MENINGITIDIS BY PCR: NOT DETECTED
NEUTROPHILS ABSOLUTE: 25.99 E9/L (ref 1.8–7.3)
NEUTROPHILS RELATIVE PERCENT: 93.4 % (ref 43–80)
ORDER NUMBER: ABNORMAL
OVALOCYTES: ABNORMAL
PDW BLD-RTO: 14.5 FL (ref 11.5–15)
PLATELET # BLD: 87 E9/L (ref 130–450)
PLATELET CONFIRMATION: NORMAL
PMV BLD AUTO: 13.8 FL (ref 7–12)
POIKILOCYTES: ABNORMAL
POTASSIUM REFLEX MAGNESIUM: 3.6 MMOL/L (ref 3.5–5)
PROTEUS SPECIES BY PCR: NOT DETECTED
PROTHROMBIN TIME: 21.6 SEC (ref 9.3–12.4)
PSEUDOMONAS AERUGINOSA BY PCR: NOT DETECTED
RBC # BLD: 3.18 E12/L (ref 3.5–5.5)
SALMONELLA SPECIES BY PCR: NOT DETECTED
SERRATIA MARCESCENS BY PCR: NOT DETECTED
SODIUM BLD-SCNC: 142 MMOL/L (ref 132–146)
SOURCE OF BLOOD CULTURE: ABNORMAL
STAPHYLOCOCCUS AUREUS BY PCR: NOT DETECTED
STAPHYLOCOCCUS EPIDERMIDIS BY PCR: NOT DETECTED
STAPHYLOCOCCUS LUGDUNENSIS BY PCR: NOT DETECTED
STAPHYLOCOCCUS SPECIES BY PCR: NOT DETECTED
STENOTROPHOMONAS MALTOPHILIA BY PCR: NOT DETECTED
STREP PNEUMONIAE ANTIGEN, URINE: NORMAL
STREPTOCOCCUS AGALACTIAE BY PCR: NOT DETECTED
STREPTOCOCCUS PNEUMONIAE BY PCR: NOT DETECTED
STREPTOCOCCUS PYOGENES  BY PCR: NOT DETECTED
STREPTOCOCCUS SPECIES BY PCR: NOT DETECTED
TOTAL PROTEIN: 5.3 G/DL (ref 6.4–8.3)
VANCOMYCIN RANDOM: 13.2 MCG/ML (ref 5–40)
WBC # BLD: 27.8 E9/L (ref 4.5–11.5)

## 2022-07-17 PROCEDURE — 85025 COMPLETE CBC W/AUTO DIFF WBC: CPT

## 2022-07-17 PROCEDURE — 97530 THERAPEUTIC ACTIVITIES: CPT

## 2022-07-17 PROCEDURE — 6360000002 HC RX W HCPCS: Performed by: INTERNAL MEDICINE

## 2022-07-17 PROCEDURE — 6370000000 HC RX 637 (ALT 250 FOR IP)

## 2022-07-17 PROCEDURE — 86022 PLATELET ANTIBODIES: CPT

## 2022-07-17 PROCEDURE — 82962 GLUCOSE BLOOD TEST: CPT

## 2022-07-17 PROCEDURE — 80202 ASSAY OF VANCOMYCIN: CPT

## 2022-07-17 PROCEDURE — 2580000003 HC RX 258: Performed by: INTERNAL MEDICINE

## 2022-07-17 PROCEDURE — 74018 RADEX ABDOMEN 1 VIEW: CPT

## 2022-07-17 PROCEDURE — 94640 AIRWAY INHALATION TREATMENT: CPT

## 2022-07-17 PROCEDURE — 83605 ASSAY OF LACTIC ACID: CPT

## 2022-07-17 PROCEDURE — 85730 THROMBOPLASTIN TIME PARTIAL: CPT

## 2022-07-17 PROCEDURE — 2700000000 HC OXYGEN THERAPY PER DAY

## 2022-07-17 PROCEDURE — 36415 COLL VENOUS BLD VENIPUNCTURE: CPT

## 2022-07-17 PROCEDURE — 80053 COMPREHEN METABOLIC PANEL: CPT

## 2022-07-17 PROCEDURE — 6360000002 HC RX W HCPCS

## 2022-07-17 PROCEDURE — 2580000003 HC RX 258: Performed by: SPECIALIST

## 2022-07-17 PROCEDURE — 85384 FIBRINOGEN ACTIVITY: CPT

## 2022-07-17 PROCEDURE — 6370000000 HC RX 637 (ALT 250 FOR IP): Performed by: INTERNAL MEDICINE

## 2022-07-17 PROCEDURE — 2060000000 HC ICU INTERMEDIATE R&B

## 2022-07-17 PROCEDURE — 6360000002 HC RX W HCPCS: Performed by: SPECIALIST

## 2022-07-17 PROCEDURE — 85610 PROTHROMBIN TIME: CPT

## 2022-07-17 RX ORDER — BUDESONIDE 0.5 MG/2ML
0.5 INHALANT ORAL 2 TIMES DAILY
Status: DISCONTINUED | OUTPATIENT
Start: 2022-07-17 | End: 2022-07-22 | Stop reason: HOSPADM

## 2022-07-17 RX ORDER — ARFORMOTEROL TARTRATE 15 UG/2ML
15 SOLUTION RESPIRATORY (INHALATION) 2 TIMES DAILY
Status: DISCONTINUED | OUTPATIENT
Start: 2022-07-17 | End: 2022-07-22 | Stop reason: HOSPADM

## 2022-07-17 RX ORDER — IPRATROPIUM BROMIDE AND ALBUTEROL SULFATE 2.5; .5 MG/3ML; MG/3ML
1 SOLUTION RESPIRATORY (INHALATION) EVERY 4 HOURS PRN
Status: DISCONTINUED | OUTPATIENT
Start: 2022-07-17 | End: 2022-07-22 | Stop reason: HOSPADM

## 2022-07-17 RX ADMIN — OXYCODONE AND ACETAMINOPHEN 1 TABLET: 10; 325 TABLET ORAL at 22:48

## 2022-07-17 RX ADMIN — HEPARIN SODIUM 22 UNITS/KG/HR: 10000 INJECTION, SOLUTION INTRAVENOUS at 01:27

## 2022-07-17 RX ADMIN — OXYCODONE AND ACETAMINOPHEN 1 TABLET: 10; 325 TABLET ORAL at 09:02

## 2022-07-17 RX ADMIN — ARFORMOTEROL TARTRATE 15 MCG: 15 SOLUTION RESPIRATORY (INHALATION) at 12:09

## 2022-07-17 RX ADMIN — SODIUM CHLORIDE, PRESERVATIVE FREE 10 ML: 5 INJECTION INTRAVENOUS at 22:48

## 2022-07-17 RX ADMIN — ESCITALOPRAM OXALATE 20 MG: 10 TABLET ORAL at 22:48

## 2022-07-17 RX ADMIN — BUDESONIDE 500 MCG: 0.5 SUSPENSION RESPIRATORY (INHALATION) at 12:09

## 2022-07-17 RX ADMIN — SODIUM CHLORIDE, PRESERVATIVE FREE 10 ML: 5 INJECTION INTRAVENOUS at 09:04

## 2022-07-17 RX ADMIN — ACETAMINOPHEN 650 MG: 325 TABLET ORAL at 04:03

## 2022-07-17 RX ADMIN — BUDESONIDE 500 MCG: 0.5 SUSPENSION RESPIRATORY (INHALATION) at 19:28

## 2022-07-17 RX ADMIN — CEFEPIME 2000 MG: 2 INJECTION, POWDER, FOR SOLUTION INTRAVENOUS at 16:16

## 2022-07-17 RX ADMIN — IPRATROPIUM BROMIDE AND ALBUTEROL SULFATE 1 AMPULE: 2.5; .5 SOLUTION RESPIRATORY (INHALATION) at 19:28

## 2022-07-17 RX ADMIN — SODIUM CHLORIDE, SODIUM LACTATE, POTASSIUM CHLORIDE, CALCIUM CHLORIDE AND DEXTROSE MONOHYDRATE: 5; 600; 310; 30; 20 INJECTION, SOLUTION INTRAVENOUS at 09:10

## 2022-07-17 RX ADMIN — INSULIN LISPRO 1 UNITS: 100 INJECTION, SOLUTION INTRAVENOUS; SUBCUTANEOUS at 13:47

## 2022-07-17 RX ADMIN — ASPIRIN 81 MG CHEWABLE TABLET 81 MG: 81 TABLET CHEWABLE at 09:02

## 2022-07-17 RX ADMIN — HEPARIN SODIUM 3750 UNITS: 1000 INJECTION INTRAVENOUS; SUBCUTANEOUS at 01:25

## 2022-07-17 RX ADMIN — IPRATROPIUM BROMIDE AND ALBUTEROL SULFATE 1 AMPULE: 2.5; .5 SOLUTION RESPIRATORY (INHALATION) at 15:22

## 2022-07-17 RX ADMIN — ARFORMOTEROL TARTRATE 15 MCG: 15 SOLUTION RESPIRATORY (INHALATION) at 19:28

## 2022-07-17 ASSESSMENT — LIFESTYLE VARIABLES: SMOKING_STATUS: 1

## 2022-07-17 ASSESSMENT — ENCOUNTER SYMPTOMS
VOMITING: 0
SHORTNESS OF BREATH: 1
NAUSEA: 0
DIARRHEA: 1
COUGH: 0

## 2022-07-17 NOTE — PROGRESS NOTES
performed therapeutic exercise of the following: seated B ankle pumps ,LAQ's PROM x 20    Patient education/treatment  Pt was educated on therapeutic exercise promoting circulation and strengthening, UE usage to assist with sitting balance and scooting    Patient response to education:   Pt verbalized understanding Pt demonstrated skill Pt requires further education in this area   slowly yes yes     ASSESSMENT:   Comments: Nurse ok with Rx. Pt sat EOB Min/Mod A for balance, initially short of breath on 5 L 02 NC, saturation 88%. Nurse increased 02 to 7 L NC high flow, saturation 94%. Exercise performed with little effort, Pt fatigued after sitting EOB 7 minutes. Pt was left in bed as found with call light in reach positioned on R side    Chair/bed alarm: bed alarm active    Time in 0840   Time out 0856   Total Treatment Time 16 minutes   CPT codes:     Therapeutic activities 46112 16 minutes   Therapeutic exercises 84754 0 minutes       Pt is showing consistent progress toward established Physical Therapy goals. Continue with physical therapy current plan of care promoting increased activity as medically able.     Griselda Robles PTA   License Number: [id number]

## 2022-07-17 NOTE — PROGRESS NOTES
Spoke to Dr. Tigre Callahan regarding new consult - consult changed to Dr. Leslie Caldwell. Messaged to him. Patient is know to Dr. Rashmi Mitchell.   Will call consult to him

## 2022-07-17 NOTE — ANESTHESIA PRE PROCEDURE
Department of Anesthesiology  Preprocedure Note       Name:  Jenny Blake   Age:  [de-identified] y.o.  :  1940                                          MRN:  [id number]         Date:  2022      Surgeon: Speedy Morton    Procedure: MEDIPORT REMOVAL    Medications prior to admission:   Prior to Admission medications    Medication Sig Start Date End Date Taking? Authorizing Provider   lipase-protease-amylase (CREON) 19641-59601 units delayed release capsule Take 12,000 Units by mouth take with snacks **SEE OTHER ORDER**    Historical Provider, MD   Multiple Vitamins-Minerals (PRESERVISION AREDS) CAPS Take 1 capsule by mouth 2 times daily    Historical Provider, MD   Magnesium Oxide (MAGNESIUM-OXIDE) 250 MG TABS tablet Take 250 mg by mouth 2 times daily    Historical Provider, MD   METAMUCIL FIBER PO Take 1 packet by mouth 2 times daily    Historical Provider, MD   diphenoxylate-atropine (LOMOTIL) 2.5-0.025 MG per tablet Take 1 tablet by mouth 4 times daily (before meals and nightly). Historical Provider, MD   loperamide (IMODIUM) 2 MG capsule Take 4 mg by mouth 3 times daily (with meals) **SEE OTHER ORDER**    Historical Provider, MD   loperamide (IMODIUM) 2 MG capsule Take 6 mg by mouth nightly **SEE OTHER ORDER**    Historical Provider, MD   amLODIPine (NORVASC) 10 MG tablet Take 10 mg by mouth nightly     Historical Provider, MD   LORazepam (ATIVAN) 0.5 MG tablet Take 0.25 mg by mouth daily as needed for Anxiety.      Historical Provider, MD   fluticasone (FLONASE) 50 MCG/ACT nasal spray 1 spray by Each Nostril route nightly     Historical Provider, MD   lipase-protease-amylase (CREON) 26102-54615 units delayed release capsule Take 24,000 Units by mouth 3 times daily (with meals) **SEE OTHER ORDER**    Historical Provider, MD   Probiotic CAPS Take 1 capsule by mouth nightly     Historical Provider, MD   hyoscyamine (ANASPAZ;LEVSIN) 125 MCG tablet Take 125 mcg by mouth 4 times daily as needed for Cramping Historical Provider, MD   omeprazole (PRILOSEC) 20 MG delayed release capsule Take 40 mg by mouth every morning     Historical Provider, MD   aspirin 81 MG tablet Take 81 mg by mouth nightly     Historical Provider, MD   oxyCODONE-acetaminophen (PERCOCET)  MG per tablet Take 1 tablet by mouth every 6 hours as needed for Pain. Historical Provider, MD   Cholecalciferol (VITAMIN D3) 50 MCG (2000 UT) TABS Take 2,000 Units by mouth 2 times daily     Historical Provider, MD   Coenzyme Q10 (COQ10 PO) Take 1 capsule by mouth nightly     Historical Provider, MD   Ferrous Sulfate (IRON) 325 (65 Fe) MG TABS Take 325 mg by mouth nightly     Historical Provider, MD   calcium carbonate (OSCAL) 500 MG TABS tablet Take 500 mg by mouth nightly     Historical Provider, MD   pravastatin (PRAVACHOL) 40 MG tablet Take 40 mg by mouth nightly     Historical Provider, MD   propranolol (INDERAL) 20 MG tablet Take 20 mg by mouth 2 times daily     Historical Provider, MD   vitamin B-12 (CYANOCOBALAMIN) 500 MCG tablet Place 500 mcg under the tongue nightly     Historical Provider, MD   Omega-3 Fatty Acids (OMEGA 3 PO) Take 1 capsule by mouth nightly     Historical Provider, MD   escitalopram (LEXAPRO) 20 MG tablet Take 20 mg by mouth nightly. Historical Provider, MD   primidone (MYSOLINE) 250 MG tablet Take 250 mg by mouth nightly     Historical Provider, MD   traZODone (DESYREL) 50 MG tablet Take 50 mg by mouth nightly     Historical Provider, MD       Current medications:    No current facility-administered medications for this visit. No current outpatient medications on file.      Facility-Administered Medications Ordered in Other Visits   Medication Dose Route Frequency Provider Last Rate Last Admin    cefepime (MAXIPIME) 2000 mg IVPB minibag  2,000 mg IntraVENous Q24H Shweta Carlson MD        Arformoterol Tartrate [facility]) nebulizer solution 15 mcg  15 mcg Nebulization BID GEMA Bowen - CNP   15 mcg at 07/17/22 1209    budesonide (PULMICORT) nebulizer suspension 500 mcg  0.5 mg Nebulization BID GEMA Murphy - CNP   500 mcg at 07/17/22 1209    ipratropium-albuterol (DUONEB) nebulizer solution 1 ampule  1 ampule Inhalation Q4H PRN GEMA Murphy - CNP   1 ampule at 07/17/22 1522    insulin lispro (HUMALOG) injection vial 0-6 Units  0-6 Units SubCUTAneous TID WC Azalea E Awilda, DO   1 Units at 07/17/22 1347    insulin lispro (HUMALOG) injection vial 0-3 Units  0-3 Units SubCUTAneous Nightly Azalea E Awilda, DO        glucose chewable tablet 16 g  4 tablet Oral PRN Azalea E Awilda, DO        dextrose bolus 10% 125 mL  125 mL IntraVENous PRN Azalea E Awilda, DO        Or    dextrose bolus 10% 250 mL  250 mL IntraVENous PRN Azalea E Awilda, DO        glucagon (rDNA) injection 1 mg  1 mg IntraMUSCular PRN Azalea E Awilda, DO        dextrose 5 % solution  100 mL/hr IntraVENous PRN Azalea BOLIVAR Awilda, DO   Held at 07/17/22 0051    dextrose 5 % in lactated ringers infusion   IntraVENous Continuous Lety Nance  mL/hr at 07/17/22 0910 New Bag at 07/17/22 0910    nicotine (NICODERM CQ) 21 MG/24HR 1 patch  1 patch TransDERmal Daily GEMA Murphy - CNP   1 patch at 07/17/22 0905    heparin (porcine) injection 3,750 Units  80 Units/kg IntraVENous PRN Brittany Day MD   3,750 Units at 07/17/22 0125    heparin (porcine) injection 1,880 Units  40 Units/kg IntraVENous PRN Brittany Day MD        heparin 25,000 units in dextrose 5% 250 mL (premix) infusion  5-30 Units/kg/hr IntraVENous Continuous Brittany Day MD 10.3 mL/hr at 07/17/22 0127 22 Units/kg/hr at 07/17/22 0127    calcitRIOL (ROCALTROL) capsule 0.25 mcg  0.25 mcg Oral Q MWF LpoezGEMA Soto CNP        vitamin D (ERGOCALCIFEROL) capsule 50,000 Units  50,000 Units Oral Weekly Lopez GEMA Sharpe CNP        sodium chloride flush 0.9 % injection 10 mL  10 mL IntraVENous 2 times per day DO Ila Balderas  Cellulitis L03.90    Infected hernioplasty mesh ([facility]) T85.79XA    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    Mild protein-calorie malnutrition ([facility]) E44.1    Acute kidney injury ([facility] N17.9    Hypertensive kidney disease with chronic kidney disease stage IV ([facility]) I12.9, N18.4    KD (acute kidney injury) ([facility] N17.9    Diarrhea R19.7    Elevated serum creatinine R79.89    Abnormal serum creatinine level R79.89    Hyperlipemia E78.5    Primary hypertension I10    Essential (primary) hypertension I10    Postsurgical malabsorption, 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 Chidi Callejas every 6 months    Cancer [facility])     skin, leg,nose- removed surgically     COPD (chronic obstructive pulmonary disease) ([facility])     mild- no inhlaers, no oxygen     Depression     Fibromyalgia     chronic back and neck pain    Generalized headaches     h/o / daily    History of blood transfusion 3-11-14    multiple times    History of necrotic bowel 2012    Hyperlipidemia     Hypertension     Incisional hernia     abdomen    Insomnia     Mitral valve regurgitation     Myocardial infarct ([facility] 2002    Occasional tremors     at hands / stable with medication    Osteopenia     PONV (postoperative nausea and vomiting)     Vitamin B12 deficiency     h/o       Past Surgical History:        Procedure Laterality Date    ABDOMEN SURGERY  2-    total colectomy, bowel resection, ileostomy,revision of ileostomy     ABDOMEN SURGERY N/A 1/26/2020    DRAINAGE OF ABDOMINAL WALL ABSCESS, EXPLANTATION OF MESH, DEBRIDEMENT OF SKIN AND SUBCUTANEOUS TISSUE performed by Delmi Chan MD at [facility] N/A 9/11/2020    EXPLANTATION OF HERNIA MESH performed by Delmi Chan MD at [address]  2002   [facility] 4  1-2006    right and left thumb   [address], 2004, 2009    lumbar fusion x 3    BACK SURGERY      cervical fusion x 2    BREAST SURGERY  years ago    monor calcifications    CARPAL TUNNEL RELEASE Bilateral     CATARACT REMOVAL WITH IMPLANT Right 03/03/15    CHOLECYSTECTOMY  5-11-07    Lap    COLONOSCOPY      CORONARY ANGIOPLASTY WITH STENT PLACEMENT  1-7-2002    ENDOSCOPY, COLON, DIAGNOSTIC      EPIGASTRIC HERNIA REPAIR  3/21/16    incisional with mesh implantation    ESOPHAGUS SURGERY  1-4-13    esophageal clamp (torn Esophagus)    FINGER TRIGGER RELEASE Right 2006    index finger    FOOT SURGERY Right     multiple    HERNIA REPAIR  12-31-13    abdominal x 5- last one 2017     ILEOSTOMY OR JEJUNOSTOMY  1-3-13    revision    INSERT PICC LINE  06/09/2020    and removed     JOINT REPLACEMENT Right 3/24/15    right total shoulder arthroplasty    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     LAPAROTOMY N/A 5/12/2020    EXPLORATORY LAPAROTOMY EXPLANTATION OF INFECTED MESH SMAL BOWEL RESECTION AND REVISION END ILEOSTOMY, LYSIS OF ADHESIONS performed by Delmi Chan MD at [place] [address] N/A 6/8/2020    LAPAROTOMY EXPLORATORY, Arelis Mutton OF HERNIA MESH performed by Delmi Chan MD at [place] [address] N/A 9/15/2020    EXPLORATORY LAPAROTOMY WITH SMALL BOWEL RESECTION, TAKE DOWN REVISION ENTEROCUTANEOUS FISTULA , OSTOMY REVISION performed by Delmi Chan MD at [address] / leg    NASAL SINUS SURGERY      x2 /  cauterized    OTHER SURGICAL HISTORY  08/24/2016    diagnostic laparotomy with repair of intraabdominal hernia    OTHER SURGICAL HISTORY      removal plate / screws  / right great toe    PICC LINE INSERTION NURSE  9/15/2020         ROTATOR CUFF REPAIR  2010    right     SKIN BIOPSY  2010    3 squamous cell carcinoma right leg, left leg       Social History:    Social History     Tobacco Use    Smoking status: Every Day     Packs/day: 1.00     Types: Cigarettes    Smokeless tobacco: Never   Substance Use Topics    Alcohol use: Not Currently     Comment: occasional                                Ready to quit: Not Answered  Counseling given: Not Answered      Vital Signs (Current): There were no vitals filed for this visit. BP Readings from Last 3 Encounters:   07/17/22 (!) 116/53   07/01/22 (!) 181/70   05/24/22 (!) 179/60       NPO Status:                                                                                 BMI:   Wt Readings from Last 3 Encounters:   07/16/22 103 lb 4.8 oz (46.9 kg)   01/28/22 94 lb (42.6 kg)   07/02/21 84 lb (38.1 kg)     There is no height or weight on file to calculate BMI.    CBC:   Lab Results   Component Value Date/Time    WBC 27.8 07/17/2022 03:27 AM    RBC 3.18 07/17/2022 03:27 AM    HGB 10.1 07/17/2022 03:27 AM    HCT 30.8 07/17/2022 03:27 AM    MCV 96.9 07/17/2022 03:27 AM    RDW 14.5 07/17/2022 03:27 AM    PLT 87 07/17/2022 03:27 AM       CMP:   Lab Results   Component Value Date/Time     07/17/2022 03:27 AM    K 3.6 07/17/2022 03:27 AM     07/17/2022 03:27 AM    CO2 25 07/17/2022 03:27 AM    BUN 79 07/17/2022 03:27 AM    CREATININE 1.9 07/17/2022 03:27 AM    GFRAA 31 07/17/2022 03:27 AM    LABGLOM 25 07/17/2022 03:27 AM    GLUCOSE 170 07/17/2022 03:27 AM    PROT 5.3 07/17/2022 03:27 AM    CALCIUM 8.6 07/17/2022 03:27 AM    BILITOT 2.0 07/17/2022 03:27 AM    ALKPHOS 252 07/17/2022 03:27 AM    AST 18 07/17/2022 03:27 AM    ALT 17 07/17/2022 03:27 AM       POC Tests: No results for input(s): POCGLU, POCNA, POCK, POCCL, POCBUN, POCHEMO, POCHCT in the last 72 hours.     Coags:   Lab Results   Component Value Date/Time    PROTIME 21.6 07/17/2022 06:29 AM    INR 2.0 07/17/2022 06:29 AM    APTT 52.6 07/17/2022 08:20 AM       HCG (If Applicable): No results found for: PREGTESTUR, PREGSERUM, HCG, HCGQUANT     ABGs: No results found for: PHART, PO2ART, LQH0UYM, OLW9HGH, BEART, C8LTNJSM     Type & Screen (If Applicable):  No results found for: LABABO, [address]     EKG 5-    Component Value Ref Range & Units Status Collected Lab   Ventricular Rate 58  BPM Incomplete 05/11/2020  9:28 AM [facility]   Atrial Rate 58  BPM Incomplete 05/11/2020  9:28 AM [facility]   P-R Interval 174  ms Incomplete 05/11/2020  9:28 AM [facility]   QRS Duration 90  ms Incomplete 05/11/2020  9:28 AM [facility]   Q-T Interval 484  ms Incomplete 05/11/2020  9:28 AM [facility]   QTc Calculation (Bazett) 475  ms Incomplete 05/11/2020  9:28 AM [facility]   P Axis 67  degrees Incomplete 05/11/2020  9:28 AM [facility]   R Axis -20  degrees Incomplete 05/11/2020  9:28 AM [facility]   T Axis 43  degrees Incomplete 05/11/2020  9:28 AM [facility]   Testing Performed By     Lab - Abbreviation Name Director Address Valid Date Range   360-[facility] MUSE Unknown Unknown 04/18/16 0721-Present   Narrative & Impression     Sinus bradycardia  Cannot rule out Anterior infarct , age undetermined  Abnormal ECG  When compared with ECG of 25-JAN-2020 18:11,  Minimal criteria for Anterior infarct are now present  Nonspecific T wave abnormality now evident in Inferior leads  Nonspecific T wave abnormality now evident in Anterolateral leads     CT ABD 5-8-2020    Impression   Status post colectomy with right lower quadrant colostomy.       Persistent soft tissue thickening of the mesh/and the abdominal wall   with tiny air bubbles which may represent inflammation/infected mesh   with phlegmon and the microabscesses. A fistula from the ileostomy is   less likely.       Some haziness surrounding the pancreatic head.  Please correlate with   the pancreatic enzymes.       Dilated small bowel loops which may be due to ileus or low-grade bowel   obstruction. CXR 2-5-2020    Impression   Borderline cardiomegaly with atherosclerotic disease of the aorta. Interval removal of right PICC line catheter. No acute cardiopulmonary process identified.           This study was dictated by Ermias Cárdenas PA-C and Marcheta Bernheim, MD    reviewed and concurred with the findings. Anesthesia Evaluation  Patient summary reviewed and Nursing notes reviewed   history of anesthetic complications: PONV. Airway: Mallampati: III  TM distance: >3 FB   Neck ROM: full  Mouth opening: > = 3 FB   Dental:          Pulmonary:   (+) COPD:  decreased breath sounds current smoker                           Cardiovascular:    (+) hypertension:, valvular problems/murmurs (s/p MVR):, past MI:, CAD:, CABG/stent:, hyperlipidemia      ECG reviewed  Rhythm: regular  Rate: normal                    Neuro/Psych:   (+) headaches:, psychiatric history: stable with treatment             ROS comment: Fibromyalgia GI/Hepatic/Renal:   (+) renal disease (stage 3): CRI,          ROS comment: S/p ventral hernia repair with necrotizing infection of abdominal wall    Ileostomy     enterocutaneous fistula. Endo/Other:    (+) blood dyscrasia (current h/h 10.9/34): anemia, arthritis: OA., malignancy/cancer (skin). Abdominal:             Vascular: negative vascular ROS. Other Findings:             Anesthesia Plan      MAC     ASA 3       Induction: intravenous. MIPS: Postoperative opioids intended and Prophylactic antiemetics administered. Anesthetic plan and risks discussed with patient. Plan discussed with CRNA.                 Patient will need to be re-evaluated prior to surgery by DOS anesthesiologist.    Eryn Rutledge DO             7/17/2022                4:03 PM

## 2022-07-17 NOTE — PROGRESS NOTES
[facility]  Nephrology Attending Progress Note    SUBJECTIVE:     7/17/22- Pt somnolent opens eyes but does not verbally interact    PROBLEM LIST:    Patient Active Problem List   Diagnosis    Right cataract    Essential hypertension, benign    Hyperlipidemia with target LDL less than 100    Osteoarthritis, shoulder    Primary osteoarthritis of left knee    History of ischemic bowel disease    Ileostomy present ([address]    Chronic kidney disease, stage III (moderate) (HCC)    COPD (chronic obstructive pulmonary disease) (HCC)    Moderate protein-calorie malnutrition (HCC)    Abdominal wall cellulitis    Cellulitis    Infected hernioplasty mesh (HCC)    Enterocutaneous fistula    Mild protein-calorie malnutrition ([address]    Acute kidney injury ([address]    Hypertensive kidney disease with chronic kidney disease stage IV (HCC)    KD (acute kidney injury) ([address]    Diarrhea    Elevated serum creatinine    Abnormal serum creatinine level    Hyperlipemia    Primary hypertension    Essential (primary) hypertension    Postsurgical malabsorption, not elsewhere classified    Hypercholesterolemia    Pure hypercholesterolemia    Prediabetes    Hypothyroidism, unspecified    Acute renal failure (ARF) ([address]        PAST MEDICAL HISTORY:    Past Medical History:   Diagnosis Date    Acute kidney failure ([address] 2014 and 8/2016    x2,no dialysis needed,resolved, kidney function tests returned to normal    Anxiety     Arthritis     CAD (coronary artery disease)     follows with Dr. Rach Mcclendon every 6 months    Cancer ([address]     skin, leg,nose- removed surgically     COPD (chronic obstructive pulmonary disease) ([address]     mild- no inhlaers, no oxygen     Depression     Fibromyalgia     chronic back and neck pain    Generalized headaches     h/o / daily    History of blood transfusion 3-11-14    multiple times    History of necrotic bowel 2012    Hyperlipidemia     Hypertension     Incisional hernia     abdomen    Insomnia     Mitral valve regurgitation     Myocardial infarct ([facility] [address] 2002    Occasional tremors     at hands / stable with medication    Osteopenia     PONV (postoperative nausea and vomiting)     Vitamin B12 deficiency     h/o       DIET:    ADULT DIET;  Regular; Low Potassium (Less than 3000 mg/day)  ADULT TUBE FEEDING; PEG; Renal Formula; Continuous; 35; Yes; 10; Q 4 hours; 35; 100; Q 6 hours     PHYSICAL EXAM:     Patient Vitals for the past 24 hrs:   BP Temp Temp src Pulse Resp SpO2   07/17/22 0855 (!) 111/50 98 °F (36.7 °C) Axillary 99 26 --   07/17/22 0359 -- 99.5 °F (37.5 °C) Axillary -- 28 99 %   07/16/22 2246 -- 99.3 °F (37.4 °C) Axillary -- 22 --   07/16/22 1954 (!) 114/44 97.9 °F (36.6 °C) Axillary 97 28 96 %   07/16/22 1545 -- -- -- -- -- 91 %   07/16/22 1400 (!) 98/49 97.6 °F (36.4 °C) Axillary 84 22 97 %   07/16/22 1142 (!) 108/46 (!) 101.6 °F (38.7 °C) Axillary (!) 108 30 91 %   07/16/22 1045 -- (!) 101.8 °F (38.8 °C) Axillary -- -- --   07/16/22 1000 -- 100.4 °F (38 °C) Axillary (!) 117 -- 93 %   @      Intake/Output Summary (Last 24 hours) at 7/17/2022 0956  Last data filed at 7/17/2022 0673  Gross per 24 hour   Intake 0 ml   Output 550 ml   Net -550 ml         Wt Readings from Last 3 Encounters:   07/16/22 103 lb 4.8 oz (46.9 kg)   01/28/22 94 lb (42.6 kg)   07/02/21 84 lb (38.1 kg)       Constitutional:  Pt frail and cachetic   Head: normocephalic, atraumatic  Neck: no JVD  Cardiovascular: S1 S2 no S3 or rub  Respiratory:  diminished, poor effort  Gastrointestinal:  Soft, + PEG and ostomy  Ext: no edema   Skin: dry, no rash  Neuro: minimally interactive    MEDS (scheduled):    insulin lispro  0-6 Units SubCUTAneous TID WC    insulin lispro  0-3 Units SubCUTAneous Nightly    cefepime  1,000 mg IntraVENous Q24H    nicotine  1 patch TransDERmal Daily    calcitRIOL  0.25 mcg Oral Q MWF    vitamin D  50,000 Units Oral Weekly    sodium chloride flush  10 mL IntraVENous 2 times per day    aspirin  81 mg Oral Daily    escitalopram 20 mg Oral Nightly    pantoprazole  40 mg Oral QAM AC       MEDS (infusions):   dextrose Stopped (07/17/22 0051)    dextrose 5% in lactated ringers 100 mL/hr at 07/17/22 0910    heparin (PORCINE) Infusion 22 Units/kg/hr (07/17/22 0127)    sodium chloride         MEDS (prn):  glucose, dextrose bolus **OR** dextrose bolus, glucagon (rDNA), dextrose, heparin (porcine), heparin (porcine), sodium chloride flush, sodium chloride, senna, acetaminophen **OR** acetaminophen, hyoscyamine, oxyCODONE-acetaminophen    DATA:    Recent Labs     07/16/22  0422 07/16/22  1750 07/17/22  0327   WBC 27.1* 31.5* 27.8*   HGB 9.3* 10.0* 10.1*   HCT 29.0* 30.5* 30.8*   MCV 97.0 95.9 96.9    95* 87*     Recent Labs     07/15/22  0438 07/16/22  0422 07/16/22  1200 07/17/22  0327    140  --  142   K 3.8 3.2* 3.9 3.6    102  --  103   CO2 19* 24  --  25   * 83*  --  79*   CREATININE 2.5* 2.0*  --  1.9*   LABGLOM 19 24  --  25   GLUCOSE 214* 186*  --  170*   CALCIUM 8.0* 7.8*  --  8.6   ALT 22 19  --  17   AST 18 25  --  18   BILITOT 0.6 1.1  --  2.0*   ALKPHOS 315* 270*  --  252*   MG 2.0 1.4* 2.0  --    PHOS 6.1*  --   --   --        Lab Results   Component Value Date    LABALBU 2.0 (L) 07/17/2022    LABALBU 2.1 (L) 07/16/2022    LABALBU 2.4 (L) 07/15/2022     Lab Results   Component Value Date    TSH 1.380 07/01/2022       Iron Studies  Lab Results   Component Value Date    IRON 30 (L) 07/01/2022    TIBC 229 (L) 07/01/2022    FERRITIN 143 07/01/2022     Vitamin B-12   Date Value Ref Range Status   04/27/2022 1050 (H) 211 - 946 pg/mL Final     Folate   Date Value Ref Range Status   01/22/2021 8.1 4.8 - 24.2 ng/mL Final       Vit D, 25-Hydroxy   Date Value Ref Range Status   07/15/2022 13 (L) 30 - 100 ng/mL Final     Comment:     <20 ng/mL. ........... Alicia Zaldivar Deficient  20-30 ng/mL. ......... Alicia Solimanst Insufficient   ng/mL. ........ Alicia Zaldivar Sufficient  >100 ng/mL. .......... Alicia Zaldivar Toxic       PTH   Date Value Ref Range Status   07/15/2022 307 (H) 15 - 65 pg/mL Final       No components found for: URIC    Lab Results   Component Value Date/Time    COLORU Yellow 07/15/2022 11:06 PM    NITRU Negative 07/15/2022 11:06 PM    GLUCOSEU Negative 07/15/2022 11:06 PM    KETUA Negative 07/15/2022 11:06 PM    UROBILINOGEN 0.2 07/15/2022 11:06 PM    BILIRUBINUR Negative 07/15/2022 11:06 PM       No results found for: LIPIDPAN    EXAMINATION:  RETROPERITONEAL ULTRASOUND OF THE KIDNEYS AND URINARY BLADDER     7/14/2022     COMPARISON:  Renal ultrasound from February 25, 2021     HISTORY:  ORDERING SYSTEM PROVIDED HISTORY: KD  TECHNOLOGIST PROVIDED HISTORY:  Reason for exam:->KD  What reading provider will be dictating this exam?->CRC     FINDINGS:     Kidneys: The right kidney measures 10.4 cm in length and the left kidney measures 9.7  cm in length. Right kidney: The corticomedullary differentiation and cortical thickness is  slightly decreased. Echogenic renal parenchyma. Anechoic thin walled  nonvascular 13 mm right mid to upper pole renal cyst.  No concerning renal  mass or intrarenal calcification. No hydronephrosis. Left kidney: The corticomedullary differentiation and cortical thickness is  slightly decreased. Anechoic thin walled nonvascular left midpole cyst.  4.5  cm left lower pole cyst.  Mildly echogenic renal parenchyma. There is no  hydronephrosis. Bladder:     Bladder volume was 282 mL. No intrinsic mass, intrinsic calcification, nor  wall thickening. Impression  1. Bilateral renal cysts. Largest is seen in the lower pole the left kidney  measuring 4.5 cm. No aggressive renal lesions. Mild bilateral renal  cortical thinning. No hydronephrosis. Mildly echogenic renal parenchyma. 2.  Normal appearance of the imaged bladder.           IMPRESSION/RECOMMENDATIONS:      Stage II KD presumed sec to decreased effective renal perfusion in the setting of CKD G3B with a baseline serum cr 1.4-1.5mg/dl with an e-GFR=33-36ml/min  7/14/22 Renal US No evidence of hydro  FeNa<1%  TF running as po intake poor. Cr trending down  24hour urine creainine clearance 33 based on the cr 2.0mg/dl  PLAN:  1. Continue the IVF  2. Follow labs  3. Follow I&O,        2. HTN with CKD I-IV  BP goal <130/80-at goal  PLAN:  Follow BP     3. Anion Gap Met Acidosis in the setting of the KD on the CKD with a lactic acidosis and the abd pain with BC (+) for GNR-on cefepime  LA trending down 3.9-->2.9-->2.5  PLAN:  Follow LA on the LR     4. Sec HPTH of Renal Origin with Hyperphosphatemia and Unspecified Vit D Def  Ca++ 8.6 ; last PO4 6.1: Vit D 13  PLAN:  Follow Ca and PO4  Continue Vit D and calcitriol MWF    5. Hypomagnesemia  Mg++ 1.4-->2.0 post IV supplement  PLAN:  IV supplementing with 2g Mg++    6.  Hypokalemia  K+ 3.2-->3.9-->3.6 post supplement 7/16  PLAN:  Follow K+       Lashanda Maldonado MD

## 2022-07-17 NOTE — PROGRESS NOTES
Progress Note  Date:2022       Room:[address] [place] [address]-A  Patient Jean Lefort     YOB: 1940     Age:82 y.o. Patient seen for follow up of acute renal failure. Per nursing patient's IV infiltrated last evening. She is awake and answering simple questions this am. She states that her abdomen is hurting her. She is resting on 10 liters of oxygen. Subjective    Subjective:  Symptoms:  Stable. She reports shortness of breath and diarrhea. No cough, chest pain, headache, chest pressure or anxiety. Diet:  Poor intake. No nausea or vomiting. Unable to obtain. Review of Systems   Respiratory:  Positive for shortness of breath. Negative for cough. Cardiovascular:  Negative for chest pain. Gastrointestinal:  Positive for diarrhea. Negative for nausea and vomiting. Objective         Vitals Last 24 Hours:  TEMPERATURE:  Temp  Av.7 °F (37.6 °C)  Min: 97.6 °F (36.4 °C)  Max: 101.8 °F (38.8 °C)  RESPIRATIONS RANGE: Resp  Av  Min: 22  Max: 30  PULSE OXIMETRY RANGE: SpO2  Av.7 %  Min: 91 %  Max: 99 %  PULSE RANGE: Pulse  Av.5  Min: 84  Max: 117  BLOOD PRESSURE RANGE: Systolic (63UHD), HDP:639 , Min:98 , WET:146   ; Diastolic (63GOR), UUO:52, Min:44, Max:49    I/O (24Hr): Intake/Output Summary (Last 24 hours) at 2022 0802  Last data filed at 2022 1400  Gross per 24 hour   Intake 100 ml   Output 850 ml   Net -750 ml       Objective:  General Appearance:  Comfortable, ill-appearing and in no acute distress. Vital signs: (most recent): Blood pressure (!) 111/50, pulse 99, temperature 98 °F (36.7 °C), temperature source Axillary, resp. rate 26, height 5' (1.524 m), weight 103 lb 4.8 oz (46.9 kg), SpO2 99 %. Lungs:  Normal effort. There are decreased breath sounds. No rales or rhonchi. Heart: Normal rate. Regular rhythm. S1 normal and S2 normal.  No gallop. Abdomen: Abdomen is soft and non-distended. (Ileostomy in right lower quadrant. ). is prominence of the ventricles and sulci due to global parenchymal volume loss. There are nonspecific areas of hypoattenuation within the periventricular and subcortical white matter, which likely represent chronic microvascular ischemic change. There is a small chronic lacunar infarct in left basal ganglia. ORBITS: The visualized portion of the orbits demonstrate no acute abnormality. SINUSES: The visualized paranasal sinuses and mastoid air cells demonstrate no acute abnormality. SOFT TISSUES/SKULL: No acute abnormality of the visualized skull or soft tissues. No acute intracranial abnormality. XR CHEST PORTABLE    Result Date: 7/15/2022  EXAMINATION: ONE XRAY VIEW OF THE CHEST 7/15/2022 9:41 pm COMPARISON: 13 July 2022 HISTORY: ORDERING SYSTEM PROVIDED HISTORY: shortness of breath TECHNOLOGIST PROVIDED HISTORY: Reason for exam:->shortness of breath FINDINGS: Single AP erect portable chest demonstrates a right-sided MediPort catheter in good position. The lungs are clear. There is tortuosity of the aorta without cardiomegaly. There is a right-sided reverse shoulder arthroplasty. There is no evidence of a pneumothorax. No acute cardiopulmonary process. US RETROPERITONEAL COMPLETE    Result Date: 7/14/2022  EXAMINATION: RETROPERITONEAL ULTRASOUND OF THE KIDNEYS AND URINARY BLADDER 7/14/2022 COMPARISON: Renal ultrasound from February 25, 2021 HISTORY: ORDERING SYSTEM PROVIDED HISTORY: KD TECHNOLOGIST PROVIDED HISTORY: Reason for exam:->KD What reading provider will be dictating this exam?->CRC FINDINGS: Kidneys: The right kidney measures 10.4 cm in length and the left kidney measures 9.7 cm in length. Right kidney: The corticomedullary differentiation and cortical thickness is slightly decreased. Echogenic renal parenchyma. Anechoic thin walled nonvascular 13 mm right mid to upper pole renal cyst.  No concerning renal mass or intrarenal calcification. No hydronephrosis. Left kidney:  The corticomedullary differentiation and cortical thickness is slightly decreased. Anechoic thin walled nonvascular left midpole cyst.  4.5 cm left lower pole cyst.  Mildly echogenic renal parenchyma. There is no hydronephrosis. Bladder: Bladder volume was 282 mL. No intrinsic mass, intrinsic calcification, nor wall thickening. 1.  Bilateral renal cysts. Largest is seen in the lower pole the left kidney measuring 4.5 cm. No aggressive renal lesions. Mild bilateral renal cortical thinning. No hydronephrosis. Mildly echogenic renal parenchyma. 2.  Normal appearance of the imaged bladder. Assessment//Plan           Hospital Problems             Last Modified POA    * (Principal) Acute renal failure (ARF) ([facility] [address] 7/14/2022 Yes   Assessment & Plan  Acute hypoxic respiratory failure  Acute renal failure  Metabolic encephalopathy  Dehydration  CKD stage III b   DM type II   Urinary retention  Short gut syndrome    Appreciate ID and Pulmonary input. Patient currently on heparin drip due to  Basilic vein thrombus. WBC 27.8 this am. Blood cultures pending. Continue on IVF per nephrology. Check Upright abdominal xray. Monitor platelets. Azalea Kaiser, DO    I can be reached through answering service.

## 2022-07-17 NOTE — PROGRESS NOTES
Pulmonary Progress Note    Admit Date: 2022                            PCP: Benedicto Kaiser DO  Principal Problem:    Acute renal failure (ARF) ([address]  Resolved Problems:    * No resolved hospital problems. *      Subjective:  Patient was seen resting in bed on 5 L nasal cannula no acute distress  She notes improvement in her shortness of breath and cough since yesterday      Medications:   dextrose Stopped (22 0051)    dextrose 5% in lactated ringers 100 mL/hr at 22 0910    heparin (PORCINE) Infusion 22 Units/kg/hr (22 0127)    sodium chloride          cefepime  2,000 mg IntraVENous Q24H    insulin lispro  0-6 Units SubCUTAneous TID WC    insulin lispro  0-3 Units SubCUTAneous Nightly    nicotine  1 patch TransDERmal Daily    calcitRIOL  0.25 mcg Oral Q MWF    vitamin D  50,000 Units Oral Weekly    sodium chloride flush  10 mL IntraVENous 2 times per day    aspirin  81 mg Oral Daily    escitalopram  20 mg Oral Nightly    pantoprazole  40 mg Oral QAM AC       Vitals:  VITALS:  BP (!) 111/50   Pulse 99   Temp 98 °F (36.7 °C) (Axillary)   Resp 26   Ht 5' (1.524 m)   Wt 103 lb 4.8 oz (46.9 kg)   SpO2 99%   BMI 20.17 kg/m²   24HR INTAKE/OUTPUT:    Intake/Output Summary (Last 24 hours) at 2022 1142  Last data filed at 2022 0924  Gross per 24 hour   Intake 0 ml   Output 550 ml   Net -550 ml     CURRENT PULSE OXIMETRY:  SpO2: 99 %  24HR PULSE OXIMETRY RANGE:  SpO2  Av.8 %  Min: 91 %  Max: 99 %  CVP:    VENT SETTINGS:      Additional Respiratory Assessments  Heart Rate: 99  Resp: 26  SpO2: 99 %      EXAM:  General: No distress. Sleepy arouses to voice  Eyes: No sclera icterus. No conjunctival injection. ENT: No discharge. Pharynx clear. Neck: Trachea midline. Normal thyroid. Resp: No accessory muscle use. No crackles. No wheezing. No rhonchi. Diminished bilaterally  CV: Regular rate. Regular rhythm. No mumur or rub. No edema. ABD: Non-tender. Non-distended.   Normal bowel sounds. PEG  Skin: Warm and dry. No nodule on exposed extremities. No rash on exposed extremities. Right chest mediport. M/S: No cyanosis. No joint deformity. No clubbing. Neuro: Follows commands, sleepy but answers questions appropriately     I/O: I/O last 3 completed shifts: In: 726 [NG/GT:726]  Out: 1200 [Urine:550; Emesis/NG output:450; Stool:200]  I/O this shift:  In: -   Out: 250 [Emesis/NG output:250]     Results:  CBC:   Recent Labs     07/16/22 0422 07/16/22  1750 07/17/22  0327   WBC 27.1* 31.5* 27.8*   HGB 9.3* 10.0* 10.1*   HCT 29.0* 30.5* 30.8*   MCV 97.0 95.9 96.9    95* 87*     BMP:   Recent Labs     07/15/22  0438 07/16/22  0422 07/16/22  1200 07/17/22  0327    140  --  142   K 3.8 3.2* 3.9 3.6    102  --  103   CO2 19* 24  --  25   PHOS 6.1*  --   --   --    * 83*  --  79*   CREATININE 2.5* 2.0*  --  1.9*     LFT:   Recent Labs     07/15/22  0438 07/16/22  0422 07/17/22  0327   ALKPHOS 315* 270* 252*   ALT 22 19 17   AST 18 25 18   PROT 5.7* 4.9* 5.3*   BILITOT 0.6 1.1 2.0*   LABALBU 2.4* 2.1* 2.0*     PT/INR:   Recent Labs     07/17/22  0629   PROTIME 21.6*   INR 2.0     Cultures:  No results for input(s): CULTRESP in the last 72 hours. ABG:   Recent Labs     07/15/22  2109   PH 7.486*   PO2 57.3*   PCO2 29.5*   HCO3 21.8*   BE -0.7   O2SAT 92.9       Films:  CT HEAD WO CONTRAST    Result Date: 7/15/2022  EXAMINATION: CT OF THE HEAD WITHOUT CONTRAST  7/15/2022 9:58 pm TECHNIQUE: CT of the head was performed without the administration of intravenous contrast. Automated exposure control, iterative reconstruction, and/or weight based adjustment of the mA/kV was utilized to reduce the radiation dose to as low as reasonably achievable. COMPARISON: None. HISTORY: ORDERING SYSTEM PROVIDED HISTORY: change in mental status TECHNOLOGIST PROVIDED HISTORY: Reason for exam:->change in mental status Has a \"code stroke\" or \"stroke alert\" been called? ->No FINDINGS: BRAIN/VENTRICLES: There is no acute intracranial hemorrhage, mass effect or midline shift. No abnormal extra-axial fluid collection. The gray-white differentiation is maintained without evidence of an acute infarct. There is prominence of the ventricles and sulci due to global parenchymal volume loss. There are nonspecific areas of hypoattenuation within the periventricular and subcortical white matter, which likely represent chronic microvascular ischemic change. There is a small chronic lacunar infarct in left basal ganglia. ORBITS: The visualized portion of the orbits demonstrate no acute abnormality. SINUSES: The visualized paranasal sinuses and mastoid air cells demonstrate no acute abnormality. SOFT TISSUES/SKULL: No acute abnormality of the visualized skull or soft tissues. No acute intracranial abnormality. XR CHEST PORTABLE    Result Date: 7/15/2022  EXAMINATION: ONE XRAY VIEW OF THE CHEST 7/15/2022 9:41 pm COMPARISON: 13 July 2022 HISTORY: ORDERING SYSTEM PROVIDED HISTORY: shortness of breath TECHNOLOGIST PROVIDED HISTORY: Reason for exam:->shortness of breath FINDINGS: Single AP erect portable chest demonstrates a right-sided MediPort catheter in good position. The lungs are clear. There is tortuosity of the aorta without cardiomegaly. There is a right-sided reverse shoulder arthroplasty. There is no evidence of a pneumothorax. No acute cardiopulmonary process. US RETROPERITONEAL COMPLETE    Result Date: 7/14/2022  EXAMINATION: RETROPERITONEAL ULTRASOUND OF THE KIDNEYS AND URINARY BLADDER 7/14/2022 COMPARISON: Renal ultrasound from February 25, 2021 HISTORY: ORDERING SYSTEM PROVIDED HISTORY: KD TECHNOLOGIST PROVIDED HISTORY: Reason for exam:->KD What reading provider will be dictating this exam?->CRC FINDINGS: Kidneys: The right kidney measures 10.4 cm in length and the left kidney measures 9.7 cm in length.  Right kidney: The corticomedullary differentiation and cortical thickness is slightly decreased. Echogenic renal parenchyma. Anechoic thin walled nonvascular 13 mm right mid to upper pole renal cyst.  No concerning renal mass or intrarenal calcification. No hydronephrosis. Left kidney: The corticomedullary differentiation and cortical thickness is slightly decreased. Anechoic thin walled nonvascular left midpole cyst.  4.5 cm left lower pole cyst.  Mildly echogenic renal parenchyma. There is no hydronephrosis. Bladder: Bladder volume was 282 mL. No intrinsic mass, intrinsic calcification, nor wall thickening. 1.  Bilateral renal cysts. Largest is seen in the lower pole the left kidney measuring 4.5 cm. No aggressive renal lesions. Mild bilateral renal cortical thinning. No hydronephrosis. Mildly echogenic renal parenchyma. 2.  Normal appearance of the imaged bladder. Assessment:  Acute hypoxic respiratory failure without hypercarbia  Elevated D-dimer  GNR septicemia secondary to CLABSI  BUE blood clots   Current smoker  Leukocytosis and fever secondary to infected Mediport  Chronic kidney disease - nephrology following  Coronary artery disease with coronary stent placement 2002      Plan:  Patient is currently on 5 L nasal cannula with SPO2 99%, weaned from 7 L high flow nasal cannula yesterday was requiring up to 10L HFNC. baseline room air. Please wean oxygen as tolerated. Strep and legionella antigens, sputum culture pending. Respiratory panel negative. Leukocytosis today, 8.8>27.1>31.5>27.8. T-max 99.5. PCT 68.17, blood cultures showing gram-negative rods secondary to infected Mediport. Surgery consulted for Mediport removal.  Continue IV cefepime per ID  ABGs noted, likely respiratory alkalosis as she was tachypneic on exam  d-dimer>5250, would like to have CTA to r/o PE but with renal function, CT chest w/o  7/16 showing mid central mucous plugging, minimal bibasilar atelectasis, pulmonary emphysema and hyperinflation.   With emphysema and smoking history we will start patient on Pulmicort, Brovana, and duo nebs. Smoking cessation encouraged, continue nicotine patch. Will need outpatient PFTs  US BUE 7/16 echogenic thrombus in distal left basilic vein and occlusive thrombus in L cephalic vein, continue heparin drip         Electronically signed by GEMA Bowen - CNP on 7/17/2022 at 11:42 AM       Seen and examined, agree with above. Has superficial clots in right arm not dvt. CT chest viewed and unimpressive. Suspect hypoxemia is more from sepsis than PE but will continue heparin until awake enough to successfully do v/q scan or cta neither of which can be done now. Agree with GNR septicemia that Mediport needs removed. As discussed with Dr. Mohamud Zarate, I am not impressed she has pneumonia.

## 2022-07-17 NOTE — PROGRESS NOTES
[address] [place] [address] [facility]  Progress Note    SUBJECTIVE:  Chief Complaint   Patient presents with    Abnormal Lab     sent in by Dr. Derian Lozada    Chest Pain     The patient is tolerating antibiotics. She is still having fevers. No nausea or vomiting. Review of systems:  As stated above in the chief complaint, otherwise negative. Medications:  Scheduled Meds:   cefepime  2,000 mg IntraVENous Q24H    insulin lispro  0-6 Units SubCUTAneous TID WC    insulin lispro  0-3 Units SubCUTAneous Nightly    nicotine  1 patch TransDERmal Daily    calcitRIOL  0.25 mcg Oral Q MWF    vitamin D  50,000 Units Oral Weekly    sodium chloride flush  10 mL IntraVENous 2 times per day    aspirin  81 mg Oral Daily    escitalopram  20 mg Oral Nightly    pantoprazole  40 mg Oral QAM AC     Continuous Infusions:   dextrose Stopped (22 005)    dextrose 5% in lactated ringers 100 mL/hr at 22 0910    heparin (PORCINE) Infusion 22 Units/kg/hr (22 0127)    sodium chloride       PRN Meds:glucose, dextrose bolus **OR** dextrose bolus, glucagon (rDNA), dextrose, heparin (porcine), heparin (porcine), sodium chloride flush, sodium chloride, senna, acetaminophen **OR** acetaminophen, hyoscyamine, oxyCODONE-acetaminophen    OBJECTIVE:  BP (!) 111/50   Pulse 99   Temp 98 °F (36.7 °C) (Axillary)   Resp 26   Ht 5' (1.524 m)   Wt 103 lb 4.8 oz (46.9 kg)   SpO2 99%   BMI 20.17 kg/m²   Temp  Av.4 °F (37.4 °C)  Min: 97.6 °F (36.4 °C)  Max: 101.8 °F (38.8 °C)  Constitutional: The patient is lying in bed. She is lethargic but arousable today and answers a couple of questions. Skin: Warm and slightly diaphoretic. No rashes were noted. HEENT: Round and reactive pupils. Moist mucous membranes. No ulcerations or thrush. Neck: Supple to movements. Chest: No use of accessory muscles to breathe. Symmetrical expansion. No wheezing, crackles or rhonchi.   Cardiovascular: S1 and S2 are rhythmic and regular. No murmurs appreciated. Abdomen: Positive bowel sounds to auscultation. Benign to palpation. No masses felt. No hepatosplenomegaly. Extremities: No clubbing, no cyanosis, no edema. Lines: Right Mediport accessed.     Laboratory and Tests Review:  Lab Results   Component Value Date    WBC 27.8 (H) 07/17/2022    WBC 31.5 (H) 07/16/2022    WBC 27.1 (H) 07/16/2022    HGB 10.1 (L) 07/17/2022    HCT 30.8 (L) 07/17/2022    MCV 96.9 07/17/2022    PLT 87 (L) 07/17/2022     Lab Results   Component Value Date    NEUTROABS 25.99 (H) 07/17/2022    NEUTROABS 24.85 (H) 07/16/2022    NEUTROABS 6.96 07/15/2022     No results found for: CRP  Lab Results   Component Value Date    ALT 17 07/17/2022    AST 18 07/17/2022    ALKPHOS 252 (H) 07/17/2022    BILITOT 2.0 (H) 07/17/2022     Lab Results   Component Value Date/Time     07/17/2022 03:27 AM    K 3.6 07/17/2022 03:27 AM     07/17/2022 03:27 AM    CO2 25 07/17/2022 03:27 AM    BUN 79 07/17/2022 03:27 AM    CREATININE 1.9 07/17/2022 03:27 AM    CREATININE 2.0 07/16/2022 04:22 AM    CREATININE 2.5 07/15/2022 04:38 AM    GFRAA 31 07/17/2022 03:27 AM    LABGLOM 25 07/17/2022 03:27 AM    GLUCOSE 170 07/17/2022 03:27 AM    PROT 5.3 07/17/2022 03:27 AM    LABALBU 2.0 07/17/2022 03:27 AM    CALCIUM 8.6 07/17/2022 03:27 AM    BILITOT 2.0 07/17/2022 03:27 AM    ALKPHOS 252 07/17/2022 03:27 AM    AST 18 07/17/2022 03:27 AM    ALT 17 07/17/2022 03:27 AM     Lab Results   Component Value Date    CRP 2.3 (H) 04/27/2022    CRP 23.2 (H) 01/27/2020     Lab Results   Component Value Date    SEDRATE 60 (H) 01/27/2020     Radiology:      Microbiology:   Rapid SARS-CoV-2: Negative  Rapid influenza: Negative  Respiratory panel: Negative  Blood culture 7/14/2022: Negative so far  Blood cultures 7/15/2022: GNR in 4 of 4 bottles  Streptococcus pneumoniae/Legionella urine Ag: negative       Recent Labs     07/15/22  2136 07/16/22  1200   PROCAL 68.17* 68.17*

## 2022-07-18 ENCOUNTER — ANESTHESIA EVENT (OUTPATIENT)
Dept: OPERATING ROOM | Age: 82
DRG: 682 | End: 2022-07-18
Payer: MEDICARE

## 2022-07-18 LAB
ALBUMIN SERPL-MCNC: 1.9 G/DL (ref 3.5–5.2)
ALP BLD-CCNC: 255 U/L (ref 35–104)
ALT SERPL-CCNC: 13 U/L (ref 0–32)
ANION GAP SERPL CALCULATED.3IONS-SCNC: 14 MMOL/L (ref 7–16)
ANISOCYTOSIS: ABNORMAL
APTT: 37 SEC (ref 24.5–35.1)
APTT: 60.7 SEC (ref 24.5–35.1)
APTT: 61.9 SEC (ref 24.5–35.1)
AST SERPL-CCNC: 15 U/L (ref 0–31)
BASOPHILS ABSOLUTE: 0 E9/L (ref 0–0.2)
BASOPHILS RELATIVE PERCENT: 0 % (ref 0–2)
BILIRUB SERPL-MCNC: 2 MG/DL (ref 0–1.2)
BUN BLDV-MCNC: 83 MG/DL (ref 6–23)
CALCIUM SERPL-MCNC: 8.3 MG/DL (ref 8.6–10.2)
CHLORIDE BLD-SCNC: 104 MMOL/L (ref 98–107)
CO2: 24 MMOL/L (ref 22–29)
CREAT SERPL-MCNC: 1.8 MG/DL (ref 0.5–1)
EKG ATRIAL RATE: 125 BPM
EKG P AXIS: 73 DEGREES
EKG P-R INTERVAL: 182 MS
EKG Q-T INTERVAL: 332 MS
EKG QRS DURATION: 108 MS
EKG QTC CALCULATION (BAZETT): 479 MS
EKG R AXIS: -52 DEGREES
EKG T AXIS: 84 DEGREES
EKG VENTRICULAR RATE: 125 BPM
EOSINOPHILS ABSOLUTE: 0.01 E9/L (ref 0.05–0.5)
EOSINOPHILS RELATIVE PERCENT: 0.3 % (ref 0–6)
GFR AFRICAN AMERICAN: 33
GFR NON-AFRICAN AMERICAN: 27 ML/MIN/1.73
GLUCOSE BLD-MCNC: 98 MG/DL (ref 74–99)
HCT VFR BLD CALC: 30.2 % (ref 34–48)
HEMOGLOBIN: 9.9 G/DL (ref 11.5–15.5)
IMMATURE GRANULOCYTES #: 0.04 E9/L
IMMATURE GRANULOCYTES %: 1.2 % (ref 0–5)
LACTIC ACID: 2.7 MMOL/L (ref 0.5–2.2)
LYMPHOCYTES ABSOLUTE: 0.24 E9/L (ref 1.5–4)
LYMPHOCYTES RELATIVE PERCENT: 7.1 % (ref 20–42)
MAGNESIUM: 1.7 MG/DL (ref 1.6–2.6)
MCH RBC QN AUTO: 31.1 PG (ref 26–35)
MCHC RBC AUTO-ENTMCNC: 32.8 % (ref 32–34.5)
MCV RBC AUTO: 95 FL (ref 80–99.9)
METER GLUCOSE: 129 MG/DL (ref 74–99)
METER GLUCOSE: 222 MG/DL (ref 74–99)
METER GLUCOSE: 237 MG/DL (ref 74–99)
MONOCYTES ABSOLUTE: 0.02 E9/L (ref 0.1–0.95)
MONOCYTES RELATIVE PERCENT: 0.6 % (ref 2–12)
MRSA CULTURE ONLY: NORMAL
NEUTROPHILS ABSOLUTE: 3.08 E9/L (ref 1.8–7.3)
NEUTROPHILS RELATIVE PERCENT: 90.8 % (ref 43–80)
ORGANISM: ABNORMAL
PDW BLD-RTO: 14.5 FL (ref 11.5–15)
PHOSPHORUS: 4.1 MG/DL (ref 2.5–4.5)
PLATELET # BLD: 59 E9/L (ref 130–450)
PLATELET CONFIRMATION: NORMAL
PMV BLD AUTO: 12.4 FL (ref 7–12)
POLYCHROMASIA: ABNORMAL
POTASSIUM REFLEX MAGNESIUM: 3.3 MMOL/L (ref 3.5–5)
RBC # BLD: 3.18 E12/L (ref 3.5–5.5)
SODIUM BLD-SCNC: 142 MMOL/L (ref 132–146)
TOTAL PROTEIN: 5 G/DL (ref 6.4–8.3)
URINE CULTURE, ROUTINE: ABNORMAL
WBC # BLD: 3.4 E9/L (ref 4.5–11.5)

## 2022-07-18 PROCEDURE — 6360000002 HC RX W HCPCS: Performed by: INTERNAL MEDICINE

## 2022-07-18 PROCEDURE — 2580000003 HC RX 258: Performed by: INTERNAL MEDICINE

## 2022-07-18 PROCEDURE — 6360000002 HC RX W HCPCS

## 2022-07-18 PROCEDURE — 6360000002 HC RX W HCPCS: Performed by: SPECIALIST

## 2022-07-18 PROCEDURE — 86022 PLATELET ANTIBODIES: CPT

## 2022-07-18 PROCEDURE — 85730 THROMBOPLASTIN TIME PARTIAL: CPT

## 2022-07-18 PROCEDURE — 36415 COLL VENOUS BLD VENIPUNCTURE: CPT

## 2022-07-18 PROCEDURE — 84100 ASSAY OF PHOSPHORUS: CPT

## 2022-07-18 PROCEDURE — 6370000000 HC RX 637 (ALT 250 FOR IP)

## 2022-07-18 PROCEDURE — 2580000003 HC RX 258: Performed by: SPECIALIST

## 2022-07-18 PROCEDURE — 83605 ASSAY OF LACTIC ACID: CPT

## 2022-07-18 PROCEDURE — 2060000000 HC ICU INTERMEDIATE R&B

## 2022-07-18 PROCEDURE — 83735 ASSAY OF MAGNESIUM: CPT

## 2022-07-18 PROCEDURE — 85025 COMPLETE CBC W/AUTO DIFF WBC: CPT

## 2022-07-18 PROCEDURE — 6370000000 HC RX 637 (ALT 250 FOR IP): Performed by: INTERNAL MEDICINE

## 2022-07-18 PROCEDURE — 2700000000 HC OXYGEN THERAPY PER DAY

## 2022-07-18 PROCEDURE — 94640 AIRWAY INHALATION TREATMENT: CPT

## 2022-07-18 PROCEDURE — 80053 COMPREHEN METABOLIC PANEL: CPT

## 2022-07-18 PROCEDURE — 94667 MNPJ CHEST WALL 1ST: CPT

## 2022-07-18 PROCEDURE — 82962 GLUCOSE BLOOD TEST: CPT

## 2022-07-18 RX ORDER — POTASSIUM CHLORIDE 7.45 MG/ML
10 INJECTION INTRAVENOUS
Status: COMPLETED | OUTPATIENT
Start: 2022-07-18 | End: 2022-07-18

## 2022-07-18 RX ORDER — MAGNESIUM SULFATE IN WATER 40 MG/ML
2000 INJECTION, SOLUTION INTRAVENOUS ONCE
Status: COMPLETED | OUTPATIENT
Start: 2022-07-18 | End: 2022-07-18

## 2022-07-18 RX ADMIN — CEFEPIME 2000 MG: 2 INJECTION, POWDER, FOR SOLUTION INTRAVENOUS at 11:33

## 2022-07-18 RX ADMIN — SODIUM CHLORIDE, PRESERVATIVE FREE 10 ML: 5 INJECTION INTRAVENOUS at 08:21

## 2022-07-18 RX ADMIN — SODIUM CHLORIDE, SODIUM LACTATE, POTASSIUM CHLORIDE, CALCIUM CHLORIDE AND DEXTROSE MONOHYDRATE: 5; 600; 310; 30; 20 INJECTION, SOLUTION INTRAVENOUS at 23:24

## 2022-07-18 RX ADMIN — HEPARIN SODIUM 26 UNITS/KG/HR: 10000 INJECTION, SOLUTION INTRAVENOUS at 05:35

## 2022-07-18 RX ADMIN — ARFORMOTEROL TARTRATE 15 MCG: 15 SOLUTION RESPIRATORY (INHALATION) at 08:41

## 2022-07-18 RX ADMIN — INSULIN LISPRO 2 UNITS: 100 INJECTION, SOLUTION INTRAVENOUS; SUBCUTANEOUS at 18:39

## 2022-07-18 RX ADMIN — INSULIN LISPRO 1 UNITS: 100 INJECTION, SOLUTION INTRAVENOUS; SUBCUTANEOUS at 20:59

## 2022-07-18 RX ADMIN — ESCITALOPRAM OXALATE 20 MG: 10 TABLET ORAL at 20:46

## 2022-07-18 RX ADMIN — HEPARIN SODIUM 24 UNITS/KG/HR: 10000 INJECTION, SOLUTION INTRAVENOUS at 02:37

## 2022-07-18 RX ADMIN — SODIUM CHLORIDE, SODIUM LACTATE, POTASSIUM CHLORIDE, CALCIUM CHLORIDE AND DEXTROSE MONOHYDRATE: 5; 600; 310; 30; 20 INJECTION, SOLUTION INTRAVENOUS at 13:09

## 2022-07-18 RX ADMIN — OXYCODONE AND ACETAMINOPHEN 1 TABLET: 10; 325 TABLET ORAL at 05:27

## 2022-07-18 RX ADMIN — ARFORMOTEROL TARTRATE 15 MCG: 15 SOLUTION RESPIRATORY (INHALATION) at 18:53

## 2022-07-18 RX ADMIN — BUDESONIDE 500 MCG: 0.5 SUSPENSION RESPIRATORY (INHALATION) at 08:42

## 2022-07-18 RX ADMIN — IPRATROPIUM BROMIDE AND ALBUTEROL SULFATE 1 AMPULE: 2.5; .5 SOLUTION RESPIRATORY (INHALATION) at 02:26

## 2022-07-18 RX ADMIN — POTASSIUM CHLORIDE 10 MEQ: 7.45 INJECTION INTRAVENOUS at 14:31

## 2022-07-18 RX ADMIN — BUDESONIDE 500 MCG: 0.5 SUSPENSION RESPIRATORY (INHALATION) at 18:53

## 2022-07-18 RX ADMIN — HEPARIN SODIUM 1880 UNITS: 1000 INJECTION INTRAVENOUS; SUBCUTANEOUS at 05:35

## 2022-07-18 RX ADMIN — POTASSIUM CHLORIDE 10 MEQ: 7.45 INJECTION INTRAVENOUS at 13:13

## 2022-07-18 RX ADMIN — ACETAMINOPHEN 650 MG: 325 TABLET ORAL at 05:25

## 2022-07-18 RX ADMIN — SODIUM CHLORIDE, PRESERVATIVE FREE 10 ML: 5 INJECTION INTRAVENOUS at 20:46

## 2022-07-18 RX ADMIN — MAGNESIUM SULFATE HEPTAHYDRATE 2000 MG: 40 INJECTION, SOLUTION INTRAVENOUS at 17:54

## 2022-07-18 ASSESSMENT — ENCOUNTER SYMPTOMS
SHORTNESS OF BREATH: 1
DIARRHEA: 1
VOMITING: 0
NAUSEA: 0
COUGH: 0

## 2022-07-18 ASSESSMENT — PAIN SCALES - GENERAL: PAINLEVEL_OUTOF10: 0

## 2022-07-18 ASSESSMENT — LIFESTYLE VARIABLES: SMOKING_STATUS: 1

## 2022-07-18 NOTE — CONSULTS
Department of Medicine  Division of Hematology/Oncology  Attending Consult Note      Requesting Physician:  Teresa Kaiser DO  Reason for Consult:  thrombocytopenia    CHIEF COMPLAINT:  weakness, fatigue, abnormal bloodwork    History Obtained From:  chart review, patient's     HISTORY OF PRESENT ILLNESS:      The patient is a [de-identified] y.o. 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 Patient completed work-up and was admitted with diagnosis(es) of ARF, dehydration. RRT called due to hypoxia requiring 10L. Leukocytosis developed and ID following for RLL HCAP, gram negative septicemia. Plans to remove mediport, on cefepime awaiting final cultures. Upper extremitiy dopplers positive for left basilic vein thrombus, left cephalic vein thrombus and started on heparin 7/16/2022. We were consulted for thrombocytopenia. On admission platelets were normal and declined daily during admissions. They fell below 100k on 7/16 and have continued to decline. She has developed pancytopenia. She is confused and delayed in responding to questions and  reports this was present on admission and he feels she might be slightly better but is alert and \"sharp\" as baseline. She is unable to provide meaningful ROS for me.       Past Medical History:        Diagnosis Date    Acute kidney failure ([facility] [address] 2014 and 8/2016    x2,no dialysis needed,resolved, kidney function tests returned to normal    Anxiety     Arthritis     CAD (coronary artery disease)     follows with Dr. Francisco Mccarthy every 6 months    Cancer [facility])     skin, leg,nose- removed surgically     COPD (chronic obstructive pulmonary disease) ([facility] [address]     mild- no inhlaers, no oxygen     Depression     Fibromyalgia     chronic back and neck pain    Generalized headaches     h/o / daily    History of blood transfusion 3-11-14    multiple times    History of necrotic bowel 2012    Hyperlipidemia     Hypertension     Incisional hernia     abdomen    Insomnia     Mitral valve regurgitation     Myocardial infarct ([address] 2002    Occasional tremors     at hands / stable with medication    Osteopenia     PONV (postoperative nausea and vomiting)     Vitamin B12 deficiency     h/o       Past Surgical History:        Procedure Laterality Date    ABDOMEN SURGERY  2-    total colectomy, bowel resection, ileostomy,revision of ileostomy     ABDOMEN SURGERY N/A 1/26/2020    DRAINAGE OF ABDOMINAL WALL ABSCESS, EXPLANTATION OF MESH, DEBRIDEMENT OF SKIN AND SUBCUTANEOUS TISSUE performed by Oscar Rabago MD at [facility] N/A 9/11/2020    EXPLANTATION OF HERNIA MESH performed by Oscar Rabago MD at [address] [place] [address] Rd  2002    ARTHROPLASTY  1-2006    right and left thumb    BACK SURGERY  1991, 2004, 2009    lumbar fusion x 3    BACK SURGERY      cervical fusion x 2    BREAST SURGERY  years ago    monor calcifications    CARPAL TUNNEL RELEASE Bilateral     CATARACT REMOVAL WITH IMPLANT Right 03/03/15    CHOLECYSTECTOMY  5-11-07    Lap    COLONOSCOPY      CORONARY ANGIOPLASTY WITH STENT PLACEMENT  1-7-2002    ENDOSCOPY, COLON, DIAGNOSTIC      EPIGASTRIC HERNIA REPAIR  3/21/16    incisional with mesh implantation    ESOPHAGUS SURGERY  1-4-13    esophageal clamp (torn Esophagus)    FINGER TRIGGER RELEASE Right 2006    index finger    FOOT SURGERY Right     multiple    HERNIA REPAIR  12-31-13    abdominal x 5- last one 2017     ILEOSTOMY OR JEJUNOSTOMY  1-3-13    revision    INSERT PICC LINE  06/09/2020    and removed     JOINT REPLACEMENT Right 3/24/15    right total shoulder arthroplasty    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     LAPAROTOMY N/A 5/12/2020    EXPLORATORY LAPAROTOMY EXPLANTATION OF INFECTED MESH SMAL BOWEL RESECTION AND REVISION END ILEOSTOMY, LYSIS OF ADHESIONS performed by Garrison Calabrese MD at [address] [facility] N/A 6/8/2020    LAPAROTOMY EXPLORATORY, [place] OF HERNIA MESH performed by Garrison Calabrese MD at [address] [facility] N/A 9/15/2020    EXPLORATORY LAPAROTOMY WITH SMALL BOWEL RESECTION, TAKE DOWN REVISION ENTEROCUTANEOUS FISTULA , OSTOMY REVISION performed by Garrison Calabrese MD at [address] / leg    NASAL SINUS SURGERY      x2 /  cauterized    OTHER SURGICAL HISTORY  08/24/2016    diagnostic laparotomy with repair of intraabdominal hernia    OTHER SURGICAL HISTORY      removal plate / screws  / right great toe    PICC LINE INSERTION NURSE  9/15/2020         ROTATOR CUFF REPAIR  2010    right     SKIN BIOPSY  2010    3 squamous cell carcinoma right leg, left leg       Current Medications:    Current Facility-Administered Medications: [START ON 7/19/2022] ceFAZolin (ANCEF) 2,000 mg in sterile water 20 mL IV syringe, 2,000 mg, IntraVENous, See Admin Instructions  cefepime (MAXIPIME) 2000 mg IVPB minibag, 2,000 mg, IntraVENous, Q24H  Arformoterol Tartrate (BROVANA) nebulizer solution 15 mcg, 15 mcg, Nebulization, BID  budesonide (PULMICORT) nebulizer suspension 500 mcg, 0.5 mg, Nebulization, BID  ipratropium-albuterol (DUONEB) nebulizer solution 1 ampule, 1 ampule, Inhalation, Q4H PRN  insulin lispro (HUMALOG) injection vial 0-6 Units, 0-6 Units, SubCUTAneous, TID WC  insulin lispro (HUMALOG) injection vial 0-3 Units, 0-3 Units, SubCUTAneous, Nightly  glucose chewable tablet 16 g, 4 tablet, Oral, PRN  dextrose bolus 10% 125 mL, 125 mL, IntraVENous, PRN **OR** dextrose bolus 10% 250 mL, 250 mL, IntraVENous, PRN  glucagon (rDNA) injection 1 mg, 1 mg, IntraMUSCular, PRN  dextrose 5 % solution, 100 mL/hr, IntraVENous, PRN  dextrose 5 % in lactated ringers infusion, , IntraVENous, Continuous  nicotine (NICODERM CQ) 21 MG/24HR 1 patch, 1 patch, TransDERmal, Daily  heparin (porcine) injection 3,750 Units, 80 Units/kg, IntraVENous, PRN  heparin (porcine) injection 1,880 Units, 40 Units/kg, IntraVENous, PRN  heparin 25,000 units in dextrose 5% 250 mL (premix) infusion, 5-30 Units/kg/hr, IntraVENous, Continuous  calcitRIOL (ROCALTROL) capsule 0.25 mcg, 0.25 mcg, Oral, Q MWF  vitamin D (ERGOCALCIFEROL) capsule 50,000 Units, 50,000 Units, Oral, Weekly  sodium chloride flush 0.9 % injection 10 mL, 10 mL, IntraVENous, 2 times per day  sodium chloride flush 0.9 % injection 10 mL, 10 mL, IntraVENous, PRN  0.9 % sodium chloride infusion, , IntraVENous, PRN  senna (SENOKOT) tablet 8.6 mg, 1 tablet, Oral, Daily PRN  acetaminophen (TYLENOL) tablet 650 mg, 650 mg, Oral, Q6H PRN **OR** acetaminophen (TYLENOL) suppository 650 mg, 650 mg, Rectal, Q6H PRN  escitalopram (LEXAPRO) tablet 20 mg, 20 mg, Oral, Nightly  hyoscyamine (ANASPAZ;LEVSIN) tablet 125 mcg, 125 mcg, Oral, BID PRN  pantoprazole (PROTONIX) tablet 40 mg, 40 mg, Oral, QAM AC  oxyCODONE-acetaminophen (PERCOCET)  MG per tablet 1 tablet, 1 tablet, Oral, Q6H PRN    Allergies:  Fentanyl, Levofloxacin, Tramadol, and Vioxx [rofecoxib]    Social History:   Social History     Socioeconomic History    Marital status:      Spouse name: Not on file    Number of children: Not on file    Years of education: Not on file    Highest education level: Not on file   Occupational History    Not on file   Tobacco Use    Smoking status: Every Day     Packs/day: 1.00     Types: Cigarettes    Smokeless tobacco: Never   Vaping Use    Vaping Use: Never used   Substance and Sexual Activity    Alcohol use: Not Currently     Comment: occasional    Drug use: No    Sexual activity: Not on file   Other Topics Concern    Not on file   Social History Narrative    Not on file     Social Determinants of Health     Financial Resource Strain: Not on file   Food Insecurity: Not on file   Transportation Needs: Not on file   Physical Activity: Not on file   Stress: Not on file   Social Connections: Not on file Intimate Partner Violence: Not on file   Housing Stability: Not on file       Family History:     No family history on file. REVIEW OF SYSTEMS:    As per HPI, remaining ROS negative. PHYSICAL EXAM:      Vitals:  BP (!) 111/56   Pulse (!) 107   Temp 98 °F (36.7 °C) (Axillary)   Resp 28   Ht 5' (1.524 m)   Wt 105 lb 14.4 oz (48 kg)   SpO2 99%   BMI 20.68 kg/m²     CONSTITUTIONAL:  awake, confused, no distress very frail appearing  HEENT:  Normocepalic, atraumatic, no obvious abnormality. Lids and lashes normal, EOMI, sclera clear, conjunctiva normal, mucosa very dry without ulcers, erythema, bleeding  NECK:  Supple, full ROM, symmetrical, trachea midline, no adenopathy  HEMATOLOGIC/LYMPHATICS:  no cervical or supraclavicular lymphadenopathy  LUNGS:  diminished breath sounds bilaterally with poor effort, on O2 via NC  CARDIOVASCULAR:  tachycardic, murmur appreciated  ABDOMEN:  normal BS, soft, non-distended, non-tender, no masses palpated, ileostomy  MUSCULOSKELETAL:  muscle tone decrease, clubbing fingers x10 with scattered ecchymoses on legs bilaterally  SKIN: arms and legs with scattered ecchymoses  NEUROLOGIC:  confused     DATA:    CMP:    Lab Results   Component Value Date/Time     07/18/2022 02:25 AM    K 3.3 07/18/2022 02:25 AM     07/18/2022 02:25 AM    CO2 24 07/18/2022 02:25 AM    BUN 83 07/18/2022 02:25 AM    PROT 5.0 07/18/2022 02:25 AM       CBC:    Recent Labs     07/16/22  1750 07/17/22  0327 07/18/22  0225   WBC 31.5* 27.8* 3.4*   HGB 10.0* 10.1* 9.9*   PLT 95* 87* 59*       Radiology:    XR ABDOMEN (KUB) (SINGLE AP VIEW)   Final Result   Multiple eggshell type calcifications left epigastric region which could be   related to the kidney. The lower lung fields are clear. The bowel gas pattern is unremarkable. US DUP UPPER EXTREMITIES BILATERAL VENOUS   Final Result   1.  Echogenic thrombus in the distal left basilic vein.   No color flow   identified in this area on color Doppler evaluation. 2.  Echogenic occlusive thrombus noted in the left cephalic vein. 3.  Remaining venous vasculature of the bilateral upper extremities appear   patent. The findings were sent to the Radiology Results [address] at 5:42   pm on 7/16/2022 to be communicated to a licensed caregiver. US DUP LOWER EXTREMITIES BILATERAL VENOUS   Final Result   No evidence of DVT in either lower extremity. CT CHEST WO CONTRAST   Final Result   Mild central mucous plugging. Minimal bibasilar atelectasis. No   pneumonia/aspiration. Trace pleural effusions. Pulmonary emphysema and hyperinflation. Enlarged pulmonary trunk and main pulmonary arteries, suggestive of pulmonary   arterial hypertension in the appropriate clinical setting. Other incidental findings as above. CT HEAD WO CONTRAST   Final Result   No acute intracranial abnormality. XR CHEST PORTABLE   Final Result   No acute cardiopulmonary process. US RETROPERITONEAL COMPLETE   Final Result   1. Bilateral renal cysts. Largest is seen in the lower pole the left kidney   measuring 4.5 cm. No aggressive renal lesions. Mild bilateral renal   cortical thinning. No hydronephrosis. Mildly echogenic renal parenchyma. 2.  Normal appearance of the imaged bladder. XR CHEST (2 VW)   Final Result   No acute process. IMPRESSION:   [de-identified] yo with gram negative bacteremia, KD and infected mediport with removal planned tomorrow. Normal platelets on presentation and progressive thrombocytopenia since admission. DVT left upper extremity diagnosed during admission, heparin infusion start 7/16. Platelets were trending down before heparin started but have progressively worsened. She has also developed pancytopenia, prominent neutrophils on differential.  Patient's pancytopenia (thrombocytopenia) likely secondary to gram negative bacteremia.   With the initiation of heparin, can rule out HIT but less likely etiology. RECOMMENDATIONS:  -Heparin induced ab  -monitor counts daily  -Discuss additional recommendations for d/c heparin and need for argatroban with attedning    Thank you for allowing us to participate in the care of Maira López Marcie Monet [facility] [phone]    Electronically signed by JUANITO Butcher on 7/18/2022 at 11:51 AM    Note: This report was completed using [employer] voiced recognition software. Every effort has been made to ensure accuracy; however, inadvertent computerized transcription errors may be present. Patient was seen and examined. Chart reviewed. Case discussed with the PA and the staff. I read and I agree with above documentation. Patient has evidence of mild pancytopenia and progressive thrombocytopenia. She has left upper extremity DVT. I doubt that she has HIT syndrome. The timing of the start of heparin and the drop of platelet count is not typical of HIT. According to the chart and after discussing the case with the staff patient did not receive any heparin products before IV heparin was started on 16 July. Thrombocytopenia (and other cytopenias) is most probably related to acute inflammatory process associated with gram-negative bacteremia and infected Mediport as well as acute renal insufficiency  Okay to continue IV heparin in the absence of active bleeding and as long as platelet count is above 50. Keep hemoglobin above 7.5 g/dL. Transfuse as needed. Check labs daily at least.  Continue current supportive care.

## 2022-07-18 NOTE — PROGRESS NOTES
[facility]  Nephrology Attending Progress Note    SUBJECTIVE:     7/18/22- Pt somnolent opens eyes but did not verbally interact.  Pt T up to 102    PROBLEM LIST:    Patient Active Problem List   Diagnosis    Right cataract    Essential hypertension, benign    Hyperlipidemia with target LDL less than 100    Osteoarthritis, shoulder    Primary osteoarthritis of left knee    History of ischemic bowel disease    Ileostomy present ([address]    Chronic kidney disease, stage III (moderate) (HCC)    COPD (chronic obstructive pulmonary disease) (HCC)    Moderate protein-calorie malnutrition (HCC)    Abdominal wall cellulitis    Cellulitis    Infected hernioplasty mesh (HCC)    Enterocutaneous fistula    Mild protein-calorie malnutrition ([address]    Acute kidney injury ([address]    Hypertensive kidney disease with chronic kidney disease stage IV (HCC)    KD (acute kidney injury) ([address]    Diarrhea    Elevated serum creatinine    Abnormal serum creatinine level    Hyperlipemia    Primary hypertension    Essential (primary) hypertension    Postsurgical malabsorption, not elsewhere classified    Hypercholesterolemia    Pure hypercholesterolemia    Prediabetes    Hypothyroidism, unspecified    Acute renal failure (ARF) ([address]        PAST MEDICAL HISTORY:    Past Medical History:   Diagnosis Date    Acute kidney failure ([address] 2014 and 8/2016    x2,no dialysis needed,resolved, kidney function tests returned to normal    Anxiety     Arthritis     CAD (coronary artery disease)     follows with Dr. Bart Frost every 6 months    Cancer ([address]     skin, leg,nose- removed surgically     COPD (chronic obstructive pulmonary disease) ([address]     mild- no inhlaers, no oxygen     Depression     Fibromyalgia     chronic back and neck pain    Generalized headaches     h/o / daily    History of blood transfusion 3-11-14    multiple times    History of necrotic bowel 2012    Hyperlipidemia     Hypertension     Incisional hernia     abdomen    Insomnia Mitral valve regurgitation     Myocardial infarct ([facility] 2002    Occasional tremors     at hands / stable with medication    Osteopenia     PONV (postoperative nausea and vomiting)     Vitamin B12 deficiency     h/o       DIET:    Diet NPO Exceptions are: Sips of Water with Meds  ADULT TUBE FEEDING; PEG; Renal Formula; Continuous; 10; Yes; 10; Q 4 hours; 35; 100; Q 6 hours     PHYSICAL EXAM:     Patient Vitals for the past 24 hrs:   BP Temp Temp src Pulse Resp SpO2 Weight   07/18/22 0800 (!) 111/56 98 °F (36.7 °C) Axillary (!) 107 28 99 % --   07/18/22 0637 -- -- -- -- -- -- 105 lb 14.4 oz (48 kg)   07/18/22 0615 -- (!) 102 °F (38.9 °C) Axillary -- -- -- --   07/18/22 0520 -- (!) 100.8 °F (38.2 °C) Axillary (!) 110 22 -- --   07/18/22 0211 (!) 123/43 98.4 °F (36.9 °C) Oral (!) 102 16 96 % --   07/17/22 2206 -- 99.2 °F (37.3 °C) Axillary -- -- -- --   07/17/22 1930 -- -- -- 95 -- -- --   07/17/22 1920 (!) 127/55 98.1 °F (36.7 °C) Oral 98 22 94 % --   07/17/22 1524 -- -- -- -- -- 93 % --   07/17/22 1512 (!) 116/53 98.1 °F (36.7 °C) Axillary 91 24 96 % --   @      Intake/Output Summary (Last 24 hours) at 7/18/2022 1501  Last data filed at 7/18/2022 1323  Gross per 24 hour   Intake --   Output 1275 ml   Net -1275 ml         Wt Readings from Last 3 Encounters:   07/18/22 105 lb 14.4 oz (48 kg)   01/28/22 94 lb (42.6 kg)   07/02/21 84 lb (38.1 kg)       Constitutional:  Pt frail and cachetic   Head: normocephalic, atraumatic  Neck: no JVD  Cardiovascular: S1 S2 no S3 or rub  Respiratory:  diminished, poor effort  Gastrointestinal:  Soft, + PEG and ostomy  Ext: no edema   Skin: dry, no rash  Neuro: minimally interactive    MEDS (scheduled):    [START ON 7/19/2022] ceFAZolin  2,000 mg IntraVENous See Admin Instructions    cefepime  2,000 mg IntraVENous Q24H    Arformoterol Tartrate  15 mcg Nebulization BID    budesonide  0.5 mg Nebulization BID    insulin lispro  0-6 Units SubCUTAneous TID WC    insulin lispro  0-3 Units SubCUTAneous Nightly    nicotine  1 patch TransDERmal Daily    calcitRIOL  0.25 mcg Oral Q MWF    vitamin D  50,000 Units Oral Weekly    sodium chloride flush  10 mL IntraVENous 2 times per day    escitalopram  20 mg Oral Nightly    pantoprazole  40 mg Oral QAM AC       MEDS (infusions):   dextrose Stopped (07/17/22 0051)    dextrose 5% in lactated ringers 100 mL/hr at 07/18/22 1309    heparin (PORCINE) Infusion 26 Units/kg/hr (07/18/22 1123)    sodium chloride         MEDS (prn):  ipratropium-albuterol, glucose, dextrose bolus **OR** dextrose bolus, glucagon (rDNA), dextrose, heparin (porcine), heparin (porcine), sodium chloride flush, sodium chloride, senna, acetaminophen **OR** acetaminophen, hyoscyamine, oxyCODONE-acetaminophen    DATA:    Recent Labs     07/16/22  1750 07/17/22  0327 07/18/22  0225   WBC 31.5* 27.8* 3.4*   HGB 10.0* 10.1* 9.9*   HCT 30.5* 30.8* 30.2*   MCV 95.9 96.9 95.0   PLT 95* 87* 59*     Recent Labs     07/16/22  0422 07/16/22  1200 07/17/22  0327 07/18/22  0225     --  142 142   K 3.2* 3.9 3.6 3.3*     --  103 104   CO2 24  --  25 24   BUN 83*  --  79* 83*   CREATININE 2.0*  --  1.9* 1.8*   LABGLOM 24  --  25 27   GLUCOSE 186*  --  170* 98   CALCIUM 7.8*  --  8.6 8.3*   ALT 19  --  17 13   AST 25  --  18 15   BILITOT 1.1  --  2.0* 2.0*   ALKPHOS 270*  --  252* 255*   MG 1.4* 2.0  --  1.7   PHOS  --   --   --  4.1       Lab Results   Component Value Date    LABALBU 1.9 (L) 07/18/2022    LABALBU 2.0 (L) 07/17/2022    LABALBU 2.1 (L) 07/16/2022     Lab Results   Component Value Date    TSH 1.380 07/01/2022       Iron Studies  Lab Results   Component Value Date    IRON 30 (L) 07/01/2022    TIBC 229 (L) 07/01/2022    FERRITIN 143 07/01/2022     Vitamin B-12   Date Value Ref Range Status   04/27/2022 1050 (H) 211 - 946 pg/mL Final     Folate   Date Value Ref Range Status   01/22/2021 8.1 4.8 - 24.2 ng/mL Final       Vit D, 25-Hydroxy   Date Value Ref Range Status   07/15/2022 13 (L) appearance of the imaged bladder. IMPRESSION/RECOMMENDATIONS:      Stage II KD presumed sec to decreased effective renal perfusion in the setting  Gram Neg Sepsis with a HX of CKD G3B with a baseline serum cr 1.4-1.5mg/dl with an e-GFR=33-36ml/min  UC Enterococcus Faecalis-Asymptomatic bacteruria  BC E Coli  7/14/22 Renal US No evidence of hydro  FeNa<1%  TF running as po intake poor. Cr trending down  24hour urine creainine clearance 33 based on the cr 2.0mg/dl  PLAN:  1. Continue the IVF  2. Follow labs  3. Follow I&O  4. For Mediport removal 7/19/22       2. HTN with CKD I-IV  BP goal <130/80-at goal  PLAN:  Follow BP     3. Anion Gap Met Acidosis in the setting of the KD on the CKD with a lactic acidosis and the abd pain with BC (+) for GNR-on cefepime  LA trending down 3.9-->2.9-->2.5-->2.7  PLAN:  Follow LA on the LR     4. 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 Hypomagnesemia  Mg++ 1.7  PLAN:  IV supplementing with 2g Mg++    6.  Hypokalemia  K+ 3.3   PLAN:  Supplement   Follow K+       Lety Nance MD

## 2022-07-18 NOTE — PROGRESS NOTES
Attempted to do chest vest therapy with pt. Pt was only able to tolerate it for a few minutes despite decreasing the settings. Pt said \"stop\" and when asked if she wanted it off she said \"yes\". Pt did finish her aresol treatment and tolerated it well.

## 2022-07-18 NOTE — CARE COORDINATION
7/18/2022  Social Work Discharge Planning:Tube feed. Ostomy at home. Mediport replacement today? AM PAC 8/24. KD-renal following. From home with her spouse. SW discussed [facility] with spouse; however, he states he would like Pt to return home with [facility] and they prefer [facility]. Referral was made to them-waiting reply. [facility] order will be needed. IV ATB. Pt has a cane, www and shower chair. One story home with two entrance steps. Electronically signed by CASSI Mobley on 7/18/2022 at 9:54 AM    7/18/2022  Social Work Discharge Planning:Pts spouse is now agreeable to Pt going to a [facility] for short term rehab. SW gave him a list of choices. Waiting for Pt and spouses decision for referrals. Electronically signed by CASSI Mobley on 7/18/2022 at 2:20 PM

## 2022-07-18 NOTE — PLAN OF CARE
Problem: Skin/Tissue Integrity  Goal: Absence of new skin breakdown  Description: 1. Monitor for areas of redness and/or skin breakdown  2. Assess vascular access sites hourly  3. Every 4-6 hours minimum:  Change oxygen saturation probe site  4. Every 4-6 hours:  If on nasal continuous positive airway pressure, respiratory therapy assess nares and determine need for appliance change or resting period.   Outcome: Resolved/Not Met     Problem: Discharge Planning  Goal: Discharge to home or other facility with appropriate resources  Outcome: Resolved/Not Met     Problem: Pain  Goal: Verbalizes/displays adequate comfort level or baseline comfort level  Outcome: Resolved/Not Met

## 2022-07-18 NOTE — PROGRESS NOTES
Progress Note  Date:2022       Room:[address] [place] [address]-A  Patient Karissa Chiang     YOB: 1940     Age:82 y.o. Patient seen for follow up of acute renal failure and gram negative bacteremia from infected mediport. Nursing off floor this am.  She is awake and answering simple questions this am. She denies any chest pains or nausea. She is resting on 10 liters of oxygen. Subjective    Subjective:  Symptoms:  Stable. She reports shortness of breath and diarrhea. No cough, chest pain, headache, chest pressure or anxiety. Diet:  Poor intake. No nausea or vomiting. Unable to obtain. Review of Systems   Respiratory:  Positive for shortness of breath. Negative for cough. Cardiovascular:  Negative for chest pain. Gastrointestinal:  Positive for diarrhea. Negative for nausea and vomiting. Objective         Vitals Last 24 Hours:  TEMPERATURE:  Temp  Av.8 °F (37.1 °C)  Min: 98 °F (36.7 °C)  Max: 100.8 °F (38.2 °C)  RESPIRATIONS RANGE: Resp  Av  Min: 16  Max: 26  PULSE OXIMETRY RANGE: SpO2  Av.2 %  Min: 92 %  Max: 96 %  PULSE RANGE: Pulse  Av.9  Min: 90  Max: 110  BLOOD PRESSURE RANGE: Systolic (70HGW), GBA:398 , Min:111 , MLD:508   ; Diastolic (36YXS), SARIKA:31, Min:43, Max:55    I/O (24Hr): Intake/Output Summary (Last 24 hours) at 2022 0625  Last data filed at 2022 2233  Gross per 24 hour   Intake 0 ml   Output 1525 ml   Net -1525 ml       Objective:  General Appearance:  Comfortable, ill-appearing and in no acute distress. Vital signs: (most recent): Blood pressure (!) 123/43, pulse (!) 110, temperature (!) 102 °F (38.9 °C), temperature source Axillary, resp. rate 22, height 5' (1.524 m), weight 105 lb 14.4 oz (48 kg), SpO2 96 %. Lungs:  Normal effort. There are decreased breath sounds. No rales or rhonchi. Heart: Tachycardia. Regular rhythm. S1 normal and S2 normal.  No gallop. Abdomen: Abdomen is soft and non-distended.   (Ileostomy in right lower quadrant. ). Bowel sounds are normal.   There is epigastric tenderness. There is no rebound tenderness. There is no guarding. Extremities: Normal range of motion. There is no dependent edema. Skin:  Warm and dry. Labs/Imaging/Diagnostics    Labs:  CBC:  Recent Labs     07/16/22  1750 07/17/22  0327 07/18/22 0225   WBC 31.5* 27.8* 3.4*   RBC 3.18* 3.18* 3.18*   HGB 10.0* 10.1* 9.9*   HCT 30.5* 30.8* 30.2*   MCV 95.9 96.9 95.0   RDW 14.5 14.5 14.5   PLT 95* 87* 59*       CHEMISTRIES:  Recent Labs     07/16/22  0422 07/16/22  1200 07/17/22 0327 07/18/22 0225     --  142 142   K 3.2* 3.9 3.6 3.3*     --  103 104   CO2 24  --  25 24   BUN 83*  --  79* 83*   CREATININE 2.0*  --  1.9* 1.8*   GLUCOSE 186*  --  170* 98   PHOS  --   --   --  4.1   MG 1.4* 2.0  --  1.7       PT/INR:  Recent Labs     07/17/22  0629   PROTIME 21.6*   INR 2.0       APTT:  Recent Labs     07/17/22  0820 07/17/22  1600 07/18/22 0225   APTT 52.6* 46.3* 37.0*       LIVER PROFILE:  Recent Labs     07/16/22  0422 07/17/22  0327 07/18/22  0225   AST 25 18 15   ALT 19 17 13   BILITOT 1.1 2.0* 2.0*   ALKPHOS 270* 252* 255*         Imaging Last 24 Hours:  CT HEAD WO CONTRAST    Result Date: 7/15/2022  EXAMINATION: CT OF THE HEAD WITHOUT CONTRAST  7/15/2022 9:58 pm TECHNIQUE: CT of the head was performed without the administration of intravenous contrast. Automated exposure control, iterative reconstruction, and/or weight based adjustment of the mA/kV was utilized to reduce the radiation dose to as low as reasonably achievable. COMPARISON: None. HISTORY: ORDERING SYSTEM PROVIDED HISTORY: change in mental status TECHNOLOGIST PROVIDED HISTORY: Reason for exam:->change in mental status Has a \"code stroke\" or \"stroke alert\" been called? ->No FINDINGS: BRAIN/VENTRICLES: There is no acute intracranial hemorrhage, mass effect or midline shift. No abnormal extra-axial fluid collection.   The gray-white differentiation is maintained without evidence of an acute infarct. There is prominence of the ventricles and sulci due to global parenchymal volume loss. There are nonspecific areas of hypoattenuation within the periventricular and subcortical white matter, which likely represent chronic microvascular ischemic change. There is a small chronic lacunar infarct in left basal ganglia. ORBITS: The visualized portion of the orbits demonstrate no acute abnormality. SINUSES: The visualized paranasal sinuses and mastoid air cells demonstrate no acute abnormality. SOFT TISSUES/SKULL: No acute abnormality of the visualized skull or soft tissues. No acute intracranial abnormality. XR CHEST PORTABLE    Result Date: 7/15/2022  EXAMINATION: ONE XRAY VIEW OF THE CHEST 7/15/2022 9:41 pm COMPARISON: 13 July 2022 HISTORY: ORDERING SYSTEM PROVIDED HISTORY: shortness of breath TECHNOLOGIST PROVIDED HISTORY: Reason for exam:->shortness of breath FINDINGS: Single AP erect portable chest demonstrates a right-sided MediPort catheter in good position. The lungs are clear. There is tortuosity of the aorta without cardiomegaly. There is a right-sided reverse shoulder arthroplasty. There is no evidence of a pneumothorax. No acute cardiopulmonary process. US RETROPERITONEAL COMPLETE    Result Date: 7/14/2022  EXAMINATION: RETROPERITONEAL ULTRASOUND OF THE KIDNEYS AND URINARY BLADDER 7/14/2022 COMPARISON: Renal ultrasound from February 25, 2021 HISTORY: ORDERING SYSTEM PROVIDED HISTORY: KD TECHNOLOGIST PROVIDED HISTORY: Reason for exam:->KD What reading provider will be dictating this exam?->CRC FINDINGS: Kidneys: The right kidney measures 10.4 cm in length and the left kidney measures 9.7 cm in length. Right kidney: The corticomedullary differentiation and cortical thickness is slightly decreased. Echogenic renal parenchyma.   Anechoic thin walled nonvascular 13 mm right mid to upper pole renal cyst.  No concerning renal mass or intrarenal calcification. No hydronephrosis. Left kidney: The corticomedullary differentiation and cortical thickness is slightly decreased. Anechoic thin walled nonvascular left midpole cyst.  4.5 cm left lower pole cyst.  Mildly echogenic renal parenchyma. There is no hydronephrosis. Bladder: Bladder volume was 282 mL. No intrinsic mass, intrinsic calcification, nor wall thickening. 1.  Bilateral renal cysts. Largest is seen in the lower pole the left kidney measuring 4.5 cm. No aggressive renal lesions. Mild bilateral renal cortical thinning. No hydronephrosis. Mildly echogenic renal parenchyma. 2.  Normal appearance of the imaged bladder. Assessment//Plan           Hospital Problems             Last Modified POA    * (Principal) Acute renal failure (ARF) ([facility] [address] 7/14/2022 Yes   Assessment & Plan  Acute hypoxic respiratory failure  Acute renal failure  Gram negative bacteremia  Metabolic encephalopathy  CLABSI   DM type II   Urinary retention  Short gut syndrome    Patient currently on heparin drip due to basilic vein thrombus and inability to perform CTA or VQ scan. WBC 3.4 his am. Blood cultures positive for gram negative rods. Continue on IVF per nephrology. Abdominal xray reviewed. Monitor platelets. Azalea Kaiser, DO    I can be reached through answering service.

## 2022-07-18 NOTE — CONSULTS
2009    lumbar fusion x 3    BACK SURGERY      cervical fusion x 2    BREAST SURGERY  years ago    monor calcifications    CARPAL TUNNEL RELEASE Bilateral     CATARACT REMOVAL WITH IMPLANT Right 03/03/15    CHOLECYSTECTOMY  5-11-07    Lap    COLONOSCOPY      CORONARY ANGIOPLASTY WITH STENT PLACEMENT  1-7-2002    ENDOSCOPY, COLON, DIAGNOSTIC      EPIGASTRIC HERNIA REPAIR  3/21/16    incisional with mesh implantation    ESOPHAGUS SURGERY  1-4-13    esophageal clamp (torn Esophagus)    FINGER TRIGGER RELEASE Right 2006    index finger    FOOT SURGERY Right     multiple    HERNIA REPAIR  12-31-13    abdominal x 5- last one 2017     ILEOSTOMY OR JEJUNOSTOMY  1-3-13    revision    INSERT PICC LINE  06/09/2020    and removed     JOINT REPLACEMENT Right 3/24/15    right total shoulder arthroplasty    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     LAPAROTOMY N/A 5/12/2020    EXPLORATORY LAPAROTOMY EXPLANTATION OF INFECTED MESH SMAL BOWEL RESECTION AND REVISION END ILEOSTOMY, LYSIS OF ADHESIONS performed by Keara Ordonez MD at [facility] N/A 6/8/2020    LAPAROTOMY EXPLORATORY, [place]a [place]ates OF HERNIA MESH performed by Keara Ordonez MD at [address] [place] [address] N/A 9/15/2020    EXPLORATORY LAPAROTOMY WITH SMALL BOWEL RESECTION, TAKE DOWN REVISION ENTEROCUTANEOUS FISTULA , OSTOMY REVISION performed by Keara Ordonez MD at [address] / leg    NASAL SINUS SURGERY      x2 /  cauterized    OTHER SURGICAL HISTORY  08/24/2016    diagnostic laparotomy with repair of intraabdominal hernia    OTHER SURGICAL HISTORY      removal plate / screws  / right great toe    PICC LINE INSERTION NURSE  9/15/2020         ROTATOR CUFF REPAIR  2010    right     SKIN BIOPSY  2010    3 squamous cell carcinoma right leg, left leg       Medications Prior to Admission:    Prior to Admission medications    Medication Sig Start Date End Date Taking?  Authorizing Provider   lipase-protease-amylase (CREON) 88135-50784 units delayed release capsule Take 12,000 Units by mouth take with snacks **SEE OTHER ORDER**   Yes Historical Provider, MD   Multiple Vitamins-Minerals (PRESERVISION AREDS) CAPS Take 1 capsule by mouth 2 times daily   Yes Historical Provider, MD   Magnesium Oxide (MAGNESIUM-OXIDE) 250 MG TABS tablet Take 250 mg by mouth 2 times daily   Yes Historical Provider, MD   METAMUCIL FIBER PO Take 1 packet by mouth 2 times daily   Yes Historical Provider, MD   diphenoxylate-atropine (LOMOTIL) 2.5-0.025 MG per tablet Take 1 tablet by mouth 4 times daily (before meals and nightly). Yes Historical Provider, MD   loperamide (IMODIUM) 2 MG capsule Take 4 mg by mouth 3 times daily (with meals) **SEE OTHER ORDER**   Yes Historical Provider, MD   loperamide (IMODIUM) 2 MG capsule Take 6 mg by mouth nightly **SEE OTHER ORDER**   Yes Historical Provider, MD   amLODIPine (NORVASC) 10 MG tablet Take 10 mg by mouth nightly     Historical Provider, MD   LORazepam (ATIVAN) 0.5 MG tablet Take 0.25 mg by mouth daily as needed for Anxiety. Historical Provider, MD   fluticasone (FLONASE) 50 MCG/ACT nasal spray 1 spray by Each Nostril route nightly     Historical Provider, MD   lipase-protease-amylase (CREON) 68618-45495 units delayed release capsule Take 24,000 Units by mouth 3 times daily (with meals) **SEE OTHER ORDER**    Historical Provider, MD   Probiotic CAPS Take 1 capsule by mouth nightly     Historical Provider, MD   hyoscyamine (ANASPAZ;LEVSIN) 125 MCG tablet Take 125 mcg by mouth 4 times daily as needed for Cramping     Historical Provider, MD   omeprazole (PRILOSEC) 20 MG delayed release capsule Take 40 mg by mouth every morning     Historical Provider, MD   aspirin 81 MG tablet Take 81 mg by mouth nightly     Historical Provider, MD   oxyCODONE-acetaminophen (PERCOCET)  MG per tablet Take 1 tablet by mouth every 6 hours as needed for Pain.      Historical Provider, MD   Cholecalciferol (VITAMIN D3) 50 MCG (2000 UT) TABS Take 2,000 Units by mouth 2 times daily     Historical Provider, MD   Coenzyme Q10 (COQ10 PO) Take 1 capsule by mouth nightly     Historical Provider, MD   Ferrous Sulfate (IRON) 325 (65 Fe) MG TABS Take 325 mg by mouth nightly     Historical Provider, MD   calcium carbonate (OSCAL) 500 MG TABS tablet Take 500 mg by mouth nightly     Historical Provider, MD   pravastatin (PRAVACHOL) 40 MG tablet Take 40 mg by mouth nightly     Historical Provider, MD   propranolol (INDERAL) 20 MG tablet Take 20 mg by mouth 2 times daily     Historical Provider, MD   vitamin B-12 (CYANOCOBALAMIN) 500 MCG tablet Place 500 mcg under the tongue nightly     Historical Provider, MD   Omega-3 Fatty Acids (OMEGA 3 PO) Take 1 capsule by mouth nightly     Historical Provider, MD   escitalopram (LEXAPRO) 20 MG tablet Take 20 mg by mouth nightly. Historical Provider, MD   primidone (MYSOLINE) 250 MG tablet Take 250 mg by mouth nightly     Historical Provider, MD   traZODone (DESYREL) 50 MG tablet Take 50 mg by mouth nightly     Historical Provider, MD       Allergies   Allergen Reactions    Fentanyl Itching     REACTION IS ONLY FROM THE PATCH    Levofloxacin Swelling and Myalgia     SWELLING & PAIN LOCALIZED TO THE LEGS    Tramadol Nausea Only and Other (See Comments)     INSOMNIA & SHAKINESS     Vioxx [Rofecoxib] Swelling and Myalgia     SWELLING & PAIN LOCALIZED TO THE LEGS       No family history on file.     Social History     Tobacco Use    Smoking status: Every Day     Packs/day: 1.00     Types: Cigarettes    Smokeless tobacco: Never   Vaping Use    Vaping Use: Never used   Substance Use Topics    Alcohol use: Not Currently     Comment: occasional    Drug use: No         Review of Systems- unable to reliably obtain secondary to patient condition      PHYSICAL EXAM:    Vitals:    07/18/22 0615   BP:    Pulse:    Resp:    Temp: (!) 102 °F (38.9 °C)   SpO2:        General Appearance:  resting in bed  Skin:  [facility]  Head/face:  NCAT  Eyes:  No gross abnormalities. Lungs: On 7 L of O2  Heart:  tachycardic, normotensive  Abdomen:  soft, non-tender, non-distended  Extremities: Extremities warm to touch, pink, with no edema. LABS:    CBC  Recent Labs     07/18/22 0225   WBC 3.4*   HGB 9.9*   HCT 30.2*   PLT 59*     BMP  Recent Labs     07/18/22 0225      K 3.3*      CO2 24   BUN 83*   CREATININE 1.8*   CALCIUM 8.3*     Liver Function  Recent Labs     07/18/22 0225   BILITOT 2.0*   AST 15   ALT 13   ALKPHOS 255*   PROT 5.0*   LABALBU 1.9*     No results for input(s): LACTATE in the last 72 hours. Recent Labs     07/17/22 0629   INR 2.0       RADIOLOGY    CT HEAD WO CONTRAST    Result Date: 7/15/2022  EXAMINATION: CT OF THE HEAD WITHOUT CONTRAST  7/15/2022 9:58 pm TECHNIQUE: CT of the head was performed without the administration of intravenous contrast. Automated exposure control, iterative reconstruction, and/or weight based adjustment of the mA/kV was utilized to reduce the radiation dose to as low as reasonably achievable. COMPARISON: None. HISTORY: ORDERING SYSTEM PROVIDED HISTORY: change in mental status TECHNOLOGIST PROVIDED HISTORY: Reason for exam:->change in mental status Has a \"code stroke\" or \"stroke alert\" been called? ->No FINDINGS: BRAIN/VENTRICLES: There is no acute intracranial hemorrhage, mass effect or midline shift. No abnormal extra-axial fluid collection. The gray-white differentiation is maintained without evidence of an acute infarct. There is prominence of the ventricles and sulci due to global parenchymal volume loss. There are nonspecific areas of hypoattenuation within the periventricular and subcortical white matter, which likely represent chronic microvascular ischemic change. There is a small chronic lacunar infarct in left basal ganglia. ORBITS: The visualized portion of the orbits demonstrate no acute abnormality.  SINUSES: The visualized paranasal sinuses and mastoid air cells demonstrate no acute abnormality. SOFT TISSUES/SKULL: No acute abnormality of the visualized skull or soft tissues. No acute intracranial abnormality. XR CHEST PORTABLE    Result Date: 7/15/2022  EXAMINATION: ONE XRAY VIEW OF THE CHEST 7/15/2022 9:41 pm COMPARISON: 13 July 2022 HISTORY: ORDERING SYSTEM PROVIDED HISTORY: shortness of breath TECHNOLOGIST PROVIDED HISTORY: Reason for exam:->shortness of breath FINDINGS: Single AP erect portable chest demonstrates a right-sided MediPort catheter in good position. The lungs are clear. There is tortuosity of the aorta without cardiomegaly. There is a right-sided reverse shoulder arthroplasty. There is no evidence of a pneumothorax. No acute cardiopulmonary process. US RETROPERITONEAL COMPLETE    Result Date: 7/14/2022  EXAMINATION: RETROPERITONEAL ULTRASOUND OF THE KIDNEYS AND URINARY BLADDER 7/14/2022 COMPARISON: Renal ultrasound from February 25, 2021 HISTORY: ORDERING SYSTEM PROVIDED HISTORY: KD TECHNOLOGIST PROVIDED HISTORY: Reason for exam:->KD What reading provider will be dictating this exam?->CRC FINDINGS: Kidneys: The right kidney measures 10.4 cm in length and the left kidney measures 9.7 cm in length. Right kidney: The corticomedullary differentiation and cortical thickness is slightly decreased. Echogenic renal parenchyma. Anechoic thin walled nonvascular 13 mm right mid to upper pole renal cyst.  No concerning renal mass or intrarenal calcification. No hydronephrosis. Left kidney: The corticomedullary differentiation and cortical thickness is slightly decreased. Anechoic thin walled nonvascular left midpole cyst.  4.5 cm left lower pole cyst.  Mildly echogenic renal parenchyma. There is no hydronephrosis. Bladder: Bladder volume was 282 mL. No intrinsic mass, intrinsic calcification, nor wall thickening. 1.  Bilateral renal cysts. Largest is seen in the lower pole the left kidney measuring 4.5 cm.   No aggressive renal lesions. Mild bilateral renal cortical thinning. No hydronephrosis. Mildly echogenic renal parenchyma. 2.  Normal appearance of the imaged bladder. ASSESSMENT:  [de-identified] y.o. female with bacteremia thought to be secondary to CLABSI. PLAN:  Plan to remove Mediport in the operating room tomorrow 7/19 at 7:15 AM  Okay for tube feeds today  Please hold tube feeds at 11 PM this evening in preparation for OR.   Okay to continue heparin GTT --pain she is being empirically treated for pulmonary embolism  Please hold heparin GTT at 1 AM 7/19 in preparation for operating room    Electronically signed by Dudley Trinidad MD on 7/18/22 at 6:56 AM EDT  Electronically signed by Wayne Banuelos MD on 7/18/2022 at 8:11 AM

## 2022-07-18 NOTE — PROGRESS NOTES
Normal thyroid. Resp: No accessory muscle use. No crackles. No wheezing. No rhonchi. Diminished bilaterally. Poor inspiratory effort  CV: Regular rate. Irregular rhythm. +[place]umur. Edema noted to BUE, no BLE edema   ABD: Non-tender. Non-distended. Normal bowel sounds. PEG  Skin: Warm and dry. No nodule on exposed extremities. No rash on exposed extremities. Right chest mediport. M/S: No cyanosis. No joint deformity. No clubbing. Neuro: Follows commands, sleepy but answers questions appropriately     I/O: I/O last 3 completed shifts: In: 0   Out: 1525 [TOCDW:5277; Emesis/NG output:250]  No intake/output data recorded. Results:  CBC:   Recent Labs     07/16/22  1750 07/17/22 0327 07/18/22 0225   WBC 31.5* 27.8* 3.4*   HGB 10.0* 10.1* 9.9*   HCT 30.5* 30.8* 30.2*   MCV 95.9 96.9 95.0   PLT 95* 87* 59*     BMP:   Recent Labs     07/16/22  0422 07/16/22  1200 07/17/22 0327 07/18/22 0225     --  142 142   K 3.2* 3.9 3.6 3.3*     --  103 104   CO2 24  --  25 24   PHOS  --   --   --  4.1   BUN 83*  --  79* 83*   CREATININE 2.0*  --  1.9* 1.8*     LFT:   Recent Labs     07/16/22  0422 07/17/22 0327 07/18/22 0225   ALKPHOS 270* 252* 255*   ALT 19 17 13   AST 25 18 15   PROT 4.9* 5.3* 5.0*   BILITOT 1.1 2.0* 2.0*   LABALBU 2.1* 2.0* 1.9*     PT/INR:   Recent Labs     07/17/22  0629   PROTIME 21.6*   INR 2.0     Cultures:  No results for input(s): CULTRESP in the last 72 hours. ABG:   Recent Labs     07/15/22  2109   PH 7.486*   PO2 57.3*   PCO2 29.5*   HCO3 21.8*   BE -0.7   O2SAT 92.9       Films:  CT HEAD WO CONTRAST    Result Date: 7/15/2022    No acute intracranial abnormality. XR CHEST PORTABLE    Result Date: 7/15/2022    No acute cardiopulmonary process. US RETROPERITONEAL COMPLETE    Result Date: 7/14/2022    1. Bilateral renal cysts. Largest is seen in the lower pole the left kidney measuring 4.5 cm. No aggressive renal lesions. Mild bilateral renal cortical thinning.   No hydronephrosis. Mildly echogenic renal parenchyma. 2.  Normal appearance of the imaged bladder. Assessment:  Acute hypoxic respiratory failure without hypercarbia  Elevated D-dimer  E. Coli septicemia secondary to CLABSI  BUE blood clots   Current smoker  Leukocytosis and fever secondary to infected Mediport  Chronic kidney disease - nephrology following  Coronary artery disease with coronary stent placement 2002      Plan: On 6 L nasal cannula with SPO2 99%, wean oxygen as tolerated. ABGs noted, likely respiratory alkalosis as she was tachypneic on exam  Strep and legionella antigens negative. Respiratory panel negative. WBC  T-max 102. PCT 68.17, blood cultures showing gram-negative rods secondary to infected Mediport. Continue IV cefepime per ID. Send sputum culture - does not look like it was sent. Surgery consulted for Mediport removal - tomorrow at 7am.    D-dimer>5250, would like to have CTA to r/o PE but with renal function, CT chest w/o  7/16 showing mid central mucous plugging, minimal bibasilar atelectasis, pulmonary emphysema and hyperinflation. With emphysema and smoking history we will start patient on Pulmicort, Brovana, and duo nebs. Smoking cessation encouraged, continue nicotine patch. Will need outpatient PFTs. Will add chest vest today to aid in expectoration with mucous plugging. US BUE 7/16 echogenic thrombus in distal left basilic vein and occlusive thrombus in L cephalic vein, continue heparin drip. Would like to obtain CTA chest or VQ scan - not sure pt would be able to tolerate test at this point. GEMA Burrell CNP    Electronically signed by GEMA Burrell CNP on 7/18/2022 at 11:50 AM    Attending Attestation Note:    Patient seen and examined with NP. I agree with above.     In addition, the following apply:    - O2, IS  -  cefepime per ID  - heparin drip with goal anticoagulation for about 6 months   - patient may  not be able to tolerate CTA at this time  - skilled rehabilitation   - removal of medi-port  - bronchopulmonary hygiene   - supportive care    Aiden Garsia MD  7/18/2022  4:05 PM

## 2022-07-18 NOTE — PROGRESS NOTES
[address] [place] [address] [facility]  Progress Note    SUBJECTIVE:  Chief Complaint   Patient presents with    Abnormal Lab     sent in by Dr. Bjorn Chavez    Chest Pain     The patient is still having fevers. Seems to be tolerating antibiotics. No new problems reported by nursing. Review of systems:  As stated above in the chief complaint, otherwise negative. Medications:  Scheduled Meds:   [START ON 2022] ceFAZolin  2,000 mg IntraVENous See Admin Instructions    potassium chloride  10 mEq IntraVENous Q1H    cefepime  2,000 mg IntraVENous Q24H    Arformoterol Tartrate  15 mcg Nebulization BID    budesonide  0.5 mg Nebulization BID    insulin lispro  0-6 Units SubCUTAneous TID WC    insulin lispro  0-3 Units SubCUTAneous Nightly    nicotine  1 patch TransDERmal Daily    calcitRIOL  0.25 mcg Oral Q MWF    vitamin D  50,000 Units Oral Weekly    sodium chloride flush  10 mL IntraVENous 2 times per day    escitalopram  20 mg Oral Nightly    pantoprazole  40 mg Oral QAM AC     Continuous Infusions:   dextrose Stopped (22 0051)    dextrose 5% in lactated ringers 100 mL/hr at 22 0910    heparin (PORCINE) Infusion 26 Units/kg/hr (22 1123)    sodium chloride       PRN Meds:ipratropium-albuterol, glucose, dextrose bolus **OR** dextrose bolus, glucagon (rDNA), dextrose, heparin (porcine), heparin (porcine), sodium chloride flush, sodium chloride, senna, acetaminophen **OR** acetaminophen, hyoscyamine, oxyCODONE-acetaminophen    OBJECTIVE:  BP (!) 111/56   Pulse (!) 107   Temp 98 °F (36.7 °C) (Axillary)   Resp 28   Ht 5' (1.524 m)   Wt 105 lb 14.4 oz (48 kg)   SpO2 99%   BMI 20.68 kg/m²   Temp  Av.2 °F (37.3 °C)  Min: 98 °F (36.7 °C)  Max: 102 °F (38.9 °C)  Constitutional: The patient is lying in bed. She is lethargic. She is arousable and answers questions. Skin: Warm and slightly diaphoretic. No rashes were noted. HEENT: Round and reactive pupils. Dry mouth.   No cultures 7/15/2022: E. coli in 4 of 4 bottles  Streptococcus pneumoniae/Legionella urine Ag: negative   Urine culture 7/16/2022: <25,000 CFU Enterococcus faecalis    Recent Labs     07/15/22  2136 07/16/22  1200   PROCAL 68.17* 68.17*       ASSESSMENT:  CLABSI  GNR septicemia secondary to CLABSI  Fever secondary to infected Mediport  Leukocytosis secondary to infected Mediport and CLABSI. She is now leukopenic. This is an indication of sepsis  Asymptomatic and insignificant bacteriuria with Enterococcus    PLAN:  Continue Cefepime  The Mediport should be removed as soon as possible given the picture of sepsis. It is scheduled to be done tomorrow  Check final blood cultures  No need to treat asymptomatic bacteriuria  Repeat blood cultures once Mediport has been removed    Spoke with nursing.       Hudson Ogden MD  12:06 PM  7/18/2022

## 2022-07-19 ENCOUNTER — ANESTHESIA (OUTPATIENT)
Dept: OPERATING ROOM | Age: 82
DRG: 682 | End: 2022-07-19
Payer: MEDICARE

## 2022-07-19 LAB
ANION GAP SERPL CALCULATED.3IONS-SCNC: 14 MMOL/L (ref 7–16)
BASOPHILS ABSOLUTE: 0.02 E9/L (ref 0–0.2)
BASOPHILS RELATIVE PERCENT: 0.2 % (ref 0–2)
BLOOD CULTURE, ROUTINE: NORMAL
BUN BLDV-MCNC: 81 MG/DL (ref 6–23)
CALCIUM SERPL-MCNC: 8.3 MG/DL (ref 8.6–10.2)
CHLORIDE BLD-SCNC: 109 MMOL/L (ref 98–107)
CO2: 24 MMOL/L (ref 22–29)
CREAT SERPL-MCNC: 1.6 MG/DL (ref 0.5–1)
CULTURE, BLOOD 2: ABNORMAL
CULTURE, BLOOD 2: NORMAL
EOSINOPHILS ABSOLUTE: 0.01 E9/L (ref 0.05–0.5)
EOSINOPHILS RELATIVE PERCENT: 0.1 % (ref 0–6)
GFR AFRICAN AMERICAN: 38
GFR NON-AFRICAN AMERICAN: 31 ML/MIN/1.73
GLUCOSE BLD-MCNC: 156 MG/DL (ref 74–99)
HCT VFR BLD CALC: 30.1 % (ref 34–48)
HEMOGLOBIN: 10 G/DL (ref 11.5–15.5)
IMMATURE GRANULOCYTES #: 0.13 E9/L
IMMATURE GRANULOCYTES %: 1 % (ref 0–5)
INR BLD: 2
LACTIC ACID: 2.1 MMOL/L (ref 0.5–2.2)
LYMPHOCYTES ABSOLUTE: 1.08 E9/L (ref 1.5–4)
LYMPHOCYTES RELATIVE PERCENT: 8.5 % (ref 20–42)
MCH RBC QN AUTO: 31.4 PG (ref 26–35)
MCHC RBC AUTO-ENTMCNC: 33.2 % (ref 32–34.5)
MCV RBC AUTO: 94.7 FL (ref 80–99.9)
METER GLUCOSE: 138 MG/DL (ref 74–99)
METER GLUCOSE: 146 MG/DL (ref 74–99)
METER GLUCOSE: 148 MG/DL (ref 74–99)
METER GLUCOSE: 190 MG/DL (ref 74–99)
MONOCYTES ABSOLUTE: 1.02 E9/L (ref 0.1–0.95)
MONOCYTES RELATIVE PERCENT: 8.1 % (ref 2–12)
NEUTROPHILS ABSOLUTE: 10.39 E9/L (ref 1.8–7.3)
NEUTROPHILS RELATIVE PERCENT: 82.1 % (ref 43–80)
ORGANISM: ABNORMAL
ORGANISM: ABNORMAL
PDW BLD-RTO: 14.6 FL (ref 11.5–15)
PHOSPHORUS: 3.9 MG/DL (ref 2.5–4.5)
PLATELET # BLD: 45 E9/L (ref 130–450)
PLATELET CONFIRMATION: NORMAL
PMV BLD AUTO: 12.5 FL (ref 7–12)
POTASSIUM SERPL-SCNC: 3.7 MMOL/L (ref 3.5–5)
PROTHROMBIN TIME: 22.1 SEC (ref 9.3–12.4)
RBC # BLD: 3.18 E12/L (ref 3.5–5.5)
SODIUM BLD-SCNC: 147 MMOL/L (ref 132–146)
WBC # BLD: 12.7 E9/L (ref 4.5–11.5)

## 2022-07-19 PROCEDURE — 36415 COLL VENOUS BLD VENIPUNCTURE: CPT

## 2022-07-19 PROCEDURE — 2060000000 HC ICU INTERMEDIATE R&B

## 2022-07-19 PROCEDURE — 87070 CULTURE OTHR SPECIMN AEROBIC: CPT

## 2022-07-19 PROCEDURE — 88304 TISSUE EXAM BY PATHOLOGIST: CPT

## 2022-07-19 PROCEDURE — 2709999900 HC NON-CHARGEABLE SUPPLY: Performed by: SURGERY

## 2022-07-19 PROCEDURE — 6370000000 HC RX 637 (ALT 250 FOR IP): Performed by: STUDENT IN AN ORGANIZED HEALTH CARE EDUCATION/TRAINING PROGRAM

## 2022-07-19 PROCEDURE — 85025 COMPLETE CBC W/AUTO DIFF WBC: CPT

## 2022-07-19 PROCEDURE — 84100 ASSAY OF PHOSPHORUS: CPT

## 2022-07-19 PROCEDURE — 87040 BLOOD CULTURE FOR BACTERIA: CPT

## 2022-07-19 PROCEDURE — 0JPT0WZ REMOVAL OF TOTALLY IMPLANTABLE VASCULAR ACCESS DEVICE FROM TRUNK SUBCUTANEOUS TISSUE AND FASCIA, OPEN APPROACH: ICD-10-PCS | Performed by: SURGERY

## 2022-07-19 PROCEDURE — 6370000000 HC RX 637 (ALT 250 FOR IP): Performed by: INTERNAL MEDICINE

## 2022-07-19 PROCEDURE — 2580000003 HC RX 258: Performed by: STUDENT IN AN ORGANIZED HEALTH CARE EDUCATION/TRAINING PROGRAM

## 2022-07-19 PROCEDURE — 7100000011 HC PHASE II RECOVERY - ADDTL 15 MIN: Performed by: SURGERY

## 2022-07-19 PROCEDURE — 92526 ORAL FUNCTION THERAPY: CPT

## 2022-07-19 PROCEDURE — 2580000003 HC RX 258

## 2022-07-19 PROCEDURE — 2700000000 HC OXYGEN THERAPY PER DAY

## 2022-07-19 PROCEDURE — 3700000000 HC ANESTHESIA ATTENDED CARE: Performed by: SURGERY

## 2022-07-19 PROCEDURE — 85610 PROTHROMBIN TIME: CPT

## 2022-07-19 PROCEDURE — 82962 GLUCOSE BLOOD TEST: CPT

## 2022-07-19 PROCEDURE — 3700000001 HC ADD 15 MINUTES (ANESTHESIA): Performed by: SURGERY

## 2022-07-19 PROCEDURE — 6360000002 HC RX W HCPCS: Performed by: STUDENT IN AN ORGANIZED HEALTH CARE EDUCATION/TRAINING PROGRAM

## 2022-07-19 PROCEDURE — 7100000010 HC PHASE II RECOVERY - FIRST 15 MIN: Performed by: SURGERY

## 2022-07-19 PROCEDURE — 80048 BASIC METABOLIC PNL TOTAL CA: CPT

## 2022-07-19 PROCEDURE — 83605 ASSAY OF LACTIC ACID: CPT

## 2022-07-19 PROCEDURE — 92610 EVALUATE SWALLOWING FUNCTION: CPT

## 2022-07-19 PROCEDURE — 3600000012 HC SURGERY LEVEL 2 ADDTL 15MIN: Performed by: SURGERY

## 2022-07-19 PROCEDURE — 6360000002 HC RX W HCPCS

## 2022-07-19 PROCEDURE — 3600000002 HC SURGERY LEVEL 2 BASE: Performed by: SURGERY

## 2022-07-19 PROCEDURE — 05PYX3Z REMOVAL OF INFUSION DEVICE FROM UPPER VEIN, EXTERNAL APPROACH: ICD-10-PCS | Performed by: SURGERY

## 2022-07-19 PROCEDURE — 88300 SURGICAL PATH GROSS: CPT

## 2022-07-19 PROCEDURE — 2500000003 HC RX 250 WO HCPCS: Performed by: SURGERY

## 2022-07-19 PROCEDURE — 94640 AIRWAY INHALATION TREATMENT: CPT

## 2022-07-19 RX ORDER — PROPOFOL 10 MG/ML
INJECTION, EMULSION INTRAVENOUS PRN
Status: DISCONTINUED | OUTPATIENT
Start: 2022-07-19 | End: 2022-07-19 | Stop reason: SDUPTHER

## 2022-07-19 RX ORDER — SODIUM CHLORIDE, SODIUM LACTATE, POTASSIUM CHLORIDE, CALCIUM CHLORIDE 600; 310; 30; 20 MG/100ML; MG/100ML; MG/100ML; MG/100ML
INJECTION, SOLUTION INTRAVENOUS CONTINUOUS PRN
Status: DISCONTINUED | OUTPATIENT
Start: 2022-07-19 | End: 2022-07-19 | Stop reason: SDUPTHER

## 2022-07-19 RX ORDER — LIDOCAINE HYDROCHLORIDE 10 MG/ML
INJECTION, SOLUTION INFILTRATION; PERINEURAL PRN
Status: DISCONTINUED | OUTPATIENT
Start: 2022-07-19 | End: 2022-07-19 | Stop reason: ALTCHOICE

## 2022-07-19 RX ADMIN — SODIUM CHLORIDE, SODIUM LACTATE, POTASSIUM CHLORIDE, CALCIUM CHLORIDE AND DEXTROSE MONOHYDRATE: 5; 600; 310; 30; 20 INJECTION, SOLUTION INTRAVENOUS at 17:52

## 2022-07-19 RX ADMIN — ARFORMOTEROL TARTRATE 15 MCG: 15 SOLUTION RESPIRATORY (INHALATION) at 08:56

## 2022-07-19 RX ADMIN — PANTOPRAZOLE SODIUM 40 MG: 40 TABLET, DELAYED RELEASE ORAL at 05:15

## 2022-07-19 RX ADMIN — PROPOFOL 80 MG: 10 INJECTION, EMULSION INTRAVENOUS at 06:55

## 2022-07-19 RX ADMIN — HEPARIN SODIUM 26 UNITS/KG/HR: 10000 INJECTION, SOLUTION INTRAVENOUS at 09:39

## 2022-07-19 RX ADMIN — ESCITALOPRAM OXALATE 20 MG: 10 TABLET ORAL at 20:08

## 2022-07-19 RX ADMIN — INSULIN LISPRO 1 UNITS: 100 INJECTION, SOLUTION INTRAVENOUS; SUBCUTANEOUS at 16:14

## 2022-07-19 RX ADMIN — SODIUM CHLORIDE, PRESERVATIVE FREE 10 ML: 5 INJECTION INTRAVENOUS at 20:08

## 2022-07-19 RX ADMIN — OXYCODONE AND ACETAMINOPHEN 1 TABLET: 10; 325 TABLET ORAL at 12:11

## 2022-07-19 RX ADMIN — SODIUM CHLORIDE, POTASSIUM CHLORIDE, SODIUM LACTATE AND CALCIUM CHLORIDE: 600; 310; 30; 20 INJECTION, SOLUTION INTRAVENOUS at 06:53

## 2022-07-19 RX ADMIN — PROPOFOL 30 MCG/KG/MIN: 10 INJECTION, EMULSION INTRAVENOUS at 07:00

## 2022-07-19 RX ADMIN — ARFORMOTEROL TARTRATE 15 MCG: 15 SOLUTION RESPIRATORY (INHALATION) at 19:34

## 2022-07-19 RX ADMIN — BUDESONIDE 500 MCG: 0.5 SUSPENSION RESPIRATORY (INHALATION) at 19:34

## 2022-07-19 RX ADMIN — BUDESONIDE 500 MCG: 0.5 SUSPENSION RESPIRATORY (INHALATION) at 08:57

## 2022-07-19 RX ADMIN — CEFEPIME 2000 MG: 2 INJECTION, POWDER, FOR SOLUTION INTRAVENOUS at 12:11

## 2022-07-19 RX ADMIN — HYOSCYAMINE SULFATE 125 MCG: 0.12 TABLET ORAL at 16:14

## 2022-07-19 RX ADMIN — INSULIN LISPRO 1 UNITS: 100 INJECTION, SOLUTION INTRAVENOUS; SUBCUTANEOUS at 10:30

## 2022-07-19 ASSESSMENT — PAIN DESCRIPTION - LOCATION: LOCATION: ABDOMEN

## 2022-07-19 ASSESSMENT — PAIN - FUNCTIONAL ASSESSMENT: PAIN_FUNCTIONAL_ASSESSMENT: ACTIVITIES ARE NOT PREVENTED

## 2022-07-19 ASSESSMENT — PAIN DESCRIPTION - PAIN TYPE: TYPE: ACUTE PAIN

## 2022-07-19 ASSESSMENT — PAIN DESCRIPTION - ONSET: ONSET: ON-GOING

## 2022-07-19 ASSESSMENT — PAIN DESCRIPTION - FREQUENCY: FREQUENCY: CONTINUOUS

## 2022-07-19 ASSESSMENT — PAIN SCALES - GENERAL
PAINLEVEL_OUTOF10: 0
PAINLEVEL_OUTOF10: 4
PAINLEVEL_OUTOF10: 2
PAINLEVEL_OUTOF10: 0

## 2022-07-19 ASSESSMENT — PAIN DESCRIPTION - DESCRIPTORS: DESCRIPTORS: ACHING;DULL;DISCOMFORT

## 2022-07-19 ASSESSMENT — ENCOUNTER SYMPTOMS
DIARRHEA: 1
COUGH: 0
NAUSEA: 0
VOMITING: 0
SHORTNESS OF BREATH: 1

## 2022-07-19 ASSESSMENT — PAIN DESCRIPTION - ORIENTATION: ORIENTATION: RIGHT;LEFT

## 2022-07-19 NOTE — PROGRESS NOTES
Patient coughing a lot when given water, this RN advised  to not give patient any more water. Speech evaluation ordered.

## 2022-07-19 NOTE — OP NOTE
Operative Note      Patient: Aida Eubanks  YOB: 1940  MRN: [id number]    Date of Procedure: 7/19/2022    Pre-Op Diagnosis: Sepsis, due to unspecified organism, unspecified whether acute organ dysfunction present ([facility] [address] [A41.9]    Post-Op Diagnosis: Same       Procedure(s):   MEDIPORT REMOVAL    Surgeon(s):  Joie Ruelas MD    Assistant:   Resident: Kimberlee Babinski, MD    Anesthesia: Monitor Anesthesia Care    Estimated Blood Loss (mL): 10 cc    Complications: None    Specimens:   ID Type Source Tests Collected by Time Destination   1 : right chest mediport tip Catheter Tip Catheter Tip CULTURE, TIP Joie Ruelas MD 7/19/2022 2013    A : port capsule Tissue Tissue SURGICAL PATHOLOGY Joie Ruelas MD 7/19/2022 7354    B : right chest [address] Joie Ruelas MD 7/19/2022 0719        Drains:   Gastrostomy/Enterostomy/Jejunostomy Tube Percutaneous Endoscopic Gastrostomy (PEG) LLQ (Active)   Drainage Appearance None 07/18/22 2100   Site Description Intact 07/18/22 2100   Surrounding Skin Clean, dry & intact 07/18/22 2100   Dressing Status New drainage noted 07/18/22 1757   Dressing Type Split gauze 07/18/22 1757   G-Tube Care Completed Yes 07/18/22 2100   Tube Feeding Renal Formula 07/18/22 2100   Tube feeding/verify rate (mL/hr) 35 mL/hr 07/18/22 2100   Tube Feeding Supplement (Product) Other (Comment) 07/18/22 2100   Tube Feeding Intake (mL) 227 ml 07/18/22 1757   Tube Feeding Supplement Amount (mL) 30 mL 07/18/22 2100   Free Water/Flush (mL) 100 mL 07/18/22 2100   Output (mL) 250 ml 07/17/22 0924   Residual Volume (ml) 0 ml 07/18/22 1757       Ileostomy Ileostomy RLQ (Active)   Stomal Appliance 2 piece 07/18/22 2100   Stoma  Assessment Red;Moist 07/18/22 2100   Peristomal Assessment ADEEL 07/18/22 2100   Treatment Bag change;Stoma paste 07/16/22 1339   Stool Appearance Watery 07/18/22 2100   Stool Color Green;Yellow 07/18/22 2100   Stool Amount Medium 07/18/22 2100 Output (mL) 200 ml 07/19/22 8638       Urinary Catheter (Active)   $ Urethral catheter insertion $ Not inserted for procedure 07/15/22 1600   Catheter Indications Urinary retention (acute or chronic), continuous bladder irrigation or bladder outlet obstruction; Need for fluid volume management of the critically ill patient in a critical care setting 07/18/22 2100   Site Assessment Pink; No urethral drainage 07/18/22 2100   Urine Color [place] 07/18/22 2100   Urine Appearance Clear 07/18/22 2100   Urine Odor Other (Comment) 07/18/22 2100   Collection Container Standard 07/18/22 2100   Securement Method Securing device (Describe) 07/18/22 2100   Catheter Care Completed Yes 07/18/22 2100   Catheter Best Practices  Drainage tube clipped to bed; Bag below bladder;Bag not on floor 07/18/22 2100   Status Draining 07/18/22 2100   Manual Irrigation Volume Input (mL) 0 mL 07/18/22 2100   Output (mL) 500 mL 07/19/22 0639       [REMOVED] External Urinary Catheter (Removed)   Placement Initiated 07/14/22 1836   Catheter Care Catheter/Wick replaced;Suction Canister/Tubing changed 07/14/22 1836   Perineal Care Yes 07/14/22 1836   Suction 40 mmgHg continuous 07/14/22 1836   Urine Color Yellow 07/15/22 0521   Urine Appearance Clear 07/15/22 0521   Output (mL) 200 mL 07/15/22 0849       Findings: Right IJ Mediport removal; no purulence or pus    Detailed Description of Procedure:     Patient was laid supine on the operating room table. MAC anesthesia was induced. Right chest was prepped and draped in the usual sterile fashion. Incision was made over the previous port scar. Port was dissected out using electrocautery. A 3-0 Vicryl stitch was placed around the tract to close the tract from the neck. Part was completely withdrawn in 1 piece. Tip sent for culture. Port capsule was excised. Wound was thoroughly irrigated and hemostasis was assured.   Skin was closed using 3-0 Vicryl in the deep dermal layer and a running 4-0 Vicryl and Dermabond. All counts were correct at the end the case.   Patient was transferred to PACU in stable condition    Electronically signed by Db Hoang MD on 7/19/2022 at 7:28 AM

## 2022-07-19 NOTE — PROGRESS NOTES
Progress Note  Date:2022       Room:[facility]5040-A  Patient Salima Fitzgerald     YOB: 1940     Age:82 y.o. Patient seen for follow up of acute renal failure and gram negative bacteremia from infected mediport. She is awake and more alert this am. She states her mouth is dry. She is still slow to questions but improving slightly. She denies any chest pains, abdominal pain or nausea. She is resting on 10 liters of oxygen. Subjective    Subjective:  Symptoms:  Stable. She reports shortness of breath and diarrhea. No cough, chest pain, headache, chest pressure or anxiety. Diet:  Poor intake. No nausea or vomiting. Unable to obtain. Review of Systems   Respiratory:  Positive for shortness of breath. Negative for cough. Cardiovascular:  Negative for chest pain. Gastrointestinal:  Positive for diarrhea. Negative for nausea and vomiting. Objective         Vitals Last 24 Hours:  TEMPERATURE:  Temp  Av.7 °F (37.1 °C)  Min: 97.7 °F (36.5 °C)  Max: 102 °F (38.9 °C)  RESPIRATIONS RANGE: Resp  Av.5  Min: 20  Max: 28  PULSE OXIMETRY RANGE: SpO2  Av.7 %  Min: 96 %  Max: 99 %  PULSE RANGE: Pulse  Av  Min: 88  Max: 107  BLOOD PRESSURE RANGE: Systolic (56JSD), BSJ:338 , Min:109 , MQP:730   ; Diastolic (25WCL), RINKU:18, Min:40, Max:56    I/O (24Hr): Intake/Output Summary (Last 24 hours) at 2022 0550  Last data filed at 2022 2100  Gross per 24 hour   Intake 457 ml   Output 1650 ml   Net -1193 ml       Objective:  General Appearance:  Comfortable, ill-appearing and in no acute distress. Vital signs: (most recent): Blood pressure (!) 109/51, pulse (!) 102, temperature 97.9 °F (36.6 °C), temperature source Axillary, resp. rate 20, height 5' (1.524 m), weight 105 lb 14.4 oz (48 kg), SpO2 98 %. Lungs:  Normal effort. There are decreased breath sounds. No rales or rhonchi. Heart: Tachycardia. Regular rhythm. S1 normal and S2 normal.  No gallop. Abdomen: Abdomen is soft and non-distended. (Ileostomy in right lower quadrant. ). Bowel sounds are normal.   There is epigastric tenderness. There is no rebound tenderness. There is no guarding. Extremities: Normal range of motion. There is no dependent edema. Skin:  Warm and dry. Labs/Imaging/Diagnostics    Labs:  CBC:  Recent Labs     07/16/22  1750 07/17/22  0327 07/18/22  0225   WBC 31.5* 27.8* 3.4*   RBC 3.18* 3.18* 3.18*   HGB 10.0* 10.1* 9.9*   HCT 30.5* 30.8* 30.2*   MCV 95.9 96.9 95.0   RDW 14.5 14.5 14.5   PLT 95* 87* 59*       CHEMISTRIES:  Recent Labs     07/16/22  1200 07/17/22  0327 07/18/22  0225   NA  --  142 142   K 3.9 3.6 3.3*   CL  --  103 104   CO2  --  25 24   BUN  --  79* 83*   CREATININE  --  1.9* 1.8*   GLUCOSE  --  170* 98   PHOS  --   --  4.1   MG 2.0  --  1.7       PT/INR:  Recent Labs     07/17/22  0629   PROTIME 21.6*   INR 2.0       APTT:  Recent Labs     07/18/22  0225 07/18/22  1120 07/18/22  1800   APTT 37.0* 61.9* 60.7*       LIVER PROFILE:  Recent Labs     07/17/22  0327 07/18/22  0225   AST 18 15   ALT 17 13   BILITOT 2.0* 2.0*   ALKPHOS 252* 255*         Imaging Last 24 Hours:  CT HEAD WO CONTRAST    Result Date: 7/15/2022  EXAMINATION: CT OF THE HEAD WITHOUT CONTRAST  7/15/2022 9:58 pm TECHNIQUE: CT of the head was performed without the administration of intravenous contrast. Automated exposure control, iterative reconstruction, and/or weight based adjustment of the mA/kV was utilized to reduce the radiation dose to as low as reasonably achievable. COMPARISON: None. HISTORY: ORDERING SYSTEM PROVIDED HISTORY: change in mental status TECHNOLOGIST PROVIDED HISTORY: Reason for exam:->change in mental status Has a \"code stroke\" or \"stroke alert\" been called? ->No FINDINGS: BRAIN/VENTRICLES: There is no acute intracranial hemorrhage, mass effect or midline shift. No abnormal extra-axial fluid collection.   The gray-white differentiation is maintained without evidence hydronephrosis. Left kidney: The corticomedullary differentiation and cortical thickness is slightly decreased. Anechoic thin walled nonvascular left midpole cyst.  4.5 cm left lower pole cyst.  Mildly echogenic renal parenchyma. There is no hydronephrosis. Bladder: Bladder volume was 282 mL. No intrinsic mass, intrinsic calcification, nor wall thickening. 1.  Bilateral renal cysts. Largest is seen in the lower pole the left kidney measuring 4.5 cm. No aggressive renal lesions. Mild bilateral renal cortical thinning. No hydronephrosis. Mildly echogenic renal parenchyma. 2.  Normal appearance of the imaged bladder. Assessment//Plan           Hospital Problems             Last Modified POA    * (Principal) Acute renal failure (ARF) ([facility] [address] 7/14/2022 Yes   Assessment & Plan  Acute hypoxic respiratory failure  Acute renal failure  Ecoli bacteremia  Metabolic encephalopathy  CLABSI   DM type II   Urinary retention  Short gut syndrome    Patient currently off heparin drip due OR this am. Blood cultures positive for Ecoli. Continue on antibiotic per ID. Continue on IVF per nephrology. Abdominal xray reviewed. Monitor platelets. Appreciate Hematology input. OR this am for mediport removal. Await am labs. Azalea Kaiser, DO    I can be reached through answering service.

## 2022-07-19 NOTE — PROGRESS NOTES
IntraVENous, Q2H PRN, Marcel Gastelum MD    ceFAZolin (ANCEF) 2,000 mg in sterile water 20 mL IV syringe, 2,000 mg, IntraVENous, See Admin Instructions, Marcel Gastelum MD    cefepime (MAXIPIME) 2000 mg IVPB minibag, 2,000 mg, IntraVENous, Q24H, Marcel Gastelum MD, Stopped at 07/18/22 1535    Arformoterol Tartrate (BROVANA) nebulizer solution 15 mcg, 15 mcg, Nebulization, BID, Marcel Gastelum MD, 15 mcg at 07/19/22 0856    budesonide (PULMICORT) nebulizer suspension 500 mcg, 0.5 mg, Nebulization, BID, Marcel Gastelum MD, 500 mcg at 07/19/22 0857    ipratropium-albuterol (DUONEB) nebulizer solution 1 ampule, 1 ampule, Inhalation, Q4H PRN, Marcel Gastelum MD, 1 ampule at 07/18/22 0226    insulin lispro (HUMALOG) injection vial 0-6 Units, 0-6 Units, SubCUTAneous, TID WC, Marcel Gastelum MD, 2 Units at 07/18/22 1839    insulin lispro (HUMALOG) injection vial 0-3 Units, 0-3 Units, SubCUTAneous, Nightly, Marcel Gastelum MD, 1 Units at 07/18/22 2059    glucose chewable tablet 16 g, 4 tablet, Oral, PRN, Marcel Gastelum MD    dextrose bolus 10% 125 mL, 125 mL, IntraVENous, PRN **OR** dextrose bolus 10% 250 mL, 250 mL, IntraVENous, PRN, Marcel Gastelum MD    glucagon (rDNA) injection 1 mg, 1 mg, IntraMUSCular, PRN, Marcel Gastelum MD    dextrose 5 % solution, 100 mL/hr, IntraVENous, PRN, Marcel Gastelum MD, Held at 07/17/22 0051    dextrose 5 % in lactated ringers infusion, , IntraVENous, Continuous, Marcel Gastelum MD, Stopped at 07/19/22 0934    nicotine (NICODERM CQ) 21 MG/24HR 1 patch, 1 patch, TransDERmal, Daily, Marcel Gastelum MD, 1 patch at 07/19/22 0845    heparin (porcine) injection 3,750 Units, 80 Units/kg, IntraVENous, PRN, Marcel Gastelum MD, 3,750 Units at 07/17/22 0125    heparin (porcine) injection 1,880 Units, 40 Units/kg, IntraVENous, PRN, Marcel Gastelum MD, 1,880 Units at 07/18/22 0535    heparin 25,000 units in dextrose 5% 250 mL (premix) infusion, 5-30 Units/kg/hr, IntraVENous, Continuous, Harmeet Tierney MD, Last Rate: 12.2 mL/hr at 07/19/22 0939, 26 Units/kg/hr at 07/19/22 0939    calcitRIOL (ROCALTROL) capsule 0.25 mcg, 0.25 mcg, Oral, Q MWF, Harmeet Tierney MD    vitamin D (ERGOCALCIFEROL) capsule 50,000 Units, 50,000 Units, Oral, Weekly, Harmeet Tierney MD    sodium chloride flush 0.9 % injection 10 mL, 10 mL, IntraVENous, 2 times per day, Harmeet Tierney MD, 10 mL at 07/18/22 2046    sodium chloride flush 0.9 % injection 10 mL, 10 mL, IntraVENous, PRN, Harmeet Tierney MD    0.9 % sodium chloride infusion, , IntraVENous, PRN, Harmeet Tierney MD    senna (SENOKOT) tablet 8.6 mg, 1 tablet, Oral, Daily PRN, Harmeet Tierney MD    acetaminophen (TYLENOL) tablet 650 mg, 650 mg, Oral, Q6H PRN, 650 mg at 07/18/22 0525 **OR** acetaminophen (TYLENOL) suppository 650 mg, 650 mg, Rectal, Q6H PRN, Harmeet Tierney MD    escitalopram (LEXAPRO) tablet 20 mg, 20 mg, Oral, Nightly, Harmeet Tierney MD, 20 mg at 07/18/22 2046    hyoscyamine (ANASPAZ;LEVSIN) tablet 125 mcg, 125 mcg, Oral, BID PRN, Harmeet Tierney MD    pantoprazole (PROTONIX) tablet 40 mg, 40 mg, Oral, QAM AC, Harmeet Tierney MD, 40 mg at 07/19/22 0515    oxyCODONE-acetaminophen (PERCOCET)  MG per tablet 1 tablet, 1 tablet, Oral, Q6H PRN, Harmeet Tierney MD, 1 tablet at 07/18/22 0527    XR ABDOMEN (KUB) (SINGLE AP VIEW)   Final Result   Multiple eggshell type calcifications left epigastric region which could be   related to the kidney. The lower lung fields are clear. The bowel gas pattern is unremarkable. US DUP UPPER EXTREMITIES BILATERAL VENOUS   Final Result   1.  Echogenic thrombus in the distal left basilic vein. No color flow   identified in this area on color Doppler evaluation. 2.  Echogenic occlusive thrombus noted in the left cephalic vein. 3.  Remaining venous vasculature of the bilateral upper extremities appear   patent.       The findings were sent to the Radiology Results [address] at 5:42   pm on 7/16/2022 to be communicated to a licensed caregiver. US DUP LOWER EXTREMITIES BILATERAL VENOUS   Final Result   No evidence of DVT in either lower extremity. CT CHEST WO CONTRAST   Final Result   Mild central mucous plugging. Minimal bibasilar atelectasis. No   pneumonia/aspiration. Trace pleural effusions. Pulmonary emphysema and hyperinflation. Enlarged pulmonary trunk and main pulmonary arteries, suggestive of pulmonary   arterial hypertension in the appropriate clinical setting. Other incidental findings as above. CT HEAD WO CONTRAST   Final Result   No acute intracranial abnormality. XR CHEST PORTABLE   Final Result   No acute cardiopulmonary process. US RETROPERITONEAL COMPLETE   Final Result   1. Bilateral renal cysts. Largest is seen in the lower pole the left kidney   measuring 4.5 cm. No aggressive renal lesions. Mild bilateral renal   cortical thinning. No hydronephrosis. Mildly echogenic renal parenchyma. 2.  Normal appearance of the imaged bladder. XR CHEST (2 VW)   Final Result   No acute process. A/P:  Thrombocytopenia, platelets 13L today  Hold heparin until platelets recover to >50k, recheck in AM  WBC increased today, afebrile ID treating  Anemia- maintain Hgb > 7.5  Gram negative bacteremia  KD  LUE DVT-HOLD heparin as long as platelets count > 17E     Thank you for allowing us to participate in the care of [place] [address]. Mariaelena Dill PA-C  [phone]    Electronically signed by JUANITO Murguia on 7/19/2022 at 9:43 AM    Note: This report was completed using [employer] voiced recognition software. Every effort has been made to ensure accuracy; however, inadvertent computerized transcription errors may be present.

## 2022-07-19 NOTE — PROGRESS NOTES
SPEECH LANGUAGE PATHOLOGY  DAILY PROGRESS NOTE        PATIENT NAME:  Geri Grant      :  1940          TODAY'S DATE:  2022 ROOM:  [facility]-    Pt's RN reported later this date that physician would like to wait on MBSS at this time due to pt overall alertness level and plans to re-consult in few days time to re-assess swallow fx. Pt's nurse indicated that they would re-consult to re-assess swallow fx when pt appears more appropriate. SLP to d/c pt from [address] [place] [address] at this time; please re-consult when indicated. Recommend continued NPO at this time. Thank you.

## 2022-07-19 NOTE — PROGRESS NOTES
[facility]  Nephrology Attending Progress Note    SUBJECTIVE:     7/19/22- Pt more awake alert today and more verbally interactive    PROBLEM LIST:    Patient Active Problem List   Diagnosis    Right cataract    Essential hypertension, benign    Hyperlipidemia with target LDL less than 100    Osteoarthritis, shoulder    Primary osteoarthritis of left knee    History of ischemic bowel disease    Ileostomy present ([address]    Chronic kidney disease, stage III (moderate) (HCC)    COPD (chronic obstructive pulmonary disease) (HCC)    Moderate protein-calorie malnutrition (HCC)    Abdominal wall cellulitis    Cellulitis    Infected hernioplasty mesh (HCC)    Enterocutaneous fistula    Mild protein-calorie malnutrition ([address]    Acute kidney injury ([address]    Hypertensive kidney disease with chronic kidney disease stage IV (HCC)    KD (acute kidney injury) ([address]    Diarrhea    Elevated serum creatinine    Abnormal serum creatinine level    Hyperlipemia    Primary hypertension    Essential (primary) hypertension    Postsurgical malabsorption, not elsewhere classified    Hypercholesterolemia    Pure hypercholesterolemia    Prediabetes    Hypothyroidism, unspecified    Acute renal failure (ARF) ([address]        PAST MEDICAL HISTORY:    Past Medical History:   Diagnosis Date    Acute kidney failure ([address] 2014 and 8/2016    x2,no dialysis needed,resolved, kidney function tests returned to normal    Anxiety     Arthritis     CAD (coronary artery disease)     follows with Dr. Sunita Wood every 6 months    Cancer ([address]     skin, leg,nose- removed surgically     COPD (chronic obstructive pulmonary disease) ([address]     mild- no inhlaers, no oxygen     Depression     Fibromyalgia     chronic back and neck pain    Generalized headaches     h/o / daily    History of blood transfusion 3-11-14    multiple times    History of necrotic bowel 2012    Hyperlipidemia     Hypertension     Incisional hernia     abdomen    Insomnia     Mitral valve regurgitation     Myocardial infarct ([facility] [address] 2002    Occasional tremors     at hands / stable with medication    Osteopenia     PONV (postoperative nausea and vomiting)     Vitamin B12 deficiency     h/o       DIET:    ADULT TUBE FEEDING; PEG; Renal Formula; Continuous; 35; No; 100; Q 6 hours  ADULT DIET;  Regular; Low Potassium (Less than 3000 mg/day)     PHYSICAL EXAM:     Patient Vitals for the past 24 hrs:   BP Temp Temp src Pulse Resp SpO2 Weight   07/19/22 0845 (!) 98/44 97.4 °F (36.3 °C) Axillary 81 18 95 % --   07/19/22 0800 -- 98.4 °F (36.9 °C) Temporal 76 20 96 % --   07/19/22 0745 (!) 106/54 -- -- 81 20 94 % --   07/19/22 0728 110/60 98.9 °F (37.2 °C) Temporal 74 20 92 % --   07/19/22 0625 -- -- -- -- -- -- 104 lb 9.6 oz (47.4 kg)   07/19/22 0045 (!) 109/51 97.9 °F (36.6 °C) Axillary (!) 102 20 98 % --   07/18/22 2100 (!) 111/53 98.1 °F (36.7 °C) Axillary 91 22 -- --   07/18/22 1731 (!) 118/40 97.7 °F (36.5 °C) Axillary 88 28 96 % --   @      Intake/Output Summary (Last 24 hours) at 7/19/2022 1026  Last data filed at 7/19/2022 0902  Gross per 24 hour   Intake 757 ml   Output 2350 ml   Net -1593 ml         Wt Readings from Last 3 Encounters:   07/19/22 104 lb 9.6 oz (47.4 kg)   01/28/22 94 lb (42.6 kg)   07/02/21 84 lb (38.1 kg)       Constitutional:  Pt frail and cachetic   Head: normocephalic, atraumatic  Neck: no JVD  Cardiovascular: S1 S2 no S3 or rub  Respiratory:  diminished, poor effort  Gastrointestinal:  Soft, + PEG and ostomy  Ext: no edema   Skin: dry, no rash  Neuro: minimally interactive    MEDS (scheduled):    ceFAZolin  2,000 mg IntraVENous See Admin Instructions    cefepime  2,000 mg IntraVENous Q24H    Arformoterol Tartrate  15 mcg Nebulization BID    budesonide  0.5 mg Nebulization BID    insulin lispro  0-6 Units SubCUTAneous TID WC    insulin lispro  0-3 Units SubCUTAneous Nightly    nicotine  1 patch TransDERmal Daily    calcitRIOL  0.25 mcg Oral Q MWF    vitamin D  50,000 Units Oral Weekly    sodium chloride flush  10 mL IntraVENous 2 times per day    escitalopram  20 mg Oral Nightly    pantoprazole  40 mg Oral QAM AC       MEDS (infusions):   dextrose Stopped (07/17/22 0051)    dextrose 5% in lactated ringers 100 mL/hr at 07/19/22 1000    heparin (PORCINE) Infusion Stopped (07/19/22 0959)    sodium chloride         MEDS (prn): HYDROmorphone **OR** HYDROmorphone, ipratropium-albuterol, glucose, dextrose bolus **OR** dextrose bolus, glucagon (rDNA), dextrose, heparin (porcine), heparin (porcine), sodium chloride flush, sodium chloride, senna, acetaminophen **OR** acetaminophen, hyoscyamine, oxyCODONE-acetaminophen    DATA:    Recent Labs     07/16/22  1750 07/17/22  0327 07/18/22  0225   WBC 31.5* 27.8* 3.4*   HGB 10.0* 10.1* 9.9*   HCT 30.5* 30.8* 30.2*   MCV 95.9 96.9 95.0   PLT 95* 87* 59*     Recent Labs     07/16/22  1200 07/17/22  0327 07/18/22  0225   NA  --  142 142   K 3.9 3.6 3.3*   CL  --  103 104   CO2  --  25 24   BUN  --  79* 83*   CREATININE  --  1.9* 1.8*   LABGLOM  --  25 27   GLUCOSE  --  170* 98   CALCIUM  --  8.6 8.3*   ALT  --  17 13   AST  --  18 15   BILITOT  --  2.0* 2.0*   ALKPHOS  --  252* 255*   MG 2.0  --  1.7   PHOS  --   --  4.1       Lab Results   Component Value Date    LABALBU 1.9 (L) 07/18/2022    LABALBU 2.0 (L) 07/17/2022    LABALBU 2.1 (L) 07/16/2022     Lab Results   Component Value Date    TSH 1.380 07/01/2022       Iron Studies  Lab Results   Component Value Date    IRON 30 (L) 07/01/2022    TIBC 229 (L) 07/01/2022    FERRITIN 143 07/01/2022     Vitamin B-12   Date Value Ref Range Status   04/27/2022 1050 (H) 211 - 946 pg/mL Final     Folate   Date Value Ref Range Status   01/22/2021 8.1 4.8 - 24.2 ng/mL Final       Vit D, 25-Hydroxy   Date Value Ref Range Status   07/15/2022 13 (L) 30 - 100 ng/mL Final     Comment:     <20 ng/mL. ........... Algis Broaden Deficient  20-30 ng/mL. ......... Algis Broaden Insufficient   ng/mL. ........ Algis Broaden Sufficient  >100 ng/mL. .......... Algis Broaden Toxic Sepsis with a HX of CKD G3B with a baseline serum cr 1.4-1.5mg/dl with an e-GFR=33-36ml/min  UC Enterococcus Faecalis-Asymptomatic bacteruria  BC E Coli  7/14/22 Renal US No evidence of hydro  FeNa<1%  TF running as po intake poor. Cr trending down  24hour urine creainine clearance 33 based on the cr 2.0mg/dl  S/P Mediport 7/19/22  PLAN:  1. Continue the IVF  2. Follow labs-no labs yet this AM  3. Follow I&O       2. HTN with CKD I-IV  BP goal <130/80-at goal  PLAN:  Follow BP     3. Anion Gap Met Acidosis in the setting of the KD on the CKD with a lactic acidosis and the abd pain with BC (+) for GNR-on cefepime  LA trending down 3.9-->2.9-->2.5-->2.7  PLAN:  Follow LA on the LR     4. 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 Hypomagnesemia  Mg++ 1.7  PLAN:  Await Mg++ level post supplement    6.  Hypokalemia  K+ 3.3 on 7//18/22  PLAN:  Follow K+-await AM labs       Juan Hernandez MD

## 2022-07-19 NOTE — CARE COORDINATION
7/19/2022  Social Work Discharge Planning:Pt is from home with her spouse and is now agreeable to go to a [facility]. Spouse chose [facility]. Referral was made. Waiting reply. Electronically signed by CASSI Nugent on 7/19/2022 at 9:23 AM    7/19/2022 Social Work Discharge Planning:Per Dedra Gomez with [address] , they will accept Pt when medically ready. No precert is needed;however they will need a COVID test. SHELLI and transport form are completed. 7000 will need completed when a discharge order is in. Electronically signed by CASSI Nugent on 7/19/2022 at 2:24 PM    7/19/2022  Social Work Discharge Planning:Per [address] liaison, If Pt is undergoing chemo treatments they will not be able to accept her;however, if she is on a chemo pill family could bring them to the facility. ARACELI martinez a voicemail with spouse to return this workers call to discuss. Electronically signed by CASSI Nugent on 7/19/2022 at 2:42 PM

## 2022-07-19 NOTE — PROGRESS NOTES
Status post port removal.  Okay for tube feeds. Please hold heparin drip until 6 PM this evening.     Electronically signed by Barbara Boswell MD on 7/19/2022 at 9:58 AM

## 2022-07-19 NOTE — PROGRESS NOTES
Per pharmacy, patient's IV fluids and heparin drip are not compatible and she now has only 1 peripheral site. IV team consult placed.

## 2022-07-19 NOTE — PROGRESS NOTES
Per speech therapist, recommends patient to remain NPO due to coughing, throat clearing until a barium swallow can be performed. This RN placed a call to Dr. Flores Black to notify of the above. Awaiting callback. Per Dr. Flores Black, re-evaluate in a day or two with another speech consult when patient is more alert.

## 2022-07-19 NOTE — PROGRESS NOTES
Pulmonary Progress Note    Admit Date: 2022                            PCP: Cas Kaiser DO  Principal Problem:    Acute renal failure (ARF) ([address]  Resolved Problems:    * No resolved hospital problems. *      Subjective:  Resting in bed, groggy with a breathing treatment on  Usually on 6-7L nc  Just returned from OR for mediport removal   Has dyspnea, no cough - falling asleep throughout our conversation      Medications:   dextrose Stopped (22 0051)    dextrose 5% in lactated ringers 100 mL/hr at 22 0840    heparin (PORCINE) Infusion 26 Units/kg/hr (22 2100)    sodium chloride          ceFAZolin  2,000 mg IntraVENous See Admin Instructions    cefepime  2,000 mg IntraVENous Q24H    Arformoterol Tartrate  15 mcg Nebulization BID    budesonide  0.5 mg Nebulization BID    insulin lispro  0-6 Units SubCUTAneous TID WC    insulin lispro  0-3 Units SubCUTAneous Nightly    nicotine  1 patch TransDERmal Daily    calcitRIOL  0.25 mcg Oral Q MWF    vitamin D  50,000 Units Oral Weekly    sodium chloride flush  10 mL IntraVENous 2 times per day    escitalopram  20 mg Oral Nightly    pantoprazole  40 mg Oral QAM AC       Vitals:  VITALS:  BP (!) 98/44   Pulse 81   Temp 97.4 °F (36.3 °C) (Axillary)   Resp 18   Ht 5' (1.524 m)   Wt 104 lb 9.6 oz (47.4 kg)   SpO2 95%   BMI 20.43 kg/m²   24HR INTAKE/OUTPUT:    Intake/Output Summary (Last 24 hours) at 2022 0902  Last data filed at 2022 0720  Gross per 24 hour   Intake 657 ml   Output 2350 ml   Net -1693 ml     CURRENT PULSE OXIMETRY:  SpO2: 95 %  24HR PULSE OXIMETRY RANGE:  SpO2  Av.2 %  Min: 92 %  Max: 98 %  CVP:    VENT SETTINGS:      Additional Respiratory Assessments  Heart Rate: 81  Resp: 18  SpO2: 95 %      EXAM:  General: No distress. Sleepy arouses to voice. Ill appearing, weak  Eyes: No sclera icterus. No conjunctival injection. ENT: No discharge. Pharynx clear. Neck: Trachea midline. Normal thyroid.   Resp: No accessory muscle use. No crackles. No wheezing. No rhonchi. Diminished bilaterally. Poor inspiratory effort  CV: Regular rate. Irregular rhythm. +Mumur. Edema noted to BUE, no BLE edema   ABD: Non-tender. Non-distended. Normal bowel sounds. PEG, +colostomy  Skin: Warm and dry. No nodule on exposed extremities. No rash on exposed extremities. Right chest mediport incision with glue. M/S: No cyanosis. No joint deformity. No clubbing. Neuro: Follows commands, sleepy but answers questions appropriately     I/O: I/O last 3 completed shifts: In: 457 [NG/GT:457]  Out: 2825 [Urine:1975; Stool:850]  I/O this shift:  In: 200 [I.V.:200]  Out: -      Results:  CBC:   Recent Labs     07/16/22  1750 07/17/22  0327 07/18/22 0225   WBC 31.5* 27.8* 3.4*   HGB 10.0* 10.1* 9.9*   HCT 30.5* 30.8* 30.2*   MCV 95.9 96.9 95.0   PLT 95* 87* 59*     BMP:   Recent Labs     07/16/22  1200 07/17/22  0327 07/18/22  0225   NA  --  142 142   K 3.9 3.6 3.3*   CL  --  103 104   CO2  --  25 24   PHOS  --   --  4.1   BUN  --  79* 83*   CREATININE  --  1.9* 1.8*     LFT:   Recent Labs     07/17/22  0327 07/18/22 0225   ALKPHOS 252* 255*   ALT 17 13   AST 18 15   PROT 5.3* 5.0*   BILITOT 2.0* 2.0*   LABALBU 2.0* 1.9*     PT/INR:   Recent Labs     07/17/22  0629   PROTIME 21.6*   INR 2.0     Cultures:  No results for input(s): CULTRESP in the last 72 hours. ABG:   No results for input(s): PH, PO2, PCO2, HCO3, BE, O2SAT in the last 72 hours. Films:  CT HEAD WO CONTRAST    Result Date: 7/15/2022    No acute intracranial abnormality. XR CHEST PORTABLE    Result Date: 7/15/2022    No acute cardiopulmonary process. US RETROPERITONEAL COMPLETE    Result Date: 7/14/2022    1. Bilateral renal cysts. Largest is seen in the lower pole the left kidney measuring 4.5 cm. No aggressive renal lesions. Mild bilateral renal cortical thinning. No hydronephrosis. Mildly echogenic renal parenchyma. 2.  Normal appearance of the imaged bladder. Assessment:  Acute hypoxic respiratory failure without hypercarbia  Elevated D-dimer  E. Coli septicemia secondary to CLABSI  BUE blood clots   Current smoker  Leukocytosis and fever secondary to infected Mediport  Chronic kidney disease - nephrology following  Coronary artery disease with coronary stent placement 2002      Plan: On 6 L nasal cannula with SPO2 99%, wean oxygen as tolerated. ABGs noted, likely respiratory alkalosis. Strep and legionella antigens negative. Respiratory panel negative. T-max 98.9 last 24 hrs, PCT 68.17, blood cultures showing gram-negative rods. Continue IV cefepime per ID. Send sputum culture - does not look like it was sent. Surgery consulted for Mediport removal - tomorrow at 7am.    D-dimer>5250, would like to have CTA to r/o PE but with renal function, CT chest w/o  7/16 showing mid central mucous plugging, minimal bibasilar atelectasis, pulmonary emphysema and hyperinflation. With emphysema and smoking history we will start patient on Pulmicort, Brovana, and duo nebs. Smoking cessation encouraged, continue nicotine patch. Will need outpatient PFTs. Continue chest vest today to aid in expectoration with mucous plugging. US BUE 7/16 echogenic thrombus in distal left basilic vein and occlusive thrombus in L cephalic vein, continue heparin drip. Would like to obtain CTA chest or VQ scan - pt would not be able to tolerate test at this point. Amanda Coyle, GEMA - CNP  7/19/2022  9:02 AM    Attending Attestation Note:    Patient seen and examined with NP. I agree with above.     In addition, the following apply:    - O2, IS  - cefepime per ID  - heparin drip with goal anticoagulation for about 6 months  - patient may not be able to tolerate CTA or V/Q at this time  - skilled rehabilitation will be needed  - removal of medi-port  - bronchopulmonary hygiene  - supportive care    Tata Franks MD  7/19/2022  2:31 PM

## 2022-07-19 NOTE — PROGRESS NOTES
Comprehensive Nutrition Assessment    Type and Reason for Visit:  Reassess    Nutrition Recommendations/Plan:   Although pt currently NPO awaiting Swallow Evaluation, recommend resume TF via PEG, as pt has been maintained on EN +PO over time:    TF RECOMMENDATION: Renal TF (Nepro) to 35 ml/hr and flushes per Renal Management  This will provide: 840 ml/d, 1512 elidia, 68 g pro, 609 ml free water  This regimen will meet ~100% energy/protein needs     Malnutrition Assessment:  Malnutrition Status:  Severe malnutrition (07/15/22 1631)    Context:  Chronic Illness     Findings of the 6 clinical characteristics of malnutrition:  Energy Intake:  75% or less estimated energy requirements for 1 month or longer (poor PO take, h/o overnight TF)  Weight Loss:  No significant weight loss     Body Fat Loss:  Severe body fat loss Orbital, Buccal region   Muscle Mass Loss:  Severe muscle mass loss Temples (temporalis), Clavicles (pectoralis & deltoids)  Fluid Accumulation:  Unable to assess     Strength:  Not Performed    Nutrition Assessment:    Pt s/p Mediport out 7/19. Pt had coughing with water today, is NPO and Swallow Eval ordered. Recommend resume EN while awaiting Swallow Eval for PO, as pt has short-gut and has been maintained on TF+PO    Nutrition Related Findings:    ileostomy +stool, A&Ox1, hypernatremia, PEG clamped (NPO/TF off for port removal 7/19) +1 edema LUE, -I/O 4.1L Wound Type: None (old Mediport site, dusky/red areas elbows/heels)       Current Nutrition Intake & Therapies:    Average Meal Intake: NPO  Average Supplements Intake: NPO  Diet NPO Exceptions are: Sips of Water with Meds    Anthropometric Measures:  Height: 5' (152.4 cm)  Ideal Body Weight (IBW): 100 lbs (45 kg)    Admission Body Weight: 102 lb 4.7 oz (46.4 kg)  Current Body Weight: 104 lb 9.6 oz (47.4 kg), 102.3 % IBW.  Weight Source: Bed Scale (7/19 bedscale)  Current BMI (kg/m2): 20.4  Usual Body Weight:  (Goal 100# per pt, 5/25/22 92.5 lbs per [facility] RD note.)     Weight Adjustment For: No Adjustment                 BMI Categories: Underweight (BMI less than 22) age over 72    Estimated Daily Nutrient Needs:  Energy Requirements Based On: Kcal/kg  Weight Used for Energy Requirements: Admission  Energy (kcal/day):  kcal/d (30-32 kcal/kg)  Weight Used for Protein Requirements: Current  Protein (g/day): 50-60 gm/d  Method Used for Fluid Requirements: Standard Renal  Fluid (ml/day): per renal    Nutrition Diagnosis:   Severe malnutrition, In context of chronic illness related to altered GI function, inadequate protein-energy intake (h/o ileostomy, abd pain, poor PO intake PTA) as evidenced by Criteria as identified in malnutrition assessment    Nutrition Interventions:   Food and/or Nutrient Delivery: Continue NPO (PO pending SLP recommendations and suggest resume EN via PEG)  Nutrition Education/Counseling: No recommendation at this time  Coordination of Nutrition Care: Continue to monitor while inpatient       Goals:  Previous Goal Met: No Progress toward Goal(s)  Goals: other (specify)  Specify Other Goals: Nutrition progression    Nutrition Monitoring and Evaluation:   Behavioral-Environmental Outcomes: None Identified  Food/Nutrient Intake Outcomes: Enteral Nutrition Intake/Tolerance, Food and Nutrient Intake  Physical Signs/Symptoms Outcomes: GI Status, Biochemical Data, Fluid Status or Edema, Nutrition Focused Physical Findings, Weight, Skin    Discharge Planning:    Enteral Nutrition     Yue Dwyer RD, CNSC, LD  Contact: [phone]

## 2022-07-19 NOTE — PROGRESS NOTES
[address] [place] [address] [facility]  Progress Note    SUBJECTIVE:  Chief Complaint   Patient presents with    Abnormal Lab     sent in by Dr. Bjorn Chavez    Chest Pain     The Mediport was removed. The patient is feeling better. No fever. Tolerating antibiotics. Review of systems:  As stated above in the chief complaint, otherwise negative. Medications:  Scheduled Meds:   ceFAZolin  2,000 mg IntraVENous See Admin Instructions    cefepime  2,000 mg IntraVENous Q24H    Arformoterol Tartrate  15 mcg Nebulization BID    budesonide  0.5 mg Nebulization BID    insulin lispro  0-6 Units SubCUTAneous TID WC    insulin lispro  0-3 Units SubCUTAneous Nightly    nicotine  1 patch TransDERmal Daily    calcitRIOL  0.25 mcg Oral Q MWF    vitamin D  50,000 Units Oral Weekly    sodium chloride flush  10 mL IntraVENous 2 times per day    escitalopram  20 mg Oral Nightly    pantoprazole  40 mg Oral QAM AC     Continuous Infusions:   dextrose Stopped (22 005)    dextrose 5% in lactated ringers 100 mL/hr at 22 1000    heparin (PORCINE) Infusion Stopped (22 0959)    sodium chloride       PRN Meds:HYDROmorphone **OR** HYDROmorphone, ipratropium-albuterol, glucose, dextrose bolus **OR** dextrose bolus, glucagon (rDNA), dextrose, heparin (porcine), heparin (porcine), sodium chloride flush, sodium chloride, senna, acetaminophen **OR** acetaminophen, hyoscyamine, oxyCODONE-acetaminophen    OBJECTIVE:  BP (!) 98/44   Pulse 81   Temp 97.4 °F (36.3 °C) (Axillary)   Resp 18   Ht 5' (1.524 m)   Wt 104 lb 9.6 oz (47.4 kg)   SpO2 95%   BMI 20.43 kg/m²   Temp  Av.1 °F (36.7 °C)  Min: 97.4 °F (36.3 °C)  Max: 98.9 °F (37.2 °C)  Constitutional: The patient is lying in bed. She is more awake and alert. No distress. Answering questions. Skin: Warm and slightly diaphoretic. No rashes were noted. HEENT: Round and reactive pupils. Dry mouth. No ulcerations or thrush. Neck: Supple to movements. Chest: No respiratory distress. Cardiovascular: Heart sounds rhythmic and regular. Abdomen: Positive bowel sounds to auscultation. Benign to palpation. No masses felt. No hepatosplenomegaly. Extremities: No edema.   Lines: Peripheral.    Laboratory and Tests Review:  Lab Results   Component Value Date    WBC 12.7 (H) 07/19/2022    WBC 3.4 (L) 07/18/2022    WBC 27.8 (H) 07/17/2022    HGB 10.0 (L) 07/19/2022    HCT 30.1 (L) 07/19/2022    MCV 94.7 07/19/2022    PLT 45 (L) 07/19/2022     Lab Results   Component Value Date    NEUTROABS 10.39 (H) 07/19/2022    NEUTROABS 3.08 07/18/2022    NEUTROABS 25.99 (H) 07/17/2022     No results found for: [facility]  Lab Results   Component Value Date    ALT 13 07/18/2022    AST 15 07/18/2022    ALKPHOS 255 (H) 07/18/2022    BILITOT 2.0 (H) 07/18/2022     Lab Results   Component Value Date/Time     07/19/2022 10:20 AM    K 3.7 07/19/2022 10:20 AM    K 3.3 07/18/2022 02:25 AM     07/19/2022 10:20 AM    CO2 24 07/19/2022 10:20 AM    BUN 81 07/19/2022 10:20 AM    CREATININE 1.6 07/19/2022 10:20 AM    CREATININE 1.8 07/18/2022 02:25 AM    CREATININE 1.9 07/17/2022 03:27 AM    GFRAA 38 07/19/2022 10:20 AM    LABGLOM 31 07/19/2022 10:20 AM    GLUCOSE 156 07/19/2022 10:20 AM    PROT 5.0 07/18/2022 02:25 AM    LABALBU 1.9 07/18/2022 02:25 AM    CALCIUM 8.3 07/19/2022 10:20 AM    BILITOT 2.0 07/18/2022 02:25 AM    ALKPHOS 255 07/18/2022 02:25 AM    AST 15 07/18/2022 02:25 AM    ALT 13 07/18/2022 02:25 AM     Lab Results   Component Value Date    CRP 2.3 (H) 04/27/2022    CRP 23.2 (H) 01/27/2020     Lab Results   Component Value Date    SEDRATE 60 (H) 01/27/2020     Radiology:      Microbiology:   Rapid SARS-CoV-2: Negative  Rapid influenza: Negative  Respiratory panel: Negative  Blood culture 7/14/2022: Negative so far  Blood cultures 7/15/2022: E. coli in 4 of 4 bottles  Streptococcus pneumoniae/Legionella urine Ag: negative   Urine culture 7/16/2022: <25,000 CFU Enterococcus faecalis    Recent Labs     07/16/22  1200   PROCAL 68.17*       ASSESSMENT:  CLABSI  E. coli septicemia secondary to CLABSI  Fever secondary to infected Mediport  Leukocytosis secondary to infected Mediport and CLABSI  Asymptomatic and insignificant bacteriuria with Enterococcus    PLAN:  Continue Cefepime  Check final sensitivities on E. coli  Repeat blood cultures x1 today  No need to treat asymptomatic bacteriuria  We will follow with you    Spoke with nursing.       Jamie Brady MD  11:25 AM  7/19/2022

## 2022-07-19 NOTE — PROGRESS NOTES
SPEECH/LANGUAGE PATHOLOGY  CLINICAL ASSESSMENT OF SWALLOWING FUNCTION   and PLAN OF CARE    PATIENT NAME:  Amy Eugene  (female)     MRN:  [id number]    :  1940  ([de-identified] y.o.)  STATUS:  Inpatient: Room 0405/0405-A    TODAY'S DATE:  2022  REFERRING PROVIDER: Zenobia Benson DO    SPECIFIC PROVIDER ORDER: SLP swallowing-dysphagia evaluation and treatment Date of order:  22  REASON FOR REFERRAL: To assess oropharyngeal swallow fx. EVALUATING THERAPIST: CLEMENCIA Knott                 RESULTS:    DYSPHAGIA DIAGNOSIS:   Clinical indicators of limited intake on exam, not able to determine type or severity of dysphagia       DIET RECOMMENDATIONS:  NPO until MBSS can be completed        FEEDING RECOMMENDATIONS:     Assistance level:  Not applicable      Compensatory strategies recommended: Not applicable      Discussed recommendations with nursing and/or faxed report to referring provider: Yes-informed pt's nurse re: rec for NPO at this time and for MBSS to be completed, of burping noted throughout intake, as well as dried secretions on pt's tongue. SPEECH THERAPY  PLAN OF CARE   The dysphagia POC is established based on physician order, dysphagia diagnosis and results of clinical assessment     Will establish POC once MBSS is completed.     Conditions Requiring Skilled Therapeutic Intervention for dysphagia:    Throat clearing during PO intake   Coughing during PO intake      Specific dysphagia interventions to include:     MBSS to be completed     Specific instructions for next treatment:  MBSS to be completed  Patient Treatment Goals:    Short Term Goals:  Pt will participate in MBSS to fully assess oropharyngeal swallow function and to assist in determining the least restrictive PO diet to maintain adequate nutrition/hydration     Long Term Goals:   OTHER RECOMMENDATIONS:  A Video Swallow Study (MBSS) is recommended and requires a physician order      Patient/family Goal:    Did not state. Will further assess during treatment. Plan of care discussed with Patient   The Patient did not demonstrate complete understanding of the diagnosis, prognosis and plan of care     Rehabilitation Potential/Prognosis: fair                    ADMITTING DIAGNOSIS: Dehydration [E86.0]  Severe protein-calorie malnutrition ([address] [E43]  Failure to thrive in adult [R62.7]  NSTEMI (non-ST elevated myocardial infarction) ([address] [I21.4]  Acute renal failure (ARF) (HCC) [N17.9]  Acute renal failure, unspecified acute renal failure type ([address] [N17.9]    VISIT DIAGNOSIS:   Visit Diagnoses         Codes    Dehydration    -  Primary E86.0    Acute renal failure, unspecified acute renal failure type ([address]     N17.9    NSTEMI (non-ST elevated myocardial infarction) ([address]     I21.4    Failure to thrive in adult     R62.7    Severe protein-calorie malnutrition ([address]     E43    Sepsis, due to unspecified organism, unspecified whether acute organ dysfunction present ([address]     A41.9             PATIENT REPORT/COMPLAINT: Pt questionable historian; however, pt reported coughing/choking with intake. RN cleared patient for participation in assessment     yes-RN reported that pt presented with coughing with thin liquid intake this date; per RN, pt has PEG tube for nutrition but that pt receives \"comfort\" feeds of regular text/thin liquid. PRIOR LEVEL OF SWALLOW FUNCTION:    PAST HISTORY OF DYSPHAGIA?: none reported    Home diet: Diet information not available     Current Diet Order:  Diet NPO Exceptions are: Sips of Water with Meds    PROCEDURE:  Consistencies Administered During the Evaluation   Liquids: thin liquid   Solids:  not appropriate      Method of Intake:   cup  Self fed      Position:   Seated, upright    CLINICAL ASSESSMENT:  Oral Stage:       Insufficient trials to assess oral stage characteristics this date.       Pharyngeal Stage:    W/ thin from cup: pt presented with X1 delayed dry throat clear and X1 immediate wet cough    Burping noted post thin liquid intake     Cognition:   Confusion noted    Oral Peripheral Examination   Generalized oral weakness    Current Respiratory Status    6 liters nasal cannula     Parameters of Speech Production  Respiration:  Adequate for speech production  Quality:   Strained at times   Intensity: Quiet at times     Volitional Swallow: not able to elicit     Volitional Cough:   DNT    Pain: No pain reported. EDUCATION:   The Speech Language Pathologist (SLP) completed education regarding results of evaluation and that intervention is warranted at this time. Learner: Patient  Education: Reviewed results and recommendations of this evaluation and Reviewed signs, symptoms and risks of aspiration  Evaluation of Education:  Verbalizes understanding and Needs further instruction    This plan may be re-evaluated and revised as warranted. Evaluation Time includes thorough review of current medical information, gathering information on past medical history/social history and prior level of function, completion of standardized testing/informal observation of tasks, assessment of data and education on plan of care and goals. [x]The admitting diagnosis and active problem list, have been reviewed prior to initiation of this evaluation.         ACTIVE PROBLEM LIST:   Patient Active Problem List   Diagnosis    Right cataract    Essential hypertension, benign    Hyperlipidemia with target LDL less than 100    Osteoarthritis, shoulder    Primary osteoarthritis of left knee    History of ischemic bowel disease    Ileostomy present ([address]    Chronic kidney disease, stage III (moderate) (HCC)    COPD (chronic obstructive pulmonary disease) (HCC)    Moderate protein-calorie malnutrition (HCC)    Abdominal wall cellulitis    Cellulitis    Infected hernioplasty mesh (HCC)    Enterocutaneous fistula    Mild protein-calorie malnutrition ([address]    Acute kidney injury ([address]    Hypertensive kidney disease with chronic kidney disease stage IV (HCC)    KD (acute kidney injury) ([facility]    Diarrhea    Elevated serum creatinine    Abnormal serum creatinine level    Hyperlipemia    Primary hypertension    Essential (primary) hypertension    Postsurgical malabsorption, not elsewhere classified    Hypercholesterolemia    Pure hypercholesterolemia    Prediabetes    Hypothyroidism, unspecified    Acute renal failure (ARF) ([facility]         CPT code:  12827  bedside swallow eval    Tx note (54589): SLP reviewed rationale for ST eval and on overt s/s of aspiration. SLP reviewed rec for NPO at this time as well as for MBSS to be completed to further assess oropharyngeal swallow fx. Pt agreeable for such. Pt presented with dried secretions on tongue this date; SLP informed pt's nurse of this and on rec for consistent oral care for pt. Materials discarded following session and pt left in room w/ callbell w/I reach.

## 2022-07-19 NOTE — PLAN OF CARE
Problem: Skin/Tissue Integrity  Goal: Absence of new skin breakdown  Description: 1. Monitor for areas of redness and/or skin breakdown  2. Assess vascular access sites hourly  3. Every 4-6 hours minimum:  Change oxygen saturation probe site  4. Every 4-6 hours:  If on nasal continuous positive airway pressure, respiratory therapy assess nares and determine need for appliance change or resting period.   Outcome: Progressing Towards Goal     Problem: Safety - Adult  Goal: Free from fall injury  Outcome: Progressing Towards Goal     Problem: Nutrition Deficit:  Goal: Optimize nutritional status  Outcome: Progressing Towards Goal     Problem: ABCDS Injury Assessment  Goal: Absence of physical injury  Outcome: Progressing Towards Goal

## 2022-07-19 NOTE — DISCHARGE INSTR - COC
Continuity of Care Form    Patient Name: aSbrina Pelayo   :  1940  MRN:  [id number]    Admit date:  2022  Discharge date:  ***    Code Status Order: Full Code   Advance Directives:     Admitting Physician:  Ishan Acosta DO  PCP: Ishan Acosta DO    Discharging Nurse: [facility] Unit/Room#: 5266/5285-C  Discharging Unit Phone Number: ***    Emergency Contact:   Extended Emergency Contact Information  Primary Emergency Contact: WarrenJohn  Address: [address] [place] [address] Phone: [phone]  Mobile Phone: [phone]  Relation: Spouse    Past Surgical History:  Past Surgical History:   Procedure Laterality Date    ABDOMEN SURGERY  2012    total colectomy, bowel resection, ileostomy,revision of ileostomy     ABDOMEN SURGERY N/A 2020    DRAINAGE OF ABDOMINAL WALL ABSCESS, EXPLANTATION OF MESH, DEBRIDEMENT OF SKIN AND SUBCUTANEOUS TISSUE performed by Lottie Melo MD at [place] [address] N/A 2020    EXPLANTATION OF HERNIA MESH performed by Lottie Melo MD at [place] [address]    ARTHROPLASTY  1-    right and left thumb    [address], ,     lumbar fusion x 3    BACK SURGERY      cervical fusion x 2    BREAST SURGERY  years ago    monor calcifications    CARPAL TUNNEL RELEASE Bilateral     CATARACT REMOVAL WITH IMPLANT Right 03/03/15    CHOLECYSTECTOMY  07    Lap    COLONOSCOPY      CORONARY ANGIOPLASTY WITH STENT PLACEMENT  2002    ENDOSCOPY, COLON, DIAGNOSTIC      EPIGASTRIC HERNIA REPAIR  3/21/16    incisional with mesh implantation    ESOPHAGUS SURGERY  13    esophageal clamp (torn Esophagus)    FINGER TRIGGER RELEASE Right 2006    index finger    FOOT SURGERY Right     multiple    HERNIA REPAIR  13    abdominal x 5- last one      ILEOSTOMY OR JEJUNOSTOMY  1-3-13    revision    INSERT PICC LINE  2020    and removed     JOINT REPLACEMENT Right 3/24/15    right total shoulder arthroplasty    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     LAPAROTOMY N/A 5/12/2020    EXPLORATORY LAPAROTOMY EXPLANTATION OF INFECTED MESH SMAL BOWEL RESECTION AND REVISION END ILEOSTOMY, LYSIS OF ADHESIONS performed by Garrison Calabrese MD at [facility] N/A 6/8/2020    LAPAROTOMY EXPLORATORY, [place] OF HERNIA MESH performed by Garrison Calabrese MD at [address] [place] [address] N/A 9/15/2020    EXPLORATORY LAPAROTOMY WITH SMALL BOWEL RESECTION, TAKE DOWN REVISION ENTEROCUTANEOUS FISTULA , OSTOMY REVISION performed by Garrison Calabrese MD at [address] / leg    NASAL SINUS SURGERY      x2 /  cauterized    OTHER SURGICAL HISTORY  08/24/2016    diagnostic laparotomy with repair of intraabdominal hernia    OTHER SURGICAL HISTORY      removal plate / screws  / right great toe    PICC LINE INSERTION NURSE  9/15/2020         PORT SURGERY N/A 7/19/2022    MEDIPORT REMOVAL performed by Evi Mulligan MD at [address]  2010    right     SKIN BIOPSY  2010    3 squamous cell carcinoma right leg, left leg       Immunization History:   Immunization History   Administered Date(s) Administered    COVID-19, PFIZER GRAY top, DO NOT Dilute, (age 15 y+), IM, 30 mcg/0.3 mL 04/18/2022    COVID-19, PFIZER PURPLE top, DILUTE for use, (age 15 y+), 30mcg/0.3mL 01/29/2021, 02/19/2021, 09/24/2021    Influenza Vaccine, unspecified formulation 10/15/2016       Active Problems:  Patient Active Problem List   Diagnosis Code    Right cataract H26.9    Essential hypertension, benign I10    Hyperlipidemia with target LDL less than 100 E78.5    Osteoarthritis, 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    Primary osteoarthritis of left knee M17.12    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    Ileostomy present ([facility] [address] Z93.2    Chronic kidney disease, stage III (moderate) (HCC) N18.30    COPD (chronic obstructive pulmonary disease) (HCC) J44.9    Moderate protein-calorie malnutrition ([facility] [address] E44.0    Abdominal wall cellulitis L03.311    Cellulitis L03.90    Infected hernioplasty mesh [facility]) T85.79XA    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    Mild protein-calorie malnutrition (HCC) E44.1    Acute kidney injury ([facility] [address] N17.9    Hypertensive kidney disease with chronic kidney disease stage IV (HCC) I12.9, N18.4    KD (acute kidney injury) ([facility] [address] N17.9    Diarrhea R19.7    Elevated serum creatinine R79.89    Abnormal serum creatinine level R79.89    Hyperlipemia E78.5    Primary hypertension I10    Essential (primary) hypertension I10    Postsurgical malabsorption, 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    Hypercholesterolemia E78.00    Pure hypercholesterolemia E78.00    Prediabetes R73.03    Hypothyroidism, unspecified E03.9    Acute renal failure (ARF) ([facility] [address] N17.9       Isolation/Infection:   Isolation            No Isolation          Patient Infection Status       Infection Onset Added Last Indicated Last Indicated By Review Planned Expiration Resolved Resolved By    None active    Resolved    COVID-19 (Rule Out) 07/16/22 07/16/22 07/16/22 Respiratory Panel, Molecular, with COVID-19 (Restricted: peds pts or suitable admitted adults) (Ordered)   07/16/22 Rule-Out Test Resulted    COVID-19 (Rule Out) 07/13/22 07/13/22 07/14/22 COVID-19, Rapid (Ordered)   07/14/22 Rule-Out Test Resulted    C-diff Rule Out 02/27/21 02/27/21 02/27/21 CLOSTRIDIUM DIFFICILE EIA (Ordered)   02/28/21 Rule-Out Test Resulted    MRSA 02/20/20 02/22/20 02/20/20 Culture, Wound   09/22/20 Bob Godwin RN            Nurse Assessment:  Last Vital Signs: BP (!) 135/58   Pulse 88   Temp 98.1 °F (36.7 °C) (Oral)   Resp 16   Ht 5' (1.524 m)   Wt 104 lb 9.6 oz (47.4 kg)   SpO2 90%   BMI 20.43 kg/m²     Last documented pain score (0-10 scale): Pain Level: 0  Last Weight:   Wt Readings from Last 1 Encounters:   07/19/22 104 lb 9.6 oz (47.4 kg)     Mental Status:  {IP PT MENTAL STATUS:20030}    IV Access:  {[facility] IV TEODUT:764252781}    Nursing Mobility/ADLs:  Walking   {[facility] DME SVQO:443747840}  Transfer  {[facility] DME TEJJ:069060837}  Bathing  {[facility] DME FHSE:999630338}  Dressing  {[facility] KXTF:704153769}  Toileting  {[facility] DME HUJQ:044570504}  Feeding  {[facility] WYU}  Med Admin  {[facility] JOJT:149709928}  Med Delivery   {[facility] MED Delivery:046835614}    Wound Care Documentation and Therapy:  Incision 22 Chest Right;Upper (Active)   Dressing Status Clean;Dry; Intact 22   Dressing/Treatment Skin glue 22   Closure Surgical glue; Open to air 22   Margins Approximated 22   Drainage Amount None 22   Number of days: 0        Elimination:  Continence: Bowel: {YES / GY:81163}  Bladder: {YES / YR:62160}  Urinary Catheter: {Urinary Catheter:686890093}   Colostomy/Ileostomy/Ileal Conduit: {YES / JH:47181}  Ileostomy Ileostomy RLQ-Stomal Appliance: 2 piece  Ileostomy Ileostomy RLQ-Stoma  Assessment: Red, Moist  Ileostomy Ileostomy RLQ-Peristomal Assessment: Unable to assess  Ileostomy Ileostomy RLQ-Treatment: Bag change, Stoma paste (patient has supplies brought from home)  Ileostomy Ileostomy RLQ-Stool Appearance: Watery  Ileostomy Ileostomy RLQ-Stool Color: Green, Yellow  Ileostomy Ileostomy RLQ-Stool Amount: Medium  Ileostomy Ileostomy RLQ-Output (mL): 200 ml    Date of Last BM: ***    Intake/Output Summary (Last 24 hours) at 2022 1425  Last data filed at 2022 1213  Gross per 24 hour   Intake 757 ml   Output 1850 ml   Net -1093 ml     I/O last 3 completed shifts:   In: 457 [NG/GT:457]  Out: 7798 [Urine:1975; Stool:850]    Safety Concerns:     [address] [employer] Safety Concerns:212477268}    Impairments/Disabilities:      [address] [employer] Impairments/Disabilities:919397461}    Nutrition Therapy:  Current Nutrition Therapy:   [address] SHELLI Diet List:829444287}    Routes of Feeding: {CHP DME Other Feedings:417497905}  Liquids: {Slp liquid thickness:15531}  Daily Fluid Restriction: {CHP DME Yes amt VIOLEKL:357635833}  Last Modified Barium Swallow with Video (Video Swallowing Test): {Done Not Done IWDW:062475248}    Treatments at the Time of Hospital Discharge:   Respiratory Treatments: ***  Oxygen Therapy:  {Therapy; copd oxygen:50751}  Ventilator:    {MH CC Vent LBZK:638106814}    Rehab Therapies: {THERAPEUTIC INTERVENTION:5922529429}  Weight Bearing Status/Restrictions: 50Omar Duncan CC Weight Bearin}  Other Medical Equipment (for information only, NOT a DME order):  {EQUIPMENT:198384173}  Other Treatments: ***    Patient's personal belongings (please select all that are sent with patient):  {CHP DME Belongings:004876224}    RN SIGNATURE:  {Esignature:613907322}    CASE MANAGEMENT/SOCIAL WORK SECTION    Inpatient Status Date: ***    Readmission Risk Assessment Score:  Readmission Risk              Risk of Unplanned Readmission:  [id number]           Discharging to Facility/ Agency   Name: [facility] [address]  Phone:[phone]  RCX:[phone]    Dialysis Facility (if applicable)   Name:  Address:  Dialysis Schedule:  Phone:  Fax:    / signature: Electronically signed by CASSI Massey on 2022 at 2:26 PM      PHYSICIAN SECTION    Prognosis: {Prognosis:9812009221}    Condition at Discharge: 50Omar Duncan Patient Condition:709707838}    Rehab Potential (if transferring to Rehab): {Prognosis:9131428839}    Recommended Labs or Other Treatments After Discharge: ***    Physician Certification: I certify the above information and transfer of Maren Toussaint  is necessary for the continuing treatment of the diagnosis listed and that she requires skilled snf for less 30 days.      Update Admission H&P: {CHP DME Changes in ELKPM:786037484}    PHYSICIAN SIGNATURE:  Electronically signed by Allan Cyr DO on 22 at 7:13 AM EDT

## 2022-07-20 ENCOUNTER — APPOINTMENT (OUTPATIENT)
Dept: GENERAL RADIOLOGY | Age: 82
DRG: 682 | End: 2022-07-20
Payer: MEDICARE

## 2022-07-20 LAB
ALBUMIN SERPL-MCNC: 1.7 G/DL (ref 3.5–5.2)
ALP BLD-CCNC: 203 U/L (ref 35–104)
ALT SERPL-CCNC: 13 U/L (ref 0–32)
ANION GAP SERPL CALCULATED.3IONS-SCNC: 9 MMOL/L (ref 7–16)
APTT: 28.6 SEC (ref 24.5–35.1)
AST SERPL-CCNC: 16 U/L (ref 0–31)
BASOPHILS ABSOLUTE: 0.01 E9/L (ref 0–0.2)
BASOPHILS RELATIVE PERCENT: 0.1 % (ref 0–2)
BILIRUB SERPL-MCNC: 1.2 MG/DL (ref 0–1.2)
BUN BLDV-MCNC: 80 MG/DL (ref 6–23)
CALCIUM SERPL-MCNC: 7.9 MG/DL (ref 8.6–10.2)
CHLORIDE BLD-SCNC: 110 MMOL/L (ref 98–107)
CO2: 25 MMOL/L (ref 22–29)
CREAT SERPL-MCNC: 1.6 MG/DL (ref 0.5–1)
EOSINOPHILS ABSOLUTE: 0.05 E9/L (ref 0.05–0.5)
EOSINOPHILS RELATIVE PERCENT: 0.5 % (ref 0–6)
GFR AFRICAN AMERICAN: 38
GFR NON-AFRICAN AMERICAN: 31 ML/MIN/1.73
GLUCOSE BLD-MCNC: 141 MG/DL (ref 74–99)
HCT VFR BLD CALC: 28.6 % (ref 34–48)
HEMOGLOBIN: 9.2 G/DL (ref 11.5–15.5)
HEPARIN PF4 ANTIBODY: 0.06 OD
IMMATURE GRANULOCYTES #: 0.09 E9/L
IMMATURE GRANULOCYTES %: 0.9 % (ref 0–5)
LYMPHOCYTES ABSOLUTE: 0.75 E9/L (ref 1.5–4)
LYMPHOCYTES RELATIVE PERCENT: 7.6 % (ref 20–42)
MAGNESIUM: 2.2 MG/DL (ref 1.6–2.6)
MCH RBC QN AUTO: 30.8 PG (ref 26–35)
MCHC RBC AUTO-ENTMCNC: 32.2 % (ref 32–34.5)
MCV RBC AUTO: 95.7 FL (ref 80–99.9)
METER GLUCOSE: 119 MG/DL (ref 74–99)
METER GLUCOSE: 153 MG/DL (ref 74–99)
MONOCYTES ABSOLUTE: 1.32 E9/L (ref 0.1–0.95)
MONOCYTES RELATIVE PERCENT: 13.3 % (ref 2–12)
NEUTROPHILS ABSOLUTE: 7.7 E9/L (ref 1.8–7.3)
NEUTROPHILS RELATIVE PERCENT: 77.6 % (ref 43–80)
OVALOCYTES: ABNORMAL
PDW BLD-RTO: 14.6 FL (ref 11.5–15)
PHOSPHORUS: 4.1 MG/DL (ref 2.5–4.5)
PLATELET # BLD: 49 E9/L (ref 130–450)
PLATELET CONFIRMATION: NORMAL
PMV BLD AUTO: 13.3 FL (ref 7–12)
POIKILOCYTES: ABNORMAL
POTASSIUM REFLEX MAGNESIUM: 3.6 MMOL/L (ref 3.5–5)
RBC # BLD: 2.99 E12/L (ref 3.5–5.5)
SODIUM BLD-SCNC: 144 MMOL/L (ref 132–146)
TOTAL PROTEIN: 4.8 G/DL (ref 6.4–8.3)
WBC # BLD: 9.9 E9/L (ref 4.5–11.5)

## 2022-07-20 PROCEDURE — 92611 MOTION FLUOROSCOPY/SWALLOW: CPT

## 2022-07-20 PROCEDURE — 6370000000 HC RX 637 (ALT 250 FOR IP): Performed by: STUDENT IN AN ORGANIZED HEALTH CARE EDUCATION/TRAINING PROGRAM

## 2022-07-20 PROCEDURE — 83735 ASSAY OF MAGNESIUM: CPT

## 2022-07-20 PROCEDURE — 92526 ORAL FUNCTION THERAPY: CPT

## 2022-07-20 PROCEDURE — 2580000003 HC RX 258: Performed by: ANESTHESIOLOGY

## 2022-07-20 PROCEDURE — 80053 COMPREHEN METABOLIC PANEL: CPT

## 2022-07-20 PROCEDURE — 6360000002 HC RX W HCPCS: Performed by: STUDENT IN AN ORGANIZED HEALTH CARE EDUCATION/TRAINING PROGRAM

## 2022-07-20 PROCEDURE — 92610 EVALUATE SWALLOWING FUNCTION: CPT

## 2022-07-20 PROCEDURE — 2700000000 HC OXYGEN THERAPY PER DAY

## 2022-07-20 PROCEDURE — 84100 ASSAY OF PHOSPHORUS: CPT

## 2022-07-20 PROCEDURE — 2060000000 HC ICU INTERMEDIATE R&B

## 2022-07-20 PROCEDURE — 36415 COLL VENOUS BLD VENIPUNCTURE: CPT

## 2022-07-20 PROCEDURE — 85730 THROMBOPLASTIN TIME PARTIAL: CPT

## 2022-07-20 PROCEDURE — 82962 GLUCOSE BLOOD TEST: CPT

## 2022-07-20 PROCEDURE — 84238 ASSAY NONENDOCRINE RECEPTOR: CPT

## 2022-07-20 PROCEDURE — 85025 COMPLETE CBC W/AUTO DIFF WBC: CPT

## 2022-07-20 PROCEDURE — 2580000003 HC RX 258: Performed by: STUDENT IN AN ORGANIZED HEALTH CARE EDUCATION/TRAINING PROGRAM

## 2022-07-20 PROCEDURE — 6370000000 HC RX 637 (ALT 250 FOR IP): Performed by: SPECIALIST

## 2022-07-20 PROCEDURE — 94640 AIRWAY INHALATION TREATMENT: CPT

## 2022-07-20 PROCEDURE — 2500000003 HC RX 250 WO HCPCS: Performed by: RADIOLOGY

## 2022-07-20 PROCEDURE — 74230 X-RAY XM SWLNG FUNCJ C+: CPT

## 2022-07-20 RX ORDER — LABETALOL HYDROCHLORIDE 5 MG/ML
10 INJECTION, SOLUTION INTRAVENOUS
Status: DISCONTINUED | OUTPATIENT
Start: 2022-07-20 | End: 2022-07-22 | Stop reason: HOSPADM

## 2022-07-20 RX ORDER — SODIUM CHLORIDE 0.9 % (FLUSH) 0.9 %
5-40 SYRINGE (ML) INJECTION EVERY 12 HOURS SCHEDULED
Status: DISCONTINUED | OUTPATIENT
Start: 2022-07-20 | End: 2022-07-22 | Stop reason: HOSPADM

## 2022-07-20 RX ORDER — SODIUM CHLORIDE 9 MG/ML
25 INJECTION, SOLUTION INTRAVENOUS PRN
Status: DISCONTINUED | OUTPATIENT
Start: 2022-07-20 | End: 2022-07-22 | Stop reason: HOSPADM

## 2022-07-20 RX ORDER — SODIUM CHLORIDE 0.9 % (FLUSH) 0.9 %
5-40 SYRINGE (ML) INJECTION PRN
Status: DISCONTINUED | OUTPATIENT
Start: 2022-07-20 | End: 2022-07-22 | Stop reason: HOSPADM

## 2022-07-20 RX ORDER — MIDAZOLAM HYDROCHLORIDE 2 MG/2ML
2 INJECTION, SOLUTION INTRAMUSCULAR; INTRAVENOUS
Status: ACTIVE | OUTPATIENT
Start: 2022-07-20 | End: 2022-07-20

## 2022-07-20 RX ORDER — HYDRALAZINE HYDROCHLORIDE 20 MG/ML
10 INJECTION INTRAMUSCULAR; INTRAVENOUS
Status: DISCONTINUED | OUTPATIENT
Start: 2022-07-20 | End: 2022-07-22 | Stop reason: HOSPADM

## 2022-07-20 RX ORDER — ONDANSETRON 2 MG/ML
4 INJECTION INTRAMUSCULAR; INTRAVENOUS
Status: ACTIVE | OUTPATIENT
Start: 2022-07-20 | End: 2022-07-20

## 2022-07-20 RX ORDER — IPRATROPIUM BROMIDE AND ALBUTEROL SULFATE 2.5; .5 MG/3ML; MG/3ML
1 SOLUTION RESPIRATORY (INHALATION)
Status: DISPENSED | OUTPATIENT
Start: 2022-07-20 | End: 2022-07-20

## 2022-07-20 RX ORDER — LEVOFLOXACIN 500 MG/1
500 TABLET, FILM COATED ORAL EVERY OTHER DAY
Status: DISCONTINUED | OUTPATIENT
Start: 2022-07-20 | End: 2022-07-22 | Stop reason: HOSPADM

## 2022-07-20 RX ADMIN — BARIUM SULFATE 70 G: 0.81 POWDER, FOR SUSPENSION ORAL at 13:29

## 2022-07-20 RX ADMIN — BUDESONIDE 500 MCG: 0.5 SUSPENSION RESPIRATORY (INHALATION) at 08:07

## 2022-07-20 RX ADMIN — ESCITALOPRAM OXALATE 20 MG: 10 TABLET ORAL at 21:56

## 2022-07-20 RX ADMIN — PANTOPRAZOLE SODIUM 40 MG: 40 TABLET, DELAYED RELEASE ORAL at 07:54

## 2022-07-20 RX ADMIN — CEFEPIME 2000 MG: 2 INJECTION, POWDER, FOR SOLUTION INTRAVENOUS at 11:59

## 2022-07-20 RX ADMIN — BARIUM SULFATE 120 ML: 400 SUSPENSION ORAL at 13:29

## 2022-07-20 RX ADMIN — ARFORMOTEROL TARTRATE 15 MCG: 15 SOLUTION RESPIRATORY (INHALATION) at 08:07

## 2022-07-20 RX ADMIN — SODIUM CHLORIDE, PRESERVATIVE FREE 10 ML: 5 INJECTION INTRAVENOUS at 22:03

## 2022-07-20 RX ADMIN — LEVOFLOXACIN 500 MG: 500 TABLET, FILM COATED ORAL at 14:15

## 2022-07-20 RX ADMIN — BUDESONIDE 500 MCG: 0.5 SUSPENSION RESPIRATORY (INHALATION) at 22:08

## 2022-07-20 RX ADMIN — ARFORMOTEROL TARTRATE 15 MCG: 15 SOLUTION RESPIRATORY (INHALATION) at 22:08

## 2022-07-20 RX ADMIN — SODIUM CHLORIDE, PRESERVATIVE FREE 10 ML: 5 INJECTION INTRAVENOUS at 09:25

## 2022-07-20 RX ADMIN — INSULIN LISPRO 1 UNITS: 100 INJECTION, SOLUTION INTRAVENOUS; SUBCUTANEOUS at 07:10

## 2022-07-20 RX ADMIN — HYOSCYAMINE SULFATE 125 MCG: 0.12 TABLET ORAL at 12:07

## 2022-07-20 RX ADMIN — BARIUM SULFATE 10 ML: 400 PASTE ORAL at 13:28

## 2022-07-20 RX ADMIN — INSULIN LISPRO 1 UNITS: 100 INJECTION, SOLUTION INTRAVENOUS; SUBCUTANEOUS at 11:05

## 2022-07-20 RX ADMIN — SODIUM CHLORIDE, PRESERVATIVE FREE 10 ML: 5 INJECTION INTRAVENOUS at 21:56

## 2022-07-20 RX ADMIN — HYOSCYAMINE SULFATE 125 MCG: 0.12 TABLET ORAL at 18:14

## 2022-07-20 RX ADMIN — CALCITRIOL 0.25 MCG: 0.25 CAPSULE ORAL at 12:07

## 2022-07-20 ASSESSMENT — PAIN SCALES - GENERAL: PAINLEVEL_OUTOF10: 4

## 2022-07-20 NOTE — PROGRESS NOTES
No respiratory distress. Cardiovascular: Heart sounds rhythmic and regular. Abdomen: Positive bowel sounds to auscultation. Benign to palpation. No masses felt. No hepatosplenomegaly. Extremities: No edema. Lines: Peripheral.    Laboratory and Tests Review:  Lab Results   Component Value Date    WBC 9.9 07/20/2022    WBC 12.7 (H) 07/19/2022    WBC 3.4 (L) 07/18/2022    HGB 9.2 (L) 07/20/2022    HCT 28.6 (L) 07/20/2022    MCV 95.7 07/20/2022    PLT 49 (L) 07/20/2022     Lab Results   Component Value Date    NEUTROABS 7.70 (H) 07/20/2022    NEUTROABS 10.39 (H) 07/19/2022    NEUTROABS 3.08 07/18/2022     No results found for: [facility]  Lab Results   Component Value Date    ALT 13 07/20/2022    AST 16 07/20/2022    ALKPHOS 203 (H) 07/20/2022    BILITOT 1.2 07/20/2022     Lab Results   Component Value Date/Time     07/20/2022 02:35 AM    K 3.6 07/20/2022 02:35 AM     07/20/2022 02:35 AM    CO2 25 07/20/2022 02:35 AM    BUN 80 07/20/2022 02:35 AM    CREATININE 1.6 07/20/2022 02:35 AM    CREATININE 1.6 07/19/2022 10:20 AM    CREATININE 1.8 07/18/2022 02:25 AM    GFRAA 38 07/20/2022 02:35 AM    LABGLOM 31 07/20/2022 02:35 AM    GLUCOSE 141 07/20/2022 02:35 AM    PROT 4.8 07/20/2022 02:35 AM    LABALBU 1.7 07/20/2022 02:35 AM    CALCIUM 7.9 07/20/2022 02:35 AM    BILITOT 1.2 07/20/2022 02:35 AM    ALKPHOS 203 07/20/2022 02:35 AM    AST 16 07/20/2022 02:35 AM    ALT 13 07/20/2022 02:35 AM     Lab Results   Component Value Date    CRP 2.3 (H) 04/27/2022    CRP 23.2 (H) 01/27/2020     Lab Results   Component Value Date    SEDRATE 60 (H) 01/27/2020     Radiology:      Microbiology:   Rapid SARS-CoV-2: Negative  Rapid influenza: Negative  Respiratory panel: Negative  Blood culture 7/14/2022: Negative so far  Blood cultures 7/15/2022: E. coli (intermediate to Augmentin.   Resistant to Bactrim) in 4 of 4 bottles  Streptococcus pneumoniae/Legionella urine Ag: negative   Urine culture 7/16/2022: <25,000 CFU Enterococcus faecalis    No results for input(s): PROCAL in the last 72 hours. ASSESSMENT:  CLABSI  E. coli septicemia secondary to CLABSI  Fever secondary to infected Mediport. Status post Mediport removal  Leukocytosis secondary to infected Mediport and CLABSI-resolved  Asymptomatic and insignificant bacteriuria with Enterococcus  Possible dysphagia    PLAN:  HCAP cefepime to Levofloxacin  Check final sensitivities on E. coli  Swallow evaluation  No need to treat asymptomatic bacteriuria  We will follow with you    Spoke with nursing.       Sean Aaron MD  1:03 PM  7/20/2022

## 2022-07-20 NOTE — PROGRESS NOTES
Pulmonary Progress Note    Admit Date: 2022                            PCP: Steven Kaiser DO  Principal Problem:    Acute renal failure (ARF) ([address]  Resolved Problems:    * No resolved hospital problems. *      Subjective:  Patient was seen resting in bed in no acute distress on 4 L nasal cannula, weaned down from 6 L nasal cannula yesterday  She is more alert today, asking when she can eat and drink  S/p Mediport removal on   Denies shortness of breath or cough        Medications:   dextrose Stopped (22 005)    dextrose 5% in lactated ringers 100 mL/hr at 22    [Held by provider] heparin (PORCINE) Infusion Stopped (22 0963)    sodium chloride          ceFAZolin  2,000 mg IntraVENous See Admin Instructions    cefepime  2,000 mg IntraVENous Q24H    Arformoterol Tartrate  15 mcg Nebulization BID    budesonide  0.5 mg Nebulization BID    insulin lispro  0-6 Units SubCUTAneous TID WC    insulin lispro  0-3 Units SubCUTAneous Nightly    nicotine  1 patch TransDERmal Daily    calcitRIOL  0.25 mcg Oral Q MWF    vitamin D  50,000 Units Oral Weekly    sodium chloride flush  10 mL IntraVENous 2 times per day    escitalopram  20 mg Oral Nightly    pantoprazole  40 mg Oral QAM AC       Vitals:  VITALS:  BP (!) 147/64   Pulse 73   Temp 98.2 °F (36.8 °C) (Oral)   Resp 18   Ht 5' (1.524 m)   Wt 104 lb 9.6 oz (47.4 kg)   SpO2 91%   BMI 20.43 kg/m²   24HR INTAKE/OUTPUT:    Intake/Output Summary (Last 24 hours) at 2022 1009  Last data filed at 2022 0755  Gross per 24 hour   Intake --   Output 1075 ml   Net -1075 ml     CURRENT PULSE OXIMETRY:  SpO2: 91 %  24HR PULSE OXIMETRY RANGE:  SpO2  Av.8 %  Min: 90 %  Max: 100 %  CVP:    VENT SETTINGS:      Additional Respiratory Assessments  Heart Rate: 73  Resp: 18  SpO2: 91 %      EXAM:  General: No distress. Alert, pleasant  Eyes: No sclera icterus. No conjunctival injection. ENT: No discharge. Pharynx clear.   Neck: Trachea midline. Normal thyroid. Resp: No accessory muscle use. No crackles. No wheezing. No rhonchi. Diminished bilaterally. Poor inspiratory effort  CV: Regular rate. Irregular rhythm. +Mumur. Edema noted to BUE, no BLE edema   ABD: Non-tender. Non-distended. Normal bowel sounds. PEG, +colostomy  Skin: Warm and dry. No nodule on exposed extremities. No rash on exposed extremities. Right chest mediport incision with glue. M/S: No cyanosis. No joint deformity. No clubbing. Neuro: Follows commands, alert &o x 3    I/O: I/O last 3 completed shifts: In: 430 [I.V.:200; NG/GT:230]  Out: 2350 [Urine:1225; Stool:1125]  I/O this shift:  In: -   Out: 275 [Stool:275]     Results:  CBC:   Recent Labs     07/18/22 0225 07/19/22  1020 07/20/22  0235   WBC 3.4* 12.7* 9.9   HGB 9.9* 10.0* 9.2*   HCT 30.2* 30.1* 28.6*   MCV 95.0 94.7 95.7   PLT 59* 45* 49*     BMP:   Recent Labs     07/18/22 0225 07/19/22  1020 07/20/22  0235    147* 144   K 3.3* 3.7 3.6    109* 110*   CO2 24 24 25   PHOS 4.1 3.9 4.1   BUN 83* 81* 80*   CREATININE 1.8* 1.6* 1.6*     LFT:   Recent Labs     07/18/22 0225 07/20/22  0235   ALKPHOS 255* 203*   ALT 13 13   AST 15 16   PROT 5.0* 4.8*   BILITOT 2.0* 1.2   LABALBU 1.9* 1.7*     PT/INR:   Recent Labs     07/19/22  1020   PROTIME 22.1*   INR 2.0     Cultures:  No results for input(s): CULTRESP in the last 72 hours. ABG:   No results for input(s): PH, PO2, PCO2, HCO3, BE, O2SAT in the last 72 hours. Films:  CT HEAD WO CONTRAST    Result Date: 7/15/2022    No acute intracranial abnormality. XR CHEST PORTABLE    Result Date: 7/15/2022    No acute cardiopulmonary process. US RETROPERITONEAL COMPLETE    Result Date: 7/14/2022    1. Bilateral renal cysts. Largest is seen in the lower pole the left kidney measuring 4.5 cm. No aggressive renal lesions. Mild bilateral renal cortical thinning. No hydronephrosis. Mildly echogenic renal parenchyma.  2.  Normal appearance of the imaged bladder. Assessment:  Acute hypoxic respiratory failure without hypercarbia  Elevated D-dimer  E. Coli septicemia secondary to CLABSI  BUE blood clots   Current smoker  Leukocytosis and fever secondary to infected Mediport  Chronic kidney disease - nephrology following  Coronary artery disease with coronary stent placement 2002      Plan:  Patient was seen on 4 L nasal cannula on 6 L nasal cannula with SPO2 91%, wean oxygen as tolerated. Strep and legionella antigens negative. Respiratory panel negative. Afebrile last 24 hrs, WBC 12.7>9.9. PCT 68.17(7/16), blood cultures 7/15 e-coli, repeat negative. Continue IV cefepime per ID. Send sputum culture - does not look like it was sent. S/p L chest mediport removal 7/19 per General Surgery     D-dimer>5250, would like to have CTA to r/o PE but with renal function, CT chest w/o  7/16 showing mid central mucous plugging, minimal bibasilar atelectasis, pulmonary emphysema and hyperinflation. Continue Pulmicort, Brovana, and duo nebs with emphysema noted on CT chest and with smoking history. Smoking cessation encouraged, continue nicotine patch. Will need outpatient PFTs. Continue chest vest today to aid in expectoration with mucous plugging. US BUE 7/16 echogenic thrombus in distal left basilic vein and occlusive thrombus in L cephalic vein, heparin drip held 7/19 per oncology due to thrombocytopenia, with parameters to hold for less than 50,000. PLT 45>49 (7/20). Can start Eliquis once platelets recover and will need anticoagulation for 6-months   Continue tube feed, pending video swallow hopefully today        GEMA Enriquez - CNP  7/20/2022  10:09 AM    Attending Attestation Note:    Patient seen and examined with NP. I agree with above.     In addition, the following apply:    - O2, IS  - cefepime per ID  - heparin drip on hold due to platelets < 21H, upon D/C look to Eliquis PO initiation   - skilled rehabilitation will be needed  - bronchopulmonary hygiene  - Pulrobbie, Brovana, and Yung  - Continue tube feed, pending video swallow hopefully today  - supportive care    Brenna Cormier MD  7/20/2022  11:07 AM

## 2022-07-20 NOTE — PROGRESS NOTES
SPEECH/LANGUAGE PATHOLOGY  CLINICAL ASSESSMENT OF SWALLOWING FUNCTION   and PLAN OF CARE    PATIENT NAME:  Carroll Ash  (female)     MRN:  [id number]    :  1940  ([de-identified] y.o.)  STATUS:  Inpatient: Room 0405/0405-A    TODAY'S DATE:  2022  REFERRING PROVIDER:   Dr. Shahrzad Carter: SLP swallowing-dysphagia evaluation and treatment Date of order:  22  REASON FOR REFERRAL: assess swallow fx   EVALUATING THERAPIST: CLEMENCIA Myers                 RESULTS:    DYSPHAGIA DIAGNOSIS:   Clinical indicators of mild-moderate oropharyngeal phase dysphagia       DIET RECOMMENDATIONS:  Cont NPO until MBSS can be completed- patient currently receiving nutrition/hydration through PEG     FEEDING RECOMMENDATIONS:     Assistance level:  Not applicable      Compensatory strategies recommended: Not applicable      Discussed recommendations with nursing and/or faxed report to referring provider: Yes    SPEECH THERAPY  PLAN OF CARE   The dysphagia POC is established based on physician order, dysphagia diagnosis and results of clinical assessment     Will establish POC once MBSS is completed. Conditions Requiring Skilled Therapeutic Intervention for dysphagia:    Patient is performing below functional baseline d/t  current acute condition, Multiple diagnoses, multiple medications, and increased dependency upon caregivers. Throat clearing during PO intake   Wet vocal quality during PO intake  Coughing during PO intake      Specific dysphagia interventions to include:     MBSS to be completed    Specific instructions for next treatment:  MBSS to be completed  Patient Treatment Goals:    Short Term Goals:  Pt will participate in MBSS to fully assess oropharyngeal swallow function and to assist in determining the least restrictive PO diet to maintain adequate nutrition/hydration     Long Term Goals:    A Video Swallow Study (MBSS) is recommended and requires a physician order      Patient/family Goal:    To be able to [address] [place] [address] discussed with Patient   The Patient understand(s) the diagnosis, prognosis and plan of care     Rehabilitation Potential/Prognosis: fair                    ADMITTING DIAGNOSIS: Dehydration [E86.0]  Severe protein-calorie malnutrition ([address] [E43]  Failure to thrive in adult [R62.7]  NSTEMI (non-ST elevated myocardial infarction) ([address] [I21.4]  Acute renal failure (ARF) (HCC) [N17.9]  Acute renal failure, unspecified acute renal failure type ([address] [N17.9]    VISIT DIAGNOSIS:   Visit Diagnoses         Codes    Dehydration    -  Primary E86.0    Acute renal failure, unspecified acute renal failure type ([address]     N17.9    NSTEMI (non-ST elevated myocardial infarction) ([address]     I21.4    Failure to thrive in adult     R62.7    Severe protein-calorie malnutrition ([address]     E43    Sepsis, due to unspecified organism, unspecified whether acute organ dysfunction present ([address]     A41.9             PATIENT REPORT/COMPLAINT: denies difficulty swallowing  nursing not available at time of evaluation chart reviewed and patient is not NPO for any procedures per current documentation.      PRIOR LEVEL OF SWALLOW FUNCTION:    PAST HISTORY OF DYSPHAGIA?: none reported    Home diet: Regular consistency solids (IDDSI level 7) with  thin liquids (IDDSI level 0)  Current Diet Order:  Diet NPO Exceptions are: Sips of Water with Meds    PROCEDURE:  Consistencies Administered During the Evaluation   Liquids: thin liquid and nectar thick liquid   Solids:  pureed foods and solid foods      Method of Intake:   cup, straw, spoon  Self fed      Position:   Seated, upright    CLINICAL ASSESSMENT:  Oral Stage:       Decreased mastication due to:  decreased lingual control  Oral residuals were noted :  on the base of the tongue      Pharyngeal Stage:    Throat clearing present after presentation of thin liquid  Immediate wet cough was noted after presentation of thin liquid  Latent wet cough was noted after presentation of nectar consistency liquid  Wet/gurgly vocal quality was noted after presentation of thin liquid and nectar consistency liquid    Cognition:   Follows 1 - step directions appropriate for this assessment- some confusion noted    Oral Peripheral Examination   Generalized oral weakness    Current Respiratory Status    4 liters nasal cannula     Parameters of Speech Production  Respiration:  Adequate for speech production  Quality:   Within functional limits  Intensity: Quiet    Volitional Swallow: present     Volitional Cough:   present     Pain: 5/10 - Pain location: PEG site    EDUCATION:   The Speech Language Pathologist (SLP) completed education regarding results of evaluation and that intervention is warranted at this time. Learner: Patient  Education: Reviewed results and recommendations of this evaluation, Reviewed signs, symptoms and risks of aspiration, and Reviewed recommendations for follow-up  Evaluation of Education:  Verbalizes understanding    This plan may be re-evaluated and revised as warranted. Evaluation Time includes thorough review of current medical information, gathering information on past medical history/social history and prior level of function, completion of standardized testing/informal observation of tasks, assessment of data and education on plan of care and goals. [x]The admitting diagnosis and active problem list, have been reviewed prior to initiation of this evaluation.         ACTIVE PROBLEM LIST:   Patient Active Problem List   Diagnosis    Right cataract    Essential hypertension, benign    Hyperlipidemia with target LDL less than 100    Osteoarthritis, shoulder    Primary osteoarthritis of left knee    History of ischemic bowel disease    Ileostomy present ([facility] [address] [address]    Chronic kidney disease, stage III (moderate) (HCC)    COPD (chronic obstructive pulmonary disease) (HCC)    Moderate protein-calorie malnutrition (HCC)    Abdominal wall cellulitis

## 2022-07-20 NOTE — CARE COORDINATION
7/20/2022  Social Work Discharge Planning:Plan is to go to [facility] at discharge. No precert is needed;however, a COVID test will be needed day of discharge. SHELLI, 7000 and transport form are completed. Electronically signed by CASSI Rg on 7/20/2022 at 11:29 AM

## 2022-07-20 NOTE — PROGRESS NOTES
Patient seen and examined. Incision C/D/I. No erythema or hematoma. No further plans from general surgery point of view. Will be available as needed.     Electronically signed by Dash Padilla MD on 7/20/2022 at 7:03 AM

## 2022-07-20 NOTE — PROGRESS NOTES
The combination of escitalopram and levofloxacin may cause QTc interval prolongation.   Please monitor QTc interval.  Oswald Cushing, RPh 7/20/2022 1:47 PM

## 2022-07-20 NOTE — PROGRESS NOTES
[facility]  Nephrology Attending Progress Note    SUBJECTIVE:     7/20/22- Pt awake alert and appropriate, no new complaints, po intake still poor    PROBLEM LIST:    Patient Active Problem List   Diagnosis    Right cataract    Essential hypertension, benign    Hyperlipidemia with target LDL less than 100    Osteoarthritis, shoulder    Primary osteoarthritis of left knee    History of ischemic bowel disease    Ileostomy present ([address]    Chronic kidney disease, stage III (moderate) (HCC)    COPD (chronic obstructive pulmonary disease) (HCC)    Moderate protein-calorie malnutrition (HCC)    Abdominal wall cellulitis    Cellulitis    Infected hernioplasty mesh (HCC)    Enterocutaneous fistula    Mild protein-calorie malnutrition ([address]    Acute kidney injury ([address]    Hypertensive kidney disease with chronic kidney disease stage IV (HCC)    KD (acute kidney injury) ([address]    Diarrhea    Elevated serum creatinine    Abnormal serum creatinine level    Hyperlipemia    Primary hypertension    Essential (primary) hypertension    Postsurgical malabsorption, not elsewhere classified    Hypercholesterolemia    Pure hypercholesterolemia    Prediabetes    Hypothyroidism, unspecified    Acute renal failure (ARF) ([address]        PAST MEDICAL HISTORY:    Past Medical History:   Diagnosis Date    Acute kidney failure ([address] 2014 and 8/2016    x2,no dialysis needed,resolved, kidney function tests returned to normal    Anxiety     Arthritis     CAD (coronary artery disease)     follows with Dr. Kacy Bee every 6 months    Cancer ([address]     skin, leg,nose- removed surgically     COPD (chronic obstructive pulmonary disease) ([address]     mild- no inhlaers, no oxygen     Depression     Fibromyalgia     chronic back and neck pain    Generalized headaches     h/o / daily    History of blood transfusion 3-11-14    multiple times    History of necrotic bowel 2012    Hyperlipidemia     Hypertension     Incisional hernia     abdomen    Insomnia Mitral valve regurgitation     Myocardial infarct ([facility] [address] 2002    Occasional tremors     at hands / stable with medication    Osteopenia     PONV (postoperative nausea and vomiting)     Vitamin B12 deficiency     h/o       DIET:    Diet NPO Exceptions are: Sips of Water with Meds     PHYSICAL EXAM:     Patient Vitals for the past 24 hrs:   BP Temp Temp src Pulse Resp SpO2   07/20/22 0930 -- -- -- -- -- 91 %   07/20/22 0915 (!) 147/64 98.2 °F (36.8 °C) Oral 73 18 --   07/20/22 0232 133/62 97.4 °F (36.3 °C) Axillary 79 16 95 %   07/19/22 2000 134/63 98 °F (36.7 °C) Oral 81 16 93 %   07/19/22 1934 -- -- -- -- -- 100 %   @      Intake/Output Summary (Last 24 hours) at 7/20/2022 1418  Last data filed at 7/20/2022 1218  Gross per 24 hour   Intake --   Output 950 ml   Net -950 ml         Wt Readings from Last 3 Encounters:   07/19/22 104 lb 9.6 oz (47.4 kg)   01/28/22 94 lb (42.6 kg)   07/02/21 84 lb (38.1 kg)       Constitutional:  Pt frail and cachetic   Head: normocephalic, atraumatic  Neck: no JVD  Cardiovascular: S1 S2 no S3 or rub  Respiratory:  diminished, poor effort  Gastrointestinal:  Soft, + PEG and ostomy  Ext: no edema   Skin: dry, no rash  Neuro: awake alert and appropriate    MEDS (scheduled):    levoFLOXacin  500 mg Oral Every Other Day    Arformoterol Tartrate  15 mcg Nebulization BID    budesonide  0.5 mg Nebulization BID    insulin lispro  0-6 Units SubCUTAneous TID WC    insulin lispro  0-3 Units SubCUTAneous Nightly    nicotine  1 patch TransDERmal Daily    calcitRIOL  0.25 mcg Oral Q MWF    vitamin D  50,000 Units Oral Weekly    sodium chloride flush  10 mL IntraVENous 2 times per day    escitalopram  20 mg Oral Nightly    pantoprazole  40 mg Oral QAM AC       MEDS (infusions):   dextrose Stopped (07/17/22 0051)    dextrose 5% in lactated ringers 100 mL/hr at 07/19/22 1752    [Held by provider] heparin (PORCINE) Infusion Stopped (07/19/22 0959)    sodium chloride         MEDS (prn):   HYDROmorphone **OR** HYDROmorphone, ipratropium-albuterol, glucose, dextrose bolus **OR** dextrose bolus, glucagon (rDNA), dextrose, heparin (porcine), heparin (porcine), sodium chloride flush, sodium chloride, senna, acetaminophen **OR** acetaminophen, hyoscyamine, oxyCODONE-acetaminophen    DATA:    Recent Labs     07/18/22 0225 07/19/22  1020 07/20/22  0235   WBC 3.4* 12.7* 9.9   HGB 9.9* 10.0* 9.2*   HCT 30.2* 30.1* 28.6*   MCV 95.0 94.7 95.7   PLT 59* 45* 49*     Recent Labs     07/18/22 0225 07/19/22  1020 07/20/22  0235    147* 144   K 3.3* 3.7 3.6    109* 110*   CO2 24 24 25   BUN 83* 81* 80*   CREATININE 1.8* 1.6* 1.6*   LABGLOM 27 31 31   GLUCOSE 98 156* 141*   CALCIUM 8.3* 8.3* 7.9*   ALT 13  --  13   AST 15  --  16   BILITOT 2.0*  --  1.2   ALKPHOS 255*  --  203*   MG 1.7  --  2.2   PHOS 4.1 3.9 4.1       Lab Results   Component Value Date    LABALBU 1.7 (L) 07/20/2022    LABALBU 1.9 (L) 07/18/2022    LABALBU 2.0 (L) 07/17/2022     Lab Results   Component Value Date    TSH 1.380 07/01/2022       Iron Studies  Lab Results   Component Value Date    IRON 30 (L) 07/01/2022    TIBC 229 (L) 07/01/2022    FERRITIN 143 07/01/2022     Vitamin B-12   Date Value Ref Range Status   04/27/2022 1050 (H) 211 - 946 pg/mL Final     Folate   Date Value Ref Range Status   01/22/2021 8.1 4.8 - 24.2 ng/mL Final       Vit D, 25-Hydroxy   Date Value Ref Range Status   07/15/2022 13 (L) 30 - 100 ng/mL Final     Comment:     <20 ng/mL. ........... [facility] Deficient  20-30 ng/mL. ......... [facility] [employer] Insufficient   ng/mL. ........ [facility] [employer] Sufficient  >100 ng/mL. .......... [facility] [employer] Toxic       PTH   Date Value Ref Range Status   07/15/2022 307 (H) 15 - 65 pg/mL Final       No components found for: URIC    Lab Results   Component Value Date/Time    COLORU Yellow 07/15/2022 11:06 PM    NITRU Negative 07/15/2022 11:06 PM    GLUCOSEU Negative 07/15/2022 11:06 PM    KETUA Negative 07/15/2022 11:06 PM    UROBILINOGEN 0.2 07/15/2022 11:06 PM    BILIRUBINUR Negative 07/15/2022 11:06 PM       No results found for: LIPIDPAN    EXAMINATION:  RETROPERITONEAL ULTRASOUND OF THE KIDNEYS AND URINARY BLADDER     7/14/2022     COMPARISON:  Renal ultrasound from February 25, 2021     HISTORY:  ORDERING SYSTEM PROVIDED HISTORY: KD  TECHNOLOGIST PROVIDED HISTORY:  Reason for exam:->KD  What reading provider will be dictating this exam?->CRC     FINDINGS:     Kidneys: The right kidney measures 10.4 cm in length and the left kidney measures 9.7  cm in length. Right kidney: The corticomedullary differentiation and cortical thickness is  slightly decreased. Echogenic renal parenchyma. Anechoic thin walled  nonvascular 13 mm right mid to upper pole renal cyst.  No concerning renal  mass or intrarenal calcification. No hydronephrosis. Left kidney: The corticomedullary differentiation and cortical thickness is  slightly decreased. Anechoic thin walled nonvascular left midpole cyst.  4.5  cm left lower pole cyst.  Mildly echogenic renal parenchyma. There is no  hydronephrosis. Bladder:     Bladder volume was 282 mL. No intrinsic mass, intrinsic calcification, nor  wall thickening. Impression  1. Bilateral renal cysts. Largest is seen in the lower pole the left kidney  measuring 4.5 cm. No aggressive renal lesions. Mild bilateral renal  cortical thinning. No hydronephrosis. Mildly echogenic renal parenchyma. 2.  Normal appearance of the imaged bladder. IMPRESSION/RECOMMENDATIONS:      Stage II KD presumed sec to decreased effective renal perfusion in the setting  Gram Neg Sepsis with a HX of CKD G3B with a baseline serum cr 1.4-1.5mg/dl with an e-GFR=33-36ml/min  UC Enterococcus Faecalis-Asymptomatic bacteruria  BC E Coli  7/14/22 Renal US No evidence of hydro  FeNa<1%  TF running as po intake poor. Cr at or near baseline  S/P Removal Mediport 7/19/22  PLAN:  1. Discontinue the IVF  2. Follow labs-  3. Follow I&O       2.  HTN with CKD I-IV  BP

## 2022-07-20 NOTE — PROGRESS NOTES
SPEECH/LANGUAGE PATHOLOGY  VIDEOFLUOROSCOPIC STUDY OF SWALLOWING (MBS)   and PLAN OF CARE    PATIENT NAME:  Salas Goyal  (female)     MRN:  [id number]    :  1940  ([de-identified] y.o.)  STATUS:  Inpatient: Room 0405/0405-A    TODAY'S DATE:  2022  REFERRING PROVIDER:   Dr. Garcia Pollen: FL modified barium swallow with video  Date of order:  22   REASON FOR REFERRAL: assess swallow fx   EVALUATING THERAPIST: CLEMENCIA Hood      RESULTS:      DYSPHAGIA DIAGNOSIS:  mild  oral phase dysphagia  and moderate pharyngeal phase dysphagia- aspiration of thin liquid and nectar thick liquid per straw observed    DIET RECOMMENDATIONS:  Soft and bite size consistency solids (IDDSI level 6) with small sips nectar consistency (mildly thick - IDDSI level 2) liquids    FEEDING RECOMMENDATIONS:    Assistance level:  Stand by assistance is needed during all oral intake     Compensatory strategies recommended: Small bites/sips, Alternate solids and liquids, and NO STRAW     Discussed recommendations with nursing and/or faxed report to referring provider: Yes    Laryngeal Penetration and Aspiration:  Penetration WITH aspiration was observed in today's study with  thin liquid and mildly thick liquid (nectar) per straw    SPEECH THERAPY  PLAN OF CARE   The dysphagia POC is established based on physician order and dysphagia diagnosis    Skilled SLP intervention for dysphagia management on acute care 3-5 x per week until goals met, pt plateaus in function and/or discharged from hospital      Conditions Requiring Skilled Therapeutic Intervention for dysphagia:    Patient is performing below functional baseline d/t  current acute condition, Multiple diagnoses, multiple medications, and increased dependency upon caregivers.   Oral motor strength/coordination impairment  Reduced laryngeal closure resulting in aspiration   Swallow triggered when bolus head at level of pyriform sinus increasing risk of aspiration    SPECIFIC DYSPHAGIA INTERVENTIONS TO INCLUDE:     Compensatory strategy training   Therapeutic exercises    Specific instructions for next treatment:  ongoing PO analysis to upgrade diet and evaluate tolerance of current PO recommendation, initiate instruction of therapeutic exercises , and initiate instruction of compensatory strategies  Treatment Goals:    Short Term Goals:  Pt will implement identified compensatory swallowing strategies on 90% of opportunities or greater to improve airway protection and swallow function. Pt will complete oral motor strength/ coordination exercises to improve bolus prep/ control and mastication with  moderate verbal prompts . Pt will complete laryngeal strength/ ROM therapeutic exercises to improve airway protection for the least restrictive PO diet moderate verbal prompts    Long Term Goals:   Pt will maintain adequate nutrition/hydration via PO intake of the least restrictive oral diet with implementation of safe swallow/ compensatory strategies and decrease signs/symptoms of aspiration to less than 1 x/day. Pt will improve oropharyngeal swallow function to ensure airway protection during PO intake to maintain adequate nutrition/hydration and decrease signs/symptoms of aspiration to less than 1 x/day.       Patient/family Goal:     To be able to [facility] discussed with Patient   The Patient understand(s) the diagnosis, prognosis and plan of care     Rehabilitation Potential/Prognosis: fair                      ADMITTING DIAGNOSIS: Dehydration [E86.0]  Severe protein-calorie malnutrition ([facility] [E43]  Failure to thrive in adult [R62.7]  NSTEMI (non-ST elevated myocardial infarction) ([facility] [I21.4]  Acute renal failure (ARF) (HCC) [N17.9]  Acute renal failure, unspecified acute renal failure type ([address] [N17.9]     VISIT DIAGNOSIS:   Visit Diagnoses         Codes    Dehydration    -  Primary E86.0    Acute renal failure, unspecified acute renal failure type ([facility] [address]     N17.9    NSTEMI (non-ST elevated myocardial infarction) ([facility])     I21.4    Failure to thrive in adult     R62.7    Severe protein-calorie malnutrition ([facility] [address]     E43    Sepsis, due to unspecified organism, unspecified whether acute organ dysfunction present ([facility] [address]     A41.9                PATIENT REPORT/COMPLAINT: coughing with liquids    PRIOR LEVEL OF SWALLOW FUNCTION:    Past History of Dysphagia?:  none reported    Home diet: Regular consistency solids (IDDSI level 7) with  thin liquids (IDDSI level 0)  Current Diet Order:  Diet NPO Exceptions are: Sips of Water with Meds    PROCEDURE:  Consistencies Administered During the Evaluation   Liquids: thin liquid and nectar thick liquid   Solids:  pureed foods and solid foods      Method of Intake:   cup, straw, spoon  Fed by clinician      Position:   Seated, upright    INSTRUMENTAL ASSESSMENT:    ORAL PREP/ ORAL PHASE:    Decreased bolus formation resulting in observed premature pharyngeal spillage     PHARYNGEAL PHASE:     ONSET TIME       Delayed initiation of the pharyngeal swallow was noted with swallow reflex triggered at the level of the vallecula and pyriform sinus        PHARYNGEAL RESIDUALS        Vallecula/Pharyngeal Wall           No significant residuals were noted in the vallecula      Pyriform Sinuses      No significant residuals were noted in the pyriform sinuses     LARYNGEAL PENETRATION   Laryngeal penetration occurred prior to aspiration. Further details under aspiration section. ASPIRATION  Aspiration occurred DURING the swallow for thin liquid due to  delayed laryngeal closure . Aspiration was mild-moderate and occurred inconsistently . a spontaneous cough was noted    Aspiration occurred DURING the swallow for nectar consistency liquid due to  delayed laryngeal closure . Aspiration was mild and occurred only with use of a straw . a spontaneous cough was noted    PENETRATION-ASPIRATION SCALE (PAS):  THIN 7 = Material enters the airway, passes below the vocal folds, and is not ejected from the trachea despite effort   MILDLY THICK 7 = Material enters the airway, passes below the vocal folds, and is not ejected from the trachea despite effort   MODERATELY THICK item not administered  PUREE 1 = Material does not enter the airway  HARD SOLID 1 = Material does not enter the airway       COMPENSATORY STRATEGIES    Compensatory strategies that were beneficial included Small bites/sips, Alternate solids and liquids, and No straw      STRUCTURAL/FUNCTIONAL ANOMALIES   No structural/functional anomalies were noted    CERVICAL ESOPHAGEAL STAGE :     The cervical esophagus appeared adequate          ___________    Cognition:   Follows 1 - step directions appropriate for this assessment    Oral Peripheral Examination   Generalized oral weakness    Current Respiratory Status   4 liters nasal cannula     Parameters of Speech Production  Respiration:  Adequate for speech production  Quality:   Within functional limits  Intensity: Quiet    Pain: No pain reported. EDUCATION:   The Speech Language Pathologist (SLP) completed education regarding results of evaluation and that intervention is warranted at this time. Learner: Patient  Education: Reviewed results and recommendations of this evaluation using images from study, Reviewed diet and strategies, and Reviewed signs, symptoms and risks of aspiration  Evaluation of Education:  Verbalizes understanding    This plan may be re-evaluated and revised as warranted. Evaluation Time includes thorough review of current medical information, gathering information on past medical history/social history and prior level of function, completion of standardized testing/informal observation of tasks, assessment of data and education on plan of care and goals. [x]The admitting diagnosis and active problem list, have been reviewed prior to initiation of this evaluation.     CPT Code: 50195  dysphagia study  95211  dysphagia tx      ACTIVE PROBLEM LIST:   Patient Active Problem List   Diagnosis    Right cataract    Essential hypertension, benign    Hyperlipidemia with target LDL less than 100    Osteoarthritis, shoulder    Primary osteoarthritis of left knee    History of ischemic bowel disease    Ileostomy present ([address]    Chronic kidney disease, stage III (moderate) (HCC)    COPD (chronic obstructive pulmonary disease) (HCC)    Moderate protein-calorie malnutrition (HCC)    Abdominal wall cellulitis    Cellulitis    Infected hernioplasty mesh (HCC)    Enterocutaneous fistula    Mild protein-calorie malnutrition ([address]    Acute kidney injury ([address]    Hypertensive kidney disease with chronic kidney disease stage IV (HCC)    KD (acute kidney injury) ([address]    Diarrhea    Elevated serum creatinine    Abnormal serum creatinine level    Hyperlipemia    Primary hypertension    Essential (primary) hypertension    Postsurgical malabsorption, not elsewhere classified    Hypercholesterolemia    Pure hypercholesterolemia    Prediabetes    Hypothyroidism, unspecified    Acute renal failure (ARF) ([address]     Marin [place] M D'Amico M. [facility]. [facility] [id number]  Speech Language Pathologist

## 2022-07-20 NOTE — PROGRESS NOTES
Subjective:  Chart reviewed  Patient seen at bedside,  present  She is alert and oriented and looks better comparatively speaking  Denies pain currently, worsening shortness of breath, nausea, vomiting    Objective:    BP (!) 147/64   Pulse 73   Temp 98.2 °F (36.8 °C) (Oral)   Resp 18   Ht 5' (1.524 m)   Wt 104 lb 9.6 oz (47.4 kg)   SpO2 91%   BMI 20.43 kg/m²     General: NAD, afebrile, awake and alert,   HEENT: normocephalic/atraumatic, sclera anicteric, conjunctiva clear, mucosa dry  Heart:  RRR, murmur appreciated  Lungs:  diminished BS, on O2 via NC  Abd: ileostomy  Extrem:  decreased muscle tone, clubbing of fingers x 10  : urinary catheter  Skin: Intact, no petechia or purpura    CBC with Differential:    Lab Results   Component Value Date/Time    WBC 9.9 07/20/2022 02:35 AM    RBC 2.99 07/20/2022 02:35 AM    HGB 9.2 07/20/2022 02:35 AM    HCT 28.6 07/20/2022 02:35 AM    PLT 49 07/20/2022 02:35 AM    MCV 95.7 07/20/2022 02:35 AM    MCH 30.8 07/20/2022 02:35 AM    MCHC 32.2 07/20/2022 02:35 AM    RDW 14.6 07/20/2022 02:35 AM    LYMPHOPCT 7.6 07/20/2022 02:35 AM    MONOPCT 13.3 07/20/2022 02:35 AM    BASOPCT 0.1 07/20/2022 02:35 AM    MONOSABS 1.32 07/20/2022 02:35 AM    LYMPHSABS 0.75 07/20/2022 02:35 AM    EOSABS 0.05 07/20/2022 02:35 AM    BASOSABS 0.01 07/20/2022 02:35 AM     CMP:    Lab Results   Component Value Date/Time     07/20/2022 02:35 AM    K 3.6 07/20/2022 02:35 AM     07/20/2022 02:35 AM    CO2 25 07/20/2022 02:35 AM    BUN 80 07/20/2022 02:35 AM    CREATININE 1.6 07/20/2022 02:35 AM    GFRAA 38 07/20/2022 02:35 AM    LABGLOM 31 07/20/2022 02:35 AM    GLUCOSE 141 07/20/2022 02:35 AM    PROT 4.8 07/20/2022 02:35 AM    LABALBU 1.7 07/20/2022 02:35 AM    CALCIUM 7.9 07/20/2022 02:35 AM    BILITOT 1.2 07/20/2022 02:35 AM    ALKPHOS 203 07/20/2022 02:35 AM    AST 16 07/20/2022 02:35 AM    ALT 13 07/20/2022 02:35 AM          Current Facility-Administered Medications: HYDROmorphone (DILAUDID) injection 0.25 mg, 0.25 mg, IntraVENous, Q2H PRN **OR** HYDROmorphone (DILAUDID) injection 0.5 mg, 0.5 mg, IntraVENous, Q2H PRN, Jimmie Rock MD    ceFAZolin (ANCEF) 2,000 mg in sterile water 20 mL IV syringe, 2,000 mg, IntraVENous, See Admin Instructions, Jimmie Rock MD    cefepime (MAXIPIME) 2000 mg IVPB minibag, 2,000 mg, IntraVENous, Q24H, Jimmie Rock MD, Stopped at 07/19/22 1614    Arformoterol Tartrate (BROVANA) nebulizer solution 15 mcg, 15 mcg, Nebulization, BID, Jimmie Rock MD, 15 mcg at 07/20/22 0807    budesonide (PULMICORT) nebulizer suspension 500 mcg, 0.5 mg, Nebulization, BID, Jimmie Rock MD, 500 mcg at 07/20/22 5967    ipratropium-albuterol (DUONEB) nebulizer solution 1 ampule, 1 ampule, Inhalation, Q4H PRN, Jimmie Rock MD, 1 ampule at 07/18/22 0226    insulin lispro (HUMALOG) injection vial 0-6 Units, 0-6 Units, SubCUTAneous, TID WC, Jimmie Rock MD, 1 Units at 07/20/22 1105    insulin lispro (HUMALOG) injection vial 0-3 Units, 0-3 Units, SubCUTAneous, Nightly, Jimmie Rock MD, 1 Units at 07/18/22 2059    glucose chewable tablet 16 g, 4 tablet, Oral, PRN, Jimmie Rock MD    dextrose bolus 10% 125 mL, 125 mL, IntraVENous, PRN **OR** dextrose bolus 10% 250 mL, 250 mL, IntraVENous, PRN, Jimmie Rock MD    glucagon (rDNA) injection 1 mg, 1 mg, IntraMUSCular, PRN, Jimmie Rock MD    dextrose 5 % solution, 100 mL/hr, IntraVENous, PRN, Jimmie Rock MD, Held at 07/17/22 0051    dextrose 5 % in lactated ringers infusion, , IntraVENous, Continuous, Jimmie Rock MD, Last Rate: 100 mL/hr at 07/19/22 1752, New Bag at 07/19/22 1752    nicotine (NICODERM CQ) 21 MG/24HR 1 patch, 1 patch, TransDERmal, Daily, Jimmie Rock MD, 1 patch at 07/20/22 0925    heparin (porcine) injection 3,750 Units, 80 Units/kg, IntraVENous, PRN, Jimmie Rock MD, 3,750 Units at 07/17/22 0125    heparin (porcine) injection 1,880 Units, 40 Units/kg, IntraVENous, PRN, Hattie Ware MD, 1,880 Units at 07/18/22 0535    [Held by provider] heparin 25,000 units in dextrose 5% 250 mL (premix) infusion, 5-30 Units/kg/hr, IntraVENous, Continuous, Hattie Ware MD, Stopped at 07/19/22 4684    calcitRIOL (ROCALTROL) capsule 0.25 mcg, 0.25 mcg, Oral, Q MWF, Hattie Ware MD    vitamin D (ERGOCALCIFEROL) capsule 50,000 Units, 50,000 Units, Oral, Weekly, Hattie Ware MD    sodium chloride flush 0.9 % injection 10 mL, 10 mL, IntraVENous, 2 times per day, Hattie Ware MD, 10 mL at 07/20/22 0925    sodium chloride flush 0.9 % injection 10 mL, 10 mL, IntraVENous, PRN, Hattie Ware MD    0.9 % sodium chloride infusion, , IntraVENous, PRN, Hattie Ware MD    senna (SENOKOT) tablet 8.6 mg, 1 tablet, Oral, Daily PRN, Hattie Ware MD    acetaminophen (TYLENOL) tablet 650 mg, 650 mg, Oral, Q6H PRN, 650 mg at 07/18/22 0525 **OR** acetaminophen (TYLENOL) suppository 650 mg, 650 mg, Rectal, Q6H PRN, Hattie Ware MD    escitalopram (LEXAPRO) tablet 20 mg, 20 mg, Oral, Nightly, Hattie Ware MD, 20 mg at 07/19/22 2008    hyoscyamine (ANASPAZ;LEVSIN) tablet 125 mcg, 125 mcg, Oral, BID PRN, Hattie Ware MD, 125 mcg at 07/19/22 1614    pantoprazole (PROTONIX) tablet 40 mg, 40 mg, Oral, QAM AC, Hattie Ware MD, 40 mg at 07/20/22 0754    oxyCODONE-acetaminophen (PERCOCET)  MG per tablet 1 tablet, 1 tablet, Oral, Q6H PRN, Hattie Ware MD, 1 tablet at 07/19/22 1211    XR ABDOMEN (KUB) (SINGLE AP VIEW)   Final Result   Multiple eggshell type calcifications left epigastric region which could be   related to the kidney. The lower lung fields are clear. The bowel gas pattern is unremarkable. US DUP UPPER EXTREMITIES BILATERAL VENOUS   Final Result   1.  Echogenic thrombus in the distal left basilic vein. No color flow   identified in this area on color Doppler evaluation.       2.  Echogenic occlusive thrombus noted in the left cephalic vein. 3.  Remaining venous vasculature of the bilateral upper extremities appear   patent. The findings were sent to the Radiology Results [address] at 5:42   pm on 7/16/2022 to be communicated to a licensed caregiver. US DUP LOWER EXTREMITIES BILATERAL VENOUS   Final Result   No evidence of DVT in either lower extremity. CT CHEST WO CONTRAST   Final Result   Mild central mucous plugging. Minimal bibasilar atelectasis. No   pneumonia/aspiration. Trace pleural effusions. Pulmonary emphysema and hyperinflation. Enlarged pulmonary trunk and main pulmonary arteries, suggestive of pulmonary   arterial hypertension in the appropriate clinical setting. Other incidental findings as above. CT HEAD WO CONTRAST   Final Result   No acute intracranial abnormality. XR CHEST PORTABLE   Final Result   No acute cardiopulmonary process. US RETROPERITONEAL COMPLETE   Final Result   1. Bilateral renal cysts. Largest is seen in the lower pole the left kidney   measuring 4.5 cm. No aggressive renal lesions. Mild bilateral renal   cortical thinning. No hydronephrosis. Mildly echogenic renal parenchyma. 2.  Normal appearance of the imaged bladder. XR CHEST (2 VW)   Final Result   No acute process. A/P:  Gram negative bacteremia status post port removal, E. coli  Thrombocytopenia, platelets 82Q today  Hold heparin until platelets recover to >50k, recheck in AM  WBC within normal limits  Anemia- maintain Hgb > 7.5  KD  LUE DVT-HOLD heparin as long as platelets count > 46E     Thank you for allowing us to participate in the care of [place] [address]. Nimesh Zuniga PA-C  [phone]    Electronically signed by JUANITO Ann on 7/20/2022 at 11:32 AM    Note: This report was completed using [employer] voiced recognition software.   Every effort has been made to ensure accuracy; however, inadvertent computerized transcription errors may be present.

## 2022-07-20 NOTE — PROGRESS NOTES
Internal Medicine Progress Note    Patient's name: Katie Nguyen  : 1940  Chief complaints (on day of admission): Abnormal Lab (sent in by Dr. Lender Councilman) and Chest Pain  Admission date: 2022  Date of service: 2022   Room: [place] [address]  Primary care physician: Keyla Estrella DO  Reason for visit: Follow-up for abnormal labs     Subjective  Maribeth Gomez was seen and examined at bedside     Modestly comfortable in bed today   No new complaints other than she is a bit tired today   However, reportedly from nursing staff she is showing daily improvement all around   Her platelet count is still low and the heparin drip is currently off, this should be restarted asap  Explained above to patient, discussed with pulm np   Other than this she is having some abdominal pain but her abdomen is soft and non peritoneal     Current treatment plan discussed and all questions answered    Current medications being prescribed discussed and patient expresses verbal understanding     Review of Systems  There are no new complaints of chest pain, shortness of breath, abdominal pain, nausea, vomiting, diarrhea, constipation unless otherwise mentioned above.      Hospital Medications  Current Facility-Administered Medications   Medication Dose Route Frequency Provider Last Rate Last Admin    HYDROmorphone (DILAUDID) injection 0.25 mg  0.25 mg IntraVENous Q2H PRN Jacob Smith MD        Or    HYDROmorphone (DILAUDID) injection 0.5 mg  0.5 mg IntraVENous Q2H PRN Jacob Smith MD        ceFAZolin (ANCEF) 2,000 mg in sterile water 20 mL IV syringe  2,000 mg IntraVENous See Admin Instructions Jacob Smith MD        cefepime (MAXIPIME) 2000 mg IVPB minibag  2,000 mg IntraVENous Q24H Jacob Smith MD   Stopped at 22 1614    Arformoterol Tartrate (BROVANA) nebulizer solution 15 mcg  15 mcg Nebulization BID Jacob Smith MD   15 mcg at 22 193    budesonide (PULMICORT) nebulizer injection 10 mL  10 mL IntraVENous PRN Kira Christensen MD        0.9 % sodium chloride infusion   IntraVENous PRN Kira Christensen MD        [facility]) tablet 8.6 mg  1 tablet Oral Daily PRN Kira Christensen MD        acetaminophen (TYLENOL) tablet 650 mg  650 mg Oral Q6H PRN Kira Christensen MD   650 mg at 07/18/22 7039    Or    acetaminophen (TYLENOL) suppository 650 mg  650 mg Rectal Q6H PRN Kira Christensen MD        escitalopram (LEXAPRO) tablet 20 mg  20 mg Oral Nightly Kira Christensen MD   20 mg at 07/19/22 2008    hyoscyamine (ANASPAZ;LEVSIN) tablet 125 mcg  125 mcg Oral BID PRN Kira Christensen MD   125 mcg at 07/19/22 1614    pantoprazole (PROTONIX) tablet 40 mg  40 mg Oral QAM AC Kira Christensen MD   40 mg at 07/19/22 0515    oxyCODONE-acetaminophen (PERCOCET)  MG per tablet 1 tablet  1 tablet Oral Q6H PRN Kira Christensen MD   1 tablet at 07/19/22 1211       PRN Medications  HYDROmorphone **OR** HYDROmorphone, ipratropium-albuterol, glucose, dextrose bolus **OR** dextrose bolus, glucagon (rDNA), dextrose, heparin (porcine), heparin (porcine), sodium chloride flush, sodium chloride, senna, acetaminophen **OR** acetaminophen, hyoscyamine, oxyCODONE-acetaminophen    Objective  Most Recent Recorded Vitals  /62   Pulse 79   Temp 97.4 °F (36.3 °C) (Axillary)   Resp 16   Ht 5' (1.524 m)   Wt 104 lb 9.6 oz (47.4 kg)   SpO2 95%   BMI 20.43 kg/m²   I/O last 3 completed shifts: In: 953 [I.V.:200; NG/GT:557]  Out: 3150 [Urine:1800; Stool:1350]  No intake/output data recorded.     Physical Exam:  General: AAO to person/place/time/purpose, NAD, no labored breathing  Eyes: conjunctivae/corneas clear, sclera non icteric  Skin: color/texture/turgor normal, no rashes or lesions  Lungs: CTAB, no retractions/use of accessory muscles, no vocal fremitus, no rhonchi, no crackle, no rales  Heart: regular rate, regular rhythm, no murmur  Abdomen: soft, NT, bowel sounds normal  Extremities: atraumatic, no edema  Neurologic: cranial nerves 2-12 grossly intact, no slurred speech    Most Recent Labs  Lab Results   Component Value Date    WBC 9.9 07/20/2022    HGB 9.2 (L) 07/20/2022    HCT 28.6 (L) 07/20/2022    PLT 49 (L) 07/20/2022     07/20/2022    K 3.6 07/20/2022     (H) 07/20/2022    CREATININE 1.6 (H) 07/20/2022    BUN 80 (H) 07/20/2022    CO2 25 07/20/2022    GLUCOSE 141 (H) 07/20/2022    ALT 13 07/20/2022    AST 16 07/20/2022    INR 2.0 07/19/2022    TSH 1.380 07/01/2022    LABA1C 5.2 07/01/2022       XR ABDOMEN (KUB) (SINGLE AP VIEW)   Final Result   Multiple eggshell type calcifications left epigastric region which could be   related to the kidney. The lower lung fields are clear. The bowel gas pattern is unremarkable. US DUP UPPER EXTREMITIES BILATERAL VENOUS   Final Result   1.  Echogenic thrombus in the distal left basilic vein. No color flow   identified in this area on color Doppler evaluation. 2.  Echogenic occlusive thrombus noted in the left cephalic vein. 3.  Remaining venous vasculature of the bilateral upper extremities appear   patent. The findings were sent to the Radiology Results [address] at 5:42   pm on 7/16/2022 to be communicated to a licensed caregiver. US DUP LOWER EXTREMITIES BILATERAL VENOUS   Final Result   No evidence of DVT in either lower extremity. CT CHEST WO CONTRAST   Final Result   Mild central mucous plugging. Minimal bibasilar atelectasis. No   pneumonia/aspiration. Trace pleural effusions. Pulmonary emphysema and hyperinflation. Enlarged pulmonary trunk and main pulmonary arteries, suggestive of pulmonary   arterial hypertension in the appropriate clinical setting. Other incidental findings as above. CT HEAD WO CONTRAST   Final Result   No acute intracranial abnormality. XR CHEST PORTABLE   Final Result   No acute cardiopulmonary process.          [address] RETROPERITONEAL COMPLETE   Final Result   1. Bilateral renal cysts. Largest is seen in the lower pole the left kidney   measuring 4.5 cm. No aggressive renal lesions. Mild bilateral renal   cortical thinning. No hydronephrosis. Mildly echogenic renal parenchyma. 2.  Normal appearance of the imaged bladder. XR CHEST (2 VW)   Final Result   No acute process. Echocardiogram       Assessment   Active Hospital Problems    Diagnosis     Acute renal failure (ARF) (HCC) [N17.9]      Priority: Medium         Plan  Acute hypoxic respiratory failure with elevated D-Dimer   Appreciate pulm recs   D-Dimer >5K   Heparin gtt, planning for at least 6 months of anticoagulation therapy   Resume once able per hem onc and pulm   Acute renal failure  Nephrology on board   Appreciate their recs   Renally dose meds   Avoid nephro toxins   Ecoli bacteremia 2/2 CLABSI   Sp mediport removal 7/19  Follow cx   Appreciate ID recs   Thrombocytopenia   Continue to monitor   Hem Onc is on board   49 K today   Metabolic encephalopathy  Improving   DM type II  Monitor sugars and titrate as needed insulin regimen   Urinary retention  Monitor   Short gut syndrome  PT AM-PAC-- TBD   DVT prophylaxis   Code status Full   Medications, labs and imaging reviewed   Discharge plan: TBD pending clinical improvement     Electronically signed by Kimberly Shi MD on 7/20/2022 at 6:34 AM    I can be reached through [address] [place] [address].

## 2022-07-21 LAB
ALBUMIN SERPL-MCNC: 1.9 G/DL (ref 3.5–5.2)
ALP BLD-CCNC: 288 U/L (ref 35–104)
ALT SERPL-CCNC: 21 U/L (ref 0–32)
ANION GAP SERPL CALCULATED.3IONS-SCNC: 12 MMOL/L (ref 7–16)
ANISOCYTOSIS: ABNORMAL
APTT: 24.7 SEC (ref 24.5–35.1)
AST SERPL-CCNC: 29 U/L (ref 0–31)
BASOPHILS ABSOLUTE: 0 E9/L (ref 0–0.2)
BASOPHILS RELATIVE PERCENT: 0 % (ref 0–2)
BILIRUB SERPL-MCNC: 1.3 MG/DL (ref 0–1.2)
BUN BLDV-MCNC: 74 MG/DL (ref 6–23)
CALCIUM IONIZED: 1.16 MMOL/L (ref 1.15–1.33)
CALCIUM SERPL-MCNC: 7.9 MG/DL (ref 8.6–10.2)
CHLORIDE BLD-SCNC: 109 MMOL/L (ref 98–107)
CO2: 23 MMOL/L (ref 22–29)
CREAT SERPL-MCNC: 1.5 MG/DL (ref 0.5–1)
CULTURE CATHETER TIP: NORMAL
EOSINOPHILS ABSOLUTE: 0 E9/L (ref 0.05–0.5)
EOSINOPHILS RELATIVE PERCENT: 0 % (ref 0–6)
GFR AFRICAN AMERICAN: 40
GFR NON-AFRICAN AMERICAN: 33 ML/MIN/1.73
GLUCOSE BLD-MCNC: 101 MG/DL (ref 74–99)
HCT VFR BLD CALC: 27.3 % (ref 34–48)
HEMOGLOBIN: 9 G/DL (ref 11.5–15.5)
HYPOCHROMIA: ABNORMAL
LYMPHOCYTES ABSOLUTE: 0.77 E9/L (ref 1.5–4)
LYMPHOCYTES RELATIVE PERCENT: 6.1 % (ref 20–42)
MAGNESIUM: 2.1 MG/DL (ref 1.6–2.6)
MCH RBC QN AUTO: 30.8 PG (ref 26–35)
MCHC RBC AUTO-ENTMCNC: 33 % (ref 32–34.5)
MCV RBC AUTO: 93.5 FL (ref 80–99.9)
METER GLUCOSE: 104 MG/DL (ref 74–99)
METER GLUCOSE: 116 MG/DL (ref 74–99)
METER GLUCOSE: 119 MG/DL (ref 74–99)
METER GLUCOSE: 139 MG/DL (ref 74–99)
MONOCYTES ABSOLUTE: 0.51 E9/L (ref 0.1–0.95)
MONOCYTES RELATIVE PERCENT: 4.3 % (ref 2–12)
MYELOCYTE PERCENT: 0.9 % (ref 0–0)
NEUTROPHILS ABSOLUTE: 11.52 E9/L (ref 1.8–7.3)
NEUTROPHILS RELATIVE PERCENT: 88.7 % (ref 43–80)
NUCLEATED RED BLOOD CELLS: 0 /100 WBC
OVALOCYTES: ABNORMAL
PDW BLD-RTO: 14.4 FL (ref 11.5–15)
PHOSPHORUS: 3.4 MG/DL (ref 2.5–4.5)
PLATELET # BLD: 65 E9/L (ref 130–450)
PLATELET CONFIRMATION: NORMAL
PMV BLD AUTO: 13.9 FL (ref 7–12)
POIKILOCYTES: ABNORMAL
POLYCHROMASIA: ABNORMAL
POTASSIUM REFLEX MAGNESIUM: 3.5 MMOL/L (ref 3.5–5)
RBC # BLD: 2.92 E12/L (ref 3.5–5.5)
SARS-COV-2, NAAT: NOT DETECTED
SODIUM BLD-SCNC: 144 MMOL/L (ref 132–146)
TARGET CELLS: ABNORMAL
TOTAL PROTEIN: 5 G/DL (ref 6.4–8.3)
WBC # BLD: 12.8 E9/L (ref 4.5–11.5)

## 2022-07-21 PROCEDURE — 6370000000 HC RX 637 (ALT 250 FOR IP)

## 2022-07-21 PROCEDURE — 87635 SARS-COV-2 COVID-19 AMP PRB: CPT

## 2022-07-21 PROCEDURE — 97535 SELF CARE MNGMENT TRAINING: CPT

## 2022-07-21 PROCEDURE — 2700000000 HC OXYGEN THERAPY PER DAY

## 2022-07-21 PROCEDURE — 82330 ASSAY OF CALCIUM: CPT

## 2022-07-21 PROCEDURE — 94640 AIRWAY INHALATION TREATMENT: CPT

## 2022-07-21 PROCEDURE — 85025 COMPLETE CBC W/AUTO DIFF WBC: CPT

## 2022-07-21 PROCEDURE — 97530 THERAPEUTIC ACTIVITIES: CPT

## 2022-07-21 PROCEDURE — 97110 THERAPEUTIC EXERCISES: CPT | Performed by: SPEECH-LANGUAGE PATHOLOGIST

## 2022-07-21 PROCEDURE — 83735 ASSAY OF MAGNESIUM: CPT

## 2022-07-21 PROCEDURE — 6360000002 HC RX W HCPCS: Performed by: STUDENT IN AN ORGANIZED HEALTH CARE EDUCATION/TRAINING PROGRAM

## 2022-07-21 PROCEDURE — 36415 COLL VENOUS BLD VENIPUNCTURE: CPT

## 2022-07-21 PROCEDURE — 2580000003 HC RX 258: Performed by: STUDENT IN AN ORGANIZED HEALTH CARE EDUCATION/TRAINING PROGRAM

## 2022-07-21 PROCEDURE — 6370000000 HC RX 637 (ALT 250 FOR IP): Performed by: STUDENT IN AN ORGANIZED HEALTH CARE EDUCATION/TRAINING PROGRAM

## 2022-07-21 PROCEDURE — 85730 THROMBOPLASTIN TIME PARTIAL: CPT

## 2022-07-21 PROCEDURE — 2580000003 HC RX 258: Performed by: ANESTHESIOLOGY

## 2022-07-21 PROCEDURE — 2060000000 HC ICU INTERMEDIATE R&B

## 2022-07-21 PROCEDURE — 80053 COMPREHEN METABOLIC PANEL: CPT

## 2022-07-21 PROCEDURE — 84100 ASSAY OF PHOSPHORUS: CPT

## 2022-07-21 PROCEDURE — 82962 GLUCOSE BLOOD TEST: CPT

## 2022-07-21 PROCEDURE — 92526 ORAL FUNCTION THERAPY: CPT | Performed by: SPEECH-LANGUAGE PATHOLOGIST

## 2022-07-21 RX ORDER — LEVOFLOXACIN 500 MG/1
500 TABLET, FILM COATED ORAL EVERY OTHER DAY
Qty: 5 TABLET | Refills: 0 | Status: ON HOLD | DISCHARGE
Start: 2022-07-22 | End: 2022-07-31 | Stop reason: HOSPADM

## 2022-07-21 RX ADMIN — SODIUM CHLORIDE, PRESERVATIVE FREE 10 ML: 5 INJECTION INTRAVENOUS at 09:00

## 2022-07-21 RX ADMIN — OXYCODONE AND ACETAMINOPHEN 1 TABLET: 10; 325 TABLET ORAL at 17:58

## 2022-07-21 RX ADMIN — ARFORMOTEROL TARTRATE 15 MCG: 15 SOLUTION RESPIRATORY (INHALATION) at 08:51

## 2022-07-21 RX ADMIN — HYOSCYAMINE SULFATE 125 MCG: 0.12 TABLET ORAL at 04:37

## 2022-07-21 RX ADMIN — SODIUM CHLORIDE, PRESERVATIVE FREE 10 ML: 5 INJECTION INTRAVENOUS at 20:31

## 2022-07-21 RX ADMIN — SODIUM CHLORIDE, PRESERVATIVE FREE 10 ML: 5 INJECTION INTRAVENOUS at 20:32

## 2022-07-21 RX ADMIN — HYOSCYAMINE SULFATE 125 MCG: 0.12 TABLET ORAL at 15:40

## 2022-07-21 RX ADMIN — BUDESONIDE 500 MCG: 0.5 SUSPENSION RESPIRATORY (INHALATION) at 19:26

## 2022-07-21 RX ADMIN — ARFORMOTEROL TARTRATE 15 MCG: 15 SOLUTION RESPIRATORY (INHALATION) at 19:27

## 2022-07-21 RX ADMIN — PANTOPRAZOLE SODIUM 40 MG: 40 TABLET, DELAYED RELEASE ORAL at 06:13

## 2022-07-21 RX ADMIN — SODIUM CHLORIDE, PRESERVATIVE FREE 10 ML: 5 INJECTION INTRAVENOUS at 09:14

## 2022-07-21 RX ADMIN — ESCITALOPRAM OXALATE 20 MG: 10 TABLET ORAL at 20:31

## 2022-07-21 RX ADMIN — APIXABAN 10 MG: 5 TABLET, FILM COATED ORAL at 20:31

## 2022-07-21 RX ADMIN — APIXABAN 10 MG: 5 TABLET, FILM COATED ORAL at 11:21

## 2022-07-21 RX ADMIN — BUDESONIDE 500 MCG: 0.5 SUSPENSION RESPIRATORY (INHALATION) at 08:51

## 2022-07-21 ASSESSMENT — PAIN DESCRIPTION - ORIENTATION: ORIENTATION: MID

## 2022-07-21 ASSESSMENT — PAIN DESCRIPTION - DESCRIPTORS: DESCRIPTORS: CRAMPING

## 2022-07-21 ASSESSMENT — PAIN SCALES - GENERAL
PAINLEVEL_OUTOF10: 0
PAINLEVEL_OUTOF10: 5
PAINLEVEL_OUTOF10: 4
PAINLEVEL_OUTOF10: 4

## 2022-07-21 ASSESSMENT — PAIN DESCRIPTION - LOCATION: LOCATION: ABDOMEN

## 2022-07-21 NOTE — PROGRESS NOTES
Physical Therapy    Treatment Note    Name: Sabrina Pelayo  : 1940  MRN: [id number]      Date of Service: 2022    Evaluating PT: Sung Aguayo PT, JOSEFINA [id number]      Room #:  0591/2254-Z  Diagnosis:  Dehydration [E86.0]  Severe protein-calorie malnutrition ([address] [E43]  Failure to thrive in adult [R62.7]  NSTEMI (non-ST elevated myocardial infarction) ([address] [I21.4]  Acute renal failure (ARF) ([facility]) [N17.9]  Acute renal failure, unspecified acute renal failure type ([address] [N17.9]  PMHx/PSHx: Anxiety, HTN, B12 Deficiency, Fibromyalgia, HLD, MI, Necrotic bowel, Osteopenia, COPD, CAD, CA, Acute Kidney Failure  Precautions:  O2, PEG, TAPS, Mooney, Fall Risk, Alarm      SUBJECTIVE:    Pt lives with spouse in a 1 story home with 2 stair(s) to enter. Pt ambulated with cane and WW prior to admission. OBJECTIVE:   Initial Evaluation  Date: 7/15/22 Treatment Date:  2022 Short Term/ Long Term   Goals   AM-PAC 6 Clicks 19/25 4/10    Was pt agreeable to Eval/treatment? Yes yes    Does pt have pain? Knee and finger pain, does not quantify No complaints    Bed Mobility  Rolling: NT  Supine to sit: Mod A  Sit to supine: Max A  Scooting: Mod A Rolling: Min A  Supine to sit: Min A  Sit to Supine: NT  Scooting:  Mod A seated to EOB Rolling: Independent   Supine to sit: CGA  Sit to supine: CGA  Scooting: CGA   Transfers Sit to stand: NT d/t patient too fatigued  Stand to sit: NT  Stand pivot: NT Sit to stand: Min A  Stand to sit; Min A  Stand Pivot: Min A using [employer] for support Sit to stand: Min A  Stand to sit: Min A  Stand pivot: Min A [employer]   Ambulation   NT NT >50 feet with Min A with [employer]   Stair negotiation: ascended and descended NT  >2 step(s) with 1 rail(s) with Min A   ROM BUE: See OT note  BLE: WFL     Strength BUE: See OT note  BLE: 4/5     Balance Sitting EOB: CGA  Dynamic Standing: NT  Sitting EOB: Independent   Dynamic Standing: Min A with [employer]     Pt is alert and able to follow instruction, states feels

## 2022-07-21 NOTE — PROGRESS NOTES
SPEECH LANGUAGE PATHOLOGY  DAILY PROGRESS NOTE        PATIENT NAME:  Sruthi Wallace      :  1940          TODAY'S DATE:  2022 ROOM:  [facility] [place] [facility] [place]        SWALLOWING    Pt seen at bedside for follow up dysphagia management. Reviewed MBS results and POC. All questions answered. Pt reports good toleration of current diet. Reports use of comp strats including no straws. Dysphagia exercises program initiated including oral motor and laryngeal exercises. Pt completed with fair-good ability with mod verbal cues. Pt encouraged to self complete as able. DYSPHAGIA DIAGNOSIS:  mild oral phase dysphagia and moderate pharyngeal phase dysphagia- aspiration of thin liquid and nectar thick liquid per straw observed     DIET RECOMMENDATIONS:  Soft and bite size consistency solids (IDDSI level 6) with small sips nectar consistency (mildly thick - IDDSI level 2) liquids     FEEDING RECOMMENDATIONS:             Assistance level:  Stand by assistance is needed during all oral intake                Compensatory strategies recommended: Small bites/sips, Alternate solids and liquids, and NO STRAW      Education- Pt educated on results and POC. Pt trained on compensatory strategies for safe swallow with good outcome. Questions answered. Will continue SP intervention as per previously established POC. Sana BRODY CCC/SLP [id number]  Speech Pathologist              CPT code(s) 34931  dysphagia tx  [id number]  oral motor exercises (15 min)  Total minutes :  30 minutes

## 2022-07-21 NOTE — PROGRESS NOTES
[address] [place] [address] [facility]  Progress Note    SUBJECTIVE:  Chief Complaint   Patient presents with    Abnormal Lab     sent in by Dr. Sonia Gruber    Chest Pain     No new complaints. The patient says that she is going to be discharged tomorrow. Tolerating oral antibiotics    Review of systems:  As stated above in the chief complaint, otherwise negative. Medications:  Scheduled Meds:   apixaban  10 mg Oral BID    Followed by    Joao Laureano ON 2022] apixaban  5 mg Oral BID    sodium chloride flush  5-40 mL IntraVENous 2 times per day    levoFLOXacin  500 mg Oral Every Other Day    Arformoterol Tartrate  15 mcg Nebulization BID    budesonide  0.5 mg Nebulization BID    insulin lispro  0-6 Units SubCUTAneous TID WC    insulin lispro  0-3 Units SubCUTAneous Nightly    nicotine  1 patch TransDERmal Daily    calcitRIOL  0.25 mcg Oral Q MWF    vitamin D  50,000 Units Oral Weekly    sodium chloride flush  10 mL IntraVENous 2 times per day    escitalopram  20 mg Oral Nightly    pantoprazole  40 mg Oral QAM AC     Continuous Infusions:   sodium chloride      dextrose Stopped (22 0051)    sodium chloride       PRN Meds:sodium chloride flush, sodium chloride, labetalol **OR** hydrALAZINE, HYDROmorphone **OR** HYDROmorphone, ipratropium-albuterol, glucose, dextrose bolus **OR** dextrose bolus, glucagon (rDNA), dextrose, sodium chloride flush, sodium chloride, senna, acetaminophen **OR** acetaminophen, hyoscyamine, oxyCODONE-acetaminophen    OBJECTIVE:  BP (!) 138/58   Pulse 74   Temp 97.7 °F (36.5 °C) (Oral)   Resp 20   Ht 5' (1.524 m)   Wt 114 lb 1.6 oz (51.8 kg)   SpO2 99%   BMI 22.28 kg/m²   Temp  Av.2 °F (36.8 °C)  Min: 97.7 °F (36.5 °C)  Max: 98.8 °F (37.1 °C)  Constitutional: The patient is lying in bed. She is awake and alert. No distress. Skin: Warm and slightly diaphoretic. No rashes were noted. HEENT: Round and reactive pupils. Dry mouth. No ulcerations or thrush.   Neck: Supple to movements. Chest: No respiratory distress. Cardiovascular: Heart sounds rhythmic and regular. Abdomen: Positive bowel sounds to auscultation. Benign to palpation. Extremities: No edema. Lines: Peripheral.    Laboratory and Tests Review:  Lab Results   Component Value Date    WBC 12.8 (H) 07/21/2022    WBC 9.9 07/20/2022    WBC 12.7 (H) 07/19/2022    HGB 9.0 (L) 07/21/2022    HCT 27.3 (L) 07/21/2022    MCV 93.5 07/21/2022    PLT 65 (L) 07/21/2022     Lab Results   Component Value Date    NEUTROABS 11.52 (H) 07/21/2022    NEUTROABS 7.70 (H) 07/20/2022    NEUTROABS 10.39 (H) 07/19/2022     No results found for: CRP  Lab Results   Component Value Date    ALT 21 07/21/2022    AST 29 07/21/2022    ALKPHOS 288 (H) 07/21/2022    BILITOT 1.3 (H) 07/21/2022     Lab Results   Component Value Date/Time     07/21/2022 06:18 AM    K 3.5 07/21/2022 06:18 AM     07/21/2022 06:18 AM    CO2 23 07/21/2022 06:18 AM    BUN 74 07/21/2022 06:18 AM    CREATININE 1.5 07/21/2022 06:18 AM    CREATININE 1.6 07/20/2022 02:35 AM    CREATININE 1.6 07/19/2022 10:20 AM    GFRAA 40 07/21/2022 06:18 AM    LABGLOM 33 07/21/2022 06:18 AM    GLUCOSE 101 07/21/2022 06:18 AM    PROT 5.0 07/21/2022 06:18 AM    LABALBU 1.9 07/21/2022 06:18 AM    CALCIUM 7.9 07/21/2022 06:18 AM    BILITOT 1.3 07/21/2022 06:18 AM    ALKPHOS 288 07/21/2022 06:18 AM    AST 29 07/21/2022 06:18 AM    ALT 21 07/21/2022 06:18 AM     Lab Results   Component Value Date    CRP 2.3 (H) 04/27/2022    CRP 23.2 (H) 01/27/2020     Lab Results   Component Value Date    SEDRATE 60 (H) 01/27/2020     Radiology:      Microbiology:   Rapid SARS-CoV-2: Negative  Rapid influenza: Negative  Respiratory panel: Negative  Blood culture 7/14/2022: Negative so far  Blood cultures 7/15/2022: E. coli (intermediate to Augmentin.   Resistant to Bactrim) in 4 of 4 bottles  Streptococcus pneumoniae/Legionella urine Ag: negative   Urine culture 7/16/2022: <25,000 CFU Enterococcus faecalis    No results for input(s): PROCAL in the last 72 hours. ASSESSMENT:  CLABSI  E. coli septicemia secondary to CLABSI  Fever secondary to infected Mediport. Status post Mediport removal  Leukocytosis secondary to infected Mediport and CLABSI-resolved  Asymptomatic and insignificant bacteriuria with Enterococcus  Possible dysphagia    PLAN:  Continue oral Levofloxacin for another week. Reconciled  Swallow evaluation  No need to treat asymptomatic bacteriuria  We will follow with you    Spoke with nursing.       Hudson Ogden MD  12:06 PM  7/21/2022

## 2022-07-21 NOTE — CARE COORDINATION
7/21/2022  Social Work Discharge Planning:Possible discharge today. Plans it to go to [facility] when medically ready. No precert is needed. COVID test is needed. Pt is a new eliquis Pt. ARACELI gave Pt a free 30 day card and notified spouse as well. SHELLI , 7000 and transport form are completed. ARACELI set up ambulance transport via [facility] for Pt to return today at [address] notified nurse here and [facility] liaison. If Pt doesn't discharge then transport needs to be cancelled. Electronically signed by Charolett Cranker, LSW on 7/21/2022 at 9:01 AM    7/21/2022 Liaison at . [facility] requested therapy updates. ARACELI notified therapy dept. Electronically signed by Charolett Cranker, LSW on 7/21/2022 at 10:25 AM    7/21/2022  Social Work Discharge Planning: Araceli was informed by physician that Pt will not discharge today but will discharge tomorrow. ARACELI rescheduled transport with [facility] for transport tomorrow at [facility]. Araceli notified nurse and [facility] liaison. Per liaison, they will need another COVID tomorrow when Pt discharges. Electronically signed by Charolett Cranker, LSW on 7/21/2022 at 11:47 AM

## 2022-07-21 NOTE — PROGRESS NOTES
Pulmonary Progress Note    Admit Date: 2022                            PCP: Alesia Kaiser DO  Principal Problem:    Acute renal failure (ARF) ([address]  Resolved Problems:    * No resolved hospital problems. *      Subjective:  Patient was seen resting in bed on 2L NC with SpO2 99%  She feels much better, denies shortness of breath or cough       Medications:   sodium chloride      dextrose Stopped (22 005)    [Held by provider] heparin (PORCINE) Infusion Stopped (22 0959)    sodium chloride          sodium chloride flush  5-40 mL IntraVENous 2 times per day    levoFLOXacin  500 mg Oral Every Other Day    Arformoterol Tartrate  15 mcg Nebulization BID    budesonide  0.5 mg Nebulization BID    insulin lispro  0-6 Units SubCUTAneous TID WC    insulin lispro  0-3 Units SubCUTAneous Nightly    nicotine  1 patch TransDERmal Daily    calcitRIOL  0.25 mcg Oral Q MWF    vitamin D  50,000 Units Oral Weekly    sodium chloride flush  10 mL IntraVENous 2 times per day    escitalopram  20 mg Oral Nightly    pantoprazole  40 mg Oral QAM AC       Vitals:  VITALS:  BP (!) 138/58   Pulse 74   Temp 97.7 °F (36.5 °C) (Oral)   Resp 20   Ht 5' (1.524 m)   Wt 114 lb 1.6 oz (51.8 kg)   SpO2 99%   BMI 22.28 kg/m²   24HR INTAKE/OUTPUT:    Intake/Output Summary (Last 24 hours) at 2022 1019  Last data filed at 2022 0934  Gross per 24 hour   Intake 1738 ml   Output 2395 ml   Net -657 ml     CURRENT PULSE OXIMETRY:  SpO2: 99 %  24HR PULSE OXIMETRY RANGE:  SpO2  Av.3 %  Min: 96 %  Max: 99 %  CVP:    VENT SETTINGS:      Additional Respiratory Assessments  Heart Rate: 74  Resp: 20  SpO2: 99 %      EXAM:  General: No distress. Alert, pleasant  Eyes: No sclera icterus. No conjunctival injection. ENT: No discharge. Pharynx clear. Neck: Trachea midline. Normal thyroid. Resp: No accessory muscle use. No crackles. No wheezing. No rhonchi. Diminished bilaterally. CV: Regular rate. Irregular rhythm. +Mumur. Edema noted to BUE, no BLE edema   ABD: Non-tender. Non-distended. Normal bowel sounds. PEG, +colostomy  Skin: Warm and dry. No nodule on exposed extremities. No rash on exposed extremities. Right chest mediport incision with glue. M/S: No cyanosis. No joint deformity. No clubbing. Neuro: Follows commands, alert &o x 3    I/O: I/O last 3 completed shifts: In: 1620 [NG/GT:1620]  Out: 2570 [Urine:900; Stool:1670]  I/O this shift:  In: 118 [P.O.:118]  Out: 100 [Stool:100]     Results:  CBC:   Recent Labs     07/19/22  1020 07/20/22  0235 07/21/22  0618   WBC 12.7* 9.9 12.8*   HGB 10.0* 9.2* 9.0*   HCT 30.1* 28.6* 27.3*   MCV 94.7 95.7 93.5   PLT 45* 49* 65*     BMP:   Recent Labs     07/19/22  1020 07/20/22  0235 07/21/22  0618   * 144 144   K 3.7 3.6 3.5   * 110* 109*   CO2 24 25 23   PHOS 3.9 4.1 3.4   BUN 81* 80* 74*   CREATININE 1.6* 1.6* 1.5*     LFT:   Recent Labs     07/20/22  0235 07/21/22  0618   ALKPHOS 203* 288*   ALT 13 21   AST 16 29   PROT 4.8* 5.0*   BILITOT 1.2 1.3*   LABALBU 1.7* 1.9*     PT/INR:   Recent Labs     07/19/22  1020   PROTIME 22.1*   INR 2.0     Cultures:  No results for input(s): CULTRESP in the last 72 hours. ABG:   No results for input(s): PH, PO2, PCO2, HCO3, BE, O2SAT in the last 72 hours. Films:  CT HEAD WO CONTRAST 7/15/2022: No acute intracranial abnormality. XR CHEST PORTABLE 7/15/2022: No acute cardiopulmonary process. US RETROPERITONEAL COMPLETE 7/14/2022  Bilateral renal cysts. Largest is seen in the lower pole the left kidney measuring 4.5 cm. No aggressive renal lesions. Mild bilateral renal cortical thinning. No hydronephrosis. Mildly echogenic renal parenchyma. 2.  Normal appearance of the imaged bladder. Assessment:  7/20: 4L NC, MBS   7/21: 2L NC  Acute hypoxic respiratory failure without hypercarbia  Elevated D-dimer  E.  Coli septicemia secondary to CLABSI  BUE blood clots   Current smoker  Leukocytosis and fever secondary to infected Mediport  Chronic kidney disease - nephrology following  Coronary artery disease with coronary stent placement 2002      Plan:  Patient was seen on 2 L nasal cannula weaned from 4L NC yesterday with SPO2 99%, baseline is room air. Continue to wean as tolerated. Strep and legionella antigens negative. Respiratory panel negative. Remains afebrile, WBC 12.7>9.9>12.8. PCT 68.17(7/16), blood cultures 7/15 e-coli, repeat negative. IV cefepime transitions to PO Levaquin 7/20. Send sputum culture - does not look like it was sent. S/p L chest mediport removal 7/19 per General Surgery     D-dimer>5250, would like to have CTA to r/o PE but with renal function, CT chest w/o  7/16 showing mid central mucous plugging, minimal bibasilar atelectasis, pulmonary emphysema and hyperinflation. Continue Pulmicort, Brovana, and duo nebs with emphysema noted on CT chest and with smoking history. Smoking cessation encouraged, continue nicotine patch. Will need outpatient PFTs. Continue chest vest to aid in expectoration with mucous plugging. US BUE 7/16 echogenic thrombus in distal left basilic vein and occlusive thrombus in L cephalic vein, heparin drip held 7/19 per oncology due to thrombocytopenia, with parameters to hold for less than 50,000. PLT 45>49 (7/20)>65 (7/21). Will start Eliquis 10 mg BID x 7 days then 5 mg BID x 6 months. Will obtain CBC on Monday to check platelet count. Continue tube feed, pending video swallow hopefully today  7/20 MBS showed aspiration of thin and nectar thick liquids, continued modified diet per Speech recommendations. Continue supplemental TF     Patient is ok for discharge from a pulmonary standpoint. Eliquis 10 mg BID  x 7 days followed by 5 mg BID x 6 months. Patient is to have a repeat CBC w diff on Monday 7/25/22 to monitor platelet level     GEMA Bellamy CNP  7/21/2022  10:19 AM    Attending Attestation Note:    Patient seen and examined with NP.   I agree with above.     In addition, the following apply:    - O2, IS  - cefepime per ID  - heparin drip on hold due to platelets < 77G (as of 7/21/2022, PLT 65K)  - PO eliquis  - CBC no Monday 7/25/2022  - bronchopulmonary hygiene  - Pulmicort, Renee Pak and Yung  - supportive care    Sharri Zamudio MD  7/21/2022  4:51 PM

## 2022-07-21 NOTE — PROGRESS NOTES
[facility]  Nephrology Attending Progress Note    SUBJECTIVE:     7/21/22- Pt awake alert and appropriate, no new complaints, po intake still poor but she is trying to eat, awaiting to D/C to [facility]    PROBLEM LIST:    Patient Active Problem List   Diagnosis    Right cataract    Essential hypertension, benign    Hyperlipidemia with target LDL less than 100    Osteoarthritis, shoulder    Primary osteoarthritis of left knee    History of ischemic bowel disease    Ileostomy present ([address]    Chronic kidney disease, stage III (moderate) ([facility])    COPD (chronic obstructive pulmonary disease) ([facility])    Moderate protein-calorie malnutrition (HCC)    Abdominal wall cellulitis    Cellulitis    Infected hernioplasty mesh (HCC)    Enterocutaneous fistula    Mild protein-calorie malnutrition ([address]    Acute kidney injury ([address]    Hypertensive kidney disease with chronic kidney disease stage IV (HCC)    KD (acute kidney injury) ([address]    Diarrhea    Elevated serum creatinine    Abnormal serum creatinine level    Hyperlipemia    Primary hypertension    Essential (primary) hypertension    Postsurgical malabsorption, not elsewhere classified    Hypercholesterolemia    Pure hypercholesterolemia    Prediabetes    Hypothyroidism, unspecified    Acute renal failure (ARF) ([address]        PAST MEDICAL HISTORY:    Past Medical History:   Diagnosis Date    Acute kidney failure ([address] 2014 and 8/2016    x2,no dialysis needed,resolved, kidney function tests returned to normal    Anxiety     Arthritis     CAD (coronary artery disease)     follows with Dr. Jayde Fajardo every 6 months    Cancer ([address]     skin, leg,nose- removed surgically     COPD (chronic obstructive pulmonary disease) ([address]     mild- no inhlaers, no oxygen     Depression     Fibromyalgia     chronic back and neck pain    Generalized headaches     h/o / daily    History of blood transfusion 3-11-14    multiple times    History of necrotic bowel 2012    Hyperlipidemia Hypertension     Incisional hernia     abdomen    Insomnia     Mitral valve regurgitation     Myocardial infarct ([address] 2002    Occasional tremors     at hands / stable with medication    Osteopenia     PONV (postoperative nausea and vomiting)     Vitamin B12 deficiency     h/o       DIET:    ADULT TUBE FEEDING; PEG; Renal Formula; Continuous; 35; No; 100; Q 6 hours  ADULT DIET;  Dysphagia - Soft and Bite Sized; Mildly Thick (Nectar)     PHYSICAL EXAM:     Patient Vitals for the past 24 hrs:   BP Temp Temp src Pulse Resp SpO2 Weight   07/21/22 0800 (!) 138/58 97.7 °F (36.5 °C) Oral 74 20 99 % --   07/21/22 0453 -- -- -- -- -- -- 114 lb 1.6 oz (51.8 kg)   07/21/22 0130 -- -- -- -- -- 96 % --   07/21/22 0000 (!) 152/68 -- -- 72 18 99 % --   07/20/22 2150 (!) 140/60 98.8 °F (37.1 °C) Oral 82 18 97 % --   07/20/22 1815 -- -- -- -- -- 96 % --   07/20/22 1715 -- -- -- -- -- 96 % --   07/20/22 1544 139/61 98.1 °F (36.7 °C) Axillary 77 18 98 % --   @      Intake/Output Summary (Last 24 hours) at 7/21/2022 1334  Last data filed at 7/21/2022 1129  Gross per 24 hour   Intake 1738 ml   Output 2920 ml   Net -1182 ml         Wt Readings from Last 3 Encounters:   07/21/22 114 lb 1.6 oz (51.8 kg)   01/28/22 94 lb (42.6 kg)   07/02/21 84 lb (38.1 kg)       Constitutional:  Pt frail and cachetic   Head: normocephalic, atraumatic  Neck: no JVD  Cardiovascular: S1 S2 no S3 or rub  Respiratory:  diminished, poor effort  Gastrointestinal:  Soft, + PEG and ostomy  Ext: no edema   Skin: dry, no rash  Neuro: awake alert and appropriate    MEDS (scheduled):    apixaban  10 mg Oral BID    Followed by    Ambrose Torres ON 7/28/2022] apixaban  5 mg Oral BID    sodium chloride flush  5-40 mL IntraVENous 2 times per day    levoFLOXacin  500 mg Oral Every Other Day    Arformoterol Tartrate  15 mcg Nebulization BID    budesonide  0.5 mg Nebulization BID    insulin lispro  0-6 Units SubCUTAneous TID WC    insulin lispro  0-3 Units SubCUTAneous Nightly nicotine  1 patch TransDERmal Daily    calcitRIOL  0.25 mcg Oral Q MWF    vitamin D  50,000 Units Oral Weekly    sodium chloride flush  10 mL IntraVENous 2 times per day    escitalopram  20 mg Oral Nightly    pantoprazole  40 mg Oral QAM AC       MEDS (infusions):   sodium chloride      dextrose Stopped (07/17/22 0051)    sodium chloride         MEDS (prn):  sodium chloride flush, sodium chloride, labetalol **OR** hydrALAZINE, HYDROmorphone **OR** HYDROmorphone, ipratropium-albuterol, glucose, dextrose bolus **OR** dextrose bolus, glucagon (rDNA), dextrose, sodium chloride flush, sodium chloride, senna, acetaminophen **OR** acetaminophen, hyoscyamine, oxyCODONE-acetaminophen    DATA:    Recent Labs     07/19/22  1020 07/20/22  0235 07/21/22  0618   WBC 12.7* 9.9 12.8*   HGB 10.0* 9.2* 9.0*   HCT 30.1* 28.6* 27.3*   MCV 94.7 95.7 93.5   PLT 45* 49* 65*     Recent Labs     07/19/22  1020 07/20/22  0235 07/21/22  0618   * 144 144   K 3.7 3.6 3.5   * 110* 109*   CO2 24 25 23   BUN 81* 80* 74*   CREATININE 1.6* 1.6* 1.5*   LABGLOM 31 31 33   GLUCOSE 156* 141* 101*   CALCIUM 8.3* 7.9* 7.9*   ALT  --  13 21   AST  --  16 29   BILITOT  --  1.2 1.3*   ALKPHOS  --  203* 288*   MG  --  2.2 2.1   PHOS 3.9 4.1 3.4       Lab Results   Component Value Date    LABALBU 1.9 (L) 07/21/2022    LABALBU 1.7 (L) 07/20/2022    LABALBU 1.9 (L) 07/18/2022     Lab Results   Component Value Date    TSH 1.380 07/01/2022       Iron Studies  Lab Results   Component Value Date    IRON 30 (L) 07/01/2022    TIBC 229 (L) 07/01/2022    FERRITIN 143 07/01/2022     Vitamin B-12   Date Value Ref Range Status   04/27/2022 1050 (H) 211 - 946 pg/mL Final     Folate   Date Value Ref Range Status   01/22/2021 8.1 4.8 - 24.2 ng/mL Final       Vit D, 25-Hydroxy   Date Value Ref Range Status   07/15/2022 13 (L) 30 - 100 ng/mL Final     Comment:     <20 ng/mL. ........... Elvie Stands Deficient  20-30 ng/mL. ......... Elvie Stands Insufficient   ng/mL......... Dailey Crape Sufficient  >100 ng/mL. .......... Dailey Crape Toxic       PTH   Date Value Ref Range Status   07/15/2022 307 (H) 15 - 65 pg/mL Final       No components found for: URIC    Lab Results   Component Value Date/Time    COLORU Yellow 07/15/2022 11:06 PM    NITRU Negative 07/15/2022 11:06 PM    GLUCOSEU Negative 07/15/2022 11:06 PM    KETUA Negative 07/15/2022 11:06 PM    UROBILINOGEN 0.2 07/15/2022 11:06 PM    BILIRUBINUR Negative 07/15/2022 11:06 PM       No results found for: LIPIDPAN    EXAMINATION:  RETROPERITONEAL ULTRASOUND OF THE KIDNEYS AND URINARY BLADDER     7/14/2022     COMPARISON:  Renal ultrasound from February 25, 2021     HISTORY:  ORDERING SYSTEM PROVIDED HISTORY: KD  TECHNOLOGIST PROVIDED HISTORY:  Reason for exam:->KD  What reading provider will be dictating this exam?->CRC     FINDINGS:     Kidneys: The right kidney measures 10.4 cm in length and the left kidney measures 9.7  cm in length. Right kidney: The corticomedullary differentiation and cortical thickness is  slightly decreased. Echogenic renal parenchyma. Anechoic thin walled  nonvascular 13 mm right mid to upper pole renal cyst.  No concerning renal  mass or intrarenal calcification. No hydronephrosis. Left kidney: The corticomedullary differentiation and cortical thickness is  slightly decreased. Anechoic thin walled nonvascular left midpole cyst.  4.5  cm left lower pole cyst.  Mildly echogenic renal parenchyma. There is no  hydronephrosis. Bladder:     Bladder volume was 282 mL. No intrinsic mass, intrinsic calcification, nor  wall thickening. Impression  1. Bilateral renal cysts. Largest is seen in the lower pole the left kidney  measuring 4.5 cm. No aggressive renal lesions. Mild bilateral renal  cortical thinning. No hydronephrosis. Mildly echogenic renal parenchyma. 2.  Normal appearance of the imaged bladder.           IMPRESSION/RECOMMENDATIONS:      Stage II KD presumed sec to decreased effective renal perfusion in the setting  Gram Neg Sepsis with a HX of CKD G3B with a baseline serum cr 1.4-1.5mg/dl with an e-GFR=33-36ml/min  UC Enterococcus Faecalis-Asymptomatic bacteruria  BC E Coli  7/14/22 Renal US No evidence of hydro  FeNa<1%  TF running as po intake poor. Cr at or near baseline  S/P Removal Mediport 7/19/22  PLAN:  1. Follow labs-as an outpt       2. HTN with CKD I-IV  BP goal <130/80-near goal  PLAN:  Follow BP     3. Anion Gap Met Acidosis in the setting of the KD on the CKD with a lactic acidosis and the abd pain with BC (+) for GNR-on cefepime  LA trending down 3.9-->2.9-->2.5-->2.7-->2. 1now LA WNL  PLAN:  Follow HCO3     4. 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 Hypomagnesemia  Mg++ 1.7-->2.2-->2.1  PLAN:  Follow Mg++    6.  Hypokalemia  K+ 3.5  PLAN:  Follow K+       Choco Ely MD

## 2022-07-21 NOTE — PROGRESS NOTES
OT BEDSIDE TREATMENT NOTE   [address] [facility] [address] [facility], [place]       [id number]                                                  Patient Name: Marcia Samuels    MRN: [id number]    : 1940    Room: [address] [place] [address]       Evaluating OT: Dalila Hidalgo OTR/L [id number]       Referring Provider: Eedr Small DO    Specific Provider Orders/Date: OT eval and treat 22       Diagnosis:  Dehydration [E86.0]  Severe protein-calorie malnutrition ([address] [E43]  Failure to thrive in adult [R62.7]  NSTEMI (non-ST elevated myocardial infarction) ([address] [I21.4]  Acute renal failure (ARF) ([address] [N17.9]  Acute renal failure, unspecified acute renal failure type ([address] [N17.9]      Pertinent Medical History: arthritis, COPD, fibromyalgia, HTN, MI, osteopenia       Precautions:  Fall Risk, alarm, ostomy, PEG, O2       Assessment of current deficits   [x] Functional mobility            [x]ADLs           [x] Strength                  [x]Cognition   [x] Functional transfers          [x] IADLs         [x] Safety Awareness   [x]Endurance   [] Fine Coordination                         [x] Balance      [] Vision/perception   []Sensation      []Gross Motor Coordination             [] ROM           [] Delirium                   [] Motor Control     OT PLAN OF CARE   OT POC based on physician orders, patient diagnosis and results of clinical assessment     Frequency/Duration 2-4 days/wk for 2 weeks PRN  Specific OT Treatment Interventions to include:   * Instruction/training on adapted ADL techniques and AE recommendations to increase functional independence within precautions       * Training on energy conservation strategies, correct breathing pattern and techniques to improve independence/tolerance for self-care routine  * Functional transfer/mobility training/DME recommendations for increased independence, safety, and fall prevention  * Patient/Family education to increase follow through with safety techniques and functional independence  * Recommendation of environmental modifications for increased safety with functional transfers/mobility and ADLs  * Cognitive retraining/development of therapeutic activities to improve problem solving, judgement, memory, and attention for increased safety/participation in ADL/IADL tasks  * Therapeutic exercise to improve motor endurance, ROM, and functional strength for ADLs/functional transfers  * Therapeutic activities to facilitate/challenge dynamic balance, stand tolerance for increased safety and independence with ADLs  * Therapeutic activities to facilitate gross/fine motor skills for increased independence with ADLs  * Neuro-muscular re-education: facilitation of righting/equilibrium reactions, midline orientation, scapular stability/mobility, normalization of muscle tone, and facilitation of volitional active controled movement  * Positioning to improve skin integrity, interaction with environment and functional independence           Recommended Adaptive Equipment: TBD     Home Living: Pt lives with  in 1 story house with 2 LOW and 2 hand rails floor. Bathroom setup: walk in shower with shower chair   Equipment owned: cane, wheeled walker     Prior Level of Function: assist  with ADLs , assist from  with IADLs; functional mobility: cane or wheeled walker     Pain Level: pt reported pain in knees; pt agreeable to therapy  Cognition: A&O: 2/4; 1-2 step command follow demonstrated. Pt with some confusion noted.               Memory:  Fair               Sequencing:  Fair-              Problem solving:  Fair-              Judgement/safety:  Fair-                Functional Assessment:  AM-PAC Daily Activity Raw Score: 14/24    Initial Eval Status  Date: 7/15/22 Treatment Status  Date: 7/21/2022 STGs = LTGs  Time frame: 10-14 days   Feeding Set up I holding and drinking nectar liquid from cup in bed       Grooming Min A, seated  In bed, set up to brush teeth and comb hair Sup   UB Dressing Mod A Mod A with gown management  SBA   LB Dressing Max A  To don socks Max A to adjust socks while in bed   Min A-with use of AD as appropriate/needed   Bathing Max A Mod A to sponge bath in bed  Min A -with use of AD as appropriate/needed   Toileting Pt with catheter and ostomy Catheter and ostomy bag      Bed Mobility Supine to sit: Mod A   Sit to supine: Max A   SBA   Functional Transfers NT. Pt too fatigued to stand this date Pt just returned to bed when therapist entered, did not want to complete OOB activity at this time  Min A    Functional Mobility NT   Min A -with device as needed to maximize independence with ADLs and functional task completion   Balance Sitting:    Static:  SBA    Dynamic:CGA  Standing: NT   Sup for static/dynamic sitting balance to maximize independence with ADLs and functional task completion     Min A for standing balance to maximize independence with ADLs and functional task completion   Activity Tolerance Fair- with light activity. Pt limited by fatigue and weakness. Fair for self care tasks in bed  Fair+ with ADL activity. Pt will demonstrate good understanding of education provided on EC/WS techniques   Visual/  Perceptual Glasses: yes    Yes- prism in Left lens for double vision                      Comments: Upon arrival pt supine in bed having just returned from sitting in chair for half an hour. Pt willing to engage in self care tasks while in bed with good participation. She was able to complete shoulder shrug and distal arm exercises for 5 reps. Pt educated on techniques to increase independence and safety during ADL's, bed mobility, and functional transfers. At end of session pt left supine in bed with  present, lines in place, and  call light within reach. Pt has made good progress towards set goals.      Continue with current plan of care    Treatment Time In:10:42            Treatment Time Out: 11: 20             Treatment Charges: Mins Units   Ther Ex  [id number]     Manual Therapy Jaxson Ramírez [phone]     ADL/Home Mgt [id number]     Neuro Re-ed [id number]     Group Therapy      Orthotic manage/training  [id number]     Non-Billable Time     Total Timed Treatment 45 [id number] [facility] [place] [id number]

## 2022-07-21 NOTE — PROGRESS NOTES
Subjective:  Chart reviewed  Patient seen at bedside continued improvement and wants to go home  Denies pain currently, worsening shortness of breath, nausea, vomiting    Objective:    BP (!) 148/64   Pulse 68   Temp 97.7 °F (36.5 °C) (Oral)   Resp 18   Ht 5' (1.524 m)   Wt 114 lb 1.6 oz (51.8 kg)   SpO2 100%   BMI 22.28 kg/m²     General: NAD, afebrile, awake and alert,   HEENT: normocephalic/atraumatic, sclera anicteric, conjunctiva clear, mucosa dry  Heart:  RRR, murmur appreciated  Lungs:  diminished BS, on O2 via NC  Abd: ileostomy  Extrem:  decreased muscle tone, clubbing of fingers x 10  : urinary catheter  Skin: Intact, no petechia or purpura    CBC with Differential:    Lab Results   Component Value Date/Time    WBC 12.8 07/21/2022 06:18 AM    RBC 2.92 07/21/2022 06:18 AM    HGB 9.0 07/21/2022 06:18 AM    HCT 27.3 07/21/2022 06:18 AM    PLT 65 07/21/2022 06:18 AM    MCV 93.5 07/21/2022 06:18 AM    MCH 30.8 07/21/2022 06:18 AM    MCHC 33.0 07/21/2022 06:18 AM    RDW 14.4 07/21/2022 06:18 AM    NRBC 0.0 07/21/2022 06:18 AM    LYMPHOPCT 6.1 07/21/2022 06:18 AM    MONOPCT 4.3 07/21/2022 06:18 AM    MYELOPCT 0.9 07/21/2022 06:18 AM    BASOPCT 0.0 07/21/2022 06:18 AM    MONOSABS 0.51 07/21/2022 06:18 AM    LYMPHSABS 0.77 07/21/2022 06:18 AM    EOSABS 0.00 07/21/2022 06:18 AM    BASOSABS 0.00 07/21/2022 06:18 AM     CMP:    Lab Results   Component Value Date/Time     07/21/2022 06:18 AM    K 3.5 07/21/2022 06:18 AM     07/21/2022 06:18 AM    CO2 23 07/21/2022 06:18 AM    BUN 74 07/21/2022 06:18 AM    CREATININE 1.5 07/21/2022 06:18 AM    GFRAA 40 07/21/2022 06:18 AM    LABGLOM 33 07/21/2022 06:18 AM    GLUCOSE 101 07/21/2022 06:18 AM    PROT 5.0 07/21/2022 06:18 AM    LABALBU 1.9 07/21/2022 06:18 AM    CALCIUM 7.9 07/21/2022 06:18 AM    BILITOT 1.3 07/21/2022 06:18 AM    ALKPHOS 288 07/21/2022 06:18 AM    AST 29 07/21/2022 06:18 AM    ALT 21 07/21/2022 06:18 AM          Current Facility-Administered Medications:     apixaban (ELIQUIS) tablet 10 mg, 10 mg, Oral, BID, 10 mg at 07/21/22 1121 **FOLLOWED BY** [START ON 7/28/2022] apixaban (ELIQUIS) tablet 5 mg, 5 mg, Oral, BID, Joanna Montesinos, APRN - CNP    sodium chloride flush 0.9 % injection 5-40 mL, 5-40 mL, IntraVENous, 2 times per day, Jorge Luis Green MD, 10 mL at 07/21/22 0900    sodium chloride flush 0.9 % injection 5-40 mL, 5-40 mL, IntraVENous, PRN, Jorge Luis Green MD    0.9 % sodium chloride infusion, 25 mL, IntraVENous, PRN, Jorge Luis Green MD    labetalol (NORMODYNE;TRANDATE) injection 10 mg, 10 mg, IntraVENous, Q15 Min PRN **OR** hydrALAZINE (APRESOLINE) injection 10 mg, 10 mg, IntraVENous, Q15 Min PRN, Jorge Luis Green MD    levoFLOXacin (LEVAQUIN) tablet 500 mg, 500 mg, Oral, Every Other Day, Paula Lloyd MD, 500 mg at 07/20/22 1415    HYDROmorphone (DILAUDID) injection 0.25 mg, 0.25 mg, IntraVENous, Q2H PRN **OR** HYDROmorphone (DILAUDID) injection 0.5 mg, 0.5 mg, IntraVENous, Q2H PRN, Karlene Schroeder MD    Arformoterol Tartrate (BROVANA) nebulizer solution 15 mcg, 15 mcg, Nebulization, BID, Karlene Schroeder MD, 15 mcg at 07/21/22 0851    budesonide (PULMICORT) nebulizer suspension 500 mcg, 0.5 mg, Nebulization, BID, Karlene Schroeder MD, 500 mcg at 07/21/22 0851    ipratropium-albuterol (DUONEB) nebulizer solution 1 ampule, 1 ampule, Inhalation, Q4H PRN, Karlene Schroeder MD, 1 ampule at 07/18/22 0226    insulin lispro (HUMALOG) injection vial 0-6 Units, 0-6 Units, SubCUTAneous, TID WC, Karlene Schroeder MD, 1 Units at 07/20/22 1105    insulin lispro (HUMALOG) injection vial 0-3 Units, 0-3 Units, SubCUTAneous, Nightly, Karlene Schroeder MD, 1 Units at 07/18/22 2059    glucose chewable tablet 16 g, 4 tablet, Oral, PRN, Karlene Schroeder MD    dextrose bolus 10% 125 mL, 125 mL, IntraVENous, PRN **OR** dextrose bolus 10% 250 mL, 250 mL, IntraVENous, PRN, Karlene Schroeder MD    glucagon (rDNA) injection 1 mg, 1 mg, IntraMUSCular, PRN, Mikael Martinez MD    dextrose 5 % solution, 100 mL/hr, IntraVENous, PRN, Mikael Martinez MD, Held at 07/17/22 0051    nicotine (NICODERM CQ) 21 MG/24HR 1 patch, 1 patch, TransDERmal, Daily, Mikael Martinez MD, 1 patch at 07/21/22 7931    calcitRIOL (ROCALTROL) capsule 0.25 mcg, 0.25 mcg, Oral, Q MWF, iMkael Martinez MD, 0.25 mcg at 07/20/22 1207    vitamin D (ERGOCALCIFEROL) capsule 50,000 Units, 50,000 Units, Oral, Weekly, Mikael Martinez MD    sodium chloride flush 0.9 % injection 10 mL, 10 mL, IntraVENous, 2 times per day, Mikael Martinez MD, 10 mL at 07/21/22 0914    sodium chloride flush 0.9 % injection 10 mL, 10 mL, IntraVENous, PRN, Mikael Martinez MD    0.9 % sodium chloride infusion, , IntraVENous, PRN, Mikael Martinez MD    senna (SENOKOT) tablet 8.6 mg, 1 tablet, Oral, Daily PRN, Mikael Martinez MD    acetaminophen (TYLENOL) tablet 650 mg, 650 mg, Oral, Q6H PRN, 650 mg at 07/18/22 0525 **OR** acetaminophen (TYLENOL) suppository 650 mg, 650 mg, Rectal, Q6H PRN, Mikael Martinez MD    escitalopram (LEXAPRO) tablet 20 mg, 20 mg, Oral, Nightly, Mikael Martinez MD, 20 mg at 07/20/22 3856    hyoscyamine (ANASPAZ;LEVSIN) tablet 125 mcg, 125 mcg, Oral, BID PRN, Mikael Martinez MD, 125 mcg at 07/21/22 6077    pantoprazole (PROTONIX) tablet 40 mg, 40 mg, Oral, QAM AC, Mikael Martinez MD, 40 mg at 07/21/22 9813    oxyCODONE-acetaminophen (PERCOCET)  MG per tablet 1 tablet, 1 tablet, Oral, Q6H PRN, Mikael Martinez MD, 1 tablet at 07/19/22 1211    FL MODIFIED BARIUM SWALLOW W VIDEO   Final Result   Positive penetration and aspiration to thin and nectar consistencies of   barium contrast.      Please see separate speech pathology report for full discussion of findings   and recommendations. XR ABDOMEN (KUB) (SINGLE AP VIEW)   Final Result   Multiple eggshell type calcifications left epigastric region which could be   related to the kidney.       The lower lung fields are clear. The bowel gas pattern is unremarkable. US DUP UPPER EXTREMITIES BILATERAL VENOUS   Final Result   1.  Echogenic thrombus in the distal left basilic vein. No color flow   identified in this area on color Doppler evaluation. 2.  Echogenic occlusive thrombus noted in the left cephalic vein. 3.  Remaining venous vasculature of the bilateral upper extremities appear   patent. The findings were sent to the Radiology Results [address] at 5:42   pm on 7/16/2022 to be communicated to a licensed caregiver. US DUP LOWER EXTREMITIES BILATERAL VENOUS   Final Result   No evidence of DVT in either lower extremity. CT CHEST WO CONTRAST   Final Result   Mild central mucous plugging. Minimal bibasilar atelectasis. No   pneumonia/aspiration. Trace pleural effusions. Pulmonary emphysema and hyperinflation. Enlarged pulmonary trunk and main pulmonary arteries, suggestive of pulmonary   arterial hypertension in the appropriate clinical setting. Other incidental findings as above. CT HEAD WO CONTRAST   Final Result   No acute intracranial abnormality. XR CHEST PORTABLE   Final Result   No acute cardiopulmonary process. US RETROPERITONEAL COMPLETE   Final Result   1. Bilateral renal cysts. Largest is seen in the lower pole the left kidney   measuring 4.5 cm. No aggressive renal lesions. Mild bilateral renal   cortical thinning. No hydronephrosis. Mildly echogenic renal parenchyma. 2.  Normal appearance of the imaged bladder. XR CHEST (2 VW)   Final Result   No acute process.              A/P:  Gram negative bacteremia status post port removal, E. coli  Thrombocytopenia, platelets 65  WBC within normal limits  Anemia- maintain Hgb > 7.5  KD, improving  LUE DVT-ok to restart anticoagulation with platelets >72F    Patient's counts improving making severe infection most likely etiology for pancytopenia. Patient can follow-up in a couple months outpatient to ensure recovery of thrombocytopenia and no additional workup is needed. We will sign off and defer anticoagulation to primary service. Thank you for allowing us to participate in the care of Maira López Nathan Capellan PA-C  [phone]    Electronically signed by JUANITO Giordano on 7/21/2022 at 3:33 PM    Note: This report was completed using [employer] voiced recognition software. Every effort has been made to ensure accuracy; however, inadvertent computerized transcription errors may be present.

## 2022-07-21 NOTE — PROGRESS NOTES
Internal Medicine Progress Note    Patient's name: Leora Chen  : 1940  Chief complaints (on day of admission): Abnormal Lab (sent in by Dr. Raiza Burleson) and Chest Pain  Admission date: 2022  Date of service: 2022   Room: [address] MEDICINE   Primary care physician: Yvette Myers DO  Reason for visit: Follow-up for abnormal labs     Subjective  Yolanda Doran was seen and examined at bedside     Doing well today    at bedside   They were not happy about the prospect of being discharged today and believed it to be too soon   We discussed this at length   I think we can keep her tonight and plan for DC tomorrow if she continues to show improvement she will need clearance from consults   They brought a bag of tomatoes for Dr Raiza Burleson which I delivered to her on their behalf   All questions answered     Current treatment plan discussed and all questions answered    Current medications being prescribed discussed and patient expresses verbal understanding     Review of Systems  There are no new complaints of chest pain, shortness of breath, abdominal pain, nausea, vomiting, diarrhea, constipation unless otherwise mentioned above.      Hospital Medications  Current Facility-Administered Medications   Medication Dose Route Frequency Provider Last Rate Last Admin    sodium chloride flush 0.9 % injection 5-40 mL  5-40 mL IntraVENous 2 times per day Júnior Valverde MD   10 mL at 22    sodium chloride flush 0.9 % injection 5-40 mL  5-40 mL IntraVENous PRN Júnior Valverde MD        0.9 % sodium chloride infusion  25 mL IntraVENous PRN Júnior Valverde MD        labetalol (NORMODYNE;TRANDATE) injection 10 mg  10 mg IntraVENous Q15 Min PRN Júnior Valverde MD        Or    hydrALAZINE (APRESOLINE) injection 10 mg  10 mg IntraVENous Q15 Min PRN Júnior Valverde MD        levoFLOXacin (LEVAQUIN) tablet 500 mg  500 mg Oral Every Other Day Stacie Watts MD   500 mg at 07/20/22 1415    HYDROmorphone (DILAUDID) injection 0.25 mg  0.25 mg IntraVENous Q2H PRN Reji Neri MD        Or    HYDROmorphone (DILAUDID) injection 0.5 mg  0.5 mg IntraVENous Q2H PRN Reji Neri MD        Arformoterol Tartrate [facility]) nebulizer solution 15 mcg  15 mcg Nebulization BID Reji Neri MD   15 mcg at 07/20/22 2208    budesonide (PULMICORT) nebulizer suspension 500 mcg  0.5 mg Nebulization BID Reji Neri MD   500 mcg at 07/20/22 2208    ipratropium-albuterol (DUONEB) nebulizer solution 1 ampule  1 ampule Inhalation Q4H PRN Reji Neri MD   1 ampule at 07/18/22 0226    insulin lispro (HUMALOG) injection vial 0-6 Units  0-6 Units SubCUTAneous TID WC Reji Neri MD   1 Units at 07/20/22 1105    insulin lispro (HUMALOG) injection vial 0-3 Units  0-3 Units SubCUTAneous Nightly Reji Neri MD   1 Units at 07/18/22 2059    glucose chewable tablet 16 g  4 tablet Oral PRN Reji Neri MD        dextrose bolus 10% 125 mL  125 mL IntraVENous PRN Reji Neri MD        Or    dextrose bolus 10% 250 mL  250 mL IntraVENous PRN Reji Neri MD        glucagon (rDNA) injection 1 mg  1 mg IntraMUSCular PRN Reji Neri MD        dextrose 5 % solution  100 mL/hr IntraVENous PRN Reji Neri MD   Held at 07/17/22 0051    nicotine (NICODERM CQ) 21 MG/24HR 1 patch  1 patch TransDERmal Daily Reji Neri MD   1 patch at 07/20/22 0925    heparin (porcine) injection 3,750 Units  80 Units/kg IntraVENous PRN Reji Neri MD   3,750 Units at 07/17/22 0125    heparin (porcine) injection 1,880 Units  40 Units/kg IntraVENous PRN Reji Neri MD   1,880 Units at 07/18/22 0535    [Held by provider] heparin 25,000 units in dextrose 5% 250 mL (premix) infusion  5-30 Units/kg/hr IntraVENous Continuous Raenette Less, MD   Stopped at 07/19/22 1978    calcitRIOL (ROCALTROL) capsule 0.25 mcg  0.25 mcg Oral Q MWF Reji Neri MD   0.25 mcg at 07/20/22 1207    vitamin D (ERGOCALCIFEROL) capsule 50,000 Units  50,000 Units Oral Weekly Clark Hook MD        sodium chloride flush 0.9 % injection 10 mL  10 mL IntraVENous 2 times per day Clark Hook MD   10 mL at 07/20/22 2156    sodium chloride flush 0.9 % injection 10 mL  10 mL IntraVENous PRN Clark Hook MD        0.9 % sodium chloride infusion   IntraVENous PRN Clark Hook MD        senna [facility]) tablet 8.6 mg  1 tablet Oral Daily PRN Clark Hook MD        acetaminophen (TYLENOL) tablet 650 mg  650 mg Oral Q6H PRN Clark Hook MD   650 mg at 07/18/22 6885    Or    acetaminophen (TYLENOL) suppository 650 mg  650 mg Rectal Q6H PRN Clark Hook MD        escitalopram (LEXAPRO) tablet 20 mg  20 mg Oral Nightly Clark Hook MD   20 mg at 07/20/22 2156    hyoscyamine (ANASPAZ;LEVSIN) tablet 125 mcg  125 mcg Oral BID PRN Clark Hook MD   125 mcg at 07/21/22 0437    pantoprazole (PROTONIX) tablet 40 mg  40 mg Oral QAM AC Clark Hook MD   40 mg at 07/21/22 9836    oxyCODONE-acetaminophen (PERCOCET)  MG per tablet 1 tablet  1 tablet Oral Q6H PRN Clark Hook MD   1 tablet at 07/19/22 1211       PRN Medications  sodium chloride flush, sodium chloride, labetalol **OR** hydrALAZINE, HYDROmorphone **OR** HYDROmorphone, ipratropium-albuterol, glucose, dextrose bolus **OR** dextrose bolus, glucagon (rDNA), dextrose, heparin (porcine), heparin (porcine), sodium chloride flush, sodium chloride, senna, acetaminophen **OR** acetaminophen, hyoscyamine, oxyCODONE-acetaminophen    Objective  Most Recent Recorded Vitals  BP (!) 152/68   Pulse 72   Temp 98.8 °F (37.1 °C) (Oral)   Resp 18   Ht 5' (1.524 m)   Wt 114 lb 1.6 oz (51.8 kg)   SpO2 96%   BMI 22.28 kg/m²   I/O last 3 completed shifts: In: 0397 [NG/GT:1620]  Out: 2570 [Urine:900; Stool:1670]  No intake/output data recorded.     Physical Exam:  General: AAO to person/place/time/purpose, NAD, no labored breathing  Eyes: conjunctivae/corneas clear, sclera non icteric  Skin: color/texture/turgor normal, no rashes or lesions  Lungs: CTAB, no retractions/use of accessory muscles, no vocal fremitus, no rhonchi, no crackle, no rales  Heart: regular rate, regular rhythm, no murmur  Abdomen: soft, NT, bowel sounds normal  Extremities: atraumatic, no edema  Neurologic: cranial nerves 2-12 grossly intact, no slurred speech    Most Recent Labs  Lab Results   Component Value Date    WBC 9.9 07/20/2022    HGB 9.2 (L) 07/20/2022    HCT 28.6 (L) 07/20/2022    PLT 49 (L) 07/20/2022     07/20/2022    K 3.6 07/20/2022     (H) 07/20/2022    CREATININE 1.6 (H) 07/20/2022    BUN 80 (H) 07/20/2022    CO2 25 07/20/2022    GLUCOSE 141 (H) 07/20/2022    ALT 13 07/20/2022    AST 16 07/20/2022    INR 2.0 07/19/2022    TSH 1.380 07/01/2022    LABA1C 5.2 07/01/2022       FL MODIFIED BARIUM SWALLOW W VIDEO   Final Result   Positive penetration and aspiration to thin and nectar consistencies of   barium contrast.      Please see separate speech pathology report for full discussion of findings   and recommendations. XR ABDOMEN (KUB) (SINGLE AP VIEW)   Final Result   Multiple eggshell type calcifications left epigastric region which could be   related to the kidney. The lower lung fields are clear. The bowel gas pattern is unremarkable. US DUP UPPER EXTREMITIES BILATERAL VENOUS   Final Result   1.  Echogenic thrombus in the distal left basilic vein. No color flow   identified in this area on color Doppler evaluation. 2.  Echogenic occlusive thrombus noted in the left cephalic vein. 3.  Remaining venous vasculature of the bilateral upper extremities appear   patent. The findings were sent to the Radiology Results [address] at 5:42   pm on 7/16/2022 to be communicated to a licensed caregiver.          US DUP LOWER EXTREMITIES BILATERAL VENOUS   Final Result   No evidence of DVT in either lower extremity. CT CHEST WO CONTRAST   Final Result   Mild central mucous plugging. Minimal bibasilar atelectasis. No   pneumonia/aspiration. Trace pleural effusions. Pulmonary emphysema and hyperinflation. Enlarged pulmonary trunk and main pulmonary arteries, suggestive of pulmonary   arterial hypertension in the appropriate clinical setting. Other incidental findings as above. CT HEAD WO CONTRAST   Final Result   No acute intracranial abnormality. XR CHEST PORTABLE   Final Result   No acute cardiopulmonary process. US RETROPERITONEAL COMPLETE   Final Result   1. Bilateral renal cysts. Largest is seen in the lower pole the left kidney   measuring 4.5 cm. No aggressive renal lesions. Mild bilateral renal   cortical thinning. No hydronephrosis. Mildly echogenic renal parenchyma. 2.  Normal appearance of the imaged bladder. XR CHEST (2 VW)   Final Result   No acute process.              Echocardiogram       Assessment   Active Hospital Problems    Diagnosis     Acute renal failure (ARF) (HCC) [N17.9]      Priority: Medium         Plan  Acute hypoxic respiratory failure with elevated D-Dimer   Appreciate pulm recs   D-Dimer >5K   Heparin gtt off while plt low, planning for at least 6 months of anticoagulation therapy   Resume once able per hem onc and pulm   Planning for PO Eliquis on DC   Acute renal failure  Nephrology on board   Appreciate their recs   Renally dose meds   Avoid nephro toxins   Baseline around 1.5  Ecoli bacteremia 2/2 CLABSI   Sp mediport removal 7/19  Follow cx   Appreciate ID recs   Levaquin now   Thrombocytopenia   Continue to monitor   Hem Onc is on board   Metabolic encephalopathy  Improving   DM type II  Monitor sugars and titrate as needed insulin regimen   Urinary retention  Monitor   Short gut syndrome  PT AM-PAC-- TBD   DVT prophylaxis   Code status Full   Medications, labs and imaging reviewed   Discharge plan: [facility] once cleared by all consults, DC plan is for tomorrow     Electronically signed by Francisco Winter MD on 7/21/2022 at 7:30 AM    I can be reached through [facility].

## 2022-07-21 NOTE — DISCHARGE INSTRUCTIONS
Please obtain a CBC w/ diff on Monday 7/25/2022 to monitor platelet count with history of thrombocytopenia

## 2022-07-21 NOTE — CARE COORDINATION
IMM letter given to Pt and signed by spouse. Electronically signed by CASSI Gilman on 7/21/2022 at 11:52 AM

## 2022-07-22 VITALS
OXYGEN SATURATION: 91 % | DIASTOLIC BLOOD PRESSURE: 58 MMHG | SYSTOLIC BLOOD PRESSURE: 116 MMHG | TEMPERATURE: 97.8 F | WEIGHT: 114.1 LBS | BODY MASS INDEX: 22.4 KG/M2 | HEART RATE: 72 BPM | RESPIRATION RATE: 16 BRPM | HEIGHT: 60 IN

## 2022-07-22 LAB
ALBUMIN SERPL-MCNC: 2.1 G/DL (ref 3.5–5.2)
ALP BLD-CCNC: 330 U/L (ref 35–104)
ALT SERPL-CCNC: 25 U/L (ref 0–32)
ANION GAP SERPL CALCULATED.3IONS-SCNC: 11 MMOL/L (ref 7–16)
AST SERPL-CCNC: 29 U/L (ref 0–31)
BASOPHILS ABSOLUTE: 0.02 E9/L (ref 0–0.2)
BASOPHILS RELATIVE PERCENT: 0.2 % (ref 0–2)
BILIRUB SERPL-MCNC: 1.1 MG/DL (ref 0–1.2)
BUN BLDV-MCNC: 63 MG/DL (ref 6–23)
CALCIUM IONIZED: 1.16 MMOL/L (ref 1.15–1.33)
CALCIUM SERPL-MCNC: 7.9 MG/DL (ref 8.6–10.2)
CHLORIDE BLD-SCNC: 107 MMOL/L (ref 98–107)
CO2: 22 MMOL/L (ref 22–29)
CREAT SERPL-MCNC: 1.4 MG/DL (ref 0.5–1)
EOSINOPHILS ABSOLUTE: 0.15 E9/L (ref 0.05–0.5)
EOSINOPHILS RELATIVE PERCENT: 1.6 % (ref 0–6)
GFR AFRICAN AMERICAN: 44
GFR NON-AFRICAN AMERICAN: 36 ML/MIN/1.73
GLUCOSE BLD-MCNC: 120 MG/DL (ref 74–99)
HCT VFR BLD CALC: 30 % (ref 34–48)
HEMOGLOBIN: 9.8 G/DL (ref 11.5–15.5)
HEPARIN PF4 ANTIBODY: 0.06 OD
IMMATURE GRANULOCYTES #: 0.18 E9/L
IMMATURE GRANULOCYTES %: 1.9 % (ref 0–5)
LYMPHOCYTES ABSOLUTE: 0.89 E9/L (ref 1.5–4)
LYMPHOCYTES RELATIVE PERCENT: 9.3 % (ref 20–42)
MAGNESIUM: 1.7 MG/DL (ref 1.6–2.6)
MCH RBC QN AUTO: 30.6 PG (ref 26–35)
MCHC RBC AUTO-ENTMCNC: 32.7 % (ref 32–34.5)
MCV RBC AUTO: 93.8 FL (ref 80–99.9)
METER GLUCOSE: 108 MG/DL (ref 74–99)
METER GLUCOSE: 139 MG/DL (ref 74–99)
MONOCYTES ABSOLUTE: 0.58 E9/L (ref 0.1–0.95)
MONOCYTES RELATIVE PERCENT: 6.1 % (ref 2–12)
NEUTROPHILS ABSOLUTE: 7.75 E9/L (ref 1.8–7.3)
NEUTROPHILS RELATIVE PERCENT: 80.9 % (ref 43–80)
PDW BLD-RTO: 14.5 FL (ref 11.5–15)
PHOSPHORUS: 4 MG/DL (ref 2.5–4.5)
PLATELET # BLD: 104 E9/L (ref 130–450)
PMV BLD AUTO: 13.3 FL (ref 7–12)
POTASSIUM REFLEX MAGNESIUM: 3.5 MMOL/L (ref 3.5–5)
RBC # BLD: 3.2 E12/L (ref 3.5–5.5)
SARS-COV-2, NAAT: NOT DETECTED
SODIUM BLD-SCNC: 140 MMOL/L (ref 132–146)
SOLUBLE TRANSFERRIN RECEPT: 2.2 MG/L (ref 1.9–4.4)
TOTAL PROTEIN: 5.2 G/DL (ref 6.4–8.3)
WBC # BLD: 9.6 E9/L (ref 4.5–11.5)

## 2022-07-22 PROCEDURE — 2580000003 HC RX 258: Performed by: STUDENT IN AN ORGANIZED HEALTH CARE EDUCATION/TRAINING PROGRAM

## 2022-07-22 PROCEDURE — 6370000000 HC RX 637 (ALT 250 FOR IP): Performed by: SPECIALIST

## 2022-07-22 PROCEDURE — 6370000000 HC RX 637 (ALT 250 FOR IP): Performed by: STUDENT IN AN ORGANIZED HEALTH CARE EDUCATION/TRAINING PROGRAM

## 2022-07-22 PROCEDURE — 85025 COMPLETE CBC W/AUTO DIFF WBC: CPT

## 2022-07-22 PROCEDURE — 84100 ASSAY OF PHOSPHORUS: CPT

## 2022-07-22 PROCEDURE — 36415 COLL VENOUS BLD VENIPUNCTURE: CPT

## 2022-07-22 PROCEDURE — 6370000000 HC RX 637 (ALT 250 FOR IP)

## 2022-07-22 PROCEDURE — 83735 ASSAY OF MAGNESIUM: CPT

## 2022-07-22 PROCEDURE — 97110 THERAPEUTIC EXERCISES: CPT | Performed by: SPEECH-LANGUAGE PATHOLOGIST

## 2022-07-22 PROCEDURE — 87635 SARS-COV-2 COVID-19 AMP PRB: CPT

## 2022-07-22 PROCEDURE — 80053 COMPREHEN METABOLIC PANEL: CPT

## 2022-07-22 PROCEDURE — 94640 AIRWAY INHALATION TREATMENT: CPT

## 2022-07-22 PROCEDURE — 6360000002 HC RX W HCPCS: Performed by: STUDENT IN AN ORGANIZED HEALTH CARE EDUCATION/TRAINING PROGRAM

## 2022-07-22 PROCEDURE — 92526 ORAL FUNCTION THERAPY: CPT | Performed by: SPEECH-LANGUAGE PATHOLOGIST

## 2022-07-22 PROCEDURE — 82330 ASSAY OF CALCIUM: CPT

## 2022-07-22 PROCEDURE — 82962 GLUCOSE BLOOD TEST: CPT

## 2022-07-22 RX ORDER — MAGNESIUM SULFATE IN WATER 40 MG/ML
2000 INJECTION, SOLUTION INTRAVENOUS ONCE
Status: DISCONTINUED | OUTPATIENT
Start: 2022-07-22 | End: 2022-07-22 | Stop reason: HOSPADM

## 2022-07-22 RX ADMIN — CALCITRIOL 0.25 MCG: 0.25 CAPSULE ORAL at 13:00

## 2022-07-22 RX ADMIN — SODIUM CHLORIDE, PRESERVATIVE FREE 10 ML: 5 INJECTION INTRAVENOUS at 09:25

## 2022-07-22 RX ADMIN — PANTOPRAZOLE SODIUM 40 MG: 40 TABLET, DELAYED RELEASE ORAL at 06:36

## 2022-07-22 RX ADMIN — APIXABAN 10 MG: 5 TABLET, FILM COATED ORAL at 09:25

## 2022-07-22 RX ADMIN — OXYCODONE AND ACETAMINOPHEN 1 TABLET: 10; 325 TABLET ORAL at 12:38

## 2022-07-22 RX ADMIN — LEVOFLOXACIN 500 MG: 500 TABLET, FILM COATED ORAL at 09:25

## 2022-07-22 RX ADMIN — ARFORMOTEROL TARTRATE 15 MCG: 15 SOLUTION RESPIRATORY (INHALATION) at 07:40

## 2022-07-22 RX ADMIN — BUDESONIDE 500 MCG: 0.5 SUSPENSION RESPIRATORY (INHALATION) at 07:40

## 2022-07-22 ASSESSMENT — ENCOUNTER SYMPTOMS
SHORTNESS OF BREATH: 1
NAUSEA: 0
DIARRHEA: 1
COUGH: 0
VOMITING: 0

## 2022-07-22 ASSESSMENT — PAIN SCALES - GENERAL: PAINLEVEL_OUTOF10: 5

## 2022-07-22 NOTE — CARE COORDINATION
7/22/2022  Social Work Discharge Planning:Pt will discharge to [facility] today at 2pm via [facility]. Will need another COVId test per liaison. Spouse, nurse here and [facility] liaison aware of transport time. Electronically signed by CASSI Rebolledo on 7/22/2022 at 9:26 AM

## 2022-07-22 NOTE — PROGRESS NOTES
Progress Note  Date:2022       Room:[facility] [place]  Patient Lindsay Alatorre     YOB: 1940     Age:82 y.o. Patient seen for follow up of acute renal failure and ecoli bacteremia from infected mediport. She is awake and more alert this am. She is continues to improve. She denies any chest pains, abdominal pain or nausea. She is resting on room air. Subjective    Subjective:  Symptoms:  Stable. She reports shortness of breath and diarrhea. No cough, chest pain, headache, chest pressure or anxiety. Diet:  Poor intake. No nausea or vomiting. Unable to obtain. Review of Systems   Respiratory:  Positive for shortness of breath. Negative for cough. Cardiovascular:  Negative for chest pain. Gastrointestinal:  Positive for diarrhea. Negative for nausea and vomiting. Objective         Vitals Last 24 Hours:  TEMPERATURE:  Temp  Av.9 °F (36.6 °C)  Min: 97.7 °F (36.5 °C)  Max: 98.3 °F (36.8 °C)  RESPIRATIONS RANGE: Resp  Av.6  Min: 16  Max: 20  PULSE OXIMETRY RANGE: SpO2  Av.6 %  Min: 99 %  Max: 100 %  PULSE RANGE: Pulse  Av.8  Min: 68  Max: 74  BLOOD PRESSURE RANGE: Systolic (25HNC), MFO:687 , Min:130 , PEH:999   ; Diastolic (04MMG), TTP:23, Min:58, Max:64    I/O (24Hr): Intake/Output Summary (Last 24 hours) at 2022 0655  Last data filed at 2022 7568  Gross per 24 hour   Intake 1312 ml   Output 1650 ml   Net -338 ml       Objective:  General Appearance:  Comfortable, in no acute distress and well-appearing. Vital signs: (most recent): Blood pressure 130/60, pulse 71, temperature 97.9 °F (36.6 °C), temperature source Axillary, resp. rate 16, height 5' (1.524 m), weight 114 lb 1.6 oz (51.8 kg), SpO2 100 %. Lungs:  Normal effort. No rales, decreased breath sounds or rhonchi. Heart: Tachycardia. Regular rhythm. S1 normal and S2 normal.  No gallop. Abdomen: Abdomen is soft and non-distended. (Ileostomy in right lower quadrant. ). Bowel sounds are normal.   There is no abdominal tenderness. There is no epigastric area tenderness. There is no rebound tenderness. There is no guarding. Extremities: Normal range of motion. There is no dependent edema. Skin:  Warm and dry. Labs/Imaging/Diagnostics    Labs:  CBC:  Recent Labs     07/20/22 0235 07/21/22 0618 07/22/22  0533   WBC 9.9 12.8* 9.6   RBC 2.99* 2.92* 3.20*   HGB 9.2* 9.0* 9.8*   HCT 28.6* 27.3* 30.0*   MCV 95.7 93.5 93.8   RDW 14.6 14.4 14.5   PLT 49* 65* 104*       CHEMISTRIES:  Recent Labs     07/20/22 0235 07/21/22 0618 07/22/22  0533    144 140   K 3.6 3.5 3.5   * 109* 107   CO2 25 23 22   BUN 80* 74* 63*   CREATININE 1.6* 1.5* 1.4*   GLUCOSE 141* 101* 120*   PHOS 4.1 3.4 4.0   MG 2.2 2.1 1.7       PT/INR:  Recent Labs     07/19/22  1020   PROTIME 22.1*   INR 2.0       APTT:  Recent Labs     07/20/22 0235 07/21/22  0618   APTT 28.6 24.7       LIVER PROFILE:  Recent Labs     07/20/22 0235 07/21/22 0618 07/22/22  0533   AST 16 29 29   ALT 13 21 25   BILITOT 1.2 1.3* 1.1   ALKPHOS 203* 288* 330*         Imaging Last 24 Hours:  CT HEAD WO CONTRAST    Result Date: 7/15/2022  EXAMINATION: CT OF THE HEAD WITHOUT CONTRAST  7/15/2022 9:58 pm TECHNIQUE: CT of the head was performed without the administration of intravenous contrast. Automated exposure control, iterative reconstruction, and/or weight based adjustment of the mA/kV was utilized to reduce the radiation dose to as low as reasonably achievable. COMPARISON: None. HISTORY: ORDERING SYSTEM PROVIDED HISTORY: change in mental status TECHNOLOGIST PROVIDED HISTORY: Reason for exam:->change in mental status Has a \"code stroke\" or \"stroke alert\" been called? ->No FINDINGS: BRAIN/VENTRICLES: There is no acute intracranial hemorrhage, mass effect or midline shift. No abnormal extra-axial fluid collection. The gray-white differentiation is maintained without evidence of an acute infarct.  There is prominence of the ventricles and sulci due to global parenchymal volume loss. There are nonspecific areas of hypoattenuation within the periventricular and subcortical white matter, which likely represent chronic microvascular ischemic change. There is a small chronic lacunar infarct in left basal ganglia. ORBITS: The visualized portion of the orbits demonstrate no acute abnormality. SINUSES: The visualized paranasal sinuses and mastoid air cells demonstrate no acute abnormality. SOFT TISSUES/SKULL: No acute abnormality of the visualized skull or soft tissues. No acute intracranial abnormality. XR CHEST PORTABLE    Result Date: 7/15/2022  EXAMINATION: ONE XRAY VIEW OF THE CHEST 7/15/2022 9:41 pm COMPARISON: 13 July 2022 HISTORY: ORDERING SYSTEM PROVIDED HISTORY: shortness of breath TECHNOLOGIST PROVIDED HISTORY: Reason for exam:->shortness of breath FINDINGS: Single AP erect portable chest demonstrates a right-sided MediPort catheter in good position. The lungs are clear. There is tortuosity of the aorta without cardiomegaly. There is a right-sided reverse shoulder arthroplasty. There is no evidence of a pneumothorax. No acute cardiopulmonary process. US RETROPERITONEAL COMPLETE    Result Date: 7/14/2022  EXAMINATION: RETROPERITONEAL ULTRASOUND OF THE KIDNEYS AND URINARY BLADDER 7/14/2022 COMPARISON: Renal ultrasound from February 25, 2021 HISTORY: ORDERING SYSTEM PROVIDED HISTORY: KD TECHNOLOGIST PROVIDED HISTORY: Reason for exam:->KD What reading provider will be dictating this exam?->CRC FINDINGS: Kidneys: The right kidney measures 10.4 cm in length and the left kidney measures 9.7 cm in length. Right kidney: The corticomedullary differentiation and cortical thickness is slightly decreased. Echogenic renal parenchyma. Anechoic thin walled nonvascular 13 mm right mid to upper pole renal cyst.  No concerning renal mass or intrarenal calcification. No hydronephrosis.  Left kidney: The corticomedullary differentiation and cortical thickness is slightly decreased. Anechoic thin walled nonvascular left midpole cyst.  4.5 cm left lower pole cyst.  Mildly echogenic renal parenchyma. There is no hydronephrosis. Bladder: Bladder volume was 282 mL. No intrinsic mass, intrinsic calcification, nor wall thickening. 1.  Bilateral renal cysts. Largest is seen in the lower pole the left kidney measuring 4.5 cm. No aggressive renal lesions. Mild bilateral renal cortical thinning. No hydronephrosis. Mildly echogenic renal parenchyma. 2.  Normal appearance of the imaged bladder. Assessment//Plan           Hospital Problems             Last Modified POA    * (Principal) Acute renal failure (ARF) ([facility] 7/14/2022 Yes   Assessment & Plan  Acute hypoxic respiratory failure  Acute renal failure  Ecoli bacteremia  Metabolic encephalopathy  CLABSI   DM type II   Urinary retention  Short gut syndrome    Blood cultures positive for Ecoli on 7/15/2022 after port removal negative. Continue on antibiotic per ID. Off IV fluid hydration. Monitor platelets improving to 104 this am. Ok to discharge to [facility] today. Azalea Kaiser, DO    I can be reached through answering service.

## 2022-07-22 NOTE — PROGRESS NOTES
[facility]  Nephrology Attending Progress Note    SUBJECTIVE:     7/22/22- Pt awake alert and appropriate, no new complaints, po intake still poor but she is trying to eat, awaiting to D/C to [facility] later today    PROBLEM LIST:    Patient Active Problem List   Diagnosis    Right cataract    Essential hypertension, benign    Hyperlipidemia with target LDL less than 100    Osteoarthritis, shoulder    Primary osteoarthritis of left knee    History of ischemic bowel disease    Ileostomy present ([address]    Chronic kidney disease, stage III (moderate) (HCC)    COPD (chronic obstructive pulmonary disease) (HCC)    Moderate protein-calorie malnutrition (HCC)    Abdominal wall cellulitis    Cellulitis    Infected hernioplasty mesh (HCC)    Enterocutaneous fistula    Mild protein-calorie malnutrition ([address]    Acute kidney injury ([address]    Hypertensive kidney disease with chronic kidney disease stage IV (HCC)    KD (acute kidney injury) ([address]    Diarrhea    Elevated serum creatinine    Abnormal serum creatinine level    Hyperlipemia    Primary hypertension    Essential (primary) hypertension    Postsurgical malabsorption, not elsewhere classified    Hypercholesterolemia    Pure hypercholesterolemia    Prediabetes    Hypothyroidism, unspecified    Acute renal failure (ARF) ([address]        PAST MEDICAL HISTORY:    Past Medical History:   Diagnosis Date    Acute kidney failure ([address] 2014 and 8/2016    x2,no dialysis needed,resolved, kidney function tests returned to normal    Anxiety     Arthritis     CAD (coronary artery disease)     follows with Dr. Molly Calixto every 6 months    Cancer [facility])     skin, leg,nose- removed surgically     COPD (chronic obstructive pulmonary disease) ([address]     mild- no inhlaers, no oxygen     Depression     Fibromyalgia     chronic back and neck pain    Generalized headaches     h/o / daily    History of blood transfusion 3-11-14    multiple times    History of necrotic bowel 2012 Hyperlipidemia     Hypertension     Incisional hernia     abdomen    Insomnia     Mitral valve regurgitation     Myocardial infarct ([address] 2002    Occasional tremors     at hands / stable with medication    Osteopenia     PONV (postoperative nausea and vomiting)     Vitamin B12 deficiency     h/o       DIET:    ADULT TUBE FEEDING; PEG; Renal Formula; Continuous; 35; No; 100; Q 6 hours  ADULT DIET;  Dysphagia - Soft and Bite Sized; Mildly Thick ([place])     PHYSICAL EXAM:     Patient Vitals for the past 24 hrs:   BP Temp Temp src Pulse Resp SpO2   07/22/22 0920 (!) 116/58 97.8 °F (36.6 °C) Axillary 72 16 91 %   07/22/22 0515 130/60 97.9 °F (36.6 °C) Axillary 71 16 100 %   07/21/22 2015 (!) 146/58 98.3 °F (36.8 °C) Oral 68 18 100 %   07/21/22 1928 -- -- -- 68 16 99 %   07/21/22 1415 (!) 148/64 97.7 °F (36.5 °C) Oral 68 18 100 %   @      Intake/Output Summary (Last 24 hours) at 7/22/2022 1315  Last data filed at 7/22/2022 0920  Gross per 24 hour   Intake 1194 ml   Output 800 ml   Net 394 ml         Wt Readings from Last 3 Encounters:   07/21/22 114 lb 1.6 oz (51.8 kg)   01/28/22 94 lb (42.6 kg)   07/02/21 84 lb (38.1 kg)       Constitutional:  Pt frail and cachetic   Head: normocephalic, atraumatic  Neck: no JVD  Cardiovascular: S1 S2 no S3 or rub  Respiratory:  diminished, poor effort  Gastrointestinal:  Soft, + PEG and ostomy  Ext: no edema   Skin: dry, no rash  Neuro: awake alert and appropriate    MEDS (scheduled):    apixaban  10 mg Oral BID    Followed by    Randal Phillips ON 7/28/2022] apixaban  5 mg Oral BID    sodium chloride flush  5-40 mL IntraVENous 2 times per day    levoFLOXacin  500 mg Oral Every Other Day    Arformoterol Tartrate  15 mcg Nebulization BID    budesonide  0.5 mg Nebulization BID    insulin lispro  0-6 Units SubCUTAneous TID WC    insulin lispro  0-3 Units SubCUTAneous Nightly    nicotine  1 patch TransDERmal Daily    calcitRIOL  0.25 mcg Oral Q MWF    vitamin D  50,000 Units Oral Weekly    sodium chloride flush  10 mL IntraVENous 2 times per day    escitalopram  20 mg Oral Nightly    pantoprazole  40 mg Oral QAM AC       MEDS (infusions):   sodium chloride      dextrose Stopped (07/17/22 0051)    sodium chloride         MEDS (prn):  sodium chloride flush, sodium chloride, labetalol **OR** hydrALAZINE, HYDROmorphone **OR** HYDROmorphone, ipratropium-albuterol, glucose, dextrose bolus **OR** dextrose bolus, glucagon (rDNA), dextrose, sodium chloride flush, sodium chloride, senna, acetaminophen **OR** acetaminophen, hyoscyamine, oxyCODONE-acetaminophen    DATA:    Recent Labs     07/20/22 0235 07/21/22 0618 07/22/22  0533   WBC 9.9 12.8* 9.6   HGB 9.2* 9.0* 9.8*   HCT 28.6* 27.3* 30.0*   MCV 95.7 93.5 93.8   PLT 49* 65* 104*     Recent Labs     07/20/22 0235 07/21/22 0618 07/22/22  0533    144 140   K 3.6 3.5 3.5   * 109* 107   CO2 25 23 22   BUN 80* 74* 63*   CREATININE 1.6* 1.5* 1.4*   LABGLOM 31 33 36   GLUCOSE 141* 101* 120*   CALCIUM 7.9* 7.9* 7.9*   ALT 13 21 25   AST 16 29 29   BILITOT 1.2 1.3* 1.1   ALKPHOS 203* 288* 330*   MG 2.2 2.1 1.7   PHOS 4.1 3.4 4.0       Lab Results   Component Value Date    LABALBU 2.1 (L) 07/22/2022    LABALBU 1.9 (L) 07/21/2022    LABALBU 1.7 (L) 07/20/2022     Lab Results   Component Value Date    TSH 1.380 07/01/2022       Iron Studies  Lab Results   Component Value Date    IRON 30 (L) 07/01/2022    TIBC 229 (L) 07/01/2022    FERRITIN 143 07/01/2022     Vitamin B-12   Date Value Ref Range Status   04/27/2022 1050 (H) 211 - 946 pg/mL Final     Folate   Date Value Ref Range Status   01/22/2021 8.1 4.8 - 24.2 ng/mL Final       Vit D, 25-Hydroxy   Date Value Ref Range Status   07/15/2022 13 (L) 30 - 100 ng/mL Final     Comment:     <20 ng/mL. ........... Posey Chime Deficient  20-30 ng/mL. ......... Posey Chime Insufficient   ng/mL. ........ Posey Chime Sufficient  >100 ng/mL. .......... Posey Chime Toxic       PTH   Date Value Ref Range Status   07/15/2022 307 (H) 15 - 65 pg/mL Final       No components found for: URIC    Lab Results   Component Value Date/Time    COLORU Yellow 07/15/2022 11:06 PM    NITRU Negative 07/15/2022 11:06 PM    GLUCOSEU Negative 07/15/2022 11:06 PM    KETUA Negative 07/15/2022 11:06 PM    UROBILINOGEN 0.2 07/15/2022 11:06 PM    BILIRUBINUR Negative 07/15/2022 11:06 PM       No results found for: LIPIDPAN    EXAMINATION:  RETROPERITONEAL ULTRASOUND OF THE KIDNEYS AND URINARY BLADDER     7/14/2022     COMPARISON:  Renal ultrasound from February 25, 2021     HISTORY:  ORDERING SYSTEM PROVIDED HISTORY: KD  TECHNOLOGIST PROVIDED HISTORY:  Reason for exam:->KD  What reading provider will be dictating this exam?->CRC     FINDINGS:     Kidneys: The right kidney measures 10.4 cm in length and the left kidney measures 9.7  cm in length. Right kidney: The corticomedullary differentiation and cortical thickness is  slightly decreased. Echogenic renal parenchyma. Anechoic thin walled  nonvascular 13 mm right mid to upper pole renal cyst.  No concerning renal  mass or intrarenal calcification. No hydronephrosis. Left kidney: The corticomedullary differentiation and cortical thickness is  slightly decreased. Anechoic thin walled nonvascular left midpole cyst.  4.5  cm left lower pole cyst.  Mildly echogenic renal parenchyma. There is no  hydronephrosis. Bladder:     Bladder volume was 282 mL. No intrinsic mass, intrinsic calcification, nor  wall thickening. Impression  1. Bilateral renal cysts. Largest is seen in the lower pole the left kidney  measuring 4.5 cm. No aggressive renal lesions. Mild bilateral renal  cortical thinning. No hydronephrosis. Mildly echogenic renal parenchyma. 2.  Normal appearance of the imaged bladder.           IMPRESSION/RECOMMENDATIONS:      Stage II KD presumed sec to decreased effective renal perfusion in the setting  Gram Neg Sepsis with a HX of CKD G3B with a baseline serum cr 1.4-1.5mg/dl with an e-GFR=33-36ml/min  UC Enterococcus Faecalis-Asymptomatic bacteruria  BC E Coli  7/14/22 Renal US No evidence of hydro  FeNa<1%  TF running as po intake poor. Cr at or near baseline  S/P Removal Mediport 7/19/22  PLAN:  1. Follow labs-as an outpt       2. HTN with CKD I-IV  BP goal <130/80-near goal  PLAN:  Follow BP     3. Anion Gap Met Acidosis in the setting of the KD on the CKD with a lactic acidosis and the abd pain with BC (+) for GNR-on cefepime  LA trending down 3.9-->2.9-->2.5-->2.7-->2. 1now LA WNL  PLAN:  Follow HCO3     4. 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 Hypomagnesemia  Mg++ 1.7-->2.2-->2.1-->1.7  PLAN:  Supplement with IV Mg++  Follow Mg++    6.  Hypokalemia  K+ 3.5  PLAN:  Follow K+       Levy Romero MD

## 2022-07-22 NOTE — DISCHARGE SUMMARY
Discharge Summary     Patient ID:  Galo Gillespie  [id number]  [de-identified] y.o. 1940 female  Jaz Kaiser DO        Admit date: 7/14/2022    Discharge date and time:  7/22/2022  7:13 AM      Activity level: As tolerated   Diet:Tube feeding as well as regular diet with no straws. Labs: BMP in 1 week   Disposition: Skilled Nursing Facility [facility]   Condition on Discharge: Stable  DME: None     Admit Diagnoses:   Patient Active Problem List   Diagnosis    Right cataract    Essential hypertension, benign    Hyperlipidemia with target LDL less than 100    Osteoarthritis, shoulder    Primary osteoarthritis of left knee    History of ischemic bowel disease    Ileostomy present ([address]    Chronic kidney disease, stage III (moderate) (HCC)    COPD (chronic obstructive pulmonary disease) ([facility])    Moderate protein-calorie malnutrition (HCC)    Abdominal wall cellulitis    Cellulitis    Infected hernioplasty mesh (HCC)    Enterocutaneous fistula    Mild protein-calorie malnutrition ([address]    Acute kidney injury ([address]    Hypertensive kidney disease with chronic kidney disease stage IV (HCC)    KD (acute kidney injury) ([address]    Diarrhea    Elevated serum creatinine    Abnormal serum creatinine level    Hyperlipemia    Primary hypertension    Essential (primary) hypertension    Postsurgical malabsorption, not elsewhere classified    Hypercholesterolemia    Pure hypercholesterolemia    Prediabetes    Hypothyroidism, unspecified    Acute renal failure (ARF) ([address]       Discharge Diagnoses: Principal Problem:    Acute renal failure (ARF) ([address]  Resolved Problems:    * No resolved hospital problems.  *      Consults:  IP CONSULT TO PRIMARY CARE PROVIDER  IP CONSULT TO CARDIOLOGY  IP CONSULT TO NEPHROLOGY  IP CONSULT TO SOCIAL WORK  IP CONSULT TO DIETITIAN  IP CONSULT TO NEPHROLOGY  IP CONSULT TO INFECTIOUS DISEASES  IP CONSULT TO PULMONOLOGY  IP CONSULT TO IV TEAM  IP CONSULT TO GENERAL SURGERY  IP CONSULT TO ONCOLOGY  IP CONSULT TO IV TEAM    Procedures: Mediport removal    Hospital Course: Patient is an [de-identified]year old female who was sent to the ER due to dehydration, mental status change. Patient was well according to  until Friday. She developed chills and then would only sleep. Patient had stopped her tube feedings for Dr. Garcia John Paul her Short Gut Syndrome specialist at [facility]. Patient had wanted to try to get her peg tube pulled. Patient had become increasingly weak and would only sleep since Friday. She had not had much to eat or drink. Her  was at the office visit and stated that on Monday they had gone to ER but the line was so long they went back home. She had an appointment with her cardiologist earlier on Wednesday but her  states that he did not know the events as he did not go in with her and patient cannot relate what happened at that appointment. Patient was attempted to be direct admitted through the ER but due to no beds available was sent straight to ER. Patient had to wait in ER overnight. On Friday she was found to be in acute renal failure with elevated troponins and was admitted for further work up. Patient was seen by cardiology who felt her elevated troponins were due to kidney failure. She was seen by nephrology and rehydrated. She developed a significant leukocytosis and had an RRT. Patient was hypoxic and tachycardic. She was found to be Ecoli bactermic from her mediport. She was seen by Infectious disease and transitioned to levaquin. She was seen by pulmonary and found to have an elevated D-dimer. She was started on Heparin drip. She had a significant decrease in her platelets. Hematology was consulted. Heparin drip was held. She was started on eliquis as CTA could not be done due to KD. VQ scan could not be completed due to acuity. She had her mediport removed. She continued on Iv antibiotics per ID and had transition to oral for discharge.      Discharge Exam:    General Appearance: Comfortable, in no acute distress and well-appearing. Vital signs: (most recent): Blood pressure 130/60, pulse 71, temperature 97.9 °F (36.6 °C), temperature source Axillary, resp. rate 16, height 5' (1.524 m), weight 114 lb 1.6 oz (51.8 kg), SpO2 100 %. Lungs:  Normal effort. No rales, decreased breath sounds or rhonchi. Heart: Tachycardia. Regular rhythm. S1 normal and S2 normal.  No gallop. Abdomen: Abdomen is soft and non-distended. (Ileostomy in right lower quadrant. ). Bowel sounds are normal.   There is no abdominal tenderness. There is no epigastric area tenderness. There is no rebound tenderness. There is no guarding. Extremities: Normal range of motion. There is no dependent edema. Skin:  Warm and dry. I/O last 3 completed shifts: In: 1135 [P.O.:118; NG/GT:1614]  Out: 2650 [Urine:800; Emesis/NG output:300; Stool:1550]  No intake/output data recorded. LABS:  Recent Labs     07/20/22  0235 07/21/22  0618 07/22/22  0533    144 140   K 3.6 3.5 3.5   * 109* 107   CO2 25 23 22   BUN 80* 74* 63*   CREATININE 1.6* 1.5* 1.4*   GLUCOSE 141* 101* 120*   CALCIUM 7.9* 7.9* 7.9*       Recent Labs     07/20/22  0235 07/21/22  0618 07/22/22  0533   WBC 9.9 12.8* 9.6   RBC 2.99* 2.92* 3.20*   HGB 9.2* 9.0* 9.8*   HCT 28.6* 27.3* 30.0*   MCV 95.7 93.5 93.8   MCH 30.8 30.8 30.6   MCHC 32.2 33.0 32.7   RDW 14.6 14.4 14.5   PLT 49* 65* 104*   MPV 13.3* 13.9* 13.3*       Recent Labs     07/22/22  0638   GLUMET 108*       Imaging:  CT HEAD WO CONTRAST    Result Date: 7/15/2022  EXAMINATION: CT OF THE HEAD WITHOUT CONTRAST  7/15/2022 9:58 pm TECHNIQUE: CT of the head was performed without the administration of intravenous contrast. Automated exposure control, iterative reconstruction, and/or weight based adjustment of the mA/kV was utilized to reduce the radiation dose to as low as reasonably achievable. COMPARISON: None.  HISTORY: ORDERING SYSTEM PROVIDED HISTORY: change in mental status TECHNOLOGIST PROVIDED HISTORY: Reason for exam:->change in mental status Has a \"code stroke\" or \"stroke alert\" been called? ->No FINDINGS: BRAIN/VENTRICLES: There is no acute intracranial hemorrhage, mass effect or midline shift. No abnormal extra-axial fluid collection. The gray-white differentiation is maintained without evidence of an acute infarct. There is prominence of the ventricles and sulci due to global parenchymal volume loss. There are nonspecific areas of hypoattenuation within the periventricular and subcortical white matter, which likely represent chronic microvascular ischemic change. There is a small chronic lacunar infarct in left basal ganglia. ORBITS: The visualized portion of the orbits demonstrate no acute abnormality. SINUSES: The visualized paranasal sinuses and mastoid air cells demonstrate no acute abnormality. SOFT TISSUES/SKULL: No acute abnormality of the visualized skull or soft tissues. No acute intracranial abnormality. XR CHEST PORTABLE    Result Date: 7/15/2022  EXAMINATION: ONE XRAY VIEW OF THE CHEST 7/15/2022 9:41 pm COMPARISON: 13 July 2022 HISTORY: ORDERING SYSTEM PROVIDED HISTORY: shortness of breath TECHNOLOGIST PROVIDED HISTORY: Reason for exam:->shortness of breath FINDINGS: Single AP erect portable chest demonstrates a right-sided MediPort catheter in good position. The lungs are clear. There is tortuosity of the aorta without cardiomegaly. There is a right-sided reverse shoulder arthroplasty. There is no evidence of a pneumothorax. No acute cardiopulmonary process. US RETROPERITONEAL COMPLETE    Result Date: 7/14/2022  EXAMINATION: RETROPERITONEAL ULTRASOUND OF THE KIDNEYS AND URINARY BLADDER 7/14/2022 COMPARISON: Renal ultrasound from February 25, 2021 HISTORY: ORDERING SYSTEM PROVIDED HISTORY: KD TECHNOLOGIST PROVIDED HISTORY: Reason for exam:->KD What reading provider will be dictating this exam?->CRC FINDINGS: Kidneys:  The right kidney measures 10.4 cm in length and the left kidney measures 9.7 cm in length. Right kidney: The corticomedullary differentiation and cortical thickness is slightly decreased. Echogenic renal parenchyma. Anechoic thin walled nonvascular 13 mm right mid to upper pole renal cyst.  No concerning renal mass or intrarenal calcification. No hydronephrosis. Left kidney: The corticomedullary differentiation and cortical thickness is slightly decreased. Anechoic thin walled nonvascular left midpole cyst.  4.5 cm left lower pole cyst.  Mildly echogenic renal parenchyma. There is no hydronephrosis. Bladder: Bladder volume was 282 mL. No intrinsic mass, intrinsic calcification, nor wall thickening. 1.  Bilateral renal cysts. Largest is seen in the lower pole the left kidney measuring 4.5 cm. No aggressive renal lesions. Mild bilateral renal cortical thinning. No hydronephrosis. Mildly echogenic renal parenchyma. 2.  Normal appearance of the imaged bladder. Patient Instructions:   Current Discharge Medication List        START taking these medications    Details   apixaban (ELIQUIS) 5 MG TABS tablet Take 1 tablet by mouth in the morning and 1 tablet before bedtime. Qty: 60 tablet, Refills: 0      levoFLOXacin (LEVAQUIN) 500 MG tablet Take 1 tablet by mouth every other day for 6 days  Qty: 5 tablet, Refills: 0           CONTINUE these medications which have NOT CHANGED    Details   !! lipase-protease-amylase (CREON) 24834-51744 units delayed release capsule Take 12,000 Units by mouth take with snacks **SEE OTHER ORDER**      Multiple Vitamins-Minerals (PRESERVISION AREDS) CAPS Take 1 capsule by mouth 2 times daily      Magnesium Oxide (MAGNESIUM-OXIDE) 250 MG TABS tablet Take 250 mg by mouth 2 times daily      METAMUCIL FIBER PO Take 1 packet by mouth 2 times daily      diphenoxylate-atropine (LOMOTIL) 2.5-0.025 MG per tablet Take 1 tablet by mouth 4 times daily (before meals and nightly).       !! loperamide (IMODIUM) 2 MG capsule Take 4 mg by mouth 3 times daily (with meals) **SEE OTHER ORDER**      !! loperamide (IMODIUM) 2 MG capsule Take 6 mg by mouth nightly **SEE OTHER ORDER**      amLODIPine (NORVASC) 10 MG tablet Take 10 mg by mouth nightly       LORazepam (ATIVAN) 0.5 MG tablet Take 0.25 mg by mouth daily as needed for Anxiety. fluticasone (FLONASE) 50 MCG/ACT nasal spray 1 spray by Each Nostril route nightly       !! lipase-protease-amylase (CREON) 91938-27291 units delayed release capsule Take 24,000 Units by mouth 3 times daily (with meals) **SEE OTHER ORDER**      Probiotic CAPS Take 1 capsule by mouth nightly       hyoscyamine (ANASPAZ;LEVSIN) 125 MCG tablet Take 125 mcg by mouth 4 times daily as needed for Cramping       omeprazole (PRILOSEC) 20 MG delayed release capsule Take 40 mg by mouth every morning       aspirin 81 MG tablet Take 81 mg by mouth nightly       oxyCODONE-acetaminophen (PERCOCET)  MG per tablet Take 1 tablet by mouth every 6 hours as needed for Pain. Cholecalciferol (VITAMIN D3) 50 MCG (2000 UT) TABS Take 2,000 Units by mouth 2 times daily       Coenzyme Q10 (COQ10 PO) Take 1 capsule by mouth nightly       Ferrous Sulfate (IRON) 325 (65 Fe) MG TABS Take 325 mg by mouth nightly       calcium carbonate (OSCAL) 500 MG TABS tablet Take 500 mg by mouth nightly       pravastatin (PRAVACHOL) 40 MG tablet Take 40 mg by mouth nightly       propranolol (INDERAL) 20 MG tablet Take 20 mg by mouth 2 times daily       vitamin B-12 (CYANOCOBALAMIN) 500 MCG tablet Place 500 mcg under the tongue nightly       Omega-3 Fatty Acids (OMEGA 3 PO) Take 1 capsule by mouth nightly       escitalopram (LEXAPRO) 20 MG tablet Take 20 mg by mouth nightly. primidone (MYSOLINE) 250 MG tablet Take 250 mg by mouth nightly       traZODone (DESYREL) 50 MG tablet Take 50 mg by mouth nightly        !! - Potential duplicate medications found. Please discuss with provider.             Note that more than 30 minutes was spent in preparing discharge papers, discussing discharge with patient, medication review, etc.      Signed:    Alesia Jaimes DO    Electronically signed by Alesia Jaimes DO on 7/22/2022 at 7:13 AM

## 2022-07-22 NOTE — PROGRESS NOTES
SPEECH LANGUAGE PATHOLOGY  DAILY PROGRESS NOTE        PATIENT NAME:  Will Fontanez      :  1940          TODAY'S DATE:  2022 ROOM:  [facility]-A        SWALLOWING    Pt seen at bedside for follow up dysphagia management. Pt preparing for d/c to rehab facility. Pt reports good toleration of current diet. Reports use of comp strats including no straws. Handouts on Dysphagia exercises provided including oral motor and laryngeal exercises. Pt completed with fair-good ability with mod verbal cues. Pt encouraged to self complete as able. Handout provided on use of thickened liquids. DYSPHAGIA DIAGNOSIS:  mild oral phase dysphagia and moderate pharyngeal phase dysphagia- aspiration of thin liquid and nectar thick liquid per straw observed     DIET RECOMMENDATIONS:  Soft and bite size consistency solids (IDDSI level 6) with small sips nectar consistency (mildly thick - IDDSI level 2) liquids     FEEDING RECOMMENDATIONS:             Assistance level:  Stand by assistance is needed during all oral intake                Compensatory strategies recommended: Small bites/sips, Alternate solids and liquids, and NO STRAW      Education- Pt educated on results and POC. Pt trained on compensatory strategies for safe swallow with good outcome. Questions answered. Will continue SP intervention as per previously established POC. Yun BRODY CCC/SLP [id number]  Speech Pathologist              CPT code(s) 21750  dysphagia tx  31496  oral motor exercises (15 min)  Total minutes :  30 minutes

## 2022-07-22 NOTE — PROGRESS NOTES
[address] [place] [address] [facility]  Progress Note    SUBJECTIVE:  Chief Complaint   Patient presents with    Abnormal Lab     sent in by Dr. Aliyah Jensen    Chest Pain     No new complaints. The patient feels good. She will be going to [facility] was today. Tolerating oral antibiotic. Review of systems:  As stated above in the chief complaint, otherwise negative. Medications:  Scheduled Meds:   apixaban  10 mg Oral BID    Followed by    Sherry Ibarra ON 2022] apixaban  5 mg Oral BID    sodium chloride flush  5-40 mL IntraVENous 2 times per day    levoFLOXacin  500 mg Oral Every Other Day    Arformoterol Tartrate  15 mcg Nebulization BID    budesonide  0.5 mg Nebulization BID    insulin lispro  0-6 Units SubCUTAneous TID WC    insulin lispro  0-3 Units SubCUTAneous Nightly    nicotine  1 patch TransDERmal Daily    calcitRIOL  0.25 mcg Oral Q MWF    vitamin D  50,000 Units Oral Weekly    sodium chloride flush  10 mL IntraVENous 2 times per day    escitalopram  20 mg Oral Nightly    pantoprazole  40 mg Oral QAM AC     Continuous Infusions:   sodium chloride      dextrose Stopped (22 0051)    sodium chloride       PRN Meds:sodium chloride flush, sodium chloride, labetalol **OR** hydrALAZINE, HYDROmorphone **OR** HYDROmorphone, ipratropium-albuterol, glucose, dextrose bolus **OR** dextrose bolus, glucagon (rDNA), dextrose, sodium chloride flush, sodium chloride, senna, acetaminophen **OR** acetaminophen, hyoscyamine, oxyCODONE-acetaminophen    OBJECTIVE:  /60   Pulse 71   Temp 97.9 °F (36.6 °C) (Axillary)   Resp 16   Ht 5' (1.524 m)   Wt 114 lb 1.6 oz (51.8 kg)   SpO2 100%   BMI 22.28 kg/m²   Temp  Av °F (36.7 °C)  Min: 97.7 °F (36.5 °C)  Max: 98.3 °F (36.8 °C)  Constitutional: The patient is lying in bed. She is awake and alert. No distress. Skin: Warm and slightly diaphoretic. No rashes were noted. HEENT: Round and reactive pupils. Dry mouth.   No ulcerations or thrush. Neck: Supple to movements. Chest: No respiratory distress. Cardiovascular: Heart sounds rhythmic and regular. Abdomen: Positive bowel sounds to auscultation. Benign to palpation. Extremities: No edema. Lines: Peripheral.    Laboratory and Tests Review:  Lab Results   Component Value Date    WBC 9.6 07/22/2022    WBC 12.8 (H) 07/21/2022    WBC 9.9 07/20/2022    HGB 9.8 (L) 07/22/2022    HCT 30.0 (L) 07/22/2022    MCV 93.8 07/22/2022     (L) 07/22/2022     Lab Results   Component Value Date    NEUTROABS 7.75 (H) 07/22/2022    NEUTROABS 11.52 (H) 07/21/2022    NEUTROABS 7.70 (H) 07/20/2022     No results found for: [facility]  Lab Results   Component Value Date    ALT 25 07/22/2022    AST 29 07/22/2022    ALKPHOS 330 (H) 07/22/2022    BILITOT 1.1 07/22/2022     Lab Results   Component Value Date/Time     07/22/2022 05:33 AM    K 3.5 07/22/2022 05:33 AM     07/22/2022 05:33 AM    CO2 22 07/22/2022 05:33 AM    BUN 63 07/22/2022 05:33 AM    CREATININE 1.4 07/22/2022 05:33 AM    CREATININE 1.5 07/21/2022 06:18 AM    CREATININE 1.6 07/20/2022 02:35 AM    GFRAA 44 07/22/2022 05:33 AM    LABGLOM 36 07/22/2022 05:33 AM    GLUCOSE 120 07/22/2022 05:33 AM    PROT 5.2 07/22/2022 05:33 AM    LABALBU 2.1 07/22/2022 05:33 AM    CALCIUM 7.9 07/22/2022 05:33 AM    BILITOT 1.1 07/22/2022 05:33 AM    ALKPHOS 330 07/22/2022 05:33 AM    AST 29 07/22/2022 05:33 AM    ALT 25 07/22/2022 05:33 AM     Lab Results   Component Value Date    CRP 2.3 (H) 04/27/2022    CRP 23.2 (H) 01/27/2020     Lab Results   Component Value Date    SEDRATE 60 (H) 01/27/2020     Radiology:      Microbiology:   Rapid SARS-CoV-2: Negative  Rapid influenza: Negative  Respiratory panel: Negative  Blood culture 7/14/2022: Negative so far  Blood cultures 7/15/2022: E. coli (intermediate to Augmentin.   Resistant to Bactrim) in 4 of 4 bottles  Streptococcus pneumoniae/Legionella urine Ag: negative   Urine culture 7/16/2022: <25,000 CFU Enterococcus faecalis    No results for input(s): PROCAL in the last 72 hours. ASSESSMENT:  CLABSI  E. coli septicemia secondary to CLABSI  Fever secondary to infected Mediport. Status post Mediport removal  Leukocytosis secondary to infected Mediport and CLABSI-resolved  Asymptomatic and insignificant bacteriuria with Enterococcus  Mild dysphagia    PLAN:  Continue oral Levofloxacin for another week. Reconciled  No need to treat asymptomatic bacteriuria  ID will sign off. Follow-up with PCP    Spoke with nursing.       Mirna Morales MD  9:04 AM  7/22/2022

## 2022-07-23 LAB
ALBUMIN SERPL-MCNC: 2 G/DL (ref 3.5–5.2)
ALP BLD-CCNC: 325 U/L (ref 35–104)
ALT SERPL-CCNC: 23 U/L (ref 0–32)
ANION GAP SERPL CALCULATED.3IONS-SCNC: 11 MMOL/L (ref 7–16)
AST SERPL-CCNC: 23 U/L (ref 0–31)
BILIRUB SERPL-MCNC: 1.8 MG/DL (ref 0–1.2)
BUN BLDV-MCNC: 55 MG/DL (ref 6–23)
CALCIUM SERPL-MCNC: 7.5 MG/DL (ref 8.6–10.2)
CHLORIDE BLD-SCNC: 106 MMOL/L (ref 98–107)
CHOLESTEROL, TOTAL: 86 MG/DL (ref 0–199)
CO2: 23 MMOL/L (ref 22–29)
CREAT SERPL-MCNC: 1.5 MG/DL (ref 0.5–1)
GFR AFRICAN AMERICAN: 40
GFR NON-AFRICAN AMERICAN: 33 ML/MIN/1.73
GLUCOSE BLD-MCNC: 110 MG/DL (ref 74–99)
HBA1C MFR BLD: 5.6 % (ref 4–5.6)
HCT VFR BLD CALC: 28 % (ref 34–48)
HDLC SERPL-MCNC: 26 MG/DL
HEMOGLOBIN: 9.4 G/DL (ref 11.5–15.5)
LDL CHOLESTEROL CALCULATED: 42 MG/DL (ref 0–99)
MCH RBC QN AUTO: 30.9 PG (ref 26–35)
MCHC RBC AUTO-ENTMCNC: 33.6 % (ref 32–34.5)
MCV RBC AUTO: 92.1 FL (ref 80–99.9)
PDW BLD-RTO: 14.3 FL (ref 11.5–15)
PLATELET # BLD: 141 E9/L (ref 130–450)
PMV BLD AUTO: 14.3 FL (ref 7–12)
POTASSIUM SERPL-SCNC: 3.8 MMOL/L (ref 3.5–5)
RBC # BLD: 3.04 E12/L (ref 3.5–5.5)
SODIUM BLD-SCNC: 140 MMOL/L (ref 132–146)
TOTAL PROTEIN: 5.2 G/DL (ref 6.4–8.3)
TRIGL SERPL-MCNC: 92 MG/DL (ref 0–149)
VITAMIN B-12: >2000 PG/ML (ref 211–946)
VITAMIN D 25-HYDROXY: 11 NG/ML (ref 30–100)
VLDLC SERPL CALC-MCNC: 18 MG/DL
WBC # BLD: 24.2 E9/L (ref 4.5–11.5)

## 2022-07-24 ENCOUNTER — APPOINTMENT (OUTPATIENT)
Dept: CT IMAGING | Age: 82
DRG: 314 | End: 2022-07-24
Payer: MEDICARE

## 2022-07-24 ENCOUNTER — HOSPITAL ENCOUNTER (INPATIENT)
Age: 82
LOS: 7 days | Discharge: NURSING, REHAB OR LONG-TERM CARE | DRG: 314 | End: 2022-07-31
Attending: EMERGENCY MEDICINE | Admitting: FAMILY MEDICINE
Payer: MEDICARE

## 2022-07-24 ENCOUNTER — APPOINTMENT (OUTPATIENT)
Dept: GENERAL RADIOLOGY | Age: 82
DRG: 314 | End: 2022-07-24
Payer: MEDICARE

## 2022-07-24 DIAGNOSIS — I95.9 HYPOTENSION, UNSPECIFIED HYPOTENSION TYPE: Primary | ICD-10-CM

## 2022-07-24 DIAGNOSIS — J18.9 PNEUMONIA OF LOWER LOBE DUE TO INFECTIOUS ORGANISM, UNSPECIFIED LATERALITY: ICD-10-CM

## 2022-07-24 DIAGNOSIS — E86.0 DEHYDRATION: ICD-10-CM

## 2022-07-24 DIAGNOSIS — K52.9 ENTERITIS: ICD-10-CM

## 2022-07-24 LAB
ALBUMIN SERPL-MCNC: 2.1 G/DL (ref 3.5–5.2)
ALP BLD-CCNC: 238 U/L (ref 35–104)
ALT SERPL-CCNC: 16 U/L (ref 0–32)
ANION GAP SERPL CALCULATED.3IONS-SCNC: 11 MMOL/L (ref 7–16)
ANISOCYTOSIS: ABNORMAL
APTT: 29.1 SEC (ref 24.5–35.1)
AST SERPL-CCNC: 14 U/L (ref 0–31)
BACTERIA: NORMAL /HPF
BASOPHILS ABSOLUTE: 0 E9/L (ref 0–0.2)
BASOPHILS RELATIVE PERCENT: 0.2 % (ref 0–2)
BILIRUB SERPL-MCNC: 1.2 MG/DL (ref 0–1.2)
BILIRUBIN URINE: NEGATIVE
BLOOD CULTURE, ROUTINE: NORMAL
BLOOD, URINE: NEGATIVE
BUN BLDV-MCNC: 60 MG/DL (ref 6–23)
CALCIUM SERPL-MCNC: 7.6 MG/DL (ref 8.6–10.2)
CHLORIDE BLD-SCNC: 103 MMOL/L (ref 98–107)
CLARITY: CLEAR
CO2: 26 MMOL/L (ref 22–29)
COLOR: YELLOW
CORTISOL TOTAL: 16.19 MCG/DL (ref 2.68–18.4)
CREAT SERPL-MCNC: 1.6 MG/DL (ref 0.5–1)
EOSINOPHILS ABSOLUTE: 0.13 E9/L (ref 0.05–0.5)
EOSINOPHILS RELATIVE PERCENT: 0.9 % (ref 0–6)
GFR AFRICAN AMERICAN: 38
GFR NON-AFRICAN AMERICAN: 31 ML/MIN/1.73
GLUCOSE BLD-MCNC: 96 MG/DL (ref 74–99)
GLUCOSE URINE: NEGATIVE MG/DL
HCT VFR BLD CALC: 26.1 % (ref 34–48)
HEMOGLOBIN: 8.5 G/DL (ref 11.5–15.5)
INR BLD: 1.7
KETONES, URINE: NEGATIVE MG/DL
LACTIC ACID, SEPSIS: 0.7 MMOL/L (ref 0.5–1.9)
LACTIC ACID, SEPSIS: 1.1 MMOL/L (ref 0.5–1.9)
LEUKOCYTE ESTERASE, URINE: NEGATIVE
LIPASE: 117 U/L (ref 13–60)
LYMPHOCYTES ABSOLUTE: 0.43 E9/L (ref 1.5–4)
LYMPHOCYTES RELATIVE PERCENT: 2.6 % (ref 20–42)
MCH RBC QN AUTO: 30.2 PG (ref 26–35)
MCHC RBC AUTO-ENTMCNC: 32.6 % (ref 32–34.5)
MCV RBC AUTO: 92.9 FL (ref 80–99.9)
MONOCYTES ABSOLUTE: 0.57 E9/L (ref 0.1–0.95)
MONOCYTES RELATIVE PERCENT: 4.3 % (ref 2–12)
MYELOCYTE PERCENT: 0.9 % (ref 0–0)
NEUTROPHILS ABSOLUTE: 13.16 E9/L (ref 1.8–7.3)
NEUTROPHILS RELATIVE PERCENT: 91.3 % (ref 43–80)
NITRITE, URINE: NEGATIVE
PDW BLD-RTO: 14.5 FL (ref 11.5–15)
PH UA: 5.5 (ref 5–9)
PLATELET # BLD: 205 E9/L (ref 130–450)
PMV BLD AUTO: 13.7 FL (ref 7–12)
POIKILOCYTES: ABNORMAL
POLYCHROMASIA: ABNORMAL
POTASSIUM REFLEX MAGNESIUM: 3.8 MMOL/L (ref 3.5–5)
PROTEIN UA: 30 MG/DL
PROTHROMBIN TIME: 19 SEC (ref 9.3–12.4)
RBC # BLD: 2.81 E12/L (ref 3.5–5.5)
RBC UA: NORMAL /HPF (ref 0–2)
RENAL EPITHELIAL, UA: NORMAL /HPF
SCHISTOCYTES: ABNORMAL
SODIUM BLD-SCNC: 140 MMOL/L (ref 132–146)
SPECIFIC GRAVITY UA: 1.02 (ref 1–1.03)
TOTAL PROTEIN: 5.3 G/DL (ref 6.4–8.3)
TROPONIN, HIGH SENSITIVITY: 185 NG/L (ref 0–9)
TROPONIN, HIGH SENSITIVITY: 200 NG/L (ref 0–9)
UROBILINOGEN, URINE: 0.2 E.U./DL
WBC # BLD: 14.3 E9/L (ref 4.5–11.5)
WBC UA: NORMAL /HPF (ref 0–5)

## 2022-07-24 PROCEDURE — 71045 X-RAY EXAM CHEST 1 VIEW: CPT

## 2022-07-24 PROCEDURE — 84484 ASSAY OF TROPONIN QUANT: CPT

## 2022-07-24 PROCEDURE — 2580000003 HC RX 258: Performed by: INTERNAL MEDICINE

## 2022-07-24 PROCEDURE — 80053 COMPREHEN METABOLIC PANEL: CPT

## 2022-07-24 PROCEDURE — 87088 URINE BACTERIA CULTURE: CPT

## 2022-07-24 PROCEDURE — 93005 ELECTROCARDIOGRAM TRACING: CPT | Performed by: STUDENT IN AN ORGANIZED HEALTH CARE EDUCATION/TRAINING PROGRAM

## 2022-07-24 PROCEDURE — 99222 1ST HOSP IP/OBS MODERATE 55: CPT | Performed by: TRANSPLANT SURGERY

## 2022-07-24 PROCEDURE — 85730 THROMBOPLASTIN TIME PARTIAL: CPT

## 2022-07-24 PROCEDURE — 82533 TOTAL CORTISOL: CPT

## 2022-07-24 PROCEDURE — 6370000000 HC RX 637 (ALT 250 FOR IP): Performed by: INTERNAL MEDICINE

## 2022-07-24 PROCEDURE — 85610 PROTHROMBIN TIME: CPT

## 2022-07-24 PROCEDURE — 74176 CT ABD & PELVIS W/O CONTRAST: CPT

## 2022-07-24 PROCEDURE — 36415 COLL VENOUS BLD VENIPUNCTURE: CPT

## 2022-07-24 PROCEDURE — 85025 COMPLETE CBC W/AUTO DIFF WBC: CPT

## 2022-07-24 PROCEDURE — 83690 ASSAY OF LIPASE: CPT

## 2022-07-24 PROCEDURE — 71250 CT THORAX DX C-: CPT

## 2022-07-24 PROCEDURE — 96360 HYDRATION IV INFUSION INIT: CPT

## 2022-07-24 PROCEDURE — 2060000000 HC ICU INTERMEDIATE R&B

## 2022-07-24 PROCEDURE — 6360000002 HC RX W HCPCS: Performed by: STUDENT IN AN ORGANIZED HEALTH CARE EDUCATION/TRAINING PROGRAM

## 2022-07-24 PROCEDURE — 87040 BLOOD CULTURE FOR BACTERIA: CPT

## 2022-07-24 PROCEDURE — 96361 HYDRATE IV INFUSION ADD-ON: CPT

## 2022-07-24 PROCEDURE — 99285 EMERGENCY DEPT VISIT HI MDM: CPT

## 2022-07-24 PROCEDURE — 2580000003 HC RX 258: Performed by: STUDENT IN AN ORGANIZED HEALTH CARE EDUCATION/TRAINING PROGRAM

## 2022-07-24 PROCEDURE — 83605 ASSAY OF LACTIC ACID: CPT

## 2022-07-24 PROCEDURE — 81001 URINALYSIS AUTO W/SCOPE: CPT

## 2022-07-24 RX ORDER — 0.9 % SODIUM CHLORIDE 0.9 %
30 INTRAVENOUS SOLUTION INTRAVENOUS ONCE
Status: COMPLETED | OUTPATIENT
Start: 2022-07-24 | End: 2022-07-24

## 2022-07-24 RX ORDER — OXYCODONE AND ACETAMINOPHEN 10; 325 MG/1; MG/1
1 TABLET ORAL EVERY 6 HOURS PRN
Status: DISCONTINUED | OUTPATIENT
Start: 2022-07-24 | End: 2022-07-31 | Stop reason: HOSPADM

## 2022-07-24 RX ORDER — HYOSCYAMINE SULFATE 0.125 MG
125 TABLET ORAL 4 TIMES DAILY PRN
Status: DISCONTINUED | OUTPATIENT
Start: 2022-07-24 | End: 2022-07-31 | Stop reason: HOSPADM

## 2022-07-24 RX ORDER — FLUTICASONE PROPIONATE 50 MCG
1 SPRAY, SUSPENSION (ML) NASAL NIGHTLY
Status: DISCONTINUED | OUTPATIENT
Start: 2022-07-24 | End: 2022-07-31 | Stop reason: HOSPADM

## 2022-07-24 RX ORDER — SODIUM CHLORIDE 0.9 % (FLUSH) 0.9 %
5-40 SYRINGE (ML) INJECTION PRN
Status: DISCONTINUED | OUTPATIENT
Start: 2022-07-24 | End: 2022-07-24 | Stop reason: SDUPTHER

## 2022-07-24 RX ORDER — ESCITALOPRAM OXALATE 10 MG/1
20 TABLET ORAL NIGHTLY
Status: DISCONTINUED | OUTPATIENT
Start: 2022-07-24 | End: 2022-07-31 | Stop reason: HOSPADM

## 2022-07-24 RX ORDER — SODIUM CHLORIDE 9 MG/ML
INJECTION, SOLUTION INTRAVENOUS PRN
Status: DISCONTINUED | OUTPATIENT
Start: 2022-07-24 | End: 2022-07-24 | Stop reason: SDUPTHER

## 2022-07-24 RX ORDER — PRIMIDONE 250 MG/1
250 TABLET ORAL NIGHTLY
Status: DISCONTINUED | OUTPATIENT
Start: 2022-07-24 | End: 2022-07-31 | Stop reason: HOSPADM

## 2022-07-24 RX ORDER — FERROUS SULFATE 325(65) MG
325 TABLET ORAL NIGHTLY
Status: DISCONTINUED | OUTPATIENT
Start: 2022-07-24 | End: 2022-07-31 | Stop reason: HOSPADM

## 2022-07-24 RX ORDER — SODIUM CHLORIDE 0.9 % (FLUSH) 0.9 %
5-40 SYRINGE (ML) INJECTION EVERY 12 HOURS SCHEDULED
Status: DISCONTINUED | OUTPATIENT
Start: 2022-07-24 | End: 2022-07-24 | Stop reason: SDUPTHER

## 2022-07-24 RX ORDER — LANOLIN ALCOHOL/MO/W.PET/CERES
1000 CREAM (GRAM) TOPICAL NIGHTLY
Status: DISCONTINUED | OUTPATIENT
Start: 2022-07-24 | End: 2022-07-31 | Stop reason: HOSPADM

## 2022-07-24 RX ORDER — LOPERAMIDE HYDROCHLORIDE 2 MG/1
6 CAPSULE ORAL NIGHTLY
Status: DISCONTINUED | OUTPATIENT
Start: 2022-07-24 | End: 2022-07-31 | Stop reason: HOSPADM

## 2022-07-24 RX ORDER — TRAZODONE HYDROCHLORIDE 50 MG/1
50 TABLET ORAL NIGHTLY
Status: DISCONTINUED | OUTPATIENT
Start: 2022-07-24 | End: 2022-07-31 | Stop reason: HOSPADM

## 2022-07-24 RX ORDER — ONDANSETRON 2 MG/ML
4 INJECTION INTRAMUSCULAR; INTRAVENOUS EVERY 6 HOURS PRN
Status: DISCONTINUED | OUTPATIENT
Start: 2022-07-24 | End: 2022-07-31 | Stop reason: HOSPADM

## 2022-07-24 RX ORDER — PANTOPRAZOLE SODIUM 40 MG/1
40 TABLET, DELAYED RELEASE ORAL
Status: DISCONTINUED | OUTPATIENT
Start: 2022-07-25 | End: 2022-07-31 | Stop reason: HOSPADM

## 2022-07-24 RX ORDER — ACETAMINOPHEN 650 MG/1
650 SUPPOSITORY RECTAL EVERY 6 HOURS PRN
Status: DISCONTINUED | OUTPATIENT
Start: 2022-07-24 | End: 2022-07-31 | Stop reason: HOSPADM

## 2022-07-24 RX ORDER — SODIUM CHLORIDE 0.9 % (FLUSH) 0.9 %
10 SYRINGE (ML) INJECTION EVERY 12 HOURS SCHEDULED
Status: DISCONTINUED | OUTPATIENT
Start: 2022-07-24 | End: 2022-07-31 | Stop reason: HOSPADM

## 2022-07-24 RX ORDER — DIPHENOXYLATE HYDROCHLORIDE AND ATROPINE SULFATE 2.5; .025 MG/1; MG/1
1 TABLET ORAL
Status: DISCONTINUED | OUTPATIENT
Start: 2022-07-24 | End: 2022-07-31 | Stop reason: HOSPADM

## 2022-07-24 RX ORDER — PRAVASTATIN SODIUM 20 MG
40 TABLET ORAL NIGHTLY
Status: DISCONTINUED | OUTPATIENT
Start: 2022-07-24 | End: 2022-07-31 | Stop reason: HOSPADM

## 2022-07-24 RX ORDER — SODIUM CHLORIDE 0.9 % (FLUSH) 0.9 %
10 SYRINGE (ML) INJECTION PRN
Status: DISCONTINUED | OUTPATIENT
Start: 2022-07-24 | End: 2022-07-31 | Stop reason: HOSPADM

## 2022-07-24 RX ORDER — 0.9 % SODIUM CHLORIDE 0.9 %
1000 INTRAVENOUS SOLUTION INTRAVENOUS ONCE
Status: COMPLETED | OUTPATIENT
Start: 2022-07-24 | End: 2022-07-24

## 2022-07-24 RX ORDER — ONDANSETRON 4 MG/1
4 TABLET, ORALLY DISINTEGRATING ORAL EVERY 8 HOURS PRN
Status: DISCONTINUED | OUTPATIENT
Start: 2022-07-24 | End: 2022-07-31 | Stop reason: HOSPADM

## 2022-07-24 RX ORDER — ACETAMINOPHEN 325 MG/1
650 TABLET ORAL EVERY 6 HOURS PRN
Status: DISCONTINUED | OUTPATIENT
Start: 2022-07-24 | End: 2022-07-31 | Stop reason: HOSPADM

## 2022-07-24 RX ORDER — ASPIRIN 81 MG/1
81 TABLET, CHEWABLE ORAL NIGHTLY
Status: DISCONTINUED | OUTPATIENT
Start: 2022-07-24 | End: 2022-07-31 | Stop reason: HOSPADM

## 2022-07-24 RX ORDER — LOPERAMIDE HYDROCHLORIDE 2 MG/1
4 CAPSULE ORAL
Status: DISCONTINUED | OUTPATIENT
Start: 2022-07-24 | End: 2022-07-31 | Stop reason: HOSPADM

## 2022-07-24 RX ORDER — SENNA PLUS 8.6 MG/1
1 TABLET ORAL DAILY PRN
Status: DISCONTINUED | OUTPATIENT
Start: 2022-07-24 | End: 2022-07-31 | Stop reason: HOSPADM

## 2022-07-24 RX ORDER — LORAZEPAM 0.5 MG/1
0.25 TABLET ORAL DAILY PRN
Status: DISCONTINUED | OUTPATIENT
Start: 2022-07-24 | End: 2022-07-31 | Stop reason: HOSPADM

## 2022-07-24 RX ORDER — LACTOBACILLUS RHAMNOSUS GG 10B CELL
1 CAPSULE ORAL NIGHTLY
Status: DISCONTINUED | OUTPATIENT
Start: 2022-07-24 | End: 2022-07-31 | Stop reason: HOSPADM

## 2022-07-24 RX ORDER — SODIUM CHLORIDE 9 MG/ML
INJECTION, SOLUTION INTRAVENOUS PRN
Status: DISCONTINUED | OUTPATIENT
Start: 2022-07-24 | End: 2022-07-31 | Stop reason: HOSPADM

## 2022-07-24 RX ORDER — CHOLECALCIFEROL (VITAMIN D3) 50 MCG
2000 TABLET ORAL 2 TIMES DAILY
Status: DISCONTINUED | OUTPATIENT
Start: 2022-07-24 | End: 2022-07-31 | Stop reason: HOSPADM

## 2022-07-24 RX ADMIN — Medication 2000 UNITS: at 20:20

## 2022-07-24 RX ADMIN — SODIUM CHLORIDE 1000 ML: 9 INJECTION, SOLUTION INTRAVENOUS at 12:34

## 2022-07-24 RX ADMIN — TRAZODONE HYDROCHLORIDE 50 MG: 50 TABLET ORAL at 20:32

## 2022-07-24 RX ADMIN — PRAVASTATIN SODIUM 40 MG: 20 TABLET ORAL at 20:19

## 2022-07-24 RX ADMIN — SODIUM CHLORIDE 1347 ML: 9 INJECTION, SOLUTION INTRAVENOUS at 10:31

## 2022-07-24 RX ADMIN — CEFEPIME HYDROCHLORIDE 2000 MG: 2 INJECTION, POWDER, FOR SOLUTION INTRAVENOUS at 14:58

## 2022-07-24 RX ADMIN — DIPHENOXYLATE HYDROCHLORIDE AND ATROPINE SULFATE 1 TABLET: 2.5; .025 TABLET ORAL at 20:32

## 2022-07-24 RX ADMIN — APIXABAN 5 MG: 5 TABLET, FILM COATED ORAL at 20:28

## 2022-07-24 RX ADMIN — FLUTICASONE PROPIONATE 1 SPRAY: 50 SPRAY, METERED NASAL at 20:19

## 2022-07-24 RX ADMIN — ASPIRIN 81 MG CHEWABLE TABLET 81 MG: 81 TABLET CHEWABLE at 20:28

## 2022-07-24 RX ADMIN — ESCITALOPRAM 20 MG: 10 TABLET, FILM COATED ORAL at 20:19

## 2022-07-24 RX ADMIN — FERROUS SULFATE TAB 325 MG (65 MG ELEMENTAL FE) 325 MG: 325 (65 FE) TAB at 20:29

## 2022-07-24 RX ADMIN — OXYCODONE AND ACETAMINOPHEN 1 TABLET: 325; 10 TABLET ORAL at 19:05

## 2022-07-24 RX ADMIN — CYANOCOBALAMIN TAB 1000 MCG 1000 MCG: 1000 TAB at 20:19

## 2022-07-24 RX ADMIN — PRIMIDONE 250 MG: 250 TABLET ORAL at 20:19

## 2022-07-24 RX ADMIN — LOPERAMIDE HYDROCHLORIDE 4 MG: 2 CAPSULE ORAL at 20:19

## 2022-07-24 RX ADMIN — Medication 10 ML: at 20:20

## 2022-07-24 RX ADMIN — Medication 1 CAPSULE: at 20:20

## 2022-07-24 ASSESSMENT — PAIN DESCRIPTION - PAIN TYPE: TYPE: ACUTE PAIN

## 2022-07-24 ASSESSMENT — PAIN SCALES - GENERAL
PAINLEVEL_OUTOF10: 0
PAINLEVEL_OUTOF10: 5
PAINLEVEL_OUTOF10: 2
PAINLEVEL_OUTOF10: 0

## 2022-07-24 ASSESSMENT — ENCOUNTER SYMPTOMS
CONSTIPATION: 0
DIARRHEA: 0
EYE PAIN: 0
SHORTNESS OF BREATH: 1
SINUS PAIN: 0
CHEST TIGHTNESS: 0
EYE REDNESS: 0
SINUS PRESSURE: 0
VOMITING: 0
BACK PAIN: 0
NAUSEA: 0
COUGH: 0
ABDOMINAL PAIN: 1

## 2022-07-24 ASSESSMENT — PAIN - FUNCTIONAL ASSESSMENT
PAIN_FUNCTIONAL_ASSESSMENT: NONE - DENIES PAIN
PAIN_FUNCTIONAL_ASSESSMENT: ACTIVITIES ARE NOT PREVENTED

## 2022-07-24 ASSESSMENT — PAIN DESCRIPTION - LOCATION
LOCATION: BACK
LOCATION: ABDOMEN

## 2022-07-24 ASSESSMENT — PAIN DESCRIPTION - DESCRIPTORS
DESCRIPTORS: CRAMPING
DESCRIPTORS: ACHING

## 2022-07-24 ASSESSMENT — PAIN DESCRIPTION - ORIENTATION: ORIENTATION: MID

## 2022-07-24 NOTE — CONSULTS
Department of Internal Medicine  Infectious Diseases   Consult Note      Reason for Consult:  E coli bacteremia       Requesting Physician:  Dr Natasha Hernandez         HISTORY OF PRESENT ILLNESS:      This is an [de-identified] yrs old female with hx of total colectomy s/p ileostomy ( ba out put ) , infected mesh s/p removal , CAD, COPD , recent E coli bacteremia ( 7/15/22) was on oral levaquin at the nursing home presented to the ER with hypotension. She denied fever, chills, rigors, denied abdomen pain, reported chronic loose stool .   She denied dysuria   WBC was 24  K   CT scan of abdomen and pelvis - dilated thickened bowel loop concerning for enteritis   Pt was given cefepime X 1        Past Medical History:      Past Medical History:   Diagnosis Date    Acute kidney failure ([facility] [address] 2014 and 8/2016    x2,no dialysis needed,resolved, kidney function tests returned to normal    Anxiety     Arthritis     CAD (coronary artery disease)     follows with Dr. Martinez Alfredo every 6 months    Cancer [facility])     skin, leg,nose- removed surgically     COPD (chronic obstructive pulmonary disease) ([facility] [address]     mild- no inhlaers, no oxygen     Depression     Fibromyalgia     chronic back and neck pain    Generalized headaches     h/o / daily    History of blood transfusion 3-11-14    multiple times    History of necrotic bowel 2012    Hyperlipidemia     Hypertension     Incisional hernia     abdomen    Insomnia     Mitral valve regurgitation     Myocardial infarct ([facility] [address] 2002    Occasional tremors     at hands / stable with medication    Osteopenia     PONV (postoperative nausea and vomiting)     Vitamin B12 deficiency     h/o         Past Surgical History:      Past Surgical History:   Procedure Laterality Date    ABDOMEN SURGERY  2-    total colectomy, bowel resection, ileostomy,revision of ileostomy     ABDOMEN SURGERY N/A 1/26/2020    DRAINAGE OF ABDOMINAL WALL ABSCESS, EXPLANTATION OF MESH, DEBRIDEMENT OF SKIN AND SUBCUTANEOUS TISSUE performed by Jessie Freeman MD at [facility] N/A 9/11/2020    EXPLANTATION OF HERNIA MESH performed by Jessie Freeman MD at [address]  2002    ARTHROPLASTY  1-2006    right and left thumb    BACK SURGERY  1991, 2004, 2009    lumbar fusion x 3    BACK SURGERY      cervical fusion x 2    BREAST SURGERY  years ago    monor calcifications    CARPAL TUNNEL RELEASE Bilateral     CATARACT REMOVAL WITH IMPLANT Right 03/03/15    CHOLECYSTECTOMY  5-11-07    Lap    COLONOSCOPY      CORONARY ANGIOPLASTY WITH STENT PLACEMENT  1-7-2002    ENDOSCOPY, COLON, DIAGNOSTIC      EPIGASTRIC HERNIA REPAIR  3/21/16    incisional with mesh implantation    ESOPHAGUS SURGERY  1-4-13    esophageal clamp (torn Esophagus)    FINGER TRIGGER RELEASE Right 2006    index finger    FOOT SURGERY Right     multiple    HERNIA REPAIR  12-31-13    abdominal x 5- last one 2017     ILEOSTOMY OR JEJUNOSTOMY  1-3-13    revision    INSERT PICC LINE  06/09/2020    and removed     JOINT REPLACEMENT Right 3/24/15    right total shoulder arthroplasty    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     LAPAROTOMY N/A 5/12/2020    EXPLORATORY LAPAROTOMY EXPLANTATION OF INFECTED MESH SMAL BOWEL RESECTION AND REVISION END ILEOSTOMY, LYSIS OF ADHESIONS performed by Jessie Freeman MD at [address] [facility] N/A 6/8/2020    LAPAROTOMY EXPLORATORY, [place] OF HERNIA MESH performed by Jessie Freeman MD at [address] [place] [address] N/A 9/15/2020    EXPLORATORY LAPAROTOMY WITH SMALL BOWEL RESECTION, TAKE DOWN REVISION ENTEROCUTANEOUS FISTULA , OSTOMY REVISION performed by Jessie Freeman MD at [address] / leg    NASAL SINUS SURGERY      x2 /  cauterized    OTHER SURGICAL HISTORY  08/24/2016    diagnostic laparotomy with repair of intraabdominal hernia    OTHER SURGICAL HISTORY      removal plate / screws  / right great toe    PICC LINE INSERTION NURSE  9/15/2020         PORT SURGERY N/A 7/19/2022    [facility] REMOVAL performed by Prateek Blanca MD at [address] [facility]  2010    right     SKIN BIOPSY  2010    3 squamous cell carcinoma right leg, left leg       Current Medications:      Current Facility-Administered Medications   Medication Dose Route Frequency Provider Last Rate Last Admin    sodium chloride flush 0.9 % injection 10 mL  10 mL IntraVENous 2 times per day Azalea E Awilda, DO        sodium chloride flush 0.9 % injection 10 mL  10 mL IntraVENous PRN Azalea E Awilda, DO        0.9 % sodium chloride infusion   IntraVENous PRN Azalea E Awilda, DO        ondansetron (ZOFRAN-ODT) disintegrating tablet 4 mg  4 mg Oral Q8H PRN Azalea E Awilda, DO        Or    ondansetron (ZOFRAN) injection 4 mg  4 mg IntraVENous Q6H PRN Azalea E Awilda, DO        senna (SENOKOT) tablet 8.6 mg  1 tablet Oral Daily PRN Azalea E Awilda, DO        acetaminophen (TYLENOL) tablet 650 mg  650 mg Oral Q6H PRN Azalea E Awilda, DO        Or    acetaminophen (TYLENOL) suppository 650 mg  650 mg Rectal Q6H PRN Azalea E Awilda, DO        apixaban (ELIQUIS) tablet 5 mg  5 mg Oral BID Azalea E Awilda, DO        aspirin tablet 81 mg  81 mg Oral Nightly Azalea E Awilda, DO        Vitamin D3 TABS 2,000 Units  2,000 Units Oral BID Azalea E Awilda, DO        diphenoxylate-atropine (LOMOTIL) 2.5-0.025 MG per tablet 1 tablet  1 tablet Oral 4x Daily AC & HS Azalea E Awilda, DO        escitalopram (LEXAPRO) tablet 20 mg  20 mg Oral Nightly Azalea E Awilda, DO        Iron TABS 325 mg  325 mg Oral Nightly Azalea E Awilda, DO        fluticasone (FLONASE) 50 MCG/ACT nasal spray 1 spray  1 spray Each Nostril Nightly Azalea LUTZ Awilda, DO        hyoscyamine (ANASPAZ;LEVSIN) tablet 125 mcg  125 mcg Oral 4x Daily PRN Azalea E Awilda, DO        loperamide (IMODIUM) capsule 4 mg  4 mg Oral TID WC Azalea LUTZ Awilda, DO        loperamide (IMODIUM) capsule 6 mg  6 mg Oral Nightly Azalea LUTZ Awilda, DO        LORazepam (ATIVAN) tablet 0.25 mg  0.25 mg Oral Daily PRN Azalea LUTZ Awilda, DO        [START ON 7/25/2022] pantoprazole (PROTONIX) tablet 40 mg  40 mg Oral QAM AC Azalea E Awilda, DO        oxyCODONE-acetaminophen (PERCOCET)  MG per tablet 1 tablet  1 tablet Oral Q6H PRN Azalea E Awilda, DO        pravastatin (PRAVACHOL) tablet 40 mg  40 mg Oral Nightly Azalea E Awilda, DO        primidone (MYSOLINE) tablet 250 mg  250 mg Oral Nightly Azalea E Awilda, DO        Probiotic CAPS 1 capsule  1 capsule Oral Nightly Azalea E Awilda, DO        traZODone (DESYREL) tablet 50 mg  50 mg Oral Nightly Azalea E Awilda, DO        vitamin B-12 (CYANOCOBALAMIN) tablet 1,000 mcg  1,000 mcg Oral Nightly Azalea E Awilda, DO         Current Outpatient Medications   Medication Sig Dispense Refill    apixaban (ELIQUIS) 5 MG TABS tablet Take 1 tablet by mouth in the morning and 1 tablet before bedtime. 60 tablet 0    levoFLOXacin (LEVAQUIN) 500 MG tablet Take 1 tablet by mouth every other day for 6 days 5 tablet 0    lipase-protease-amylase (CREON) 80482-75063 units delayed release capsule Take 12,000 Units by mouth take with snacks **SEE OTHER ORDER**      Multiple Vitamins-Minerals (PRESERVISION AREDS) CAPS Take 1 capsule by mouth 2 times daily      Magnesium Oxide (MAGNESIUM-OXIDE) 250 MG TABS tablet Take 250 mg by mouth 2 times daily      METAMUCIL FIBER PO Take 1 packet by mouth 2 times daily      diphenoxylate-atropine (LOMOTIL) 2.5-0.025 MG per tablet Take 1 tablet by mouth 4 times daily (before meals and nightly). loperamide (IMODIUM) 2 MG capsule Take 4 mg by mouth 3 times daily (with meals) **SEE OTHER ORDER**      loperamide (IMODIUM) 2 MG capsule Take 6 mg by mouth nightly **SEE OTHER ORDER**      amLODIPine (NORVASC) 10 MG tablet Take 10 mg by mouth nightly       LORazepam (ATIVAN) 0.5 MG tablet Take 0.25 mg by mouth daily as needed for Anxiety.        fluticasone (FLONASE) 50 MCG/ACT nasal spray 1 spray by Each Nostril route nightly lipase-protease-amylase (CREON) 67298-02174 units delayed release capsule Take 24,000 Units by mouth 3 times daily (with meals) **SEE OTHER ORDER**      Probiotic CAPS Take 1 capsule by mouth nightly       hyoscyamine (ANASPAZ;LEVSIN) 125 MCG tablet Take 125 mcg by mouth 4 times daily as needed for Cramping       omeprazole (PRILOSEC) 20 MG delayed release capsule Take 40 mg by mouth every morning       aspirin 81 MG tablet Take 81 mg by mouth nightly       oxyCODONE-acetaminophen (PERCOCET)  MG per tablet Take 1 tablet by mouth every 6 hours as needed for Pain. Cholecalciferol (VITAMIN D3) 50 MCG (2000 UT) TABS Take 2,000 Units by mouth 2 times daily       Coenzyme Q10 (COQ10 PO) Take 1 capsule by mouth nightly       Ferrous Sulfate (IRON) 325 (65 Fe) MG TABS Take 325 mg by mouth nightly       calcium carbonate (OSCAL) 500 MG TABS tablet Take 500 mg by mouth nightly       pravastatin (PRAVACHOL) 40 MG tablet Take 40 mg by mouth nightly       propranolol (INDERAL) 20 MG tablet Take 20 mg by mouth 2 times daily       vitamin B-12 (CYANOCOBALAMIN) 500 MCG tablet Place 500 mcg under the tongue nightly       Omega-3 Fatty Acids (OMEGA 3 PO) Take 1 capsule by mouth nightly       escitalopram (LEXAPRO) 20 MG tablet Take 20 mg by mouth nightly.       primidone (MYSOLINE) 250 MG tablet Take 250 mg by mouth nightly       traZODone (DESYREL) 50 MG tablet Take 50 mg by mouth nightly          Allergies:  Fentanyl, Levofloxacin, Tramadol, and Vioxx [rofecoxib]    Social History:      Social History     Socioeconomic History    Marital status:      Spouse name: Not on file    Number of children: Not on file    Years of education: Not on file    Highest education level: Not on file   Occupational History    Not on file   Tobacco Use    Smoking status: Every Day     Packs/day: 1.00     Types: Cigarettes    Smokeless tobacco: Never   Vaping Use    Vaping Use: Never used   Substance and Sexual Activity    Alcohol use: Not Currently     Comment: occasional    Drug use: No    Sexual activity: Not on file   Other Topics Concern    Not on file   Social History Narrative    Not on file     Social Determinants of Health     Financial Resource Strain: Not on file   Food Insecurity: Not on file   Transportation Needs: Not on file   Physical Activity: Not on file   Stress: Not on file   Social Connections: Not on file   Intimate Partner Violence: Not on file   Housing Stability: Not on file         Family History:     Not pertinent to present illness       REVIEW OF SYSTEMS:    CONSTITUTIONAL:  Denies fever, chill or rigors. Reported weakness   HEENT: denies blurring of vision or double vision, denies hearing problem  RESPIRATORY: denies cough, shortness of breath, sputum expectoration, chest pain. CARDIOVASCULAR:  Denies palpitation  GASTROINTESTINAL:  diarrhea . dehydration   GENITOURINARY:  Denies burning urination or frequency of urination  INTEGUMENT: denies wound , rash  HEMATOLOGIC/LYMPHATIC:  Denies lymph node swelling, gum bleeding or easy bruising. MUSCULOSKELETAL:  Denies leg pain , joint pain , joint swelling  NEUROLOGICAL:  Denies light headed, dizziness    PHYSICAL EXAM:      Vitals:     BP (!) 114/50   Pulse 55   Temp 97.4 °F (36.3 °C) (Oral)   Resp 16   Ht 5' (1.524 m)   Wt 99 lb (44.9 kg)   SpO2 98%   BMI 19.33 kg/m²     General Appearance:    Awake, alert , no acute distress. Head:    Normocephalic, atraumatic   Eyes:    No pallor, no icterus,   Ears:    No obvious deformity or drainage.    Nose:   No nasal drainage   Throat:   Mucosa dry    Neck:   Supple, no lymphadenopathy   Back:     no CVA tenderness   Lungs:     Clear to auscultation bilaterally    Heart:    Regular rate and rhythm   Abdomen:     Soft, non-tender, bowel sounds present    PEG tube - OK, ileostomy functioning    Extremities:   No edema, no cyanosis    Pulses:   Dorsalis pedis palpable    Skin:   no rashes or lesions   CBC with 02:45 PM    WBCUA NONE 07/24/2022 11:19 AM    RBCUA NONE 07/24/2022 11:19 AM    YEAST Present 09/18/2020 06:50 AM    BACTERIA NONE SEEN 07/24/2022 11:19 AM    CLARITYU Clear 07/24/2022 11:19 AM    SPECGRAV 1.020 07/24/2022 11:19 AM    LEUKOCYTESUR Negative 07/24/2022 11:19 AM    UROBILINOGEN 0.2 07/24/2022 11:19 AM    BILIRUBINUR Negative 07/24/2022 11:19 AM    BLOODU Negative 07/24/2022 11:19 AM    GLUCOSEU Negative 07/24/2022 11:19 AM       ABG:  No results found for: RFP3JGK, BEART, S4BAPOZN, PHART, THGBART, BQX5MVZ, PO2ART, CUI9BYQ    MICROBIOLOGY:    Blood culture -    Susceptibility      Escherichia coli (1)    Antibiotic Interpretation Microscan  Method Status    amoxicillin-clavulanate Intermediate ^16 mcg/mL BACTERIAL SUSCEPTIBILITY PANEL BY JAMAAL     ceFAZolin Sensitive <=^4 mcg/mL BACTERIAL SUSCEPTIBILITY PANEL BY JAMAAL     cefepime Sensitive <=^0.12 mcg/mL BACTERIAL SUSCEPTIBILITY PANEL BY JAMAAL     cefotaxime Sensitive <=^0.25 mcg/mL BACTERIAL SUSCEPTIBILITY PANEL BY JAMAAL     cefOXitin Sensitive <=^4 mcg/mL BACTERIAL SUSCEPTIBILITY PANEL BY JAMAAL     cefTAZidime-avibactam Sensitive <=^0.12 mcg/mL BACTERIAL SUSCEPTIBILITY PANEL BY JAMAAL     gentamicin Sensitive <=^1 mcg/mL BACTERIAL SUSCEPTIBILITY PANEL BY JAMAAL     levofloxacin Sensitive <=^0.12 mcg/mL BACTERIAL SUSCEPTIBILITY PANEL BY JAMAAL     meropenem Sensitive <=^0.25 mcg/mL BACTERIAL SUSCEPTIBILITY PANEL BY JAMAAL     piperacillin-tazobactam Sensitive <=^4 mcg/mL BACTERIAL SUSCEPTIBILITY PANEL BY JAMAAL     trimethoprim-sulfamethoxazole Resistant >=^320 mcg/mL BACTERIAL SUSCEPTIBILITY              Urine Culture -    Sputum Culture -    Wound Culture -        Radiology :    CT abdomen and pelvis -  Centrilobular emphysema with progressive patchy infiltrates/atelectasis and   pleural effusion in the lung bases concerning for pneumonia or edema. Coronary artery calcification. Suboptimal evaluation abdomen pelvis.      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 Liver disease with small amount of ascites. Question indeterminate cystic lesion in the pancreas. Consider surveillance. Significant irregularity and the postsurgical changes in the lumbosacral   spine. See above.        IMPRESSION:     Short gut syndrome,  Recent E coli bacteremia  Leukocytosis     RECOMMENDATIONS:    IV rocephin 1 gram q 24 hrs  ( doubtful of oral absorption )   CBC with diff       Thank you Dr Kingsley Molina  for the consult

## 2022-07-24 NOTE — ED PROVIDER NOTES
[facility] [address]  Department of Emergency Medicine     Written by: Meaghan Milligan DO  Patient Name: Judge Reyes  Attending Provider: Federico Emanuel MD  Admit Date: 2022  8:37 AM  MRN: [id number]                   : 1940        Chief Complaint   Patient presents with    Hypotension     Was sent in from [facility] for hypotension BP  103/46 HERE    - Chief complaint    Ms. Blas Henry is a [de-identified]year old female who presents to the ED due to hypotension. Patient was sent from her nursing facility at [facility] with hypotension. Was recently admitted to [facility] and found to have E. coli bacteremia. She has been discharged 2 days ago and has been taking Levaquin which she is still on. She had slowly been improving. She notes that she has intermittent abdominal cramping and mild shortness of breath neither of which are necessarily new for her. States that she has an ileostomy and PEG tube from prior medical conditions. Patient blood pressure upon arrival was 103/46. She has no acute complaints at this time. Symptoms have been constant since onset. Denies any aggravating or relieving factors. Denies any fevers, chills, nausea, vomiting, chest pain, bowel or urinary changes, headaches or vision changes. Review of Systems   Constitutional:  Positive for fatigue. Negative for chills and fever. HENT:  Negative for congestion, sinus pressure and sinus pain. Eyes:  Negative for pain and redness. Respiratory:  Positive for shortness of breath (Mild). Negative for cough and chest tightness. Cardiovascular:  Negative for chest pain, palpitations and leg swelling. Gastrointestinal:  Positive for abdominal pain (Cramping). Negative for constipation, diarrhea, nausea and vomiting. Genitourinary:  Negative for dysuria, flank pain, frequency, hematuria and urgency.    Musculoskeletal:  Negative for arthralgias, back pain, gait problem, joint swelling and myalgias. Skin:  Negative for rash. Neurological:  Negative for dizziness, light-headedness and headaches. Physical Exam  Constitutional:       General: She is not in acute distress. Appearance: Normal appearance. She is normal weight. She is not ill-appearing or toxic-appearing. HENT:      Head: Normocephalic and atraumatic. Right Ear: External ear normal.      Left Ear: External ear normal.      Nose: Nose normal.      Mouth/Throat:      Mouth: Mucous membranes are moist.      Pharynx: Oropharynx is clear. Eyes:      Extraocular Movements: Extraocular movements intact. Conjunctiva/sclera: Conjunctivae normal.   Cardiovascular:      Rate and Rhythm: Normal rate and regular rhythm. Pulses: Normal pulses. Heart sounds: Normal heart sounds. Pulmonary:      Effort: Pulmonary effort is normal. No respiratory distress. Breath sounds: Normal breath sounds. No wheezing. Abdominal:      General: Abdomen is flat. Bowel sounds are normal. There is no distension. Palpations: Abdomen is soft. Tenderness: There is no abdominal tenderness. There is no guarding or rebound. Comments: Ileostomy and PEG tube in place   Musculoskeletal:         General: No swelling or tenderness. Normal range of motion. Cervical back: Normal range of motion and neck supple. No rigidity or tenderness. Skin:     General: Skin is warm and dry. Neurological:      General: No focal deficit present. Mental Status: She is alert and oriented to person, place, and time. Mental status is at baseline. Cranial Nerves: No cranial nerve deficit. Sensory: No sensory deficit. Motor: No weakness. Psychiatric:         Mood and Affect: Mood normal.         Behavior: Behavior normal.        Procedures       MDM  Number of Diagnoses or Management Options  Enteritis  Pneumonia of lower lobe due to infectious organism, unspecified laterality  Diagnosis management comments:  This is a [de-identified]year old female who presents to the ED due to hypotension. Patient's lab work revealed a improving leukocytosis which is now 14.3 with chronic anemia and hemoglobin 8.5. CMP revealed chronic CKD with a creatinine of 1.6. Lactate was normal.  Patient's coags were also normal.  Initial troponin was 200 with a repeat of 185. Lipase was mildly elevated at 117. Patient's urinalysis did not reveal any signs of infection. CT abdomen pelvis and CT chest revealed emphysema with questionable pneumonia at the lung bases with dilated thickened bowel loops concerning for enteritis. Patient was started on cefepime in the apartment. She will be admitted for further work-up and treatment by Dr. Ester Delcid at this time. --------------------------------------------- PAST HISTORY ---------------------------------------------  Past Medical History:  has a past medical history of Acute kidney failure ([address] Anxiety, Arthritis, CAD (coronary artery disease), Cancer ([address] COPD (chronic obstructive pulmonary disease) ([address] Depression, Fibromyalgia, Generalized headaches, History of blood transfusion, History of necrotic bowel, Hyperlipidemia, Hypertension, Incisional hernia, Insomnia, Mitral valve regurgitation, Myocardial infarct ([address] Occasional tremors, Osteopenia, PONV (postoperative nausea and vomiting), and Vitamin B12 deficiency. Past Surgical History:  has a past surgical history that includes Coronary angioplasty with stent (1-7-2002); Colonoscopy; Esophagus surgery (1-4-13); Rotator cuff repair (2010); arthroplasty (1-2006); Finger trigger release (Right, 2006); Cataract removal with implant (Right, 03/03/15); Appendectomy (2002); Cholecystectomy (5-11-07); Carpal tunnel release (Bilateral); skin biopsy (2010); Foot surgery (Right); Jejunostomy (1-3-13); Endoscopy, colon, diagnostic; epigastric hernia repair (3/21/16); other surgical history (08/24/2016);  Nasal sinus surgery; back surgery (1991, 1.50 - 4.00 E9/L    Monocytes Absolute 0.57 0.10 - 0.95 E9/L    Eosinophils Absolute 0.13 0.05 - 0.50 E9/L    Basophils Absolute 0.00 0.00 - 0.20 E9/L    Myelocyte Percent 0.9 0 - 0 %    Anisocytosis 2+     Polychromasia 1+     Poikilocytes 1+     Schistocytes 1+    Comprehensive Metabolic Panel w/ Reflex to MG   Result Value Ref Range    Sodium 140 132 - 146 mmol/L    Potassium reflex Magnesium 3.8 3.5 - 5.0 mmol/L    Chloride 103 98 - 107 mmol/L    CO2 26 22 - 29 mmol/L    Anion Gap 11 7 - 16 mmol/L    Glucose 96 74 - 99 mg/dL    BUN 60 (H) 6 - 23 mg/dL    Creatinine 1.6 (H) 0.5 - 1.0 mg/dL    GFR Non-African American 31 >=60 mL/min/1.73    GFR African American 38     Calcium 7.6 (L) 8.6 - 10.2 mg/dL    Total Protein 5.3 (L) 6.4 - 8.3 g/dL    Albumin 2.1 (L) 3.5 - 5.2 g/dL    Total Bilirubin 1.2 0.0 - 1.2 mg/dL    Alkaline Phosphatase 238 (H) 35 - 104 U/L    ALT 16 0 - 32 U/L    AST 14 0 - 31 U/L   Urinalysis   Result Value Ref Range    Color, UA Yellow Straw/Yellow    Clarity, UA Clear Clear    Glucose, Ur Negative Negative mg/dL    Bilirubin Urine Negative Negative    Ketones, Urine Negative Negative mg/dL    Specific Gravity, UA 1.020 1.005 - 1.030    Blood, Urine Negative Negative    pH, UA 5.5 5.0 - 9.0    Protein, UA 30 (A) Negative mg/dL    Urobilinogen, Urine 0.2 <2.0 E.U./dL    Nitrite, Urine Negative Negative    Leukocyte Esterase, Urine Negative Negative   Lactate, Sepsis   Result Value Ref Range    Lactic Acid, Sepsis 1.1 0.5 - 1.9 mmol/L   APTT   Result Value Ref Range    aPTT 29.1 24.5 - 35.1 sec   Protime-INR   Result Value Ref Range    Protime 19.0 (H) 9.3 - 12.4 sec    INR 1.7    Lipase   Result Value Ref Range    Lipase 117 (H) 13 - 60 U/L   Troponin   Result Value Ref Range    Troponin, High Sensitivity 200 (H) 0 - 9 ng/L   Troponin   Result Value Ref Range    Troponin, High Sensitivity 185 (H) 0 - 9 ng/L   Microscopic Urinalysis   Result Value Ref Range    WBC, UA NONE 0 - 5 /HPF    RBC, UA NONE 0 - 2 /HPF    Renal Epithelial, UA FEW /HPF    Bacteria, UA NONE SEEN None Seen /HPF   EKG 12 lead   Result Value Ref Range    Ventricular Rate 49 BPM    Atrial Rate 49 BPM    P-R Interval 154 ms    QRS Duration 106 ms    Q-T Interval 552 ms    QTc Calculation (Bazett) 498 ms    P Axis 61 degrees    R Axis -44 degrees    T Axis 26 degrees       RADIOLOGY:  CT ABDOMEN PELVIS WO CONTRAST Additional Contrast? None   Final Result   Centrilobular emphysema with progressive patchy infiltrates/atelectasis and   pleural effusion in the lung bases concerning for pneumonia or edema. Coronary artery calcification. Suboptimal evaluation abdomen pelvis. 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 Liver disease with small amount of ascites. Question indeterminate cystic lesion in the pancreas. Consider surveillance. Significant irregularity and the postsurgical changes in the lumbosacral   spine. See above. CT CHEST WO CONTRAST   Final Result   Centrilobular emphysema with progressive patchy infiltrates/atelectasis and   pleural effusion in the lung bases concerning for pneumonia or edema. Coronary artery calcification. Suboptimal evaluation abdomen pelvis. 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 Liver disease with small amount of ascites. Question indeterminate cystic lesion in the pancreas. Consider surveillance. Significant irregularity and the postsurgical changes in the lumbosacral   spine. See above. XR CHEST PORTABLE   Final Result   Stable chronic changes seen within the lung fields with no evidence of acute   parenchymal disease.   Noa catheter is been removed in the interval on the   right.                   ------------------------- NURSING NOTES AND VITALS REVIEWED ---------------------------  Date / Time Roomed:  7/24/2022  8:37 AM  ED Bed Assignment:  Cynthiajeanne Rojas    The nursing notes within the ED encounter and vital signs as below have been reviewed. Patient Vitals for the past 24 hrs:   BP Temp Temp src Pulse Resp SpO2 Height Weight   07/24/22 1545 (!) 114/50 -- -- 55 16 98 % -- --   07/24/22 1439 (!) 115/48 -- -- 54 18 97 % -- --   07/24/22 1237 (!) 100/47 -- -- 54 18 99 % -- --   07/24/22 1230 (!) 73/62 -- -- 55 -- 99 % -- --   07/24/22 1130 -- -- -- -- -- 94 % -- --   07/24/22 1115 (!) 104/44 -- -- 57 18 (!) 89 % -- --   07/24/22 1030 (!) 87/52 -- -- 60 14 91 % -- --   07/24/22 0818 (!) 103/46 97.4 °F (36.3 °C) Oral 58 16 94 % 5' (1.524 m) 99 lb (44.9 kg)       Oxygen Saturation Interpretation: Normal    ------------------------------------------ PROGRESS NOTES ------------------------------------------  Re-evaluation(s):  Time: 1500  Patients symptoms show no change  Repeat physical examination is not changed    Counseling:  I have spoken with the patient and discussed todays results, in addition to providing specific details for the plan of care and counseling regarding the diagnosis and prognosis. Their questions are answered at this time and they are agreeable with the plan of admission.    --------------------------------- ADDITIONAL PROVIDER NOTES ---------------------------------  Consultations:  Time: 1515. Spoke with Dr. Ceasar Rogers. Discussed case. They will admit the patient. This patient's ED course included: a personal history and physicial examination, re-evaluation prior to disposition, multiple bedside re-evaluations, and IV medications    This patient has remained hemodynamically stable during their ED course. Diagnosis:  1. Enteritis    2. Pneumonia of lower lobe due to infectious organism, unspecified laterality        Disposition:  Patient's disposition: Admit to telemetry  Patient's condition is stable.         Patient was seen and evaluated by myself and my attending Natalya Sweet MD. Assessment and Plan discussed with attending provider, please see attestation for final plan of care.      DO Cassandra Bueno DO  Resident  07/24/22 2361

## 2022-07-24 NOTE — ED PROVIDER NOTES
ATTENDING PROVIDER ATTESTATION:     Geri Grant presented to the emergency department for evaluation of Hypotension (Was sent in from [facility] for hypotension BP  103/46 HERE)   and was initially evaluated by the Medical Resident. See Original ED Note for H&P and ED course above. I have reviewed and discussed the case, including pertinent history (medical, surgical, family and social) and exam findings with the Medical Resident assigned to Bullvirgil Rudy. I have personally performed and/or participated in the history, exam, medical decision making, and procedures and agree with all pertinent clinical information and any additional changes or corrections are noted below in my assessment and plan. I have discussed this patient in detail with the resident, and provided the instruction and education,       I have reviewed my findings and recommendations with the assigned Medical Resident, Geri Grant and members of family present at the time of disposition. I have performed a history and physical examination of this patient and directly supervised the resident caring for this patient      History of Present Illness:    Presents to the ED for hypotension, beginning prior to arrival.  The complaint has been intermittent, mild in severity, and worsened by nothing. Patient was sent from the nursing facility for hypotension. She was recently admitted to [facility] and found to have E. coli bacteremia. She was treated and improved in facility on oral Levaquin. she is still on this medication. She reports that she is slowly feeling better. He has a complex medical history including infected mesh, multi organism infections in the past as well. She follows closely with infectious disease. She denies fever or chills. She denies chest pain. She does say she has some mild shortness of breath and intermittent non productive cough. Denies nausea or vomiting. Denies abdominal pain but says from time to time she has abdominal cramps secondary to her ostomy. This is not new. Says this happens all the time. She denies fever or chills. She says she just feels tired since being admitted. Review of Systems:   A complete review of systems was performed and pertinent positives and negatives are stated within HPI, all other systems reviewed and are negative.    --------------------------------------------- PAST HISTORY ---------------------------------------------  Past Medical History:  has a past medical history of Acute kidney failure ([facility] [address] Anxiety, Arthritis, CAD (coronary artery disease), Cancer ([facility] [address] COPD (chronic obstructive pulmonary disease) ([facility] [address] [facility] Depression, Fibromyalgia, Generalized headaches, History of blood transfusion, History of necrotic bowel, Hyperlipidemia, Hypertension, Incisional hernia, Insomnia, Mitral valve regurgitation, Myocardial infarct ([facility] Occasional tremors, Osteopenia, PONV (postoperative nausea and vomiting), and Vitamin B12 deficiency. Past Surgical History:  has a past surgical history that includes Coronary angioplasty with stent (1-7-2002); Colonoscopy; Esophagus surgery (1-4-13); Rotator cuff repair (2010); arthroplasty (1-2006); Finger trigger release (Right, 2006); Cataract removal with implant (Right, 03/03/15); Appendectomy (2002); Cholecystectomy (5-11-07); Carpal tunnel release (Bilateral); skin biopsy (2010); Foot surgery (Right); Jejunostomy (1-3-13); Endoscopy, colon, diagnostic; epigastric hernia repair (3/21/16); other surgical history (08/24/2016); Nasal sinus surgery; back surgery (1991, 2004, 2009); back surgery; Mohs surgery; other surgical history; Breast surgery (years ago); Knee Arthroplasty (Left, 03/22/2017); laparotomy (N/A, 5/12/2020); laparotomy (N/A, 6/8/2020); Insert Picc Line (06/09/2020); joint replacement (Right, 3/24/15); Abdomen surgery (2-);  Abdomen surgery (N/A, 1/26/2020); hernia repair (07-44-67); Abdomen surgery (N/A, 9/11/2020); picc line insertion nurse (9/15/2020); laparotomy (N/A, 9/15/2020); and Port Surgery (N/A, 7/19/2022). Social History:  reports that she has been smoking cigarettes. She has been smoking an average of 1 pack per day. She has never used smokeless tobacco. She reports that she does not currently use alcohol. She reports that she does not use drugs. Family History: family history is not on file. Unless otherwise noted, family history is non contributory    The patients home medications have been reviewed. Allergies: Fentanyl, Levofloxacin, Tramadol, and Vioxx [rofecoxib]    Physical Exam:  Constitutional/General: Alert and oriented x3  Head: Normocephalic and atraumatic  Eyes: PERRL, EOMI, sclera non icteric  ENT: Oropharynx clear, handling secretions  Neck: Supple, full ROM, no stridor, no meningeal signs  Respiratory: Lungs with decreased BS bilaterally. Not in respiratory distress  Cardiovascular:  Regular rate. Regular rhythm. No murmurs, no gallops, no rubs. 2+ distal pulses. Equal extremity pulses. GI:  Abdomen Soft, Non tender, Non distended. No rebound, guarding, or rigidity. No pulsatile masses. Musculoskeletal: Moves all extremities x 4. Warm and well perfused,  no clubbing, no cyanosis, no edema. Palpable peripheral pulses  Integument: skin warm and dry. No rashes. Neurologic: GCS 15, no focal deficits  Psychiatric: Normal Affect      I directly supervised any procedures performed by the resident and was present for the procedure including all critical portions of the procedure      I, Dr. Crissy Quijano, am the primary provider of record    My Medical Decision Making:         Fatigue, hypotension, looks dry. Cr slightly increased from prior. CT scan suggestive of pneumonia. She does have some mild shortness of breath and infiltrate on CT, 1 dose of cefepime added. Medicine consulted for admission. BP improving        1.  Hypotension, unspecified hypotension type    2. Enteritis    3. Pneumonia of lower lobe due to infectious organism, unspecified laterality    4.  Berto Costa MD  07/24/22 [id number]

## 2022-07-24 NOTE — PLAN OF CARE
Problem: Discharge Planning  Goal: Discharge to home or other facility with appropriate resources  Outcome: Progressing     Problem: Pain  Goal: Verbalizes/displays adequate comfort level or baseline comfort level  Outcome: Progressing     Problem: Skin/Tissue Integrity  Goal: Absence of new skin breakdown  Description: 1. Monitor for areas of redness and/or skin breakdown  2. Assess vascular access sites hourly  3. Every 4-6 hours minimum:  Change oxygen saturation probe site  4. Every 4-6 hours:  If on nasal continuous positive airway pressure, respiratory therapy assess nares and determine need for appliance change or resting period.   Outcome: Progressing     Problem: Chronic Conditions and Co-morbidities  Goal: Patient's chronic conditions and co-morbidity symptoms are monitored and maintained or improved  Outcome: Progressing

## 2022-07-24 NOTE — CONSULTS
[facility] SURGERY  CONSULT NOTE  7/24/2022    Physician Consulted: Dr. Cris Griffin  Reason for Consult: Pancreatic lesion  Referring Physician: Dr. Shaniqua Granados    HPI  Juice Lambert is a [de-identified] y.o. female who presents for evaluation of hypotension and leukocytosis, arrived from skilled nursing facility. General surgery was consulted for incidental pancreatic lesion. She normally lives at home, but was recovering at [facility] following recent admission at [facility] for E. coli bacteremia. While she was there, she had a right Mediport removed due to colonization with E. coli. 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 She has an end ileostomy claims that output is normal for her. Patient has history of dysphagia and has a gastrostomy tube which she uses for supplemental nutrition. Patient denies any abdominal pain, bloating or blood in her ostomy.       Past Medical History:   Diagnosis Date    Acute kidney failure ([facility] [address] 2014 and 8/2016    x2,no dialysis needed,resolved, kidney function tests returned to normal    Anxiety     Arthritis     CAD (coronary artery disease)     follows with Dr. Naldo Taylor every 6 months    Cancer [facility])     skin, leg,nose- removed surgically     COPD (chronic obstructive pulmonary disease) ([facility] [address]     mild- no inhlaers, no oxygen     Depression     Fibromyalgia     chronic back and neck pain    Generalized headaches     h/o / daily    History of blood transfusion 3-11-14    multiple times    History of necrotic bowel 2012    Hyperlipidemia     Hypertension     Incisional hernia     abdomen    Insomnia     Mitral valve regurgitation     Myocardial infarct ([facility] [address] 2002    Occasional tremors     at hands / stable with medication    Osteopenia     PONV (postoperative nausea and vomiting)     Vitamin B12 deficiency     h/o       Past Surgical History:   Procedure Laterality Date    ABDOMEN SURGERY  2-    total colectomy, bowel resection, ileostomy,revision of ileostomy     ABDOMEN SURGERY N/A 1/26/2020    DRAINAGE OF ABDOMINAL WALL ABSCESS, EXPLANTATION OF MESH, DEBRIDEMENT OF SKIN AND SUBCUTANEOUS TISSUE performed by Patel Mathur MD at [facility] N/A 9/11/2020    EXPLANTATION OF HERNIA MESH performed by Patel Mathur MD at [facility]  2002    ARTHROPLASTY  1-2006    right and left thumb    BACK SURGERY  1991, 2004, 2009    lumbar fusion x 3    BACK SURGERY      cervical fusion x 2    BREAST SURGERY  years ago    monor calcifications    CARPAL TUNNEL RELEASE Bilateral     CATARACT REMOVAL WITH IMPLANT Right 03/03/15    CHOLECYSTECTOMY  5-11-07    Lap    COLONOSCOPY      CORONARY ANGIOPLASTY WITH STENT PLACEMENT  1-7-2002    ENDOSCOPY, COLON, DIAGNOSTIC      EPIGASTRIC HERNIA REPAIR  3/21/16    incisional with mesh implantation    ESOPHAGUS SURGERY  1-4-13    esophageal clamp (torn Esophagus)    FINGER TRIGGER RELEASE Right 2006    index finger    FOOT SURGERY Right     multiple    HERNIA REPAIR  12-31-13    abdominal x 5- last one 2017     ILEOSTOMY OR JEJUNOSTOMY  1-3-13    revision    INSERT PICC LINE  06/09/2020    and removed     JOINT REPLACEMENT Right 3/24/15    right total shoulder arthroplasty    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     LAPAROTOMY N/A 5/12/2020    EXPLORATORY LAPAROTOMY EXPLANTATION OF INFECTED MESH SMAL BOWEL RESECTION AND REVISION END ILEOSTOMY, LYSIS OF ADHESIONS performed by Patel Mathur MD at [address] [place] [address] N/A 6/8/2020    LAPAROTOMY EXPLORATORY, Deadra Motta OF HERNIA MESH performed by Patel Mathur MD at [address] [place] [address] N/A 9/15/2020    EXPLORATORY LAPAROTOMY WITH SMALL BOWEL RESECTION, TAKE DOWN REVISION ENTEROCUTANEOUS FISTULA , OSTOMY REVISION performed by Patel Mathur MD at [address] / leg    NASAL SINUS SURGERY      x2 /  cauterized    OTHER SURGICAL HISTORY  08/24/2016    diagnostic laparotomy with repair of intraabdominal hernia OTHER SURGICAL HISTORY      removal plate / screws  / right great toe    PICC LINE INSERTION NURSE  9/15/2020         PORT SURGERY N/A 7/19/2022    MEDIPORT REMOVAL performed by Joie Ruelas MD at [facility]  2010    right     SKIN BIOPSY  2010    3 squamous cell carcinoma right leg, left leg       Medications Prior to Admission    Prior to Admission medications    Medication Sig Start Date End Date Taking? Authorizing Provider   apixaban (ELIQUIS) 5 MG TABS tablet Take 1 tablet by mouth in the morning and 1 tablet before bedtime. 7/21/22 8/20/22  Felicia Rutherford MD   levoFLOXacin (LEVAQUIN) 500 MG tablet Take 1 tablet by mouth every other day for 6 days 7/22/22 7/28/22  Freddy Correa MD   lipase-protease-amylase (CREON) 24714-56727 units delayed release capsule Take 12,000 Units by mouth take with snacks **SEE OTHER ORDER**    Historical Provider, MD   Multiple Vitamins-Minerals (PRESERVISION AREDS) CAPS Take 1 capsule by mouth 2 times daily    Historical Provider, MD   Magnesium Oxide (MAGNESIUM-OXIDE) 250 MG TABS tablet Take 250 mg by mouth 2 times daily    Historical Provider, MD   METAMUCIL FIBER PO Take 1 packet by mouth 2 times daily    Historical Provider, MD   diphenoxylate-atropine (LOMOTIL) 2.5-0.025 MG per tablet Take 1 tablet by mouth 4 times daily (before meals and nightly). Historical Provider, MD   loperamide (IMODIUM) 2 MG capsule Take 4 mg by mouth 3 times daily (with meals) **SEE OTHER ORDER**    Historical Provider, MD   loperamide (IMODIUM) 2 MG capsule Take 6 mg by mouth nightly **SEE OTHER ORDER**    Historical Provider, MD   amLODIPine (NORVASC) 10 MG tablet Take 10 mg by mouth nightly     Historical Provider, MD   LORazepam (ATIVAN) 0.5 MG tablet Take 0.25 mg by mouth daily as needed for Anxiety.      Historical Provider, MD   fluticasone (FLONASE) 50 MCG/ACT nasal spray 1 spray by Each Nostril route nightly     Historical Provider, MD   lipase-protease-amylase LOCALIZED TO THE LEGS    Tramadol Nausea Only and Other (See Comments)     INSOMNIA & SHAKINESS     Vioxx [Rofecoxib] Swelling and Myalgia     SWELLING & PAIN LOCALIZED TO THE LEGS       History reviewed. No pertinent family history. Social History     Tobacco Use    Smoking status: Every Day     Packs/day: 1.00     Types: Cigarettes    Smokeless tobacco: Never   Vaping Use    Vaping Use: Never used   Substance Use Topics    Alcohol use: Not Currently     Comment: occasional    Drug use: No         Review of Systems: pertinent ROS listed in HPI, all others negative       PHYSICAL EXAM:    Vitals:    07/24/22 1630   BP: (!) 120/53   Pulse: 59   Resp: 16   Temp: 97.6 °F (36.4 °C)   SpO2:        GENERAL:  NAD. A&Ox3. HEAD:  Normocephalic. Atraumatic. EYES:   No scleral icterus. PERRL. LUNGS:  No increased work of breathing. CARDIOVASCULAR: RR  ABDOMEN:  Soft, non-distended, non-tender. Large midline scar without obvious hernia. Gastrostomy tube. Ileostomy and bag. EXTREMITIES:   MAEx4. Atraumatic. No LE edema.   SKIN:  Warm and dry  NEUROLOGIC:  GCS 15        ASSESSMENT/PLAN:  [de-identified] y.o. female with pancreatic lesion at 1.8cm on non contrasted imaging    - MRI of the pancreas with IV contrast.  GFR is acceptable for IV contrast.  - will make further recommendations once MRI completed    Electronically signed by Hamzah Pena MD on 7/25/2022 at 8:49 AM

## 2022-07-24 NOTE — LETTER
[address] [place] [address] Dr Department Medicaid  CERTIFICATION OF NECESSITY  FOR NON-EMERGENCY TRANSPORTATION   BY GROUND AMBULANCE      Individual Information   1. Name: Deven Kim 2. [address] [place] [address] Dr Medicaid Billing Number:    3. [facility]      Transportation Provider Information   4. Provider Name:  [id number]   5. [address] [place] [address] Dr Medicaid Provider Number:  National Provider Identifier (NPI):      Certification  7. Criteria:  During transport, this individual requires:  [x] Medical treatment or continuous     supervision by an EMT. [x] The administration or regulation of oxygen by another person. [] Supervised protective restraint. 8. Period Beginning Date: 07/25/2022   9. Length  [x] Not more than 8 day(s)  [] One Year     Additional Information Relevant to Certification   10. Comments or Explanations, If Necessary or Appropriate     Fibromyalgia, HLD, MI, Failure to thrive, enteritis, patient unable to sit upright with out assistance, 2L O2 per [place]. Certifying Practitioner Information   11. Name of Practitioner:  Dr Rani Correa MD   12. [address] [place] [address]  Medicaid Provider Number, If Applicable:  [id number] Provider Identifier (NPI):      Signature Information   14. Date of Signature:  07/25/2022 15. Name of Person Signing: Ashlee Saucedo RN.   16. Signature and Professional Designation:  Electronically signed by Ashlee Saucedo RN on 7/25/22 at 2:56 PM EDT       ODM 56267  Rev. 7/2015  [facility] Encounter Date/Time: 7/24/2022 [address] [place] [address] Account: [de-identified]    MRN: [id number]    Patient: Deven Kim    Contact Serial #: [id number]      ENCOUNTER          Patient Class: I Private Enc?   No Unit  [place] [address] Melody Management Service: MED   Encounter DX: Enteritis [K52.9]   ADM Provider: Clarence Joyner MD   Procedure:     ATT Provider: Clarence Joyner MD   REF Provider:        Admission DX: Enteritis, Hypotension, Pneumonia of

## 2022-07-24 NOTE — ED NOTES
Report given to WILFRID KHAN [facility]     Manish Latham, MARYJANE  07/24/22 0061

## 2022-07-25 PROBLEM — R93.7 ABNORMAL CT SCAN, LUMBAR SPINE: Status: ACTIVE | Noted: 2022-07-25

## 2022-07-25 LAB
ALBUMIN SERPL-MCNC: 1.8 G/DL (ref 3.5–5.2)
ALP BLD-CCNC: 210 U/L (ref 35–104)
ALT SERPL-CCNC: 14 U/L (ref 0–32)
ANION GAP SERPL CALCULATED.3IONS-SCNC: 13 MMOL/L (ref 7–16)
AST SERPL-CCNC: 14 U/L (ref 0–31)
BASOPHILS ABSOLUTE: 0.03 E9/L (ref 0–0.2)
BASOPHILS RELATIVE PERCENT: 0.3 % (ref 0–2)
BILIRUB SERPL-MCNC: 0.7 MG/DL (ref 0–1.2)
BUN BLDV-MCNC: 56 MG/DL (ref 6–23)
CALCIUM SERPL-MCNC: 6.8 MG/DL (ref 8.6–10.2)
CHLORIDE BLD-SCNC: 108 MMOL/L (ref 98–107)
CO2: 20 MMOL/L (ref 22–29)
CREAT SERPL-MCNC: 1.4 MG/DL (ref 0.5–1)
EKG ATRIAL RATE: 49 BPM
EKG P AXIS: 61 DEGREES
EKG P-R INTERVAL: 154 MS
EKG Q-T INTERVAL: 552 MS
EKG QRS DURATION: 106 MS
EKG QTC CALCULATION (BAZETT): 498 MS
EKG R AXIS: -44 DEGREES
EKG T AXIS: 26 DEGREES
EKG VENTRICULAR RATE: 49 BPM
EOSINOPHILS ABSOLUTE: 0.17 E9/L (ref 0.05–0.5)
EOSINOPHILS RELATIVE PERCENT: 1.7 % (ref 0–6)
GFR AFRICAN AMERICAN: 44
GFR NON-AFRICAN AMERICAN: 36 ML/MIN/1.73
GLUCOSE BLD-MCNC: 66 MG/DL (ref 74–99)
HCT VFR BLD CALC: 25.7 % (ref 34–48)
HEMOGLOBIN: 8.1 G/DL (ref 11.5–15.5)
IMMATURE GRANULOCYTES #: 0.12 E9/L
IMMATURE GRANULOCYTES %: 1.2 % (ref 0–5)
LYMPHOCYTES ABSOLUTE: 0.91 E9/L (ref 1.5–4)
LYMPHOCYTES RELATIVE PERCENT: 9.2 % (ref 20–42)
MCH RBC QN AUTO: 30.7 PG (ref 26–35)
MCHC RBC AUTO-ENTMCNC: 31.5 % (ref 32–34.5)
MCV RBC AUTO: 97.3 FL (ref 80–99.9)
MONOCYTES ABSOLUTE: 0.59 E9/L (ref 0.1–0.95)
MONOCYTES RELATIVE PERCENT: 6 % (ref 2–12)
NEUTROPHILS ABSOLUTE: 8.07 E9/L (ref 1.8–7.3)
NEUTROPHILS RELATIVE PERCENT: 81.6 % (ref 43–80)
PDW BLD-RTO: 14.6 FL (ref 11.5–15)
PLATELET # BLD: 231 E9/L (ref 130–450)
PMV BLD AUTO: 13.9 FL (ref 7–12)
POTASSIUM REFLEX MAGNESIUM: 3.9 MMOL/L (ref 3.5–5)
RBC # BLD: 2.64 E12/L (ref 3.5–5.5)
SODIUM BLD-SCNC: 141 MMOL/L (ref 132–146)
TOTAL PROTEIN: 4.7 G/DL (ref 6.4–8.3)
WBC # BLD: 9.9 E9/L (ref 4.5–11.5)

## 2022-07-25 PROCEDURE — 36415 COLL VENOUS BLD VENIPUNCTURE: CPT

## 2022-07-25 PROCEDURE — 80053 COMPREHEN METABOLIC PANEL: CPT

## 2022-07-25 PROCEDURE — 2580000003 HC RX 258: Performed by: INTERNAL MEDICINE

## 2022-07-25 PROCEDURE — 6360000002 HC RX W HCPCS: Performed by: INTERNAL MEDICINE

## 2022-07-25 PROCEDURE — 85025 COMPLETE CBC W/AUTO DIFF WBC: CPT

## 2022-07-25 PROCEDURE — 2060000000 HC ICU INTERMEDIATE R&B

## 2022-07-25 PROCEDURE — 6370000000 HC RX 637 (ALT 250 FOR IP): Performed by: INTERNAL MEDICINE

## 2022-07-25 PROCEDURE — 99222 1ST HOSP IP/OBS MODERATE 55: CPT | Performed by: NEUROLOGICAL SURGERY

## 2022-07-25 PROCEDURE — 2580000003 HC RX 258: Performed by: FAMILY MEDICINE

## 2022-07-25 RX ORDER — SODIUM CHLORIDE 9 MG/ML
INJECTION, SOLUTION INTRAVENOUS CONTINUOUS
Status: DISCONTINUED | OUTPATIENT
Start: 2022-07-25 | End: 2022-07-31 | Stop reason: HOSPADM

## 2022-07-25 RX ADMIN — APIXABAN 5 MG: 5 TABLET, FILM COATED ORAL at 20:32

## 2022-07-25 RX ADMIN — LOPERAMIDE HYDROCHLORIDE 6 MG: 2 CAPSULE ORAL at 20:35

## 2022-07-25 RX ADMIN — Medication 10 ML: at 20:35

## 2022-07-25 RX ADMIN — PANTOPRAZOLE SODIUM 40 MG: 40 TABLET, DELAYED RELEASE ORAL at 05:22

## 2022-07-25 RX ADMIN — Medication 10 ML: at 09:00

## 2022-07-25 RX ADMIN — FLUTICASONE PROPIONATE 1 SPRAY: 50 SPRAY, METERED NASAL at 20:38

## 2022-07-25 RX ADMIN — ONDANSETRON 4 MG: 2 INJECTION INTRAMUSCULAR; INTRAVENOUS at 12:10

## 2022-07-25 RX ADMIN — ASPIRIN 81 MG CHEWABLE TABLET 81 MG: 81 TABLET CHEWABLE at 20:38

## 2022-07-25 RX ADMIN — DIPHENOXYLATE HYDROCHLORIDE AND ATROPINE SULFATE 1 TABLET: 2.5; .025 TABLET ORAL at 20:33

## 2022-07-25 RX ADMIN — Medication 1 CAPSULE: at 20:34

## 2022-07-25 RX ADMIN — TRAZODONE HYDROCHLORIDE 50 MG: 50 TABLET ORAL at 20:34

## 2022-07-25 RX ADMIN — FERROUS SULFATE TAB 325 MG (65 MG ELEMENTAL FE) 325 MG: 325 (65 FE) TAB at 20:34

## 2022-07-25 RX ADMIN — SODIUM CHLORIDE: 9 INJECTION, SOLUTION INTRAVENOUS at 13:13

## 2022-07-25 RX ADMIN — Medication 2000 UNITS: at 20:34

## 2022-07-25 RX ADMIN — LOPERAMIDE HYDROCHLORIDE 4 MG: 2 CAPSULE ORAL at 17:33

## 2022-07-25 RX ADMIN — CEFTRIAXONE SODIUM 1000 MG: 1 INJECTION, POWDER, FOR SOLUTION INTRAMUSCULAR; INTRAVENOUS at 06:39

## 2022-07-25 RX ADMIN — PRIMIDONE 250 MG: 250 TABLET ORAL at 20:34

## 2022-07-25 RX ADMIN — CYANOCOBALAMIN TAB 1000 MCG 1000 MCG: 1000 TAB at 20:33

## 2022-07-25 RX ADMIN — ESCITALOPRAM 20 MG: 10 TABLET, FILM COATED ORAL at 20:33

## 2022-07-25 RX ADMIN — DIPHENOXYLATE HYDROCHLORIDE AND ATROPINE SULFATE 1 TABLET: 2.5; .025 TABLET ORAL at 17:33

## 2022-07-25 RX ADMIN — DIPHENOXYLATE HYDROCHLORIDE AND ATROPINE SULFATE 1 TABLET: 2.5; .025 TABLET ORAL at 06:46

## 2022-07-25 RX ADMIN — PRAVASTATIN SODIUM 40 MG: 20 TABLET ORAL at 20:34

## 2022-07-25 RX ADMIN — OXYCODONE AND ACETAMINOPHEN 1 TABLET: 325; 10 TABLET ORAL at 19:52

## 2022-07-25 ASSESSMENT — PAIN SCALES - GENERAL
PAINLEVEL_OUTOF10: 0
PAINLEVEL_OUTOF10: 8
PAINLEVEL_OUTOF10: 0

## 2022-07-25 ASSESSMENT — PAIN DESCRIPTION - LOCATION: LOCATION: ABDOMEN

## 2022-07-25 ASSESSMENT — ENCOUNTER SYMPTOMS
SHORTNESS OF BREATH: 0
ABDOMINAL PAIN: 1

## 2022-07-25 NOTE — CONSULTS
Chief Complaint:   Chief Complaint   Patient presents with    Hypotension     Was sent in from [facility] for hypotension BP  103/46 HERE       HPI:     I had the pleasure of seeing Sruthi Wallace today in post.  As you know this delightful [de-identified] woman with a multilevel fusion up in [facility] clinic by multiple surgeons presents with hypotension. Subsequent work-up with CT scan identified her instrumentation and subtle listhesis at L4-5. We were consulted for evaluation. Upon specific questioning the patient denies any coleen back pain. There is no new numbness weakness or tingling.     Past Medical History:   Diagnosis Date    Acute kidney failure ([address] 2014 and 8/2016    x2,no dialysis needed,resolved, kidney function tests returned to normal    Anxiety     Arthritis     CAD (coronary artery disease)     follows with Dr. John Parsons every 6 months    Cancer [facility])     skin, leg,nose- removed surgically     COPD (chronic obstructive pulmonary disease) ([facility] [address]     mild- no inhlaers, no oxygen     Depression     Fibromyalgia     chronic back and neck pain    Generalized headaches     h/o / daily    History of blood transfusion 3-11-14    multiple times    History of necrotic bowel 2012    Hyperlipidemia     Hypertension     Incisional hernia     abdomen    Insomnia     Mitral valve regurgitation     Myocardial infarct ([address] 2002    Occasional tremors     at hands / stable with medication    Osteopenia     PONV (postoperative nausea and vomiting)     Vitamin B12 deficiency     h/o     Past Surgical History:   Procedure Laterality Date    ABDOMEN SURGERY  2-    total colectomy, bowel resection, ileostomy,revision of ileostomy     ABDOMEN SURGERY N/A 1/26/2020    DRAINAGE OF ABDOMINAL WALL ABSCESS, EXPLANTATION OF MESH, DEBRIDEMENT OF SKIN AND SUBCUTANEOUS TISSUE performed by Danna Meigs, MD at [facility] N/A 9/11/2020    EXPLANTATION OF HERNIA MESH performed by Rosalinda Wilburn Jimi Castillo MD at [facility]  1-2006    right and left thumb    BACK SURGERY  1991, 2004, 2009    lumbar fusion x 3    BACK SURGERY      cervical fusion x 2    BREAST SURGERY  years ago    monor calcifications    CARPAL TUNNEL RELEASE Bilateral     CATARACT REMOVAL WITH IMPLANT Right 03/03/15    CHOLECYSTECTOMY  5-11-07    Lap    COLONOSCOPY      CORONARY ANGIOPLASTY WITH STENT PLACEMENT  1-7-2002    ENDOSCOPY, COLON, DIAGNOSTIC      EPIGASTRIC HERNIA REPAIR  3/21/16    incisional with mesh implantation    ESOPHAGUS SURGERY  1-4-13    esophageal clamp (torn Esophagus)    FINGER TRIGGER RELEASE Right 2006    index finger    FOOT SURGERY Right     multiple    HERNIA REPAIR  12-31-13    abdominal x 5- last one 2017     ILEOSTOMY OR JEJUNOSTOMY  1-3-13    revision    INSERT PICC LINE  06/09/2020    and removed     JOINT REPLACEMENT Right 3/24/15    right total shoulder arthroplasty    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     LAPAROTOMY N/A 5/12/2020    EXPLORATORY LAPAROTOMY EXPLANTATION OF INFECTED MESH SMAL BOWEL RESECTION AND REVISION END ILEOSTOMY, LYSIS OF ADHESIONS performed by Elizabeth Ibarra MD at [address] [place] [address] N/A 6/8/2020    LAPAROTOMY EXPLORATORY, Rande Sanderson OF HERNIA MESH performed by Elizabeth Ibarra MD at [address] [facility] [address] N/A 9/15/2020    EXPLORATORY LAPAROTOMY WITH SMALL BOWEL RESECTION, TAKE DOWN REVISION ENTEROCUTANEOUS FISTULA , OSTOMY REVISION performed by Elizabeth Ibarra MD at [address] / leg    NASAL SINUS SURGERY      x2 /  cauterized    OTHER SURGICAL HISTORY  08/24/2016    diagnostic laparotomy with repair of intraabdominal hernia    OTHER SURGICAL HISTORY      removal plate / screws  / right great toe    PICC LINE INSERTION NURSE  9/15/2020         PORT SURGERY N/A 7/19/2022    MEDIPORT REMOVAL performed by Polo Xiong MD at [facility]  2010    right     SKIN BIOPSY  2010    3 squamous cell carcinoma right leg, left leg      History reviewed. No pertinent family history.    Social History     Socioeconomic History    Marital status:      Spouse name: Not on file    Number of children: Not on file    Years of education: Not on file    Highest education level: Not on file   Occupational History    Not on file   Tobacco Use    Smoking status: Every Day     Packs/day: 1.00     Types: Cigarettes    Smokeless tobacco: Never   Vaping Use    Vaping Use: Never used   Substance and Sexual Activity    Alcohol use: Not Currently     Comment: occasional    Drug use: No    Sexual activity: Not on file   Other Topics Concern    Not on file   Social History Narrative    Not on file     Social Determinants of Health     Financial Resource Strain: Not on file   Food Insecurity: Not on file   Transportation Needs: Not on file   Physical Activity: Not on file   Stress: Not on file   Social Connections: Not on file   Intimate Partner Violence: Not on file   Housing Stability: Not on file       Medications:   Current Facility-Administered Medications   Medication Dose Route Frequency Provider Last Rate Last Admin    0.9 % sodium chloride infusion   IntraVENous Continuous Cierra Booker  mL/hr at 07/25/22 1313 New Bag at 07/25/22 1313    sodium chloride flush 0.9 % injection 10 mL  10 mL IntraVENous 2 times per day Azalea E Awilda, DO   10 mL at 07/25/22 0900    sodium chloride flush 0.9 % injection 10 mL  10 mL IntraVENous PRN Azalea E Awilda, DO        0.9 % sodium chloride infusion   IntraVENous PRN Azalea E Awilda, DO        ondansetron (ZOFRAN-ODT) disintegrating tablet 4 mg  4 mg Oral Q8H PRN Azalea E Awilda, DO        Or    ondansetron (ZOFRAN) injection 4 mg  4 mg IntraVENous Q6H PRN Azalea E Awilda, DO   4 mg at 07/25/22 1210    senna (SENOKOT) tablet 8.6 mg  1 tablet Oral Daily PRN Azalea E Awilda, DO        acetaminophen (TYLENOL) tablet 650 mg  650 mg Oral Q6H PRN Azalea E Awilda, DO Or    acetaminophen (TYLENOL) suppository 650 mg  650 mg Rectal Q6H PRN Azalea E Awilda, DO        apixaban (ELIQUIS) tablet 5 mg  5 mg Oral BID Azalea E Awilda, DO   5 mg at 07/24/22 2028    aspirin chewable tablet 81 mg  81 mg Oral Nightly Azalea E Awilda, DO   81 mg at 07/24/22 2028    vitamin D (CHOLECALCIFEROL) tablet 2,000 Units  2,000 Units Oral BID Azalea E Awilda, DO   2,000 Units at 07/24/22 2020    diphenoxylate-atropine (LOMOTIL) 2.5-0.025 MG per tablet 1 tablet  1 tablet Oral 4x Daily AC & HS Azalea E Awilda, DO   1 tablet at 07/25/22 1733    escitalopram (LEXAPRO) tablet 20 mg  20 mg Oral Nightly Azalea E Awilda, DO   20 mg at 07/24/22 2019    ferrous sulfate (IRON 325) tablet 325 mg  325 mg Oral Nightly Azalea E Awilda, DO   325 mg at 07/24/22 2029    fluticasone (FLONASE) 50 MCG/ACT nasal spray 1 spray  1 spray Each Nostril Nightly Azalea E Awilda, DO   1 spray at 07/24/22 2019    hyoscyamine (ANASPAZ;LEVSIN) tablet 125 mcg  125 mcg Oral 4x Daily PRN Azalea E Awilda, DO        loperamide (IMODIUM) capsule 4 mg  4 mg Oral TID WC Azalea E Awilda, DO   4 mg at 07/25/22 1733    loperamide (IMODIUM) capsule 6 mg  6 mg Oral Nightly Azalea E Awilda, DO        LORazepam (ATIVAN) tablet 0.25 mg  0.25 mg Oral Daily PRN Azalea E Awilda, DO        pantoprazole (PROTONIX) tablet 40 mg  40 mg Oral QAM AC Azalea E Awilda, DO   40 mg at 07/25/22 0522    oxyCODONE-acetaminophen (PERCOCET)  MG per tablet 1 tablet  1 tablet Oral Q6H PRN Azalea E Awilda, DO   1 tablet at 07/24/22 1905    pravastatin (PRAVACHOL) tablet 40 mg  40 mg Oral Nightly Azalea E Awilda, DO   40 mg at 07/24/22 2019    primidone (MYSOLINE) tablet 250 mg  250 mg Oral Nightly Azalea Kaiser DO   250 mg at 07/24/22 2019    lactobacillus (CULTURELLE) capsule 1 capsule  1 capsule Oral Nightly Azalea Kaiser,    1 capsule at 07/24/22 2020    traZODone (DESYREL) tablet 50 mg  50 mg Oral Nightly Azalea Kaiser,    50 mg at 07/24/22 2032    vitamin B-12 (CYANOCOBALAMIN) tablet 1,000 mcg  1,000 mcg Oral Nightly Azalea Kaiser,    1,000 mcg at 07/24/22 2019    cefTRIAXone (ROCEPHIN) 1,000 mg in sterile water 10 mL IV syringe  1,000 mg IntraVENous Q24H Crissy Salmon MD   1,000 mg at 07/25/22 6357        Allergies:    Fentanyl, Levofloxacin, Tramadol, and Vioxx [rofecoxib]       Review of Systems:    Denies any chest pain, shortness of breath, headache, dyspnea, recent weight loss, fevers, chills or night sweats. Physical Examination:    BP (!) 109/40   Pulse 60   Temp 98.1 °F (36.7 °C) (Temporal)   Resp 16   Ht 5' (1.524 m)   Wt 99 lb (44.9 kg)   SpO2 100%   BMI 19.33 kg/m²      AAO x4, rationally conversant. Lawernce Roughen Speech clear  Face symmetric GI/FT  Tongue MDL  SS symm and strong  NATION-C, preserved power  Incision is well-healed. ASSESSMENT:    I personally reviewed Robert Perkins NATIVIDAD Kelley's radiographic images, particularly CT scan of the abdomen that demonstrates multilevel instrumented fusion with subtle L4 on 5 listhesis likely predating her instrumentation. MEDICAL DECISION MAKING & PLAN:    No acute neurosurgical intervention is required at this time. Patient is asymptomatic for her back pain at this juncture. No new neurosurgical recommendations at this time. Patient's surgeries were performed remotely at the [facility] clinic. Should she develop any new neurosurgical issues we be happy to see her again in hospital or in our clinic. Thank you so much for allowing us to participate in the care of this patient. Electronically signed by Laura Leo MD on 7/25/2022 at 6:21 PM       NOTE: This report was transcribed using voice recognition software.  Every effort was made to ensure accuracy; however, inadvertent computerized transcription errors may be present

## 2022-07-25 NOTE — H&P
HISTORY AND PHYSICAL             Date: 7/25/2022        Patient Name: Leora Chen     YOB: 1940      Age:  [de-identified] y.o. Patient sleeping. Chief Complaint     Chief Complaint   Patient presents with    Hypotension     Was sent in from [facility] for hypotension BP  103/46 HERE        History Obtained From   electronic medical record    History of Present Illness   Patient recently admitted to [facility] for e coli bacteremia. Sent in with leukocytosis and hypotension from nursing home.     Past Medical History     Past Medical History:   Diagnosis Date    Acute kidney failure ([facility] 2014 and 8/2016    x2,no dialysis needed,resolved, kidney function tests returned to normal    Anxiety     Arthritis     CAD (coronary artery disease)     follows with Dr. Arleen Cline every 6 months    Cancer [facility])     skin, leg,nose- removed surgically     COPD (chronic obstructive pulmonary disease) ([facility]     mild- no inhlaers, no oxygen     Depression     Fibromyalgia     chronic back and neck pain    Generalized headaches     h/o / daily    History of blood transfusion 3-11-14    multiple times    History of necrotic bowel 2012    Hyperlipidemia     Hypertension     Incisional hernia     abdomen    Insomnia     Mitral valve regurgitation     Myocardial infarct ([facility] 2002    Occasional tremors     at hands / stable with medication    Osteopenia     PONV (postoperative nausea and vomiting)     Vitamin B12 deficiency     h/o        Past Surgical History     Past Surgical History:   Procedure Laterality Date    ABDOMEN SURGERY  2-    total colectomy, bowel resection, ileostomy,revision of ileostomy     ABDOMEN SURGERY N/A 1/26/2020    DRAINAGE OF ABDOMINAL WALL ABSCESS, EXPLANTATION OF MESH, DEBRIDEMENT OF SKIN AND SUBCUTANEOUS TISSUE performed by Keara Ordonez MD at [facility] N/A 9/11/2020    EXPLANTATION OF HERNIA MESH performed by Keara Ordonez MD at [address] [place] [address]  2002 ARTHROPLASTY  1-2006    right and left thumb    BACK SURGERY  1991, 2004, 2009    lumbar fusion x 3    BACK SURGERY      cervical fusion x 2    BREAST SURGERY  years ago    monor calcifications    CARPAL TUNNEL RELEASE Bilateral     CATARACT REMOVAL WITH IMPLANT Right 03/03/15    CHOLECYSTECTOMY  5-11-07    Lap    COLONOSCOPY      CORONARY ANGIOPLASTY WITH STENT PLACEMENT  1-7-2002    ENDOSCOPY, COLON, DIAGNOSTIC      EPIGASTRIC HERNIA REPAIR  3/21/16    incisional with mesh implantation    ESOPHAGUS SURGERY  1-4-13    esophageal clamp (torn Esophagus)    FINGER TRIGGER RELEASE Right 2006    index finger    FOOT SURGERY Right     multiple    HERNIA REPAIR  12-31-13    abdominal x 5- last one 2017     ILEOSTOMY OR JEJUNOSTOMY  1-3-13    revision    INSERT PICC LINE  06/09/2020    and removed     JOINT REPLACEMENT Right 3/24/15    right total shoulder arthroplasty    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     LAPAROTOMY N/A 5/12/2020    EXPLORATORY LAPAROTOMY EXPLANTATION OF INFECTED MESH SMAL BOWEL RESECTION AND REVISION END ILEOSTOMY, LYSIS OF ADHESIONS performed by Yesica Joseph MD at [address] [facility] N/A 6/8/2020    LAPAROTOMY EXPLORATORY, Linzie Benne OF HERNIA MESH performed by Yesica Joseph MD at [facility] N/A 9/15/2020    EXPLORATORY LAPAROTOMY WITH SMALL BOWEL RESECTION, TAKE DOWN REVISION ENTEROCUTANEOUS FISTULA , OSTOMY REVISION performed by Yesica Joseph MD at [address] / leg    NASAL SINUS SURGERY      x2 /  cauterized    OTHER SURGICAL HISTORY  08/24/2016    diagnostic laparotomy with repair of intraabdominal hernia    OTHER SURGICAL HISTORY      removal plate / screws  / right great toe    PICC LINE INSERTION NURSE  9/15/2020         PORT SURGERY N/A 7/19/2022    MEDIPORT REMOVAL performed by Eunice Vincent MD at [address] [facility]  2010    right     SKIN BIOPSY  2010    3 squamous cell carcinoma right leg, left leg Medications Prior to Admission     Prior to Admission medications    Medication Sig Start Date End Date Taking? Authorizing Provider   apixaban (ELIQUIS) 5 MG TABS tablet Take 1 tablet by mouth in the morning and 1 tablet before bedtime. 7/21/22 8/20/22  Valley Handler, MD   levoFLOXacin (LEVAQUIN) 500 MG tablet Take 1 tablet by mouth every other day for 6 days 7/22/22 7/28/22  Angelika Amin MD   lipase-protease-amylase (CREON) 47517-71875 units delayed release capsule Take 12,000 Units by mouth take with snacks **SEE OTHER ORDER**    Historical Provider, MD   Multiple Vitamins-Minerals (PRESERVISION AREDS) CAPS Take 1 capsule by mouth 2 times daily    Historical Provider, MD   Magnesium Oxide (MAGNESIUM-OXIDE) 250 MG TABS tablet Take 250 mg by mouth 2 times daily    Historical Provider, MD   METAMUCIL FIBER PO Take 1 packet by mouth 2 times daily    Historical Provider, MD   diphenoxylate-atropine (LOMOTIL) 2.5-0.025 MG per tablet Take 1 tablet by mouth 4 times daily (before meals and nightly). Historical Provider, MD   loperamide (IMODIUM) 2 MG capsule Take 4 mg by mouth 3 times daily (with meals) **SEE OTHER ORDER**    Historical Provider, MD   loperamide (IMODIUM) 2 MG capsule Take 6 mg by mouth nightly **SEE OTHER ORDER**    Historical Provider, MD   amLODIPine (NORVASC) 10 MG tablet Take 10 mg by mouth nightly     Historical Provider, MD   LORazepam (ATIVAN) 0.5 MG tablet Take 0.25 mg by mouth daily as needed for Anxiety.      Historical Provider, MD   fluticasone (FLONASE) 50 MCG/ACT nasal spray 1 spray by Each Nostril route nightly     Historical Provider, MD   lipase-protease-amylase (CREON) 60443-83842 units delayed release capsule Take 24,000 Units by mouth 3 times daily (with meals) **SEE OTHER ORDER**    Historical Provider, MD   Probiotic CAPS Take 1 capsule by mouth nightly     Historical Provider, MD   hyoscyamine (ANASPAZ;LEVSIN) 125 MCG tablet Take 125 mcg by mouth 4 times daily as needed Family History   History reviewed. No pertinent family history. Review of Systems   Review of Systems   Constitutional:  Positive for activity change. HENT:  Negative for congestion. Respiratory:  Negative for shortness of breath. Cardiovascular:  Negative for chest pain. Gastrointestinal:  Positive for abdominal pain. Neurological:  Negative for dizziness. Physical Exam   BP (!) 96/50   Pulse 53   Temp 97.8 °F (36.6 °C) (Temporal)   Resp 16   Ht 5' (1.524 m)   Wt 99 lb (44.9 kg)   SpO2 97%   BMI 19.33 kg/m²     Physical Exam  Vitals reviewed. HENT:      Mouth/Throat:      Mouth: Mucous membranes are moist.   Eyes:      Pupils: Pupils are equal, round, and reactive to light. Cardiovascular:      Rate and Rhythm: Normal rate and regular rhythm. Pulses: Normal pulses. Heart sounds: Normal heart sounds. Pulmonary:      Effort: Pulmonary effort is normal. No respiratory distress. Breath sounds: Normal breath sounds. No wheezing. Abdominal:      General: Abdomen is flat. Bowel sounds are normal.      Tenderness: There is no abdominal tenderness. Skin:     General: Skin is warm and dry. Neurological:      Mental Status: Mental status is at baseline.        Labs      Recent Results (from the past 24 hour(s))   CBC auto differential    Collection Time: 07/24/22  9:43 AM   Result Value Ref Range    WBC 14.3 (H) 4.5 - 11.5 E9/L    RBC 2.81 (L) 3.50 - 5.50 E12/L    Hemoglobin 8.5 (L) 11.5 - 15.5 g/dL    Hematocrit 26.1 (L) 34.0 - 48.0 %    MCV 92.9 80.0 - 99.9 fL    MCH 30.2 26.0 - 35.0 pg    MCHC 32.6 32.0 - 34.5 %    RDW 14.5 11.5 - 15.0 fL    Platelets 634 643 - 174 E9/L    MPV 13.7 (H) 7.0 - 12.0 fL    Neutrophils % 91.3 (H) 43.0 - 80.0 %    Lymphocytes % 2.6 (L) 20.0 - 42.0 %    Monocytes % 4.3 2.0 - 12.0 %    Eosinophils % 0.9 0.0 - 6.0 %    Basophils % 0.2 0.0 - 2.0 %    Neutrophils Absolute 13.16 (H) 1.80 - 7.30 E9/L    Lymphocytes Absolute 0.43 (L) 1.50 - Glucose, Ur Negative Negative mg/dL    Bilirubin Urine Negative Negative    Ketones, Urine Negative Negative mg/dL    Specific Gravity, UA 1.020 1.005 - 1.030    Blood, Urine Negative Negative    pH, UA 5.5 5.0 - 9.0    Protein, UA 30 (A) Negative mg/dL    Urobilinogen, Urine 0.2 <2.0 E.U./dL    Nitrite, Urine Negative Negative    Leukocyte Esterase, Urine Negative Negative   Troponin    Collection Time: 07/24/22 11:19 AM   Result Value Ref Range    Troponin, High Sensitivity 185 (H) 0 - 9 ng/L   Microscopic Urinalysis    Collection Time: 07/24/22 11:19 AM   Result Value Ref Range    WBC, UA NONE 0 - 5 /HPF    RBC, UA NONE 0 - 2 /HPF    Renal Epithelial, UA FEW /HPF    Bacteria, UA NONE SEEN None Seen /HPF   Cortisol Total    Collection Time: 07/24/22 11:19 AM   Result Value Ref Range    Cortisol 16.19 2.68 - 18.40 mcg/dL   Lactate, Sepsis    Collection Time: 07/24/22  5:00 PM   Result Value Ref Range    Lactic Acid, Sepsis 0.7 0.5 - 1.9 mmol/L   Comprehensive Metabolic Panel w/ Reflex to MG    Collection Time: 07/25/22  5:30 AM   Result Value Ref Range    Sodium 141 132 - 146 mmol/L    Potassium reflex Magnesium 3.9 3.5 - 5.0 mmol/L    Chloride 108 (H) 98 - 107 mmol/L    CO2 20 (L) 22 - 29 mmol/L    Anion Gap 13 7 - 16 mmol/L    Glucose 66 (L) 74 - 99 mg/dL    BUN 56 (H) 6 - 23 mg/dL    Creatinine 1.4 (H) 0.5 - 1.0 mg/dL    GFR Non-African American 36 >=60 mL/min/1.73    GFR African American 44     Calcium 6.8 (L) 8.6 - 10.2 mg/dL    Total Protein 4.7 (L) 6.4 - 8.3 g/dL    Albumin 1.8 (L) 3.5 - 5.2 g/dL    Total Bilirubin 0.7 0.0 - 1.2 mg/dL    Alkaline Phosphatase 210 (H) 35 - 104 U/L    ALT 14 0 - 32 U/L    AST 14 0 - 31 U/L   CBC auto differential    Collection Time: 07/25/22  5:30 AM   Result Value Ref Range    WBC 9.9 4.5 - 11.5 E9/L    RBC 2.64 (L) 3.50 - 5.50 E12/L    Hemoglobin 8.1 (L) 11.5 - 15.5 g/dL    Hematocrit 25.7 (L) 34.0 - 48.0 %    MCV 97.3 80.0 - 99.9 fL    MCH 30.7 26.0 - 35.0 pg    MCHC 31.5 (L) 32.0 - 34.5 %    RDW 14.6 11.5 - 15.0 fL    Platelets 036 347 - 939 E9/L    MPV 13.9 (H) 7.0 - 12.0 fL    Neutrophils % 81.6 (H) 43.0 - 80.0 %    Immature Granulocytes % 1.2 0.0 - 5.0 %    Lymphocytes % 9.2 (L) 20.0 - 42.0 %    Monocytes % 6.0 2.0 - 12.0 %    Eosinophils % 1.7 0.0 - 6.0 %    Basophils % 0.3 0.0 - 2.0 %    Neutrophils Absolute 8.07 (H) 1.80 - 7.30 E9/L    Immature Granulocytes # 0.12 E9/L    Lymphocytes Absolute 0.91 (L) 1.50 - 4.00 E9/L    Monocytes Absolute 0.59 0.10 - 0.95 E9/L    Eosinophils Absolute 0.17 0.05 - 0.50 E9/L    Basophils Absolute 0.03 0.00 - 0.20 E9/L        Imaging/Diagnostics Last 24 Hours   CT ABDOMEN PELVIS WO CONTRAST Additional Contrast? None    Result Date: 7/24/2022  EXAMINATION: CT OF THE CHEST WITHOUT CONTRAST; CT OF THE ABDOMEN AND PELVIS WITHOUT CONTRAST 7/24/2022 12:55 pm TECHNIQUE: CT of the chest was performed without the administration of intravenous contrast. Multiplanar reformatted images are provided for review. Automated exposure control, iterative reconstruction, and/or weight based adjustment of the mA/kV was utilized to reduce the radiation dose to as low as reasonably achievable.; CT of the abdomen and pelvis was performed without the administration of intravenous contrast. Multiplanar reformatted images are provided for review. Automated exposure control, iterative reconstruction, and/or weight based adjustment of the mA/kV was utilized to reduce the radiation dose to as low as reasonably achievable. COMPARISON: 07/16/2022 HISTORY: ORDERING SYSTEM PROVIDED HISTORY: rule out infection TECHNOLOGIST PROVIDED HISTORY: Reason for exam:->rule out infection Decision Support Exception - unselect if not a suspected or confirmed emergency medical condition->Emergency Medical Condition (MA) What reading provider will be dictating this exam?->CRC FINDINGS: CT chest. There is borderline cardiac size with diffuse coronary artery calcification.  The great vessels are normal.  There is prominent pulmonary artery concerning for pulmonary artery hypertension. Moderately enlarged mediastinal lymph nodes are present. There is centrilobular emphysema with progressive infiltrates/atelectasis and pleural effusion in the lung bases concerning for pneumonia or edema. Degenerative changes are identified in the thoracolumbar spine with previous surgical changes CT abdomen and pelvis. Comparison 01/21/2021 Findings The examination is very limited due to lack of contrast.  The liver is heterogeneous in appearance concerning for liver disease. Gallbladder is absent. Stomach is collapsed with a G-tube. Spleen appears normal. 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 There is poor delineation of the pancreas with suggestion of a 1.8 cm cystic lesion in the head of the pancreas which is indeterminate. There is a small amount of ascites with fluid surrounding the liver and paracolic gutters. Adrenals and the kidneys are normal with 2.2 cm cystic lesion in the left kidney. Extensive postoperative changes are identified in the lumbosacral spine with irregularity and fragmentation at the surgical site and persistent grade 2 spondylolisthesis at L4-5. Clinical assessment is recommended. There is calcification aorta. Pelvis. Bladder is distended. There is small amount of ascites. 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 Somewhat distended small bowel loops with the mural thickening is identified. There is anasarca. Centrilobular emphysema with progressive patchy infiltrates/atelectasis and pleural effusion in the lung bases concerning for pneumonia or edema. Coronary artery calcification. Suboptimal evaluation abdomen pelvis. 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  Liver disease with small amount of ascites. Question indeterminate cystic lesion in the pancreas. Consider surveillance. Significant irregularity and the postsurgical changes in the lumbosacral spine. See above. CT CHEST WO CONTRAST    Result Date: 7/24/2022  EXAMINATION: CT OF THE CHEST WITHOUT CONTRAST; CT OF THE ABDOMEN AND PELVIS WITHOUT CONTRAST 7/24/2022 12:55 pm TECHNIQUE: CT of the chest was performed without the administration of intravenous contrast. Multiplanar reformatted images are provided for review. Automated exposure control, iterative reconstruction, and/or weight based adjustment of the mA/kV was utilized to reduce the radiation dose to as low as reasonably achievable.; CT of the abdomen and pelvis was performed without the administration of intravenous contrast. Multiplanar reformatted images are provided for review. Automated exposure control, iterative reconstruction, and/or weight based adjustment of the mA/kV was utilized to reduce the radiation dose to as low as reasonably achievable. COMPARISON: 07/16/2022 HISTORY: ORDERING SYSTEM PROVIDED HISTORY: rule out infection TECHNOLOGIST PROVIDED HISTORY: Reason for exam:->rule out infection Decision Support Exception - unselect if not a suspected or confirmed emergency medical condition->Emergency Medical Condition (MA) What reading provider will be dictating this exam?->CRC FINDINGS: CT chest. There is borderline cardiac size with diffuse coronary artery calcification. The great vessels are normal.  There is prominent pulmonary artery concerning for pulmonary artery hypertension. Moderately enlarged mediastinal lymph nodes are present. There is centrilobular emphysema with progressive infiltrates/atelectasis and pleural effusion in the lung bases concerning for pneumonia or edema. Degenerative changes are identified in the thoracolumbar spine with previous surgical changes CT abdomen and pelvis.  Comparison 01/21/2021 Findings The examination is very limited due to lack of contrast.  The liver is heterogeneous in appearance concerning for liver disease. Gallbladder is absent. Stomach is collapsed with a G-tube. Spleen appears normal. 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 There is poor delineation of the pancreas with suggestion of a 1.8 cm cystic lesion in the head of the pancreas which is indeterminate. There is a small amount of ascites with fluid surrounding the liver and paracolic gutters. Adrenals and the kidneys are normal with 2.2 cm cystic lesion in the left kidney. Extensive postoperative changes are identified in the lumbosacral spine with irregularity and fragmentation at the surgical site and persistent grade 2 spondylolisthesis at L4-5. Clinical assessment is recommended. There is calcification aorta. Pelvis. Bladder is distended. There is small amount of ascites. 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 Somewhat distended small bowel loops with the mural thickening is identified. There is anasarca. Centrilobular emphysema with progressive patchy infiltrates/atelectasis and pleural effusion in the lung bases concerning for pneumonia or edema. Coronary artery calcification. Suboptimal evaluation abdomen pelvis. 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 Liver disease with small amount of ascites. Question indeterminate cystic lesion in the pancreas. Consider surveillance. Significant irregularity and the postsurgical changes in the lumbosacral spine. See above.      XR CHEST PORTABLE    Result Date: 7/24/2022  EXAMINATION: ONE XRAY VIEW OF THE CHEST 7/24/2022 9:54 am COMPARISON: 07/15/2022 HISTORY: ORDERING SYSTEM PROVIDED HISTORY: sepsis, r/o pneumonia TECHNOLOGIST PROVIDED HISTORY: Reason for exam:->sepsis, r/o pneumonia What reading provider will be dictating this exam?->CRC FINDINGS: Portable chest reveal cardiac and mediastinal silhouettes within normal limits. Underlying chronic changes seen within the lung fields bilaterally. No focal parenchymal opacification present. No pleural effusion or pneumothorax. Vascular calcifications seen within the thoracic aorta with hardware identified at the thoracolumbar spine. Interval removal the right noa catheter. Stable chronic changes seen within the lung fields with no evidence of acute parenchymal disease. Noa catheter is been removed in the interval on the right. Assessment      Hospital Problems             Last Modified POA    * (Principal) Enteritis 7/24/2022 Yes    Hypotension 7/24/2022 Yes   E coli bacteremia   Short gut syndrome   Pancreatic mass  Lumbar DJD  CAD  HTN  Hyperlipidemia    Plan   ID following, on rocephin  Neurosurgery to evaluate lumbar issues. No plans yet from surgery regarding pancreatic mass. Monitor labs and exam.  Continue rest of meds.     Consultations Ordered:  IP CONSULT TO INTERNAL MEDICINE  IP CONSULT TO SOCIAL WORK  IP CONSULT TO INFECTIOUS DISEASES  IP CONSULT TO GENERAL SURGERY  IP CONSULT TO NEUROSURGERY    Electronically signed by Clarence Joyner MD on 7/25/22 at 7:03 AM EDT

## 2022-07-25 NOTE — PLAN OF CARE
Problem: Discharge Planning  Goal: Discharge to home or other facility with appropriate resources  7/25/2022 0245 by Kylie Serna RN  Outcome: Progressing  7/24/2022 1819 by Delroy Caal RN  Outcome: Progressing     Problem: Pain  Goal: Verbalizes/displays adequate comfort level or baseline comfort level  7/25/2022 0245 by Kylie Serna RN  Outcome: Progressing  7/24/2022 1819 by Delroy Caal RN  Outcome: Progressing     Problem: Skin/Tissue Integrity  Goal: Absence of new skin breakdown  Description: 1. Monitor for areas of redness and/or skin breakdown  2. Assess vascular access sites hourly  3. Every 4-6 hours minimum:  Change oxygen saturation probe site  4. Every 4-6 hours:  If on nasal continuous positive airway pressure, respiratory therapy assess nares and determine need for appliance change or resting period.   7/25/2022 0245 by Kylie Serna RN  Outcome: Progressing  7/24/2022 1819 by Delroy Caal RN  Outcome: Progressing     Problem: Chronic Conditions and Co-morbidities  Goal: Patient's chronic conditions and co-morbidity symptoms are monitored and maintained or improved  7/25/2022 0245 by Kylie Serna RN  Outcome: Progressing  7/24/2022 1819 by Delroy Caal RN  Outcome: Progressing     Problem: Safety - Adult  Goal: Free from fall injury  Outcome: Progressing

## 2022-07-25 NOTE — PROGRESS NOTES
Occupational Therapy  OT SESSION ATTEMPT     Date:2022  Patient Name: Juice Lambert  MRN: [id number]  : 1940  Room: [address] [place] [address]     Attempted OT session this date:    [x] unavailable due to other medical staff currently with pt,    [] on hold, await MRI/ neurosurgical recommendations. [] on hold per nursing staff secondary to lab / radiology results    [] declined Occupational Therapy  this date due to ___. Benefits of participation in therapy reviewed with pt.    [] off unit   [] Other:     Will reattempt OT eval at a later time.     [place], [place] [address]

## 2022-07-25 NOTE — PROGRESS NOTES
GENERAL SURGERY  DAILY PROGRESS NOTE  7/25/2022    CHIEF COMPLAINT:  Chief Complaint   Patient presents with    Hypotension     Was sent in from [place] for hypotension BP  103/46 HERE       SUBJECTIVE:  No events overnight. Patient denies complaints, pain. Denies history of recent weight loss or recent onset of back pain. OBJECTIVE:  BP (!) 96/50   Pulse 53   Temp 97.8 °F (36.6 °C) (Temporal)   Resp 16   Ht 5' (1.524 m)   Wt 99 lb (44.9 kg)   SpO2 97%   BMI 19.33 kg/m²     GENERAL:  NAD. A&Ox3. LUNGS:  No increased work of breathing. CARDIOVASCULAR: RR  ABDOMEN:  Soft, non-distended, non-tender. No guarding, rigidity, rebound.     ASSESSMENT/PLAN:  [de-identified] y.o. female with leukocytosis and hypotension, recent E. coli sepsis being evaluated by general surgery for incidental pancreatic lesion on noncontrast CT.    NPO  MRCP for evaluation of pancreatic lesion    Discussed with Dr. Phani Alvarado, DO  Surgery Resident PGY-1  7/25/2022  6:12 AM

## 2022-07-25 NOTE — PROGRESS NOTES
Department of Internal Medicine  Infectious Diseases  Progress  Note    C/C :  E coli bacteremia       Denies fever, chills, abdomen pain   Afebrile  Low blood pressure, responding to IV fluid   Afebrile      Current Facility-Administered Medications   Medication Dose Route Frequency Provider Last Rate Last Admin    0.9 % sodium chloride infusion   IntraVENous Continuous Leo Del Castillo  mL/hr at 07/25/22 1313 New Bag at 07/25/22 1313    sodium chloride flush 0.9 % injection 10 mL  10 mL IntraVENous 2 times per day Azalea E Awilda, DO   10 mL at 07/25/22 0900    sodium chloride flush 0.9 % injection 10 mL  10 mL IntraVENous PRN Azalea E Awilda, DO        0.9 % sodium chloride infusion   IntraVENous PRN Azalea E Awilda, DO        ondansetron (ZOFRAN-ODT) disintegrating tablet 4 mg  4 mg Oral Q8H PRN Azalea E Awilda, DO        Or    ondansetron (ZOFRAN) injection 4 mg  4 mg IntraVENous Q6H PRN Azalea E Awilda, DO   4 mg at 07/25/22 1210    senna (SENOKOT) tablet 8.6 mg  1 tablet Oral Daily PRN Azalea E Awilda, DO        acetaminophen (TYLENOL) tablet 650 mg  650 mg Oral Q6H PRN Azalea E Awilda, DO        Or    acetaminophen (TYLENOL) suppository 650 mg  650 mg Rectal Q6H PRN Azalea E Awilda, DO        apixaban (ELIQUIS) tablet 5 mg  5 mg Oral BID Azalea E Awilda, DO   5 mg at 07/24/22 2028    aspirin chewable tablet 81 mg  81 mg Oral Nightly Azalea E Awilda, DO   81 mg at 07/24/22 2028    vitamin D (CHOLECALCIFEROL) tablet 2,000 Units  2,000 Units Oral BID Azalea E Awilda, DO   2,000 Units at 07/24/22 2020    diphenoxylate-atropine (LOMOTIL) 2.5-0.025 MG per tablet 1 tablet  1 tablet Oral 4x Daily AC & HS Azalea E Awilda, DO   1 tablet at 07/25/22 0646    escitalopram (LEXAPRO) tablet 20 mg  20 mg Oral Nightly Azalea E Awilda, DO   20 mg at 07/24/22 2019    ferrous sulfate (IRON 325) tablet 325 mg  325 mg Oral Nightly Azalea Kaiser DO   325 mg at 07/24/22 2029    fluticasone (FLONASE) 50 MCG/ACT nasal spray 1 spray  1 spray Each Nostril Nightly Azalea E Awilda, DO   1 spray at 07/24/22 2019    hyoscyamine (ANASPAZ;LEVSIN) tablet 125 mcg  125 mcg Oral 4x Daily PRN Azalea E Awilda, DO        loperamide (IMODIUM) capsule 4 mg  4 mg Oral TID WC Azalea E Awilda, DO   4 mg at 07/24/22 2019    loperamide (IMODIUM) capsule 6 mg  6 mg Oral Nightly Azalea E Awilda, DO        LORazepam (ATIVAN) tablet 0.25 mg  0.25 mg Oral Daily PRN Azalea E Awilda, DO        pantoprazole (PROTONIX) tablet 40 mg  40 mg Oral QAM AC Azalea E Awilda, DO   40 mg at 07/25/22 0522    oxyCODONE-acetaminophen (PERCOCET)  MG per tablet 1 tablet  1 tablet Oral Q6H PRN Azalea E Awilda, DO   1 tablet at 07/24/22 1905    pravastatin (PRAVACHOL) tablet 40 mg  40 mg Oral Nightly Azalea E Awilda, DO   40 mg at 07/24/22 2019    primidone (MYSOLINE) tablet 250 mg  250 mg Oral Nightly Azalea E Awilda, DO   250 mg at 07/24/22 2019    lactobacillus (CULTURELLE) capsule 1 capsule  1 capsule Oral Nightly Azalea E Awilda, DO   1 capsule at 07/24/22 2020    traZODone (DESYREL) tablet 50 mg  50 mg Oral Nightly Azalea E Awilda, DO   50 mg at 07/24/22 2032    vitamin B-12 (CYANOCOBALAMIN) tablet 1,000 mcg  1,000 mcg Oral Nightly Azalea E Awilda, DO   1,000 mcg at 07/24/22 2019    cefTRIAXone (ROCEPHIN) 1,000 mg in sterile water 10 mL IV syringe  1,000 mg IntraVENous Q24H Birdie Salmon MD   1,000 mg at 07/25/22 4625         REVIEW OF SYSTEMS:    CONSTITUTIONAL:  Denies fever, chill or rigors. Reported weakness   HEENT: denies blurring of vision or double vision, denies hearing problem  RESPIRATORY: denies cough, shortness of breath, sputum expectoration, chest pain. CARDIOVASCULAR:  Denies palpitation  GASTROINTESTINAL:  diarrhea . dehydration   GENITOURINARY:  Denies burning urination or frequency of urination  INTEGUMENT: denies wound , rash  HEMATOLOGIC/LYMPHATIC:  Denies lymph node swelling, gum bleeding or easy bruising. MUSCULOSKELETAL:  Denies leg pain , joint pain , joint swelling  NEUROLOGICAL:  Denies light headed, dizziness    PHYSICAL EXAM:      Vitals:     BP (!) 106/38   Pulse 55   Temp 97.9 °F (36.6 °C) (Oral)   Resp 16   Ht 5' (1.524 m)   Wt 99 lb (44.9 kg)   SpO2 96%   BMI 19.33 kg/m²     General Appearance:    Awake, alert , no acute distress. Head:    Normocephalic, atraumatic   Eyes:    No pallor, no icterus,   Ears:    No obvious deformity or drainage.    Nose:   No nasal drainage   Throat:   Mucosa dry    Neck:   Supple, no lymphadenopathy   Back:     no CVA tenderness   Lungs:     Clear to auscultation bilaterally    Heart:    Regular rate and rhythm   Abdomen:     Soft, non-tender, bowel sounds present    PEG tube - OK, ileostomy functioning    Extremities:   No edema, no cyanosis    Pulses:   Dorsalis pedis palpable    Skin:   no rashes or lesions   CBC with Differential:      Lab Results   Component Value Date/Time    WBC 9.9 07/25/2022 05:30 AM    RBC 2.64 07/25/2022 05:30 AM    HGB 8.1 07/25/2022 05:30 AM    HCT 25.7 07/25/2022 05:30 AM     07/25/2022 05:30 AM    MCV 97.3 07/25/2022 05:30 AM    MCH 30.7 07/25/2022 05:30 AM    MCHC 31.5 07/25/2022 05:30 AM    RDW 14.6 07/25/2022 05:30 AM    NRBC 0.0 07/21/2022 06:18 AM    LYMPHOPCT 9.2 07/25/2022 05:30 AM    MONOPCT 6.0 07/25/2022 05:30 AM    MYELOPCT 0.9 07/24/2022 09:43 AM    BASOPCT 0.3 07/25/2022 05:30 AM    MONOSABS 0.59 07/25/2022 05:30 AM    LYMPHSABS 0.91 07/25/2022 05:30 AM    EOSABS 0.17 07/25/2022 05:30 AM    BASOSABS 0.03 07/25/2022 05:30 AM       CMP     Lab Results   Component Value Date/Time     07/25/2022 05:30 AM    K 3.9 07/25/2022 05:30 AM     07/25/2022 05:30 AM    CO2 20 07/25/2022 05:30 AM    BUN 56 07/25/2022 05:30 AM    CREATININE 1.4 07/25/2022 05:30 AM    GFRAA 44 07/25/2022 05:30 AM    LABGLOM 36 07/25/2022 05:30 AM    GLUCOSE 66 07/25/2022 05:30 AM    PROT 4.7 07/25/2022 05:30 AM    LABALBU 1.8 07/25/2022 05:30 AM    CALCIUM 6.8 07/25/2022 05:30 AM    BILITOT 0.7 07/25/2022 05:30 AM    ALKPHOS 210 07/25/2022 05:30 AM    AST 14 07/25/2022 05:30 AM    ALT 14 07/25/2022 05:30 AM         Hepatic Function Panel:    Lab Results   Component Value Date/Time    ALKPHOS 210 07/25/2022 05:30 AM    ALT 14 07/25/2022 05:30 AM    AST 14 07/25/2022 05:30 AM    PROT 4.7 07/25/2022 05:30 AM    BILITOT 0.7 07/25/2022 05:30 AM    BILIDIR <0.2 01/21/2021 11:55 AM    IBILI see below 01/21/2021 11:55 AM    LABALBU 1.8 07/25/2022 05:30 AM       PT/INR:    Lab Results   Component Value Date/Time    PROTIME 19.0 07/24/2022 09:43 AM    INR 1.7 07/24/2022 09:43 AM       TSH:    Lab Results   Component Value Date/Time    TSH 1.380 07/01/2022 02:31 PM       U/A:    Lab Results   Component Value Date/Time    COLORU Yellow 07/24/2022 11:19 AM    PHUR 5.5 07/24/2022 11:19 AM    LABCAST FEW 01/25/2020 02:45 PM    WBCUA NONE 07/24/2022 11:19 AM    RBCUA NONE 07/24/2022 11:19 AM    YEAST Present 09/18/2020 06:50 AM    BACTERIA NONE SEEN 07/24/2022 11:19 AM    CLARITYU Clear 07/24/2022 11:19 AM    SPECGRAV 1.020 07/24/2022 11:19 AM    LEUKOCYTESUR Negative 07/24/2022 11:19 AM    UROBILINOGEN 0.2 07/24/2022 11:19 AM    BILIRUBINUR Negative 07/24/2022 11:19 AM    BLOODU Negative 07/24/2022 11:19 AM    GLUCOSEU Negative 07/24/2022 11:19 AM       ABG:  No results found for: ZNE6GBT, BEART, U8FESEEQ, PHART, THGBART, KSM4VXN, PO2ART, PSA5VTC    MICROBIOLOGY:    Blood culture -    Susceptibility      Escherichia coli (1)    Antibiotic Interpretation Microscan  Method Status    amoxicillin-clavulanate Intermediate ^16 mcg/mL BACTERIAL SUSCEPTIBILITY PANEL BY JAMAAL     ceFAZolin Sensitive <=^4 mcg/mL BACTERIAL SUSCEPTIBILITY PANEL BY JAMAAL     cefepime Sensitive <=^0.12 mcg/mL BACTERIAL SUSCEPTIBILITY PANEL BY JAMAAL     cefotaxime Sensitive <=^0.25 mcg/mL BACTERIAL SUSCEPTIBILITY PANEL BY JAMAAL     cefOXitin Sensitive <=^4 mcg/mL BACTERIAL SUSCEPTIBILITY PANEL BY JAMAAL     cefTAZidime-avibactam Sensitive <=^0.12 mcg/mL BACTERIAL SUSCEPTIBILITY PANEL BY JAMAAL     gentamicin Sensitive <=^1 mcg/mL BACTERIAL SUSCEPTIBILITY PANEL BY JAMAAL     levofloxacin Sensitive <=^0.12 mcg/mL BACTERIAL SUSCEPTIBILITY PANEL BY JAMAAL     meropenem Sensitive <=^0.25 mcg/mL BACTERIAL SUSCEPTIBILITY PANEL BY JAMAAL     piperacillin-tazobactam Sensitive <=^4 mcg/mL BACTERIAL SUSCEPTIBILITY PANEL BY JAMAAL     trimethoprim-sulfamethoxazole Resistant >=^320 mcg/mL BACTERIAL SUSCEPTIBILITY              Urine Culture -    Sputum Culture -    Wound Culture -        Radiology :    CT abdomen and pelvis -  Centrilobular emphysema with progressive patchy infiltrates/atelectasis and   pleural effusion in the lung bases concerning for pneumonia or edema. Coronary artery calcification. Suboptimal evaluation abdomen pelvis. 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 Liver disease with small amount of ascites. Question indeterminate cystic lesion in the pancreas. Consider surveillance. Significant irregularity and the postsurgical changes in the lumbosacral   spine. See above.        IMPRESSION:     Short gut syndrome,  Recent E coli bacteremia  Leukocytosis - improved   Hypovolemia     RECOMMENDATIONS:    IV rocephin 1 gram q 24 hrs  ( doubtful of oral absorption )   IV hydration

## 2022-07-25 NOTE — CARE COORDINATION
Met with the patient at bedside to discuss transition of care plans. Patient admitted from [address] [place] [address], where she was for rehab. Patient would like to return to finish rehab, if appropriate when she is medically stable to do so. Patient is ordered an MRCP today for evaluation of her pancreatic lesion and is NPO. BP low today as well and IV fluids started. Spoke with Mckenna Gracia, liaison with [facility] and discussed transition of care. Patient is not a bed hold but can return if they have an appropriate bed when the patient is medically stable to do so. They will need therapy notes and therapy evaluations are ordered, no precert will be needed. Patient will need a negative COVID the day of discharge. Envelope and transfer paperwork in the soft chart. Will continue to follow.      Pepper Valencia RN.  Allison Howell  [phone]

## 2022-07-25 NOTE — CONSULTS
[address]                  [address] [place],  [facility]                                  CONSULTATION    PATIENT NAME: Eileen Loya              :        1940  MED REC NO:   [id number]                            ROOM:       93  ACCOUNT NO:   [de-identified]                           ADMIT DATE: 2022  PROVIDER:     Arlet Wilson MD    CONSULT DATE:  2022    NEUROSURGERY CONSULT    REASON FOR CONSULTATION:  Abnormal lumbar CT. HISTORY OF PRESENT ILLNESS:  The patient is an 82-year-old lady who has  had prior lumbar fusion over 10 years ago in [place]. She presented to  the hospital.  Part of her workup included CT scan of her abdomen and  pelvis with sagittal and coronal recon of the lumbar spine that showed  postoperative changes, as a result of that Neurosurgery Service was  consulted. She denies any new back pain or any new numbness or  tingling. She states that she does have some generalized weakness. She  denies any loss of control of bowel or bladder function. PAST MEDICAL HISTORY:  Positive for acute kidney failure, anxiety,  arthritis, coronary artery disease, cancer, COPD, depression,  fibromyalgia, generalized headaches, hyperlipidemia, hypertension,  insomnia, mitral regurgitation, osteopenia and B12 deficiency. PAST SURGICAL HISTORY:  Positive for abdominal surgery with ileostomy,  lumbar fusion, cervical fusion, appendectomy, multiple laparotomies. FAMILY HISTORY:  Positive for lung cancer in her father. SOCIAL HISTORY:  Positive for tobacco use and she does not consume any  alcoholic beverages. ALLERGIES:  Include TRAMADOL, VIOXX and FENTANYL PATCH. MEDICATIONS:  Include Eliquis. REVIEW OF SYSTEMS:  HEENT:  Negative for headache, double vision, or  blurry vision. CARDIOVASCULAR:  Negative for chest pain, arrhythmia, or  palpitations. RESPIRATORY:  Positive for some shortness of breath. GASTROINTESTINAL:  Negative for current nausea or vomiting. GENITOURINARY:  Negative for hematuria or dysuria. HEMATOLOGIC:   Positive for easy bruising. INFECTIOUS:  Negative for any recent  infection. MUSCULOSKELETAL:  Positive for joint stiffness and swelling. PSYCHIATRIC:  Positive for anxiety. NEUROLOGIC:  Negative for seizure  or stroke. ENDOCRINE:  Negative for thyroid disorder or diabetes. PHYSICAL EXAMINATION:  VITAL SIGNS:  She is currently afebrile with a T-current of 36.7 degrees  Celsius, pulse 60, blood pressure is 109/40, respiratory rate is 16. GENERAL:  She is resting in bed. Does not appear to be in any acute  distress, appears her stated age. HEENT:  Her head is normocephalic and atraumatic. Pupils are 4 to 3 mm  and reactive. She has no drainage out of her eyes, ears, nose or  throat. SKIN:  Warm and dry. MUSCULOSKELETAL:  She has got good range of motion of bilateral upper  and lower extremities. ABDOMEN:  Soft, nontender, nondistended. RESPIRATORY:  She is not using any accessory muscles of respiration. NEUROLOGIC:  Rest of her neurologic exam, she is awake, alert, oriented  x3. Cranial nerves II through XII are intact bilaterally. Motor exam  reveals 4+/5 strength in her bilateral upper and lower extremities. Sensation is grossly intact to light touch. Reflexes are 1+ and  symmetric. Toes are going down. LABORATORY VALUES:  She has sodium of 141, potassium 3.9, BUN is 56,  creatinine 1.4. REVIEW OF IMAGING:  She has a CT scan of her abdomen and pelvis with  sagittal and coronal reconstitutions that shows a grade 2  spondylolisthesis at L4-L5 with postoperative changes. ASSESSMENT AND PLAN:  The patient is an [de-identified] lady status post  lumbar fusion in [place] with chronic findings of grade 2  spondylolisthesis at L4-L5. At this time, no intervention is required  and there are no acute findings.         Tk Willoughby MD    D: 07/25/2022 15:44:22       T: 07/25/2022 15:46:49     [email or web address]  Job#: [id number]     Doc#: [id number]    CC:

## 2022-07-26 LAB
ALBUMIN SERPL-MCNC: 1.6 G/DL (ref 3.5–5.2)
ALP BLD-CCNC: 204 U/L (ref 35–104)
ALT SERPL-CCNC: 12 U/L (ref 0–32)
ANION GAP SERPL CALCULATED.3IONS-SCNC: 7 MMOL/L (ref 7–16)
AST SERPL-CCNC: 12 U/L (ref 0–31)
BASOPHILS ABSOLUTE: 0.04 E9/L (ref 0–0.2)
BASOPHILS RELATIVE PERCENT: 0.6 % (ref 0–2)
BILIRUB SERPL-MCNC: 0.5 MG/DL (ref 0–1.2)
BUN BLDV-MCNC: 48 MG/DL (ref 6–23)
CALCIUM SERPL-MCNC: 6.7 MG/DL (ref 8.6–10.2)
CHLORIDE BLD-SCNC: 105 MMOL/L (ref 98–107)
CO2: 21 MMOL/L (ref 22–29)
CREAT SERPL-MCNC: 1.5 MG/DL (ref 0.5–1)
EOSINOPHILS ABSOLUTE: 0.12 E9/L (ref 0.05–0.5)
EOSINOPHILS RELATIVE PERCENT: 1.7 % (ref 0–6)
GFR AFRICAN AMERICAN: 40
GFR NON-AFRICAN AMERICAN: 33 ML/MIN/1.73
GLUCOSE BLD-MCNC: 109 MG/DL (ref 74–99)
HCT VFR BLD CALC: 24.5 % (ref 34–48)
HEMOGLOBIN: 7.5 G/DL (ref 11.5–15.5)
IMMATURE GRANULOCYTES #: 0.05 E9/L
IMMATURE GRANULOCYTES %: 0.7 % (ref 0–5)
LYMPHOCYTES ABSOLUTE: 1.13 E9/L (ref 1.5–4)
LYMPHOCYTES RELATIVE PERCENT: 15.6 % (ref 20–42)
MCH RBC QN AUTO: 30.1 PG (ref 26–35)
MCHC RBC AUTO-ENTMCNC: 30.6 % (ref 32–34.5)
MCV RBC AUTO: 98.4 FL (ref 80–99.9)
MONOCYTES ABSOLUTE: 0.69 E9/L (ref 0.1–0.95)
MONOCYTES RELATIVE PERCENT: 9.5 % (ref 2–12)
NEUTROPHILS ABSOLUTE: 5.2 E9/L (ref 1.8–7.3)
NEUTROPHILS RELATIVE PERCENT: 71.9 % (ref 43–80)
PDW BLD-RTO: 14.6 FL (ref 11.5–15)
PLATELET # BLD: 273 E9/L (ref 130–450)
PMV BLD AUTO: 12.7 FL (ref 7–12)
POTASSIUM REFLEX MAGNESIUM: 3.7 MMOL/L (ref 3.5–5)
RBC # BLD: 2.49 E12/L (ref 3.5–5.5)
SODIUM BLD-SCNC: 133 MMOL/L (ref 132–146)
TOTAL PROTEIN: 4.8 G/DL (ref 6.4–8.3)
URINE CULTURE, ROUTINE: NORMAL
WBC # BLD: 7.2 E9/L (ref 4.5–11.5)

## 2022-07-26 PROCEDURE — 97535 SELF CARE MNGMENT TRAINING: CPT

## 2022-07-26 PROCEDURE — 6370000000 HC RX 637 (ALT 250 FOR IP): Performed by: INTERNAL MEDICINE

## 2022-07-26 PROCEDURE — 97161 PT EVAL LOW COMPLEX 20 MIN: CPT

## 2022-07-26 PROCEDURE — 2580000003 HC RX 258: Performed by: INTERNAL MEDICINE

## 2022-07-26 PROCEDURE — 2580000003 HC RX 258: Performed by: FAMILY MEDICINE

## 2022-07-26 PROCEDURE — 85025 COMPLETE CBC W/AUTO DIFF WBC: CPT

## 2022-07-26 PROCEDURE — 2060000000 HC ICU INTERMEDIATE R&B

## 2022-07-26 PROCEDURE — 80053 COMPREHEN METABOLIC PANEL: CPT

## 2022-07-26 PROCEDURE — 97165 OT EVAL LOW COMPLEX 30 MIN: CPT

## 2022-07-26 PROCEDURE — 6360000002 HC RX W HCPCS: Performed by: INTERNAL MEDICINE

## 2022-07-26 PROCEDURE — 97530 THERAPEUTIC ACTIVITIES: CPT

## 2022-07-26 PROCEDURE — 36415 COLL VENOUS BLD VENIPUNCTURE: CPT

## 2022-07-26 RX ADMIN — CEFTRIAXONE SODIUM 1000 MG: 1 INJECTION, POWDER, FOR SOLUTION INTRAMUSCULAR; INTRAVENOUS at 07:00

## 2022-07-26 RX ADMIN — PANTOPRAZOLE SODIUM 40 MG: 40 TABLET, DELAYED RELEASE ORAL at 05:49

## 2022-07-26 RX ADMIN — SODIUM CHLORIDE: 9 INJECTION, SOLUTION INTRAVENOUS at 16:27

## 2022-07-26 RX ADMIN — DIPHENOXYLATE HYDROCHLORIDE AND ATROPINE SULFATE 1 TABLET: 2.5; .025 TABLET ORAL at 05:49

## 2022-07-26 RX ADMIN — APIXABAN 5 MG: 5 TABLET, FILM COATED ORAL at 21:42

## 2022-07-26 RX ADMIN — DIPHENOXYLATE HYDROCHLORIDE AND ATROPINE SULFATE 1 TABLET: 2.5; .025 TABLET ORAL at 21:41

## 2022-07-26 RX ADMIN — ASPIRIN 81 MG CHEWABLE TABLET 81 MG: 81 TABLET CHEWABLE at 21:41

## 2022-07-26 RX ADMIN — Medication 2000 UNITS: at 21:40

## 2022-07-26 RX ADMIN — ESCITALOPRAM 20 MG: 10 TABLET, FILM COATED ORAL at 21:40

## 2022-07-26 RX ADMIN — DIPHENOXYLATE HYDROCHLORIDE AND ATROPINE SULFATE 1 TABLET: 2.5; .025 TABLET ORAL at 18:34

## 2022-07-26 RX ADMIN — OXYCODONE AND ACETAMINOPHEN 1 TABLET: 325; 10 TABLET ORAL at 12:48

## 2022-07-26 RX ADMIN — LOPERAMIDE HYDROCHLORIDE 4 MG: 2 CAPSULE ORAL at 18:34

## 2022-07-26 RX ADMIN — Medication 1 CAPSULE: at 21:41

## 2022-07-26 RX ADMIN — LOPERAMIDE HYDROCHLORIDE 6 MG: 2 CAPSULE ORAL at 21:41

## 2022-07-26 RX ADMIN — TRAZODONE HYDROCHLORIDE 50 MG: 50 TABLET ORAL at 21:42

## 2022-07-26 RX ADMIN — PRAVASTATIN SODIUM 40 MG: 20 TABLET ORAL at 21:41

## 2022-07-26 RX ADMIN — OXYCODONE AND ACETAMINOPHEN 1 TABLET: 325; 10 TABLET ORAL at 19:03

## 2022-07-26 RX ADMIN — FERROUS SULFATE TAB 325 MG (65 MG ELEMENTAL FE) 325 MG: 325 (65 FE) TAB at 21:42

## 2022-07-26 RX ADMIN — DIPHENOXYLATE HYDROCHLORIDE AND ATROPINE SULFATE 1 TABLET: 2.5; .025 TABLET ORAL at 12:48

## 2022-07-26 RX ADMIN — CYANOCOBALAMIN TAB 1000 MCG 1000 MCG: 1000 TAB at 21:41

## 2022-07-26 RX ADMIN — LOPERAMIDE HYDROCHLORIDE 4 MG: 2 CAPSULE ORAL at 12:48

## 2022-07-26 RX ADMIN — PRIMIDONE 250 MG: 250 TABLET ORAL at 21:42

## 2022-07-26 RX ADMIN — Medication 10 ML: at 21:40

## 2022-07-26 ASSESSMENT — PAIN SCALES - GENERAL
PAINLEVEL_OUTOF10: 6
PAINLEVEL_OUTOF10: 0
PAINLEVEL_OUTOF10: 0
PAINLEVEL_OUTOF10: 4

## 2022-07-26 ASSESSMENT — ENCOUNTER SYMPTOMS
ABDOMINAL PAIN: 1
SHORTNESS OF BREATH: 0

## 2022-07-26 NOTE — PROGRESS NOTES
Physical Therapy  Physical Therapy Initial Assessment     Name: Coco Corral  : 1940  MRN: [id number]      Date of Service: 2022    Evaluating PT:  Eliceo Crum PT, DPT [id number]    Room #:  8895/4485-V  Diagnosis:  Enteritis [K52.9]  Hypotension [I95.9]  Pneumonia of lower lobe due to infectious organism, unspecified laterality [J18.9]  PMHx/PSHx:    Past Medical History:   Diagnosis Date    Acute kidney failure ([facility]  and 8/2016    x2,no dialysis needed,resolved, kidney function tests returned to normal    Anxiety     Arthritis     CAD (coronary artery disease)     follows with Dr. Ethel Lundborg every 6 months    Cancer [facility])     skin, leg,nose- removed surgically     COPD (chronic obstructive pulmonary disease) ([facility]     mild- no inhlaers, no oxygen     Depression     Fibromyalgia     chronic back and neck pain    Generalized headaches     h/o / daily    History of blood transfusion 3-11-14    multiple times    History of necrotic bowel     Hyperlipidemia     Hypertension     Incisional hernia     abdomen    Insomnia     Mitral valve regurgitation     Myocardial infarct ([facility]     Occasional tremors     at hands / stable with medication    Osteopenia     PONV (postoperative nausea and vomiting)     Vitamin B12 deficiency     h/o     Precautions:  Fall risk, Alarms, TAPs, Pure Wick  Equipment Needs:  None    SUBJECTIVE:    Pt lives with  in a one story home with two stairs to enter and bilateral rail. Bed is on first floor and bath is on first floor. Pt ambulated with rollator PTA. Equipment Owned: Rollator, SPC, shower seat, grab bars    OBJECTIVE:   Initial Evaluation  Date: 22 Treatment Short Term/ Long Term   Goals   AM-PAC 6 Clicks 90/96     Was pt agreeable to Eval/treatment? Yes     Does pt have pain?  3.5/10 LB, neck and head     Bed Mobility  Rolling: NT  Supine to sit: with HOB elevated, Min A   Sit to supine: Min A   Scooting: CGA  Rolling: Independent  Supine to sit: Independent  Sit to supine: Independent  Scooting: Independent   Transfers Sit to stand: Mod A   Stand to sit: Mod A  Stand pivot: NT  Sit to stand: Independent  Stand to sit: Independent  Stand pivot: Mod I   Ambulation    NT  >50 feet with AAD Mod I   Stair negotiation: ascended and descended  NT  2 steps with bilateral rail supervision   ROM BUE:  Refer to OT note  BLE:  WFL     Strength BUE:  Refer to OT note  BLE:  4/5 B LEs  4+/5   Balance Sitting EOB:  SBA to Min A   Dynamic Standing:  NT  Sitting EOB:  Independent   Dynamic Standing: Mod I with AAD     Pt is A & O x 4  Sensation:  Pt denies numbness and tingling to extremities  Edema:  none    Vitals:  Blood Pressure at rest - Blood Pressure post session -   Heart Rate at rest 66 Heart Rate post session 64   SPO2 at rest 95 SPO2 post session 96       Patient education  Pt educated on role and benefits of physical therapy, safety and technique for mobility    Patient response to education:   Pt verbalized understanding Pt demonstrated skill Pt requires further education in this area   x x x     ASSESSMENT:    Conditions Requiring Skilled Therapeutic Intervention:    [x]Decreased strength     []Decreased ROM  [x]Decreased functional mobility  [x]Decreased balance   [x]Decreased endurance   []Decreased posture  []Decreased sensation  []Decreased coordination   []Decreased vision  []Decreased safety awareness   [x]Increased pain       Comments:  Patient deemed medically stable prior to therapy evaluation. Patient was supine in bed upon therapy arrival. Patient provided consent to evaluation and PT/OT collaboration. Patient initially was apprehensive as pain and stiffness is limiting. Patient verbalized more comfort when she was educated that symptoms and comfort is monitored throughout, therapy performed to tolerance. Increased time with position change due to increased light headedness. Vitals WNL despite varying symptoms.  Increased physical assistance for trunk management with bed mobility and transfers. Attempts to side step HOB restricted due to increased fatigue, leading to second sit to stand for optimal TAPs placement/adjustment. Patient left sidelying on left side with HOB slightly elevated and pillows for pressure relief, with all needs met and call light in reach for safety. Pure wick replaced. Nursing notified. She will continue to benefit from skilled PT post DC in order to reduce falls and return to PLOF. Treatment:  Patient practiced and was instructed in the following treatment:    Bed mobility training - pt given verbal and tactile cues to facilitate proper sequencing and safety during rolling and supine>sit as well as provided with physical assistance to complete task   Sitting EOB for >10 minutes for upright tolerance, postural awareness and BLE ROM  Transfer training - pt was given verbal and tactile cues to facilitate proper hand placement, technique and safety during sit to stand and stand to sit as well as provided with physical assistance. Skilled positioning - Pt placed in side lying with pillows utilized to facilitate upright posture, joint and skin integrity, and interaction with environment. Pt's/ family goals   1. Return to PLOF    Prognosis is good for reaching above PT goals. Patient and or family understand(s) diagnosis, prognosis, and plan of care.   Yes    PHYSICAL THERAPY PLAN OF CARE:    PT POC is established based on physician order and patient diagnosis     Referring provider/PT Order:    PT evaluation and treat  Start:  07/24/22 1445,   End:  07/24/22 1445,   ONE TIME,   Standing Count:  1 Occurrences,   R         Azalea Kaiser, DO     Diagnosis:  Enteritis [K52.9]  Hypotension [I95.9]  Pneumonia of lower lobe due to infectious organism, unspecified laterality [J18.9]  Specific instructions for next treatment:  progress mobility, attempt transfers and ambulation    Current Treatment Recommendations:     [x] Strengthening to improve independence with functional mobility   [] ROM to improve independence with functional mobility   [x] Balance Training to improve static/dynamic balance and to reduce fall risk  [x] Endurance Training to improve activity tolerance during functional mobility   [x] Transfer Training to improve safety and independence with all functional transfers   [x] Gait Training to improve gait mechanics, endurance and assess need for appropriate assistive device  [x] Stair Training in preparation for safe discharge home and/or into the community   [] Positioning to prevent skin breakdown and contractures  [x] Safety and Education Training   [x] Patient/Caregiver Education   [] HEP  [] Other     PT long term treatment goals are located in above grid    Frequency of treatments: 2-5x/week x 1-2 weeks. Time in  1020  Time out  1050    Total Treatment Time  15 minutes     Evaluation Time includes thorough review of current medical information, gathering information on past medical history/social history and prior level of function, completion of standardized testing/informal observation of tasks, assessment of data and education on plan of care and goals.     CPT codes:  [x] Low Complexity PT evaluation 78276  [] Moderate Complexity PT evaluation 86155  [] High Complexity PT evaluation 30571  [] PT Re-evaluation 33549  [] Gait training 27837 - minutes  [] Manual therapy 83954 - minutes  [x] Therapeutic activities 13811 15 minutes  [] Therapeutic exercises 67873 - minutes  [] Neuromuscular reeducation 24231 - minutes     Cee Carrillo PT, DPT [id number]

## 2022-07-26 NOTE — PROGRESS NOTES
age  Clinical Indicators: Per ED Provider note on 7/24 \". ..CMP revealed chronic CKD   with a creatinine of 1.6. Mima Matias \", GFR 31 on admission, GFR 12-36 over last year  Treatment: serial CMP    Thank you,  Karen Arriola, RN, BSN, CDIS  Clinical Documentation Improvement  [email or web address]  Options provided:  -- CKD Stage 3b GFR 30-44  -- CKD Stage 4 GFR 15-29  -- Other - I will add my own diagnosis  -- Disagree - Not applicable / Not valid  -- Disagree - Clinically unable to determine / Unknown  -- Refer to Clinical Documentation Reviewer    PROVIDER RESPONSE TEXT:    This patient has CKD Stage 3b.     Query created by: Roma Kaplan on 7/26/2022 11:04 AM      Electronically signed by:  Janice Stringer MD 7/26/2022 1:01 PM

## 2022-07-26 NOTE — PROGRESS NOTES
Department of Internal Medicine  Infectious Diseases  Progress  Note    C/C :  E coli bacteremia       Denies fever, chills, abdomen pain   Afebrile  Low blood pressure, responding to IV fluid   Afebrile      Current Facility-Administered Medications   Medication Dose Route Frequency Provider Last Rate Last Admin    0.9 % sodium chloride infusion   IntraVENous Continuous Duncan Mccain  mL/hr at 07/25/22 1313 New Bag at 07/25/22 1313    sodium chloride flush 0.9 % injection 10 mL  10 mL IntraVENous 2 times per day Azalea E Awilda, DO   10 mL at 07/25/22 2035    sodium chloride flush 0.9 % injection 10 mL  10 mL IntraVENous PRN Azalea E Awilda, DO        0.9 % sodium chloride infusion   IntraVENous PRN Azalea E Awilda, DO        ondansetron (ZOFRAN-ODT) disintegrating tablet 4 mg  4 mg Oral Q8H PRN Azalea E Awilda, DO        Or    ondansetron (ZOFRAN) injection 4 mg  4 mg IntraVENous Q6H PRN Azalea E Awilda, DO   4 mg at 07/25/22 1210    senna (SENOKOT) tablet 8.6 mg  1 tablet Oral Daily PRN Azalea E Awilda, DO        acetaminophen (TYLENOL) tablet 650 mg  650 mg Oral Q6H PRN Azalea E Awilda, DO        Or    acetaminophen (TYLENOL) suppository 650 mg  650 mg Rectal Q6H PRN Azalea E Awilda, DO        apixaban (ELIQUIS) tablet 5 mg  5 mg Oral BID Azalea E Awilda, DO   5 mg at 07/25/22 2032    aspirin chewable tablet 81 mg  81 mg Oral Nightly Azalea E Awilda, DO   81 mg at 07/25/22 2038    vitamin D (CHOLECALCIFEROL) tablet 2,000 Units  2,000 Units Oral BID Azalea E Awilda, DO   2,000 Units at 07/25/22 2034    diphenoxylate-atropine (LOMOTIL) 2.5-0.025 MG per tablet 1 tablet  1 tablet Oral 4x Daily AC & HS Azalea E Awilda, DO   1 tablet at 07/26/22 1248    escitalopram (LEXAPRO) tablet 20 mg  20 mg Oral Nightly Azalea E Awilda, DO   20 mg at 07/25/22 2033    ferrous sulfate (IRON 325) tablet 325 mg  325 mg Oral Nightly Azalea Kaiser DO   325 mg at 07/25/22 2034    fluticasone (FLONASE) 50 MCG/ACT nasal spray 1 spray  1 spray Each Nostril Nightly Azalea E Awilda, DO   1 spray at 07/25/22 2038    hyoscyamine (ANASPAZ;LEVSIN) tablet 125 mcg  125 mcg Oral 4x Daily PRN Azalea E Awilda, DO        loperamide (IMODIUM) capsule 4 mg  4 mg Oral TID WC Azalea E Awilda, DO   4 mg at 07/26/22 1248    loperamide (IMODIUM) capsule 6 mg  6 mg Oral Nightly Azalea E Awilda, DO   6 mg at 07/25/22 2035    LORazepam (ATIVAN) tablet 0.25 mg  0.25 mg Oral Daily PRN Azalea E Awilda, DO        pantoprazole (PROTONIX) tablet 40 mg  40 mg Oral QAM AC Azalea E Awilda, DO   40 mg at 07/26/22 0549    oxyCODONE-acetaminophen (PERCOCET)  MG per tablet 1 tablet  1 tablet Oral Q6H PRN Azalea E Awilda, DO   1 tablet at 07/26/22 1248    pravastatin (PRAVACHOL) tablet 40 mg  40 mg Oral Nightly Azalea E Awilda, DO   40 mg at 07/25/22 2034    primidone (MYSOLINE) tablet 250 mg  250 mg Oral Nightly Azalea E Awilda, DO   250 mg at 07/25/22 2034    lactobacillus (CULTURELLE) capsule 1 capsule  1 capsule Oral Nightly Azalea E Awilda, DO   1 capsule at 07/25/22 2034    traZODone (DESYREL) tablet 50 mg  50 mg Oral Nightly Azalea E Awilda, DO   50 mg at 07/25/22 2034    vitamin B-12 (CYANOCOBALAMIN) tablet 1,000 mcg  1,000 mcg Oral Nightly Azalea E Awilda, DO   1,000 mcg at 07/25/22 2033    cefTRIAXone (ROCEPHIN) 1,000 mg in sterile water 10 mL IV syringe  1,000 mg IntraVENous Q24H Fred Salmon MD   1,000 mg at 07/26/22 0700         REVIEW OF SYSTEMS:    CONSTITUTIONAL:  Denies fever, chill or rigors. Reported weakness   HEENT: denies blurring of vision or double vision, denies hearing problem  RESPIRATORY: denies cough, shortness of breath, sputum expectoration, chest pain. CARDIOVASCULAR:  Denies palpitation  GASTROINTESTINAL:  diarrhea . dehydration   GENITOURINARY:  Denies burning urination or frequency of urination  INTEGUMENT: denies wound , rash  HEMATOLOGIC/LYMPHATIC:  Denies lymph node swelling, gum bleeding or easy bruising. MUSCULOSKELETAL:  Denies leg pain , joint pain , joint swelling  NEUROLOGICAL:  Denies light headed, dizziness    PHYSICAL EXAM:      Vitals:     BP (!) 112/42   Pulse 53   Temp 97.8 °F (36.6 °C) (Temporal)   Resp 16   Ht 5' (1.524 m)   Wt 99 lb (44.9 kg)   SpO2 95%   BMI 19.33 kg/m²     General Appearance:    Awake, alert , no acute distress. Head:    Normocephalic, atraumatic   Eyes:    No pallor, no icterus,   Ears:    No obvious deformity or drainage.    Nose:   No nasal drainage   Throat:   Mucosa dry    Neck:   Supple, no lymphadenopathy   Back:     no CVA tenderness   Lungs:     Clear to auscultation bilaterally    Heart:    Regular rate and rhythm   Abdomen:     Soft, non-tender, bowel sounds present    PEG tube - OK, ileostomy functioning    Extremities:   No edema, no cyanosis    Pulses:   Dorsalis pedis palpable    Skin:   no rashes or lesions   CBC with Differential:      Lab Results   Component Value Date/Time    WBC 7.2 07/26/2022 05:55 AM    RBC 2.49 07/26/2022 05:55 AM    HGB 7.5 07/26/2022 05:55 AM    HCT 24.5 07/26/2022 05:55 AM     07/26/2022 05:55 AM    MCV 98.4 07/26/2022 05:55 AM    MCH 30.1 07/26/2022 05:55 AM    MCHC 30.6 07/26/2022 05:55 AM    RDW 14.6 07/26/2022 05:55 AM    NRBC 0.0 07/21/2022 06:18 AM    LYMPHOPCT 15.6 07/26/2022 05:55 AM    MONOPCT 9.5 07/26/2022 05:55 AM    MYELOPCT 0.9 07/24/2022 09:43 AM    BASOPCT 0.6 07/26/2022 05:55 AM    MONOSABS 0.69 07/26/2022 05:55 AM    LYMPHSABS 1.13 07/26/2022 05:55 AM    EOSABS 0.12 07/26/2022 05:55 AM    BASOSABS 0.04 07/26/2022 05:55 AM       CMP     Lab Results   Component Value Date/Time     07/26/2022 05:55 AM    K 3.7 07/26/2022 05:55 AM     07/26/2022 05:55 AM    CO2 21 07/26/2022 05:55 AM    BUN 48 07/26/2022 05:55 AM    CREATININE 1.5 07/26/2022 05:55 AM    GFRAA 40 07/26/2022 05:55 AM    LABGLOM 33 07/26/2022 05:55 AM    GLUCOSE 109 07/26/2022 05:55 AM    PROT 4.8 07/26/2022 05:55 AM    LABALBU 1.6 07/26/2022 05:55 AM    CALCIUM 6.7 07/26/2022 05:55 AM    BILITOT 0.5 07/26/2022 05:55 AM    ALKPHOS 204 07/26/2022 05:55 AM    AST 12 07/26/2022 05:55 AM    ALT 12 07/26/2022 05:55 AM         Hepatic Function Panel:    Lab Results   Component Value Date/Time    ALKPHOS 204 07/26/2022 05:55 AM    ALT 12 07/26/2022 05:55 AM    AST 12 07/26/2022 05:55 AM    PROT 4.8 07/26/2022 05:55 AM    BILITOT 0.5 07/26/2022 05:55 AM    BILIDIR <0.2 01/21/2021 11:55 AM    IBILI see below 01/21/2021 11:55 AM    LABALBU 1.6 07/26/2022 05:55 AM       PT/INR:    Lab Results   Component Value Date/Time    PROTIME 19.0 07/24/2022 09:43 AM    INR 1.7 07/24/2022 09:43 AM       TSH:    Lab Results   Component Value Date/Time    TSH 1.380 07/01/2022 02:31 PM       U/A:    Lab Results   Component Value Date/Time    COLORU Yellow 07/24/2022 11:19 AM    PHUR 5.5 07/24/2022 11:19 AM    LABCAST FEW 01/25/2020 02:45 PM    WBCUA NONE 07/24/2022 11:19 AM    RBCUA NONE 07/24/2022 11:19 AM    YEAST Present 09/18/2020 06:50 AM    BACTERIA NONE SEEN 07/24/2022 11:19 AM    CLARITYU Clear 07/24/2022 11:19 AM    SPECGRAV 1.020 07/24/2022 11:19 AM    LEUKOCYTESUR Negative 07/24/2022 11:19 AM    UROBILINOGEN 0.2 07/24/2022 11:19 AM    BILIRUBINUR Negative 07/24/2022 11:19 AM    BLOODU Negative 07/24/2022 11:19 AM    GLUCOSEU Negative 07/24/2022 11:19 AM       ABG:  No results found for: JFM9SRE, BEART, H5LGYIRP, PHART, THGBART, CIW5OLF, PO2ART, MFH1XIG    MICROBIOLOGY:    Blood culture -    Susceptibility      Escherichia coli (1)    Antibiotic Interpretation Microscan  Method Status    amoxicillin-clavulanate Intermediate ^16 mcg/mL BACTERIAL SUSCEPTIBILITY PANEL BY JAMAAL     ceFAZolin Sensitive <=^4 mcg/mL BACTERIAL SUSCEPTIBILITY PANEL BY JAMAAL     cefepime Sensitive <=^0.12 mcg/mL BACTERIAL SUSCEPTIBILITY PANEL BY JAMAAL     cefotaxime Sensitive <=^0.25 mcg/mL BACTERIAL SUSCEPTIBILITY PANEL BY JAMAAL     cefOXitin Sensitive <=^4 mcg/mL BACTERIAL SUSCEPTIBILITY PANEL BY JAMAAL     cefTAZidime-avibactam Sensitive <=^0.12 mcg/mL BACTERIAL SUSCEPTIBILITY PANEL BY JAMAAL     gentamicin Sensitive <=^1 mcg/mL BACTERIAL SUSCEPTIBILITY PANEL BY JAMAAL     levofloxacin Sensitive <=^0.12 mcg/mL BACTERIAL SUSCEPTIBILITY PANEL BY JAMAAL     meropenem Sensitive <=^0.25 mcg/mL BACTERIAL SUSCEPTIBILITY PANEL BY JAMAAL     piperacillin-tazobactam Sensitive <=^4 mcg/mL BACTERIAL SUSCEPTIBILITY PANEL BY JAMAAL     trimethoprim-sulfamethoxazole Resistant >=^320 mcg/mL BACTERIAL SUSCEPTIBILITY              Urine Culture -    Sputum Culture -    Wound Culture -        Radiology :    CT abdomen and pelvis -  Centrilobular emphysema with progressive patchy infiltrates/atelectasis and   pleural effusion in the lung bases concerning for pneumonia or edema. Coronary artery calcification. Suboptimal evaluation abdomen pelvis. 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 Liver disease with small amount of ascites. Question indeterminate cystic lesion in the pancreas. Consider surveillance. Significant irregularity and the postsurgical changes in the lumbosacral   spine. See above.        IMPRESSION:     Short gut syndrome,  Recent E coli bacteremia  Leukocytosis - improved   Hypovolemia     RECOMMENDATIONS:    IV rocephin 1 gram q 24 hrs  ( day 3 of 7 )

## 2022-07-26 NOTE — PROGRESS NOTES
Occupational Therapy  OCCUPATIONAL THERAPY INITIAL EVALUATION     [address] [address] [address]      Date:2022                                                Patient Name: Aniyah Mckay  MRN: [id number]  : 1940  Room: [place], [place] #1482    Referring Provider: Balwinder Garcia DO  Specific Provider Orders/Date: OT eval and treat 22    Diagnosis: Enteritis [K52.9]  Hypotension [I95.9]  Pneumonia of lower lobe due to infectious organism, unspecified laterality [J18.9]   Pt admitted to hospital on 22 for hypotension    Pertinent Medical History:  has a past medical history of Acute kidney failure ([address] Anxiety, Arthritis, CAD (coronary artery disease), Cancer ([address] COPD (chronic obstructive pulmonary disease) ([address] Depression, Fibromyalgia, Generalized headaches, History of blood transfusion, History of necrotic bowel, Hyperlipidemia, Hypertension, Incisional hernia, Insomnia, Mitral valve regurgitation, Myocardial infarct ([address] Occasional tremors, Osteopenia, PONV (postoperative nausea and vomiting), and Vitamin B12 deficiency.        Precautions:  Fall Risk, ileostomy, PEG, Purewick, bed alarm    Assessment of current deficits    [x] Functional mobility  [x]ADLs  [x] Strength               []Cognition    [x] Functional transfers   [x] IADLs         [x] Safety Awareness   [x]Endurance    [] Fine Coordination              [x] Balance      [] Vision/perception   []Sensation     []Gross Motor Coordination  [] ROM  [] Delirium                   [] Motor Control     OT PLAN OF CARE   OT POC based on physician orders, patient diagnosis and results of clinical assessment    Frequency/Duration 1-3 days/wk for 2 weeks PRN   Specific OT Treatment Interventions to include:   * Instruction/training on adapted ADL techniques and AE recommendations to increase functional independence within precautions       * Training on energy conservation strategies, correct breathing pattern and techniques to improve independence/tolerance for self-care routine  * Functional transfer/mobility training/DME recommendations for increased independence, safety, and fall prevention  * Patient/Family education to increase follow through with safety techniques and functional independence  * Recommendation of environmental modifications for increased safety with functional transfers/mobility and ADLs  * Therapeutic exercise to improve motor endurance, ROM, and functional strength for ADLs/functional transfers  * Therapeutic activities to facilitate/challenge dynamic balance, stand tolerance for increased safety and independence with ADLs  * Therapeutic activities to facilitate gross/fine motor skills for increased independence with ADLs      Recommended Adaptive Equipment: TBD     Home Living: [facility] admitted [facility]  Pt lives with spouse    Bathroom setup: walk in shower with seat    Equipment owned: rollator, cane    Prior Level of Function: independent/mod I with ADLs , shares with IADLs; ambulated w/ rollator    Pain Level: Pt c/o 3.5/10 back and neck pain this session ; reinforced management strategies    Cognition: A&O: 4/4; Follows 1 step directions   Memory:  good    Sequencing:  fair +   Problem solving:  fair    Judgement/safety:  fair      Functional Assessment:  AM-PAC Daily Activity Raw Score: 15/24   Initial Eval Status  Date: 7/26/22 Treatment Status  Date: STGs = LTGs  Time frame: 10-14 days   Feeding NPO  Independent w/ mouth swab  -   Grooming Minimal Assist   Modified [place]    UB Dressing Minimal Assist   Gown  Modified [place]    LB Dressing Dependent   Stand by Assist    Bathing Moderate Assist  Stand by Assist    Toileting Maximal Assist   Stand by Assist    Bed Mobility  Supine to sit: Minimal Assist   Sit to supine:  Moderate Assist   Supine to sit: Supervision   Sit to supine: Supervision Functional Transfers Moderate Assist   Stand by Assist    Functional Mobility NT  D/t c/o intermittent dizziness w/ activity. Subsided w/ rest  Minimal Assist    Balance Sitting:     Static:  SBA    Dynamic:Min A  Standing: Mod A     Activity Tolerance Fair-  Fair+   Visual/  Perceptual Glasses: yes                  Hand Dominance R   AROM (PROM) Strength Additional Info:    RUE  WFL 4-/5 good  and wfl FMC/dexterity noted during ADL tasks       LUE WFL 4-/5 good  and wfl FMC/dexterity noted during ADL tasks       Hearing: WFL  Sensation:  No c/o numbness or tingling   Tone: WFL   Edema: none noted    Comments: Upon arrival patient lying in bed. Therapist educated pt on role of OT. RN clearance. At end of session, patient lying in bed (bed alarm on) with call light and phone within reach, all lines and tubes intact. Overall patient demonstrated decreased independence and safety during completion of ADL/functional transfer/mobility tasks. Pt would benefit from continued skilled OT to increase safety and independence with completion of ADL/IADL tasks for functional independence and quality of life. Treatment: OT treatment provided this date includes: Facilitation of bed mobility (education/cues for body mechanics), unsupported sitting balance (addressing posture, weight shifting, dynamic reaching to prep for ADL's), functional transfers (w/ education/cues for safety/hand placement) and standing tolerance tasks (addressing posture, balance and activity tolerance impacting ADLs and functional activity). Therapist facilitated self-care retraining: UB self-care tasks (gown), toileting task and seated grooming tasks while educating/cuing pt on modified techniques, posture, safety and energy conservation techniques. Skilled monitoring of HR, O2 sats and pts response to treatment.        Rehab Potential: Good for established goals     Patient / Family Goal: not stated      Patient and/or family were instructed on functional diagnosis, prognosis/goals and OT plan of care. Demonstrated fair understanding. Eval Complexity: Low    Time In: 10:20  Time Out: 10:50  Total Treatment Time: 15 minutes    Min Units   OT Eval Low 97165  X  1   OT Eval Medium 98684      OT Eval High 22518      OT Re-Eval [id number]       Therapeutic Ex 85785       Therapeutic Activities 18935  7  0   ADL/Self Care 18853  8  1   Orthotic Management 17428       Manual [id number]     Neuro Re-Ed [id number]       Non-Billable Time          Evaluation Time additionally includes thorough review of current medical information, gathering information on past medical history/social history and prior level of function, interpretation of standardized testing/informal observation of tasks, assessment of data and development of plan of care and goals.         Bekah OTR/L #[id number]

## 2022-07-26 NOTE — PROGRESS NOTES
GENERAL SURGERY  DAILY PROGRESS NOTE  7/26/2022    CHIEF COMPLAINT:  Chief Complaint   Patient presents with    Hypotension     Was sent in from [facility] for hypotension BP  103/46 HERE       SUBJECTIVE:  No events overnight. Patient resting comfortably in bed, denies complaints, pain. OBJECTIVE:  BP (!) 100/52   Pulse 53   Temp 97.8 °F (36.6 °C) (Temporal)   Resp 16   Ht 5' (1.524 m)   Wt 99 lb (44.9 kg)   SpO2 95%   BMI 19.33 kg/m²     GENERAL:  NAD. A&Ox3. LUNGS:  No increased work of breathing. CARDIOVASCULAR: RR  ABDOMEN:  Soft, non-distended, non-tender. No guarding, rigidity, rebound.     ASSESSMENT/PLAN:  [de-identified] y.o. female with leukocytosis and hypotension, recent E. coli sepsis being evaluated by general surgery for incidental pancreatic lesion on noncontrast CT.    NPO  MRCP for evaluation of pancreatic lesion  Ok for diet when MRCP complete    Will discuss with Dr. Kathy Davis, DO  Surgery Resident PGY-1  7/26/2022  6:08 AM

## 2022-07-26 NOTE — PLAN OF CARE
Problem: Discharge Planning  Goal: Discharge to home or other facility with appropriate resources  Outcome: Progressing Towards Goal     Problem: Pain  Goal: Verbalizes/displays adequate comfort level or baseline comfort level  Outcome: Progressing Towards Goal     Problem: Skin/Tissue Integrity  Goal: Absence of new skin breakdown  Description: 1. Monitor for areas of redness and/or skin breakdown  2. Assess vascular access sites hourly  3. Every 4-6 hours minimum:  Change oxygen saturation probe site  4. Every 4-6 hours:  If on nasal continuous positive airway pressure, respiratory therapy assess nares and determine need for appliance change or resting period.   Outcome: Progressing Towards Goal     Problem: Chronic Conditions and Co-morbidities  Goal: Patient's chronic conditions and co-morbidity symptoms are monitored and maintained or improved  Outcome: Progressing Towards Goal     Problem: Safety - Adult  Goal: Free from fall injury  Outcome: Progressing Towards Goal

## 2022-07-26 NOTE — FLOWSHEET NOTE
ET Nurse (Initial consult) [id number]  Admit Date: 7/24/2022  8:37 AM    Reason for consult:  Ileostomy    Significant history: Per H&P            Chief Complaint   Patient presents with    Hypotension       Stoma assessment:     07/26/22 0902   Ileostomy Ileostomy RLQ   Placement Date/Time: 09/17/20 1305   Pre-existing: No  Inserted by: Dr. Xavi Garber  Ileostomy Type: Ileostomy  Location: RLQ   Stomal Appliance 2 piece; Changed   Stoma  Assessment Red;Moist;Protrudes   Peristomal Assessment Intact   Treatment Bag change;Site care  (2 piece convex, barrier ring, barrier strips)   Stool Appearance Watery   Stool Color Brown   Output (mL) 100 ml       Plan:  Pouch change  Patient has own supplied from home. Will follow.     Glenford Bence, RN 7/26/2022 10:01 AM

## 2022-07-26 NOTE — PROGRESS NOTES
Progress Note  Date:2022       Room:[facility]85[facility]  Patient Lynn Santos     YOB: 1940     Age:82 y.o. Patient says she feels improved today. Subjective    Subjective:  Symptoms:  Improved. No shortness of breath or chest pain. Diet:  Poor intake. Activity level: Normal.    Pain:  She complains of pain that is mild. Review of Systems   Constitutional:  Negative for activity change and fever. HENT:  Negative for congestion. Respiratory:  Negative for shortness of breath. Cardiovascular:  Negative for chest pain. Gastrointestinal:  Positive for abdominal pain. Neurological:  Negative for dizziness. Objective         Vitals Last 24 Hours:  TEMPERATURE:  Temp  Av.8 °F (36.6 °C)  Min: 97.4 °F (36.3 °C)  Max: 98.1 °F (36.7 °C)  RESPIRATIONS RANGE: Resp  Av  Min: 16  Max: 16  PULSE OXIMETRY RANGE: SpO2  Av.3 %  Min: 95 %  Max: 100 %  PULSE RANGE: Pulse  Av.8  Min: 53  Max: 70  BLOOD PRESSURE RANGE: Systolic (61BDJ), LRQ:319 , Min:80 , DLB:676   ; Diastolic (09YEY), DENISE:62, Min:38, Max:57    I/O (24Hr): Intake/Output Summary (Last 24 hours) at 2022 0723  Last data filed at 2022 0654  Gross per 24 hour   Intake 436 ml   Output 1625 ml   Net -1189 ml     Objective:  General Appearance:  Comfortable. Vital signs: (most recent): Blood pressure (!) 100/52, pulse 53, temperature 97.8 °F (36.6 °C), temperature source Temporal, resp. rate 16, height 5' (1.524 m), weight 99 lb (44.9 kg), SpO2 95 %. No fever. Lungs:  Normal effort and normal respiratory rate. Breath sounds clear to auscultation. Heart: Normal rate. Regular rhythm.   S1 normal and S2 normal.    Labs/Imaging/Diagnostics    Labs:  CBC:  Recent Labs     22  0943 22  0530 22  0555   WBC 14.3* 9.9 7.2   RBC 2.81* 2.64* 2.49*   HGB 8.5* 8.1* 7.5*   HCT 26.1* 25.7* 24.5*   MCV 92.9 97.3 98.4   RDW 14.5 14.6 14.6    231 273     CHEMISTRIES:  Recent Labs     07/24/22  0943 07/25/22  0530 07/26/22  0555    141 133   K 3.8 3.9 3.7    108* 105   CO2 26 20* 21*   BUN 60* 56* 48*   CREATININE 1.6* 1.4* 1.5*   GLUCOSE 96 66* 109*     PT/INR:  Recent Labs     07/24/22  0943   PROTIME 19.0*   INR 1.7     APTT:  Recent Labs     07/24/22  0943   APTT 29.1     LIVER PROFILE:  Recent Labs     07/24/22  0943 07/25/22  0530 07/26/22  0555   AST 14 14 12   ALT 16 14 12   BILITOT 1.2 0.7 0.5   ALKPHOS 238* 210* 204*       Imaging Last 24 Hours:  CT ABDOMEN PELVIS WO CONTRAST Additional Contrast? None    Result Date: 7/24/2022  EXAMINATION: CT OF THE CHEST WITHOUT CONTRAST; CT OF THE ABDOMEN AND PELVIS WITHOUT CONTRAST 7/24/2022 12:55 pm TECHNIQUE: CT of the chest was performed without the administration of intravenous contrast. Multiplanar reformatted images are provided for review. Automated exposure control, iterative reconstruction, and/or weight based adjustment of the mA/kV was utilized to reduce the radiation dose to as low as reasonably achievable.; CT of the abdomen and pelvis was performed without the administration of intravenous contrast. Multiplanar reformatted images are provided for review. Automated exposure control, iterative reconstruction, and/or weight based adjustment of the mA/kV was utilized to reduce the radiation dose to as low as reasonably achievable. COMPARISON: 07/16/2022 HISTORY: ORDERING SYSTEM PROVIDED HISTORY: rule out infection TECHNOLOGIST PROVIDED HISTORY: Reason for exam:->rule out infection Decision Support Exception - unselect if not a suspected or confirmed emergency medical condition->Emergency Medical Condition (MA) What reading provider will be dictating this exam?->CRC FINDINGS: CT chest. There is borderline cardiac size with diffuse coronary artery calcification. The great vessels are normal.  There is prominent pulmonary artery concerning for pulmonary artery hypertension.   Moderately enlarged mediastinal lymph nodes are present. There is centrilobular emphysema with progressive infiltrates/atelectasis and pleural effusion in the lung bases concerning for pneumonia or edema. Degenerative changes are identified in the thoracolumbar spine with previous surgical changes CT abdomen and pelvis. Comparison 01/21/2021 Findings The examination is very limited due to lack of contrast.  The liver is heterogeneous in appearance concerning for liver disease. Gallbladder is absent. Stomach is collapsed with a G-tube. Spleen appears normal. 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 There is poor delineation of the pancreas with suggestion of a 1.8 cm cystic lesion in the head of the pancreas which is indeterminate. There is a small amount of ascites with fluid surrounding the liver and paracolic gutters. Adrenals and the kidneys are normal with 2.2 cm cystic lesion in the left kidney. Extensive postoperative changes are identified in the lumbosacral spine with irregularity and fragmentation at the surgical site and persistent grade 2 spondylolisthesis at L4-5. Clinical assessment is recommended. There is calcification aorta. Pelvis. Bladder is distended. There is small amount of ascites. 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 Somewhat distended small bowel loops with the mural thickening is identified. There is anasarca. Centrilobular emphysema with progressive patchy infiltrates/atelectasis and pleural effusion in the lung bases concerning for pneumonia or edema. Coronary artery calcification. Suboptimal evaluation abdomen pelvis. 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 Liver disease with small amount of ascites. Question indeterminate cystic lesion in the pancreas. Consider surveillance.  Significant irregularity and the postsurgical changes in the lumbosacral spine. See above. CT CHEST WO CONTRAST    Result Date: 7/24/2022  EXAMINATION: CT OF THE CHEST WITHOUT CONTRAST; CT OF THE ABDOMEN AND PELVIS WITHOUT CONTRAST 7/24/2022 12:55 pm TECHNIQUE: CT of the chest was performed without the administration of intravenous contrast. Multiplanar reformatted images are provided for review. Automated exposure control, iterative reconstruction, and/or weight based adjustment of the mA/kV was utilized to reduce the radiation dose to as low as reasonably achievable.; CT of the abdomen and pelvis was performed without the administration of intravenous contrast. Multiplanar reformatted images are provided for review. Automated exposure control, iterative reconstruction, and/or weight based adjustment of the mA/kV was utilized to reduce the radiation dose to as low as reasonably achievable. COMPARISON: 07/16/2022 HISTORY: ORDERING SYSTEM PROVIDED HISTORY: rule out infection TECHNOLOGIST PROVIDED HISTORY: Reason for exam:->rule out infection Decision Support Exception - unselect if not a suspected or confirmed emergency medical condition->Emergency Medical Condition (MA) What reading provider will be dictating this exam?->CRC FINDINGS: CT chest. There is borderline cardiac size with diffuse coronary artery calcification. The great vessels are normal.  There is prominent pulmonary artery concerning for pulmonary artery hypertension. Moderately enlarged mediastinal lymph nodes are present. There is centrilobular emphysema with progressive infiltrates/atelectasis and pleural effusion in the lung bases concerning for pneumonia or edema. Degenerative changes are identified in the thoracolumbar spine with previous surgical changes CT abdomen and pelvis. Comparison 01/21/2021 Findings The examination is very limited due to lack of contrast.  The liver is heterogeneous in appearance concerning for liver disease. Gallbladder is absent.   Stomach is collapsed with a G-tube. Spleen appears normal. 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 There is poor delineation of the pancreas with suggestion of a 1.8 cm cystic lesion in the head of the pancreas which is indeterminate. There is a small amount of ascites with fluid surrounding the liver and paracolic gutters. Adrenals and the kidneys are normal with 2.2 cm cystic lesion in the left kidney. Extensive postoperative changes are identified in the lumbosacral spine with irregularity and fragmentation at the surgical site and persistent grade 2 spondylolisthesis at L4-5. Clinical assessment is recommended. There is calcification aorta. Pelvis. Bladder is distended. There is small amount of ascites. 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 Somewhat distended small bowel loops with the mural thickening is identified. There is anasarca. Centrilobular emphysema with progressive patchy infiltrates/atelectasis and pleural effusion in the lung bases concerning for pneumonia or edema. Coronary artery calcification. Suboptimal evaluation abdomen pelvis. 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 Liver disease with small amount of ascites. Question indeterminate cystic lesion in the pancreas. Consider surveillance. Significant irregularity and the postsurgical changes in the lumbosacral spine. See above.      XR CHEST PORTABLE    Result Date: 7/24/2022  EXAMINATION: ONE XRAY VIEW OF THE CHEST 7/24/2022 9:54 am COMPARISON: 07/15/2022 HISTORY: ORDERING SYSTEM PROVIDED HISTORY: sepsis, r/o pneumonia TECHNOLOGIST PROVIDED HISTORY: Reason for exam:->sepsis, r/o pneumonia What reading provider will be dictating this exam?->CRC FINDINGS: Portable chest reveal cardiac and mediastinal silhouettes within normal limits. Underlying chronic changes seen within the lung fields bilaterally. No focal parenchymal opacification present. No pleural effusion or pneumothorax. Vascular calcifications seen within the thoracic aorta with hardware identified at the thoracolumbar spine. Interval removal the right noa catheter. Stable chronic changes seen within the lung fields with no evidence of acute parenchymal disease. Noa catheter is been removed in the interval on the right. Assessment//Plan           Hospital Problems             Last Modified POA    * (Principal) Enteritis 7/24/2022 Yes    Hypotension 7/24/2022 Yes    Abnormal CT scan, lumbar spine 7/25/2022 Yes     Assessment:   (E coli bacteremia  Short gut syndrome  Pancreatic mass  Lumbar DJD  CAD  HTN  Hyperlipidemia     Plan  ID following, on rocephin  Neurosurgery no intervention  IV fluids  MRCP per surgery for pancreatic mass. Monitor labs and exam.  Continue rest of meds. ).      Electronically signed by Duncan Mccain MD on 7/26/22 at 7:23 AM EDT

## 2022-07-27 ENCOUNTER — APPOINTMENT (OUTPATIENT)
Dept: MRI IMAGING | Age: 82
DRG: 314 | End: 2022-07-27
Payer: MEDICARE

## 2022-07-27 LAB
ALBUMIN SERPL-MCNC: 1.8 G/DL (ref 3.5–5.2)
ALP BLD-CCNC: 206 U/L (ref 35–104)
ALT SERPL-CCNC: 13 U/L (ref 0–32)
ANION GAP SERPL CALCULATED.3IONS-SCNC: 8 MMOL/L (ref 7–16)
AST SERPL-CCNC: 14 U/L (ref 0–31)
BASOPHILS ABSOLUTE: 0.05 E9/L (ref 0–0.2)
BASOPHILS RELATIVE PERCENT: 0.7 % (ref 0–2)
BILIRUB SERPL-MCNC: 0.5 MG/DL (ref 0–1.2)
BUN BLDV-MCNC: 36 MG/DL (ref 6–23)
CALCIUM SERPL-MCNC: 7 MG/DL (ref 8.6–10.2)
CHLORIDE BLD-SCNC: 111 MMOL/L (ref 98–107)
CO2: 18 MMOL/L (ref 22–29)
CREAT SERPL-MCNC: 1.4 MG/DL (ref 0.5–1)
EOSINOPHILS ABSOLUTE: 0.13 E9/L (ref 0.05–0.5)
EOSINOPHILS RELATIVE PERCENT: 1.9 % (ref 0–6)
GFR AFRICAN AMERICAN: 44
GFR NON-AFRICAN AMERICAN: 36 ML/MIN/1.73
GLUCOSE BLD-MCNC: 90 MG/DL (ref 74–99)
HCT VFR BLD CALC: 27 % (ref 34–48)
HEMOGLOBIN: 8.3 G/DL (ref 11.5–15.5)
IMMATURE GRANULOCYTES #: 0.04 E9/L
IMMATURE GRANULOCYTES %: 0.6 % (ref 0–5)
LYMPHOCYTES ABSOLUTE: 1.14 E9/L (ref 1.5–4)
LYMPHOCYTES RELATIVE PERCENT: 16.8 % (ref 20–42)
MCH RBC QN AUTO: 30.9 PG (ref 26–35)
MCHC RBC AUTO-ENTMCNC: 30.7 % (ref 32–34.5)
MCV RBC AUTO: 100.4 FL (ref 80–99.9)
MONOCYTES ABSOLUTE: 0.64 E9/L (ref 0.1–0.95)
MONOCYTES RELATIVE PERCENT: 9.4 % (ref 2–12)
NEUTROPHILS ABSOLUTE: 4.78 E9/L (ref 1.8–7.3)
NEUTROPHILS RELATIVE PERCENT: 70.6 % (ref 43–80)
PDW BLD-RTO: 14.6 FL (ref 11.5–15)
PLATELET # BLD: 300 E9/L (ref 130–450)
PMV BLD AUTO: 12.4 FL (ref 7–12)
POTASSIUM REFLEX MAGNESIUM: 4.1 MMOL/L (ref 3.5–5)
RBC # BLD: 2.69 E12/L (ref 3.5–5.5)
SODIUM BLD-SCNC: 137 MMOL/L (ref 132–146)
TOTAL PROTEIN: 5 G/DL (ref 6.4–8.3)
WBC # BLD: 6.8 E9/L (ref 4.5–11.5)

## 2022-07-27 PROCEDURE — 6370000000 HC RX 637 (ALT 250 FOR IP): Performed by: INTERNAL MEDICINE

## 2022-07-27 PROCEDURE — 2060000000 HC ICU INTERMEDIATE R&B

## 2022-07-27 PROCEDURE — 85025 COMPLETE CBC W/AUTO DIFF WBC: CPT

## 2022-07-27 PROCEDURE — 6360000002 HC RX W HCPCS: Performed by: INTERNAL MEDICINE

## 2022-07-27 PROCEDURE — 2580000003 HC RX 258: Performed by: FAMILY MEDICINE

## 2022-07-27 PROCEDURE — 80053 COMPREHEN METABOLIC PANEL: CPT

## 2022-07-27 PROCEDURE — 2580000003 HC RX 258: Performed by: INTERNAL MEDICINE

## 2022-07-27 PROCEDURE — 74183 MRI ABD W/O CNTR FLWD CNTR: CPT

## 2022-07-27 PROCEDURE — A9579 GAD-BASE MR CONTRAST NOS,1ML: HCPCS | Performed by: RADIOLOGY

## 2022-07-27 PROCEDURE — 6360000004 HC RX CONTRAST MEDICATION: Performed by: RADIOLOGY

## 2022-07-27 PROCEDURE — 36415 COLL VENOUS BLD VENIPUNCTURE: CPT

## 2022-07-27 RX ADMIN — ASPIRIN 81 MG CHEWABLE TABLET 81 MG: 81 TABLET CHEWABLE at 21:04

## 2022-07-27 RX ADMIN — Medication 10 ML: at 13:59

## 2022-07-27 RX ADMIN — DIPHENOXYLATE HYDROCHLORIDE AND ATROPINE SULFATE 1 TABLET: 2.5; .025 TABLET ORAL at 13:57

## 2022-07-27 RX ADMIN — OXYCODONE AND ACETAMINOPHEN 1 TABLET: 325; 10 TABLET ORAL at 13:33

## 2022-07-27 RX ADMIN — TRAZODONE HYDROCHLORIDE 50 MG: 50 TABLET ORAL at 21:02

## 2022-07-27 RX ADMIN — LOPERAMIDE HYDROCHLORIDE 6 MG: 2 CAPSULE ORAL at 21:02

## 2022-07-27 RX ADMIN — ESCITALOPRAM 20 MG: 10 TABLET, FILM COATED ORAL at 21:03

## 2022-07-27 RX ADMIN — DIPHENOXYLATE HYDROCHLORIDE AND ATROPINE SULFATE 1 TABLET: 2.5; .025 TABLET ORAL at 16:35

## 2022-07-27 RX ADMIN — Medication 10 ML: at 21:05

## 2022-07-27 RX ADMIN — LOPERAMIDE HYDROCHLORIDE 4 MG: 2 CAPSULE ORAL at 13:57

## 2022-07-27 RX ADMIN — PRIMIDONE 250 MG: 250 TABLET ORAL at 21:05

## 2022-07-27 RX ADMIN — APIXABAN 5 MG: 5 TABLET, FILM COATED ORAL at 13:58

## 2022-07-27 RX ADMIN — Medication 2000 UNITS: at 21:03

## 2022-07-27 RX ADMIN — FERROUS SULFATE TAB 325 MG (65 MG ELEMENTAL FE) 325 MG: 325 (65 FE) TAB at 21:04

## 2022-07-27 RX ADMIN — CEFTRIAXONE SODIUM 1000 MG: 1 INJECTION, POWDER, FOR SOLUTION INTRAMUSCULAR; INTRAVENOUS at 05:43

## 2022-07-27 RX ADMIN — FLUTICASONE PROPIONATE 1 SPRAY: 50 SPRAY, METERED NASAL at 21:06

## 2022-07-27 RX ADMIN — LOPERAMIDE HYDROCHLORIDE 4 MG: 2 CAPSULE ORAL at 16:35

## 2022-07-27 RX ADMIN — Medication 1 CAPSULE: at 21:04

## 2022-07-27 RX ADMIN — SODIUM CHLORIDE: 9 INJECTION, SOLUTION INTRAVENOUS at 15:25

## 2022-07-27 RX ADMIN — Medication 2000 UNITS: at 13:58

## 2022-07-27 RX ADMIN — PRAVASTATIN SODIUM 40 MG: 20 TABLET ORAL at 21:04

## 2022-07-27 RX ADMIN — OXYCODONE AND ACETAMINOPHEN 1 TABLET: 325; 10 TABLET ORAL at 21:03

## 2022-07-27 RX ADMIN — CYANOCOBALAMIN TAB 1000 MCG 1000 MCG: 1000 TAB at 21:03

## 2022-07-27 RX ADMIN — GADOTERIDOL 9 ML: 279.3 INJECTION, SOLUTION INTRAVENOUS at 12:37

## 2022-07-27 RX ADMIN — APIXABAN 5 MG: 5 TABLET, FILM COATED ORAL at 21:04

## 2022-07-27 RX ADMIN — DIPHENOXYLATE HYDROCHLORIDE AND ATROPINE SULFATE 1 TABLET: 2.5; .025 TABLET ORAL at 21:02

## 2022-07-27 ASSESSMENT — PAIN SCALES - GENERAL
PAINLEVEL_OUTOF10: 0
PAINLEVEL_OUTOF10: 5
PAINLEVEL_OUTOF10: 0

## 2022-07-27 ASSESSMENT — PAIN DESCRIPTION - LOCATION: LOCATION: BACK

## 2022-07-27 ASSESSMENT — ENCOUNTER SYMPTOMS
SHORTNESS OF BREATH: 0
ABDOMINAL PAIN: 1

## 2022-07-27 NOTE — PROGRESS NOTES
PRE-SEDATION ASSESSMENT    CONSENT  Consent for procedure and sedation obtained: Yes    MEDICAL HISTORY       PHYSICAL EXAM  History and Physical Reviewed: Patient has valid H&P within 30 days. I have reviewed and there are no changes.  Airway Anatomy : Class II  Heart : Normal  Lungs : Normal  LOC/Mental Status : Normal    OTHER FINDINGS  Reviewed current medications and allergies: Yes  Pertinent lab/diagnostic test reviewed: Yes    SEDATION RISK ASSESSMENT  Risk Status ASA: Class III - Severe systemic disease, limits activity, is not incapacitated  Plan for Sedation: Moderate Sedation  Indications for Procedure/Pre-Procedure Diagnosis and Planned Procedure: lymphoma presents for chest port placement  EKG Monitoring: Yes    NARRATIVE FINDINGS      Progress Note  Date:2022       Room:85[facility]  Patient Ronald Herrera     YOB: 1940     Age:82 y.o. Patient says she feels improved today. Subjective    Subjective:  Symptoms:  Improved. No shortness of breath or chest pain. Diet:  Poor intake. Activity level: Normal.    Pain:  She complains of pain that is mild. Review of Systems   Constitutional:  Negative for activity change and fever. HENT:  Negative for congestion. Respiratory:  Negative for shortness of breath. Cardiovascular:  Negative for chest pain. Gastrointestinal:  Positive for abdominal pain. Neurological:  Negative for dizziness. Objective         Vitals Last 24 Hours:  TEMPERATURE:  Temp  Av.5 °F (36.4 °C)  Min: 96.9 °F (36.1 °C)  Max: 98 °F (36.7 °C)  RESPIRATIONS RANGE: Resp  Av  Min: 18  Max: 26  PULSE OXIMETRY RANGE: SpO2  Av %  Min: 96 %  Max: 96 %  PULSE RANGE: Pulse  Av  Min: 62  Max: 74  BLOOD PRESSURE RANGE: Systolic (53RXZ), JBM:048 , Min:110 , AFU:631   ; Diastolic (22PEL), WBD:90, Min:40, Max:50    I/O (24Hr): Intake/Output Summary (Last 24 hours) at 2022 0711  Last data filed at 2022 0552  Gross per 24 hour   Intake --   Output 1300 ml   Net -1300 ml       Objective:  General Appearance:  Comfortable. Vital signs: (most recent): Blood pressure (!) 124/50, pulse 74, temperature 96.9 °F (36.1 °C), temperature source Temporal, resp. rate 18, height 5' (1.524 m), weight 99 lb (44.9 kg), SpO2 96 %. No fever. Lungs:  Normal effort and normal respiratory rate. Breath sounds clear to auscultation. Heart: Normal rate. Regular rhythm.   S1 normal and S2 normal.    Labs/Imaging/Diagnostics    Labs:  CBC:  Recent Labs     22  0943 22  0530 22  0555   WBC 14.3* 9.9 7.2   RBC 2.81* 2.64* 2.49*   HGB 8.5* 8.1* 7.5*   HCT 26.1* 25.7* 24.5*   MCV 92.9 97.3 98.4   RDW 14.5 14.6 14.6    231 273       CHEMISTRIES:  Recent Labs are present. There is centrilobular emphysema with progressive infiltrates/atelectasis and pleural effusion in the lung bases concerning for pneumonia or edema. Degenerative changes are identified in the thoracolumbar spine with previous surgical changes CT abdomen and pelvis. Comparison 01/21/2021 Findings The examination is very limited due to lack of contrast.  The liver is heterogeneous in appearance concerning for liver disease. Gallbladder is absent. Stomach is collapsed with a G-tube. Spleen appears normal. 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 There is poor delineation of the pancreas with suggestion of a 1.8 cm cystic lesion in the head of the pancreas which is indeterminate. There is a small amount of ascites with fluid surrounding the liver and paracolic gutters. Adrenals and the kidneys are normal with 2.2 cm cystic lesion in the left kidney. Extensive postoperative changes are identified in the lumbosacral spine with irregularity and fragmentation at the surgical site and persistent grade 2 spondylolisthesis at L4-5. Clinical assessment is recommended. There is calcification aorta. Pelvis. Bladder is distended. There is small amount of ascites. 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 Somewhat distended small bowel loops with the mural thickening is identified. There is anasarca. Centrilobular emphysema with progressive patchy infiltrates/atelectasis and pleural effusion in the lung bases concerning for pneumonia or edema. Coronary artery calcification. Suboptimal evaluation abdomen pelvis. 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 Liver disease with small amount of ascites. Question indeterminate cystic lesion in the pancreas. Consider surveillance.  Significant irregularity and the postsurgical changes in the lumbosacral spine. See above. CT CHEST WO CONTRAST    Result Date: 7/24/2022  EXAMINATION: CT OF THE CHEST WITHOUT CONTRAST; CT OF THE ABDOMEN AND PELVIS WITHOUT CONTRAST 7/24/2022 12:55 pm TECHNIQUE: CT of the chest was performed without the administration of intravenous contrast. Multiplanar reformatted images are provided for review. Automated exposure control, iterative reconstruction, and/or weight based adjustment of the mA/kV was utilized to reduce the radiation dose to as low as reasonably achievable.; CT of the abdomen and pelvis was performed without the administration of intravenous contrast. Multiplanar reformatted images are provided for review. Automated exposure control, iterative reconstruction, and/or weight based adjustment of the mA/kV was utilized to reduce the radiation dose to as low as reasonably achievable. COMPARISON: 07/16/2022 HISTORY: ORDERING SYSTEM PROVIDED HISTORY: rule out infection TECHNOLOGIST PROVIDED HISTORY: Reason for exam:->rule out infection Decision Support Exception - unselect if not a suspected or confirmed emergency medical condition->Emergency Medical Condition (MA) What reading provider will be dictating this exam?->CRC FINDINGS: CT chest. There is borderline cardiac size with diffuse coronary artery calcification. The great vessels are normal.  There is prominent pulmonary artery concerning for pulmonary artery hypertension. Moderately enlarged mediastinal lymph nodes are present. There is centrilobular emphysema with progressive infiltrates/atelectasis and pleural effusion in the lung bases concerning for pneumonia or edema. Degenerative changes are identified in the thoracolumbar spine with previous surgical changes CT abdomen and pelvis. Comparison 01/21/2021 Findings The examination is very limited due to lack of contrast.  The liver is heterogeneous in appearance concerning for liver disease. Gallbladder is absent. Stomach is collapsed with a G-tube. Spleen appears normal. 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 There is poor delineation of the pancreas with suggestion of a 1.8 cm cystic lesion in the head of the pancreas which is indeterminate. There is a small amount of ascites with fluid surrounding the liver and paracolic gutters. Adrenals and the kidneys are normal with 2.2 cm cystic lesion in the left kidney. Extensive postoperative changes are identified in the lumbosacral spine with irregularity and fragmentation at the surgical site and persistent grade 2 spondylolisthesis at L4-5. Clinical assessment is recommended. There is calcification aorta. Pelvis. Bladder is distended. There is small amount of ascites. 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 Somewhat distended small bowel loops with the mural thickening is identified. There is anasarca. Centrilobular emphysema with progressive patchy infiltrates/atelectasis and pleural effusion in the lung bases concerning for pneumonia or edema. Coronary artery calcification. Suboptimal evaluation abdomen pelvis. 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 Liver disease with small amount of ascites. Question indeterminate cystic lesion in the pancreas. Consider surveillance. Significant irregularity and the postsurgical changes in the lumbosacral spine. See above.      XR CHEST PORTABLE    Result Date: 7/24/2022  EXAMINATION: ONE XRAY VIEW OF THE CHEST 7/24/2022 9:54 am COMPARISON: 07/15/2022 HISTORY: ORDERING SYSTEM PROVIDED HISTORY: sepsis, r/o pneumonia TECHNOLOGIST PROVIDED HISTORY: Reason for exam:->sepsis, r/o pneumonia What reading provider will be dictating this exam?->CRC FINDINGS: Portable chest reveal cardiac and mediastinal silhouettes within normal limits. Underlying chronic changes seen within the lung fields bilaterally. No focal parenchymal opacification present. No pleural effusion or pneumothorax. Vascular calcifications seen within the thoracic aorta with hardware identified at the thoracolumbar spine. Interval removal the right noa catheter. Stable chronic changes seen within the lung fields with no evidence of acute parenchymal disease. Noa catheter is been removed in the interval on the right. Assessment//Plan           Hospital Problems             Last Modified POA    * (Principal) Enteritis 7/24/2022 Yes    Hypotension 7/24/2022 Yes    Abnormal CT scan, lumbar spine 7/25/2022 Yes   Assessment:   (E coli bacteremia  Short gut syndrome  Pancreatic mass  Lumbar DJD  CAD  HTN  Hyperlipidemia     Plan  ID following, on rocephin  Neurosurgery no intervention  IV fluids  MRCP per surgery for pancreatic mass. Monitor labs and exam.  Continue rest of meds. ).

## 2022-07-27 NOTE — PLAN OF CARE
Problem: Discharge Planning  Goal: Discharge to home or other facility with appropriate resources  7/27/2022 1002 by Jose Woodward RN  Outcome: Progressing Towards Goal  7/26/2022 2246 by Saintclair Abu, RN  Outcome: Progressing Towards Goal     Problem: Pain  Goal: Verbalizes/displays adequate comfort level or baseline comfort level  7/27/2022 1002 by Jose Woodward RN  Outcome: Progressing Towards Goal  7/26/2022 2246 by Saintclair Abu, RN  Outcome: Progressing Towards Goal     Problem: Skin/Tissue Integrity  Goal: Absence of new skin breakdown  Description: 1. Monitor for areas of redness and/or skin breakdown  2. Assess vascular access sites hourly  3. Every 4-6 hours minimum:  Change oxygen saturation probe site  4. Every 4-6 hours:  If on nasal continuous positive airway pressure, respiratory therapy assess nares and determine need for appliance change or resting period.   7/27/2022 1002 by Jose Woodward RN  Outcome: Progressing Towards Goal  7/26/2022 2246 by Saintclair Abu, RN  Outcome: Progressing Towards Goal     Problem: Chronic Conditions and Co-morbidities  Goal: Patient's chronic conditions and co-morbidity symptoms are monitored and maintained or improved  7/27/2022 1002 by Jose Woodward RN  Outcome: Progressing Towards Goal  7/26/2022 2246 by Saintclair Abu, RN  Outcome: Progressing Towards Goal     Problem: Safety - Adult  Goal: Free from fall injury  7/27/2022 1002 by Jose Woodward RN  Outcome: Progressing Towards Goal  7/26/2022 2246 by Saintclair Abu, RN  Outcome: Progressing Towards Goal

## 2022-07-27 NOTE — PROGRESS NOTES
Department of Internal Medicine  Infectious Diseases  Progress  Note    C/C :  E coli bacteremia       Denies fever, chills, abdomen pain   Afebrile  Low blood pressure, responding to IV fluid   Afebrile      Current Facility-Administered Medications   Medication Dose Route Frequency Provider Last Rate Last Admin    0.9 % sodium chloride infusion   IntraVENous Continuous Duncan Mccain  mL/hr at 07/26/22 1627 New Bag at 07/26/22 1627    sodium chloride flush 0.9 % injection 10 mL  10 mL IntraVENous 2 times per day Azalea E Awilda, DO   10 mL at 07/27/22 1359    sodium chloride flush 0.9 % injection 10 mL  10 mL IntraVENous PRN Azalea E Awilda, DO        0.9 % sodium chloride infusion   IntraVENous PRN Azalea E Awilda, DO        ondansetron (ZOFRAN-ODT) disintegrating tablet 4 mg  4 mg Oral Q8H PRN Azalea E Awilda, DO        Or    ondansetron (ZOFRAN) injection 4 mg  4 mg IntraVENous Q6H PRN Azalea E Awilda, DO   4 mg at 07/25/22 1210    senna (SENOKOT) tablet 8.6 mg  1 tablet Oral Daily PRN Azalea E Awilda, DO        acetaminophen (TYLENOL) tablet 650 mg  650 mg Oral Q6H PRN Azalea E Awilda, DO        Or    acetaminophen (TYLENOL) suppository 650 mg  650 mg Rectal Q6H PRN Azalea E Awilda, DO        apixaban (ELIQUIS) tablet 5 mg  5 mg Oral BID Azalea E Awilda, DO   5 mg at 07/27/22 1358    aspirin chewable tablet 81 mg  81 mg Oral Nightly Azalea E Awilda, DO   81 mg at 07/26/22 2141    vitamin D (CHOLECALCIFEROL) tablet 2,000 Units  2,000 Units Oral BID Azalea E Awilda, DO   2,000 Units at 07/27/22 1358    diphenoxylate-atropine (LOMOTIL) 2.5-0.025 MG per tablet 1 tablet  1 tablet Oral 4x Daily AC & HS Azalea E Awilda, DO   1 tablet at 07/27/22 1357    escitalopram (LEXAPRO) tablet 20 mg  20 mg Oral Nightly Azalea E Awilda, DO   20 mg at 07/26/22 2140    ferrous sulfate (IRON 325) tablet 325 mg  325 mg Oral Nightly Azalea Kaiser DO   325 mg at 07/26/22 2142    fluticasone (FLONASE) 50 MCG/ACT nasal spray 1 spray  1 spray Each Nostril Nightly Azalea E Awilda, DO   1 spray at 07/25/22 2038    hyoscyamine (ANASPAZ;LEVSIN) tablet 125 mcg  125 mcg Oral 4x Daily PRN Azalea E Awilda, DO        loperamide (IMODIUM) capsule 4 mg  4 mg Oral TID WC Azalea E Awilda, DO   4 mg at 07/27/22 1357    loperamide (IMODIUM) capsule 6 mg  6 mg Oral Nightly Azalea E Awilda, DO   6 mg at 07/26/22 2141    LORazepam (ATIVAN) tablet 0.25 mg  0.25 mg Oral Daily PRN Azalea E Awilda, DO        pantoprazole (PROTONIX) tablet 40 mg  40 mg Oral QAM AC Aazlea E Awilda, DO   40 mg at 07/26/22 0549    oxyCODONE-acetaminophen (PERCOCET)  MG per tablet 1 tablet  1 tablet Oral Q6H PRN Azalea E Awilda, DO   1 tablet at 07/27/22 1333    pravastatin (PRAVACHOL) tablet 40 mg  40 mg Oral Nightly Azalea E Awilda, DO   40 mg at 07/26/22 2141    primidone (MYSOLINE) tablet 250 mg  250 mg Oral Nightly Azalea E Awilda, DO   250 mg at 07/26/22 2142    lactobacillus (CULTURELLE) capsule 1 capsule  1 capsule Oral Nightly Azalea E Awilda, DO   1 capsule at 07/26/22 2141    traZODone (DESYREL) tablet 50 mg  50 mg Oral Nightly Azalea E Awilda, DO   50 mg at 07/26/22 2142    vitamin B-12 (CYANOCOBALAMIN) tablet 1,000 mcg  1,000 mcg Oral Nightly Azalea E Awilda, DO   1,000 mcg at 07/26/22 2141    cefTRIAXone (ROCEPHIN) 1,000 mg in sterile water 10 mL IV syringe  1,000 mg IntraVENous Q24H Fred Salmon MD   1,000 mg at 07/27/22 0543         REVIEW OF SYSTEMS:    CONSTITUTIONAL:  Denies fever, chill or rigors. Reported weakness   HEENT: denies blurring of vision or double vision, denies hearing problem  RESPIRATORY: denies cough, shortness of breath, sputum expectoration, chest pain. CARDIOVASCULAR:  Denies palpitation  GASTROINTESTINAL:  diarrhea . dehydration   GENITOURINARY:  Denies burning urination or frequency of urination  INTEGUMENT: denies wound , rash  HEMATOLOGIC/LYMPHATIC:  Denies lymph node swelling, gum bleeding or easy bruising. MUSCULOSKELETAL:  Denies leg pain , joint pain , joint swelling  NEUROLOGICAL:  Denies light headed, dizziness    PHYSICAL EXAM:      Vitals:     BP (!) 124/42   Pulse 77   Temp 96.8 °F (36 °C) (Temporal)   Resp 18   Ht 5' (1.524 m)   Wt 99 lb (44.9 kg)   SpO2 94%   BMI 19.33 kg/m²     General Appearance:    Awake, alert , no acute distress. Head:    Normocephalic, atraumatic   Eyes:    No pallor, no icterus,   Ears:    No obvious deformity or drainage.    Nose:   No nasal drainage   Throat:   Mucosa dry    Neck:   Supple, no lymphadenopathy   Back:     no CVA tenderness   Lungs:     Clear to auscultation bilaterally    Heart:    Regular rate and rhythm   Abdomen:     Soft, non-tender, bowel sounds present    PEG tube - OK, ileostomy functioning    Extremities:   No edema, no cyanosis    Pulses:   Dorsalis pedis palpable    Skin:   no rashes or lesions   CBC with Differential:      Lab Results   Component Value Date/Time    WBC 6.8 07/27/2022 06:58 AM    RBC 2.69 07/27/2022 06:58 AM    HGB 8.3 07/27/2022 06:58 AM    HCT 27.0 07/27/2022 06:58 AM     07/27/2022 06:58 AM    .4 07/27/2022 06:58 AM    MCH 30.9 07/27/2022 06:58 AM    MCHC 30.7 07/27/2022 06:58 AM    RDW 14.6 07/27/2022 06:58 AM    NRBC 0.0 07/21/2022 06:18 AM    LYMPHOPCT 16.8 07/27/2022 06:58 AM    MONOPCT 9.4 07/27/2022 06:58 AM    MYELOPCT 0.9 07/24/2022 09:43 AM    BASOPCT 0.7 07/27/2022 06:58 AM    MONOSABS 0.64 07/27/2022 06:58 AM    LYMPHSABS 1.14 07/27/2022 06:58 AM    EOSABS 0.13 07/27/2022 06:58 AM    BASOSABS 0.05 07/27/2022 06:58 AM       CMP     Lab Results   Component Value Date/Time     07/27/2022 06:58 AM    K 4.1 07/27/2022 06:58 AM     07/27/2022 06:58 AM    CO2 18 07/27/2022 06:58 AM    BUN 36 07/27/2022 06:58 AM    CREATININE 1.4 07/27/2022 06:58 AM    GFRAA 44 07/27/2022 06:58 AM    LABGLOM 36 07/27/2022 06:58 AM    GLUCOSE 90 07/27/2022 06:58 AM    PROT 5.0 07/27/2022 06:58 AM    LABALBU 1.8 07/27/2022 06:58 AM    CALCIUM 7.0 07/27/2022 06:58 AM    BILITOT 0.5 07/27/2022 06:58 AM    ALKPHOS 206 07/27/2022 06:58 AM    AST 14 07/27/2022 06:58 AM    ALT 13 07/27/2022 06:58 AM         Hepatic Function Panel:    Lab Results   Component Value Date/Time    ALKPHOS 206 07/27/2022 06:58 AM    ALT 13 07/27/2022 06:58 AM    AST 14 07/27/2022 06:58 AM    PROT 5.0 07/27/2022 06:58 AM    BILITOT 0.5 07/27/2022 06:58 AM    BILIDIR <0.2 01/21/2021 11:55 AM    IBILI see below 01/21/2021 11:55 AM    LABALBU 1.8 07/27/2022 06:58 AM       PT/INR:    Lab Results   Component Value Date/Time    PROTIME 19.0 07/24/2022 09:43 AM    INR 1.7 07/24/2022 09:43 AM       TSH:    Lab Results   Component Value Date/Time    TSH 1.380 07/01/2022 02:31 PM       U/A:    Lab Results   Component Value Date/Time    COLORU Yellow 07/24/2022 11:19 AM    PHUR 5.5 07/24/2022 11:19 AM    LABCAST FEW 01/25/2020 02:45 PM    WBCUA NONE 07/24/2022 11:19 AM    RBCUA NONE 07/24/2022 11:19 AM    YEAST Present 09/18/2020 06:50 AM    BACTERIA NONE SEEN 07/24/2022 11:19 AM    CLARITYU Clear 07/24/2022 11:19 AM    SPECGRAV 1.020 07/24/2022 11:19 AM    LEUKOCYTESUR Negative 07/24/2022 11:19 AM    UROBILINOGEN 0.2 07/24/2022 11:19 AM    BILIRUBINUR Negative 07/24/2022 11:19 AM    BLOODU Negative 07/24/2022 11:19 AM    GLUCOSEU Negative 07/24/2022 11:19 AM       ABG:  No results found for: KTZ7YJL, BEART, F5HVYVDK, PHART, THGBART, ZYW5CUL, PO2ART, QHX7QTU    MICROBIOLOGY:    Blood culture -    Susceptibility      Escherichia coli (1)    Antibiotic Interpretation Microscan  Method Status    amoxicillin-clavulanate Intermediate ^16 mcg/mL BACTERIAL SUSCEPTIBILITY PANEL BY JAMAAL     ceFAZolin Sensitive <=^4 mcg/mL BACTERIAL SUSCEPTIBILITY PANEL BY JAMAAL     cefepime Sensitive <=^0.12 mcg/mL BACTERIAL SUSCEPTIBILITY PANEL BY JAMAAL     cefotaxime Sensitive <=^0.25 mcg/mL BACTERIAL SUSCEPTIBILITY PANEL BY JAMAAL     cefOXitin Sensitive <=^4 mcg/mL BACTERIAL SUSCEPTIBILITY PANEL BY JAMAAL     cefTAZidime-avibactam Sensitive <=^0.12 mcg/mL BACTERIAL SUSCEPTIBILITY PANEL BY JAMAAL     gentamicin Sensitive <=^1 mcg/mL BACTERIAL SUSCEPTIBILITY PANEL BY JAMAAL     levofloxacin Sensitive <=^0.12 mcg/mL BACTERIAL SUSCEPTIBILITY PANEL BY JAMAAL     meropenem Sensitive <=^0.25 mcg/mL BACTERIAL SUSCEPTIBILITY PANEL BY JAMAAL     piperacillin-tazobactam Sensitive <=^4 mcg/mL BACTERIAL SUSCEPTIBILITY PANEL BY JAMAAL     trimethoprim-sulfamethoxazole Resistant >=^320 mcg/mL BACTERIAL SUSCEPTIBILITY              Radiology :    MRI abdomen -  Associated with the distal margin distal pancreatic duct is a 12 mm focus   without abnormal enhancement most consistent of a 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 Findings consistent with acute enteritis and mesenteric edema as well as   small volume ascites.      Moderate right and small to moderate left pleural effusions         IMPRESSION:     Short gut syndrome,  E coli bacteremia  Leukocytosis - improved       RECOMMENDATIONS:    IV rocephin 1 gram q 24 hrs  ( day 4 of 7 )

## 2022-07-27 NOTE — PROGRESS NOTES
Physician Progress Note      Glenn Gann  [facility] #:                  [id number]  :                       1940  ADMIT DATE:       2022 8:37 AM  DISCH DATE:  RESPONDING  PROVIDER #:        Mendoza Egan MD          QUERY TEXT:    Pt admitted with hypotension and has anemia documented. If possible, please   document in progress notes and discharge summary further specificity regarding   the acuity and type of anemia:    The medical record reflects the following:  Risk Factors: CKD, Short gut syndrome  Clinical Indicators: Per ED provider note on  \". ..chronic anemia and   hemoglobin 8.5. [place] \", H&H 8.5 & 26.1 on admission and down to 7.5 & 24.5 on   , H&H on  was 10 & 30.1, Iron level on 22 was 30, Iron Saturation   on 22 was 13, TIBC on 22 was 229  Treatment: Serial CBC    Thank you,  Margaree Bence, RN, BSN, CDIS  Clinical Documentation Improvement  [email or web address]  Options provided:  -- Chronic anemia due to iron deficiency  -- Chronic anemia due to CKD  -- Acute on chronic anemia due to iron deficiency  -- Acute on chronic anemia due to CKD  -- Chronic anemia due to ##please specify, please specify. -- Acute on chronic Anemia due to ##please specify, please specify. -- Other - I will add my own diagnosis  -- Disagree - Not applicable / Not valid  -- Disagree - Clinically unable to determine / Unknown  -- Refer to Clinical Documentation Reviewer    PROVIDER RESPONSE TEXT:    This patient has chronic iron deficiency anemia.     Query created by: Dulce Stuart on 2022 10:44 AM      Electronically signed by:  Mendoza Egan MD 2022 3:41 PM

## 2022-07-28 PROCEDURE — 1200000000 HC SEMI PRIVATE

## 2022-07-28 PROCEDURE — 6370000000 HC RX 637 (ALT 250 FOR IP): Performed by: INTERNAL MEDICINE

## 2022-07-28 PROCEDURE — 2580000003 HC RX 258: Performed by: INTERNAL MEDICINE

## 2022-07-28 PROCEDURE — 6360000002 HC RX W HCPCS: Performed by: INTERNAL MEDICINE

## 2022-07-28 PROCEDURE — 99232 SBSQ HOSP IP/OBS MODERATE 35: CPT | Performed by: TRANSPLANT SURGERY

## 2022-07-28 PROCEDURE — 2580000003 HC RX 258: Performed by: FAMILY MEDICINE

## 2022-07-28 RX ADMIN — APIXABAN 5 MG: 5 TABLET, FILM COATED ORAL at 22:03

## 2022-07-28 RX ADMIN — TRAZODONE HYDROCHLORIDE 50 MG: 50 TABLET ORAL at 22:03

## 2022-07-28 RX ADMIN — DIPHENOXYLATE HYDROCHLORIDE AND ATROPINE SULFATE 1 TABLET: 2.5; .025 TABLET ORAL at 13:50

## 2022-07-28 RX ADMIN — PRIMIDONE 250 MG: 250 TABLET ORAL at 22:46

## 2022-07-28 RX ADMIN — DIPHENOXYLATE HYDROCHLORIDE AND ATROPINE SULFATE 1 TABLET: 2.5; .025 TABLET ORAL at 22:03

## 2022-07-28 RX ADMIN — Medication 1 CAPSULE: at 22:04

## 2022-07-28 RX ADMIN — LOPERAMIDE HYDROCHLORIDE 4 MG: 2 CAPSULE ORAL at 18:03

## 2022-07-28 RX ADMIN — LOPERAMIDE HYDROCHLORIDE 4 MG: 2 CAPSULE ORAL at 13:50

## 2022-07-28 RX ADMIN — Medication 2000 UNITS: at 08:42

## 2022-07-28 RX ADMIN — CYANOCOBALAMIN TAB 1000 MCG 1000 MCG: 1000 TAB at 22:04

## 2022-07-28 RX ADMIN — LOPERAMIDE HYDROCHLORIDE 6 MG: 2 CAPSULE ORAL at 22:02

## 2022-07-28 RX ADMIN — OXYCODONE AND ACETAMINOPHEN 1 TABLET: 325; 10 TABLET ORAL at 10:00

## 2022-07-28 RX ADMIN — PANTOPRAZOLE SODIUM 40 MG: 40 TABLET, DELAYED RELEASE ORAL at 06:05

## 2022-07-28 RX ADMIN — Medication 10 ML: at 08:43

## 2022-07-28 RX ADMIN — FLUTICASONE PROPIONATE 1 SPRAY: 50 SPRAY, METERED NASAL at 22:05

## 2022-07-28 RX ADMIN — PRAVASTATIN SODIUM 40 MG: 20 TABLET ORAL at 22:46

## 2022-07-28 RX ADMIN — DIPHENOXYLATE HYDROCHLORIDE AND ATROPINE SULFATE 1 TABLET: 2.5; .025 TABLET ORAL at 18:03

## 2022-07-28 RX ADMIN — Medication 2000 UNITS: at 22:04

## 2022-07-28 RX ADMIN — DIPHENOXYLATE HYDROCHLORIDE AND ATROPINE SULFATE 1 TABLET: 2.5; .025 TABLET ORAL at 06:05

## 2022-07-28 RX ADMIN — APIXABAN 5 MG: 5 TABLET, FILM COATED ORAL at 08:42

## 2022-07-28 RX ADMIN — OXYCODONE AND ACETAMINOPHEN 1 TABLET: 325; 10 TABLET ORAL at 18:49

## 2022-07-28 RX ADMIN — SODIUM CHLORIDE: 9 INJECTION, SOLUTION INTRAVENOUS at 01:31

## 2022-07-28 RX ADMIN — Medication 10 ML: at 22:05

## 2022-07-28 RX ADMIN — ASPIRIN 81 MG CHEWABLE TABLET 81 MG: 81 TABLET CHEWABLE at 22:04

## 2022-07-28 RX ADMIN — ESCITALOPRAM 20 MG: 10 TABLET, FILM COATED ORAL at 22:04

## 2022-07-28 RX ADMIN — CEFTRIAXONE SODIUM 1000 MG: 1 INJECTION, POWDER, FOR SOLUTION INTRAMUSCULAR; INTRAVENOUS at 06:05

## 2022-07-28 RX ADMIN — FERROUS SULFATE TAB 325 MG (65 MG ELEMENTAL FE) 325 MG: 325 (65 FE) TAB at 22:04

## 2022-07-28 RX ADMIN — LOPERAMIDE HYDROCHLORIDE 4 MG: 2 CAPSULE ORAL at 08:42

## 2022-07-28 ASSESSMENT — PAIN SCALES - GENERAL
PAINLEVEL_OUTOF10: 4
PAINLEVEL_OUTOF10: 0

## 2022-07-28 ASSESSMENT — ENCOUNTER SYMPTOMS
SHORTNESS OF BREATH: 0
ABDOMINAL PAIN: 1

## 2022-07-28 NOTE — PLAN OF CARE
Problem: Discharge Planning  Goal: Discharge to home or other facility with appropriate resources  7/27/2022 2241 by Amarjit Liz RN  Outcome: Progressing  Flowsheets (Taken 7/27/2022 1557 by Caren العلي RN)  Discharge to home or other facility with appropriate resources:   Identify barriers to discharge with patient and caregiver   Identify discharge learning needs (meds, wound care, etc)  7/27/2022 1002 by Caren العلي RN  Outcome: Progressing     Problem: Pain  Goal: Verbalizes/displays adequate comfort level or baseline comfort level  7/27/2022 2241 by Amarjit Liz RN  Outcome: Progressing  7/27/2022 1002 by Caren العلي RN  Outcome: Progressing     Problem: Skin/Tissue Integrity  Goal: Absence of new skin breakdown  7/27/2022 2241 by Amarjit Liz RN  Outcome: Progressing  7/27/2022 1002 by Caren العلي RN  Outcome: Progressing     Problem: Chronic Conditions and Co-morbidities  Goal: Patient's chronic conditions and co-morbidity symptoms are monitored and maintained or improved  7/27/2022 2241 by Amarjit Liz RN  Outcome: Progressing  7/27/2022 1002 by Caren العلي RN  Outcome: Progressing     Problem: Safety - Adult  Goal: Free from fall injury  7/27/2022 2241 by Amarjit Liz RN  Outcome: Progressing  7/27/2022 1002 by Caren العلي RN  Outcome: Progressing

## 2022-07-28 NOTE — PROGRESS NOTES
137   K 3.7 4.1    111*   CO2 21* 18*   BUN 48* 36*   CREATININE 1.5* 1.4*   GLUCOSE 109* 90       PT/INR:  No results for input(s): PROTIME, INR in the last 72 hours. APTT:  No results for input(s): APTT in the last 72 hours. LIVER PROFILE:  Recent Labs     07/26/22  0555 07/27/22  0658   AST 12 14   ALT 12 13   BILITOT 0.5 0.5   ALKPHOS 204* 206*         Imaging Last 24 Hours:  CT ABDOMEN PELVIS WO CONTRAST Additional Contrast? None    Result Date: 7/24/2022  EXAMINATION: CT OF THE CHEST WITHOUT CONTRAST; CT OF THE ABDOMEN AND PELVIS WITHOUT CONTRAST 7/24/2022 12:55 pm TECHNIQUE: CT of the chest was performed without the administration of intravenous contrast. Multiplanar reformatted images are provided for review. Automated exposure control, iterative reconstruction, and/or weight based adjustment of the mA/kV was utilized to reduce the radiation dose to as low as reasonably achievable.; CT of the abdomen and pelvis was performed without the administration of intravenous contrast. Multiplanar reformatted images are provided for review. Automated exposure control, iterative reconstruction, and/or weight based adjustment of the mA/kV was utilized to reduce the radiation dose to as low as reasonably achievable. COMPARISON: 07/16/2022 HISTORY: ORDERING SYSTEM PROVIDED HISTORY: rule out infection TECHNOLOGIST PROVIDED HISTORY: Reason for exam:->rule out infection Decision Support Exception - unselect if not a suspected or confirmed emergency medical condition->Emergency Medical Condition (MA) What reading provider will be dictating this exam?->CRC FINDINGS: CT chest. There is borderline cardiac size with diffuse coronary artery calcification. The great vessels are normal.  There is prominent pulmonary artery concerning for pulmonary artery hypertension. Moderately enlarged mediastinal lymph nodes are present.   There is centrilobular emphysema with progressive infiltrates/atelectasis and pleural effusion in the lung bases concerning for pneumonia or edema. Degenerative changes are identified in the thoracolumbar spine with previous surgical changes CT abdomen and pelvis. Comparison 01/21/2021 Findings The examination is very limited due to lack of contrast.  The liver is heterogeneous in appearance concerning for liver disease. Gallbladder is absent. Stomach is collapsed with a G-tube. Spleen appears normal. 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 There is poor delineation of the pancreas with suggestion of a 1.8 cm cystic lesion in the head of the pancreas which is indeterminate. There is a small amount of ascites with fluid surrounding the liver and paracolic gutters. Adrenals and the kidneys are normal with 2.2 cm cystic lesion in the left kidney. Extensive postoperative changes are identified in the lumbosacral spine with irregularity and fragmentation at the surgical site and persistent grade 2 spondylolisthesis at L4-5. Clinical assessment is recommended. There is calcification aorta. Pelvis. Bladder is distended. There is small amount of ascites. 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 Somewhat distended small bowel loops with the mural thickening is identified. There is anasarca. Centrilobular emphysema with progressive patchy infiltrates/atelectasis and pleural effusion in the lung bases concerning for pneumonia or edema. Coronary artery calcification. Suboptimal evaluation abdomen pelvis. 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 Liver disease with small amount of ascites. Question indeterminate cystic lesion in the pancreas. Consider surveillance. Significant irregularity and the postsurgical changes in the lumbosacral spine. See above.      CT CHEST WO CONTRAST    Result Date: 7/24/2022  EXAMINATION: CT OF THE CHEST WITHOUT CONTRAST; CT OF THE ABDOMEN AND PELVIS WITHOUT CONTRAST 7/24/2022 12:55 pm TECHNIQUE: CT of the chest was performed without the administration of intravenous contrast. Multiplanar reformatted images are provided for review. Automated exposure control, iterative reconstruction, and/or weight based adjustment of the mA/kV was utilized to reduce the radiation dose to as low as reasonably achievable.; CT of the abdomen and pelvis was performed without the administration of intravenous contrast. Multiplanar reformatted images are provided for review. Automated exposure control, iterative reconstruction, and/or weight based adjustment of the mA/kV was utilized to reduce the radiation dose to as low as reasonably achievable. COMPARISON: 07/16/2022 HISTORY: ORDERING SYSTEM PROVIDED HISTORY: rule out infection TECHNOLOGIST PROVIDED HISTORY: Reason for exam:->rule out infection Decision Support Exception - unselect if not a suspected or confirmed emergency medical condition->Emergency Medical Condition (MA) What reading provider will be dictating this exam?->CRC FINDINGS: CT chest. There is borderline cardiac size with diffuse coronary artery calcification. The great vessels are normal.  There is prominent pulmonary artery concerning for pulmonary artery hypertension. Moderately enlarged mediastinal lymph nodes are present. There is centrilobular emphysema with progressive infiltrates/atelectasis and pleural effusion in the lung bases concerning for pneumonia or edema. Degenerative changes are identified in the thoracolumbar spine with previous surgical changes CT abdomen and pelvis. Comparison 01/21/2021 Findings The examination is very limited due to lack of contrast.  The liver is heterogeneous in appearance concerning for liver disease. Gallbladder is absent. Stomach is collapsed with a G-tube.   Spleen appears normal. 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 There is poor delineation of the pancreas with suggestion of a 1.8 cm cystic lesion in the head of the pancreas which is indeterminate. There is a small amount of ascites with fluid surrounding the liver and paracolic gutters. Adrenals and the kidneys are normal with 2.2 cm cystic lesion in the left kidney. Extensive postoperative changes are identified in the lumbosacral spine with irregularity and fragmentation at the surgical site and persistent grade 2 spondylolisthesis at L4-5. Clinical assessment is recommended. There is calcification aorta. Pelvis. Bladder is distended. There is small amount of ascites. 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 Somewhat distended small bowel loops with the mural thickening is identified. There is anasarca. Centrilobular emphysema with progressive patchy infiltrates/atelectasis and pleural effusion in the lung bases concerning for pneumonia or edema. Coronary artery calcification. Suboptimal evaluation abdomen pelvis. 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 Liver disease with small amount of ascites. Question indeterminate cystic lesion in the pancreas. Consider surveillance. Significant irregularity and the postsurgical changes in the lumbosacral spine. See above. XR CHEST PORTABLE    Result Date: 7/24/2022  EXAMINATION: ONE XRAY VIEW OF THE CHEST 7/24/2022 9:54 am COMPARISON: 07/15/2022 HISTORY: ORDERING SYSTEM PROVIDED HISTORY: sepsis, r/o pneumonia TECHNOLOGIST PROVIDED HISTORY: Reason for exam:->sepsis, r/o pneumonia What reading provider will be dictating this exam?->CRC FINDINGS: Portable chest reveal cardiac and mediastinal silhouettes within normal limits. Underlying chronic changes seen within the lung fields bilaterally.  No focal parenchymal opacification present. No pleural effusion or pneumothorax. Vascular calcifications seen within the thoracic aorta with hardware identified at the thoracolumbar spine. Interval removal the right noa catheter. Stable chronic changes seen within the lung fields with no evidence of acute parenchymal disease. Noa catheter is been removed in the interval on the right. Assessment//Plan           Hospital Problems             Last Modified POA    * (Principal) Enteritis 7/24/2022 Yes    Hypotension 7/24/2022 Yes    Abnormal CT scan, lumbar spine 7/25/2022 Yes   Assessment:   (E coli bacteremia  Short gut syndrome  Pancreatic mass  Lumbar DJD  CAD  HTN  Hyperlipidemia     Plan  ID following, on rocephin  Neurosurgery no intervention  MRCP reviewed, no plans from surgery  Monitor labs and exam.  Continue rest of meds. Discharge planning. ).

## 2022-07-28 NOTE — PROGRESS NOTES
B SURGERY  DAILY PROGRESS NOTE  7/28/2022    CHIEF COMPLAINT:  Chief Complaint   Patient presents with    Hypotension     Was sent in from [facility] for hypotension BP  103/46 HERE       SUBJECTIVE:  No events overnight. Patient resting comfortably in bed, denies complaints, pain, N/V.     OBJECTIVE:  BP 91/76   Pulse 75   Temp (!) 69.9 °F (21.1 °C) (Temporal)   Resp 18   Ht 5' (1.524 m)   Wt 99 lb (44.9 kg)   SpO2 97%   BMI 19.33 kg/m²     GENERAL:  NAD. A&Ox3. LUNGS:  No increased work of breathing. CARDIOVASCULAR: RR  ABDOMEN:  Soft, non-distended, non-tender. No guarding, rigidity, rebound. ASSESSMENT/PLAN:  [de-identified] y.o. female with leukocytosis and hypotension, recent E. coli sepsis being evaluated by general surgery for incidental pancreatic lesion on noncontrast CT.     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

## 2022-07-28 NOTE — PROGRESS NOTES
Department of Internal Medicine  Infectious Diseases  Progress  Note    C/C :  E coli bacteremia       Denies fever, chills, abdomen pain   Afebrile  Low blood pressure, responding to IV fluid   Afebrile      Current Facility-Administered Medications   Medication Dose Route Frequency Provider Last Rate Last Admin    0.9 % sodium chloride infusion   IntraVENous Continuous Clarence Joyner  mL/hr at 07/28/22 0131 New Bag at 07/28/22 0131    sodium chloride flush 0.9 % injection 10 mL  10 mL IntraVENous 2 times per day Azalea E Awilda, DO   10 mL at 07/28/22 0843    sodium chloride flush 0.9 % injection 10 mL  10 mL IntraVENous PRN Azalea E Awilda, DO        0.9 % sodium chloride infusion   IntraVENous PRN Azalea E Awilda, DO        ondansetron (ZOFRAN-ODT) disintegrating tablet 4 mg  4 mg Oral Q8H PRN Azalea E Awilda, DO        Or    ondansetron (ZOFRAN) injection 4 mg  4 mg IntraVENous Q6H PRN Azalea E Awilda, DO   4 mg at 07/25/22 1210    senna (SENOKOT) tablet 8.6 mg  1 tablet Oral Daily PRN Azalea E Awilda, DO        acetaminophen (TYLENOL) tablet 650 mg  650 mg Oral Q6H PRN Azalea E Awilda, DO        Or    acetaminophen (TYLENOL) suppository 650 mg  650 mg Rectal Q6H PRN Azalea E Awilda, DO        apixaban (ELIQUIS) tablet 5 mg  5 mg Oral BID Azalea E Awilda, DO   5 mg at 07/28/22 6809    aspirin chewable tablet 81 mg  81 mg Oral Nightly Azalea E Awilda, DO   81 mg at 07/27/22 2104    vitamin D (CHOLECALCIFEROL) tablet 2,000 Units  2,000 Units Oral BID Azalea E Awilda, DO   2,000 Units at 07/28/22 0842    diphenoxylate-atropine (LOMOTIL) 2.5-0.025 MG per tablet 1 tablet  1 tablet Oral 4x Daily AC & HS Azalea E Awilda, DO   1 tablet at 07/28/22 1350    escitalopram (LEXAPRO) tablet 20 mg  20 mg Oral Nightly Azalea E Awilda, DO   20 mg at 07/27/22 2103    ferrous sulfate (IRON 325) tablet 325 mg  325 mg Oral Nightly Azalea Kaiser DO   325 mg at 07/27/22 2104    fluticasone (FLONASE) 50 MCG/ACT nasal spray 1 spray  1 spray Each Nostril Nightly Azalea E Awilda, DO   1 spray at 07/27/22 2106    hyoscyamine (ANASPAZ;LEVSIN) tablet 125 mcg  125 mcg Oral 4x Daily PRN Azalea E Awilda, DO        loperamide (IMODIUM) capsule 4 mg  4 mg Oral TID WC Azalea E Awilda, DO   4 mg at 07/28/22 1350    loperamide (IMODIUM) capsule 6 mg  6 mg Oral Nightly Azalea E Awilda, DO   6 mg at 07/27/22 2102    LORazepam (ATIVAN) tablet 0.25 mg  0.25 mg Oral Daily PRN Azalea E Awilda, DO        pantoprazole (PROTONIX) tablet 40 mg  40 mg Oral QAM AC Azalea E Awilda, DO   40 mg at 07/28/22 0605    oxyCODONE-acetaminophen (PERCOCET)  MG per tablet 1 tablet  1 tablet Oral Q6H PRN Azalea E Awilda, DO   1 tablet at 07/28/22 1000    pravastatin (PRAVACHOL) tablet 40 mg  40 mg Oral Nightly Azalea E Awilda, DO   40 mg at 07/27/22 2104    primidone (MYSOLINE) tablet 250 mg  250 mg Oral Nightly Azalea E Awilda, DO   250 mg at 07/27/22 2105    lactobacillus (CULTURELLE) capsule 1 capsule  1 capsule Oral Nightly Azalea E Awilda, DO   1 capsule at 07/27/22 2104    traZODone (DESYREL) tablet 50 mg  50 mg Oral Nightly Azalea E Awilda, DO   50 mg at 07/27/22 2102    vitamin B-12 (CYANOCOBALAMIN) tablet 1,000 mcg  1,000 mcg Oral Nightly Azalea E Awilda, DO   1,000 mcg at 07/27/22 2103    cefTRIAXone (ROCEPHIN) 1,000 mg in sterile water 10 mL IV syringe  1,000 mg IntraVENous Q24H Maria Del Rosario Salmon MD   1,000 mg at 07/28/22 0605         REVIEW OF SYSTEMS:    CONSTITUTIONAL:  Denies fever, chill or rigors. Reported weakness   HEENT: denies blurring of vision or double vision, denies hearing problem  RESPIRATORY: denies cough, shortness of breath, sputum expectoration, chest pain. CARDIOVASCULAR:  Denies palpitation  GASTROINTESTINAL:  diarrhea . dehydration   GENITOURINARY:  Denies burning urination or frequency of urination  INTEGUMENT: denies wound , rash  HEMATOLOGIC/LYMPHATIC:  Denies lymph node swelling, gum bleeding or easy bruising. MUSCULOSKELETAL:  Denies leg pain , joint pain , joint swelling  NEUROLOGICAL:  Denies light headed, dizziness    PHYSICAL EXAM:      Vitals:     BP (!) 112/51   Pulse 55   Temp 96.9 °F (36.1 °C)   Resp 16   Ht 5' (1.524 m)   Wt 99 lb (44.9 kg)   SpO2 100%   BMI 19.33 kg/m²     General Appearance:    Awake, alert , no acute distress. Head:    Normocephalic, atraumatic   Eyes:    No pallor, no icterus,   Ears:    No obvious deformity or drainage.    Nose:   No nasal drainage   Throat:   Mucosa dry    Neck:   Supple, no lymphadenopathy   Back:     no CVA tenderness   Lungs:     Clear to auscultation bilaterally    Heart:    Regular rate and rhythm   Abdomen:     Soft, non-tender, bowel sounds present    PEG tube - OK, ileostomy functioning    Extremities:   No edema, no cyanosis    Pulses:   Dorsalis pedis palpable    Skin:   no rashes or lesions   CBC with Differential:      Lab Results   Component Value Date/Time    WBC 6.8 07/27/2022 06:58 AM    RBC 2.69 07/27/2022 06:58 AM    HGB 8.3 07/27/2022 06:58 AM    HCT 27.0 07/27/2022 06:58 AM     07/27/2022 06:58 AM    .4 07/27/2022 06:58 AM    MCH 30.9 07/27/2022 06:58 AM    MCHC 30.7 07/27/2022 06:58 AM    RDW 14.6 07/27/2022 06:58 AM    NRBC 0.0 07/21/2022 06:18 AM    LYMPHOPCT 16.8 07/27/2022 06:58 AM    MONOPCT 9.4 07/27/2022 06:58 AM    MYELOPCT 0.9 07/24/2022 09:43 AM    BASOPCT 0.7 07/27/2022 06:58 AM    MONOSABS 0.64 07/27/2022 06:58 AM    LYMPHSABS 1.14 07/27/2022 06:58 AM    EOSABS 0.13 07/27/2022 06:58 AM    BASOSABS 0.05 07/27/2022 06:58 AM       CMP     Lab Results   Component Value Date/Time     07/27/2022 06:58 AM    K 4.1 07/27/2022 06:58 AM     07/27/2022 06:58 AM    CO2 18 07/27/2022 06:58 AM    BUN 36 07/27/2022 06:58 AM    CREATININE 1.4 07/27/2022 06:58 AM    GFRAA 44 07/27/2022 06:58 AM    LABGLOM 36 07/27/2022 06:58 AM    GLUCOSE 90 07/27/2022 06:58 AM    PROT 5.0 07/27/2022 06:58 AM    LABALBU 1.8 07/27/2022 06:58 AM    CALCIUM 7.0 07/27/2022 06:58 AM    BILITOT 0.5 07/27/2022 06:58 AM    ALKPHOS 206 07/27/2022 06:58 AM    AST 14 07/27/2022 06:58 AM    ALT 13 07/27/2022 06:58 AM         Hepatic Function Panel:    Lab Results   Component Value Date/Time    ALKPHOS 206 07/27/2022 06:58 AM    ALT 13 07/27/2022 06:58 AM    AST 14 07/27/2022 06:58 AM    PROT 5.0 07/27/2022 06:58 AM    BILITOT 0.5 07/27/2022 06:58 AM    BILIDIR <0.2 01/21/2021 11:55 AM    IBILI see below 01/21/2021 11:55 AM    LABALBU 1.8 07/27/2022 06:58 AM       PT/INR:    Lab Results   Component Value Date/Time    PROTIME 19.0 07/24/2022 09:43 AM    INR 1.7 07/24/2022 09:43 AM       TSH:    Lab Results   Component Value Date/Time    TSH 1.380 07/01/2022 02:31 PM       U/A:    Lab Results   Component Value Date/Time    COLORU Yellow 07/24/2022 11:19 AM    PHUR 5.5 07/24/2022 11:19 AM    LABCAST FEW 01/25/2020 02:45 PM    WBCUA NONE 07/24/2022 11:19 AM    RBCUA NONE 07/24/2022 11:19 AM    YEAST Present 09/18/2020 06:50 AM    BACTERIA NONE SEEN 07/24/2022 11:19 AM    CLARITYU Clear 07/24/2022 11:19 AM    SPECGRAV 1.020 07/24/2022 11:19 AM    LEUKOCYTESUR Negative 07/24/2022 11:19 AM    UROBILINOGEN 0.2 07/24/2022 11:19 AM    BILIRUBINUR Negative 07/24/2022 11:19 AM    BLOODU Negative 07/24/2022 11:19 AM    GLUCOSEU Negative 07/24/2022 11:19 AM       ABG:  No results found for: BZT6FNX, BEART, T5BZUAEN, PHART, THGBART, LPF3XBZ, PO2ART, NSP4OUU    MICROBIOLOGY:    Blood culture -    Susceptibility      Escherichia coli (1)    Antibiotic Interpretation Microscan  Method Status    amoxicillin-clavulanate Intermediate ^16 mcg/mL BACTERIAL SUSCEPTIBILITY PANEL BY JAMAAL     ceFAZolin Sensitive <=^4 mcg/mL BACTERIAL SUSCEPTIBILITY PANEL BY JAMAAL     cefepime Sensitive <=^0.12 mcg/mL BACTERIAL SUSCEPTIBILITY PANEL BY JAMAAL     cefotaxime Sensitive <=^0.25 mcg/mL BACTERIAL SUSCEPTIBILITY PANEL BY JAMAAL     cefOXitin Sensitive <=^4 mcg/mL BACTERIAL SUSCEPTIBILITY PANEL BY JAMAAL     cefTAZidime-avibactam Sensitive <=^0.12 mcg/mL BACTERIAL SUSCEPTIBILITY PANEL BY JAMAAL     gentamicin Sensitive <=^1 mcg/mL BACTERIAL SUSCEPTIBILITY PANEL BY JAMAAL     levofloxacin Sensitive <=^0.12 mcg/mL BACTERIAL SUSCEPTIBILITY PANEL BY JAMAAL     meropenem Sensitive <=^0.25 mcg/mL BACTERIAL SUSCEPTIBILITY PANEL BY JAMAAL     piperacillin-tazobactam Sensitive <=^4 mcg/mL BACTERIAL SUSCEPTIBILITY PANEL BY JAMAAL     trimethoprim-sulfamethoxazole Resistant >=^320 mcg/mL BACTERIAL SUSCEPTIBILITY              Radiology :    MRI abdomen -  Associated with the distal margin distal pancreatic duct is a 12 mm focus   without abnormal enhancement most consistent of a 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 Findings consistent with acute enteritis and mesenteric edema as well as   small volume ascites.      Moderate right and small to moderate left pleural effusions         IMPRESSION:     Short gut syndrome,  E coli bacteremia  Leukocytosis - improved       RECOMMENDATIONS:    IV rocephin 1 gram q 24 hrs  ( day  5 of 7 )

## 2022-07-29 PROBLEM — E43 SEVERE PROTEIN-CALORIE MALNUTRITION (HCC): Chronic | Status: ACTIVE | Noted: 2022-07-29

## 2022-07-29 LAB
BLOOD CULTURE, ROUTINE: NORMAL
CULTURE, BLOOD 2: NORMAL

## 2022-07-29 PROCEDURE — 97530 THERAPEUTIC ACTIVITIES: CPT

## 2022-07-29 PROCEDURE — 1200000000 HC SEMI PRIVATE

## 2022-07-29 PROCEDURE — 2580000003 HC RX 258: Performed by: INTERNAL MEDICINE

## 2022-07-29 PROCEDURE — 6370000000 HC RX 637 (ALT 250 FOR IP): Performed by: INTERNAL MEDICINE

## 2022-07-29 PROCEDURE — 6360000002 HC RX W HCPCS: Performed by: INTERNAL MEDICINE

## 2022-07-29 PROCEDURE — 97535 SELF CARE MNGMENT TRAINING: CPT

## 2022-07-29 RX ADMIN — Medication 10 ML: at 21:04

## 2022-07-29 RX ADMIN — PRAVASTATIN SODIUM 40 MG: 20 TABLET ORAL at 21:03

## 2022-07-29 RX ADMIN — Medication 2000 UNITS: at 09:50

## 2022-07-29 RX ADMIN — Medication 10 ML: at 09:50

## 2022-07-29 RX ADMIN — LOPERAMIDE HYDROCHLORIDE 4 MG: 2 CAPSULE ORAL at 18:11

## 2022-07-29 RX ADMIN — Medication 2000 UNITS: at 21:03

## 2022-07-29 RX ADMIN — ESCITALOPRAM 20 MG: 10 TABLET, FILM COATED ORAL at 21:03

## 2022-07-29 RX ADMIN — LOPERAMIDE HYDROCHLORIDE 4 MG: 2 CAPSULE ORAL at 14:10

## 2022-07-29 RX ADMIN — OXYCODONE AND ACETAMINOPHEN 1 TABLET: 325; 10 TABLET ORAL at 19:46

## 2022-07-29 RX ADMIN — DIPHENOXYLATE HYDROCHLORIDE AND ATROPINE SULFATE 1 TABLET: 2.5; .025 TABLET ORAL at 14:10

## 2022-07-29 RX ADMIN — CEFTRIAXONE SODIUM 1000 MG: 1 INJECTION, POWDER, FOR SOLUTION INTRAMUSCULAR; INTRAVENOUS at 06:35

## 2022-07-29 RX ADMIN — FLUTICASONE PROPIONATE 1 SPRAY: 50 SPRAY, METERED NASAL at 21:03

## 2022-07-29 RX ADMIN — ASPIRIN 81 MG CHEWABLE TABLET 81 MG: 81 TABLET CHEWABLE at 21:03

## 2022-07-29 RX ADMIN — LOPERAMIDE HYDROCHLORIDE 4 MG: 2 CAPSULE ORAL at 06:39

## 2022-07-29 RX ADMIN — DIPHENOXYLATE HYDROCHLORIDE AND ATROPINE SULFATE 1 TABLET: 2.5; .025 TABLET ORAL at 06:35

## 2022-07-29 RX ADMIN — Medication 1 CAPSULE: at 21:03

## 2022-07-29 RX ADMIN — PRIMIDONE 250 MG: 250 TABLET ORAL at 21:03

## 2022-07-29 RX ADMIN — FERROUS SULFATE TAB 325 MG (65 MG ELEMENTAL FE) 325 MG: 325 (65 FE) TAB at 21:03

## 2022-07-29 RX ADMIN — PANTOPRAZOLE SODIUM 40 MG: 40 TABLET, DELAYED RELEASE ORAL at 06:35

## 2022-07-29 RX ADMIN — DIPHENOXYLATE HYDROCHLORIDE AND ATROPINE SULFATE 1 TABLET: 2.5; .025 TABLET ORAL at 21:03

## 2022-07-29 RX ADMIN — OXYCODONE AND ACETAMINOPHEN 1 TABLET: 325; 10 TABLET ORAL at 06:35

## 2022-07-29 RX ADMIN — CYANOCOBALAMIN TAB 1000 MCG 1000 MCG: 1000 TAB at 21:04

## 2022-07-29 RX ADMIN — APIXABAN 5 MG: 5 TABLET, FILM COATED ORAL at 09:50

## 2022-07-29 RX ADMIN — LOPERAMIDE HYDROCHLORIDE 6 MG: 2 CAPSULE ORAL at 21:04

## 2022-07-29 RX ADMIN — TRAZODONE HYDROCHLORIDE 50 MG: 50 TABLET ORAL at 21:03

## 2022-07-29 RX ADMIN — DIPHENOXYLATE HYDROCHLORIDE AND ATROPINE SULFATE 1 TABLET: 2.5; .025 TABLET ORAL at 18:11

## 2022-07-29 RX ADMIN — APIXABAN 5 MG: 5 TABLET, FILM COATED ORAL at 21:04

## 2022-07-29 ASSESSMENT — PAIN DESCRIPTION - DESCRIPTORS
DESCRIPTORS: ACHING;CRAMPING
DESCRIPTORS: ACHING

## 2022-07-29 ASSESSMENT — PAIN SCALES - GENERAL
PAINLEVEL_OUTOF10: 4
PAINLEVEL_OUTOF10: 5

## 2022-07-29 ASSESSMENT — ENCOUNTER SYMPTOMS
ABDOMINAL PAIN: 1
SHORTNESS OF BREATH: 0

## 2022-07-29 ASSESSMENT — PAIN DESCRIPTION - ORIENTATION: ORIENTATION: MID

## 2022-07-29 ASSESSMENT — PAIN DESCRIPTION - LOCATION
LOCATION: BACK
LOCATION: BACK

## 2022-07-29 NOTE — PROGRESS NOTES
Progress Note  Date:2022       Room:84/8407-  Patient Name:Bea Kelley     YOB: 1940     Age:82 y.o. Patient says she feels improved today. Subjective    Subjective:  Symptoms:  Improved. No shortness of breath or chest pain. Diet:  Poor intake. Activity level: Normal.    Pain:  She complains of pain that is mild. Review of Systems   Constitutional:  Negative for activity change and fever. HENT:  Negative for congestion. Respiratory:  Negative for shortness of breath. Cardiovascular:  Negative for chest pain. Gastrointestinal:  Positive for abdominal pain. Neurological:  Negative for dizziness. Objective         Vitals Last 24 Hours:  TEMPERATURE:  Temp  Av.4 °F (36.3 °C)  Min: 96.9 °F (36.1 °C)  Max: 98 °F (36.7 °C)  RESPIRATIONS RANGE: Resp  Av.5  Min: 16  Max: 18  PULSE OXIMETRY RANGE: SpO2  Av.3 %  Min: 94 %  Max: 100 %  PULSE RANGE: Pulse  Av.3  Min: 55  Max: 75  BLOOD PRESSURE RANGE: Systolic (05YOU), VERNON:431 , Min:91 , QAJ:190   ; Diastolic (86LIW), LVQ:09, Min:48, Max:76    I/O (24Hr): Intake/Output Summary (Last 24 hours) at 2022 07  Last data filed at 2022 2128  Gross per 24 hour   Intake 240 ml   Output 1450 ml   Net -1210 ml       Objective:  General Appearance:  Comfortable. Vital signs: (most recent): Blood pressure (!) 121/51, pulse 64, temperature 97.9 °F (36.6 °C), temperature source Temporal, resp. rate 16, height 5' (1.524 m), weight 99 lb (44.9 kg), SpO2 94 %. No fever. Lungs:  Normal effort and normal respiratory rate. Breath sounds clear to auscultation. Heart: Normal rate. Regular rhythm.   S1 normal and S2 normal.    Labs/Imaging/Diagnostics    Labs:  CBC:  Recent Labs     22  0658   WBC 6.8   RBC 2.69*   HGB 8.3*   HCT 27.0*   .4*   RDW 14.6          CHEMISTRIES:  Recent Labs     22  0658      K 4.1   *   CO2 18*   BUN 36*   CREATININE 1.4* GLUCOSE 90       PT/INR:  No results for input(s): PROTIME, INR in the last 72 hours. APTT:  No results for input(s): APTT in the last 72 hours. LIVER PROFILE:  Recent Labs     07/27/22  0658   AST 14   ALT 13   BILITOT 0.5   ALKPHOS 206*         Imaging Last 24 Hours:  CT ABDOMEN PELVIS WO CONTRAST Additional Contrast? None    Result Date: 7/24/2022  EXAMINATION: CT OF THE CHEST WITHOUT CONTRAST; CT OF THE ABDOMEN AND PELVIS WITHOUT CONTRAST 7/24/2022 12:55 pm TECHNIQUE: CT of the chest was performed without the administration of intravenous contrast. Multiplanar reformatted images are provided for review. Automated exposure control, iterative reconstruction, and/or weight based adjustment of the mA/kV was utilized to reduce the radiation dose to as low as reasonably achievable.; CT of the abdomen and pelvis was performed without the administration of intravenous contrast. Multiplanar reformatted images are provided for review. Automated exposure control, iterative reconstruction, and/or weight based adjustment of the mA/kV was utilized to reduce the radiation dose to as low as reasonably achievable. COMPARISON: 07/16/2022 HISTORY: ORDERING SYSTEM PROVIDED HISTORY: rule out infection TECHNOLOGIST PROVIDED HISTORY: Reason for exam:->rule out infection Decision Support Exception - unselect if not a suspected or confirmed emergency medical condition->Emergency Medical Condition (MA) What reading provider will be dictating this exam?->CRC FINDINGS: CT chest. There is borderline cardiac size with diffuse coronary artery calcification. The great vessels are normal.  There is prominent pulmonary artery concerning for pulmonary artery hypertension. Moderately enlarged mediastinal lymph nodes are present. There is centrilobular emphysema with progressive infiltrates/atelectasis and pleural effusion in the lung bases concerning for pneumonia or edema.   Degenerative changes are identified in the thoracolumbar spine with previous surgical changes CT abdomen and pelvis. Comparison 01/21/2021 Findings The examination is very limited due to lack of contrast.  The liver is heterogeneous in appearance concerning for liver disease. Gallbladder is absent. Stomach is collapsed with a G-tube. Spleen appears normal. 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 There is poor delineation of the pancreas with suggestion of a 1.8 cm cystic lesion in the head of the pancreas which is indeterminate. There is a small amount of ascites with fluid surrounding the liver and paracolic gutters. Adrenals and the kidneys are normal with 2.2 cm cystic lesion in the left kidney. Extensive postoperative changes are identified in the lumbosacral spine with irregularity and fragmentation at the surgical site and persistent grade 2 spondylolisthesis at L4-5. Clinical assessment is recommended. There is calcification aorta. Pelvis. Bladder is distended. There is small amount of ascites. 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 Somewhat distended small bowel loops with the mural thickening is identified. There is anasarca. Centrilobular emphysema with progressive patchy infiltrates/atelectasis and pleural effusion in the lung bases concerning for pneumonia or edema. Coronary artery calcification. Suboptimal evaluation abdomen pelvis. 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 Liver disease with small amount of ascites. Question indeterminate cystic lesion in the pancreas. Consider surveillance. Significant irregularity and the postsurgical changes in the lumbosacral spine. See above.      CT CHEST WO CONTRAST    Result Date: 7/24/2022  EXAMINATION: CT OF THE CHEST WITHOUT CONTRAST; CT OF THE ABDOMEN AND PELVIS WITHOUT CONTRAST 7/24/2022 12:55 pm TECHNIQUE: CT of the chest was performed without the administration of intravenous contrast. Multiplanar reformatted images are provided for review. Automated exposure control, iterative reconstruction, and/or weight based adjustment of the mA/kV was utilized to reduce the radiation dose to as low as reasonably achievable.; CT of the abdomen and pelvis was performed without the administration of intravenous contrast. Multiplanar reformatted images are provided for review. Automated exposure control, iterative reconstruction, and/or weight based adjustment of the mA/kV was utilized to reduce the radiation dose to as low as reasonably achievable. COMPARISON: 07/16/2022 HISTORY: ORDERING SYSTEM PROVIDED HISTORY: rule out infection TECHNOLOGIST PROVIDED HISTORY: Reason for exam:->rule out infection Decision Support Exception - unselect if not a suspected or confirmed emergency medical condition->Emergency Medical Condition (MA) What reading provider will be dictating this exam?->CRC FINDINGS: CT chest. There is borderline cardiac size with diffuse coronary artery calcification. The great vessels are normal.  There is prominent pulmonary artery concerning for pulmonary artery hypertension. Moderately enlarged mediastinal lymph nodes are present. There is centrilobular emphysema with progressive infiltrates/atelectasis and pleural effusion in the lung bases concerning for pneumonia or edema. Degenerative changes are identified in the thoracolumbar spine with previous surgical changes CT abdomen and pelvis. Comparison 01/21/2021 Findings The examination is very limited due to lack of contrast.  The liver is heterogeneous in appearance concerning for liver disease. Gallbladder is absent. Stomach is collapsed with a G-tube. Spleen appears normal. 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   There is poor delineation of the pancreas with suggestion of a 1.8 cm cystic lesion in the head of the pancreas which is indeterminate. There is a small amount of ascites with fluid surrounding the liver and paracolic gutters. Adrenals and the kidneys are normal with 2.2 cm cystic lesion in the left kidney. Extensive postoperative changes are identified in the lumbosacral spine with irregularity and fragmentation at the surgical site and persistent grade 2 spondylolisthesis at L4-5. Clinical assessment is recommended. There is calcification aorta. Pelvis. Bladder is distended. There is small amount of ascites. 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 Somewhat distended small bowel loops with the mural thickening is identified. There is anasarca. Centrilobular emphysema with progressive patchy infiltrates/atelectasis and pleural effusion in the lung bases concerning for pneumonia or edema. Coronary artery calcification. Suboptimal evaluation abdomen pelvis. 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 Liver disease with small amount of ascites. Question indeterminate cystic lesion in the pancreas. Consider surveillance. Significant irregularity and the postsurgical changes in the lumbosacral spine. See above. XR CHEST PORTABLE    Result Date: 7/24/2022  EXAMINATION: ONE XRAY VIEW OF THE CHEST 7/24/2022 9:54 am COMPARISON: 07/15/2022 HISTORY: ORDERING SYSTEM PROVIDED HISTORY: sepsis, r/o pneumonia TECHNOLOGIST PROVIDED HISTORY: Reason for exam:->sepsis, r/o pneumonia What reading provider will be dictating this exam?->CRC FINDINGS: Portable chest reveal cardiac and mediastinal silhouettes within normal limits. Underlying chronic changes seen within the lung fields bilaterally. No focal parenchymal opacification present. No pleural effusion or pneumothorax.   Vascular calcifications seen within the thoracic aorta with hardware identified at the thoracolumbar spine. Interval removal the right noa catheter. Stable chronic changes seen within the lung fields with no evidence of acute parenchymal disease. Noa catheter is been removed in the interval on the right. Assessment//Plan           Hospital Problems             Last Modified POA    * (Principal) Enteritis 7/24/2022 Yes    Hypotension 7/24/2022 Yes    Abnormal CT scan, lumbar spine 7/25/2022 Yes   Assessment:   (E coli bacteremia  Short gut syndrome  Pancreatic mass  Lumbar DJD  CAD  HTN  Hyperlipidemia     Plan  ID following, on rocephin  Neurosurgery no intervention  MRCP reviewed, no plans from surgery  Monitor labs and exam.  Continue rest of meds. Discharge planning. ).

## 2022-07-29 NOTE — PROGRESS NOTES
negotiation: ascended and descended  NT NT 2 steps with bilateral rail supervision   ROM BUE:  Refer to OT note  BLE:  WFL     Strength BUE:  Refer to OT note  BLE:  4/5 B LEs  4+/5   Balance Sitting EOB:  SBA to Min A   Dynamic Standing:  NT Sitting EOB:  CGA  Dynamic Standing:  Gauri Sitting EOB:  Independent   Dynamic Standing: Mod I with AAD     Pt is A & O x 4  Sensation:  Pt denies numbness and tingling to extremities  Edema:  none    Vitals:  Spo2 93% HR 70    Patient education  Pt educated on role and benefits of physical therapy, safety and technique for mobility    Patient response to education:   Pt verbalized understanding Pt demonstrated skill Pt requires further education in this area   x x x     ASSESSMENT:    Conditions Requiring Skilled Therapeutic Intervention:    [x]Decreased strength     []Decreased ROM  [x]Decreased functional mobility  [x]Decreased balance   [x]Decreased endurance   []Decreased posture  []Decreased sensation  []Decreased coordination   []Decreased vision  []Decreased safety awareness   [x]Increased pain       Comments:  Patient agreeable to PT. Pt performing bed mobility, transfer, and ambulation with assistance. Required cues for proper mechanics during transfers and for [employer] approximation. Pt overall activity limited by fatigue. Pt gait pattern slow with decreased step length and height bilaterally. Patient would benefit from continued skilled PT to maximize functional mobility independence. BP seated in bedside chair 133/60      Treatment:  Patient practiced and was instructed in the following treatment:    Bed mobility training - pt given verbal and tactile cues to facilitate proper sequencing and safety during rolling and supine>sit as well as provided with physical assistance to complete task   Gait training-Verbal cues for proper positioning and sequencing using assistive device to maximize functional mobility independence.     Transfer training - Verbal cues for proper positioning and sequencing to perform transfers safely with maximum independence. Pt's/ family goals   1. Return to PLOF    Prognosis is good for reaching above PT goals. Patient and or family understand(s) diagnosis, prognosis, and plan of care. Yes    PHYSICAL THERAPY PLAN OF CARE:    PT POC is established based on physician order and patient diagnosis     Referring provider/PT Order:    PT evaluation and treat  Start:  07/24/22 1445,   End:  07/24/22 1445,   ONE TIME,   Standing Count:  1 Occurrences,   R         Azalea Kaiser, DO     Diagnosis:  Enteritis [K52.9]  Hypotension [I95.9]  Pneumonia of lower lobe due to infectious organism, unspecified laterality [J18.9]  Specific instructions for next treatment:  progress mobility, attempt transfers and ambulation    Current Treatment Recommendations:     [x] Strengthening to improve independence with functional mobility   [] ROM to improve independence with functional mobility   [x] Balance Training to improve static/dynamic balance and to reduce fall risk  [x] Endurance Training to improve activity tolerance during functional mobility   [x] Transfer Training to improve safety and independence with all functional transfers   [x] Gait Training to improve gait mechanics, endurance and assess need for appropriate assistive device  [x] Stair Training in preparation for safe discharge home and/or into the community   [] Positioning to prevent skin breakdown and contractures  [x] Safety and Education Training   [x] Patient/Caregiver Education   [] HEP  [] Other     PT long term treatment goals are located in above grid    Frequency of treatments: 2-5x/week x 1-2 weeks.     Time in  1450  Time out  1505    Total Treatment Time  15 minutes     Evaluation Time includes thorough review of current medical information, gathering information on past medical history/social history and prior level of function, completion of standardized testing/informal observation of

## 2022-07-29 NOTE — CARE COORDINATION
Per ID note today, IV rocephin 1 gram q 24 hrs  ( day  6 of 7 )- pt can be discharged tomorrow after rocephin is given. Plan is [facility], no precert required and they will have a bed available on Sunday. Transportation set up via [facility] for 11 am on Sunday. Charge nurse, liaison, patient and her  Nesha Wood all made aware. Therapy department notified of need for updated PT/OT. Patient will need a negative Covid-19 test day of discharge. Envelope and transfer paperwork in the soft chart.   Hao Abdul RN CM  [phone]

## 2022-07-29 NOTE — PROGRESS NOTES
Occupational Therapy  OCCUPATIONAL THERAPY TREATMENT SESSION    [facility] [address] [place], [address]  [address] [facility], [facility], [facility] TREATMENT NOTE      Date:2022  Patient Name: Coco Corral  MRN: [id number]  : 1940  Room: [address] [place] [address]     Per OT Eval:      Evaluating [address] [facility], OTR/L #[id number]     Referring Provider: Delano Becerra DO  Specific Provider Orders/Date: OT eval and treat 22     Diagnosis: Enteritis [K52.9]  Hypotension [I95.9]  Pneumonia of lower lobe due to infectious organism, unspecified laterality [J18.9]   Pt admitted to hospital on 22 for hypotension     Pertinent Medical History:  has a past medical history of Acute kidney failure ([facility] Anxiety, Arthritis, CAD (coronary artery disease), Cancer ([facility] COPD (chronic obstructive pulmonary disease) ([facility] Depression, Fibromyalgia, Generalized headaches, History of blood transfusion, History of necrotic bowel, Hyperlipidemia, Hypertension, Incisional hernia, Insomnia, Mitral valve regurgitation, Myocardial infarct ([facility] Occasional tremors, Osteopenia, PONV (postoperative nausea and vomiting), and Vitamin B12 deficiency.          Precautions:  Fall Risk, ileostomy, PEG, Purewick, bed alarm     Assessment of current deficits   [x] Functional mobility         [x]ADLs           [x] Strength                  []Cognition   [x] Functional transfers       [x] IADLs         [x] Safety Awareness   [x]Endurance   [] Fine Coordination                      [x] Balance      [] Vision/perception   []Sensation      []Gross Motor Coordination          [] ROM           [] Delirium                   [] Motor Control     OT PLAN OF CARE   OT POC based on physician orders, patient diagnosis and results of clinical assessment     Frequency/Duration 1-3 days/wk for 2 weeks PRN  Specific OT Treatment Interventions to include:   * Instruction/training on adapted ADL techniques and AE recommendations to increase functional independence within precautions       * Training on energy conservation strategies, correct breathing pattern and techniques to improve independence/tolerance for self-care routine  * Functional transfer/mobility training/DME recommendations for increased independence, safety, and fall prevention  * Patient/Family education to increase follow through with safety techniques and functional independence  * Recommendation of environmental modifications for increased safety with functional transfers/mobility and ADLs  * Therapeutic exercise to improve motor endurance, ROM, and functional strength for ADLs/functional transfers  * Therapeutic activities to facilitate/challenge dynamic balance, stand tolerance for increased safety and independence with ADLs  * Therapeutic activities to facilitate gross/fine motor skills for increased independence with ADLs        Recommended Adaptive Equipment: TBD     Home Living: Pt admitted from [place] [address]  Pt lives with spouse    Bathroom setup: walk in shower with seat    Equipment owned: rollator, cane     Prior Level of Function: independent/mod I with ADLs , shares with IADLs; ambulated w/ rollator     Pain Level: Pt c/o no pain this session.      Cognition: A&O: 4/4; Follows 1 step directions           Memory:  good            Sequencing:  fair +           Problem solving:  fair            Judgement/safety:  fair              Functional Assessment:  AM-PAC Daily Activity Raw Score: 16/24    Initial Eval Status  Date: 7/26/22 Treatment Status  Date:7/29/22 STGs = LTGs  Time frame: 10-14 days   Feeding NPO  Independent w/ mouth swab  Supervision Independent   Grooming Minimal Assist SBA  Seated position d/t limited activity tolerance  Modified [place]    UB Dressing Minimal Assist   Gown SBA  Donned/doffed gown  Modified [place]    LB Dressing Dependent   Mod A  B socks while seated in chair Stand by Assist   Bathing Moderate Assist   Stand by Assist    Toileting Maximal Assist  Min A   Stand by Assist    Bed Mobility Supine to sit: Minimal Assist   Sit to supine: Moderate Assist  Supine to sit: Minimal Assist   Sit to supine:  Supine to sit: Supervision   Sit to supine: Supervision    Functional Transfers Moderate Assist Min A  Various surfaces (EOB, chair, low commode)  Stand by Assist    Functional Mobility NT  D/t c/o intermittent dizziness w/ activity. Subsided w/ rest  Min A w/ w/w  In room and bathroom Minimal Assist    Balance Sitting:    Static:  SBA    Dynamic:Min A  Standing: Mod A  Sitting:    Static:  Supervision    Dynamic: SBA  Standing: Min A w/ w/w     Activity Tolerance Fair-  Fair Fair+   Visual/  Perceptual Glasses: yes              Comments: Upon arrival pt lying in bed. At end of session pt seated in chair all lines and tubes intact, call light within reach. Informed RN that pt in chair, requested RN order w/w to promote functional ambulation w/ staff. Treatment: Therapist facilitated bed mobility (education/cues for body mechanics and safety), unsupported sitting balance (addressing posture, weight shifting, dynamic reaching to prep for ADL's), functional transfers (various surfaces w/ education/cues for safety/hand placement), standing tolerance tasks (education/cues for posture, balance and activity tolerance) and functional ambulation tasks with w/w (including to/ from bathroom and in preparation for item retrieval tasks w/ education/cuing on posture, w/w management, energy conservation and safety). Therapist also facilitated self-care tasks including: UB/LB self-care tasks (gown, socks), toileting task (including bladder management and ileostomy management) and seated grooming tasks while educating/cuing pt on modified strategies, posture, safety and techniques to conserve energy. Skilled monitoring of HR, O2 sats and pts response to treatment.        Patient demonstrated decreased independence and safety during completion of ADL/functional transfer/mobility tasks. Pt would benefit from continued skilled OT to increase safety and independence with ADL/IADL tasks for functional independence and quality of life. Pt has made good progress towards set goals.        Treatment Time In:14:45            Treatment Time Out: 15:10             Treatment Charges: Mins Units   Ther Ex  [id number]     Manual Therapy [id number]     Thera Activities [id number] 10 1   ADL/Home Mgt [id number] 13 1   Neuro Re-ed [id number]     Group Therapy      Orthotic manage/training  [id number]     Non-Billable Time     Total Timed Treatment 23 45 Mariam Traylor, OTR/L #[id number]

## 2022-07-29 NOTE — PROGRESS NOTES
Comprehensive Nutrition Assessment    Type and Reason for Visit:  Initial, RD Nutrition Re-Screen/LOS    Nutrition Recommendations/Plan:   Continue current diet as tolerated. Recommend and start Nepro BID to optimize intake in the setting of severe malnutrition. If PO intake does not improve, recommend POC to consider EN support. Please consult RD as needed. Malnutrition Assessment:  Malnutrition Status:  Severe malnutrition (07/29/22 1402)    Context:  Chronic Illness     Findings of the 6 clinical characteristics of malnutrition:  Energy Intake:  75% or less estimated energy requirements for 1 month or longer  Weight Loss:  No significant weight loss     Body Fat Loss:  Severe body fat loss Orbital, Buccal region   Muscle Mass Loss:  Severe muscle mass loss [place] (temporalis), Clavicles (pectoralis & deltoids)  Fluid Accumulation:  Unable to assess     Strength:  Not Performed    Nutrition Assessment:    Pt. admitted from SNF for evaluation of hypotension and leukocytosis. Noted recent E. coli sepsis. Noted acute enteritis. PMHx CKD,COPD, HLD, HTN,CA, s/p PEG placement 03/2022, s/p ileostomy. Pt. meets severe malnutrition criteria. Pt. intake poor/limited. Will start ONS and monitor. Nutrition Related Findings:    A&Ox4, +diarrhea, +BS, Illeostomy RLQ, +PEG, +1 pitting LE edema, Wound Type: None       Current Nutrition Intake & Therapies:    Average Meal Intake: 1-25%  Average Supplements Intake: None Ordered  ADULT DIET; Regular    Anthropometric Measures:  Height: 5' (152.4 cm)  Ideal Body Weight (IBW): 100 lbs (45 kg)    Admission Body Weight: 99 lb (44.9 kg) (stated 7/24)  Current Body Weight: 104 lb 10 oz (47.5 kg) (Wt used from recent adm on 7/19/22 as no updated CBW), 104.6 % IBW.  Weight Source: Not Specified  Current BMI (kg/m2): 20.4  Usual Body Weight:  (Goal 100# per pt, 5/25/22 92.5 lbs per [facility] RD note)     Weight Adjustment For: No Adjustment                 BMI Categories: Underweight (BMI less than 22) age over 72    Estimated Daily Nutrient Needs:  Energy Requirements Based On: Kcal/kg  Weight Used for Energy Requirements: Current  Energy (kcal/day): 2136-7879 kcal (30-32kcal/kg)  Weight Used for Protein Requirements: Current  Protein (g/day): 50-65g (1.2-1.4g/kg) (as tolerated with CKD)  Method Used for Fluid Requirements: 1 ml/kcal  Fluid (ml/day): 1400-1500ml    Nutrition Diagnosis:   Severe malnutrition, In context of chronic illness related to altered GI function (2/2 complex GI altered function / surgical hx) as evidenced by poor intake prior to admission, Criteria as identified in malnutrition assessment    Nutrition Interventions:   Food and/or Nutrient Delivery: Continue Current Diet, Start Oral Nutrition Supplement (Start Nepro ONS BID)  Nutrition Education/Counseling: No recommendation at this time  Coordination of Nutrition Care: Continue to monitor while inpatient       Goals:     Goals: PO intake 50% or greater, by next RD assessment       Nutrition Monitoring and Evaluation:   Behavioral-Environmental Outcomes: None Identified  Food/Nutrient Intake Outcomes: Food and Nutrient Intake, Supplement Intake  Physical Signs/Symptoms Outcomes: Biochemical Data, GI Status, Diarrhea, Nutrition Focused Physical Findings, Skin, Weight, Fluid Status or Edema, Hemodynamic Status    Discharge Planning:     Too soon to determine     Carrie Camejo RD  Contact: ext [phone]

## 2022-07-29 NOTE — PROGRESS NOTES
Department of Internal Medicine  Infectious Diseases  Progress  Note    C/C :  E coli bacteremia       Denies fever, chills, abdomen pain   Afebrile  Low blood pressure, responding to IV fluid   Afebrile      Current Facility-Administered Medications   Medication Dose Route Frequency Provider Last Rate Last Admin    0.9 % sodium chloride infusion   IntraVENous Continuous Mu Borja  mL/hr at 07/28/22 0131 New Bag at 07/28/22 0131    sodium chloride flush 0.9 % injection 10 mL  10 mL IntraVENous 2 times per day Azalea E Awilda, DO   10 mL at 07/29/22 0950    sodium chloride flush 0.9 % injection 10 mL  10 mL IntraVENous PRN Azalea E Awilda, DO        0.9 % sodium chloride infusion   IntraVENous PRN Azalea E Awilda, DO        ondansetron (ZOFRAN-ODT) disintegrating tablet 4 mg  4 mg Oral Q8H PRN Azalea E Awilda, DO        Or    ondansetron (ZOFRAN) injection 4 mg  4 mg IntraVENous Q6H PRN Azalea E Awilda, DO   4 mg at 07/25/22 1210    senna (SENOKOT) tablet 8.6 mg  1 tablet Oral Daily PRN Azalea E Awilda, DO        acetaminophen (TYLENOL) tablet 650 mg  650 mg Oral Q6H PRN Azalea E Awilda, DO        Or    acetaminophen (TYLENOL) suppository 650 mg  650 mg Rectal Q6H PRN Azalea E Awilda, DO        apixaban (ELIQUIS) tablet 5 mg  5 mg Oral BID Azalea E Awilda, DO   5 mg at 07/29/22 0950    aspirin chewable tablet 81 mg  81 mg Oral Nightly Azalea E Awilda, DO   81 mg at 07/28/22 2204    vitamin D (CHOLECALCIFEROL) tablet 2,000 Units  2,000 Units Oral BID Azalea E Awilda, DO   2,000 Units at 07/29/22 0950    diphenoxylate-atropine (LOMOTIL) 2.5-0.025 MG per tablet 1 tablet  1 tablet Oral 4x Daily AC & HS Azalea E Awilda, DO   1 tablet at 07/29/22 0635    escitalopram (LEXAPRO) tablet 20 mg  20 mg Oral Nightly Azalea E Awilda, DO   20 mg at 07/28/22 2204    ferrous sulfate (IRON 325) tablet 325 mg  325 mg Oral Nightly Azalea Kaiser DO   325 mg at 07/28/22 2204    fluticasone (FLONASE) 50 MCG/ACT nasal spray 1 spray  1 spray Each Nostril Nightly Azalea E Awilda, DO   1 spray at 07/28/22 2205    hyoscyamine (ANASPAZ;LEVSIN) tablet 125 mcg  125 mcg Oral 4x Daily PRN Azalea E Awilda, DO        loperamide (IMODIUM) capsule 4 mg  4 mg Oral TID WC Azalea E Awilda, DO   4 mg at 07/29/22 8208    loperamide (IMODIUM) capsule 6 mg  6 mg Oral Nightly Azalea E Awilda, DO   6 mg at 07/28/22 2202    LORazepam (ATIVAN) tablet 0.25 mg  0.25 mg Oral Daily PRN Azalea E Awilda, DO        pantoprazole (PROTONIX) tablet 40 mg  40 mg Oral QAM AC Azalea E Awilda, DO   40 mg at 07/29/22 0635    oxyCODONE-acetaminophen (PERCOCET)  MG per tablet 1 tablet  1 tablet Oral Q6H PRN Azalea E Awilda, DO   1 tablet at 07/29/22 0635    pravastatin (PRAVACHOL) tablet 40 mg  40 mg Oral Nightly Azalea E Awilda, DO   40 mg at 07/28/22 2246    primidone (MYSOLINE) tablet 250 mg  250 mg Oral Nightly Azalea E Awilda, DO   250 mg at 07/28/22 2246    lactobacillus (CULTURELLE) capsule 1 capsule  1 capsule Oral Nightly Azalea E Awilda, DO   1 capsule at 07/28/22 2204    traZODone (DESYREL) tablet 50 mg  50 mg Oral Nightly Azalea E Awilda, DO   50 mg at 07/28/22 2203    vitamin B-12 (CYANOCOBALAMIN) tablet 1,000 mcg  1,000 mcg Oral Nightly Azalea E Awilda, DO   1,000 mcg at 07/28/22 2204    cefTRIAXone (ROCEPHIN) 1,000 mg in sterile water 10 mL IV syringe  1,000 mg IntraVENous Q24H Rashel Salmon MD   1,000 mg at 07/29/22 0355         REVIEW OF SYSTEMS:    CONSTITUTIONAL:  Denies fever, chill or rigors. Reported weakness   HEENT: denies blurring of vision or double vision, denies hearing problem  RESPIRATORY: denies cough, shortness of breath, sputum expectoration, chest pain. CARDIOVASCULAR:  Denies palpitation  GASTROINTESTINAL:  diarrhea . dehydration   GENITOURINARY:  Denies burning urination or frequency of urination  INTEGUMENT: denies wound , rash  HEMATOLOGIC/LYMPHATIC:  Denies lymph node swelling, gum bleeding or easy bruising. MUSCULOSKELETAL:  Denies leg pain , joint pain , joint swelling  NEUROLOGICAL:  Denies light headed, dizziness    PHYSICAL EXAM:      Vitals:     BP (!) 123/43   Pulse 69   Temp 98.4 °F (36.9 °C) (Oral)   Resp 18   Ht 5' (1.524 m)   Wt 99 lb (44.9 kg)   SpO2 92%   BMI 19.33 kg/m²     General Appearance:    Awake, alert , no acute distress. Head:    Normocephalic, atraumatic   Eyes:    No pallor, no icterus,   Ears:    No obvious deformity or drainage.    Nose:   No nasal drainage   Throat:   Mucosa dry    Neck:   Supple, no lymphadenopathy   Back:     no CVA tenderness   Lungs:     Clear to auscultation bilaterally    Heart:    Regular rate and rhythm   Abdomen:     Soft, non-tender, bowel sounds present    PEG tube - OK, ileostomy functioning    Extremities:   No edema, no cyanosis    Pulses:   Dorsalis pedis palpable    Skin:   no rashes or lesions   CBC with Differential:      Lab Results   Component Value Date/Time    WBC 6.8 07/27/2022 06:58 AM    RBC 2.69 07/27/2022 06:58 AM    HGB 8.3 07/27/2022 06:58 AM    HCT 27.0 07/27/2022 06:58 AM     07/27/2022 06:58 AM    .4 07/27/2022 06:58 AM    MCH 30.9 07/27/2022 06:58 AM    MCHC 30.7 07/27/2022 06:58 AM    RDW 14.6 07/27/2022 06:58 AM    NRBC 0.0 07/21/2022 06:18 AM    LYMPHOPCT 16.8 07/27/2022 06:58 AM    MONOPCT 9.4 07/27/2022 06:58 AM    MYELOPCT 0.9 07/24/2022 09:43 AM    BASOPCT 0.7 07/27/2022 06:58 AM    MONOSABS 0.64 07/27/2022 06:58 AM    LYMPHSABS 1.14 07/27/2022 06:58 AM    EOSABS 0.13 07/27/2022 06:58 AM    BASOSABS 0.05 07/27/2022 06:58 AM       CMP     Lab Results   Component Value Date/Time     07/27/2022 06:58 AM    K 4.1 07/27/2022 06:58 AM     07/27/2022 06:58 AM    CO2 18 07/27/2022 06:58 AM    BUN 36 07/27/2022 06:58 AM    CREATININE 1.4 07/27/2022 06:58 AM    GFRAA 44 07/27/2022 06:58 AM    LABGLOM 36 07/27/2022 06:58 AM    GLUCOSE 90 07/27/2022 06:58 AM    PROT 5.0 07/27/2022 06:58 AM    LABALBU 1.8 07/27/2022 06:58 AM    CALCIUM 7.0 07/27/2022 06:58 AM    BILITOT 0.5 07/27/2022 06:58 AM    ALKPHOS 206 07/27/2022 06:58 AM    AST 14 07/27/2022 06:58 AM    ALT 13 07/27/2022 06:58 AM         Hepatic Function Panel:    Lab Results   Component Value Date/Time    ALKPHOS 206 07/27/2022 06:58 AM    ALT 13 07/27/2022 06:58 AM    AST 14 07/27/2022 06:58 AM    PROT 5.0 07/27/2022 06:58 AM    BILITOT 0.5 07/27/2022 06:58 AM    BILIDIR <0.2 01/21/2021 11:55 AM    IBILI see below 01/21/2021 11:55 AM    LABALBU 1.8 07/27/2022 06:58 AM       PT/INR:    Lab Results   Component Value Date/Time    PROTIME 19.0 07/24/2022 09:43 AM    INR 1.7 07/24/2022 09:43 AM       TSH:    Lab Results   Component Value Date/Time    TSH 1.380 07/01/2022 02:31 PM       U/A:    Lab Results   Component Value Date/Time    COLORU Yellow 07/24/2022 11:19 AM    PHUR 5.5 07/24/2022 11:19 AM    LABCAST FEW 01/25/2020 02:45 PM    WBCUA NONE 07/24/2022 11:19 AM    RBCUA NONE 07/24/2022 11:19 AM    YEAST Present 09/18/2020 06:50 AM    BACTERIA NONE SEEN 07/24/2022 11:19 AM    CLARITYU Clear 07/24/2022 11:19 AM    SPECGRAV 1.020 07/24/2022 11:19 AM    LEUKOCYTESUR Negative 07/24/2022 11:19 AM    UROBILINOGEN 0.2 07/24/2022 11:19 AM    BILIRUBINUR Negative 07/24/2022 11:19 AM    BLOODU Negative 07/24/2022 11:19 AM    GLUCOSEU Negative 07/24/2022 11:19 AM       ABG:  No results found for: YYN2EHC, BEART, V8UBURLQ, PHART, THGBART, MPB2VKE, PO2ART, VOF3CRA    MICROBIOLOGY:    Blood culture -    Susceptibility      Escherichia coli (1)    Antibiotic Interpretation Microscan  Method Status    amoxicillin-clavulanate Intermediate ^16 mcg/mL BACTERIAL SUSCEPTIBILITY PANEL BY JAMAAL     ceFAZolin Sensitive <=^4 mcg/mL BACTERIAL SUSCEPTIBILITY PANEL BY JAMAAL     cefepime Sensitive <=^0.12 mcg/mL BACTERIAL SUSCEPTIBILITY PANEL BY JAMAAL     cefotaxime Sensitive <=^0.25 mcg/mL BACTERIAL SUSCEPTIBILITY PANEL BY JAMAAL     cefOXitin Sensitive <=^4 mcg/mL BACTERIAL SUSCEPTIBILITY PANEL BY JAMAAL     cefTAZidime-avibactam Sensitive <=^0.12 mcg/mL BACTERIAL SUSCEPTIBILITY PANEL BY JAMAAL     gentamicin Sensitive <=^1 mcg/mL BACTERIAL SUSCEPTIBILITY PANEL BY JAMAAL     levofloxacin Sensitive <=^0.12 mcg/mL BACTERIAL SUSCEPTIBILITY PANEL BY AJMAAL     meropenem Sensitive <=^0.25 mcg/mL BACTERIAL SUSCEPTIBILITY PANEL BY JAMAAL     piperacillin-tazobactam Sensitive <=^4 mcg/mL BACTERIAL SUSCEPTIBILITY PANEL BY JAMAAL     trimethoprim-sulfamethoxazole Resistant >=^320 mcg/mL BACTERIAL SUSCEPTIBILITY              Radiology :    MRI abdomen -  Associated with the distal margin distal pancreatic duct is a 12 mm focus   without abnormal enhancement most consistent of a 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 Findings consistent with acute enteritis and mesenteric edema as well as   small volume ascites.      Moderate right and small to moderate left pleural effusions         IMPRESSION:     Short gut syndrome,  E coli bacteremia  Leukocytosis - improved       RECOMMENDATIONS:    IV rocephin 1 gram q 24 hrs  ( day  6 of 7 )- pt can be discharged tomorrow after rocephin is given

## 2022-07-29 NOTE — PLAN OF CARE
Problem: Discharge Planning  Goal: Discharge to home or other facility with appropriate resources  Outcome: Progressing     Problem: Pain  Goal: Verbalizes/displays adequate comfort level or baseline comfort level  7/29/2022 1324 by Jose Arnold RN  Outcome: Progressing     Problem: Skin/Tissue Integrity  Goal: Absence of new skin breakdown  Description: 1. Monitor for areas of redness and/or skin breakdown  2. Assess vascular access sites hourly  3. Every 4-6 hours minimum:  Change oxygen saturation probe site  4. Every 4-6 hours:  If on nasal continuous positive airway pressure, respiratory therapy assess nares and determine need for appliance change or resting period.   7/29/2022 1324 by Jose Arnold RN  Outcome: Progressing     Problem: Chronic Conditions and Co-morbidities  Goal: Patient's chronic conditions and co-morbidity symptoms are monitored and maintained or improved  7/29/2022 1324 by Jose Arnold RN  Outcome: Progressing     Problem: Chronic Conditions and Co-morbidities  Goal: Patient's chronic conditions and co-morbidity symptoms are monitored and maintained or improved  7/29/2022 1324 by Jose Arnold RN  Outcome: Progressing     Problem: Safety - Adult  Goal: Free from fall injury  Outcome: Progressing

## 2022-07-30 PROCEDURE — 6360000002 HC RX W HCPCS: Performed by: INTERNAL MEDICINE

## 2022-07-30 PROCEDURE — 1200000000 HC SEMI PRIVATE

## 2022-07-30 PROCEDURE — 2580000003 HC RX 258: Performed by: FAMILY MEDICINE

## 2022-07-30 PROCEDURE — 6370000000 HC RX 637 (ALT 250 FOR IP): Performed by: INTERNAL MEDICINE

## 2022-07-30 PROCEDURE — 2580000003 HC RX 258: Performed by: INTERNAL MEDICINE

## 2022-07-30 RX ADMIN — CEFTRIAXONE SODIUM 1000 MG: 1 INJECTION, POWDER, FOR SOLUTION INTRAMUSCULAR; INTRAVENOUS at 05:43

## 2022-07-30 RX ADMIN — SODIUM CHLORIDE: 9 INJECTION, SOLUTION INTRAVENOUS at 04:39

## 2022-07-30 RX ADMIN — OXYCODONE AND ACETAMINOPHEN 1 TABLET: 325; 10 TABLET ORAL at 09:05

## 2022-07-30 RX ADMIN — PANTOPRAZOLE SODIUM 40 MG: 40 TABLET, DELAYED RELEASE ORAL at 05:42

## 2022-07-30 RX ADMIN — APIXABAN 5 MG: 5 TABLET, FILM COATED ORAL at 21:10

## 2022-07-30 RX ADMIN — FLUTICASONE PROPIONATE 1 SPRAY: 50 SPRAY, METERED NASAL at 21:13

## 2022-07-30 RX ADMIN — TRAZODONE HYDROCHLORIDE 50 MG: 50 TABLET ORAL at 21:10

## 2022-07-30 RX ADMIN — DIPHENOXYLATE HYDROCHLORIDE AND ATROPINE SULFATE 1 TABLET: 2.5; .025 TABLET ORAL at 21:10

## 2022-07-30 RX ADMIN — DIPHENOXYLATE HYDROCHLORIDE AND ATROPINE SULFATE 1 TABLET: 2.5; .025 TABLET ORAL at 13:18

## 2022-07-30 RX ADMIN — Medication 10 ML: at 21:11

## 2022-07-30 RX ADMIN — PRAVASTATIN SODIUM 40 MG: 20 TABLET ORAL at 21:09

## 2022-07-30 RX ADMIN — PRIMIDONE 250 MG: 250 TABLET ORAL at 21:25

## 2022-07-30 RX ADMIN — Medication 10 ML: at 09:06

## 2022-07-30 RX ADMIN — LOPERAMIDE HYDROCHLORIDE 4 MG: 2 CAPSULE ORAL at 13:18

## 2022-07-30 RX ADMIN — FERROUS SULFATE TAB 325 MG (65 MG ELEMENTAL FE) 325 MG: 325 (65 FE) TAB at 21:11

## 2022-07-30 RX ADMIN — OXYCODONE AND ACETAMINOPHEN 1 TABLET: 325; 10 TABLET ORAL at 16:39

## 2022-07-30 RX ADMIN — LOPERAMIDE HYDROCHLORIDE 6 MG: 2 CAPSULE ORAL at 21:09

## 2022-07-30 RX ADMIN — LOPERAMIDE HYDROCHLORIDE 4 MG: 2 CAPSULE ORAL at 18:09

## 2022-07-30 RX ADMIN — LOPERAMIDE HYDROCHLORIDE 4 MG: 2 CAPSULE ORAL at 09:05

## 2022-07-30 RX ADMIN — ESCITALOPRAM 20 MG: 10 TABLET, FILM COATED ORAL at 21:10

## 2022-07-30 RX ADMIN — ASPIRIN 81 MG CHEWABLE TABLET 81 MG: 81 TABLET CHEWABLE at 21:10

## 2022-07-30 RX ADMIN — Medication 1 CAPSULE: at 21:10

## 2022-07-30 RX ADMIN — DIPHENOXYLATE HYDROCHLORIDE AND ATROPINE SULFATE 1 TABLET: 2.5; .025 TABLET ORAL at 18:09

## 2022-07-30 RX ADMIN — Medication 2000 UNITS: at 09:06

## 2022-07-30 RX ADMIN — APIXABAN 5 MG: 5 TABLET, FILM COATED ORAL at 09:06

## 2022-07-30 RX ADMIN — CYANOCOBALAMIN TAB 1000 MCG 1000 MCG: 1000 TAB at 21:10

## 2022-07-30 RX ADMIN — Medication 2000 UNITS: at 21:09

## 2022-07-30 RX ADMIN — DIPHENOXYLATE HYDROCHLORIDE AND ATROPINE SULFATE 1 TABLET: 2.5; .025 TABLET ORAL at 05:42

## 2022-07-30 ASSESSMENT — PAIN DESCRIPTION - ONSET
ONSET: ON-GOING
ONSET: ON-GOING

## 2022-07-30 ASSESSMENT — PAIN SCALES - GENERAL
PAINLEVEL_OUTOF10: 3
PAINLEVEL_OUTOF10: 2
PAINLEVEL_OUTOF10: 5
PAINLEVEL_OUTOF10: 5
PAINLEVEL_OUTOF10: 0

## 2022-07-30 ASSESSMENT — PAIN DESCRIPTION - FREQUENCY
FREQUENCY: CONTINUOUS
FREQUENCY: CONTINUOUS

## 2022-07-30 ASSESSMENT — PAIN DESCRIPTION - DESCRIPTORS
DESCRIPTORS: ACHING
DESCRIPTORS: ACHING

## 2022-07-30 ASSESSMENT — PAIN DESCRIPTION - LOCATION
LOCATION: BACK

## 2022-07-30 ASSESSMENT — ENCOUNTER SYMPTOMS
ABDOMINAL PAIN: 1
SHORTNESS OF BREATH: 0

## 2022-07-30 ASSESSMENT — PAIN DESCRIPTION - ORIENTATION: ORIENTATION: MID

## 2022-07-30 NOTE — PROGRESS NOTES
Department of Internal Medicine  Infectious Diseases  Progress  Note    C/C :  E coli bacteremia       Denies fever, chills, abdomen pain   Afebrile        Current Facility-Administered Medications   Medication Dose Route Frequency Provider Last Rate Last Admin    0.9 % sodium chloride infusion   IntraVENous Continuous Duncan Mccain  mL/hr at 07/30/22 0439 New Bag at 07/30/22 0439    sodium chloride flush 0.9 % injection 10 mL  10 mL IntraVENous 2 times per day Azalea E Awilda, DO   10 mL at 07/30/22 7283    sodium chloride flush 0.9 % injection 10 mL  10 mL IntraVENous PRN Azalea E Awilda, DO        0.9 % sodium chloride infusion   IntraVENous PRN Azalea E Awilda, DO        ondansetron (ZOFRAN-ODT) disintegrating tablet 4 mg  4 mg Oral Q8H PRN Azalea E Awilda, DO        Or    ondansetron (ZOFRAN) injection 4 mg  4 mg IntraVENous Q6H PRN Azalea E Awilda, DO   4 mg at 07/25/22 1210    senna (SENOKOT) tablet 8.6 mg  1 tablet Oral Daily PRN Azalea E Awilda, DO        acetaminophen (TYLENOL) tablet 650 mg  650 mg Oral Q6H PRN Azalea E Awilda, DO        Or    acetaminophen (TYLENOL) suppository 650 mg  650 mg Rectal Q6H PRN Azalea E Awilda, DO        apixaban (ELIQUIS) tablet 5 mg  5 mg Oral BID Azalea E Awilda, DO   5 mg at 07/30/22 7357    aspirin chewable tablet 81 mg  81 mg Oral Nightly Azalea E Awilda, DO   81 mg at 07/29/22 2103    vitamin D (CHOLECALCIFEROL) tablet 2,000 Units  2,000 Units Oral BID Azalea E Awilda, DO   2,000 Units at 07/30/22 0906    diphenoxylate-atropine (LOMOTIL) 2.5-0.025 MG per tablet 1 tablet  1 tablet Oral 4x Daily AC & HS Azalea E Awilda, DO   1 tablet at 07/30/22 0542    escitalopram (LEXAPRO) tablet 20 mg  20 mg Oral Nightly Azalea E Awilda, DO   20 mg at 07/29/22 2103    ferrous sulfate (IRON 325) tablet 325 mg  325 mg Oral Nightly Azalea E Awilda, DO   325 mg at 07/29/22 2103    fluticasone (FLONASE) 50 MCG/ACT nasal spray 1 spray  1 spray Each Nostril Nightly Azalea E Awilda, DO   1 spray at 07/29/22 2103    hyoscyamine (ANASPAZ;LEVSIN) tablet 125 mcg  125 mcg Oral 4x Daily PRN Azalea E Awilda, DO        loperamide (IMODIUM) capsule 4 mg  4 mg Oral TID WC Azalea E Awilda, DO   4 mg at 07/30/22 8302    loperamide (IMODIUM) capsule 6 mg  6 mg Oral Nightly Azalea E Awilda, DO   6 mg at 07/29/22 2104    LORazepam (ATIVAN) tablet 0.25 mg  0.25 mg Oral Daily PRN Azalea E Awilda, DO        pantoprazole (PROTONIX) tablet 40 mg  40 mg Oral QAM AC Azalea E Awilda, DO   40 mg at 07/30/22 0542    oxyCODONE-acetaminophen (PERCOCET)  MG per tablet 1 tablet  1 tablet Oral Q6H PRN Azalea E Awilda, DO   1 tablet at 07/30/22 0905    pravastatin (PRAVACHOL) tablet 40 mg  40 mg Oral Nightly Azalea E Awilda, DO   40 mg at 07/29/22 2103    primidone (MYSOLINE) tablet 250 mg  250 mg Oral Nightly Azalea E Awilda, DO   250 mg at 07/29/22 2103    lactobacillus (CULTURELLE) capsule 1 capsule  1 capsule Oral Nightly Azalea E Awilda, DO   1 capsule at 07/29/22 2103    traZODone (DESYREL) tablet 50 mg  50 mg Oral Nightly Azalea E Awilda, DO   50 mg at 07/29/22 2103    vitamin B-12 (CYANOCOBALAMIN) tablet 1,000 mcg  1,000 mcg Oral Nightly Azalea E Awilda, DO   1,000 mcg at 07/29/22 2104    cefTRIAXone (ROCEPHIN) 1,000 mg in sterile water 10 mL IV syringe  1,000 mg IntraVENous Q24H Fred Salmon MD   1,000 mg at 07/30/22 0543         REVIEW OF SYSTEMS:    CONSTITUTIONAL:  Denies fever, chill or rigors. Reported weakness   HEENT: denies blurring of vision or double vision, denies hearing problem  RESPIRATORY: denies cough, shortness of breath, sputum expectoration, chest pain. CARDIOVASCULAR:  Denies palpitation  GASTROINTESTINAL:  diarrhea . dehydration   GENITOURINARY:  Denies burning urination or frequency of urination  INTEGUMENT: denies wound , rash  HEMATOLOGIC/LYMPHATIC:  Denies lymph node swelling, gum bleeding or easy bruising.   MUSCULOSKELETAL:  Denies leg pain , joint pain , joint swelling  NEUROLOGICAL:  Denies light headed, dizziness    PHYSICAL EXAM:      Vitals:     BP (!) 139/56   Pulse 72   Temp 98 °F (36.7 °C) (Oral)   Resp 18   Ht 5' (1.524 m)   Wt 99 lb (44.9 kg)   SpO2 95%   BMI 19.33 kg/m²     General Appearance:    Awake, alert , no acute distress. Head:    Normocephalic, atraumatic   Eyes:    No pallor, no icterus,   Ears:    No obvious deformity or drainage.    Nose:   No nasal drainage   Throat:   Mucosa dry    Neck:   Supple, no lymphadenopathy   Back:     no CVA tenderness   Lungs:     Clear to auscultation bilaterally    Heart:    Regular rate and rhythm   Abdomen:     Soft, non-tender, bowel sounds present    PEG tube - OK, ileostomy functioning    Extremities:   No edema, no cyanosis    Pulses:   Dorsalis pedis palpable    Skin:   no rashes or lesions   CBC with Differential:      Lab Results   Component Value Date/Time    WBC 6.8 07/27/2022 06:58 AM    RBC 2.69 07/27/2022 06:58 AM    HGB 8.3 07/27/2022 06:58 AM    HCT 27.0 07/27/2022 06:58 AM     07/27/2022 06:58 AM    .4 07/27/2022 06:58 AM    MCH 30.9 07/27/2022 06:58 AM    MCHC 30.7 07/27/2022 06:58 AM    RDW 14.6 07/27/2022 06:58 AM    NRBC 0.0 07/21/2022 06:18 AM    LYMPHOPCT 16.8 07/27/2022 06:58 AM    MONOPCT 9.4 07/27/2022 06:58 AM    MYELOPCT 0.9 07/24/2022 09:43 AM    BASOPCT 0.7 07/27/2022 06:58 AM    MONOSABS 0.64 07/27/2022 06:58 AM    LYMPHSABS 1.14 07/27/2022 06:58 AM    EOSABS 0.13 07/27/2022 06:58 AM    BASOSABS 0.05 07/27/2022 06:58 AM       CMP     Lab Results   Component Value Date/Time     07/27/2022 06:58 AM    K 4.1 07/27/2022 06:58 AM     07/27/2022 06:58 AM    CO2 18 07/27/2022 06:58 AM    BUN 36 07/27/2022 06:58 AM    CREATININE 1.4 07/27/2022 06:58 AM    GFRAA 44 07/27/2022 06:58 AM    LABGLOM 36 07/27/2022 06:58 AM    GLUCOSE 90 07/27/2022 06:58 AM    PROT 5.0 07/27/2022 06:58 AM    LABALBU 1.8 07/27/2022 06:58 AM    CALCIUM 7.0 07/27/2022 <=^0.12 mcg/mL BACTERIAL SUSCEPTIBILITY PANEL BY JAMAAL     gentamicin Sensitive <=^1 mcg/mL BACTERIAL SUSCEPTIBILITY PANEL BY JAMAAL     levofloxacin Sensitive <=^0.12 mcg/mL BACTERIAL SUSCEPTIBILITY PANEL BY JAMAAL     meropenem Sensitive <=^0.25 mcg/mL BACTERIAL SUSCEPTIBILITY PANEL BY JAMAAL     piperacillin-tazobactam Sensitive <=^4 mcg/mL BACTERIAL SUSCEPTIBILITY PANEL BY JAMAAL     trimethoprim-sulfamethoxazole Resistant >=^320 mcg/mL BACTERIAL SUSCEPTIBILITY              Radiology :    MRI abdomen -  Associated with the distal margin distal pancreatic duct is a 12 mm focus   without abnormal enhancement most consistent of a 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 Findings consistent with acute enteritis and mesenteric edema as well as   small volume ascites. Moderate right and small to moderate left pleural effusions         IMPRESSION:     Short gut syndrome,  E coli bacteremia  Leukocytosis - improved       RECOMMENDATIONS:    Monitor off antibiotics.  OK for discharge

## 2022-07-30 NOTE — PLAN OF CARE
Problem: Discharge Planning  Goal: Discharge to home or other facility with appropriate resources  7/30/2022 1014 by Carmen Key RN  Outcome: Progressing  Flowsheets (Taken 7/30/2022 0302 by Jaquelin Padron RN)  Discharge to home or other facility with appropriate resources:   Identify barriers to discharge with patient and caregiver   Arrange for needed discharge resources and transportation as appropriate   Identify discharge learning needs (meds, wound care, etc)   Arrange for interpreters to assist at discharge as needed   Refer to discharge planning if patient needs post-hospital services based on physician order or complex needs related to functional status, cognitive ability or social support system     Problem: Pain  Goal: Verbalizes/displays adequate comfort level or baseline comfort level  7/30/2022 1014 by Carmen Key RN  Outcome: Progressing     Problem: Skin/Tissue Integrity  Goal: Absence of new skin breakdown  Description: 1. Monitor for areas of redness and/or skin breakdown  2. Assess vascular access sites hourly  3. Every 4-6 hours minimum:  Change oxygen saturation probe site  4. Every 4-6 hours:  If on nasal continuous positive airway pressure, respiratory therapy assess nares and determine need for appliance change or resting period.   7/30/2022 1014 by Carmen Key RN  Outcome: Progressing     Problem: Chronic Conditions and Co-morbidities  Goal: Patient's chronic conditions and co-morbidity symptoms are monitored and maintained or improved  7/30/2022 1014 by Carmen Key RN  Outcome: Progressing  Flowsheets (Taken 7/30/2022 0302 by Jaquelin Padron RN)  Care Plan - Patient's Chronic Conditions and Co-Morbidity Symptoms are Monitored and Maintained or Improved: Monitor and assess patient's chronic conditions and comorbid symptoms for stability, deterioration, or improvement     Problem: Safety - Adult  Goal: Free from fall injury  7/30/2022 1014 by [employer] MARYJANE Christian  Outcome: Progressing     Problem: Nutrition Deficit:  Goal: Optimize nutritional status  7/30/2022 1014 by Modesto Mott RN  Outcome: Progressing

## 2022-07-30 NOTE — PROGRESS NOTES
Progress Note  Date:2022       Room:8407/8407-B  Patient Name:Bea Kelley     YOB: 1940     Age:82 y.o. Patient says she feels improved today. Subjective    Subjective:  Symptoms:  Improved. No shortness of breath or chest pain. Diet:  Poor intake. Activity level: Normal.    Pain:  She complains of pain that is mild. Review of Systems   Constitutional:  Negative for activity change and fever. HENT:  Negative for congestion. Respiratory:  Negative for shortness of breath. Cardiovascular:  Negative for chest pain. Gastrointestinal:  Positive for abdominal pain. Neurological:  Negative for dizziness. Objective         Vitals Last 24 Hours:  TEMPERATURE:  Temp  Av.2 °F (36.8 °C)  Min: 98 °F (36.7 °C)  Max: 98.4 °F (36.9 °C)  RESPIRATIONS RANGE: Resp  Av  Min: 18  Max: 18  PULSE OXIMETRY RANGE: SpO2  Av %  Min: 93 %  Max: 95 %  PULSE RANGE: Pulse  Av.3  Min: 65  Max: 72  BLOOD PRESSURE RANGE: Systolic (46AMB), DNB:601 , Min:111 , LALIT:925   ; Diastolic (09OWU), UCX:92, Min:47, Max:58    I/O (24Hr): Intake/Output Summary (Last 24 hours) at 2022 0842  Last data filed at 2022 0335  Gross per 24 hour   Intake 480 ml   Output 1425 ml   Net -945 ml       Objective:  General Appearance:  Comfortable. Vital signs: (most recent): Blood pressure (!) 139/56, pulse 72, temperature 98 °F (36.7 °C), temperature source Oral, resp. rate 18, height 5' (1.524 m), weight 99 lb (44.9 kg), SpO2 95 %. No fever. Lungs:  Normal effort and normal respiratory rate. Breath sounds clear to auscultation. Heart: Normal rate. Regular rhythm. S1 normal and S2 normal.    Labs/Imaging/Diagnostics    Labs:  CBC:  No results for input(s): WBC, RBC, HGB, HCT, MCV, RDW, PLT in the last 72 hours. CHEMISTRIES:  No results for input(s): NA, K, CL, CO2, BUN, CREATININE, GLUCOSE, CA, PHOS, MG in the last 72 hours.     PT/INR:  No results for input(s): pelvis. Comparison 01/21/2021 Findings The examination is very limited due to lack of contrast.  The liver is heterogeneous in appearance concerning for liver disease. Gallbladder is absent. Stomach is collapsed with a G-tube. Spleen appears normal. 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 There is poor delineation of the pancreas with suggestion of a 1.8 cm cystic lesion in the head of the pancreas which is indeterminate. There is a small amount of ascites with fluid surrounding the liver and paracolic gutters. Adrenals and the kidneys are normal with 2.2 cm cystic lesion in the left kidney. Extensive postoperative changes are identified in the lumbosacral spine with irregularity and fragmentation at the surgical site and persistent grade 2 spondylolisthesis at L4-5. Clinical assessment is recommended. There is calcification aorta. Pelvis. Bladder is distended. There is small amount of ascites. 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 Somewhat distended small bowel loops with the mural thickening is identified. There is anasarca. Centrilobular emphysema with progressive patchy infiltrates/atelectasis and pleural effusion in the lung bases concerning for pneumonia or edema. Coronary artery calcification. Suboptimal evaluation abdomen pelvis. 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 Liver disease with small amount of ascites. Question indeterminate cystic lesion in the pancreas. Consider surveillance. Significant irregularity and the postsurgical changes in the lumbosacral spine. See above.      CT CHEST WO CONTRAST    Result Date: 7/24/2022  EXAMINATION: CT OF THE CHEST WITHOUT CONTRAST; CT OF THE ABDOMEN AND PELVIS WITHOUT CONTRAST 7/24/2022 12:55 pm TECHNIQUE: CT of the chest was performed without the administration of intravenous contrast. Multiplanar reformatted images are provided for review. Automated exposure control, iterative reconstruction, and/or weight based adjustment of the mA/kV was utilized to reduce the radiation dose to as low as reasonably achievable.; CT of the abdomen and pelvis was performed without the administration of intravenous contrast. Multiplanar reformatted images are provided for review. Automated exposure control, iterative reconstruction, and/or weight based adjustment of the mA/kV was utilized to reduce the radiation dose to as low as reasonably achievable. COMPARISON: 07/16/2022 HISTORY: ORDERING SYSTEM PROVIDED HISTORY: rule out infection TECHNOLOGIST PROVIDED HISTORY: Reason for exam:->rule out infection Decision Support Exception - unselect if not a suspected or confirmed emergency medical condition->Emergency Medical Condition (MA) What reading provider will be dictating this exam?->CRC FINDINGS: CT chest. There is borderline cardiac size with diffuse coronary artery calcification. The great vessels are normal.  There is prominent pulmonary artery concerning for pulmonary artery hypertension. Moderately enlarged mediastinal lymph nodes are present. There is centrilobular emphysema with progressive infiltrates/atelectasis and pleural effusion in the lung bases concerning for pneumonia or edema. Degenerative changes are identified in the thoracolumbar spine with previous surgical changes CT abdomen and pelvis. Comparison 01/21/2021 Findings The examination is very limited due to lack of contrast.  The liver is heterogeneous in appearance concerning for liver disease. Gallbladder is absent. Stomach is collapsed with a G-tube. Spleen appears normal. 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   There is poor delineation of the pancreas with suggestion of a 1.8 cm cystic lesion in the head of the pancreas which is indeterminate. There is a small amount of ascites with fluid surrounding the liver and paracolic gutters. Adrenals and the kidneys are normal with 2.2 cm cystic lesion in the left kidney. Extensive postoperative changes are identified in the lumbosacral spine with irregularity and fragmentation at the surgical site and persistent grade 2 spondylolisthesis at L4-5. Clinical assessment is recommended. There is calcification aorta. Pelvis. Bladder is distended. There is small amount of ascites. 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 Somewhat distended small bowel loops with the mural thickening is identified. There is anasarca. Centrilobular emphysema with progressive patchy infiltrates/atelectasis and pleural effusion in the lung bases concerning for pneumonia or edema. Coronary artery calcification. Suboptimal evaluation abdomen pelvis. 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 Liver disease with small amount of ascites. Question indeterminate cystic lesion in the pancreas. Consider surveillance. Significant irregularity and the postsurgical changes in the lumbosacral spine. See above. XR CHEST PORTABLE    Result Date: 7/24/2022  EXAMINATION: ONE XRAY VIEW OF THE CHEST 7/24/2022 9:54 am COMPARISON: 07/15/2022 HISTORY: ORDERING SYSTEM PROVIDED HISTORY: sepsis, r/o pneumonia TECHNOLOGIST PROVIDED HISTORY: Reason for exam:->sepsis, r/o pneumonia What reading provider will be dictating this exam?->CRC FINDINGS: Portable chest reveal cardiac and mediastinal silhouettes within normal limits. Underlying chronic changes seen within the lung fields bilaterally. No focal parenchymal opacification present. No pleural effusion or pneumothorax. Vascular calcifications seen within the thoracic aorta with hardware identified at the thoracolumbar spine. Interval removal the right noa catheter. Stable chronic changes seen within the lung fields with no evidence of acute parenchymal disease. Noa catheter is been removed in the interval on the right. Assessment//Plan           Hospital Problems             Last Modified POA    * (Principal) Enteritis 7/24/2022 Yes    Hypotension 7/24/2022 Yes    Abnormal CT scan, lumbar spine 7/25/2022 Yes    Severe protein-calorie malnutrition (HCC) (Chronic) 7/29/2022 Yes   Assessment:   (E coli bacteremia  Short gut syndrome  Pancreatic mass  Lumbar DJD  CAD  HTN  Hyperlipidemia     Plan  ID following, on rocephin  Neurosurgery no intervention  MRCP reviewed, no plans from surgery  Monitor labs and exam.  Continue rest of meds. Discharge planning. ).

## 2022-07-31 VITALS
DIASTOLIC BLOOD PRESSURE: 42 MMHG | WEIGHT: 99 LBS | HEIGHT: 60 IN | RESPIRATION RATE: 16 BRPM | TEMPERATURE: 98.1 F | SYSTOLIC BLOOD PRESSURE: 124 MMHG | HEART RATE: 76 BPM | BODY MASS INDEX: 19.44 KG/M2 | OXYGEN SATURATION: 91 %

## 2022-07-31 LAB — SARS-COV-2, NAAT: NOT DETECTED

## 2022-07-31 PROCEDURE — 87635 SARS-COV-2 COVID-19 AMP PRB: CPT

## 2022-07-31 PROCEDURE — 2580000003 HC RX 258: Performed by: INTERNAL MEDICINE

## 2022-07-31 PROCEDURE — 6370000000 HC RX 637 (ALT 250 FOR IP): Performed by: INTERNAL MEDICINE

## 2022-07-31 RX ORDER — ONDANSETRON 4 MG/1
4 TABLET, ORALLY DISINTEGRATING ORAL EVERY 8 HOURS PRN
Qty: 30 TABLET | Refills: 0 | Status: SHIPPED | OUTPATIENT
Start: 2022-07-31 | End: 2022-08-08

## 2022-07-31 RX ORDER — SENNA PLUS 8.6 MG/1
1 TABLET ORAL DAILY PRN
Qty: 30 TABLET | Refills: 0 | Status: SHIPPED | OUTPATIENT
Start: 2022-07-31 | End: 2022-08-08

## 2022-07-31 RX ADMIN — DIPHENOXYLATE HYDROCHLORIDE AND ATROPINE SULFATE 1 TABLET: 2.5; .025 TABLET ORAL at 06:05

## 2022-07-31 RX ADMIN — Medication 2000 UNITS: at 08:42

## 2022-07-31 RX ADMIN — APIXABAN 5 MG: 5 TABLET, FILM COATED ORAL at 08:42

## 2022-07-31 RX ADMIN — LOPERAMIDE HYDROCHLORIDE 4 MG: 2 CAPSULE ORAL at 08:41

## 2022-07-31 RX ADMIN — OXYCODONE AND ACETAMINOPHEN 1 TABLET: 325; 10 TABLET ORAL at 09:31

## 2022-07-31 RX ADMIN — DIPHENOXYLATE HYDROCHLORIDE AND ATROPINE SULFATE 1 TABLET: 2.5; .025 TABLET ORAL at 11:24

## 2022-07-31 RX ADMIN — Medication 10 ML: at 08:41

## 2022-07-31 RX ADMIN — PANTOPRAZOLE SODIUM 40 MG: 40 TABLET, DELAYED RELEASE ORAL at 06:05

## 2022-07-31 ASSESSMENT — PAIN SCALES - GENERAL
PAINLEVEL_OUTOF10: 0
PAINLEVEL_OUTOF10: 4

## 2022-07-31 NOTE — DISCHARGE INSTR - COC
and removed     JOINT REPLACEMENT Right 3/24/15    right total shoulder arthroplasty    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     LAPAROTOMY N/A 5/12/2020    EXPLORATORY LAPAROTOMY EXPLANTATION OF INFECTED MESH SMAL BOWEL RESECTION AND REVISION END ILEOSTOMY, LYSIS OF ADHESIONS performed by Zenobia Woodall MD at [facility] N/A 6/8/2020    LAPAROTOMY EXPLORATORY, [place] Nava OF HERNIA MESH performed by Zenobia Woodall MD at [facility] N/A 9/15/2020    EXPLORATORY LAPAROTOMY WITH SMALL BOWEL RESECTION, TAKE DOWN REVISION ENTEROCUTANEOUS FISTULA , OSTOMY REVISION performed by Zenobia Woodall MD at [address] / leg    NASAL SINUS SURGERY      x2 /  cauterized    OTHER SURGICAL HISTORY  08/24/2016    diagnostic laparotomy with repair of intraabdominal hernia    OTHER SURGICAL HISTORY      removal plate / screws  / right great toe    PICC LINE INSERTION NURSE  9/15/2020         PORT SURGERY N/A 7/19/2022    MEDIPORT REMOVAL performed by Samuel Van MD at [facility]  2010    right     SKIN BIOPSY  2010    3 squamous cell carcinoma right leg, left leg       Immunization History:   Immunization History   Administered Date(s) Administered    COVID-19, PFIZER GRAY top, DO NOT Dilute, (age 15 y+), IM, 30 mcg/0.3 mL 04/18/2022    COVID-19, PFIZER PURPLE top, DILUTE for use, (age 15 y+), 30mcg/0.3mL 01/29/2021, 02/19/2021, 09/24/2021    Influenza Vaccine, unspecified formulation 10/15/2016       Active Problems:  Patient Active Problem List   Diagnosis Code    Right cataract H26.9    Essential hypertension, benign I10    Hyperlipidemia with target LDL less than 100 E78.5    Osteoarthritis, 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    Primary osteoarthritis of left knee M17.12    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    Ileostomy present ([facility] [address] Z93.2    Chronic kidney disease, stage III (moderate) (HCC) N18.30    COPD (chronic obstructive pulmonary disease) ([facility] [address] J44.9    Moderate protein-calorie malnutrition (HCC) E44.0    Abdominal wall cellulitis L03.311    Cellulitis L03.90    Infected hernioplasty mesh (HCC) T85.79XA    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    Mild protein-calorie malnutrition (HCC) E44.1    Acute kidney injury ([facility] [address] N17.9    Hypertensive kidney disease with chronic kidney disease stage IV (HCC) I12.9, N18.4    KD (acute kidney injury) ([facility] [address] N17.9    Diarrhea R19.7    Elevated serum creatinine R79.89    Abnormal serum creatinine level R79.89    Hyperlipemia E78.5    Primary hypertension I10    Essential (primary) hypertension I10    Postsurgical malabsorption, 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    Hypercholesterolemia E78.00    Pure hypercholesterolemia E78.00    Prediabetes R73.03    Hypothyroidism, unspecified E03.9    Acute renal failure (ARF) ([facility]) N17.9    Enteritis K52.9    Hypotension I95.9    Abnormal CT scan, lumbar spine R93.7    Severe protein-calorie malnutrition ([facility] [address] E43       Isolation/Infection:   Isolation            No Isolation          Patient Infection Status       Infection Onset Added Last Indicated Last Indicated By Review Planned Expiration Resolved Resolved By    None active    Resolved    COVID-19 (Rule Out) 07/16/22 07/16/22 07/16/22 Respiratory Panel, Molecular, with COVID-19 (Restricted: peds pts or suitable admitted adults) (Ordered)   07/16/22 Rule-Out Test Resulted    COVID-19 (Rule Out) 07/13/22 07/13/22 07/14/22 COVID-19, Rapid (Ordered)   07/14/22 Rule-Out Test Resulted    C-diff Rule Out 02/27/21 02/27/21 02/27/21 CLOSTRIDIUM DIFFICILE EIA (Ordered)   02/28/21 Rule-Out Test Resulted    MRSA 02/20/20 02/22/20 02/20/20 Culture, Wound   09/22/20 Rona Ellis RN            Nurse Assessment:  Last Vital Signs: BP (!) 122/54   Pulse 73   Temp 98.3 °F (36.8 °C) (Oral)   Resp 18   Ht 5' (1.524 m)   Wt 99 lb (44.9 kg)   SpO2 92%   BMI 19.33 kg/m²     Last documented pain score (0-10 scale): Pain Level: 0  Last Weight:    Wt Readings from Last 1 Encounters:   07/24/22 99 lb (44.9 kg)     Mental Status:  oriented and alert    IV Access:  - None    Nursing Mobility/ADLs:  Walking   Independent  Transfer  Independent  Bathing  Independent  Dressing  Independent  Toileting  Independent  Feeding  Independent  Med [address]  Med Delivery   whole    Wound Care Documentation and Therapy:  Incision 07/19/22 Chest Right;Upper (Active)   Dressing Status Other (Comment) 07/29/22 0819   Dressing/Treatment Skin glue 07/30/22 2341   Closure Open to air;Surgical glue 07/30/22 2341   Margins Approximated 07/29/22 0112   Incision Assessment Dry 07/30/22 2341   Drainage Amount None 07/30/22 2341   Danelle-incision Assessment Intact 07/29/22 0112   Number of days: 12        Elimination:  Continence: Bowel: Yes  Bladder: Yes  Urinary Catheter: None   Colostomy/Ileostomy/Ileal Conduit: {YES / CP:21868}  Ileostomy Ileostomy RLQ-Stomal Appliance: 2 piece, Changed, Leaking  Ileostomy Ileostomy RLQ-Stoma  Assessment: Protrudes, Pink  Ileostomy Ileostomy RLQ-Peristomal Assessment: Clean, dry & intact  Ileostomy Ileostomy RLQ-Treatment: Bag change, Tape changed, Site care  Ileostomy Ileostomy RLQ-Stool Appearance: Watery  Ileostomy Ileostomy RLQ-Stool Color: Joni Plush  Ileostomy Ileostomy RLQ-Stool Amount: Large  Ileostomy Ileostomy RLQ-Output (mL): 600 ml    Date of Last BM: 7/31/22    Intake/Output Summary (Last 24 hours) at 7/31/2022 0741  Last data filed at 7/31/2022 0645  Gross per 24 hour   Intake 120 ml   Output 1925 ml   Net -1805 ml     I/O last 3 completed shifts:   In: 120 [P.O.:120]  Out: 2622 [Urine:600; Stool:2275]    Safety Concerns:     None    Impairments/Disabilities:      None    Nutrition Therapy:  Current Nutrition Therapy:   - Oral Diet:  General    Routes of Feeding: Oral  Liquids: No Restrictions  Daily Fluid Restriction: no  Last Modified Barium Swallow with Video (Video Swallowing Test): not done    Treatments at the Time of Hospital Discharge:   Respiratory Treatments: ***  Oxygen Therapy:  is on oxygen at 2 L/min per nasal cannula. Ventilator:    - No ventilator support    Rehab Therapies: Physical Therapy and Occupational Therapy  Weight Bearing Status/Restrictions: No weight bearing restrictions  Other Medical Equipment (for information only, NOT a DME order):  {EQUIPMENT:819777442}  Other Treatments: ***    Patient's personal belongings (please select all that are sent with patient):  Aidan    RN SIGNATURE:  Electronically signed by Obdulio Rivera RN on 7/31/22 at 8:14 AM EDT    CASE MANAGEMENT/SOCIAL WORK SECTION    Inpatient Status Date: ***    Readmission Risk Assessment Score:  Readmission Risk              Risk of Unplanned Readmission:  [id number]           Discharging to Facility/ Agency   Name:   Address:  Phone:  Fax:    Dialysis Facility (if applicable)   Name:  Address:  Dialysis Schedule:  Phone:  Fax:    / signature: {Esignature:[id number]}    PHYSICIAN SECTION    Prognosis: {Prognosis:3408627306}    Condition at Discharge: [address] [place] [address] Patient Condition:[id number]}    Rehab Potential (if transferring to Rehab): {Prognosis:8522207626}    Recommended Labs or Other Treatments After Discharge: ***    Physician Certification: I certify the above information and transfer of Karl Henderson  is necessary for the continuing treatment of the diagnosis listed and that she requires {Admit to Appropriate Level of Care:45455} for {GREATER/LESS:089292274} 30 days.      Update Admission H&P: {CHP DME Changes in EFAHD:549777093}    PHYSICIAN SIGNATURE:  John Dominguez MD

## 2022-07-31 NOTE — DISCHARGE SUMMARY
Final  Hemoglobin                                    Date: 07/24/2022  Value: 8.5 (A)     Ref range: 11.5 - 15.5 g/dL   Status: Final  Hematocrit                                    Date: 07/24/2022  Value: 26.1 (A)    Ref range: 34.0 - 48.0 %      Status: Final  MCV                                           Date: 07/24/2022  Value: 92.9        Ref range: 80.0 - 99.9 fL     Status: Final  MCH                                           Date: 07/24/2022  Value: 30.2        Ref range: 26.0 - 35.0 pg     Status: Final  MCHC                                          Date: 07/24/2022  Value: 32.6        Ref range: 32.0 - 34.5 %      Status: Final  RDW                                           Date: 07/24/2022  Value: 14.5        Ref range: 11.5 - 15.0 fL     Status: Final  Platelets                                     Date: 07/24/2022  Value: 205         Ref range: 130 - 450 E9/L     Status: Final  MPV                                           Date: 07/24/2022  Value: 13.7 (A)    Ref range: 7.0 - 12.0 fL      Status: Final  Neutrophils %                                 Date: 07/24/2022  Value: 91.3 (A)    Ref range: 43.0 - 80.0 %      Status: Final  Lymphocytes %                                 Date: 07/24/2022  Value: 2.6 (A)     Ref range: 20.0 - 42.0 %      Status: Final  Monocytes %                                   Date: 07/24/2022  Value: 4.3         Ref range: 2.0 - 12.0 %       Status: Final  Eosinophils %                                 Date: 07/24/2022  Value: 0.9         Ref range: 0.0 - 6.0 %        Status: Final  Basophils %                                   Date: 07/24/2022  Value: 0.2         Ref range: 0.0 - 2.0 %        Status: Final  Neutrophils Absolute                          Date: 07/24/2022  Value: 13.16 (A)   Ref range: 1.80 - 7.30 E9/L   Status: Final  Lymphocytes Absolute                          Date: 07/24/2022  Value: 0.43 (A)    Ref range: 1.50 - 4.00 E9/L   Status: Final  Monocytes Absolute Date: 07/24/2022  Value: 31          Ref range: >=60 mL/min/1.73   Status: Final                Comment: Chronic Kidney Disease: less than 60 ml/min/1.73 sq.m. Kidney Failure: less than 15 ml/min/1.73 sq.m. Results valid for patients 18 years and older.     GFR                           Date: 07/24/2022  Value: 38            Status: Final  Calcium                                       Date: 07/24/2022  Value: 7.6 (A)     Ref range: 8.6 - 10.2 mg/dL   Status: Final  Total Protein                                 Date: 07/24/2022  Value: 5.3 (A)     Ref range: 6.4 - 8.3 g/dL     Status: Final  Albumin                                       Date: 07/24/2022  Value: 2.1 (A)     Ref range: 3.5 - 5.2 g/dL     Status: Final  Total Bilirubin                               Date: 07/24/2022  Value: 1.2         Ref range: 0.0 - 1.2 mg/dL    Status: Final  Alkaline Phosphatase                          Date: 07/24/2022  Value: 238 (A)     Ref range: 35 - 104 U/L       Status: Final  ALT                                           Date: 07/24/2022  Value: 16          Ref range: 0 - 32 U/L         Status: Final  AST                                           Date: 07/24/2022  Value: 14          Ref range: 0 - 31 U/L         Status: Final  Color, UA                                     Date: 07/24/2022  Value: Yellow      Ref range: Straw/Yellow       Status: Final  Clarity, UA                                   Date: 07/24/2022  Value: Clear       Ref range: Clear              Status: Final  Glucose, Ur                                   Date: 07/24/2022  Value: Negative    Ref range: Negative mg/dL     Status: Final  Bilirubin Urine                               Date: 07/24/2022  Value: Negative    Ref range: Negative           Status: Final  Ketones, Urine                                Date: 07/24/2022  Value: Negative    Ref range: Negative mg/dL     Status: Final  Specific Gravity, UA Date: 07/24/2022  Value: 1.020       Ref range: 1.005 - 1.030      Status: Final  Blood, Urine                                  Date: 07/24/2022  Value: Negative    Ref range: Negative           Status: Final  pH, UA                                        Date: 07/24/2022  Value: 5.5         Ref range: 5.0 - 9.0          Status: Final  Protein, UA                                   Date: 07/24/2022  Value: 30 (A)      Ref range: Negative mg/dL     Status: Final  Urobilinogen, Urine                           Date: 07/24/2022  Value: 0.2         Ref range: <2.0 E.U./dL       Status: Final  Nitrite, Urine                                Date: 07/24/2022  Value: Negative    Ref range: Negative           Status: Final  Leukocyte Esterase, Urine                     Date: 07/24/2022  Value: Negative    Ref range: Negative           Status: Final  Urine Culture, Routine                        Date: 07/24/2022  Value:               Status: Final                   Value:<10,000 CFU/mL  Mixed gram positive organisms    Lactic Acid, Sepsis                           Date: 07/24/2022  Value: 1.1         Ref range: 0.5 - 1.9 mmol/L   Status: Final  Lactic Acid, Sepsis                           Date: 07/24/2022  Value: 0.7         Ref range: 0.5 - 1.9 mmol/L   Status: Final  Blood Culture, Routine                        Date: 07/24/2022  Value: 5 Days no growth                       Status: Final  Culture, Blood 2                              Date: 07/24/2022  Value: 5 Days no growth                       Status: Final  aPTT                                          Date: 07/24/2022  Value: 29.1        Ref range: 24.5 - 35.1 sec    Status: Final  Protime                                       Date: 07/24/2022  Value: 19.0 (A)    Ref range: 9.3 - 12.4 sec     Status: Final  INR                                           Date: 07/24/2022  Value: 1.7           Status: Final  Lipase                                        Date: 07/24/2022  Value: 117 (A)     Ref range: 13 - 60 U/L        Status: Final  Ventricular Rate                              Date: 07/24/2022  Value: 49          Ref range: BPM                Status: Final  Atrial Rate                                   Date: 07/24/2022  Value: 49          Ref range: BPM                Status: Final  P-R Interval                                  Date: 07/24/2022  Value: 154         Ref range: ms                 Status: Final  QRS Duration                                  Date: 07/24/2022  Value: 106         Ref range: ms                 Status: Final  Q-T Interval                                  Date: 07/24/2022  Value: 552         Ref range: ms                 Status: Final  QTc Calculation (Bazett)                      Date: 07/24/2022  Value: 498         Ref range: ms                 Status: Final  P Axis                                        Date: 07/24/2022  Value: 61          Ref range: degrees            Status: Final  R Axis                                        Date: 07/24/2022  Value: -44         Ref range: degrees            Status: Final  T Axis                                        Date: 07/24/2022  Value: 26          Ref range: degrees            Status: Final  Troponin, High Sensitivity                    Date: 07/24/2022  Value: 200 (A)     Ref range: 0 - 9 ng/L         Status: Final                Comment: High Sensitivity Troponin values cannot be compared with  other Troponin methodologies. Patients with high levels of Biotin oral intake (i.e. >5 mg/day)  may have falsely decreased Troponin levels. Samples collected  within 8 hours of biotin intake may require additional information  for diagnosis. Troponin, High Sensitivity                    Date: 07/24/2022  Value: 185 (A)     Ref range: 0 - 9 ng/L         Status: Final                Comment: High Sensitivity Troponin values cannot be compared with  other Troponin methodologies.     Patients with high levels of Biotin oral intake (i.e. >5 mg/day)  may have falsely decreased Troponin levels. Samples collected  within 8 hours of biotin intake may require additional information  for diagnosis.     WBC, UA                                       Date: 07/24/2022  Value: NONE        Ref range: 0 - 5 /HPF         Status: Final  RBC, UA                                       Date: 07/24/2022  Value: NONE        Ref range: 0 - 2 /HPF         Status: Final  Renal Epithelial, UA                          Date: 07/24/2022  Value: FEW         Ref range: /HPF               Status: Final  Bacteria, UA                                  Date: 07/24/2022  Value: NONE SEEN   Ref range: None Seen /HPF     Status: Final  Cortisol                                      Date: 07/24/2022  Value: 16.19       Ref range: 2.68 - 18.40 mcg*  Status: Final  Sodium                                        Date: 07/25/2022  Value: 141         Ref range: 132 - 146 mmol/L   Status: Final  Potassium reflex Magnesium                    Date: 07/25/2022  Value: 3.9         Ref range: 3.5 - 5.0 mmol/L   Status: Final  Chloride                                      Date: 07/25/2022  Value: 108 (A)     Ref range: 98 - 107 mmol/L    Status: Final  CO2                                           Date: 07/25/2022  Value: 20 (A)      Ref range: 22 - 29 mmol/L     Status: Final  Anion Gap                                     Date: 07/25/2022  Value: 13          Ref range: 7 - 16 mmol/L      Status: Final  Glucose                                       Date: 07/25/2022  Value: 66 (A)      Ref range: 74 - 99 mg/dL      Status: Final  BUN                                           Date: 07/25/2022  Value: 56 (A)      Ref range: 6 - 23 mg/dL       Status: Final  Creatinine                                    Date: 07/25/2022  Value: 1.4 (A)     Ref range: 0.5 - 1.0 mg/dL    Status: Final  GFR Non-                      Date: 07/25/2022  Value: 36          Ref range: >=60 mL/min/1.73   Status: Final                Comment: Chronic Kidney Disease: less than 60 ml/min/1.73 sq.m. Kidney Failure: less than 15 ml/min/1.73 sq.m. Results valid for patients 18 years and older.     GFR                           Date: 07/25/2022  Value: 44            Status: Final  Calcium                                       Date: 07/25/2022  Value: 6.8 (A)     Ref range: 8.6 - 10.2 mg/dL   Status: Final  Total Protein                                 Date: 07/25/2022  Value: 4.7 (A)     Ref range: 6.4 - 8.3 g/dL     Status: Final  Albumin                                       Date: 07/25/2022  Value: 1.8 (A)     Ref range: 3.5 - 5.2 g/dL     Status: Final  Total Bilirubin                               Date: 07/25/2022  Value: 0.7         Ref range: 0.0 - 1.2 mg/dL    Status: Final  Alkaline Phosphatase                          Date: 07/25/2022  Value: 210 (A)     Ref range: 35 - 104 U/L       Status: Final  ALT                                           Date: 07/25/2022  Value: 14          Ref range: 0 - 32 U/L         Status: Final  AST                                           Date: 07/25/2022  Value: 14          Ref range: 0 - 31 U/L         Status: Final  WBC                                           Date: 07/25/2022  Value: 9.9         Ref range: 4.5 - 11.5 E9/L    Status: Final  RBC                                           Date: 07/25/2022  Value: 2.64 (A)    Ref range: 3.50 - 5.50 E12/L  Status: Final  Hemoglobin                                    Date: 07/25/2022  Value: 8.1 (A)     Ref range: 11.5 - 15.5 g/dL   Status: Final  Hematocrit                                    Date: 07/25/2022  Value: 25.7 (A)    Ref range: 34.0 - 48.0 %      Status: Final  MCV                                           Date: 07/25/2022  Value: 97.3        Ref range: 80.0 - 99.9 fL     Status: Final  MCH                                           Date: 07/25/2022  Value: 30.7        Ref range: 26.0 - 35.0 pg     Status: Final  MCHC                                          Date: 07/25/2022  Value: 31.5 (A)    Ref range: 32.0 - 34.5 %      Status: Final  RDW                                           Date: 07/25/2022  Value: 14.6        Ref range: 11.5 - 15.0 fL     Status: Final  Platelets                                     Date: 07/25/2022  Value: 231         Ref range: 130 - 450 E9/L     Status: Final  MPV                                           Date: 07/25/2022  Value: 13.9 (A)    Ref range: 7.0 - 12.0 fL      Status: Final  Neutrophils %                                 Date: 07/25/2022  Value: 81.6 (A)    Ref range: 43.0 - 80.0 %      Status: Final  Immature Granulocytes %                       Date: 07/25/2022  Value: 1.2         Ref range: 0.0 - 5.0 %        Status: Final  Lymphocytes %                                 Date: 07/25/2022  Value: 9.2 (A)     Ref range: 20.0 - 42.0 %      Status: Final  Monocytes %                                   Date: 07/25/2022  Value: 6.0         Ref range: 2.0 - 12.0 %       Status: Final  Eosinophils %                                 Date: 07/25/2022  Value: 1.7         Ref range: 0.0 - 6.0 %        Status: Final  Basophils %                                   Date: 07/25/2022  Value: 0.3         Ref range: 0.0 - 2.0 %        Status: Final  Neutrophils Absolute                          Date: 07/25/2022  Value: 8.07 (A)    Ref range: 1.80 - 7.30 E9/L   Status: Final  Immature Granulocytes #                       Date: 07/25/2022  Value: 0.12        Ref range: E9/L               Status: Final  Lymphocytes Absolute                          Date: 07/25/2022  Value: 0.91 (A)    Ref range: 1.50 - 4.00 E9/L   Status: Final  Monocytes Absolute                            Date: 07/25/2022  Value: 0.59        Ref range: 0.10 - 0.95 E9/L   Status: Final  Eosinophils Absolute                          Date: 07/25/2022  Value: 0.17        Ref range: 0.05 - 0.50 E9/L Status: Final  Basophils Absolute                            Date: 07/25/2022  Value: 0.03        Ref range: 0.00 - 0.20 E9/L   Status: Final  Sodium                                        Date: 07/26/2022  Value: 133         Ref range: 132 - 146 mmol/L   Status: Final  Potassium reflex Magnesium                    Date: 07/26/2022  Value: 3.7         Ref range: 3.5 - 5.0 mmol/L   Status: Final  Chloride                                      Date: 07/26/2022  Value: 105         Ref range: 98 - 107 mmol/L    Status: Final  CO2                                           Date: 07/26/2022  Value: 21 (A)      Ref range: 22 - 29 mmol/L     Status: Final  Anion Gap                                     Date: 07/26/2022  Value: 7           Ref range: 7 - 16 mmol/L      Status: Final  Glucose                                       Date: 07/26/2022  Value: 109 (A)     Ref range: 74 - 99 mg/dL      Status: Final  BUN                                           Date: 07/26/2022  Value: 48 (A)      Ref range: 6 - 23 mg/dL       Status: Final  Creatinine                                    Date: 07/26/2022  Value: 1.5 (A)     Ref range: 0.5 - 1.0 mg/dL    Status: Final  GFR Non-                      Date: 07/26/2022  Value: 33          Ref range: >=60 mL/min/1.73   Status: Final                Comment: Chronic Kidney Disease: less than 60 ml/min/1.73 sq.m. Kidney Failure: less than 15 ml/min/1.73 sq.m. Results valid for patients 18 years and older.     GFR                           Date: 07/26/2022  Value: 40            Status: Final  Calcium                                       Date: 07/26/2022  Value: 6.7 (A)     Ref range: 8.6 - 10.2 mg/dL   Status: Final  Total Protein                                 Date: 07/26/2022  Value: 4.8 (A)     Ref range: 6.4 - 8.3 g/dL     Status: Final  Albumin                                       Date: 07/26/2022  Value: 1.6 (A)     Ref range: 3.5 - 5.2 g/dL Status: Final  Total Bilirubin                               Date: 07/26/2022  Value: 0.5         Ref range: 0.0 - 1.2 mg/dL    Status: Final  Alkaline Phosphatase                          Date: 07/26/2022  Value: 204 (A)     Ref range: 35 - 104 U/L       Status: Final  ALT                                           Date: 07/26/2022  Value: 12          Ref range: 0 - 32 U/L         Status: Final  AST                                           Date: 07/26/2022  Value: 12          Ref range: 0 - 31 U/L         Status: Final  WBC                                           Date: 07/26/2022  Value: 7.2         Ref range: 4.5 - 11.5 E9/L    Status: Final  RBC                                           Date: 07/26/2022  Value: 2.49 (A)    Ref range: 3.50 - 5.50 E12/L  Status: Final  Hemoglobin                                    Date: 07/26/2022  Value: 7.5 (A)     Ref range: 11.5 - 15.5 g/dL   Status: Final  Hematocrit                                    Date: 07/26/2022  Value: 24.5 (A)    Ref range: 34.0 - 48.0 %      Status: Final  MCV                                           Date: 07/26/2022  Value: 98.4        Ref range: 80.0 - 99.9 fL     Status: Final  MCH                                           Date: 07/26/2022  Value: 30.1        Ref range: 26.0 - 35.0 pg     Status: Final  MCHC                                          Date: 07/26/2022  Value: 30.6 (A)    Ref range: 32.0 - 34.5 %      Status: Final  RDW                                           Date: 07/26/2022  Value: 14.6        Ref range: 11.5 - 15.0 fL     Status: Final  Platelets                                     Date: 07/26/2022  Value: 273         Ref range: 130 - 450 E9/L     Status: Final  MPV                                           Date: 07/26/2022  Value: 12.7 (A)    Ref range: 7.0 - 12.0 fL      Status: Final  Neutrophils %                                 Date: 07/26/2022  Value: 71.9        Ref range: 43.0 - 80.0 %      Status: Final  Immature Granulocytes %                       Date: 07/26/2022  Value: 0.7         Ref range: 0.0 - 5.0 %        Status: Final  Lymphocytes %                                 Date: 07/26/2022  Value: 15.6 (A)    Ref range: 20.0 - 42.0 %      Status: Final  Monocytes %                                   Date: 07/26/2022  Value: 9.5         Ref range: 2.0 - 12.0 %       Status: Final  Eosinophils %                                 Date: 07/26/2022  Value: 1.7         Ref range: 0.0 - 6.0 %        Status: Final  Basophils %                                   Date: 07/26/2022  Value: 0.6         Ref range: 0.0 - 2.0 %        Status: Final  Neutrophils Absolute                          Date: 07/26/2022  Value: 5.20        Ref range: 1.80 - 7.30 E9/L   Status: Final  Immature Granulocytes #                       Date: 07/26/2022  Value: 0.05        Ref range: E9/L               Status: Final  Lymphocytes Absolute                          Date: 07/26/2022  Value: 1.13 (A)    Ref range: 1.50 - 4.00 E9/L   Status: Final  Monocytes Absolute                            Date: 07/26/2022  Value: 0.69        Ref range: 0.10 - 0.95 E9/L   Status: Final  Eosinophils Absolute                          Date: 07/26/2022  Value: 0.12        Ref range: 0.05 - 0.50 E9/L   Status: Final  Basophils Absolute                            Date: 07/26/2022  Value: 0.04        Ref range: 0.00 - 0.20 E9/L   Status: Final  Sodium                                        Date: 07/27/2022  Value: 137         Ref range: 132 - 146 mmol/L   Status: Final  Potassium reflex Magnesium                    Date: 07/27/2022  Value: 4.1         Ref range: 3.5 - 5.0 mmol/L   Status: Final  Chloride                                      Date: 07/27/2022  Value: 111 (A)     Ref range: 98 - 107 mmol/L    Status: Final  CO2                                           Date: 07/27/2022  Value: 18 (A)      Ref range: 22 - 29 mmol/L     Status: Final  Anion Gap Date: 07/27/2022  Value: 8           Ref range: 7 - 16 mmol/L      Status: Final  Glucose                                       Date: 07/27/2022  Value: 90          Ref range: 74 - 99 mg/dL      Status: Final  BUN                                           Date: 07/27/2022  Value: 36 (A)      Ref range: 6 - 23 mg/dL       Status: Final  Creatinine                                    Date: 07/27/2022  Value: 1.4 (A)     Ref range: 0.5 - 1.0 mg/dL    Status: Final  GFR Non-                      Date: 07/27/2022  Value: 36          Ref range: >=60 mL/min/1.73   Status: Final                Comment: Chronic Kidney Disease: less than 60 ml/min/1.73 sq.m. Kidney Failure: less than 15 ml/min/1.73 sq.m. Results valid for patients 18 years and older.     GFR                           Date: 07/27/2022  Value: 44            Status: Final  Calcium                                       Date: 07/27/2022  Value: 7.0 (A)     Ref range: 8.6 - 10.2 mg/dL   Status: Final  Total Protein                                 Date: 07/27/2022  Value: 5.0 (A)     Ref range: 6.4 - 8.3 g/dL     Status: Final  Albumin                                       Date: 07/27/2022  Value: 1.8 (A)     Ref range: 3.5 - 5.2 g/dL     Status: Final  Total Bilirubin                               Date: 07/27/2022  Value: 0.5         Ref range: 0.0 - 1.2 mg/dL    Status: Final  Alkaline Phosphatase                          Date: 07/27/2022  Value: 206 (A)     Ref range: 35 - 104 U/L       Status: Final  ALT                                           Date: 07/27/2022  Value: 13          Ref range: 0 - 32 U/L         Status: Final  AST                                           Date: 07/27/2022  Value: 14          Ref range: 0 - 31 U/L         Status: Final  WBC                                           Date: 07/27/2022  Value: 6.8         Ref range: 4.5 - 11.5 E9/L    Status: Final  RBC Date: 07/27/2022  Value: 2.69 (A)    Ref range: 3.50 - 5.50 E12/L  Status: Final  Hemoglobin                                    Date: 07/27/2022  Value: 8.3 (A)     Ref range: 11.5 - 15.5 g/dL   Status: Final  Hematocrit                                    Date: 07/27/2022  Value: 27.0 (A)    Ref range: 34.0 - 48.0 %      Status: Final  MCV                                           Date: 07/27/2022  Value: 100.4 (A)   Ref range: 80.0 - 99.9 fL     Status: Final  MCH                                           Date: 07/27/2022  Value: 30.9        Ref range: 26.0 - 35.0 pg     Status: Final  MCHC                                          Date: 07/27/2022  Value: 30.7 (A)    Ref range: 32.0 - 34.5 %      Status: Final  RDW                                           Date: 07/27/2022  Value: 14.6        Ref range: 11.5 - 15.0 fL     Status: Final  Platelets                                     Date: 07/27/2022  Value: 300         Ref range: 130 - 450 E9/L     Status: Final  MPV                                           Date: 07/27/2022  Value: 12.4 (A)    Ref range: 7.0 - 12.0 fL      Status: Final  Neutrophils %                                 Date: 07/27/2022  Value: 70.6        Ref range: 43.0 - 80.0 %      Status: Final  Immature Granulocytes %                       Date: 07/27/2022  Value: 0.6         Ref range: 0.0 - 5.0 %        Status: Final  Lymphocytes %                                 Date: 07/27/2022  Value: 16.8 (A)    Ref range: 20.0 - 42.0 %      Status: Final  Monocytes %                                   Date: 07/27/2022  Value: 9.4         Ref range: 2.0 - 12.0 %       Status: Final  Eosinophils %                                 Date: 07/27/2022  Value: 1.9         Ref range: 0.0 - 6.0 %        Status: Final  Basophils %                                   Date: 07/27/2022  Value: 0.7         Ref range: 0.0 - 2.0 %        Status: Final  Neutrophils Absolute                          Date: 07/27/2022  Value: 4.78        Ref range: 1.80 - 7.30 E9/L   Status: Final  Immature Granulocytes #                       Date: 07/27/2022  Value: 0.04        Ref range: E9/L               Status: Final  Lymphocytes Absolute                          Date: 07/27/2022  Value: 1.14 (A)    Ref range: 1.50 - 4.00 E9/L   Status: Final  Monocytes Absolute                            Date: 07/27/2022  Value: 0.64        Ref range: 0.10 - 0.95 E9/L   Status: Final  Eosinophils Absolute                          Date: 07/27/2022  Value: 0.13        Ref range: 0.05 - 0.50 E9/L   Status: Final  Basophils Absolute                            Date: 07/27/2022  Value: 0.05        Ref range: 0.00 - 0.20 E9/L   Status: Final  ------------    Radiology last 7 days:  CT ABDOMEN PELVIS WO CONTRAST Additional Contrast? None    Result Date: 7/24/2022  Centrilobular emphysema with progressive patchy infiltrates/atelectasis and pleural effusion in the lung bases concerning for pneumonia or edema. Coronary artery calcification. Suboptimal evaluation abdomen pelvis. 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 Liver disease with small amount of ascites. Question indeterminate cystic lesion in the pancreas. Consider surveillance. Significant irregularity and the postsurgical changes in the lumbosacral spine. See above. CT CHEST WO CONTRAST    Result Date: 7/24/2022  Centrilobular emphysema with progressive patchy infiltrates/atelectasis and pleural effusion in the lung bases concerning for pneumonia or edema. Coronary artery calcification. Suboptimal evaluation abdomen pelvis. 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 Liver disease with small amount of ascites. Question indeterminate cystic lesion in the pancreas. Consider surveillance.  Significant irregularity and the postsurgical changes in the lumbosacral spine. See above. XR CHEST PORTABLE    Result Date: 7/24/2022  Stable chronic changes seen within the lung fields with no evidence of acute parenchymal disease. Noa catheter is been removed in the interval on the right. MRI ABDOMEN W WO CONTRAST MRCP    Result Date: 7/27/2022  Associated with the distal margin distal pancreatic duct is a 12 mm focus without abnormal enhancement most consistent of a likely distal side branch IPMN. Findings consistent with acute enteritis and mesenteric edema as well as small volume ascites. Moderate right and small to moderate left pleural effusions RECOMMENDATIONS: Unavailable        [unfilled]    Discharge Medications    Current Discharge Medication List    START taking these medications    ondansetron (ZOFRAN-ODT) 4 MG disintegrating tablet  Take 1 tablet by mouth every 8 hours as needed for Nausea or Vomiting  Qty: 30 tablet Refills: 0    senna (SENOKOT) 8.6 MG tablet  Take 1 tablet by mouth daily as needed (Constipation)  Qty: 30 tablet Refills: 0          Current Discharge Medication List        Current Discharge Medication List    CONTINUE these medications which have NOT CHANGED    apixaban (ELIQUIS) 5 MG TABS tablet  Take 1 tablet by mouth in the morning and 1 tablet before bedtime. Qty: 60 tablet Refills: 0    !! lipase-protease-amylase (CREON) 23765-69906 units delayed release capsule  Take 12,000 Units by mouth take with snacks **SEE OTHER ORDER**    Multiple Vitamins-Minerals (PRESERVISION AREDS) CAPS  Take 1 capsule by mouth 2 times daily    Magnesium Oxide (MAGNESIUM-OXIDE) 250 MG TABS tablet  Take 250 mg by mouth 2 times daily    METAMUCIL FIBER PO  Take 1 packet by mouth 2 times daily    diphenoxylate-atropine (LOMOTIL) 2.5-0.025 MG per tablet  Take 1 tablet by mouth 4 times daily (before meals and nightly).     loperamide (IMODIUM) 2 MG capsule  Take 4 mg by mouth 3 times daily (with meals) **SEE OTHER ORDER**    fluticasone (FLONASE) 50 MCG/ACT nasal spray  1 spray by Each Nostril route nightly     !! lipase-protease-amylase (CREON) 90140-22037 units delayed release capsule  Take 24,000 Units by mouth 3 times daily (with meals) **SEE OTHER ORDER**    Probiotic CAPS  Take 1 capsule by mouth nightly     hyoscyamine (ANASPAZ;LEVSIN) 125 MCG tablet  Take 125 mcg by mouth 4 times daily as needed for Cramping     omeprazole (PRILOSEC) 20 MG delayed release capsule  Take 40 mg by mouth every morning     aspirin 81 MG tablet  Take 81 mg by mouth nightly     Cholecalciferol (VITAMIN D3) 50 MCG (2000 UT) TABS  Take 2,000 Units by mouth 2 times daily     Ferrous Sulfate (IRON) 325 (65 Fe) MG TABS  Take 325 mg by mouth nightly     calcium carbonate (OSCAL) 500 MG TABS tablet  Take 500 mg by mouth nightly     pravastatin (PRAVACHOL) 40 MG tablet  Take 40 mg by mouth nightly     vitamin B-12 (CYANOCOBALAMIN) 500 MCG tablet  Place 500 mcg under the tongue nightly     Omega-3 Fatty Acids (OMEGA 3 PO)  Take 1 capsule by mouth nightly     escitalopram (LEXAPRO) 20 MG tablet  Take 20 mg by mouth nightly. primidone (MYSOLINE) 250 MG tablet  Take 250 mg by mouth nightly     traZODone (DESYREL) 50 MG tablet  Take 50 mg by mouth nightly     !! - Potential duplicate medications found. Please discuss with provider.           Current Discharge Medication List    STOP taking these medications    levoFLOXacin (LEVAQUIN) 500 MG tablet  Comments:  Reason for Stopping:    amLODIPine (NORVASC) 10 MG tablet  Comments:  Reason for Stopping:    LORazepam (ATIVAN) 0.5 MG tablet  Comments:  Reason for Stopping:    oxyCODONE-acetaminophen (PERCOCET)  MG per tablet  Comments:  Reason for Stopping:    Coenzyme Q10 (COQ10 PO)  Comments:  Reason for Stopping:    propranolol (INDERAL) 20 MG tablet  Comments:  Reason for Stopping:          Time Spent on Discharge:  15 minutes were spent in patient examination, evaluation, counseling as well as medication reconciliation, prescriptions for required medications, discharge plan, and follow up.     Electronically signed by Sonny Mitchell MD on 7/31/22 at 7:44 AM EDT

## 2022-08-01 ENCOUNTER — HOSPITAL ENCOUNTER (EMERGENCY)
Age: 82
Discharge: HOME | End: 2022-08-01
Attending: STUDENT IN AN ORGANIZED HEALTH CARE EDUCATION/TRAINING PROGRAM
Payer: MEDICARE

## 2022-08-01 VITALS
BODY MASS INDEX: 22.19 KG/M2 | TEMPERATURE: 97.4 F | WEIGHT: 113 LBS | HEART RATE: 68 BPM | SYSTOLIC BLOOD PRESSURE: 142 MMHG | HEIGHT: 60 IN | DIASTOLIC BLOOD PRESSURE: 58 MMHG | RESPIRATION RATE: 16 BRPM | OXYGEN SATURATION: 94 %

## 2022-08-01 DIAGNOSIS — D64.9 ANEMIA, UNSPECIFIED TYPE: Primary | ICD-10-CM

## 2022-08-01 LAB
ABO/RH: NORMAL
ALBUMIN SERPL-MCNC: 1.7 G/DL (ref 3.5–5.2)
ALP BLD-CCNC: 212 U/L (ref 35–104)
ALT SERPL-CCNC: 7 U/L (ref 0–32)
ANION GAP SERPL CALCULATED.3IONS-SCNC: 7 MMOL/L (ref 7–16)
ANION GAP SERPL CALCULATED.3IONS-SCNC: 9 MMOL/L (ref 7–16)
ANTIBODY SCREEN: NORMAL
AST SERPL-CCNC: 14 U/L (ref 0–31)
BASOPHILS ABSOLUTE: 0.08 E9/L (ref 0–0.2)
BASOPHILS RELATIVE PERCENT: 1.3 % (ref 0–2)
BILIRUB SERPL-MCNC: 0.4 MG/DL (ref 0–1.2)
BLOOD BANK DISPENSE STATUS: NORMAL
BLOOD BANK PRODUCT CODE: NORMAL
BPU ID: NORMAL
BUN BLDV-MCNC: 16 MG/DL (ref 6–23)
BUN BLDV-MCNC: 16 MG/DL (ref 6–23)
CALCIUM SERPL-MCNC: 7.7 MG/DL (ref 8.6–10.2)
CALCIUM SERPL-MCNC: 7.7 MG/DL (ref 8.6–10.2)
CHLORIDE BLD-SCNC: 112 MMOL/L (ref 98–107)
CHLORIDE BLD-SCNC: 116 MMOL/L (ref 98–107)
CHOLESTEROL, TOTAL: 87 MG/DL (ref 0–199)
CO2: 15 MMOL/L (ref 22–29)
CO2: 17 MMOL/L (ref 22–29)
CREAT SERPL-MCNC: 1.2 MG/DL (ref 0.5–1)
CREAT SERPL-MCNC: 1.3 MG/DL (ref 0.5–1)
DESCRIPTION BLOOD BANK: NORMAL
EOSINOPHILS ABSOLUTE: 0.15 E9/L (ref 0.05–0.5)
EOSINOPHILS RELATIVE PERCENT: 2.5 % (ref 0–6)
GFR AFRICAN AMERICAN: 48
GFR AFRICAN AMERICAN: 52
GFR NON-AFRICAN AMERICAN: 39 ML/MIN/1.73
GFR NON-AFRICAN AMERICAN: 43 ML/MIN/1.73
GLUCOSE BLD-MCNC: 122 MG/DL (ref 74–99)
GLUCOSE BLD-MCNC: 65 MG/DL (ref 74–99)
HBA1C MFR BLD: 5.8 % (ref 4–5.6)
HCT VFR BLD CALC: 21.6 % (ref 34–48)
HCT VFR BLD CALC: 24.2 % (ref 34–48)
HDLC SERPL-MCNC: 42 MG/DL
HEMOGLOBIN: 6.8 G/DL (ref 11.5–15.5)
HEMOGLOBIN: 7.3 G/DL (ref 11.5–15.5)
IMMATURE GRANULOCYTES #: 0.03 E9/L
IMMATURE GRANULOCYTES %: 0.5 % (ref 0–5)
LDL CHOLESTEROL CALCULATED: 30 MG/DL (ref 0–99)
LYMPHOCYTES ABSOLUTE: 1.38 E9/L (ref 1.5–4)
LYMPHOCYTES RELATIVE PERCENT: 23 % (ref 20–42)
MCH RBC QN AUTO: 30.9 PG (ref 26–35)
MCH RBC QN AUTO: 31.6 PG (ref 26–35)
MCHC RBC AUTO-ENTMCNC: 30.2 % (ref 32–34.5)
MCHC RBC AUTO-ENTMCNC: 31.5 % (ref 32–34.5)
MCV RBC AUTO: 100.5 FL (ref 80–99.9)
MCV RBC AUTO: 102.5 FL (ref 80–99.9)
MONOCYTES ABSOLUTE: 0.45 E9/L (ref 0.1–0.95)
MONOCYTES RELATIVE PERCENT: 7.5 % (ref 2–12)
NEUTROPHILS ABSOLUTE: 3.92 E9/L (ref 1.8–7.3)
NEUTROPHILS RELATIVE PERCENT: 65.2 % (ref 43–80)
PDW BLD-RTO: 15.7 FL (ref 11.5–15)
PDW BLD-RTO: 15.7 FL (ref 11.5–15)
PLATELET # BLD: 391 E9/L (ref 130–450)
PLATELET # BLD: 407 E9/L (ref 130–450)
PMV BLD AUTO: 11.5 FL (ref 7–12)
PMV BLD AUTO: 11.7 FL (ref 7–12)
POTASSIUM SERPL-SCNC: 4.7 MMOL/L (ref 3.5–5)
POTASSIUM SERPL-SCNC: 4.8 MMOL/L (ref 3.5–5)
RBC # BLD: 2.15 E12/L (ref 3.5–5.5)
RBC # BLD: 2.36 E12/L (ref 3.5–5.5)
SODIUM BLD-SCNC: 136 MMOL/L (ref 132–146)
SODIUM BLD-SCNC: 140 MMOL/L (ref 132–146)
TOTAL PROTEIN: 5.4 G/DL (ref 6.4–8.3)
TRIGL SERPL-MCNC: 75 MG/DL (ref 0–149)
VITAMIN B-12: >2000 PG/ML (ref 211–946)
VITAMIN D 25-HYDROXY: 9 NG/ML (ref 30–100)
VLDLC SERPL CALC-MCNC: 15 MG/DL
WBC # BLD: 5.6 E9/L (ref 4.5–11.5)
WBC # BLD: 6 E9/L (ref 4.5–11.5)

## 2022-08-01 PROCEDURE — 86850 RBC ANTIBODY SCREEN: CPT

## 2022-08-01 PROCEDURE — 86900 BLOOD TYPING SEROLOGIC ABO: CPT

## 2022-08-01 PROCEDURE — 85025 COMPLETE CBC W/AUTO DIFF WBC: CPT

## 2022-08-01 PROCEDURE — 80048 BASIC METABOLIC PNL TOTAL CA: CPT

## 2022-08-01 PROCEDURE — 93005 ELECTROCARDIOGRAM TRACING: CPT

## 2022-08-01 PROCEDURE — 86923 COMPATIBILITY TEST ELECTRIC: CPT

## 2022-08-01 PROCEDURE — 86901 BLOOD TYPING SEROLOGIC RH(D): CPT

## 2022-08-01 PROCEDURE — 36430 TRANSFUSION BLD/BLD COMPNT: CPT

## 2022-08-01 PROCEDURE — 99285 EMERGENCY DEPT VISIT HI MDM: CPT

## 2022-08-01 PROCEDURE — P9016 RBC LEUKOCYTES REDUCED: HCPCS

## 2022-08-01 RX ORDER — SODIUM CHLORIDE 9 MG/ML
INJECTION, SOLUTION INTRAVENOUS PRN
Status: DISCONTINUED | OUTPATIENT
Start: 2022-08-01 | End: 2022-08-01 | Stop reason: HOSPADM

## 2022-08-01 ASSESSMENT — PAIN DESCRIPTION - LOCATION: LOCATION: BACK

## 2022-08-01 ASSESSMENT — PAIN DESCRIPTION - PAIN TYPE: TYPE: CHRONIC PAIN

## 2022-08-01 ASSESSMENT — PAIN - FUNCTIONAL ASSESSMENT: PAIN_FUNCTIONAL_ASSESSMENT: 0-10

## 2022-08-01 NOTE — CARE COORDINATION
Social Work/Transition of Care:     Pt being discharged to [place] [id number] to transport ETA 1600.     Electronically signed by Ruba Benitez on 2/0/2614 at 4:23 PM

## 2022-08-01 NOTE — ED PROVIDER NOTES
ED Attending Shared Visit: [id number]  Department of Emergency Medicine   ED  Encounter Note  Admit Date/RoomTime: 2022  9:31 AM  ED Room: WILBER/WILBER    NAME: Erick Luna  : 1940  MRN: [id number]     Chief Complaint:  Fatigue (Pt is at [facility] for rehab, they had basic lab work and was found to have a low hgb, of 6.8, colostomy draining liquid darl stool )    HISTORY OF PRESENT ILLNESS        Erick Luna is a [de-identified] y.o. 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 During her hospital stay her hemoglobin has been vacillating from the low 9's to high 7's. Her last Hgb drawn was on 22 and was 8.2. She was discharged to [facility] rehab center yesterday, had routine blood work drawn this morning indicating a hemoglobin of 6.8. This prompted concern for active bleeding patient was sent to the emergency department for evaluation. Patient has a PEG tube in left upper quadrant which she states is not currently being used that she is allowed to eat and drink whatever she wants. She also has an ileostomy in the right lower quadrant which has been draining mostly thin dark liquid. She does take iron supplements for iron deficiency anemia. Other than generalized fatigue she has no immediate complaints. She states she has received blood transfusions in the past surrounding surgical procedures. ROS   Pertinent positives and negatives are stated within HPI, all other systems reviewed and are negative.     Past Medical History:  has a past medical history of Acute kidney failure ([address] Anxiety, Arthritis, CAD (coronary artery disease), Cancer ([address] COPD (chronic obstructive pulmonary disease) ([address] Depression, Fibromyalgia, Generalized headaches, History of blood transfusion, History of necrotic bowel, Hyperlipidemia, Hypertension, Incisional hernia, Insomnia, Mitral valve regurgitation, Myocardial infarct ([address] Occasional tremors, Osteopenia, PONV (postoperative nausea and vomiting), and Vitamin B12 deficiency. Surgical History:  has a past surgical history that includes Coronary angioplasty with stent (1-7-2002); Colonoscopy; Esophagus surgery (1-4-13); Rotator cuff repair (2010); arthroplasty (1-2006); Finger trigger release (Right, 2006); Cataract removal with implant (Right, 03/03/15); Appendectomy (2002); Cholecystectomy (5-11-07); Carpal tunnel release (Bilateral); skin biopsy (2010); Foot surgery (Right); Jejunostomy (1-3-13); Endoscopy, colon, diagnostic; epigastric hernia repair (3/21/16); other surgical history (08/24/2016); Nasal sinus surgery; back surgery (1991, 2004, 2009); back surgery; Mohs surgery; other surgical history; Breast surgery (years ago); Knee Arthroplasty (Left, 03/22/2017); laparotomy (N/A, 5/12/2020); laparotomy (N/A, 6/8/2020); Insert Picc Line (06/09/2020); joint replacement (Right, 3/24/15); Abdomen surgery (2-); Abdomen surgery (N/A, 1/26/2020); hernia repair (12-31-13); Abdomen surgery (N/A, 9/11/2020); picc line insertion nurse (9/15/2020); laparotomy (N/A, 9/15/2020); and Port Surgery (N/A, 7/19/2022). Social History:  reports that she has been smoking cigarettes. She has been smoking an average of 1 pack per day. She has never used smokeless tobacco. She reports that she does not currently use alcohol. She reports that she does not use drugs. Family History: family history is not on file. Allergies: Fentanyl, Levofloxacin, Tramadol, and Vioxx [rofecoxib]    PHYSICAL EXAM   Oxygen Saturation Interpretation: Normal on room air analysis. ED Triage Vitals   BP Temp Temp src Pulse Resp SpO2 Height Weight   -- -- -- -- -- -- -- --         Physical Exam  Constitutional/General: Alert and oriented x3, well appearing, non toxic.   HEENT: NC/NT. PERRLA,  Airway patent. Neck: Supple, full ROM, non tender to palpation in the midline, no stridor, no crepitus, no meningeal signs  Respiratory: Lungs clear to auscultation bilaterally, no wheezes, rales, or rhonchi. Not in respiratory distress  CV:  Regular rate. Regular rhythm. No murmurs, gallops, or rubs. 2+ distal pulses  Chest: No chest wall tenderness  GI:  Abdomen Soft, Non tender, there is a PEG tube situated in left upper quadrant without immediate complication. There is an ileostomy situated in the right lower quadrant with thin dark green contents of liquid within the bag along with air. Non distended. +BS. No rebound, guarding, or rigidity. No pulsatile masses. Musculoskeletal: Moves all extremities x 4. Warm and well perfused, no clubbing, cyanosis, or edema. Capillary refill <3 seconds  Integument: skin warm and dry. No rashes.    Lymphatic: no lymphadenopathy noted  Neurologic: GCS 15, no focal deficits, symmetric strength 5/5 in the upper and lower extremities bilaterally    Lab / Imaging Results   (All laboratory and radiology results have been personally reviewed by myself)  Labs:  Results for orders placed or performed during the hospital encounter of 08/01/22   CBC with Auto Differential   Result Value Ref Range    WBC 6.0 4.5 - 11.5 E9/L    RBC 2.36 (L) 3.50 - 5.50 E12/L    Hemoglobin 7.3 (L) 11.5 - 15.5 g/dL    Hematocrit 24.2 (L) 34.0 - 48.0 %    .5 (H) 80.0 - 99.9 fL    MCH 30.9 26.0 - 35.0 pg    MCHC 30.2 (L) 32.0 - 34.5 %    RDW 15.7 (H) 11.5 - 15.0 fL    Platelets 200 529 - 795 E9/L    MPV 11.5 7.0 - 12.0 fL    Neutrophils % 65.2 43.0 - 80.0 %    Immature Granulocytes % 0.5 0.0 - 5.0 %    Lymphocytes % 23.0 20.0 - 42.0 %    Monocytes % 7.5 2.0 - 12.0 %    Eosinophils % 2.5 0.0 - 6.0 %    Basophils % 1.3 0.0 - 2.0 %    Neutrophils Absolute 3.92 1.80 - 7.30 E9/L    Immature Granulocytes # 0.03 E9/L    Lymphocytes Absolute 1.38 (L) 1.50 - 4.00 E9/L    Monocytes Absolute 0. 45 0.10 - 0.95 E9/L    Eosinophils Absolute 0.15 0.05 - 0.50 E9/L    Basophils Absolute 0.08 0.00 - 0.20 C1/L   Basic Metabolic Panel   Result Value Ref Range    Sodium 136 132 - 146 mmol/L    Potassium 4.7 3.5 - 5.0 mmol/L    Chloride 112 (H) 98 - 107 mmol/L    CO2 15 (L) 22 - 29 mmol/L    Anion Gap 9 7 - 16 mmol/L    Glucose 122 (H) 74 - 99 mg/dL    BUN 16 6 - 23 mg/dL    Creatinine 1.2 (H) 0.5 - 1.0 mg/dL    GFR Non-African American 43 >=60 mL/min/1.73    GFR African American 52     Calcium 7.7 (L) 8.6 - 10.2 mg/dL   EKG 12 Lead   Result Value Ref Range    Ventricular Rate 67 BPM    Atrial Rate 67 BPM    P-R Interval 156 ms    QRS Duration 92 ms    Q-T Interval 402 ms    QTc Calculation (Bazett) 424 ms    P Axis 50 degrees    R Axis -32 degrees    T Axis 40 degrees   TYPE AND SCREEN   Result Value Ref Range    ABO/Rh A POS     Antibody Screen NEG    PREPARE RBC (CROSSMATCH), 1 Units   Result Value Ref Range    Product Code Blood Bank [id number]     Description Blood Bank Red Blood Cells, Leuko-reduced     Unit Number [id number]     Dispense Status Blood Bank transfused      Imaging: All Radiology results interpreted by Radiologist unless otherwise noted. No orders to display       ED Course / Medical Decision Making     Medications - No data to display       Re-Evaluations:  8/1/22      Time: 1055    Patients condition remained stable. Hemoglobin today is 7.3. There is some degree of lab variation from sample to sample. Plan is still to transfuse 1 unit of blood and if no other developments arise discharged back to the rehab center to continue her rehabilitation. .    MDM: And presented with low blood count from a rehab center where she just arrived. She is known to have chronic anemia and has received transfusions in the past.  There are no active signs of bleeding. Stool from her ileostomy was tested negative for blood.   She did receive 1 unit of blood while in the emergency department and was ultimately discharged back to the rehab center. Plan of Care/Counseling:  JUANITO Short and EM Attending Physician reviewed today's visit with the patient in addition to providing specific details for the plan of care and counseling regarding the diagnosis and prognosis. Questions are answered at this time and are agreeable with the plan. ASSESSMENT     1. Anemia, unspecified type      This patient's ED course included: a personal history and physicial examination, re-evaluation prior to disposition, and cardiac monitoring  This patient has remained hemodynamically stable and improved during their ED course. PLAN   Discharged back to rehabilitation center. Patient condition is stable. New Medications     Discharge Medication List as of 8/1/2022  3:08 PM        Electronically signed by JUANITO Short   DD: 8/1/22  **This report was transcribed using voice recognition software. Every effort was made to ensure accuracy; however, inadvertent computerized transcription errors may be present.   [id number] Beatriz Almeida  08/06/22 1582

## 2022-08-01 NOTE — ED NOTES
Pt states that she is at [facility] ''  for rehab, she is alert times 3, resp easy, lungs are clear a and p, abdomen is soft with colostomy to the rt abdominal quad, draining dark liquid stool, she also has a peg tube, trace edema to lower legs bilateral. Pedal pulses plus 2 bilateral.      Abigail Manuel RN  08/01/22 [id number]

## 2022-08-02 LAB
EKG ATRIAL RATE: 67 BPM
EKG P AXIS: 50 DEGREES
EKG P-R INTERVAL: 156 MS
EKG Q-T INTERVAL: 402 MS
EKG QRS DURATION: 92 MS
EKG QTC CALCULATION (BAZETT): 424 MS
EKG R AXIS: -32 DEGREES
EKG T AXIS: 40 DEGREES
EKG VENTRICULAR RATE: 67 BPM

## 2022-08-08 ENCOUNTER — TELEPHONE (OUTPATIENT)
Dept: OTHER | Facility: CLINIC | Age: 82
End: 2022-08-08

## 2022-08-08 ENCOUNTER — APPOINTMENT (OUTPATIENT)
Dept: GENERAL RADIOLOGY | Age: 82
DRG: 377 | End: 2022-08-08
Payer: MEDICARE

## 2022-08-08 ENCOUNTER — HOSPITAL ENCOUNTER (INPATIENT)
Age: 82
LOS: 10 days | DRG: 377 | End: 2022-08-18
Attending: STUDENT IN AN ORGANIZED HEALTH CARE EDUCATION/TRAINING PROGRAM | Admitting: FAMILY MEDICINE
Payer: MEDICARE

## 2022-08-08 DIAGNOSIS — E87.5 HYPERKALEMIA: Primary | ICD-10-CM

## 2022-08-08 DIAGNOSIS — D64.9 ACUTE ON CHRONIC ANEMIA: ICD-10-CM

## 2022-08-08 LAB
ABO/RH: NORMAL
ALBUMIN SERPL-MCNC: 2.4 G/DL (ref 3.5–5.2)
ALBUMIN SERPL-MCNC: 2.6 G/DL (ref 3.5–5.2)
ALP BLD-CCNC: 240 U/L (ref 35–104)
ALP BLD-CCNC: 267 U/L (ref 35–104)
ALT SERPL-CCNC: 7 U/L (ref 0–32)
ALT SERPL-CCNC: 9 U/L (ref 0–32)
ANION GAP SERPL CALCULATED.3IONS-SCNC: 10 MMOL/L (ref 7–16)
ANION GAP SERPL CALCULATED.3IONS-SCNC: 11 MMOL/L (ref 7–16)
ANION GAP SERPL CALCULATED.3IONS-SCNC: 12 MMOL/L (ref 7–16)
ANION GAP SERPL CALCULATED.3IONS-SCNC: 7 MMOL/L (ref 7–16)
ANION GAP SERPL CALCULATED.3IONS-SCNC: 8 MMOL/L (ref 7–16)
ANTIBODY SCREEN: NORMAL
AST SERPL-CCNC: 12 U/L (ref 0–31)
AST SERPL-CCNC: 15 U/L (ref 0–31)
BACTERIA: ABNORMAL /HPF
BASOPHILS ABSOLUTE: 0.08 E9/L (ref 0–0.2)
BASOPHILS RELATIVE PERCENT: 0.9 % (ref 0–2)
BILIRUB SERPL-MCNC: 0.3 MG/DL (ref 0–1.2)
BILIRUB SERPL-MCNC: 0.4 MG/DL (ref 0–1.2)
BILIRUBIN URINE: NEGATIVE
BLOOD, URINE: ABNORMAL
BUN BLDV-MCNC: 29 MG/DL (ref 6–23)
BUN BLDV-MCNC: 34 MG/DL (ref 6–23)
BUN BLDV-MCNC: 36 MG/DL (ref 6–23)
BUN BLDV-MCNC: 36 MG/DL (ref 6–23)
BUN BLDV-MCNC: 40 MG/DL (ref 6–23)
CALCIUM SERPL-MCNC: 7.3 MG/DL (ref 8.6–10.2)
CALCIUM SERPL-MCNC: 7.6 MG/DL (ref 8.6–10.2)
CALCIUM SERPL-MCNC: 7.9 MG/DL (ref 8.6–10.2)
CALCIUM SERPL-MCNC: 7.9 MG/DL (ref 8.6–10.2)
CALCIUM SERPL-MCNC: 8.2 MG/DL (ref 8.6–10.2)
CHLORIDE BLD-SCNC: 100 MMOL/L (ref 98–107)
CHLORIDE BLD-SCNC: 102 MMOL/L (ref 98–107)
CHLORIDE BLD-SCNC: 103 MMOL/L (ref 98–107)
CHP ED QC CHECK: NORMAL
CLARITY: CLEAR
CO2: 19 MMOL/L (ref 22–29)
CO2: 20 MMOL/L (ref 22–29)
CO2: 21 MMOL/L (ref 22–29)
CO2: 21 MMOL/L (ref 22–29)
CO2: 23 MMOL/L (ref 22–29)
COLOR: YELLOW
CREAT SERPL-MCNC: 1.7 MG/DL (ref 0.5–1)
CREAT SERPL-MCNC: 1.7 MG/DL (ref 0.5–1)
CREAT SERPL-MCNC: 1.9 MG/DL (ref 0.5–1)
CREAT SERPL-MCNC: 2 MG/DL (ref 0.5–1)
CREAT SERPL-MCNC: 2.2 MG/DL (ref 0.5–1)
EKG ATRIAL RATE: 83 BPM
EKG P AXIS: 82 DEGREES
EKG P-R INTERVAL: 152 MS
EKG Q-T INTERVAL: 370 MS
EKG QRS DURATION: 92 MS
EKG QTC CALCULATION (BAZETT): 434 MS
EKG R AXIS: -61 DEGREES
EKG T AXIS: 73 DEGREES
EKG VENTRICULAR RATE: 83 BPM
EOSINOPHILS ABSOLUTE: 0.13 E9/L (ref 0.05–0.5)
EOSINOPHILS RELATIVE PERCENT: 1.4 % (ref 0–6)
EPITHELIAL CELLS, UA: ABNORMAL /HPF
FERRITIN: 160 NG/ML
FOLATE: 9.4 NG/ML (ref 4.8–24.2)
GFR AFRICAN AMERICAN: 26
GFR AFRICAN AMERICAN: 29
GFR AFRICAN AMERICAN: 31
GFR AFRICAN AMERICAN: 35
GFR AFRICAN AMERICAN: 35
GFR NON-AFRICAN AMERICAN: 21 ML/MIN/1.73
GFR NON-AFRICAN AMERICAN: 24 ML/MIN/1.73
GFR NON-AFRICAN AMERICAN: 25 ML/MIN/1.73
GFR NON-AFRICAN AMERICAN: 29 ML/MIN/1.73
GFR NON-AFRICAN AMERICAN: 29 ML/MIN/1.73
GLUCOSE BLD-MCNC: 104 MG/DL (ref 74–99)
GLUCOSE BLD-MCNC: 128 MG/DL (ref 74–99)
GLUCOSE BLD-MCNC: 129 MG/DL (ref 74–99)
GLUCOSE BLD-MCNC: 139 MG/DL (ref 74–99)
GLUCOSE BLD-MCNC: 141 MG/DL
GLUCOSE BLD-MCNC: 83 MG/DL (ref 74–99)
GLUCOSE URINE: NEGATIVE MG/DL
HCT VFR BLD CALC: 19.2 % (ref 34–48)
HCT VFR BLD CALC: 21.8 % (ref 34–48)
HCT VFR BLD CALC: 22 % (ref 34–48)
HEMOGLOBIN: 6 G/DL (ref 11.5–15.5)
HEMOGLOBIN: 6.6 G/DL (ref 11.5–15.5)
HEMOGLOBIN: 7.1 G/DL (ref 11.5–15.5)
IMMATURE GRANULOCYTES #: 0.04 E9/L
IMMATURE GRANULOCYTES %: 0.4 % (ref 0–5)
IRON SATURATION: 50 % (ref 15–50)
IRON: 97 MCG/DL (ref 37–145)
KETONES, URINE: NEGATIVE MG/DL
LEUKOCYTE ESTERASE, URINE: NEGATIVE
LYMPHOCYTES ABSOLUTE: 1.79 E9/L (ref 1.5–4)
LYMPHOCYTES RELATIVE PERCENT: 19.8 % (ref 20–42)
MAGNESIUM: 1.6 MG/DL (ref 1.6–2.6)
MCH RBC QN AUTO: 31.7 PG (ref 26–35)
MCH RBC QN AUTO: 32.3 PG (ref 26–35)
MCHC RBC AUTO-ENTMCNC: 30.3 % (ref 32–34.5)
MCHC RBC AUTO-ENTMCNC: 31.3 % (ref 32–34.5)
MCV RBC AUTO: 103.2 FL (ref 80–99.9)
MCV RBC AUTO: 104.8 FL (ref 80–99.9)
METER GLUCOSE: 134 MG/DL (ref 74–99)
METER GLUCOSE: 141 MG/DL (ref 74–99)
MONOCYTES ABSOLUTE: 0.55 E9/L (ref 0.1–0.95)
MONOCYTES RELATIVE PERCENT: 6.1 % (ref 2–12)
NEUTROPHILS ABSOLUTE: 6.45 E9/L (ref 1.8–7.3)
NEUTROPHILS RELATIVE PERCENT: 71.4 % (ref 43–80)
NITRITE, URINE: NEGATIVE
PDW BLD-RTO: 15.9 FL (ref 11.5–15)
PDW BLD-RTO: 16 FL (ref 11.5–15)
PH UA: 5.5 (ref 5–9)
PLATELET # BLD: 229 E9/L (ref 130–450)
PLATELET # BLD: 319 E9/L (ref 130–450)
PMV BLD AUTO: 11.6 FL (ref 7–12)
PMV BLD AUTO: 11.7 FL (ref 7–12)
POTASSIUM REFLEX MAGNESIUM: 6.4 MMOL/L (ref 3.5–5)
POTASSIUM SERPL-SCNC: 5.7 MMOL/L (ref 3.5–5)
POTASSIUM SERPL-SCNC: 6.3 MMOL/L (ref 3.5–5)
POTASSIUM SERPL-SCNC: 6.7 MMOL/L (ref 3.5–5)
POTASSIUM SERPL-SCNC: 6.8 MMOL/L (ref 3.5–5)
PRO-BNP: 686 PG/ML (ref 0–450)
PROTEIN UA: 30 MG/DL
RBC # BLD: 1.86 E12/L (ref 3.5–5.5)
RBC # BLD: 2.08 E12/L (ref 3.5–5.5)
RBC UA: ABNORMAL /HPF (ref 0–2)
SODIUM BLD-SCNC: 129 MMOL/L (ref 132–146)
SODIUM BLD-SCNC: 132 MMOL/L (ref 132–146)
SODIUM BLD-SCNC: 133 MMOL/L (ref 132–146)
SODIUM BLD-SCNC: 133 MMOL/L (ref 132–146)
SODIUM BLD-SCNC: 134 MMOL/L (ref 132–146)
SPECIFIC GRAVITY UA: 1.02 (ref 1–1.03)
TOTAL IRON BINDING CAPACITY: 195 MCG/DL (ref 250–450)
TOTAL PROTEIN: 5.8 G/DL (ref 6.4–8.3)
TOTAL PROTEIN: 6.7 G/DL (ref 6.4–8.3)
TROPONIN, HIGH SENSITIVITY: 56 NG/L (ref 0–9)
TROPONIN, HIGH SENSITIVITY: 60 NG/L (ref 0–9)
UROBILINOGEN, URINE: 0.2 E.U./DL
VITAMIN B-12: 1764 PG/ML (ref 211–946)
WBC # BLD: 6.8 E9/L (ref 4.5–11.5)
WBC # BLD: 9 E9/L (ref 4.5–11.5)
WBC UA: ABNORMAL /HPF (ref 0–5)

## 2022-08-08 PROCEDURE — 82607 VITAMIN B-12: CPT

## 2022-08-08 PROCEDURE — 93005 ELECTROCARDIOGRAM TRACING: CPT

## 2022-08-08 PROCEDURE — 6360000002 HC RX W HCPCS: Performed by: STUDENT IN AN ORGANIZED HEALTH CARE EDUCATION/TRAINING PROGRAM

## 2022-08-08 PROCEDURE — 99285 EMERGENCY DEPT VISIT HI MDM: CPT

## 2022-08-08 PROCEDURE — 96375 TX/PRO/DX INJ NEW DRUG ADDON: CPT

## 2022-08-08 PROCEDURE — 86901 BLOOD TYPING SEROLOGIC RH(D): CPT

## 2022-08-08 PROCEDURE — 85018 HEMOGLOBIN: CPT

## 2022-08-08 PROCEDURE — 82962 GLUCOSE BLOOD TEST: CPT

## 2022-08-08 PROCEDURE — 71045 X-RAY EXAM CHEST 1 VIEW: CPT

## 2022-08-08 PROCEDURE — 85025 COMPLETE CBC W/AUTO DIFF WBC: CPT

## 2022-08-08 PROCEDURE — 36415 COLL VENOUS BLD VENIPUNCTURE: CPT

## 2022-08-08 PROCEDURE — 86850 RBC ANTIBODY SCREEN: CPT

## 2022-08-08 PROCEDURE — 83880 ASSAY OF NATRIURETIC PEPTIDE: CPT

## 2022-08-08 PROCEDURE — 82728 ASSAY OF FERRITIN: CPT

## 2022-08-08 PROCEDURE — P9016 RBC LEUKOCYTES REDUCED: HCPCS

## 2022-08-08 PROCEDURE — 6360000002 HC RX W HCPCS

## 2022-08-08 PROCEDURE — 83735 ASSAY OF MAGNESIUM: CPT

## 2022-08-08 PROCEDURE — 2060000000 HC ICU INTERMEDIATE R&B

## 2022-08-08 PROCEDURE — 86923 COMPATIBILITY TEST ELECTRIC: CPT

## 2022-08-08 PROCEDURE — 83550 IRON BINDING TEST: CPT

## 2022-08-08 PROCEDURE — 87088 URINE BACTERIA CULTURE: CPT

## 2022-08-08 PROCEDURE — 2500000003 HC RX 250 WO HCPCS: Performed by: STUDENT IN AN ORGANIZED HEALTH CARE EDUCATION/TRAINING PROGRAM

## 2022-08-08 PROCEDURE — 94664 DEMO&/EVAL PT USE INHALER: CPT

## 2022-08-08 PROCEDURE — 6370000000 HC RX 637 (ALT 250 FOR IP)

## 2022-08-08 PROCEDURE — 80048 BASIC METABOLIC PNL TOTAL CA: CPT

## 2022-08-08 PROCEDURE — 82746 ASSAY OF FOLIC ACID SERUM: CPT

## 2022-08-08 PROCEDURE — 85014 HEMATOCRIT: CPT

## 2022-08-08 PROCEDURE — 86900 BLOOD TYPING SEROLOGIC ABO: CPT

## 2022-08-08 PROCEDURE — 6370000000 HC RX 637 (ALT 250 FOR IP): Performed by: STUDENT IN AN ORGANIZED HEALTH CARE EDUCATION/TRAINING PROGRAM

## 2022-08-08 PROCEDURE — 96365 THER/PROPH/DIAG IV INF INIT: CPT

## 2022-08-08 PROCEDURE — 6370000000 HC RX 637 (ALT 250 FOR IP): Performed by: FAMILY MEDICINE

## 2022-08-08 PROCEDURE — 87106 FUNGI IDENTIFICATION YEAST: CPT

## 2022-08-08 PROCEDURE — 94640 AIRWAY INHALATION TREATMENT: CPT

## 2022-08-08 PROCEDURE — 81001 URINALYSIS AUTO W/SCOPE: CPT

## 2022-08-08 PROCEDURE — 2580000003 HC RX 258

## 2022-08-08 PROCEDURE — 2500000003 HC RX 250 WO HCPCS

## 2022-08-08 PROCEDURE — 83540 ASSAY OF IRON: CPT

## 2022-08-08 PROCEDURE — 84484 ASSAY OF TROPONIN QUANT: CPT

## 2022-08-08 PROCEDURE — 96374 THER/PROPH/DIAG INJ IV PUSH: CPT

## 2022-08-08 PROCEDURE — 2580000003 HC RX 258: Performed by: STUDENT IN AN ORGANIZED HEALTH CARE EDUCATION/TRAINING PROGRAM

## 2022-08-08 PROCEDURE — 80053 COMPREHEN METABOLIC PANEL: CPT

## 2022-08-08 RX ORDER — ASCORBIC ACID 500 MG
500 TABLET ORAL 2 TIMES DAILY
COMMUNITY

## 2022-08-08 RX ORDER — DIPHENOXYLATE HYDROCHLORIDE AND ATROPINE SULFATE 2.5; .025 MG/1; MG/1
1 TABLET ORAL
Status: DISCONTINUED | OUTPATIENT
Start: 2022-08-08 | End: 2022-08-18

## 2022-08-08 RX ORDER — SODIUM POLYSTYRENE SULFONATE 15 G/60ML
15 SUSPENSION ORAL; RECTAL ONCE
COMMUNITY
Start: 2022-08-08 | End: 2022-08-09

## 2022-08-08 RX ORDER — FERROUS SULFATE 325(65) MG
325 TABLET ORAL NIGHTLY
Status: DISCONTINUED | OUTPATIENT
Start: 2022-08-08 | End: 2022-08-18

## 2022-08-08 RX ORDER — OXYCODONE HYDROCHLORIDE AND ACETAMINOPHEN 5; 325 MG/1; MG/1
1 TABLET ORAL ONCE
Status: COMPLETED | OUTPATIENT
Start: 2022-08-08 | End: 2022-08-08

## 2022-08-08 RX ORDER — ACETAMINOPHEN 325 MG/1
650 TABLET ORAL EVERY 4 HOURS PRN
COMMUNITY

## 2022-08-08 RX ORDER — SENNA PLUS 8.6 MG/1
1 TABLET ORAL DAILY PRN
Status: DISCONTINUED | OUTPATIENT
Start: 2022-08-08 | End: 2022-08-18

## 2022-08-08 RX ORDER — ALBUTEROL SULFATE 2.5 MG/3ML
10 SOLUTION RESPIRATORY (INHALATION) ONCE
Status: COMPLETED | OUTPATIENT
Start: 2022-08-08 | End: 2022-08-08

## 2022-08-08 RX ORDER — ESCITALOPRAM OXALATE 10 MG/1
20 TABLET ORAL NIGHTLY
Status: DISCONTINUED | OUTPATIENT
Start: 2022-08-08 | End: 2022-08-18

## 2022-08-08 RX ORDER — FLUTICASONE PROPIONATE 50 MCG
1 SPRAY, SUSPENSION (ML) NASAL NIGHTLY
Status: DISCONTINUED | OUTPATIENT
Start: 2022-08-08 | End: 2022-08-18

## 2022-08-08 RX ORDER — SENNA PLUS 8.6 MG/1
1 TABLET ORAL DAILY PRN
COMMUNITY

## 2022-08-08 RX ORDER — CALCIUM CARBONATE 200(500)MG
1 TABLET,CHEWABLE ORAL EVERY EVENING
COMMUNITY

## 2022-08-08 RX ORDER — CALCIUM GLUCONATE 94 MG/ML
INJECTION, SOLUTION INTRAVENOUS
Status: DISPENSED
Start: 2022-08-08 | End: 2022-08-08

## 2022-08-08 RX ORDER — ONDANSETRON 4 MG/1
4 TABLET, FILM COATED ORAL EVERY 8 HOURS PRN
COMMUNITY

## 2022-08-08 RX ORDER — ATORVASTATIN CALCIUM 10 MG/1
10 TABLET, FILM COATED ORAL NIGHTLY
COMMUNITY

## 2022-08-08 RX ORDER — CALCIUM CARBONATE 200(500)MG
500 TABLET,CHEWABLE ORAL NIGHTLY
Status: DISCONTINUED | OUTPATIENT
Start: 2022-08-08 | End: 2022-08-18

## 2022-08-08 RX ORDER — CHOLECALCIFEROL (VITAMIN D3) 50 MCG
2000 TABLET ORAL 2 TIMES DAILY
Status: DISCONTINUED | OUTPATIENT
Start: 2022-08-08 | End: 2022-08-18

## 2022-08-08 RX ORDER — VIT C/E/ZN/COPPR/LUTEIN/ZEAXAN 60 MG-6 MG
1 CAPSULE ORAL 2 TIMES DAILY
Status: DISCONTINUED | OUTPATIENT
Start: 2022-08-08 | End: 2022-08-18

## 2022-08-08 RX ORDER — LANOLIN ALCOHOL/MO/W.PET/CERES
500 CREAM (GRAM) TOPICAL NIGHTLY
Status: DISCONTINUED | OUTPATIENT
Start: 2022-08-08 | End: 2022-08-18

## 2022-08-08 RX ORDER — ONDANSETRON 4 MG/1
4 TABLET, ORALLY DISINTEGRATING ORAL EVERY 8 HOURS PRN
Status: DISCONTINUED | OUTPATIENT
Start: 2022-08-08 | End: 2022-08-11

## 2022-08-08 RX ORDER — PRAVASTATIN SODIUM 20 MG
40 TABLET ORAL NIGHTLY
Status: DISCONTINUED | OUTPATIENT
Start: 2022-08-08 | End: 2022-08-18

## 2022-08-08 RX ORDER — 0.9 % SODIUM CHLORIDE 0.9 %
500 INTRAVENOUS SOLUTION INTRAVENOUS ONCE
Status: COMPLETED | OUTPATIENT
Start: 2022-08-08 | End: 2022-08-08

## 2022-08-08 RX ORDER — TRAZODONE HYDROCHLORIDE 50 MG/1
50 TABLET ORAL NIGHTLY
Status: DISCONTINUED | OUTPATIENT
Start: 2022-08-08 | End: 2022-08-18

## 2022-08-08 RX ORDER — LOPERAMIDE HYDROCHLORIDE 2 MG/1
4 CAPSULE ORAL
Status: DISCONTINUED | OUTPATIENT
Start: 2022-08-08 | End: 2022-08-18

## 2022-08-08 RX ORDER — HYOSCYAMINE SULFATE 0.125 MG
125 TABLET ORAL 4 TIMES DAILY PRN
Status: DISCONTINUED | OUTPATIENT
Start: 2022-08-08 | End: 2022-08-18

## 2022-08-08 RX ORDER — DEXTROSE MONOHYDRATE 100 MG/ML
INJECTION, SOLUTION INTRAVENOUS CONTINUOUS PRN
Status: DISCONTINUED | OUTPATIENT
Start: 2022-08-08 | End: 2022-08-11 | Stop reason: SDUPTHER

## 2022-08-08 RX ORDER — SODIUM CHLORIDE 9 MG/ML
INJECTION, SOLUTION INTRAVENOUS PRN
Status: COMPLETED | OUTPATIENT
Start: 2022-08-08 | End: 2022-08-14

## 2022-08-08 RX ORDER — PRIMIDONE 250 MG/1
250 TABLET ORAL NIGHTLY
Status: DISCONTINUED | OUTPATIENT
Start: 2022-08-08 | End: 2022-08-18

## 2022-08-08 RX ORDER — OXYCODONE HYDROCHLORIDE AND ACETAMINOPHEN 5; 325 MG/1; MG/1
1 TABLET ORAL EVERY 6 HOURS PRN
Status: DISCONTINUED | OUTPATIENT
Start: 2022-08-08 | End: 2022-08-18

## 2022-08-08 RX ORDER — MAGNESIUM SULFATE IN WATER 40 MG/ML
2000 INJECTION, SOLUTION INTRAVENOUS ONCE
Status: DISCONTINUED | OUTPATIENT
Start: 2022-08-08 | End: 2022-08-08

## 2022-08-08 RX ORDER — NICOTINE POLACRILEX 4 MG
15 LOZENGE BUCCAL PRN
COMMUNITY

## 2022-08-08 RX ORDER — LANOLIN ALCOHOL/MO/W.PET/CERES
400 CREAM (GRAM) TOPICAL 2 TIMES DAILY
Status: DISCONTINUED | OUTPATIENT
Start: 2022-08-08 | End: 2022-08-18

## 2022-08-08 RX ORDER — DEXTROSE MONOHYDRATE 100 MG/ML
INJECTION, SOLUTION INTRAVENOUS CONTINUOUS PRN
Status: DISCONTINUED | OUTPATIENT
Start: 2022-08-08 | End: 2022-08-16 | Stop reason: SDUPTHER

## 2022-08-08 RX ADMIN — CALCIUM GLUCONATE 1000 MG: 98 INJECTION, SOLUTION INTRAVENOUS at 22:05

## 2022-08-08 RX ADMIN — SODIUM BICARBONATE 50 MEQ: 84 INJECTION INTRAVENOUS at 22:00

## 2022-08-08 RX ADMIN — DEXTROSE MONOHYDRATE 250 ML: 100 INJECTION, SOLUTION INTRAVENOUS at 22:08

## 2022-08-08 RX ADMIN — CALCIUM GLUCONATE 1000 MG: 94 INJECTION, SOLUTION INTRAVENOUS at 11:31

## 2022-08-08 RX ADMIN — ALBUTEROL SULFATE 10 MG: 2.5 SOLUTION RESPIRATORY (INHALATION) at 22:31

## 2022-08-08 RX ADMIN — INSULIN HUMAN 10 UNITS: 100 INJECTION, SOLUTION PARENTERAL at 22:00

## 2022-08-08 RX ADMIN — DEXTROSE MONOHYDRATE 250 ML: 100 INJECTION, SOLUTION INTRAVENOUS at 10:39

## 2022-08-08 RX ADMIN — OXYCODONE AND ACETAMINOPHEN 1 TABLET: 5; 325 TABLET ORAL at 19:46

## 2022-08-08 RX ADMIN — DIPHENOXYLATE HYDROCHLORIDE AND ATROPINE SULFATE 1 TABLET: 2.5; .025 TABLET ORAL at 17:34

## 2022-08-08 RX ADMIN — ALBUTEROL SULFATE 10 MG: 2.5 SOLUTION RESPIRATORY (INHALATION) at 11:36

## 2022-08-08 RX ADMIN — SODIUM CHLORIDE 500 ML: 9 INJECTION, SOLUTION INTRAVENOUS at 11:03

## 2022-08-08 RX ADMIN — SODIUM ZIRCONIUM CYCLOSILICATE 10 G: 10 POWDER, FOR SUSPENSION ORAL at 13:46

## 2022-08-08 RX ADMIN — Medication 1000 MG: at 10:36

## 2022-08-08 RX ADMIN — INSULIN HUMAN 5 UNITS: 100 INJECTION, SOLUTION PARENTERAL at 10:51

## 2022-08-08 RX ADMIN — Medication 1000 MG: at 14:07

## 2022-08-08 RX ADMIN — OXYCODONE AND ACETAMINOPHEN 1 TABLET: 5; 325 TABLET ORAL at 10:50

## 2022-08-08 RX ADMIN — HYOSCYAMINE SULFATE 125 MCG: 0.12 TABLET ORAL at 19:08

## 2022-08-08 ASSESSMENT — PAIN DESCRIPTION - ORIENTATION: ORIENTATION: LOWER

## 2022-08-08 ASSESSMENT — PAIN DESCRIPTION - LOCATION
LOCATION: HEAD;NECK;ABDOMEN
LOCATION: BACK

## 2022-08-08 ASSESSMENT — ENCOUNTER SYMPTOMS
VOICE CHANGE: 0
CHEST TIGHTNESS: 1
VOMITING: 0
SHORTNESS OF BREATH: 0
NAUSEA: 0
PHOTOPHOBIA: 0
TROUBLE SWALLOWING: 0
ABDOMINAL PAIN: 0
COUGH: 0
WHEEZING: 0
DIARRHEA: 0

## 2022-08-08 ASSESSMENT — PAIN DESCRIPTION - DESCRIPTORS: DESCRIPTORS: ACHING

## 2022-08-08 ASSESSMENT — PAIN SCALES - GENERAL
PAINLEVEL_OUTOF10: 4
PAINLEVEL_OUTOF10: 8

## 2022-08-08 ASSESSMENT — PAIN - FUNCTIONAL ASSESSMENT
PAIN_FUNCTIONAL_ASSESSMENT: NONE - DENIES PAIN

## 2022-08-08 NOTE — ED PROVIDER NOTES
[de-identified]y.o. year old female presenting to the emergency room with concerns of elevated potassium beginning today. Patient reports that symptom's onset on lab draws this morning. Worsen with nothing. Improves with nothing. Severity of mild with no radiation. Symptoms are constant in timing. Symptoms described as chest tightness, palpitations. Patient reports associated symptoms of lightheadedness. Patient in no acute distress. Patient reports history of previous hypo and hyperkalemia incidents. Patient from nursing home, said that when she stood up this morning she felt lightheaded. Chief Complaint   Patient presents with    Other     Sent from NH for K+ of 6.8       Review of Systems   Constitutional:  Negative for chills, fatigue and fever. HENT:  Negative for trouble swallowing and voice change. Eyes:  Negative for photophobia and visual disturbance. Respiratory:  Positive for chest tightness. Negative for cough, shortness of breath and wheezing. Cardiovascular:  Positive for palpitations. Negative for chest pain. Gastrointestinal:  Negative for abdominal pain, diarrhea, nausea and vomiting. Musculoskeletal:  Negative for arthralgias. Skin:  Negative for wound. Neurological:  Positive for light-headedness. Negative for dizziness, syncope, weakness and headaches. Psychiatric/Behavioral:  Negative for behavioral problems and confusion. Physical Exam  Vitals reviewed. Constitutional:       General: She is not in acute distress. HENT:      Head: Normocephalic. Right Ear: External ear normal.      Left Ear: External ear normal.      Nose: Nose normal.      Mouth/Throat:      Pharynx: Oropharynx is clear. No posterior oropharyngeal erythema. Eyes:      General: No scleral icterus. Right eye: No discharge. Left eye: No discharge. Conjunctiva/sclera: Conjunctivae normal.   Cardiovascular:      Rate and Rhythm: Normal rate and regular rhythm.       Pulses: Normal pulses. Heart sounds: No friction rub. No gallop. Pulmonary:      Effort: Pulmonary effort is normal. No respiratory distress. Breath sounds: No stridor. No rhonchi or rales. Abdominal:      General: There is no distension. Palpations: Abdomen is soft. Tenderness: There is no abdominal tenderness. There is no right CVA tenderness, left CVA tenderness, guarding or rebound. Musculoskeletal:         General: No tenderness or deformity. Normal range of motion. Cervical back: Normal range of motion and neck supple. No rigidity or tenderness. Skin:     General: Skin is warm. Coloration: Skin is not jaundiced. Neurological:      Mental Status: She is alert and oriented to person, place, and time. Sensory: No sensory deficit. Motor: No weakness. Psychiatric:         Mood and Affect: Mood normal.         Behavior: Behavior normal.        Procedures     XR CHEST PORTABLE    (Results Pending)       EKG: This EKG is signed by emergency department physician. Rate: 83  Rhythm: Sinus  AXIS:leftward  Interpretation: sinus rhythm with high amplitude of Twaves  Comparison: changes compared to previous EKG     MDM  Number of Diagnoses or Management Options  Acute on chronic anemia  Hyperkalemia  Diagnosis management comments: [de-identified]year old female presenting to the complaints of hyperkalemia noted at facility. Potassium 6.8 at facility. Potassium 6.6 here. Started on hyperkalemia protocol. Given calcium gluconate. Insulin, albuterol. Consult to nephrology placed. Spoke to Dr. Jonny Macedo, discussed patient plan to admit patient at this time. Patient requesting home Percocet. Percocet given. Patient's potassium noted to be elevating, repeat calcium, glucose, insulin albuterol given. KD noted. Patient signed out to oncoming resident.        Amount and/or Complexity of Data Reviewed  Decide to obtain previous medical records or to obtain history from someone other than the does not use drugs. Family History: family history is not on file. The patients home medications have been reviewed.     Allergies: Fentanyl, Levofloxacin, Tramadol, and Vioxx [rofecoxib]    -------------------------------------------------- RESULTS -------------------------------------------------    LABS:  Results for orders placed or performed during the hospital encounter of 08/08/22   CBC with Auto Differential   Result Value Ref Range    WBC 9.0 4.5 - 11.5 E9/L    RBC 2.08 (L) 3.50 - 5.50 E12/L    Hemoglobin 6.6 (LL) 11.5 - 15.5 g/dL    Hematocrit 21.8 (L) 34.0 - 48.0 %    .8 (H) 80.0 - 99.9 fL    MCH 31.7 26.0 - 35.0 pg    MCHC 30.3 (L) 32.0 - 34.5 %    RDW 15.9 (H) 11.5 - 15.0 fL    Platelets 073 770 - 998 E9/L    MPV 11.7 7.0 - 12.0 fL    Neutrophils % 71.4 43.0 - 80.0 %    Immature Granulocytes % 0.4 0.0 - 5.0 %    Lymphocytes % 19.8 (L) 20.0 - 42.0 %    Monocytes % 6.1 2.0 - 12.0 %    Eosinophils % 1.4 0.0 - 6.0 %    Basophils % 0.9 0.0 - 2.0 %    Neutrophils Absolute 6.45 1.80 - 7.30 E9/L    Immature Granulocytes # 0.04 E9/L    Lymphocytes Absolute 1.79 1.50 - 4.00 E9/L    Monocytes Absolute 0.55 0.10 - 0.95 E9/L    Eosinophils Absolute 0.13 0.05 - 0.50 E9/L    Basophils Absolute 0.08 0.00 - 0.20 E9/L   Comprehensive Metabolic Panel w/ Reflex to MG   Result Value Ref Range    Sodium 134 132 - 146 mmol/L    Potassium reflex Magnesium 6.4 (H) 3.5 - 5.0 mmol/L    Chloride 102 98 - 107 mmol/L    CO2 21 (L) 22 - 29 mmol/L    Anion Gap 11 7 - 16 mmol/L    Glucose 128 (H) 74 - 99 mg/dL    BUN 36 (H) 6 - 23 mg/dL    Creatinine 1.9 (H) 0.5 - 1.0 mg/dL    GFR Non-African American 25 >=60 mL/min/1.73    GFR African American 31     Calcium 7.9 (L) 8.6 - 10.2 mg/dL    Total Protein 6.7 6.4 - 8.3 g/dL    Albumin 2.6 (L) 3.5 - 5.2 g/dL    Total Bilirubin 0.4 0.0 - 1.2 mg/dL    Alkaline Phosphatase 267 (H) 35 - 104 U/L    ALT 9 0 - 32 U/L    AST 15 0 - 31 U/L   Troponin   Result Value Ref Range 129/59 -- -- 79 20 96 % -- --   08/08/22 1137 -- -- -- 68 14 -- -- --   08/08/22 1136 -- -- -- 71 16 -- -- --   08/08/22 1135 -- -- -- 73 20 -- -- --   08/08/22 1003 (!) 89/47 98 °F (36.7 °C) -- 92 18 99 % 5' (1.524 m) 90 lb (40.8 kg)       Oxygen Saturation Interpretation: Normal    ------------------------------------------ PROGRESS NOTES ------------------------------------------  Re-evaluation(s):  Patients symptoms show no change  Repeat physical examination is not changed    Counseling:  I have spoken with the patient and discussed todays results, in addition to providing specific details for the plan of care and counseling regarding the diagnosis and prognosis. Their questions are answered at this time and they are agreeable with the plan of admission.    --------------------------------- ADDITIONAL PROVIDER NOTES ---------------------------------  Consultations:  Spoke with Dr. Narda Morales. Discussed case. They will admit the patient. This patient's ED course included: a personal history and physicial examination, re-evaluation prior to disposition, multiple bedside re-evaluations, IV medications, cardiac monitoring, and continuous pulse oximetry    This patient has remained hemodynamically stable during their ED course. Diagnosis:  1. Hyperkalemia    2. Acute on chronic anemia        Disposition:  Patient's disposition: Admit  Patient's condition is stable. Attending was present and available throughout encounter including all critical portions;  See Attending Note/Attestation for [address], DO  Resident  08/08/22 1998

## 2022-08-08 NOTE — CONSULTS
Consult Note  NEPHROLOGY    Reason for Consult: Evaluation of acute on chronic kidney disease    Requesting Physician: Dr. Mariam Chery    Chief Complaint: Anemia and hyperkalemia    History Obtained From:  patient, electronic medical record    History of Present Ilness:      Mark Chavez is an [de-identified]year old female we were asked to see regarding KD. She is followed longitudinally in the office with Dr. Jen Ram. she was last seen in the office in April of this year and at that time her baseline creatinine was noted to be 1.4-1.5 mg/dl with an e-GFR of 33-36 ml/min. She now presents to ED from local nursing home for abnormal lab work, primarily hyperkalemia. Vitals upon arrival included blood pressure 89/47, pulse 92, respirations 18 and temperature 98.0. Initial BMP revealed sodium 134, potassium 6.4, chloride 102, CO2 21, BUN 36 and creatinine 1.9. Initial CBC revealed WBCs 9.0, hemoglobin 6.6, hematocrit 21.8 and platelet count 646. Initial UA showed clear yellow urine, 1.020, trace blood, trace protein. Patient now examined in the ED setting. She is awake alert and oriented. Currently with 1 unit PRBC infusing. Of note she was just discharged from this facility 1 week ago after being treated for enteritis, hypotension and pneumonia. She has been actively recovering at [facility]. Currently she denies any chest pain or shortness of breath at rest.  She does endorse generalized weakness. Currently without nausea, vomiting or diarrhea. She also denies any specific urinary complaints.         Past Medical History:        Diagnosis Date    Acute kidney failure ([address] 2014 and 8/2016    x2,no dialysis needed,resolved, kidney function tests returned to normal    Anxiety     Arthritis     CAD (coronary artery disease)     follows with Dr. Anna Robles every 6 months    Cancer [facility])     skin, leg,nose- removed surgically     COPD (chronic obstructive pulmonary disease) ([facility]     mild- no inhlaers, no oxygen     Depression     Fibromyalgia     chronic back and neck pain    Generalized headaches     h/o / daily    History of blood transfusion 3-11-14    multiple times    History of necrotic bowel 2012    Hyperlipidemia     Hypertension     Incisional hernia     abdomen    Insomnia     Mitral valve regurgitation     Myocardial infarct ([address] 2002    Occasional tremors     at hands / stable with medication    Osteopenia     PONV (postoperative nausea and vomiting)     Vitamin B12 deficiency     h/o       Past Surgical History:        Procedure Laterality Date    ABDOMEN SURGERY  2-    total colectomy, bowel resection, ileostomy,revision of ileostomy     ABDOMEN SURGERY N/A 1/26/2020    DRAINAGE OF ABDOMINAL WALL ABSCESS, EXPLANTATION OF MESH, DEBRIDEMENT OF SKIN AND SUBCUTANEOUS TISSUE performed by Fco Rosas MD at [facility] N/A 9/11/2020    EXPLANTATION OF HERNIA MESH performed by Fco Rosas MD at [address] [place] [address]  2002    ARTHROPLASTY  1-2006    right and left thumb    [facility], 2004, 2009    lumbar fusion x 3    BACK SURGERY      cervical fusion x 2    BREAST SURGERY  years ago    monor calcifications    CARPAL TUNNEL RELEASE Bilateral     CATARACT REMOVAL WITH IMPLANT Right 03/03/15    CHOLECYSTECTOMY  5-11-07    Lap    COLONOSCOPY      CORONARY ANGIOPLASTY WITH STENT PLACEMENT  1-7-2002    ENDOSCOPY, COLON, DIAGNOSTIC      EPIGASTRIC HERNIA REPAIR  3/21/16    incisional with mesh implantation    ESOPHAGUS SURGERY  1-4-13    esophageal clamp (torn Esophagus)    FINGER TRIGGER RELEASE Right 2006    index finger    FOOT SURGERY Right     multiple    HERNIA REPAIR  12-31-13    abdominal x 5- last one 2017     ILEOSTOMY OR JEJUNOSTOMY  1-3-13    revision    INSERT PICC LINE  06/09/2020    and removed     JOINT REPLACEMENT Right 3/24/15    right total shoulder arthroplasty    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     LAPAROTOMY N/A 5/12/2020 EXPLORATORY LAPAROTOMY EXPLANTATION OF INFECTED MESH SMAL BOWEL RESECTION AND REVISION END ILEOSTOMY, LYSIS OF ADHESIONS performed by Charles Lowry MD at [facility] N/A 6/8/2020    LAPAROTOMY EXPLORATORY, Alysia Ohms OF HERNIA MESH performed by Charles Lowry MD at [facility] N/A 9/15/2020    EXPLORATORY LAPAROTOMY WITH SMALL BOWEL RESECTION, TAKE DOWN REVISION ENTEROCUTANEOUS FISTULA , OSTOMY REVISION performed by Charles Lowry MD at [address] / leg    NASAL SINUS SURGERY      x2 /  cauterized    OTHER SURGICAL HISTORY  08/24/2016    diagnostic laparotomy with repair of intraabdominal hernia    OTHER SURGICAL HISTORY      removal plate / screws  / right great toe    PICC LINE INSERTION NURSE  9/15/2020         PORT SURGERY N/A 7/19/2022    MEDIPORT REMOVAL performed by Carlee Michelle MD at [address]  2010    right     SKIN BIOPSY  2010    3 squamous cell carcinoma right leg, left leg       Current Medications:    Current Facility-Administered Medications: glucose chewable tablet 16 g, 4 tablet, Oral, PRN  dextrose bolus 10% 125 mL, 125 mL, IntraVENous, PRN **OR** dextrose bolus 10% 250 mL, 250 mL, IntraVENous, PRN  glucagon (rDNA) injection 1 mg, 1 mg, SubCUTAneous, PRN  dextrose 10 % infusion, , IntraVENous, Continuous PRN  0.9 % sodium chloride infusion, , IntraVENous, PRN  calcium gluconate 10 % injection, , ,     Allergies:  Fentanyl, Levofloxacin, Tramadol, and Vioxx [rofecoxib]    Social History:         reports that she has been smoking cigarettes. She has been smoking about 1.00 pack per day. She has never used smokeless tobacco. She reports previous alcohol use. She reports that she does not use drugs. Family History:     No family history on file. Review of Systems:         Pertinent positives stated above in HPI. All other systems were reviewed and were negative.     Physical exam:   Constitutional:  VITALS:  BP (!) 124/48   Pulse 74   Temp 98.2 °F (36.8 °C) (Oral)   Resp 20   Ht 5' (1.524 m)   Wt 90 lb (40.8 kg)   SpO2 99%   BMI 17.58 kg/m²   CURRENT TEMPERATURE:  Temp: 98.2 °F (36.8 °C)  CURRENT RESPIRATORY RATE:  Resp: 20  CURRENT PULSE:  Heart Rate: 74  CURRENT BLOOD PRESSURE:  BP: (!) 124/48  24HR BLOOD PRESSURE RANGE:  Systolic (25LYX), MFD:353 , Min:89 , DPH:083   ; Diastolic (17JVW), QRK:64, Min:47, Max:59    24HR INTAKE/OUTPUT:  No intake or output data in the 24 hours ending 08/08/22 1522  Gen: alert, awake, nad  Skin: no rash, turgor wnl  Heent:  eomi, mmm  Neck: no bruits or jvd noted  Cardiovascular:  S1, S2 without m/r/g  Respiratory: Diminished but clear  Abdomen: Soft, nontender, PEG tube intact, RLQ Ileostomy   Ext: No edema  Psychiatric: mood and affect appropriate  Musculoskeletal:  Rom, muscular strength intact    DATA:      CBC:   Lab Results   Component Value Date/Time    WBC 9.0 08/08/2022 10:47 AM    RBC 2.08 08/08/2022 10:47 AM    HGB 6.6 08/08/2022 10:47 AM    HCT 21.8 08/08/2022 10:47 AM    .8 08/08/2022 10:47 AM    MCH 31.7 08/08/2022 10:47 AM    MCHC 30.3 08/08/2022 10:47 AM    RDW 15.9 08/08/2022 10:47 AM     08/08/2022 10:47 AM    MPV 11.7 08/08/2022 10:47 AM     BMP:    Lab Results   Component Value Date/Time     08/08/2022 12:21 PM    K 5.7 08/08/2022 12:21 PM    K 6.4 08/08/2022 10:47 AM     08/08/2022 12:21 PM    CO2 19 08/08/2022 12:21 PM    BUN 36 08/08/2022 12:21 PM    LABALBU 2.6 08/08/2022 10:47 AM    CREATININE 2.0 08/08/2022 12:21 PM    CALCIUM 8.2 08/08/2022 12:21 PM    GFRAA 29 08/08/2022 12:21 PM    LABGLOM 24 08/08/2022 12:21 PM    GLUCOSE 139 08/08/2022 12:21 PM       RAD:  XR CHEST PORTABLE    Result Date: 8/8/2022  EXAMINATION: ONE XRAY VIEW OF THE CHEST 8/8/2022 11:07 am COMPARISON: 07/24/2022 HISTORY: ORDERING SYSTEM PROVIDED HISTORY: hyperkalemia, chest tightness TECHNOLOGIST PROVIDED HISTORY: Reason for exam:->hyperkalemia, chest tightness What reading provider will be dictating this exam?->CRC FINDINGS: The lungs are without acute focal process. There is no effusion or pneumothorax. The cardiomediastinal silhouette is without acute process. The osseous structures are without acute process. No acute process. Assessment/Plan    1. Stage II KD most likely secondary to decreased effective renal perfusion in the setting of severe anemia  Baseline serum cr 1.4-1.5mg/dl with an e-GFR=33-36ml/min  7/14/22: Renal US no evidence of hydro  Initial UA showed clear yellow urine, 1.020, trace blood, trace protein. Sr stable just above recent baseline   No aggressive work-up planned this patient is well-known to our practice  Follow daily labs and strict intake and output     2. HTN with CKD I-IV  BP goal <130/80-near goal  Follow BP     3. Met Acidosis in the setting of the KD on the CKD   Also with ileostomy intact  Mild  Follow HCO3      4. 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 Hypomagnesemia  Mg++ 1.7  Plan to recheck in the morning and supplement if needed     6. Hyperkalemia  Most likely due to dietary indiscretion  Has received medical treatment in the ED  Follow closely    7. Severe anemia  Currently receiving 1 unit PRBC  Check stool for occult blood    8. CKD stage G3B   with a baseline serum cr 1.4-1.5mg/dl with an e-GFR=33-36ml/min        Thank you for allowing us to participate in care of Ms Nazanin Pruitt, APRN - CNP  8/8/2022  3:22 PM     Pt seen and examined in the er  Pt admits to eating a banana a day and drinking oj daily  Sp hyperkalemia protocol  Start lokelma  Trf today  Start melissa  Ostomy output has maroon color in it  Heme check   Pt says she lost 15 lbs over last 1-2 weeks since she wasn't getting the tube feeds. . need to address with primary  Rosa M Andino MD

## 2022-08-08 NOTE — TELEPHONE ENCOUNTER
Writer contacted ED provider Angela Francisco  to inform of 30 day readmission risk. Writer's attempt to contact ED provider was unsuccessful. No Decision on disposition at this time.       Call Back: If you need to call back to inform of disposition you can contact me at [phone]

## 2022-08-08 NOTE — LETTER
[address] Medicaid  CERTIFICATION OF NECESSITY  FOR TRANSPORTATION   BY WHEELCHAIR VAN     Individual Information   1. Name: Geri Grant 2. [place] Medicaid Billing Number:   3. Address: [address] [facility] [address]      Transportation Provider Information   4. Provider Name:    5. [place] Medicaid Provider Number:  National Provider Identifier (NPI):      Certification  7. Criteria:  By signing this document, the practitioner certifies that two statements are true:  A. This individual must be accompanied by a mobility-related assistive device from the point of pick-up to the point of drop-off. B. Transport of this individual by standard passenger vehicle or common carrier is precluded or contraindicated. 8. Period Beginning Date: 08/10/2022   9. Length  [x] Not more than 10 day(s)  [] One Year     Additional Information Relevant to Certification   10. Comments or Explanations, If Necessary or Appropriate     Hyperkalemia, Ileostomy, 3L O2 per NC, GI Bleed, PEG tube     Certifying Practitioner Information   11. Name of Practitioner:  Dr Lj Dow MD   12. [place] Medicaid Provider Number, If Applicable:  [address] Provider Identifier (NPI):      Signature Information   14. Date of Signature:  08/10/2022 15. Name of Person Signing:  Vijaya Reddy RN.   16. Signature and Professional Designation:  Electronically signed by Vijaya Reddy RN on 8/10/22 at 3:33 PM EDT         [facility] [address]  Rev. 7/2015  [address] [facility] Encounter Date/Time: 8/8/2022 [address] Account: [de-identified]    MRN: [id number]    Patient: Geri Grant    Contact Serial #: [id number]      ENCOUNTER          Patient Class: I Private Enc?   No Unit RM BD: [address] Service: MED   Encounter DX: Hyperkalemia [E87.5]   ADM Provider: Maykel Diaz MD   Procedure:     ATT Provider: Maykel Diaz MD   REF Provider:        Admission DX: Hyperkalemia, Acute on

## 2022-08-08 NOTE — ED NOTES
Patient has peg tube in place. And RLQ ileostomy. Sites clean dry and intact.       Tre Velázquez RN  08/08/22 1739

## 2022-08-09 LAB
ANION GAP SERPL CALCULATED.3IONS-SCNC: 12 MMOL/L (ref 7–16)
ANION GAP SERPL CALCULATED.3IONS-SCNC: 12 MMOL/L (ref 7–16)
ANION GAP SERPL CALCULATED.3IONS-SCNC: 17 MMOL/L (ref 7–16)
BLOOD BANK DISPENSE STATUS: NORMAL
BLOOD BANK DISPENSE STATUS: NORMAL
BLOOD BANK PRODUCT CODE: NORMAL
BLOOD BANK PRODUCT CODE: NORMAL
BPU ID: NORMAL
BPU ID: NORMAL
BUN BLDV-MCNC: 44 MG/DL (ref 6–23)
BUN BLDV-MCNC: 47 MG/DL (ref 6–23)
BUN BLDV-MCNC: 52 MG/DL (ref 6–23)
CALCIUM SERPL-MCNC: 7.3 MG/DL (ref 8.6–10.2)
CALCIUM SERPL-MCNC: 7.5 MG/DL (ref 8.6–10.2)
CALCIUM SERPL-MCNC: 7.6 MG/DL (ref 8.6–10.2)
CHLORIDE BLD-SCNC: 96 MMOL/L (ref 98–107)
CHLORIDE BLD-SCNC: 96 MMOL/L (ref 98–107)
CHLORIDE BLD-SCNC: 99 MMOL/L (ref 98–107)
CHLORIDE URINE RANDOM: 26 MMOL/L
CO2: 15 MMOL/L (ref 22–29)
CO2: 19 MMOL/L (ref 22–29)
CO2: 19 MMOL/L (ref 22–29)
CREAT SERPL-MCNC: 2.5 MG/DL (ref 0.5–1)
CREAT SERPL-MCNC: 2.7 MG/DL (ref 0.5–1)
CREAT SERPL-MCNC: 3.1 MG/DL (ref 0.5–1)
CREATININE URINE: 579 MG/DL (ref 29–226)
DESCRIPTION BLOOD BANK: NORMAL
DESCRIPTION BLOOD BANK: NORMAL
GFR AFRICAN AMERICAN: 17
GFR AFRICAN AMERICAN: 21
GFR AFRICAN AMERICAN: 22
GFR NON-AFRICAN AMERICAN: 14 ML/MIN/1.73
GFR NON-AFRICAN AMERICAN: 17 ML/MIN/1.73
GFR NON-AFRICAN AMERICAN: 19 ML/MIN/1.73
GLUCOSE BLD-MCNC: 102 MG/DL (ref 74–99)
GLUCOSE BLD-MCNC: 113 MG/DL (ref 74–99)
GLUCOSE BLD-MCNC: 130 MG/DL (ref 74–99)
HCT VFR BLD CALC: 19.5 % (ref 34–48)
HCT VFR BLD CALC: 23.4 % (ref 34–48)
HEMOGLOBIN: 6.2 G/DL (ref 11.5–15.5)
HEMOGLOBIN: 7.9 G/DL (ref 11.5–15.5)
MAGNESIUM: 1.5 MG/DL (ref 1.6–2.6)
MCH RBC QN AUTO: 30.4 PG (ref 26–35)
MCHC RBC AUTO-ENTMCNC: 31.8 % (ref 32–34.5)
MCV RBC AUTO: 95.6 FL (ref 80–99.9)
METER GLUCOSE: 143 MG/DL (ref 74–99)
METER GLUCOSE: 147 MG/DL (ref 74–99)
PDW BLD-RTO: 18.6 FL (ref 11.5–15)
PHOSPHORUS: 5.2 MG/DL (ref 2.5–4.5)
PLATELET # BLD: 293 E9/L (ref 130–450)
PMV BLD AUTO: 11.5 FL (ref 7–12)
POTASSIUM SERPL-SCNC: 6.1 MMOL/L (ref 3.5–5)
POTASSIUM SERPL-SCNC: 6.8 MMOL/L (ref 3.5–5)
POTASSIUM SERPL-SCNC: 7.7 MMOL/L (ref 3.5–5)
POTASSIUM, UR: >100 MMOL/L
RBC # BLD: 2.04 E12/L (ref 3.5–5.5)
SODIUM BLD-SCNC: 127 MMOL/L (ref 132–146)
SODIUM BLD-SCNC: 128 MMOL/L (ref 132–146)
SODIUM BLD-SCNC: 130 MMOL/L (ref 132–146)
SODIUM URINE: 21 MMOL/L
WBC # BLD: 13 E9/L (ref 4.5–11.5)

## 2022-08-09 PROCEDURE — C9113 INJ PANTOPRAZOLE SODIUM, VIA: HCPCS | Performed by: SURGERY

## 2022-08-09 PROCEDURE — 84100 ASSAY OF PHOSPHORUS: CPT

## 2022-08-09 PROCEDURE — 6360000002 HC RX W HCPCS: Performed by: STUDENT IN AN ORGANIZED HEALTH CARE EDUCATION/TRAINING PROGRAM

## 2022-08-09 PROCEDURE — 6360000002 HC RX W HCPCS: Performed by: SURGERY

## 2022-08-09 PROCEDURE — 6370000000 HC RX 637 (ALT 250 FOR IP): Performed by: INTERNAL MEDICINE

## 2022-08-09 PROCEDURE — 82436 ASSAY OF URINE CHLORIDE: CPT

## 2022-08-09 PROCEDURE — 6360000002 HC RX W HCPCS: Performed by: FAMILY MEDICINE

## 2022-08-09 PROCEDURE — 85027 COMPLETE CBC AUTOMATED: CPT

## 2022-08-09 PROCEDURE — 2580000003 HC RX 258: Performed by: STUDENT IN AN ORGANIZED HEALTH CARE EDUCATION/TRAINING PROGRAM

## 2022-08-09 PROCEDURE — 82962 GLUCOSE BLOOD TEST: CPT

## 2022-08-09 PROCEDURE — 2500000003 HC RX 250 WO HCPCS: Performed by: INTERNAL MEDICINE

## 2022-08-09 PROCEDURE — 2580000003 HC RX 258: Performed by: SURGERY

## 2022-08-09 PROCEDURE — 6360000002 HC RX W HCPCS: Performed by: INTERNAL MEDICINE

## 2022-08-09 PROCEDURE — 80048 BASIC METABOLIC PNL TOTAL CA: CPT

## 2022-08-09 PROCEDURE — 6370000000 HC RX 637 (ALT 250 FOR IP): Performed by: FAMILY MEDICINE

## 2022-08-09 PROCEDURE — 83735 ASSAY OF MAGNESIUM: CPT

## 2022-08-09 PROCEDURE — 36415 COLL VENOUS BLD VENIPUNCTURE: CPT

## 2022-08-09 PROCEDURE — 2060000000 HC ICU INTERMEDIATE R&B

## 2022-08-09 PROCEDURE — 36430 TRANSFUSION BLD/BLD COMPNT: CPT

## 2022-08-09 PROCEDURE — 84300 ASSAY OF URINE SODIUM: CPT

## 2022-08-09 PROCEDURE — 85018 HEMOGLOBIN: CPT

## 2022-08-09 PROCEDURE — 82570 ASSAY OF URINE CREATININE: CPT

## 2022-08-09 PROCEDURE — A4216 STERILE WATER/SALINE, 10 ML: HCPCS | Performed by: SURGERY

## 2022-08-09 PROCEDURE — 2700000000 HC OXYGEN THERAPY PER DAY

## 2022-08-09 PROCEDURE — 85014 HEMATOCRIT: CPT

## 2022-08-09 PROCEDURE — 6370000000 HC RX 637 (ALT 250 FOR IP): Performed by: SURGERY

## 2022-08-09 PROCEDURE — 84133 ASSAY OF URINE POTASSIUM: CPT

## 2022-08-09 PROCEDURE — 2580000003 HC RX 258: Performed by: INTERNAL MEDICINE

## 2022-08-09 PROCEDURE — 6370000000 HC RX 637 (ALT 250 FOR IP): Performed by: STUDENT IN AN ORGANIZED HEALTH CARE EDUCATION/TRAINING PROGRAM

## 2022-08-09 RX ORDER — PANTOPRAZOLE SODIUM 40 MG/1
40 TABLET, DELAYED RELEASE ORAL
Status: DISCONTINUED | OUTPATIENT
Start: 2022-08-09 | End: 2022-08-09

## 2022-08-09 RX ORDER — SODIUM CHLORIDE 9 MG/ML
INJECTION, SOLUTION INTRAVENOUS CONTINUOUS
Status: DISCONTINUED | OUTPATIENT
Start: 2022-08-09 | End: 2022-08-10

## 2022-08-09 RX ORDER — MAGNESIUM SULFATE 1 G/100ML
1000 INJECTION INTRAVENOUS ONCE
Status: COMPLETED | OUTPATIENT
Start: 2022-08-09 | End: 2022-08-09

## 2022-08-09 RX ORDER — SUCRALFATE 1 G/1
1 TABLET ORAL EVERY 6 HOURS SCHEDULED
Status: DISCONTINUED | OUTPATIENT
Start: 2022-08-09 | End: 2022-08-18

## 2022-08-09 RX ORDER — MORPHINE SULFATE 2 MG/ML
1 INJECTION, SOLUTION INTRAMUSCULAR; INTRAVENOUS
Status: DISCONTINUED | OUTPATIENT
Start: 2022-08-09 | End: 2022-08-09

## 2022-08-09 RX ORDER — SODIUM CHLORIDE 9 MG/ML
INJECTION, SOLUTION INTRAVENOUS PRN
Status: DISCONTINUED | OUTPATIENT
Start: 2022-08-09 | End: 2022-08-15

## 2022-08-09 RX ORDER — MORPHINE SULFATE 2 MG/ML
1 INJECTION, SOLUTION INTRAMUSCULAR; INTRAVENOUS
Status: DISCONTINUED | OUTPATIENT
Start: 2022-08-09 | End: 2022-08-13

## 2022-08-09 RX ORDER — DEXTROSE MONOHYDRATE 25 G/50ML
25 INJECTION, SOLUTION INTRAVENOUS ONCE
Status: COMPLETED | OUTPATIENT
Start: 2022-08-09 | End: 2022-08-09

## 2022-08-09 RX ORDER — SODIUM CHLORIDE 9 MG/ML
50 INJECTION, SOLUTION INTRAVENOUS ONCE
Status: COMPLETED | OUTPATIENT
Start: 2022-08-09 | End: 2022-08-09

## 2022-08-09 RX ORDER — OXYCODONE HYDROCHLORIDE 5 MG/1
2.5 TABLET ORAL EVERY 4 HOURS PRN
Status: DISCONTINUED | OUTPATIENT
Start: 2022-08-09 | End: 2022-08-13

## 2022-08-09 RX ADMIN — ESCITALOPRAM OXALATE 20 MG: 10 TABLET ORAL at 20:15

## 2022-08-09 RX ADMIN — CYANOCOBALAMIN TAB 1000 MCG 500 MCG: 1000 TAB at 20:15

## 2022-08-09 RX ADMIN — HYOSCYAMINE SULFATE 125 MCG: 0.12 TABLET ORAL at 17:34

## 2022-08-09 RX ADMIN — PRAVASTATIN SODIUM 40 MG: 20 TABLET ORAL at 20:16

## 2022-08-09 RX ADMIN — PROTHROMBIN, COAGULATION FACTOR VII HUMAN, COAGULATION FACTOR IX HUMAN, COAGULATION FACTOR X HUMAN, PROTEIN C, PROTEIN S HUMAN, AND WATER 1000 UNITS: KIT at 06:53

## 2022-08-09 RX ADMIN — SODIUM CHLORIDE, PRESERVATIVE FREE 40 MG: 5 INJECTION INTRAVENOUS at 19:43

## 2022-08-09 RX ADMIN — SUCRALFATE 1 G: 1 TABLET ORAL at 17:36

## 2022-08-09 RX ADMIN — Medication 400 MG: at 20:14

## 2022-08-09 RX ADMIN — FERROUS SULFATE TAB 325 MG (65 MG ELEMENTAL FE) 325 MG: 325 (65 FE) TAB at 20:15

## 2022-08-09 RX ADMIN — MAGNESIUM SULFATE HEPTAHYDRATE 1000 MG: 1 INJECTION, SOLUTION INTRAVENOUS at 17:51

## 2022-08-09 RX ADMIN — CALCIUM GLUCONATE 1000 MG: 98 INJECTION, SOLUTION INTRAVENOUS at 12:06

## 2022-08-09 RX ADMIN — SODIUM CHLORIDE: 9 INJECTION, SOLUTION INTRAVENOUS at 17:47

## 2022-08-09 RX ADMIN — Medication 1 CAPSULE: at 20:15

## 2022-08-09 RX ADMIN — SODIUM CHLORIDE: 9 INJECTION, SOLUTION INTRAVENOUS at 12:50

## 2022-08-09 RX ADMIN — DIPHENOXYLATE HYDROCHLORIDE AND ATROPINE SULFATE 1 TABLET: 2.5; .025 TABLET ORAL at 17:36

## 2022-08-09 RX ADMIN — HYDROMORPHONE HYDROCHLORIDE 0.5 MG: 1 INJECTION, SOLUTION INTRAMUSCULAR; INTRAVENOUS; SUBCUTANEOUS at 18:59

## 2022-08-09 RX ADMIN — SODIUM BICARBONATE 50 MEQ: 84 INJECTION INTRAVENOUS at 19:43

## 2022-08-09 RX ADMIN — SODIUM ZIRCONIUM CYCLOSILICATE 10 G: 10 POWDER, FOR SUSPENSION ORAL at 22:05

## 2022-08-09 RX ADMIN — HYOSCYAMINE SULFATE 125 MCG: 0.12 TABLET ORAL at 01:33

## 2022-08-09 RX ADMIN — HYDROMORPHONE HYDROCHLORIDE 0.5 MG: 1 INJECTION, SOLUTION INTRAMUSCULAR; INTRAVENOUS; SUBCUTANEOUS at 09:32

## 2022-08-09 RX ADMIN — FLUTICASONE PROPIONATE 1 SPRAY: 50 SPRAY, METERED NASAL at 20:14

## 2022-08-09 RX ADMIN — TRAZODONE HYDROCHLORIDE 50 MG: 50 TABLET ORAL at 22:05

## 2022-08-09 RX ADMIN — SODIUM ZIRCONIUM CYCLOSILICATE 10 G: 10 POWDER, FOR SUSPENSION ORAL at 02:15

## 2022-08-09 RX ADMIN — SODIUM BICARBONATE: 84 INJECTION, SOLUTION INTRAVENOUS at 20:13

## 2022-08-09 RX ADMIN — CALCIUM GLUCONATE 1000 MG: 98 INJECTION, SOLUTION INTRAVENOUS at 19:58

## 2022-08-09 RX ADMIN — CALCIUM CARBONATE 500 MG: 500 TABLET, CHEWABLE ORAL at 20:14

## 2022-08-09 RX ADMIN — PRIMIDONE 250 MG: 250 TABLET ORAL at 20:16

## 2022-08-09 RX ADMIN — SODIUM CHLORIDE 50 ML: 9 INJECTION, SOLUTION INTRAVENOUS at 06:57

## 2022-08-09 RX ADMIN — SODIUM CHLORIDE, PRESERVATIVE FREE 40 MG: 5 INJECTION INTRAVENOUS at 09:00

## 2022-08-09 RX ADMIN — SODIUM BICARBONATE 50 MEQ: 84 INJECTION, SOLUTION INTRAVENOUS at 14:16

## 2022-08-09 RX ADMIN — MORPHINE SULFATE 1 MG: 2 INJECTION, SOLUTION INTRAMUSCULAR; INTRAVENOUS at 06:51

## 2022-08-09 RX ADMIN — INSULIN HUMAN 10 UNITS: 100 INJECTION, SOLUTION PARENTERAL at 19:43

## 2022-08-09 RX ADMIN — DEXTROSE MONOHYDRATE 25 G: 25 INJECTION, SOLUTION INTRAVENOUS at 19:43

## 2022-08-09 RX ADMIN — HYDROMORPHONE HYDROCHLORIDE 0.5 MG: 1 INJECTION, SOLUTION INTRAMUSCULAR; INTRAVENOUS; SUBCUTANEOUS at 13:49

## 2022-08-09 RX ADMIN — TRAZODONE HYDROCHLORIDE 50 MG: 50 TABLET ORAL at 00:09

## 2022-08-09 RX ADMIN — ONDANSETRON 4 MG: 4 TABLET, ORALLY DISINTEGRATING ORAL at 15:45

## 2022-08-09 RX ADMIN — OXYCODONE AND ACETAMINOPHEN 1 TABLET: 5; 325 TABLET ORAL at 01:36

## 2022-08-09 RX ADMIN — INSULIN HUMAN 6 UNITS: 100 INJECTION, SOLUTION PARENTERAL at 12:55

## 2022-08-09 RX ADMIN — Medication 2000 UNITS: at 20:14

## 2022-08-09 RX ADMIN — ONDANSETRON 4 MG: 4 TABLET, ORALLY DISINTEGRATING ORAL at 02:24

## 2022-08-09 RX ADMIN — DIPHENOXYLATE HYDROCHLORIDE AND ATROPINE SULFATE 1 TABLET: 2.5; .025 TABLET ORAL at 20:14

## 2022-08-09 RX ADMIN — HYDROMORPHONE HYDROCHLORIDE 0.5 MG: 1 INJECTION, SOLUTION INTRAMUSCULAR; INTRAVENOUS; SUBCUTANEOUS at 23:01

## 2022-08-09 RX ADMIN — OXYCODONE AND ACETAMINOPHEN 1 TABLET: 5; 325 TABLET ORAL at 17:44

## 2022-08-09 RX ADMIN — DIPHENOXYLATE HYDROCHLORIDE AND ATROPINE SULFATE 1 TABLET: 2.5; .025 TABLET ORAL at 05:24

## 2022-08-09 RX ADMIN — SUCRALFATE 1 G: 1 TABLET ORAL at 06:42

## 2022-08-09 RX ADMIN — PANTOPRAZOLE SODIUM 40 MG: 40 TABLET, DELAYED RELEASE ORAL at 05:24

## 2022-08-09 ASSESSMENT — PAIN SCALES - WONG BAKER: WONGBAKER_NUMERICALRESPONSE: 0

## 2022-08-09 ASSESSMENT — PAIN DESCRIPTION - ONSET
ONSET: ON-GOING

## 2022-08-09 ASSESSMENT — PAIN DESCRIPTION - DESCRIPTORS
DESCRIPTORS: ACHING;CRAMPING;DISCOMFORT
DESCRIPTORS: ACHING;CRAMPING;SORE
DESCRIPTORS: SHARP;ACHING;CRAMPING
DESCRIPTORS: ACHING;CRAMPING;SHARP;DISCOMFORT
DESCRIPTORS: ACHING;CRAMPING;DISCOMFORT
DESCRIPTORS: ACHING;CRAMPING;DISCOMFORT
DESCRIPTORS: SHARP;ACHING;CRAMPING
DESCRIPTORS: SHARP;CRAMPING;DISCOMFORT

## 2022-08-09 ASSESSMENT — PAIN DESCRIPTION - LOCATION
LOCATION: ABDOMEN
LOCATION: ABDOMEN
LOCATION: ABDOMEN;BACK
LOCATION: ABDOMEN
LOCATION: ABDOMEN;BACK
LOCATION: ABDOMEN
LOCATION: BACK;ABDOMEN
LOCATION: ABDOMEN
LOCATION: ABDOMEN

## 2022-08-09 ASSESSMENT — ENCOUNTER SYMPTOMS
ABDOMINAL PAIN: 1
SHORTNESS OF BREATH: 0

## 2022-08-09 ASSESSMENT — PAIN SCALES - GENERAL
PAINLEVEL_OUTOF10: 8
PAINLEVEL_OUTOF10: 6
PAINLEVEL_OUTOF10: 6
PAINLEVEL_OUTOF10: 8
PAINLEVEL_OUTOF10: 4
PAINLEVEL_OUTOF10: 8
PAINLEVEL_OUTOF10: 8
PAINLEVEL_OUTOF10: 10
PAINLEVEL_OUTOF10: 6
PAINLEVEL_OUTOF10: 0

## 2022-08-09 ASSESSMENT — PAIN DESCRIPTION - ORIENTATION
ORIENTATION: MID
ORIENTATION: MID
ORIENTATION: LEFT
ORIENTATION: LEFT
ORIENTATION: RIGHT;LEFT;MID;LOWER;UPPER
ORIENTATION: LEFT

## 2022-08-09 ASSESSMENT — PAIN DESCRIPTION - FREQUENCY
FREQUENCY: CONTINUOUS
FREQUENCY: CONTINUOUS
FREQUENCY: INTERMITTENT
FREQUENCY: CONTINUOUS

## 2022-08-09 ASSESSMENT — PAIN - FUNCTIONAL ASSESSMENT
PAIN_FUNCTIONAL_ASSESSMENT: PREVENTS OR INTERFERES SOME ACTIVE ACTIVITIES AND ADLS

## 2022-08-09 ASSESSMENT — PAIN DESCRIPTION - PAIN TYPE: TYPE: ACUTE PAIN;CHRONIC PAIN

## 2022-08-09 NOTE — CARE COORDINATION
Patient readmission from [facility] for high potassium, 6.8. Mag 1.5, replacement ordered. Na 127. Hgb this morning 6.2 and 1 unit PRBC's ordered. Per surgery consult, patient will need an EGD when labs stabilized d/t large bloody peg lavage and maroon outputs with clots from ileostomy. Patient is on 3LO2 pr NC at this time. Met with the patient at the bedside. Patient stated that she is very weak and tired at this time. Patient confirms she was at [facility] for rehab again, but had to return to the hospital again due to her labs and the bloody output from her ostomy bag. Patient would like to return to [facility] for rehab if possible when she is medically stable to do so. Call placed to Janny Leung, liaison with [facility] re: patient and ability for patient to return. Await return call for acceptance. Will continue to follow.      Ulisses Joseph RN.  [facility]  [phone]

## 2022-08-09 NOTE — PROGRESS NOTES
EGD tentatively tomorrow  Pending electrolyte corrections.  Pt ok for clear per general surgery resident

## 2022-08-09 NOTE — H&P
HISTORY AND PHYSICAL             Date: 8/9/2022        Patient Name: Leora Chen     YOB: 1940      Age:  [de-identified] y.o. Patient sleeping.     Chief Complaint     Chief Complaint   Patient presents with    Other     Sent from NH for K+ of 6.8        History Obtained From   electronic medical record, patient    History of Present Illness   Patient was sent to the ER from a facility for hyperkalemia    Past Medical History     Past Medical History:   Diagnosis Date    Acute kidney failure ([facility] 2014 and 8/2016    x2,no dialysis needed,resolved, kidney function tests returned to normal    Anxiety     Arthritis     CAD (coronary artery disease)     follows with Dr. Arleen Cline every 6 months    [facility])     skin, leg,nose- removed surgically     COPD (chronic obstructive pulmonary disease) ([facility] [address]     mild- no inhlaers, no oxygen     Depression     Fibromyalgia     chronic back and neck pain    Generalized headaches     h/o / daily    History of blood transfusion 3-11-14    multiple times    History of necrotic bowel 2012    Hyperlipidemia     Hypertension     Incisional hernia     abdomen    Insomnia     Mitral valve regurgitation     Myocardial infarct ([facility] [address] 2002    Occasional tremors     at hands / stable with medication    Osteopenia     PONV (postoperative nausea and vomiting)     Vitamin B12 deficiency     h/o        Past Surgical History     Past Surgical History:   Procedure Laterality Date    ABDOMEN SURGERY  2-    total colectomy, bowel resection, ileostomy,revision of ileostomy     ABDOMEN SURGERY N/A 1/26/2020    DRAINAGE OF ABDOMINAL WALL ABSCESS, EXPLANTATION OF MESH, DEBRIDEMENT OF SKIN AND SUBCUTANEOUS TISSUE performed by Keara Ordonez MD at [facility] N/A 9/11/2020    EXPLANTATION OF HERNIA MESH performed by Keara Ordonez MD at [address] [place] [address] Rd  2002    ARTHROPLASTY  1-2006    right and left thumb    [address], 2004, 2009    lumbar fusion x 3 BACK SURGERY      cervical fusion x 2    BREAST SURGERY  years ago    monor calcifications    CARPAL TUNNEL RELEASE Bilateral     CATARACT REMOVAL WITH IMPLANT Right 03/03/15    CHOLECYSTECTOMY  5-11-07    Lap    COLONOSCOPY      CORONARY ANGIOPLASTY WITH STENT PLACEMENT  1-7-2002    ENDOSCOPY, COLON, DIAGNOSTIC      EPIGASTRIC HERNIA REPAIR  3/21/16    incisional with mesh implantation    ESOPHAGUS SURGERY  1-4-13    esophageal clamp (torn Esophagus)    FINGER TRIGGER RELEASE Right 2006    index finger    FOOT SURGERY Right     multiple    HERNIA REPAIR  12-31-13    abdominal x 5- last one 2017     ILEOSTOMY OR JEJUNOSTOMY  1-3-13    revision    INSERT PICC LINE  06/09/2020    and removed     JOINT REPLACEMENT Right 3/24/15    right total shoulder arthroplasty    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     LAPAROTOMY N/A 5/12/2020    EXPLORATORY LAPAROTOMY EXPLANTATION OF INFECTED MESH SMAL BOWEL RESECTION AND REVISION END ILEOSTOMY, LYSIS OF ADHESIONS performed by Silvia Aguilar MD at [address] [place] [address] N/A 6/8/2020    LAPAROTOMY EXPLORATORY, Ulyess Said OF HERNIA MESH performed by Silvia Aguilar MD at [address] [place] [address] N/A 9/15/2020    EXPLORATORY LAPAROTOMY WITH SMALL BOWEL RESECTION, TAKE DOWN REVISION ENTEROCUTANEOUS FISTULA , OSTOMY REVISION performed by Silvia Aguilar MD at [address] [facility] [address] / leg    NASAL SINUS SURGERY      x2 /  cauterized    OTHER SURGICAL HISTORY  08/24/2016    diagnostic laparotomy with repair of intraabdominal hernia    OTHER SURGICAL HISTORY      removal plate / screws  / right great toe    PICC LINE INSERTION NURSE  9/15/2020         PORT SURGERY N/A 7/19/2022    MEDIPORT REMOVAL performed by Izzy Curry MD at [address] [place] [address]  2010    right     SKIN BIOPSY  2010    3 squamous cell carcinoma right leg, left leg        Medications Prior to Admission     Prior to Admission medications    Medication Sig Start Date End Date Taking? Authorizing Provider   atorvastatin (LIPITOR) 10 MG tablet Take 10 mg by mouth at bedtime   Yes Historical Provider, MD   calcium carbonate (TUMS) 500 MG chewable tablet Take 1 tablet by mouth every evening   Yes Historical Provider, MD   glucose (GLUTOSE) 40 % GEL Take 15 g by mouth as needed (hypoglycemia)   Yes Historical Provider, MD   sodium polystyrene (KAYEXALATE) 15 GM/60ML suspension Take 15 g by mouth once 8/8/22 8/9/22 Yes Historical Provider, MD   magnesium hydroxide (MILK OF MAGNESIA) 400 MG/5ML suspension Take 30 mLs by mouth daily as needed for Constipation   Yes Historical Provider, MD   senna (SENOKOT) 8.6 MG tablet Take 1 tablet by mouth daily as needed for Constipation   Yes Historical Provider, MD   acetaminophen (TYLENOL) 325 MG tablet Take 650 mg by mouth every 4 hours as needed for Pain   Yes Historical Provider, MD   vitamin C (ASCORBIC ACID) 500 MG tablet Take 500 mg by mouth in the morning and 500 mg before bedtime. Yes Historical Provider, MD   ondansetron (ZOFRAN) 4 MG tablet Take 4 mg by mouth every 8 hours as needed for Nausea or Vomiting   Yes Historical Provider, MD   apixaban (ELIQUIS) 5 MG TABS tablet Take 1 tablet by mouth in the morning and 1 tablet before bedtime. 7/21/22 8/20/22  Mary Liz MD   lipase-protease-amylase (CREON) 02139-71283 units delayed release capsule Take 12,000 Units by mouth 2 times daily as needed (diarrhea)    Historical Provider, MD   Multiple Vitamins-Minerals (PRESERVISION AREDS) CAPS Take 1 capsule by mouth 2 times daily    Historical Provider, MD   Magnesium Oxide (MAGNESIUM-OXIDE) 250 MG TABS tablet Take 250 mg by mouth 2 times daily    Historical Provider, MD   METAMUCIL FIBER PO Take 1 packet by mouth 2 times daily    Historical Provider, MD   diphenoxylate-atropine (LOMOTIL) 2.5-0.025 MG per tablet Take 1 tablet by mouth 4 times daily (before meals and nightly).     Historical Provider, MD   loperamide (IMODIUM) 2 MG capsule Take 4 mg by mouth in the morning and 4 mg at noon and 4 mg in the evening. Take with meals. Historical Provider, MD   amLODIPine (NORVASC) 10 MG tablet Take 10 mg by mouth nightly   7/31/22  Historical Provider, MD   fluticasone (FLONASE) 50 MCG/ACT nasal spray 1 spray by Nasal route nightly    Historical Provider, MD   lipase-protease-amylase (CREON) 90571-25561 units delayed release capsule Take 24,000 Units by mouth in the morning and 24,000 Units at noon and 24,000 Units in the evening. Take with meals. Historical Provider, MD   Probiotic CAPS Take 1 capsule by mouth nightly     Historical Provider, MD   hyoscyamine (ANASPAZ;LEVSIN) 125 MCG tablet Take 125 mcg by mouth 4 times daily as needed (heartburn)    Historical Provider, MD   omeprazole (PRILOSEC) 20 MG delayed release capsule Take 40 mg by mouth every morning     Historical Provider, MD   aspirin 81 MG tablet Take 81 mg by mouth nightly     Historical Provider, MD   Cholecalciferol (VITAMIN D3) 50 MCG (2000 UT) TABS Take 2,000 Units by mouth 2 times daily     Historical Provider, MD   Ferrous Sulfate (IRON) 325 (65 Fe) MG TABS Take 325 mg by mouth nightly     Historical Provider, MD   Coenzyme Q10 (COQ10 PO) Take 1 capsule by mouth nightly   7/31/22  Historical Provider, MD   vitamin B-12 (CYANOCOBALAMIN) 500 MCG tablet Place 500 mcg under the tongue nightly     Historical Provider, MD   Omega 3 1000 MG CAPS Take 1,000 mg by mouth nightly    Historical Provider, MD   escitalopram (LEXAPRO) 20 MG tablet Take 20 mg by mouth nightly.     Historical Provider, MD   primidone (MYSOLINE) 250 MG tablet Take 250 mg by mouth nightly     Historical Provider, MD   traZODone (DESYREL) 50 MG tablet Take 50 mg by mouth nightly     Historical Provider, MD        Allergies   Fentanyl, Levofloxacin, Tramadol, and Vioxx [rofecoxib]    Social History     Social History       Tobacco History       Smoking Status  Every Day Smoking Frequency  1.00 packs/day Smoking Tobacco Type  Cigarettes      Smokeless Tobacco Use  Never              Alcohol History       Alcohol Use Status  Not Currently Comment  occasional              Drug Use       Drug Use Status  No              Sexual Activity       Sexually Active  Not Asked                    Family History   No family history on file. Review of Systems   Review of Systems   Constitutional:  Positive for activity change. HENT:  Negative for congestion. Respiratory:  Negative for shortness of breath. Cardiovascular:  Negative for chest pain. Gastrointestinal:  Positive for abdominal pain. Neurological:  Negative for dizziness. Physical Exam   BP (!) 100/47   Pulse 64   Temp (!) 96.4 °F (35.8 °C) (Temporal)   Resp 20   Ht 5' (1.524 m)   Wt 94 lb (42.6 kg)   SpO2 99%   BMI 18.36 kg/m²     Physical Exam  Vitals reviewed. HENT:      Mouth/Throat:      Mouth: Mucous membranes are moist.   Eyes:      Pupils: Pupils are equal, round, and reactive to light. Cardiovascular:      Rate and Rhythm: Normal rate and regular rhythm. Pulses: Normal pulses. Heart sounds: Normal heart sounds. Pulmonary:      Effort: Pulmonary effort is normal. No respiratory distress. Breath sounds: Normal breath sounds. No wheezing. Abdominal:      General: Abdomen is flat. Bowel sounds are normal.      Tenderness: There is no abdominal tenderness. Skin:     General: Skin is warm and dry. Neurological:      Mental Status: Mental status is at baseline.        Labs      Recent Results (from the past 24 hour(s))   Basic Metabolic Panel    Collection Time: 08/08/22  8:50 AM   Result Value Ref Range    Sodium 129 (L) 132 - 146 mmol/L    Potassium 6.3 (H) 3.5 - 5.0 mmol/L    Chloride 100 98 - 107 mmol/L    CO2 21 (L) 22 - 29 mmol/L    Anion Gap 8 7 - 16 mmol/L    Glucose 129 (H) 74 - 99 mg/dL    BUN 34 (H) 6 - 23 mg/dL    Creatinine 1.7 (H) 0.5 - 1.0 mg/dL    GFR Non-African American 29 >=60 mL/min/1.73    GFR African American 35 Calcium 7.9 (L) 8.6 - 10.2 mg/dL   EKG 12 Lead    Collection Time: 08/08/22 10:08 AM   Result Value Ref Range    Ventricular Rate 83 BPM    Atrial Rate 83 BPM    P-R Interval 152 ms    QRS Duration 92 ms    Q-T Interval 370 ms    QTc Calculation (Bazett) 434 ms    P Axis 82 degrees    R Axis -61 degrees    T Axis 73 degrees   POCT Glucose    Collection Time: 08/08/22 10:31 AM   Result Value Ref Range    Meter Glucose 141 (H) 74 - 99 mg/dL   POCT Glucose    Collection Time: 08/08/22 10:36 AM   Result Value Ref Range    Glucose 141 mg/dL    QC OK? yes.     CBC with Auto Differential    Collection Time: 08/08/22 10:47 AM   Result Value Ref Range    WBC 9.0 4.5 - 11.5 E9/L    RBC 2.08 (L) 3.50 - 5.50 E12/L    Hemoglobin 6.6 (LL) 11.5 - 15.5 g/dL    Hematocrit 21.8 (L) 34.0 - 48.0 %    .8 (H) 80.0 - 99.9 fL    MCH 31.7 26.0 - 35.0 pg    MCHC 30.3 (L) 32.0 - 34.5 %    RDW 15.9 (H) 11.5 - 15.0 fL    Platelets 538 683 - 274 E9/L    MPV 11.7 7.0 - 12.0 fL    Neutrophils % 71.4 43.0 - 80.0 %    Immature Granulocytes % 0.4 0.0 - 5.0 %    Lymphocytes % 19.8 (L) 20.0 - 42.0 %    Monocytes % 6.1 2.0 - 12.0 %    Eosinophils % 1.4 0.0 - 6.0 %    Basophils % 0.9 0.0 - 2.0 %    Neutrophils Absolute 6.45 1.80 - 7.30 E9/L    Immature Granulocytes # 0.04 E9/L    Lymphocytes Absolute 1.79 1.50 - 4.00 E9/L    Monocytes Absolute 0.55 0.10 - 0.95 E9/L    Eosinophils Absolute 0.13 0.05 - 0.50 E9/L    Basophils Absolute 0.08 0.00 - 0.20 E9/L   Comprehensive Metabolic Panel w/ Reflex to MG    Collection Time: 08/08/22 10:47 AM   Result Value Ref Range    Sodium 134 132 - 146 mmol/L    Potassium reflex Magnesium 6.4 (H) 3.5 - 5.0 mmol/L    Chloride 102 98 - 107 mmol/L    CO2 21 (L) 22 - 29 mmol/L    Anion Gap 11 7 - 16 mmol/L    Glucose 128 (H) 74 - 99 mg/dL    BUN 36 (H) 6 - 23 mg/dL    Creatinine 1.9 (H) 0.5 - 1.0 mg/dL    GFR Non-African American 25 >=60 mL/min/1.73    GFR African American 31     Calcium 7.9 (L) 8.6 - 10.2 mg/dL Total Protein 6.7 6.4 - 8.3 g/dL    Albumin 2.6 (L) 3.5 - 5.2 g/dL    Total Bilirubin 0.4 0.0 - 1.2 mg/dL    Alkaline Phosphatase 267 (H) 35 - 104 U/L    ALT 9 0 - 32 U/L    AST 15 0 - 31 U/L   Troponin    Collection Time: 08/08/22 10:47 AM   Result Value Ref Range    Troponin, High Sensitivity 60 (H) 0 - 9 ng/L   Brain Natriuretic Peptide    Collection Time: 08/08/22 10:47 AM   Result Value Ref Range    Pro- (H) 0 - 450 pg/mL   TYPE AND SCREEN    Collection Time: 08/08/22 11:38 AM   Result Value Ref Range    ABO/Rh A POS     Antibody Screen NEG    PREPARE RBC (CROSSMATCH), 1 Units    Collection Time: 08/08/22 11:38 AM   Result Value Ref Range    Product Code Blood Bank [id number]     Description Blood Bank Red Blood Cells, Leuko-reduced     Unit Number [id number]     Dispense Status Blood Bank transfused    Troponin    Collection Time: 08/08/22 12:21 PM   Result Value Ref Range    Troponin, High Sensitivity 56 (H) 0 - 9 ng/L   Basic Metabolic Panel    Collection Time: 08/08/22 12:21 PM   Result Value Ref Range    Sodium 133 132 - 146 mmol/L    Potassium 5.7 (H) 3.5 - 5.0 mmol/L    Chloride 102 98 - 107 mmol/L    CO2 19 (L) 22 - 29 mmol/L    Anion Gap 12 7 - 16 mmol/L    Glucose 139 (H) 74 - 99 mg/dL    BUN 36 (H) 6 - 23 mg/dL    Creatinine 2.0 (H) 0.5 - 1.0 mg/dL    GFR Non-African American 24 >=60 mL/min/1.73    GFR African American 29     Calcium 8.2 (L) 8.6 - 10.2 mg/dL   Magnesium    Collection Time: 08/08/22 12:21 PM   Result Value Ref Range    Magnesium 1.6 1.6 - 2.6 mg/dL   Urinalysis with Microscopic    Collection Time: 08/08/22 12:39 PM   Result Value Ref Range    Color, UA Yellow Straw/Yellow    Clarity, UA Clear Clear    Glucose, Ur Negative Negative mg/dL    Bilirubin Urine Negative Negative    Ketones, Urine Negative Negative mg/dL    Specific Gravity, UA 1.020 1.005 - 1.030    Blood, Urine TRACE-INTACT Negative    pH, UA 5.5 5.0 - 9.0    Protein, UA 30 (A) Negative mg/dL    Urobilinogen, Urine 0. 2 <2.0 E.U./dL    Nitrite, Urine Negative Negative    Leukocyte Esterase, Urine Negative Negative    WBC, UA NONE 0 - 5 /HPF    RBC, UA 1-3 0 - 2 /HPF    Epithelial Cells, UA FEW /HPF    Bacteria, UA RARE (A) None Seen /HPF   Ferritin    Collection Time: 08/08/22 12:39 PM   Result Value Ref Range    Ferritin 160 ng/mL   Iron and TIBC    Collection Time: 08/08/22 12:39 PM   Result Value Ref Range    Iron 97 37 - 145 mcg/dL    TIBC 195 (L) 250 - 450 mcg/dL    Iron Saturation 50 15 - 50 %   Vitamin B12 & Folate    Collection Time: 08/08/22 12:39 PM   Result Value Ref Range    Vitamin B-12 1764 (H) 211 - 946 pg/mL    Folate 9.4 4.8 - 24.2 ng/mL   Hemoglobin and Hematocrit    Collection Time: 08/08/22  6:29 PM   Result Value Ref Range    Hemoglobin 7.1 (L) 11.5 - 15.5 g/dL    Hematocrit 22.0 (L) 34.0 - 48.0 %   Basic Metabolic Panel    Collection Time: 08/08/22  6:29 PM   Result Value Ref Range    Sodium 132 132 - 146 mmol/L    Potassium 6.7 (HH) 3.5 - 5.0 mmol/L    Chloride 102 98 - 107 mmol/L    CO2 20 (L) 22 - 29 mmol/L    Anion Gap 10 7 - 16 mmol/L    Glucose 104 (H) 74 - 99 mg/dL    BUN 40 (H) 6 - 23 mg/dL    Creatinine 2.2 (H) 0.5 - 1.0 mg/dL    GFR Non-African American 21 >=60 mL/min/1.73    GFR African American 26     Calcium 7.6 (L) 8.6 - 10.2 mg/dL   POCT Glucose    Collection Time: 08/08/22  9:55 PM   Result Value Ref Range    Meter Glucose 134 (H) 74 - 99 mg/dL   Basic Metabolic Panel    Collection Time: 08/09/22 12:39 AM   Result Value Ref Range    Sodium 130 (L) 132 - 146 mmol/L    Potassium 6.1 (H) 3.5 - 5.0 mmol/L    Chloride 99 98 - 107 mmol/L    CO2 19 (L) 22 - 29 mmol/L    Anion Gap 12 7 - 16 mmol/L    Glucose 113 (H) 74 - 99 mg/dL    BUN 44 (H) 6 - 23 mg/dL    Creatinine 2.5 (H) 0.5 - 1.0 mg/dL    GFR Non-African American 19 >=60 mL/min/1.73    GFR African American 22     Calcium 7.6 (L) 8.6 - 10.2 mg/dL   POCT Glucose    Collection Time: 08/09/22  5:27 AM   Result Value Ref Range    Meter Glucose 147 (H) 74 - 99 mg/dL   CBC    Collection Time: 08/09/22  6:04 AM   Result Value Ref Range    WBC 13.0 (H) 4.5 - 11.5 E9/L    RBC 2.04 (L) 3.50 - 5.50 E12/L    Hemoglobin 6.2 (LL) 11.5 - 15.5 g/dL    Hematocrit 19.5 (L) 34.0 - 48.0 %    MCV 95.6 80.0 - 99.9 fL    MCH 30.4 26.0 - 35.0 pg    MCHC 31.8 (L) 32.0 - 34.5 %    RDW 18.6 (H) 11.5 - 15.0 fL    Platelets 275 122 - 845 E9/L    MPV 11.5 7.0 - 12.0 fL        Imaging/Diagnostics Last 24 Hours   CT ABDOMEN PELVIS WO CONTRAST Additional Contrast? None    Result Date: 7/24/2022  EXAMINATION: CT OF THE CHEST WITHOUT CONTRAST; CT OF THE ABDOMEN AND PELVIS WITHOUT CONTRAST 7/24/2022 12:55 pm TECHNIQUE: CT of the chest was performed without the administration of intravenous contrast. Multiplanar reformatted images are provided for review. Automated exposure control, iterative reconstruction, and/or weight based adjustment of the mA/kV was utilized to reduce the radiation dose to as low as reasonably achievable.; CT of the abdomen and pelvis was performed without the administration of intravenous contrast. Multiplanar reformatted images are provided for review. Automated exposure control, iterative reconstruction, and/or weight based adjustment of the mA/kV was utilized to reduce the radiation dose to as low as reasonably achievable. COMPARISON: 07/16/2022 HISTORY: ORDERING SYSTEM PROVIDED HISTORY: rule out infection TECHNOLOGIST PROVIDED HISTORY: Reason for exam:->rule out infection Decision Support Exception - unselect if not a suspected or confirmed emergency medical condition->Emergency Medical Condition ([place]) What reading provider will be dictating this exam?->CRC FINDINGS: CT chest. There is borderline cardiac size with diffuse coronary artery calcification. The great vessels are normal.  There is prominent pulmonary artery concerning for pulmonary artery hypertension. Moderately enlarged mediastinal lymph nodes are present.   There is centrilobular emphysema with progressive infiltrates/atelectasis and pleural effusion in the lung bases concerning for pneumonia or edema. Degenerative changes are identified in the thoracolumbar spine with previous surgical changes CT abdomen and pelvis. Comparison 01/21/2021 Findings The examination is very limited due to lack of contrast.  The liver is heterogeneous in appearance concerning for liver disease. Gallbladder is absent. Stomach is collapsed with a G-tube. Spleen appears normal. 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 There is poor delineation of the pancreas with suggestion of a 1.8 cm cystic lesion in the head of the pancreas which is indeterminate. There is a small amount of ascites with fluid surrounding the liver and paracolic gutters. Adrenals and the kidneys are normal with 2.2 cm cystic lesion in the left kidney. Extensive postoperative changes are identified in the lumbosacral spine with irregularity and fragmentation at the surgical site and persistent grade 2 spondylolisthesis at L4-5. Clinical assessment is recommended. There is calcification aorta. Pelvis. Bladder is distended. There is small amount of ascites. 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 Somewhat distended small bowel loops with the mural thickening is identified. There is anasarca. Centrilobular emphysema with progressive patchy infiltrates/atelectasis and pleural effusion in the lung bases concerning for pneumonia or edema. Coronary artery calcification. Suboptimal evaluation abdomen pelvis. 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 Liver disease with small amount of ascites. Question indeterminate cystic lesion in the pancreas. Consider surveillance.  Significant irregularity and the postsurgical changes in the lumbosacral spine.  See above. CT CHEST WO CONTRAST    Result Date: 7/24/2022  EXAMINATION: CT OF THE CHEST WITHOUT CONTRAST; CT OF THE ABDOMEN AND PELVIS WITHOUT CONTRAST 7/24/2022 12:55 pm TECHNIQUE: CT of the chest was performed without the administration of intravenous contrast. Multiplanar reformatted images are provided for review. Automated exposure control, iterative reconstruction, and/or weight based adjustment of the mA/kV was utilized to reduce the radiation dose to as low as reasonably achievable.; CT of the abdomen and pelvis was performed without the administration of intravenous contrast. Multiplanar reformatted images are provided for review. Automated exposure control, iterative reconstruction, and/or weight based adjustment of the mA/kV was utilized to reduce the radiation dose to as low as reasonably achievable. COMPARISON: 07/16/2022 HISTORY: ORDERING SYSTEM PROVIDED HISTORY: rule out infection TECHNOLOGIST PROVIDED HISTORY: Reason for exam:->rule out infection Decision Support Exception - unselect if not a suspected or confirmed emergency medical condition->Emergency Medical Condition ([place]) What reading provider will be dictating this exam?->CRC FINDINGS: CT chest. There is borderline cardiac size with diffuse coronary artery calcification. The great vessels are normal.  There is prominent pulmonary artery concerning for pulmonary artery hypertension. Moderately enlarged mediastinal lymph nodes are present. There is centrilobular emphysema with progressive infiltrates/atelectasis and pleural effusion in the lung bases concerning for pneumonia or edema. Degenerative changes are identified in the thoracolumbar spine with previous surgical changes CT abdomen and pelvis. Comparison 01/21/2021 Findings The examination is very limited due to lack of contrast.  The liver is heterogeneous in appearance concerning for liver disease. Gallbladder is absent. Stomach is collapsed with a G-tube.   Spleen appears normal. 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 There is poor delineation of the pancreas with suggestion of a 1.8 cm cystic lesion in the head of the pancreas which is indeterminate. There is a small amount of ascites with fluid surrounding the liver and paracolic gutters. Adrenals and the kidneys are normal with 2.2 cm cystic lesion in the left kidney. Extensive postoperative changes are identified in the lumbosacral spine with irregularity and fragmentation at the surgical site and persistent grade 2 spondylolisthesis at L4-5. Clinical assessment is recommended. There is calcification aorta. Pelvis. Bladder is distended. There is small amount of ascites. 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 Somewhat distended small bowel loops with the mural thickening is identified. There is anasarca. Centrilobular emphysema with progressive patchy infiltrates/atelectasis and pleural effusion in the lung bases concerning for pneumonia or edema. Coronary artery calcification. Suboptimal evaluation abdomen pelvis. 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 Liver disease with small amount of ascites. Question indeterminate cystic lesion in the pancreas. Consider surveillance. Significant irregularity and the postsurgical changes in the lumbosacral spine. See above. XR CHEST PORTABLE    Result Date: 7/24/2022  EXAMINATION: ONE XRAY VIEW OF THE CHEST 7/24/2022 9:54 am COMPARISON: 07/15/2022 HISTORY: ORDERING SYSTEM PROVIDED HISTORY: sepsis, r/o pneumonia TECHNOLOGIST PROVIDED HISTORY: Reason for exam:->sepsis, r/o pneumonia What reading provider will be dictating this exam?->CRC FINDINGS: Portable chest reveal cardiac and mediastinal silhouettes within normal limits.   Underlying chronic changes seen within the lung fields bilaterally. No focal parenchymal opacification present. No pleural effusion or pneumothorax. Vascular calcifications seen within the thoracic aorta with hardware identified at the thoracolumbar spine. Interval removal the right noa catheter. Stable chronic changes seen within the lung fields with no evidence of acute parenchymal disease. Noa catheter is been removed in the interval on the right. Assessment      Hospital Problems             Last Modified POA    * (Principal) Hyperkalemia 8/8/2022 Yes   Acute blood loss anemia  Likely GI bleed. Lumbar DJD  CAD  HTN  Hyperlipidemia    Plan   Renal following, on ROSA, lokelma  Surgery following for bleed ostomy  Possible EGD  Monitor labs and exam.  Transfuse one unit p RBC this AM as hemoglobin 6.2. Continue rest of meds.     Consultations Ordered:  IP CONSULT TO HOSPITALIST  IP CONSULT TO NEPHROLOGY  IP CONSULT TO GENERAL SURGERY    Electronically signed by Te Keys MD on 7/25/22 at 7:03 AM EDT

## 2022-08-09 NOTE — CONSULTS
hands / stable with medication    Osteopenia     PONV (postoperative nausea and vomiting)     Vitamin B12 deficiency     h/o       Past Surgical History:   Procedure Laterality Date    ABDOMEN SURGERY  2-    total colectomy, bowel resection, ileostomy,revision of ileostomy     ABDOMEN SURGERY N/A 1/26/2020    DRAINAGE OF ABDOMINAL WALL ABSCESS, EXPLANTATION OF MESH, DEBRIDEMENT OF SKIN AND SUBCUTANEOUS TISSUE performed by Yesica Joseph MD at [facility] N/A 9/11/2020    EXPLANTATION OF HERNIA MESH performed by Yesica Joseph MD at [address] [facility]  2002    ARTHROPLASTY  1-2006    right and left thumb    BACK SURGERY  1991, 2004, 2009    lumbar fusion x 3    BACK SURGERY      cervical fusion x 2    BREAST SURGERY  years ago    monor calcifications    CARPAL TUNNEL RELEASE Bilateral     CATARACT REMOVAL WITH IMPLANT Right 03/03/15    CHOLECYSTECTOMY  5-11-07    Lap    COLONOSCOPY      CORONARY ANGIOPLASTY WITH STENT PLACEMENT  1-7-2002    ENDOSCOPY, COLON, DIAGNOSTIC      EPIGASTRIC HERNIA REPAIR  3/21/16    incisional with mesh implantation    ESOPHAGUS SURGERY  1-4-13    esophageal clamp (torn Esophagus)    FINGER TRIGGER RELEASE Right 2006    index finger    FOOT SURGERY Right     multiple    HERNIA REPAIR  12-31-13    abdominal x 5- last one 2017     ILEOSTOMY OR JEJUNOSTOMY  1-3-13    revision    INSERT PICC LINE  06/09/2020    and removed     JOINT REPLACEMENT Right 3/24/15    right total shoulder arthroplasty    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     LAPAROTOMY N/A 5/12/2020    EXPLORATORY LAPAROTOMY EXPLANTATION OF INFECTED MESH SMAL BOWEL RESECTION AND REVISION END ILEOSTOMY, LYSIS OF ADHESIONS performed by Yesica Joseph MD at [address] [place] [address] N/A 6/8/2020    LAPAROTOMY EXPLORATORY, Linzie Benne OF HERNIA MESH performed by Yesica Joseph MD at [address] [place] [address] N/A 9/15/2020    EXPLORATORY LAPAROTOMY WITH SMALL BOWEL RESECTION, TAKE DOWN REVISION ENTEROCUTANEOUS FISTULA , OSTOMY REVISION performed by Keara Ordonez MD at [address] / leg    NASAL SINUS SURGERY      x2 /  cauterized    OTHER SURGICAL HISTORY  08/24/2016    diagnostic laparotomy with repair of intraabdominal hernia    OTHER SURGICAL HISTORY      removal plate / screws  / right great toe    PICC LINE INSERTION NURSE  9/15/2020         PORT SURGERY N/A 7/19/2022    MEDIPORT REMOVAL performed by Linda Britton MD at [facility]  2010    right     SKIN BIOPSY  2010    3 squamous cell carcinoma right leg, left leg       Medications Prior to Admission:    Prior to Admission medications    Medication Sig Start Date End Date Taking? Authorizing Provider   atorvastatin (LIPITOR) 10 MG tablet Take 10 mg by mouth at bedtime   Yes Historical Provider, MD   calcium carbonate (TUMS) 500 MG chewable tablet Take 1 tablet by mouth every evening   Yes Historical Provider, MD   glucose (GLUTOSE) 40 % GEL Take 15 g by mouth as needed (hypoglycemia)   Yes Historical Provider, MD   sodium polystyrene (KAYEXALATE) 15 GM/60ML suspension Take 15 g by mouth once 8/8/22 8/9/22 Yes Historical Provider, MD   magnesium hydroxide (MILK OF MAGNESIA) 400 MG/5ML suspension Take 30 mLs by mouth daily as needed for Constipation   Yes Historical Provider, MD   senna (SENOKOT) 8.6 MG tablet Take 1 tablet by mouth daily as needed for Constipation   Yes Historical Provider, MD   acetaminophen (TYLENOL) 325 MG tablet Take 650 mg by mouth every 4 hours as needed for Pain   Yes Historical Provider, MD   vitamin C (ASCORBIC ACID) 500 MG tablet Take 500 mg by mouth in the morning and 500 mg before bedtime. Yes Historical Provider, MD   ondansetron (ZOFRAN) 4 MG tablet Take 4 mg by mouth every 8 hours as needed for Nausea or Vomiting   Yes Historical Provider, MD   apixaban (ELIQUIS) 5 MG TABS tablet Take 1 tablet by mouth in the morning and 1 tablet before bedtime.  7/21/22 8/20/22  Liu Ballard Kai Bejarano MD   lipase-protease-amylase (CREON) 71089-73218 units delayed release capsule Take 12,000 Units by mouth 2 times daily as needed (diarrhea)    Historical Provider, MD   Multiple Vitamins-Minerals (PRESERVISION AREDS) CAPS Take 1 capsule by mouth 2 times daily    Historical Provider, MD   Magnesium Oxide (MAGNESIUM-OXIDE) 250 MG TABS tablet Take 250 mg by mouth 2 times daily    Historical Provider, MD   METAMUCIL FIBER PO Take 1 packet by mouth 2 times daily    Historical Provider, MD   diphenoxylate-atropine (LOMOTIL) 2.5-0.025 MG per tablet Take 1 tablet by mouth 4 times daily (before meals and nightly). Historical Provider, MD   loperamide (IMODIUM) 2 MG capsule Take 4 mg by mouth in the morning and 4 mg at noon and 4 mg in the evening. Take with meals. Historical Provider, MD   amLODIPine (NORVASC) 10 MG tablet Take 10 mg by mouth nightly   7/31/22  Historical Provider, MD   fluticasone (FLONASE) 50 MCG/ACT nasal spray 1 spray by Nasal route nightly    Historical Provider, MD   lipase-protease-amylase (CREON) 96322-13582 units delayed release capsule Take 24,000 Units by mouth in the morning and 24,000 Units at noon and 24,000 Units in the evening. Take with meals.     Historical Provider, MD   Probiotic CAPS Take 1 capsule by mouth nightly     Historical Provider, MD   hyoscyamine (ANASPAZ;LEVSIN) 125 MCG tablet Take 125 mcg by mouth 4 times daily as needed (heartburn)    Historical Provider, MD   omeprazole (PRILOSEC) 20 MG delayed release capsule Take 40 mg by mouth every morning     Historical Provider, MD   aspirin 81 MG tablet Take 81 mg by mouth nightly     Historical Provider, MD   Cholecalciferol (VITAMIN D3) 50 MCG (2000 UT) TABS Take 2,000 Units by mouth 2 times daily     Historical Provider, MD   Ferrous Sulfate (IRON) 325 (65 Fe) MG TABS Take 325 mg by mouth nightly     Historical Provider, MD   Coenzyme Q10 (COQ10 PO) Take 1 capsule by mouth nightly   7/31/22  Historical Provider, MD vitamin B-12 (CYANOCOBALAMIN) 500 MCG tablet Place 500 mcg under the tongue nightly     Historical Provider, MD   Omega 3 1000 MG CAPS Take 1,000 mg by mouth nightly    Historical Provider, MD   escitalopram (LEXAPRO) 20 MG tablet Take 20 mg by mouth nightly. Historical Provider, MD   primidone (MYSOLINE) 250 MG tablet Take 250 mg by mouth nightly     Historical Provider, MD   traZODone (DESYREL) 50 MG tablet Take 50 mg by mouth nightly     Historical Provider, MD       Allergies   Allergen Reactions    Fentanyl Itching     REACTION IS ONLY FROM THE PATCH    Levofloxacin Swelling and Myalgia     SWELLING & PAIN LOCALIZED TO THE LEGS    Tramadol Nausea Only and Other (See Comments)     INSOMNIA & SHAKINESS     Vioxx [Rofecoxib] Swelling and Myalgia     SWELLING & PAIN LOCALIZED TO THE LEGS       No family history on file. Social History     Tobacco Use    Smoking status: Every Day     Packs/day: 1.00     Types: Cigarettes    Smokeless tobacco: Never   Vaping Use    Vaping Use: Never used   Substance Use Topics    Alcohol use: Not Currently     Comment: occasional    Drug use: No         Review of Systems   General ROS: negative for - chills or fever  Hematological and Lymphatic ROS: positive for - bleeding problems and blood clots  Respiratory ROS: no cough, shortness of breath, or wheezing  Cardiovascular ROS: no chest pain or dyspnea on exertion  Gastrointestinal ROS: positive for abdominal pain and bloody ileostopmy output. Negative for nausea and vomiting. Genito-Urinary ROS: no dysuria, trouble voiding, or hematuria  Musculoskeletal ROS: negative for - muscle pain or muscular weakness      PHYSICAL EXAM:    Vitals:    08/09/22 0000   BP: (!) 128/58   Pulse: 100   Resp: 18   Temp: 97.9 °F (36.6 °C)   SpO2: 100%       General Appearance:  awake, alert, oriented, in no acute distress  Skin:  Skin color, texture, turgor normal. No rashes or lesions. Head/face:  NCAT  Eyes:  No gross abnormalities.   Lungs: on 2 L of O2 nasal cannula  Heart:  regular rate, normotensive  Abdomen:  soft, diffusely tender, but distractible, non-distended. RUQ ileostomy with maroon stool in bag. Ostomy pink. PEG in place and sinched down to 2 cm at the skin. PEG lavage with bloody return  Extremities: Extremities warm to touch, pink, with no edema. LABS:    CBC  Recent Labs     08/08/22  1047 08/08/22  1829   WBC 9.0  --    HGB 6.6* 7.1*   HCT 21.8* 22.0*     --      BMP  Recent Labs     08/09/22  0039   *   K 6.1*   CL 99   CO2 19*   BUN 44*   CREATININE 2.5*   CALCIUM 7.6*     Liver Function  Recent Labs     08/08/22  1047   BILITOT 0.4   AST 15   ALT 9   ALKPHOS 267*   PROT 6.7   LABALBU 2.6*     No results for input(s): LACTATE in the last 72 hours. No results for input(s): INR, PTT in the last 72 hours. Invalid input(s): PT    RADIOLOGY    XR CHEST PORTABLE    Result Date: 8/8/2022  EXAMINATION: ONE XRAY VIEW OF THE CHEST 8/8/2022 11:07 am COMPARISON: 07/24/2022 HISTORY: ORDERING SYSTEM PROVIDED HISTORY: hyperkalemia, chest tightness TECHNOLOGIST PROVIDED HISTORY: Reason for exam:->hyperkalemia, chest tightness What reading provider will be dictating this exam?->CRC FINDINGS: The lungs are without acute focal process. There is no effusion or pneumothorax. The cardiomediastinal silhouette is without acute process. The osseous structures are without acute process. No acute process. ASSESSMENT:  [de-identified] y.o. female with anemia, maroon ileostomy output, and bloody PEG lavage. PLAN:  NPO  PPI BID  Trend H/H and transfuse as needed  Recommend holding anticoagulation  Patient will likely need EGD. Will discuss timing with attending. Electronically signed by Lilly Blizzard, MD on 8/9/22 at 4:39 AM EDT      Patient seen and examined    Gen: frail, thin  ABD:  PEG clamped, abd soft, minimal mid-abd tenderness, multiple scars from prior surgery.  RLQ ileostomy with dark blood output, no clots    A/P:  GI bleed, anemia, suspect upper source given blood aspirated from gastric lavage  - resuscitate  - hold Eliquis  - monitor H/H - transfuse for goal >7  - PPI IV BID, carafate  - needs EGD but will need adequate resuscitation first - tentative tomorrow 8/10  - Nephrology following - need electrolyte abnormalities corrected before can have anesthesia    D/w Dr. Monica Purvis    Electronically signed by Ya Billy DO on 8/9/2022 at 7:06 AM

## 2022-08-09 NOTE — PROGRESS NOTES
[facility]  Nephrology Progress Note    Patient's Name: Josi Soria    History of Present Illness from 8/8 Consult Note:    Avinash Costa is an [de-identified]year old female we were asked to see regarding KD. She is followed longitudinally in the office with Dr. Refugio Reed. she was last seen in the office in April of this year and at that time her baseline creatinine was noted to be 1.4-1.5 mg/dl with an e-GFR of 33-36 ml/min. She now presents to ED from local nursing home for abnormal lab work, primarily hyperkalemia. Vitals upon arrival included blood pressure 89/47, pulse 92, respirations 18 and temperature 98.0. Initial BMP revealed sodium 134, potassium 6.4, chloride 102, CO2 21, BUN 36 and creatinine 1.9. Initial CBC revealed WBCs 9.0, hemoglobin 6.6, hematocrit 21.8 and platelet count 906. Initial UA showed clear yellow urine, 1.020, trace blood, trace protein. Patient now examined in the ED setting. She is awake alert and oriented. Currently with 1 unit PRBC infusing. Of note she was just discharged from this facility 1 week ago after being treated for enteritis, hypotension and pneumonia. She has been actively recovering at [facility]. Currently she denies any chest pain or shortness of breath at rest.  She does endorse generalized weakness. Currently without nausea, vomiting or diarrhea. She also denies any specific urinary complaints. \"    Subjective:    8/9: Patient was seen and examined. She denies any current chest pain or shortness of breath. She reports abdominal pain, but denies any nausea. Her  is at the bedside.     PMH:    Past Medical History:   Diagnosis Date    Acute kidney failure ([address] 2014 and 8/2016    x2,no dialysis needed,resolved, kidney function tests returned to normal    Anxiety     Arthritis     CAD (coronary artery disease)     follows with Dr. Francisco Mccarthy every 6 months    Cancer [facility])     skin, leg,nose- removed surgically     COPD (chronic obstructive pulmonary disease) (HCC)     mild- no inhlaers, no oxygen     Depression     Fibromyalgia     chronic back and neck pain    Generalized headaches     h/o / daily    History of blood transfusion 3-11-14    multiple times    History of necrotic bowel 2012    Hyperlipidemia     Hypertension     Incisional hernia     abdomen    Insomnia     Mitral valve regurgitation     Myocardial infarct ([address] 2002    Occasional tremors     at hands / stable with medication    Osteopenia     PONV (postoperative nausea and vomiting)     Vitamin B12 deficiency     h/o       Patient Active Problem List   Diagnosis    Right cataract    Essential hypertension, benign    Hyperlipidemia with target LDL less than 100    Osteoarthritis, shoulder    Primary osteoarthritis of left knee    History of ischemic bowel disease    Ileostomy present ([address]    Chronic kidney disease, stage III (moderate) ([facility])    COPD (chronic obstructive pulmonary disease) ([facility])    Moderate protein-calorie malnutrition ([facility])    Abdominal wall cellulitis    Cellulitis    Infected hernioplasty mesh ([facility])    Enterocutaneous fistula    Mild protein-calorie malnutrition ([address]    Acute kidney injury ([address]    Hypertensive kidney disease with chronic kidney disease stage IV ([facility])    KD (acute kidney injury) ([address]    Diarrhea    Elevated serum creatinine    Abnormal serum creatinine level    Hyperlipemia    Primary hypertension    Essential (primary) hypertension    Postsurgical malabsorption, not elsewhere classified    Hypercholesterolemia    Pure hypercholesterolemia    Prediabetes    Hypothyroidism, unspecified    Acute renal failure (ARF) ([facility])    Enteritis    Hypotension    Abnormal CT scan, lumbar spine    Severe protein-calorie malnutrition ([facility])    Hyperkalemia       Meds:     pantoprazole (PROTONIX) 40 mg injection  40 mg IntraVENous Q12H    sucralfate  1 g Oral 4 times per day    magnesium sulfate  1,000 mg IntraVENous Once    insulin regular  6 Units IntraVENous Once    calcium gluconate  1,000 mg IntraVENous Once    sodium bicarbonate  50 mEq IntraVENous Once    calcium carbonate  500 mg Oral Nightly    vitamin D  2,000 Units Oral BID    diphenoxylate-atropine  1 tablet Oral 4x Daily AC & HS    escitalopram  20 mg Oral Nightly    ferrous sulfate  325 mg Oral Nightly    fluticasone  1 spray Each Nostril Nightly    lipase-protease-amylase  24,000 Units Oral TID WC    loperamide  4 mg Oral TID WC    magnesium oxide  400 mg Oral BID    ocuvite-lutein  1 capsule Oral BID    pravastatin  40 mg Oral Nightly    primidone  250 mg Oral Nightly    traZODone  50 mg Oral Nightly    vitamin B-12  500 mcg Oral Nightly        sodium chloride      dextrose      sodium chloride      dextrose         Meds prn:     oxyCODONE, sodium chloride, HYDROmorphone, morphine, glucose, dextrose bolus **OR** dextrose bolus, glucagon (rDNA), dextrose, sodium chloride, hyoscyamine, lipase-protease-amylase, ondansetron, senna, oxyCODONE-acetaminophen, glucose, dextrose bolus **OR** dextrose bolus, glucagon (rDNA), dextrose    Meds prior to admission:     No current facility-administered medications on file prior to encounter.      Current Outpatient Medications on File Prior to Encounter   Medication Sig Dispense Refill    atorvastatin (LIPITOR) 10 MG tablet Take 10 mg by mouth at bedtime      calcium carbonate (TUMS) 500 MG chewable tablet Take 1 tablet by mouth every evening      glucose (GLUTOSE) 40 % GEL Take 15 g by mouth as needed (hypoglycemia)      sodium polystyrene (KAYEXALATE) 15 GM/60ML suspension Take 15 g by mouth once      magnesium hydroxide (MILK OF MAGNESIA) 400 MG/5ML suspension Take 30 mLs by mouth daily as needed for Constipation      senna (SENOKOT) 8.6 MG tablet Take 1 tablet by mouth daily as needed for Constipation      acetaminophen (TYLENOL) 325 MG tablet Take 650 mg by mouth every 4 hours as needed for Pain      vitamin C (ASCORBIC ACID) 500 MG tablet Take 500 mg by mouth in the morning and 500 mg before bedtime. ondansetron (ZOFRAN) 4 MG tablet Take 4 mg by mouth every 8 hours as needed for Nausea or Vomiting      apixaban (ELIQUIS) 5 MG TABS tablet Take 1 tablet by mouth in the morning and 1 tablet before bedtime. 60 tablet 0    lipase-protease-amylase (CREON) 64292-22774 units delayed release capsule Take 12,000 Units by mouth 2 times daily as needed (diarrhea)      Multiple Vitamins-Minerals (PRESERVISION AREDS) CAPS Take 1 capsule by mouth 2 times daily      Magnesium Oxide (MAGNESIUM-OXIDE) 250 MG TABS tablet Take 250 mg by mouth 2 times daily      METAMUCIL FIBER PO Take 1 packet by mouth 2 times daily      diphenoxylate-atropine (LOMOTIL) 2.5-0.025 MG per tablet Take 1 tablet by mouth 4 times daily (before meals and nightly). loperamide (IMODIUM) 2 MG capsule Take 4 mg by mouth in the morning and 4 mg at noon and 4 mg in the evening. Take with meals. [DISCONTINUED] amLODIPine (NORVASC) 10 MG tablet Take 10 mg by mouth nightly       fluticasone (FLONASE) 50 MCG/ACT nasal spray 1 spray by Nasal route nightly      lipase-protease-amylase (CREON) 17634-79574 units delayed release capsule Take 24,000 Units by mouth in the morning and 24,000 Units at noon and 24,000 Units in the evening. Take with meals.       Probiotic CAPS Take 1 capsule by mouth nightly       hyoscyamine (ANASPAZ;LEVSIN) 125 MCG tablet Take 125 mcg by mouth 4 times daily as needed (heartburn)      omeprazole (PRILOSEC) 20 MG delayed release capsule Take 40 mg by mouth every morning       aspirin 81 MG tablet Take 81 mg by mouth nightly       Cholecalciferol (VITAMIN D3) 50 MCG (2000 UT) TABS Take 2,000 Units by mouth 2 times daily       Ferrous Sulfate (IRON) 325 (65 Fe) MG TABS Take 325 mg by mouth nightly       [DISCONTINUED] Coenzyme Q10 (COQ10 PO) Take 1 capsule by mouth nightly       vitamin B-12 (CYANOCOBALAMIN) 500 MCG tablet Place 500 mcg under the tongue nightly       Omega 3 1000 MG CAPS Take 1,000 mg by mouth nightly      escitalopram (LEXAPRO) 20 MG tablet Take 20 mg by mouth nightly. primidone (MYSOLINE) 250 MG tablet Take 250 mg by mouth nightly       traZODone (DESYREL) 50 MG tablet Take 50 mg by mouth nightly          Allergies:    Fentanyl, Levofloxacin, Tramadol, and Vioxx [rofecoxib]    Social History:     reports that she has been smoking cigarettes. She has been smoking an average of 1 pack per day. She has never used smokeless tobacco. She reports that she does not currently use alcohol. She reports that she does not use drugs. Family History:     No family history on file.     Physical Exam:      Patient Vitals for the past 24 hrs:   BP Temp Temp src Pulse Resp SpO2 Weight   08/09/22 1032 (!) 100/48 97.2 °F (36.2 °C) Temporal 70 20 96 % --   08/09/22 1009 (!) 100/40 97.5 °F (36.4 °C) Temporal 73 20 100 % --   08/09/22 0932 -- -- -- -- 20 -- --   08/09/22 0721 (!) 102/41 97.4 °F (36.3 °C) -- 63 22 96 % --   08/09/22 0515 (!) 100/47 (!) 96.4 °F (35.8 °C) Temporal 64 20 99 % --   08/09/22 0451 -- -- -- -- -- -- 94 lb (42.6 kg)   08/09/22 0000 (!) 128/58 97.9 °F (36.6 °C) Axillary 100 18 100 % --   08/08/22 2233 -- -- -- 83 19 100 % --   08/08/22 2232 -- -- -- 85 27 100 % --   08/08/22 2231 -- -- -- 88 20 98 % --   08/08/22 2230 -- -- -- 85 22 98 % --   08/08/22 2000 (!) 100/55 -- -- 69 29 97 % --   08/08/22 1525 (!) 137/55 -- -- 69 24 -- --   08/08/22 1444 (!) 124/48 98.2 °F (36.8 °C) Oral 74 20 99 % --   08/08/22 1350 (!) 108/57 98.1 °F (36.7 °C) Oral 93 18 95 % --   08/08/22 1335 (!) 122/48 98 °F (36.7 °C) -- 90 18 96 % --   08/08/22 1158 (!) 129/59 -- -- 79 20 96 % --   08/08/22 1137 -- -- -- 68 14 -- --   08/08/22 1136 -- -- -- 71 16 -- --   08/08/22 1135 -- -- -- 73 20 -- --         Intake/Output Summary (Last 24 hours) at 8/9/2022 1058  Last data filed at 8/9/2022 0534  Gross per 24 hour   Intake 120 ml   Output 600 ml   Net -480 ml     General: Awake, alert, no acute distress  Neck: No JVD noted  Lungs: Clear bilaterally upper, diminished to the bases bilaterally. Unlabored  CV: Regular rate and rhythm. No rub  Abd: Soft, nontender, nondistended. Active bowel sounds; ostomy; PEG tube  Skin: Warm and dry. No rash on exposed extremities  Ext: No edema   Neuro: Awake, answers questions appropriately    Data:    Recent Labs     08/08/22  0339 08/08/22  1047 08/08/22  1829 08/09/22  0604   WBC 6.8 9.0  --  13.0*   HGB 6.0* 6.6* 7.1* 6.2*   HCT 19.2* 21.8* 22.0* 19.5*   .2* 104.8*  --  95.6    319  --  293       Recent Labs     08/08/22  1221 08/08/22 1829 08/09/22  0039 08/09/22  0604    132 130* 127*   K 5.7* 6.7* 6.1* 6.8*    102 99 96*   CO2 19* 20* 19* 19*   CREATININE 2.0* 2.2* 2.5* 2.7*   BUN 36* 40* 44* 47*   LABGLOM 24 21 19 17   GLUCOSE 139* 104* 113* 130*   CALCIUM 8.2* 7.6* 7.6* 7.3*   PHOS  --   --   --  5.2*   MG 1.6  --   --  1.5*       Vit D, 25-Hydroxy   Date Value Ref Range Status   08/01/2022 9 (L) 30 - 100 ng/mL Final     Comment:     <20 ng/mL. ........... Cloteal Hickman Deficient  20-30 ng/mL. ......... Cloteal Hickman Insufficient   ng/mL. ........ Cloteal Hickman Sufficient  >100 ng/mL. .......... Cloteal Hickman Toxic         PTH   Date Value Ref Range Status   07/15/2022 307 (H) 15 - 65 pg/mL Final       Recent Labs     08/08/22  0339 08/08/22  1047   ALT 7 9   AST 12 15   ALKPHOS 240* 267*   BILITOT 0.3 0.4       Recent Labs     08/08/22  0339 08/08/22  1047   LABALBU 2.4* 2.6*       Ferritin   Date Value Ref Range Status   08/08/2022 160 ng/mL Final     Comment:     FERRITIN Reference Ranges:  Adult Males   20 - 60 years:    30 - 400 ng/mL  Adult females 16 - 61 years:    15 - 150 ng/mL  Adults greater than 60 years:   no established reference range  Pediatrics:                     no established reference range       Iron   Date Value Ref Range Status   08/08/2022 97 37 - 145 mcg/dL Final     TIBC   Date Value Ref Range Status   08/08/2022 195 (L) 250 - 450 mcg/dL Final Vitamin B-12   Date Value Ref Range Status   08/08/2022 1764 (H) 211 - 946 pg/mL Final       Folate   Date Value Ref Range Status   08/08/2022 9.4 4.8 - 24.2 ng/mL Final       Lab Results   Component Value Date/Time    COLORU Yellow 08/08/2022 12:39 PM    NITRU Negative 08/08/2022 12:39 PM    GLUCOSEU Negative 08/08/2022 12:39 PM    KETUA Negative 08/08/2022 12:39 PM    UROBILINOGEN 0.2 08/08/2022 12:39 PM    BILIRUBINUR Negative 08/08/2022 12:39 PM       No results found for: ADEOLA, CREURRAN, MACREATRATIO, OSMOU    No components found for: URIC    No results found for: LIPIDPAN    Assessment and Plans:    KD Stage II on CKD 3b KD   likely secondary to decreased effective renal perfusion in the setting of severe anemia  Baseline serum cr 1.4-1.5mg/dl with an e-GFR=33-36ml/min  7/14 retroperitoneal US no hydro  UA showed clear yellow urine, spec grav 1.020, trace blood, 30 protein  Cr peaked today at 2.7  On IVF NS at 75 mL/hour  Check urine studies  Strict I&O, daily weights  Monitor labs     2. HTN with CKD I-IV  BP goal <120/80  BP at goal  Monitor BPs     3. Met Acidosis   in the setting of the KD/ CKD   Also with ileostomy   May need oral sodium bicarb  Monitor labs     4. Secondary hyperparathyroidism of Renal Origin   with Hyperphosphatemia and Unspecified Vit D Def   on 7/15;  PO4 5.2 on 8/9,  Vit D 19 on 8/1   On vitamin D supplementation  Monitor labs     5. Hypomagnesemia  Mg++ 1.5 today  For magnesium supplementation  Check magnesium in the a.m. Monitor labs     6.  Hyperkalemia  likely due to diet an KD/CKD  For potassium medical management today  Check potassium in the afternoon  Low potassium diet when able to eat  Monitor labs     7.  anemia  Possible GI bleed  Hemoglobin 6 on 8/8--> hemoglobin 6.2 today  S/p PRBCs 8/8, for PRBCs today   Transfuse for hemoglobin<7  On ROSA  General surgery following-possible EGD  Monitor labs     Yuli Carmona, APRN - CNP    Pt seen and examined agree with above   at bedside  Hyperkalemia protocol today  Repeat dustin Austin MD

## 2022-08-09 NOTE — PROGRESS NOTES
Second attempt to call/text sent IV team regarding possible aid with obtaining access this evening-first notified via perfect serve by charge at approx 2:45 pm.

## 2022-08-09 NOTE — PROGRESS NOTES
Increased abdominal cramping with redder drainage from ileostomy. Dr Nay Pittman notified. New order for consult to [place].

## 2022-08-10 ENCOUNTER — APPOINTMENT (OUTPATIENT)
Dept: ULTRASOUND IMAGING | Age: 82
DRG: 377 | End: 2022-08-10
Payer: MEDICARE

## 2022-08-10 LAB
ADENOVIRUS BY PCR: NOT DETECTED
ALBUMIN SERPL-MCNC: 2 G/DL (ref 3.5–5.2)
ALP BLD-CCNC: 187 U/L (ref 35–104)
ALT SERPL-CCNC: 12 U/L (ref 0–32)
ANION GAP SERPL CALCULATED.3IONS-SCNC: 14 MMOL/L (ref 7–16)
ANION GAP SERPL CALCULATED.3IONS-SCNC: 15 MMOL/L (ref 7–16)
ANION GAP SERPL CALCULATED.3IONS-SCNC: 16 MMOL/L (ref 7–16)
ANION GAP SERPL CALCULATED.3IONS-SCNC: 17 MMOL/L (ref 7–16)
ANION GAP SERPL CALCULATED.3IONS-SCNC: 17 MMOL/L (ref 7–16)
ANION GAP SERPL CALCULATED.3IONS-SCNC: 18 MMOL/L (ref 7–16)
AST SERPL-CCNC: 22 U/L (ref 0–31)
B.E.: 0.6 MMOL/L (ref -3–3)
BILIRUB SERPL-MCNC: 0.6 MG/DL (ref 0–1.2)
BORDETELLA PARAPERTUSSIS BY PCR: NOT DETECTED
BORDETELLA PERTUSSIS BY PCR: NOT DETECTED
BUN BLDV-MCNC: 53 MG/DL (ref 6–23)
BUN BLDV-MCNC: 55 MG/DL (ref 6–23)
BUN BLDV-MCNC: 57 MG/DL (ref 6–23)
BUN BLDV-MCNC: 58 MG/DL (ref 6–23)
CALCIUM SERPL-MCNC: 6.6 MG/DL (ref 8.6–10.2)
CALCIUM SERPL-MCNC: 6.8 MG/DL (ref 8.6–10.2)
CALCIUM SERPL-MCNC: 7.1 MG/DL (ref 8.6–10.2)
CALCIUM SERPL-MCNC: 7.5 MG/DL (ref 8.6–10.2)
CHLAMYDOPHILIA PNEUMONIAE BY PCR: NOT DETECTED
CHLORIDE BLD-SCNC: 87 MMOL/L (ref 98–107)
CHLORIDE BLD-SCNC: 92 MMOL/L (ref 98–107)
CHLORIDE BLD-SCNC: 94 MMOL/L (ref 98–107)
CHLORIDE BLD-SCNC: 95 MMOL/L (ref 98–107)
CO2: 18 MMOL/L (ref 22–29)
CO2: 20 MMOL/L (ref 22–29)
CO2: 21 MMOL/L (ref 22–29)
CO2: 22 MMOL/L (ref 22–29)
CO2: 24 MMOL/L (ref 22–29)
CO2: 25 MMOL/L (ref 22–29)
COHB: 0.9 % (ref 0–1.5)
CORONAVIRUS 229E BY PCR: NOT DETECTED
CORONAVIRUS HKU1 BY PCR: NOT DETECTED
CORONAVIRUS NL63 BY PCR: NOT DETECTED
CORONAVIRUS OC43 BY PCR: NOT DETECTED
CREAT SERPL-MCNC: 3.5 MG/DL (ref 0.5–1)
CREAT SERPL-MCNC: 3.6 MG/DL (ref 0.5–1)
CREAT SERPL-MCNC: 3.7 MG/DL (ref 0.5–1)
CREAT SERPL-MCNC: 3.8 MG/DL (ref 0.5–1)
CRITICAL: ABNORMAL
DATE ANALYZED: ABNORMAL
DATE OF COLLECTION: ABNORMAL
GFR AFRICAN AMERICAN: 14
GFR AFRICAN AMERICAN: 15
GFR AFRICAN AMERICAN: 15
GFR NON-AFRICAN AMERICAN: 11 ML/MIN/1.73
GFR NON-AFRICAN AMERICAN: 12 ML/MIN/1.73
GFR NON-AFRICAN AMERICAN: 12 ML/MIN/1.73
GFR NON-AFRICAN AMERICAN: 13 ML/MIN/1.73
GLUCOSE BLD-MCNC: 113 MG/DL (ref 74–99)
GLUCOSE BLD-MCNC: 143 MG/DL (ref 74–99)
GLUCOSE BLD-MCNC: 148 MG/DL (ref 74–99)
GLUCOSE BLD-MCNC: 148 MG/DL (ref 74–99)
GLUCOSE BLD-MCNC: 273 MG/DL (ref 74–99)
GLUCOSE BLD-MCNC: 342 MG/DL (ref 74–99)
HCO3: 24.6 MMOL/L (ref 22–26)
HCT VFR BLD CALC: 23.7 % (ref 34–48)
HEMOGLOBIN: 7.9 G/DL (ref 11.5–15.5)
HHB: 1.9 % (ref 0–5)
HUMAN METAPNEUMOVIRUS BY PCR: NOT DETECTED
HUMAN RHINOVIRUS/ENTEROVIRUS BY PCR: NOT DETECTED
INFLUENZA A BY PCR: NOT DETECTED
INFLUENZA B BY PCR: NOT DETECTED
LAB: ABNORMAL
LACTIC ACID: 4.7 MMOL/L (ref 0.5–2.2)
LACTIC ACID: 7.3 MMOL/L (ref 0.5–2.2)
LIPASE: 64 U/L (ref 13–60)
Lab: ABNORMAL
MAGNESIUM: 1.7 MG/DL (ref 1.6–2.6)
MCH RBC QN AUTO: 30.2 PG (ref 26–35)
MCHC RBC AUTO-ENTMCNC: 33.3 % (ref 32–34.5)
MCV RBC AUTO: 90.5 FL (ref 80–99.9)
METER GLUCOSE: 112 MG/DL (ref 74–99)
METER GLUCOSE: 229 MG/DL (ref 74–99)
METER GLUCOSE: 267 MG/DL (ref 74–99)
METHB: 0.4 % (ref 0–1.5)
MODE: ABNORMAL
MYCOPLASMA PNEUMONIAE BY PCR: NOT DETECTED
O2 SATURATION: 98.3 % (ref 92–98.5)
O2HB: 96.8 % (ref 94–97)
OPERATOR ID: 2860
ORGANISM: ABNORMAL
PARAINFLUENZA VIRUS 1 BY PCR: NOT DETECTED
PARAINFLUENZA VIRUS 2 BY PCR: NOT DETECTED
PARAINFLUENZA VIRUS 3 BY PCR: NOT DETECTED
PARAINFLUENZA VIRUS 4 BY PCR: NOT DETECTED
PATIENT TEMP: 37 C
PCO2: 36.7 MMHG (ref 35–45)
PDW BLD-RTO: 19.9 FL (ref 11.5–15)
PH BLOOD GAS: 7.45 (ref 7.35–7.45)
PLATELET # BLD: 271 E9/L (ref 130–450)
PMV BLD AUTO: 11.9 FL (ref 7–12)
PO2: 112.2 MMHG (ref 75–100)
POTASSIUM REFLEX MAGNESIUM: 5.5 MMOL/L (ref 3.5–5)
POTASSIUM REFLEX MAGNESIUM: 5.6 MMOL/L (ref 3.5–5)
POTASSIUM REFLEX MAGNESIUM: 5.9 MMOL/L (ref 3.5–5)
POTASSIUM SERPL-SCNC: 5.4 MMOL/L (ref 3.5–5)
POTASSIUM SERPL-SCNC: 5.8 MMOL/L (ref 3.5–5)
POTASSIUM SERPL-SCNC: 5.92 MMOL/L (ref 3.5–5)
POTASSIUM SERPL-SCNC: 7 MMOL/L (ref 3.5–5)
POTASSIUM SERPL-SCNC: 7.3 MMOL/L (ref 3.5–5)
PROCALCITONIN: 1.97 NG/ML (ref 0–0.08)
RBC # BLD: 2.62 E12/L (ref 3.5–5.5)
REASON FOR REJECTION: NORMAL
REJECTED TEST: NORMAL
RESPIRATORY SYNCYTIAL VIRUS BY PCR: NOT DETECTED
SARS-COV-2, PCR: NOT DETECTED
SODIUM BLD-SCNC: 126 MMOL/L (ref 132–146)
SODIUM BLD-SCNC: 128 MMOL/L (ref 132–146)
SODIUM BLD-SCNC: 130 MMOL/L (ref 132–146)
SODIUM BLD-SCNC: 130 MMOL/L (ref 132–146)
SODIUM BLD-SCNC: 131 MMOL/L (ref 132–146)
SODIUM BLD-SCNC: 134 MMOL/L (ref 132–146)
SOURCE, BLOOD GAS: ABNORMAL
THB: 7.6 G/DL (ref 11.5–16.5)
TIME ANALYZED: 1111
TOTAL PROTEIN: 4.5 G/DL (ref 6.4–8.3)
URINE CULTURE, ROUTINE: ABNORMAL
URINE CULTURE, ROUTINE: ABNORMAL
WBC # BLD: 25.3 E9/L (ref 4.5–11.5)

## 2022-08-10 PROCEDURE — 87040 BLOOD CULTURE FOR BACTERIA: CPT

## 2022-08-10 PROCEDURE — 93005 ELECTROCARDIOGRAM TRACING: CPT

## 2022-08-10 PROCEDURE — 76770 US EXAM ABDO BACK WALL COMP: CPT

## 2022-08-10 PROCEDURE — 36415 COLL VENOUS BLD VENIPUNCTURE: CPT

## 2022-08-10 PROCEDURE — 36620 INSERTION CATHETER ARTERY: CPT

## 2022-08-10 PROCEDURE — 04HY32Z INSERTION OF MONITORING DEVICE INTO LOWER ARTERY, PERCUTANEOUS APPROACH: ICD-10-PCS | Performed by: FAMILY MEDICINE

## 2022-08-10 PROCEDURE — 84132 ASSAY OF SERUM POTASSIUM: CPT

## 2022-08-10 PROCEDURE — 83605 ASSAY OF LACTIC ACID: CPT

## 2022-08-10 PROCEDURE — 6370000000 HC RX 637 (ALT 250 FOR IP)

## 2022-08-10 PROCEDURE — 6360000002 HC RX W HCPCS

## 2022-08-10 PROCEDURE — 2580000003 HC RX 258: Performed by: SURGERY

## 2022-08-10 PROCEDURE — C9113 INJ PANTOPRAZOLE SODIUM, VIA: HCPCS | Performed by: SURGERY

## 2022-08-10 PROCEDURE — 2580000003 HC RX 258: Performed by: INTERNAL MEDICINE

## 2022-08-10 PROCEDURE — A4216 STERILE WATER/SALINE, 10 ML: HCPCS | Performed by: SURGERY

## 2022-08-10 PROCEDURE — 85027 COMPLETE CBC AUTOMATED: CPT

## 2022-08-10 PROCEDURE — 6360000002 HC RX W HCPCS: Performed by: INTERNAL MEDICINE

## 2022-08-10 PROCEDURE — 80053 COMPREHEN METABOLIC PANEL: CPT

## 2022-08-10 PROCEDURE — 6370000000 HC RX 637 (ALT 250 FOR IP): Performed by: SURGERY

## 2022-08-10 PROCEDURE — 2580000003 HC RX 258: Performed by: STUDENT IN AN ORGANIZED HEALTH CARE EDUCATION/TRAINING PROGRAM

## 2022-08-10 PROCEDURE — 94640 AIRWAY INHALATION TREATMENT: CPT

## 2022-08-10 PROCEDURE — 2700000000 HC OXYGEN THERAPY PER DAY

## 2022-08-10 PROCEDURE — 6360000002 HC RX W HCPCS: Performed by: FAMILY MEDICINE

## 2022-08-10 PROCEDURE — 2500000003 HC RX 250 WO HCPCS

## 2022-08-10 PROCEDURE — 6360000002 HC RX W HCPCS: Performed by: SURGERY

## 2022-08-10 PROCEDURE — 6370000000 HC RX 637 (ALT 250 FOR IP): Performed by: STUDENT IN AN ORGANIZED HEALTH CARE EDUCATION/TRAINING PROGRAM

## 2022-08-10 PROCEDURE — 99223 1ST HOSP IP/OBS HIGH 75: CPT | Performed by: INTERNAL MEDICINE

## 2022-08-10 PROCEDURE — 2580000003 HC RX 258

## 2022-08-10 PROCEDURE — 2000000000 HC ICU R&B

## 2022-08-10 PROCEDURE — 80048 BASIC METABOLIC PNL TOTAL CA: CPT

## 2022-08-10 PROCEDURE — 82962 GLUCOSE BLOOD TEST: CPT

## 2022-08-10 PROCEDURE — 84145 PROCALCITONIN (PCT): CPT

## 2022-08-10 PROCEDURE — 6370000000 HC RX 637 (ALT 250 FOR IP): Performed by: INTERNAL MEDICINE

## 2022-08-10 PROCEDURE — 6370000000 HC RX 637 (ALT 250 FOR IP): Performed by: FAMILY MEDICINE

## 2022-08-10 PROCEDURE — 0202U NFCT DS 22 TRGT SARS-COV-2: CPT

## 2022-08-10 PROCEDURE — 83735 ASSAY OF MAGNESIUM: CPT

## 2022-08-10 PROCEDURE — 82805 BLOOD GASES W/O2 SATURATION: CPT

## 2022-08-10 PROCEDURE — 83690 ASSAY OF LIPASE: CPT

## 2022-08-10 RX ORDER — LIDOCAINE HYDROCHLORIDE 20 MG/ML
INJECTION, SOLUTION INTRAVENOUS
Status: DISPENSED
Start: 2022-08-10 | End: 2022-08-11

## 2022-08-10 RX ORDER — ALBUTEROL SULFATE 2.5 MG/3ML
10 SOLUTION RESPIRATORY (INHALATION) ONCE
Status: DISCONTINUED | OUTPATIENT
Start: 2022-08-10 | End: 2022-08-10

## 2022-08-10 RX ORDER — ALBUTEROL SULFATE 2.5 MG/3ML
10 SOLUTION RESPIRATORY (INHALATION) ONCE
Status: COMPLETED | OUTPATIENT
Start: 2022-08-10 | End: 2022-08-10

## 2022-08-10 RX ORDER — LIDOCAINE HYDROCHLORIDE 10 MG/ML
INJECTION, SOLUTION EPIDURAL; INFILTRATION; INTRACAUDAL; PERINEURAL
Status: DISPENSED
Start: 2022-08-10 | End: 2022-08-11

## 2022-08-10 RX ORDER — FLUCONAZOLE 100 MG/1
200 TABLET ORAL DAILY
Status: DISCONTINUED | OUTPATIENT
Start: 2022-08-10 | End: 2022-08-15

## 2022-08-10 RX ORDER — ALBUMIN (HUMAN) 12.5 G/50ML
25 SOLUTION INTRAVENOUS EVERY 8 HOURS
Status: DISCONTINUED | OUTPATIENT
Start: 2022-08-10 | End: 2022-08-10

## 2022-08-10 RX ORDER — 0.9 % SODIUM CHLORIDE 0.9 %
INTRAVENOUS SOLUTION INTRAVENOUS CONTINUOUS PRN
Status: COMPLETED | OUTPATIENT
Start: 2022-08-10 | End: 2022-08-10

## 2022-08-10 RX ORDER — SODIUM CHLORIDE 9 MG/ML
INJECTION, SOLUTION INTRAVENOUS CONTINUOUS
Status: DISCONTINUED | OUTPATIENT
Start: 2022-08-10 | End: 2022-08-11

## 2022-08-10 RX ORDER — DEXTROSE MONOHYDRATE 25 G/50ML
50 INJECTION, SOLUTION INTRAVENOUS ONCE
Status: COMPLETED | OUTPATIENT
Start: 2022-08-10 | End: 2022-08-10

## 2022-08-10 RX ORDER — DEXTROSE MONOHYDRATE 100 MG/ML
INJECTION, SOLUTION INTRAVENOUS CONTINUOUS PRN
Status: DISCONTINUED | OUTPATIENT
Start: 2022-08-10 | End: 2022-08-18

## 2022-08-10 RX ORDER — 0.9 % SODIUM CHLORIDE 0.9 %
850 INTRAVENOUS SOLUTION INTRAVENOUS ONCE
Status: DISCONTINUED | OUTPATIENT
Start: 2022-08-10 | End: 2022-08-10 | Stop reason: SDUPTHER

## 2022-08-10 RX ORDER — SODIUM POLYSTYRENE SULFONATE 15 G/60ML
15 SUSPENSION ORAL; RECTAL ONCE
Status: DISCONTINUED | OUTPATIENT
Start: 2022-08-10 | End: 2022-08-14

## 2022-08-10 RX ADMIN — SODIUM CHLORIDE, PRESERVATIVE FREE 40 MG: 5 INJECTION INTRAVENOUS at 21:34

## 2022-08-10 RX ADMIN — DEXTROSE MONOHYDRATE 250 ML: 100 INJECTION, SOLUTION INTRAVENOUS at 12:31

## 2022-08-10 RX ADMIN — Medication 1 CAPSULE: at 21:34

## 2022-08-10 RX ADMIN — DIPHENOXYLATE HYDROCHLORIDE AND ATROPINE SULFATE 1 TABLET: 2.5; .025 TABLET ORAL at 21:33

## 2022-08-10 RX ADMIN — ALBUTEROL SULFATE 2.5 MG: 2.5 SOLUTION RESPIRATORY (INHALATION) at 11:22

## 2022-08-10 RX ADMIN — FLUTICASONE PROPIONATE 1 SPRAY: 50 SPRAY, METERED NASAL at 21:35

## 2022-08-10 RX ADMIN — OXYCODONE AND ACETAMINOPHEN 1 TABLET: 5; 325 TABLET ORAL at 05:23

## 2022-08-10 RX ADMIN — ALBUTEROL SULFATE 10 MG: 2.5 SOLUTION RESPIRATORY (INHALATION) at 11:29

## 2022-08-10 RX ADMIN — SODIUM BICARBONATE 50 MEQ: 84 INJECTION, SOLUTION INTRAVENOUS at 10:14

## 2022-08-10 RX ADMIN — SUCRALFATE 1 G: 1 TABLET ORAL at 05:20

## 2022-08-10 RX ADMIN — Medication 2000 UNITS: at 21:33

## 2022-08-10 RX ADMIN — EPOETIN ALFA-EPBX 1280 UNITS: 2000 INJECTION, SOLUTION INTRAVENOUS; SUBCUTANEOUS at 10:25

## 2022-08-10 RX ADMIN — CALCIUM GLUCONATE 1000 MG: 98 INJECTION, SOLUTION INTRAVENOUS at 10:19

## 2022-08-10 RX ADMIN — INSULIN HUMAN 10 UNITS: 100 INJECTION, SOLUTION PARENTERAL at 16:01

## 2022-08-10 RX ADMIN — INSULIN HUMAN 10 UNITS: 100 INJECTION, SOLUTION PARENTERAL at 13:02

## 2022-08-10 RX ADMIN — SODIUM ZIRCONIUM CYCLOSILICATE 10 G: 10 POWDER, FOR SUSPENSION ORAL at 11:08

## 2022-08-10 RX ADMIN — CALCIUM CARBONATE 500 MG: 500 TABLET, CHEWABLE ORAL at 21:32

## 2022-08-10 RX ADMIN — PRIMIDONE 250 MG: 250 TABLET ORAL at 21:33

## 2022-08-10 RX ADMIN — DIPHENOXYLATE HYDROCHLORIDE AND ATROPINE SULFATE 1 TABLET: 2.5; .025 TABLET ORAL at 18:42

## 2022-08-10 RX ADMIN — DEXTROSE MONOHYDRATE 250 ML: 100 INJECTION, SOLUTION INTRAVENOUS at 16:02

## 2022-08-10 RX ADMIN — CYANOCOBALAMIN TAB 1000 MCG 500 MCG: 1000 TAB at 21:32

## 2022-08-10 RX ADMIN — SUCRALFATE 1 G: 1 TABLET ORAL at 23:54

## 2022-08-10 RX ADMIN — DIPHENOXYLATE HYDROCHLORIDE AND ATROPINE SULFATE 1 TABLET: 2.5; .025 TABLET ORAL at 05:20

## 2022-08-10 RX ADMIN — ESCITALOPRAM OXALATE 20 MG: 10 TABLET ORAL at 21:32

## 2022-08-10 RX ADMIN — SUCRALFATE 1 G: 1 TABLET ORAL at 01:12

## 2022-08-10 RX ADMIN — FLUCONAZOLE 200 MG: 100 TABLET ORAL at 17:42

## 2022-08-10 RX ADMIN — PANCRELIPASE 24000 UNITS: 60000; 12000; 38000 CAPSULE, DELAYED RELEASE PELLETS ORAL at 10:22

## 2022-08-10 RX ADMIN — Medication 400 MG: at 21:33

## 2022-08-10 RX ADMIN — SODIUM CHLORIDE 500 ML: 9 INJECTION, SOLUTION INTRAVENOUS at 11:22

## 2022-08-10 RX ADMIN — HYDROMORPHONE HYDROCHLORIDE 0.5 MG: 1 INJECTION, SOLUTION INTRAMUSCULAR; INTRAVENOUS; SUBCUTANEOUS at 06:21

## 2022-08-10 RX ADMIN — SODIUM ZIRCONIUM CYCLOSILICATE 10 G: 10 POWDER, FOR SUSPENSION ORAL at 23:45

## 2022-08-10 RX ADMIN — INSULIN HUMAN 10 UNITS: 100 INJECTION, SOLUTION PARENTERAL at 23:26

## 2022-08-10 RX ADMIN — DEXTROSE MONOHYDRATE 50 ML: 25 INJECTION, SOLUTION INTRAVENOUS at 10:20

## 2022-08-10 RX ADMIN — SODIUM CHLORIDE, PRESERVATIVE FREE 40 MG: 5 INJECTION INTRAVENOUS at 05:20

## 2022-08-10 RX ADMIN — SUCRALFATE 1 G: 1 TABLET ORAL at 18:43

## 2022-08-10 RX ADMIN — LOPERAMIDE HYDROCHLORIDE 4 MG: 2 CAPSULE ORAL at 10:23

## 2022-08-10 RX ADMIN — SODIUM CHLORIDE: 9 INJECTION, SOLUTION INTRAVENOUS at 23:45

## 2022-08-10 RX ADMIN — FERROUS SULFATE TAB 325 MG (65 MG ELEMENTAL FE) 325 MG: 325 (65 FE) TAB at 21:33

## 2022-08-10 RX ADMIN — INSULIN HUMAN 10 UNITS: 100 INJECTION, SOLUTION PARENTERAL at 10:19

## 2022-08-10 RX ADMIN — DEXTROSE MONOHYDRATE 250 ML: 100 INJECTION, SOLUTION INTRAVENOUS at 23:28

## 2022-08-10 RX ADMIN — PIPERACILLIN AND TAZOBACTAM 4500 MG: 4; .5 INJECTION, POWDER, FOR SOLUTION INTRAVENOUS at 18:44

## 2022-08-10 RX ADMIN — TRAZODONE HYDROCHLORIDE 50 MG: 50 TABLET ORAL at 21:32

## 2022-08-10 RX ADMIN — PANCRELIPASE 24000 UNITS: 60000; 12000; 38000 CAPSULE, DELAYED RELEASE PELLETS ORAL at 18:42

## 2022-08-10 RX ADMIN — ONDANSETRON 4 MG: 4 TABLET, ORALLY DISINTEGRATING ORAL at 22:28

## 2022-08-10 RX ADMIN — VANCOMYCIN HYDROCHLORIDE 1000 MG: 1 INJECTION, POWDER, LYOPHILIZED, FOR SOLUTION INTRAVENOUS at 17:41

## 2022-08-10 RX ADMIN — PRAVASTATIN SODIUM 40 MG: 20 TABLET ORAL at 21:33

## 2022-08-10 RX ADMIN — HYDROMORPHONE HYDROCHLORIDE 0.5 MG: 1 INJECTION, SOLUTION INTRAMUSCULAR; INTRAVENOUS; SUBCUTANEOUS at 10:12

## 2022-08-10 RX ADMIN — LOPERAMIDE HYDROCHLORIDE 4 MG: 2 CAPSULE ORAL at 18:42

## 2022-08-10 ASSESSMENT — PAIN DESCRIPTION - LOCATION
LOCATION: ABDOMEN
LOCATION: ABDOMEN

## 2022-08-10 ASSESSMENT — PAIN SCALES - GENERAL
PAINLEVEL_OUTOF10: 0
PAINLEVEL_OUTOF10: 8
PAINLEVEL_OUTOF10: 0
PAINLEVEL_OUTOF10: 6
PAINLEVEL_OUTOF10: 0

## 2022-08-10 ASSESSMENT — PAIN DESCRIPTION - ORIENTATION
ORIENTATION: MID;LOWER
ORIENTATION: MID;LOWER

## 2022-08-10 ASSESSMENT — ENCOUNTER SYMPTOMS
COUGH: 0
NAUSEA: 0
CHEST TIGHTNESS: 0
DIARRHEA: 0
VOMITING: 0
CONSTIPATION: 0
PHOTOPHOBIA: 0
ABDOMINAL PAIN: 1
SHORTNESS OF BREATH: 0

## 2022-08-10 ASSESSMENT — PAIN DESCRIPTION - DESCRIPTORS
DESCRIPTORS: ACHING;DISCOMFORT;SHARP
DESCRIPTORS: ACHING;DISCOMFORT;SHARP

## 2022-08-10 NOTE — CODE DOCUMENTATION
RRT called    Rapid Response Team Note    Date of event: 8/10/2022   Time of event: [address] [de-identified]y.o. year old female   YOB: 1940   Admit date:  8/8/2022   Location: /36-A   Witnessed? : [x]Yes  [] No  Monitored? : [x]Yes  [] No  Code status: [x] Full  [] DNR-CCA  []DNR-CC  ______________________________________________________________________  Reason for RRT:    [] RR < 8     [] RR > 28   [x] SpO2 <90%   [] HR < 40 bpm   [] HR > 130 bpm  [x] SBP < 90 mmHg    [] LOC    [] Seizures   [] Significant Bleeding Event    [x] Other: hyperkalemia   Altered mentation   Hypotensive 85/92    Subjective:   RRT called for hypotension   Upon arrival the pt was awake      Objective:   Vital signs: BP:85/92     /RR:    /HR:     /Temp:    Initial Condition:  Conscious   [x] Yes  [] No     Breathing [x] Yes  [] No     Pulse  [x] Yes  [] No    Airway:   [x] Open/ Clear     Intervention: [] None  [] Pooled secretions     [] Suctioned  [] Stridor      [] Intubation    Lungs:   [x] Symmetrical chest rise/ CTABL Intervention: [] None  [] Use of accessory muscles    [] NIV (CPAP/BiPAP)  [] Cyanosis      [x] Nasal Oxygen/Mask  [] Wheezing       [x] ABG             [] CXR  [] Other:     Circulation:  12 lead ekg ordered Rhythm:  [x] Sinus [] Other:   Intervention: [] None            [] IV Access  [x] Peripheral              [] Central            [x] EKG            [] Cardioversion            [] Defibrillation     Capillary Refill:  [] > 2 seconds [x] < 2 seconds    Neurologic:   [] NIHSS      [] Pupillary Response:     Response to pain:   [x] Yes  [] No  Follow commands:  [x] Yes  [] No  Facial asymmetry:  [x] Yes  [] No  Motor strength:  [] Equal  [] Focal deficit:   Diagnostic Test:  Blood Sugar:  na mg/dL  EKG:      ABG:      Lab Results   Component Value Date/Time    PH 7.445 08/10/2022 11:11 AM    PCO2 36.7 08/10/2022 11:11 AM    PO2 112.2 08/10/2022 11:11 AM    HCO3 24.6 08/10/2022 11:11 AM

## 2022-08-10 NOTE — PROGRESS NOTES
[facility]  Nephrology Progress Note    Patient's Name: Rosanna Page    History of Present Illness from 8/8 Consult Note:    Prasanna Leary is an [de-identified]year old female we were asked to see regarding KD. She is followed longitudinally in the office with Dr. Gaby Ray. she was last seen in the office in April of this year and at that time her baseline creatinine was noted to be 1.4-1.5 mg/dl with an e-GFR of 33-36 ml/min. She now presents to ED from local nursing home for abnormal lab work, primarily hyperkalemia. Vitals upon arrival included blood pressure 89/47, pulse 92, respirations 18 and temperature 98.0. Initial BMP revealed sodium 134, potassium 6.4, chloride 102, CO2 21, BUN 36 and creatinine 1.9. Initial CBC revealed WBCs 9.0, hemoglobin 6.6, hematocrit 21.8 and platelet count 873. Initial UA showed clear yellow urine, 1.020, trace blood, trace protein. Patient now examined in the ED setting. She is awake alert and oriented. Currently with 1 unit PRBC infusing. Of note she was just discharged from this facility 1 week ago after being treated for enteritis, hypotension and pneumonia. She has been actively recovering at [facility]. Currently she denies any chest pain or shortness of breath at rest.  She does endorse generalized weakness. Currently without nausea, vomiting or diarrhea. She also denies any specific urinary complaints. \"    Subjective:    8/10: Patient was seen and examined. She reports abdominal pain today, but denies any nausea or vomiting. She denies any chest pain or shortness of breath. She was transferred to icu for abd pain and dropping blood pressure. Seen in icu with dr Siddhartha Mata. Receiving 1 L bolus of ns. Sbp in 90. Farrukh no hydro. Repeat k 5.8 and hyperkalemia protocol ordered.      PMH:    Past Medical History:   Diagnosis Date    Acute kidney failure ([address] 2014 and 8/2016    x2,no dialysis needed,resolved, kidney function tests returned to normal    Anxiety     Arthritis     CAD (coronary artery disease)     follows with Dr. Arleen Cline every 6 months    [facility]cer [facility])     skin, leg,nose- removed surgically     COPD (chronic obstructive pulmonary disease) ([address]     mild- no inhlaers, no oxygen     Depression     Fibromyalgia     chronic back and neck pain    Generalized headaches     h/o / daily    History of blood transfusion 3-11-14    multiple times    History of necrotic bowel 2012    Hyperlipidemia     Hypertension     Incisional hernia     abdomen    Insomnia     Mitral valve regurgitation     Myocardial infarct ([address] 2002    Occasional tremors     at hands / stable with medication    Osteopenia     PONV (postoperative nausea and vomiting)     Vitamin B12 deficiency     h/o       Patient Active Problem List   Diagnosis    Right cataract    Essential hypertension, benign    Hyperlipidemia with target LDL less than 100    Osteoarthritis, shoulder    Primary osteoarthritis of left knee    History of ischemic bowel disease    Ileostomy present ([address]    Chronic kidney disease, stage III (moderate) (HCC)    COPD (chronic obstructive pulmonary disease) ([address]    Moderate protein-calorie malnutrition ([address]    Abdominal wall cellulitis    Cellulitis    Infected hernioplasty mesh (HCC)    Enterocutaneous fistula    Mild protein-calorie malnutrition ([address]    Acute kidney injury ([address]    Hypertensive kidney disease with chronic kidney disease stage IV (HCC)    KD (acute kidney injury) ([address]    Diarrhea    Elevated serum creatinine    Abnormal serum creatinine level    Hyperlipemia    Primary hypertension    Essential (primary) hypertension    Postsurgical malabsorption, not elsewhere classified    Hypercholesterolemia    Pure hypercholesterolemia    Prediabetes    Hypothyroidism, unspecified    Acute renal failure (ARF) (HCC)    Enteritis    Hypotension    Abnormal CT scan, lumbar spine    Severe protein-calorie malnutrition (HCC)    Hyperkalemia       Meds: needed for Constipation      senna (SENOKOT) 8.6 MG tablet Take 1 tablet by mouth daily as needed for Constipation      acetaminophen (TYLENOL) 325 MG tablet Take 650 mg by mouth every 4 hours as needed for Pain      vitamin C (ASCORBIC ACID) 500 MG tablet Take 500 mg by mouth in the morning and 500 mg before bedtime. ondansetron (ZOFRAN) 4 MG tablet Take 4 mg by mouth every 8 hours as needed for Nausea or Vomiting      apixaban (ELIQUIS) 5 MG TABS tablet Take 1 tablet by mouth in the morning and 1 tablet before bedtime. 60 tablet 0    lipase-protease-amylase (CREON) 00799-69019 units delayed release capsule Take 12,000 Units by mouth 2 times daily as needed (diarrhea)      Multiple Vitamins-Minerals (PRESERVISION AREDS) CAPS Take 1 capsule by mouth 2 times daily      Magnesium Oxide (MAGNESIUM-OXIDE) 250 MG TABS tablet Take 250 mg by mouth 2 times daily      METAMUCIL FIBER PO Take 1 packet by mouth 2 times daily      diphenoxylate-atropine (LOMOTIL) 2.5-0.025 MG per tablet Take 1 tablet by mouth 4 times daily (before meals and nightly). loperamide (IMODIUM) 2 MG capsule Take 4 mg by mouth in the morning and 4 mg at noon and 4 mg in the evening. Take with meals. [DISCONTINUED] amLODIPine (NORVASC) 10 MG tablet Take 10 mg by mouth nightly       fluticasone (FLONASE) 50 MCG/ACT nasal spray 1 spray by Nasal route nightly      lipase-protease-amylase (CREON) 79778-30246 units delayed release capsule Take 24,000 Units by mouth in the morning and 24,000 Units at noon and 24,000 Units in the evening. Take with meals.       Probiotic CAPS Take 1 capsule by mouth nightly       hyoscyamine (ANASPAZ;LEVSIN) 125 MCG tablet Take 125 mcg by mouth 4 times daily as needed (heartburn)      omeprazole (PRILOSEC) 20 MG delayed release capsule Take 40 mg by mouth every morning       aspirin 81 MG tablet Take 81 mg by mouth nightly       Cholecalciferol (VITAMIN D3) 50 MCG (2000 UT) TABS Take 2,000 Units by mouth 2 times daily       Ferrous Sulfate (IRON) 325 (65 Fe) MG TABS Take 325 mg by mouth nightly       [DISCONTINUED] Coenzyme Q10 (COQ10 PO) Take 1 capsule by mouth nightly       vitamin B-12 (CYANOCOBALAMIN) 500 MCG tablet Place 500 mcg under the tongue nightly       [place] 3 1000 MG CAPS Take 1,000 mg by mouth nightly      escitalopram (LEXAPRO) 20 MG tablet Take 20 mg by mouth nightly. primidone (MYSOLINE) 250 MG tablet Take 250 mg by mouth nightly       traZODone (DESYREL) 50 MG tablet Take 50 mg by mouth nightly          Allergies:    Fentanyl, Levofloxacin, Tramadol, and Vioxx [rofecoxib]    Social History:     reports that she has been smoking cigarettes. She has been smoking an average of 1 pack per day. She has never used smokeless tobacco. She reports that she does not currently use alcohol. She reports that she does not use drugs. Family History:     No family history on file.     Physical Exam:      Patient Vitals for the past 24 hrs:   BP Temp Temp src Pulse Resp SpO2 Weight   08/10/22 0727 (!) 145/42 97.4 °F (36.3 °C) Oral 82 17 -- --   08/10/22 0553 -- -- -- -- 20 -- --   08/09/22 2335 -- -- -- -- -- -- 101 lb (45.8 kg)   08/09/22 2331 -- -- -- -- 20 -- --   08/09/22 2000 (!) 111/51 97.1 °F (36.2 °C) Temporal 90 20 96 % --   08/09/22 1930 (!) 108/32 96.8 °F (36 °C) Temporal 78 (!) 36 98 % --   08/09/22 1500 (!) 123/54 97.7 °F (36.5 °C) Oral 73 19 -- --   08/09/22 1349 137/63 97.8 °F (36.6 °C) -- 73 18 100 % --   08/09/22 1300 106/60 97.5 °F (36.4 °C) -- 74 18 97 % --   08/09/22 1246 106/60 97.5 °F (36.4 °C) Temporal 74 20 97 % --   08/09/22 1032 (!) 100/48 97.2 °F (36.2 °C) Temporal 70 20 96 % --   08/09/22 1030 (!) 100/48 97.2 °F (36.2 °C) -- 70 20 96 % --   08/09/22 1009 (!) 100/40 97.5 °F (36.4 °C) Temporal 73 20 100 % --   08/09/22 1002 -- -- -- -- 20 -- --   08/09/22 0932 -- -- -- -- 20 -- --           Intake/Output Summary (Last 24 hours) at 8/10/2022 0839  Last data filed at 8/10/2022 0691  Gross per 24 hour   Intake 1375.62 ml   Output 200 ml   Net 1175.62 ml       General: Awake, alert, no acute distress  Neck: No JVD noted  Lungs: Clear bilaterally upper, diminished to the bases bilaterally. Unlabored  CV: Regular rate and rhythm. No rub  Abd: tender, Active bowel sounds; ostomy; PEG tube  Skin: Warm and dry. No rash on exposed extremities  Ext: No edema   Neuro: Awake, answers questions appropriately    Data:    Recent Labs     08/08/22  1047 08/08/22  1829 08/09/22  0604 08/09/22  2038 08/10/22  0556   WBC 9.0  --  13.0*  --  25.3*   HGB 6.6*   < > 6.2* 7.9* 7.9*   HCT 21.8*   < > 19.5* 23.4* 23.7*   .8*  --  95.6  --  90.5     --  293  --  271    < > = values in this interval not displayed. Recent Labs     08/08/22  1221 08/08/22  1829 08/09/22  0604 08/09/22  1641 08/10/22  0110 08/10/22  0556      < > 127* 128* 128* 131*   K 5.7*   < > 6.8* 7.7* 7.3* 7.0*      < > 96* 96* 94* 92*   CO2 19*   < > 19* 15* 18* 24   CREATININE 2.0*   < > 2.7* 3.1* 3.5* 3.6*   BUN 36*   < > 47* 52* 55* 58*   LABGLOM 24   < > 17 14 13 12   GLUCOSE 139*   < > 130* 102* 143* 113*   CALCIUM 8.2*   < > 7.3* 7.5* 7.5* 7.1*   PHOS  --   --  5.2*  --   --   --    MG 1.6  --  1.5*  --   --  1.7    < > = values in this interval not displayed. Vit D, 25-Hydroxy   Date Value Ref Range Status   08/01/2022 9 (L) 30 - 100 ng/mL Final     Comment:     <20 ng/mL. ........... [place]on [place]ose Deficient  20-30 ng/mL. ......... Shon Moose Insufficient   ng/mL. ........ Shon Moose Sufficient  >100 ng/mL. .......... Shon Moose Toxic         PTH   Date Value Ref Range Status   07/15/2022 307 (H) 15 - 65 pg/mL Final       Recent Labs     08/08/22  0339 08/08/22  1047   ALT 7 9   AST 12 15   ALKPHOS 240* 267*   BILITOT 0.3 0.4         Recent Labs     08/08/22  0339 08/08/22  1047   LABALBU 2.4* 2.6*         Ferritin   Date Value Ref Range Status   08/08/2022 160 ng/mL Final     Comment:     FERRITIN Reference Ranges:  Adult Males   20 - 60 years:    27 - 400 ng/mL  Adult females 16 - 60 years:    15 - 150 ng/mL  Adults greater than 60 years:   no established reference range  Pediatrics:                     no established reference range       Iron   Date Value Ref Range Status   08/08/2022 97 37 - 145 mcg/dL Final     TIBC   Date Value Ref Range Status   08/08/2022 195 (L) 250 - 450 mcg/dL Final       Vitamin B-12   Date Value Ref Range Status   08/08/2022 1764 (H) 211 - 946 pg/mL Final       Folate   Date Value Ref Range Status   08/08/2022 9.4 4.8 - 24.2 ng/mL Final       Lab Results   Component Value Date/Time    COLORU Yellow 08/08/2022 12:39 PM    NITRU Negative 08/08/2022 12:39 PM    GLUCOSEU Negative 08/08/2022 12:39 PM    KETUA Negative 08/08/2022 12:39 PM    UROBILINOGEN 0.2 08/08/2022 12:39 PM    BILIRUBINUR Negative 08/08/2022 12:39 PM       No results found for: ADEOLA, CREURRAN, MACREATRATIO, OSMOU    No components found for: URIC    No results found for: LIPIDPAN    Assessment and Plans:    KD Stage II on CKD 3b KD   likely secondary to decreased effective renal perfusion in the setting of severe anemia  Baseline serum cr 1.4-1.5mg/dl with an e-GFR=33-36ml/min  retroperitoneal US 8/10\" left renal cysts\" and unremarkable for right kidney; no hydro  UA showed clear yellow urine, spec grav 1.020, trace blood, 30 protein  FeNa 0.1% supports prerenal etiology   Cr peaked today at 3.6  Mooney catheter  On 0.45 sodium chloride with 75 mEq sodium bicarbonate at 100 mL/hour  Strict I&O, daily weights  Monitor labs  Uo minimal with rising cr  If now improvement will start hd     2. HTN with CKD I-IV  BP goal <120/80  BP at/near goal  Monitor BPs     3. Met Acidosis   in the setting of the KD/ CKD   Also with ileostomy   On 0.45 sodium chloride with 75 mEq sodium bicarbonate at 100 mL/hour  Monitor labs     4.   Secondary hyperparathyroidism of Renal Origin   with Hyperphosphatemia and Unspecified Vit D Def   on 7/15;  PO4 5.2 on 8/9,  Vit D 19 on 8/1   On vitamin D supplementation  Check phos in the am  Monitor labs     5. Hypomagnesemia  Mg++ 1.7 today  Check magnesium in the a.m. Monitor labs     6. Hyperkalemia  likely due to diet an KD/CKD  For potassium medical management again today  Check potassium in the afternoon  Low potassium diet when able to eat  Monitor labs  If no improvement will start hd     7.  anemia  Possible GI bleed  Hemoglobin 6 on 8/8--> hemoglobin 7.9 today  S/p PRBCs  Transfuse for hemoglobin<7  On ROSA  General surgery following-possible EGD  Monitor labs    8. Leukocytosis/abd pain/hypotension  ?  Septic shock/hypovolemia/?acute abd  Treat yeast in urine  Start empiric abx  Get ct of abd pelvis  Check lactic acid  Check lfts again  Check lipase  Pan cx  Resp panel       Isabel Whalen, APRN - CNP

## 2022-08-10 NOTE — CONSULTS
Medical Intensive Care Unit     [facility] [address]  Resident Consult Note    Date and time: 8/10/2022 6:24 PM  Patient's name:  Carroll Ash  Medical Record Number: [id number]  Patient's account/billing number: [de-identified]  Patient's YOB: 1940  Age: [de-identified] y.o. Date of Admission: 8/8/2022 10:01 AM  Length of stay during current admission: 2    Primary Care Physician: Violet Molina DO  ICU Attending Physician: Dr. Mango Schmidt Status: Full Code    Reason for ICU admission: Hyperkalemia    History of Present Illness:   Patient is an 61-year-old female with past medical history of skin cancer, ischemic colitis status post colostomy, ischemic gut, short gut syndrome, hypertension, hyperlipidemia, CKD, CAD presenting to the hospital on 8/9 from nursing home for concerns of hyperkalemia. Patient has had multiple admissions over the past several months for variety of reasons including E. coli bacteremia, hypotension and pulmonary embolism concern among others. Patient had been doing well at her nursing home following discharge from the hospital last week however on 8/9 it was discovered that patient had severe hypokalemia and as such was transported to the hospital for further management. Initially patient was admitted to the floors for management of her hyperkalemia with nephrology being consulted. However today an RRT was called on the patient due to concerns of hypotension and persistent hyperkalemia, patient was given a 500 cc bolus on the floors which slightly increase her blood pressures and she was then transferred to the ICU for further management. In the ICU patient is being worked up for sepsis concern due to elevated white count Pro-Ayaz and lactic acid. Patient is also being treated for hyperkalemia including insulin and D10 with nephrology following. At this time continue ICU level care.      CURRENT VENTILATION STATUS:   [] Ventilator  [] BIPAP  [] Nasal Cannula [x] Room Air      SECRETIONS   Amount:  [] Small [] Moderate  [] Large [x] None    Color:     [] White [] Colored  [] Bloody    SEDATION:  RAAS Score:  [] Propofol gtt  [] Versed gtt  [] Fentanyl gtt  [x] No Sedation    PARALYZED:  [x] No    [] Yes    VASOPRESSORS:  [x] No    [] Yes    If yes -   [] Levophed       [] Dopamine     [] Vasopressin       [] Dobutamine  [] Phenylephrine         [] Epinephrine    CENTRAL LINES:     [x] No   [] Yes   (Date of Insertion:   )           If yes -     [] Right IJ     [] Left IJ [] Right Femoral [] Left Femoral                   [] Right Subclavian [] Left Subclavian     AZAR'S CATHETER:   [] No   [x] Yes  (Date of Insertion:   )     URINE OUTPUT:            [] Good   [x] Low              [] Anuric    Review of Systems   Constitutional:  Negative for fatigue and fever. HENT:  Negative for congestion. Eyes:  Negative for photophobia and visual disturbance. Respiratory:  Negative for cough, chest tightness and shortness of breath. Cardiovascular:  Negative for chest pain, palpitations and leg swelling. Gastrointestinal:  Positive for abdominal pain. Negative for constipation, diarrhea, nausea and vomiting. Genitourinary:  Negative for difficulty urinating. Musculoskeletal:  Negative for arthralgias. Neurological:  Negative for dizziness, weakness, light-headedness and headaches.      OBJECTIVE:     VITAL SIGNS:  BP (!) 86/41   Pulse 94   Temp 97.3 °F (36.3 °C) (Axillary)   Resp 23   Ht 5' (1.524 m)   Wt 101 lb (45.8 kg)   SpO2 91%   BMI 19.73 kg/m²   Tmax over 24 hours:  Temp (24hrs), Av.2 °F (36.2 °C), Min:96.8 °F (36 °C), Max:97.6 °F (36.4 °C)      Patient Vitals for the past 6 hrs:   BP Temp Temp src Pulse Resp SpO2   08/10/22 1800 -- -- -- 94 23 --   08/10/22 1700 (!) 86/41 -- -- 97 23 --   08/10/22 1600 (!) 112/33 97.3 °F (36.3 °C) Axillary 97 23 91 %   08/10/22 1500 (!) 96/36 -- -- 96 20 90 %   08/10/22 1400 (!) 102/52 -- -- 98 19 93 % 08/10/22 1300 (!) 92/52 97.6 °F (36.4 °C) Axillary 99 25 94 %         Intake/Output Summary (Last 24 hours) at 8/10/2022 1824  Last data filed at 8/10/2022 1015  Gross per 24 hour   Intake 1073.45 ml   Output 220 ml   Net 853.45 ml     Wt Readings from Last 2 Encounters:   08/09/22 101 lb (45.8 kg)   08/01/22 113 lb (51.3 kg)     Body mass index is 19.73 kg/m². Physical Exam  Constitutional:       Appearance: Normal appearance. Eyes:      Pupils: Pupils are equal, round, and reactive to light. Cardiovascular:      Rate and Rhythm: Normal rate and regular rhythm. Pulses: Normal pulses. Heart sounds: Normal heart sounds. No murmur heard. Pulmonary:      Effort: Pulmonary effort is normal.      Breath sounds: Normal breath sounds. No wheezing or rhonchi. Abdominal:      General: There is distension. Palpations: Abdomen is soft. Tenderness: There is abdominal tenderness in the epigastric area. Hernia: A hernia is present. Comments: Ostomy and peg tube present   Musculoskeletal:         General: Normal range of motion. Cervical back: Normal range of motion. Skin:     General: Skin is warm and dry. Capillary Refill: Capillary refill takes less than 2 seconds. Neurological:      General: No focal deficit present. Mental Status: She is alert and oriented to person, place, and time.    Psychiatric:         Mood and Affect: Mood normal.         Behavior: Behavior normal.        Any additional physical findings:    MEDICATIONS:  Scheduled Meds:   sodium zirconium cyclosilicate  10 g Oral TID    sodium polystyrene  15 g Oral Once    vancomycin  1,000 mg IntraVENous Once    fluconazole  200 mg Oral Daily    piperacillin-tazobactam  4,500 mg IntraVENous Q12H    norepinephrine        lidocaine PF        pantoprazole (PROTONIX) 40 mg injection  40 mg IntraVENous Q12H    sucralfate  1 g Oral 4 times per day    epoetin ludin-epbx  30 Units/kg SubCUTAneous Once per day on Mon Wed Fri    calcium carbonate  500 mg Oral Nightly    vitamin D  2,000 Units Oral BID    diphenoxylate-atropine  1 tablet Oral 4x Daily AC & HS    escitalopram  20 mg Oral Nightly    ferrous sulfate  325 mg Oral Nightly    fluticasone  1 spray Each Nostril Nightly    lipase-protease-amylase  24,000 Units Oral TID WC    loperamide  4 mg Oral TID WC    magnesium oxide  400 mg Oral BID    ocuvite-lutein  1 capsule Oral BID    pravastatin  40 mg Oral Nightly    primidone  250 mg Oral Nightly    traZODone  50 mg Oral Nightly    vitamin B-12  500 mcg Oral Nightly     Continuous Infusions:   sodium chloride      sodium bicarbonate infusion 100 mL/hr at 08/10/22 0623    dextrose      sodium chloride      dextrose       PRN Meds:   oxyCODONE, 2.5 mg, Q4H PRN  sodium chloride, , PRN  [Held by provider] HYDROmorphone, 0.5 mg, Q4H PRN  [Held by provider] morphine, 1 mg, Q3H PRN  glucose, 4 tablet, PRN  dextrose bolus, 125 mL, PRN   Or  dextrose bolus, 250 mL, PRN  glucagon (rDNA), 1 mg, PRN  dextrose, , Continuous PRN  sodium chloride, , PRN  hyoscyamine, 125 mcg, 4x Daily PRN  lipase-protease-amylase, 12,000 Units, With Snacks  ondansetron, 4 mg, Q8H PRN  senna, 1 tablet, Daily PRN  oxyCODONE-acetaminophen, 1 tablet, Q6H PRN  glucose, 4 tablet, PRN  dextrose bolus, 125 mL, PRN   Or  dextrose bolus, 250 mL, PRN  glucagon (rDNA), 1 mg, PRN  dextrose, , Continuous PRN        VENT SETTINGS (Comprehensive) (if applicable): Additional Respiratory Assessments  Heart Rate: 94  Resp: 23  SpO2: 91 %    ABGs:   Recent Labs     08/10/22  1111   PH 7.445   PCO2 36.7   PO2 112.2*   HCO3 24.6   BE 0.6   O2SAT 98.3       Laboratory findings:  Complete Blood Count:   Recent Labs     08/08/22  1047 08/08/22  1829 08/09/22  0604 08/09/22  2038 08/10/22  0556   WBC 9.0  --  13.0*  --  25.3*   HGB 6.6*   < > 6.2* 7.9* 7.9*   HCT 21.8*   < > 19.5* 23.4* 23.7*     --  293  --  271    < > = values in this interval not displayed. Last 3 Blood Glucose:   Recent Labs     08/10/22  1137 08/10/22  1335 08/10/22  1745   GLUCOSE 148* 342* 148*        PT/INR:    Lab Results   Component Value Date/Time    PROTIME 19.0 07/24/2022 09:43 AM    INR 1.7 07/24/2022 09:43 AM     PTT:    Lab Results   Component Value Date/Time    APTT 29.1 07/24/2022 09:43 AM       Comprehensive Metabolic Profile:   Recent Labs     08/10/22  1137 08/10/22  1335 08/10/22  1745    130* 130*   K 5.8* 5.5* 5.4*   CL 95* 92* 92*   CO2 25 20* 21*   BUN 57* 55* 55*   CREATININE 3.7* 3.8* 3.8*   GLUCOSE 148* 342* 148*   CALCIUM 6.8* 6.8* 6.8*   PROT 4.5*  --   --    LABALBU 2.0*  --   --    BILITOT 0.6  --   --    ALKPHOS 187*  --   --    AST 22  --   --    ALT 12  --   --       Magnesium:   Lab Results   Component Value Date/Time    MG 1.7 08/10/2022 05:56 AM     Phosphorus:   Lab Results   Component Value Date/Time    PHOS 5.2 08/09/2022 06:04 AM     Ionized Calcium:   Lab Results   Component Value Date/Time    CAION 1.16 07/22/2022 05:33 AM      Troponin: No results for input(s): TROPONINI in the last 72 hours. Radiology/Imaging:   US RETROPERITONEAL COMPLETE   Final Result   1. Unremarkable sonographic evaluation of the right kidney. 2. Left renal cysts. 3. The urinary bladder is not visualized secondary to obscuring bowel gas. XR CHEST PORTABLE   Final Result   No acute process. CT ABDOMEN PELVIS WO CONTRAST Additional Contrast? None    (Results Pending)         ASSESSMENT  And PLAN:      Assessment:  Hyperkalemia 2/2 KD/CKD, poor diet  Hypotension with concern for septic shock: Recent hx of e.  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  Hx Short Gut syndrome   Hx HTN Hx HLD  Hx CAD s/p stent placement in 2002   Hx COPD  Prior concern for PE was on Eliquis: Blood thinners currently on hold    Plan:  Treating with insulin and D10 for hyperkalemia, nephrology has been contacted  Per nephrology plan is to follow potassium overnight and treat as appropriate with insulin protocol, if potassium is not able to correct on its own patient may require dialysis in the morning  Patient was given 30 cc fluid bolus and pressures responded, will try to place a line for more accurate representation of blood pressures  Fluids and bicarb per nephrology  General surgery following, plan to scope when more stable from electrolyte perspective  In the setting of elevated Pro-Ayaz, lactic acid and white count are starting broad-spectrum antibiotics with vancomycin and Zosyn to cover for possible CAP and abdominal infection  Started on fluconazole for Candida infection of the urine  Pancultures sent  Obtain CT of the abdomen in the setting of acute abdominal pain  Per general surgery patient can be resumed on tube feeds  Continue to follow electrolytes and replace as appropriate       WEAN PER PROTOCOL:  [x] No   [] Yes  [] N/A    ICU PROPHYLAXIS:  Stress ulcer:  [x] PPI Agent  [] V3Cfspc [] Sucralfate  [] Other:  VTE:   [] Enoxaparin  [] Unfract. Heparin Subcut  [x] EPC Cuffs    NUTRITION:  [] NPO [x] Tube Feeding (Specify: ) [] TPN  [] PO (Diet: Diet NPO Exceptions are: Sips of Water with Meds  ADULT TUBE FEEDING; Gastrostomy;  Renal Formula; Continuous; 20; Yes; 15; Q 4 hours; 45; 30; Q 6 hours)    INSULIN DRIP:   [x] No   [] Yes    CONSULTATION NEEDED:   [x] No   [] Yes    FAMILY UPDATED:    [] No   [x] Yes    TRANSFER OUT OF ICU:   [x] No   [] Yes    Bhavik Ratliff MD, PGY-2  Attending Physician: Dr. Luis Fernando Liu   8/10/2022, 6:24 PM     [facility]  Department of Pulmonary, Critical Care and Sleep Medicine  [address] [facility]  Department of Internal Medicine      During multidisciplinary team rounds Aniyah Mckay is a [de-identified] y.o. female was seen, examined and discussed. This is confirmation that I have personally seen and examined the patient and that the key elements of the encounter were performed by me (> 85 % time). The medications & laboratory data was discussed and adjusted where necessary. The radiographic images were reviewed or with radiologist or consultant if felt dis-concordant with the exam or history. The above findings were corroborated, plans confirmed and changes made if needed. Family is updated at the bedside as available. Key issues of the case were discussed among consultants. Critical Care time is documented if appropriate.       Patric Pittman DO, FACP, FCCP, [place],

## 2022-08-10 NOTE — PROGRESS NOTES
Updated patients  with room number and questions answered appropriately.  Patient had one earring and cell phone during transfer

## 2022-08-10 NOTE — PROGRESS NOTES
Progress Note  Date:8/10/2022       Room:8514/8514-B  Patient Name:Bea Kelley     YOB: 1940     Age:82 y.o. Patient says she is tired today. Subjective    Subjective:  Symptoms:  Stable. No shortness of breath or chest pain. Diet:  Adequate intake. Activity level: Impaired due to weakness. Pain:  She complains of pain that is mild. Review of Systems   Constitutional:  Negative for activity change and fever. HENT:  Negative for congestion. Respiratory:  Negative for shortness of breath. Cardiovascular:  Negative for chest pain. Gastrointestinal:  Positive for abdominal pain. Neurological:  Negative for dizziness. Objective         Vitals Last 24 Hours:  TEMPERATURE:  Temp  Av.4 °F (36.3 °C)  Min: 96.8 °F (36 °C)  Max: 97.8 °F (36.6 °C)  RESPIRATIONS RANGE: Resp  Av.9  Min: 18  Max: 36  PULSE OXIMETRY RANGE: SpO2  Av.3 %  Min: 96 %  Max: 100 %  PULSE RANGE: Pulse  Av.8  Min: 63  Max: 90  BLOOD PRESSURE RANGE: Systolic (65BGH), RZF:887 , Min:100 , LOB:913   ; Diastolic (65CSJ), WHL:54, Min:32, Max:63    I/O (24Hr): Intake/Output Summary (Last 24 hours) at 8/10/2022 0637  Last data filed at 8/10/2022 6848  Gross per 24 hour   Intake 1375.62 ml   Output 200 ml   Net 1175.62 ml     Objective:  General Appearance:  Comfortable. Vital signs: (most recent): Blood pressure (!) 111/51, pulse 90, temperature 97.1 °F (36.2 °C), temperature source Temporal, resp. rate 20, height 5' (1.524 m), weight 101 lb (45.8 kg), SpO2 96 %. No fever. Lungs:  Normal effort and normal respiratory rate. Breath sounds clear to auscultation. Heart: Normal rate. Regular rhythm.   S1 normal and S2 normal.    Labs/Imaging/Diagnostics    Labs:  CBC:  Recent Labs     22  0339 22  1047 22  1829 22  0604 08/09/22  2038   WBC 6.8 9.0  --  13.0*  --    RBC 1.86* 2.08*  --  2.04*  --    HGB 6.0* 6.6* 7.1* 6.2* 7.9*   HCT 19.2* 21.8* 22.0* 19.5* 23.4*   .2* 104.8*  --  95.6  --    RDW 16.0* 15.9*  --  18.6*  --     319  --  293  --      CHEMISTRIES:  Recent Labs     08/08/22  1221 08/08/22  1829 08/09/22  0604 08/09/22  1641 08/10/22  0110      < > 127* 128* 128*   K 5.7*   < > 6.8* 7.7* 7.3*      < > 96* 96* 94*   CO2 19*   < > 19* 15* 18*   BUN 36*   < > 47* 52* 55*   CREATININE 2.0*   < > 2.7* 3.1* 3.5*   GLUCOSE 139*   < > 130* 102* 143*   PHOS  --   --  5.2*  --   --    MG 1.6  --  1.5*  --   --     < > = values in this interval not displayed. PT/INR:No results for input(s): PROTIME, INR in the last 72 hours. APTT:No results for input(s): APTT in the last 72 hours. LIVER PROFILE:  Recent Labs     08/08/22  0339 08/08/22  1047   AST 12 15   ALT 7 9   BILITOT 0.3 0.4   ALKPHOS 240* 267*       Imaging Last 24 Hours:  XR CHEST PORTABLE    Result Date: 8/8/2022  EXAMINATION: ONE XRAY VIEW OF THE CHEST 8/8/2022 11:07 am COMPARISON: 07/24/2022 HISTORY: ORDERING SYSTEM PROVIDED HISTORY: hyperkalemia, chest tightness TECHNOLOGIST PROVIDED HISTORY: Reason for exam:->hyperkalemia, chest tightness What reading provider will be dictating this exam?->CRC FINDINGS: The lungs are without acute focal process. There is no effusion or pneumothorax. The cardiomediastinal silhouette is without acute process. The osseous structures are without acute process. No acute process. Assessment//Plan           Hospital Problems             Last Modified POA    * (Principal) Hyperkalemia 8/8/2022 Yes     Assessment:   (* (Principal) Hyperkalemia 8/8/2022 Yes  Acute blood loss anemia  Likely GI bleed. Lumbar DJD  CAD  HTN  Hyperlipidemia  ). Plan:    (Surgery plans on EGD when potassium is improved. Monitor labs and exam.  Continue rest of meds. ).      Electronically signed by Adri Landa MD on 8/10/22 at 6:37 AM EDT

## 2022-08-10 NOTE — CARE COORDINATION
Spoke with Kelley Butcher, liaison with [facility]. Patient was at their facility for rehab and can return when medically stable to do so, if they have an appropriate bed at that time. She will not need pre-cert but will need a negative COVID the day of discharge. K 7.0 this am after treatment given for 6.8 yesterday. Nephrology following and notified. EGD canceled for today and planned for tomorrow pending stability of electrolytes per surgery. Envelope and transfer paperwork completed and placed in the soft chart. Will continue to follow.      Hazel Dunbar RN.  [facility]  [phone]

## 2022-08-10 NOTE — PROGRESS NOTES
GENERAL SURGERY  DAILY PROGRESS NOTE  8/10/2022    Chief Complaint   Patient presents with    Other     Sent from NH for K+ of 6.8       Subjective:  Feeling fatigued and frustrated this morning. Ostomy output has darkened, no visible blood    Objective:  BP (!) 111/51   Pulse 90   Temp 97.1 °F (36.2 °C) (Temporal)   Resp 20   Ht 5' (1.524 m)   Wt 101 lb (45.8 kg)   SpO2 96%   BMI 19.73 kg/m²     GENERAL:  Laying in bed, awake, alert, cooperative, no apparent distress  HEAD: Normocephalic, atraumatic  EYES: No sclera icterus, pupils equal  LUNGS:  No increased work of breathing  CARDIOVASCULAR:  RR  ABDOMEN:  Soft, non-tender, non-distended.  Ostomy with melena present  EXTREMITIES: No edema or swelling  SKIN: Warm and dry    Assessment/Plan:  [de-identified] y.o. female with GI bleed, KD, hyperkalemia    - severe hyperkalemia that is not responding to the treatment tried multiple times so far  - this needs to be corrected prior to EGD  - monitor H/H  - PPI, carafate     Electronically signed by Iraj Agarwal MD on 8/10/2022 at 6:06 AM    Seen/examined  Agree with above  Ok to start tube feeds  [place]

## 2022-08-11 ENCOUNTER — APPOINTMENT (OUTPATIENT)
Dept: CT IMAGING | Age: 82
DRG: 377 | End: 2022-08-11
Payer: MEDICARE

## 2022-08-11 ENCOUNTER — APPOINTMENT (OUTPATIENT)
Dept: GENERAL RADIOLOGY | Age: 82
DRG: 377 | End: 2022-08-11
Payer: MEDICARE

## 2022-08-11 LAB
ANION GAP SERPL CALCULATED.3IONS-SCNC: 13 MMOL/L (ref 7–16)
ANION GAP SERPL CALCULATED.3IONS-SCNC: 18 MMOL/L (ref 7–16)
ANION GAP SERPL CALCULATED.3IONS-SCNC: 18 MMOL/L (ref 7–16)
BLOOD BANK DISPENSE STATUS: NORMAL
BLOOD BANK PRODUCT CODE: NORMAL
BPU ID: NORMAL
BUN BLDV-MCNC: 25 MG/DL (ref 6–23)
BUN BLDV-MCNC: 53 MG/DL (ref 6–23)
BUN BLDV-MCNC: 55 MG/DL (ref 6–23)
CALCIUM IONIZED: 0.99 MMOL/L (ref 1.15–1.33)
CALCIUM SERPL-MCNC: 6.7 MG/DL (ref 8.6–10.2)
CALCIUM SERPL-MCNC: 7.2 MG/DL (ref 8.6–10.2)
CALCIUM SERPL-MCNC: 7.5 MG/DL (ref 8.6–10.2)
CHLORIDE BLD-SCNC: 90 MMOL/L (ref 98–107)
CHLORIDE BLD-SCNC: 92 MMOL/L (ref 98–107)
CHLORIDE BLD-SCNC: 97 MMOL/L (ref 98–107)
CO2: 20 MMOL/L (ref 22–29)
CO2: 21 MMOL/L (ref 22–29)
CO2: 27 MMOL/L (ref 22–29)
CORTISOL TOTAL: 90 MCG/DL (ref 2.68–18.4)
CREAT SERPL-MCNC: 2.2 MG/DL (ref 0.5–1)
CREAT SERPL-MCNC: 3.7 MG/DL (ref 0.5–1)
CREAT SERPL-MCNC: 3.9 MG/DL (ref 0.5–1)
DESCRIPTION BLOOD BANK: NORMAL
EKG ATRIAL RATE: 101 BPM
EKG ATRIAL RATE: 98 BPM
EKG P AXIS: 65 DEGREES
EKG P AXIS: 66 DEGREES
EKG P-R INTERVAL: 158 MS
EKG P-R INTERVAL: 166 MS
EKG Q-T INTERVAL: 370 MS
EKG Q-T INTERVAL: 374 MS
EKG QRS DURATION: 92 MS
EKG QRS DURATION: 94 MS
EKG QTC CALCULATION (BAZETT): 477 MS
EKG QTC CALCULATION (BAZETT): 479 MS
EKG R AXIS: -18 DEGREES
EKG R AXIS: -42 DEGREES
EKG T AXIS: 67 DEGREES
EKG T AXIS: 88 DEGREES
EKG VENTRICULAR RATE: 101 BPM
EKG VENTRICULAR RATE: 98 BPM
GFR AFRICAN AMERICAN: 13
GFR AFRICAN AMERICAN: 14
GFR AFRICAN AMERICAN: 26
GFR NON-AFRICAN AMERICAN: 11 ML/MIN/1.73
GFR NON-AFRICAN AMERICAN: 12 ML/MIN/1.73
GFR NON-AFRICAN AMERICAN: 21 ML/MIN/1.73
GLUCOSE BLD-MCNC: 127 MG/DL (ref 74–99)
GLUCOSE BLD-MCNC: 93 MG/DL (ref 74–99)
GLUCOSE BLD-MCNC: 98 MG/DL (ref 74–99)
HCT VFR BLD CALC: 16.9 % (ref 34–48)
HCT VFR BLD CALC: 19.8 % (ref 34–48)
HCT VFR BLD CALC: 20.4 % (ref 34–48)
HEMOGLOBIN: 5.5 G/DL (ref 11.5–15.5)
HEMOGLOBIN: 6.6 G/DL (ref 11.5–15.5)
HEMOGLOBIN: 6.8 G/DL (ref 11.5–15.5)
LACTIC ACID: 1.3 MMOL/L (ref 0.5–2.2)
LACTIC ACID: 4.2 MMOL/L (ref 0.5–2.2)
LACTIC ACID: 7.2 MMOL/L (ref 0.5–2.2)
MCH RBC QN AUTO: 30.7 PG (ref 26–35)
MCHC RBC AUTO-ENTMCNC: 32.5 % (ref 32–34.5)
MCV RBC AUTO: 94.4 FL (ref 80–99.9)
METER GLUCOSE: 119 MG/DL (ref 74–99)
PDW BLD-RTO: 19.1 FL (ref 11.5–15)
PLATELET # BLD: 166 E9/L (ref 130–450)
PMV BLD AUTO: 12 FL (ref 7–12)
POTASSIUM REFLEX MAGNESIUM: 5.4 MMOL/L (ref 3.5–5)
POTASSIUM REFLEX MAGNESIUM: 5.6 MMOL/L (ref 3.5–5)
POTASSIUM REFLEX MAGNESIUM: 5.7 MMOL/L (ref 3.5–5)
POTASSIUM SERPL-SCNC: 3.7 MMOL/L (ref 3.5–5)
POTASSIUM SERPL-SCNC: 5.5 MMOL/L (ref 3.5–5)
POTASSIUM SERPL-SCNC: 5.5 MMOL/L (ref 3.5–5)
RBC # BLD: 1.79 E12/L (ref 3.5–5.5)
REASON FOR REJECTION: NORMAL
REJECTED TEST: NORMAL
SODIUM BLD-SCNC: 128 MMOL/L (ref 132–146)
SODIUM BLD-SCNC: 131 MMOL/L (ref 132–146)
SODIUM BLD-SCNC: 137 MMOL/L (ref 132–146)
TOTAL CK: 101 U/L (ref 20–180)
WBC # BLD: 20.2 E9/L (ref 4.5–11.5)

## 2022-08-11 PROCEDURE — 2580000003 HC RX 258: Performed by: INTERNAL MEDICINE

## 2022-08-11 PROCEDURE — 2500000003 HC RX 250 WO HCPCS: Performed by: INTERNAL MEDICINE

## 2022-08-11 PROCEDURE — 36556 INSERT NON-TUNNEL CV CATH: CPT | Performed by: INTERNAL MEDICINE

## 2022-08-11 PROCEDURE — P9047 ALBUMIN (HUMAN), 25%, 50ML: HCPCS | Performed by: STUDENT IN AN ORGANIZED HEALTH CARE EDUCATION/TRAINING PROGRAM

## 2022-08-11 PROCEDURE — C9113 INJ PANTOPRAZOLE SODIUM, VIA: HCPCS | Performed by: SURGERY

## 2022-08-11 PROCEDURE — 2580000003 HC RX 258: Performed by: STUDENT IN AN ORGANIZED HEALTH CARE EDUCATION/TRAINING PROGRAM

## 2022-08-11 PROCEDURE — 85027 COMPLETE CBC AUTOMATED: CPT

## 2022-08-11 PROCEDURE — 5A1D70Z PERFORMANCE OF URINARY FILTRATION, INTERMITTENT, LESS THAN 6 HOURS PER DAY: ICD-10-PCS | Performed by: FAMILY MEDICINE

## 2022-08-11 PROCEDURE — 74176 CT ABD & PELVIS W/O CONTRAST: CPT

## 2022-08-11 PROCEDURE — 80074 ACUTE HEPATITIS PANEL: CPT

## 2022-08-11 PROCEDURE — 2700000000 HC OXYGEN THERAPY PER DAY

## 2022-08-11 PROCEDURE — 82550 ASSAY OF CK (CPK): CPT

## 2022-08-11 PROCEDURE — 36556 INSERT NON-TUNNEL CV CATH: CPT

## 2022-08-11 PROCEDURE — 6370000000 HC RX 637 (ALT 250 FOR IP): Performed by: SURGERY

## 2022-08-11 PROCEDURE — 84132 ASSAY OF SERUM POTASSIUM: CPT

## 2022-08-11 PROCEDURE — 6360000002 HC RX W HCPCS: Performed by: STUDENT IN AN ORGANIZED HEALTH CARE EDUCATION/TRAINING PROGRAM

## 2022-08-11 PROCEDURE — 85018 HEMOGLOBIN: CPT

## 2022-08-11 PROCEDURE — 87081 CULTURE SCREEN ONLY: CPT

## 2022-08-11 PROCEDURE — 6360000002 HC RX W HCPCS: Performed by: SURGERY

## 2022-08-11 PROCEDURE — 82962 GLUCOSE BLOOD TEST: CPT

## 2022-08-11 PROCEDURE — 36556 INSERT NON-TUNNEL CV CATH: CPT | Performed by: SURGERY

## 2022-08-11 PROCEDURE — 6360000002 HC RX W HCPCS

## 2022-08-11 PROCEDURE — 85014 HEMATOCRIT: CPT

## 2022-08-11 PROCEDURE — 2500000003 HC RX 250 WO HCPCS: Performed by: STUDENT IN AN ORGANIZED HEALTH CARE EDUCATION/TRAINING PROGRAM

## 2022-08-11 PROCEDURE — 82533 TOTAL CORTISOL: CPT

## 2022-08-11 PROCEDURE — 02HV33Z INSERTION OF INFUSION DEVICE INTO SUPERIOR VENA CAVA, PERCUTANEOUS APPROACH: ICD-10-PCS | Performed by: FAMILY MEDICINE

## 2022-08-11 PROCEDURE — 80048 BASIC METABOLIC PNL TOTAL CA: CPT

## 2022-08-11 PROCEDURE — 36430 TRANSFUSION BLD/BLD COMPNT: CPT

## 2022-08-11 PROCEDURE — 6370000000 HC RX 637 (ALT 250 FOR IP): Performed by: STUDENT IN AN ORGANIZED HEALTH CARE EDUCATION/TRAINING PROGRAM

## 2022-08-11 PROCEDURE — 6370000000 HC RX 637 (ALT 250 FOR IP): Performed by: INTERNAL MEDICINE

## 2022-08-11 PROCEDURE — A4216 STERILE WATER/SALINE, 10 ML: HCPCS | Performed by: SURGERY

## 2022-08-11 PROCEDURE — 6370000000 HC RX 637 (ALT 250 FOR IP): Performed by: FAMILY MEDICINE

## 2022-08-11 PROCEDURE — 2000000000 HC ICU R&B

## 2022-08-11 PROCEDURE — 6370000000 HC RX 637 (ALT 250 FOR IP)

## 2022-08-11 PROCEDURE — 82330 ASSAY OF CALCIUM: CPT

## 2022-08-11 PROCEDURE — 2580000003 HC RX 258: Performed by: SURGERY

## 2022-08-11 PROCEDURE — 83605 ASSAY OF LACTIC ACID: CPT

## 2022-08-11 PROCEDURE — 71045 X-RAY EXAM CHEST 1 VIEW: CPT

## 2022-08-11 PROCEDURE — 2580000003 HC RX 258

## 2022-08-11 PROCEDURE — 99233 SBSQ HOSP IP/OBS HIGH 50: CPT | Performed by: INTERNAL MEDICINE

## 2022-08-11 PROCEDURE — 90935 HEMODIALYSIS ONE EVALUATION: CPT

## 2022-08-11 PROCEDURE — 86706 HEP B SURFACE ANTIBODY: CPT

## 2022-08-11 PROCEDURE — 36415 COLL VENOUS BLD VENIPUNCTURE: CPT

## 2022-08-11 RX ORDER — LIDOCAINE 4 G/G
1 PATCH TOPICAL DAILY
Status: DISCONTINUED | OUTPATIENT
Start: 2022-08-11 | End: 2022-08-17

## 2022-08-11 RX ORDER — SODIUM CHLORIDE 9 MG/ML
INJECTION, SOLUTION INTRAVENOUS PRN
Status: DISCONTINUED | OUTPATIENT
Start: 2022-08-11 | End: 2022-08-15

## 2022-08-11 RX ORDER — ONDANSETRON 2 MG/ML
4 INJECTION INTRAMUSCULAR; INTRAVENOUS EVERY 6 HOURS PRN
Status: DISCONTINUED | OUTPATIENT
Start: 2022-08-11 | End: 2022-08-15

## 2022-08-11 RX ORDER — ALBUMIN (HUMAN) 12.5 G/50ML
25 SOLUTION INTRAVENOUS EVERY 6 HOURS
Status: COMPLETED | OUTPATIENT
Start: 2022-08-11 | End: 2022-08-11

## 2022-08-11 RX ADMIN — ESCITALOPRAM OXALATE 20 MG: 10 TABLET ORAL at 20:24

## 2022-08-11 RX ADMIN — FLUCONAZOLE 200 MG: 100 TABLET ORAL at 12:12

## 2022-08-11 RX ADMIN — Medication 1 CAPSULE: at 20:24

## 2022-08-11 RX ADMIN — LOPERAMIDE HYDROCHLORIDE 4 MG: 2 CAPSULE ORAL at 12:12

## 2022-08-11 RX ADMIN — DIPHENOXYLATE HYDROCHLORIDE AND ATROPINE SULFATE 1 TABLET: 2.5; .025 TABLET ORAL at 22:10

## 2022-08-11 RX ADMIN — CALCIUM GLUCONATE 1000 MG: 98 INJECTION, SOLUTION INTRAVENOUS at 09:40

## 2022-08-11 RX ADMIN — HYOSCYAMINE SULFATE 125 MCG: 0.12 TABLET ORAL at 09:08

## 2022-08-11 RX ADMIN — DEXTROSE MONOHYDRATE 250 ML: 100 INJECTION, SOLUTION INTRAVENOUS at 09:47

## 2022-08-11 RX ADMIN — OXYCODONE 2.5 MG: 5 TABLET ORAL at 10:31

## 2022-08-11 RX ADMIN — Medication 2000 UNITS: at 20:39

## 2022-08-11 RX ADMIN — ALBUMIN (HUMAN) 25 G: 0.25 INJECTION, SOLUTION INTRAVENOUS at 10:31

## 2022-08-11 RX ADMIN — SUCRALFATE 1 G: 1 TABLET ORAL at 23:59

## 2022-08-11 RX ADMIN — CYANOCOBALAMIN TAB 1000 MCG 500 MCG: 1000 TAB at 20:27

## 2022-08-11 RX ADMIN — SODIUM CHLORIDE, PRESERVATIVE FREE 40 MG: 5 INJECTION INTRAVENOUS at 10:31

## 2022-08-11 RX ADMIN — ALBUMIN (HUMAN) 25 G: 0.25 INJECTION, SOLUTION INTRAVENOUS at 20:14

## 2022-08-11 RX ADMIN — SODIUM BICARBONATE: 84 INJECTION, SOLUTION INTRAVENOUS at 03:12

## 2022-08-11 RX ADMIN — PRIMIDONE 250 MG: 250 TABLET ORAL at 20:24

## 2022-08-11 RX ADMIN — PIPERACILLIN AND TAZOBACTAM 4500 MG: 4; .5 INJECTION, POWDER, FOR SOLUTION INTRAVENOUS at 20:54

## 2022-08-11 RX ADMIN — FERROUS SULFATE TAB 325 MG (65 MG ELEMENTAL FE) 325 MG: 325 (65 FE) TAB at 20:36

## 2022-08-11 RX ADMIN — FLUTICASONE PROPIONATE 1 SPRAY: 50 SPRAY, METERED NASAL at 20:26

## 2022-08-11 RX ADMIN — PANCRELIPASE 24000 UNITS: 60000; 12000; 38000 CAPSULE, DELAYED RELEASE PELLETS ORAL at 12:12

## 2022-08-11 RX ADMIN — SUCRALFATE 1 G: 1 TABLET ORAL at 18:29

## 2022-08-11 RX ADMIN — SUCRALFATE 1 G: 1 TABLET ORAL at 06:58

## 2022-08-11 RX ADMIN — SODIUM CHLORIDE, PRESERVATIVE FREE 40 MG: 5 INJECTION INTRAVENOUS at 20:15

## 2022-08-11 RX ADMIN — PANCRELIPASE 24000 UNITS: 60000; 12000; 38000 CAPSULE, DELAYED RELEASE PELLETS ORAL at 09:45

## 2022-08-11 RX ADMIN — DIPHENOXYLATE HYDROCHLORIDE AND ATROPINE SULFATE 1 TABLET: 2.5; .025 TABLET ORAL at 10:31

## 2022-08-11 RX ADMIN — CALCIUM CARBONATE 500 MG: 500 TABLET, CHEWABLE ORAL at 20:36

## 2022-08-11 RX ADMIN — Medication 1 CAPSULE: at 09:10

## 2022-08-11 RX ADMIN — Medication 400 MG: at 20:23

## 2022-08-11 RX ADMIN — ONDANSETRON 4 MG: 2 INJECTION INTRAMUSCULAR; INTRAVENOUS at 00:21

## 2022-08-11 RX ADMIN — PRAVASTATIN SODIUM 40 MG: 20 TABLET ORAL at 20:24

## 2022-08-11 RX ADMIN — Medication 10 UNITS: at 09:48

## 2022-08-11 RX ADMIN — SODIUM ZIRCONIUM CYCLOSILICATE 10 G: 10 POWDER, FOR SUSPENSION ORAL at 20:23

## 2022-08-11 RX ADMIN — TRAZODONE HYDROCHLORIDE 50 MG: 50 TABLET ORAL at 22:11

## 2022-08-11 RX ADMIN — SODIUM ZIRCONIUM CYCLOSILICATE 10 G: 10 POWDER, FOR SUSPENSION ORAL at 09:09

## 2022-08-11 RX ADMIN — PIPERACILLIN AND TAZOBACTAM 4500 MG: 4; .5 INJECTION, POWDER, FOR SOLUTION INTRAVENOUS at 01:58

## 2022-08-11 RX ADMIN — Medication 400 MG: at 10:31

## 2022-08-11 RX ADMIN — LOPERAMIDE HYDROCHLORIDE 4 MG: 2 CAPSULE ORAL at 09:08

## 2022-08-11 RX ADMIN — ALBUMIN (HUMAN) 25 G: 0.25 INJECTION, SOLUTION INTRAVENOUS at 16:48

## 2022-08-11 RX ADMIN — OXYCODONE AND ACETAMINOPHEN 1 TABLET: 5; 325 TABLET ORAL at 16:46

## 2022-08-11 RX ADMIN — Medication 5 MCG/MIN: at 06:47

## 2022-08-11 RX ADMIN — Medication 2000 UNITS: at 09:10

## 2022-08-11 RX ADMIN — SUCRALFATE 1 G: 1 TABLET ORAL at 10:31

## 2022-08-11 ASSESSMENT — PAIN DESCRIPTION - ORIENTATION: ORIENTATION: LOWER

## 2022-08-11 ASSESSMENT — PAIN DESCRIPTION - LOCATION: LOCATION: ABDOMEN

## 2022-08-11 ASSESSMENT — PAIN SCALES - GENERAL: PAINLEVEL_OUTOF10: 5

## 2022-08-11 ASSESSMENT — PAIN DESCRIPTION - DESCRIPTORS: DESCRIPTORS: ACHING

## 2022-08-11 NOTE — PROGRESS NOTES
Pt vomited in bed despite oral Zofran given earlier. Order for IV Zofran obtained. Tube feed turned off per Dr. Ritesh Winn.  Electronically signed by Conrad Frankel, RN on 8/11/2022 at 3:59 AM

## 2022-08-11 NOTE — PROCEDURES
Pavan Rehman is a [de-identified] y.o. female patient. 1. Hyperkalemia    2. Acute on chronic anemia      Past Medical History:   Diagnosis Date    Acute kidney failure ([facility] 2014 and 8/2016    x2,no dialysis needed,resolved, kidney function tests returned to normal    Anxiety     Arthritis     CAD (coronary artery disease)     follows with Dr. Onel Moffett every 6 months    Cancer [facility])     skin, leg,nose- removed surgically     COPD (chronic obstructive pulmonary disease) ([facility]     mild- no inhlaers, no oxygen     Depression     Fibromyalgia     chronic back and neck pain    Generalized headaches     h/o / daily    History of blood transfusion 3-11-14    multiple times    History of necrotic bowel 2012    Hyperlipidemia     Hypertension     Incisional hernia     abdomen    Insomnia     Mitral valve regurgitation     Myocardial infarct ([facility] 2002    Occasional tremors     at hands / stable with medication    Osteopenia     PONV (postoperative nausea and vomiting)     Vitamin B12 deficiency     h/o     Blood pressure (!) 116/42, pulse 100, temperature 97.8 °F (36.6 °C), resp. rate 20, height 5' (1.524 m), weight 101 lb (45.8 kg), SpO2 96 %. Central Line    Date/Time: 8/11/2022 4:28 PM  Performed by: Susan Carrasco MD  Authorized by: Amy Schultz MD   Consent: Written consent obtained.   Risks and benefits: risks, benefits and alternatives were discussed  Consent given by: patient  Patient understanding: patient states understanding of the procedure being performed  Patient consent: the patient's understanding of the procedure matches consent given  Procedure consent: procedure consent matches procedure scheduled  Relevant documents: relevant documents present and verified  Test results: test results available and properly labeled  Required items: required blood products, implants, devices, and special equipment available  Patient identity confirmed: verbally with patient and arm band  Time out: Immediately

## 2022-08-11 NOTE — PROCEDURES
Central Line Insertion     Procedure: left internal jugular vein triple lumen catheter placement. Indications: vascular access    Consent: The patient was counseled regarding the procedure, its indications, risks, potential complications and alternatives, and any questions were answered. Consent was obtained to proceed. Number of sticks: 2    Number of Kits used: 2    Procedure: Time Out: Immediately prior to the procedure a \"timeout\" was called to verify the correct patient and procedure. The patient was place in the trendelenburg position and the skin over the left internal jugular vein was prepped with betadine and draped in a sterile fashion. Local anesthesia was obtained by infiltration using 1% Lidocaine without epinephrine. With ultrasound guidance a large bore needle was used to identify the vein, dark non pulsatile blood returned. The guide wire was then inserted through the needle with minimal resistance. 2 mm nick was made in the skin beside the guidewire. Then a dilator was inserted and removed. A triple lumen catheter was then inserted into the vessel over the guide wire using the Seldinger technique to the 15 cm edilma. All ports showed good, free flowing blood return and were flushed with saline solution. The catheter was then securely fastened to the skin with sutures and with an adhesive dressing and covered with a bio patch and sterile dressing. A post procedure X-ray was ordered and showed good line position. Complications: None   The patient tolerated the procedure well. Estimated blood loss: 5 ml. Bryson Castañeda MD PGY-2  8/11/2022  8:52 AM      [place]  Department of Pulmonary, Critical Care and Sleep Medicine  [address] [facility]  Department of Internal Medicine  Attending Procedural Note Addendum Statement    This procedure was supervised.  The critical elements of this procedure was monitored and, if needed, I was available for the needs of the procedure.      Katherine Herndon DO, FCCP, [place]

## 2022-08-11 NOTE — PROGRESS NOTES
Comprehensive Nutrition Assessment    Type and Reason for Visit:  Initial, Consult    Nutrition Recommendations/Plan:     Continue NPO. EN held at this time d/t emesis. Pending EGD as medically feasible. Updated TF rec when needed as current order overfeeding:  Renal (Nepro 1.8) @ 30 ml/hr + 1 protein modular daily  Will provide: 720 ml tv, 1296 kcals, 58 gm pro (1396 kcals & 84 gm pro w/ mod), 523 ml free water  Noted ongoing hyperkalemia, monitor        Malnutrition Assessment:  Malnutrition Status:  Severe malnutrition (08/11/22 1245)    Context:  Chronic Illness     Findings of the 6 clinical characteristics of malnutrition:  Energy Intake:  75% or less estimated energy requirements for 1 month or longer  Weight Loss:  Unable to assess (Subjective 40lb wt loss over past 2 years (cannot confirm) Recent wt hx of low BMI but within stable ranges overall)     Body Fat Loss:  Severe body fat loss Orbital, Triceps   Muscle Mass Loss:  Severe muscle mass loss Temples (temporalis), Clavicles (pectoralis & deltoids), Calf (gastrocnemius), Thigh (quadraceps)  Fluid Accumulation:  No significant fluid accumulation     Strength:  Not Performed    Nutrition Assessment:    Pt admit w/ GIB & KD/ severe hyperkalemia now s/p RRT transferred to MICU. PMHx Skin CA, COPD, Pre-DM, short gut syndrome w/ ileostomy/PEG follows @ [facility]( Reviewed RD notes from facility). Pt meets criteria for Severe Malnutrition. Plans for EGD when medically stable. EN support held d/t emesis, will monitor & provide updated TF recs.     Nutrition Related Findings:    Pt alert, hypotension on pressor, +I/O's 6L, trace edema, active BS, nausea/ emesis, RLQ ileostomy, PEG clamped/ TF held, hyperkalemia K 5.7 Wound Type: Surgical Incision       Current Nutrition Intake & Therapies:    Average Meal Intake: NPO     Current Tube Feeding (TF) Orders:  Feeding Route: PEG (clamped)  Formula: Renal Formula  Schedule: Continuous  Feeding Regimen: 45 ml/hr, not running/ held  Water Flushes: 30 ml q 6 hr= 120 ml water  Goal TF & Flush Orders Provides: 1080 ml tv, 1994 kcals, 87 gm pro, 785 ml free water, 905 ml total water w/ flushes    Anthropometric Measures:  Height: 5' (152.4 cm)  Ideal Body Weight (IBW): 100 lbs (45 kg)    Admission Body Weight: 94 lb (42.6 kg) (8/9 first measured)  Current Body Weight: 94 lb (42.6 kg) (8/9 adm wt as CBW elevated), 94 % IBW. Current BMI (kg/m2): 18.4  Usual Body Weight:  (EMR measured wt ranges 83-91lb per EMR review over past year. Reviewed [facility] RD notes)   Weight Changes: Subject 40lb wt loss x 2 years per [facility] RD note however recent wts have been stable                    BMI Categories: Underweight (BMI less than 22) age over 72    Estimated Daily Nutrient Needs:  Energy Requirements Based On: Kcal/kg  Weight Used for Energy Requirements: Admission  Energy (kcal/day): 30-35 kcal/kg for healthy wt gain; 1538-4835  Weight Used for Protein Requirements: Admission  Protein (g/day): 1.5-1.8 g/kg dry adm wt; 65-75  Fluid (ml/day): per critical care    Nutrition Diagnosis:   Severe malnutrition, In context of chronic illness related to catabolic illness as evidenced by poor intake prior to admission, severe loss of subcutaneous fat, severe muscle loss    Nutrition Interventions:   Nutrition Education/Counseling: No recommendation at this time  Coordination of Nutrition Care: Continue to monitor while inpatient      Goals:    Specify Other Goals: Resume EN when medically appropriate    Nutrition Monitoring and Evaluation:      Food/Nutrient Intake Outcomes: Diet Advancement/Tolerance  Physical Signs/Symptoms Outcomes: Biochemical Data, Nutrition Focused Physical Findings, Skin, Weight, GI Status, Nausea or Vomiting, Fluid Status or Edema, Hemodynamic Status    Discharge Planning:     Too soon to determine     Braeden Parker RD, LD  Contact: Ext [phone]

## 2022-08-11 NOTE — PROGRESS NOTES
[facility]  Nephrology Progress Note    Patient's Name: Yanely Robles    History of Present Illness from 8/8 Consult Note:    Lisseth Alvarez is an [de-identified]year old female we were asked to see regarding KD. She is followed longitudinally in the office with Dr. Juan Kwong. she was last seen in the office in April of this year and at that time her baseline creatinine was noted to be 1.4-1.5 mg/dl with an e-GFR of 33-36 ml/min. She now presents to ED from local nursing home for abnormal lab work, primarily hyperkalemia. Vitals upon arrival included blood pressure 89/47, pulse 92, respirations 18 and temperature 98.0. Initial BMP revealed sodium 134, potassium 6.4, chloride 102, CO2 21, BUN 36 and creatinine 1.9. Initial CBC revealed WBCs 9.0, hemoglobin 6.6, hematocrit 21.8 and platelet count 676. Initial UA showed clear yellow urine, 1.020, trace blood, trace protein. Patient now examined in the ED setting. She is awake alert and oriented. Currently with 1 unit PRBC infusing. Of note she was just discharged from this facility 1 week ago after being treated for enteritis, hypotension and pneumonia. She has been actively recovering at [facility]. Currently she denies any chest pain or shortness of breath at rest.  She does endorse generalized weakness. Currently without nausea, vomiting or diarrhea. She also denies any specific urinary complaints. \"    Subjective:    8/10: Patient was seen and examined. She reports abdominal pain today, but denies any nausea or vomiting. She denies any chest pain or shortness of breath. She was transferred to icu for abd pain and dropping blood pressure. Seen in icu with dr Kaye Gonzalez. Receiving 1 L bolus of ns. Sbp in 90. Farrukh no hydro. Repeat k 5.8 and hyperkalemia protocol ordered.      8/11: pt seen in icu  Awaits ct scan   For hd today for continued hyperkalemia and oliguria    PMH:    Past Medical History:   Diagnosis Date    Acute kidney failure ([address] 2014 and 8/2016    x2,no dialysis needed,resolved, kidney function tests returned to normal    Anxiety     Arthritis     CAD (coronary artery disease)     follows with Dr. Chidi Callejas every 6 months    Cancer [facility])     skin, leg,nose- removed surgically     COPD (chronic obstructive pulmonary disease) ([address]     mild- no inhlaers, no oxygen     Depression     Fibromyalgia     chronic back and neck pain    Generalized headaches     h/o / daily    History of blood transfusion 3-11-14    multiple times    History of necrotic bowel 2012    Hyperlipidemia     Hypertension     Incisional hernia     abdomen    Insomnia     Mitral valve regurgitation     Myocardial infarct ([address] 2002    Occasional tremors     at hands / stable with medication    Osteopenia     PONV (postoperative nausea and vomiting)     Vitamin B12 deficiency     h/o       Patient Active Problem List   Diagnosis    Right cataract    Essential hypertension, benign    Hyperlipidemia with target LDL less than 100    Osteoarthritis, shoulder    Primary osteoarthritis of left knee    History of ischemic bowel disease    Ileostomy present ([address]    Chronic kidney disease, stage III (moderate) ([facility])    COPD (chronic obstructive pulmonary disease) ([address]    Moderate protein-calorie malnutrition ([address]    Abdominal wall cellulitis    Cellulitis    Infected hernioplasty mesh ([facility])    Enterocutaneous fistula    Mild protein-calorie malnutrition ([address]    Acute kidney injury ([address]    Hypertensive kidney disease with chronic kidney disease stage IV (HCC)    KD (acute kidney injury) ([address]    Diarrhea    Elevated serum creatinine    Abnormal serum creatinine level    Hyperlipemia    Primary hypertension    Essential (primary) hypertension    Postsurgical malabsorption, not elsewhere classified    Hypercholesterolemia    Pure hypercholesterolemia    Prediabetes    Hypothyroidism, unspecified    Acute renal failure (ARF) (HCC)    Enteritis    Hypotension    Abnormal CT scan, lumbar spine    Severe protein-calorie malnutrition (HCC)    Hyperkalemia       Meds:     albumin human  25 g IntraVENous Q6H    lidocaine  1 patch TransDERmal Daily    sodium zirconium cyclosilicate  10 g Oral TID    sodium polystyrene  15 g Oral Once    fluconazole  200 mg Oral Daily    piperacillin-tazobactam  4,500 mg IntraVENous Q12H    pantoprazole (PROTONIX) 40 mg injection  40 mg IntraVENous Q12H    sucralfate  1 g Oral 4 times per day    epoetin ludin-epbx  30 Units/kg SubCUTAneous Once per day on Mon Wed Fri    calcium carbonate  500 mg Oral Nightly    vitamin D  2,000 Units Oral BID    diphenoxylate-atropine  1 tablet Oral 4x Daily AC & HS    escitalopram  20 mg Oral Nightly    ferrous sulfate  325 mg Oral Nightly    fluticasone  1 spray Each Nostril Nightly    lipase-protease-amylase  24,000 Units Oral TID WC    loperamide  4 mg Oral TID WC    magnesium oxide  400 mg Oral BID    ocuvite-lutein  1 capsule Oral BID    pravastatin  40 mg Oral Nightly    primidone  250 mg Oral Nightly    traZODone  50 mg Oral Nightly    vitamin B-12  500 mcg Oral Nightly        norepinephrine 7 mcg/min (08/11/22 0715)    sodium chloride      dextrose      sodium chloride      sodium bicarbonate infusion 100 mL/hr at 08/11/22 0537    dextrose      sodium chloride      dextrose         Meds prn:     ondansetron, sodium chloride, glucose, dextrose bolus **OR** dextrose bolus, glucagon (rDNA), dextrose, oxyCODONE, sodium chloride, [Held by provider] HYDROmorphone, [Held by provider] morphine, glucose, dextrose bolus **OR** dextrose bolus, glucagon (rDNA), dextrose, sodium chloride, hyoscyamine, lipase-protease-amylase, senna, oxyCODONE-acetaminophen, glucose, dextrose bolus **OR** dextrose bolus, glucagon (rDNA), dextrose    Meds prior to admission:     No current facility-administered medications on file prior to encounter.      Current Outpatient Medications on File Prior to Encounter   Medication Sig Dispense Refill atorvastatin (LIPITOR) 10 MG tablet Take 10 mg by mouth at bedtime      calcium carbonate (TUMS) 500 MG chewable tablet Take 1 tablet by mouth every evening      glucose (GLUTOSE) 40 % GEL Take 15 g by mouth as needed (hypoglycemia)      magnesium hydroxide (MILK OF MAGNESIA) 400 MG/5ML suspension Take 30 mLs by mouth daily as needed for Constipation      senna (SENOKOT) 8.6 MG tablet Take 1 tablet by mouth daily as needed for Constipation      acetaminophen (TYLENOL) 325 MG tablet Take 650 mg by mouth every 4 hours as needed for Pain      vitamin C (ASCORBIC ACID) 500 MG tablet Take 500 mg by mouth in the morning and 500 mg before bedtime. ondansetron (ZOFRAN) 4 MG tablet Take 4 mg by mouth every 8 hours as needed for Nausea or Vomiting      apixaban (ELIQUIS) 5 MG TABS tablet Take 1 tablet by mouth in the morning and 1 tablet before bedtime. 60 tablet 0    lipase-protease-amylase (CREON) 66467-10475 units delayed release capsule Take 12,000 Units by mouth 2 times daily as needed (diarrhea)      Multiple Vitamins-Minerals (PRESERVISION AREDS) CAPS Take 1 capsule by mouth 2 times daily      Magnesium Oxide (MAGNESIUM-OXIDE) 250 MG TABS tablet Take 250 mg by mouth 2 times daily      METAMUCIL FIBER PO Take 1 packet by mouth 2 times daily      diphenoxylate-atropine (LOMOTIL) 2.5-0.025 MG per tablet Take 1 tablet by mouth 4 times daily (before meals and nightly). loperamide (IMODIUM) 2 MG capsule Take 4 mg by mouth in the morning and 4 mg at noon and 4 mg in the evening. Take with meals. [DISCONTINUED] amLODIPine (NORVASC) 10 MG tablet Take 10 mg by mouth nightly       fluticasone (FLONASE) 50 MCG/ACT nasal spray 1 spray by Nasal route nightly      lipase-protease-amylase (CREON) 48306-17555 units delayed release capsule Take 24,000 Units by mouth in the morning and 24,000 Units at noon and 24,000 Units in the evening. Take with meals.       Probiotic CAPS Take 1 capsule by mouth nightly hyoscyamine (ANASPAZ;LEVSIN) 125 MCG tablet Take 125 mcg by mouth 4 times daily as needed (heartburn)      omeprazole (PRILOSEC) 20 MG delayed release capsule Take 40 mg by mouth every morning       aspirin 81 MG tablet Take 81 mg by mouth nightly       Cholecalciferol (VITAMIN D3) 50 MCG (2000 UT) TABS Take 2,000 Units by mouth 2 times daily       Ferrous Sulfate (IRON) 325 (65 Fe) MG TABS Take 325 mg by mouth nightly       [DISCONTINUED] Coenzyme Q10 (COQ10 PO) Take 1 capsule by mouth nightly       vitamin B-12 (CYANOCOBALAMIN) 500 MCG tablet Place 500 mcg under the tongue nightly       Bascom 3 1000 MG CAPS Take 1,000 mg by mouth nightly      escitalopram (LEXAPRO) 20 MG tablet Take 20 mg by mouth nightly. primidone (MYSOLINE) 250 MG tablet Take 250 mg by mouth nightly       traZODone (DESYREL) 50 MG tablet Take 50 mg by mouth nightly          Allergies:    Fentanyl, Levofloxacin, Tramadol, and Vioxx [rofecoxib]    Social History:     reports that she has been smoking cigarettes. She has been smoking an average of 1 pack per day. She has never used smokeless tobacco. She reports that she does not currently use alcohol. She reports that she does not use drugs. Family History:     No family history on file.     Physical Exam:      Patient Vitals for the past 24 hrs:   BP Temp Temp src Pulse Resp SpO2   08/11/22 0700 -- -- -- 95 23 (!) 89 %   08/11/22 0649 (!) 101/38 -- -- -- -- --   08/11/22 0648 -- 98.9 °F (37.2 °C) -- 96 24 92 %   08/11/22 0645 -- -- -- 98 24 90 %   08/11/22 0630 -- -- -- 98 29 92 %   08/11/22 0615 -- -- -- 96 (!) 36 91 %   08/11/22 0600 -- -- -- 99 19 (!) 84 %   08/11/22 0500 -- -- -- 97 25 94 %   08/11/22 0400 -- 98.6 °F (37 °C) Temporal 95 22 94 %   08/11/22 0300 -- -- -- 91 22 97 %   08/11/22 0200 -- -- -- 93 26 95 %   08/11/22 0100 -- -- -- (!) 107 (!) 35 91 %   08/11/22 0000 -- 98.5 °F (36.9 °C) Temporal (!) 107 24 95 %   08/10/22 2300 -- -- -- 95 28 94 %   08/10/22 2200 -- -- -- 100 27 96 %   08/10/22 2100 (!) 100/33 -- -- 100 (!) 33 92 %   08/10/22 2000 (!) 95/35 99.8 °F (37.7 °C) Temporal 95 28 94 %   08/10/22 1900 (!) 98/40 -- -- 94 23 94 %   08/10/22 1800 (!) 92/39 -- -- 94 23 93 %   08/10/22 1700 (!) 86/41 -- -- 97 23 93 %   08/10/22 1600 (!) 112/33 97.3 °F (36.3 °C) Axillary 97 23 91 %   08/10/22 1500 (!) 96/36 -- -- 96 20 90 %   08/10/22 1400 (!) 102/52 -- -- 98 19 93 %   08/10/22 1300 (!) 92/52 97.6 °F (36.4 °C) Axillary 99 25 94 %   08/10/22 1134 -- -- -- 97 28 99 %         Intake/Output Summary (Last 24 hours) at 8/11/2022 1123  Last data filed at 8/11/2022 1041  Gross per 24 hour   Intake 6291.72 ml   Output 345 ml   Net 5946.72 ml     General: Awake, alert, no acute distress  Neck: No JVD noted  Lungs: Clear bilaterally upper, diminished to the bases bilaterally. Unlabored  CV: Regular rate and rhythm. No rub  Abd: tender, Active bowel sounds; ostomy; PEG tube  Skin: Warm and dry.   No rash on exposed extremities  Ext: No edema   Neuro: Awake, answers questions appropriately    Data:    Recent Labs     08/09/22  0604 08/09/22  2038 08/10/22  0556 08/11/22  0458   WBC 13.0*  --  25.3* 20.2*   HGB 6.2* 7.9* 7.9* 5.5*   HCT 19.5* 23.4* 23.7* 16.9*   MCV 95.6  --  90.5 94.4     --  271 166       Recent Labs     08/08/22  1221 08/08/22  1829 08/09/22  0604 08/09/22  1641 08/10/22  0556 08/10/22  1111 08/10/22  1745 08/10/22  2026 08/10/22  2157 08/11/22  0046 08/11/22  0458      < > 127*   < > 131*   < > 130* 126*  --  128*  --    K 5.7*   < > 6.8*   < > 7.0*   < > 5.4* 5.6*   < > 5.5*  5.6* 5.7*      < > 96*   < > 92*   < > 92* 87*  --  90*  --    CO2 19*   < > 19*   < > 24   < > 21* 22  --  20*  --    CREATININE 2.0*   < > 2.7*   < > 3.6*   < > 3.8* 3.8*  --  3.9*  --    BUN 36*   < > 47*   < > 58*   < > 55* 53*  --  55*  --    LABGLOM 24   < > 17   < > 12   < > 11 11  --  11  --    GLUCOSE 139*   < > 130*   < > 113*   < > 148* 273*  --  127*  --    CALCIUM 8.2* < > 7.3*   < > 7.1*   < > 6.8* 6.6*  --  6.7*  --    PHOS  --   --  5.2*  --   --   --   --   --   --   --   --    MG 1.6  --  1.5*  --  1.7  --   --   --   --   --   --     < > = values in this interval not displayed. Vit D, 25-Hydroxy   Date Value Ref Range Status   08/01/2022 9 (L) 30 - 100 ng/mL Final     Comment:     <20 ng/mL. ........... Micha Gutter Deficient  20-30 ng/mL. ......... Micha Gutter Insufficient   ng/mL. ........ Micha Gutter Sufficient  >100 ng/mL. .......... Micha Gutter Toxic         PTH   Date Value Ref Range Status   07/15/2022 307 (H) 15 - 65 pg/mL Final       Recent Labs     08/10/22  1137   ALT 12   AST 22   ALKPHOS 187*   BILITOT 0.6       Recent Labs     08/10/22  1137   LABALBU 2.0*       Ferritin   Date Value Ref Range Status   08/08/2022 160 ng/mL Final     Comment:     FERRITIN Reference Ranges:  Adult Males   20 - 60 years:    30 - 400 ng/mL  Adult females 16 - 61 years:    15 - 150 ng/mL  Adults greater than 60 years:   no established reference range  Pediatrics:                     no established reference range       Iron   Date Value Ref Range Status   08/08/2022 97 37 - 145 mcg/dL Final     TIBC   Date Value Ref Range Status   08/08/2022 195 (L) 250 - 450 mcg/dL Final       Vitamin B-12   Date Value Ref Range Status   08/08/2022 1764 (H) 211 - 946 pg/mL Final       Folate   Date Value Ref Range Status   08/08/2022 9.4 4.8 - 24.2 ng/mL Final       Lab Results   Component Value Date/Time    COLORU Yellow 08/08/2022 12:39 PM    NITRU Negative 08/08/2022 12:39 PM    GLUCOSEU Negative 08/08/2022 12:39 PM    KETUA Negative 08/08/2022 12:39 PM    UROBILINOGEN 0.2 08/08/2022 12:39 PM    BILIRUBINUR Negative 08/08/2022 12:39 PM       No results found for: ADEOLA, KHADRAAN, DEBBIO, SHAHAB    No components found for: URIC    No results found for: LIPIDPAN    Assessment and Plans:    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 HTN with CKD I-IV  BP goal <120/80  BP at/near goal  Monitor BPs  Levo prn     3. Met Acidosis   in the setting of the KD/ CKD   Also with ileostomy   On 0.45 sodium chloride with 75 mEq sodium bicarbonate at 100 mL/hour  Monitor labs     4. Secondary hyperparathyroidism of Renal Origin   with Hyperphosphatemia and Unspecified Vit D Def   on 7/15;  PO4 5.2 on 8/9,  Vit D 19 on 8/1   On vitamin D supplementation  Monitor labs  Ca low check ionized and replace prn     5. Hypomagnesemia  Mg++ 1.7     6. Hyperkalemia  likely due to diet an KD/CKD  Remains high despite numerous rounds of hyper k protocol and hco3 drip  Low potassium diet when able to eat  Cortisol adequate at 90  Start hd today     7.  anemia  Gi bleed  H/o ischemic colitis and ostomy  S/p PRBCs  Transfuse for hemoglobin<7  On ROSA  General surgery following-possible EGD when lytes stable    8. Leukocytosis/abd pain/hypotension  ?  Septic shock/hypovolemia/?acute abd  Treat yeast in urine  Start empiric abx  Get ct of abd pelvis  Lactate 7       Naveed Olson MD

## 2022-08-11 NOTE — PROGRESS NOTES
Stage  CI HR MAP TPRI SVI   Baseline 3.8 97 52 1099 39   Challenge 4.6 97 56 969 48   Result (%?) 22.2 0.3 7.7 -11.9 23.9

## 2022-08-11 NOTE — CONSULTS
Vascular Surgery Consultation Note    Reason for Consult:  dialysis access    Chief complaint:  Chief Complaint   Patient presents with    Other     Sent from NH for K+ of 6.8      Need for dialysis access    HPI :    This is a [de-identified] y.o. female who is admitted to the hospital for the treatment of multiple issues. She is noted to have hyperkalemia and oliguria. She is battled hyperkalemia in the past and seen a nephrologist as an outpatient. She was admitted to the MICU for hypotension etiology of which is not clear at this time. Nephrology has seen the patient and given her continued hyperkalemia despite medical management and her oliguria they have consulted vascular surgery for temporary dialysis catheter access. Patient has never been on dialysis before. Her baseline creatinine is noted to be 1.4-1.5mg/dL with an EGFR of 33 mL/min. She has a history of central catheter placement in the past.  Currently she has a internal jugular central venous catheter and a left femoral arterial line. Her maps are greater than 65 mmHg.       ROS : Negative if blank [], Positive if [x]  General Vascular   [] Fevers [] Claudication (Blocks)   [] Chills [] Rest Pain   [] Weight Loss [] Tissue Loss   [] Chest Pain [] Clotting Disorder    [] SOB at rest [] Leg Swelling   [] SOB with exertion [] DVT/PE      [x] Nausea    [x] Vomitting [] Stroke/TIA   [x] Abdominal Pain [] Focal weakness   [] Melena [] Slurred Speech   [] Hematochezia [] Vision Changes   [] Hematuria    [] Dysuria [x] Hx of Central Catheters   [x] Wears Glasses/Contacts  [] Dialysis and If so date initiated   [] Blindness     [x] Right Hand Dominant   [] Difficulty swallowing        Past Medical History:   Diagnosis Date    Acute kidney failure ([facility] [address] 2014 and 8/2016    x2,no dialysis needed,resolved, kidney function tests returned to normal    Anxiety     Arthritis     CAD (coronary artery disease)     follows with Dr. Radha Reagan every 6 months    Cancer [facility]) skin, leg,nose- removed surgically     COPD (chronic obstructive pulmonary disease) (HCC)     mild- no inhlaers, no oxygen     Depression     Fibromyalgia     chronic back and neck pain    Generalized headaches     h/o / daily    History of blood transfusion 3-11-14    multiple times    History of necrotic bowel 2012    Hyperlipidemia     Hypertension     Incisional hernia     abdomen    Insomnia     Mitral valve regurgitation     Myocardial infarct ([address] 2002    Occasional tremors     at hands / stable with medication    Osteopenia     PONV (postoperative nausea and vomiting)     Vitamin B12 deficiency     h/o        Past Surgical History:   Procedure Laterality Date    ABDOMEN SURGERY  2-    total colectomy, bowel resection, ileostomy,revision of ileostomy     ABDOMEN SURGERY N/A 1/26/2020    DRAINAGE OF ABDOMINAL WALL ABSCESS, EXPLANTATION OF MESH, DEBRIDEMENT OF SKIN AND SUBCUTANEOUS TISSUE performed by Taryn Posey MD at [facility] N/A 9/11/2020    EXPLANTATION OF HERNIA MESH performed by Taryn Posey MD at [address] [place] [address]  2002    ARTHROPLASTY  1-2006    right and left thumb    BACK SURGERY  1991, 2004, 2009    lumbar fusion x 3    BACK SURGERY      cervical fusion x 2    BREAST SURGERY  years ago    monor calcifications    CARPAL TUNNEL RELEASE Bilateral     CATARACT REMOVAL WITH IMPLANT Right 03/03/15    CHOLECYSTECTOMY  5-11-07    Lap    COLONOSCOPY      CORONARY ANGIOPLASTY WITH STENT PLACEMENT  1-7-2002    ENDOSCOPY, COLON, DIAGNOSTIC      EPIGASTRIC HERNIA REPAIR  3/21/16    incisional with mesh implantation    ESOPHAGUS SURGERY  1-4-13    esophageal clamp (torn Esophagus)    FINGER TRIGGER RELEASE Right 2006    index finger    FOOT SURGERY Right     multiple    HERNIA REPAIR  12-31-13    abdominal x 5- last one 2017     ILEOSTOMY OR JEJUNOSTOMY  1-3-13    revision    INSERT PICC LINE  06/09/2020    and removed     JOINT REPLACEMENT Right 3/24/15    right total shoulder arthroplasty    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     LAPAROTOMY N/A 5/12/2020    EXPLORATORY LAPAROTOMY EXPLANTATION OF INFECTED MESH SMAL BOWEL RESECTION AND REVISION END ILEOSTOMY, LYSIS OF ADHESIONS performed by Taryn Posey MD at [address] N/A 6/8/2020    LAPAROTOMY EXPLORATORY, Jeffery Ratel OF HERNIA MESH performed by Taryn Posey MD at [address] [place] [address] N/A 9/15/2020    EXPLORATORY LAPAROTOMY WITH SMALL BOWEL RESECTION, TAKE DOWN REVISION ENTEROCUTANEOUS FISTULA , OSTOMY REVISION performed by Taryn Posey MD at [address] / leg    NASAL SINUS SURGERY      x2 /  cauterized    OTHER SURGICAL HISTORY  08/24/2016    diagnostic laparotomy with repair of intraabdominal hernia    OTHER SURGICAL HISTORY      removal plate / screws  / right great toe    PICC LINE INSERTION NURSE  9/15/2020         PORT SURGERY N/A 7/19/2022    MEDIPORT REMOVAL performed by Sherman Nelson MD at [address] [place] [address]  2010    right     SKIN BIOPSY  2010    3 squamous cell carcinoma right leg, left leg       Current Medications:    norepinephrine 7 mcg/min (08/11/22 0715)    sodium chloride      sodium chloride      dextrose      sodium chloride      sodium bicarbonate infusion 100 mL/hr at 08/11/22 0537    dextrose      sodium chloride        ondansetron, sodium chloride, sodium chloride, dextrose bolus **OR** [DISCONTINUED] dextrose bolus, dextrose, oxyCODONE, sodium chloride, [Held by provider] HYDROmorphone, [Held by provider] morphine, glucose, glucagon (rDNA), dextrose, sodium chloride, hyoscyamine, lipase-protease-amylase, senna, oxyCODONE-acetaminophen, [DISCONTINUED] dextrose bolus **OR** dextrose bolus    albumin human  25 g IntraVENous Q6H    lidocaine  1 patch TransDERmal Daily    sodium zirconium cyclosilicate  10 g Oral TID    sodium polystyrene  15 g Oral Once    fluconazole  200 mg Oral Daily    piperacillin-tazobactam  4,500 mg IntraVENous Q12H    pantoprazole (PROTONIX) 40 mg injection  40 mg IntraVENous Q12H    sucralfate  1 g Oral 4 times per day    epoetin ludin-epbx  30 Units/kg SubCUTAneous Once per day on Mon Wed Fri    calcium carbonate  500 mg Oral Nightly    vitamin D  2,000 Units Oral BID    diphenoxylate-atropine  1 tablet Oral 4x Daily AC & HS    escitalopram  20 mg Oral Nightly    ferrous sulfate  325 mg Oral Nightly    fluticasone  1 spray Each Nostril Nightly    lipase-protease-amylase  24,000 Units Oral TID WC    loperamide  4 mg Oral TID WC    magnesium oxide  400 mg Oral BID    ocuvite-lutein  1 capsule Oral BID    pravastatin  40 mg Oral Nightly    primidone  250 mg Oral Nightly    traZODone  50 mg Oral Nightly    vitamin B-12  500 mcg Oral Nightly        Home Medications:  Prior to Admission medications    Medication Sig Start Date End Date Taking? Authorizing Provider   atorvastatin (LIPITOR) 10 MG tablet Take 10 mg by mouth at bedtime   Yes Historical Provider, MD   calcium carbonate (TUMS) 500 MG chewable tablet Take 1 tablet by mouth every evening   Yes Historical Provider, MD   glucose (GLUTOSE) 40 % GEL Take 15 g by mouth as needed (hypoglycemia)   Yes Historical Provider, MD   magnesium hydroxide (MILK OF MAGNESIA) 400 MG/5ML suspension Take 30 mLs by mouth daily as needed for Constipation   Yes Historical Provider, MD   senna (SENOKOT) 8.6 MG tablet Take 1 tablet by mouth daily as needed for Constipation   Yes Historical Provider, MD   acetaminophen (TYLENOL) 325 MG tablet Take 650 mg by mouth every 4 hours as needed for Pain   Yes Historical Provider, MD   vitamin C (ASCORBIC ACID) 500 MG tablet Take 500 mg by mouth in the morning and 500 mg before bedtime.    Yes Historical Provider, MD   ondansetron (ZOFRAN) 4 MG tablet Take 4 mg by mouth every 8 hours as needed for Nausea or Vomiting   Yes Historical Provider, MD   apixaban (ELIQUIS) 5 MG TABS tablet Take 1 tablet by mouth in the morning and 1 tablet before bedtime. 7/21/22 8/20/22  Ashley Swain MD   lipase-protease-amylase (CREON) 69736-52110 units delayed release capsule Take 12,000 Units by mouth 2 times daily as needed (diarrhea)    Historical Provider, MD   Multiple Vitamins-Minerals (PRESERVISION AREDS) CAPS Take 1 capsule by mouth 2 times daily    Historical Provider, MD   Magnesium Oxide (MAGNESIUM-OXIDE) 250 MG TABS tablet Take 250 mg by mouth 2 times daily    Historical Provider, MD   METAMUCIL FIBER PO Take 1 packet by mouth 2 times daily    Historical Provider, MD   diphenoxylate-atropine (LOMOTIL) 2.5-0.025 MG per tablet Take 1 tablet by mouth 4 times daily (before meals and nightly). Historical Provider, MD   loperamide (IMODIUM) 2 MG capsule Take 4 mg by mouth in the morning and 4 mg at noon and 4 mg in the evening. Take with meals. Historical Provider, MD   amLODIPine (NORVASC) 10 MG tablet Take 10 mg by mouth nightly   7/31/22  Historical Provider, MD   fluticasone (FLONASE) 50 MCG/ACT nasal spray 1 spray by Nasal route nightly    Historical Provider, MD   lipase-protease-amylase (CREON) 40266-12332 units delayed release capsule Take 24,000 Units by mouth in the morning and 24,000 Units at noon and 24,000 Units in the evening. Take with meals.     Historical Provider, MD   Probiotic CAPS Take 1 capsule by mouth nightly     Historical Provider, MD   hyoscyamine (ANASPAZ;LEVSIN) 125 MCG tablet Take 125 mcg by mouth 4 times daily as needed (heartburn)    Historical Provider, MD   omeprazole (PRILOSEC) 20 MG delayed release capsule Take 40 mg by mouth every morning     Historical Provider, MD   aspirin 81 MG tablet Take 81 mg by mouth nightly     Historical Provider, MD   Cholecalciferol (VITAMIN D3) 50 MCG (2000 UT) TABS Take 2,000 Units by mouth 2 times daily     Historical Provider, MD   Ferrous Sulfate (IRON) 325 (65 Fe) MG TABS Take 325 mg by mouth nightly     Historical Provider, MD   Coenzyme Q10 (COQ10 PO) Take 1 capsule by mouth nightly 7/31/22  Historical Provider, MD   vitamin B-12 (CYANOCOBALAMIN) 500 MCG tablet Place 500 mcg under the tongue nightly     Historical Provider, MD   Omega 3 1000 MG CAPS Take 1,000 mg by mouth nightly    Historical Provider, MD   escitalopram (LEXAPRO) 20 MG tablet Take 20 mg by mouth nightly. Historical Provider, MD   primidone (MYSOLINE) 250 MG tablet Take 250 mg by mouth nightly     Historical Provider, MD   traZODone (DESYREL) 50 MG tablet Take 50 mg by mouth nightly     Historical Provider, MD       Allergies:  Fentanyl, Levofloxacin, Tramadol, and Vioxx [rofecoxib]    Social History     Socioeconomic History    Marital status:      Spouse name: Not on file    Number of children: Not on file    Years of education: Not on file    Highest education level: Not on file   Occupational History    Not on file   Tobacco Use    Smoking status: Every Day     Packs/day: 1.00     Types: Cigarettes    Smokeless tobacco: Never   Vaping Use    Vaping Use: Never used   Substance and Sexual Activity    Alcohol use: Not Currently     Comment: occasional    Drug use: No    Sexual activity: Not on file   Other Topics Concern    Not on file   Social History Narrative    Not on file     Social Determinants of Health     Financial Resource Strain: Not on file   Food Insecurity: Not on file   Transportation Needs: Not on file   Physical Activity: Not on file   Stress: Not on file   Social Connections: Not on file   Intimate Partner Violence: Not on file   Housing Stability: Not on file        No family history on file.     PHYSICAL EXAM:    BP (!) 116/42   Pulse 100   Temp 97.8 °F (36.6 °C)   Resp 20   Ht 5' (1.524 m)   Wt 101 lb (45.8 kg)   SpO2 96%   BMI 19.73 kg/m²   CONSTITUTIONAL:  no apparent distress, appears stated age  NEURO:  Grossly normal, no slurred speech  EYES: anicteric, lids and lashes normal  HENT:  normocepalic, mucous membranes dry  NECK:  supple, symmetrical, trachea midline  LUNGS:  Nonlabored on supplemental O2, nocough  CARDIOVASCULAR:  tachycardic, normotensive  ABDOMEN:  soft, mildly distended, mildly TTP throughout, ostomy at right hemiabdomen productive of liquid stool  SKIN:  no obvious rashes  EXTREMITIES:   R UE    Swelling absent   Incisions absent                        L UE    Swelling absent   Incisions absent                        R LE    Edema absent    Incisions absent               Varicose veins absent               Wounds absent  L LE     Edema absent    Incisions absent               Varicose veins absent               Wounds absent  R brachial 2 L brachial 2   R radial 2 L radial 2   R femoral 2 L femoral Arterial line   R popliteal  L popliteal    R posterior tibial  L posterior tibial    R dorsalis pedis  L dorsalis pedis    Above definitions: 2 = palpable, 1 = trace, 0 = nonpalpable    LABS:    Lab Results   Component Value Date    WBC 20.2 (H) 08/11/2022    HGB 6.8 (LL) 08/11/2022    HCT 20.4 (L) 08/11/2022     08/11/2022    PROTIME 19.0 (H) 07/24/2022    INR 1.7 07/24/2022    K 5.4 (H) 08/11/2022    K 5.5 (H) 08/11/2022    BUN 53 (H) 08/11/2022    CREATININE 3.7 (H) 08/11/2022         Assesment/Plan  43-year-old female who requires dialysis access for hyperkalemia and oliguria. Right common femoral temporary dialysis catheter placed at the bedside. Okay to use immediately for dialysis. Please page with any questions or concerns.           Electronically signed by Allison Baltazar MD on 8/11/2022 at 4:29 PM

## 2022-08-11 NOTE — PROGRESS NOTES
(2012)  Lactic acidosis likely type A/D sepsis versus short gut syndrome  Secondary hyperparathyroidism of renal origin-, P5.2, vitamin D 19  Urine Candidia infection- on diflucan  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 Hx Short Gut syndrome   Hx HTN   Hx HLD LDL 30 on 8/1/2022 on Pravachol 40  Hx CAD s/p stent placement in 2002   Hx COPD  Prior concern for PE was on Eliquis: Blood thinners currently on hold    Plan:  Continue vancomycin and Zosyn for septic shock  Continue Diflucan for urinary candidal infection  Continue Levophed, wean as able  Patient fluid responsive on NICOM initially with SVI of 23%, IV fluids given, repeat NICOM  Follow blood culture, respiratory culture, MRSA nares  Follow lactic acid every 6 hourly  Patient has worsening creatinine and has minimal urine output, nephrology is following, patient is planned for hemodialysis. Vascular consulted for catheter placement. Continue bicarb drip 75 mEq in 0.5% NS at 100 cc/h as per nephro recommendations  Monitor BMP every 6 hourly, magnesium and phosphorous daily. Replace electrolyte as needed  Monitor potassium closely for hyperkalemia   Continue Lokelma  Eliquis currently on hold 2/2 concern of GI bleeding  Patient had drop in Hgb, s/p 4 unit PRBC since yesterday. Last Hgb 6.8, 1 PRBC ordered  Follow posttransfusion H&H  Continue Carafate and PPI for GI bleed  For EGD to tomorrow rule out upper GI bleed, general surgery following  Patient had bilious emesis, tube feeds currently on hold will resume as able. Follow CT abdomen results.     # Diet:NPO  # Bowel regimen: senna, ileostomy in place, has short gut syndrome on Lomotil and loperamide  # Peptic ulcer prophylaxis: PPI twice daily  # DVT Prophylaxis: PCD's  # Disposition: Cont current care     Kenton Guerrero MD, PGY-2    Attending Physician: Dr. Carine Wayne  Department of Pulmonary, Critical Care and Sleep Medicine  [address] [facility]  Department of Internal Medicine      During multidisciplinary team rounds Karl linton a [de-identified] y.o. female was seen, examined and discussed. This is confirmation that I have personally seen and examined the patient and that the key elements of the encounter were performed by me (> 85 % time). The medications & laboratory data was discussed and adjusted where necessary. The radiographic images were reviewed or with radiologist or consultant if felt dis-concordant with the exam or history. The above findings were corroborated, plans confirmed and changes made if needed. Family is updated at the bedside as available. Key issues of the case were discussed among consultants. Critical Care time is documented if appropriate.       Ekta Iqbal DO, FACP, FCCP, Lydia carranza,

## 2022-08-11 NOTE — PROCEDURES
Arterial line placement    Procedure: Left Femoral arterial line placement. Indications: Continuous monitoring of blood pressure in a patient with hypotension +/- shock, on Levophed. Anesthesia: Local infiltration of 1% lidocaine. Consent:  The patient provided verbal consent for this procedure. Technique: Time Out: Immediately prior to the procedure a \"timeout\" was called to verify the correct patient and procedure. Procedure was done using strict aseptic technique. Giovanny's test was performed and was normal. Left Femoral site was cleaned with chloraprep and draped. Left Femoral was identified, then Lidocaine 1% was infiltrated locally. Arterial line was inserted, a good blood flow was obtained, after which guidewire was inserted all the way with no resistance. Then the canula was inserted and needle with guidewire was withdrawn. Pulsatile bright red blood flow was observed. The canula was connected to BP monitoring apparatus and a good quality waveform was noted. Then the canula was secured with 2 stay sutures of 2-0 silk after Lidocaine infiltration, following which dressing was applied. Number of sticks: 2. Number of Kits used: 2. Complications: No immediate complication. Estimated blood loss: About 5 ml. Comment: Patient tolerated the procedure well.      Marni Taylor MD PGY-2  8/10/2022 10:38 PM

## 2022-08-11 NOTE — PROGRESS NOTES
GENERAL SURGERY  DAILY PROGRESS NOTE  8/11/2022    Chief Complaint   Patient presents with    Other     Sent from NH for K+ of 6.8       Subjective:  Patient reports diffuse abdominal pain this morning, saying that the pain comes and goes in waves. Ostomy output looks the best it has during this admission, thick and green, no blood. Patient was on tube feeds for several hours yesterday, then vomited mostly bilious vomit midnight. No residual.    K+: 5.7  Objective:  BP (!) 92/39   Pulse 94   Temp 98.6 °F (37 °C)   Resp 23   Ht 5' (1.524 m)   Wt 101 lb (45.8 kg)   SpO2 93%   BMI 19.73 kg/m²     GENERAL:  Laying in bed, awake, alert, cooperative, no apparent distress  HEAD: Normocephalic, atraumatic  EYES: No sclera icterus, pupils equal  LUNGS:  No increased work of breathing  CARDIOVASCULAR:  RR  ABDOMEN:  Soft, diffusely tender to palpation. Ostomy with green output. EXTREMITIES: No edema or swelling  SKIN: Warm and dry    Assessment/Plan:  [de-identified] y.o. female with GI bleed, KD, hyperkalemia    - Hyperkalemia improved somewhat  - this needs to be corrected prior to EGD  - monitor H/H -reported as 5.5 this a.m., 2 units PRBC have been ordered  - PPI, carafate     Will discuss with Dr. Renata Bhagat    Electronically signed by Roosevelt Martin DO on 8/11/2022 at 6:37 AM    Seen/examined  Events noted  CT was ordered by MICU team, not completed yet  Emesis when tube feeds started  Worsening creatinine  BP marginal, levo gtt ordered to start  No bloody ostomy output  Stoma remains pink   Hb 5.5  K 5.7  Lactate 7.2  Continue supportive care  Continue PPI BID  Replace red cells- should improve oxygen carrying capacity and lactate level  No evidence of ischemic enteritis at this point;  However with her extensive surgical history and limited remaining small bowel, I would not recommend surgical exploration if her condition deteriorates further  Tentatively scheduled EGD for tomorrow afternoon pending clinical course  Family should be made aware that her prognosis seems poor  JSGadyMD

## 2022-08-12 ENCOUNTER — ANESTHESIA EVENT (OUTPATIENT)
Dept: ENDOSCOPY | Age: 82
DRG: 377 | End: 2022-08-12
Payer: MEDICARE

## 2022-08-12 ENCOUNTER — ANESTHESIA (OUTPATIENT)
Dept: ENDOSCOPY | Age: 82
DRG: 377 | End: 2022-08-12
Payer: MEDICARE

## 2022-08-12 LAB
ABO/RH: NORMAL
ANGLE (CLOT STRENGTH): 63.5 DEGREE (ref 59–74)
ANGLE (CLOT STRENGTH): 73.1 DEGREE (ref 59–74)
ANION GAP SERPL CALCULATED.3IONS-SCNC: 11 MMOL/L (ref 7–16)
ANTIBODY SCREEN: NORMAL
BLOOD BANK DISPENSE STATUS: NORMAL
BLOOD BANK PRODUCT CODE: NORMAL
BPU ID: NORMAL
BUN BLDV-MCNC: 27 MG/DL (ref 6–23)
CALCIUM IONIZED: 1.04 MMOL/L (ref 1.15–1.33)
CALCIUM SERPL-MCNC: 7.3 MG/DL (ref 8.6–10.2)
CHLORIDE BLD-SCNC: 97 MMOL/L (ref 98–107)
CO2: 27 MMOL/L (ref 22–29)
CREAT SERPL-MCNC: 2.3 MG/DL (ref 0.5–1)
DESCRIPTION BLOOD BANK: NORMAL
EPL-TEG: 0 % (ref 0–15)
EPL-TEG: 0 % (ref 0–15)
G-TEG: 10.9 K D/SC (ref 4.5–11)
G-TEG: 9 K D/SC (ref 4.5–11)
GFR AFRICAN AMERICAN: 25
GFR NON-AFRICAN AMERICAN: 20 ML/MIN/1.73
GLUCOSE BLD-MCNC: 87 MG/DL (ref 74–99)
HAV IGM SER IA-ACNC: NORMAL
HBV SURFACE AB TITR SER: NORMAL {TITER}
HCT VFR BLD CALC: 23 % (ref 34–48)
HCT VFR BLD CALC: 23.4 % (ref 34–48)
HEMOGLOBIN: 7.6 G/DL (ref 11.5–15.5)
HEMOGLOBIN: 7.9 G/DL (ref 11.5–15.5)
HEPATITIS B CORE IGM ANTIBODY: NORMAL
HEPATITIS B SURFACE ANTIGEN INTERPRETATION: NORMAL
HEPATITIS C ANTIBODY INTERPRETATION: NORMAL
INR BLD: 1.5
INR BLD: 5.4
K (CLOTTING TIME): 1.1 MIN (ref 1–3)
K (CLOTTING TIME): 2 MIN (ref 1–3)
LACTIC ACID: 0.7 MMOL/L (ref 0.5–2.2)
LACTIC ACID: 0.8 MMOL/L (ref 0.5–2.2)
LY30 (FIBRINOLYSIS): 0 % (ref 0–8)
LY30 (FIBRINOLYSIS): 0 % (ref 0–8)
MA (MAX AMPLITUDE): 64.3 MM (ref 50–70)
MA (MAX AMPLITUDE): 68.6 MM (ref 50–70)
MAGNESIUM: 1.8 MG/DL (ref 1.6–2.6)
MCH RBC QN AUTO: 30.2 PG (ref 26–35)
MCHC RBC AUTO-ENTMCNC: 33.8 % (ref 32–34.5)
MCV RBC AUTO: 89.3 FL (ref 80–99.9)
METER GLUCOSE: 90 MG/DL (ref 74–99)
MRSA CULTURE ONLY: NORMAL
PDW BLD-RTO: 17 FL (ref 11.5–15)
PHOSPHORUS: 4.3 MG/DL (ref 2.5–4.5)
PLATELET # BLD: 102 E9/L (ref 130–450)
PMV BLD AUTO: 11.5 FL (ref 7–12)
POTASSIUM REFLEX MAGNESIUM: 3.8 MMOL/L (ref 3.5–5)
POTASSIUM SERPL-SCNC: 3.7 MMOL/L (ref 3.5–5)
PROTHROMBIN TIME: 16.3 SEC (ref 9.3–12.4)
PROTHROMBIN TIME: 60 SEC (ref 9.3–12.4)
R (REACTION TIME): 11.5 MIN (ref 5–10)
R (REACTION TIME): 6.2 MIN (ref 5–10)
RBC # BLD: 2.62 E12/L (ref 3.5–5.5)
SODIUM BLD-SCNC: 135 MMOL/L (ref 132–146)
WBC # BLD: 8.8 E9/L (ref 4.5–11.5)

## 2022-08-12 PROCEDURE — 3700000001 HC ADD 15 MINUTES (ANESTHESIA): Performed by: SURGERY

## 2022-08-12 PROCEDURE — 85018 HEMOGLOBIN: CPT

## 2022-08-12 PROCEDURE — 83735 ASSAY OF MAGNESIUM: CPT

## 2022-08-12 PROCEDURE — 2500000003 HC RX 250 WO HCPCS: Performed by: STUDENT IN AN ORGANIZED HEALTH CARE EDUCATION/TRAINING PROGRAM

## 2022-08-12 PROCEDURE — 99233 SBSQ HOSP IP/OBS HIGH 50: CPT | Performed by: INTERNAL MEDICINE

## 2022-08-12 PROCEDURE — 2580000003 HC RX 258: Performed by: INTERNAL MEDICINE

## 2022-08-12 PROCEDURE — 85014 HEMATOCRIT: CPT

## 2022-08-12 PROCEDURE — 6360000002 HC RX W HCPCS: Performed by: SURGERY

## 2022-08-12 PROCEDURE — 3700000000 HC ANESTHESIA ATTENDED CARE: Performed by: SURGERY

## 2022-08-12 PROCEDURE — 85576 BLOOD PLATELET AGGREGATION: CPT

## 2022-08-12 PROCEDURE — 2709999900 HC NON-CHARGEABLE SUPPLY: Performed by: SURGERY

## 2022-08-12 PROCEDURE — 85347 COAGULATION TIME ACTIVATED: CPT

## 2022-08-12 PROCEDURE — 84132 ASSAY OF SERUM POTASSIUM: CPT

## 2022-08-12 PROCEDURE — 36620 INSERTION CATHETER ARTERY: CPT

## 2022-08-12 PROCEDURE — 6360000002 HC RX W HCPCS: Performed by: NURSE ANESTHETIST, CERTIFIED REGISTERED

## 2022-08-12 PROCEDURE — 85384 FIBRINOGEN ACTIVITY: CPT

## 2022-08-12 PROCEDURE — 85610 PROTHROMBIN TIME: CPT

## 2022-08-12 PROCEDURE — 85027 COMPLETE CBC AUTOMATED: CPT

## 2022-08-12 PROCEDURE — 83605 ASSAY OF LACTIC ACID: CPT

## 2022-08-12 PROCEDURE — P9016 RBC LEUKOCYTES REDUCED: HCPCS

## 2022-08-12 PROCEDURE — 2580000003 HC RX 258: Performed by: SURGERY

## 2022-08-12 PROCEDURE — 2580000003 HC RX 258: Performed by: NURSE ANESTHETIST, CERTIFIED REGISTERED

## 2022-08-12 PROCEDURE — 2000000000 HC ICU R&B

## 2022-08-12 PROCEDURE — 2580000003 HC RX 258: Performed by: STUDENT IN AN ORGANIZED HEALTH CARE EDUCATION/TRAINING PROGRAM

## 2022-08-12 PROCEDURE — 6370000000 HC RX 637 (ALT 250 FOR IP): Performed by: STUDENT IN AN ORGANIZED HEALTH CARE EDUCATION/TRAINING PROGRAM

## 2022-08-12 PROCEDURE — 2580000003 HC RX 258

## 2022-08-12 PROCEDURE — 82962 GLUCOSE BLOOD TEST: CPT

## 2022-08-12 PROCEDURE — 86901 BLOOD TYPING SEROLOGIC RH(D): CPT

## 2022-08-12 PROCEDURE — 2500000003 HC RX 250 WO HCPCS: Performed by: INTERNAL MEDICINE

## 2022-08-12 PROCEDURE — 99223 1ST HOSP IP/OBS HIGH 75: CPT | Performed by: SURGERY

## 2022-08-12 PROCEDURE — 6370000000 HC RX 637 (ALT 250 FOR IP): Performed by: FAMILY MEDICINE

## 2022-08-12 PROCEDURE — 80048 BASIC METABOLIC PNL TOTAL CA: CPT

## 2022-08-12 PROCEDURE — 36430 TRANSFUSION BLD/BLD COMPNT: CPT

## 2022-08-12 PROCEDURE — A4216 STERILE WATER/SALINE, 10 ML: HCPCS | Performed by: SURGERY

## 2022-08-12 PROCEDURE — 2700000000 HC OXYGEN THERAPY PER DAY

## 2022-08-12 PROCEDURE — 6370000000 HC RX 637 (ALT 250 FOR IP): Performed by: INTERNAL MEDICINE

## 2022-08-12 PROCEDURE — 0DJ08ZZ INSPECTION OF UPPER INTESTINAL TRACT, VIA NATURAL OR ARTIFICIAL OPENING ENDOSCOPIC: ICD-10-PCS | Performed by: SURGERY

## 2022-08-12 PROCEDURE — 86900 BLOOD TYPING SEROLOGIC ABO: CPT

## 2022-08-12 PROCEDURE — 82330 ASSAY OF CALCIUM: CPT

## 2022-08-12 PROCEDURE — 86923 COMPATIBILITY TEST ELECTRIC: CPT

## 2022-08-12 PROCEDURE — 6360000002 HC RX W HCPCS

## 2022-08-12 PROCEDURE — 3609017100 HC EGD: Performed by: SURGERY

## 2022-08-12 PROCEDURE — 86850 RBC ANTIBODY SCREEN: CPT

## 2022-08-12 PROCEDURE — C9113 INJ PANTOPRAZOLE SODIUM, VIA: HCPCS | Performed by: SURGERY

## 2022-08-12 PROCEDURE — 6370000000 HC RX 637 (ALT 250 FOR IP): Performed by: SURGERY

## 2022-08-12 PROCEDURE — 36415 COLL VENOUS BLD VENIPUNCTURE: CPT

## 2022-08-12 PROCEDURE — 6360000002 HC RX W HCPCS: Performed by: STUDENT IN AN ORGANIZED HEALTH CARE EDUCATION/TRAINING PROGRAM

## 2022-08-12 PROCEDURE — 84100 ASSAY OF PHOSPHORUS: CPT

## 2022-08-12 PROCEDURE — 6370000000 HC RX 637 (ALT 250 FOR IP)

## 2022-08-12 PROCEDURE — P9059 PLASMA, FRZ BETWEEN 8-24HOUR: HCPCS

## 2022-08-12 RX ORDER — SODIUM CHLORIDE 9 MG/ML
INJECTION, SOLUTION INTRAVENOUS PRN
Status: DISCONTINUED | OUTPATIENT
Start: 2022-08-12 | End: 2022-08-12

## 2022-08-12 RX ORDER — SODIUM CHLORIDE 9 MG/ML
INJECTION, SOLUTION INTRAVENOUS CONTINUOUS PRN
Status: DISCONTINUED | OUTPATIENT
Start: 2022-08-12 | End: 2022-08-12 | Stop reason: SDUPTHER

## 2022-08-12 RX ORDER — SODIUM CHLORIDE 9 MG/ML
INJECTION, SOLUTION INTRAVENOUS PRN
Status: DISCONTINUED | OUTPATIENT
Start: 2022-08-12 | End: 2022-08-15

## 2022-08-12 RX ORDER — LIDOCAINE 4 G/G
1 PATCH TOPICAL DAILY
Status: DISCONTINUED | OUTPATIENT
Start: 2022-08-13 | End: 2022-08-18

## 2022-08-12 RX ORDER — PROPOFOL 10 MG/ML
INJECTION, EMULSION INTRAVENOUS PRN
Status: DISCONTINUED | OUTPATIENT
Start: 2022-08-12 | End: 2022-08-12 | Stop reason: SDUPTHER

## 2022-08-12 RX ORDER — SODIUM CHLORIDE 9 MG/ML
INJECTION, SOLUTION INTRAVENOUS CONTINUOUS
Status: DISCONTINUED | OUTPATIENT
Start: 2022-08-12 | End: 2022-08-14

## 2022-08-12 RX ADMIN — PIPERACILLIN AND TAZOBACTAM 4500 MG: 4; .5 INJECTION, POWDER, FOR SOLUTION INTRAVENOUS at 13:28

## 2022-08-12 RX ADMIN — FLUCONAZOLE 200 MG: 100 TABLET ORAL at 08:10

## 2022-08-12 RX ADMIN — PANCRELIPASE 24000 UNITS: 60000; 12000; 38000 CAPSULE, DELAYED RELEASE PELLETS ORAL at 08:09

## 2022-08-12 RX ADMIN — PHYTONADIONE 10 MG: 10 INJECTION, EMULSION INTRAMUSCULAR; INTRAVENOUS; SUBCUTANEOUS at 16:23

## 2022-08-12 RX ADMIN — SODIUM CHLORIDE, PRESERVATIVE FREE 40 MG: 5 INJECTION INTRAVENOUS at 21:15

## 2022-08-12 RX ADMIN — Medication 2000 UNITS: at 21:20

## 2022-08-12 RX ADMIN — PROPOFOL 40 MG: 10 INJECTION, EMULSION INTRAVENOUS at 15:45

## 2022-08-12 RX ADMIN — OXYCODONE AND ACETAMINOPHEN 1 TABLET: 5; 325 TABLET ORAL at 06:43

## 2022-08-12 RX ADMIN — PRIMIDONE 250 MG: 250 TABLET ORAL at 21:26

## 2022-08-12 RX ADMIN — SODIUM CHLORIDE: 9 INJECTION, SOLUTION INTRAVENOUS at 15:40

## 2022-08-12 RX ADMIN — SODIUM CHLORIDE: 9 INJECTION, SOLUTION INTRAVENOUS at 11:21

## 2022-08-12 RX ADMIN — Medication 1 CAPSULE: at 08:08

## 2022-08-12 RX ADMIN — SODIUM CHLORIDE: 9 INJECTION, SOLUTION INTRAVENOUS at 22:52

## 2022-08-12 RX ADMIN — SODIUM ZIRCONIUM CYCLOSILICATE 10 G: 10 POWDER, FOR SUSPENSION ORAL at 21:19

## 2022-08-12 RX ADMIN — CALCIUM GLUCONATE 1000 MG: 98 INJECTION, SOLUTION INTRAVENOUS at 08:13

## 2022-08-12 RX ADMIN — LOPERAMIDE HYDROCHLORIDE 4 MG: 2 CAPSULE ORAL at 08:08

## 2022-08-12 RX ADMIN — TRAZODONE HYDROCHLORIDE 50 MG: 50 TABLET ORAL at 21:19

## 2022-08-12 RX ADMIN — Medication 9 MCG/MIN: at 07:54

## 2022-08-12 RX ADMIN — Medication 1 CAPSULE: at 21:26

## 2022-08-12 RX ADMIN — ESCITALOPRAM OXALATE 20 MG: 10 TABLET ORAL at 21:19

## 2022-08-12 RX ADMIN — HYOSCYAMINE SULFATE 125 MCG: 0.12 TABLET ORAL at 17:55

## 2022-08-12 RX ADMIN — FLUTICASONE PROPIONATE 1 SPRAY: 50 SPRAY, METERED NASAL at 21:27

## 2022-08-12 RX ADMIN — PIPERACILLIN AND TAZOBACTAM 4500 MG: 4; .5 INJECTION, POWDER, FOR SOLUTION INTRAVENOUS at 08:04

## 2022-08-12 RX ADMIN — FERROUS SULFATE TAB 325 MG (65 MG ELEMENTAL FE) 325 MG: 325 (65 FE) TAB at 21:19

## 2022-08-12 RX ADMIN — SODIUM BICARBONATE: 84 INJECTION, SOLUTION INTRAVENOUS at 04:20

## 2022-08-12 RX ADMIN — DIPHENOXYLATE HYDROCHLORIDE AND ATROPINE SULFATE 1 TABLET: 2.5; .025 TABLET ORAL at 21:20

## 2022-08-12 RX ADMIN — DIPHENOXYLATE HYDROCHLORIDE AND ATROPINE SULFATE 1 TABLET: 2.5; .025 TABLET ORAL at 08:25

## 2022-08-12 RX ADMIN — PRAVASTATIN SODIUM 40 MG: 20 TABLET ORAL at 21:27

## 2022-08-12 RX ADMIN — CYANOCOBALAMIN TAB 1000 MCG 500 MCG: 1000 TAB at 21:19

## 2022-08-12 RX ADMIN — Medication 400 MG: at 09:36

## 2022-08-12 RX ADMIN — SODIUM CHLORIDE, PRESERVATIVE FREE 40 MG: 5 INJECTION INTRAVENOUS at 06:49

## 2022-08-12 RX ADMIN — Medication 400 MG: at 21:20

## 2022-08-12 RX ADMIN — Medication 2000 UNITS: at 09:36

## 2022-08-12 RX ADMIN — SUCRALFATE 1 G: 1 TABLET ORAL at 23:00

## 2022-08-12 RX ADMIN — SUCRALFATE 1 G: 1 TABLET ORAL at 06:45

## 2022-08-12 ASSESSMENT — PAIN SCALES - WONG BAKER
WONGBAKER_NUMERICALRESPONSE: 0

## 2022-08-12 ASSESSMENT — PAIN DESCRIPTION - FREQUENCY
FREQUENCY: INTERMITTENT
FREQUENCY: CONTINUOUS
FREQUENCY: CONTINUOUS

## 2022-08-12 ASSESSMENT — PAIN DESCRIPTION - LOCATION
LOCATION: BACK
LOCATION: ABDOMEN
LOCATION: ABDOMEN
LOCATION: BACK
LOCATION: BACK
LOCATION: ABDOMEN

## 2022-08-12 ASSESSMENT — PAIN DESCRIPTION - DESCRIPTORS
DESCRIPTORS: ACHING;DISCOMFORT
DESCRIPTORS: ACHING;DISCOMFORT
DESCRIPTORS: ACHING;THROBBING
DESCRIPTORS: OTHER (COMMENT)
DESCRIPTORS: ACHING;THROBBING
DESCRIPTORS: ACHING;THROBBING
DESCRIPTORS: ACHING;DISCOMFORT

## 2022-08-12 ASSESSMENT — PAIN SCALES - GENERAL
PAINLEVEL_OUTOF10: 7
PAINLEVEL_OUTOF10: 0
PAINLEVEL_OUTOF10: 7
PAINLEVEL_OUTOF10: 0
PAINLEVEL_OUTOF10: 7
PAINLEVEL_OUTOF10: 0
PAINLEVEL_OUTOF10: 7
PAINLEVEL_OUTOF10: 0

## 2022-08-12 ASSESSMENT — PAIN DESCRIPTION - ORIENTATION
ORIENTATION: LOWER
ORIENTATION: MID
ORIENTATION: MID
ORIENTATION: LOWER
ORIENTATION: MID
ORIENTATION: LOWER

## 2022-08-12 ASSESSMENT — PAIN DESCRIPTION - ONSET
ONSET: ON-GOING
ONSET: ON-GOING

## 2022-08-12 ASSESSMENT — PAIN DESCRIPTION - PAIN TYPE
TYPE: CHRONIC PAIN
TYPE: CHRONIC PAIN
TYPE: ACUTE PAIN
TYPE: ACUTE PAIN

## 2022-08-12 ASSESSMENT — PAIN - FUNCTIONAL ASSESSMENT
PAIN_FUNCTIONAL_ASSESSMENT: PREVENTS OR INTERFERES SOME ACTIVE ACTIVITIES AND ADLS

## 2022-08-12 NOTE — OP NOTE
Yanely Robles  YOB: 1940  [id number]    Pre-operative Diagnosis: GI bleed    Post-operative Diagnosis: hemorrhagic gastritis     Procedure: EGD     Anesthesia: [facility]    Surgeon: Adriel Allan MD    Assistant: None    Estimated Blood Loss: none    Complications: none    Specimens: none    Procedure:  Procedure performed at bedside in MICU. Patient placed in a left lateral decubitus position. LMAC anesthesia was administered and a bite block was inserted. A lubricated gastroscope was inserted into the oropharynx and advanced into the esophagus. The esophagus was inspected throughout its length. There were no varices. No evidence of esophagitis. The GE junction was sharp and located at 42 cm. The stomach was entered and insufflated. There was some dark fluid in the stomach, blood tinged. No evidence of ulceration or neoplasm. The pylorus was intubated. The first and second portions of the duodenum were normal.  The scope was pulled back into the antrum and retroflexed. The angle of the stomach was normal.  The proximal greater and lesser curves were normal.  The fundus was normal.  At the GE junction, there was no evidence of hiatal hernia. The stomach was deflated and the scope was withdrawn and removed. The patient tolerated the procedure well.     Impression: hemorrhagic gastritis; likely due to critical illness, elevated INR    Plan:PPI ok to resume tube feeds and advance as tolerated      Adriel Allan MD

## 2022-08-12 NOTE — PROGRESS NOTES
[address] [facility]   Department of Internal Medicine   Internal Medicine Residency  MICU Progress Note    Patient:  Artis Suazo [de-identified] y.o. female   MRN: [id number]       Date of Service: 2022    Allergy: Fentanyl, Levofloxacin, Tramadol, and Vioxx [rofecoxib]    Subjective     Patient was seen and examined this morning at bedside in no acute distress. Patient is somnolent but arousable. She is following commands appropriately. Patient was started on hemodialysis yesterday and her hyperkalemia, lactic acidosis, uremia has corrected appropriately. Patient is scheduled for EGD today. Objective     TEMPERATURE:  Current - Temp: 98.7 °F (37.1 °C); Max - Temp  Av °F (36.7 °C)  Min: 97.3 °F (36.3 °C)  Max: 98.7 °F (37.1 °C)  RESPIRATIONS RANGE: Resp  Av.3  Min: 10  Max: 33  PULSE RANGE: Pulse  Av.1  Min: 80  Max: 114  BLOOD PRESSURE RANGE:  Systolic (07YCW), FLAKITA:236 , Min:99 , FHP:281   ; Diastolic (90LGM), PVW:07, Min:36, Max:99    PULSE OXIMETRY RANGE: SpO2  Av.7 %  Min: 93 %  Max: 100 %    I & O - 24hr:    Intake/Output Summary (Last 24 hours) at 2022 1616  Last data filed at 2022 1550  Gross per 24 hour   Intake 4680.24 ml   Output 2540 ml   Net 2140.24 ml     I/O last 3 completed shifts: In: 9531.7 [I.V.:6847.2; Blood:871.7; NG/GT:170; IV Piggyback:892.9]  Out: 3692 [Urine:670; NMWHE:6135] I/O this shift:  In: 1888.2 [P.O.:100; I.V.:1280.3; NG/GT:30; IV Piggyback:477.9]  Out: 570 [Urine:170; Emesis/NG output:200; Stool:200]   Weight change:     Physical Exam:  General Appearance:  Somnolent but arousable, follows commands, cooperative, no acute distress, mucous membranes appear dry   HEENT:    NC/AT, mucous membranes are dry   Neck:   Supple, no jugular venous distention. Resp:     CTAB, No wheezes, No rhonchi, no use of accessory muscles   Heart:    RRR, S1 and S2 normal, no murmur, rub or gallop.     Abdomen:     Soft, generalized tenderness, no guarding or rigidity, non-distended with decreased bowel sounds, ostomy with green output   Extremities:   Atraumatic, no cyanosis or edema, multiple petechial ecchymosis on bilateral upper and lower extremities noted   Pulses:  Radial and pedal pulses are intact bilaterally   Neurologic:   AAOx3, No focal motor deficit       Medications     Continuous Infusions:   sodium chloride 75 mL/hr at 08/12/22 1444    sodium chloride      norepinephrine 9 mcg/min (08/12/22 1444)    sodium chloride      sodium chloride      sodium chloride      dextrose      sodium chloride      dextrose      sodium chloride       Scheduled Meds:   epoetin ludin-epbx  30 Units/kg SubCUTAneous Once per day on Mon Wed Fri    phytonadione (VITAMIN K)  IVPB  10 mg IntraVENous Once    lidocaine  1 patch TransDERmal Daily    sodium zirconium cyclosilicate  10 g Oral TID    sodium polystyrene  15 g Oral Once    fluconazole  200 mg Oral Daily    piperacillin-tazobactam  4,500 mg IntraVENous Q12H    pantoprazole (PROTONIX) 40 mg injection  40 mg IntraVENous Q12H    sucralfate  1 g Oral 4 times per day    calcium carbonate  500 mg Oral Nightly    vitamin D  2,000 Units Oral BID    diphenoxylate-atropine  1 tablet Oral 4x Daily AC & HS    escitalopram  20 mg Oral Nightly    ferrous sulfate  325 mg Oral Nightly    fluticasone  1 spray Each Nostril Nightly    lipase-protease-amylase  24,000 Units Oral TID WC    loperamide  4 mg Oral TID WC    magnesium oxide  400 mg Oral BID    ocuvite-lutein  1 capsule Oral BID    pravastatin  40 mg Oral Nightly    primidone  250 mg Oral Nightly    traZODone  50 mg Oral Nightly    vitamin B-12  500 mcg Oral Nightly     PRN Meds: sodium chloride, ondansetron, sodium chloride, sodium chloride, sodium chloride, dextrose bolus **OR** [DISCONTINUED] dextrose bolus, dextrose, [Held by provider] oxyCODONE, sodium chloride, [Held by provider] HYDROmorphone, [Held by provider] morphine, glucose, glucagon (rDNA), dextrose, sodium chloride, hyoscyamine, lipase-protease-amylase, senna, [Held by provider] oxyCODONE-acetaminophen, [DISCONTINUED] dextrose bolus **OR** dextrose bolus  Nutrition:   NG/OG tube TF type: Pulmocare/Nephro/Glucerna/Jevity        At rate: Currently on hold    Labs and Imaging Studies     CBC:   Recent Labs     08/10/22  0556 08/11/22  0458 08/11/22  1142 08/11/22  2200 08/12/22  0310 08/12/22  0517   WBC 25.3* 20.2*  --   --   --  8.8   HGB 7.9* 5.5*   < > 6.6* 7.6* 7.9*   HCT 23.7* 16.9*   < > 19.8* 23.0* 23.4*   MCV 90.5 94.4  --   --   --  89.3    166  --   --   --  102*    < > = values in this interval not displayed. BMP:    Recent Labs     08/11/22  1142 08/11/22  2144 08/12/22  0310 08/12/22  0517   * 137  --  135   K 5.5*  5.4* 3.7 3.8 3.7   CL 92* 97*  --  97*   CO2 21* 27  --  27   BUN 53* 25*  --  27*   CREATININE 3.7* 2.2*  --  2.3*   GLUCOSE 98 93  --  87       LIVER PROFILE:   Recent Labs     08/10/22  1137   AST 22   ALT 12   LIPASE 64*   BILITOT 0.6   ALKPHOS 187*       PT/INR:   Recent Labs     08/12/22  1154   PROTIME 60.0*   INR 5.4*       APTT:   No results for input(s): APTT in the last 72 hours.     Fasting Lipid Panel:    Lab Results   Component Value Date/Time    CHOL 87 08/01/2022 03:36 AM    TRIG 75 08/01/2022 03:36 AM    HDL 42 08/01/2022 03:36 AM       Cardiac Enzymes:    Lab Results   Component Value Date    CKTOTAL 101 08/11/2022    CKTOTAL 28 07/14/2022    CKTOTAL 31 07/14/2022    TROPONINI 0.01 02/25/2021    TROPONINI 0.01 01/21/2021    TROPONINI 0.07 (H) 01/25/2020       Notable Cultures:      Blood cultures   Blood Culture, Routine   Date Value Ref Range Status   08/10/2022 24 Hours no growth  Preliminary     Respiratory cultures No results found for: RESPCULTURE No results found for: LABGRAM  Urine   Urine Culture, Routine   Date Value Ref Range Status   08/08/2022 <10,000 CFU/mL  Mixed gram positive organisms   (A)  Final   08/08/2022 >100,000 CFU/ml  Final     Legionella No results found for: LABLEGI  C Diff PCR No results found for: CDIFPCR  Wound culture/abscess: No results for input(s): WNDABS in the last 72 hours. Tip culture:No results for input(s): CXCATHTIP in the last 72 hours. Oxygen: Additional Respiratory Assessments  Heart Rate: 97  Resp: 17  SpO2: 94 %       Urinary Catheter-Output (mL): 10 mL    Imaging Studies:  CT ABDOMEN PELVIS WO CONTRAST Additional Contrast? None   Final Result   Enlarged liver demonstrating periportal edema. Redemonstration of a low-attenuation lesion in the pancreas. Dilated stomach and gaseous proximal small intestinal dilatation. Abnormal thickening of the wall of the distal small bowel. Large ventral hernia containing the distal stomach and segments of small   bowel. Generalized soft tissue edematous changes consistent with anasarca and mild   to moderate ascites. Multiple additional findings as described. XR CHEST PORTABLE   Final Result   Interval placement of left internal jugular central venous catheter   terminating at the cavoatrial junction. No postprocedure pneumothorax         US RETROPERITONEAL COMPLETE   Final Result   1. Unremarkable sonographic evaluation of the right kidney. 2. Left renal cysts. 3. The urinary bladder is not visualized secondary to obscuring bowel gas. XR CHEST PORTABLE   Final Result   No acute process. Resident's Assessment and Plan     Assessment and Plan:  Shock 2/2 septic (? UTI vs enteritis in setting of recent E. 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   DIC  -TEG has prolonged  R time, INR 5  -Injection vitamin K 10 mg IV once  -FFP transfusion stat  -Send PT/INR/TEG post FFP transfusion, transfuse as needed  -Monitor fibrinogen twice daily and APTT daily    Acute on chronic normocytic normochromic anemia 2/2 ? GI blood loss in setting of coagulopathy, INR 5  - had blood-tinged melena in ostomy bag   - s/p 6 PRBC this admission  -Monitor H&H every 12 hourly  -For EGD today  -Continue PPI and Carafate    Abdominal pain 2/2 ? Enteritis in setting of multiple abdominal surgery and ileostomy for ischemic bowel (2012) vs concern for narcotic bowel syndrome  -CT abdomen (8/11): Enlarged liver with periportal edema, dilated stomach and gaseous small bowel dilatation, thickening of distal small bowel wall, moderate ascites and soft tissue anasarca  -Patient was on oxycodone, Percocet, Dilaudid, morphine at nursing home  -Hold opioids  -Plan to start trickle tube feeds after EGD. Secondary hyperparathyroidism of renal origin-, P5.2, vitamin D 19  -On vitamin D supplementation  -Monitor phosphorus    Urine Candidia infection- on diflucan  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   Hx Short Gut syndrome   Hx HTN   Hx HLD LDL 30 on 8/1/2022 on Pravachol 40  Hx CAD s/p stent placement in 2002   Hx COPD  Prior concern for PE was on Eliquis: Blood thinners currently on hold    Resolved problems:  HAGMA 2/2 lactic acidosis/uremia in setting of ileostomy on bicarb drip   Hyperkalemia 2/2 KD/CKD  Hypervolemic hypotonic hyponatremia 2/2 KD  Lactic acidosis likely type A/D sepsis versus short gut syndrome    # Diet:NPO  # Bowel regimen: senna, ileostomy in place, has short gut syndrome on Lomotil and loperamide  # Peptic ulcer prophylaxis: PPI twice daily  # DVT Prophylaxis: PCD's  # Disposition: Cont current care     Theodora Chambers MD, PGY-2    Attending Physician: Dr. Albaro Gonzalez  Department of Pulmonary, Critical Care and [facility], [facility] of Internal Medicine      During multidisciplinary team rounds Leora Chen is a [de-identified] y.o. female was seen, examined and discussed. This is confirmation that I have personally seen and examined the patient and that the key elements of the encounter were performed by me (> 85 % time). The medications & laboratory data was discussed and adjusted where necessary. The radiographic images were reviewed or with radiologist or consultant if felt dis-concordant with the exam or history. The above findings were corroborated, plans confirmed and changes made if needed. Family is updated at the bedside as available. Key issues of the case were discussed among consultants. Critical Care time is documented if appropriate.       Jeff Rousseau DO, FACP, FCCP, [facility],

## 2022-08-12 NOTE — ANESTHESIA POSTPROCEDURE EVALUATION
Department of Anesthesiology  Postprocedure Note    Patient: Lissy Jennings  MRN: [id number]  YOB: 1940  Date of evaluation: 8/12/2022      Procedure Summary     Date: 08/12/22 Room / Location: [address] / [facility]    Anesthesia Start: 1540 Anesthesia Stop:     Procedure: ESOPHAGOGASTRODUODENOSCOPY Diagnosis:       Gastrointestinal hemorrhage, unspecified gastrointestinal hemorrhage type      (/)    Surgeons: Eunice Vincent MD Responsible Provider: Zoie Odonnell MD    Anesthesia Type: MAC ASA Status: 4          Anesthesia Type: No value filed.     Sugar Phase I:      Sugar Phase II:        Anesthesia Post Evaluation    Patient location during evaluation: ICU  Patient participation: complete - patient participated  Level of consciousness: awake  Pain score: 3  Airway patency: patent  Nausea & Vomiting: no nausea and no vomiting  Complications: no  Cardiovascular status: blood pressure returned to baseline  Respiratory status: acceptable  Hydration status: euvolemic

## 2022-08-12 NOTE — PROGRESS NOTES
Temporary dialysis catheter placed in the right groin yesterday. There was some bleeding at the site overnight. She otherwise tolerated dialysis well. There were no other issues with the line. Otherwise there is no hematoma or coleen bleeding visualized. The dressing was removed this morning and a piece of surgical snow was placed around the exit site of the catheter where it emanated from the skin at the right groin. Dressing was reapplied. We will monitor the right groin for signs of ongoing bleeding.     Raymonde Lesches, MD

## 2022-08-12 NOTE — CARE COORDINATION
8/12:  Update CM Note:  Pt presented to the ER for hyperkalemia from [facility].  high K 6.8/ Mg1.5 & Na 127. RRT on 8/10 for hypotension & persistent hyperkalemia. Pt was transferred to MICU. Pt is on 5L/NC at 99%, Iv Fluids, Iv Levophed, Iv Zosyn & Iv Protonix. Pt's Hgb 6.6 on 8/11 GS following for EGD today. Pt has a ileostomy. Pt had a temp HD line placed on 8/11 with HD same day. Per previous CM Notes:  Pt would like to return to [facility] for rehab if possible when she is medically stable to do so. [facility] can return if bed available. Pt will not need pre-cert. Will need negative Covid. SHELLI update. Envelope in soft chart. Awaiting return call for acceptance. Sw/CM will continue to follow for dc planning.   Electronically signed by Yeni Kaplan RN on 8/12/2022 at 2:02 PM

## 2022-08-12 NOTE — FLOWSHEET NOTE
08/11/22 1930   Vital Signs   BP (!) 106/36   Temp 98 °F (36.7 °C)   Resp 23   Weight 102 lb 8 oz (46.5 kg)   Percent Weight Change 0.49   Post-Hemodialysis Assessment   Post-Treatment Procedures Blood returned;Catheter capped, clamped with Citrate x 2 ports   Machine Disinfection Process Acid/Vinegar Clean;Bleach; Machine Absence of Bleach Machine;Verified Absence of Bleach in HCO3 Jug;Exterior Machine Disinfection   Blood Volume Processed (Liters) 29 l/min   Duration of Treatment (minutes) 150 minutes   Hemodialysis Intake (ml) 750 ml   Hemodialysis Output (ml) 750 ml   NET Removed (ml) 0   Tolerated Treatment Good   Physician Notified No   Dsg saturated at end of tx, redressed with 2 folded 4x4s and occlusive dsg.

## 2022-08-12 NOTE — PROCEDURES
Central Line Insertion     Procedure: left internal jugular vein Triple Lumen Catheter placement. Indications: vascular access    Consent: The patient was counseled regarding the procedure, its indications, risks, potential complications and alternatives, and any questions were answered. Consent was obtained to proceed. Number of sticks: 2    Number of Kits used: 1    Procedure: Time Out: Immediately prior to the procedure a \"timeout\" was called to verify the correct patient and procedure. The patient was place in the trendelenburg position and the skin over the left internal jugular vein was prepped with betadine and draped in a sterile fashion. Local anesthesia was obtained by infiltration using 1% Lidocaine without epinephrine. With Ultrasound guidance a large bore needle was used to identify the vein, dark non pulsatile blood returned. The guide wire was then inserted through the needle with minimal resistance. 2 mm nick was made in the skin beside the guidewire. Then a dilator was inserted and removed. A triple lumen catheter was then inserted into the vessel over the guide wire using the Seldinger technique to the 19 cm edilma. All ports showed good, free flowing blood return and were flushed with saline solution. The catheter was then securely fastened to the skin with sutures and covered with a bio patch and sterile dressing. A post procedure X-ray was ordered and showed good line position. Complications: None   The patient tolerated the procedure well. Estimated blood loss: 5 ml.     Mathew Rashid MD PGY-3  8/12/2022  7:15 AM      Attending: Dr. Clau Pride

## 2022-08-12 NOTE — PROGRESS NOTES
Pt s/e this am.  Resting comfortably in bed. Denies abdominal pain, no tenderness to palpation. Green ostomy output, nonbloody. Pt dialyzed at night.     EGD this afternoon w Dr. Sommer Simmons,

## 2022-08-12 NOTE — ANESTHESIA PRE PROCEDURE
Department of Anesthesiology  Preprocedure Note       Name:  Juice Lambert   Age:  [de-identified] y.o.  :  1940                                          MRN:  [id number]         Date:  2022      Surgeon: Lionel Chavez):  Vivian Blackwell MD    Procedure: Procedure(s):  ESOPHAGOGASTRODUODENOSCOPY    Medications prior to admission:   Prior to Admission medications    Medication Sig Start Date End Date Taking? Authorizing Provider   atorvastatin (LIPITOR) 10 MG tablet Take 10 mg by mouth at bedtime   Yes Historical Provider, MD   calcium carbonate (TUMS) 500 MG chewable tablet Take 1 tablet by mouth every evening   Yes Historical Provider, MD   glucose (GLUTOSE) 40 % GEL Take 15 g by mouth as needed (hypoglycemia)   Yes Historical Provider, MD   magnesium hydroxide (MILK OF MAGNESIA) 400 MG/5ML suspension Take 30 mLs by mouth daily as needed for Constipation   Yes Historical Provider, MD   senna (SENOKOT) 8.6 MG tablet Take 1 tablet by mouth daily as needed for Constipation   Yes Historical Provider, MD   acetaminophen (TYLENOL) 325 MG tablet Take 650 mg by mouth every 4 hours as needed for Pain   Yes Historical Provider, MD   vitamin C (ASCORBIC ACID) 500 MG tablet Take 500 mg by mouth in the morning and 500 mg before bedtime. Yes Historical Provider, MD   ondansetron (ZOFRAN) 4 MG tablet Take 4 mg by mouth every 8 hours as needed for Nausea or Vomiting   Yes Historical Provider, MD   apixaban (ELIQUIS) 5 MG TABS tablet Take 1 tablet by mouth in the morning and 1 tablet before bedtime.  22  Braden Chinchilla MD   lipase-protease-amylase (CREON) 88851-71047 units delayed release capsule Take 12,000 Units by mouth 2 times daily as needed (diarrhea)    Historical Provider, MD   Multiple Vitamins-Minerals (PRESERVISION AREDS) CAPS Take 1 capsule by mouth 2 times daily    Historical Provider, MD   Magnesium Oxide (MAGNESIUM-OXIDE) 250 MG TABS tablet Take 250 mg by mouth 2 times daily    Historical Provider, MD METAMUCIL FIBER PO Take 1 packet by mouth 2 times daily    Historical Provider, MD   diphenoxylate-atropine (LOMOTIL) 2.5-0.025 MG per tablet Take 1 tablet by mouth 4 times daily (before meals and nightly). Historical Provider, MD   loperamide (IMODIUM) 2 MG capsule Take 4 mg by mouth in the morning and 4 mg at noon and 4 mg in the evening. Take with meals. Historical Provider, MD   amLODIPine (NORVASC) 10 MG tablet Take 10 mg by mouth nightly   7/31/22  Historical Provider, MD   fluticasone (FLONASE) 50 MCG/ACT nasal spray 1 spray by Nasal route nightly    Historical Provider, MD   lipase-protease-amylase (CREON) 02828-23960 units delayed release capsule Take 24,000 Units by mouth in the morning and 24,000 Units at noon and 24,000 Units in the evening. Take with meals. Historical Provider, MD   Probiotic CAPS Take 1 capsule by mouth nightly     Historical Provider, MD   hyoscyamine (ANASPAZ;LEVSIN) 125 MCG tablet Take 125 mcg by mouth 4 times daily as needed (heartburn)    Historical Provider, MD   omeprazole (PRILOSEC) 20 MG delayed release capsule Take 40 mg by mouth every morning     Historical Provider, MD   aspirin 81 MG tablet Take 81 mg by mouth nightly     Historical Provider, MD   Cholecalciferol (VITAMIN D3) 50 MCG (2000 UT) TABS Take 2,000 Units by mouth 2 times daily     Historical Provider, MD   Ferrous Sulfate (IRON) 325 (65 Fe) MG TABS Take 325 mg by mouth nightly     Historical Provider, MD   Coenzyme Q10 (COQ10 PO) Take 1 capsule by mouth nightly   7/31/22  Historical Provider, MD   vitamin B-12 (CYANOCOBALAMIN) 500 MCG tablet Place 500 mcg under the tongue nightly     Historical Provider, MD   Omega 3 1000 MG CAPS Take 1,000 mg by mouth nightly    Historical Provider, MD   escitalopram (LEXAPRO) 20 MG tablet Take 20 mg by mouth nightly.     Historical Provider, MD   primidone (MYSOLINE) 250 MG tablet Take 250 mg by mouth nightly     Historical Provider, MD   traZODone (DESYREL) 50 MG tablet Take 50 mg by mouth nightly     Historical Provider, MD       Current medications:    Current Facility-Administered Medications   Medication Dose Route Frequency Provider Last Rate Last Admin    epoetin ludin-epbx (RETACRIT) injection 1,600 Units  30 Units/kg SubCUTAneous Once per day on Mon Wed Fri Rochelle Gordon MD        0.9 % sodium chloride infusion   IntraVENous Continuous Rochelle Gordon MD 75 mL/hr at 08/12/22 1444 Rate Verify at 08/12/22 1444    phytonadione (ADULT) (VITAMIN K) 10 mg in dextrose 5 % 100 mL IVPB  10 mg IntraVENous Once Mera Bahena MD        0.9 % sodium chloride infusion   IntraVENous PRN Mera Bahena MD        ondansetron [facility]) injection 4 mg  4 mg IntraVENous Q6H PRN Shannon Carter MD   4 mg at 08/11/22 0021    norepinephrine (LEVOPHED) 16 mg in dextrose 5% 250 mL infusion  1-100 mcg/min IntraVENous Continuous Shannon Carter MD 8.4 mL/hr at 08/12/22 1444 9 mcg/min at 08/12/22 1444    0.9 % sodium chloride infusion   IntraVENous PRN Shannon Carter MD        lidocaine 4 % external patch 1 patch  1 patch TransDERmal Daily Mera Bahena MD   1 patch at 08/11/22 1030    0.9 % sodium chloride infusion   IntraVENous PRN Mera Bahena MD        0.9 % sodium chloride infusion   IntraVENous PRN Rajat Self MD        sodium zirconium cyclosilicate [facility]) oral suspension 10 g  10 g Oral TID Rochelle Gordon MD   10 g at 08/11/22 2023    sodium polystyrene (KAYEXALATE) 15 GM/60ML suspension 15 g  15 g Oral Once DO [place]        fluconazole (DIFLUCAN) tablet 200 mg  200 mg Oral Daily Edward Pearce MD   200 mg at 08/12/22 0810    piperacillin-tazobactam (ZOSYN) 4,500 mg in dextrose 5 % 100 mL IVPB ([id number])  4,500 mg IntraVENous Q12H Edward Pearce MD 25 mL/hr at 08/12/22 1444 Rate Verify at 08/12/22 1444    dextrose bolus 10% 125 mL  125 mL IntraVENous PRN Shannon Carter MD        dextrose 10 % infusion   IntraVENous Continuous PRN MD Duyen Varner pantoprazole (PROTONIX) 40 mg in sodium chloride (PF) 10 mL injection  40 mg IntraVENous Q12H Citlaly E Kaercher, DO   40 mg at 08/12/22 0649    [Held by provider] oxyCODONE (ROXICODONE) immediate release tablet 2.5 mg  2.5 mg Oral Q4H PRN Citlaly E Kaercher, DO   2.5 mg at 08/11/22 1031    sucralfate (CARAFATE) tablet 1 g  1 g Oral 4 times per day Brielle Campi, DO   1 g at 08/12/22 0645    0.9 % sodium chloride infusion   IntraVENous PRN Te Keys MD        [Held by provider] HYDROmorphone (DILAUDID) injection 0.5 mg  0.5 mg IntraVENous Q4H PRN Te Keys MD   0.5 mg at 08/10/22 1012    [Held by provider] morphine (PF) injection 1 mg  1 mg IntraVENous Q3H PRN Brielle Hughes, DO        glucose chewable tablet 16 g  4 tablet Oral PRN Te Keys MD        glucagon (rDNA) injection 1 mg  1 mg SubCUTAneous PRN Te Keys MD        dextrose 10 % infusion   IntraVENous Continuous PRN Te Keys MD        0.9 % sodium chloride infusion   IntraVENous PRN Te Keys MD        calcium carbonate (TUMS) chewable tablet 500 mg  500 mg Oral Nightly Te Keys MD   500 mg at 08/11/22 2036    vitamin D (CHOLECALCIFEROL) tablet 2,000 Units  2,000 Units Oral BID Te Keys MD   2,000 Units at 08/12/22 0936    diphenoxylate-atropine (LOMOTIL) 2.5-0.025 MG per tablet 1 tablet  1 tablet Oral 4x Daily AC & HS Te Keys MD   1 tablet at 08/12/22 0825    escitalopram (LEXAPRO) tablet 20 mg  20 mg Oral Nightly Te Keys MD   20 mg at 08/11/22 2024    ferrous sulfate (IRON 325) tablet 325 mg  325 mg Oral Nightly Te Keys MD   325 mg at 08/11/22 2036    fluticasone (FLONASE) 50 MCG/ACT nasal spray 1 spray  1 spray Each Nostril Nightly Te Keys MD   1 spray at 08/11/22 2026    hyoscyamine (ANASPAZ;LEVSIN) tablet 125 mcg  125 mcg Oral 4x Daily PRN [place] Clanton, MD   125 mcg at 08/11/22 0908    lipase-protease-amylase (CREON) delayed release capsule 24,000 Units  24,000 Units Oral TID  Sonny Mitchell MD   24,000 Units at 08/12/22 0809    lipase-protease-amylase (CREON) delayed release capsule 12,000 Units  12,000 Units Oral With Snacks Sonny Mitchell MD        loperamide (IMODIUM) capsule 4 mg  4 mg Oral TID  Sonny Mitchell MD   4 mg at 08/12/22 5038    magnesium oxide (MAG-OX) tablet 400 mg  400 mg Oral BID Sonny Mitchell MD   400 mg at 08/12/22 8477    ocuvite-lutein multivitamin 1 capsule  1 capsule Oral BID Sonny Mitchell MD   1 capsule at 08/12/22 0865    pravastatin (PRAVACHOL) tablet 40 mg  40 mg Oral Nightly Sonny Mitchell MD   40 mg at 08/11/22 2024    primidone (MYSOLINE) tablet 250 mg  250 mg Oral Nightly Sonny Mitchell MD   250 mg at 08/11/22 2024    senna (SENOKOT) tablet 8.6 mg  1 tablet Oral Daily PRN Sonny Mitchell MD        traZODone (DESYREL) tablet 50 mg  50 mg Oral Nightly Sonny Mitchell MD   50 mg at 08/11/22 2211    vitamin B-12 (CYANOCOBALAMIN) tablet 500 mcg  500 mcg Oral Nightly Sonny Mithcell MD   500 mcg at 08/11/22 2027    [Held by provider] oxyCODONE-acetaminophen (PERCOCET) 5-325 MG per tablet 1 tablet  1 tablet Oral Q6H PRN Sowmya Dumas MD   1 tablet at 08/12/22 0643    dextrose bolus 10% 250 mL  250 mL IntraVENous PRN Tony Ovalle MD           Allergies:     Allergies   Allergen Reactions    Fentanyl Itching     REACTION IS ONLY FROM THE PATCH    Levofloxacin Swelling and Myalgia     SWELLING & PAIN LOCALIZED TO THE LEGS    Tramadol Nausea Only and Other (See Comments)     INSOMNIA & SHAKINESS     Vioxx [Rofecoxib] Swelling and Myalgia     SWELLING & PAIN LOCALIZED TO THE LEGS       Problem List:    Patient Active Problem List   Diagnosis Code    Right cataract H26.9    Essential hypertension, benign I10    Hyperlipidemia with target LDL less than 100 E78.5    Osteoarthritis, 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    Primary osteoarthritis of left knee M17.12    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    Ileostomy present ([facility]) Z93.2    Chronic kidney disease, stage III (moderate) ([facility]) N18.30    COPD (chronic obstructive pulmonary disease) ([facility]) J44.9    Moderate protein-calorie malnutrition ([facility]) E44.0    Abdominal wall cellulitis L03.311    Cellulitis L03.90    Infected hernioplasty mesh ([facility]) T85.79XA    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    Mild protein-calorie malnutrition ([facility]) E44.1    Acute kidney injury ([address] N17.9    Hypertensive kidney disease with chronic kidney disease stage IV ([facility]) I12.9, N18.4    KD (acute kidney injury) ([address] N17.9    Diarrhea R19.7    Elevated serum creatinine R79.89    Abnormal serum creatinine level R79.89    Hyperlipemia E78.5    Primary hypertension I10    Essential (primary) hypertension I10    Postsurgical malabsorption, 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    Hypercholesterolemia E78.00    Pure hypercholesterolemia E78.00    Prediabetes R73.03    Hypothyroidism, unspecified E03.9    Acute renal failure (ARF) ([facility]) N17.9    Enteritis K52.9    Hypotension I95.9    Abnormal CT scan, lumbar spine R93.7    Severe protein-calorie malnutrition ([facility]) E43    Hyperkalemia E87.5       Past Medical History:        Diagnosis Date    Acute kidney failure ([address] 2014 and 8/2016    x2,no dialysis needed,resolved, kidney function tests returned to normal    Anxiety     Arthritis     CAD (coronary artery disease)     follows with Dr. Pradip Alas every 6 months    Cancer [facility])     skin, leg,nose- removed surgically     COPD (chronic obstructive pulmonary disease) ([facility])     mild- no inhlaers, no oxygen     Depression     Fibromyalgia     chronic back and neck pain    Generalized headaches     h/o / daily    History of blood transfusion 3-11-14    multiple times    History of necrotic bowel 2012    Hyperlipidemia     Hypertension     Incisional hernia     abdomen    Insomnia     Mitral valve regurgitation     Myocardial infarct ([address] 2002    Occasional tremors     at hands / stable with medication    Osteopenia     PONV (postoperative nausea and vomiting)     Vitamin B12 deficiency     h/o       Past Surgical History:        Procedure Laterality Date    ABDOMEN SURGERY  2-    total colectomy, bowel resection, ileostomy,revision of ileostomy     ABDOMEN SURGERY N/A 1/26/2020    DRAINAGE OF ABDOMINAL WALL ABSCESS, EXPLANTATION OF MESH, DEBRIDEMENT OF SKIN AND SUBCUTANEOUS TISSUE performed by Delmi Chan MD at [facility] N/A 9/11/2020    EXPLANTATION OF HERNIA MESH performed by Delmi Chan MD at [facility]  2002   [address]  1-2006    right and left thumb   [facility], 2004, 2009    lumbar fusion x 3    BACK SURGERY      cervical fusion x 2    BREAST SURGERY  years ago    monor calcifications    CARPAL TUNNEL RELEASE Bilateral     CATARACT REMOVAL WITH IMPLANT Right 03/03/15    CHOLECYSTECTOMY  5-11-07    Lap    COLONOSCOPY      CORONARY ANGIOPLASTY WITH STENT PLACEMENT  1-7-2002    ENDOSCOPY, COLON, DIAGNOSTIC      EPIGASTRIC HERNIA REPAIR  3/21/16    incisional with mesh implantation    ESOPHAGUS SURGERY  1-4-13    esophageal clamp (torn Esophagus)    FINGER TRIGGER RELEASE Right 2006    index finger    FOOT SURGERY Right     multiple    HERNIA REPAIR  12-31-13    abdominal x 5- last one 2017     ILEOSTOMY OR JEJUNOSTOMY  1-3-13    revision    INSERT PICC LINE  06/09/2020    and removed     JOINT REPLACEMENT Right 3/24/15    right total shoulder arthroplasty    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     LAPAROTOMY N/A 5/12/2020    EXPLORATORY LAPAROTOMY EXPLANTATION OF INFECTED MESH SMAL BOWEL RESECTION AND REVISION END ILEOSTOMY, LYSIS OF ADHESIONS performed by Delmi Chan MD at [facility] N/A 6/8/2020 LAPAROTOMY EXPLORATORY, EXLANTATION OF HERNIA MESH performed by Arlen Valverde MD at [facility] N/A 9/15/2020    EXPLORATORY LAPAROTOMY WITH SMALL BOWEL RESECTION, TAKE DOWN REVISION ENTEROCUTANEOUS FISTULA , OSTOMY REVISION performed by Arlen Valverde MD at [address] / leg    NASAL SINUS SURGERY      x2 /  cauterized    OTHER SURGICAL HISTORY  08/24/2016    diagnostic laparotomy with repair of intraabdominal hernia    OTHER SURGICAL HISTORY      removal plate / screws  / right great toe    PICC LINE INSERTION NURSE  9/15/2020         PORT SURGERY N/A 7/19/2022    MEDIPORT REMOVAL performed by Sotero Rausch MD at [address] [place] [address] Rd  2010    right     SKIN BIOPSY  2010    3 squamous cell carcinoma right leg, left leg       Social History:    Social History     Tobacco Use    Smoking status: Every Day     Packs/day: 1.00     Types: Cigarettes    Smokeless tobacco: Never   Substance Use Topics    Alcohol use: Not Currently     Comment: occasional                                Ready to quit: Not Answered  Counseling given: Not Answered      Vital Signs (Current):   Vitals:    08/12/22 1330 08/12/22 1346 08/12/22 1400 08/12/22 1430   BP:   99/64    Pulse: 81 87 91 92   Resp: 13 18 14 13   Temp:       TempSrc:       SpO2:       Weight:       Height:                                                  BP Readings from Last 3 Encounters:   08/12/22 99/64   08/01/22 (!) 142/58   07/31/22 (!) 124/42       NPO Status:  > 8 hours                                                                               BMI:   Wt Readings from Last 3 Encounters:   08/12/22 116 lb 13.5 oz (53 kg)   08/01/22 113 lb (51.3 kg)   07/24/22 99 lb (44.9 kg)     Body mass index is 22.82 kg/m².     CBC:   Lab Results   Component Value Date/Time    WBC 8.8 08/12/2022 05:17 AM    RBC 2.62 08/12/2022 05:17 AM    HGB 7.9 08/12/2022 05:17 AM    HCT 23.4 08/12/2022 05:17 AM    MCV 89.3 08/12/2022 05:17 AM    RDW 17.0 08/12/2022 05:17 AM     08/12/2022 05:17 AM       CMP:   Lab Results   Component Value Date/Time     08/12/2022 05:17 AM    K 3.7 08/12/2022 05:17 AM    K 3.8 08/12/2022 03:10 AM    CL 97 08/12/2022 05:17 AM    CO2 27 08/12/2022 05:17 AM    BUN 27 08/12/2022 05:17 AM    CREATININE 2.3 08/12/2022 05:17 AM    GFRAA 25 08/12/2022 05:17 AM    LABGLOM 20 08/12/2022 05:17 AM    GLUCOSE 87 08/12/2022 05:17 AM    PROT 4.5 08/10/2022 11:37 AM    CALCIUM 7.3 08/12/2022 05:17 AM    BILITOT 0.6 08/10/2022 11:37 AM    ALKPHOS 187 08/10/2022 11:37 AM    AST 22 08/10/2022 11:37 AM    ALT 12 08/10/2022 11:37 AM       POC Tests: No results for input(s): POCGLU, POCNA, POCK, POCCL, POCBUN, POCHEMO, POCHCT in the last 72 hours. Coags:   Lab Results   Component Value Date/Time    PROTIME 60.0 08/12/2022 11:54 AM    INR 5.4 08/12/2022 11:54 AM    APTT 29.1 07/24/2022 09:43 AM       HCG (If Applicable): No results found for: PREGTESTUR, PREGSERUM, HCG, HCGQUANT     ABGs: No results found for: PHART, PO2ART, QLI3HKW, SAX4AUI, BEART, M5DKFVOV     Type & Screen (If Applicable):  No results found for: LABABO, LABRH    Drug/Infectious Status (If Applicable):  No results found for: HIV, HEPCAB    COVID-19 Screening (If Applicable):   Lab Results   Component Value Date/Time    COVID19 Not Detected 08/10/2022 06:30 PM           Anesthesia Evaluation  Patient summary reviewed and Nursing notes reviewed   history of anesthetic complications: PONV. Airway: Mallampati: II  TM distance: <3 FB     Mouth opening: < 3 FB   Dental: normal exam     Comment: Patient denies any missing, chipped or loose teeth.     Pulmonary: breath sounds clear to auscultation  (+) COPD:                             Cardiovascular:  Exercise tolerance: poor (<4 METS),   (+) hypertension:, past MI:, CAD:, hyperlipidemia      ECG reviewed  Rhythm: regular  Rate: normal           Beta Blocker:  Not on Beta Blocker         Neuro/Psych:   (+) neuromuscular disease:, headaches:, psychiatric history:depression/anxiety              ROS comment: Right cataract GI/Hepatic/Renal:   (+) renal disease: ESRD,          ROS comment: Postsurgical malabsorption, not elsewhere classified  Hypercholesterolemia  Pure hypercholesterolemia  Enterocutaneous fistula  History of ischemic bowel disease  Ileostomy present ([facility]    . Endo/Other:    (+) hypothyroidism: arthritis: OA., .                  ROS comment: Osteoarthritis, shoulder  Primary osteoarthritis of left knee     Abdominal:             Vascular:           ROS comment: Abdominal wall cellulitis  Cellulitis    . Other Findings:           Anesthesia Plan      MAC     ASA 4       Induction: intravenous. Anesthetic plan and risks discussed with patient. Use of blood products discussed with patient whom consented to blood products. Plan discussed with CRNA and attending. Suellen Monk RN   8/12/2022      Pt seen, examined, chart reviewed, plan discussed.   Kassie Grimes MD  8/12/2022  3:50 PM

## 2022-08-12 NOTE — DISCHARGE INSTR - COC
Continuity of Care Form    Patient Name: Marii Alatorre   :  1940  MRN:  [id number]    [facility] date:  2022  Discharge date:  ***    Code Status Order: Full Code   Advance Directives:     Admitting Physician:  Lorin Winn MD  PCP: Nikolay Joyner DO    Discharging Nurse: [facility] Unit/Room#: 0059/7877-O  Discharging Unit Phone Number: ***    Emergency Contact:   Extended Emergency Contact Information  Primary Emergency Contact: John Kelley  Address: [address] [place] [address] [place]           [place], [facility] [address] Phone: [phone]  Mobile Phone: [phone]  Relation: Spouse    Past Surgical History:  Past Surgical History:   Procedure Laterality Date    ABDOMEN SURGERY  2012    total colectomy, bowel resection, ileostomy,revision of ileostomy     ABDOMEN SURGERY N/A 2020    DRAINAGE OF ABDOMINAL WALL ABSCESS, EXPLANTATION OF MESH, DEBRIDEMENT OF SKIN AND SUBCUTANEOUS TISSUE performed by Zenobia Woodall MD at [facility] N/A 2020    EXPLANTATION OF HERNIA MESH performed by Zenobia Woodall MD at [address] [place] [address]      ARTHROPLASTY  -    right and left thumb    [facility], ,     lumbar fusion x 3    BACK SURGERY      cervical fusion x 2    BREAST SURGERY  years ago    monor calcifications    CARPAL TUNNEL RELEASE Bilateral     CATARACT REMOVAL WITH IMPLANT Right 03/03/15    CHOLECYSTECTOMY  07    Lap    COLONOSCOPY      CORONARY ANGIOPLASTY WITH STENT PLACEMENT  2002    ENDOSCOPY, COLON, DIAGNOSTIC      EPIGASTRIC HERNIA REPAIR  3/21/16    incisional with mesh implantation    ESOPHAGUS SURGERY  13    esophageal clamp (torn Esophagus)    FINGER TRIGGER RELEASE Right 2006    index finger    FOOT SURGERY Right     multiple    HERNIA REPAIR  13    abdominal x 5- last one      ILEOSTOMY OR JEJUNOSTOMY  1-3-13    revision    INSERT PICC LINE  2020    and removed JOINT REPLACEMENT Right 3/24/15    right total shoulder arthroplasty    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     LAPAROTOMY N/A 5/12/2020    EXPLORATORY LAPAROTOMY EXPLANTATION OF INFECTED MESH SMAL BOWEL RESECTION AND REVISION END ILEOSTOMY, LYSIS OF ADHESIONS performed by Danna Meigs, MD at [facility] N/A 6/8/2020    LAPAROTOMY EXPLORATORY, Margaret Dopp OF HERNIA MESH performed by Danna Meigs, MD at [address] [place] [address] N/A 9/15/2020    EXPLORATORY LAPAROTOMY WITH SMALL BOWEL RESECTION, TAKE DOWN REVISION ENTEROCUTANEOUS FISTULA , OSTOMY REVISION performed by Danna Meigs, MD at [address] / leg    NASAL SINUS SURGERY      x2 /  cauterized    OTHER SURGICAL HISTORY  08/24/2016    diagnostic laparotomy with repair of intraabdominal hernia    OTHER SURGICAL HISTORY      removal plate / screws  / right great toe    PICC LINE INSERTION NURSE  9/15/2020         PORT SURGERY N/A 7/19/2022    MEDIPORT REMOVAL performed by Carollee Kehr, MD at [address] [place] [address]  2010    right     SKIN BIOPSY  2010    3 squamous cell carcinoma right leg, left leg       Immunization History:   Immunization History   Administered Date(s) Administered    COVID-19, PFIZER GRAY top, DO NOT Dilute, (age 15 y+), IM, 30 mcg/0.3 mL 04/18/2022    COVID-19, PFIZER PURPLE top, DILUTE for use, (age 15 y+), 30mcg/0.3mL 01/29/2021, 02/19/2021, 09/24/2021    Influenza Vaccine, unspecified formulation 10/15/2016       Active Problems:  Patient Active Problem List   Diagnosis Code    Right cataract H26.9    Essential hypertension, benign I10    Hyperlipidemia with target LDL less than 100 E78.5    Osteoarthritis, 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 Z93.2    Chronic kidney disease, stage III (moderate) (HCC) N18.30    COPD (chronic obstructive pulmonary disease) (HCC) J44.9    Moderate protein-calorie malnutrition ([facility] E44.0    Abdominal wall cellulitis L03.311    Cellulitis L03.90    Infected hernioplasty mesh 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    Hypercholesterolemia E78.00    Pure hypercholesterolemia E78.00    Prediabetes R73.03    Hypothyroidism, unspecified E03.9    Acute renal failure (ARF) (HCC) N17.9    Enteritis K52.9    Hypotension I95.9    Abnormal CT scan, lumbar spine R93.7    Severe protein-calorie malnutrition (HCC) E43    Hyperkalemia E87.5       Isolation/Infection:   Isolation            No Isolation          Patient Infection Status       Infection Onset Added Last Indicated Last Indicated By Review Planned Expiration Resolved Resolved By    None active    Resolved    COVID-19 (Rule Out) 08/10/22 08/10/22 08/10/22 Respiratory Panel, Molecular, with COVID-19 (Restricted: peds pts or suitable admitted adults) (Ordered)   08/10/22 Rule-Out Test Resulted    COVID-19 (Rule Out) 07/16/22 07/16/22 07/16/22 Respiratory Panel, Molecular, with COVID-19 (Restricted: peds pts or suitable admitted adults) (Ordered)   07/16/22 Rule-Out Test Resulted    COVID-19 (Rule Out) 07/13/22 07/13/22 07/14/22 COVID-19, Rapid (Ordered)   07/14/22 Rule-Out Test Resulted    C-diff Rule Out 02/27/21 02/27/21 02/27/21 CLOSTRIDIUM DIFFICILE EIA (Ordered)   02/28/21 Rule-Out Test Resulted    MRSA 02/20/20 02/22/20 02/20/20 Culture, Wound   09/22/20 Susu Spears RN            Nurse Assessment:  Last Vital Signs: /72   Pulse 87   Temp 98.7 °F (37.1 °C) (Oral)   Resp 18   Ht 5' (1.524 m)   Wt 116 lb 13.5 oz (53 kg)   SpO2 99%   BMI 22.82 kg/m²     Last documented pain score (0-10 scale): Pain Level: 0  Last Weight:   Wt Readings from Last 1 Encounters:   08/12/22 116 lb 13.5 oz (53 kg)     Mental Status:  {IP PT MENTAL STATUS:10595}    IV Access:  { SHELLI IV ACCESS:996598364}    Nursing Mobility/ADLs:  Walking   {CHP DME PZOW:021581474}  Transfer  {CHP DME ZVLY:182284861}  Bathing  {CHP DME TCPH:453004553}  Dressing  {CHP DME ZEDN:037466393}  Toileting  {CHP DME TTGY:267935647}  Feeding  {CHP DME UPZV:094136261}  Med Admin  {CHP DME YYKM:818893096}  Med Delivery   { SHELLI MED Delivery:608451725}    Wound Care Documentation and Therapy:  Incision 07/19/22 Chest Right;Upper (Active)   Dressing Status Other (Comment) 08/11/22 1800   Dressing/Treatment Skin glue; Open to air 08/12/22 1346   Closure Open to air;Surgical glue 08/12/22 1346   Margins Approximated 08/12/22 1346   Incision Assessment Dry 08/12/22 1346   Drainage Amount None 08/12/22 1346   Odor None 08/11/22 1800   Danelle-incision Assessment Intact 08/12/22 1346   Number of days: 24        Elimination:  Continence:    Bowel: {YES / IB:78970}  Bladder: {YES / TA:34987}  Urinary Catheter: {Urinary Catheter:663092739}   Colostomy/Ileostomy/Ileal Conduit: {YES / YA:81635}  Ileostomy Ileostomy RLQ-Stomal Appliance: 1 piece  Ileostomy Ileostomy RLQ-Stoma  Assessment: Protrudes, Moist, Pink  Ileostomy Ileostomy RLQ-Peristomal Assessment: Unable to assess (wafer intact)  Ileostomy Ileostomy RLQ-Treatment: Bag change, Tape changed  Ileostomy Ileostomy RLQ-Stool Appearance: Watery  Ileostomy Ileostomy RLQ-Stool Color: Green  Ileostomy Ileostomy RLQ-Stool Amount: Large  Ileostomy Ileostomy RLQ-Output (mL): 300 ml    Date of Last BM: ***    Intake/Output Summary (Last 24 hours) at 8/12/2022 1404  Last data filed at 8/12/2022 1150  Gross per 24 hour   Intake 2892.04 ml   Output 2080 ml   Net 812.04 ml I/O last 3 completed shifts: In: 9531.7 [I.V.:6847.2;  Blood:871.7; NG/GT:170; IV Piggyback:892.9]  Out: 3790 [Urine:670; Stool:1150]    Safety Concerns:     [address] MARQU[address] Safety Concerns:580309067}    Impairments/Disabilities:      [address] MARQU[address] Impairments/Disabilities:726447422}    Nutrition Therapy:  Current Nutrition Therapy:   [address] SHELLI Diet List:140656391}    Routes of Feeding: {CHP DME Other Feedings:938720232}  Liquids: {Slp liquid thickness:45258}  Daily Fluid Restriction: {CHP DME Yes amt example:120046645}  Last Modified Barium Swallow with Video (Video Swallowing Test): {Done Not Done FMJB:557189211}    Treatments at the Time of Hospital Discharge:   Respiratory Treatments: ***  Oxygen Therapy:  {Therapy; copd oxygen:74828}  Ventilator:    { CC Vent RCCM:583505248}    Rehab Therapies: {THERAPEUTIC INTERVENTION:0784065778}  Weight Bearing Status/Restrictions: [address]  Weight Bearin}  Other Medical Equipment (for information only, NOT a DME order):  {EQUIPMENT:986331197}  Other Treatments: ***    Patient's personal belongings (please select all that are sent with patient):  {P DME Belongings:114351940}    RN SIGNATURE:  {Esignature:278960740}    CASE MANAGEMENT/SOCIAL WORK SECTION    Inpatient Status Date: ***    Readmission Risk Assessment Score:  Readmission Risk              Risk of Unplanned Readmission:  52           Discharging to Facility/ Agency   Name: [facility]  Address:  Phone:  Fax:    Dialysis Facility (if applicable)   Name:  Address:  Dialysis Schedule:  Phone:  Fax:    / signature: Electronically signed by Rowena Ch RN on 2022 at 2:04 PM      PHYSICIAN SECTION    Prognosis: {Prognosis:8477216112}    Condition at Discharge: [address] Patient Condition:472503368}    Rehab Potential (if transferring to Rehab): {Prognosis:9883591913}    Recommended Labs or Other Treatments After Discharge: ***    Physician Certification: I certify the above information and transfer of Marcia Samuels  is necessary for the continuing treatment of the diagnosis listed and that she requires [place] for less 30 days.      Update Admission H&P: {CHP DME Changes in HBNQX:279189364}    PHYSICIAN SIGNATURE:  {Esignature:694832637}

## 2022-08-12 NOTE — PROGRESS NOTES
[facility]  Nephrology Progress Note    Patient's Name: Maren Toussaint    History of Present Illness from 8/8 Consult Note:    Ez Garcia is an [de-identified]year old female we were asked to see regarding KD. She is followed longitudinally in the office with Dr. Carmen Mei. she was last seen in the office in April of this year and at that time her baseline creatinine was noted to be 1.4-1.5 mg/dl with an e-GFR of 33-36 ml/min. She now presents to ED from local nursing home for abnormal lab work, primarily hyperkalemia. Vitals upon arrival included blood pressure 89/47, pulse 92, respirations 18 and temperature 98.0. Initial BMP revealed sodium 134, potassium 6.4, chloride 102, CO2 21, BUN 36 and creatinine 1.9. Initial CBC revealed WBCs 9.0, hemoglobin 6.6, hematocrit 21.8 and platelet count 038. Initial UA showed clear yellow urine, 1.020, trace blood, trace protein. Patient now examined in the ED setting. She is awake alert and oriented. Currently with 1 unit PRBC infusing. Of note she was just discharged from this facility 1 week ago after being treated for enteritis, hypotension and pneumonia. She has been actively recovering at [facility]. Currently she denies any chest pain or shortness of breath at rest.  She does endorse generalized weakness. Currently without nausea, vomiting or diarrhea. She also denies any specific urinary complaints. \"    Subjective:    8/10: Patient was seen and examined. She reports abdominal pain today, but denies any nausea or vomiting. She denies any chest pain or shortness of breath. She was transferred to icu for abd pain and dropping blood pressure. Seen in icu with dr Frances Chavez. Receiving 1 L bolus of ns. Sbp in 90. Farrukh no hydro. Repeat k 5.8 and hyperkalemia protocol ordered.      8/11: pt seen in icu  Awaits ct scan   For hd today for continued hyperkalemia and oliguria    8/12: pt seen in icu  Sp first hd yesterday  Abd pain somewhat improved    PMH:    Past Medical History:   Diagnosis Date    Acute kidney failure ([address] 2014 and 8/2016    x2,no dialysis needed,resolved, kidney function tests returned to normal    Anxiety     Arthritis     CAD (coronary artery disease)     follows with Dr. Pradip Alas every 6 months    Cancer [facility])     skin, leg,nose- removed surgically     COPD (chronic obstructive pulmonary disease) ([address]     mild- no inhlaers, no oxygen     Depression     Fibromyalgia     chronic back and neck pain    Generalized headaches     h/o / daily    History of blood transfusion 3-11-14    multiple times    History of necrotic bowel 2012    Hyperlipidemia     Hypertension     Incisional hernia     abdomen    Insomnia     Mitral valve regurgitation     Myocardial infarct ([address] 2002    Occasional tremors     at hands / stable with medication    Osteopenia     PONV (postoperative nausea and vomiting)     Vitamin B12 deficiency     h/o       Patient Active Problem List   Diagnosis    Right cataract    Essential hypertension, benign    Hyperlipidemia with target LDL less than 100    Osteoarthritis, shoulder    Primary osteoarthritis of left knee    History of ischemic bowel disease    Ileostomy present ([address]    Chronic kidney disease, stage III (moderate) ([facility])    COPD (chronic obstructive pulmonary disease) ([address]    Moderate protein-calorie malnutrition ([address]    Abdominal wall cellulitis    Cellulitis    Infected hernioplasty mesh ([facility])    Enterocutaneous fistula    Mild protein-calorie malnutrition ([address]    Acute kidney injury ([address]    Hypertensive kidney disease with chronic kidney disease stage IV (HCC)    KD (acute kidney injury) ([address]    Diarrhea    Elevated serum creatinine    Abnormal serum creatinine level    Hyperlipemia    Primary hypertension    Essential (primary) hypertension    Postsurgical malabsorption, not elsewhere classified    Hypercholesterolemia    Pure hypercholesterolemia    Prediabetes    Hypothyroidism, unspecified Acute renal failure (ARF) (HCC)    Enteritis    Hypotension    Abnormal CT scan, lumbar spine    Severe protein-calorie malnutrition (HCC)    Hyperkalemia       Meds:     epoetin ludin-epbx  30 Units/kg SubCUTAneous Once per day on Mon Wed Fri    lidocaine  1 patch TransDERmal Daily    sodium zirconium cyclosilicate  10 g Oral TID    sodium polystyrene  15 g Oral Once    fluconazole  200 mg Oral Daily    piperacillin-tazobactam  4,500 mg IntraVENous Q12H    pantoprazole (PROTONIX) 40 mg injection  40 mg IntraVENous Q12H    sucralfate  1 g Oral 4 times per day    calcium carbonate  500 mg Oral Nightly    vitamin D  2,000 Units Oral BID    diphenoxylate-atropine  1 tablet Oral 4x Daily AC & HS    escitalopram  20 mg Oral Nightly    ferrous sulfate  325 mg Oral Nightly    fluticasone  1 spray Each Nostril Nightly    lipase-protease-amylase  24,000 Units Oral TID WC    loperamide  4 mg Oral TID WC    magnesium oxide  400 mg Oral BID    ocuvite-lutein  1 capsule Oral BID    pravastatin  40 mg Oral Nightly    primidone  250 mg Oral Nightly    traZODone  50 mg Oral Nightly    vitamin B-12  500 mcg Oral Nightly        norepinephrine 9 mcg/min (08/12/22 0754)    sodium chloride      sodium chloride      sodium chloride      dextrose      sodium chloride      dextrose      sodium chloride         Meds prn:     ondansetron, sodium chloride, sodium chloride, sodium chloride, dextrose bolus **OR** [DISCONTINUED] dextrose bolus, dextrose, oxyCODONE, sodium chloride, [Held by provider] HYDROmorphone, [Held by provider] morphine, glucose, glucagon (rDNA), dextrose, sodium chloride, hyoscyamine, lipase-protease-amylase, senna, oxyCODONE-acetaminophen, [DISCONTINUED] dextrose bolus **OR** dextrose bolus    Meds prior to admission:     No current facility-administered medications on file prior to encounter.      Current Outpatient Medications on File Prior to Encounter   Medication Sig Dispense Refill    atorvastatin (LIPITOR) 10 MG tablet Take 10 mg by mouth at bedtime      calcium carbonate (TUMS) 500 MG chewable tablet Take 1 tablet by mouth every evening      glucose (GLUTOSE) 40 % GEL Take 15 g by mouth as needed (hypoglycemia)      magnesium hydroxide (MILK OF MAGNESIA) 400 MG/5ML suspension Take 30 mLs by mouth daily as needed for Constipation      senna (SENOKOT) 8.6 MG tablet Take 1 tablet by mouth daily as needed for Constipation      acetaminophen (TYLENOL) 325 MG tablet Take 650 mg by mouth every 4 hours as needed for Pain      vitamin C (ASCORBIC ACID) 500 MG tablet Take 500 mg by mouth in the morning and 500 mg before bedtime. ondansetron (ZOFRAN) 4 MG tablet Take 4 mg by mouth every 8 hours as needed for Nausea or Vomiting      apixaban (ELIQUIS) 5 MG TABS tablet Take 1 tablet by mouth in the morning and 1 tablet before bedtime. 60 tablet 0    lipase-protease-amylase (CREON) 83305-42246 units delayed release capsule Take 12,000 Units by mouth 2 times daily as needed (diarrhea)      Multiple Vitamins-Minerals (PRESERVISION AREDS) CAPS Take 1 capsule by mouth 2 times daily      Magnesium Oxide (MAGNESIUM-OXIDE) 250 MG TABS tablet Take 250 mg by mouth 2 times daily      METAMUCIL FIBER PO Take 1 packet by mouth 2 times daily      diphenoxylate-atropine (LOMOTIL) 2.5-0.025 MG per tablet Take 1 tablet by mouth 4 times daily (before meals and nightly). loperamide (IMODIUM) 2 MG capsule Take 4 mg by mouth in the morning and 4 mg at noon and 4 mg in the evening. Take with meals. [DISCONTINUED] amLODIPine (NORVASC) 10 MG tablet Take 10 mg by mouth nightly       fluticasone (FLONASE) 50 MCG/ACT nasal spray 1 spray by Nasal route nightly      lipase-protease-amylase (CREON) 95187-18369 units delayed release capsule Take 24,000 Units by mouth in the morning and 24,000 Units at noon and 24,000 Units in the evening. Take with meals.       Probiotic CAPS Take 1 capsule by mouth nightly       hyoscyamine (ANASPAZ;LEVSIN) 125 MCG tablet Take 125 mcg by mouth 4 times daily as needed (heartburn)      omeprazole (PRILOSEC) 20 MG delayed release capsule Take 40 mg by mouth every morning       aspirin 81 MG tablet Take 81 mg by mouth nightly       Cholecalciferol (VITAMIN D3) 50 MCG (2000 UT) TABS Take 2,000 Units by mouth 2 times daily       Ferrous Sulfate (IRON) 325 (65 Fe) MG TABS Take 325 mg by mouth nightly       [DISCONTINUED] Coenzyme Q10 (COQ10 PO) Take 1 capsule by mouth nightly       vitamin B-12 (CYANOCOBALAMIN) 500 MCG tablet Place 500 mcg under the tongue nightly       Mansura 3 1000 MG CAPS Take 1,000 mg by mouth nightly      escitalopram (LEXAPRO) 20 MG tablet Take 20 mg by mouth nightly. primidone (MYSOLINE) 250 MG tablet Take 250 mg by mouth nightly       traZODone (DESYREL) 50 MG tablet Take 50 mg by mouth nightly          Allergies:    Fentanyl, Levofloxacin, Tramadol, and Vioxx [rofecoxib]    Social History:     reports that she has been smoking cigarettes. She has been smoking an average of 1 pack per day. She has never used smokeless tobacco. She reports that she does not currently use alcohol. She reports that she does not use drugs. Family History:     No family history on file.     Physical Exam:      Patient Vitals for the past 24 hrs:   BP Temp Temp src Pulse Resp SpO2 Height Weight   08/12/22 1004 (!) 124/99 98.4 °F (36.9 °C) Oral 90 18 -- -- --   08/12/22 0900 (!) 106/52 98.4 °F (36.9 °C) Oral 88 16 100 % -- --   08/12/22 0713 -- -- -- -- 16 -- -- --   08/12/22 0700 -- -- -- 89 13 100 % -- --   08/12/22 0643 -- -- -- -- 18 -- -- --   08/12/22 0600 -- -- -- 95 23 100 % -- 116 lb 13.5 oz (53 kg)   08/12/22 0500 -- -- -- 94 17 99 % -- --   08/12/22 0400 -- 98 °F (36.7 °C) Oral (!) 101 25 99 % -- --   08/12/22 0300 -- -- -- 96 (!) 33 99 % -- --   08/12/22 0200 -- 97.9 °F (36.6 °C) Oral 96 17 100 % -- --   08/12/22 0145 (!) 106/41 97.9 °F (36.6 °C) -- 96 17 100 % -- --   08/12/22 0134 (!) 106/41 98 °F (36.7 °C) Oral 95 16 100 % -- --   08/12/22 0100 -- -- -- 93 22 100 % -- --   08/12/22 0000 -- -- -- 99 23 100 % -- --   08/11/22 2335 (!) 112/43 97.3 °F (36.3 °C) Oral (!) 104 21 100 % -- --   08/11/22 2330 -- -- -- (!) 108 24 98 % -- --   08/11/22 2312 -- -- -- (!) 102 18 100 % -- --   08/11/22 2311 -- -- -- (!) 104 16 100 % -- --   08/11/22 2310 (!) 116/44 98.2 °F (36.8 °C) -- (!) 104 23 100 % -- --   08/11/22 2300 -- -- -- (!) 102 26 100 % -- --   08/11/22 2200 -- -- -- (!) 102 20 94 % -- --   08/11/22 2100 -- -- -- (!) 102 24 93 % -- --   08/11/22 2000 -- 97.6 °F (36.4 °C) Oral (!) 108 25 97 % -- --   08/11/22 1930 (!) 106/36 98 °F (36.7 °C) -- -- 23 -- -- 102 lb 8 oz (46.5 kg)   08/11/22 1900 -- -- -- 99 18 100 % -- --   08/11/22 1830 -- -- -- 100 -- -- -- --   08/11/22 1815 -- -- -- 96 -- -- -- --   08/11/22 1801 -- -- -- (!) 103 -- -- -- --   08/11/22 1800 -- -- -- (!) 101 15 -- -- --   08/11/22 1745 -- -- -- (!) 101 -- -- -- --   08/11/22 1730 -- -- -- (!) 114 -- -- -- --   08/11/22 1715 -- -- -- (!) 104 -- -- -- --   08/11/22 1700 -- 98 °F (36.7 °C) -- 99 23 96 % -- --   08/11/22 1630 -- 98 °F (36.7 °C) -- (!) 104 -- -- -- 102 lb (46.3 kg)   08/11/22 1615 (!) 116/42 97.8 °F (36.6 °C) -- 100 20 96 % -- --   08/11/22 1613 -- -- -- (!) 104 19 -- -- --   08/11/22 1550 (!) 118/47 97.8 °F (36.6 °C) -- (!) 105 22 98 % -- --   08/11/22 1500 -- -- -- (!) 105 20 -- -- --   08/11/22 1400 -- -- -- (!) 104 22 100 % -- --   08/11/22 1300 -- -- -- 95 17 95 % -- --   08/11/22 1233 -- -- -- -- -- -- 5' (1.524 m) --   08/11/22 1200 -- -- -- 100 23 -- -- --   08/11/22 1045 (!) 124/45 97.9 °F (36.6 °C) Oral 100 20 94 % -- --   08/11/22 1035 (!) 124/45 97.9 °F (36.6 °C) -- 95 17 95 % -- --         Intake/Output Summary (Last 24 hours) at 8/12/2022 1024  Last data filed at 8/12/2022 1004  Gross per 24 hour   Intake 3871.68 ml   Output 2355 ml   Net 1516.68 ml     General: Awake, alert, no acute distress  Neck: No JVD noted  Lungs: Clear bilaterally upper, diminished to the bases bilaterally. Unlabored  CV: Regular rate and rhythm. No rub  Abd: tender, Active bowel sounds; ostomy; PEG tube  Skin: Warm and dry. No rash on exposed extremities  Ext: No edema   Neuro: Awake, answers questions appropriately    Data:    Recent Labs     08/10/22  0556 08/11/22  0458 08/11/22  1142 08/11/22  2200 08/12/22  0310 08/12/22  0517   WBC 25.3* 20.2*  --   --   --  8.8   HGB 7.9* 5.5*   < > 6.6* 7.6* 7.9*   HCT 23.7* 16.9*   < > 19.8* 23.0* 23.4*   MCV 90.5 94.4  --   --   --  89.3    166  --   --   --  102*    < > = values in this interval not displayed. Recent Labs     08/10/22  0556 08/10/22  1111 08/11/22  0046 08/11/22  0458 08/11/22  1142 08/11/22  2144 08/12/22  0310 08/12/22  0517   *   < > 128*  --  131* 137  --   --    K 7.0*   < > 5.5*  5.6*   < > 5.5*  5.4* 3.7 3.8  --    CL 92*   < > 90*  --  92* 97*  --   --    CO2 24   < > 20*  --  21* 27  --   --    CREATININE 3.6*   < > 3.9*  --  3.7* 2.2*  --   --    BUN 58*   < > 55*  --  53* 25*  --   --    LABGLOM 12   < > 11  --  12 21  --   --    GLUCOSE 113*   < > 127*  --  98 93  --   --    CALCIUM 7.1*   < > 6.7*  --  7.2* 7.5*  --   --    PHOS  --   --   --   --   --   --   --  4.3   MG 1.7  --   --   --   --   --   --  1.8    < > = values in this interval not displayed. Vit D, 25-Hydroxy   Date Value Ref Range Status   08/01/2022 9 (L) 30 - 100 ng/mL Final     Comment:     <20 ng/mL. ........... Fausto Breen Deficient  20-30 ng/mL. ......... Fausto Breen Insufficient   ng/mL. ........ Fausto Breen Sufficient  >100 ng/mL. .......... Fausto Breen Toxic         PTH   Date Value Ref Range Status   07/15/2022 307 (H) 15 - 65 pg/mL Final       Recent Labs     08/10/22  1137   ALT 12   AST 22   ALKPHOS 187*   BILITOT 0.6       Recent Labs     08/10/22  1137   LABALBU 2.0*       Ferritin   Date Value Ref Range Status   08/08/2022 160 ng/mL Final     Comment:     FERRITIN Reference Ranges:  Adult Males   20 - 60 years:    27 - 400 ng/mL  Adult females 16 - 60 years:    15 - 150 ng/mL  Adults greater than 60 years:   no established reference range  Pediatrics:                     no established reference range       Iron   Date Value Ref Range Status   08/08/2022 97 37 - 145 mcg/dL Final     TIBC   Date Value Ref Range Status   08/08/2022 195 (L) 250 - 450 mcg/dL Final       Vitamin B-12   Date Value Ref Range Status   08/08/2022 1764 (H) 211 - 946 pg/mL Final       Folate   Date Value Ref Range Status   08/08/2022 9.4 4.8 - 24.2 ng/mL Final       Lab Results   Component Value Date/Time    COLORU Yellow 08/08/2022 12:39 PM    NITRU Negative 08/08/2022 12:39 PM    GLUCOSEU Negative 08/08/2022 12:39 PM    KETUA Negative 08/08/2022 12:39 PM    UROBILINOGEN 0.2 08/08/2022 12:39 PM    BILIRUBINUR Negative 08/08/2022 12:39 PM       No results found for: ADEOLA, CREURRAN, MACREATRATIO, OSMOU    No components found for: URIC    No results found for: LIPIDPAN    Assessment and Plans:    KD Stage II on CKD 3b KD   likely secondary to decreased effective renal perfusion in the setting of severe anemia  Baseline serum cr 1.4-1.5mg/dl with an e-GFR=33-36ml/min  retroperitoneal US 8/10\" left renal cysts\" and unremarkable for right kidney; no hydro  UA showed clear yellow urine, spec grav 1.020, trace blood, 30 protein  FeNa 0.1% supports prerenal etiology   Cr peaked at 3.9 before had had  Mooney catheter  0.45 nacl with 75 mEq sodium bicarbonate at 100 mL/hour  Change to ns  Sp hd for oliguria and hi k on 8/11  Hold hd for today      2. HTN with CKD I-IV  BP goal <120/80  BP at/near goal  Monitor BPs  Levo prn     3. Met Acidosis   in the setting of the KD/ CKD   Also with ileostomy   Monitor labs  Stop hco3 drip     4. Secondary hyperparathyroidism of Renal Origin   with Hyperphosphatemia and Unspecified Vit D Def   on 7/15;  PO4 5.2 on 8/9,  Vit D 19 on 8/1   On vitamin D supplementation  Monitor labs  Ca low check ionized and replace prn     5. Hypomagnesemia/Hypocalcemia  Replace prn  On vit d     6. Hyperkalemia  likely due to diet an KD/CKD  high despite numerous rounds of hyper k protocol and hco3 drip  Low potassium diet when able to eat  Cortisol adequate at 90  One hd on 8/11  K better     7.  anemia  Gi bleed  H/o ischemic colitis and ostomy  S/p PRBCs  Transfuse for hemoglobin<7  On ROSA  General surgery following-possible EGD when lytes stable    8. Leukocytosis/abd pain/hypotension  ?  Septic shock/hypovolemia/?acute abd  Treat yeast in urine  Start empiric abx  Had ct of abd pelvis  Lactate 7>0.7       Monica Navarro MD

## 2022-08-13 LAB
ALBUMIN SERPL-MCNC: 2 G/DL (ref 3.5–5.2)
ALP BLD-CCNC: 107 U/L (ref 35–104)
ALT SERPL-CCNC: 9 U/L (ref 0–32)
ANION GAP SERPL CALCULATED.3IONS-SCNC: 8 MMOL/L (ref 7–16)
ANION GAP SERPL CALCULATED.3IONS-SCNC: 9 MMOL/L (ref 7–16)
APTT: 36.1 SEC (ref 24.5–35.1)
AST SERPL-CCNC: 12 U/L (ref 0–31)
B.E.: 0.4 MMOL/L (ref -3–3)
BILIRUB SERPL-MCNC: 0.6 MG/DL (ref 0–1.2)
BUN BLDV-MCNC: 16 MG/DL (ref 6–23)
BUN BLDV-MCNC: 34 MG/DL (ref 6–23)
CALCIUM IONIZED: 1.15 MMOL/L (ref 1.15–1.33)
CALCIUM SERPL-MCNC: 7.5 MG/DL (ref 8.6–10.2)
CALCIUM SERPL-MCNC: 7.6 MG/DL (ref 8.6–10.2)
CHLORIDE BLD-SCNC: 100 MMOL/L (ref 98–107)
CHLORIDE BLD-SCNC: 98 MMOL/L (ref 98–107)
CO2: 27 MMOL/L (ref 22–29)
CO2: 28 MMOL/L (ref 22–29)
COHB: 1 % (ref 0–1.5)
COMMENT: ABNORMAL
CREAT SERPL-MCNC: 1.7 MG/DL (ref 0.5–1)
CREAT SERPL-MCNC: 2.7 MG/DL (ref 0.5–1)
CRITICAL: ABNORMAL
DATE ANALYZED: ABNORMAL
DATE OF COLLECTION: ABNORMAL
FIBRINOGEN: 283 MG/DL (ref 200–400)
FIBRINOGEN: 290 MG/DL (ref 200–400)
GFR AFRICAN AMERICAN: 21
GFR AFRICAN AMERICAN: 35
GFR NON-AFRICAN AMERICAN: 17 ML/MIN/1.73
GFR NON-AFRICAN AMERICAN: 29 ML/MIN/1.73
GLUCOSE BLD-MCNC: 132 MG/DL (ref 74–99)
GLUCOSE BLD-MCNC: 134 MG/DL (ref 74–99)
HCO3: 26 MMOL/L (ref 22–26)
HCT VFR BLD CALC: 21.2 % (ref 34–48)
HCT VFR BLD CALC: 22.9 % (ref 34–48)
HEMOGLOBIN: 6.8 G/DL (ref 11.5–15.5)
HEMOGLOBIN: 7.3 G/DL (ref 11.5–15.5)
HHB: 35.9 % (ref 0–5)
INR BLD: 1.2
LAB: ABNORMAL
Lab: ABNORMAL
MAGNESIUM: 2 MG/DL (ref 1.6–2.6)
MCH RBC QN AUTO: 29.8 PG (ref 26–35)
MCHC RBC AUTO-ENTMCNC: 31.9 % (ref 32–34.5)
MCV RBC AUTO: 93.5 FL (ref 80–99.9)
METER GLUCOSE: 121 MG/DL (ref 74–99)
METHB: 0.7 % (ref 0–1.5)
O2 SATURATION: 55.8 % (ref 92–98.5)
O2HB: 62.4 % (ref 94–97)
OPERATOR ID: ABNORMAL
PATIENT TEMP: 37 C
PCO2: 47.2 MMHG (ref 35–45)
PDW BLD-RTO: 18.2 FL (ref 11.5–15)
PH BLOOD GAS: 7.36 (ref 7.35–7.45)
PHOSPHORUS: 5.1 MG/DL (ref 2.5–4.5)
PLATELET # BLD: 129 E9/L (ref 130–450)
PMV BLD AUTO: 10.9 FL (ref 7–12)
PO2: 30.8 MMHG (ref 75–100)
POTASSIUM SERPL-SCNC: 3 MMOL/L (ref 3.5–5)
POTASSIUM SERPL-SCNC: 3.7 MMOL/L (ref 3.5–5)
PREALBUMIN: 6 MG/DL (ref 20–40)
PROTHROMBIN TIME: 13.5 SEC (ref 9.3–12.4)
RBC # BLD: 2.45 E12/L (ref 3.5–5.5)
SODIUM BLD-SCNC: 135 MMOL/L (ref 132–146)
SODIUM BLD-SCNC: 135 MMOL/L (ref 132–146)
SOURCE, BLOOD GAS: ABNORMAL
THB: 8.2 G/DL (ref 11.5–16.5)
TIME ANALYZED: 1846
TOTAL PROTEIN: 4.2 G/DL (ref 6.4–8.3)
TSH SERPL DL<=0.05 MIU/L-ACNC: 0.6 UIU/ML (ref 0.27–4.2)
WBC # BLD: 7.8 E9/L (ref 4.5–11.5)

## 2022-08-13 PROCEDURE — 2700000000 HC OXYGEN THERAPY PER DAY

## 2022-08-13 PROCEDURE — 6360000002 HC RX W HCPCS: Performed by: STUDENT IN AN ORGANIZED HEALTH CARE EDUCATION/TRAINING PROGRAM

## 2022-08-13 PROCEDURE — 2500000003 HC RX 250 WO HCPCS: Performed by: STUDENT IN AN ORGANIZED HEALTH CARE EDUCATION/TRAINING PROGRAM

## 2022-08-13 PROCEDURE — 80053 COMPREHEN METABOLIC PANEL: CPT

## 2022-08-13 PROCEDURE — 36415 COLL VENOUS BLD VENIPUNCTURE: CPT

## 2022-08-13 PROCEDURE — 85014 HEMATOCRIT: CPT

## 2022-08-13 PROCEDURE — 6370000000 HC RX 637 (ALT 250 FOR IP): Performed by: SURGERY

## 2022-08-13 PROCEDURE — 6360000002 HC RX W HCPCS

## 2022-08-13 PROCEDURE — 2580000003 HC RX 258: Performed by: SURGERY

## 2022-08-13 PROCEDURE — 2580000003 HC RX 258: Performed by: INTERNAL MEDICINE

## 2022-08-13 PROCEDURE — A4216 STERILE WATER/SALINE, 10 ML: HCPCS | Performed by: SURGERY

## 2022-08-13 PROCEDURE — 82330 ASSAY OF CALCIUM: CPT

## 2022-08-13 PROCEDURE — 84100 ASSAY OF PHOSPHORUS: CPT

## 2022-08-13 PROCEDURE — 6360000002 HC RX W HCPCS: Performed by: SURGERY

## 2022-08-13 PROCEDURE — 80048 BASIC METABOLIC PNL TOTAL CA: CPT

## 2022-08-13 PROCEDURE — C9113 INJ PANTOPRAZOLE SODIUM, VIA: HCPCS | Performed by: SURGERY

## 2022-08-13 PROCEDURE — 82962 GLUCOSE BLOOD TEST: CPT

## 2022-08-13 PROCEDURE — 6370000000 HC RX 637 (ALT 250 FOR IP): Performed by: INTERNAL MEDICINE

## 2022-08-13 PROCEDURE — 82805 BLOOD GASES W/O2 SATURATION: CPT

## 2022-08-13 PROCEDURE — 85384 FIBRINOGEN ACTIVITY: CPT

## 2022-08-13 PROCEDURE — 6370000000 HC RX 637 (ALT 250 FOR IP): Performed by: STUDENT IN AN ORGANIZED HEALTH CARE EDUCATION/TRAINING PROGRAM

## 2022-08-13 PROCEDURE — 84443 ASSAY THYROID STIM HORMONE: CPT

## 2022-08-13 PROCEDURE — 90935 HEMODIALYSIS ONE EVALUATION: CPT

## 2022-08-13 PROCEDURE — 85018 HEMOGLOBIN: CPT

## 2022-08-13 PROCEDURE — 2580000003 HC RX 258: Performed by: STUDENT IN AN ORGANIZED HEALTH CARE EDUCATION/TRAINING PROGRAM

## 2022-08-13 PROCEDURE — 2000000000 HC ICU R&B

## 2022-08-13 PROCEDURE — 84134 ASSAY OF PREALBUMIN: CPT

## 2022-08-13 PROCEDURE — 99233 SBSQ HOSP IP/OBS HIGH 50: CPT | Performed by: INTERNAL MEDICINE

## 2022-08-13 PROCEDURE — 6370000000 HC RX 637 (ALT 250 FOR IP): Performed by: FAMILY MEDICINE

## 2022-08-13 PROCEDURE — 85027 COMPLETE CBC AUTOMATED: CPT

## 2022-08-13 PROCEDURE — 85730 THROMBOPLASTIN TIME PARTIAL: CPT

## 2022-08-13 PROCEDURE — 85610 PROTHROMBIN TIME: CPT

## 2022-08-13 PROCEDURE — 6370000000 HC RX 637 (ALT 250 FOR IP)

## 2022-08-13 PROCEDURE — 2580000003 HC RX 258

## 2022-08-13 PROCEDURE — 83735 ASSAY OF MAGNESIUM: CPT

## 2022-08-13 RX ORDER — POTASSIUM CHLORIDE 7.45 MG/ML
40 INJECTION INTRAVENOUS ONCE
Status: DISCONTINUED | OUTPATIENT
Start: 2022-08-13 | End: 2022-08-13

## 2022-08-13 RX ORDER — THIAMINE HYDROCHLORIDE 100 MG/ML
100 INJECTION, SOLUTION INTRAMUSCULAR; INTRAVENOUS DAILY
Status: DISCONTINUED | OUTPATIENT
Start: 2022-08-13 | End: 2022-08-18

## 2022-08-13 RX ORDER — POTASSIUM CHLORIDE 29.8 MG/ML
20 INJECTION INTRAVENOUS
Status: COMPLETED | OUTPATIENT
Start: 2022-08-13 | End: 2022-08-13

## 2022-08-13 RX ADMIN — CALCIUM GLUCONATE 1000 MG: 98 INJECTION, SOLUTION INTRAVENOUS at 09:27

## 2022-08-13 RX ADMIN — DIPHENOXYLATE HYDROCHLORIDE AND ATROPINE SULFATE 1 TABLET: 2.5; .025 TABLET ORAL at 13:26

## 2022-08-13 RX ADMIN — POTASSIUM CHLORIDE 20 MEQ: 29.8 INJECTION INTRAVENOUS at 11:00

## 2022-08-13 RX ADMIN — POTASSIUM CHLORIDE 20 MEQ: 29.8 INJECTION INTRAVENOUS at 13:20

## 2022-08-13 RX ADMIN — PRIMIDONE 250 MG: 250 TABLET ORAL at 20:58

## 2022-08-13 RX ADMIN — HYOSCYAMINE SULFATE 125 MCG: 0.12 TABLET ORAL at 16:48

## 2022-08-13 RX ADMIN — SODIUM CHLORIDE, PRESERVATIVE FREE 40 MG: 5 INJECTION INTRAVENOUS at 06:49

## 2022-08-13 RX ADMIN — POTASSIUM CHLORIDE 20 MEQ: 29.8 INJECTION INTRAVENOUS at 11:52

## 2022-08-13 RX ADMIN — DIPHENOXYLATE HYDROCHLORIDE AND ATROPINE SULFATE 1 TABLET: 2.5; .025 TABLET ORAL at 06:49

## 2022-08-13 RX ADMIN — SUCRALFATE 1 G: 1 TABLET ORAL at 20:58

## 2022-08-13 RX ADMIN — OXYCODONE AND ACETAMINOPHEN 1 TABLET: 5; 325 TABLET ORAL at 18:06

## 2022-08-13 RX ADMIN — Medication 1 CAPSULE: at 20:58

## 2022-08-13 RX ADMIN — SODIUM ZIRCONIUM CYCLOSILICATE 10 G: 10 POWDER, FOR SUSPENSION ORAL at 16:37

## 2022-08-13 RX ADMIN — OXYCODONE AND ACETAMINOPHEN 1 TABLET: 5; 325 TABLET ORAL at 11:44

## 2022-08-13 RX ADMIN — PANCRELIPASE 24000 UNITS: 60000; 12000; 38000 CAPSULE, DELAYED RELEASE PELLETS ORAL at 13:34

## 2022-08-13 RX ADMIN — LOPERAMIDE HYDROCHLORIDE 4 MG: 2 CAPSULE ORAL at 09:17

## 2022-08-13 RX ADMIN — FERROUS SULFATE TAB 325 MG (65 MG ELEMENTAL FE) 325 MG: 325 (65 FE) TAB at 20:56

## 2022-08-13 RX ADMIN — SODIUM ZIRCONIUM CYCLOSILICATE 10 G: 10 POWDER, FOR SUSPENSION ORAL at 09:17

## 2022-08-13 RX ADMIN — PANCRELIPASE 24000 UNITS: 60000; 12000; 38000 CAPSULE, DELAYED RELEASE PELLETS ORAL at 19:24

## 2022-08-13 RX ADMIN — CYANOCOBALAMIN TAB 1000 MCG 500 MCG: 1000 TAB at 20:56

## 2022-08-13 RX ADMIN — Medication 1 CAPSULE: at 16:55

## 2022-08-13 RX ADMIN — PIPERACILLIN AND TAZOBACTAM 4500 MG: 4; .5 INJECTION, POWDER, FOR SOLUTION INTRAVENOUS at 16:45

## 2022-08-13 RX ADMIN — Medication 400 MG: at 20:56

## 2022-08-13 RX ADMIN — ONDANSETRON 4 MG: 2 INJECTION INTRAMUSCULAR; INTRAVENOUS at 02:18

## 2022-08-13 RX ADMIN — FLUCONAZOLE 200 MG: 100 TABLET ORAL at 09:14

## 2022-08-13 RX ADMIN — HYOSCYAMINE SULFATE 125 MCG: 0.12 TABLET ORAL at 11:05

## 2022-08-13 RX ADMIN — LOPERAMIDE HYDROCHLORIDE 4 MG: 2 CAPSULE ORAL at 18:49

## 2022-08-13 RX ADMIN — Medication 2000 UNITS: at 09:18

## 2022-08-13 RX ADMIN — Medication 400 MG: at 09:16

## 2022-08-13 RX ADMIN — FLUTICASONE PROPIONATE 1 SPRAY: 50 SPRAY, METERED NASAL at 20:58

## 2022-08-13 RX ADMIN — PIPERACILLIN AND TAZOBACTAM 4500 MG: 4; .5 INJECTION, POWDER, FOR SOLUTION INTRAVENOUS at 02:28

## 2022-08-13 RX ADMIN — Medication 13 MCG/MIN: at 16:33

## 2022-08-13 RX ADMIN — SUCRALFATE 1 G: 1 TABLET ORAL at 22:57

## 2022-08-13 RX ADMIN — DIPHENOXYLATE HYDROCHLORIDE AND ATROPINE SULFATE 1 TABLET: 2.5; .025 TABLET ORAL at 21:03

## 2022-08-13 RX ADMIN — SODIUM CHLORIDE: 9 INJECTION, SOLUTION INTRAVENOUS at 13:24

## 2022-08-13 RX ADMIN — ESCITALOPRAM OXALATE 20 MG: 10 TABLET ORAL at 20:56

## 2022-08-13 RX ADMIN — TRAZODONE HYDROCHLORIDE 50 MG: 50 TABLET ORAL at 21:03

## 2022-08-13 RX ADMIN — SUCRALFATE 1 G: 1 TABLET ORAL at 16:33

## 2022-08-13 RX ADMIN — SODIUM CHLORIDE, PRESERVATIVE FREE 40 MG: 5 INJECTION INTRAVENOUS at 19:22

## 2022-08-13 RX ADMIN — DIPHENOXYLATE HYDROCHLORIDE AND ATROPINE SULFATE 1 TABLET: 2.5; .025 TABLET ORAL at 18:07

## 2022-08-13 RX ADMIN — THIAMINE HYDROCHLORIDE 100 MG: 100 INJECTION, SOLUTION INTRAMUSCULAR; INTRAVENOUS at 18:14

## 2022-08-13 RX ADMIN — POTASSIUM CHLORIDE 20 MEQ: 29.8 INJECTION INTRAVENOUS at 09:30

## 2022-08-13 RX ADMIN — PRAVASTATIN SODIUM 40 MG: 20 TABLET ORAL at 20:58

## 2022-08-13 RX ADMIN — LOPERAMIDE HYDROCHLORIDE 4 MG: 2 CAPSULE ORAL at 13:26

## 2022-08-13 RX ADMIN — Medication 2000 UNITS: at 20:59

## 2022-08-13 ASSESSMENT — PAIN DESCRIPTION - LOCATION
LOCATION: ABDOMEN;BACK
LOCATION: ABDOMEN
LOCATION: BACK
LOCATION: BACK

## 2022-08-13 ASSESSMENT — PAIN DESCRIPTION - DESCRIPTORS
DESCRIPTORS: CRAMPING;SORE
DESCRIPTORS: SORE;TENDER;DISCOMFORT
DESCRIPTORS: CRAMPING
DESCRIPTORS: SORE;TENDER

## 2022-08-13 ASSESSMENT — PAIN SCALES - GENERAL
PAINLEVEL_OUTOF10: 6
PAINLEVEL_OUTOF10: 6
PAINLEVEL_OUTOF10: 0
PAINLEVEL_OUTOF10: 5
PAINLEVEL_OUTOF10: 0

## 2022-08-13 ASSESSMENT — PAIN - FUNCTIONAL ASSESSMENT
PAIN_FUNCTIONAL_ASSESSMENT: ACTIVITIES ARE NOT PREVENTED

## 2022-08-13 NOTE — PROGRESS NOTES
Temporary dialysis catheter placed 8/11 with some ongoing bleeding. Surgical snow placed at bedside yesterday. This morning, dressing with minimal sanguinous saturation. No hematoma. Vascular surgery will be available as needed, please do not hesitate to reach out if there are any further questions or concerns.      Mac Carrillo MD  General Surgery PGY-3

## 2022-08-13 NOTE — PROGRESS NOTES
[facility]  Nephrology Progress Note    Patient's Name: Nilda Larson    History of Present Illness from 8/8 Consult Note:    Kasey Berman is an [de-identified]year old female we were asked to see regarding KD. She is followed longitudinally in the office with Dr. Shahab Hannon. she was last seen in the office in April of this year and at that time her baseline creatinine was noted to be 1.4-1.5 mg/dl with an e-GFR of 33-36 ml/min. She now presents to ED from local nursing home for abnormal lab work, primarily hyperkalemia. Vitals upon arrival included blood pressure 89/47, pulse 92, respirations 18 and temperature 98.0. Initial BMP revealed sodium 134, potassium 6.4, chloride 102, CO2 21, BUN 36 and creatinine 1.9. Initial CBC revealed WBCs 9.0, hemoglobin 6.6, hematocrit 21.8 and platelet count 510. Initial UA showed clear yellow urine, 1.020, trace blood, trace protein. Patient now examined in the ED setting. She is awake alert and oriented. Currently with 1 unit PRBC infusing. Of note she was just discharged from this facility 1 week ago after being treated for enteritis, hypotension and pneumonia. She has been actively recovering at [facility]. Currently she denies any chest pain or shortness of breath at rest.  She does endorse generalized weakness. Currently without nausea, vomiting or diarrhea. She also denies any specific urinary complaints. \"    Subjective:    8/10: Patient was seen and examined. She reports abdominal pain today, but denies any nausea or vomiting. She denies any chest pain or shortness of breath. She was transferred to icu for abd pain and dropping blood pressure. Seen in icu with dr Shanelle Lombardo. Receiving 1 L bolus of ns. Sbp in 90. Farrukh no hydro. Repeat k 5.8 and hyperkalemia protocol ordered.      8/11: pt seen in icu  Awaits ct scan   For hd today for continued hyperkalemia and oliguria    8/12: pt seen in icu  Sp first hd yesterday  Abd pain somewhat elsewhere classified    Hypercholesterolemia    Pure hypercholesterolemia    Prediabetes    Hypothyroidism, unspecified    Acute renal failure (ARF) (HCC)    Enteritis    Hypotension    Abnormal CT scan, lumbar spine    Severe protein-calorie malnutrition (HCC)    Hyperkalemia       Meds:     calcium gluconate IVPB  1,000 mg IntraVENous Once    potassium chloride  20 mEq IntraVENous Q1H    epoetin ludin-epbx  30 Units/kg SubCUTAneous Once per day on Mon Wed Fri    lidocaine  1 patch TransDERmal Daily    lidocaine  1 patch TransDERmal Daily    sodium zirconium cyclosilicate  10 g Oral TID    sodium polystyrene  15 g Oral Once    fluconazole  200 mg Oral Daily    piperacillin-tazobactam  4,500 mg IntraVENous Q12H    pantoprazole (PROTONIX) 40 mg injection  40 mg IntraVENous Q12H    sucralfate  1 g Oral 4 times per day    calcium carbonate  500 mg Oral Nightly    vitamin D  2,000 Units Oral BID    diphenoxylate-atropine  1 tablet Oral 4x Daily AC & HS    escitalopram  20 mg Oral Nightly    ferrous sulfate  325 mg Oral Nightly    fluticasone  1 spray Each Nostril Nightly    lipase-protease-amylase  24,000 Units Oral TID WC    loperamide  4 mg Oral TID WC    magnesium oxide  400 mg Oral BID    ocuvite-lutein  1 capsule Oral BID    pravastatin  40 mg Oral Nightly    primidone  250 mg Oral Nightly    traZODone  50 mg Oral Nightly    vitamin B-12  500 mcg Oral Nightly        sodium chloride 75 mL/hr at 08/13/22 0645    sodium chloride      norepinephrine 9 mcg/min (08/13/22 0645)    sodium chloride      sodium chloride      sodium chloride      dextrose      sodium chloride      dextrose      sodium chloride         Meds prn:     sodium chloride, ondansetron, sodium chloride, sodium chloride, sodium chloride, dextrose bolus **OR** [DISCONTINUED] dextrose bolus, dextrose, [Held by provider] oxyCODONE, sodium chloride, [Held by provider] HYDROmorphone, [Held by provider] morphine, glucose, glucagon (rDNA), dextrose, sodium chloride, hyoscyamine, lipase-protease-amylase, senna, [Held by provider] oxyCODONE-acetaminophen, [DISCONTINUED] dextrose bolus **OR** dextrose bolus    Meds prior to admission:     No current facility-administered medications on file prior to encounter. Current Outpatient Medications on File Prior to Encounter   Medication Sig Dispense Refill    atorvastatin (LIPITOR) 10 MG tablet Take 10 mg by mouth at bedtime      calcium carbonate (TUMS) 500 MG chewable tablet Take 1 tablet by mouth every evening      glucose (GLUTOSE) 40 % GEL Take 15 g by mouth as needed (hypoglycemia)      magnesium hydroxide (MILK OF MAGNESIA) 400 MG/5ML suspension Take 30 mLs by mouth daily as needed for Constipation      senna (SENOKOT) 8.6 MG tablet Take 1 tablet by mouth daily as needed for Constipation      acetaminophen (TYLENOL) 325 MG tablet Take 650 mg by mouth every 4 hours as needed for Pain      vitamin C (ASCORBIC ACID) 500 MG tablet Take 500 mg by mouth in the morning and 500 mg before bedtime. ondansetron (ZOFRAN) 4 MG tablet Take 4 mg by mouth every 8 hours as needed for Nausea or Vomiting      apixaban (ELIQUIS) 5 MG TABS tablet Take 1 tablet by mouth in the morning and 1 tablet before bedtime. 60 tablet 0    lipase-protease-amylase (CREON) 33977-21838 units delayed release capsule Take 12,000 Units by mouth 2 times daily as needed (diarrhea)      Multiple Vitamins-Minerals (PRESERVISION AREDS) CAPS Take 1 capsule by mouth 2 times daily      Magnesium Oxide (MAGNESIUM-OXIDE) 250 MG TABS tablet Take 250 mg by mouth 2 times daily      METAMUCIL FIBER PO Take 1 packet by mouth 2 times daily      diphenoxylate-atropine (LOMOTIL) 2.5-0.025 MG per tablet Take 1 tablet by mouth 4 times daily (before meals and nightly). loperamide (IMODIUM) 2 MG capsule Take 4 mg by mouth in the morning and 4 mg at noon and 4 mg in the evening. Take with meals.       [DISCONTINUED] amLODIPine (NORVASC) 10 MG tablet Take 10 mg by mouth nightly       fluticasone (FLONASE) 50 MCG/ACT nasal spray 1 spray by Nasal route nightly      lipase-protease-amylase (CREON) 35948-70485 units delayed release capsule Take 24,000 Units by mouth in the morning and 24,000 Units at noon and 24,000 Units in the evening. Take with meals. Probiotic CAPS Take 1 capsule by mouth nightly       hyoscyamine (ANASPAZ;LEVSIN) 125 MCG tablet Take 125 mcg by mouth 4 times daily as needed (heartburn)      omeprazole (PRILOSEC) 20 MG delayed release capsule Take 40 mg by mouth every morning       aspirin 81 MG tablet Take 81 mg by mouth nightly       Cholecalciferol (VITAMIN D3) 50 MCG (2000 UT) TABS Take 2,000 Units by mouth 2 times daily       Ferrous Sulfate (IRON) 325 (65 Fe) MG TABS Take 325 mg by mouth nightly       [DISCONTINUED] Coenzyme Q10 (COQ10 PO) Take 1 capsule by mouth nightly       vitamin B-12 (CYANOCOBALAMIN) 500 MCG tablet Place 500 mcg under the tongue nightly       Sumpter 3 1000 MG CAPS Take 1,000 mg by mouth nightly      escitalopram (LEXAPRO) 20 MG tablet Take 20 mg by mouth nightly.       primidone (MYSOLINE) 250 MG tablet Take 250 mg by mouth nightly       traZODone (DESYREL) 50 MG tablet Take 50 mg by mouth nightly          Allergies:    Fentanyl, Levofloxacin, Tramadol, and Vioxx [rofecoxib]      Physical Exam:      Patient Vitals for the past 24 hrs:   BP Temp Temp src Pulse Resp SpO2 Weight   08/13/22 0700 -- -- -- 80 14 100 % --   08/13/22 0600 -- -- -- 82 16 100 % --   08/13/22 0527 -- -- -- -- -- -- 117 lb 4.6 oz (53.2 kg)   08/13/22 0500 -- -- -- 87 15 96 % --   08/13/22 0400 -- -- -- 90 18 97 % --   08/13/22 0330 -- -- -- 91 18 100 % --   08/13/22 0300 -- -- -- 91 19 99 % --   08/13/22 0200 -- -- -- (!) 102 21 90 % --   08/13/22 0100 -- -- -- 98 20 94 % --   08/13/22 0000 -- 98.5 °F (36.9 °C) Oral 96 18 94 % --   08/12/22 2300 -- -- -- 90 16 92 % --   08/12/22 2200 -- -- -- 91 20 97 % --   08/12/22 2100 -- -- -- 85 20 99 % --   08/12/22 2044 (!) 131/55 98.2 °F (36.8 °C) -- 91 19 99 % --   08/12/22 2038 (!) 131/55 98.2 °F (36.8 °C) -- 91 19 99 % --   08/12/22 2030 (!) 135/59 98.2 °F (36.8 °C) -- 91 19 97 % --   08/12/22 2013 (!) 115/51 98 °F (36.7 °C) Oral 89 18 98 % --   08/12/22 2000 -- -- -- 97 16 99 % --   08/12/22 1800 112/64 98.2 °F (36.8 °C) Oral 80 16 -- --   08/12/22 1730 -- -- -- 84 20 -- --   08/12/22 1700 -- -- -- 77 15 -- --   08/12/22 1630 -- -- -- 73 19 -- --   08/12/22 1600 -- -- -- 84 16 -- --   08/12/22 1550 (!) 114/51 98.7 °F (37.1 °C) Oral 97 17 94 % --   08/12/22 1530 112/68 97.5 °F (36.4 °C) Oral 95 14 -- --   08/12/22 1500 -- -- -- 84 13 -- --   08/12/22 1430 -- -- -- 92 13 -- --   08/12/22 1400 99/64 -- -- 91 14 -- --   08/12/22 1346 -- -- -- 87 18 -- --   08/12/22 1330 -- -- -- 81 13 -- --   08/12/22 1300 102/70 -- -- 80 12 -- --   08/12/22 1230 -- -- -- 80 11 -- --   08/12/22 1200 104/72 98.7 °F (37.1 °C) Oral 81 10 -- --   08/12/22 1130 -- -- -- 90 13 -- --   08/12/22 1100 -- -- -- 85 16 -- --   08/12/22 1041 -- -- -- 88 -- 99 % --   08/12/22 1030 -- -- -- 89 18 -- --   08/12/22 1004 (!) 124/99 98.4 °F (36.9 °C) Oral 90 18 -- --   08/12/22 1000 -- -- -- 82 17 -- --   08/12/22 0930 -- -- -- 90 18 -- --   08/12/22 0900 (!) 106/52 98.4 °F (36.9 °C) Oral 88 16 100 % --   08/12/22 0830 -- -- -- 84 15 -- --         Intake/Output Summary (Last 24 hours) at 8/13/2022 0827  Last data filed at 8/13/2022 0645  Gross per 24 hour   Intake 4088.11 ml   Output 1365 ml   Net 2723.11 ml     General: drowsy, alert, no acute distress  Neck: No JVD noted  Lungs: Bilateral rhonchi  CV: Regular rate and rhythm. No rub  Abd: tender, Active bowel sounds; ostomy; PEG tube; ileostomy bag with watery stool content  Skin: Warm and dry.   No rash on exposed extremities  Ext: No edema   Neuro: drowsy, opens eyes to name , not answering questions    Data:    Recent Labs     08/11/22  0458 08/11/22  1142 08/12/22  0310 08/12/22  0517 08/13/22  0421   WBC 20.2*  --   --  8.8 7.8   HGB 5.5*   < > 7.6* 7.9* 7.3*   HCT 16.9*   < > 23.0* 23.4* 22.9*   MCV 94.4  --   --  89.3 93.5     --   --  102* 129*    < > = values in this interval not displayed. Recent Labs     08/11/22  2144 08/12/22  0310 08/12/22  0517 08/13/22  0421     --  135 135   K 3.7 3.8 3.7 3.0*   CL 97*  --  97* 98   CO2 27  --  27 28   CREATININE 2.2*  --  2.3* 2.7*   BUN 25*  --  27* 34*   LABGLOM 21  --  20 17   GLUCOSE 93  --  87 134*   CALCIUM 7.5*  --  7.3* 7.5*   PHOS  --   --  4.3 5.1*   MG  --   --  1.8 2.0       Vit D, 25-Hydroxy   Date Value Ref Range Status   08/01/2022 9 (L) 30 - 100 ng/mL Final     Comment:     <20 ng/mL. ........... Danielle Bloodgood Deficient  20-30 ng/mL. ......... Danielle Bloodgood Insufficient   ng/mL. ........ Danielle Bloodgood Sufficient  >100 ng/mL. .......... Danielle Bloodgood Toxic         PTH   Date Value Ref Range Status   07/15/2022 307 (H) 15 - 65 pg/mL Final       Recent Labs     08/10/22  1137   ALT 12   AST 22   ALKPHOS 187*   BILITOT 0.6       Recent Labs     08/10/22  1137   LABALBU 2.0*       Ferritin   Date Value Ref Range Status   08/08/2022 160 ng/mL Final     Comment:     FERRITIN Reference Ranges:  Adult Males   20 - 60 years:    30 - 400 ng/mL  Adult females 16 - 61 years:    15 - 150 ng/mL  Adults greater than 60 years:   no established reference range  Pediatrics:                     no established reference range       Iron   Date Value Ref Range Status   08/08/2022 97 37 - 145 mcg/dL Final     TIBC   Date Value Ref Range Status   08/08/2022 195 (L) 250 - 450 mcg/dL Final       Vitamin B-12   Date Value Ref Range Status   08/08/2022 1764 (H) 211 - 946 pg/mL Final       Folate   Date Value Ref Range Status   08/08/2022 9.4 4.8 - 24.2 ng/mL Final       Lab Results   Component Value Date/Time    COLORU Yellow 08/08/2022 12:39 PM    NITRU Negative 08/08/2022 12:39 PM    GLUCOSEU Negative 08/08/2022 12:39 PM    KETUA Negative 08/08/2022 12:39 PM    UROBILINOGEN 0.2 08/08/2022 12:39 PM    BILIRUBINUR Negative 08/08/2022 12:39 PM       No results found for: ADEOLA, CREURRAN, MACREATRATIO, OSMOU    No components found for: URIC    No results found for: LIPIDPAN    Assessment and Plans:    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 HTN with CKD I-IV  BP goal <120/80  BP at/near goal  Monitor BPs  Levo prn     3. Met Acidosis   in the setting of the KD/ CKD   Also with ileostomy   Monitor labs  Now off bicarb drip     4. Secondary hyperparathyroidism of Renal Origin   with Hyperphosphatemia and Unspecified Vit D Def   on 7/15;  PO4 5.2 on 8/9,  Vit D 19 on 8/1   On vitamin D supplementation  Monitor labs  Ca low check ionized and replace prn     5. Hypomagnesemia/Hypocalcemia  Replace prn  On vit d     6. Hyperkalemia  likely due to diet an KD/CKD  high despite numerous rounds of hyper k protocol and hco3 drip  Low potassium diet when able to eat  Cortisol adequate at 90  One hd on 8/11  K low this am, due to GI loss from high ostomy output     7.  anemia  Gi bleed  H/o ischemic colitis and ostomy  S/p PRBCs  Transfuse for hemoglobin<7  On ROSA  General surgery following-possible EGD when lytes stable    8. Shock  ?  Septic shock/hypovolemia/?acute abd  Treat yeast in urine  On diflucan an zosyn  Requiring low dose pressors  Had ct of abd pelvis  Lactate 7>0.7    D/w Resident       Patrica Johnson MD

## 2022-08-13 NOTE — PROGRESS NOTES
GENERAL SURGERY  DAILY PROGRESS NOTE  8/13/2022    CHIEF COMPLAINT:  Chief Complaint   Patient presents with    Other     Sent from NH for K+ of 6.8       SUBJECTIVE:  Pt had multiple bouts of emesis overnight, at times pink and frothy and other times more tan. Pt also having non bloody, green/brown ostomy output. Pt currently has TF's turned off for abdominal cramping, which has since subsided. OBJECTIVE:  BP (!) 131/55   Pulse 91   Temp 98.5 °F (36.9 °C) (Oral)   Resp 19   Ht 5' (1.524 m)   Wt 117 lb 4.6 oz (53.2 kg)   SpO2 99%   BMI 22.91 kg/m²     CBC:   Lab Results   Component Value Date/Time    WBC 7.8 08/13/2022 04:21 AM    RBC 2.45 08/13/2022 04:21 AM    HGB 7.3 08/13/2022 04:21 AM    HCT 22.9 08/13/2022 04:21 AM    MCV 93.5 08/13/2022 04:21 AM    MCH 29.8 08/13/2022 04:21 AM    MCHC 31.9 08/13/2022 04:21 AM    RDW 18.2 08/13/2022 04:21 AM     08/13/2022 04:21 AM    MPV 10.9 08/13/2022 04:21 AM     CMP:    Lab Results   Component Value Date/Time     08/13/2022 04:21 AM    K 3.0 08/13/2022 04:21 AM    K 3.8 08/12/2022 03:10 AM    CL 98 08/13/2022 04:21 AM    CO2 28 08/13/2022 04:21 AM    BUN 34 08/13/2022 04:21 AM    CREATININE 2.7 08/13/2022 04:21 AM    GFRAA 21 08/13/2022 04:21 AM    LABGLOM 17 08/13/2022 04:21 AM    GLUCOSE 134 08/13/2022 04:21 AM    PROT 4.5 08/10/2022 11:37 AM    LABALBU 2.0 08/10/2022 11:37 AM    CALCIUM 7.5 08/13/2022 04:21 AM    BILITOT 0.6 08/10/2022 11:37 AM    ALKPHOS 187 08/10/2022 11:37 AM    AST 22 08/10/2022 11:37 AM    ALT 12 08/10/2022 11:37 AM        GENERAL:  NAD. A&Ox3. LUNGS: No acute respiratory distress 5L NC  CARDIOVASCULAR: hemodynamically stable  ABDOMEN:  Soft, non-distended, mild/diffuse tenderness to palpation. No guarding, rigidity, rebound. ASSESSMENT/PLAN:  [de-identified] y.o. female KD, GIB.      Plan  -EGD 8/12 showed hemorrhagic gastritis  -ok to advance TF's as tolerated  -PPI BID, carafate  -wean pressors as able   -monitor labs    Discussed w Dr. Jackeline Paniagua MD  Surgery Resident PGY-2  Lawyer Derek DO  8/13/2022  6:34 AM

## 2022-08-13 NOTE — PROGRESS NOTES
Pt is repeatedly asking for pain medication. She says her pain is in her back and describes the pain at a #7 on a 1 to 10 scale. Pt says she has a history of lumbar surgery. Tylenol offered but refused. Resident notified.

## 2022-08-13 NOTE — PROGRESS NOTES
Complaining of abdominal cramping and feeling nauseated. Pt says her stomach is distended more. Tube feeding turned off. No tube feed residual observed. Also, still complaining of back pain and says the lidocaine patch isn't helping much and requesting a percocet. Resident notified.

## 2022-08-13 NOTE — PROGRESS NOTES
[address] [facility]   Department of Internal Medicine   Internal Medicine Residency  MICU Progress Note    Patient:  Lexx Mercado [de-identified] y.o. female   MRN: [id number]       Date of Service: 2022    Allergy: Fentanyl, Levofloxacin, Tramadol, and Vioxx [rofecoxib]    Subjective     Patient was seen and examined this morning at bedside in no acute distress. Overnight, lidocaine patch given for back pain to avoid sedating effects of opiates. Despite not receiving opiates last night, patient profoundly somnolent this morning, unable to stay awake. Alert and interactive this afternoon. Objective     TEMPERATURE:  Current - Temp: 97.8 °F (36.6 °C); Max - Temp  Av °F (36.7 °C)  Min: 97.5 °F (36.4 °C)  Max: 98.7 °F (37.1 °C)  RESPIRATIONS RANGE: Resp  Av.6  Min: 13  Max: 21  PULSE RANGE: Pulse  Av.3  Min: 70  Max: 102  BLOOD PRESSURE RANGE:  Systolic (10IMD), JAF:948 , Min:92 , GAW:748   ; Diastolic (19KZG), KPY:91, Min:37, Max:77    PULSE OXIMETRY RANGE: SpO2  Av %  Min: 90 %  Max: 100 %    I & O - 24hr:    Intake/Output Summary (Last 24 hours) at 2022 1444  Last data filed at 2022 1310  Gross per 24 hour   Intake 2549.91 ml   Output 2215 ml   Net 334.91 ml     I/O last 3 completed shifts: In: 6880.2 [P.O.:300; I.V.:4199; Blood:555; NG/GT:292; IV Piggyback:784.1]  Out: 7135 [Urine:970; Emesis/NG output:200; ZBZGQ:4516] I/O this shift:  In: 300   Out: 1360 [Urine:60]   Weight change: 15 lb 4.6 oz (6.933 kg)    Physical Exam:   General Appearance:    Somnolent, difficult to arouse   HEENT:    NC/AT, mucous membranes are moist   Neck:   Supple, no jugular venous distention. Resp:     CTAB, No wheezes, No rhonchi, no use of accessory muscles   Heart:    RRR, S1 and S2 normal, no murmur, rub or gallop.     Abdomen:     Soft, non-tender, non-distended with normal bowel sounds   Extremities:   Atraumatic, no cyanosis or edema   Pulses:  Radial and pedal pulses are intact bilaterally   Neurologic:   Follows commands       Medications     Continuous Infusions:   sodium chloride 75 mL/hr at 08/13/22 1324    sodium chloride      norepinephrine 14 mcg/min (08/13/22 1403)    sodium chloride      sodium chloride      sodium chloride      dextrose      sodium chloride      dextrose      sodium chloride       Scheduled Meds:   thiamine  100 mg IntraVENous Daily    epoetin ludin-epbx  30 Units/kg SubCUTAneous Once per day on Mon Wed Fri    lidocaine  1 patch TransDERmal Daily    lidocaine  1 patch TransDERmal Daily    sodium zirconium cyclosilicate  10 g Oral TID    sodium polystyrene  15 g Oral Once    fluconazole  200 mg Oral Daily    piperacillin-tazobactam  4,500 mg IntraVENous Q12H    pantoprazole (PROTONIX) 40 mg injection  40 mg IntraVENous Q12H    sucralfate  1 g Oral 4 times per day    calcium carbonate  500 mg Oral Nightly    vitamin D  2,000 Units Oral BID    diphenoxylate-atropine  1 tablet Oral 4x Daily AC & HS    escitalopram  20 mg Oral Nightly    ferrous sulfate  325 mg Oral Nightly    fluticasone  1 spray Each Nostril Nightly    lipase-protease-amylase  24,000 Units Oral TID WC    loperamide  4 mg Oral TID WC    magnesium oxide  400 mg Oral BID    ocuvite-lutein  1 capsule Oral BID    pravastatin  40 mg Oral Nightly    primidone  250 mg Oral Nightly    traZODone  50 mg Oral Nightly    vitamin B-12  500 mcg Oral Nightly     PRN Meds: sodium chloride, ondansetron, sodium chloride, sodium chloride, sodium chloride, dextrose bolus **OR** [DISCONTINUED] dextrose bolus, dextrose, sodium chloride, glucose, glucagon (rDNA), dextrose, sodium chloride, hyoscyamine, lipase-protease-amylase, senna, oxyCODONE-acetaminophen, [DISCONTINUED] dextrose bolus **OR** dextrose bolus  Nutrition:   ADULT TUBE FEEDING; Gastrostomy; Renal Formula; Continuous; 10; Yes; 15; Q 12 hours; 30; 30;  Q 6 hours; Protein; 1 Proteinex Daily via feeding tube    Labs and Imaging Studies     CBC:   Recent Labs 08/11/22  0458 08/11/22  1142 08/12/22  0310 08/12/22  0517 08/13/22 0421   WBC 20.2*  --   --  8.8 7.8   HGB 5.5*   < > 7.6* 7.9* 7.3*   HCT 16.9*   < > 23.0* 23.4* 22.9*   MCV 94.4  --   --  89.3 93.5     --   --  102* 129*    < > = values in this interval not displayed. BMP:    Recent Labs     08/11/22  2144 08/12/22  0310 08/12/22  0517 08/13/22 0421     --  135 135   K 3.7 3.8 3.7 3.0*   CL 97*  --  97* 98   CO2 27  --  27 28   BUN 25*  --  27* 34*   CREATININE 2.2*  --  2.3* 2.7*   GLUCOSE 93  --  87 134*       LIVER PROFILE:   No results for input(s): AST, ALT, LIPASE, BILIDIR, BILITOT, ALKPHOS in the last 72 hours. Invalid input(s): AMYLASE,  ALB      PT/INR:   Recent Labs     08/12/22  1154 08/12/22 2144 08/13/22 0421   PROTIME 60.0* 16.3* 13.5*   INR 5.4* 1.5 1.2       APTT:   Recent Labs     08/13/22 0421   APTT 36.1*       Fasting Lipid Panel:    Lab Results   Component Value Date/Time    CHOL 87 08/01/2022 03:36 AM    TRIG 75 08/01/2022 03:36 AM    HDL 42 08/01/2022 03:36 AM       Cardiac Enzymes:    Lab Results   Component Value Date    CKTOTAL 101 08/11/2022    CKTOTAL 28 07/14/2022    CKTOTAL 31 07/14/2022    TROPONINI 0.01 02/25/2021    TROPONINI 0.01 01/21/2021    TROPONINI 0.07 (H) 01/25/2020       Notable Cultures:      Blood cultures   Blood Culture, Routine   Date Value Ref Range Status   08/10/2022 24 Hours no growth  Preliminary     Respiratory cultures No results found for: RESPCULTURE No results found for: LABGRAM  Urine   Urine Culture, Routine   Date Value Ref Range Status   08/08/2022 <10,000 CFU/mL  Mixed gram positive organisms   (A)  Final   08/08/2022 >100,000 CFU/ml  Final     Legionella No results found for: LABLEGI  C Diff PCR No results found for: CDIFPCR  Wound culture/abscess: No results for input(s): WNDABS in the last 72 hours. Tip culture:No results for input(s): CXCATHTIP in the last 72 hours. Oxygen:         Additional Respiratory Assessments  Heart Rate: 82  Resp: 20  SpO2: 95 %       Urinary Catheter-Output (mL): 60 mL    Imaging Studies:  CT ABDOMEN PELVIS WO CONTRAST Additional Contrast? None   Final Result   Enlarged liver demonstrating periportal edema. Redemonstration of a low-attenuation lesion in the pancreas. Dilated stomach and gaseous proximal small intestinal dilatation. Abnormal thickening of the wall of the distal small bowel. Large ventral hernia containing the distal stomach and segments of small   bowel. Generalized soft tissue edematous changes consistent with anasarca and mild   to moderate ascites. Multiple additional findings as described. XR CHEST PORTABLE   Final Result   Interval placement of left internal jugular central venous catheter   terminating at the cavoatrial junction. No postprocedure pneumothorax         US RETROPERITONEAL COMPLETE   Final Result   1. Unremarkable sonographic evaluation of the right kidney. 2. Left renal cysts. 3. The urinary bladder is not visualized secondary to obscuring bowel gas. XR CHEST PORTABLE   Final Result   No acute process.               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 Miguel queen  On HD, started on 8/11/2022  Monitor BMP q12h     Coagulopathy most likely 2/2 DIC from sepsis  TEG has normalized, HD line stopped oozing  Follow fibrinogen q12h, consider discontinuing tomorrow if normal     Acute on chronic normocytic normochromic anemia 2/2 hemorrhagic gastritis  INR normalized, now 1.2  S/p EGD 8/12   Continue PPI BID and carafate     Abdominal pain 2/2 hemorrhagic gastritis - improved  Restarted percocet for LBP today, will monitor closely for oversedation  On TF, tolerating well     Secondary hyperparathyroidism of renal origin-, P5.2, vitamin D 19  On vitamin D supplementation     Urine Candidia infection- on diflucan  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 Hx Short Gut syndrome  Hx HTN  Hx HLD LDL 30 on 8/1/2022 on Pravachol 40  Hx CAD s/p stent placement in 2002  Hx COPD  Prior concern for PE was on Eliquis: Blood thinners currently on hold      # Peptic ulcer prophylaxis: protonix 40mg BID  # DVT Prophylaxis: PCDs  # Disposition: Cont current care    Jane Willis MD, PGY-1    Attending Physician: Dr. Melody Sawyer  Department of Pulmonary, Critical Care and Sleep Medicine  [address] [facility]  Department of Internal Medicine      During multidisciplinary team rounds Josi Soria is a [de-identified] y.o. female was seen, examined and discussed. This is confirmation that I have personally seen and examined the patient and that the key elements of the encounter were performed by me (> 85 % time). The medications & laboratory data was discussed and adjusted where necessary. The radiographic images were reviewed or with radiologist or consultant if felt dis-concordant with the exam or history. The above findings were corroborated, plans confirmed and changes made if needed. Family is updated at the bedside as available. Key issues of the case were discussed among consultants. Critical Care time is documented if appropriate.       Chasidy Isaacs, , FACP, FCCP, [facility],

## 2022-08-14 ENCOUNTER — APPOINTMENT (OUTPATIENT)
Dept: GENERAL RADIOLOGY | Age: 82
DRG: 377 | End: 2022-08-14
Payer: MEDICARE

## 2022-08-14 LAB
ANION GAP SERPL CALCULATED.3IONS-SCNC: 8 MMOL/L (ref 7–16)
APTT: 30.2 SEC (ref 24.5–35.1)
B.E.: -0.8 MMOL/L (ref -3–3)
B.E.: 0.9 MMOL/L (ref -3–3)
B.E.: 2.7 MMOL/L (ref -3–3)
BLOOD BANK DISPENSE STATUS: NORMAL
BLOOD BANK PRODUCT CODE: NORMAL
BPU ID: NORMAL
BUN BLDV-MCNC: 20 MG/DL (ref 6–23)
CALCIUM IONIZED: 1.17 MMOL/L (ref 1.15–1.33)
CALCIUM SERPL-MCNC: 7.4 MG/DL (ref 8.6–10.2)
CHLORIDE BLD-SCNC: 101 MMOL/L (ref 98–107)
CO2: 25 MMOL/L (ref 22–29)
COHB: 0.4 % (ref 0–1.5)
COHB: 1.4 % (ref 0–1.5)
CORTISOL TOTAL: 36.57 MCG/DL (ref 2.68–18.4)
CREAT SERPL-MCNC: 2 MG/DL (ref 0.5–1)
CRITICAL: ABNORMAL
CRITICAL: ABNORMAL
DATE ANALYZED: ABNORMAL
DATE ANALYZED: ABNORMAL
DATE OF COLLECTION: ABNORMAL
DATE OF COLLECTION: ABNORMAL
DELIVERY SYSTEMS: ABNORMAL
DESCRIPTION BLOOD BANK: NORMAL
DEVICE: ABNORMAL
FIBRINOGEN: 294 MG/DL (ref 200–400)
GFR AFRICAN AMERICAN: 29
GFR NON-AFRICAN AMERICAN: 24 ML/MIN/1.73
GLUCOSE BLD-MCNC: 128 MG/DL (ref 74–99)
HCO3: 23.9 MMOL/L (ref 22–26)
HCO3: 25 MMOL/L (ref 22–26)
HCO3: 26.9 MMOL/L (ref 22–26)
HCT VFR BLD CALC: 26.5 % (ref 34–48)
HCT VFR BLD CALC: 27.3 % (ref 34–48)
HEMOGLOBIN: 8.4 G/DL (ref 11.5–15.5)
HEMOGLOBIN: 8.9 G/DL (ref 11.5–15.5)
HHB: 10 % (ref 0–5)
HHB: 22.9 % (ref 0–5)
INR BLD: 1.1
LAB: ABNORMAL
MAGNESIUM: 1.8 MG/DL (ref 1.6–2.6)
MCH RBC QN AUTO: 28.4 PG (ref 26–35)
MCHC RBC AUTO-ENTMCNC: 31.7 % (ref 32–34.5)
MCV RBC AUTO: 89.5 FL (ref 80–99.9)
METHB: 0.5 % (ref 0–1.5)
METHB: 0.5 % (ref 0–1.5)
MODE: ABNORMAL
MODE: ABNORMAL
O2 SATURATION: 69.8 % (ref 92–98.5)
O2 SATURATION: 88.6 % (ref 92–98.5)
O2 SATURATION: 96.5 % (ref 92–98.5)
O2HB: 75.2 % (ref 94–97)
O2HB: 89.1 % (ref 94–97)
OPERATOR ID: 359
OPERATOR ID: 359
OPERATOR ID: 783
PATIENT TEMP: 37 C
PATIENT TEMP: 37 C
PCO2 37: 38.3 MMHG (ref 35–45)
PCO2: 37.6 MMHG (ref 35–45)
PCO2: 39.5 MMHG (ref 35–45)
PDW BLD-RTO: 18.1 FL (ref 11.5–15)
PH 37: 7.4 (ref 7.35–7.45)
PH BLOOD GAS: 7.44 (ref 7.35–7.45)
PH BLOOD GAS: 7.45 (ref 7.35–7.45)
PHOSPHORUS: 3.1 MG/DL (ref 2.5–4.5)
PLATELET # BLD: 145 E9/L (ref 130–450)
PMV BLD AUTO: 11.1 FL (ref 7–12)
PO2 37: 85.3 MMHG (ref 60–80)
PO2: 34.8 MMHG (ref 75–100)
PO2: 52.7 MMHG (ref 75–100)
POC SOURCE: ABNORMAL
POTASSIUM SERPL-SCNC: 3.2 MMOL/L (ref 3.5–5)
PROTHROMBIN TIME: 11.9 SEC (ref 9.3–12.4)
RBC # BLD: 2.96 E12/L (ref 3.5–5.5)
SODIUM BLD-SCNC: 134 MMOL/L (ref 132–146)
SOURCE, BLOOD GAS: ABNORMAL
SOURCE, BLOOD GAS: ABNORMAL
THB: 8.8 G/DL (ref 11.5–16.5)
THB: 9.2 G/DL (ref 11.5–16.5)
TIME ANALYZED: 1700
TIME ANALYZED: 2315
TROPONIN, HIGH SENSITIVITY: 184 NG/L (ref 0–9)
WBC # BLD: 8.6 E9/L (ref 4.5–11.5)

## 2022-08-14 PROCEDURE — 6370000000 HC RX 637 (ALT 250 FOR IP): Performed by: STUDENT IN AN ORGANIZED HEALTH CARE EDUCATION/TRAINING PROGRAM

## 2022-08-14 PROCEDURE — 6360000002 HC RX W HCPCS: Performed by: STUDENT IN AN ORGANIZED HEALTH CARE EDUCATION/TRAINING PROGRAM

## 2022-08-14 PROCEDURE — 93005 ELECTROCARDIOGRAM TRACING: CPT | Performed by: CHIROPRACTOR

## 2022-08-14 PROCEDURE — 2580000003 HC RX 258: Performed by: STUDENT IN AN ORGANIZED HEALTH CARE EDUCATION/TRAINING PROGRAM

## 2022-08-14 PROCEDURE — 99233 SBSQ HOSP IP/OBS HIGH 50: CPT | Performed by: INTERNAL MEDICINE

## 2022-08-14 PROCEDURE — 82330 ASSAY OF CALCIUM: CPT

## 2022-08-14 PROCEDURE — 6360000002 HC RX W HCPCS: Performed by: SURGERY

## 2022-08-14 PROCEDURE — 2580000003 HC RX 258

## 2022-08-14 PROCEDURE — 6370000000 HC RX 637 (ALT 250 FOR IP)

## 2022-08-14 PROCEDURE — 82805 BLOOD GASES W/O2 SATURATION: CPT

## 2022-08-14 PROCEDURE — 80048 BASIC METABOLIC PNL TOTAL CA: CPT

## 2022-08-14 PROCEDURE — 85384 FIBRINOGEN ACTIVITY: CPT

## 2022-08-14 PROCEDURE — 85610 PROTHROMBIN TIME: CPT

## 2022-08-14 PROCEDURE — 36430 TRANSFUSION BLD/BLD COMPNT: CPT

## 2022-08-14 PROCEDURE — 82803 BLOOD GASES ANY COMBINATION: CPT

## 2022-08-14 PROCEDURE — C9113 INJ PANTOPRAZOLE SODIUM, VIA: HCPCS | Performed by: SURGERY

## 2022-08-14 PROCEDURE — 74018 RADEX ABDOMEN 1 VIEW: CPT

## 2022-08-14 PROCEDURE — 36415 COLL VENOUS BLD VENIPUNCTURE: CPT

## 2022-08-14 PROCEDURE — 83735 ASSAY OF MAGNESIUM: CPT

## 2022-08-14 PROCEDURE — 6360000002 HC RX W HCPCS: Performed by: CHIROPRACTOR

## 2022-08-14 PROCEDURE — 2580000003 HC RX 258: Performed by: SURGERY

## 2022-08-14 PROCEDURE — 2580000003 HC RX 258: Performed by: FAMILY MEDICINE

## 2022-08-14 PROCEDURE — 85730 THROMBOPLASTIN TIME PARTIAL: CPT

## 2022-08-14 PROCEDURE — 85027 COMPLETE CBC AUTOMATED: CPT

## 2022-08-14 PROCEDURE — 2000000000 HC ICU R&B

## 2022-08-14 PROCEDURE — 84100 ASSAY OF PHOSPHORUS: CPT

## 2022-08-14 PROCEDURE — 82533 TOTAL CORTISOL: CPT

## 2022-08-14 PROCEDURE — 6370000000 HC RX 637 (ALT 250 FOR IP): Performed by: FAMILY MEDICINE

## 2022-08-14 PROCEDURE — 85018 HEMOGLOBIN: CPT

## 2022-08-14 PROCEDURE — 6360000002 HC RX W HCPCS

## 2022-08-14 PROCEDURE — 2580000003 HC RX 258: Performed by: INTERNAL MEDICINE

## 2022-08-14 PROCEDURE — 2700000000 HC OXYGEN THERAPY PER DAY

## 2022-08-14 PROCEDURE — 71045 X-RAY EXAM CHEST 1 VIEW: CPT

## 2022-08-14 PROCEDURE — 84484 ASSAY OF TROPONIN QUANT: CPT

## 2022-08-14 PROCEDURE — A4216 STERILE WATER/SALINE, 10 ML: HCPCS | Performed by: SURGERY

## 2022-08-14 PROCEDURE — 6370000000 HC RX 637 (ALT 250 FOR IP): Performed by: SURGERY

## 2022-08-14 PROCEDURE — 85014 HEMATOCRIT: CPT

## 2022-08-14 PROCEDURE — 6370000000 HC RX 637 (ALT 250 FOR IP): Performed by: CHIROPRACTOR

## 2022-08-14 PROCEDURE — P9047 ALBUMIN (HUMAN), 25%, 50ML: HCPCS | Performed by: STUDENT IN AN ORGANIZED HEALTH CARE EDUCATION/TRAINING PROGRAM

## 2022-08-14 RX ORDER — DICYCLOMINE HYDROCHLORIDE 10 MG/1
20 CAPSULE ORAL ONCE
Status: DISCONTINUED | OUTPATIENT
Start: 2022-08-14 | End: 2022-08-14

## 2022-08-14 RX ORDER — POTASSIUM CHLORIDE 29.8 MG/ML
20 INJECTION INTRAVENOUS ONCE
Status: COMPLETED | OUTPATIENT
Start: 2022-08-14 | End: 2022-08-14

## 2022-08-14 RX ORDER — MIDODRINE HYDROCHLORIDE 5 MG/1
2.5 TABLET ORAL
Status: DISCONTINUED | OUTPATIENT
Start: 2022-08-14 | End: 2022-08-15

## 2022-08-14 RX ORDER — FUROSEMIDE 10 MG/ML
40 INJECTION INTRAMUSCULAR; INTRAVENOUS ONCE
Status: COMPLETED | OUTPATIENT
Start: 2022-08-14 | End: 2022-08-14

## 2022-08-14 RX ORDER — SODIUM CHLORIDE 9 MG/ML
INJECTION, SOLUTION INTRAVENOUS PRN
Status: DISCONTINUED | OUTPATIENT
Start: 2022-08-14 | End: 2022-08-18 | Stop reason: SDUPTHER

## 2022-08-14 RX ORDER — ALBUMIN (HUMAN) 12.5 G/50ML
25 SOLUTION INTRAVENOUS EVERY 8 HOURS
Status: COMPLETED | OUTPATIENT
Start: 2022-08-14 | End: 2022-08-15

## 2022-08-14 RX ORDER — SODIUM CHLORIDE 0.9 % (FLUSH) 0.9 %
SYRINGE (ML) INJECTION
Status: COMPLETED
Start: 2022-08-14 | End: 2022-08-14

## 2022-08-14 RX ORDER — SODIUM CHLORIDE, SODIUM LACTATE, POTASSIUM CHLORIDE, CALCIUM CHLORIDE 600; 310; 30; 20 MG/100ML; MG/100ML; MG/100ML; MG/100ML
INJECTION, SOLUTION INTRAVENOUS CONTINUOUS
Status: DISCONTINUED | OUTPATIENT
Start: 2022-08-14 | End: 2022-08-14

## 2022-08-14 RX ADMIN — SODIUM CHLORIDE, POTASSIUM CHLORIDE, SODIUM LACTATE AND CALCIUM CHLORIDE: 600; 310; 30; 20 INJECTION, SOLUTION INTRAVENOUS at 21:52

## 2022-08-14 RX ADMIN — ALBUMIN (HUMAN) 25 G: 0.25 INJECTION, SOLUTION INTRAVENOUS at 13:47

## 2022-08-14 RX ADMIN — Medication 400 MG: at 20:50

## 2022-08-14 RX ADMIN — SODIUM CHLORIDE, POTASSIUM CHLORIDE, SODIUM LACTATE AND CALCIUM CHLORIDE: 600; 310; 30; 20 INJECTION, SOLUTION INTRAVENOUS at 09:46

## 2022-08-14 RX ADMIN — OXYCODONE AND ACETAMINOPHEN 1 TABLET: 5; 325 TABLET ORAL at 16:06

## 2022-08-14 RX ADMIN — ALUMINUM HYDROXIDE, MAGNESIUM HYDROXIDE, AND SIMETHICONE: 200; 200; 20 SUSPENSION ORAL at 17:38

## 2022-08-14 RX ADMIN — PRIMIDONE 250 MG: 250 TABLET ORAL at 20:51

## 2022-08-14 RX ADMIN — FERROUS SULFATE TAB 325 MG (65 MG ELEMENTAL FE) 325 MG: 325 (65 FE) TAB at 20:50

## 2022-08-14 RX ADMIN — SODIUM CHLORIDE: 9 INJECTION, SOLUTION INTRAVENOUS at 21:49

## 2022-08-14 RX ADMIN — HYOSCYAMINE SULFATE 125 MCG: 0.12 TABLET ORAL at 13:54

## 2022-08-14 RX ADMIN — TRAZODONE HYDROCHLORIDE 50 MG: 50 TABLET ORAL at 20:50

## 2022-08-14 RX ADMIN — LOPERAMIDE HYDROCHLORIDE 4 MG: 2 CAPSULE ORAL at 12:30

## 2022-08-14 RX ADMIN — SUCRALFATE 1 G: 1 TABLET ORAL at 12:31

## 2022-08-14 RX ADMIN — SODIUM CHLORIDE, PRESERVATIVE FREE 40 MG: 5 INJECTION INTRAVENOUS at 18:23

## 2022-08-14 RX ADMIN — POTASSIUM CHLORIDE 20 MEQ: 29.8 INJECTION, SOLUTION INTRAVENOUS at 06:54

## 2022-08-14 RX ADMIN — POTASSIUM BICARBONATE 40 MEQ: 782 TABLET, EFFERVESCENT ORAL at 06:29

## 2022-08-14 RX ADMIN — PIPERACILLIN AND TAZOBACTAM 4500 MG: 4; .5 INJECTION, POWDER, FOR SOLUTION INTRAVENOUS at 03:18

## 2022-08-14 RX ADMIN — LOPERAMIDE HYDROCHLORIDE 4 MG: 2 CAPSULE ORAL at 09:22

## 2022-08-14 RX ADMIN — SODIUM CHLORIDE, PRESERVATIVE FREE 40 MG: 5 INJECTION INTRAVENOUS at 06:29

## 2022-08-14 RX ADMIN — Medication 1 CAPSULE: at 20:50

## 2022-08-14 RX ADMIN — OXYCODONE AND ACETAMINOPHEN 1 TABLET: 5; 325 TABLET ORAL at 10:03

## 2022-08-14 RX ADMIN — DIPHENOXYLATE HYDROCHLORIDE AND ATROPINE SULFATE 1 TABLET: 2.5; .025 TABLET ORAL at 06:29

## 2022-08-14 RX ADMIN — THIAMINE HYDROCHLORIDE 100 MG: 100 INJECTION, SOLUTION INTRAMUSCULAR; INTRAVENOUS at 09:30

## 2022-08-14 RX ADMIN — FLUCONAZOLE 200 MG: 100 TABLET ORAL at 09:30

## 2022-08-14 RX ADMIN — FLUTICASONE PROPIONATE 1 SPRAY: 50 SPRAY, METERED NASAL at 20:51

## 2022-08-14 RX ADMIN — Medication 400 MG: at 09:25

## 2022-08-14 RX ADMIN — Medication 2000 UNITS: at 20:50

## 2022-08-14 RX ADMIN — MIDODRINE HYDROCHLORIDE 2.5 MG: 5 TABLET ORAL at 13:44

## 2022-08-14 RX ADMIN — PRAVASTATIN SODIUM 40 MG: 20 TABLET ORAL at 20:50

## 2022-08-14 RX ADMIN — SODIUM CHLORIDE, PRESERVATIVE FREE 10 ML: 5 INJECTION INTRAVENOUS at 18:22

## 2022-08-14 RX ADMIN — SUCRALFATE 1 G: 1 TABLET ORAL at 06:29

## 2022-08-14 RX ADMIN — ALBUMIN (HUMAN) 25 G: 0.25 INJECTION, SOLUTION INTRAVENOUS at 21:12

## 2022-08-14 RX ADMIN — PANCRELIPASE 24000 UNITS: 60000; 12000; 38000 CAPSULE, DELAYED RELEASE PELLETS ORAL at 12:30

## 2022-08-14 RX ADMIN — HYOSCYAMINE SULFATE 125 MCG: 0.12 TABLET ORAL at 20:50

## 2022-08-14 RX ADMIN — ESCITALOPRAM OXALATE 20 MG: 10 TABLET ORAL at 20:50

## 2022-08-14 RX ADMIN — SODIUM CHLORIDE: 9 INJECTION, SOLUTION INTRAVENOUS at 03:15

## 2022-08-14 RX ADMIN — Medication 1 CAPSULE: at 09:30

## 2022-08-14 RX ADMIN — Medication 2000 UNITS: at 09:26

## 2022-08-14 RX ADMIN — OXYCODONE AND ACETAMINOPHEN 1 TABLET: 5; 325 TABLET ORAL at 03:42

## 2022-08-14 RX ADMIN — PANCRELIPASE 24000 UNITS: 60000; 12000; 38000 CAPSULE, DELAYED RELEASE PELLETS ORAL at 09:22

## 2022-08-14 RX ADMIN — HYDROMORPHONE HYDROCHLORIDE 0.25 MG: 1 INJECTION, SOLUTION INTRAMUSCULAR; INTRAVENOUS; SUBCUTANEOUS at 22:49

## 2022-08-14 RX ADMIN — HYDROMORPHONE HYDROCHLORIDE 0.25 MG: 1 INJECTION, SOLUTION INTRAMUSCULAR; INTRAVENOUS; SUBCUTANEOUS at 19:02

## 2022-08-14 RX ADMIN — DIPHENOXYLATE HYDROCHLORIDE AND ATROPINE SULFATE 1 TABLET: 2.5; .025 TABLET ORAL at 12:30

## 2022-08-14 RX ADMIN — FUROSEMIDE 40 MG: 10 INJECTION, SOLUTION INTRAMUSCULAR; INTRAVENOUS at 18:18

## 2022-08-14 RX ADMIN — CYANOCOBALAMIN TAB 1000 MCG 500 MCG: 1000 TAB at 20:50

## 2022-08-14 ASSESSMENT — PAIN DESCRIPTION - DESCRIPTORS
DESCRIPTORS: CRAMPING;DISCOMFORT
DESCRIPTORS: CRAMPING;SHARP
DESCRIPTORS: CRAMPING
DESCRIPTORS: CRAMPING;SORE;TENDER

## 2022-08-14 ASSESSMENT — PAIN DESCRIPTION - LOCATION
LOCATION: ABDOMEN
LOCATION: BACK;ABDOMEN;GROIN
LOCATION: ABDOMEN
LOCATION: BACK;ABDOMEN
LOCATION: ABDOMEN;BACK
LOCATION: ABDOMEN
LOCATION: BACK;ABDOMEN
LOCATION: ABDOMEN;BACK

## 2022-08-14 ASSESSMENT — PAIN SCALES - GENERAL
PAINLEVEL_OUTOF10: 0
PAINLEVEL_OUTOF10: 6
PAINLEVEL_OUTOF10: 0
PAINLEVEL_OUTOF10: 2
PAINLEVEL_OUTOF10: 6
PAINLEVEL_OUTOF10: 8
PAINLEVEL_OUTOF10: 0
PAINLEVEL_OUTOF10: 5
PAINLEVEL_OUTOF10: 5
PAINLEVEL_OUTOF10: 2
PAINLEVEL_OUTOF10: 6
PAINLEVEL_OUTOF10: 8

## 2022-08-14 ASSESSMENT — PAIN DESCRIPTION - ORIENTATION
ORIENTATION: MID
ORIENTATION: RIGHT

## 2022-08-14 ASSESSMENT — PAIN DESCRIPTION - ONSET
ONSET: GRADUAL
ONSET: GRADUAL

## 2022-08-14 ASSESSMENT — PAIN DESCRIPTION - FREQUENCY
FREQUENCY: INTERMITTENT
FREQUENCY: INTERMITTENT

## 2022-08-14 ASSESSMENT — PAIN - FUNCTIONAL ASSESSMENT
PAIN_FUNCTIONAL_ASSESSMENT: ACTIVITIES ARE NOT PREVENTED
PAIN_FUNCTIONAL_ASSESSMENT: PREVENTS OR INTERFERES WITH ALL ACTIVE AND SOME PASSIVE ACTIVITIES
PAIN_FUNCTIONAL_ASSESSMENT: ACTIVITIES ARE NOT PREVENTED
PAIN_FUNCTIONAL_ASSESSMENT: PREVENTS OR INTERFERES WITH ALL ACTIVE AND SOME PASSIVE ACTIVITIES
PAIN_FUNCTIONAL_ASSESSMENT: ACTIVITIES ARE NOT PREVENTED
PAIN_FUNCTIONAL_ASSESSMENT: ACTIVITIES ARE NOT PREVENTED

## 2022-08-14 ASSESSMENT — PAIN DESCRIPTION - PAIN TYPE
TYPE: CHRONIC PAIN

## 2022-08-14 NOTE — PROGRESS NOTES
GENERAL SURGERY  DAILY PROGRESS NOTE  8/14/2022    CHIEF COMPLAINT:  Chief Complaint   Patient presents with    Other     Sent from NH for K+ of 6.8       SUBJECTIVE:  No acute events overnight. Patient tolerating tube feeds at 30. No nausea or emesis. Ostomy output is thick, green-brown. OBJECTIVE:  BP (!) 132/41   Pulse 73   Temp 98.4 °F (36.9 °C) (Temporal)   Resp 14   Ht 5' (1.524 m)   Wt 119 lb 0.8 oz (54 kg)   SpO2 98%   BMI 23.25 kg/m²     CBC:   Lab Results   Component Value Date/Time    WBC 8.6 08/14/2022 04:26 AM    RBC 2.96 08/14/2022 04:26 AM    HGB 8.4 08/14/2022 04:26 AM    HCT 26.5 08/14/2022 04:26 AM    MCV 89.5 08/14/2022 04:26 AM    MCH 28.4 08/14/2022 04:26 AM    MCHC 31.7 08/14/2022 04:26 AM    RDW 18.1 08/14/2022 04:26 AM     08/14/2022 04:26 AM    MPV 11.1 08/14/2022 04:26 AM     CMP:    Lab Results   Component Value Date/Time     08/14/2022 04:26 AM    K 3.2 08/14/2022 04:26 AM    K 3.8 08/12/2022 03:10 AM     08/14/2022 04:26 AM    CO2 25 08/14/2022 04:26 AM    BUN 20 08/14/2022 04:26 AM    CREATININE 2.0 08/14/2022 04:26 AM    GFRAA 29 08/14/2022 04:26 AM    LABGLOM 24 08/14/2022 04:26 AM    GLUCOSE 128 08/14/2022 04:26 AM    PROT 4.2 08/13/2022 04:00 PM    LABALBU 2.0 08/13/2022 04:00 PM    CALCIUM 7.4 08/14/2022 04:26 AM    BILITOT 0.6 08/13/2022 04:00 PM    ALKPHOS 107 08/13/2022 04:00 PM    AST 12 08/13/2022 04:00 PM    ALT 9 08/13/2022 04:00 PM        GENERAL:  NAD. A&Ox3. LUNGS: No acute respiratory distress 5L NC  CARDIOVASCULAR: hemodynamically stable  ABDOMEN:  Soft, non-distended, mild/diffuse tenderness to palpation. No guarding, rigidity, rebound. ASSESSMENT/PLAN:  [de-identified] y.o. female KD, GIB.      Plan  -Continue tube feeds as tolerated  -PPI BID, carafate  -wean pressors as able   -monitor labs    Will discuss with  Dr. Marisol Dumont, DO  Surgery Resident PGY-1  8/14/2022  7:03 AM

## 2022-08-14 NOTE — FLOWSHEET NOTE
Stage  CI HR MAP TPRI SVI   Baseline 3.7 77   49   Challenge 4.2 79  1093 53   Result (%?) 8.5 2.9   8.5

## 2022-08-14 NOTE — PROGRESS NOTES
[address] [facility]   Department of Internal Medicine   Internal Medicine Residency  MICU Progress Note    Patient:  Marcia Samuels [de-identified] y.o. female   MRN: [id number]       Date of Service: 2022    Allergy: Fentanyl, Levofloxacin, Tramadol, and Vioxx [rofecoxib]    Subjective     Patient was seen and examined this morning at bedside in no acute distress. Overnight, no acute events noted. Patient awake and alert and oriented x3 this morning. She denies any chest pain, difficulty breathing, nausea, vomiting, numbness and tingling. She reports her abdominal pain is better. Objective     TEMPERATURE:  Current - Temp: 98.9 °F (37.2 °C); Max - Temp  Av.5 °F (36.9 °C)  Min: 97.8 °F (36.6 °C)  Max: 98.9 °F (37.2 °C)  RESPIRATIONS RANGE: Resp  Av.5  Min: 10  Max: 24  PULSE RANGE: Pulse  Av.6  Min: 67  Max: 82  BLOOD PRESSURE RANGE:  Systolic (70OXS), DIQ:272 , Min:103 , MPO:422   ; Diastolic (30VFS), BQV:61, Min:35, Max:44    PULSE OXIMETRY RANGE: SpO2  Av.8 %  Min: 94 %  Max: 100 %    I & O - 24hr:    Intake/Output Summary (Last 24 hours) at 2022 1403  Last data filed at 2022 1000  Gross per 24 hour   Intake 3771.57 ml   Output 3620 ml   Net 151.57 ml       I/O last 3 completed shifts: In: 6191.5 [P.O.:180; I.V.:3436.2; Blood:577.5; NG/GT:1137; IV Piggyback:560.8]  Out: 5410 [Urine:460; DTKCU:1882] I/O this shift:  In: -   Out: 365 [Urine:15; Stool:350]   Weight change: 5 lb 4.7 oz (2.4 kg)    Physical Exam:   General Appearance:  Alert oriented x3   HEENT:    NC/AT, mucous membranes are moist   Neck:   Supple, no jugular venous distention. Resp:     CTAB, No wheezes, No rhonchi, no use of accessory muscles   Heart:    RRR, S1 and S2 normal, no murmur, rub or gallop.     Abdomen:     Soft, non-tender, non-distended with normal bowel sounds   Extremities:   Atraumatic, no cyanosis or edema   Pulses:  Radial and pedal pulses are intact bilaterally   Neurologic:   Follows Protein Oral Supplement    Labs and Imaging Studies     CBC:   Recent Labs     08/12/22  0517 08/13/22  0421 08/13/22  2233 08/14/22  0426   WBC 8.8 7.8  --  8.6   HGB 7.9* 7.3* 6.8* 8.4*   HCT 23.4* 22.9* 21.2* 26.5*   MCV 89.3 93.5  --  89.5   * 129*  --  145         BMP:    Recent Labs     08/13/22  0421 08/13/22  1600 08/14/22  0426    135 134   K 3.0* 3.7 3.2*   CL 98 100 101   CO2 28 27 25   BUN 34* 16 20   CREATININE 2.7* 1.7* 2.0*   GLUCOSE 134* 132* 128*         LIVER PROFILE:   Recent Labs     08/13/22  1600   AST 12   ALT 9   BILITOT 0.6   ALKPHOS 107*           PT/INR:   Recent Labs     08/12/22  2144 08/13/22  0421 08/14/22  0426   PROTIME 16.3* 13.5* 11.9   INR 1.5 1.2 1.1         APTT:   Recent Labs     08/13/22  0421 08/14/22  0426   APTT 36.1* 30.2         Fasting Lipid Panel:    Lab Results   Component Value Date/Time    CHOL 87 08/01/2022 03:36 AM    TRIG 75 08/01/2022 03:36 AM    HDL 42 08/01/2022 03:36 AM       Cardiac Enzymes:    Lab Results   Component Value Date    CKTOTAL 101 08/11/2022    CKTOTAL 28 07/14/2022    CKTOTAL 31 07/14/2022    TROPONINI 0.01 02/25/2021    TROPONINI 0.01 01/21/2021    TROPONINI 0.07 (H) 01/25/2020       Notable Cultures:      Blood cultures   Blood Culture, Routine   Date Value Ref Range Status   08/10/2022 24 Hours no growth  Preliminary     Respiratory cultures No results found for: RESPCULTURE No results found for: LABGRAM  Urine   Urine Culture, Routine   Date Value Ref Range Status   08/08/2022 <10,000 CFU/mL  Mixed gram positive organisms   (A)  Final   08/08/2022 >100,000 CFU/ml  Final     Legionella No results found for: LABLEGI  C Diff PCR No results found for: CDIFPCR  Wound culture/abscess: No results for input(s): WNDABS in the last 72 hours. Tip culture:No results for input(s): CXCATHTIP in the last 72 hours. Oxygen:         Additional Respiratory Assessments  Heart Rate: 72  Resp: 19  SpO2: 95 %       Urinary Catheter-Output (mL): 5 mL    Imaging Studies:  CT ABDOMEN PELVIS WO CONTRAST Additional Contrast? None   Final Result   Enlarged liver demonstrating periportal edema. Redemonstration of a low-attenuation lesion in the pancreas. Dilated stomach and gaseous proximal small intestinal dilatation. Abnormal thickening of the wall of the distal small bowel. Large ventral hernia containing the distal stomach and segments of small   bowel. Generalized soft tissue edematous changes consistent with anasarca and mild   to moderate ascites. Multiple additional findings as described. XR CHEST PORTABLE   Final Result   Interval placement of left internal jugular central venous catheter   terminating at the cavoatrial junction. No postprocedure pneumothorax         US RETROPERITONEAL COMPLETE   Final Result   1. Unremarkable sonographic evaluation of the right kidney. 2. Left renal cysts. 3. The urinary bladder is not visualized secondary to obscuring bowel gas. XR CHEST PORTABLE   Final Result   No acute process.               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 Dain Martinez group  On HD, started on 8/11/2022  Monitor BMP q12h     Coagulopathy most likely 2/2 DIC from sepsis  TEG has normalized, HD line stopped oozing, Close monitoring for overt bleeding  Discontinue trending fibrinogen     Acute on chronic normocytic normochromic anemia 2/2 hemorrhagic gastritis  INR normalized, now 1.1  S/p EGD 8/12 -suggestive of hemorrhagic gastritis  Continue PPI BID and carafate     Abdominal pain 2/2 hemorrhagic gastritis vs ileus in setting of multiple abdominal surgery and ileostomy for ischemic bowel disease  Had massive ileostomy output of 3100 cc over 24 hours, however output is getting thicker in consistency  On TF, renal formula  Dietitian consult for recommendations on changing to gastrotomy/jejunostomy formula for improved ileostomy output. 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 Hx Short Gut syndrome  Hx HTN  Hx HLD LDL 30 on 8/1/2022 on Pravachol 40  Hx CAD s/p stent placement in 2002  Hx COPD  Prior concern for PE was on Eliquis: Blood thinners currently on hold    Resolved issues:  HAGMA 2/2 lactic acidosis/uremia in setting of ileostomy  Hyperkalemia 2/2 KD/CKD-Lokelma stopped  Hypervolemic hypotonic hyponatremia 2/2 KD  Lactic acidosis likely type A/D sepsis versus short gut syndrome    # Peptic ulcer prophylaxis: protonix 40mg BID  # DVT Prophylaxis: PCDs  # Disposition: Cont current care    Blaze Menard MD, PGY-2    Attending Physician: Dr. Sim Tesfaye  Department of Pulmonary, Critical Care and Sleep Medicine  [address] [facility]  Department of Internal Medicine      During multidisciplinary team rounds Aniyah Mckay is a [de-identified] y.o. female was seen, examined and discussed. This is confirmation that I have personally seen and examined the patient and that the key elements of the encounter were performed by me (> 85 % time). The medications & laboratory data was discussed and adjusted where necessary. The radiographic images were reviewed or with radiologist or consultant if felt dis-concordant with the exam or history.  The above findings were corroborated, plans confirmed and changes made if needed. Family is updated at the bedside as available. Key issues of the case were discussed among consultants. Critical Care time is documented if appropriate.       Marilyn Norton DO, FACP, FCCP, [facility],

## 2022-08-14 NOTE — PROGRESS NOTES
[facility]  Nephrology Progress Note    Patient's Name: Geri Grant    History of Present Illness from 8/8 Consult Note:    Dolly Gamboa is an [de-identified]year old female we were asked to see regarding KD. She is followed longitudinally in the office with Dr. Mira Murphy. she was last seen in the office in April of this year and at that time her baseline creatinine was noted to be 1.4-1.5 mg/dl with an e-GFR of 33-36 ml/min. She now presents to ED from local nursing home for abnormal lab work, primarily hyperkalemia. Vitals upon arrival included blood pressure 89/47, pulse 92, respirations 18 and temperature 98.0. Initial BMP revealed sodium 134, potassium 6.4, chloride 102, CO2 21, BUN 36 and creatinine 1.9. Initial CBC revealed WBCs 9.0, hemoglobin 6.6, hematocrit 21.8 and platelet count 633. Initial UA showed clear yellow urine, 1.020, trace blood, trace protein. Patient now examined in the ED setting. She is awake alert and oriented. Currently with 1 unit PRBC infusing. Of note she was just discharged from this facility 1 week ago after being treated for enteritis, hypotension and pneumonia. She has been actively recovering at [facility]. Currently she denies any chest pain or shortness of breath at rest.  She does endorse generalized weakness. Currently without nausea, vomiting or diarrhea. She also denies any specific urinary complaints. \"    Subjective:    8/10: Patient was seen and examined. She reports abdominal pain today, but denies any nausea or vomiting. She denies any chest pain or shortness of breath. She was transferred to icu for abd pain and dropping blood pressure. Seen in icu with dr Jose Feng. Receiving 1 L bolus of ns. Sbp in 90. Farurkh no hydro. Repeat k 5.8 and hyperkalemia protocol ordered.      8/11: pt seen in icu  Awaits ct scan   For hd today for continued hyperkalemia and oliguria    8/12: pt seen in icu  Sp first hd yesterday  Abd pain somewhat improved    8/13: RN  reports patient with high ostomy output; ostomy bag burst on 1 occasion; emesis today; TF placed on hold    8/14: large volume ileostomy output continues    Patient Active Problem List   Diagnosis    Right cataract    Essential hypertension, benign    Hyperlipidemia with target LDL less than 100    Osteoarthritis, shoulder    Primary osteoarthritis of left knee    History of ischemic bowel disease    Ileostomy present ([address]    Chronic kidney disease, stage III (moderate) ([facility] [place])    COPD (chronic obstructive pulmonary disease) ([address]    Moderate protein-calorie malnutrition ([address]    Abdominal wall cellulitis    Cellulitis    Infected hernioplasty mesh ([facility])    Enterocutaneous fistula    Mild protein-calorie malnutrition ([address]    Acute kidney injury ([address]    Hypertensive kidney disease with chronic kidney disease stage IV ([facility])    KD (acute kidney injury) ([address]    Diarrhea    Elevated serum creatinine    Abnormal serum creatinine level    Hyperlipemia    Primary hypertension    Essential (primary) hypertension    Postsurgical malabsorption, not elsewhere classified    Hypercholesterolemia    Pure hypercholesterolemia    Prediabetes    Hypothyroidism, unspecified    Acute renal failure (ARF) ([facility])    Enteritis    Hypotension    Abnormal CT scan, lumbar spine    Severe protein-calorie malnutrition ([facility])    Hyperkalemia       Meds:     thiamine  100 mg IntraVENous Daily    epoetin ludin-epbx  30 Units/kg SubCUTAneous Once per day on Mon Wed Fri    lidocaine  1 patch TransDERmal Daily    lidocaine  1 patch TransDERmal Daily    fluconazole  200 mg Oral Daily    piperacillin-tazobactam  4,500 mg IntraVENous Q12H    pantoprazole (PROTONIX) 40 mg injection  40 mg IntraVENous Q12H    sucralfate  1 g Oral 4 times per day    calcium carbonate  500 mg Oral Nightly    vitamin D  2,000 Units Oral BID    diphenoxylate-atropine  1 tablet Oral 4x Daily AC & HS    escitalopram  20 mg Oral Nightly    ferrous sulfate 325 mg Oral Nightly    fluticasone  1 spray Each Nostril Nightly    lipase-protease-amylase  24,000 Units Oral TID WC    loperamide  4 mg Oral TID WC    magnesium oxide  400 mg Oral BID    ocuvite-lutein  1 capsule Oral BID    pravastatin  40 mg Oral Nightly    primidone  250 mg Oral Nightly    traZODone  50 mg Oral Nightly    vitamin B-12  500 mcg Oral Nightly        sodium chloride      lactated ringers      sodium chloride      norepinephrine 10 mcg/min (08/14/22 0639)    sodium chloride      sodium chloride      sodium chloride      dextrose      sodium chloride      dextrose      sodium chloride         Meds prn:     sodium chloride, sodium chloride, ondansetron, sodium chloride, sodium chloride, sodium chloride, dextrose bolus **OR** [DISCONTINUED] dextrose bolus, dextrose, sodium chloride, glucose, glucagon (rDNA), dextrose, sodium chloride, hyoscyamine, lipase-protease-amylase, senna, oxyCODONE-acetaminophen, [DISCONTINUED] dextrose bolus **OR** dextrose bolus    Meds prior to admission:     No current facility-administered medications on file prior to encounter. Current Outpatient Medications on File Prior to Encounter   Medication Sig Dispense Refill    atorvastatin (LIPITOR) 10 MG tablet Take 10 mg by mouth at bedtime      calcium carbonate (TUMS) 500 MG chewable tablet Take 1 tablet by mouth every evening      glucose (GLUTOSE) 40 % GEL Take 15 g by mouth as needed (hypoglycemia)      magnesium hydroxide (MILK OF MAGNESIA) 400 MG/5ML suspension Take 30 mLs by mouth daily as needed for Constipation      senna (SENOKOT) 8.6 MG tablet Take 1 tablet by mouth daily as needed for Constipation      acetaminophen (TYLENOL) 325 MG tablet Take 650 mg by mouth every 4 hours as needed for Pain      vitamin C (ASCORBIC ACID) 500 MG tablet Take 500 mg by mouth in the morning and 500 mg before bedtime.       ondansetron (ZOFRAN) 4 MG tablet Take 4 mg by mouth every 8 hours as needed for Nausea or Vomiting mg by mouth nightly       traZODone (DESYREL) 50 MG tablet Take 50 mg by mouth nightly          Allergies:    Fentanyl, Levofloxacin, Tramadol, and Vioxx [rofecoxib]      Physical Exam:      Patient Vitals for the past 24 hrs:   BP Temp Temp src Pulse Resp SpO2 Weight   08/14/22 0759 (!) 117/36 98.9 °F (37.2 °C) Axillary 68 15 97 % --   08/14/22 0700 (!) 110/35 -- -- 69 17 96 % --   08/14/22 0600 -- -- -- 73 14 98 % 119 lb 0.8 oz (54 kg)   08/14/22 0500 -- -- -- 72 18 98 % --   08/14/22 0400 (!) 132/41 98.4 °F (36.9 °C) Temporal 76 21 94 % --   08/14/22 0300 (!) 115/37 98.4 °F (36.9 °C) -- 73 17 99 % --   08/14/22 0250 (!) 115/43 98.4 °F (36.9 °C) -- 74 16 94 % --   08/14/22 0200 -- -- -- 68 15 100 % --   08/14/22 0100 (!) 132/41 98.8 °F (37.1 °C) -- 73 20 100 % --   08/14/22 0035 (!) 122/38 98.8 °F (37.1 °C) -- 76 19 100 % --   08/14/22 0000 (!) 103/36 98.8 °F (37.1 °C) Temporal 67 18 99 % --   08/13/22 2300 -- -- -- 74 17 97 % --   08/13/22 2200 -- -- -- 76 10 100 % --   08/13/22 2100 -- -- -- 79 16 100 % --   08/13/22 2000 (!) 113/40 98.1 °F (36.7 °C) Axillary 72 18 100 % --   08/13/22 1900 -- -- -- 77 20 100 % --   08/13/22 1800 -- -- -- 78 24 100 % --   08/13/22 1700 -- -- -- 82 23 99 % --   08/13/22 1600 -- 97.8 °F (36.6 °C) Axillary 73 15 95 % --   08/13/22 1500 -- -- -- 79 14 100 % --   08/13/22 1400 -- -- -- 77 17 100 % --   08/13/22 1310 103/64 97.8 °F (36.6 °C) -- 82 20 95 % 120 lb 5.9 oz (54.6 kg)   08/13/22 1300 (!) 92/37 97.8 °F (36.6 °C) Axillary 79 20 94 % --   08/13/22 1245 -- -- -- 84 -- -- --   08/13/22 1230 -- -- -- 92 -- -- --   08/13/22 1215 -- -- -- 97 -- -- --   08/13/22 1200 121/77 -- -- 94 21 99 % --   08/13/22 1144 -- -- -- 80 -- -- --   08/13/22 1130 -- -- -- 85 -- -- --   08/13/22 1115 -- -- -- 82 -- -- --   08/13/22 1100 -- -- -- 81 -- -- --   08/13/22 1045 -- -- -- 98 -- -- --   08/13/22 1030 -- -- -- 78 -- -- --   08/13/22 1015 -- -- -- 75 -- -- --   08/13/22 1000 (!) 132/48 97.7 °F (36.5 °C) -- 70 18 97 % --   08/13/22 0900 (!) 124/49 97.7 °F (36.5 °C) -- 71 17 99 % 122 lb 9.2 oz (55.6 kg)         Intake/Output Summary (Last 24 hours) at 8/14/2022 0847  Last data filed at 8/14/2022 7899  Gross per 24 hour   Intake 4191.57 ml   Output 4615 ml   Net -423.43 ml     General: More awake today; no acute distress  Neck: No JVD noted  Lungs: Bilateral rhonchi  CV: Regular rate and rhythm. No rub  Abd: tender, Active bowel sounds; ostomy; PEG tube; ileostomy bag with watery stool content  Skin: Warm and dry. No rash on exposed extremities  Ext: No edema ; temporary HD catheter in right CFV  Neuro: More awake today and interactive following commands    Data:    Recent Labs     08/12/22  0517 08/13/22  0421 08/13/22  2233 08/14/22  0426   WBC 8.8 7.8  --  8.6   HGB 7.9* 7.3* 6.8* 8.4*   HCT 23.4* 22.9* 21.2* 26.5*   MCV 89.3 93.5  --  89.5   * 129*  --  145       Recent Labs     08/12/22  0517 08/13/22  0421 08/13/22  1600 08/14/22  0426    135 135 134   K 3.7 3.0* 3.7 3.2*   CL 97* 98 100 101   CO2 27 28 27 25   CREATININE 2.3* 2.7* 1.7* 2.0*   BUN 27* 34* 16 20   LABGLOM 20 17 29 24   GLUCOSE 87 134* 132* 128*   CALCIUM 7.3* 7.5* 7.6* 7.4*   PHOS 4.3 5.1*  --  3.1   MG 1.8 2.0  --  1.8       Vit D, 25-Hydroxy   Date Value Ref Range Status   08/01/2022 9 (L) 30 - 100 ng/mL Final     Comment:     <20 ng/mL. ........... [place] Deficient  20-30 ng/mL. ......... [place] Insufficient   ng/mL. ........ [place] Sufficient  >100 ng/mL. .......... [place] Toxic         PTH   Date Value Ref Range Status   07/15/2022 307 (H) 15 - 65 pg/mL Final       Recent Labs     08/13/22  1600   ALT 9   AST 12   ALKPHOS 107*   BILITOT 0.6       Recent Labs     08/13/22  1600   LABALBU 2.0*       Ferritin   Date Value Ref Range Status   08/08/2022 160 ng/mL Final     Comment:     FERRITIN Reference Ranges:  Adult Males   20 - 60 years:    27 - 400 ng/mL  Adult females 16 - 61 years:    15 - 150 ng/mL  Adults greater than 60 years:   no established reference range  Pediatrics:                     no established reference range       Iron   Date Value Ref Range Status   08/08/2022 97 37 - 145 mcg/dL Final     TIBC   Date Value Ref Range Status   08/08/2022 195 (L) 250 - 450 mcg/dL Final       Vitamin B-12   Date Value Ref Range Status   08/08/2022 1764 (H) 211 - 946 pg/mL Final       Folate   Date Value Ref Range Status   08/08/2022 9.4 4.8 - 24.2 ng/mL Final       Lab Results   Component Value Date/Time    COLORU Yellow 08/08/2022 12:39 PM    NITRU Negative 08/08/2022 12:39 PM    GLUCOSEU Negative 08/08/2022 12:39 PM    KETUA Negative 08/08/2022 12:39 PM    UROBILINOGEN 0.2 08/08/2022 12:39 PM    BILIRUBINUR Negative 08/08/2022 12:39 PM       No results found for: ADEOLA, CREURRAN, MACREATRATIO, OSMOU    No components found for: URIC    No results found for: LIPIDPAN    Assessment and Plans:    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 Shock  ?hypovolemic  Still requiring low-dose Levophed  BP at/near goal  Volume status to be assessed with NICOM  Start midodrine  Levo prn     3. Met Acidosis   in the setting of the KD/ CKD   Also with bicarbonate loss from high ileostomy output  Monitor labs  Acidosis resolved  Now off bicarb drip     4. hypomagnesemia/Hypocalcemia  Suspect in part due to GI losses  Replace prn  On vit d     6. Hyperkalemia/hypokalemia  Potassium were initially high; m now trending low due to GI losses high output ileostomy  Supplement as needed  Cortisol adequate at 90  One hd on 8/11       7.   anemia  Gi bleed  H/o ischemic colitis and ostomy  S/p PRBCs  Transfuse for hemoglobin<7  On ROSA  General surgery following-possible EGD when lytes stable    8. Shock  ?  Septic shock/hypovolemia/?acute abd  Treat yeast in urine  On diflucan an zosyn  Requiring low dose pressors  Had ct of abd pelvis  Lactate 7>0.7    D/w Resident       Patrica Johnson MD

## 2022-08-15 ENCOUNTER — APPOINTMENT (OUTPATIENT)
Dept: GENERAL RADIOLOGY | Age: 82
DRG: 377 | End: 2022-08-15
Payer: MEDICARE

## 2022-08-15 LAB
ALBUMIN SERPL-MCNC: 2.6 G/DL (ref 3.5–5.2)
ALP BLD-CCNC: 85 U/L (ref 35–104)
ALT SERPL-CCNC: 7 U/L (ref 0–32)
ANION GAP SERPL CALCULATED.3IONS-SCNC: 13 MMOL/L (ref 7–16)
ANION GAP SERPL CALCULATED.3IONS-SCNC: 15 MMOL/L (ref 7–16)
ANISOCYTOSIS: ABNORMAL
AST SERPL-CCNC: 13 U/L (ref 0–31)
B.E.: 0.1 MMOL/L (ref -3–3)
BASOPHILS ABSOLUTE: 0 E9/L (ref 0–0.2)
BASOPHILS RELATIVE PERCENT: 0.5 % (ref 0–2)
BILIRUB SERPL-MCNC: 1 MG/DL (ref 0–1.2)
BLASTS RELATIVE PERCENT: 0.9 % (ref 0–0)
BLOOD CULTURE, ROUTINE: NORMAL
BUN BLDV-MCNC: 24 MG/DL (ref 6–23)
BUN BLDV-MCNC: 26 MG/DL (ref 6–23)
BURR CELLS: ABNORMAL
CALCIUM IONIZED: 1.16 MMOL/L (ref 1.15–1.33)
CALCIUM SERPL-MCNC: 7.9 MG/DL (ref 8.6–10.2)
CALCIUM SERPL-MCNC: 8.3 MG/DL (ref 8.6–10.2)
CHLORIDE BLD-SCNC: 100 MMOL/L (ref 98–107)
CHLORIDE BLD-SCNC: 102 MMOL/L (ref 98–107)
CO2: 21 MMOL/L (ref 22–29)
CO2: 22 MMOL/L (ref 22–29)
COHB: 0.7 % (ref 0–1.5)
CREAT SERPL-MCNC: 2.5 MG/DL (ref 0.5–1)
CREAT SERPL-MCNC: 2.8 MG/DL (ref 0.5–1)
CRITICAL: ABNORMAL
CULTURE, BLOOD 2: NORMAL
DATE ANALYZED: ABNORMAL
DATE OF COLLECTION: ABNORMAL
EKG ATRIAL RATE: 97 BPM
EKG P AXIS: 63 DEGREES
EKG P-R INTERVAL: 148 MS
EKG Q-T INTERVAL: 404 MS
EKG QRS DURATION: 92 MS
EKG QTC CALCULATION (BAZETT): 513 MS
EKG R AXIS: -48 DEGREES
EKG T AXIS: 47 DEGREES
EKG VENTRICULAR RATE: 97 BPM
EOSINOPHILS ABSOLUTE: 0 E9/L (ref 0.05–0.5)
EOSINOPHILS RELATIVE PERCENT: 0 % (ref 0–6)
GFR AFRICAN AMERICAN: 20
GFR AFRICAN AMERICAN: 22
GFR NON-AFRICAN AMERICAN: 16 ML/MIN/1.73
GFR NON-AFRICAN AMERICAN: 19 ML/MIN/1.73
GLUCOSE BLD-MCNC: 64 MG/DL (ref 74–99)
GLUCOSE BLD-MCNC: 82 MG/DL (ref 74–99)
HCO3: 24.9 MMOL/L (ref 22–26)
HCT VFR BLD CALC: 26.8 % (ref 34–48)
HCT VFR BLD CALC: 26.8 % (ref 34–48)
HCT VFR BLD CALC: 28.2 % (ref 34–48)
HEMOGLOBIN: 8.5 G/DL (ref 11.5–15.5)
HEMOGLOBIN: 8.6 G/DL (ref 11.5–15.5)
HEMOGLOBIN: 9.1 G/DL (ref 11.5–15.5)
HHB: 5.5 % (ref 0–5)
HYPOCHROMIA: ABNORMAL
INR BLD: 1.2
LAB: ABNORMAL
LYMPHOCYTES ABSOLUTE: 0.7 E9/L (ref 1.5–4)
LYMPHOCYTES RELATIVE PERCENT: 4.4 % (ref 20–42)
Lab: ABNORMAL
MAGNESIUM: 2.1 MG/DL (ref 1.6–2.6)
MCH RBC QN AUTO: 29 PG (ref 26–35)
MCH RBC QN AUTO: 29.6 PG (ref 26–35)
MCHC RBC AUTO-ENTMCNC: 31.7 % (ref 32–34.5)
MCHC RBC AUTO-ENTMCNC: 32.3 % (ref 32–34.5)
MCV RBC AUTO: 91.5 FL (ref 80–99.9)
MCV RBC AUTO: 91.9 FL (ref 80–99.9)
METER GLUCOSE: 113 MG/DL (ref 74–99)
METER GLUCOSE: 121 MG/DL (ref 74–99)
METER GLUCOSE: 55 MG/DL (ref 74–99)
METER GLUCOSE: 62 MG/DL (ref 74–99)
METHB: 0.3 % (ref 0–1.5)
MODE: ABNORMAL
MONOCYTES ABSOLUTE: 0.35 E9/L (ref 0.1–0.95)
MONOCYTES RELATIVE PERCENT: 1.7 % (ref 2–12)
NEUTROPHILS ABSOLUTE: 16.37 E9/L (ref 1.8–7.3)
NEUTROPHILS RELATIVE PERCENT: 93 % (ref 43–80)
O2 SATURATION: 94.2 % (ref 92–98.5)
O2HB: 93.5 % (ref 94–97)
OPERATOR ID: ABNORMAL
PATIENT TEMP: 37 C
PCO2: 40.8 MMHG (ref 35–45)
PDW BLD-RTO: 18.6 FL (ref 11.5–15)
PDW BLD-RTO: 18.8 FL (ref 11.5–15)
PH BLOOD GAS: 7.4 (ref 7.35–7.45)
PHOSPHORUS: 3.3 MG/DL (ref 2.5–4.5)
PLATELET # BLD: 132 E9/L (ref 130–450)
PLATELET # BLD: 140 E9/L (ref 130–450)
PMV BLD AUTO: 10.8 FL (ref 7–12)
PMV BLD AUTO: 10.9 FL (ref 7–12)
PO2: 70.1 MMHG (ref 75–100)
POIKILOCYTES: ABNORMAL
POLYCHROMASIA: ABNORMAL
POTASSIUM SERPL-SCNC: 3.7 MMOL/L (ref 3.5–5)
POTASSIUM SERPL-SCNC: 3.8 MMOL/L (ref 3.5–5)
PROTHROMBIN TIME: 13.5 SEC (ref 9.3–12.4)
RBC # BLD: 2.93 E12/L (ref 3.5–5.5)
RBC # BLD: 3.07 E12/L (ref 3.5–5.5)
SCHISTOCYTES: ABNORMAL
SODIUM BLD-SCNC: 136 MMOL/L (ref 132–146)
SODIUM BLD-SCNC: 137 MMOL/L (ref 132–146)
SOURCE, BLOOD GAS: ABNORMAL
THB: 9.9 G/DL (ref 11.5–16.5)
TIME ANALYZED: 535
TOTAL PROTEIN: 4.6 G/DL (ref 6.4–8.3)
WBC # BLD: 17.6 E9/L (ref 4.5–11.5)
WBC # BLD: 6.3 E9/L (ref 4.5–11.5)

## 2022-08-15 PROCEDURE — 2580000003 HC RX 258: Performed by: STUDENT IN AN ORGANIZED HEALTH CARE EDUCATION/TRAINING PROGRAM

## 2022-08-15 PROCEDURE — 2580000003 HC RX 258: Performed by: SURGERY

## 2022-08-15 PROCEDURE — 83735 ASSAY OF MAGNESIUM: CPT

## 2022-08-15 PROCEDURE — 6370000000 HC RX 637 (ALT 250 FOR IP): Performed by: FAMILY MEDICINE

## 2022-08-15 PROCEDURE — 99291 CRITICAL CARE FIRST HOUR: CPT | Performed by: INTERNAL MEDICINE

## 2022-08-15 PROCEDURE — 6360000002 HC RX W HCPCS: Performed by: SURGERY

## 2022-08-15 PROCEDURE — 84100 ASSAY OF PHOSPHORUS: CPT

## 2022-08-15 PROCEDURE — 80048 BASIC METABOLIC PNL TOTAL CA: CPT

## 2022-08-15 PROCEDURE — A4216 STERILE WATER/SALINE, 10 ML: HCPCS | Performed by: SURGERY

## 2022-08-15 PROCEDURE — 6360000002 HC RX W HCPCS: Performed by: INTERNAL MEDICINE

## 2022-08-15 PROCEDURE — 2580000003 HC RX 258

## 2022-08-15 PROCEDURE — 85025 COMPLETE CBC W/AUTO DIFF WBC: CPT

## 2022-08-15 PROCEDURE — 6360000002 HC RX W HCPCS: Performed by: STUDENT IN AN ORGANIZED HEALTH CARE EDUCATION/TRAINING PROGRAM

## 2022-08-15 PROCEDURE — 6370000000 HC RX 637 (ALT 250 FOR IP): Performed by: STUDENT IN AN ORGANIZED HEALTH CARE EDUCATION/TRAINING PROGRAM

## 2022-08-15 PROCEDURE — 2000000000 HC ICU R&B

## 2022-08-15 PROCEDURE — 2580000003 HC RX 258: Performed by: INTERNAL MEDICINE

## 2022-08-15 PROCEDURE — 85610 PROTHROMBIN TIME: CPT

## 2022-08-15 PROCEDURE — 2500000003 HC RX 250 WO HCPCS: Performed by: STUDENT IN AN ORGANIZED HEALTH CARE EDUCATION/TRAINING PROGRAM

## 2022-08-15 PROCEDURE — 2700000000 HC OXYGEN THERAPY PER DAY

## 2022-08-15 PROCEDURE — 6370000000 HC RX 637 (ALT 250 FOR IP)

## 2022-08-15 PROCEDURE — P9047 ALBUMIN (HUMAN), 25%, 50ML: HCPCS | Performed by: STUDENT IN AN ORGANIZED HEALTH CARE EDUCATION/TRAINING PROGRAM

## 2022-08-15 PROCEDURE — 85027 COMPLETE CBC AUTOMATED: CPT

## 2022-08-15 PROCEDURE — 82962 GLUCOSE BLOOD TEST: CPT

## 2022-08-15 PROCEDURE — 36415 COLL VENOUS BLD VENIPUNCTURE: CPT

## 2022-08-15 PROCEDURE — 82805 BLOOD GASES W/O2 SATURATION: CPT

## 2022-08-15 PROCEDURE — 80053 COMPREHEN METABOLIC PANEL: CPT

## 2022-08-15 PROCEDURE — 94640 AIRWAY INHALATION TREATMENT: CPT

## 2022-08-15 PROCEDURE — C9113 INJ PANTOPRAZOLE SODIUM, VIA: HCPCS | Performed by: SURGERY

## 2022-08-15 PROCEDURE — 85014 HEMATOCRIT: CPT

## 2022-08-15 PROCEDURE — 74018 RADEX ABDOMEN 1 VIEW: CPT

## 2022-08-15 PROCEDURE — 6370000000 HC RX 637 (ALT 250 FOR IP): Performed by: SURGERY

## 2022-08-15 PROCEDURE — 85018 HEMOGLOBIN: CPT

## 2022-08-15 PROCEDURE — 6360000002 HC RX W HCPCS

## 2022-08-15 PROCEDURE — 82330 ASSAY OF CALCIUM: CPT

## 2022-08-15 RX ORDER — SODIUM CHLORIDE 0.9 % (FLUSH) 0.9 %
SYRINGE (ML) INJECTION
Status: COMPLETED
Start: 2022-08-15 | End: 2022-08-15

## 2022-08-15 RX ORDER — 0.9 % SODIUM CHLORIDE 0.9 %
500 INTRAVENOUS SOLUTION INTRAVENOUS ONCE
Status: COMPLETED | OUTPATIENT
Start: 2022-08-15 | End: 2022-08-15

## 2022-08-15 RX ORDER — SODIUM CHLORIDE, SODIUM LACTATE, POTASSIUM CHLORIDE, CALCIUM CHLORIDE 600; 310; 30; 20 MG/100ML; MG/100ML; MG/100ML; MG/100ML
INJECTION, SOLUTION INTRAVENOUS CONTINUOUS
Status: ACTIVE | OUTPATIENT
Start: 2022-08-15 | End: 2022-08-15

## 2022-08-15 RX ORDER — IPRATROPIUM BROMIDE AND ALBUTEROL SULFATE 2.5; .5 MG/3ML; MG/3ML
1 SOLUTION RESPIRATORY (INHALATION)
Status: DISCONTINUED | OUTPATIENT
Start: 2022-08-15 | End: 2022-08-18

## 2022-08-15 RX ORDER — SUCRALFATE 1 G/1
TABLET ORAL
Status: COMPLETED
Start: 2022-08-15 | End: 2022-08-15

## 2022-08-15 RX ORDER — 0.9 % SODIUM CHLORIDE 0.9 %
250 INTRAVENOUS SOLUTION INTRAVENOUS ONCE
Status: COMPLETED | OUTPATIENT
Start: 2022-08-15 | End: 2022-08-15

## 2022-08-15 RX ORDER — MAGNESIUM HYDROXIDE/ALUMINUM HYDROXICE/SIMETHICONE 120; 1200; 1200 MG/30ML; MG/30ML; MG/30ML
30 SUSPENSION ORAL EVERY 6 HOURS PRN
Status: DISCONTINUED | OUTPATIENT
Start: 2022-08-15 | End: 2022-08-18

## 2022-08-15 RX ORDER — GUAIFENESIN/DEXTROMETHORPHAN 100-10MG/5
5 SYRUP ORAL 2 TIMES DAILY
Status: DISCONTINUED | OUTPATIENT
Start: 2022-08-15 | End: 2022-08-18

## 2022-08-15 RX ORDER — ARFORMOTEROL TARTRATE 15 UG/2ML
15 SOLUTION RESPIRATORY (INHALATION) 2 TIMES DAILY
Status: DISCONTINUED | OUTPATIENT
Start: 2022-08-15 | End: 2022-08-18

## 2022-08-15 RX ORDER — BUDESONIDE 0.5 MG/2ML
0.5 INHALANT ORAL 2 TIMES DAILY
Status: DISCONTINUED | OUTPATIENT
Start: 2022-08-15 | End: 2022-08-18

## 2022-08-15 RX ADMIN — MIDODRINE HYDROCHLORIDE 2.5 MG: 5 TABLET ORAL at 08:48

## 2022-08-15 RX ADMIN — IPRATROPIUM BROMIDE AND ALBUTEROL SULFATE 1 AMPULE: .5; 2.5 SOLUTION RESPIRATORY (INHALATION) at 12:06

## 2022-08-15 RX ADMIN — SUCRALFATE 1 G: 1 TABLET ORAL at 12:18

## 2022-08-15 RX ADMIN — GUAIFENESIN SYRUP AND DEXTROMETHORPHAN 5 ML: 100; 10 SYRUP ORAL at 08:48

## 2022-08-15 RX ADMIN — EPOETIN ALFA-EPBX 1600 UNITS: 2000 INJECTION, SOLUTION INTRAVENOUS; SUBCUTANEOUS at 08:57

## 2022-08-15 RX ADMIN — LOPERAMIDE HYDROCHLORIDE 4 MG: 2 CAPSULE ORAL at 08:49

## 2022-08-15 RX ADMIN — IPRATROPIUM BROMIDE AND ALBUTEROL SULFATE 1 AMPULE: .5; 2.5 SOLUTION RESPIRATORY (INHALATION) at 08:14

## 2022-08-15 RX ADMIN — SODIUM CHLORIDE, POTASSIUM CHLORIDE, SODIUM LACTATE AND CALCIUM CHLORIDE: 600; 310; 30; 20 INJECTION, SOLUTION INTRAVENOUS at 13:50

## 2022-08-15 RX ADMIN — SUCRALFATE 1 G: 1 TABLET ORAL at 17:08

## 2022-08-15 RX ADMIN — FLUCONAZOLE 200 MG: 100 TABLET ORAL at 08:49

## 2022-08-15 RX ADMIN — DEXTROSE MONOHYDRATE 125 ML: 100 INJECTION, SOLUTION INTRAVENOUS at 17:03

## 2022-08-15 RX ADMIN — THIAMINE HYDROCHLORIDE 100 MG: 100 INJECTION, SOLUTION INTRAMUSCULAR; INTRAVENOUS at 08:47

## 2022-08-15 RX ADMIN — Medication 2000 UNITS: at 08:48

## 2022-08-15 RX ADMIN — OXYCODONE AND ACETAMINOPHEN 1 TABLET: 5; 325 TABLET ORAL at 08:47

## 2022-08-15 RX ADMIN — Medication 400 MG: at 08:49

## 2022-08-15 RX ADMIN — HYDROMORPHONE HYDROCHLORIDE 0.25 MG: 1 INJECTION, SOLUTION INTRAMUSCULAR; INTRAVENOUS; SUBCUTANEOUS at 23:51

## 2022-08-15 RX ADMIN — FLUTICASONE PROPIONATE 1 SPRAY: 50 SPRAY, METERED NASAL at 20:00

## 2022-08-15 RX ADMIN — SODIUM CHLORIDE, PRESERVATIVE FREE 10 ML: 5 INJECTION INTRAVENOUS at 17:14

## 2022-08-15 RX ADMIN — DIPHENOXYLATE HYDROCHLORIDE AND ATROPINE SULFATE 1 TABLET: 2.5; .025 TABLET ORAL at 05:06

## 2022-08-15 RX ADMIN — PANCRELIPASE 24000 UNITS: 60000; 12000; 38000 CAPSULE, DELAYED RELEASE PELLETS ORAL at 08:48

## 2022-08-15 RX ADMIN — Medication 1 CAPSULE: at 08:48

## 2022-08-15 RX ADMIN — Medication 0.25 MG: at 23:51

## 2022-08-15 RX ADMIN — ALBUMIN (HUMAN) 25 G: 0.25 INJECTION, SOLUTION INTRAVENOUS at 04:48

## 2022-08-15 RX ADMIN — SUCRALFATE 1 G: 1 TABLET ORAL at 00:00

## 2022-08-15 RX ADMIN — SODIUM CHLORIDE 500 ML: 9 INJECTION, SOLUTION INTRAVENOUS at 20:12

## 2022-08-15 RX ADMIN — SUCRALFATE 1 G: 1 TABLET ORAL at 04:46

## 2022-08-15 RX ADMIN — ARFORMOTEROL TARTRATE 15 MCG: 15 SOLUTION RESPIRATORY (INHALATION) at 19:17

## 2022-08-15 RX ADMIN — BUDESONIDE 500 MCG: 0.5 SUSPENSION RESPIRATORY (INHALATION) at 19:17

## 2022-08-15 RX ADMIN — SODIUM CHLORIDE, PRESERVATIVE FREE 40 MG: 5 INJECTION INTRAVENOUS at 05:05

## 2022-08-15 RX ADMIN — DEXTROSE MONOHYDRATE 125 ML: 100 INJECTION, SOLUTION INTRAVENOUS at 11:52

## 2022-08-15 RX ADMIN — DIPHENOXYLATE HYDROCHLORIDE AND ATROPINE SULFATE 1 TABLET: 2.5; .025 TABLET ORAL at 10:39

## 2022-08-15 RX ADMIN — SODIUM CHLORIDE: 9 INJECTION, SOLUTION INTRAVENOUS at 04:23

## 2022-08-15 RX ADMIN — IPRATROPIUM BROMIDE AND ALBUTEROL SULFATE 1 AMPULE: .5; 2.5 SOLUTION RESPIRATORY (INHALATION) at 19:17

## 2022-08-15 RX ADMIN — HYDROMORPHONE HYDROCHLORIDE 0.25 MG: 1 INJECTION, SOLUTION INTRAMUSCULAR; INTRAVENOUS; SUBCUTANEOUS at 13:40

## 2022-08-15 RX ADMIN — PANCRELIPASE 24000 UNITS: 60000; 12000; 38000 CAPSULE, DELAYED RELEASE PELLETS ORAL at 12:17

## 2022-08-15 RX ADMIN — SODIUM CHLORIDE, PRESERVATIVE FREE 40 MG: 5 INJECTION INTRAVENOUS at 17:08

## 2022-08-15 RX ADMIN — Medication 7 MCG/MIN: at 04:22

## 2022-08-15 RX ADMIN — SODIUM CHLORIDE 250 ML: 9 INJECTION, SOLUTION INTRAVENOUS at 13:26

## 2022-08-15 ASSESSMENT — PAIN SCALES - GENERAL
PAINLEVEL_OUTOF10: 0
PAINLEVEL_OUTOF10: 3
PAINLEVEL_OUTOF10: 0
PAINLEVEL_OUTOF10: 7
PAINLEVEL_OUTOF10: 5
PAINLEVEL_OUTOF10: 7
PAINLEVEL_OUTOF10: 0

## 2022-08-15 ASSESSMENT — PAIN DESCRIPTION - LOCATION
LOCATION: ABDOMEN;BACK
LOCATION: BACK;ABDOMEN
LOCATION: ABDOMEN;BACK
LOCATION: BACK;ABDOMEN

## 2022-08-15 ASSESSMENT — PAIN - FUNCTIONAL ASSESSMENT: PAIN_FUNCTIONAL_ASSESSMENT: PREVENTS OR INTERFERES WITH ALL ACTIVE AND SOME PASSIVE ACTIVITIES

## 2022-08-15 ASSESSMENT — PAIN DESCRIPTION - ORIENTATION
ORIENTATION: UPPER;LOWER;MID
ORIENTATION: MID
ORIENTATION: LOWER;UPPER;MID

## 2022-08-15 ASSESSMENT — PAIN DESCRIPTION - FREQUENCY: FREQUENCY: CONTINUOUS

## 2022-08-15 ASSESSMENT — PAIN DESCRIPTION - DESCRIPTORS
DESCRIPTORS: DISCOMFORT;CRAMPING;SORE
DESCRIPTORS: DISCOMFORT;CRAMPING;STABBING

## 2022-08-15 ASSESSMENT — PAIN DESCRIPTION - ONSET: ONSET: GRADUAL

## 2022-08-15 ASSESSMENT — PAIN DESCRIPTION - PAIN TYPE: TYPE: CHRONIC PAIN

## 2022-08-15 NOTE — PROGRESS NOTES
Progress Note  8/15/2022 9:50 AM  Subjective:   Admit Date: 8/8/2022  PCP: Yani Kaiser,   Interval History: Patient examined , alert ,  at bedside , c/o abdo distention     Diet: ADULT TUBE FEEDING; Gastrostomy; Renal Formula; Continuous; 10; Yes; 15; Q 12 hours; 30; 30;  Q 6 hours; Protein; 1 Proteinex Daily via feeding tube  ADULT ORAL NUTRITION SUPPLEMENT; AM Snack, Breakfast, Lunch, PM Snack, Dinner, HS Snack; Standard High Calorie/High Protein Oral Supplement    Data:   Scheduled Meds:   ipratropium-albuterol  1 ampule Inhalation Q4H WA    guaiFENesin-dextromethorphan  5 mL Oral BID    midodrine  2.5 mg Oral TID WC    thiamine  100 mg IntraVENous Daily    epoetin ludin-epbx  30 Units/kg SubCUTAneous Once per day on Mon Wed Fri    lidocaine  1 patch TransDERmal Daily    lidocaine  1 patch TransDERmal Daily    fluconazole  200 mg Oral Daily    pantoprazole (PROTONIX) 40 mg injection  40 mg IntraVENous Q12H    sucralfate  1 g Oral 4 times per day    calcium carbonate  500 mg Oral Nightly    vitamin D  2,000 Units Oral BID    diphenoxylate-atropine  1 tablet Oral 4x Daily AC & HS    escitalopram  20 mg Oral Nightly    ferrous sulfate  325 mg Oral Nightly    fluticasone  1 spray Each Nostril Nightly    lipase-protease-amylase  24,000 Units Oral TID WC    loperamide  4 mg Oral TID WC    magnesium oxide  400 mg Oral BID    ocuvite-lutein  1 capsule Oral BID    pravastatin  40 mg Oral Nightly    primidone  250 mg Oral Nightly    traZODone  50 mg Oral Nightly    vitamin B-12  500 mcg Oral Nightly     Continuous Infusions:   sodium chloride 10 mL/hr at 08/15/22 0655    sodium chloride      norepinephrine 10 mcg/min (08/15/22 0655)    sodium chloride      sodium chloride      sodium chloride      dextrose      sodium chloride      dextrose       PRN Meds:aluminum & magnesium hydroxide-simethicone, sodium chloride, sodium chloride, ondansetron, sodium chloride, sodium chloride, sodium chloride, dextrose bolus effusion or pneumothorax. The cardiomediastinal silhouette is without acute process. The osseous structures are without acute process. No acute process. Objective:   Vitals: BP (!) 152/76   Pulse (!) 108   Temp 99.2 °F (37.3 °C) (Temporal)   Resp 19   Ht 5' (1.524 m)   Wt 119 lb 0.8 oz (54 kg)   SpO2 95%   BMI 23.25 kg/m²   General appearance: appears stated age   Skin:  No rashes or lesions  HEENT: Head: Normocephalic, no lesions, without obvious abnormality.   Neck: no adenopathy, no carotid bruit, no JVD, supple, symmetrical, trachea midline, and thyroid not enlarged, symmetric, no tenderness/mass/nodules  Lungs: clear to auscultation bilaterally  Heart: regular rate and rhythm, S1, S2 normal, no murmur, click, rub or gallop  Abdomen:  distended , BS absent   Extremities: extremities normal, atraumatic, no cyanosis or edema  Neurologic: Mental status: Alert, oriented, thought content appropriate    Assessment:   Patient Active Problem List:     Right cataract     Essential hypertension, benign     Hyperlipidemia with target LDL less than 100     Osteoarthritis, shoulder     Primary osteoarthritis of left knee     History of ischemic bowel disease     Ileostomy present (HCC)     Chronic kidney disease, stage III (moderate) (HCC)     COPD (chronic obstructive pulmonary disease) (HCC)     Moderate protein-calorie malnutrition (HCC)     Abdominal wall cellulitis     Cellulitis     Infected hernioplasty mesh (HCC)     Enterocutaneous fistula     Mild protein-calorie malnutrition (HCC)     Acute kidney injury ([address]     Hypertensive kidney disease with chronic kidney disease stage IV (HCC)     KD (acute kidney injury) ([address]     Diarrhea     Elevated serum creatinine     Abnormal serum creatinine level     Hyperlipemia     Primary hypertension     Essential (primary) hypertension     Postsurgical malabsorption, not elsewhere classified     Hypercholesterolemia     Pure hypercholesterolemia Prediabetes     Hypothyroidism, unspecified     Acute renal failure (ARF) (HCC)     Enteritis     Hypotension     Abnormal CT scan, lumbar spine     Severe protein-calorie malnutrition (HCC)     Hyperkalemia    Plan:     Assessment and Plans:     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 HTN with CKD I-IV  BP goal <120/80  BP at/near goal  Monitor BPs  Levo prn     3. Met Acidosis  in the setting of the KD/ CKD   Also with ileostomy  Monitor labs  Now off bicarb drip     4. Secondary hyperparathyroidism of Renal Origin  with Hyperphosphatemia and Unspecified Vit D Def   on 7/15;  PO4 5.2 on 8/9,  Vit D 19 on 8/1  On vitamin D supplementation  Monitor labs  Ca low check ionized and replace prn     5. Hypomagnesemia/Hypocalcemia  Replace prn  On vit d     6. Hyperkalemia  likely due to diet an KD/CKD  high despite numerous rounds of hyper k protocol and hco3 drip  Low potassium diet when able to eat  Cortisol adequate at 90  One hd on 8/11  K low this am, due to GI loss from high ostomy output     7.  anemia  Gi bleed  H/o ischemic colitis and ostomy  S/p PRBCs  Transfuse for hemoglobin<7  On ROSA  General surgery following-possible EGD when lytes stable     8. Shock  ? Septic shock/hypovolemia/?acute abd  Treat yeast in urine  On diflucan an zosyn  Requiring low dose pressors  Had ct of abd pelvis  Lactate 7>0.7    Doing ok , some improvement in u/o , cr higher , Distended abdomen   Absent bowel sounds , ? Ileus .  Dialysis need to be decided based on daily evaluation      D/w Resident  Neo Lu MD

## 2022-08-15 NOTE — PROGRESS NOTES
[address] [facility]   Department of Internal Medicine   Internal Medicine Residency  MICU Progress Note    Patient:  Coco Corral [de-identified] y.o. female   MRN: [id number]       Date of Service: 8/15/2022    Allergy: Fentanyl, Levofloxacin, Tramadol, and Vioxx [rofecoxib]    Subjective     Patient was seen and examined this morning at bedside in no acute distress. Overnight, patient had abdominal pain and distention. X-ray KUB was ordered and it was negative for air-fluid levels. Patient's pain relieved by Dilaudid 0.25 mg IV once. Patient also had episode of low oxygen saturation. P.o. to on ABG was 34.8. Her PO2 improved to 85% on nonrebreather and with extensive pulmonary hygiene and suction. This morning patient seen resting comfortably in bed. She still complains of abdominal pain and distention. Patient denies any nausea or vomiting, chest pain, difficulty breathing. Objective     TEMPERATURE:  Current - Temp: 98.4 °F (36.9 °C); Max - Temp  Av.9 °F (37.2 °C)  Min: 98.4 °F (36.9 °C)  Max: 99.2 °F (37.3 °C)  RESPIRATIONS RANGE: Resp  Av.8  Min: 16  Max: 39  PULSE RANGE: Pulse  Av.9  Min: 86  Max: 117  BLOOD PRESSURE RANGE:  Systolic (81BHE), ZUR:654 , Min:98 , KLQ:180   ; Diastolic (74QUL), VWJ:55, Min:33, Max:76    PULSE OXIMETRY RANGE: SpO2  Av.5 %  Min: 76 %  Max: 99 %    I & O - 24hr:    Intake/Output Summary (Last 24 hours) at 8/15/2022 1621  Last data filed at 8/15/2022 1100  Gross per 24 hour   Intake 781.58 ml   Output 655 ml   Net 126.58 ml     I/O last 3 completed shifts: In: 5513.5 [P.O.:180; I.V.:3389.6; Blood:362.5; NG/GT:829; IV Piggyback:752.4]  Out: 3680 [Urine:430; XMLTJ:8299] I/O this shift:  In: -   Out: 235 [Urine:235]   Weight change:     Physical Exam:   General Appearance:  Alert oriented x3   HEENT:    NC/AT, mucous membranes are dry   Neck:   Supple, no jugular venous distention.     Resp:     CTAB, (+) wheezes, No rhonchi, no use of accessory muscles, currently on 6 L oxygen by nasal cannula. Heart:    RRR, S1 and S2 normal, no murmur, rub or gallop. Abdomen:   Distended, tender, normal bowel sounds noted. Ileostomy draining minimal stool. PEG tube put to gravity for drainage.    Extremities:   Atraumatic, no cyanosis or edema   Pulses:  Radial and pedal pulses are intact bilaterally   Neurologic:   Follows commands       Medications     Continuous Infusions:   lactated ringers 75 mL/hr at 08/15/22 1350    sodium chloride 10 mL/hr at 08/15/22 0655    norepinephrine 9 mcg/min (08/15/22 1235)    dextrose      dextrose       Scheduled Meds:   ipratropium-albuterol  1 ampule Inhalation Q4H WA    guaiFENesin-dextromethorphan  5 mL Oral BID    Arformoterol Tartrate  15 mcg Nebulization BID    budesonide  0.5 mg Nebulization BID    thiamine  100 mg IntraVENous Daily    epoetin ludin-epbx  30 Units/kg SubCUTAneous Once per day on Mon Wed Fri    lidocaine  1 patch TransDERmal Daily    lidocaine  1 patch TransDERmal Daily    pantoprazole (PROTONIX) 40 mg injection  40 mg IntraVENous Q12H    sucralfate  1 g Oral 4 times per day    calcium carbonate  500 mg Oral Nightly    vitamin D  2,000 Units Oral BID    [Held by provider] diphenoxylate-atropine  1 tablet Oral 4x Daily AC & HS    escitalopram  20 mg Oral Nightly    ferrous sulfate  325 mg Oral Nightly    fluticasone  1 spray Each Nostril Nightly    lipase-protease-amylase  24,000 Units Oral TID WC    [Held by provider] loperamide  4 mg Oral TID WC    magnesium oxide  400 mg Oral BID    ocuvite-lutein  1 capsule Oral BID    pravastatin  40 mg Oral Nightly    primidone  250 mg Oral Nightly    traZODone  50 mg Oral Nightly    vitamin B-12  500 mcg Oral Nightly     PRN Meds: aluminum & magnesium hydroxide-simethicone, sodium chloride, dextrose bolus **OR** [DISCONTINUED] dextrose bolus, dextrose, glucose, glucagon (rDNA), dextrose, hyoscyamine, lipase-protease-amylase, senna, oxyCODONE-acetaminophen, [DISCONTINUED] dextrose bolus **OR** dextrose bolus  Nutrition:   Diet NPO Exceptions are: Sips of Water with Meds    Labs and Imaging Studies     CBC:   Recent Labs     08/14/22  0426 08/14/22  1855 08/15/22  0458 08/15/22  1329   WBC 8.6  --  6.3 17.6*   HGB 8.4* 8.9* 9.1* 8.5*   HCT 26.5* 27.3* 28.2* 26.8*   MCV 89.5  --  91.9 91.5     --  132 140       BMP:    Recent Labs     08/13/22  1600 08/14/22  0426 08/15/22  0458    134 137   K 3.7 3.2* 3.8    101 102   CO2 27 25 22   BUN 16 20 24*   CREATININE 1.7* 2.0* 2.5*   GLUCOSE 132* 128* 64*       LIVER PROFILE:   Recent Labs     08/13/22  1600   AST 12   ALT 9   BILITOT 0.6   ALKPHOS 107*         PT/INR:   Recent Labs     08/13/22  0421 08/14/22  0426 08/15/22  0458   PROTIME 13.5* 11.9 13.5*   INR 1.2 1.1 1.2       APTT:   Recent Labs     08/13/22  0421 08/14/22  0426   APTT 36.1* 30.2       Fasting Lipid Panel:    Lab Results   Component Value Date/Time    CHOL 87 08/01/2022 03:36 AM    TRIG 75 08/01/2022 03:36 AM    HDL 42 08/01/2022 03:36 AM       Cardiac Enzymes:    Lab Results   Component Value Date    CKTOTAL 101 08/11/2022    CKTOTAL 28 07/14/2022    CKTOTAL 31 07/14/2022    TROPONINI 0.01 02/25/2021    TROPONINI 0.01 01/21/2021    TROPONINI 0.07 (H) 01/25/2020       Notable Cultures:      Blood cultures   Blood Culture, Routine   Date Value Ref Range Status   08/10/2022 24 Hours no growth  Preliminary     Respiratory cultures No results found for: RESPCULTURE No results found for: LABGRAM  Urine   Urine Culture, Routine   Date Value Ref Range Status   08/08/2022 <10,000 CFU/mL  Mixed gram positive organisms   (A)  Final   08/08/2022 >100,000 CFU/ml  Final     Legionella No results found for: LABLEGI  C Diff PCR No results found for: CDIFPCR  Wound culture/abscess: No results for input(s): WNDABS in the last 72 hours. Tip culture:No results for input(s): CXCATHTIP in the last 72 hours. Oxygen:         Additional Respiratory Assessments  Heart Rate: (!) 104  Resp: 18  SpO2: 97 %       Urinary Catheter-Output (mL): 50 mL    Imaging Studies:  XR CHEST PORTABLE   Final Result   Bibasilar consolidation and/or collapse. Probable effusions. XR ABDOMEN (KUB) (SINGLE AP VIEW)   Final Result   Gaseous small intestinal distension and thickening of the small bowel folds   in the right lower quadrant. CT ABDOMEN PELVIS WO CONTRAST Additional Contrast? None   Final Result   Enlarged liver demonstrating periportal edema. Redemonstration of a low-attenuation lesion in the pancreas. Dilated stomach and gaseous proximal small intestinal dilatation. Abnormal thickening of the wall of the distal small bowel. Large ventral hernia containing the distal stomach and segments of small   bowel. Generalized soft tissue edematous changes consistent with anasarca and mild   to moderate ascites. Multiple additional findings as described. XR CHEST PORTABLE   Final Result   Interval placement of left internal jugular central venous catheter   terminating at the cavoatrial junction. No postprocedure pneumothorax         US RETROPERITONEAL COMPLETE   Final Result   1. Unremarkable sonographic evaluation of the right kidney. 2. Left renal cysts. 3. The urinary bladder is not visualized secondary to obscuring bowel gas. XR CHEST PORTABLE   Final Result   No acute process.          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 Wilmer [facility]  On HD, started on 8/11/2022, creatinine 2.4 today  Monitor BMP q12h     Acute hypoxic respiratory insufficiency 2/2 ? Pulmonary edema vs COPD exacerbation  Currently on 6 L oxygen by nasal cannula, SPO2 96%  Aggressive pulmonary hygiene, incentive spirometry every 2 hours while awake  Mucolytic and breathing treatment with DuoNeb, Brovana and Pulmicort ordered. Monitor ABG and chest x-ray daily. Coagulopathy most likely 2/2 ? DIC-resolved  TEG has normalized, HD line stopped oozing, Close monitoring for overt bleeding  INR 1.2 today     Acute on chronic normocytic normochromic anemia 2/2 hemorrhagic gastritis  S/p EGD 8/12 -suggestive of hemorrhagic gastritis  Continue PPI BID and carafate     Abdominal pain 2/2 ileus in setting of multiple abdominal surgery and ileostomy for ischemic bowel disease  Patient has history of hemorrhagic gastritis s/p EGD (8/12) with no overt bleed. Ileostomy output 1100 cc over last 24 hours. Minimal output this a.m. Patient complaining of abdominal pain. PEG tube put to gravity drainage. 400 cc bilious secretion drained. General surgery following  Keep n.p.o., tube feeds held, loperamide and Lomotil held  LR @75 cc/h maintenance for 10 hours  Continue to monitor for dehydration. Monitor ileostomy output     Secondary hyperparathyroidism of renal origin-  , P5.2, vitamin D 19  On vitamin D supplementation     Urine Candidia infection- on diflucan  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   Hx Short Gut syndrome  Hx HTN  Hx HLD LDL 30 on 8/1/2022 on Pravachol 40  Hx CAD s/p stent placement in 2002  Hx COPD  Prior concern for PE was on Eliquis: Blood thinners currently on hold    Resolved issues:  HAGMA 2/2 lactic acidosis/uremia in setting of ileostomy  Hyperkalemia 2/2 KD/CKD-Lokelma stopped  Hypervolemic hypotonic hyponatremia 2/2 KD  Lactic acidosis likely type A/D sepsis versus short gut syndrome    # Peptic ulcer prophylaxis: protonix 40mg BID  # DVT Prophylaxis: PCDs  # Disposition: Cont current care    Noah Christian MD, PGY-2    Attending Physician: Dr. Dayami Pacheco  Department of Pulmonary, Critical Care and [facility]  Department of Internal Medicine      During multidisciplinary team rounds Pavan Rehman is a [de-identified] y.o. female was seen, examined and discussed. This is confirmation that I have personally seen and examined the patient and that the key elements of the encounter were performed by me (> 85 % time). The medications & laboratory data was discussed and adjusted where necessary. The radiographic images were reviewed or with radiologist or consultant if felt dis-concordant with the exam or history. The above findings were corroborated, plans confirmed and changes made if needed. Family is updated at the bedside as available. Key issues of the case were discussed among consultants. Critical Care time is documented if appropriate.       Critical Care time: 33 minutes    Sosa Connelly DO

## 2022-08-15 NOTE — PROGRESS NOTES
Comprehensive Nutrition Assessment    Type and Reason for Visit:  Reassess    Nutrition Recommendations/Plan:     Continue NPO. EN support held at this time d/t possible ileus    Updated TF Rec when needed to promote optimal GI tolerance  Peptide Based (Vital AF 1.2) @ 45 ml/hr. Will provide: 1080 ml tv, 1296 kcals, 81 gm pro, 875 ml free water  Monitor electrolytes/ replace prm       Malnutrition Assessment:  Malnutrition Status:  Severe malnutrition (08/11/22 1245)    Context:  Chronic Illness     Findings of the 6 clinical characteristics of malnutrition:  Energy Intake:  75% or less estimated energy requirements for 1 month or longer  Weight Loss:  Unable to assess (Subjective 40lb wt loss over past 2 years (cannot confirm) Recent wt hx of low BMI but within stable ranges overall)     Body Fat Loss:  Severe body fat loss Orbital, Triceps   Muscle Mass Loss:  Severe muscle mass loss Temples (temporalis), Clavicles (pectoralis & deltoids), Calf (gastrocnemius), Thigh (quadraceps)  Fluid Accumulation:  No significant fluid accumulation     Strength:  Not Performed    Nutrition Assessment:    Pt remains at nutritional risk d/t ongoing need for ICU care. Admit 2/2 GIB & KD s/p EGD. PMHx Skin CA, COPD, Pre-DM, short gut syndrome w/ ileostomy/PEG follows @ [facility]( Reviewed RD notes from facility). Pt meets criteria for Severe Malnutrition. Noted multiple emesis w/ TF & Massive ostomy output as high as >3L x 24 hrs (appears slowed today). PEG open to gravity. Will provide updated EN rec for optimal GI tolerance when needed.     Nutrition Related Findings:    Pt alert, ongoing hypotension on pressor, +I/O's 10 L, trace edema, active BS, abd distention/cramping, ileus? multiple emesis, ileostomy RLQ w/ high ouput, PEG to gravity, K/Phos now WNL     Wound Type: Surgical Incision, Multiple, Open Wounds       Current Nutrition Intake & Therapies:    Average Meal Intake: NPO     Current Tube Feeding (TF) Orders:  Feeding Route: PEG  Formula: Renal Formula  Schedule: Continuous  Feeding Regimen: 30 ml/hr, held  Additives/Modulars: Protein (daily= 26 gm)  Water Flushes: 30 ml q 6 hr = 120 ml water  Goal TF & Flush Orders Provides: 720 ml tv, 1296 kcals, 58 gm pro (1396 kcals & 84 gm pro w/ mod), 523 ml free water, 643 ml total water w flushes    Anthropometric Measures:  Height: 5' (152.4 cm)  Ideal Body Weight (IBW): 100 lbs (45 kg)    Admission Body Weight: 94 lb (42.6 kg) (8/9 first measured)  Current Body Weight: 94 lb (42.6 kg) (8/9 adm wt as CBW remains elevated), 94 % IBW. Current BMI (kg/m2): 18.4  Usual Body Weight:  (EMR measured wt ranges 83-91lb per EMR review over past year. Reviewed [facility] RD notes)                       BMI Categories: Underweight (BMI less than 22) age over 72    Estimated Daily Nutrient Needs:  Energy Requirements Based On: Kcal/kg  Weight Used for Energy Requirements: Admission  Energy (kcal/day): 30-35 kcal/kg for healthy wt gain; 8420-6822  Weight Used for Protein Requirements: Admission  Protein (g/day): 1.5-1.8 g/kg dry adm wt; 65-75  Fluid (ml/day): per critical care    Nutrition Diagnosis:   Severe malnutrition, In context of chronic illness related to catabolic illness as evidenced by poor intake prior to admission, severe loss of subcutaneous fat, severe muscle loss    Nutrition Interventions:   Nutrition Education/Counseling: No recommendation at this time  Coordination of Nutrition Care: Continue to monitor while inpatient       Goals:  Previous Goal Met: Progress towards Goal(s) Declining (TF held)  Specify Other Goals: Resuem EN when medically feasible    Nutrition Monitoring and Evaluation:      Food/Nutrient Intake Outcomes: Diet Advancement/Tolerance  Physical Signs/Symptoms Outcomes: Biochemical Data, Nutrition Focused Physical Findings, Diarrhea, GI Status, Nausea or Vomiting, Fluid Status or Edema, Skin, Weight, Hemodynamic Status    Discharge Planning:     Too soon to determine Rusty Redding, ALESSANDRA, LD  Contact: ext [phone]

## 2022-08-15 NOTE — PROGRESS NOTES
GENERAL SURGERY  DAILY PROGRESS NOTE  8/15/2022    CHIEF COMPLAINT:  Chief Complaint   Patient presents with    Other     Sent from NH for K+ of 6.8       SUBJECTIVE:  Pt reports worsening distention through the night and decreased ostomy output. 50cc ostomy output today. PEG to gravity drained 400cc so far today. Pt denies N/V.    OBJECTIVE:  BP (!) 152/76   Pulse (!) 104   Temp 99.2 °F (37.3 °C) (Temporal)   Resp 27   Ht 5' (1.524 m)   Wt 119 lb 0.8 oz (54 kg)   SpO2 97%   BMI 23.25 kg/m²     GENERAL:  NAD. A&Ox3. LUNGS:  No increased work of breathing. CARDIOVASCULAR: RR  ABDOMEN:  Soft, moderately distended, tender to palpation RLQ. No guarding, rigidity, rebound. ASSESSMENT/PLAN:  [de-identified] y.o. female with KD and hemorrhagic gastritis.       PEG to gravity  NPO  Hold TF's     Will discuss w Dr. Justina Hernandez, DO  Surgery Resident PGY-1  8/15/2022  1:42 PM

## 2022-08-16 ENCOUNTER — APPOINTMENT (OUTPATIENT)
Dept: GENERAL RADIOLOGY | Age: 82
DRG: 377 | End: 2022-08-16
Payer: MEDICARE

## 2022-08-16 ENCOUNTER — APPOINTMENT (OUTPATIENT)
Dept: CT IMAGING | Age: 82
DRG: 377 | End: 2022-08-16
Payer: MEDICARE

## 2022-08-16 LAB
ACANTHOCYTES: ABNORMAL
ALBUMIN SERPL-MCNC: 2.5 G/DL (ref 3.5–5.2)
ALP BLD-CCNC: 116 U/L (ref 35–104)
ALT SERPL-CCNC: 8 U/L (ref 0–32)
ANION GAP SERPL CALCULATED.3IONS-SCNC: 8 MMOL/L (ref 7–16)
ANION GAP SERPL CALCULATED.3IONS-SCNC: 9 MMOL/L (ref 7–16)
ANION GAP SERPL CALCULATED.3IONS-SCNC: 9 MMOL/L (ref 7–16)
ANISOCYTOSIS: ABNORMAL
AST SERPL-CCNC: 17 U/L (ref 0–31)
BASOPHILIC STIPPLING: ABNORMAL
BASOPHILS ABSOLUTE: 0 E9/L (ref 0–0.2)
BASOPHILS ABSOLUTE: 0 E9/L (ref 0–0.2)
BASOPHILS ABSOLUTE: 0.02 E9/L (ref 0–0.2)
BASOPHILS RELATIVE PERCENT: 0 % (ref 0–2)
BASOPHILS RELATIVE PERCENT: 0 % (ref 0–2)
BASOPHILS RELATIVE PERCENT: 0.3 % (ref 0–2)
BILIRUB SERPL-MCNC: 1.1 MG/DL (ref 0–1.2)
BLASTS RELATIVE PERCENT: 0.9 % (ref 0–0)
BUN BLDV-MCNC: 13 MG/DL (ref 6–23)
BUN BLDV-MCNC: 16 MG/DL (ref 6–23)
BUN BLDV-MCNC: 27 MG/DL (ref 6–23)
BURR CELLS: ABNORMAL
CALCIUM IONIZED: 1.22 MMOL/L (ref 1.15–1.33)
CALCIUM SERPL-MCNC: 8.1 MG/DL (ref 8.6–10.2)
CALCIUM SERPL-MCNC: 8.1 MG/DL (ref 8.6–10.2)
CALCIUM SERPL-MCNC: 8.3 MG/DL (ref 8.6–10.2)
CHLORIDE BLD-SCNC: 100 MMOL/L (ref 98–107)
CHLORIDE BLD-SCNC: 104 MMOL/L (ref 98–107)
CHLORIDE BLD-SCNC: 104 MMOL/L (ref 98–107)
CO2: 24 MMOL/L (ref 22–29)
CO2: 26 MMOL/L (ref 22–29)
CO2: 26 MMOL/L (ref 22–29)
CREAT SERPL-MCNC: 1.8 MG/DL (ref 0.5–1)
CREAT SERPL-MCNC: 2.2 MG/DL (ref 0.5–1)
CREAT SERPL-MCNC: 3.1 MG/DL (ref 0.5–1)
DOHLE BODIES: ABNORMAL
EOSINOPHILS ABSOLUTE: 0 E9/L (ref 0.05–0.5)
EOSINOPHILS ABSOLUTE: 0 E9/L (ref 0.05–0.5)
EOSINOPHILS ABSOLUTE: 0.01 E9/L (ref 0.05–0.5)
EOSINOPHILS RELATIVE PERCENT: 0 % (ref 0–6)
GFR AFRICAN AMERICAN: 17
GFR AFRICAN AMERICAN: 26
GFR AFRICAN AMERICAN: 33
GFR NON-AFRICAN AMERICAN: 14 ML/MIN/1.73
GFR NON-AFRICAN AMERICAN: 21 ML/MIN/1.73
GFR NON-AFRICAN AMERICAN: 27 ML/MIN/1.73
GLUCOSE BLD-MCNC: 100 MG/DL (ref 74–99)
GLUCOSE BLD-MCNC: 122 MG/DL (ref 74–99)
GLUCOSE BLD-MCNC: 73 MG/DL (ref 74–99)
HCT VFR BLD CALC: 25.6 % (ref 34–48)
HCT VFR BLD CALC: 29.6 % (ref 34–48)
HCT VFR BLD CALC: 30 % (ref 34–48)
HEMOGLOBIN: 7.9 G/DL (ref 11.5–15.5)
HEMOGLOBIN: 9.2 G/DL (ref 11.5–15.5)
HEMOGLOBIN: 9.4 G/DL (ref 11.5–15.5)
IMMATURE GRANULOCYTES #: 1.06 E9/L
IMMATURE GRANULOCYTES %: 2.4 % (ref 0–5)
LACTIC ACID: 1.7 MMOL/L (ref 0.5–2.2)
LACTIC ACID: 2 MMOL/L (ref 0.5–2.2)
LIPASE: 43 U/L (ref 13–60)
LYMPHOCYTES ABSOLUTE: 0.76 E9/L (ref 1.5–4)
LYMPHOCYTES ABSOLUTE: 1.27 E9/L (ref 1.5–4)
LYMPHOCYTES ABSOLUTE: 1.58 E9/L (ref 1.5–4)
LYMPHOCYTES RELATIVE PERCENT: 1.7 % (ref 20–42)
LYMPHOCYTES RELATIVE PERCENT: 2.6 % (ref 20–42)
LYMPHOCYTES RELATIVE PERCENT: 3.5 % (ref 20–42)
MAGNESIUM: 2.5 MG/DL (ref 1.6–2.6)
MCH RBC QN AUTO: 29.2 PG (ref 26–35)
MCH RBC QN AUTO: 29.6 PG (ref 26–35)
MCH RBC QN AUTO: 29.6 PG (ref 26–35)
MCHC RBC AUTO-ENTMCNC: 30.9 % (ref 32–34.5)
MCHC RBC AUTO-ENTMCNC: 31.1 % (ref 32–34.5)
MCHC RBC AUTO-ENTMCNC: 31.3 % (ref 32–34.5)
MCV RBC AUTO: 94.3 FL (ref 80–99.9)
MCV RBC AUTO: 94.5 FL (ref 80–99.9)
MCV RBC AUTO: 95.2 FL (ref 80–99.9)
METAMYELOCYTES RELATIVE PERCENT: 4.4 % (ref 0–1)
METER GLUCOSE: 111 MG/DL (ref 74–99)
METER GLUCOSE: 112 MG/DL (ref 74–99)
METER GLUCOSE: 63 MG/DL (ref 74–99)
METER GLUCOSE: 70 MG/DL (ref 74–99)
METER GLUCOSE: 78 MG/DL (ref 74–99)
METER GLUCOSE: 97 MG/DL (ref 74–99)
MONOCYTES ABSOLUTE: 0 E9/L (ref 0.1–0.95)
MONOCYTES ABSOLUTE: 0.73 E9/L (ref 0.1–0.95)
MONOCYTES ABSOLUTE: 3.82 E9/L (ref 0.1–0.95)
MONOCYTES RELATIVE PERCENT: 1.6 % (ref 2–12)
MONOCYTES RELATIVE PERCENT: 1.9 % (ref 2–12)
MONOCYTES RELATIVE PERCENT: 10.4 % (ref 2–12)
NEUTROPHILS ABSOLUTE: 33.62 E9/L (ref 1.8–7.3)
NEUTROPHILS ABSOLUTE: 41.03 E9/L (ref 1.8–7.3)
NEUTROPHILS ABSOLUTE: 41.56 E9/L (ref 1.8–7.3)
NEUTROPHILS RELATIVE PERCENT: 83.5 % (ref 43–80)
NEUTROPHILS RELATIVE PERCENT: 92.5 % (ref 43–80)
NEUTROPHILS RELATIVE PERCENT: 96.5 % (ref 43–80)
OVALOCYTES: ABNORMAL
OVALOCYTES: ABNORMAL
PDW BLD-RTO: 18.6 FL (ref 11.5–15)
PDW BLD-RTO: 18.6 FL (ref 11.5–15)
PDW BLD-RTO: 18.9 FL (ref 11.5–15)
PHOSPHORUS: 4.2 MG/DL (ref 2.5–4.5)
PLATELET # BLD: 159 E9/L (ref 130–450)
PLATELET # BLD: 182 E9/L (ref 130–450)
PLATELET # BLD: 189 E9/L (ref 130–450)
PMV BLD AUTO: 10.8 FL (ref 7–12)
PMV BLD AUTO: 11 FL (ref 7–12)
PMV BLD AUTO: 11 FL (ref 7–12)
POIKILOCYTES: ABNORMAL
POLYCHROMASIA: ABNORMAL
POTASSIUM SERPL-SCNC: 3.7 MMOL/L (ref 3.5–5)
POTASSIUM SERPL-SCNC: 3.8 MMOL/L (ref 3.5–5)
POTASSIUM SERPL-SCNC: 4.1 MMOL/L (ref 3.5–5)
PROCALCITONIN: 25.65 NG/ML (ref 0–0.08)
RBC # BLD: 2.71 E12/L (ref 3.5–5.5)
RBC # BLD: 3.11 E12/L (ref 3.5–5.5)
RBC # BLD: 3.18 E12/L (ref 3.5–5.5)
SODIUM BLD-SCNC: 133 MMOL/L (ref 132–146)
SODIUM BLD-SCNC: 138 MMOL/L (ref 132–146)
SODIUM BLD-SCNC: 139 MMOL/L (ref 132–146)
SPHEROCYTES: ABNORMAL
TEAR DROP CELLS: ABNORMAL
TOTAL PROTEIN: 4.7 G/DL (ref 6.4–8.3)
TOXIC GRANULATION: ABNORMAL
VACUOLATED NEUTROPHILS: ABNORMAL
WBC # BLD: 38.2 E9/L (ref 4.5–11.5)
WBC # BLD: 42.3 E9/L (ref 4.5–11.5)
WBC # BLD: 45 E9/L (ref 4.5–11.5)

## 2022-08-16 PROCEDURE — 87106 FUNGI IDENTIFICATION YEAST: CPT

## 2022-08-16 PROCEDURE — 6360000002 HC RX W HCPCS: Performed by: STUDENT IN AN ORGANIZED HEALTH CARE EDUCATION/TRAINING PROGRAM

## 2022-08-16 PROCEDURE — 71045 X-RAY EXAM CHEST 1 VIEW: CPT

## 2022-08-16 PROCEDURE — 87040 BLOOD CULTURE FOR BACTERIA: CPT

## 2022-08-16 PROCEDURE — 2500000003 HC RX 250 WO HCPCS: Performed by: INTERNAL MEDICINE

## 2022-08-16 PROCEDURE — 6370000000 HC RX 637 (ALT 250 FOR IP): Performed by: STUDENT IN AN ORGANIZED HEALTH CARE EDUCATION/TRAINING PROGRAM

## 2022-08-16 PROCEDURE — 6370000000 HC RX 637 (ALT 250 FOR IP): Performed by: SURGERY

## 2022-08-16 PROCEDURE — 84100 ASSAY OF PHOSPHORUS: CPT

## 2022-08-16 PROCEDURE — 2580000003 HC RX 258

## 2022-08-16 PROCEDURE — 85025 COMPLETE CBC W/AUTO DIFF WBC: CPT

## 2022-08-16 PROCEDURE — 6360000002 HC RX W HCPCS: Performed by: SURGERY

## 2022-08-16 PROCEDURE — 84145 PROCALCITONIN (PCT): CPT

## 2022-08-16 PROCEDURE — P9047 ALBUMIN (HUMAN), 25%, 50ML: HCPCS | Performed by: STUDENT IN AN ORGANIZED HEALTH CARE EDUCATION/TRAINING PROGRAM

## 2022-08-16 PROCEDURE — 80053 COMPREHEN METABOLIC PANEL: CPT

## 2022-08-16 PROCEDURE — 82330 ASSAY OF CALCIUM: CPT

## 2022-08-16 PROCEDURE — 99291 CRITICAL CARE FIRST HOUR: CPT | Performed by: INTERNAL MEDICINE

## 2022-08-16 PROCEDURE — 2580000003 HC RX 258: Performed by: STUDENT IN AN ORGANIZED HEALTH CARE EDUCATION/TRAINING PROGRAM

## 2022-08-16 PROCEDURE — 87449 NOS EACH ORGANISM AG IA: CPT

## 2022-08-16 PROCEDURE — 82962 GLUCOSE BLOOD TEST: CPT

## 2022-08-16 PROCEDURE — 87186 SC STD MICRODIL/AGAR DIL: CPT

## 2022-08-16 PROCEDURE — 2000000000 HC ICU R&B

## 2022-08-16 PROCEDURE — 6370000000 HC RX 637 (ALT 250 FOR IP): Performed by: FAMILY MEDICINE

## 2022-08-16 PROCEDURE — 83605 ASSAY OF LACTIC ACID: CPT

## 2022-08-16 PROCEDURE — 94640 AIRWAY INHALATION TREATMENT: CPT

## 2022-08-16 PROCEDURE — 90935 HEMODIALYSIS ONE EVALUATION: CPT

## 2022-08-16 PROCEDURE — 83735 ASSAY OF MAGNESIUM: CPT

## 2022-08-16 PROCEDURE — 87324 CLOSTRIDIUM AG IA: CPT

## 2022-08-16 PROCEDURE — 83690 ASSAY OF LIPASE: CPT

## 2022-08-16 PROCEDURE — 71250 CT THORAX DX C-: CPT

## 2022-08-16 PROCEDURE — 80048 BASIC METABOLIC PNL TOTAL CA: CPT

## 2022-08-16 PROCEDURE — 2580000003 HC RX 258: Performed by: INTERNAL MEDICINE

## 2022-08-16 PROCEDURE — 87150 DNA/RNA AMPLIFIED PROBE: CPT

## 2022-08-16 PROCEDURE — A4216 STERILE WATER/SALINE, 10 ML: HCPCS | Performed by: SURGERY

## 2022-08-16 PROCEDURE — 2700000000 HC OXYGEN THERAPY PER DAY

## 2022-08-16 PROCEDURE — C9113 INJ PANTOPRAZOLE SODIUM, VIA: HCPCS | Performed by: SURGERY

## 2022-08-16 PROCEDURE — 2500000003 HC RX 250 WO HCPCS: Performed by: STUDENT IN AN ORGANIZED HEALTH CARE EDUCATION/TRAINING PROGRAM

## 2022-08-16 PROCEDURE — 87088 URINE BACTERIA CULTURE: CPT

## 2022-08-16 PROCEDURE — 6360000002 HC RX W HCPCS: Performed by: INTERNAL MEDICINE

## 2022-08-16 PROCEDURE — 74176 CT ABD & PELVIS W/O CONTRAST: CPT

## 2022-08-16 PROCEDURE — 36415 COLL VENOUS BLD VENIPUNCTURE: CPT

## 2022-08-16 PROCEDURE — 2580000003 HC RX 258: Performed by: SURGERY

## 2022-08-16 RX ORDER — PIPERACILLIN SODIUM, TAZOBACTAM SODIUM 4; .5 G/20ML; G/20ML
4500 INJECTION, POWDER, LYOPHILIZED, FOR SOLUTION INTRAVENOUS EVERY 12 HOURS
Status: DISCONTINUED | OUTPATIENT
Start: 2022-08-16 | End: 2022-08-16 | Stop reason: SDUPTHER

## 2022-08-16 RX ORDER — DEXTROSE AND SODIUM CHLORIDE 5; .9 G/100ML; G/100ML
INJECTION, SOLUTION INTRAVENOUS CONTINUOUS
Status: ACTIVE | OUTPATIENT
Start: 2022-08-16 | End: 2022-08-16

## 2022-08-16 RX ORDER — ALBUMIN (HUMAN) 12.5 G/50ML
25 SOLUTION INTRAVENOUS EVERY 8 HOURS
Status: COMPLETED | OUTPATIENT
Start: 2022-08-16 | End: 2022-08-16

## 2022-08-16 RX ORDER — 0.9 % SODIUM CHLORIDE 0.9 %
250 INTRAVENOUS SOLUTION INTRAVENOUS ONCE
Status: DISCONTINUED | OUTPATIENT
Start: 2022-08-16 | End: 2022-08-17

## 2022-08-16 RX ORDER — ANTICOAGULANT SODIUM CITRATE SOLUTION 4 G/100ML
2.8 SOLUTION INTRAVENOUS PRN
Status: DISCONTINUED | OUTPATIENT
Start: 2022-08-16 | End: 2022-08-18

## 2022-08-16 RX ORDER — METRONIDAZOLE 500 MG/100ML
500 INJECTION, SOLUTION INTRAVENOUS EVERY 8 HOURS
Status: DISCONTINUED | OUTPATIENT
Start: 2022-08-16 | End: 2022-08-18

## 2022-08-16 RX ORDER — DEXTROSE AND SODIUM CHLORIDE 5; .9 G/100ML; G/100ML
INJECTION, SOLUTION INTRAVENOUS CONTINUOUS
Status: DISCONTINUED | OUTPATIENT
Start: 2022-08-16 | End: 2022-08-16

## 2022-08-16 RX ADMIN — PRAVASTATIN SODIUM 40 MG: 20 TABLET ORAL at 22:07

## 2022-08-16 RX ADMIN — ARFORMOTEROL TARTRATE 15 MCG: 15 SOLUTION RESPIRATORY (INHALATION) at 08:31

## 2022-08-16 RX ADMIN — GUAIFENESIN SYRUP AND DEXTROMETHORPHAN 5 ML: 100; 10 SYRUP ORAL at 08:41

## 2022-08-16 RX ADMIN — ALBUMIN (HUMAN) 25 G: 0.25 INJECTION, SOLUTION INTRAVENOUS at 19:23

## 2022-08-16 RX ADMIN — Medication 2000 UNITS: at 08:45

## 2022-08-16 RX ADMIN — ARFORMOTEROL TARTRATE 15 MCG: 15 SOLUTION RESPIRATORY (INHALATION) at 19:42

## 2022-08-16 RX ADMIN — DEXTROSE MONOHYDRATE 125 ML: 100 INJECTION, SOLUTION INTRAVENOUS at 00:02

## 2022-08-16 RX ADMIN — OXYCODONE AND ACETAMINOPHEN 1 TABLET: 5; 325 TABLET ORAL at 11:50

## 2022-08-16 RX ADMIN — HYDROMORPHONE HYDROCHLORIDE 0.25 MG: 1 INJECTION, SOLUTION INTRAMUSCULAR; INTRAVENOUS; SUBCUTANEOUS at 15:13

## 2022-08-16 RX ADMIN — BUDESONIDE 500 MCG: 0.5 SUSPENSION RESPIRATORY (INHALATION) at 19:42

## 2022-08-16 RX ADMIN — FLUTICASONE PROPIONATE 1 SPRAY: 50 SPRAY, METERED NASAL at 22:40

## 2022-08-16 RX ADMIN — DEXTROSE AND SODIUM CHLORIDE: 5; 900 INJECTION, SOLUTION INTRAVENOUS at 07:10

## 2022-08-16 RX ADMIN — SUCRALFATE 1 G: 1 TABLET ORAL at 16:57

## 2022-08-16 RX ADMIN — HYDROCORTISONE SODIUM SUCCINATE 100 MG: 100 INJECTION, POWDER, FOR SOLUTION INTRAMUSCULAR; INTRAVENOUS at 16:49

## 2022-08-16 RX ADMIN — Medication 10 MCG/MIN: at 22:38

## 2022-08-16 RX ADMIN — ESCITALOPRAM OXALATE 20 MG: 10 TABLET ORAL at 22:09

## 2022-08-16 RX ADMIN — GUAIFENESIN SYRUP AND DEXTROMETHORPHAN 5 ML: 100; 10 SYRUP ORAL at 22:05

## 2022-08-16 RX ADMIN — BUDESONIDE 500 MCG: 0.5 SUSPENSION RESPIRATORY (INHALATION) at 08:31

## 2022-08-16 RX ADMIN — IPRATROPIUM BROMIDE AND ALBUTEROL SULFATE 1 AMPULE: .5; 2.5 SOLUTION RESPIRATORY (INHALATION) at 19:42

## 2022-08-16 RX ADMIN — FERROUS SULFATE TAB 325 MG (65 MG ELEMENTAL FE) 325 MG: 325 (65 FE) TAB at 22:05

## 2022-08-16 RX ADMIN — THIAMINE HYDROCHLORIDE 100 MG: 100 INJECTION, SOLUTION INTRAMUSCULAR; INTRAVENOUS at 08:44

## 2022-08-16 RX ADMIN — METRONIDAZOLE 500 MG: 500 INJECTION, SOLUTION INTRAVENOUS at 16:56

## 2022-08-16 RX ADMIN — SODIUM CHLORIDE, PRESERVATIVE FREE 40 MG: 5 INJECTION INTRAVENOUS at 06:15

## 2022-08-16 RX ADMIN — PRIMIDONE 250 MG: 250 TABLET ORAL at 22:11

## 2022-08-16 RX ADMIN — Medication 1 CAPSULE: at 08:44

## 2022-08-16 RX ADMIN — Medication 1 CAPSULE: at 22:15

## 2022-08-16 RX ADMIN — SODIUM CHLORIDE, PRESERVATIVE FREE 40 MG: 5 INJECTION INTRAVENOUS at 19:14

## 2022-08-16 RX ADMIN — DEXTROSE AND SODIUM CHLORIDE: 5; 900 INJECTION, SOLUTION INTRAVENOUS at 13:35

## 2022-08-16 RX ADMIN — CALCIUM CARBONATE 500 MG: 500 TABLET, CHEWABLE ORAL at 22:06

## 2022-08-16 RX ADMIN — Medication 400 MG: at 08:43

## 2022-08-16 RX ADMIN — DEXTROSE MONOHYDRATE 125 ML: 100 INJECTION, SOLUTION INTRAVENOUS at 07:15

## 2022-08-16 RX ADMIN — Medication 2000 UNITS: at 22:23

## 2022-08-16 RX ADMIN — WATER 2 ML: 1 INJECTION INTRAMUSCULAR; INTRAVENOUS; SUBCUTANEOUS at 16:49

## 2022-08-16 RX ADMIN — CYANOCOBALAMIN TAB 1000 MCG 500 MCG: 1000 TAB at 22:13

## 2022-08-16 RX ADMIN — Medication 400 MG: at 22:13

## 2022-08-16 RX ADMIN — TRAZODONE HYDROCHLORIDE 50 MG: 50 TABLET ORAL at 22:06

## 2022-08-16 RX ADMIN — CEFEPIME 2000 MG: 2 INJECTION, POWDER, FOR SOLUTION INTRAVENOUS at 14:36

## 2022-08-16 RX ADMIN — SUCRALFATE 1 G: 1 TABLET ORAL at 11:34

## 2022-08-16 RX ADMIN — HYDROCORTISONE SODIUM SUCCINATE 100 MG: 100 INJECTION, POWDER, FOR SOLUTION INTRAMUSCULAR; INTRAVENOUS at 08:41

## 2022-08-16 RX ADMIN — ALBUMIN (HUMAN) 25 G: 0.25 INJECTION, SOLUTION INTRAVENOUS at 10:54

## 2022-08-16 RX ADMIN — IPRATROPIUM BROMIDE AND ALBUTEROL SULFATE 1 AMPULE: .5; 2.5 SOLUTION RESPIRATORY (INHALATION) at 08:31

## 2022-08-16 RX ADMIN — IPRATROPIUM BROMIDE AND ALBUTEROL SULFATE 1 AMPULE: .5; 2.5 SOLUTION RESPIRATORY (INHALATION) at 15:21

## 2022-08-16 ASSESSMENT — PAIN SCALES - GENERAL
PAINLEVEL_OUTOF10: 5
PAINLEVEL_OUTOF10: 0
PAINLEVEL_OUTOF10: 0
PAINLEVEL_OUTOF10: 2

## 2022-08-16 ASSESSMENT — PAIN SCALES - WONG BAKER: WONGBAKER_NUMERICALRESPONSE: 2

## 2022-08-16 NOTE — PROGRESS NOTES
[address] [facility]   Department of Internal Medicine   Internal Medicine Residency  MICU Progress Note    Patient:  Jenny Blake [de-identified] y.o. female   MRN: [id number]       Date of Service: 2022    Allergy: Fentanyl, Levofloxacin, Tramadol, and Vioxx [rofecoxib]    Subjective     Patient was seen and examined this morning at bedside in no acute distress. Overnight, patient had uptrending pressor requirement. WBC count trended up to 45 this AM.  NICOM was fluid responsive and 500 cc fluid bolus was given. Patient continues to have abdominal distention however it has decreased from yesterday. Patient denies any nausea, vomiting, headache, difficulty breathing, chest pain, palpitation, numbness or tingling in extremities. Objective     TEMPERATURE:  Current - Temp: 99 °F (37.2 °C); Max - Temp  Av.7 °F (37.1 °C)  Min: 98.1 °F (36.7 °C)  Max: 99.4 °F (37.4 °C)  RESPIRATIONS RANGE: Resp  Av.6  Min: 16  Max: 39  PULSE RANGE: Pulse  Av.4  Min: 100  Max: 117  BLOOD PRESSURE RANGE:  Systolic (62ZXW), GPQ:561 , Min:103 , SWM:346   ; Diastolic (18EUQ), FGK:48, Min:34, Max:58    PULSE OXIMETRY RANGE: SpO2  Av %  Min: 90 %  Max: 97 %    I & O - 24hr:    Intake/Output Summary (Last 24 hours) at 2022 0914  Last data filed at 2022 0900  Gross per 24 hour   Intake 3279.49 ml   Output 1530 ml   Net 1749.49 ml     I/O last 3 completed shifts: In: 1259.6 [I.V.:1065.3; IV Piggyback:194.3]  Out: 1370 [Urine:685; Emesis/NG output:570; AHYVS:178] I/O this shift:  In: 2801.5 [P.O.:480; I.V.:1821.1; IV Piggyback:500.4]  Out: 435 [Urine:5; Stool:430]   Weight change:     Physical Exam:   General Appearance:  Alert oriented x3   HEENT:    NC/AT, mucous membranes are moist   Neck:   Supple, no jugular venous distention. Resp:     CTAB, (+) wheezes, No rhonchi, no use of accessory muscles, currently on 6 L oxygen by nasal cannula. Heart:    RRR, S1 and S2 normal, no murmur, rub or gallop. Abdomen:   Distended, distension decreased when compared to exam yesterday,  tender, sluggish bowel sounds noted. Ileostomy draining minimal stool. PEG tube put to gravity for drainage with bilious fluid. + dullness to percussion and fluid wave. Extremities:   Atraumatic, no cyanosis or edema   Pulses:  Radial and pedal pulses are intact bilaterally   Neurologic:   Follows commands, no focal neurological deficit noted.        Medications     Continuous Infusions:   dextrose 5 % and 0.9 % NaCl 50 mL/hr at 08/16/22 2068    sodium chloride 10 mL/hr at 08/16/22 9651    norepinephrine 18 mcg/min (08/16/22 0728)    dextrose      dextrose       Scheduled Meds:   piperacillin-tazobactam  4,500 mg IntraVENous Q12H    hydrocortisone sodium succinate PF  100 mg IntraVENous Q8H    ipratropium-albuterol  1 ampule Inhalation Q4H WA    guaiFENesin-dextromethorphan  5 mL Oral BID    Arformoterol Tartrate  15 mcg Nebulization BID    budesonide  0.5 mg Nebulization BID    thiamine  100 mg IntraVENous Daily    epoetin ludin-epbx  30 Units/kg SubCUTAneous Once per day on Mon Wed Fri    lidocaine  1 patch TransDERmal Daily    lidocaine  1 patch TransDERmal Daily    pantoprazole (PROTONIX) 40 mg injection  40 mg IntraVENous Q12H    sucralfate  1 g Oral 4 times per day    calcium carbonate  500 mg Oral Nightly    vitamin D  2,000 Units Oral BID    [Held by provider] diphenoxylate-atropine  1 tablet Oral 4x Daily AC & HS    escitalopram  20 mg Oral Nightly    ferrous sulfate  325 mg Oral Nightly    fluticasone  1 spray Each Nostril Nightly    [Held by provider] lipase-protease-amylase  24,000 Units Oral TID WC    [Held by provider] loperamide  4 mg Oral TID WC    magnesium oxide  400 mg Oral BID    ocuvite-lutein  1 capsule Oral BID    pravastatin  40 mg Oral Nightly    primidone  250 mg Oral Nightly    traZODone  50 mg Oral Nightly    vitamin B-12  500 mcg Oral Nightly     PRN Meds: iohexol, aluminum & magnesium hydroxide-simethicone, sodium chloride, dextrose bolus **OR** [DISCONTINUED] dextrose bolus, dextrose, glucose, glucagon (rDNA), dextrose, hyoscyamine, [Held by provider] lipase-protease-amylase, senna, oxyCODONE-acetaminophen, [DISCONTINUED] dextrose bolus **OR** dextrose bolus  Nutrition:   Diet NPO Exceptions are: Sips of Water with Meds    Labs and Imaging Studies     CBC:   Recent Labs     08/15/22  1329 08/15/22  1813 08/16/22  0003 08/16/22  0453   WBC 17.6*  --  38.2* 45.0*   HGB 8.5* 8.6* 9.2* 9.4*   HCT 26.8* 26.8* 29.6* 30.0*   MCV 91.5  --  95.2 94.3     --  182 189       BMP:    Recent Labs     08/15/22  0458 08/15/22  1329 08/16/22  0441    136 133   K 3.8 3.7 4.1    100 100   CO2 22 21* 24   BUN 24* 26* 27*   CREATININE 2.5* 2.8* 3.1*   GLUCOSE 64* 82 73*       LIVER PROFILE:   Recent Labs     08/13/22  1600 08/15/22  1329   AST 12 13   ALT 9 7   BILITOT 0.6 1.0   ALKPHOS 107* 85         PT/INR:   Recent Labs     08/14/22  0426 08/15/22  0458   PROTIME 11.9 13.5*   INR 1.1 1.2       APTT:   Recent Labs     08/14/22 0426   APTT 30.2       Fasting Lipid Panel:    Lab Results   Component Value Date/Time    CHOL 87 08/01/2022 03:36 AM    TRIG 75 08/01/2022 03:36 AM    HDL 42 08/01/2022 03:36 AM       Cardiac Enzymes:    Lab Results   Component Value Date    CKTOTAL 101 08/11/2022    CKTOTAL 28 07/14/2022    CKTOTAL 31 07/14/2022    TROPONINI 0.01 02/25/2021    TROPONINI 0.01 01/21/2021    TROPONINI 0.07 (H) 01/25/2020       Notable Cultures:      Blood cultures   Blood Culture, Routine   Date Value Ref Range Status   08/10/2022 5 Days no growth  Final     Respiratory cultures No results found for: RESPCULTURE No results found for: LABGRAM  Urine   Urine Culture, Routine   Date Value Ref Range Status   08/08/2022 <10,000 CFU/mL  Mixed gram positive organisms   (A)  Final   08/08/2022 >100,000 CFU/ml  Final     Legionella No results found for: LABLEGI  C Diff PCR No results found for: CDIFPCR  Wound culture/abscess: No results for input(s): WNDABS in the last 72 hours. Tip culture:No results for input(s): CXCATHTIP in the last 72 hours. Oxygen: Additional Respiratory Assessments  Heart Rate: (!) 117  Resp: 28  SpO2: 91 %       Urinary Catheter-Output (mL): 5 mL    Imaging Studies:  XR ABDOMEN (KUB) (SINGLE AP VIEW)   Final Result   No evidence of bowel obstruction or other acute process. XR CHEST PORTABLE   Final Result   Bibasilar consolidation and/or collapse. Probable effusions. XR ABDOMEN (KUB) (SINGLE AP VIEW)   Final Result   Gaseous small intestinal distension and thickening of the small bowel folds   in the right lower quadrant. CT ABDOMEN PELVIS WO CONTRAST Additional Contrast? None   Final Result   Enlarged liver demonstrating periportal edema. Redemonstration of a low-attenuation lesion in the pancreas. Dilated stomach and gaseous proximal small intestinal dilatation. Abnormal thickening of the wall of the distal small bowel. Large ventral hernia containing the distal stomach and segments of small   bowel. Generalized soft tissue edematous changes consistent with anasarca and mild   to moderate ascites. Multiple additional findings as described. XR CHEST PORTABLE   Final Result   Interval placement of left internal jugular central venous catheter   terminating at the cavoatrial junction. No postprocedure pneumothorax         US RETROPERITONEAL COMPLETE   Final Result   1. Unremarkable sonographic evaluation of the right kidney. 2. Left renal cysts. 3. The urinary bladder is not visualized secondary to obscuring bowel gas. XR CHEST PORTABLE   Final Result   No acute process.          CT CHEST WO CONTRAST    (Results Pending)   CT ABDOMEN PELVIS WO CONTRAST Additional Contrast? Radiologist Recommendation    (Results Pending)        Resident's Assessment and Plan       Assessment and Plan:    Shock, most likely septic 2/2 ? Source ? C. Diff vs HAP vs ? abdominal abscess  WBC uptrending to 45, patient continues to be afebrile  Pro-Ayaz 25, uptrending pressor requirement  NICOM 35.  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 Miguel queen  On HD, started on 8/11/2022, creatinine 3.1 today   For HD today per Nephro  Monitor BMP q12h     Acute hypoxic respiratory insufficiency 2/2 ? Pulmonary edema vs COPD exacerbation  Currently on 6 L oxygen by nasal cannula, SPO2 96%  Aggressive pulmonary hygiene, incentive spirometry every 2 hours while awake  Mucolytic and breathing treatment with DuoNeb, Brovana and Pulmicort ordered. Monitor ABG and chest x-ray PRN    Coagulopathy most likely 2/2 ? DIC-resolved  TEG has normalized, HD line stopped oozing, Close monitoring for overt bleeding  INR 1.2 (8/15)     Acute on chronic normocytic normochromic anemia 2/2 hemorrhagic gastritis  S/p EGD 8/12 -suggestive of hemorrhagic gastritis  Continue PPI BID and carafate     Abdominal pain 2/2 ileus in setting of multiple abdominal surgery and ileostomy for ischemic bowel disease ? Toxic megacolon  Patient has history of hemorrhagic gastritis s/p EGD (8/12) with no overt bleed. Ileostomy output 75 cc  Patient complaining of abdominal pain. PEG tube put to gravity drainage. 500 cc cc bilious secretion drained. General surgery following  Continue n.p.o., continue to hold tube feeds, Lomotil and loperamide  [email or web address] cc/h  Follow stat CT abdomen pelvis results. Continue to monitor for dehydration.   Monitor ileostomy output     Secondary hyperparathyroidism of renal origin-  , P5.2, vitamin D 19  On vitamin D supplementation     Urine Candidia infection- s/p diflucan  Hypoglycemia 2/2 being Npo- BG q 4 Hourly and D5 NS @ 50 cc/hr   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 Hx Short Gut syndrome  Hx HTN  Hx HLD LDL 30 on 8/1/2022 on Pravachol 40  Hx CAD s/p stent placement in 2002  Hx COPD  Prior concern for PE was on Eliquis: Blood thinners currently on hold    Resolved issues:  HAGMA 2/2 lactic acidosis/uremia in setting of ileostomy  Hyperkalemia 2/2 KD/CKD-Lokelma stopped  Hypervolemic hypotonic hyponatremia 2/2 KD  Lactic acidosis likely type A/D sepsis versus short gut syndrome  Coagulopathy 2/2 hemorrhagic gastritis /HD catheter bleed, INR - 5- resolved    # Peptic ulcer prophylaxis: protonix 40mg BID  # DVT Prophylaxis: PCDs  # Disposition: Cont current care    Ralph Sears MD, PGY-2    Attending Physician: Dr. Garrett Zuleta  Department of Pulmonary, Critical Care and Sleep Medicine  [address] [facility]  Department of Internal Medicine      During multidisciplinary team rounds Katie Nguyen is a [de-identified] y.o. female was seen, examined and discussed. This is confirmation that I have personally seen and examined the patient and that the key elements of the encounter were performed by me (> 85 % time). The medications & laboratory data was discussed and adjusted where necessary. The radiographic images were reviewed or with radiologist or consultant if felt dis-concordant with the exam or history. The above findings were corroborated, plans confirmed and changes made if needed. Family is updated at the bedside as available. Key issues of the case were discussed among consultants. Critical Care time is documented if appropriate.       Patient evaluated multiple times throughout the day  CT abdomen reviewed  Appreciate surgery recommendations  Significant protein calorie malnutrition  Overall prognosis poor    Critical Care time: 44 minutes

## 2022-08-16 NOTE — CARE COORDINATION
8/16:  Update CM Note:  Pt presented to the ER for hyperkalemia from [facility].  high K 6.8/ Mg1.5 & Na 127. RRT on 8/10 for hypotension & persistent hyperkalemia. Pt was transferred to MICU. Pt is on 5L/NC at 99%, Iv Fluids, Iv Levophed, Iv Zosyn & Iv Protonix. Pt's Hgb 6.6 on 8/11 GS following for EGD today. Pt has a ileostomy. Pt had a temp HD line placed on 8/11. Pt is for UF today. Pt is on 6L/Nc at 94%, SPA 25%, Iv Maxipime, Iv Flagyl, Iv Solu-cortef, Iv fluids, Iv Thiamine & Iv Levophed. Id consult today for leucocytosis. ISO-Contact for C Diff. Per notes pt may need EGD when lytes stable. Cm met with pt bedside today to discuss dc planning. Pt's dc plan is to return to [facility] but may need high level of care. CM discussed [facility] & [facility]. Pt is open to [facility] & will discuss with her . She would prefer [facility]. CM sent referral to [facility]. CM did update  on the information he will discuss with his wife as well. If doesn't met the criteria she will just return to [facility]. [facility] can return if bed available. Pt will not need pre-cert. Will need negative Covid. SHELLI update. Envelope in soft chart. Sw/CM will continue to follow for dc planning. Electronically signed by Lovena Holter, RN on 8/16/2022 at 11:55 AM      The Plan for Transition of Care is related to the following treatment goals: SNF/LTAC    The Patient and/or patient representative  was provided with a choice of provider and agrees   with the discharge plan. [x] Yes [] No    Freedom of choice list was provided with basic dialogue that supports the patient's individualized plan of care/goals, treatment preferences and shares the quality data associated with the providers.  [x] Yes [] No

## 2022-08-16 NOTE — PROGRESS NOTES
Progress Note  8/16/2022 10:33 AM  Subjective:   Admit Date: 8/8/2022  PCP: Jaz Kaiser,   Interval History: patient examined doing much the same remains on pressors , urine output too low     Diet: Diet NPO Exceptions are: Sips of Water with Meds    Data:   Scheduled Meds:   hydrocortisone sodium succinate PF  100 mg IntraVENous Q8H    metroNIDAZOLE  500 mg IntraVENous Q8H    cefepime  2,000 mg IntraVENous Q12H    vancomycin  500 mg Oral 4 times per day    albumin human  25 g IntraVENous Q8H    ipratropium-albuterol  1 ampule Inhalation Q4H WA    guaiFENesin-dextromethorphan  5 mL Oral BID    Arformoterol Tartrate  15 mcg Nebulization BID    budesonide  0.5 mg Nebulization BID    thiamine  100 mg IntraVENous Daily    epoetin ludin-epbx  30 Units/kg SubCUTAneous Once per day on Mon Wed Fri    lidocaine  1 patch TransDERmal Daily    lidocaine  1 patch TransDERmal Daily    pantoprazole (PROTONIX) 40 mg injection  40 mg IntraVENous Q12H    sucralfate  1 g Oral 4 times per day    calcium carbonate  500 mg Oral Nightly    vitamin D  2,000 Units Oral BID    [Held by provider] diphenoxylate-atropine  1 tablet Oral 4x Daily AC & HS    escitalopram  20 mg Oral Nightly    ferrous sulfate  325 mg Oral Nightly    fluticasone  1 spray Each Nostril Nightly    [Held by provider] lipase-protease-amylase  24,000 Units Oral TID WC    [Held by provider] loperamide  4 mg Oral TID WC    magnesium oxide  400 mg Oral BID    ocuvite-lutein  1 capsule Oral BID    pravastatin  40 mg Oral Nightly    primidone  250 mg Oral Nightly    traZODone  50 mg Oral Nightly    vitamin B-12  500 mcg Oral Nightly     Continuous Infusions:   dextrose 5 % and 0.9 % NaCl 50 mL/hr at 08/16/22 0728    sodium chloride 10 mL/hr at 08/16/22 0728    norepinephrine 18 mcg/min (08/16/22 0728)    dextrose       PRN Meds:iohexol, aluminum & magnesium hydroxide-simethicone, sodium chloride, dextrose bolus **OR** [DISCONTINUED] dextrose bolus, dextrose, glucose, glucagon (rDNA), hyoscyamine, [Held by provider] lipase-protease-amylase, senna, oxyCODONE-acetaminophen, [DISCONTINUED] dextrose bolus **OR** dextrose bolus  I/O last 3 completed shifts: In: 1259.6 [I.V.:1065.3; IV Piggyback:194.3]  Out: 1370 [Urine:685; Emesis/NG output:570; Stool:115]  I/O this shift:  In: 2801.5 [P.O.:480; I.V.:1821.1; IV Piggyback:500.4]  Out: 435 [Urine:5; Stool:430]    Intake/Output Summary (Last 24 hours) at 8/16/2022 1033  Last data filed at 8/16/2022 0900  Gross per 24 hour   Intake 3279.49 ml   Output 1470 ml   Net 1809.49 ml     CBC:   Recent Labs     08/15/22  1329 08/15/22  1813 08/16/22  0003 08/16/22  0453   WBC 17.6*  --  38.2* 45.0*   HGB 8.5* 8.6* 9.2* 9.4*     --  182 189     BMP:    Recent Labs     08/15/22  0458 08/15/22  1329 08/16/22  0441    136 133   K 3.8 3.7 4.1    100 100   CO2 22 21* 24   BUN 24* 26* 27*   CREATININE 2.5* 2.8* 3.1*   GLUCOSE 64* 82 73*     Hepatic:   Recent Labs     08/13/22  1600 08/15/22  1329   AST 12 13   ALT 9 7   BILITOT 0.6 1.0   ALKPHOS 107* 85     Troponin: No results for input(s): TROPONINI in the last 72 hours. BNP: No results for input(s): BNP in the last 72 hours. Lipids: No results for input(s): CHOL, HDL in the last 72 hours. Invalid input(s): LDLCALCU  ABGs: No results found for: PHART, PO2ART, PNS6YWI  INR:   Recent Labs     08/14/22  0426 08/15/22  0458   INR 1.1 1.2       -----------------------------------------------------------------  RAD: XR CHEST PORTABLE    Result Date: 8/8/2022  EXAMINATION: ONE XRAY VIEW OF THE CHEST 8/8/2022 11:07 am COMPARISON: 07/24/2022 HISTORY: ORDERING SYSTEM PROVIDED HISTORY: hyperkalemia, chest tightness TECHNOLOGIST PROVIDED HISTORY: Reason for exam:->hyperkalemia, chest tightness What reading provider will be dictating this exam?->CRC FINDINGS: The lungs are without acute focal process. There is no effusion or pneumothorax.  The cardiomediastinal silhouette is without acute process. The osseous structures are without acute process. No acute process. Objective:   Vitals: BP (!) 113/58   Pulse (!) 117   Temp 99 °F (37.2 °C) (Axillary)   Resp 28   Ht 5' (1.524 m)   Wt 119 lb 0.8 oz (54 kg)   SpO2 92%   BMI 23.25 kg/m²   General appearance: appears stated age   Skin:  No rashes or lesions  HEENT: Head: Normocephalic, no lesions, without obvious abnormality.   Neck: no adenopathy, no carotid bruit, no JVD, supple, symmetrical, trachea midline, and thyroid not enlarged, symmetric, no tenderness/mass/nodules  Lungs: diminished breath sounds bibasilar  Heart: regular rate and rhythm, S1, S2 normal, no murmur, click, rub or gallop  Abdomen:  distended   Extremities: edema trace  Neurologic: Mental status: Alert, oriented, thought content appropriate    Assessment:   Patient Active Problem List:     Right cataract     Essential hypertension, benign     Hyperlipidemia with target LDL less than 100     Osteoarthritis, shoulder     Primary osteoarthritis of left knee     History of ischemic bowel disease     Ileostomy present ([facility])     Chronic kidney disease, stage III (moderate) ([facility])     COPD (chronic obstructive pulmonary disease) ([facility])     Moderate protein-calorie malnutrition ([facility])     Abdominal wall cellulitis     Cellulitis     Infected hernioplasty mesh ([facility])     Enterocutaneous fistula     Mild protein-calorie malnutrition ([facility])     Acute kidney injury ([address]     Hypertensive kidney disease with chronic kidney disease stage IV ([facility])     KD (acute kidney injury) ([address]     Diarrhea     Elevated serum creatinine     Abnormal serum creatinine level     Hyperlipemia     Primary hypertension     Essential (primary) hypertension     Postsurgical malabsorption, not elsewhere classified     Hypercholesterolemia     Pure hypercholesterolemia     Prediabetes     Hypothyroidism, unspecified     Acute renal failure (ARF) ([facility])     Enteritis     Hypotension     Abnormal CT scan, lumbar spine     Severe protein-calorie malnutrition ([facility])     Hyperkalemia    Plan:     Assessment and Plans:     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 HTN with CKD I-IV  BP goal <120/80  BP at/near goal  Monitor BPs  On Levo      3. Met Acidosis better   in the setting of the KD/ CKD   Also with ileostomy  Monitor labs       4. Secondary hyperparathyroidism of Renal Origin  with Hyperphosphatemia and Unspecified Vit D Def   on 7/15;  PO4 5.2 on 8/9,  Vit D 19 on 8/1  On vitamin D supplementation  Monitor labs  Ca low check ionized and replace prn     5. Hypomagnesemia/Hypocalcemia  Replace prn  On vit d     6. Hyperkalemia  likely due to diet an KD/CKD  high despite numerous rounds of hyper k protocol and hco3 drip  Low potassium diet when able to eat  Cortisol adequate at 90  One hd on 8/11  K low this am, due to GI loss from high ostomy output     7.  anemia  Gi bleed  H/o ischemic colitis and ostomy  S/p PRBCs  Transfuse for hemoglobin<7  On ROSA  General surgery following-possible EGD when lytes stable     8. Shock  ?  Septic shock/hypovolemia/?acute abd  Treat yeast in urine  On diflucan an zosyn  Requiring low dose pressors  Had ct of abd pelvis  Lactate 7>0.7     Patient remains oliguric , overnight u/o was , 20 cc , will continue dialytic support   With UF as tolerated , progressive leucocytosis noted      D/w Resident    Xuan Castillo MD

## 2022-08-16 NOTE — PROGRESS NOTES
GENERAL SURGERY  DAILY PROGRESS NOTE  8/16/2022    CHIEF COMPLAINT:  Chief Complaint   Patient presents with    Other     Sent from NH for K+ of 6.8       SUBJECTIVE:  Pt reports abdominal pain has mostly resolved. Levo at 16. OBJECTIVE:  BP (!) 103/37   Pulse (!) 108   Temp 98.2 °F (36.8 °C) (Temporal)   Resp 24   Ht 5' (1.524 m)   Wt 119 lb 0.8 oz (54 kg)   SpO2 94%   BMI 23.25 kg/m²     GENERAL:  NAD. A&Ox3. LUNGS:  No increased work of breathing. CARDIOVASCULAR: RR  ABDOMEN:  Soft, moderately distended but improved from yesterday, still diffusely tender to palpation. No guarding, rigidity, rebound. Ostomy site pink. ASSESSMENT/PLAN:  [de-identified] y.o. female with KD and hemorrhagic gastritis. PEG to gravity  NPO  Hold TF's   With patient's hx of mesenteric ischemia, recommend no TF's while patient is on vasopressors    Will discuss w Dr. Fidelia Leal DO  Surgery Resident PGY-1  8/16/2022  6:23 AM     ADDENDUM:    Patient s/e. Increasing leukocytosis, WBC 45, Procal 25.65. increasing Levophed requirements. All consistent with septic picture. PEG to gravity with approx 500ml out yesterday    A/P: Sepsis, hemorrhagic gastritis, KD on CKD; hx of mesenteric ischemia  - being treated for UTI as possible source for sepsis per critical care team.  - Abdomen is slightly less distended than yesterday. Continue PEG to gravity. Monitor PEG and ostomy output  - continue to hold tube feeds while on vasopressors  - overall seems to be poor prognosis. - No plans for acute surgical interventions. She has a \"hostile abdomen\" from her extensive surgical history and limited remaining small bowel. As such do not recommend surgical exploration if her condition deteriorates further.     Cecily Neal,  PGY4  Surgery Resident  8/16/2022 6:58 AM    Seen/examined  Agree with above  Prognosis remains poor  Significant ascites on CT- can drain fluid and send for culture  No plans for intervention, even if there is evidence of an intra-abdominal catastrophe  JSGadyMD

## 2022-08-16 NOTE — CONSULTS
Department of Internal Medicine  Infectious Diseases   Consult Note      Reason for Consult: Septic shock       Requesting Physician: Dr Diehl Dress:               This is an [de-identified] yrs old female s/p colectomy ( for ischemic colitis ) , has ileostomy , HTN, CAD , E coli bacteremia ( 7/2022) was admitted to [facility] with hyperkalemia ( on 8/9/2022)  . Pt became hypotensive on the floor , RRT called and transferred to MICU ( 8/10) . She was empirically started on IV zosyn . Urine cx grew candida - treated with diflucan . on levophed for hypotension. She developed leukocytosis of 45 K ,lactic acid 2.0   Procalcitonin 25.65   Stool out put slowing down today, pt reported abdomen pain .   Pt denied fever or chills   CT scan of abdomen / pelvis - large volume ascites       Past Medical History:      Past Medical History:   Diagnosis Date    Acute kidney failure ([facility] [address] 2014 and 8/2016    x2,no dialysis needed,resolved, kidney function tests returned to normal    Anxiety     Arthritis     CAD (coronary artery disease)     follows with Dr. Francisco Mccarthy every 6 months    Cancer [facility])     skin, leg,nose- removed surgically     COPD (chronic obstructive pulmonary disease) ([facility] [address]     mild- no inhlaers, no oxygen     Depression     Fibromyalgia     chronic back and neck pain    Generalized headaches     h/o / daily    History of blood transfusion 3-11-14    multiple times    History of necrotic bowel 2012    Hyperlipidemia     Hypertension     Incisional hernia     abdomen    Insomnia     Mitral valve regurgitation     Myocardial infarct ([address] 2002    Occasional tremors     at hands / stable with medication    Osteopenia     PONV (postoperative nausea and vomiting)     Vitamin B12 deficiency     h/o         Past Surgical History:      Past Surgical History:   Procedure Laterality Date    ABDOMEN SURGERY  2-    total colectomy, bowel resection, ileostomy,revision of ileostomy     ABDOMEN SURGERY N/A plate / screws  / right great toe    PICC LINE INSERTION NURSE  9/15/2020         PORT SURGERY N/A 7/19/2022    MEDIPORT REMOVAL performed by Sherri Johnson MD at [address] [facility]  2010    right     SKIN BIOPSY  2010    3 squamous cell carcinoma right leg, left leg    UPPER GASTROINTESTINAL ENDOSCOPY N/A 8/12/2022    ESOPHAGOGASTRODUODENOSCOPY performed by Sherri Johnson MD at [facility]       Current Medications:      Current Facility-Administered Medications   Medication Dose Route Frequency Provider Last Rate Last Admin    dextrose 5 % and 0.9 % sodium chloride infusion   IntraVENous Continuous Jacques Montoya MD 50 mL/hr at 08/16/22 0728 Rate Verify at 08/16/22 0728    iohexol (OMNIPAQUE 240) injection 50 mL  50 mL Oral ONCE PRN Aleisha Richard MD        hydrocortisone sodium succinate PF (SOLU-CORTEF) injection 100 mg  100 mg IntraVENous Q8H Jacques Montoya MD   100 mg at 08/16/22 0841    metronidazole (FLAGYL) 500 mg in 0.9% NaCl 100 mL IVPB premix  500 mg IntraVENous Q8H Fred Salmon MD        cefepime (MAXIPIME) 2000 mg IVPB minibag  2,000 mg IntraVENous Q12H Fred Salmon MD        vancomycin (VANCOCIN) oral solution 500 mg  500 mg Oral 4 times per day Fred Salmon MD        albumin human 25 % IV solution 25 g  25 g IntraVENous Q8H Jacques Montoya  mL/hr at 08/16/22 1054 25 g at 08/16/22 1054    aluminum & magnesium hydroxide-simethicone (MAALOX) 200-200-20 MG/5ML suspension 30 mL  30 mL Oral Q6H PRN Jacques Montoya MD        ipratropium-albuterol (DUONEB) nebulizer solution 1 ampule  1 ampule Inhalation Q4H WA Jacques Montoya MD   1 ampule at 08/16/22 0831    guaiFENesin-dextromethorphan (ROBITUSSIN DM) 100-10 MG/5ML syrup 5 mL  5 mL Oral BID Jacques Montoya MD   5 mL at 08/16/22 0841    Arformoterol Tartrate (BROVANA) nebulizer solution 15 mcg  15 mcg Nebulization BID Jacques Montoya MD   15 mcg at 08/16/22 0831    budesonide (PULMICORT) nebulizer suspension 500 mcg  0.5 mg Nebulization BID Aekta Merrill Hooker MD   500 mcg at 08/16/22 0831    0.9 % sodium chloride infusion   IntraVENous PRN Syeda Frank MD 10 mL/hr at 08/16/22 0728 Rate Verify at 08/16/22 0728    thiamine (B-1) injection 100 mg  100 mg IntraVENous Daily Syeda Frank MD   100 mg at 08/16/22 0844    epoetin ludin-epbx (RETACRIT) injection 1,600 Units  30 Units/kg SubCUTAneous Once per day on Mon Wed Fri Marita Jennings MD   1,600 Units at 08/15/22 0857    lidocaine 4 % external patch 1 patch  1 patch TransDERmal Daily Porter Self MD   1 patch at 08/16/22 0850    norepinephrine (LEVOPHED) 16 mg in dextrose 5% 250 mL infusion  1-100 mcg/min IntraVENous Continuous Brandon Carrero MD 15 mL/hr at 08/16/22 1000 16 mcg/min at 08/16/22 1000    lidocaine 4 % external patch 1 patch  1 patch TransDERmal Daily Celina Flroes MD   1 patch at 08/16/22 0850    dextrose bolus 10% 125 mL  125 mL IntraVENous PRN Brandon Carrero MD   Stopped at 08/16/22 0734    dextrose 10 % infusion   IntraVENous Continuous PRN Brandon Carrero MD        pantoprazole (PROTONIX) 40 mg in sodium chloride (PF) 10 mL injection  40 mg IntraVENous Q12H Citlalyzuri lAatorre DO   40 mg at 08/16/22 0615    sucralfate (CARAFATE) tablet 1 g  1 g Oral 4 times per day Jeremy Fine, DO   1 g at 08/15/22 1708    glucose chewable tablet 16 g  4 tablet Oral PRN Yanely Motta MD        glucagon (rDNA) injection 1 mg  1 mg SubCUTAneous PRN Yanely Motta MD        calcium carbonate (TUMS) chewable tablet 500 mg  500 mg Oral Nightly Yanely Motta MD   500 mg at 08/11/22 2036    vitamin D (CHOLECALCIFEROL) tablet 2,000 Units  2,000 Units Oral BID Yanely Motta MD   2,000 Units at 08/16/22 0845    [Held by provider] diphenoxylate-atropine (LOMOTIL) 2.5-0.025 MG per tablet 1 tablet  1 tablet Oral 4x Daily AC & HS Yanely Motta MD   1 tablet at 08/15/22 1039    escitalopram (LEXAPRO) tablet 20 mg  20 mg Oral Nightly Yanely Motta MD   20 mg at 08/14/22 2050    ferrous sulfate (IRON 325) tablet 325 mg  325 mg Oral Nightly Mariann Pyle MD   325 mg at 08/14/22 2050    fluticasone (FLONASE) 50 MCG/ACT nasal spray 1 spray  1 spray Each Nostril Nightly Mariann Pyle MD   1 spray at 08/15/22 2000    hyoscyamine (ANASPAZ;LEVSIN) tablet 125 mcg  125 mcg Oral 4x Daily PRN Mariann Pyle MD   125 mcg at 08/14/22 2050    [Held by provider] lipase-protease-amylase (CREON) delayed release capsule 24,000 Units  24,000 Units Oral TID  Mariann Pyle MD   24,000 Units at 08/15/22 1217    [Held by provider] lipase-protease-amylase (CREON) delayed release capsule 12,000 Units  12,000 Units Oral With Snacks Mariann Pyle MD        [facility] by provider] loperamide (IMODIUM) capsule 4 mg  4 mg Oral TID  Mariann Pyle MD   4 mg at 08/15/22 0849    magnesium oxide (MAG-OX) tablet 400 mg  400 mg Oral BID Mariann Pyle MD   400 mg at 08/16/22 2218    ocuvite-lutein multivitamin 1 capsule  1 capsule Oral BID Mariann Pyle MD   1 capsule at 08/16/22 0844    pravastatin (PRAVACHOL) tablet 40 mg  40 mg Oral Nightly Mariann Pyle MD   40 mg at 08/14/22 2050    primidone (MYSOLINE) tablet 250 mg  250 mg Oral Nightly Mariann Pyle MD   250 mg at 08/14/22 2051    senna (SENOKOT) tablet 8.6 mg  1 tablet Oral Daily PRN Mariann Pyle MD        traZODone (DESYREL) tablet 50 mg  50 mg Oral Nightly Mariann Pyle MD   50 mg at 08/14/22 2050    vitamin B-12 (CYANOCOBALAMIN) tablet 500 mcg  500 mcg Oral Nightly Mariann Pyle MD   500 mcg at 08/14/22 2050    oxyCODONE-acetaminophen (PERCOCET) 5-325 MG per tablet 1 tablet  1 tablet Oral Q6H PRN Arvind Lux MD   1 tablet at 08/15/22 0847    dextrose bolus 10% 250 mL  250 mL IntraVENous PRN Manju Byrne MD           Allergies:  Fentanyl, Levofloxacin, Tramadol, and Vioxx [rofecoxib]    Social History:      Social History Socioeconomic History    Marital status:      Spouse name: Not on file    Number of children: Not on file    Years of education: Not on file    Highest education level: Not on file   Occupational History    Not on file   Tobacco Use    Smoking status: Every Day     Packs/day: 1.00     Types: Cigarettes    Smokeless tobacco: Never   Vaping Use    Vaping Use: Never used   Substance and Sexual Activity    Alcohol use: Not Currently     Comment: occasional    Drug use: No    Sexual activity: Not on file   Other Topics Concern    Not on file   Social History Narrative    Not on file     Social Determinants of Health     Financial Resource Strain: Not on file   Food Insecurity: Not on file   Transportation Needs: Not on file   Physical Activity: Not on file   Stress: Not on file   Social Connections: Not on file   Intimate Partner Violence: Not on file   Housing Stability: Not on file         Family History:     Not pertinent to present illness       REVIEW OF SYSTEMS:    CONSTITUTIONAL:  Denies fever, chill or rigors. HEENT: denies blurring of vision or double vision, denies hearing problem  RESPIRATORY: SOB   CARDIOVASCULAR:  Denies palpitation  GASTROINTESTINAL:  Abdomen pain . GENITOURINARY:  Denies burning urination or frequency of urination  INTEGUMENT: denies wound , rash  HEMATOLOGIC/LYMPHATIC:  was anemic, received 6 PRBC   MUSCULOSKELETAL:  Denies leg pain , joint pain , joint swelling  NEUROLOGICAL:  weakness       PHYSICAL EXAM:      Vitals:   BP (!) 120/42   Pulse (!) 111   Temp 97.5 °F (36.4 °C)   Resp 18   Ht 5' (1.524 m)   Wt 125 lb 7.1 oz (56.9 kg)   SpO2 92%   BMI 24.50 kg/m²     General Appearance:    Awake, alert , no acute distress. Head:    Normocephalic, atraumatic   Eyes:    No pallor, no icterus,   Ears:    No obvious deformity or drainage.    Nose:   No nasal drainage   Throat:   Mucosa dry    Neck:   Supple, no lymphadenopathy   Lungs:     Clear to auscultation bilaterally, no wheeze    Heart:    Tachycardic, systolic murmur    Abdomen:     Soft,  +  tender, bowel sounds present , mild distention    Extremities:    + edema    Pulses:   Dorsalis pedis palpable    Skin:   no rashes or lesions         CBC with Differential:      Lab Results   Component Value Date/Time    WBC 45.0 08/16/2022 04:53 AM    RBC 3.18 08/16/2022 04:53 AM    HGB 9.4 08/16/2022 04:53 AM    HCT 30.0 08/16/2022 04:53 AM     08/16/2022 04:53 AM    MCV 94.3 08/16/2022 04:53 AM    MCH 29.6 08/16/2022 04:53 AM    MCHC 31.3 08/16/2022 04:53 AM    RDW 18.6 08/16/2022 04:53 AM    NRBC 0.0 07/21/2022 06:18 AM    BLASTSPCT 0.9 08/15/2022 01:29 PM    METASPCT 4.4 08/16/2022 12:03 AM    LYMPHOPCT 3.5 08/16/2022 04:53 AM    MONOPCT 1.6 08/16/2022 04:53 AM    MYELOPCT 0.9 07/24/2022 09:43 AM    BASOPCT 0.0 08/16/2022 04:53 AM    MONOSABS 0.73 08/16/2022 04:53 AM    LYMPHSABS 1.58 08/16/2022 04:53 AM    EOSABS 0.01 08/16/2022 04:53 AM    BASOSABS 0.02 08/16/2022 04:53 AM       CMP     Lab Results   Component Value Date/Time     08/16/2022 04:41 AM    K 4.1 08/16/2022 04:41 AM    K 3.8 08/12/2022 03:10 AM     08/16/2022 04:41 AM    CO2 24 08/16/2022 04:41 AM    BUN 27 08/16/2022 04:41 AM    CREATININE 3.1 08/16/2022 04:41 AM    GFRAA 17 08/16/2022 04:41 AM    LABGLOM 14 08/16/2022 04:41 AM    GLUCOSE 73 08/16/2022 04:41 AM    PROT 4.6 08/15/2022 01:29 PM    LABALBU 2.6 08/15/2022 01:29 PM    CALCIUM 8.1 08/16/2022 04:41 AM    BILITOT 1.0 08/15/2022 01:29 PM    ALKPHOS 85 08/15/2022 01:29 PM    AST 13 08/15/2022 01:29 PM    ALT 7 08/15/2022 01:29 PM         Hepatic Function Panel:    Lab Results   Component Value Date/Time    ALKPHOS 85 08/15/2022 01:29 PM    ALT 7 08/15/2022 01:29 PM    AST 13 08/15/2022 01:29 PM    PROT 4.6 08/15/2022 01:29 PM    BILITOT 1.0 08/15/2022 01:29 PM    BILIDIR <0.2 01/21/2021 11:55 AM    IBILI see below 01/21/2021 11:55 AM    LABALBU 2.6 08/15/2022 01:29 PM       PT/INR:    Lab approximately 6 x 11 cm. 4.  Moderate to large bilateral pleural effusions. Moderate subcutaneous   edema along the body wall compatible with 3rd spacing of fluid.        IMPRESSION:     Septic shock - likely intra abdomen source   Leukocytosis , elevated procalcitonin level sec to above   S/P colectomy and ileostomy         RECOMMENDATIONS:       Cefepime 2 grams IV q 12 hrs, Flagyl 500 mg IV q 8 hrs   Stool for C diff toxin, vancomycin oral pending stool test,   Fungitell assay       Thank you Dr NAVA [facility] for the consult

## 2022-08-16 NOTE — FLOWSHEET NOTE
08/16/22 1411   Vital Signs   BP (!) 131/49   Temp 97 °F (36.1 °C)   Heart Rate (!) 104   Resp 25   Weight 125 lb 7.1 oz (56.9 kg)   Weight Method Bed scale   Percent Weight Change 0   Post-Hemodialysis Assessment   Post-Treatment Procedures Blood returned;Catheter capped, clamped with Citrate x 2 ports   Machine Disinfection Process Acid/Vinegar Clean;Heat Disinfect; Exterior Machine Disinfection   Rinseback Volume (ml) 300 ml   Blood Volume Processed (Liters) 35.1 l/min   Dialyzer Clearance Lightly streaked   Duration of Treatment (minutes) 180 minutes   Heparin Amount Administered During Treatment (mL) 0 mL   Hemodialysis Intake (ml) 400 ml   Hemodialysis Output (ml) 400 ml   NET Removed (ml) 0   Tolerated Treatment Fair   Interventions Taken Medication   Patient Response to Treatment pt tolerated tx fair, unable to remove fluids due to hypotension and tachycardia, Dr. Issac Elliott aware   Physician Notified Yes   Time Off 1406   Patient Disposition Remain in ICU/ED   Tolerated  HD per orders,  unable to remove fluids due to hypotension and tachycardia, Dr. Issac Elliott aware. HD CVC flushed, citrate to dwell, clamped and capped  post tx per policy. Report to floor RN, remains in care of staff.

## 2022-08-17 ENCOUNTER — APPOINTMENT (OUTPATIENT)
Dept: GENERAL RADIOLOGY | Age: 82
DRG: 377 | End: 2022-08-17
Payer: MEDICARE

## 2022-08-17 PROBLEM — D72.829 LEUKOCYTOSIS: Status: ACTIVE | Noted: 2022-08-17

## 2022-08-17 PROBLEM — R57.9 SHOCK (HCC): Status: ACTIVE | Noted: 2022-08-17

## 2022-08-17 LAB
ACINETOBACTER CALCOAC BAUMANNII COMPLEX BY PCR: NOT DETECTED
ANION GAP SERPL CALCULATED.3IONS-SCNC: 8 MMOL/L (ref 7–16)
ANISOCYTOSIS: ABNORMAL
ANISOCYTOSIS: ABNORMAL
B.E.: -1.3 MMOL/L (ref -3–3)
B.E.: -1.6 MMOL/L (ref -3–3)
B.E.: -2.4 MMOL/L (ref -3–3)
BACTEROIDES FRAGILIS BY PCR: NOT DETECTED
BASOPHILIC STIPPLING: ABNORMAL
BASOPHILS ABSOLUTE: 0 E9/L (ref 0–0.2)
BASOPHILS ABSOLUTE: 0 E9/L (ref 0–0.2)
BASOPHILS RELATIVE PERCENT: 0.2 % (ref 0–2)
BASOPHILS RELATIVE PERCENT: 0.2 % (ref 0–2)
BOTTLE TYPE: ABNORMAL
BUN BLDV-MCNC: 20 MG/DL (ref 6–23)
BURR CELLS: ABNORMAL
BURR CELLS: ABNORMAL
C DIFF TOXIN/ANTIGEN: NORMAL
CALCIUM IONIZED: 1.25 MMOL/L (ref 1.15–1.33)
CALCIUM SERPL-MCNC: 8.2 MG/DL (ref 8.6–10.2)
CANDIDA ALBICANS BY PCR: NOT DETECTED
CANDIDA AURIS BY PCR: NOT DETECTED
CANDIDA GLABRATA BY PCR: NOT DETECTED
CANDIDA KRUSEI BY PCR: NOT DETECTED
CANDIDA PARAPSILOSIS BY PCR: NOT DETECTED
CANDIDA TROPICALIS BY PCR: NOT DETECTED
CHLORIDE BLD-SCNC: 102 MMOL/L (ref 98–107)
CO2: 25 MMOL/L (ref 22–29)
COHB: 0.1 % (ref 0–1.5)
COHB: 0.7 % (ref 0–1.5)
COHB: 0.8 % (ref 0–1.5)
COMMENT: ABNORMAL
CREAT SERPL-MCNC: 2.4 MG/DL (ref 0.5–1)
CRITICAL: ABNORMAL
CRYPTOCOCCUS NEOFORMANS/GATTII BY PCR: NOT DETECTED
DATE ANALYZED: ABNORMAL
DATE OF COLLECTION: ABNORMAL
DOHLE BODIES: ABNORMAL
ENTEROBACTER CLOACAE COMPLEX BY PCR: NOT DETECTED
ENTEROBACTERALES BY PCR: NOT DETECTED
ENTEROCOCCUS FAECALIS BY PCR: NOT DETECTED
ENTEROCOCCUS FAECIUM BY PCR: DETECTED
EOSINOPHILS ABSOLUTE: 0 E9/L (ref 0.05–0.5)
EOSINOPHILS ABSOLUTE: 0 E9/L (ref 0.05–0.5)
EOSINOPHILS RELATIVE PERCENT: 0 % (ref 0–6)
EOSINOPHILS RELATIVE PERCENT: 0 % (ref 0–6)
ESCHERICHIA COLI BY PCR: NOT DETECTED
FIBRINOGEN: 333 MG/DL (ref 200–400)
GFR AFRICAN AMERICAN: 23
GFR NON-AFRICAN AMERICAN: 19 ML/MIN/1.73
GLUCOSE BLD-MCNC: 151 MG/DL (ref 74–99)
HAEMOPHILUS INFLUENZAE BY PCR: NOT DETECTED
HCO3: 25 MMOL/L (ref 22–26)
HCO3: 25.3 MMOL/L (ref 22–26)
HCO3: 25.9 MMOL/L (ref 22–26)
HCT VFR BLD CALC: 23.5 % (ref 34–48)
HCT VFR BLD CALC: 23.6 % (ref 34–48)
HEMOGLOBIN: 7.2 G/DL (ref 11.5–15.5)
HEMOGLOBIN: 7.4 G/DL (ref 11.5–15.5)
HHB: 14 % (ref 0–5)
HHB: 16.4 % (ref 0–5)
HHB: 4.7 % (ref 0–5)
HYPOCHROMIA: ABNORMAL
INR BLD: 1.4
KLEBSIELLA AEROGENES BY PCR: NOT DETECTED
KLEBSIELLA OXYTOCA BY PCR: NOT DETECTED
KLEBSIELLA PNEUMONIAE GROUP BY PCR: NOT DETECTED
LAB: ABNORMAL
LACTIC ACID: 1.5 MMOL/L (ref 0.5–2.2)
LISTERIA MONOCYTOGENES BY PCR: NOT DETECTED
LYMPHOCYTES ABSOLUTE: 1.51 E9/L (ref 1.5–4)
LYMPHOCYTES ABSOLUTE: 1.69 E9/L (ref 1.5–4)
LYMPHOCYTES RELATIVE PERCENT: 5.2 % (ref 20–42)
LYMPHOCYTES RELATIVE PERCENT: 6.1 % (ref 20–42)
Lab: ABNORMAL
MAGNESIUM: 2.3 MG/DL (ref 1.6–2.6)
MCH RBC QN AUTO: 28.7 PG (ref 26–35)
MCH RBC QN AUTO: 28.8 PG (ref 26–35)
MCHC RBC AUTO-ENTMCNC: 30.6 % (ref 32–34.5)
MCHC RBC AUTO-ENTMCNC: 31.4 % (ref 32–34.5)
MCV RBC AUTO: 91.5 FL (ref 80–99.9)
MCV RBC AUTO: 94 FL (ref 80–99.9)
METAMYELOCYTES RELATIVE PERCENT: 0.9 % (ref 0–1)
METER GLUCOSE: 141 MG/DL (ref 74–99)
METER GLUCOSE: 142 MG/DL (ref 74–99)
METER GLUCOSE: 155 MG/DL (ref 74–99)
METHB: 0.4 % (ref 0–1.5)
METHB: 0.4 % (ref 0–1.5)
METHB: 0.5 % (ref 0–1.5)
MODE: ABNORMAL
MONOCYTES ABSOLUTE: 0 E9/L (ref 0.1–0.95)
MONOCYTES ABSOLUTE: 2.7 E9/L (ref 0.1–0.95)
MONOCYTES RELATIVE PERCENT: 2.6 % (ref 2–12)
MONOCYTES RELATIVE PERCENT: 7.8 % (ref 2–12)
MYELOCYTE PERCENT: 0.9 % (ref 0–0)
NEISSERIA MENINGITIDIS BY PCR: NOT DETECTED
NEUTROPHILS ABSOLUTE: 23.59 E9/L (ref 1.8–7.3)
NEUTROPHILS ABSOLUTE: 29.41 E9/L (ref 1.8–7.3)
NEUTROPHILS RELATIVE PERCENT: 85.2 % (ref 43–80)
NEUTROPHILS RELATIVE PERCENT: 93.9 % (ref 43–80)
O2 SATURATION: 74 % (ref 92–98.5)
O2 SATURATION: 77.8 % (ref 92–98.5)
O2 SATURATION: 93.3 % (ref 92–98.5)
O2HB: 83.1 % (ref 94–97)
O2HB: 84.8 % (ref 94–97)
O2HB: 94.1 % (ref 94–97)
OPERATOR ID: 1210
OPERATOR ID: 1893
OPERATOR ID: ABNORMAL
ORDER NUMBER: ABNORMAL
OVALOCYTES: ABNORMAL
OVALOCYTES: ABNORMAL
PATIENT TEMP: 37 C
PCO2: 50.4 MMHG (ref 35–45)
PCO2: 58.3 MMHG (ref 35–45)
PCO2: 59.8 MMHG (ref 35–45)
PDW BLD-RTO: 18.3 FL (ref 11.5–15)
PDW BLD-RTO: 18.4 FL (ref 11.5–15)
PEEP/CPAP: 6 CMH2O
PH BLOOD GAS: 7.25 (ref 7.35–7.45)
PH BLOOD GAS: 7.25 (ref 7.35–7.45)
PH BLOOD GAS: 7.31 (ref 7.35–7.45)
PHOSPHORUS: 3.7 MG/DL (ref 2.5–4.5)
PLATELET # BLD: 111 E9/L (ref 130–450)
PLATELET # BLD: 118 E9/L (ref 130–450)
PMV BLD AUTO: 10.5 FL (ref 7–12)
PMV BLD AUTO: 10.5 FL (ref 7–12)
PO2: 45.7 MMHG (ref 75–100)
PO2: 49.2 MMHG (ref 75–100)
PO2: 73.3 MMHG (ref 75–100)
POIKILOCYTES: ABNORMAL
POIKILOCYTES: ABNORMAL
POLYCHROMASIA: ABNORMAL
POLYCHROMASIA: ABNORMAL
POTASSIUM SERPL-SCNC: 3.8 MMOL/L (ref 3.5–5)
PROTEUS SPECIES BY PCR: NOT DETECTED
PROTHROMBIN TIME: 14.8 SEC (ref 9.3–12.4)
PS: 14 CMH20
PSEUDOMONAS AERUGINOSA BY PCR: NOT DETECTED
RBC # BLD: 2.5 E12/L (ref 3.5–5.5)
RBC # BLD: 2.58 E12/L (ref 3.5–5.5)
SALMONELLA SPECIES BY PCR: NOT DETECTED
SERRATIA MARCESCENS BY PCR: NOT DETECTED
SODIUM BLD-SCNC: 135 MMOL/L (ref 132–146)
SOURCE OF BLOOD CULTURE: ABNORMAL
SOURCE, BLOOD GAS: ABNORMAL
STAPHYLOCOCCUS AUREUS BY PCR: NOT DETECTED
STAPHYLOCOCCUS EPIDERMIDIS BY PCR: NOT DETECTED
STAPHYLOCOCCUS LUGDUNENSIS BY PCR: NOT DETECTED
STAPHYLOCOCCUS SPECIES BY PCR: NOT DETECTED
STENOTROPHOMONAS MALTOPHILIA BY PCR: NOT DETECTED
STREPTOCOCCUS AGALACTIAE BY PCR: NOT DETECTED
STREPTOCOCCUS PNEUMONIAE BY PCR: NOT DETECTED
STREPTOCOCCUS PYOGENES  BY PCR: NOT DETECTED
STREPTOCOCCUS SPECIES BY PCR: NOT DETECTED
THB: 10.7 G/DL (ref 11.5–16.5)
THB: 8.3 G/DL (ref 11.5–16.5)
THB: 8.7 G/DL (ref 11.5–16.5)
TIME ANALYZED: 1650
TIME ANALYZED: 534
TIME ANALYZED: 951
TOXIC GRANULATION: ABNORMAL
VANCOMYCIN RESISTANT BY PCR: DETECTED
WBC # BLD: 25.1 E9/L (ref 4.5–11.5)
WBC # BLD: 33.8 E9/L (ref 4.5–11.5)

## 2022-08-17 PROCEDURE — 94640 AIRWAY INHALATION TREATMENT: CPT

## 2022-08-17 PROCEDURE — 85025 COMPLETE CBC W/AUTO DIFF WBC: CPT

## 2022-08-17 PROCEDURE — 99291 CRITICAL CARE FIRST HOUR: CPT | Performed by: INTERNAL MEDICINE

## 2022-08-17 PROCEDURE — 2580000003 HC RX 258

## 2022-08-17 PROCEDURE — 2580000003 HC RX 258: Performed by: STUDENT IN AN ORGANIZED HEALTH CARE EDUCATION/TRAINING PROGRAM

## 2022-08-17 PROCEDURE — 6370000000 HC RX 637 (ALT 250 FOR IP): Performed by: FAMILY MEDICINE

## 2022-08-17 PROCEDURE — 6360000002 HC RX W HCPCS: Performed by: SURGERY

## 2022-08-17 PROCEDURE — 6360000002 HC RX W HCPCS: Performed by: STUDENT IN AN ORGANIZED HEALTH CARE EDUCATION/TRAINING PROGRAM

## 2022-08-17 PROCEDURE — 36415 COLL VENOUS BLD VENIPUNCTURE: CPT

## 2022-08-17 PROCEDURE — 6370000000 HC RX 637 (ALT 250 FOR IP): Performed by: STUDENT IN AN ORGANIZED HEALTH CARE EDUCATION/TRAINING PROGRAM

## 2022-08-17 PROCEDURE — 82330 ASSAY OF CALCIUM: CPT

## 2022-08-17 PROCEDURE — 83735 ASSAY OF MAGNESIUM: CPT

## 2022-08-17 PROCEDURE — 83605 ASSAY OF LACTIC ACID: CPT

## 2022-08-17 PROCEDURE — 71045 X-RAY EXAM CHEST 1 VIEW: CPT

## 2022-08-17 PROCEDURE — 80048 BASIC METABOLIC PNL TOTAL CA: CPT

## 2022-08-17 PROCEDURE — 85610 PROTHROMBIN TIME: CPT

## 2022-08-17 PROCEDURE — C9113 INJ PANTOPRAZOLE SODIUM, VIA: HCPCS | Performed by: SURGERY

## 2022-08-17 PROCEDURE — 6370000000 HC RX 637 (ALT 250 FOR IP): Performed by: SURGERY

## 2022-08-17 PROCEDURE — 82962 GLUCOSE BLOOD TEST: CPT

## 2022-08-17 PROCEDURE — A4216 STERILE WATER/SALINE, 10 ML: HCPCS | Performed by: SURGERY

## 2022-08-17 PROCEDURE — 99222 1ST HOSP IP/OBS MODERATE 55: CPT

## 2022-08-17 PROCEDURE — 84100 ASSAY OF PHOSPHORUS: CPT

## 2022-08-17 PROCEDURE — 6360000002 HC RX W HCPCS: Performed by: INTERNAL MEDICINE

## 2022-08-17 PROCEDURE — 2000000000 HC ICU R&B

## 2022-08-17 PROCEDURE — 2580000003 HC RX 258: Performed by: SURGERY

## 2022-08-17 PROCEDURE — 82805 BLOOD GASES W/O2 SATURATION: CPT

## 2022-08-17 PROCEDURE — 94660 CPAP INITIATION&MGMT: CPT

## 2022-08-17 PROCEDURE — 85384 FIBRINOGEN ACTIVITY: CPT

## 2022-08-17 PROCEDURE — A4216 STERILE WATER/SALINE, 10 ML: HCPCS | Performed by: INTERNAL MEDICINE

## 2022-08-17 PROCEDURE — 2500000003 HC RX 250 WO HCPCS: Performed by: INTERNAL MEDICINE

## 2022-08-17 PROCEDURE — 2580000003 HC RX 258: Performed by: INTERNAL MEDICINE

## 2022-08-17 RX ORDER — 0.9 % SODIUM CHLORIDE 0.9 %
500 INTRAVENOUS SOLUTION INTRAVENOUS ONCE
Status: COMPLETED | OUTPATIENT
Start: 2022-08-17 | End: 2022-08-17

## 2022-08-17 RX ORDER — 0.9 % SODIUM CHLORIDE 0.9 %
250 INTRAVENOUS SOLUTION INTRAVENOUS ONCE
Status: COMPLETED | OUTPATIENT
Start: 2022-08-17 | End: 2022-08-17

## 2022-08-17 RX ORDER — DEXTROSE AND SODIUM CHLORIDE 5; .9 G/100ML; G/100ML
INJECTION, SOLUTION INTRAVENOUS CONTINUOUS
Status: DISCONTINUED | OUTPATIENT
Start: 2022-08-17 | End: 2022-08-17

## 2022-08-17 RX ADMIN — SUCRALFATE 1 G: 1 TABLET ORAL at 18:25

## 2022-08-17 RX ADMIN — ARFORMOTEROL TARTRATE 15 MCG: 15 SOLUTION RESPIRATORY (INHALATION) at 09:43

## 2022-08-17 RX ADMIN — HYDROCORTISONE SODIUM SUCCINATE 100 MG: 100 INJECTION, POWDER, FOR SOLUTION INTRAMUSCULAR; INTRAVENOUS at 17:39

## 2022-08-17 RX ADMIN — GUAIFENESIN SYRUP AND DEXTROMETHORPHAN 5 ML: 100; 10 SYRUP ORAL at 21:27

## 2022-08-17 RX ADMIN — ESCITALOPRAM OXALATE 20 MG: 10 TABLET ORAL at 21:26

## 2022-08-17 RX ADMIN — BUDESONIDE 500 MCG: 0.5 SUSPENSION RESPIRATORY (INHALATION) at 09:43

## 2022-08-17 RX ADMIN — METRONIDAZOLE 500 MG: 500 INJECTION, SOLUTION INTRAVENOUS at 09:33

## 2022-08-17 RX ADMIN — DEXTROSE AND SODIUM CHLORIDE: 5; 900 INJECTION, SOLUTION INTRAVENOUS at 13:57

## 2022-08-17 RX ADMIN — CYANOCOBALAMIN TAB 1000 MCG 500 MCG: 1000 TAB at 21:26

## 2022-08-17 RX ADMIN — EPOETIN ALFA-EPBX 1600 UNITS: 2000 INJECTION, SOLUTION INTRAVENOUS; SUBCUTANEOUS at 09:19

## 2022-08-17 RX ADMIN — Medication 2000 UNITS: at 21:27

## 2022-08-17 RX ADMIN — HYDROCORTISONE SODIUM SUCCINATE 100 MG: 100 INJECTION, POWDER, FOR SOLUTION INTRAMUSCULAR; INTRAVENOUS at 09:19

## 2022-08-17 RX ADMIN — Medication 2000 UNITS: at 09:51

## 2022-08-17 RX ADMIN — SODIUM CHLORIDE 250 ML: 9 INJECTION, SOLUTION INTRAVENOUS at 11:00

## 2022-08-17 RX ADMIN — PHYTONADIONE 10 MG: 10 INJECTION, EMULSION INTRAMUSCULAR; INTRAVENOUS; SUBCUTANEOUS at 09:32

## 2022-08-17 RX ADMIN — CEFEPIME 2000 MG: 2 INJECTION, POWDER, FOR SOLUTION INTRAVENOUS at 14:38

## 2022-08-17 RX ADMIN — DAPTOMYCIN 450 MG: 500 INJECTION, POWDER, LYOPHILIZED, FOR SOLUTION INTRAVENOUS at 18:13

## 2022-08-17 RX ADMIN — SODIUM CHLORIDE 500 ML: 9 INJECTION, SOLUTION INTRAVENOUS at 22:10

## 2022-08-17 RX ADMIN — Medication 1 CAPSULE: at 21:26

## 2022-08-17 RX ADMIN — GUAIFENESIN SYRUP AND DEXTROMETHORPHAN 5 ML: 100; 10 SYRUP ORAL at 09:19

## 2022-08-17 RX ADMIN — SODIUM CHLORIDE 250 ML: 9 INJECTION, SOLUTION INTRAVENOUS at 05:14

## 2022-08-17 RX ADMIN — SODIUM CHLORIDE, PRESERVATIVE FREE 40 MG: 5 INJECTION INTRAVENOUS at 17:38

## 2022-08-17 RX ADMIN — METRONIDAZOLE 500 MG: 500 INJECTION, SOLUTION INTRAVENOUS at 17:49

## 2022-08-17 RX ADMIN — FLUTICASONE PROPIONATE 1 SPRAY: 50 SPRAY, METERED NASAL at 21:27

## 2022-08-17 RX ADMIN — Medication 400 MG: at 21:26

## 2022-08-17 RX ADMIN — THIAMINE HYDROCHLORIDE 100 MG: 100 INJECTION, SOLUTION INTRAMUSCULAR; INTRAVENOUS at 09:50

## 2022-08-17 RX ADMIN — SODIUM CHLORIDE, PRESERVATIVE FREE 40 MG: 5 INJECTION INTRAVENOUS at 05:07

## 2022-08-17 RX ADMIN — BUDESONIDE 500 MCG: 0.5 SUSPENSION RESPIRATORY (INHALATION) at 21:15

## 2022-08-17 RX ADMIN — HYDROCORTISONE SODIUM SUCCINATE 100 MG: 100 INJECTION, POWDER, FOR SOLUTION INTRAMUSCULAR; INTRAVENOUS at 00:18

## 2022-08-17 RX ADMIN — CALCIUM CARBONATE 500 MG: 500 TABLET, CHEWABLE ORAL at 21:26

## 2022-08-17 RX ADMIN — Medication 400 MG: at 09:19

## 2022-08-17 RX ADMIN — PRAVASTATIN SODIUM 40 MG: 20 TABLET ORAL at 21:27

## 2022-08-17 RX ADMIN — IPRATROPIUM BROMIDE AND ALBUTEROL SULFATE 1 AMPULE: .5; 2.5 SOLUTION RESPIRATORY (INHALATION) at 17:10

## 2022-08-17 RX ADMIN — IPRATROPIUM BROMIDE AND ALBUTEROL SULFATE 1 AMPULE: .5; 2.5 SOLUTION RESPIRATORY (INHALATION) at 12:59

## 2022-08-17 RX ADMIN — PRIMIDONE 250 MG: 250 TABLET ORAL at 21:27

## 2022-08-17 RX ADMIN — ARFORMOTEROL TARTRATE 15 MCG: 15 SOLUTION RESPIRATORY (INHALATION) at 21:14

## 2022-08-17 RX ADMIN — WATER 10 ML: 1 INJECTION INTRAMUSCULAR; INTRAVENOUS; SUBCUTANEOUS at 00:18

## 2022-08-17 RX ADMIN — IPRATROPIUM BROMIDE AND ALBUTEROL SULFATE 1 AMPULE: .5; 2.5 SOLUTION RESPIRATORY (INHALATION) at 09:43

## 2022-08-17 RX ADMIN — METRONIDAZOLE 500 MG: 500 INJECTION, SOLUTION INTRAVENOUS at 02:33

## 2022-08-17 RX ADMIN — TRAZODONE HYDROCHLORIDE 50 MG: 50 TABLET ORAL at 21:27

## 2022-08-17 RX ADMIN — IPRATROPIUM BROMIDE AND ALBUTEROL SULFATE 1 AMPULE: .5; 2.5 SOLUTION RESPIRATORY (INHALATION) at 21:14

## 2022-08-17 RX ADMIN — DEXTROSE AND SODIUM CHLORIDE: 5; 900 INJECTION, SOLUTION INTRAVENOUS at 09:23

## 2022-08-17 RX ADMIN — FERROUS SULFATE TAB 325 MG (65 MG ELEMENTAL FE) 325 MG: 325 (65 FE) TAB at 21:27

## 2022-08-17 RX ADMIN — CEFEPIME 2000 MG: 2 INJECTION, POWDER, FOR SOLUTION INTRAVENOUS at 02:25

## 2022-08-17 ASSESSMENT — PAIN SCALES - GENERAL
PAINLEVEL_OUTOF10: 0

## 2022-08-17 ASSESSMENT — PULMONARY FUNCTION TESTS: PEFR_L/MIN: 23

## 2022-08-17 NOTE — CONSULTS
Palliative Care Department  [phone]  Palliative Care Initial Consult  Provider Yelitza Jose, APRN - CNP      PATIENT: Artis Suazo  : 1940  MRN: [id number]  ADMISSION DATE: 2022 10:01 AM  Referring Provider: Mera Bahena MD    Palliative Medicine was consulted on hospital day 9 for assistance with Goals of care, Code Status Discussion, Family support, Symptom management     HPI:     Brendon Mayorga is a [de-identified] y.o. y/o female with a history of arthritis, coronary artery disease, cancer, COPD, depression, fibromyalgia, necrotic bowel, hypertension, hyperlipidemia, mitral valve regurgitation, myocardial infarction, osteopenia, acute kidney failure, who [facility] on [facility] from nursing home with abnormal labs (elevated potassium levels 6.8). At the emergency room laboratory findings shows potassium level 6.4, hemoglobin 6.6, hematocrit 21.8, troponin 60, proBNP 686. Patient had a recent admission discharged from [facility] where she was treated for bacteremia, she had a port that was removed for concern of CLABSI. Also has a history of mesenteric ischemia with an ileostomy placement, and a PEG tube placed for failure to thrive. On 8/10/2022 patient was transferred to the MICU due to persistent hyperkalemia, low blood pressure, with improving potassium levels, and stable hemoglobin, however developing new lactic acidosis 2022 had an EGD that shows hemorrhagic gastritis. ASSESSMENT/PLAN:     Pertinent Hospital Diagnoses     Hyperkalemia  Acute on chronic anemia      Palliative Care Encounter / Counseling Regarding Goals of Care  Please see detailed goals of care discussion as below  At this time, Artis Suazo, Does Not have capacity for medical decision-making.   Capacity is time limited and situation/question specific  During encounter Savana Yoo was surrogate medical decision-maker  Outcome of goals of care meeting:  Continue with current medical treatment   is waiting for his daughter, who is coming from [place] tomorrow, and his son arriving from out of state, before withdrawing care  CODE STATUS discussed and changed to DNR CCA DNI  Code status Limited DNR CCA DNI  Advanced Directives: no POA or living will in [facility]  Surrogate/Legal NOK:  Riaz Alas (Spouse) [phone]    Spiritual assessment: no spiritual distress identified  Bereavement and grief: to be determined  Referrals to: none today    Thank you for the opportunity to participate in the care of Maira Mendoza. GEMA Molina CNP  Palliative Medicine     SUBJECTIVE:     Details of Conversation:     Chart reviewed. Met with patient and her  Arminda Whitaker at the bedside. Patient was on the BiPAP, she was drowsy, and after a few words, did not participate in a meaningful conversation. Introduced myself and the role of palliative medicine to both patient and . Patient stated that she has been  for 62 years, has 3 children's, 1 passed away, has a daughter who lives in [place] and a son who lives out of state. Arminda Whitaker reported his wife has been having ongoing issues since acquiring an infection in her abdominal mesh 8 years ago, and later developed ischemic bowel, required an ileostomy a year ago, she also has issues with nutrition, PEG tube was placed a year ago for supplemental nutrition. Arminda Whitaker also stated she has been discussing her goals with him, and she had expressed of not wanting to be placed on vent support short or long-term, however she has no spoken to him about resuscitation efforts, but given her current condition, and her voicing to him of getting tire and wanting to be kept comfortable, he feels that she should not be resuscitated. We discussed CODE STATUS, especially DNR CCA with no intubation. He feels that is appropriate.   But he will want to de-escalate care, however he wants his daughter was coming from [place] and his son who are coming from out of state tomorrow, to be present at the bedside before moving forward with withdrawing care. We discussed hospice and comfort care route. Answered all his questions. CODE STATUS changed to limited DNR CCA DNI. We will continue to follow. Prognosis: Poor    OBJECTIVE:     BP (!) 131/49   Pulse 90   Temp (!) 95.6 °F (35.3 °C) (Axillary)   Resp 25   Ht 5' (1.524 m)   Wt 125 lb 7.1 oz (56.9 kg)   SpO2 (!) 87%   BMI 24.50 kg/m²     Physical Examination:  Gen: elderly, thin, NAD, drowsy, on BiPAP  HEENT: normocephalic, atraumatic, PERRL, EOMI,   Neck: trachea midline, no JVD  Lungs: Tachypneic on BiPAP  Heart: regular rate and rhythm, distant heart tones,   Abdomen: Rounded, distended  Extremities: edema  Skin: warm, dry without rashes, lesions, bruising  Neuro: Drowsy, fidgety, follows commands    Objective data reviewed: labs, images, records, medication use, vitals, and chart    Time/Communication  Greater than 50% of time spent, total 50 minutes in counseling and coordination of care at the bedside regarding  CODE STATUS discussion, family support and goals of care. Thank you for allowing Palliative Medicine to participate in the care of [address]. Note: This report was completed using Singulex voiced recognition software. Every effort has been made to ensure accuracy; however, inadvertent computerized transcription errors may be present.

## 2022-08-17 NOTE — PROGRESS NOTES
Progress Note  8/17/2022 8:53 AM  Subjective:   Admit Date: 8/8/2022  PCP: Rod Kaiser, DO  Interval History: Patient examined , on BIPAP , irritable , was dialysed yesterday     Diet: Diet NPO Exceptions are: Sips of Water with Meds    Data:   Scheduled Meds:   sodium chloride  250 mL IntraVENous Once    phytonadione (VITAMIN K)  IVPB  10 mg IntraVENous Once    hydrocortisone sodium succinate PF  100 mg IntraVENous Q8H    metroNIDAZOLE  500 mg IntraVENous Q8H    cefepime  2,000 mg IntraVENous Q12H    vancomycin  500 mg Oral 4 times per day    sodium chloride  250 mL IntraVENous Once    ipratropium-albuterol  1 ampule Inhalation Q4H WA    guaiFENesin-dextromethorphan  5 mL Oral BID    Arformoterol Tartrate  15 mcg Nebulization BID    budesonide  0.5 mg Nebulization BID    thiamine  100 mg IntraVENous Daily    epoetin ludin-epbx  30 Units/kg SubCUTAneous Once per day on Mon Wed Fri    lidocaine  1 patch TransDERmal Daily    pantoprazole (PROTONIX) 40 mg injection  40 mg IntraVENous Q12H    sucralfate  1 g Oral 4 times per day    calcium carbonate  500 mg Oral Nightly    vitamin D  2,000 Units Oral BID    [Held by provider] diphenoxylate-atropine  1 tablet Oral 4x Daily AC & HS    escitalopram  20 mg Oral Nightly    ferrous sulfate  325 mg Oral Nightly    fluticasone  1 spray Each Nostril Nightly    [Held by provider] lipase-protease-amylase  24,000 Units Oral TID WC    [Held by provider] loperamide  4 mg Oral TID WC    magnesium oxide  400 mg Oral BID    ocuvite-lutein  1 capsule Oral BID    pravastatin  40 mg Oral Nightly    primidone  250 mg Oral Nightly    traZODone  50 mg Oral Nightly    vitamin B-12  500 mcg Oral Nightly     Continuous Infusions:   dextrose 5 % and 0.9 % NaCl      sodium chloride 10 mL/hr at 08/16/22 2330    norepinephrine 14 mcg/min (08/17/22 0046)    dextrose       PRN Meds:iohexol, anticoagulant sodium citrate, aluminum & magnesium hydroxide-simethicone, sodium chloride, dextrose bolus structures are without acute process. No acute process. Objective:   Vitals: BP (!) 131/49   Pulse 90   Temp (!) 95.6 °F (35.3 °C) (Axillary)   Resp 25   Ht 5' (1.524 m)   Wt 125 lb 7.1 oz (56.9 kg)   SpO2 (!) 87%   BMI 24.50 kg/m²   General appearance: appears stated age   Skin:  No rashes or lesions  HEENT: Head: Normocephalic, no lesions, without obvious abnormality.   Neck: no adenopathy, no carotid bruit, no JVD, supple, symmetrical, trachea midline, and thyroid not enlarged, symmetric, no tenderness/mass/nodules  Lungs: diminished breath sounds bilaterally  Heart: regular rate and rhythm, S1, S2 normal, no murmur, click, rub or gallop  Abdomen:  distended   Extremities: extremities normal, atraumatic, no cyanosis or edema  Neurologic: Mental status: Alert, oriented, thought content appropriate    Assessment:   Patient Active Problem List:     Right cataract     Essential hypertension, benign     Hyperlipidemia with target LDL less than 100     Osteoarthritis, shoulder     Primary osteoarthritis of left knee     History of ischemic bowel disease     Ileostomy present ([facility])     Chronic kidney disease, stage III (moderate) ([facility])     COPD (chronic obstructive pulmonary disease) ([facility])     Moderate protein-calorie malnutrition (HCC)     Abdominal wall cellulitis     Cellulitis     Infected hernioplasty mesh ([facility])     Enterocutaneous fistula     Mild protein-calorie malnutrition (HCC)     Acute kidney injury ([address]     Hypertensive kidney disease with chronic kidney disease stage IV (HCC)     KD (acute kidney injury) ([address]     Diarrhea     Elevated serum creatinine     Abnormal serum creatinine level     Hyperlipemia     Primary hypertension     Essential (primary) hypertension     Postsurgical malabsorption, not elsewhere classified     Hypercholesterolemia     Pure hypercholesterolemia     Prediabetes     Hypothyroidism, unspecified     Acute renal failure (ARF) ([facility])     Enteritis     Hypotension Abnormal CT scan, lumbar spine     Severe protein-calorie malnutrition (HCC)     Hyperkalemia    Plan:     Assessment and Plans:     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 HTN with CKD I-IV  BP goal <120/80  BP at/near goal  Monitor BPs  On Levo     3. Met Acidosis better   in the setting of the KD/ CKD   Also with ileostomy  Monitor labs        4. Secondary hyperparathyroidism of Renal Origin  with Hyperphosphatemia and Unspecified Vit D Def   on 7/15;  PO4 5.2 on 8/9,  Vit D 19 on 8/1  On vitamin D supplementation  Monitor labs  Ca low check ionized and replace prn     5. Hypomagnesemia/Hypocalcemia  Replace prn  On vit d     6. Hyperkalemia  likely due to diet an KD/CKD  high despite numerous rounds of hyper k protocol and hco3 drip  Low potassium diet when able to eat  Cortisol adequate at 90  One hd on 8/11  K low this am, due to GI loss from high ostomy output     7.  anemia  Gi bleed  H/o ischemic colitis and ostomy  S/p PRBCs  Transfuse for hemoglobin<7  On ROSA  General surgery following-possible EGD when lytes stable     8. Shock  ?  Septic shock/hypovolemia/?acute abd  Treat yeast in urine  On diflucan an zosyn  Requiring low dose pressors  Had ct of abd pelvis  Lactate 7>0.7     Patient remains oliguric , will continue dialytic support  With UF as tolerated , progressive leucocytosis noted await blood c/s stool c diff        Haim Vega MD

## 2022-08-17 NOTE — PROGRESS NOTES
Department of Internal Medicine  Infectious Diseases  Progress  Note    C/C : Septic shock     Pt is lethargic   In resp distress   On BiPAP On levophed       Current Facility-Administered Medications   Medication Dose Route Frequency Provider Last Rate Last Admin    dextrose 5 % and 0.9 % sodium chloride infusion   IntraVENous Continuous Kye San MD 50 mL/hr at 08/17/22 0923 New Bag at 08/17/22 0923    iohexol (OMNIPAQUE 240) injection 50 mL  50 mL Oral ONCE PRN Elvira Gibbons MD        hydrocortisone sodium succinate PF (SOLU-CORTEF) injection 100 mg  100 mg IntraVENous Q8H Kye San MD   100 mg at 08/17/22 0919    metronidazole (FLAGYL) 500 mg in 0.9% NaCl 100 mL IVPB premix  500 mg IntraVENous Q8H Fredboom Salmon MD   Stopped at 08/17/22 1033    cefepime (MAXIPIME) 2000 mg IVPB minibag  2,000 mg IntraVENous Q12H Fred GRISELDA Salmon MD   Stopped at 08/17/22 0255    vancomycin (VANCOCIN) oral solution 500 mg  500 mg Oral 4 times per day Chrissie Gay MD   500 mg at 08/17/22 1235    anticoagulant sodium citrate 4 GM/100ML solution 0.112 g  2.8 mL IntraCATHeter PRN Gonzales Mendez MD        aluminum & magnesium hydroxide-simethicone (MAALOX) 200-200-20 MG/5ML suspension 30 mL  30 mL Oral Q6H PRN Kye San MD        ipratropium-albuterol (DUONEB) nebulizer solution 1 ampule  1 ampule Inhalation Q4H WA Kye San MD   1 ampule at 08/17/22 1259    guaiFENesin-dextromethorphan (ROBITUSSIN DM) 100-10 MG/5ML syrup 5 mL  5 mL Oral BID Kye San MD   5 mL at 08/17/22 0919    Arformoterol Tartrate (BROVANA) nebulizer solution 15 mcg  15 mcg Nebulization BID Kye San MD   15 mcg at 08/17/22 0943    budesonide (PULMICORT) nebulizer suspension 500 mcg  0.5 mg Nebulization BID Kye San MD   500 mcg at 08/17/22 0943    0.9 % sodium chloride infusion   IntraVENous PRN Ginna Moore MD 10 mL/hr at 08/16/22 2330 Rate Verify at 08/16/22 2330    thiamine (B-1) injection 100 mg  100 mg IntraVENous Daily Shauna Olmedo MD   100 mg at 08/17/22 0950    epoetin ludin-epbx (RETACRIT) injection 1,600 Units  30 Units/kg SubCUTAneous Once per day on Mon Wed Fri Narinder Mcdonnell MD   1,600 Units at 08/17/22 0919    lidocaine 4 % external patch 1 patch  1 patch TransDERmal Daily Khris Damian MD   1 patch at 08/17/22 0920    norepinephrine (LEVOPHED) 16 mg in dextrose 5% 250 mL infusion  1-100 mcg/min IntraVENous Continuous Terry Miranda MD 13.1 mL/hr at 08/17/22 0046 14 mcg/min at 08/17/22 0046    dextrose bolus 10% 125 mL  125 mL IntraVENous PRN Terry Miranda MD   Stopped at 08/16/22 0734    dextrose 10 % infusion   IntraVENous Continuous PRN Terry Miranda MD        pantoprazole (PROTONIX) 40 mg in sodium chloride (PF) 10 mL injection  40 mg IntraVENous Q12H Citlaly E Kaercher, DO   40 mg at 08/17/22 0507    sucralfate (CARAFATE) tablet 1 g  1 g Oral 4 times per day Joseph Sake, DO   1 g at 08/16/22 1657    glucose chewable tablet 16 g  4 tablet Oral PRN Reed Oh MD        glucagon (rDNA) injection 1 mg  1 mg SubCUTAneous PRN Reed Oh MD        calcium carbonate (TUMS) chewable tablet 500 mg  500 mg Oral Nightly Reed Oh MD   500 mg at 08/16/22 2206    vitamin D (CHOLECALCIFEROL) tablet 2,000 Units  2,000 Units Oral BID Reed Oh MD   2,000 Units at 08/17/22 3531    [Held by provider] diphenoxylate-atropine (LOMOTIL) 2.5-0.025 MG per tablet 1 tablet  1 tablet Oral 4x Daily AC & HS Reed Oh MD   1 tablet at 08/15/22 1039    escitalopram (LEXAPRO) tablet 20 mg  20 mg Oral Nightly Reed Oh MD   20 mg at 08/16/22 2209    ferrous sulfate (IRON 325) tablet 325 mg  325 mg Oral Nightly Reed Oh MD   325 mg at 08/16/22 2205    fluticasone (FLONASE) 50 MCG/ACT nasal spray 1 spray  1 spray Each Nostril Nightly Reed Oh MD   1 spray at 08/16/22 2240    hyoscyamine (ANASPAZ;LEVSIN) tablet 125 mcg  125 mcg Oral 4x Daily PRN Jami Arevalo MD   125 mcg at 08/14/22 2050    [Held by provider] lipase-protease-amylase (CREON) delayed release capsule 24,000 Units  24,000 Units Oral TID  Jami Arevalo MD   24,000 Units at 08/15/22 1217    [Held by provider] lipase-protease-amylase (CREON) delayed release capsule 12,000 Units  12,000 Units Oral With Snacks Jami Arevalo MD        [facility] [place] by provider] loperamide (IMODIUM) capsule 4 mg  4 mg Oral TID  Jami Arevalo MD   4 mg at 08/15/22 0849    magnesium oxide (MAG-OX) tablet 400 mg  400 mg Oral BID Jami Arevalo MD   400 mg at 08/17/22 3238    ocuvite-lutein multivitamin 1 capsule  1 capsule Oral BID Jami Arevalo MD   1 capsule at 08/16/22 2215    pravastatin (PRAVACHOL) tablet 40 mg  40 mg Oral Nightly Jami Arevalo MD   40 mg at 08/16/22 2207    primidone (MYSOLINE) tablet 250 mg  250 mg Oral Nightly Jami Arevalo MD   250 mg at 08/16/22 2211    senna (SENOKOT) tablet 8.6 mg  1 tablet Oral Daily PRN Jami Arevalo MD        traZODone (DESYREL) tablet 50 mg  50 mg Oral Nightly Jami Arevalo MD   50 mg at 08/16/22 2206    vitamin B-12 (CYANOCOBALAMIN) tablet 500 mcg  500 mcg Oral Nightly Jami Arevalo MD   500 mcg at 08/16/22 2213    oxyCODONE-acetaminophen (PERCOCET) 5-325 MG per tablet 1 tablet  1 tablet Oral Q6H PRN Blaze Menard MD   1 tablet at 08/16/22 1150    dextrose bolus 10% 250 mL  250 mL IntraVENous PRN Kait Bernardo MD         REVIEW OF SYSTEMS:  could not be obtained         PHYSICAL EXAM:      Vitals:   BP (!) 131/49   Pulse 84   Temp (!) 95.6 °F (35.3 °C) (Axillary)   Resp 19   Ht 5' (1.524 m)   Wt 125 lb 7.1 oz (56.9 kg)   SpO2 92%   BMI 24.50 kg/m²     General Appearance:     Lethargic in resp distress . Head:    Normocephalic, atraumatic   Eyes:    No pallor, no icterus,   Ears:    No obvious deformity or drainage.    Nose:   No nasal drainage   Throat: Mucosa dry    Neck:   Supple, no lymphadenopathy   Lungs:     Clear to auscultation bilaterally, no wheeze    Heart:    Tachycardic, systolic murmur    Abdomen:     Soft,  +  tender, bowel sounds present , mild distention    Extremities:    + edema    Pulses:   Dorsalis pedis palpable    Skin:   no rashes         CBC with Differential:      Lab Results   Component Value Date/Time    WBC 33.8 08/17/2022 05:00 AM    RBC 2.50 08/17/2022 05:00 AM    HGB 7.2 08/17/2022 05:00 AM    HCT 23.5 08/17/2022 05:00 AM     08/17/2022 05:00 AM    MCV 94.0 08/17/2022 05:00 AM    MCH 28.8 08/17/2022 05:00 AM    MCHC 30.6 08/17/2022 05:00 AM    RDW 18.4 08/17/2022 05:00 AM    NRBC 0.0 07/21/2022 06:18 AM    BLASTSPCT 0.9 08/16/2022 06:59 PM    METASPCT 0.9 08/17/2022 05:00 AM    LYMPHOPCT 5.2 08/17/2022 05:00 AM    MONOPCT 7.8 08/17/2022 05:00 AM    MYELOPCT 0.9 08/17/2022 05:00 AM    BASOPCT 0.2 08/17/2022 05:00 AM    MONOSABS 2.70 08/17/2022 05:00 AM    LYMPHSABS 1.69 08/17/2022 05:00 AM    EOSABS 0.00 08/17/2022 05:00 AM    BASOSABS 0.00 08/17/2022 05:00 AM       CMP     Lab Results   Component Value Date/Time     08/17/2022 05:00 AM    K 3.8 08/17/2022 05:00 AM    K 3.8 08/12/2022 03:10 AM     08/17/2022 05:00 AM    CO2 25 08/17/2022 05:00 AM    BUN 20 08/17/2022 05:00 AM    CREATININE 2.4 08/17/2022 05:00 AM    GFRAA 23 08/17/2022 05:00 AM    LABGLOM 19 08/17/2022 05:00 AM    GLUCOSE 151 08/17/2022 05:00 AM    PROT 4.7 08/16/2022 02:11 PM    LABALBU 2.5 08/16/2022 02:11 PM    CALCIUM 8.2 08/17/2022 05:00 AM    BILITOT 1.1 08/16/2022 02:11 PM    ALKPHOS 116 08/16/2022 02:11 PM    AST 17 08/16/2022 02:11 PM    ALT 8 08/16/2022 02:11 PM         Hepatic Function Panel:    Lab Results   Component Value Date/Time    ALKPHOS 116 08/16/2022 02:11 PM    ALT 8 08/16/2022 02:11 PM    AST 17 08/16/2022 02:11 PM    PROT 4.7 08/16/2022 02:11 PM    BILITOT 1.1 08/16/2022 02:11 PM    BILIDIR <0.2 01/21/2021 11:55 AM    IBILI see below 01/21/2021 11:55 AM    LABALBU 2.5 08/16/2022 02:11 PM       PT/INR:    Lab Results   Component Value Date/Time    PROTIME 14.8 08/17/2022 05:00 AM    INR 1.4 08/17/2022 05:00 AM       TSH:    Lab Results   Component Value Date/Time    TSH 0.597 08/13/2022 04:00 PM       U/A:    Lab Results   Component Value Date/Time    COLORU Yellow 08/08/2022 12:39 PM    PHUR 5.5 08/08/2022 12:39 PM    LABCAST FEW 01/25/2020 02:45 PM    [address] NONE 08/08/2022 12:39 PM    RBCUA 1-3 08/08/2022 12:39 PM    YEAST Present 09/18/2020 06:50 AM    BACTERIA RARE 08/08/2022 12:39 PM    CLARITYU Clear 08/08/2022 12:39 PM    SPECGRAV 1.020 08/08/2022 12:39 PM    LEUKOCYTESUR Negative 08/08/2022 12:39 PM    UROBILINOGEN 0.2 08/08/2022 12:39 PM    BILIRUBINUR Negative 08/08/2022 12:39 PM    BLOODU TRACE-INTACT 08/08/2022 12:39 PM    GLUCOSEU Negative 08/08/2022 12:39 PM       ABG:  No results found for: CDV8FXJ, BEART, B9IFSBND, PHART, THGBART, QII2DGJ, PO2ART, GMY2MAH    MICROBIOLOGY:    Blood culture -neg to date     Urine Culture -   Specimen: Urine, clean catch Updated: 08/10/22 1207    Urine Culture, Routine -- Abnormal     <10,000 CFU/mL   Mixed gram positive organisms    Abnormal     Organism Candida albicans Abnormal     Urine Culture, Routine >100,000 CFU/ml   Narrative:           Collected: 08/16/22 1043    Updated: 08/17/22 1130    Specimen Source: Urine, clean catch     Organism Candida albicans Abnormal     Urine Culture, Routine --    >100,000 CFU/ml   Identification to follow    Narrative:     Source: URINE       Site: Urine Indewelling Cath             CLOSTRIDIUM DIFFICILE EIA [[id number]]    Collected: 08/16/22 1411    Updated: 08/17/22 1115    Specimen Type: Stool     C.diff Toxin/Antigen --    C. Difficile Toxin A and/or B NOT detected   Normal Range: Not detected    Narrative:     Source: STOOL       Site: Stool&Stool Watery                     Radiology :    CT scan of abdomen and pelvis -  1.   Increased abdominal ascites compared to 08/11/2022 which is now moderate. 2.  Wall thickening of a segment of small bowel in the lower abdomen with gas   fluid levels in the mid small bowel. It is difficult to exclude small bowel   ischemia. Small bowel dilatation and fold thickening has generally improved   compared to 08/11/2022. 3.  Moderate pelvic ascites apparently contiguous with the moderate abdominal   ascites however there are suspended foci of GAS within the pelvic cul-de-sac   which could reflect PELVIC ABSCESS and/or bacterial PERITONITIS. The   communicating fluid collection within the pelvic cul-de-sac measures   approximately 6 x 11 cm. 4.  Moderate to large bilateral pleural effusions. Moderate subcutaneous   edema along the body wall compatible with 3rd spacing of fluid.        IMPRESSION:     Septic shock - likely intra abdomen source   Leukocytosis , elevated procalcitonin level sec to above   S/P colectomy and ileostomy         RECOMMENDATIONS:       Cefepime 2 grams IV q 12 hrs, Flagyl 500 mg IV q 8 hrs   Supportive care ( pt is DNR CCA )   Addendum --  Blood cx     Enterococcus faecium by PCR DETECTED Panic       Escherichia coli by PCR Not Detected    Haemophilus Influenzae by PCR Not Detected    Klebsiella aerogenes by PCR Not Detected    Klebsiella oxytoca by PCR Not Detected    Klebsiella pneumoniae group by PCR Not Detected    Listeria monocytogenes by PCR Not Detected    Neisseria meningitidis by PCR Not Detected    Proteus species by PCR Not Detected    Pseudomonas aeruginosa by PCR Not Detected    Salmonella species by PCR Not Detected    Streptococcus agalactiae by PCR Not Detected    Staphylococcus aureus by PCR Not Detected    Staphylococcus epidermidis by PCR Not Detected    Staphylococcus lugdunensis by PCR Not Detected    Staphylococcus species by PCR Not Detected    Serratia marcescens by PCR Not Detected    Streptococcus pneumoniae by PCR Not Detected    Streptococcus pyogenes  by PCR Not Detected    Streptococcus species by PCR Not Detected    Stenotrophomonas maltophilia by PCR Not Detected    Candida albicans by PCR Not Detected    Candida auris by PCR Not Detected    Candida glabrata by PCR Not Detected    Candida krusei by PCR Not Detected    Candida parapsilosis by PCR Not Detected    Candida tropicalis by PCR Not Detected    Cryptococcus neoformans/gattii by PCR Not Detected    Vancomycin Resistant by PCR DETECTED Panic     Narrative:       Start daptomycin 10 mg / kg IV q  48 hrs

## 2022-08-17 NOTE — PROGRESS NOTES
GENERAL SURGERY  DAILY PROGRESS NOTE  8/17/2022    CHIEF COMPLAINT:  Chief Complaint   Patient presents with    Other     Sent from NH for K+ of 6.8       SUBJECTIVE:  No complaints from pt this am. Levo at 15. OBJECTIVE:  BP (!) 131/49   Pulse 98   Temp 97.3 °F (36.3 °C) (Axillary)   Resp 18   Ht 5' (1.524 m)   Wt 125 lb 7.1 oz (56.9 kg)   SpO2 92%   BMI 24.50 kg/m²     GENERAL:  NAD. A&Ox3. LUNGS:  No increased work of breathing. CARDIOVASCULAR: RR  ABDOMEN:  Soft, moderately distended, still diffusely tender to palpation. No change from yesterday. No guarding, rigidity, rebound. Ostomy site pink. ASSESSMENT/PLAN:  [de-identified] y.o. female with sepsis, hemorrhagic gastritis, KD on CKD; hx of mesenteric ischemia. - PEG to gravity  - NPO  - Hold TF's   - With patient's hx of mesenteric ischemia, recommend no TF's while patient is on vasopressors  - Significant ascites on CT- recommend drain fluid and send for culture. Suspect her ileus will improve with drainage of this. - overall seems to be poor prognosis. - No plans for acute surgical interventions. She has a \"hostile abdomen\" from her extensive surgical history and limited remaining small bowel. As such do not recommend surgical exploration if her condition deteriorates further. Will discuss w Dr. Marin Sheffield, DO  Surgery Resident PGY-1  8/17/2022  6:08 AM     Electronically signed by Maria Esther Tucker,  on 8/17/2022 at 6:26 AM    Seen/examined  Agree with above  No plans for intervention- supportive care only. Not that surgery is impossible; it certainly would be technically difficult. I would offer it to her if I thought it would be of some benefit or potentially change the outcome. With her age, co-morbidities, functional status and malnutrition, I don't see any potential for a meaningful outcome.   Having said that, at this point I can't find any hard indications for intervention  JSGadyMD  JSGadyMD

## 2022-08-17 NOTE — PROGRESS NOTES
[address] [facility]   Department of Internal Medicine   Internal Medicine Residency  MICU Progress Note    Patient:  Yanely Robles [de-identified] y.o. female   MRN: [id number]       Date of Service: 2022    Allergy: Fentanyl, Levofloxacin, Tramadol, and Vioxx [rofecoxib]    Subjective     Patient was seen and examined this morning at bedside in respiratory distress. Patient getting agitated and pulling off BiPAP. Overnight patient had low oxygen saturation with ABG 7.25/59.8/49.2/25. 9. The need for possible intubation with worsening respiratory status was discussed with patient and her . After detailed discussion they have opted for changing the CODE STATUS to DNR CCA/DNI. Objective     TEMPERATURE:  Current - Temp: (!) 95.6 °F (35.3 °C); Max - Temp  Av.5 °F (35.8 °C)  Min: 95.6 °F (35.3 °C)  Max: 97.3 °F (36.3 °C)  RESPIRATIONS RANGE: Resp  Av.2  Min: 14  Max: 29  PULSE RANGE: Pulse  Av.6  Min: 79  Max: 120  BLOOD PRESSURE RANGE:  No data recorded.  ; No data recorded. PULSE OXIMETRY RANGE: SpO2  Av %  Min: 77 %  Max: 96 %    I & O - 24hr:    Intake/Output Summary (Last 24 hours) at 2022 1749  Last data filed at 2022 1400  Gross per 24 hour   Intake 1412.05 ml   Output 176 ml   Net 1236.05 ml     I/O last 3 completed shifts: In: 5189.7 [P.O.:480; I.V.:3011.5; NG/GT:450; IV Piggyback:848.3]  Out: 1416 [Urine:46; Emesis/NG output:930; Stool:40] I/O this shift:  In: -   Out: 48 [Urine:45; Stool:3]   Weight change:     Physical Exam:   General Appearance:  Drowsy but arousable. Ill-appearing   HEENT:    NC/AT, mucous membranes are dry   Neck:   Supple, no jugular venous distention. Resp:     CTAB, (+) wheezes, No rhonchi, no use of accessory muscles. Heart:    RRR, S1 and S2 normal, no murmur, rub or gallop. Abdomen:   Distended, distension decreased when compared to exam yesterday,  tender, sluggish bowel sounds noted. Ileostomy draining minimal stool. PEG tube put to gravity for drainage with bilious fluid. + dullness to percussion and fluid wave. Extremities:   Atraumatic, no cyanosis or edema   Pulses:  Radial and pedal pulses are intact bilaterally   Neurologic:   Follows commands, no focal neurological deficit noted.        Medications     Continuous Infusions:   dextrose 5 % and 0.9 % NaCl 50 mL/hr at 08/17/22 1357    sodium chloride 10 mL/hr at 08/16/22 2330    norepinephrine 14 mcg/min (08/17/22 0046)    dextrose       Scheduled Meds:   daptomycin (CUBICIN) in NS IV syringe  450 mg IntraVENous Q48H    hydrocortisone sodium succinate PF  100 mg IntraVENous Q8H    metroNIDAZOLE  500 mg IntraVENous Q8H    cefepime  2,000 mg IntraVENous Q12H    ipratropium-albuterol  1 ampule Inhalation Q4H WA    guaiFENesin-dextromethorphan  5 mL Oral BID    Arformoterol Tartrate  15 mcg Nebulization BID    budesonide  0.5 mg Nebulization BID    thiamine  100 mg IntraVENous Daily    epoetin ludin-epbx  30 Units/kg SubCUTAneous Once per day on Mon Wed Fri    lidocaine  1 patch TransDERmal Daily    pantoprazole (PROTONIX) 40 mg injection  40 mg IntraVENous Q12H    sucralfate  1 g Oral 4 times per day    calcium carbonate  500 mg Oral Nightly    vitamin D  2,000 Units Oral BID    [Held by provider] diphenoxylate-atropine  1 tablet Oral 4x Daily AC & HS    escitalopram  20 mg Oral Nightly    ferrous sulfate  325 mg Oral Nightly    fluticasone  1 spray Each Nostril Nightly    [Held by provider] lipase-protease-amylase  24,000 Units Oral TID WC    [Held by provider] loperamide  4 mg Oral TID WC    magnesium oxide  400 mg Oral BID    ocuvite-lutein  1 capsule Oral BID    pravastatin  40 mg Oral Nightly    primidone  250 mg Oral Nightly    traZODone  50 mg Oral Nightly    vitamin B-12  500 mcg Oral Nightly     PRN Meds: iohexol, anticoagulant sodium citrate, aluminum & magnesium hydroxide-simethicone, sodium chloride, dextrose bolus **OR** [DISCONTINUED] dextrose bolus, dextrose, glucose, glucagon (rDNA), hyoscyamine, [Held by provider] lipase-protease-amylase, senna, oxyCODONE-acetaminophen, [DISCONTINUED] dextrose bolus **OR** dextrose bolus  Nutrition:   Diet NPO Exceptions are: Sips of Water with Meds    Labs and Imaging Studies     CBC:   Recent Labs     08/16/22  0453 08/16/22  1859 08/17/22  0500   WBC 45.0* 42.3* 33.8*   HGB 9.4* 7.9* 7.2*   HCT 30.0* 25.6* 23.5*   MCV 94.3 94.5 94.0    159 118*       BMP:    Recent Labs     08/16/22  1411 08/16/22  1858 08/17/22  0500    138 135   K 3.7 3.8 3.8    104 102   CO2 26 26 25   BUN 13 16 20   CREATININE 1.8* 2.2* 2.4*   GLUCOSE 100* 122* 151*       LIVER PROFILE:   Recent Labs     08/15/22  1329 08/16/22  1043 08/16/22  1411   AST 13  --  17   ALT 7  --  8   LIPASE  --  43  --    BILITOT 1.0  --  1.1   ALKPHOS 85  --  116*         PT/INR:   Recent Labs     08/15/22  0458 08/17/22  0500   PROTIME 13.5* 14.8*   INR 1.2 1.4       APTT:   No results for input(s): APTT in the last 72 hours.       Fasting Lipid Panel:    Lab Results   Component Value Date/Time    CHOL 87 08/01/2022 03:36 AM    TRIG 75 08/01/2022 03:36 AM    HDL 42 08/01/2022 03:36 AM       Cardiac Enzymes:    Lab Results   Component Value Date    CKTOTAL 101 08/11/2022    CKTOTAL 28 07/14/2022    CKTOTAL 31 07/14/2022    TROPONINI 0.01 02/25/2021    TROPONINI 0.01 01/21/2021    TROPONINI 0.07 (H) 01/25/2020       Notable Cultures:      Blood cultures   Blood Culture, Routine   Date Value Ref Range Status   08/16/2022 (A)  Preliminary    Gram stain performed from blood culture bottle media  Gram positive cocci in chains       Respiratory cultures No results found for: RESPCULTURE No results found for: LABGRAM  Urine   Urine Culture, Routine   Date Value Ref Range Status   08/16/2022 >100,000 CFU/ml  Identification to follow    Preliminary     Legionella No results found for: LABLEGI  C Diff PCR No results found for: CDIFPCR  Wound culture/abscess: No results for input(s): WNDABS in the last 72 hours. Tip culture:No results for input(s): CXCATHTIP in the last 72 hours. Oxygen: Additional Respiratory Assessments  Heart Rate: 81  Resp: 24  SpO2: (!) 82 %       Urinary Catheter-Output (mL): 10 mL    Imaging Studies:  XR CHEST PORTABLE   Final Result   Improvement in aeration in the right mid to lower lung field. Otherwise, no change over the past 24 hours. XR CHEST PORTABLE   Final Result   Bilateral pleural effusions decreased on the left compared to prior study. CT CHEST WO CONTRAST   Final Result   1. Moderate to large bilateral pleural effusions with passive atelectasis of   the lower lobes. Small amount of high density material dependently in the   lower lobe bronchi could represent aspiration oral contrast material.  The   esophagus is distended with oral contrast.      2.  Moderate upper abdominal ascites. 3.  No acute alveolar consolidation within the chest.         CT ABDOMEN PELVIS WO CONTRAST Additional Contrast? Radiologist Recommendation   Final Result   1. Increased abdominal ascites compared to 08/11/2022 which is now moderate. 2.  Wall thickening of a segment of small bowel in the lower abdomen with gas   fluid levels in the mid small bowel. It is difficult to exclude small bowel   ischemia. Small bowel dilatation and fold thickening has generally improved   compared to 08/11/2022. 3.  Moderate pelvic ascites apparently contiguous with the moderate abdominal   ascites however there are suspended foci of GAS within the pelvic cul-de-sac   which could reflect PELVIC ABSCESS and/or bacterial PERITONITIS. The   communicating fluid collection within the pelvic cul-de-sac measures   approximately 6 x 11 cm. 4.  Moderate to large bilateral pleural effusions. Moderate subcutaneous   edema along the body wall compatible with 3rd spacing of fluid.          XR ABDOMEN (KUB) (SINGLE AP VIEW)   Final Result   No evidence of bowel obstruction or other acute process. XR CHEST PORTABLE   Final Result   Bibasilar consolidation and/or collapse. Probable effusions. XR ABDOMEN (KUB) (SINGLE AP VIEW)   Final Result   Gaseous small intestinal distension and thickening of the small bowel folds   in the right lower quadrant. CT ABDOMEN PELVIS WO CONTRAST Additional Contrast? None   Final Result   Enlarged liver demonstrating periportal edema. Redemonstration of a low-attenuation lesion in the pancreas. Dilated stomach and gaseous proximal small intestinal dilatation. Abnormal thickening of the wall of the distal small bowel. Large ventral hernia containing the distal stomach and segments of small   bowel. Generalized soft tissue edematous changes consistent with anasarca and mild   to moderate ascites. Multiple additional findings as described. XR CHEST PORTABLE   Final Result   Interval placement of left internal jugular central venous catheter   terminating at the cavoatrial junction. No postprocedure pneumothorax         US RETROPERITONEAL COMPLETE   Final Result   1. Unremarkable sonographic evaluation of the right kidney. 2. Left renal cysts. 3. The urinary bladder is not visualized secondary to obscuring bowel gas. XR CHEST PORTABLE   Final Result   No acute process. Resident's Assessment and Plan       Assessment and Plan:    Shock, most likely septic 2/2 ? Source ? C. Diff vs HAP vs ? abdominal abscess  WBC downtrending to 33.8  On Levophed 14 mics per minute  NICOM continues to be fluid responsive. 250 cc fluid bolus ordered and will reassess  Follow Pan cultures, 1,3 beta D glucan, C.diff EIA  Follow ID recommendations -continue cefepime and metronidazole   After discussion with patient and her  CODE STATUS has been changed to DNR CCA /DNI. Palliative care following.         Blood culture positive for gram-positive cocci in Blood thinners currently on hold    Resolved issues:  HAGMA 2/2 lactic acidosis/uremia in setting of ileostomy  Hyperkalemia 2/2 KD/CKD-Lokelma stopped  Hypervolemic hypotonic hyponatremia 2/2 KD  Lactic acidosis likely type A/D sepsis versus short gut syndrome  Coagulopathy 2/2 hemorrhagic gastritis /HD catheter bleed, INR - 5- resolved    # Peptic ulcer prophylaxis: protonix 40mg BID  # DVT Prophylaxis: PCDs  # Disposition: Cont current care    Niya Lei DO, PGY-2    Attending Physician: Dr. Maria M Patel  Department of Pulmonary, Critical Care and [facility]  Department of Internal Medicine      During multidisciplinary team rounds Amy Eugene is a [de-identified] y.o. female was seen, examined and discussed. This is confirmation that I have personally seen and examined the patient and that the key elements of the encounter were performed by me (> 85 % time). The medications & laboratory data was discussed and adjusted where necessary. The radiographic images were reviewed or with radiologist or consultant if felt dis-concordant with the exam or history. The above findings were corroborated, plans confirmed and changes made if needed. Family is updated at the bedside as available. Key issues of the case were discussed among consultants. Critical Care time is documented if appropriate. I personally spoke with the patient's  at bedside and discussed with him her overall prognosis and multiple comorbidities. At this time he does not want to pursue intubation. His children will be coming from out of town tomorrow. CODE STATUS is currently DNR CCA, no intubation. We will likely pursue hospice in a.m. Critical Care time: 40 minutes.     Sosa Connelly DO

## 2022-08-18 ENCOUNTER — APPOINTMENT (OUTPATIENT)
Dept: GENERAL RADIOLOGY | Age: 82
DRG: 377 | End: 2022-08-18
Payer: MEDICARE

## 2022-08-18 VITALS — HEIGHT: 60 IN | BODY MASS INDEX: 23.42 KG/M2 | TEMPERATURE: 96.5 F | WEIGHT: 119.27 LBS

## 2022-08-18 LAB
AADO2: 370.7 MMHG
ANION GAP SERPL CALCULATED.3IONS-SCNC: 9 MMOL/L (ref 7–16)
ANISOCYTOSIS: ABNORMAL
B.E.: -4.4 MMOL/L (ref -3–3)
BASOPHILIC STIPPLING: ABNORMAL
BASOPHILS ABSOLUTE: 0 E9/L (ref 0–0.2)
BASOPHILS RELATIVE PERCENT: 0.4 % (ref 0–2)
BUN BLDV-MCNC: 26 MG/DL (ref 6–23)
BURR CELLS: ABNORMAL
CALCIUM IONIZED: 1.26 MMOL/L (ref 1.15–1.33)
CALCIUM SERPL-MCNC: 8 MG/DL (ref 8.6–10.2)
CHLORIDE BLD-SCNC: 104 MMOL/L (ref 98–107)
CO2: 22 MMOL/L (ref 22–29)
COHB: 0.8 % (ref 0–1.5)
CREAT SERPL-MCNC: 2.4 MG/DL (ref 0.5–1)
CRITICAL: ABNORMAL
DATE ANALYZED: ABNORMAL
DATE OF COLLECTION: ABNORMAL
DOHLE BODIES: ABNORMAL
EOSINOPHILS ABSOLUTE: 0 E9/L (ref 0.05–0.5)
EOSINOPHILS RELATIVE PERCENT: 0 % (ref 0–6)
FIO2: 70 %
GFR AFRICAN AMERICAN: 23
GFR NON-AFRICAN AMERICAN: 19 ML/MIN/1.73
GLUCOSE BLD-MCNC: 149 MG/DL (ref 74–99)
HCO3: 21.5 MMOL/L (ref 22–26)
HCT VFR BLD CALC: 25.2 % (ref 34–48)
HEMOGLOBIN: 7.9 G/DL (ref 11.5–15.5)
HHB: 6.8 % (ref 0–5)
LAB: ABNORMAL
LYMPHOCYTES ABSOLUTE: 1.06 E9/L (ref 1.5–4)
LYMPHOCYTES RELATIVE PERCENT: 5.2 % (ref 20–42)
Lab: ABNORMAL
MCH RBC QN AUTO: 29.3 PG (ref 26–35)
MCHC RBC AUTO-ENTMCNC: 31.3 % (ref 32–34.5)
MCV RBC AUTO: 93.3 FL (ref 80–99.9)
METER GLUCOSE: 126 MG/DL (ref 74–99)
METER GLUCOSE: 137 MG/DL (ref 74–99)
METER GLUCOSE: 145 MG/DL (ref 74–99)
METHB: 0.5 % (ref 0–1.5)
MODE: ABNORMAL
MONOCYTES ABSOLUTE: 0.85 E9/L (ref 0.1–0.95)
MONOCYTES RELATIVE PERCENT: 4.4 % (ref 2–12)
NEUTROPHILS ABSOLUTE: 19.08 E9/L (ref 1.8–7.3)
NEUTROPHILS RELATIVE PERCENT: 90.4 % (ref 43–80)
O2 SATURATION: 91 % (ref 92–98.5)
O2HB: 91.9 % (ref 94–97)
OPERATOR ID: 7221
ORGANISM: ABNORMAL
ORGANISM: ABNORMAL
OVALOCYTES: ABNORMAL
PATIENT TEMP: 37 C
PCO2: 43 MMHG (ref 35–45)
PDW BLD-RTO: 18.1 FL (ref 11.5–15)
PEEP/CPAP: 6 CMH2O
PFO2: 0.92 MMHG/%
PH BLOOD GAS: 7.32 (ref 7.35–7.45)
PIP: 14 CMH2O
PLATELET # BLD: 106 E9/L (ref 130–450)
PMV BLD AUTO: 11.4 FL (ref 7–12)
PO2: 64.7 MMHG (ref 75–100)
POIKILOCYTES: ABNORMAL
POLYCHROMASIA: ABNORMAL
POTASSIUM SERPL-SCNC: 3.5 MMOL/L (ref 3.5–5)
RBC # BLD: 2.7 E12/L (ref 3.5–5.5)
RI(T): 5.73
SODIUM BLD-SCNC: 135 MMOL/L (ref 132–146)
SOURCE, BLOOD GAS: ABNORMAL
THB: 8.8 G/DL (ref 11.5–16.5)
TIME ANALYZED: 437
TOXIC GRANULATION: ABNORMAL
URINE CULTURE, ROUTINE: ABNORMAL
URINE CULTURE, ROUTINE: ABNORMAL
WBC # BLD: 21.2 E9/L (ref 4.5–11.5)

## 2022-08-18 PROCEDURE — 6370000000 HC RX 637 (ALT 250 FOR IP): Performed by: STUDENT IN AN ORGANIZED HEALTH CARE EDUCATION/TRAINING PROGRAM

## 2022-08-18 PROCEDURE — 82330 ASSAY OF CALCIUM: CPT

## 2022-08-18 PROCEDURE — 2580000003 HC RX 258: Performed by: INTERNAL MEDICINE

## 2022-08-18 PROCEDURE — 6360000002 HC RX W HCPCS: Performed by: STUDENT IN AN ORGANIZED HEALTH CARE EDUCATION/TRAINING PROGRAM

## 2022-08-18 PROCEDURE — 82962 GLUCOSE BLOOD TEST: CPT

## 2022-08-18 PROCEDURE — 2580000003 HC RX 258: Performed by: SURGERY

## 2022-08-18 PROCEDURE — 6360000002 HC RX W HCPCS: Performed by: INTERNAL MEDICINE

## 2022-08-18 PROCEDURE — 71045 X-RAY EXAM CHEST 1 VIEW: CPT

## 2022-08-18 PROCEDURE — A4216 STERILE WATER/SALINE, 10 ML: HCPCS | Performed by: SURGERY

## 2022-08-18 PROCEDURE — 99291 CRITICAL CARE FIRST HOUR: CPT | Performed by: INTERNAL MEDICINE

## 2022-08-18 PROCEDURE — 6360000002 HC RX W HCPCS: Performed by: SURGERY

## 2022-08-18 PROCEDURE — P9047 ALBUMIN (HUMAN), 25%, 50ML: HCPCS | Performed by: INTERNAL MEDICINE

## 2022-08-18 PROCEDURE — 2580000003 HC RX 258: Performed by: STUDENT IN AN ORGANIZED HEALTH CARE EDUCATION/TRAINING PROGRAM

## 2022-08-18 PROCEDURE — 36415 COLL VENOUS BLD VENIPUNCTURE: CPT

## 2022-08-18 PROCEDURE — 90935 HEMODIALYSIS ONE EVALUATION: CPT

## 2022-08-18 PROCEDURE — 6370000000 HC RX 637 (ALT 250 FOR IP): Performed by: FAMILY MEDICINE

## 2022-08-18 PROCEDURE — 2500000003 HC RX 250 WO HCPCS

## 2022-08-18 PROCEDURE — 94660 CPAP INITIATION&MGMT: CPT

## 2022-08-18 PROCEDURE — 6360000002 HC RX W HCPCS

## 2022-08-18 PROCEDURE — 82805 BLOOD GASES W/O2 SATURATION: CPT

## 2022-08-18 PROCEDURE — 80048 BASIC METABOLIC PNL TOTAL CA: CPT

## 2022-08-18 PROCEDURE — 85025 COMPLETE CBC W/AUTO DIFF WBC: CPT

## 2022-08-18 PROCEDURE — 2500000003 HC RX 250 WO HCPCS: Performed by: INTERNAL MEDICINE

## 2022-08-18 PROCEDURE — 99233 SBSQ HOSP IP/OBS HIGH 50: CPT

## 2022-08-18 PROCEDURE — C9113 INJ PANTOPRAZOLE SODIUM, VIA: HCPCS | Performed by: SURGERY

## 2022-08-18 PROCEDURE — 6360000002 HC RX W HCPCS: Performed by: NURSE PRACTITIONER

## 2022-08-18 PROCEDURE — 6370000000 HC RX 637 (ALT 250 FOR IP): Performed by: SURGERY

## 2022-08-18 RX ORDER — MORPHINE SULFATE 2 MG/ML
2 INJECTION, SOLUTION INTRAMUSCULAR; INTRAVENOUS EVERY 30 MIN PRN
Status: DISCONTINUED | OUTPATIENT
Start: 2022-08-18 | End: 2022-08-18 | Stop reason: HOSPADM

## 2022-08-18 RX ORDER — SODIUM CHLORIDE, SODIUM LACTATE, POTASSIUM CHLORIDE, AND CALCIUM CHLORIDE .6; .31; .03; .02 G/100ML; G/100ML; G/100ML; G/100ML
500 INJECTION, SOLUTION INTRAVENOUS ONCE
Status: COMPLETED | OUTPATIENT
Start: 2022-08-18 | End: 2022-08-18

## 2022-08-18 RX ORDER — MIDAZOLAM HYDROCHLORIDE 2 MG/2ML
1 INJECTION, SOLUTION INTRAMUSCULAR; INTRAVENOUS
Status: DISCONTINUED | OUTPATIENT
Start: 2022-08-18 | End: 2022-08-18 | Stop reason: HOSPADM

## 2022-08-18 RX ORDER — GLYCOPYRROLATE 0.2 MG/ML
0.2 INJECTION INTRAMUSCULAR; INTRAVENOUS EVERY 4 HOURS PRN
Status: DISCONTINUED | OUTPATIENT
Start: 2022-08-18 | End: 2022-08-18 | Stop reason: HOSPADM

## 2022-08-18 RX ORDER — MIDAZOLAM HYDROCHLORIDE 2 MG/2ML
0.5 INJECTION, SOLUTION INTRAMUSCULAR; INTRAVENOUS
Status: DISCONTINUED | OUTPATIENT
Start: 2022-08-18 | End: 2022-08-18

## 2022-08-18 RX ORDER — MIDAZOLAM HYDROCHLORIDE 2 MG/2ML
1 INJECTION, SOLUTION INTRAMUSCULAR; INTRAVENOUS
Status: DISCONTINUED | OUTPATIENT
Start: 2022-08-18 | End: 2022-08-18

## 2022-08-18 RX ORDER — MORPHINE SULFATE 2 MG/ML
2 INJECTION, SOLUTION INTRAMUSCULAR; INTRAVENOUS
Status: DISCONTINUED | OUTPATIENT
Start: 2022-08-18 | End: 2022-08-18

## 2022-08-18 RX ORDER — MIDAZOLAM HYDROCHLORIDE 2 MG/2ML
0.5 INJECTION, SOLUTION INTRAMUSCULAR; INTRAVENOUS
Status: DISCONTINUED | OUTPATIENT
Start: 2022-08-18 | End: 2022-08-18 | Stop reason: HOSPADM

## 2022-08-18 RX ORDER — ALBUMIN (HUMAN) 12.5 G/50ML
25 SOLUTION INTRAVENOUS ONCE
Status: COMPLETED | OUTPATIENT
Start: 2022-08-18 | End: 2022-08-18

## 2022-08-18 RX ADMIN — HYDROMORPHONE HYDROCHLORIDE 1 MG: 1 INJECTION, SOLUTION INTRAMUSCULAR; INTRAVENOUS; SUBCUTANEOUS at 12:56

## 2022-08-18 RX ADMIN — CEFEPIME 2000 MG: 2 INJECTION, POWDER, FOR SOLUTION INTRAVENOUS at 02:54

## 2022-08-18 RX ADMIN — THIAMINE HYDROCHLORIDE 100 MG: 100 INJECTION, SOLUTION INTRAMUSCULAR; INTRAVENOUS at 09:33

## 2022-08-18 RX ADMIN — METRONIDAZOLE 500 MG: 500 INJECTION, SOLUTION INTRAVENOUS at 00:38

## 2022-08-18 RX ADMIN — HYDROCORTISONE SODIUM SUCCINATE 100 MG: 100 INJECTION, POWDER, FOR SOLUTION INTRAMUSCULAR; INTRAVENOUS at 00:33

## 2022-08-18 RX ADMIN — IPRATROPIUM BROMIDE AND ALBUTEROL SULFATE 1 AMPULE: .5; 2.5 SOLUTION RESPIRATORY (INHALATION) at 12:24

## 2022-08-18 RX ADMIN — SODIUM CHLORIDE, PRESERVATIVE FREE 40 MG: 5 INJECTION INTRAVENOUS at 06:40

## 2022-08-18 RX ADMIN — MORPHINE SULFATE 2 MG: 2 INJECTION, SOLUTION INTRAMUSCULAR; INTRAVENOUS at 16:40

## 2022-08-18 RX ADMIN — MIDAZOLAM HYDROCHLORIDE 1 MG: 2 INJECTION, SOLUTION INTRAMUSCULAR; INTRAVENOUS at 14:36

## 2022-08-18 RX ADMIN — Medication 1 CAPSULE: at 09:57

## 2022-08-18 RX ADMIN — MIDAZOLAM HYDROCHLORIDE 1 MG: 2 INJECTION, SOLUTION INTRAMUSCULAR; INTRAVENOUS at 12:57

## 2022-08-18 RX ADMIN — GUAIFENESIN SYRUP AND DEXTROMETHORPHAN 5 ML: 100; 10 SYRUP ORAL at 09:27

## 2022-08-18 RX ADMIN — Medication 400 MG: at 09:34

## 2022-08-18 RX ADMIN — MORPHINE SULFATE 2 MG: 2 INJECTION, SOLUTION INTRAMUSCULAR; INTRAVENOUS at 14:54

## 2022-08-18 RX ADMIN — SUCRALFATE 1 G: 1 TABLET ORAL at 06:40

## 2022-08-18 RX ADMIN — Medication 2000 UNITS: at 09:35

## 2022-08-18 RX ADMIN — HYDROCORTISONE SODIUM SUCCINATE 100 MG: 100 INJECTION, POWDER, FOR SOLUTION INTRAMUSCULAR; INTRAVENOUS at 09:33

## 2022-08-18 RX ADMIN — ALBUMIN (HUMAN) 25 G: 0.25 INJECTION, SOLUTION INTRAVENOUS at 09:19

## 2022-08-18 RX ADMIN — METRONIDAZOLE 500 MG: 500 INJECTION, SOLUTION INTRAVENOUS at 09:50

## 2022-08-18 RX ADMIN — SODIUM CHLORIDE, POTASSIUM CHLORIDE, SODIUM LACTATE AND CALCIUM CHLORIDE 500 ML: 600; 310; 30; 20 INJECTION, SOLUTION INTRAVENOUS at 01:00

## 2022-08-18 RX ADMIN — GLYCOPYRROLATE 0.2 MG: 0.2 INJECTION, SOLUTION INTRAMUSCULAR; INTRAVENOUS at 12:57

## 2022-08-18 RX ADMIN — SUCRALFATE 1 G: 1 TABLET ORAL at 00:33

## 2022-08-18 ASSESSMENT — PAIN SCALES - GENERAL
PAINLEVEL_OUTOF10: 0

## 2022-08-18 NOTE — PROGRESS NOTES
GENERAL SURGERY  DAILY PROGRESS NOTE  8/18/2022    CHIEF COMPLAINT:  Chief Complaint   Patient presents with    Other     Sent from NH for K+ of 6.8       SUBJECTIVE:  No complaints from pt this am. BP's labile through the night, pt received 2 x 500 cc bolus and is currently on levo 10. Zero output from ostomy. OBJECTIVE:  BP (!) 131/49   Pulse 92   Temp 99.2 °F (37.3 °C) (Axillary)   Resp 18   Ht 5' (1.524 m)   Wt 125 lb 7.1 oz (56.9 kg)   SpO2 98%   BMI 24.50 kg/m²     GENERAL:  Responds to pain, not to questions or commands  LUNGS:  on BiPap  CARDIOVASCULAR: RR  ABDOMEN:  Firm, moderately distended, diffusely tender to palpation. No guarding, rigidity, rebound. Ostomy site pink. ASSESSMENT/PLAN:  [de-identified] y.o. female with sepsis, hemorrhagic gastritis, KD on CKD; hx of mesenteric ischemia. - PEG to gravity  - NPO  - Hold TF's   - With patient's hx of mesenteric ischemia, recommend no TF's while patient is on vasopressors  - Significant ascites on CT- recommend drain fluid and send for culture. Suspect her ileus will improve with drainage of this. - overall seems to be poor prognosis. - No plans for acute surgical interventions. She has a \"hostile abdomen\" from her extensive surgical history and limited remaining small bowel. As such do not recommend surgical exploration if her condition deteriorates further.     Will discuss w Dr. Sherif Ramos DO  Surgery Resident PGY-1  8/18/2022  5:54 AM     Electronically signed by Yudy Villa DO on 8/18/2022 at 5:54 AM    Seen/examined  Agree with above  With VRE bacteremia  On norepi gtt  See my addendum from yesterday's note  Code status changed to [address]. paracentesis  Surgery will sign off  [place]

## 2022-08-18 NOTE — FLOWSHEET NOTE
08/18/22 1135   Vital Signs   BP (!) 131/54   Temp (!) 96.5 °F (35.8 °C)   Heart Rate 94   Resp 21   Weight 119 lb 4.3 oz (54.1 kg)   Weight Method Bed scale   Percent Weight Change 0   Post-Hemodialysis Assessment   Post-Treatment Procedures Blood returned;Catheter capped, clamped with Citrate x 2 ports   Machine Disinfection Process Exterior Machine Disinfection   Rinseback Volume (ml) 300 ml   Blood Volume Processed (Liters) 34.3 l/min   Dialyzer Clearance Lightly streaked   Duration of Treatment (minutes) 180 minutes   Heparin Amount Administered During Treatment (mL) 0 mL   Hemodialysis Intake (ml) 300 ml   Hemodialysis Output (ml) 1000 ml   NET Removed (ml) 700   Tolerated Treatment Fair   Interventions Taken Medication   Patient Response to Treatment pt tolerated tx fair, 700ml fluid removed   Physician Notified Yes   Time Off 1130   Patient Disposition Remain in ICU/ED   Tolerated  HD per orders,  700ml removed. HD CVC flushed, citrate to dwell, clamped and capped  post tx per policy. Report to floor RN, remains in care of staff.

## 2022-08-18 NOTE — PROGRESS NOTES
[address] [facility]   Department of Internal Medicine   Internal Medicine Residency  MICU Progress Note    Patient:  Sruthi Wallace [de-identified] y.o. female   MRN: [id number]       Date of Service: 2022    Allergy: Fentanyl, Levofloxacin, Tramadol, and Vioxx [rofecoxib]    Subjective     Patient was seen and examined this morning at bedside in respiratory distress. Patient getting agitated and pulling off BiPAP. Overnight patient had low oxygen saturation with ABG 7.25/59.8/49.2/25. 9. The need for possible intubation with worsening respiratory status was discussed with patient and her . After detailed discussion they have opted for changing the CODE STATUS to DNR CCA/DNI. Objective     TEMPERATURE:  Current - Temp: (!) 96.5 °F (35.8 °C); Max - Temp  Av.4 °F (36.9 °C)  Min: 96.5 °F (35.8 °C)  Max: 99.7 °F (37.6 °C)  RESPIRATIONS RANGE: Resp  Av.1  Min: 14  Max: 24  PULSE RANGE: Pulse  Av.8  Min: 77  Max: 106  BLOOD PRESSURE RANGE:  Systolic (43EKT), FUN:127 , Min:131 , KLR:533   ; Diastolic (53QAC), FELECIA:27, Min:54, Max:57    PULSE OXIMETRY RANGE: SpO2  Av.9 %  Min: 81 %  Max: 100 %    I & O - 24hr:    Intake/Output Summary (Last 24 hours) at 2022 1629  Last data filed at 2022 1135  Gross per 24 hour   Intake 4540.61 ml   Output 1110 ml   Net 3430.61 ml     I/O last 3 completed shifts: In: 5167.1 [I.V.:2156. 6; NG/GT:390; IV Piggyback:2620.5]  Out: 287 [Urine:98; Emesis/NG output:180; Stool:9] I/O this shift: In: 785.5 [I.V.:485.5]  Out: 1005 [Urine:5]   Weight change: -4 lb 10.1 oz (-2.1 kg)    Physical Exam:   General Appearance:  Ill-appearing, drowsy, on BiPAP   HEENT:    NC/AT, mucous membranes are dry   Neck:   Supple, no jugular venous distention. Resp:   Tachypneic (+) wheezes, and crepitus   Heart:    RRR   Abdomen:   Abdomen distended, sluggish bowel sounds noted. Ileostomy draining minimal stool.   PEG tube put to gravity for drainage with bilious fluid. + dullness to percussion and fluid wave.    Extremities:   Atraumatic, edema 4 (+)   Pulses:  Radial and pedal pulses are intact bilaterally   Neurologic: Lethargic, intermittently follows commands       Medications     Continuous Infusions:   norepinephrine 11 mcg/min (08/18/22 0727)    dextrose       Scheduled Meds:   [START ON 8/19/2022] cefepime  1,000 mg IntraVENous Q24H    daptomycin (CUBICIN) in NS IV syringe  450 mg IntraVENous Q48H    hydrocortisone sodium succinate PF  100 mg IntraVENous Q8H    metroNIDAZOLE  500 mg IntraVENous Q8H    ipratropium-albuterol  1 ampule Inhalation Q4H WA    guaiFENesin-dextromethorphan  5 mL Oral BID    Arformoterol Tartrate  15 mcg Nebulization BID    budesonide  0.5 mg Nebulization BID    thiamine  100 mg IntraVENous Daily    epoetin ludin-epbx  30 Units/kg SubCUTAneous Once per day on Mon Wed Fri    lidocaine  1 patch TransDERmal Daily    pantoprazole (PROTONIX) 40 mg injection  40 mg IntraVENous Q12H    sucralfate  1 g Oral 4 times per day    [Held by provider] calcium carbonate  500 mg Oral Nightly    vitamin D  2,000 Units Oral BID    [Held by provider] diphenoxylate-atropine  1 tablet Oral 4x Daily AC & HS    escitalopram  20 mg Oral Nightly    ferrous sulfate  325 mg Oral Nightly    fluticasone  1 spray Each Nostril Nightly    [Held by provider] lipase-protease-amylase  24,000 Units Oral TID WC    [Held by provider] loperamide  4 mg Oral TID WC    magnesium oxide  400 mg Oral BID    ocuvite-lutein  1 capsule Oral BID    [Held by provider] pravastatin  40 mg Oral Nightly    primidone  250 mg Oral Nightly    traZODone  50 mg Oral Nightly    vitamin B-12  500 mcg Oral Nightly     PRN Meds: HYDROmorphone **OR** HYDROmorphone, glycopyrrolate, midazolam **OR** midazolam, morphine, iohexol, anticoagulant sodium citrate, aluminum & magnesium hydroxide-simethicone, dextrose bolus **OR** [DISCONTINUED] dextrose bolus, dextrose, glucose, glucagon (rDNA), hyoscyamine, [Held by provider] lipase-protease-amylase, senna, oxyCODONE-acetaminophen, [DISCONTINUED] dextrose bolus **OR** dextrose bolus  Nutrition:   Diet NPO Exceptions are: Sips of Water with Meds    Labs and Imaging Studies     CBC:   Recent Labs     08/17/22  0500 08/17/22  1811 08/18/22  0427   WBC 33.8* 25.1* 21.2*   HGB 7.2* 7.4* 7.9*   HCT 23.5* 23.6* 25.2*   MCV 94.0 91.5 93.3   * 111* 106*       BMP:    Recent Labs     08/16/22  1858 08/17/22  0500 08/18/22  0427    135 135   K 3.8 3.8 3.5    102 104   CO2 26 25 22   BUN 16 20 26*   CREATININE 2.2* 2.4* 2.4*   GLUCOSE 122* 151* 149*       LIVER PROFILE:   Recent Labs     08/16/22  1043 08/16/22  1411   AST  --  17   ALT  --  8   LIPASE 43  --    BILITOT  --  1.1   ALKPHOS  --  116*         PT/INR:   Recent Labs     08/17/22  0500   PROTIME 14.8*   INR 1.4       APTT:   No results for input(s): APTT in the last 72 hours.       Fasting Lipid Panel:    Lab Results   Component Value Date/Time    CHOL 87 08/01/2022 03:36 AM    TRIG 75 08/01/2022 03:36 AM    HDL 42 08/01/2022 03:36 AM       Cardiac Enzymes:    Lab Results   Component Value Date    CKTOTAL 101 08/11/2022    CKTOTAL 28 07/14/2022    CKTOTAL 31 07/14/2022    TROPONINI 0.01 02/25/2021    TROPONINI 0.01 01/21/2021    TROPONINI 0.07 (H) 01/25/2020       Notable Cultures:      Blood cultures   Blood Culture, Routine   Date Value Ref Range Status   08/16/2022 (A)  Preliminary    Gram stain performed from blood culture bottle media  Gram positive cocci in chains     08/16/2022 Identification and sensitivity to follow  Preliminary     Respiratory cultures No results found for: RESPCULTURE No results found for: LABGRAM  Urine   Urine Culture, Routine   Date Value Ref Range Status   08/16/2022 >100,000 CFU/ml  Identification to follow    Preliminary     Legionella No results found for: LABLEGI  C Diff PCR No results found for: CDIFPCR  Wound culture/abscess: No results for input(s): WNDABS in the last 72 hours. Tip culture:No results for input(s): CXCATHTIP in the last 72 hours. Oxygen:     Vent Information  Equipment Changed: (S) Mask  Additional Respiratory Assessments  Heart Rate: 89  Resp: 22  SpO2: (!) 81 %       Urinary Catheter-Output (mL): 5 mL    Imaging Studies:  XR CHEST PORTABLE   Final Result   1. Unchanged moderate bilateral pleural effusions with probable perihilar   atelectasis. 2.  Stable left IJ central venous catheter. XR CHEST PORTABLE   Final Result   Improvement in aeration in the right mid to lower lung field. Otherwise, no change over the past 24 hours. XR CHEST PORTABLE   Final Result   Bilateral pleural effusions decreased on the left compared to prior study. CT CHEST WO CONTRAST   Final Result   1. Moderate to large bilateral pleural effusions with passive atelectasis of   the lower lobes. Small amount of high density material dependently in the   lower lobe bronchi could represent aspiration oral contrast material.  The   esophagus is distended with oral contrast.      2.  Moderate upper abdominal ascites. 3.  No acute alveolar consolidation within the chest.         CT ABDOMEN PELVIS WO CONTRAST Additional Contrast? Radiologist Recommendation   Final Result   1. Increased abdominal ascites compared to 08/11/2022 which is now moderate. 2.  Wall thickening of a segment of small bowel in the lower abdomen with gas   fluid levels in the mid small bowel. It is difficult to exclude small bowel   ischemia. Small bowel dilatation and fold thickening has generally improved   compared to 08/11/2022. 3.  Moderate pelvic ascites apparently contiguous with the moderate abdominal   ascites however there are suspended foci of GAS within the pelvic cul-de-sac   which could reflect PELVIC ABSCESS and/or bacterial PERITONITIS. The   communicating fluid collection within the pelvic cul-de-sac measures   approximately 6 x 11 cm.       4. Moderate to large bilateral pleural effusions. Moderate subcutaneous   edema along the body wall compatible with 3rd spacing of fluid. XR ABDOMEN (KUB) (SINGLE AP VIEW)   Final Result   No evidence of bowel obstruction or other acute process. XR CHEST PORTABLE   Final Result   Bibasilar consolidation and/or collapse. Probable effusions. XR ABDOMEN (KUB) (SINGLE AP VIEW)   Final Result   Gaseous small intestinal distension and thickening of the small bowel folds   in the right lower quadrant. CT ABDOMEN PELVIS WO CONTRAST Additional Contrast? None   Final Result   Enlarged liver demonstrating periportal edema. Redemonstration of a low-attenuation lesion in the pancreas. Dilated stomach and gaseous proximal small intestinal dilatation. Abnormal thickening of the wall of the distal small bowel. Large ventral hernia containing the distal stomach and segments of small   bowel. Generalized soft tissue edematous changes consistent with anasarca and mild   to moderate ascites. Multiple additional findings as described. XR CHEST PORTABLE   Final Result   Interval placement of left internal jugular central venous catheter   terminating at the cavoatrial junction. No postprocedure pneumothorax         US RETROPERITONEAL COMPLETE   Final Result   1. Unremarkable sonographic evaluation of the right kidney. 2. Left renal cysts. 3. The urinary bladder is not visualized secondary to obscuring bowel gas. XR CHEST PORTABLE   Final Result   No acute process. XR CHEST PORTABLE    (Results Pending)        Resident's Assessment and Plan       Assessment and Plan:    Shock, most likely septic 2/2 ? Source ? C.  Diff vs HAP vs ? 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 DIC-resolved  Acute on chronic normocytic normochromic anemia 2/2 hemorrhagic gastritis- S/p EGD 8/12 -suggestive of hemorrhagic gastritis  Abdominal pain 2/2 ileus in setting of multiple abdominal surgery and ileostomy for ischemic bowel disease ? Toxic megacolon  CT abdomen (8/16/2022): Large ascites with cul-de-sac collection measuring 6 x 11 cm /abscess/peritonitis. Patient and has been have decided against any invasive procedures at this point of time including paracentesis. General surgery following and recommends no surgical intervention given extensive history of abdominal surgeries in past  Hx Short Gut syndrome  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 Hx HLD LDL 30 on 8/1/2022 on Pravachol 40  Hx CAD s/p stent placement in 2002  Hx COPD  Prior concern for PE was on Eliquis: Blood thinners currently on hold    Resolved issues:  HAGMA 2/2 lactic acidosis/uremia in setting of ileostomy  Hyperkalemia 2/2 KD/CKD-Lokelma stopped  Hypervolemic hypotonic hyponatremia 2/2 KD  Lactic acidosis likely type A/D sepsis versus short gut syndrome  Coagulopathy 2/2 hemorrhagic gastritis /HD catheter bleed, INR - 5- resolved    Plan:  Due to discussion about patient's condition in setting of multiple comorbidities and complicated surgical history. Patient's  and daughter were updated about the current treatment plan. However, based on their discussion with patient in the past and her condition, they hey expressed that they want to focus on comfort care going forward. Palliative has been following. After lengthy discussion patient's CODE STATUS was changed to DNR CC. Comfort and support provided. All questions and concerns were answered.       Junaid Gamez MD, PGY-2    Attending Physician: Dr. Maria M Patel  Department of Pulmonary, Critical Care and [facility]  Department of Internal Medicine      During multidisciplinary team rounds Coco linton a [de-identified] y.o. female was seen, examined and discussed. This is confirmation that I have personally seen and examined the patient and that the key elements of the encounter were performed by me (> 85 % time). The medications & laboratory data was discussed and adjusted where necessary. The radiographic images were reviewed or with radiologist or consultant if felt dis-concordant with the exam or history. The above findings were corroborated, plans confirmed and changes made if needed. Family is updated at the bedside as available. Key issues of the case were discussed among consultants. Critical Care time is documented if appropriate.       Critical Care time: 34 minutes    Sosa Connelly DO

## 2022-08-18 NOTE — DEATH NOTES
Expiration Note:    Called to bedside to pronounce death after patient was noted to be in asystole at [facility] 8/18/2022. NO pupillary, corneal, doll's eye or gag reflex noted. NO heart sounds or pulse noted. NO Chest rise or Lung sounds noted. NO Response to painful stimuli  Time of death declared at 1905.       Fransisco Wise MD, PGY-2  8/18/2022  7:14 PM

## 2022-08-18 NOTE — PROGRESS NOTES
WORKERS'  RETURN TO WORK REPORT   [facility]   [address] [place] [address]  [phone]    2019    EMPLOYEE INFORMATION:   EMPLOYER INFORMATION:  NAME: [employer]  : 1989     [phone]                DATE OF SERVICE:  Date: 2019.      Time out: 1:40 PM.   [facility]   Provider: Rani Deng CNM    DIAGNOSIS:   1. Postpartum follow-up    .     STATUS:   NON WORK RELATED       RETURN TO WORK NOW:     [x] WITHOUT limitations [] WITH limitations as stated below   [] Off work until next visit because: [] Home bound  [] Bed rest  [] Other:      ACTIVITY LIMITATIONS: Are to be followed both at work and at home           Up to ( )                 Hrs/Day     Not at all   Left       Right  May reach above shoulder level    (  )        []            []            []   May use hand/arm for repetitive movements   (  )  []            []            []    May use hand for pushing, pulling, carrying   (  )  []            []            []  May stand/walk      (  )  []             May bend/twist      (  )  []            May squat/kneel      (  )  []               [] Avoid the most extensive stooping, bending, stretching, twisting  [] No climbing ladders or scaffolding   [] Avoid climbing stairs   [] Avoid high force/high repetition jobs  [] Will need substantially different job duties than in the past    Other comments and limitations:      OTHER:    [] LEFT hand work only  [] RIGHT hand work only  [] Sit down work only   [] Alternate sitting and standing as needed   [] Do not drive / operate equipment  [] Rotate jobs every ( ) hours  [] Medication may cause drowsiness      Electronically signed on 2019 at 1:40 PM by: Rani Deng CNM       OTHER COMMENTS:    To employee: The [employer] medical provider sets medical activity limitations, in order to try to keep you working.  Your  Palliative Care Department  [phone]  Palliative Care Initial Consult  Provider Yelitza Chinchilla, APRN - CNP      PATIENT: Marcia Samuels  : 1940  MRN: [id number]  ADMISSION DATE: 2022 10:01 AM  Referring Provider: Arvind Lux MD    Palliative Medicine was consulted on hospital day 10 for assistance with Goals of care, Code Status Discussion, Family support, Symptom management     HPI:     Wilton Banuelos is a [de-identified] y.o. y/o female with a history of arthritis, coronary artery disease, cancer, COPD, depression, fibromyalgia, necrotic bowel, hypertension, hyperlipidemia, mitral valve regurgitation, myocardial infarction, osteopenia, acute kidney failure, who [facility] to [facility] on 2022 from nursing home with abnormal labs (elevated potassium levels 6.8). At the emergency room laboratory findings shows potassium level 6.4, hemoglobin 6.6, hematocrit 21.8, troponin 60, proBNP 686. Patient had a recent admission discharged from [facility] where she was treated for bacteremia, she had a port that was removed for concern of CLABSI. Also has a history of mesenteric ischemia with an ileostomy placement, and a PEG tube placed for failure to thrive. On 8/10/2022 patient was transferred to the MICU due to persistent hyperkalemia, low blood pressure, with improving potassium levels, and stable hemoglobin, however developing new lactic acidosis 2022 had an EGD that shows hemorrhagic gastritis. ASSESSMENT/PLAN:     Pertinent Hospital Diagnoses     Hyperkalemia  Acute on chronic anemia      Palliative Care Encounter / Counseling Regarding Goals of Care  Please see detailed goals of care discussion as below  At this time, Marcia Samuels, Does Not have capacity for medical decision-making.   Capacity is time limited and situation/question specific  During encounter Peggye Cabot was surrogate medical decision-maker  Outcome of goals of care meeting:  Continue with current medical treatment   is waiting for his daughter, who is coming from [place] tomorrow, and his son arriving from out of state, before withdrawing care  CODE STATUS discussed and changed to DNR CCA DNI  Code status Limited DNR CCA DNI  Advanced Directives: no POA or living will in [facility]  Surrogate/Legal NOK:  Riaz Alas (Spouse) [phone]    Spiritual assessment: no spiritual distress identified  Bereavement and grief: to be determined  Referrals to: none today    Thank you for the opportunity to participate in the care of Maira Mendoza. GEMA Molina CNP  Palliative Medicine     SUBJECTIVE:     Details of Conversation:     Chart reviewed. General surgery note reviewed. Saw patient at the bedside. She is was currently receiving hemodialysis, not tolerating very well, Levophed was increased. Met with patient  and daughter in the waiting room. We had a lengthy discussions regarding goal of care, patient had expressed in multiple occasions of feeling tired, and wanting to de-escalate care, currently patient is more confused. Arminda Whitaker and his daughter has come up to an agreement it would be best to saurabh patient's wishes of de-escalating care, and wants to transition her to comfort care focus. After dialysis is completed, plan is to move forward with DNR CC, comfort medications were placed. Bedside nurse and care team notified of family decision. We will continue to follow. Prognosis: Poor    OBJECTIVE:     BP (!) 131/54   Pulse 94   Temp (!) 96.5 °F (35.8 °C)   Resp 21   Ht 5' (1.524 m)   Wt 119 lb 4.3 oz (54.1 kg)   SpO2 99%   BMI 23.29 kg/m²     Physical Examination:  Gen: elderly, thin, NAD, drowsy, on BiPAP  HEENT: normocephalic, atraumatic, PERRL, EOMI,   Neck: trachea midline, no JVD  Lungs:  Tachypneic on BiPAP  Heart: regular rate and rhythm, distant heart tones,   Abdomen: Rounded, distended  Extremities: edema  Skin: warm, dry without rashes, employer will determine if and when appropriate limited work is available for you.  Your employer, not the medical provider, will find you a job or else remove you from work if there is no appropriate limited duty. Discuss this with your employer. If you anticipate problems commuting to work or need time off for other reasons, discuss these concerns with your employer.    To employer: locating suitable limited work for some injuries may require a dynamic approach; please call our  nurse or provider if you have questions on this.         lesions, bruising  Neuro: Drowsy, fidgety, follows commands    Objective data reviewed: labs, images, records, medication use, vitals, and chart    Time/Communication  Greater than 50% of time spent, total 35 minutes in counseling and coordination of care at the bedside regarding  CODE STATUS discussion, family support and goals of care. Thank you for allowing Palliative Medicine to participate in the care of [address]. Note: This report was completed using computerize voiced recognition software. Every effort has been made to ensure accuracy; however, inadvertent computerized transcription errors may be present.

## 2022-08-18 NOTE — PROGRESS NOTES
Patient family ( and daughter Janey Christie) is at bedside. They spoke with palliative Care GEMA Diallo earlier today and expressed they wanted to makes this patient comfort care only now [facility]). Patient  stated Brittany Borden has been in pain too long and she is ready to be at peace. \" Patient daughter is at the bedside and in agreement with transition to [address] only at this time.

## 2022-08-18 NOTE — PLAN OF CARE
Call placed to patient's spouse Jacinto Barker to discuss current condition of patient and update about worsening abdominal distension with concern for abdominal infection with ? C. Diff, respiratory status, kidney function. Patient is becoming hypoxic and refuses to keep BIPAP or nasal cannula on. Concerns about need of intubation given current circumstances were discussed. However, he expressed that he does not want intubation done. Patient has been refusing intubation with her mentation intact. Patient's spouse is going to be in MICU to visit patient today and to discuss more about the goals of care. Palliative care consulted for suppport and to discuss goals of care. Electronically signed by Kenton Guerrero MD on 8/17/22 at 9:26 AM EDT       ADDENDUM:  Met patient's  Mr. Jacinto Barker at bedside with Dr. Yvonne Zhao and Palliative care at 10:45 am. Patient's condition discussed in details and he wished to change the code status to DNR- CCA. Comfort and support provided. All questions and concerns answered.     Electronically signed by Kenton Guerrero MD on 8/17/22 at 10:59 AM EDT
Problem: Discharge Planning  Goal: Discharge to home or other facility with appropriate resources  Outcome: Progressing     Problem: Skin/Tissue Integrity  Goal: Absence of new skin breakdown  Description: 1. Monitor for areas of redness and/or skin breakdown  2. Assess vascular access sites hourly  3. Every 4-6 hours minimum:  Change oxygen saturation probe site  4. Every 4-6 hours:  If on nasal continuous positive airway pressure, respiratory therapy assess nares and determine need for appliance change or resting period.   Outcome: Progressing     Problem: Safety - Adult  Goal: Free from fall injury  Outcome: Progressing     Problem: Pain  Goal: Verbalizes/displays adequate comfort level or baseline comfort level  Outcome: Progressing     Problem: Nutrition Deficit:  Goal: Optimize nutritional status  Outcome: Progressing
Problem: Discharge Planning  Goal: Discharge to home or other facility with appropriate resources  Outcome: Progressing     Problem: Skin/Tissue Integrity  Goal: Absence of new skin breakdown  Description: 1. Monitor for areas of redness and/or skin breakdown  2. Assess vascular access sites hourly  3. Every 4-6 hours minimum:  Change oxygen saturation probe site  4. Every 4-6 hours:  If on nasal continuous positive airway pressure, respiratory therapy assess nares and determine need for appliance change or resting period.   Outcome: Progressing     Problem: Safety - Adult  Goal: Free from fall injury  Outcome: Progressing  Flowsheets (Taken 8/17/2022 2734)  Free From Fall Injury:   Instruct family/caregiver on patient safety   Based on caregiver fall risk screen, instruct family/caregiver to ask for assistance with transferring infant if caregiver noted to have fall risk factors     Problem: Pain  Goal: Verbalizes/displays adequate comfort level or baseline comfort level  Outcome: Progressing     Problem: Nutrition Deficit:  Goal: Optimize nutritional status  Outcome: Progressing
Problem: Discharge Planning  Goal: Discharge to home or other facility with appropriate resources  Outcome: Progressing     Problem: Skin/Tissue Integrity  Goal: Absence of new skin breakdown  Outcome: Progressing     Problem: Safety - Adult  Goal: Free from fall injury  Outcome: Progressing
Problem: Pain  Goal: Verbalizes/displays adequate comfort level or baseline comfort level  8/13/2022 0355 by Peace Solano RN  Outcome: Not Progressing  8/12/2022 1633 by Radu Monique RN  Outcome: Progressing  Flowsheets (Taken 8/12/2022 0932)  Verbalizes/displays adequate comfort level or baseline comfort level: Assess pain using appropriate pain scale     Problem: Nutrition Deficit:  Goal: Optimize nutritional status  8/13/2022 0355 by Peace Solano RN  Outcome: Not Progressing  8/12/2022 1633 by Radu Monique RN  Outcome: Progressing     Problem: Pain  Goal: Verbalizes/displays adequate comfort level or baseline comfort level  8/13/2022 0355 by Peace Solano RN  Outcome: Not Progressing  8/12/2022 1633 by Radu Monique RN  Outcome: Progressing  Flowsheets (Taken 8/12/2022 0932)  Verbalizes/displays adequate comfort level or baseline comfort level: Assess pain using appropriate pain scale     Problem: Nutrition Deficit:  Goal: Optimize nutritional status  8/13/2022 0355 by Peace Solano RN  Outcome: Not Progressing  8/12/2022 1633 by Radu Monique RN  Outcome: Progressing
Problem: Skin/Tissue Integrity  Goal: Absence of new skin breakdown  Description: 1. Monitor for areas of redness and/or skin breakdown  2. Assess vascular access sites hourly  3. Every 4-6 hours minimum:  Change oxygen saturation probe site  4. Every 4-6 hours:  If on nasal continuous positive airway pressure, respiratory therapy assess nares and determine need for appliance change or resting period. 8/16/2022 0820 by Marybeth Huston RN  Outcome: Progressing     Problem: Safety - Adult  Goal: Free from fall injury  8/16/2022 0820 by Marybeth Huston RN  Outcome: Progressing     Problem: Pain  Goal: Verbalizes/displays adequate comfort level or baseline comfort level  8/16/2022 0820 by Marybeth Huston RN  Outcome: Progressing     Problem: Nutrition Deficit:  Goal: Optimize nutritional status  8/16/2022 0820 by Marybeth Huston RN  Outcome: Progressing   Plan of care discussed with patient / family.
Problem: Skin/Tissue Integrity  Goal: Absence of new skin breakdown  Description: 1. Monitor for areas of redness and/or skin breakdown  2. Assess vascular access sites hourly  3. Every 4-6 hours minimum:  Change oxygen saturation probe site  4. Every 4-6 hours:  If on nasal continuous positive airway pressure, respiratory therapy assess nares and determine need for appliance change or resting period. Outcome: Not Progressing  Note: Excoriation to sacrum     Problem: Nutrition Deficit:  Goal: Optimize nutritional status  Outcome: Not Progressing     Problem: Skin/Tissue Integrity  Goal: Absence of new skin breakdown  Description: 1. Monitor for areas of redness and/or skin breakdown  2. Assess vascular access sites hourly  3. Every 4-6 hours minimum:  Change oxygen saturation probe site  4. Every 4-6 hours:  If on nasal continuous positive airway pressure, respiratory therapy assess nares and determine need for appliance change or resting period.   Outcome: Not Progressing  Note: Excoriation to sacrum     Problem: Nutrition Deficit:  Goal: Optimize nutritional status  Outcome: Not Progressing
~2000 Arterial line placement was attempted on L. Radial A. Was attempted x4 times with no success.
Change oxygen saturation probe site  4. Every 4-6 hours:  If on nasal continuous positive airway pressure, respiratory therapy assess nares and determine need for appliance change or resting period.   8/12/2022 0931 by Allyson Garcia RN  Outcome: Progressing  8/12/2022 0104 by Karthik Myrick RN  Outcome: Not Progressing  Note: Excoriation to sacrum     Problem: Nutrition Deficit:  Goal: Optimize nutritional status  8/12/2022 0931 by Allyson Garcia RN  Outcome: Progressing  8/12/2022 0104 by Karthik Myrick RN  Outcome: Not Progressing

## 2022-08-18 NOTE — PROGRESS NOTES
Spoke with Patient  Mana Mayor he updated that the patient  per phone (as previously discussed earlier with the )    Elaine León from [facility]  referral number [de-identified] and body can be released    Family would like the body sent to below  home.    [facility]  [address], [place]  [facility]

## 2022-08-18 NOTE — PROGRESS NOTES
Progress Note  8/18/2022 1:02 PM  Subjective:   Admit Date: 8/8/2022  PCP: Dylon Kaiser,   Interval History: Patient doing much the same , remains lethargic , on pressors , abdo remains distended     Diet: Diet NPO Exceptions are: Sips of Water with Meds    Data:   Scheduled Meds:   [START ON 8/19/2022] cefepime  1,000 mg IntraVENous Q24H    daptomycin (CUBICIN) in NS IV syringe  450 mg IntraVENous Q48H    hydrocortisone sodium succinate PF  100 mg IntraVENous Q8H    metroNIDAZOLE  500 mg IntraVENous Q8H    ipratropium-albuterol  1 ampule Inhalation Q4H WA    guaiFENesin-dextromethorphan  5 mL Oral BID    Arformoterol Tartrate  15 mcg Nebulization BID    budesonide  0.5 mg Nebulization BID    thiamine  100 mg IntraVENous Daily    epoetin ludin-epbx  30 Units/kg SubCUTAneous Once per day on Mon Wed Fri    lidocaine  1 patch TransDERmal Daily    pantoprazole (PROTONIX) 40 mg injection  40 mg IntraVENous Q12H    sucralfate  1 g Oral 4 times per day    [Held by provider] calcium carbonate  500 mg Oral Nightly    vitamin D  2,000 Units Oral BID    [Held by provider] diphenoxylate-atropine  1 tablet Oral 4x Daily AC & HS    escitalopram  20 mg Oral Nightly    ferrous sulfate  325 mg Oral Nightly    fluticasone  1 spray Each Nostril Nightly    [Held by provider] lipase-protease-amylase  24,000 Units Oral TID WC    [Held by provider] loperamide  4 mg Oral TID WC    magnesium oxide  400 mg Oral BID    ocuvite-lutein  1 capsule Oral BID    [Held by provider] pravastatin  40 mg Oral Nightly    primidone  250 mg Oral Nightly    traZODone  50 mg Oral Nightly    vitamin B-12  500 mcg Oral Nightly     Continuous Infusions:   norepinephrine 11 mcg/min (08/18/22 0727)    dextrose       PRN Meds:HYDROmorphone **OR** HYDROmorphone, midazolam **OR** midazolam, glycopyrrolate, iohexol, anticoagulant sodium citrate, aluminum & magnesium hydroxide-simethicone, dextrose bolus **OR** [DISCONTINUED] dextrose bolus, dextrose, glucose, glucagon (rDNA), hyoscyamine, [Held by provider] lipase-protease-amylase, senna, oxyCODONE-acetaminophen, [DISCONTINUED] dextrose bolus **OR** dextrose bolus  I/O last 3 completed shifts: In: 5167.1 [I.V.:2156. 6; NG/GT:390; IV Piggyback:2620.5]  Out: 287 [Urine:98; Emesis/NG output:180; Stool:9]  I/O this shift: In: 785.5 [I.V.:485.5]  Out: 1005 [Urine:5]    Intake/Output Summary (Last 24 hours) at 8/18/2022 1302  Last data filed at 8/18/2022 1135  Gross per 24 hour   Intake 4540.61 ml   Output 1125 ml   Net 3415.61 ml     CBC:   Recent Labs     08/17/22  0500 08/17/22  1811 08/18/22  0427   WBC 33.8* 25.1* 21.2*   HGB 7.2* 7.4* 7.9*   * 111* 106*     BMP:    Recent Labs     08/16/22  1858 08/17/22  0500 08/18/22  0427    135 135   K 3.8 3.8 3.5    102 104   CO2 26 25 22   BUN 16 20 26*   CREATININE 2.2* 2.4* 2.4*   GLUCOSE 122* 151* 149*     Hepatic:   Recent Labs     08/15/22  1329 08/16/22  1411   AST 13 17   ALT 7 8   BILITOT 1.0 1.1   ALKPHOS 85 116*     Troponin: No results for input(s): TROPONINI in the last 72 hours. BNP: No results for input(s): BNP in the last 72 hours. Lipids: No results for input(s): CHOL, HDL in the last 72 hours. Invalid input(s): LDLCALCU  ABGs: No results found for: PHART, PO2ART, NSR1ZLS  INR:   Recent Labs     08/17/22  0500   INR 1.4       -----------------------------------------------------------------  RAD: XR CHEST PORTABLE    Result Date: 8/8/2022  EXAMINATION: ONE XRAY VIEW OF THE CHEST 8/8/2022 11:07 am COMPARISON: 07/24/2022 HISTORY: ORDERING SYSTEM PROVIDED HISTORY: hyperkalemia, chest tightness TECHNOLOGIST PROVIDED HISTORY: Reason for exam:->hyperkalemia, chest tightness What reading provider will be dictating this exam?->CRC FINDINGS: The lungs are without acute focal process. There is no effusion or pneumothorax. The cardiomediastinal silhouette is without acute process. The osseous structures are without acute process. No acute process. Objective:   Vitals: BP (!) 131/54   Pulse 89   Temp (!) 96.5 °F (35.8 °C)   Resp 22   Ht 5' (1.524 m)   Wt 119 lb 4.3 oz (54.1 kg)   SpO2 (!) 81%   BMI 23.29 kg/m²   General appearance: appears stated age   Skin:  No rashes or lesions  HEENT: Head: Normocephalic, no lesions, without obvious abnormality.   Neck: no adenopathy, no carotid bruit, no JVD, supple, symmetrical, trachea midline, and thyroid not enlarged, symmetric, no tenderness/mass/nodules  Lungs: diminished breath sounds bibasilar  Heart: regular rate and rhythm, S1, S2 normal, no murmur, click, rub or gallop  Abdomen:  distended   Extremities:  trace edema   Neurologic: Mental status: alertness: lethargic    Assessment:   Patient Active Problem List:     Right cataract     Essential hypertension, benign     Hyperlipidemia with target LDL less than 100     Osteoarthritis, shoulder     Primary osteoarthritis of left knee     History of ischemic bowel disease     Ileostomy present (HCC)     Chronic kidney disease, stage III (moderate) (HCC)     COPD (chronic obstructive pulmonary disease) (HCC)     Moderate protein-calorie malnutrition (HCC)     Abdominal wall cellulitis     Cellulitis     Infected hernioplasty mesh (HCC)     Enterocutaneous fistula     Mild protein-calorie malnutrition (HCC)     Acute kidney injury (HCC)     Hypertensive kidney disease with chronic kidney disease stage IV (HCC)     KD (acute kidney injury) (HCC)     Diarrhea     Elevated serum creatinine     Abnormal serum creatinine level     Hyperlipemia     Primary hypertension     Essential (primary) hypertension     Postsurgical malabsorption, not elsewhere classified     Hypercholesterolemia     Pure hypercholesterolemia     Prediabetes     Hypothyroidism, unspecified     Acute renal failure (ARF) (HCC)     Enteritis     Hypotension     Abnormal CT scan, lumbar spine     Severe protein-calorie malnutrition (HCC)     Hyperkalemia     Shock ([address] Leukocytosis    Plan:   Assessment and Plans:     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 HTN with CKD I-IV  BP goal <120/80  BP at/near goal  Monitor BPs  On Levo     3. Met Acidosis better   in the setting of the KD/ CKD   Also with ileostomy  Monitor labs        4. Secondary hyperparathyroidism of Renal Origin  with Hyperphosphatemia and Unspecified Vit D Def   on 7/15;  PO4 5.2 on 8/9,  Vit D 19 on 8/1  On vitamin D supplementation  Monitor labs  Ca low check ionized and replace prn     5. Hypomagnesemia/Hypocalcemia  Replace prn  On vit d     6. Hyperkalemia  likely due to diet an KD/CKD  high despite numerous rounds of hyper k protocol and hco3 drip  Low potassium diet when able to eat  Cortisol adequate at 90  One hd on 8/11  K low this am, due to GI loss from high ostomy output     7.  anemia  Gi bleed  H/o ischemic colitis and ostomy  S/p PRBCs  Transfuse for hemoglobin<7  On ROSA  General surgery following-possible EGD when lytes stable     8. Shock  ?  Septic shock/hypovolemia/?acute abd  Treat yeast in urine  On diflucan an zosyn  Requiring low dose pressors  Had ct of abd pelvis  Lactate 7>0.7     Patient remains oliguric ,in septic shock , blood c/s enterococcus faecium +   will continue dialytic support  Family considering palliative care , not for intubation , d/w ICU resident          Lesa Chin MD

## 2022-08-19 LAB
(1,3)-BETA-D-GLUCAN (FUNGITELL) INTERPRETATION: POSITIVE
(1,3)-BETA-D-GLUCAN (FUNGITELL): >500 PG/ML
BLOOD CULTURE, ROUTINE: ABNORMAL
ORGANISM: ABNORMAL

## 2022-08-19 NOTE — DISCHARGE SUMMARY
Discharge Summary    Date: 2022  Patient Name: Mark Chavez    YOB: 1940     Age: [de-identified] y.o. Admit Date: 2022  Discharge Date: 2022  Discharge Condition:    Admission Diagnosis  Hyperkalemia [E87.5]; Acute on chronic anemia [D64.9]      Discharge Diagnosis  Principal Problem:    Hyperkalemia  Active Problems:    Severe protein-calorie malnutrition (HCC)    Shock ([address]    Leukocytosis  Resolved Problems:    * No resolved hospital problems. [facility] AND [facility] Stay  Narrative of Hospital Course:  Patient admitted with multiple issues but essentially developed septic shock. Family chose comfort care and patient passed. Consultants:  IP CONSULT TO HOSPITALIST  IP CONSULT TO NEPHROLOGY  IP CONSULT TO GENERAL SURGERY  IP CONSULT TO DIETITIAN  IP CONSULT TO VASCULAR SURGERY  IP CONSULT TO DIETITIAN  IP CONSULT TO PHARMACY  PHARMACY TO DOSE VANCOMYCIN  IP CONSULT TO INFECTIOUS DISEASES  IP CONSULT TO PALLIATIVE CARE    Surgeries/procedures Performed:      Treatments:            Discharge Plan/Disposition:      Hospital/Incidental Findings Requiring Follow Up:    Patient Instructions:    Diet:    Activity:  For number of days (if applicable): Other Instructions:    Provider Follow-Up:   No follow-ups on file. Significant Diagnostic Studies:    Recent Labs:  Admission on 2022, Discharged on 2022  No results displayed because visit has over 200 results. ------------    Radiology last 7 days:  CT ABDOMEN PELVIS WO CONTRAST Additional Contrast? Radiologist Recommendation    Result Date: 2022  1. Increased abdominal ascites compared to 2022 which is now moderate. 2.  Wall thickening of a segment of small bowel in the lower abdomen with gas fluid levels in the mid small bowel. It is difficult to exclude small bowel ischemia. Small bowel dilatation and fold thickening has generally improved compared to 2022.  3.  Moderate pelvic ascites apparently contiguous with the moderate abdominal ascites however there are suspended foci of GAS within the pelvic cul-de-sac which could reflect PELVIC ABSCESS and/or bacterial PERITONITIS. The communicating fluid collection within the pelvic cul-de-sac measures approximately 6 x 11 cm. 4.  Moderate to large bilateral pleural effusions. Moderate subcutaneous edema along the body wall compatible with 3rd spacing of fluid. XR ABDOMEN (KUB) (SINGLE AP VIEW)    Result Date: 8/15/2022  No evidence of bowel obstruction or other acute process. XR ABDOMEN (KUB) (SINGLE AP VIEW)    Result Date: 8/14/2022  Gaseous small intestinal distension and thickening of the small bowel folds in the right lower quadrant. CT CHEST WO CONTRAST    Result Date: 8/16/2022  1. Moderate to large bilateral pleural effusions with passive atelectasis of the lower lobes. Small amount of high density material dependently in the lower lobe bronchi could represent aspiration oral contrast material.  The esophagus is distended with oral contrast. 2.  Moderate upper abdominal ascites. 3.  No acute alveolar consolidation within the chest.     XR CHEST PORTABLE    Result Date: 8/18/2022  1. Unchanged moderate bilateral pleural effusions with probable perihilar atelectasis. 2.  Stable left IJ central venous catheter. XR CHEST PORTABLE    Result Date: 8/17/2022  Improvement in aeration in the right mid to lower lung field. Otherwise, no change over the past 24 hours. XR CHEST PORTABLE    Result Date: 8/16/2022  Bilateral pleural effusions decreased on the left compared to prior study. XR CHEST PORTABLE    Result Date: 8/14/2022  Bibasilar consolidation and/or collapse. Probable effusions.         Pending Labs     Order Current Status    Arterial Blood Gas, Respiratory Only Collected (08/13/22 1209)    Arterial Blood Gas, Respiratory Only Collected (08/14/22 1952)    Arterial Blood Gas, Respiratory Only Collected (08/17/22 9016)    Arterial Blood Gas, Respiratory Only Collected (08/17/22 1031)    Arterial Blood Gas, Respiratory Only Collected (08/17/22 1651)    Culture, Blood 1 Preliminary result    Culture, Blood 2 Preliminary result        Discharge Medications    Discharge Medication List as of 8/18/2022  8:04 AM        Discharge Medication List as of 8/18/2022  8:04 AM        Discharge Medication List as of 8/18/2022  8:04 AM    CONTINUE these medications which have NOT CHANGED    atorvastatin (LIPITOR) 10 MG tablet  Take 10 mg by mouth at bedtime  Historical Med    calcium carbonate (TUMS) 500 MG chewable tablet  Take 1 tablet by mouth every evening  Historical Med    glucose (GLUTOSE) 40 % GEL  Take 15 g by mouth as needed (hypoglycemia)  Historical Med    magnesium hydroxide (MILK OF MAGNESIA) 400 MG/5ML suspension  Take 30 mLs by mouth daily as needed for Constipation  Historical Med    senna (SENOKOT) 8.6 MG tablet  Take 1 tablet by mouth daily as needed for Constipation  Historical Med    acetaminophen (TYLENOL) 325 MG tablet  Take 650 mg by mouth every 4 hours as needed for Pain  Historical Med    vitamin C (ASCORBIC ACID) 500 MG tablet  Take 500 mg by mouth in the morning and 500 mg before bedtime. Historical Med    ondansetron (ZOFRAN) 4 MG tablet  Take 4 mg by mouth every 8 hours as needed for Nausea or Vomiting  Historical Med    apixaban (ELIQUIS) 5 MG TABS tablet  Take 1 tablet by mouth in the morning and 1 tablet before bedtime. , Disp-60 tablet, R-0  DC to SNF    !! lipase-protease-amylase (CREON) 58913-58763 units delayed release capsule  Take 12,000 Units by mouth 2 times daily as needed (diarrhea), Oral, 2 TIMES DAILY PRN, Historical Med    Multiple Vitamins-Minerals (PRESERVISION AREDS) CAPS  Take 1 capsule by mouth 2 times daily  Historical Med    Magnesium Oxide (MAGNESIUM-OXIDE) 250 MG TABS tablet  Take 250 mg by mouth 2 times daily  Historical Med    METAMUCIL FIBER PO  Take 1 packet by mouth 2 times daily  Historical Med    diphenoxylate-atropine (LOMOTIL) 2.5-0.025 MG per tablet  Take 1 tablet by mouth 4 times daily (before meals and nightly). Historical Med    loperamide (IMODIUM) 2 MG capsule  Take 4 mg by mouth in the morning and 4 mg at noon and 4 mg in the evening. Take with meals. Historical Med    fluticasone (FLONASE) 50 MCG/ACT nasal spray  1 spray by Nasal route nightly  Historical Med    !! lipase-protease-amylase (CREON) 64013-36314 units delayed release capsule  Take 24,000 Units by mouth in the morning and 24,000 Units at noon and 24,000 Units in the evening. Take with meals. , Oral, 3 TIMES DAILY WITH MEALS, Historical Med    Probiotic CAPS  Take 1 capsule by mouth nightly   Historical Med    hyoscyamine (ANASPAZ;LEVSIN) 125 MCG tablet  Take 125 mcg by mouth 4 times daily as needed (heartburn)  Historical Med    omeprazole (PRILOSEC) 20 MG delayed release capsule  Take 40 mg by mouth every morning   Historical Med    aspirin 81 MG tablet  Take 81 mg by mouth nightly   Historical Med    Cholecalciferol (VITAMIN D3) 50 MCG (2000 UT) TABS  Take 2,000 Units by mouth 2 times daily   Historical Med    Ferrous Sulfate (IRON) 325 (65 Fe) MG TABS  Take 325 mg by mouth nightly   Historical Med    vitamin B-12 (CYANOCOBALAMIN) 500 MCG tablet  Place 500 mcg under the tongue nightly   Historical Med    Omega 3 1000 MG CAPS  Take 1,000 mg by mouth nightly  Historical Med    escitalopram (LEXAPRO) 20 MG tablet  Take 20 mg by mouth nightly. Historical Med    primidone (MYSOLINE) 250 MG tablet  Take 250 mg by mouth nightly   Historical Med    traZODone (DESYREL) 50 MG tablet  Take 50 mg by mouth nightly   Historical Med    !! - Potential duplicate medications found. Please discuss with provider.           Discharge Medication List as of 8/18/2022  8:04 AM    STOP taking these medications    sodium polystyrene (KAYEXALATE) 15 GM/60ML suspension  Comments:  Reason for Stopping:    ondansetron (ZOFRAN-ODT) 4 MG

## 2022-08-19 NOTE — PROGRESS NOTES
Informed THE [facility] of , autopsy is not needed. Pt is not a 's case. [phone] per family's wishes, pt will be picked up tonight per facility representative.

## 2022-08-21 LAB — CULTURE, BLOOD 2: NORMAL

## 2023-10-18 NOTE — PROGRESS NOTES
[facility] Follow-up Visit    Date of Visit: 10/20/2023     CC: Follow-up for DEREK management      HPI:  Edwin Hammond is a very pleasant 68 y.o. year old male former smoker (30 pack-years, quit in 2001), with a PMHx of DEREK, elevated BMI, asthma, HTN, DSL  who presented to the [facility] for a regular follow up. Last seen in the office on 10/18/2022 with myself.     Patient presents for compliance.  Patient states that he has had a very busy work schedule which is attributed to his low compliance.  He denies any significant morning headaches, daytime/or drowsy, issues mostly, snoring, gasping, apneas, dry mouth, aerophagia, sleep, or skin irritation.  Patient goes to bed between 630-7 PM and wakes up between 2-3 AM.  He will not have any awakenings at night and will occasionally nap for about an hour.    DME provider: [facility]  Device: [employer] air sense 11 auto  Settings: CPAP 12 CWP  When: May 2022  Mask: Nasal pillows  Chin strap: Yes    Cleaning regimen: Couple times a week with soap and water.    Compliance:  Compliance data reviewed showing 53% usage > 4hours in last 30 days. Average AHI 0.6 events/hour.95% leaks 39.8 L/min. Patient continues to use and benefit from machine.        Sleep History:  PSG titration 6/19/2022-        Patient Active Problem List    Diagnosis Date Noted    DEREK on CPAP 06/17/2016    BMI 40.0-44.9, adult ([facility]) 08/10/2017    Mild intermittent asthma without complication 05/06/2016    Dyslipidemia 05/06/2016    Essential hypertension 05/06/2016     Past Medical History:   Diagnosis Date    Chickenpox     Spanish measles     Influenza     Mumps     Sleep apnea       Past Surgical History:   Procedure Laterality Date    OTHER  [id number]    Mastoidectomy left ear     Family History   Problem Relation Age of Onset    Sleep Apnea Brother      Social History     Socioeconomic History    Marital status:      Spouse name: Not on file    Number of children: Not on file     General Surgery Progress Note:    Patient Txr to MICU today for persistent hyperkalemia, low BP. Last labs show K to be improving. Hg this am stable. New Lactic acidosis. At time of my exam she is asleep and easily arousable. States she is comfortable and presently denies abdominal pain. Denies nausea or vomiting. GEN: calm, alert, cooperative, comfortable  ABD: soft, nondistended, non-tender. No guarding or rebound. PEG in LUQ, RLQ ileostomy viable with dark liquid and greenish stool output. A/P:    [de-identified] female with anemia, GI bleed which appears to be resolving; KD, hyperkalemia, oliguria  - abdominal exam continues to be reassuring - doubt this is source of lactic acidosis  - IVFs per Nephro  - ok to start tube feeds   - will continue to monitor H/H  - still planning for EGD but unable to do until electrolyte abnormalities resolved due to risk of cardiac event with Anesthesia.     D/w Dr. James Chin,  PGY4  Surgery Resident  8/10/2022 3:37 PM Years of education: Not on file    Highest education level: Not on file   Occupational History    Not on file   Tobacco Use    Smoking status: Former     Current packs/day: 0.00     Average packs/day: 1 pack/day for 30.0 years (30.0 ttl pk-yrs)     Types: Cigarettes     Start date: 1971     Quit date: 2001     Years since quittin.8    Smokeless tobacco: Never   Vaping Use    Vaping Use: Never used   Substance and Sexual Activity    Alcohol use: No    Drug use: No    Sexual activity: Not on file   Other Topics Concern    Not on file   Social History Narrative    Not on file     Social Determinants of Health     Financial Resource Strain: Not on file   Food Insecurity: Not on file   Transportation Needs: Not on file   Physical Activity: Not on file   Stress: Not on file   Social Connections: Not on file   Intimate Partner Violence: Not on file   Housing Stability: Not on file     Current Outpatient Medications   Medication Sig Dispense Refill    citalopram (CELEXA) 20 MG Tab Take 20 mg by mouth every day.      AMLODIPINE BESYLATE PO Take  by mouth.      Albuterol Sulfate 108 (90 BASE) MCG/ACT AEROSOL POWDER, BREATH ACTIVATED Inhale  by mouth.      lovastatin (MEVACOR) 40 MG tablet Take 40 mg by mouth every evening.      mometasone (ASMANEX) 220 MCG/INH inhaler Inhale 2 Puffs by mouth every day.      CYCLOBENZAPRINE HCL PO Take  by mouth. (Patient not taking: Reported on 10/18/2022)      MELOXICAM PO Take  by mouth. (Patient not taking: Reported on 10/20/2023)      aspirin 81 MG tablet Take 81 mg by mouth every day. (Patient not taking: Reported on 10/20/2023)      losartan (COZAAR) 50 MG Tab Take 100 mg by mouth every day. (Patient not taking: Reported on 10/20/2023)      losartan (COZAAR) 25 MG Tab Take 25 mg by mouth every day. (Patient not taking: Reported on 10/20/2023)       No current facility-administered medications for this visit.      ALLERGIES: Patient has no known  "allergies.    ROS:  Constitutional: Denies fever, chills, sweats,  weight loss, fatigue  Cardiovascular: Denies chest pain, tightness, palpitations, swelling in legs/feet  Respiratory: Denies shortness of breath, cough, sputum, wheezing, painful breathing   Sleep: per HPI  Gastrointestinal: Denies  difficulty swallowing, nausea, abdominal pain, diarrhea, constipation, heartburn.  Musculoskeletal: Denies painful joints, sore muscles,       PHYSICAL EXAM:  /72 (BP Location: Right arm, Patient Position: Sitting, BP Cuff Size: Adult)   Pulse 72   Resp 16   Ht 1.727 m (5' 8\")   Wt 112 kg (248 lb)   SpO2 95% Comment: room air at rest  BMI 37.71 kg/m²   Appearance: Well-nourished, well-developed, no acute distress  Eyes:  No scleral icterus , EOMI  ENMT: No redness of the oropharynx  Lung auscultation:  No wheezes rhonchi rubs or rales  Cardiac: No murmurs, rubs, or gallops; regular rhythm, normal rate; no edema  Musculoskeletal:  Grossly normal; gait and station normal; digits and nails normal  Skin:  No rashes, petechiae, cyanosis  Neurologic: without focal signs; oriented to person, time, place, and purpose; judgement intact  Psychiatric:  No depression, anxiety, agitation    Assessment and Plan:    The medical record was reviewed.    Diagnostic and titration nocturnal polysomnogram's, home sleep apnea tests, continuous nocturnal oximetry results, multiple sleep latency tests, and compliance reports reviewed.    Problem List Items Addressed This Visit       DEREK on CPAP     Sleep Apnea:    The pathophysiology of sleep anea and the increased risk of cardiovascular morbidity from untreated sleep apnea is discussed in detail with the patient.  Urged to avoid supine sleep, weight gain and alcoholic beverages since all of these can worsen sleep apnea. Cautioned against drowsy driving. If feeling sleepy while driving, pull over for a break/nap, rather than persist on the road, in the interest of own safety and that " of others on the road.    Compliance download was reviewed and discussed with the patient.   Patient's AHI is well controlled.  He does have low compliance and was counseled on increasing his compliance.  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    - Order placed for mask and supplies to [facility]   - Compliance was reinforced  - Recommended the patient against the use of Ozone , such as SoClean  - Clean supplies a couple times a week with dish soap and water and air dry  - Recommended the patient change out supplies as recommended for best mask fit and usage of the machine  - Advised patient to reach out via [employer] if any questions or concerns should arise.   - Equipment replacement schedule:  Mask cushion every month  Nasal pillows 2 times per month  Mask every 6 months  Head gear every 6 months  Tubing every 3 months  Ultra-fine filters 2 times per month  Foam filter every 6 months  Humidifier chamber every 6 months  Chin strap every 6 months    Has been advised to continue the current CPAP, clean equipment frequently, and get new mask and supplies as allowed by insurance and DME. Recommend an earlier appointment, if significant treatment barriers develop.    The risks of untreated sleep apnea were discussed with the patient at length. Patients with sleep apnea are at increased risk of cardiovascular disease including coronary artery disease, systemic arterial hypertension, pulmonary arterial hypertension, cardiac arrythmias, and stroke. The patient was advised to avoid driving a motor vehicle when drowsy.    Positive airway pressure will favorably impact many of the adverse conditions and effects provoked by sleep apnea.         Relevant Orders    DME Mask and Supplies     Have advised the patient to follow up with the appropriate healthcare practitioners for all other medical problems and issues.    Return in about 1 year (around 10/20/2024), or if  symptoms worsen or fail to improve, for compliance, with [facility].    Please note portions of this record was created using voice recognition software. I have made every reasonable attempt to correct obvious errors, but I expect that there are errors of grammar and possibly content I did not discover before finalizing the note.    Time spent in record review prior to patient arrival, reviewing results, and in face-to-face encounter totaled 12 min.  __________  UDAY Melgoza  Pulmonary & Sleep Medicine  [facility]

## 2024-06-17 NOTE — CONSULTS
HCC coding opportunities       Chart reviewed, no opportunity found: CHART REVIEWED, NO OPPORTUNITY FOUND        Patients Insurance        Commercial Insurance: [employer]        Pain Management Consult        Brandon Underwood is a 68 y.o. female, her YOB: 1940 with the following history as recorded in [facility]:    CC:  Abdominal pain, lower back and neck pain    HPI:  This is a 68year old female who recently underwent an explantation of hernia mesh and an exploratory lap, take down enterocutaneous fistula, small bowel resection and ileostomy revision. She reports a 15 year history of pain management for her chronic pain (lower back and neck with hx of multiple surgeries). She reports that most recently she has been a patient of [facility] for the last 6 years taking percocet 10/325 TID/QID #100 per month. She reports prior to that she had been on the fentanyl patch along with vicodin and at another point on long acting morphine. She reports her current pain is mostly concentrated in her abdomen and lower back. She reports that the norco that has been ordered is not relieving her pain enough. She states that she is no longer taking IV dilaudid.       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    Infected hernioplasty mesh ([address] 06/10/2020    Cellulitis 06/05/2020    Abdominal wall cellulitis 05/07/2020    Moderate protein-calorie malnutrition ([address] 01/27/2020    History of ischemic bowel disease 03/23/2017    Ileostomy present ([address] 03/23/2017    Chronic kidney disease, stage III (moderate) ([address] 03/23/2017    COPD (chronic obstructive pulmonary disease) ([address] 03/23/2017    Primary osteoarthritis of left knee 03/03/2017    Essential hypertension, benign 03/24/2015    Hyperlipidemia with target LDL less than 100 03/24/2015    Osteoarthritis, shoulder 03/24/2015    Right cataract 03/03/2015     Current Facility-Administered Medications   Medication Dose Route Frequency Provider Last Rate Last Dose    magnesium sulfate 4 g in 100 mL IVPB premix  4 g Intravenous Once Houston Denis MD        magnesium oxide (MAG-OX) tablet 400 mg  400 mg Oral BID Candace Armendariz MD   400 mg at 09/20/20 2026    HYDROcodone-acetaminophen (NORCO) 5-325 MG per tablet 1 tablet  1 tablet Oral Q4H PRN Candace Armendariz MD   1 tablet at 09/21/20 0138    Or    HYDROcodone-acetaminophen (NORCO) 5-325 MG per tablet 2 tablet  2 tablet Oral Q4H PRN Candace Armendariz MD   2 tablet at 09/21/20 0647    HYDROmorphone (DILAUDID) injection 1 mg  1 mg Intravenous Q3H PRN Candace Armendariz MD        miconazole (MICOTIN) 2 % powder   Topical BID Julien Martinez MD        promethazine (PHENERGAN) injection 12.5 mg  12.5 mg Intramuscular Q6H PRN Julien Martinez MD        pantoprazole (PROTONIX) injection 40 mg  40 mg Intravenous BID Houston Denis MD   40 mg at 09/20/20 2026    And    sodium chloride (PF) 0.9 % injection 10 mL  10 mL Intravenous BID Houston Denis MD   10 mL at 09/20/20 2026    enoxaparin (LOVENOX) injection 40 mg  40 mg Subcutaneous Daily Houston Denis MD   40 mg at 09/20/20 0849    aluminum sulfate-calcium acetate (DOMEBORO) packet 4 packet  4 packet Topical Daily PRN Houston Denis MD   4 packet at 09/21/20 0512    sodium chloride flush 0.9 % injection 10 mL  10 mL Intravenous PRN Houston Denis MD   20 mL at 09/15/20 1309    heparin flush 100 UNIT/ML injection 300 Units  3 mL Intravenous 2 times per day Houston Denis MD   300 Units at 09/20/20 2026    heparin flush 100 UNIT/ML injection 300 Units  3 mL Intracatheter PRN Houston Denis MD   300 Units at 09/19/20 0407    nicotine (NICODERM CQ) 14 MG/24HR 1 patch  1 patch Transdermal Daily Houston Denis MD   1 patch at 09/20/20 0850    HYDROmorphone (DILAUDID) injection 0.5 mg  0.5 mg Intravenous Q3H PRN Houston Denis MD   0.5 mg at 09/15/20 1204    dicyclomine (BENTYL) capsule 10 mg  10 mg Oral BID Houstonyoon Denis MD   10 mg at 09/20/20 2026    dilTIAZem (CARDIZEM CD) extended release capsule 120 mg  120 mg Mitral valve regurgitation     [facility] infarct [facility] 2002    Occasional tremors     at hands / stable with medication    Osteopenia     PONV (postoperative nausea and vomiting)     Vitamin B12 deficiency     h/o     Past Surgical History:   Procedure Laterality Date    ABDOMEN SURGERY  2-    total colectomy, bowel resection, ileostomy,revision of ileostomy     ABDOMEN SURGERY N/A 1/26/2020    DRAINAGE OF ABDOMINAL WALL ABSCESS, EXPLANTATION OF MESH, DEBRIDEMENT OF SKIN AND SUBCUTANEOUS TISSUE performed by Harleen Miller MD at [facility] N/A 9/11/2020    EXPLANTATION OF HERNIA MESH [facility]  [facility]   [address]  1-2006    right and left thumb   [facility], 2004, 2009    lumbar fusion x 3    BACK SURGERY      cervical fusion x 2    BREAST SURGERY  years ago    monor calcifications    CARPAL TUNNEL RELEASE Bilateral     CATARACT REMOVAL WITH IMPLANT Right 03/03/15    CHOLECYSTECTOMY  5-11-07    Lap    COLONOSCOPY      CORONARY ANGIOPLASTY WITH STENT PLACEMENT  1-7-2002    ENDOSCOPY, COLON, DIAGNOSTIC      EPIGASTRIC HERNIA REPAIR  3/21/16    incisional with mesh implantation    ESOPHAGUS SURGERY  1-4-13    esophageal clamp (torn Esophagus)    FINGER TRIGGER RELEASE Right 2006    index finger    FOOT SURGERY Right     multiple    HERNIA REPAIR  12-31-13    abdominal x 5- last one 2017     ILEOSTOMY OR JEJUNOSTOMY  1-3-13    revision    INSERT PICC LINE  06/09/2020    and removed     JOINT REPLACEMENT Right 3/24/15    right total shoulder arthroplasty    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     LAPAROTOMY N/A 5/12/2020    EXPLORATORY LAPAROTOMY EXPLANTATION OF INFECTED MESH SMAL BOWEL RESECTION AND REVISION END ILEOSTOMY, LYSIS OF ADHESIONS performed by Harleen Miller MD at [place] [address] N/A 6/8/2020    LAPAROTOMY EXPLORATORY, Bertis Shone OF HERNIA MESH performed by Harleen Miller, MD at [facility] N/A 9/15/2020    EXPLORATORY LAPAROTOMY WITH SMALL BOWEL RESECTION, TAKE DOWN REVISION ENTEROCUTANEOUS FISTULA , OSTOMY REVISION performed by Eli Lopez MD at [address] / leg    NASAL SINUS SURGERY      x2 /  cauterized    OTHER SURGICAL HISTORY  08/24/2016    diagnostic laparotomy with repair of intraabdominal hernia    OTHER SURGICAL HISTORY      removal plate / screws  / right great toe    PICC LINE INSERTION NURSE  9/15/2020         ROTATOR CUFF REPAIR  2010    right     SKIN BIOPSY  2010    3 squamous cell carcinoma right leg, left leg     History reviewed. No pertinent family history. Social History     Tobacco Use    Smoking status: Current Every Day Smoker     Packs/day: 1.00    Smokeless tobacco: Never Used   Substance Use Topics    Alcohol use: Yes     Comment: occasional           Imaging studies:    09/12/2020 CT Abdomen/Pelvis    FINDINGS:         LOWER THORAX: Unremarkable. LIVER: Unremarkable. GALLBLADDER: Surgically absent. SPLEEN:Unremarkable. ADRENALS:Unremarkable. KIDNEYS:Unremarkable. PANCREAS:Unremarkable. BOWEL: Status post subtotal colectomy. Right lower quadrant ileostomy    is noted. Medial to the site of the ileostomy, there is small bowel    loop, associated with a tiny amount of contrast which extends to the    skin surface, where contrast is seen pooling (series 3, image 26). This may represent the suspected cutaneous fistula. A rectal stump is    seen in the pelvis. BLADDER:Unremarkable. REPRODUCTIVE ORGANS:Unremarkable. VASCULATURE: Prominent calcified atherosclerosis seen in the abdominal    aorta. No aneurysm. LYMPH NODES:Unremarkable. BONES:Evaluation of the bones reveals no fracture or destructive    lesion. Postoperative changes from posterior interbody fusion are    noted in the visualized thoracolumbar spine extending to the level of    S1.  Anterior body fusion hardware is also noted at L5-S1. Decompressive laminectomies are noted from L1-2 to L5-1. MISCELLANEOUS:No additional finding.              Impression              1. Tiny tract of contrast, which appears to extend from small bowel    loop to the anterior abdominal wall, superior and medial to the site    of the colostomy. This finding likely represents the suspected    enterocutaneous fistula. If indicated injection of the fistula under    fluoroscopy would be more definitive. 2. Status post hemicolectomy. OARRS report:  09/2020:  Consistent. Reviewed. She is a current patient of [facility]. REVIEW OF SYSTEMS:     Delio Councilman denies fever/chills, chest pain, shortness of breath, new bowel or bladder complaints. All other review of systems was negative. PHYSICAL EXAMINATION:      BP (!) 122/56   Pulse 82   Temp 97.8 °F (36.6 °C) (Oral)   Resp 18   Ht 5' (1.524 m)   Wt 141 lb 6.4 oz (64.1 kg)   SpO2 96%   BMI 27.62 kg/m²     General:      General appearance:   elderly, pleasant and well-hydrated. , in no discomfort and A & O x3  Build:Normal Weight    HEENT:    Head:normocephalic and atraumatic  Sclera: icterus absent,    Lungs:    Breathing:Normal expansion. No rales, rhonchi, or wheezing. Lumbar spine:    Range of motion:abnormal moderately Lateral bending, flexion, extension rotation bilateral and is painful. Extremities:    Tremors:None bilaterally lower  Intact:Yes    Dermatology:    Skin:no unusual rashes, no skin lesions, no palpable subcutaneous nodules and good skin turgor    Impression:    Patient is s/p:  Explantation of hernia mesh and an exploratory lap, take down enterocutaneous fistula, small bowel resection and ileostomy revision. Hx of chronic pain management x 15 years. Patient of [facility] most recently x 6 years. On percocet 10/325 #100 per month (TID/QID).     Hx of cervical fusion x 2 and lumbar fusion x 3  Chemical

## 2025-04-22 NOTE — PROGRESS NOTES
Notified Dr. Garcia Better pt blood pressure is 80/50 and bp is 55. Awaiting orders. BMI: BMI (kg/m2): 27 (04-14-25 @ 13:38)  HbA1c:   Glucose:   BP: 109/69 (04-21-25 @ 13:01) (109/69 - 116/78)Vital Signs Last 24 Hrs  T(C): --  T(F): --  HR: --  BP: --  BP(mean): --  RR: --  SpO2: --      Lipid Panel:

## (undated) DEVICE — SPONGE GZ W4XL4IN RAYON POLY FILL CVR W/ NONWOVEN FAB

## (undated) DEVICE — TOWEL,OR,DSP,ST,GREEN,DLX,XR,4/PK,20PK/C: Brand: MEDLINE

## (undated) DEVICE — BAG SPECIMEN BIOHAZARD 6IN X 9IN

## (undated) DEVICE — SPONGE,LAP,4"X18",XR,ST,5/PK,40PK/CS: Brand: MEDLINE INDUSTRIES, INC.

## (undated) DEVICE — SHEET, T, LAPAROTOMY, STERILE: Brand: MEDLINE

## (undated) DEVICE — GAUZE,SPONGE,4"X4",8PLY,STRL,LF,10/TRAY: Brand: MEDLINE

## (undated) DEVICE — HEAVY DRAINAGE PACK: Brand: CURITY

## (undated) DEVICE — DILATORS EEA W/VALTRAC

## (undated) DEVICE — STAPLER INT L55MM CUT LN L53MM STPL LN L57MM BLU B FRM 8

## (undated) DEVICE — SUTURE VIC + 3-0 27 IN CT1 VIO VCP338H

## (undated) DEVICE — READY WET SKIN SCRUB TRAY-LF: Brand: MEDLINE INDUSTRIES, INC.

## (undated) DEVICE — TRAP SPEC MUCUS FOR SUCT

## (undated) DEVICE — DRAPE,LAPAROTOMY,PCH,STERILE: Brand: MEDLINE

## (undated) DEVICE — SOLUTION IV IRRIG POUR BRL 0.9% SODIUM CHL 2F7124

## (undated) DEVICE — Device

## (undated) DEVICE — WIPE,PROTECTANT,SKIN,SUREPREP: Brand: MEDLINE

## (undated) DEVICE — Z DISCONTINUED SUGG SUB 2622703 KIT OST L12IN FLNG DIA70MM ST TRNSPAR TWO PC MOLD

## (undated) DEVICE — PAD,ABDOMINAL,5"X9",ST,LF,25/BX: Brand: MEDLINE INDUSTRIES, INC.

## (undated) DEVICE — DOUBLE BASIN SET: Brand: MEDLINE INDUSTRIES, INC.

## (undated) DEVICE — GOWN,SIRUS,FABRNF,L,20/CS: Brand: MEDLINE

## (undated) DEVICE — GOWN,SIRUS,FABRNF,XL,20/CS: Brand: MEDLINE

## (undated) DEVICE — SEALER ENDOSCP NANO COAT OPN DIV CRV L JAW LIGASURE IMPACT

## (undated) DEVICE — Device: Brand: SENSURA MIO

## (undated) DEVICE — CONNECTOR IRRIGATION AUXILIARY H2O JET W/ PRT MTL THRD HYDR

## (undated) DEVICE — LAPAROTOMY DRAPE WITH POUCHES: Brand: CONVERTORS

## (undated) DEVICE — WEITLANER RETRCT 6.5 3X4 PRNG BLNT

## (undated) DEVICE — ELECTRODE PT RET AD L9FT HI MOIST COND ADH HYDRGEL CORDED

## (undated) DEVICE — DRESSING BORDERED ADH GZ UNIV GEN USE 8INX4IN AND 6INX2IN

## (undated) DEVICE — PACK PROCEDURE SURG GEN CUST

## (undated) DEVICE — TOWEL,OR,DSP,ST,BLUE,STD,6/PK,12PK/CS: Brand: MEDLINE

## (undated) DEVICE — DRESSING NEG PRSS L W15XH3.3XL26CM BLK POLYUR DRP PD

## (undated) DEVICE — SPONGE LAP W18XL18IN WHT COT 4 PLY FLD STRUNG RADPQ DISP ST

## (undated) DEVICE — APPLICATOR PREP 26ML 0.7% IOD POVACRYLEX 74% ISO ALC ST

## (undated) DEVICE — COVER HNDL LT DISP

## (undated) DEVICE — DRESSING COMP W4XL4IN N ADH PD W2.5XL2.5IN GZ BORDERED ADH

## (undated) DEVICE — BLADE ES L6IN ELASTOMERIC COAT EXT DURABLE BEND UPTO 90DEG

## (undated) DEVICE — SUTURE VCRL + SZ 3-0 L18IN ABSRB UD PS-1 L24MM 3/8 CIR PRIM VCP683G

## (undated) DEVICE — CLAMP KELLY CURVED

## (undated) DEVICE — BLADE CLIPPER GEN PURP NS

## (undated) DEVICE — 4-PORT MANIFOLD: Brand: NEPTUNE 2

## (undated) DEVICE — MARKER,SKIN,WI/RULER AND LABELS: Brand: MEDLINE

## (undated) DEVICE — BLADE ES ELASTOMERIC COAT INSUL DURABLE BEND UPTO 90DEG

## (undated) DEVICE — Z DISCONTINUED NO SUB IDED TUBING ETCO2 AD L6.5FT NSL ORAL CVD PRNG NONFLARED TIP OVR

## (undated) DEVICE — YANKAUER,POOLE TIP,STERILE,50/CS: Brand: MEDLINE

## (undated) DEVICE — E-Z CLEAN, NON-STICK, PTFE COATED, ELECTROSURGICAL BLADE ELECTRODE, MODIFIED EXTENDED INSULATION, 2.5 INCH (6.35 CM): Brand: MEGADYNE

## (undated) DEVICE — SWAB SPEC COLL SHFT L5.25IN POLYUR FOAM TIP SFT DBL MEDIA

## (undated) DEVICE — STAPLER INT L75MM CUT LN L73MM STPL LN L77MM BLU B FRM 8

## (undated) DEVICE — SUTURE VCRL SZ 3-0 L27IN ABSRB UD L26MM SH 1/2 CIR J416H

## (undated) DEVICE — METER,URINE,400ML,DRAIN BAG,L/F,LL: Brand: MEDLINE

## (undated) DEVICE — SET INSTRUMENT LAP I

## (undated) DEVICE — TELFA ADHESIVE ISLAND DRESSING: Brand: TELFA

## (undated) DEVICE — INTENDED FOR TISSUE SEPARATION, AND OTHER PROCEDURES THAT REQUIRE A SHARP SURGICAL BLADE TO PUNCTURE OR CUT.: Brand: BARD-PARKER ® STAINLESS STEEL BLADES

## (undated) DEVICE — NEEDLE HYPO 22GA L1.5IN BLK POLYPR HUB S STL REG BVL STR

## (undated) DEVICE — RELOAD STPL L75MM OPN H3.8MM CLS 1.5MM WIRE DIA0.2MM REG

## (undated) DEVICE — DEFENDO AIR WATER SUCTION AND BIOPSY VALVE KIT FOR  OLYMPUS: Brand: DEFENDO AIR/WATER/SUCTION AND BIOPSY VALVE

## (undated) DEVICE — SPECIMEN CUP W/LID: Brand: DEROYAL

## (undated) DEVICE — GLOVE ORANGE PI 8   MSG9080

## (undated) DEVICE — BITEBLOCK 54FR W/ DENT RIM BLOX

## (undated) DEVICE — CONTAINER VACUTAINER ANAER CULTURE SWAB

## (undated) DEVICE — BANDAGE,GAUZE,BULKEE II,4.5"X4.1YD,STRL: Brand: MEDLINE

## (undated) DEVICE — GLOVE ORANGE PI 7 1/2   MSG9075

## (undated) DEVICE — SOLUTION IV IRRIG WATER 1000ML POUR BRL 2F7114

## (undated) DEVICE — CHLORAPREP 26ML ORANGE

## (undated) DEVICE — SET INSTRUMENT LAP II